# Patient Record
Sex: FEMALE | Race: BLACK OR AFRICAN AMERICAN | Employment: OTHER | URBAN - METROPOLITAN AREA
[De-identification: names, ages, dates, MRNs, and addresses within clinical notes are randomized per-mention and may not be internally consistent; named-entity substitution may affect disease eponyms.]

---

## 2017-06-13 ENCOUNTER — APPOINTMENT (OUTPATIENT)
Dept: CT IMAGING | Age: 67
End: 2017-06-13
Attending: EMERGENCY MEDICINE
Payer: MEDICARE

## 2017-06-13 ENCOUNTER — HOSPITAL ENCOUNTER (EMERGENCY)
Age: 67
Discharge: HOME | End: 2017-06-13
Attending: EMERGENCY MEDICINE
Payer: MEDICARE

## 2017-06-13 ENCOUNTER — APPOINTMENT (OUTPATIENT)
Dept: GENERAL RADIOLOGY | Age: 67
End: 2017-06-13
Attending: EMERGENCY MEDICINE
Payer: MEDICARE

## 2017-06-13 VITALS
WEIGHT: 124 LBS | TEMPERATURE: 99.1 F | OXYGEN SATURATION: 97 % | DIASTOLIC BLOOD PRESSURE: 64 MMHG | HEART RATE: 70 BPM | HEIGHT: 62 IN | RESPIRATION RATE: 16 BRPM | SYSTOLIC BLOOD PRESSURE: 129 MMHG | BODY MASS INDEX: 22.82 KG/M2

## 2017-06-13 DIAGNOSIS — E86.0 DEHYDRATION: Primary | ICD-10-CM

## 2017-06-13 LAB
ALBUMIN SERPL BCP-MCNC: 3.7 G/DL (ref 3.4–5)
ALBUMIN/GLOB SERPL: 0.8 {RATIO} (ref 0.8–1.7)
ALP SERPL-CCNC: 77 U/L (ref 45–117)
ALT SERPL-CCNC: 25 U/L (ref 13–56)
AMORPH CRY URNS QL MICRO: ABNORMAL
ANION GAP BLD CALC-SCNC: 12 MMOL/L (ref 3–18)
APPEARANCE UR: CLEAR
AST SERPL W P-5'-P-CCNC: 20 U/L (ref 15–37)
BACTERIA URNS QL MICRO: ABNORMAL /HPF
BASOPHILS # BLD AUTO: 0 K/UL (ref 0–0.1)
BASOPHILS # BLD: 0 % (ref 0–2)
BILIRUB SERPL-MCNC: 0.6 MG/DL (ref 0.2–1)
BILIRUB UR QL: NEGATIVE
BNP SERPL-MCNC: 43 PG/ML (ref 0–900)
BUN SERPL-MCNC: 18 MG/DL (ref 7–18)
BUN/CREAT SERPL: 15 (ref 12–20)
CALCIUM SERPL-MCNC: 8.5 MG/DL (ref 8.5–10.1)
CHLORIDE SERPL-SCNC: 105 MMOL/L (ref 100–108)
CK MB CFR SERPL CALC: 2.3 % (ref 0–4)
CK MB SERPL-MCNC: 1 NG/ML (ref 5–25)
CK SERPL-CCNC: 44 U/L (ref 26–192)
CO2 SERPL-SCNC: 22 MMOL/L (ref 21–32)
COLOR UR: ABNORMAL
CREAT SERPL-MCNC: 1.17 MG/DL (ref 0.6–1.3)
D DIMER PPP FEU-MCNC: 0.34 UG/ML(FEU)
DIFFERENTIAL METHOD BLD: ABNORMAL
EOSINOPHIL # BLD: 0 K/UL (ref 0–0.4)
EOSINOPHIL NFR BLD: 0 % (ref 0–5)
EPITH CASTS URNS QL MICRO: NEGATIVE /LPF (ref 0–5)
ERYTHROCYTE [DISTWIDTH] IN BLOOD BY AUTOMATED COUNT: 13.4 % (ref 11.6–14.5)
GLOBULIN SER CALC-MCNC: 4.6 G/DL (ref 2–4)
GLUCOSE SERPL-MCNC: 215 MG/DL (ref 74–99)
GLUCOSE UR STRIP.AUTO-MCNC: 100 MG/DL
HCT VFR BLD AUTO: 38 % (ref 35–45)
HGB BLD-MCNC: 12.7 G/DL (ref 12–16)
HGB UR QL STRIP: NEGATIVE
HYALINE CASTS URNS QL MICRO: ABNORMAL /LPF (ref 0–2)
KETONES UR QL STRIP.AUTO: ABNORMAL MG/DL
LEUKOCYTE ESTERASE UR QL STRIP.AUTO: NEGATIVE
LYMPHOCYTES # BLD AUTO: 21 % (ref 21–52)
LYMPHOCYTES # BLD: 0.8 K/UL (ref 0.9–3.6)
MCH RBC QN AUTO: 24.8 PG (ref 24–34)
MCHC RBC AUTO-ENTMCNC: 33.4 G/DL (ref 31–37)
MCV RBC AUTO: 74.2 FL (ref 74–97)
MONOCYTES # BLD: 0.3 K/UL (ref 0.05–1.2)
MONOCYTES NFR BLD AUTO: 8 % (ref 3–10)
MUCOUS THREADS URNS QL MICRO: ABNORMAL /LPF
NEUTS SEG # BLD: 2.6 K/UL (ref 1.8–8)
NEUTS SEG NFR BLD AUTO: 71 % (ref 40–73)
NITRITE UR QL STRIP.AUTO: NEGATIVE
PH UR STRIP: 5 [PH] (ref 5–8)
PLATELET # BLD AUTO: 184 K/UL (ref 135–420)
PLATELET COMMENTS,PCOM: ABNORMAL
PMV BLD AUTO: 10.9 FL (ref 9.2–11.8)
POTASSIUM SERPL-SCNC: 3.5 MMOL/L (ref 3.5–5.5)
PROT SERPL-MCNC: 8.3 G/DL (ref 6.4–8.2)
PROT UR STRIP-MCNC: 30 MG/DL
RBC # BLD AUTO: 5.12 M/UL (ref 4.2–5.3)
RBC #/AREA URNS HPF: NEGATIVE /HPF (ref 0–5)
RBC MORPH BLD: ABNORMAL
SODIUM SERPL-SCNC: 139 MMOL/L (ref 136–145)
SP GR UR REFRACTOMETRY: >1.03 (ref 1–1.03)
TROPONIN I SERPL-MCNC: <0.02 NG/ML (ref 0–0.04)
TSH SERPL DL<=0.05 MIU/L-ACNC: 0.45 UIU/ML (ref 0.36–3.74)
UROBILINOGEN UR QL STRIP.AUTO: 1 EU/DL (ref 0.2–1)
WBC # BLD AUTO: 3.7 K/UL (ref 4.6–13.2)
WBC URNS QL MICRO: ABNORMAL /HPF (ref 0–4)

## 2017-06-13 PROCEDURE — 84443 ASSAY THYROID STIM HORMONE: CPT | Performed by: EMERGENCY MEDICINE

## 2017-06-13 PROCEDURE — 74011250636 HC RX REV CODE- 250/636: Performed by: EMERGENCY MEDICINE

## 2017-06-13 PROCEDURE — 96374 THER/PROPH/DIAG INJ IV PUSH: CPT

## 2017-06-13 PROCEDURE — 71010 XR CHEST PORT: CPT

## 2017-06-13 PROCEDURE — 99285 EMERGENCY DEPT VISIT HI MDM: CPT

## 2017-06-13 PROCEDURE — 82550 ASSAY OF CK (CPK): CPT | Performed by: EMERGENCY MEDICINE

## 2017-06-13 PROCEDURE — 70450 CT HEAD/BRAIN W/O DYE: CPT

## 2017-06-13 PROCEDURE — 83880 ASSAY OF NATRIURETIC PEPTIDE: CPT | Performed by: EMERGENCY MEDICINE

## 2017-06-13 PROCEDURE — 96361 HYDRATE IV INFUSION ADD-ON: CPT

## 2017-06-13 PROCEDURE — 81001 URINALYSIS AUTO W/SCOPE: CPT | Performed by: EMERGENCY MEDICINE

## 2017-06-13 PROCEDURE — 85379 FIBRIN DEGRADATION QUANT: CPT | Performed by: EMERGENCY MEDICINE

## 2017-06-13 PROCEDURE — 93005 ELECTROCARDIOGRAM TRACING: CPT

## 2017-06-13 PROCEDURE — 80053 COMPREHEN METABOLIC PANEL: CPT | Performed by: EMERGENCY MEDICINE

## 2017-06-13 PROCEDURE — 85025 COMPLETE CBC W/AUTO DIFF WBC: CPT | Performed by: EMERGENCY MEDICINE

## 2017-06-13 RX ORDER — HYDROCODONE POLISTIREX AND CHLORPHENIRAMINE POLISTIREX 10; 8 MG/5ML; MG/5ML
5 SUSPENSION, EXTENDED RELEASE ORAL
Qty: 115 ML | Refills: 0 | Status: SHIPPED | OUTPATIENT
Start: 2017-06-13 | End: 2017-11-08

## 2017-06-13 RX ORDER — TRAMADOL HYDROCHLORIDE 50 MG/1
50 TABLET ORAL
COMMUNITY
End: 2017-11-08

## 2017-06-13 RX ORDER — SODIUM CHLORIDE 9 MG/ML
1000 INJECTION, SOLUTION INTRAVENOUS ONCE
Status: COMPLETED | OUTPATIENT
Start: 2017-06-13 | End: 2017-06-13

## 2017-06-13 RX ORDER — MORPHINE SULFATE 4 MG/ML
4 INJECTION, SOLUTION INTRAMUSCULAR; INTRAVENOUS
Status: COMPLETED | OUTPATIENT
Start: 2017-06-13 | End: 2017-06-13

## 2017-06-13 RX ORDER — METFORMIN HYDROCHLORIDE 500 MG/1
750 TABLET ORAL
Status: ON HOLD | COMMUNITY
End: 2019-10-18 | Stop reason: SDUPTHER

## 2017-06-13 RX ADMIN — Medication 4 MG: at 19:44

## 2017-06-13 RX ADMIN — SODIUM CHLORIDE 1000 ML: 900 INJECTION, SOLUTION INTRAVENOUS at 19:27

## 2017-06-13 RX ADMIN — SODIUM CHLORIDE 1000 ML: 9 INJECTION, SOLUTION INTRAVENOUS at 18:30

## 2017-06-13 NOTE — DISCHARGE INSTRUCTIONS
Dehydration: Care Instructions  Your Care Instructions  Dehydration happens when your body loses too much fluid. This might happen when you do not drink enough water or you lose large amounts of fluids from your body because of diarrhea, vomiting, or sweating. Severe dehydration can be life-threatening. Water and minerals called electrolytes help put your body fluids back in balance. Learn the early signs of fluid loss, and drink more fluids to prevent dehydration. Follow-up care is a key part of your treatment and safety. Be sure to make and go to all appointments, and call your doctor if you are having problems. It's also a good idea to know your test results and keep a list of the medicines you take. How can you care for yourself at home? · To prevent dehydration, drink plenty of fluids, enough so that your urine is light yellow or clear like water. Choose water and other caffeine-free clear liquids until you feel better. If you have kidney, heart, or liver disease and have to limit fluids, talk with your doctor before you increase the amount of fluids you drink. · If you do not feel like eating or drinking, try taking small sips of water, sports drinks, or other rehydration drinks. · Get plenty of rest.  To prevent dehydration  · Add more fluids to your diet and daily routine, unless your doctor has told you not to. · During hot weather, drink more fluids. Drink even more fluids if you exercise a lot. Stay away from drinks with alcohol or caffeine. · Watch for the symptoms of dehydration. These include:  ¨ A dry, sticky mouth. ¨ Dark yellow urine, and not much of it. ¨ Dry and sunken eyes. ¨ Feeling very tired. · Learn what problems can lead to dehydration. These include:  ¨ Diarrhea, fever, and vomiting. ¨ Any illness with a fever, such as pneumonia or the flu. ¨ Activities that cause heavy sweating, such as endurance races and heavy outdoor work in hot or humid weather.   ¨ Alcohol or drug abuse or withdrawal.  ¨ Certain medicines, such as cold and allergy pills (antihistamines), diet pills (diuretics), and laxatives. ¨ Certain diseases, such as diabetes, cancer, and heart or kidney disease. When should you call for help? Call 911 anytime you think you may need emergency care. For example, call if:  · You passed out (lost consciousness). Call your doctor now or seek immediate medical care if:  · You are confused and cannot think clearly. · You are dizzy or lightheaded, or you feel like you may faint. · You have signs of needing more fluids. You have sunken eyes and a dry mouth, and you pass only a little dark urine. · You cannot keep fluids down. Watch closely for changes in your health, and be sure to contact your doctor if:  · You are not making tears. · Your skin is very dry and sags slowly back into place after you pinch it. · Your mouth and eyes are very dry. Where can you learn more? Go to http://odessa-mack.info/. Enter Q602 in the search box to learn more about \"Dehydration: Care Instructions. \"  Current as of: May 27, 2016  Content Version: 11.2  © 3829-6171 [employer]. Care instructions adapted under license by [employer] (which disclaims liability or warranty for this information). If you have questions about a medical condition or this instruction, always ask your healthcare professional. [place] [address] any warranty or liability for your use of this information.

## 2017-06-13 NOTE — ED PROVIDER NOTES
HPI Comments:   5:55 PM Avelina Cole is a 61 y.o. female who presents to the ED via EMS for the evaluation of dizziness and fatigue, onset yesterday. Pt states that her sx worsened this morning where she became more SOB and had an episode of N/V. She also reports frequent bowel movements, dysuria, bilateral ankle swelling, and productive cough with green sputum. Denies CP, palpitations, or vision changes. Pt states that she saw her PCP about a week ago, where he refilled all of her home meds. Pt states that sh is no longer on Coumadin. No relieving or exacerbating factors. No other complaints at this time. PMHx: Pt has a past medical history of Arthritis; Asthma; CAD in native artery; Chronic neck pain; Chronic pain; Chronic pain syndrome; COPD (chronic obstructive pulmonary disease) ([address] Depression; Diabetes ([address] Diverticulitis; GERD (gastroesophageal reflux disease); Heart disease; Hidradenitis (9/11/2014); Hypertension; Left knee pain; Narcotic dependence ([address] Right wrist pain; and Sarcoidosis ([address] She also has no past medical history of Thromboembolus ([address] PCP: Hermann Fritz MD       The history is provided by the spouse, the patient and the EMS personnel. Past Medical History:   Diagnosis Date    Arthritis     \"generalized\"    Asthma     CAD in native artery     NSTEMI (Sep 2014); diffuse 65% pLAD, minimal disease RCA & LCx. pLAD FFR 0.93.  LV  no RWMA (echo and LV angio)    Chronic neck pain     Chronic pain     left knee    Chronic pain syndrome     COPD (chronic obstructive pulmonary disease) (HCC)     Depression     Diabetes (HCC)     Diverticulitis     GERD (gastroesophageal reflux disease)     Heart disease     Hidradenitis 9/11/2014    Hypertension     Left knee pain     Narcotic dependence ([address]     Right wrist pain     Sarcoidosis ([address]        Past Surgical History:   Procedure Laterality Date    HX BREAST BIOPSY Right     9/10/14: She said tag in place from [address] [place] [address] HX HYSTERECTOMY      HX KNEE ARTHROSCOPY Left          Family History:   Problem Relation Age of Onset    Hypertension Father     Diabetes Mother        Social History     Social History    Marital status:      Spouse name: N/A    Number of children: N/A    Years of education: N/A     Occupational History    Not on file. Social History Main Topics    Smoking status: Current Some Day Smoker     Packs/day: 0.10     Years: 46.00     Types: Cigarettes    Smokeless tobacco: Not on file      Comment: Only smokes 1 cigarette per day    Alcohol use 1.0 oz/week     2 Glasses of wine per week      Comment: socially    Drug use: Yes     Special: Cocaine, Marijuana      Comment: 9/10/14: Cocaine use during past day; 9/12/14: Says uses cocaine only occasionally    Sexual activity: Yes     Other Topics Concern    Not on file     Social History Narrative         ALLERGIES: Ciprofloxacin; Penicillins; and Shellfish derived    Review of Systems   Constitutional: Positive for appetite change and fatigue. Negative for chills and fever. HENT: Negative for congestion and sneezing. Eyes: Negative for visual disturbance. Respiratory: Positive for cough and shortness of breath. Cardiovascular: Positive for leg swelling. Negative for chest pain and palpitations. Gastrointestinal: Positive for nausea and vomiting. Negative for abdominal pain. Genitourinary: Negative for difficulty urinating and dysuria. Musculoskeletal: Negative for back pain. Skin: Negative for rash. Neurological: Positive for dizziness and light-headedness. Negative for weakness and headaches. Vitals:    06/13/17 1757 06/13/17 1800   BP: 130/70 132/72   Pulse: 81 81   Resp: 17 20   Temp: 99.1 °F (37.3 °C)    SpO2: 98% 97%   Weight: 56.2 kg (124 lb)    Height: 5' 2\" (1.575 m)         98% on RA, indicating adequate oxygenation.       Physical Exam   Constitutional: She is oriented to person, place, and time. She appears well-developed and well-nourished. Appears chronically ill   HENT:   Head: Normocephalic and atraumatic. Dry oral mucosa  Chapped lips   Neck: Neck supple. No JVD present. Cardiovascular: Normal rate and regular rhythm. Pulmonary/Chest: Effort normal and breath sounds normal. No respiratory distress. Abdominal: Soft. She exhibits no distension. There is no tenderness. There is no rebound and no guarding. Musculoskeletal: She exhibits no edema. No calf tenderness   Neurological: She is alert and oriented to person, place, and time. No cranial nerve deficit. Skin: Skin is warm and dry. No erythema. Psychiatric: Judgment normal.        MDM  Number of Diagnoses or Management Options  Diagnosis management comments: 62 y/o female presents with fatigue    Screen for infection with ua, cxr    Due to report of multiple falls will ct head to eval for ich  No dizziness, grossly normal neuro exam, doubt cva    Appears dry, ?due to decreased fluid intake and report of diarrhea, will give NS bolus.         Amount and/or Complexity of Data Reviewed  Clinical lab tests: ordered and reviewed  Tests in the radiology section of CPT®: ordered and reviewed  Tests in the medicine section of CPT®: reviewed and ordered      ED Course       Procedures    Medications ordered:   Medications   0.9% sodium chloride infusion 1,000 mL (1,000 mL IntraVENous New Bag 6/13/17 1830)   0.9% sodium chloride infusion 1,000 mL (1,000 mL IntraVENous New Bag 6/13/17 1927)   morphine injection 4 mg (4 mg IntraVENous Given 6/13/17 1944)         Lab findings:  Recent Results (from the past 12 hour(s))   TSH 3RD GENERATION    Collection Time: 06/13/17  6:08 PM   Result Value Ref Range    TSH 0.45 0.36 - 3.74 uIU/mL   EKG, 12 LEAD, INITIAL    Collection Time: 06/13/17  6:12 PM   Result Value Ref Range    Ventricular Rate 81 BPM    Atrial Rate 81 BPM    P-R Interval 122 ms    QRS Duration 86 ms    Q-T Interval 412 ms    QTC Calculation (Bezet) 478 ms    Calculated P Axis 61 degrees    Calculated R Axis -39 degrees    Calculated T Axis 45 degrees    Diagnosis       Normal sinus rhythm  Left axis deviation  Abnormal ECG  When compared with ECG of 30-SEP-2016 18:05,  No significant change was found     URINALYSIS W/ RFLX MICROSCOPIC    Collection Time: 06/13/17  6:15 PM   Result Value Ref Range    Color DARK YELLOW      Appearance CLEAR      Specific gravity >1.030 (H) 1.005 - 1.030    pH (UA) 5.0 5.0 - 8.0      Protein 30 (A) NEG mg/dL    Glucose 100 (A) NEG mg/dL    Ketone TRACE (A) NEG mg/dL    Bilirubin NEGATIVE  NEG      Blood NEGATIVE  NEG      Urobilinogen 1.0 0.2 - 1.0 EU/dL    Nitrites NEGATIVE  NEG      Leukocyte Esterase NEGATIVE  NEG     URINE MICROSCOPIC ONLY    Collection Time: 06/13/17  6:15 PM   Result Value Ref Range    WBC 1 to 3 0 - 4 /hpf    RBC NEGATIVE  0 - 5 /hpf    Epithelial cells NEGATIVE  0 - 5 /lpf    Bacteria FEW (A) NEG /hpf    Mucus 1+ (A) NEG /lpf    Amorphous Crystals 2+ (A) NEG    Hyaline cast 20 to 40 0 - 2 /lpf   CBC WITH AUTOMATED DIFF    Collection Time: 06/13/17  6:35 PM   Result Value Ref Range    WBC 3.7 (L) 4.6 - 13.2 K/uL    RBC 5.12 4.20 - 5.30 M/uL    HGB 12.7 12.0 - 16.0 g/dL    HCT 38.0 35.0 - 45.0 %    MCV 74.2 74.0 - 97.0 FL    MCH 24.8 24.0 - 34.0 PG    MCHC 33.4 31.0 - 37.0 g/dL    RDW 13.4 11.6 - 14.5 %    PLATELET 514 324 - 844 K/uL    MPV 10.9 9.2 - 11.8 FL    NEUTROPHILS 71 40 - 73 %    LYMPHOCYTES 21 21 - 52 %    MONOCYTES 8 3 - 10 %    EOSINOPHILS 0 0 - 5 %    BASOPHILS 0 0 - 2 %    ABS. NEUTROPHILS 2.6 1.8 - 8.0 K/UL    ABS. LYMPHOCYTES 0.8 (L) 0.9 - 3.6 K/UL    ABS. MONOCYTES 0.3 0.05 - 1.2 K/UL    ABS. EOSINOPHILS 0.0 0.0 - 0.4 K/UL    ABS.  BASOPHILS 0.0 0.0 - 0.1 K/UL    DF AUTOMATED      PLATELET COMMENTS ADEQUATE PLATELETS      RBC COMMENTS NORMOCYTIC, NORMOCHROMIC     METABOLIC PANEL, COMPREHENSIVE    Collection Time: 06/13/17  6:35 PM   Result Value Ref Range    Sodium 139 136 - 145 mmol/L    Potassium 3.5 3.5 - 5.5 mmol/L    Chloride 105 100 - 108 mmol/L    CO2 22 21 - 32 mmol/L    Anion gap 12 3.0 - 18 mmol/L    Glucose 215 (H) 74 - 99 mg/dL    BUN 18 7.0 - 18 MG/DL    Creatinine 1.17 0.6 - 1.3 MG/DL    BUN/Creatinine ratio 15 12 - 20      GFR est AA 56 (L) >60 ml/min/1.73m2    GFR est non-AA 47 (L) >60 ml/min/1.73m2    Calcium 8.5 8.5 - 10.1 MG/DL    Bilirubin, total 0.6 0.2 - 1.0 MG/DL    ALT (SGPT) 25 13 - 56 U/L    AST (SGOT) 20 15 - 37 U/L    Alk. phosphatase 77 45 - 117 U/L    Protein, total 8.3 (H) 6.4 - 8.2 g/dL    Albumin 3.7 3.4 - 5.0 g/dL    Globulin 4.6 (H) 2.0 - 4.0 g/dL    A-G Ratio 0.8 0.8 - 1.7     CARDIAC PANEL,(CK, CKMB & TROPONIN)    Collection Time: 06/13/17  6:35 PM   Result Value Ref Range    CK 44 26 - 192 U/L    CK - MB 1.0 <3.6 ng/ml    CK-MB Index 2.3 0.0 - 4.0 %    Troponin-I, Qt. <0.02 0.0 - 0.045 NG/ML   PRO-BNP    Collection Time: 06/13/17  6:35 PM   Result Value Ref Range    NT pro-BNP 43 0 - 900 PG/ML   D DIMER    Collection Time: 06/13/17  6:35 PM   Result Value Ref Range    D DIMER 0.34 <0.46 ug/ml(FEU)        EKG interpretation per Rhoda Mccall, DO :  3834, rate 81, left axis, NSR, incomplete RBBB. No ST changes    X-Ray, CT or other radiology findings or impressions:  Ct Head Wo Cont    Result Date: 6/13/2017  CT HEAD WITHOUT IV CONTRAST INDICATION: Multiple falls, off-balance, dizziness, lethargy. COMPARISON: CT head 9/30/2016. TECHNIQUE: Serial axial CT images were obtained from the skull vertex to foramen magnum without IV contrast. All CT scans at this facility are performed using dose optimization technique as appropriate to a performed exam, to include automated exposure control, adjustment of the mA and/or kV according to patient's size (including appropriate matching for site-specific examinations), or use of iterative reconstruction technique.  FINDINGS: Visualized paranasal sinuses are free from significant mucosal disease. . Gray-white matter differentiation appears preserved. .No evidence for acute intracranial hemorrhage, acute infarct, or mass lesion. Similar appearance of cerebral and cerebellar atrophy. No evidence for hydrocephalus. . The calvarium appears intact. IMPRESSION: No evidence for acute intracranial process. . Similar appearance of global atrophy. CXR: no acute process    Progress notes, Consult notes or additional Procedure notes:   Discussed results with pt. Advised PCP follow up.   discussed return precautions. Notified by RN that pt was requesting resources for addiction as she reports using crack cocaine intermittently. Beulah Cranberry, crisis worker gave pt resources for follow up. Dispo:  Patient was discharged in stable condition. Patient is to return to emergency department with any new or worsening condition. Diagnosis:   1. Dehydration        Follow-up Information     Follow up With Details Comments Prema Bauer MD In 2 days  [address] [facility] EMERGENCY DEPT  As needed, If symptoms worsen [address] [facility] [address]  [phone]           Patient's Medications   Start Taking    CHLORPHENIRAMINE-HYDROCODONE [facility]) 10-8 MG/5 ML SUSPENSION    Take 5 mL by mouth nightly as needed for Cough. Max Daily Amount: 5 mL. Continue Taking    ALBUTEROL (PROVENTIL HFA, VENTOLIN HFA, PROAIR HFA) 90 MCG/ACTUATION INHALER    Take 1 puff by inhalation daily as needed for Wheezing. ASPIRIN 81 MG CHEWABLE TABLET    Take 1 tablet by mouth daily. ATORVASTATIN (LIPITOR) 40 MG TABLET    Take 1 tablet by mouth nightly. CLOPIDOGREL (PLAVIX) 75 MG TABLET    Take 1 tablet by mouth daily. DILTIAZEM CD (CARDIZEM CD) 240 MG ER CAPSULE    Take 1 capsule by mouth daily. FLUOXETINE (PROZAC) 20 MG CAPSULE    Take  by mouth daily.     FLUTICASONE-SALMETEROL (ADVAIR) 250-50 MCG/DOSE DISKUS INHALER    Take 1 puff by inhalation every twelve (12) hours. GABAPENTIN (NEURONTIN) 300 MG CAPSULE    Take 300 mg by mouth daily. Indications: NEUROPATHIC PAIN    GABAPENTIN (NEURONTIN) 300 MG CAPSULE    1 tab PO BID    HYDROCODONE-ACETAMINOPHEN (NORCO)  MG TABLET    1/2 - 1 tab PO every day-BID PRN pain    INSULIN GLARGINE (LANTUS) 100 UNIT/ML INJECTION    33 Units by SubCUTAneous route nightly. METFORMIN (GLUCOPHAGE) 500 MG TABLET    Take 500 mg by mouth two (2) times daily (with meals). METHYLPREDNISOLONE (MEDROL DOSEPACK) 4 MG TABLET    Per dose pack instructions    NITROGLYCERIN (NITROSTAT) 0.4 MG SL TABLET    1 tablet by SubLINGual route as needed for Chest Pain. OMEPRAZOLE (PRILOSEC PO)    Take 20 mg by mouth daily. POLYETHYLENE GLYCOL (MIRALAX) 17 GRAM PACKET    Take 1 packet by mouth daily. POLYETHYLENE GLYCOL (MIRALAX) 17 GRAM/DOSE POWDER    Take 17 g by mouth daily. 1 tablespoon with 8 oz of water daily  Indications: CONSTIPATION    SITAGLIPTIN (JANUVIA) 100 MG TABLET    Take 100 mg by mouth daily. TIOTROPIUM (SPIRIVA WITH HANDIHALER) 18 MCG INHALATION CAPSULE    Take 1 Cap by inhalation daily. TIOTROPIUM (SPIRIVA WITH HANDIHALER) 18 MCG INHALATION CAPSULE    Take 1 capsule by inhalation daily. TRAMADOL (ULTRAM) 50 MG TABLET    Take 50 mg by mouth every six (6) hours as needed for Pain. ZOLPIDEM CR (AMBIEN CR) 12.5 MG TABLET        ZOLPIDEM TARTRATE (AMBIEN PO)    Take 12.5 mg by mouth nightly. These Medications have changed    No medications on file   Stop Taking    No medications on file         SCRIBE ATTESTATION STATEMENT  Documented by: Jayden Donaldson. Hermelinda Mullen for, and in the presence of, Vincenzo Montiel DO 5:55 PM   Signed by Jayden Donaldson.  Teresa Mukherjee, 6/13/2017 5:55 PM     PROVIDER ATTESTATION STATEMENT  I personally performed the services described in the documentation, reviewed the documentation, as recorded by the scribe in my presence, and it accurately and completely records my words and actions.   Javier Tse, DO

## 2017-06-13 NOTE — BSMART NOTE
At the request of an ER nurse, patient was provided with a folder containing referral information for area substance abuse treatment centers and miscellaneous alcohol/substance abuse contact information.       Dante Gupta, RN, BSN

## 2017-06-13 NOTE — ED TRIAGE NOTES
Pt arrives via EMS with c/o lethargy and dizziness with onset last night. Pt reports that sx's worsened today with nausea and vomiting. No change in meds per pt. Pt reports taht she saw her PCP Dr. Mack Goddard for similar sx's last week. Hx of MI x 2, HTN, and DM.

## 2017-06-14 LAB
ATRIAL RATE: 81 BPM
CALCULATED P AXIS, ECG09: 61 DEGREES
CALCULATED R AXIS, ECG10: -39 DEGREES
CALCULATED T AXIS, ECG11: 45 DEGREES
DIAGNOSIS, 93000: NORMAL
P-R INTERVAL, ECG05: 122 MS
Q-T INTERVAL, ECG07: 412 MS
QRS DURATION, ECG06: 86 MS
QTC CALCULATION (BEZET), ECG08: 478 MS
VENTRICULAR RATE, ECG03: 81 BPM

## 2017-06-14 NOTE — ED NOTES
I have reviewed discharge instructions with the patient. The patient verbalized understanding. Patient armband removed and shredded. Pt is discharged via wheelchair with no acute distress noted at this time, the patient is alert, oriented and stable at time of discharge. Vital Signs stable. Patient is being discharged with 1 prescription at this time.

## 2017-06-16 ENCOUNTER — HOSPITAL ENCOUNTER (EMERGENCY)
Age: 67
Discharge: HOME | End: 2017-06-16
Attending: EMERGENCY MEDICINE | Admitting: EMERGENCY MEDICINE
Payer: MEDICARE

## 2017-06-16 ENCOUNTER — APPOINTMENT (OUTPATIENT)
Dept: GENERAL RADIOLOGY | Age: 67
End: 2017-06-16
Attending: EMERGENCY MEDICINE
Payer: MEDICARE

## 2017-06-16 VITALS
RESPIRATION RATE: 20 BRPM | DIASTOLIC BLOOD PRESSURE: 85 MMHG | TEMPERATURE: 98.1 F | WEIGHT: 130 LBS | HEIGHT: 62 IN | BODY MASS INDEX: 23.92 KG/M2 | HEART RATE: 76 BPM | OXYGEN SATURATION: 99 % | SYSTOLIC BLOOD PRESSURE: 152 MMHG

## 2017-06-16 DIAGNOSIS — R53.1 WEAKNESS: Primary | ICD-10-CM

## 2017-06-16 DIAGNOSIS — E87.6 HYPOKALEMIA: ICD-10-CM

## 2017-06-16 LAB
ANION GAP BLD CALC-SCNC: 6 MMOL/L (ref 3–18)
APPEARANCE UR: CLEAR
BACTERIA URNS QL MICRO: NEGATIVE /HPF
BASOPHILS # BLD AUTO: 0 K/UL (ref 0–0.1)
BASOPHILS # BLD: 1 % (ref 0–2)
BILIRUB UR QL: NEGATIVE
BUN SERPL-MCNC: 5 MG/DL (ref 7–18)
BUN/CREAT SERPL: 7 (ref 12–20)
CALCIUM SERPL-MCNC: 8.2 MG/DL (ref 8.5–10.1)
CHLORIDE SERPL-SCNC: 105 MMOL/L (ref 100–108)
CK MB CFR SERPL CALC: NORMAL % (ref 0–4)
CK MB SERPL-MCNC: <1 NG/ML (ref 5–25)
CK SERPL-CCNC: 35 U/L (ref 26–192)
CO2 SERPL-SCNC: 30 MMOL/L (ref 21–32)
COLOR UR: YELLOW
CREAT SERPL-MCNC: 0.69 MG/DL (ref 0.6–1.3)
DIFFERENTIAL METHOD BLD: ABNORMAL
EOSINOPHIL # BLD: 0 K/UL (ref 0–0.4)
EOSINOPHIL NFR BLD: 1 % (ref 0–5)
EPITH CASTS URNS QL MICRO: ABNORMAL /LPF (ref 0–5)
ERYTHROCYTE [DISTWIDTH] IN BLOOD BY AUTOMATED COUNT: 13.3 % (ref 11.6–14.5)
GLUCOSE SERPL-MCNC: 139 MG/DL (ref 74–99)
GLUCOSE UR STRIP.AUTO-MCNC: 100 MG/DL
HCT VFR BLD AUTO: 34.2 % (ref 35–45)
HGB BLD-MCNC: 11.5 G/DL (ref 12–16)
HGB UR QL STRIP: NEGATIVE
KETONES UR QL STRIP.AUTO: NEGATIVE MG/DL
LEUKOCYTE ESTERASE UR QL STRIP.AUTO: NEGATIVE
LYMPHOCYTES # BLD AUTO: 45 % (ref 21–52)
LYMPHOCYTES # BLD: 1.7 K/UL (ref 0.9–3.6)
MAGNESIUM SERPL-MCNC: 1.4 MG/DL (ref 1.6–2.6)
MCH RBC QN AUTO: 24.7 PG (ref 24–34)
MCHC RBC AUTO-ENTMCNC: 33.6 G/DL (ref 31–37)
MCV RBC AUTO: 73.5 FL (ref 74–97)
MONOCYTES # BLD: 0.3 K/UL (ref 0.05–1.2)
MONOCYTES NFR BLD AUTO: 8 % (ref 3–10)
MUCOUS THREADS URNS QL MICRO: ABNORMAL /LPF
NEUTS SEG # BLD: 1.7 K/UL (ref 1.8–8)
NEUTS SEG NFR BLD AUTO: 45 % (ref 40–73)
NITRITE UR QL STRIP.AUTO: NEGATIVE
PH UR STRIP: 6 [PH] (ref 5–8)
PLATELET # BLD AUTO: 156 K/UL (ref 135–420)
PMV BLD AUTO: 11.8 FL (ref 9.2–11.8)
POTASSIUM SERPL-SCNC: 2.8 MMOL/L (ref 3.5–5.5)
PROT UR STRIP-MCNC: 30 MG/DL
RBC # BLD AUTO: 4.65 M/UL (ref 4.2–5.3)
RBC #/AREA URNS HPF: NEGATIVE /HPF (ref 0–5)
SODIUM SERPL-SCNC: 141 MMOL/L (ref 136–145)
SP GR UR REFRACTOMETRY: 1.02 (ref 1–1.03)
TROPONIN I SERPL-MCNC: <0.02 NG/ML (ref 0–0.04)
UROBILINOGEN UR QL STRIP.AUTO: 4 EU/DL (ref 0.2–1)
WBC # BLD AUTO: 3.7 K/UL (ref 4.6–13.2)
WBC URNS QL MICRO: ABNORMAL /HPF (ref 0–4)

## 2017-06-16 PROCEDURE — 96374 THER/PROPH/DIAG INJ IV PUSH: CPT

## 2017-06-16 PROCEDURE — 71010 XR CHEST PORT: CPT

## 2017-06-16 PROCEDURE — 85025 COMPLETE CBC W/AUTO DIFF WBC: CPT | Performed by: EMERGENCY MEDICINE

## 2017-06-16 PROCEDURE — 99285 EMERGENCY DEPT VISIT HI MDM: CPT

## 2017-06-16 PROCEDURE — 74011250636 HC RX REV CODE- 250/636: Performed by: EMERGENCY MEDICINE

## 2017-06-16 PROCEDURE — 83735 ASSAY OF MAGNESIUM: CPT | Performed by: EMERGENCY MEDICINE

## 2017-06-16 PROCEDURE — 80048 BASIC METABOLIC PNL TOTAL CA: CPT | Performed by: EMERGENCY MEDICINE

## 2017-06-16 PROCEDURE — 81001 URINALYSIS AUTO W/SCOPE: CPT | Performed by: EMERGENCY MEDICINE

## 2017-06-16 PROCEDURE — 74011250637 HC RX REV CODE- 250/637: Performed by: EMERGENCY MEDICINE

## 2017-06-16 PROCEDURE — 82550 ASSAY OF CK (CPK): CPT | Performed by: EMERGENCY MEDICINE

## 2017-06-16 RX ORDER — KETOROLAC TROMETHAMINE 30 MG/ML
30 INJECTION, SOLUTION INTRAMUSCULAR; INTRAVENOUS
Status: COMPLETED | OUTPATIENT
Start: 2017-06-16 | End: 2017-06-16

## 2017-06-16 RX ORDER — POTASSIUM CHLORIDE 20 MEQ/1
40 TABLET, EXTENDED RELEASE ORAL
Status: COMPLETED | OUTPATIENT
Start: 2017-06-16 | End: 2017-06-16

## 2017-06-16 RX ORDER — POTASSIUM CHLORIDE 20 MEQ/1
20 TABLET, EXTENDED RELEASE ORAL 3 TIMES DAILY
Qty: 9 TAB | Refills: 0 | Status: SHIPPED | OUTPATIENT
Start: 2017-06-16 | End: 2017-06-19

## 2017-06-16 RX ADMIN — POTASSIUM CHLORIDE 40 MEQ: 1500 TABLET, EXTENDED RELEASE ORAL at 14:17

## 2017-06-16 RX ADMIN — KETOROLAC TROMETHAMINE 30 MG: 30 INJECTION, SOLUTION INTRAMUSCULAR at 14:37
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
## 2017-06-16 NOTE — DISCHARGE INSTRUCTIONS
SPECIFIC PATIENT INSTRUCTIONS FROM THE PHYSICIAN WHO TREATED YOU IN THE ER TODAY:  1. Return if any concerns or worsening of condition(s). 2. FOLLOW UP APPOINTMENT:  Your primary doctor in 1-2 days. 3. K-Dur as prescribed. Weakness: Care Instructions  Your Care Instructions  Weakness is a lack of physical or muscle strength. You may feel that you need to make extra effort to move your arms, legs, or other muscles. Generalized weakness means that you feel weak in most areas of your body. Another type of weakness may affect just one muscle or group of muscles. You may feel weak and tired after you have done too much activity, such as taking an extra-long hike. This is not a serious problem. It often goes away on its own. Feeling weak can also be caused by medical conditions like thyroid problems, depression, or a virus. Sometimes the cause can be serious. Your doctor may want to do more tests to try to find the cause of the weakness. The doctor has checked you carefully, but problems can develop later. If you notice any problems or new symptoms, get medical treatment right away. Follow-up care is a key part of your treatment and safety. Be sure to make and go to all appointments, and call your doctor if you are having problems. It's also a good idea to know your test results and keep a list of the medicines you take. How can you care for yourself at home? · Rest when you feel tired. · Be safe with medicines. If your doctor prescribed medicine, take it exactly as prescribed. Call your doctor if you think you are having a problem with your medicine. You will get more details on the specific medicines your doctor prescribes. · Do not skip meals. Eating a balanced diet may increase your energy level. · Get some physical activity every day, but do not get too tired. When should you call for help? Call your doctor now or seek immediate medical care if:  · You have new or worse weakness.   · You are dizzy or lightheaded, or you feel like you may faint. Watch closely for changes in your health, and be sure to contact your doctor if:  · You do not get better as expected. Where can you learn more? Go to http://odessa-mack.info/. Enter 148 [id number] in the search box to learn more about \"Weakness: Care Instructions. \"  Current as of: May 27, 2016  Content Version: 11.2  © 5899-0695 [employer]. Care instructions adapted under license by [employer] (which disclaims liability or warranty for this information). If you have questions about a medical condition or this instruction, always ask your healthcare professional. [address] any warranty or liability for your use of this information. Hypokalemia: Care Instructions  Your Care Instructions  Hypokalemia (say \"py-tr-roo-MARIAM-kenya-uh\") is a low level of potassium. The heart, muscles, kidneys, and nervous system all need potassium to work well. This problem has many different causes. Kidney problems, diet, and medicines like diuretics and laxatives can cause it. So can vomiting or diarrhea. In some cases, cancer is the cause. Your doctor may do tests to find the cause of your low potassium levels. You may need medicines to bring your potassium levels back to normal. You may also need regular blood tests to check your potassium. If you have very low potassium, you may need intravenous (IV) medicines. You also may need tests to check the electrical activity of your heart. Heart problems caused by low potassium levels can be very serious. Follow-up care is a key part of your treatment and safety. Be sure to make and go to all appointments, and call your doctor if you are having problems. It's also a good idea to know your test results and keep a list of the medicines you take. How can you care for yourself at home? · If your doctor recommends it, eat foods that have a lot of potassium.  These include fresh fruits, juices, and vegetables. They also include nuts, beans, and milk. · Be safe with medicines. If your doctor prescribes medicines or potassium supplements, take them exactly as directed. Call your doctor if you have any problems with your medicines. · Get your potassium levels tested as often as your doctor tells you. When should you call for help? Call 911 anytime you think you may need emergency care. For example, call if:  · You feel like your heart is missing beats. Heart problems caused by low potassium can cause death. · You passed out (lost consciousness). · You have a seizure. Call your doctor now or seek immediate medical care if:  · You feel weak or unusually tired. · You have severe arm or leg cramps. · You have tingling or numbness. · You feel sick to your stomach, or you vomit. · You have belly cramps. · You feel bloated or constipated. · You have to urinate a lot. · You feel very thirsty most of the time. · You are dizzy or lightheaded, or you feel like you may faint. · You feel depressed, or you lose touch with reality. Watch closely for changes in your health, and be sure to contact your doctor if:  · You do not get better as expected. Where can you learn more? Go to http://odessa-mack.info/. Enter G358 in the search box to learn more about \"Hypokalemia: Care Instructions. \"  Current as of: July 28, 2016  Content Version: 11.2  © 4391-6344 [employer]. Care instructions adapted under license by [employer] (which disclaims liability or warranty for this information). If you have questions about a medical condition or this instruction, always ask your healthcare professional. [address] any warranty or liability for your use of this information. Dojo Activation    Thank you for requesting access to Dojo.  Please follow the instructions below to securely access and download your online medical record. enercast allows you to send messages to your doctor, view your test results, renew your prescriptions, schedule appointments, and more. How Do I Sign Up? 1. In your internet browser, go to [email or web address]. 2. Click on the First Time User? Click Here link in the Sign In box. You will see the New Member Sign Up page. 3. Enter your enercast Access Code exactly as it appears below. You will not need to use this code after youve completed the sign-up process. If you do not sign up before the expiration date, you must request a new code. Intucellt Access Code: Activation code not generated  Current enercast Status: Patient Declined (This is the date your enercast access code will )    4. Enter the last four digits of your Social Security Number (xxxx) and Date of Birth (mm/dd/yyyy) as indicated and click Submit. You will be taken to the next sign-up page. 5. Create a enercast ID. This will be your enercast login ID and cannot be changed, so think of one that is secure and easy to remember. 6. Create a enercast password. You can change your password at any time. 7. Enter your Password Reset Question and Answer. This can be used at a later time if you forget your password. 8. Enter your e-mail address. You will receive e-mail notification when new information is available in [address] 9. Click Sign Up. You can now view and download portions of your medical record. 10. Click the Download Summary menu link to download a portable copy of your medical information. Additional Information    If you have questions, please visit the Frequently Asked Questions section of the enercast website at [email or web address] Remember, enercast is NOT to be used for urgent needs. For medical emergencies, dial 911.

## 2017-08-15 ENCOUNTER — HOSPITAL ENCOUNTER (OUTPATIENT)
Dept: LAB | Age: 67
Discharge: HOME | End: 2017-08-15
Payer: MEDICARE

## 2017-08-15 DIAGNOSIS — I10 ESSENTIAL HYPERTENSION, BENIGN: ICD-10-CM

## 2017-08-15 LAB
ANION GAP BLD CALC-SCNC: 8 MMOL/L (ref 3–18)
BUN SERPL-MCNC: 13 MG/DL (ref 7–18)
BUN/CREAT SERPL: 17 (ref 12–20)
CALCIUM SERPL-MCNC: 8.4 MG/DL (ref 8.5–10.1)
CHLORIDE SERPL-SCNC: 106 MMOL/L (ref 100–108)
CO2 SERPL-SCNC: 29 MMOL/L (ref 21–32)
CREAT SERPL-MCNC: 0.75 MG/DL (ref 0.6–1.3)
GLUCOSE SERPL-MCNC: 252 MG/DL (ref 74–99)
HBA1C MFR BLD: 9.1 % (ref 4.2–5.6)
POTASSIUM SERPL-SCNC: 3.9 MMOL/L (ref 3.5–5.5)
SODIUM SERPL-SCNC: 143 MMOL/L (ref 136–145)

## 2017-08-15 PROCEDURE — 80048 BASIC METABOLIC PNL TOTAL CA: CPT | Performed by: FAMILY MEDICINE

## 2017-08-15 PROCEDURE — 36415 COLL VENOUS BLD VENIPUNCTURE: CPT | Performed by: FAMILY MEDICINE

## 2017-08-15 PROCEDURE — 83036 HEMOGLOBIN GLYCOSYLATED A1C: CPT | Performed by: FAMILY MEDICINE

## 2017-10-30 ENCOUNTER — HOSPITAL ENCOUNTER (EMERGENCY)
Age: 67
Discharge: HOME | End: 2017-10-30
Attending: EMERGENCY MEDICINE
Payer: MEDICARE

## 2017-10-30 ENCOUNTER — APPOINTMENT (OUTPATIENT)
Dept: GENERAL RADIOLOGY | Age: 67
End: 2017-10-30
Attending: EMERGENCY MEDICINE
Payer: MEDICARE

## 2017-10-30 VITALS
OXYGEN SATURATION: 97 % | RESPIRATION RATE: 22 BRPM | HEART RATE: 96 BPM | DIASTOLIC BLOOD PRESSURE: 88 MMHG | TEMPERATURE: 98.6 F | SYSTOLIC BLOOD PRESSURE: 134 MMHG

## 2017-10-30 DIAGNOSIS — R05.9 COUGH: Primary | ICD-10-CM

## 2017-10-30 LAB
ALBUMIN SERPL-MCNC: 4.1 G/DL (ref 3.4–5)
ALBUMIN/GLOB SERPL: 0.8 {RATIO} (ref 0.8–1.7)
ALP SERPL-CCNC: 142 U/L (ref 45–117)
ALT SERPL-CCNC: 23 U/L (ref 13–56)
ANION GAP SERPL CALC-SCNC: 7 MMOL/L (ref 3–18)
AST SERPL-CCNC: 16 U/L (ref 15–37)
ATRIAL RATE: 97 BPM
BASOPHILS # BLD: 0 K/UL (ref 0–0.06)
BASOPHILS NFR BLD: 0 % (ref 0–2)
BILIRUB SERPL-MCNC: 0.2 MG/DL (ref 0.2–1)
BUN SERPL-MCNC: 18 MG/DL (ref 7–18)
BUN/CREAT SERPL: 29 (ref 12–20)
CALCIUM SERPL-MCNC: 9.3 MG/DL (ref 8.5–10.1)
CALCULATED P AXIS, ECG09: 55 DEGREES
CALCULATED R AXIS, ECG10: -21 DEGREES
CALCULATED T AXIS, ECG11: 41 DEGREES
CHLORIDE SERPL-SCNC: 101 MMOL/L (ref 100–108)
CK MB CFR SERPL CALC: 3.9 % (ref 0–4)
CK MB SERPL-MCNC: 1.5 NG/ML (ref 5–25)
CK SERPL-CCNC: 38 U/L (ref 26–192)
CO2 SERPL-SCNC: 28 MMOL/L (ref 21–32)
CREAT SERPL-MCNC: 0.63 MG/DL (ref 0.6–1.3)
D DIMER PPP FEU-MCNC: 0.45 UG/ML(FEU)
DIAGNOSIS, 93000: NORMAL
DIFFERENTIAL METHOD BLD: ABNORMAL
EOSINOPHIL # BLD: 0.1 K/UL (ref 0–0.4)
EOSINOPHIL NFR BLD: 2 % (ref 0–5)
ERYTHROCYTE [DISTWIDTH] IN BLOOD BY AUTOMATED COUNT: 16.1 % (ref 11.6–14.5)
GLOBULIN SER CALC-MCNC: 5.3 G/DL (ref 2–4)
GLUCOSE SERPL-MCNC: 171 MG/DL (ref 74–99)
HCT VFR BLD AUTO: 37.2 % (ref 35–45)
HGB BLD-MCNC: 11.7 G/DL (ref 12–16)
LACTATE BLD-SCNC: 1.1 MMOL/L (ref 0.4–2)
LYMPHOCYTES # BLD: 1.9 K/UL (ref 0.9–3.6)
LYMPHOCYTES NFR BLD: 33 % (ref 21–52)
MCH RBC QN AUTO: 23.6 PG (ref 24–34)
MCHC RBC AUTO-ENTMCNC: 31.5 G/DL (ref 31–37)
MCV RBC AUTO: 75.2 FL (ref 74–97)
MONOCYTES # BLD: 0.3 K/UL (ref 0.05–1.2)
MONOCYTES NFR BLD: 6 % (ref 3–10)
NEUTS SEG # BLD: 3.4 K/UL (ref 1.8–8)
NEUTS SEG NFR BLD: 59 % (ref 40–73)
P-R INTERVAL, ECG05: 116 MS
PLATELET # BLD AUTO: 328 K/UL (ref 135–420)
PMV BLD AUTO: 10.6 FL (ref 9.2–11.8)
POTASSIUM SERPL-SCNC: 4.5 MMOL/L (ref 3.5–5.5)
PROT SERPL-MCNC: 9.4 G/DL (ref 6.4–8.2)
Q-T INTERVAL, ECG07: 354 MS
QRS DURATION, ECG06: 74 MS
QTC CALCULATION (BEZET), ECG08: 449 MS
RBC # BLD AUTO: 4.95 M/UL (ref 4.2–5.3)
SODIUM SERPL-SCNC: 136 MMOL/L (ref 136–145)
TROPONIN I SERPL-MCNC: <0.02 NG/ML (ref 0–0.04)
VENTRICULAR RATE, ECG03: 97 BPM
WBC # BLD AUTO: 5.7 K/UL (ref 4.6–13.2)

## 2017-10-30 PROCEDURE — 80053 COMPREHEN METABOLIC PANEL: CPT | Performed by: EMERGENCY MEDICINE

## 2017-10-30 PROCEDURE — 74011000250 HC RX REV CODE- 250: Performed by: EMERGENCY MEDICINE

## 2017-10-30 PROCEDURE — 83605 ASSAY OF LACTIC ACID: CPT

## 2017-10-30 PROCEDURE — 93005 ELECTROCARDIOGRAM TRACING: CPT

## 2017-10-30 PROCEDURE — 82550 ASSAY OF CK (CPK): CPT | Performed by: EMERGENCY MEDICINE

## 2017-10-30 PROCEDURE — 94640 AIRWAY INHALATION TREATMENT: CPT

## 2017-10-30 PROCEDURE — 85379 FIBRIN DEGRADATION QUANT: CPT | Performed by: EMERGENCY MEDICINE

## 2017-10-30 PROCEDURE — 77030029684 HC NEB SM VOL KT MONA -A

## 2017-10-30 PROCEDURE — 74011250636 HC RX REV CODE- 250/636: Performed by: EMERGENCY MEDICINE

## 2017-10-30 PROCEDURE — 99283 EMERGENCY DEPT VISIT LOW MDM: CPT

## 2017-10-30 PROCEDURE — 85025 COMPLETE CBC W/AUTO DIFF WBC: CPT | Performed by: EMERGENCY MEDICINE

## 2017-10-30 PROCEDURE — 96374 THER/PROPH/DIAG INJ IV PUSH: CPT

## 2017-10-30 PROCEDURE — 71020 XR CHEST PA LAT: CPT

## 2017-10-30 RX ORDER — KETOROLAC TROMETHAMINE 30 MG/ML
30 INJECTION, SOLUTION INTRAMUSCULAR; INTRAVENOUS
Status: COMPLETED | OUTPATIENT
Start: 2017-10-30 | End: 2017-10-30

## 2017-10-30 RX ORDER — IPRATROPIUM BROMIDE AND ALBUTEROL SULFATE 2.5; .5 MG/3ML; MG/3ML
3 SOLUTION RESPIRATORY (INHALATION)
Status: COMPLETED | OUTPATIENT
Start: 2017-10-30 | End: 2017-10-30

## 2017-10-30 RX ORDER — AZITHROMYCIN 250 MG/1
TABLET, FILM COATED ORAL
Qty: 6 TAB | Refills: 0 | Status: SHIPPED | OUTPATIENT
Start: 2017-10-30 | End: 2017-11-08

## 2017-10-30 RX ORDER — METHYLPREDNISOLONE 4 MG/1
TABLET ORAL
Qty: 1 DOSE PACK | Refills: 0 | Status: SHIPPED | OUTPATIENT
Start: 2017-10-30 | End: 2018-09-11

## 2017-10-30 RX ORDER — HYDROCODONE BITARTRATE AND ACETAMINOPHEN 5; 325 MG/1; MG/1
TABLET ORAL
Qty: 12 TAB | Refills: 0 | Status: ON HOLD | OUTPATIENT
Start: 2017-10-30 | End: 2017-11-08

## 2017-10-30 RX ADMIN — KETOROLAC TROMETHAMINE 30 MG: 30 INJECTION, SOLUTION INTRAMUSCULAR at 14:24

## 2017-10-30 RX ADMIN — IPRATROPIUM BROMIDE AND ALBUTEROL SULFATE 3 ML: .5; 3 SOLUTION RESPIRATORY (INHALATION) at 14:24

## 2017-10-30 NOTE — ED PROVIDER NOTES
HPI Comments: 12:52 PM Junaid Black is a 74 y.o. female with a h/o COPD, CAD, asthma, HTN who presents to the ED c/o productive cough with central CP that began about 3 weeks ago. She notes green sputum with streaks of blood and states that chest pain worsens with coughing. She said she always gets this pain when she coughs. Has had similar pain whens he has gotten a respiratory infection in the past.  She rates her current discomfort at 9/10 in severity. She reports sore throat, dry skin and muscle aches. She states that symptoms have persisted much longer than in previous similar episodes and have not responded to OTC medication. She has not been seen by her PCP for her symptoms. She denies leg swelling. PCP:  Saadia Covington MD      The history is provided by the patient. No  was used. Past Medical History:   Diagnosis Date    Arthritis     \"generalized\"    Asthma     CAD in native artery     NSTEMI (Sep 2014); diffuse 65% pLAD, minimal disease RCA & LCx. pLAD FFR 0.93.  LV  no RWMA (echo and LV angio)    Chronic neck pain     Chronic pain     left knee    Chronic pain syndrome     COPD (chronic obstructive pulmonary disease) (HCC)     Depression     Diabetes (HCC)     Diverticulitis     GERD (gastroesophageal reflux disease)     Heart disease     Hidradenitis 9/11/2014    Hypertension     Left knee pain     Narcotic dependence ([address]     Right wrist pain     Sarcoidosis        Past Surgical History:   Procedure Laterality Date    HX BREAST BIOPSY Right     9/10/14: She said tag in place from [address] [place] [address] HX HYSTERECTOMY      HX KNEE ARTHROSCOPY Left          Family History:   Problem Relation Age of Onset    Hypertension Father     Diabetes Mother        Social History     Social History    Marital status:      Spouse name: N/A    Number of children: N/A    Years of education: N/A     Occupational History    Not on file.     Social History Main Topics    Smoking status: Current Some Day Smoker     Packs/day: 0.10     Years: 46.00     Types: Cigarettes    Smokeless tobacco: Not on file      Comment: Only smokes 1 cigarette per day    Alcohol use 1.0 oz/week     2 Glasses of wine per week      Comment: socially    Drug use: Yes     Special: Cocaine, Marijuana      Comment: 9/10/14: Cocaine use during past day; 9/12/14: Says uses cocaine only occasionally    Sexual activity: Yes     Other Topics Concern    Not on file     Social History Narrative         ALLERGIES: Ciprofloxacin; Penicillins; and Shellfish derived    Review of Systems   Constitutional: Positive for fever. Negative for chills. HENT: Positive for congestion and sore throat. Negative for rhinorrhea. Respiratory: Positive for cough and shortness of breath. Cardiovascular: Positive for chest pain. Negative for leg swelling. Gastrointestinal: Negative for abdominal pain, nausea and vomiting. Endocrine: Negative for polyuria. Genitourinary: Negative for dysuria. Musculoskeletal: Positive for myalgias. Negative for back pain. Skin: Negative for rash. Neurological: Negative for headaches. Vitals:    10/30/17 1026   BP: 134/88   Pulse: 96   Resp: 22   Temp: 98.6 °F (37 °C)   SpO2: 97%            Physical Exam   Constitutional: She is oriented to person, place, and time. She appears well-developed and well-nourished. Speaking in full sentences   HENT:   Head: Normocephalic and atraumatic. Eyes: Conjunctivae are normal. Pupils are equal, round, and reactive to light. Neck: Normal range of motion. Neck supple. Cardiovascular: Normal rate and regular rhythm. Pulmonary/Chest: Effort normal and breath sounds normal. No respiratory distress. She has no wheezes. She exhibits tenderness (anterior). Speaking in full sentences   Abdominal: Soft. Bowel sounds are normal. She exhibits no distension. There is no tenderness.  There is no rebound and no guarding. Musculoskeletal: Normal range of motion. Neurological: She is alert and oriented to person, place, and time. Skin: Skin is warm and dry. Psychiatric: She has a normal mood and affect. Thought content normal.        MDM  ED Course   Patient with cough and pain whens he coughs. Says she normally takes vicodin for this pain but she is out. Deniesany fevers or chills. CXR with some chronic changes. Azithromycin for possible infiltrate. Will give steroids for possible flair of sarcoid. GIven small doses of vicodin. Cardiac enzymes and d-dimer all within normal limits.      Procedures    Vitals:  Patient Vitals for the past 12 hrs:   Temp Pulse Resp BP SpO2   10/30/17 1026 98.6 °F (37 °C) 96 22 134/88 97 %       Medications Ordered:  Medications   ketorolac (TORADOL) injection 30 mg (30 mg IntraVENous Given 10/30/17 1424)   albuterol-ipratropium (DUO-NEB) 2.5 MG-0.5 MG/3 ML (3 mL Nebulization Given 10/30/17 1424)       Lab Findings:  Recent Results (from the past 12 hour(s))   EKG, 12 LEAD, INITIAL    Collection Time: 10/30/17 10:19 AM   Result Value Ref Range    Ventricular Rate 97 BPM    Atrial Rate 97 BPM    P-R Interval 116 ms    QRS Duration 74 ms    Q-T Interval 354 ms    QTC Calculation (Bezet) 449 ms    Calculated P Axis 55 degrees    Calculated R Axis -21 degrees    Calculated T Axis 41 degrees    Diagnosis       Normal sinus rhythm  Possible Left atrial enlargement  Borderline ECG  When compared with ECG of 13-JUN-2017 18:12,  No significant change was found  Confirmed by Dominik Ledbetter ([id number]) on 10/30/2017 1:37:13 PM     CBC WITH AUTOMATED DIFF    Collection Time: 10/30/17 12:10 PM   Result Value Ref Range    WBC 5.7 4.6 - 13.2 K/uL    RBC 4.95 4.20 - 5.30 M/uL    HGB 11.7 (L) 12.0 - 16.0 g/dL    HCT 37.2 35.0 - 45.0 %    MCV 75.2 74.0 - 97.0 FL    MCH 23.6 (L) 24.0 - 34.0 PG    MCHC 31.5 31.0 - 37.0 g/dL    RDW 16.1 (H) 11.6 - 14.5 %    PLATELET 033 745 - 812 K/uL    MPV 10.6 9.2 - 11.8 FL    NEUTROPHILS 59 40 - 73 %    LYMPHOCYTES 33 21 - 52 %    MONOCYTES 6 3 - 10 %    EOSINOPHILS 2 0 - 5 %    BASOPHILS 0 0 - 2 %    ABS. NEUTROPHILS 3.4 1.8 - 8.0 K/UL    ABS. LYMPHOCYTES 1.9 0.9 - 3.6 K/UL    ABS. MONOCYTES 0.3 0.05 - 1.2 K/UL    ABS. EOSINOPHILS 0.1 0.0 - 0.4 K/UL    ABS. BASOPHILS 0.0 0.0 - 0.06 K/UL    DF AUTOMATED     METABOLIC PANEL, COMPREHENSIVE    Collection Time: 10/30/17 12:10 PM   Result Value Ref Range    Sodium 136 136 - 145 mmol/L    Potassium 4.5 3.5 - 5.5 mmol/L    Chloride 101 100 - 108 mmol/L    CO2 28 21 - 32 mmol/L    Anion gap 7 3.0 - 18 mmol/L    Glucose 171 (H) 74 - 99 mg/dL    BUN 18 7.0 - 18 MG/DL    Creatinine 0.63 0.6 - 1.3 MG/DL    BUN/Creatinine ratio 29 (H) 12 - 20      GFR est AA >60 >60 ml/min/1.73m2    GFR est non-AA >60 >60 ml/min/1.73m2    Calcium 9.3 8.5 - 10.1 MG/DL    Bilirubin, total 0.2 0.2 - 1.0 MG/DL    ALT (SGPT) 23 13 - 56 U/L    AST (SGOT) 16 15 - 37 U/L    Alk. phosphatase 142 (H) 45 - 117 U/L    Protein, total 9.4 (H) 6.4 - 8.2 g/dL    Albumin 4.1 3.4 - 5.0 g/dL    Globulin 5.3 (H) 2.0 - 4.0 g/dL    A-G Ratio 0.8 0.8 - 1.7     CARDIAC PANEL,(CK, CKMB & TROPONIN)    Collection Time: 10/30/17 12:10 PM   Result Value Ref Range    CK 38 26 - 192 U/L    CK - MB 1.5 <3.6 ng/ml    CK-MB Index 3.9 0.0 - 4.0 %    Troponin-I, Qt. <0.02 0.0 - 0.045 NG/ML   D DIMER    Collection Time: 10/30/17 12:10 PM   Result Value Ref Range    D DIMER 0.45 <0.46 ug/ml(FEU)   POC LACTIC ACID    Collection Time: 10/30/17  1:23 PM   Result Value Ref Range    Lactic Acid (POC) 1.1 0.4 - 2.0 mmol/L       EKG Interpretation by ED physician:  10:19  NSR @ 97 bpm. ST elevations or depressions. X-ray, CT or radiology findings or impressions:  Xr Chest Pa Lat    Result Date: 10/30/2017  CHEST CPT code: 23326 History: Productive cough with thick mucus; not feeling well last three weeks.  Findings: PA and lateral views of the chest demonstrate chronic increased interstitial markings in the lateral mid upper right lung and possibly both lung bases. There is a small focal opacity at the cardiac apex seen on several prior studies. No acute consolidations are seen. The cardiomediastinal silhouette is normal.  The bones and soft tissues are unremarkable. There is little change from 16 June 2017. IMPRESSION: No acute pulmonic disease. Old interstitial or possibly granulomatous disease mainly upper mid right lung in the lung bases. Chronic focal opacity at the cardiac apex unchanged from 13 June 2017. Diagnosis:   1. Cough        Disposition: Home    Follow-up Information     Follow up With Details MD Dinorah Call  [address] [place] [address]  [phone]             Discharge Medication List as of 10/30/2017  3:49 PM      START taking these medications    Details   HYDROcodone-acetaminophen (NORCO) 5-325 mg per tablet Take 1-2 tablets PO every 4-6 hours as needed for pain control. If over the counter ibuprofen or acetaminophen was suggested, then only take the vicodin for pain not well controlled with the over the counter medication. , Print, Disp-12 Tab, R-0      azithromycin (ZITHROMAX Z-RENÉE) 250 mg tablet Take 2 tablets for 1 day, then 1 tablet daily for 4 days, Print, Disp-6 Tab, R-0         CONTINUE these medications which have CHANGED    Details   methylPREDNISolone (MEDROL DOSEPACK) 4 mg tablet Per dose pack instructions, Print, Disp-1 Dose Pack, R-0         CONTINUE these medications which have NOT CHANGED    Details   traMADol (ULTRAM) 50 mg tablet Take 50 mg by mouth every six (6) hours as needed for Pain., Historical Med      metFORMIN (GLUCOPHAGE) 500 mg tablet Take 500 mg by mouth two (2) times daily (with meals). , Historical Med      chlorpheniramine-HYDROcodone (TUSSIONEX PENNKINETIC ER) 10-8 mg/5 mL suspension Take 5 mL by mouth nightly as needed for Cough. Max Daily Amount: 5 mL. , Print, Disp-115 mL, R-0      zolpidem CR (AMBIEN CR) 12.5 mg tablet Historical Med      polyethylene glycol (MIRALAX) 17 gram/dose powder Take 17 g by mouth daily. 1 tablespoon with 8 oz of water daily  Indications: CONSTIPATION, Normal, Disp-510 g, R-0      !! gabapentin (NEURONTIN) 300 mg capsule 1 tab PO BID, Normal, Disp-60 Cap, R-2      !! tiotropium (SPIRIVA WITH HANDIHALER) 18 mcg inhalation capsule Take 1 capsule by inhalation daily. , Sample, Disp-10 capsule, R-0      fluticasone-salmeterol (ADVAIR) 250-50 mcg/dose diskus inhaler Take 1 puff by inhalation every twelve (12) hours. , Sample, Disp-1 Inhaler, R-0      albuterol (PROVENTIL HFA, VENTOLIN HFA, PROAIR HFA) 90 mcg/actuation inhaler Take 1 puff by inhalation daily as needed for Wheezing., Sample, Disp-1 Inhaler, R-0      clopidogrel (PLAVIX) 75 mg tablet Take 1 tablet by mouth daily. , Normal, Disp-30 tablet, R-11      diltiazem CD (CARDIZEM CD) 240 mg ER capsule Take 1 capsule by mouth daily. , Normal, Disp-30 capsule, R-11      atorvastatin (LIPITOR) 40 mg tablet Take 1 tablet by mouth nightly., Normal, Disp-30 tablet, R-11      aspirin 81 mg chewable tablet Take 1 tablet by mouth daily. , Print, Disp-30 tablet, R-0      nitroglycerin (NITROSTAT) 0.4 mg SL tablet 1 tablet by SubLINGual route as needed for Chest Pain., Print, Disp-1 Bottle, R-0      polyethylene glycol (MIRALAX) 17 gram packet Take 1 packet by mouth daily. , Print, Disp-14 each, R-0      !! gabapentin (NEURONTIN) 300 mg capsule Take 300 mg by mouth daily. Indications: NEUROPATHIC PAIN, Historical Med      sitagliptin (JANUVIA) 100 mg tablet Take 100 mg by mouth daily. , Historical Med      fluoxetine (PROZAC) 20 mg capsule Take  by mouth daily. , Historical Med      insulin glargine (LANTUS) 100 unit/mL injection 33 Units by SubCUTAneous route nightly., Historical Med      OMEPRAZOLE (PRILOSEC PO) Take 20 mg by mouth daily. , Historical Med      ZOLPIDEM TARTRATE (AMBIEN PO) Take 12.5 mg by mouth nightly., Historical Med      !! tiotropium (SPIRIVA WITH HANDIHALER) 18 mcg inhalation capsule Take 1 Cap by inhalation daily. , Historical Med       !! - Potential duplicate medications found. Please discuss with provider. STOP taking these medications       HYDROcodone-acetaminophen (NORCO)  mg tablet Comments:   Reason for Stopping:             Scribe Attestation     [facility] acting as a scribe for and in the presence of Galen Bermeo MD      October 30, 2017 at 12:51 PM       Provider Attestation:      I personally performed the services described in the documentation, reviewed the documentation, as recorded by the scribe in my presence, and it accurately and completely records my words and actions.  October 30, 2017 at 12:51 PM - Galen Bermeo MD

## 2017-10-30 NOTE — DISCHARGE INSTRUCTIONS

## 2017-10-30 NOTE — ED TRIAGE NOTES
Patient states this is the 3rd week I just don't feel good. Has productive cough with thick green mucous. When she coughs had it really hurts.

## 2017-11-05 ENCOUNTER — HOSPITAL ENCOUNTER (OUTPATIENT)
Age: 67
Setting detail: OBSERVATION
Discharge: HOME | End: 2017-11-08
Attending: EMERGENCY MEDICINE | Admitting: FAMILY MEDICINE
Payer: MEDICARE

## 2017-11-05 ENCOUNTER — APPOINTMENT (OUTPATIENT)
Dept: GENERAL RADIOLOGY | Age: 67
End: 2017-11-05
Attending: EMERGENCY MEDICINE
Payer: MEDICARE

## 2017-11-05 ENCOUNTER — APPOINTMENT (OUTPATIENT)
Dept: CT IMAGING | Age: 67
End: 2017-11-05
Attending: EMERGENCY MEDICINE
Payer: MEDICARE

## 2017-11-05 DIAGNOSIS — G89.29 CHRONIC LOW BACK PAIN WITHOUT SCIATICA, UNSPECIFIED BACK PAIN LATERALITY: ICD-10-CM

## 2017-11-05 DIAGNOSIS — R55 SYNCOPE AND COLLAPSE: ICD-10-CM

## 2017-11-05 DIAGNOSIS — D64.9 ANEMIA, UNSPECIFIED TYPE: Primary | ICD-10-CM

## 2017-11-05 DIAGNOSIS — S09.90XA CLOSED HEAD INJURY, INITIAL ENCOUNTER: ICD-10-CM

## 2017-11-05 DIAGNOSIS — M54.50 CHRONIC LOW BACK PAIN WITHOUT SCIATICA, UNSPECIFIED BACK PAIN LATERALITY: ICD-10-CM

## 2017-11-05 PROBLEM — M54.9 CHRONIC BACK PAIN: Status: ACTIVE | Noted: 2017-11-05

## 2017-11-05 PROBLEM — R73.9 HYPERGLYCEMIA: Status: ACTIVE | Noted: 2017-11-05

## 2017-11-05 LAB
ADMINISTERED INITIALS, ADMINIT: NORMAL
ALBUMIN SERPL-MCNC: 3.4 G/DL (ref 3.4–5)
ALBUMIN/GLOB SERPL: 0.8 {RATIO} (ref 0.8–1.7)
ALP SERPL-CCNC: 144 U/L (ref 45–117)
ALT SERPL-CCNC: 22 U/L (ref 13–56)
AMPHET UR QL SCN: NEGATIVE
ANION GAP SERPL CALC-SCNC: 12 MMOL/L (ref 3–18)
AST SERPL-CCNC: 20 U/L (ref 15–37)
ATRIAL RATE: 87 BPM
BARBITURATES UR QL SCN: NEGATIVE
BASOPHILS # BLD: 0 K/UL (ref 0–0.1)
BASOPHILS NFR BLD: 0 % (ref 0–2)
BENZODIAZ UR QL: NEGATIVE
BILIRUB SERPL-MCNC: 0.3 MG/DL (ref 0.2–1)
BUN SERPL-MCNC: 18 MG/DL (ref 7–18)
BUN/CREAT SERPL: 21 (ref 12–20)
CALCIUM SERPL-MCNC: 8.6 MG/DL (ref 8.5–10.1)
CALCULATED P AXIS, ECG09: 50 DEGREES
CALCULATED R AXIS, ECG10: -14 DEGREES
CALCULATED T AXIS, ECG11: 41 DEGREES
CANNABINOIDS UR QL SCN: NEGATIVE
CHLORIDE SERPL-SCNC: 106 MMOL/L (ref 100–108)
CK MB CFR SERPL CALC: 3.3 % (ref 0–4)
CK MB SERPL-MCNC: 2.1 NG/ML (ref 5–25)
CK SERPL-CCNC: 63 U/L (ref 26–192)
CO2 SERPL-SCNC: 21 MMOL/L (ref 21–32)
COCAINE UR QL SCN: POSITIVE
CREAT SERPL-MCNC: 0.86 MG/DL (ref 0.6–1.3)
D50 ADMINISTERED, D50ADM: 0 ML
D50 ORDER, D50ORD: 0 ML
DIAGNOSIS, 93000: NORMAL
DIFFERENTIAL METHOD BLD: ABNORMAL
EOSINOPHIL # BLD: 0 K/UL (ref 0–0.4)
EOSINOPHIL NFR BLD: 0 % (ref 0–5)
ERYTHROCYTE [DISTWIDTH] IN BLOOD BY AUTOMATED COUNT: 16 % (ref 11.6–14.5)
EST. AVERAGE GLUCOSE BLD GHB EST-MCNC: 212 MG/DL
GLOBULIN SER CALC-MCNC: 4.5 G/DL (ref 2–4)
GLUCOSE BLD STRIP.AUTO-MCNC: 162 MG/DL (ref 70–110)
GLUCOSE BLD STRIP.AUTO-MCNC: 172 MG/DL (ref 70–110)
GLUCOSE BLD STRIP.AUTO-MCNC: 207 MG/DL (ref 70–110)
GLUCOSE BLD STRIP.AUTO-MCNC: 322 MG/DL (ref 70–110)
GLUCOSE SERPL-MCNC: 438 MG/DL (ref 74–99)
GLUCOSE, GLC: 162 MG/DL
GLUCOSE, GLC: 207 MG/DL
GLUCOSE, GLC: 322 MG/DL
HBA1C MFR BLD: 9 % (ref 4.2–5.6)
HCT VFR BLD AUTO: 30.2 % (ref 35–45)
HDSCOM,HDSCOM: ABNORMAL
HGB BLD-MCNC: 9.7 G/DL (ref 12–16)
HIGH TARGET, HITG: 180 MG/DL
INSULIN ADMINSTERED, INSADM: 1 UNITS/HOUR
INSULIN ADMINSTERED, INSADM: 1.5 UNITS/HOUR
INSULIN ADMINSTERED, INSADM: 5.2 UNITS/HOUR
INSULIN ORDER, INSORD: 1 UNITS/HOUR
INSULIN ORDER, INSORD: 1.5 UNITS/HOUR
INSULIN ORDER, INSORD: 5.2 UNITS/HOUR
LOW TARGET, LOT: 140 MG/DL
LYMPHOCYTES # BLD: 0.7 K/UL (ref 0.9–3.6)
LYMPHOCYTES NFR BLD: 8 % (ref 21–52)
MAGNESIUM SERPL-MCNC: 1.7 MG/DL (ref 1.6–2.6)
MCH RBC QN AUTO: 24 PG (ref 24–34)
MCHC RBC AUTO-ENTMCNC: 32.1 G/DL (ref 31–37)
MCV RBC AUTO: 74.6 FL (ref 74–97)
METHADONE UR QL: NEGATIVE
MINUTES UNTIL NEXT BG, NBG: 60 MIN
MONOCYTES # BLD: 0.3 K/UL (ref 0.05–1.2)
MONOCYTES NFR BLD: 3 % (ref 3–10)
MULTIPLIER, MUL: 0.01
MULTIPLIER, MUL: 0.01
MULTIPLIER, MUL: 0.02
NEUTS SEG # BLD: 8.3 K/UL (ref 1.8–8)
NEUTS SEG NFR BLD: 89 % (ref 40–73)
OPIATES UR QL: NEGATIVE
ORDER INITIALS, ORDINIT: NORMAL
P-R INTERVAL, ECG05: 134 MS
PCP UR QL: NEGATIVE
PLATELET # BLD AUTO: 328 K/UL (ref 135–420)
PMV BLD AUTO: 10 FL (ref 9.2–11.8)
POTASSIUM SERPL-SCNC: 4.7 MMOL/L (ref 3.5–5.5)
PROT SERPL-MCNC: 7.9 G/DL (ref 6.4–8.2)
Q-T INTERVAL, ECG07: 376 MS
QRS DURATION, ECG06: 84 MS
QTC CALCULATION (BEZET), ECG08: 452 MS
RBC # BLD AUTO: 4.05 M/UL (ref 4.2–5.3)
SODIUM SERPL-SCNC: 139 MMOL/L (ref 136–145)
TROPONIN I SERPL-MCNC: <0.02 NG/ML (ref 0–0.04)
VENTRICULAR RATE, ECG03: 87 BPM
WBC # BLD AUTO: 9.3 K/UL (ref 4.6–13.2)

## 2017-11-05 PROCEDURE — 96366 THER/PROPH/DIAG IV INF ADDON: CPT

## 2017-11-05 PROCEDURE — 83735 ASSAY OF MAGNESIUM: CPT | Performed by: EMERGENCY MEDICINE

## 2017-11-05 PROCEDURE — 96361 HYDRATE IV INFUSION ADD-ON: CPT

## 2017-11-05 PROCEDURE — 74011000258 HC RX REV CODE- 258: Performed by: EMERGENCY MEDICINE

## 2017-11-05 PROCEDURE — 74011250636 HC RX REV CODE- 250/636: Performed by: EMERGENCY MEDICINE

## 2017-11-05 PROCEDURE — 72072 X-RAY EXAM THORAC SPINE 3VWS: CPT

## 2017-11-05 PROCEDURE — 93005 ELECTROCARDIOGRAM TRACING: CPT

## 2017-11-05 PROCEDURE — 83036 HEMOGLOBIN GLYCOSYLATED A1C: CPT | Performed by: EMERGENCY MEDICINE

## 2017-11-05 PROCEDURE — 65660000000 HC RM CCU STEPDOWN

## 2017-11-05 PROCEDURE — 72125 CT NECK SPINE W/O DYE: CPT

## 2017-11-05 PROCEDURE — 85025 COMPLETE CBC W/AUTO DIFF WBC: CPT | Performed by: EMERGENCY MEDICINE

## 2017-11-05 PROCEDURE — 74011250637 HC RX REV CODE- 250/637: Performed by: FAMILY MEDICINE

## 2017-11-05 PROCEDURE — 65390000012 HC CONDITION CODE 44 OBSERVATION

## 2017-11-05 PROCEDURE — 70450 CT HEAD/BRAIN W/O DYE: CPT

## 2017-11-05 PROCEDURE — 99285 EMERGENCY DEPT VISIT HI MDM: CPT

## 2017-11-05 PROCEDURE — 80053 COMPREHEN METABOLIC PANEL: CPT | Performed by: EMERGENCY MEDICINE

## 2017-11-05 PROCEDURE — 82550 ASSAY OF CK (CPK): CPT | Performed by: EMERGENCY MEDICINE

## 2017-11-05 PROCEDURE — 82962 GLUCOSE BLOOD TEST: CPT

## 2017-11-05 PROCEDURE — 80307 DRUG TEST PRSMV CHEM ANLYZR: CPT | Performed by: EMERGENCY MEDICINE

## 2017-11-05 PROCEDURE — 74011636637 HC RX REV CODE- 636/637: Performed by: EMERGENCY MEDICINE

## 2017-11-05 PROCEDURE — 96365 THER/PROPH/DIAG IV INF INIT: CPT

## 2017-11-05 RX ORDER — ATORVASTATIN CALCIUM 40 MG/1
40 TABLET, FILM COATED ORAL
Status: DISCONTINUED | OUTPATIENT
Start: 2017-11-05 | End: 2017-11-08 | Stop reason: HOSPADM

## 2017-11-05 RX ORDER — FLUOXETINE HYDROCHLORIDE 20 MG/1
20 CAPSULE ORAL DAILY
Status: DISCONTINUED | OUTPATIENT
Start: 2017-11-06 | End: 2017-11-08 | Stop reason: HOSPADM

## 2017-11-05 RX ORDER — INSULIN GLARGINE 100 [IU]/ML
20 INJECTION, SOLUTION SUBCUTANEOUS
Status: DISCONTINUED | OUTPATIENT
Start: 2017-11-05 | End: 2017-11-05

## 2017-11-05 RX ORDER — GABAPENTIN 300 MG/1
300 CAPSULE ORAL 2 TIMES DAILY
Status: DISCONTINUED | OUTPATIENT
Start: 2017-11-05 | End: 2017-11-05

## 2017-11-05 RX ORDER — SODIUM CHLORIDE 9 MG/ML
75 INJECTION, SOLUTION INTRAVENOUS CONTINUOUS
Status: DISCONTINUED | OUTPATIENT
Start: 2017-11-05 | End: 2017-11-08 | Stop reason: HOSPADM

## 2017-11-05 RX ORDER — ZOLPIDEM TARTRATE 5 MG/1
10 TABLET ORAL
Status: DISCONTINUED | OUTPATIENT
Start: 2017-11-05 | End: 2017-11-08 | Stop reason: HOSPADM

## 2017-11-05 RX ORDER — MAGNESIUM SULFATE 100 %
4 CRYSTALS MISCELLANEOUS AS NEEDED
Status: DISCONTINUED | OUTPATIENT
Start: 2017-11-05 | End: 2017-11-06 | Stop reason: SDUPTHER

## 2017-11-05 RX ORDER — POLYETHYLENE GLYCOL 3350 17 G/17G
17 POWDER, FOR SOLUTION ORAL DAILY
Status: DISCONTINUED | OUTPATIENT
Start: 2017-11-06 | End: 2017-11-08 | Stop reason: HOSPADM

## 2017-11-05 RX ORDER — NITROGLYCERIN 0.4 MG/1
0.4 TABLET SUBLINGUAL AS NEEDED
Status: DISCONTINUED | OUTPATIENT
Start: 2017-11-05 | End: 2017-11-08 | Stop reason: HOSPADM

## 2017-11-05 RX ORDER — BUDESONIDE AND FORMOTEROL FUMARATE DIHYDRATE 160; 4.5 UG/1; UG/1
2 AEROSOL RESPIRATORY (INHALATION)
Status: DISCONTINUED | OUTPATIENT
Start: 2017-11-05 | End: 2017-11-08 | Stop reason: HOSPADM

## 2017-11-05 RX ORDER — GABAPENTIN 300 MG/1
300 CAPSULE ORAL 2 TIMES DAILY
Status: DISCONTINUED | OUTPATIENT
Start: 2017-11-06 | End: 2017-11-08 | Stop reason: HOSPADM

## 2017-11-05 RX ORDER — AZITHROMYCIN 250 MG/1
250 TABLET, FILM COATED ORAL SEE ADMIN INSTRUCTIONS
COMMUNITY
End: 2018-09-11

## 2017-11-05 RX ORDER — DEXTROSE 50 % IN WATER (D50W) INTRAVENOUS SYRINGE
25-50 AS NEEDED
Status: DISCONTINUED | OUTPATIENT
Start: 2017-11-05 | End: 2017-11-06 | Stop reason: SDUPTHER

## 2017-11-05 RX ORDER — HYDROCODONE BITARTRATE AND ACETAMINOPHEN 10; 325 MG/1; MG/1
1 TABLET ORAL
Status: DISCONTINUED | OUTPATIENT
Start: 2017-11-05 | End: 2017-11-08 | Stop reason: HOSPADM

## 2017-11-05 RX ORDER — HYDROMORPHONE HCL IN 0.9% NACL 50 MG/50ML
0.2 PLASTIC BAG, INJECTION (ML) INJECTION ONCE
Status: COMPLETED | OUTPATIENT
Start: 2017-11-05 | End: 2017-11-05

## 2017-11-05 RX ORDER — GUAIFENESIN 100 MG/5ML
81 LIQUID (ML) ORAL DAILY
Status: DISCONTINUED | OUTPATIENT
Start: 2017-11-06 | End: 2017-11-08 | Stop reason: HOSPADM

## 2017-11-05 RX ADMIN — HYDROCODONE BITARTRATE AND ACETAMINOPHEN 1 TABLET: 10; 325 TABLET ORAL at 23:40

## 2017-11-05 RX ADMIN — SODIUM CHLORIDE 5.2 UNITS/HR: 900 INJECTION, SOLUTION INTRAVENOUS at 18:28

## 2017-11-05 RX ADMIN — ATORVASTATIN CALCIUM 40 MG: 40 TABLET, FILM COATED ORAL at 23:29

## 2017-11-05 RX ADMIN — GABAPENTIN 300 MG: 300 CAPSULE ORAL at 20:48

## 2017-11-05 RX ADMIN — Medication 0.2 MG: at 20:48

## 2017-11-05 RX ADMIN — SODIUM CHLORIDE 1000 ML: 900 INJECTION, SOLUTION INTRAVENOUS at 17:37

## 2017-11-05 RX ADMIN — SODIUM CHLORIDE 125 ML/HR: 900 INJECTION, SOLUTION INTRAVENOUS at 23:30

## 2017-11-05 NOTE — IP AVS SNAPSHOT
[address] Patient: Gaby Kang MRN: [id number] ENN:[id number] My Medications STOP taking these medications   
 chlorpheniramine-HYDROcodone 10-8 mg/5 mL suspension Commonly known as:  TUSSIONEX PENNKINETIC ER  
   
  
 traMADol 50 mg tablet Commonly known as:  ULTRAM  
   
  
  
TAKE these medications as instructed Instructions Each Dose to Equal  
 Morning Noon Evening Bedtime  
 albuterol 90 mcg/actuation inhaler Commonly known as:  PROVENTIL HFA, VENTOLIN HFA, PROAIR HFA Your last dose was: Your next dose is: Take 1 puff by inhalation daily as needed for Wheezing. 1 Puff  
    
   
   
   
  
 aspirin 81 mg chewable tablet Your last dose was: Your next dose is: Take 1 tablet by mouth daily. 81 mg  
    
   
   
   
  
 atorvastatin 40 mg tablet Commonly known as:  LIPITOR Your last dose was: Your next dose is: Take 1 tablet by mouth nightly. 40 mg  
    
   
   
   
  
 azithromycin 250 mg tablet Commonly known as:  Janeth Redgranite Your last dose was: Your next dose is: Take 250 mg by mouth See Admin Instructions. 250 mg  
    
   
   
   
  
 clopidogrel 75 mg Tab Commonly known as:  PLAVIX Your last dose was: Your next dose is: Take 1 tablet by mouth daily. 75 mg  
    
   
   
   
  
 dilTIAZem  mg ER capsule Commonly known as:  CARDIZEM CD Your last dose was: Your next dose is: Take 1 capsule by mouth daily. 240 mg FLUoxetine 20 mg capsule Commonly known as:  PROzac Your last dose was: Your next dose is: Take  by mouth daily. fluticasone-salmeterol 250-50 mcg/dose diskus inhaler Commonly known as:  ADVAIR  
   
 Your last dose was: Your next dose is: Take 1 puff by inhalation every twelve (12) hours. 1 Puff  
    
   
   
   
  
 gabapentin 300 mg capsule Commonly known as:  NEURONTIN Your last dose was: Your next dose is: Take 300 mg by mouth daily. Indications: NEUROPATHIC PAIN  
 300 mg HYDROcodone-acetaminophen 5-325 mg per tablet Commonly known as:  Wayna Glaze Your last dose was: Your next dose is: Take 1 Tab by mouth every six (6) hours as needed for Pain for up to 7 days. Max Daily Amount: 4 Tabs. 1 Tab JANUVIA 100 mg tablet Generic drug:  SITagliptin Your last dose was: Your next dose is: Take 100 mg by mouth daily. 100 mg  
    
   
   
   
  
 LANTUS 100 unit/mL injection Generic drug:  insulin glargine Your last dose was: Your next dose is:    
   
   
 33 Units by SubCUTAneous route nightly. 33 Units  
    
   
   
   
  
 metFORMIN 500 mg tablet Commonly known as:  GLUCOPHAGE Your last dose was: Your next dose is: Take 500 mg by mouth two (2) times daily (with meals). 500 mg  
    
   
   
   
  
 methylPREDNISolone 4 mg tablet Commonly known as:  Nasim Montoya Your last dose was: Your next dose is:    
   
   
 Per dose pack instructions  
     
   
   
   
  
 nitroglycerin 0.4 mg SL tablet Commonly known as:  NITROSTAT Your last dose was: Your next dose is:    
   
   
 1 Tab by SubLINGual route as needed for Chest Pain. 0.4 mg  
    
   
   
   
  
 polyethylene glycol 17 gram packet Commonly known as:  Ronnell Barragan Your last dose was: Your next dose is: Take 1 packet by mouth daily. 17 g PRILOSEC PO Your last dose was: Your next dose is: Take 20 mg by mouth daily.   
 20 mg  
    
   
   
 SPIRIVA WITH HANDIHALER 18 mcg inhalation capsule Generic drug:  tiotropium Your last dose was: Your next dose is: Take 1 Cap by inhalation daily. 1 Cap  
    
   
   
   
  
 zolpidem CR 12.5 mg tablet Commonly known as:  BOSTON DAVIS Your last dose was: Your next dose is:    
   
   
      
   
   
   
  
  
  
Where to Get Your Medications Information on where to get these meds will be given to you by the nurse or doctor. ! Ask your nurse or doctor about these medications HYDROcodone-acetaminophen 5-325 mg per tablet  
 nitroglycerin 0.4 mg SL tablet

## 2017-11-05 NOTE — ED PROVIDER NOTES
HPI Comments: Steve Cosme is a 74 y.o. Female with PMHx of sarcoidosis, COPD, chronic pain, HTN, DM and CAD who presents to the ED with c/o head pain post-syncopal episode earlier today.  notes patient was attempting to go to the restroom when she passed out and hit the R side of her head. Associated symptoms include mid-back pain, notes this is different and worse than her chronic pinched nerve pain. Denies wrist pain. No other symptoms or concerns were expressed. The history is provided by the patient and the spouse. Past Medical History:   Diagnosis Date    Arthritis     \"generalized\"    Asthma     CAD in native artery     NSTEMI (Sep 2014); diffuse 65% pLAD, minimal disease RCA & LCx. pLAD FFR 0.93. LV  no RWMA (echo and LV angio)    Chronic neck pain     Chronic pain     left knee    Chronic pain syndrome     COPD (chronic obstructive pulmonary disease) (HCC)     Depression     Diabetes (HCC)     Diverticulitis     GERD (gastroesophageal reflux disease)     Heart disease     Hidradenitis 9/11/2014    Hypertension     Left knee pain     Narcotic dependence ([address]     Right wrist pain     Sarcoidosis        Past Surgical History:   Procedure Laterality Date    HX BREAST BIOPSY Right     9/10/14: She said tag in place from [address] [facility] HX HYSTERECTOMY      HX KNEE ARTHROSCOPY Left          Family History:   Problem Relation Age of Onset    Hypertension Father     Diabetes Mother        Social History     Social History    Marital status:      Spouse name: N/A    Number of children: N/A    Years of education: N/A     Occupational History    Not on file.      Social History Main Topics    Smoking status: Current Some Day Smoker     Packs/day: 0.10     Years: 46.00     Types: Cigarettes    Smokeless tobacco: Not on file      Comment: Only smokes 1 cigarette per day    Alcohol use 1.0 oz/week     2 Glasses of wine per week      Comment: socially    Drug use: Yes     Special: Cocaine, Marijuana      Comment: 9/10/14: Cocaine use during past day; 9/12/14: Says uses cocaine only occasionally    Sexual activity: Yes     Other Topics Concern    Not on file     Social History Narrative         ALLERGIES: Ciprofloxacin; Penicillins; and Shellfish derived    Review of Systems   Constitutional: Negative for activity change, fatigue and fever. HENT: Negative for congestion and rhinorrhea. Eyes: Negative for visual disturbance. Respiratory: Negative for shortness of breath. Cardiovascular: Negative for chest pain and palpitations. Gastrointestinal: Negative for abdominal pain, diarrhea, nausea and vomiting. Genitourinary: Negative for dysuria and hematuria. Musculoskeletal: Positive for back pain and myalgias. Skin: Negative for rash. Neurological: Positive for syncope. Negative for dizziness, weakness and light-headedness. All other systems reviewed and are negative. Vitals:    11/08/17 0400 11/08/17 0724 11/08/17 0958 11/08/17 1142   BP: 148/80 145/78  152/81   Pulse: 85 76  82   Resp: 18 18  17   Temp: 98.5 °F (36.9 °C) 98.7 °F (37.1 °C)  98.6 °F (37 °C)   SpO2: 97% 97% 98% 94%   Weight:       Height:                Physical Exam   Constitutional: She is oriented to person, place, and time. She appears well-developed and well-nourished. No distress. HENT:   Head: Normocephalic. Right Ear: External ear normal.   Left Ear: External ear normal.   Nose: Nose normal.   Dry oral mucosa   R frontal hematoma with ecchymosis    Eyes: Conjunctivae and EOM are normal. Pupils are equal, round, and reactive to light. No scleral icterus. Neck: Neck supple. No JVD present. No tracheal deviation present. No thyromegaly present. Poorly localized neck pain    Cardiovascular: Normal rate, regular rhythm, normal heart sounds and intact distal pulses. Exam reveals no gallop and no friction rub. No murmur heard.   Pulmonary/Chest: Effort normal and breath sounds normal. She exhibits no tenderness. Abdominal: Soft. Bowel sounds are normal. She exhibits no distension. There is no tenderness. There is no rebound and no guarding. Musculoskeletal: She exhibits no edema or tenderness. Mild B wrist pain, no deformity    Lymphadenopathy:     She has no cervical adenopathy. Neurological: She is alert and oriented to person, place, and time. No cranial nerve deficit. Coordination normal.   Globally weak but follows commands    Skin: Skin is warm and dry. Psychiatric: She has a normal mood and affect. Her behavior is normal. Judgment and thought content normal.   Supportive family at the bedside    Nursing note and vitals reviewed. MDM  Number of Diagnoses or Management Options  Diagnosis management comments: Pt is a 72yo female with a hx of COPD, sarcoidosis, chronic pain, DM, HTN, CAD, evaluation for cough on medrol/zithromax presents with complaint of HA and weakness. Pt was at home preparing for [employer] and had a syncopal event. Pt notes she had to go to the bathroom prior to the event. She had LOC and has R sided HA. Pt appears weak and does not recall the events. Will CT head, follow cardiac labs, hydrate and start insulin as glucose is elevated. Will CT c/s and XR the T spine as well. Pt EKG notes a normal QT while on azithromycin.  [place], DO 4:57 PM      ED Course       Procedures  Vitals:  Patient Vitals for the past 12 hrs:   Temp Pulse Resp BP SpO2   11/05/17 1845 - 88 27 137/82 97 %   11/05/17 1830 - - - 141/70 97 %   11/05/17 1815 - - - 150/78 97 %   11/05/17 1800 - 86 26 135/76 97 %   11/05/17 1745 - 88 28 147/73 97 %   11/05/17 1630 - 83 26 137/66 96 %   11/05/17 1625 - - - - 99 %   11/05/17 1624 97.8 °F (36.6 °C) 71 25 131/61 99 %   11/05/17 1615 - 93 21 (!) 148/116 98 %   11/05/17 1600 - 86 28 136/61 96 %       Medications ordered:   Medications   0.9% sodium chloride infusion (not administered)   insulin regular (NOVOLIN R, HUMULIN R) 100 Units in 0.9% sodium chloride 100 mL infusion (5.2 Units/hr IntraVENous New Bag 11/5/17 6245)   glucose chewable tablet 16 g (not administered)   glucagon (GLUCAGEN) injection 1 mg (not administered)   dextrose (D50W) injection syrg 12.5-25 g (not administered)   sodium chloride 0.9 % bolus infusion 1,000 mL (1,000 mL IntraVENous New Bag 11/5/17 7583)         Lab findings:  Recent Results (from the past 12 hour(s))   CBC WITH AUTOMATED DIFF    Collection Time: 11/05/17  3:44 PM   Result Value Ref Range    WBC 9.3 4.6 - 13.2 K/uL    RBC 4.05 (L) 4.20 - 5.30 M/uL    HGB 9.7 (L) 12.0 - 16.0 g/dL    HCT 30.2 (L) 35.0 - 45.0 %    MCV 74.6 74.0 - 97.0 FL    MCH 24.0 24.0 - 34.0 PG    MCHC 32.1 31.0 - 37.0 g/dL    RDW 16.0 (H) 11.6 - 14.5 %    PLATELET 017 578 - 736 K/uL    MPV 10.0 9.2 - 11.8 FL    NEUTROPHILS 89 (H) 40 - 73 %    LYMPHOCYTES 8 (L) 21 - 52 %    MONOCYTES 3 3 - 10 %    EOSINOPHILS 0 0 - 5 %    BASOPHILS 0 0 - 2 %    ABS. NEUTROPHILS 8.3 (H) 1.8 - 8.0 K/UL    ABS. LYMPHOCYTES 0.7 (L) 0.9 - 3.6 K/UL    ABS. MONOCYTES 0.3 0.05 - 1.2 K/UL    ABS. EOSINOPHILS 0.0 0.0 - 0.4 K/UL    ABS. BASOPHILS 0.0 0.0 - 0.1 K/UL    DF AUTOMATED     METABOLIC PANEL, COMPREHENSIVE    Collection Time: 11/05/17  3:44 PM   Result Value Ref Range    Sodium 139 136 - 145 mmol/L    Potassium 4.7 3.5 - 5.5 mmol/L    Chloride 106 100 - 108 mmol/L    CO2 21 21 - 32 mmol/L    Anion gap 12 3.0 - 18 mmol/L    Glucose 438 (HH) 74 - 99 mg/dL    BUN 18 7.0 - 18 MG/DL    Creatinine 0.86 0.6 - 1.3 MG/DL    BUN/Creatinine ratio 21 (H) 12 - 20      GFR est AA >60 >60 ml/min/1.73m2    GFR est non-AA >60 >60 ml/min/1.73m2    Calcium 8.6 8.5 - 10.1 MG/DL    Bilirubin, total 0.3 0.2 - 1.0 MG/DL    ALT (SGPT) 22 13 - 56 U/L    AST (SGOT) 20 15 - 37 U/L    Alk.  phosphatase 144 (H) 45 - 117 U/L    Protein, total 7.9 6.4 - 8.2 g/dL    Albumin 3.4 3.4 - 5.0 g/dL    Globulin 4.5 (H) 2.0 - 4.0 g/dL    A-G Ratio 0.8 0.8 - 1. 7     MAGNESIUM    Collection Time: 11/05/17  3:44 PM   Result Value Ref Range    Magnesium 1.7 1.6 - 2.6 mg/dL   CARDIAC PANEL,(CK, CKMB & TROPONIN)    Collection Time: 11/05/17  3:44 PM   Result Value Ref Range    CK 63 26 - 192 U/L    CK - MB 2.1 <3.6 ng/ml    CK-MB Index 3.3 0.0 - 4.0 %    Troponin-I, Qt. <0.02 0.0 - 0.045 NG/ML   HEMOGLOBIN A1C WITH EAG    Collection Time: 11/05/17  3:44 PM   Result Value Ref Range    Hemoglobin A1c 9.0 (H) 4.2 - 5.6 %    Est. average glucose 212 mg/dL   EKG, 12 LEAD, INITIAL    Collection Time: 11/05/17  4:19 PM   Result Value Ref Range    Ventricular Rate 87 BPM    Atrial Rate 87 BPM    P-R Interval 134 ms    QRS Duration 84 ms    Q-T Interval 376 ms    QTC Calculation (Bezet) 452 ms    Calculated P Axis 50 degrees    Calculated R Axis -14 degrees    Calculated T Axis 41 degrees    Diagnosis       Normal sinus rhythm  Possible Left atrial enlargement  Borderline ECG  When compared with ECG of 30-OCT-2017 10:19,  No significant change was found  Confirmed by Kamilla Mobley MD, ----- ([id number]) on 11/5/2017 5:11:09 PM     GLUCOSE, POC    Collection Time: 11/05/17  6:23 PM   Result Value Ref Range    Glucose (POC) 322 (H) 70 - 110 mg/dL   GLUCOSTABILIZER    Collection Time: 11/05/17  6:28 PM   Result Value Ref Range    Glucose 322 mg/dL    Insulin order 5.2 units/hour    Insulin adminstered 5.2 units/hour    Multiplier 0.020     Low target 140 mg/dL    High target 180 mg/dL    D50 order 0.0 ml    D50 administered 0.00 ml    Minutes until next BG 60 min    Order initials LC     Administered initials LC        EKG interpretation by ED Physician:  16:19: NSR @ 87 bpm/ LAD. No STEMI. X-Ray, CT or other radiology findings or impressions:  XR SPINE THORAC 3 V   Final Result      CT SPINE CERV WO CONT   Final Result      CT HEAD WO CONT   Final Result      CT Head:  No acute intracranial abnormality. CT C-Spine:  No CT evidence of acute C-spine injury seen.   Facet arthropathy at several levels with associated bony foramina stenosis,  mildly progressed as above  XR T-Spine:  No fracture. Progress notes, Consult notes or additional Procedure notes:   7:11 PM: Patient is feeling very weak with elevated blood sugar. CT is reassuring. Will discuss admission for syncope, and hyperglycemia with Dr. Danette Lyons. Reevaluation of patient:   I have reassessed the patient. Patient is feeling better but is week with recurrent syncope. Discussed the case with Dr. Chin Starr and will admit. Asks to put on Dr. Liz Brain service. Stan Krishna DO     Disposition:  Diagnosis:   1. Anemia, unspecified type    2. Syncope and collapse    3. Closed head injury, initial encounter    4. Chronic low back pain without sciatica, unspecified back pain laterality        Disposition: Admit     Follow-up Information     Follow up With Details Comments MD Beena On 11/15/2017 @ 12:15 pm [address] [place] [address]  [phone]             Patient's Medications   Start Taking    No medications on file   Continue Taking    ALBUTEROL (PROVENTIL HFA, VENTOLIN HFA, PROAIR HFA) 90 MCG/ACTUATION INHALER    Take 1 puff by inhalation daily as needed for Wheezing. ASPIRIN 81 MG CHEWABLE TABLET    Take 1 tablet by mouth daily. ATORVASTATIN (LIPITOR) 40 MG TABLET    Take 1 tablet by mouth nightly. CHLORPHENIRAMINE-HYDROCODONE (TUSSIONEX PENNKINETIC ER) 10-8 MG/5 ML SUSPENSION    Take 5 mL by mouth nightly as needed for Cough. Max Daily Amount: 5 mL. CLOPIDOGREL (PLAVIX) 75 MG TABLET    Take 1 tablet by mouth daily. DILTIAZEM CD (CARDIZEM CD) 240 MG ER CAPSULE    Take 1 capsule by mouth daily. FLUOXETINE (PROZAC) 20 MG CAPSULE    Take  by mouth daily. FLUTICASONE-SALMETEROL (ADVAIR) 250-50 MCG/DOSE DISKUS INHALER    Take 1 puff by inhalation every twelve (12) hours. GABAPENTIN (NEURONTIN) 300 MG CAPSULE    Take 300 mg by mouth daily. Indications: NEUROPATHIC PAIN    GABAPENTIN (NEURONTIN) 300 MG CAPSULE    1 tab PO BID    HYDROCODONE-ACETAMINOPHEN (NORCO) 5-325 MG PER TABLET    Take 1-2 tablets PO every 4-6 hours as needed for pain control. If over the counter ibuprofen or acetaminophen was suggested, then only take the vicodin for pain not well controlled with the over the counter medication. INSULIN GLARGINE (LANTUS) 100 UNIT/ML INJECTION    33 Units by SubCUTAneous route nightly. METFORMIN (GLUCOPHAGE) 500 MG TABLET    Take 500 mg by mouth two (2) times daily (with meals). METHYLPREDNISOLONE (MEDROL DOSEPACK) 4 MG TABLET    Per dose pack instructions    NITROGLYCERIN (NITROSTAT) 0.4 MG SL TABLET    1 tablet by SubLINGual route as needed for Chest Pain. OMEPRAZOLE (PRILOSEC PO)    Take 20 mg by mouth daily. POLYETHYLENE GLYCOL (MIRALAX) 17 GRAM PACKET    Take 1 packet by mouth daily. POLYETHYLENE GLYCOL (MIRALAX) 17 GRAM/DOSE POWDER    Take 17 g by mouth daily. 1 tablespoon with 8 oz of water daily  Indications: CONSTIPATION    SITAGLIPTIN (JANUVIA) 100 MG TABLET    Take 100 mg by mouth daily. TIOTROPIUM (SPIRIVA WITH HANDIHALER) 18 MCG INHALATION CAPSULE    Take 1 Cap by inhalation daily. TIOTROPIUM (SPIRIVA WITH HANDIHALER) 18 MCG INHALATION CAPSULE    Take 1 capsule by inhalation daily. TRAMADOL (ULTRAM) 50 MG TABLET    Take 50 mg by mouth every six (6) hours as needed for Pain. ZOLPIDEM CR (AMBIEN CR) 12.5 MG TABLET        ZOLPIDEM TARTRATE (AMBIEN PO)    Take 12.5 mg by mouth nightly.    These Medications have changed    No medications on file   Stop Taking    No medications on file         Scribe Attestation     Julieann Litten acting as a scribe for and in the presence of Enzo Betancur MD      November 05, 2017 at 4:46 PM       Provider Attestation:      I personally performed the services described in the documentation, reviewed the documentation, as recorded by the scribe in my presence, and it accurately and completely records my words and actions.  November 05, 2017 at 4:46 PM - Gail Reyes MD

## 2017-11-05 NOTE — IP AVS SNAPSHOT
Blu Argueta 
 
 
 [address] [facility] [address] Patient: Gertrude Leonard MRN: [id number] About your hospitalization You were admitted on:  November 5, 2017 You last received care in the:  [facility] [address] 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  
@ 12:15 pm  
  
Phone:  [phone] Where:  [address] [facility], , [address] [facility] [address] [facility] [address] Discharge Orders None A check marcia indicates which time of day the medication should be taken. My Medications STOP taking these medications   
 chlorpheniramine-HYDROcodone 10-8 mg/5 mL suspension Commonly known as:  TUSSIONEX PENNKINETIC ER  
   
  
 traMADol 50 mg tablet Commonly known as:  ULTRAM  
   
  
  
TAKE these medications as instructed Instructions Each Dose to Equal  
 Morning Noon Evening Bedtime  
 albuterol 90 mcg/actuation inhaler Commonly known as:  PROVENTIL HFA, VENTOLIN HFA, PROAIR HFA Your last dose was: Your next dose is: Take 1 puff by inhalation daily as needed for Wheezing. 1 Puff  
    
   
   
   
  
 aspirin 81 mg chewable tablet Your last dose was: Your next dose is: Take 1 tablet by mouth daily. 81 mg  
    
   
   
   
  
 atorvastatin 40 mg tablet Commonly known as:  LIPITOR Your last dose was: Your next dose is: Take 1 tablet by mouth nightly. 40 mg  
    
   
   
   
  
 azithromycin 250 mg tablet Commonly known as:  Saravanan Patton Your last dose was: Your next dose is: Take 250 mg by mouth See Admin Instructions. 250 mg  
    
   
   
   
  
 clopidogrel 75 mg Tab Commonly known as:  PLAVIX Your last dose was: Your next dose is: Take 1 tablet by mouth daily. 75 mg  
    
   
   
   
  
 dilTIAZem  mg ER capsule Commonly known as:  CARDIZEM CD Your last dose was: Your next dose is: Take 1 capsule by mouth daily. 240 mg FLUoxetine 20 mg capsule Commonly known as:  PROzac Your last dose was: Your next dose is: Take  by mouth daily. fluticasone-salmeterol 250-50 mcg/dose diskus inhaler Commonly known as:  ADVAIR Your last dose was: Your next dose is: Take 1 puff by inhalation every twelve (12) hours. 1 Puff  
    
   
   
   
  
 gabapentin 300 mg capsule Commonly known as:  NEURONTIN Your last dose was: Your next dose is: Take 300 mg by mouth daily. Indications: NEUROPATHIC PAIN  
 300 mg HYDROcodone-acetaminophen 5-325 mg per tablet Commonly known as:  [place] Your last dose was: Your next dose is: Take 1 Tab by mouth every six (6) hours as needed for Pain for up to 7 days. Max Daily Amount: 4 Tabs. 1 Tab JANUVIA 100 mg tablet Generic drug:  SITagliptin Your last dose was: Your next dose is: Take 100 mg by mouth daily. 100 mg  
    
   
   
   
  
 LANTUS 100 unit/mL injection Generic drug:  insulin glargine Your last dose was: Your next dose is:    
   
   
 33 Units by SubCUTAneous route nightly. 33 Units  
    
   
   
   
  
 metFORMIN 500 mg tablet Commonly known as:  GLUCOPHAGE Your last dose was: Your next dose is: Take 500 mg by mouth two (2) times daily (with meals). 500 mg  
    
   
   
   
  
 methylPREDNISolone 4 mg tablet Commonly known as:  Sonia Rist Your last dose was: Your next dose is:    
   
   
 Per dose pack instructions  
     
   
   
   
  
 nitroglycerin 0.4 mg SL tablet Commonly known as:  NITROSTAT Your last dose was: Your next dose is:    
   
   
 1 Tab by SubLINGual route as needed for Chest Pain. 0.4 mg  
    
   
   
   
  
 polyethylene glycol 17 gram packet Commonly known as:  Tamara Floor Your last dose was: Your next dose is: Take 1 packet by mouth daily. 17 g PRILOSEC PO Your last dose was: Your next dose is: Take 20 mg by mouth daily. 20 mg  
    
   
   
   
  
 SPIRIVA WITH HANDIHALER 18 mcg inhalation capsule Generic drug:  tiotropium Your last dose was: Your next dose is: Take 1 Cap by inhalation daily. 1 Cap  
    
   
   
   
  
 zolpidem CR 12.5 mg tablet Commonly known as:  AMBIEN CR Your last dose was: Your next dose is:    
   
   
      
   
   
   
  
  
  
Where to Get Your Medications Information on where to get these meds will be given to you by the nurse or doctor. ! Ask your nurse or doctor about these medications HYDROcodone-acetaminophen 5-325 mg per tablet  
 nitroglycerin 0.4 mg SL tablet Discharge Instructions Fainting: Care Instructions Your Care Instructions When you faint, or pass out, you lose consciousness for a short time. A brief drop in blood flow to the brain often causes it. When you fall or lie down, more blood flows to your brain and you regain consciousness. Emotional stress, pain, or overheating-especially if you have been standing-can make you faint.  In these cases, fainting is usually not serious. But fainting can be a sign of a more serious problem. Your doctor may want you to have more tests to rule out other causes. The treatment you need depends on the reason why you fainted. The doctor has checked you carefully, but problems can develop later. If you notice any problems or new symptoms, get medical treatment right away. Follow-up care is a key part of your treatment and safety. Be sure to make and go to all appointments, and call your doctor if you are having problems. It's also a good idea to know your test results and keep a list of the medicines you take. How can you care for yourself at home? · Drink plenty of fluids to prevent dehydration. If you have kidney, heart, or liver disease and have to limit fluids, talk with your doctor before you increase your fluid intake. When should you call for help? Call 911 anytime you think you may need emergency care. For example, call if: 
? · You have symptoms of a heart problem. These may include: ¨ Chest pain or pressure. ¨ Severe trouble breathing. ¨ A fast or irregular heartbeat. ¨ Lightheadedness or sudden weakness. ¨ Coughing up pink, foamy mucus. ¨ Passing out. After you call 911, the  may tell you to chew 1 adult-strength or 2 to 4 low-dose aspirin. Wait for an ambulance. Do not try to drive yourself. ? · You have symptoms of a stroke. These may include: 
¨ Sudden numbness, tingling, weakness, or loss of movement in your face, arm, or leg, especially on only one side of your body. ¨ Sudden vision changes. ¨ Sudden trouble speaking. ¨ Sudden confusion or trouble understanding simple statements. ¨ Sudden problems with walking or balance. ¨ A sudden, severe headache that is different from past headaches. ? · You passed out (lost consciousness) again. ? Watch closely for changes in your health, and be sure to contact your doctor if: 
? · You do not get better as expected. Where can you learn more? Go to http://odessa-mack.info/. Enter H717 in the search box to learn more about \"Fainting: Care Instructions. \" Current as of: March 20, 2017 Content Version: 11.4 © 0543-6032 [employer]. Care instructions adapted under license by [employer] (which disclaims liability or warranty for this information). If you have questions about a medical condition or this instruction, always ask your healthcare professional. [address] any warranty or liability for your use of this information. Learning About High Blood Sugar What is high blood sugar? Your body turns the food you eat into glucose (sugar), which it uses for energy. But if your body isn't able to use the sugar right away, it can build up in your blood and lead to high blood sugar. When the amount of sugar in your blood stays too high for too much of the time, you may have diabetes. Diabetes is a disease that can cause serious health problems. The good news is that lifestyle changes may help you get your blood sugar back to normal and avoid or delay diabetes. What causes high blood sugar? Sugar (glucose) can build up in your blood if you: · Are overweight. · Have a family history of diabetes. · Take certain medicines, such as steroids. What are the symptoms? Having high blood sugar may not cause any symptoms at all. Or it may make you feel very thirsty or very hungry. You may also urinate more often than usual, have blurry vision, or lose weight without trying. How is high blood sugar treated? You can take steps to lower your blood sugar level if you understand what makes it get higher. Your doctor may want you to learn how to test your blood sugar level at home. Then you can see how illness, stress, or different kinds of food or medicine raise or lower your blood sugar level. Other tests may be needed to see if you have diabetes. How can you prevent high blood sugar? · Watch your weight. If you're overweight, losing just a small amount of weight may help. Reducing fat around your waist is most important. · Limit the amount of calories, sweets, and unhealthy fat you eat. Ask your doctor if a dietitian can help you. A registered dietitian can help you create meal plans that fit your lifestyle. · Get at least 30 minutes of exercise on most days of the week. Exercise helps control your blood sugar. It also helps you maintain a healthy weight. Walking is a good choice. You also may want to do other activities, such as running, swimming, cycling, or playing tennis or team sports. · If your doctor prescribed medicines, take them exactly as prescribed. Call your doctor if you think you are having a problem with your medicine. You will get more details on the specific medicines your doctor prescribes. Follow-up care is a key part of your treatment and safety. Be sure to make and go to all appointments, and call your doctor if you are having problems. It's also a good idea to know your test results and keep a list of the medicines you take. Where can you learn more? Go to http://odsesa-mack.info/. Enter O108 in the search box to learn more about \"Learning About High Blood Sugar. \" Current as of: March 13, 2017 Content Version: 11.4 © 6816-5580 [employer]. Care instructions adapted under license by [employer] (which disclaims liability or warranty for this information). If you have questions about a medical condition or this instruction, always ask your healthcare professional. [place] [address] any warranty or liability for your use of this information. Learning About a Closed Head Injury What is a closed head injury? A closed head injury happens when your head gets hit hard. The strong force of the blow causes your brain to shake in your skull.  This movement can cause the brain to bruise, swell, or tear. Sometimes nerves or blood vessels also get damaged. This can cause bleeding in or around the brain. A concussion is a type of closed head injury. What are the symptoms? If you have a mild concussion, you may have a mild headache or feel \"not quite right. \" These symptoms are common. They usually go away over a few days to 4 weeks. But sometimes after a concussion, you feel like you can't function as well as before the injury. And you have new symptoms. This is called postconcussive syndrome. You may: · Find it harder to solve problems, think, concentrate, or remember. · Have headaches. · Have changes in your sleep patterns, such as not being able to sleep or sleeping all the time. · Have changes in your personality. · Not be interested in your usual activities. · Feel angry or anxious without a clear reason. · Lose your sense of taste or smell. · Be dizzy, lightheaded, or unsteady. It may be hard to stand or walk. How is a closed head injury treated? Any person who may have a concussion needs to see a doctor. Some people have to stay in the hospital to be watched. Others can go home safely. If you go home, follow your doctor's instructions. He or she will tell you if you need someone to watch you closely for the next 24 hours or longer. Rest is the best treatment. Get plenty of sleep at night. And try to rest during the day. · Avoid activities that are physically or mentally demanding. These include housework, exercise, and schoolwork. And don't play video games, send text messages, or use the computer. You may need to change your school or work schedule to be able to avoid these activities. · Ask your doctor when it's okay to drive, ride a bike, or operate machinery. · Take an over-the-counter pain medicine, such as acetaminophen (Tylenol), ibuprofen (Advil, Motrin), or naproxen (Aleve). Be safe with medicines. Read and follow all instructions on the label. · Check with your doctor before you use any other medicines for pain. · Do not drink alcohol or use illegal drugs. They can slow recovery. They can also increase your risk of getting a second head injury. Follow-up care is a key part of your treatment and safety. Be sure to make and go to all appointments, and call your doctor if you are having problems. It's also a good idea to know your test results and keep a list of the medicines you take. Where can you learn more? Go to http://odessa-mack.info/. Enter E235 in the search box to learn more about \"Learning About a Closed Head Injury. \" Current as of: October 14, 2016 Content Version: 11.4 © 0133-0240 [employer]. Care instructions adapted under license by [employer] (which disclaims liability or warranty for this information). If you have questions about a medical condition or this instruction, always ask your healthcare professional. [place] [address] any warranty or liability for your use of this information. Patient armband removed and shredded DISCHARGE SUMMARY from Nurse PATIENT INSTRUCTIONS: 
 
 
F-face looks uneven A-arms unable to move or move unevenly S-speech slurred or non-existent T-time-call 911 as soon as signs and symptoms begin-DO NOT go Back to bed or wait to see if you get better-TIME IS BRAIN. Warning Signs of HEART ATTACK Call 911 if you have these symptoms: 
? Chest discomfort.  Most heart attacks involve discomfort in the center of the chest that lasts more than a few minutes, or that goes away and comes back. It can feel like uncomfortable pressure, squeezing, fullness, or pain. ? Discomfort in other areas of the upper body. Symptoms can include pain or discomfort in one or both arms, the back, neck, jaw, or stomach. ? Shortness of breath with or without chest discomfort. ? Other signs may include breaking out in a cold sweat, nausea, or lightheadedness. Don't wait more than five minutes to call [address]! Fast action can save your life. Calling 911 is almost always the fastest way to get lifesaving treatment. Emergency Medical Services staff can begin treatment when they arrive  up to an hour sooner than if someone gets to the hospital by car. The discharge information has been reviewed with the patient. The patient verbalized understanding. Discharge medications reviewed with the patient and appropriate educational materials and side effects teaching were provided. ___________________________________________________________________________________________________________________________________ Introducing [facility]! Dear Lulla Lennox: Thank you for requesting a [employer] account. Our records indicate that you have previously registered for a [employer] account but its currently inactive. Please call our [employer] support line at [phone]. Additional Information If you have questions, please visit the Frequently Asked Questions section of the [employer] website at [email or web address] Remember, [employer] is NOT to be used for urgent needs. For medical emergencies, dial 911. Now available from your iPhone and Android! Unresulted Labs-Please follow up with your PCP about these lab tests Order Current Status NUCLEAR STRESS TEST Preliminary result Providers Seen During Your Hospitalization Provider Specialty Primary office phone Gregory Elena MD Emergency Medicine [phone] Gildardo Gillespie MD Family Practice [phone] Your Primary Care Physician (PCP) Primary Care Physician Office Phone Office Fax Emma Mazariegos [phone] You are allergic to the following Allergen Reactions Ciprofloxacin Hives Penicillins Nausea Only Shellfish Derived Hives Swelling Swelling of legs Recent Documentation Height Weight BMI OB Status Smoking Status 1.575 m 56.7 kg 22.86 kg/m2 Hysterectomy Current Some Day Smoker Emergency Contacts Name Discharge Info Relation Home Work Mobile GrahamManfred Rev DISCHARGE CAREGIVER [3] Spouse [3]  [phone] Patient Belongings The following personal items are in your possession at time of discharge: 
  Dental Appliances: None  Visual Aid: None      Home Medications: Kept at bedside   Jewelry: None  Clothing: None    Other Valuables: None Discharge Instructions Attachments/References HYDROCODONE/ACETAMINOPHEN (BY MOUTH) (ENGLISH) NITROGLYCERIN (BY MOUTH) (ENGLISH) Patient Handouts Hydrocodone/Acetaminophen (By mouth) Acetaminophen (a-yxoo-i-MIN-oh-fen), Hydrocodone Bitartrate (bmc-jily-NWU-done bye-TAR-trate) Treats pain. This medicine contains a narcotic pain reliever. Brand Name(s): Hycet, Lorcet, Lorcet HD, Lorcet Plus, Lortab 10/325, Lortab 5/325, Lortab 7.5/325, Lortab Elixir, Norco, Verdrocet, Vicodin, Vicodin ES, Vicodin HP, Xodol, Xodol 5/300 There may be other brand names for this medicine. When This Medicine Should Not Be Used: This medicine is not right for everyone. Do not use it if you had an allergic reaction to acetaminophen, hydrocodone, or other narcotic medicines, or stomach or bowel blockage (including paralytic ileus). How to Use This Medicine:  
Capsule, Liquid, Tablet · Your doctor will tell you how much medicine to use. Do not use more than directed. · An overdose can be dangerous. Follow directions carefully so you do not get too much medicine at one time. · Oral liquid: Measure the oral liquid medicine with a marked measuring spoon, oral syringe, or medicine cup. · Drink plenty of liquids to help avoid constipation. · This medicine should come with a Medication Guide. Ask your pharmacist for a copy if you do not have one. · Missed dose: Take a dose as soon as you remember. If it is almost time for your next dose, wait until then and take a regular dose. Do not take extra medicine to make up for a missed dose. · Store the medicine in a closed container at room temperature, away from heat, moisture, and direct light. Flush any unused Norco® tablets down the toilet. Drugs and Foods to Avoid: Ask your doctor or pharmacist before using any other medicine, including over-the-counter medicines, vitamins, and herbal products. · Do not use this medicine if you are using or have used an MAO inhibitor within the past 14 days. · Some medicines can affect how hydrocodone/acetaminophen works. Tell your doctor if you are using any of the following: ¨ Carbamazepine, erythromycin, ketoconazole, mirtazapine, phenytoin, rifampin, ritonavir, tramadol, trazodone ¨ Diuretic (water pill) ¨ Medicine to treat depression or mental health problems ¨ Medicine to treat migraine headaches ¨ Phenothiazine medicine · Tell your doctor if you use anything else that makes you sleepy. Some examples are allergy medicine, narcotic pain medicine, and alcohol. Tell your doctor if you are using buprenorphine, butorphanol, nalbuphine, pentazocine, or a muscle relaxer. · Do not drink alcohol while you are using this medicine. Acetaminophen can damage your liver, and your risk is higher if you also drink alcohol. Warnings While Using This Medicine: · Tell your doctor if you are pregnant or breastfeeding, or if you have kidney disease, liver disease, lung or breathing problems, gallbladder or pancreas problems, an underactive thyroid, [place] disease, prostate problems, trouble urinating, stomach problems, or a history of head injury or brain tumor, seizures, alcohol or drug addiction. · This medicine may cause the following problems:  
¨ High risk of overdose, which can lead to death ¨ Respiratory depression (serious breathing problem that can be life-threatening) ¨ Liver problems ¨ Serious skin reactions ¨ Serotonin syndrome (when used with certain medicines) · This medicine can be habit-forming. Do not use more than your prescribed dose. Call your doctor if you think your medicine is not working. · This medicine may make you dizzy or drowsy. Do not drive or doing anything else that could be dangerous until you know how this medicine affects you. · This medicine contains acetaminophen. Read the labels of all other medicines you are using to see if they also contain acetaminophen, or ask your doctor or pharmacist. Geno Anna not use more than 4 grams (4,000 milligrams) total of acetaminophen in one day. · Tell any doctor or dentist who treats you that you are using this medicine. This medicine may affect certain medical test results. · This medicine may cause constipation, especially with long-term use. Ask your doctor if you should use a laxative to prevent and treat constipation. · This medicine could cause infertility. Talk with your doctor before using this medicine if you plan to have children. · Keep all medicine out of the reach of children. Never share your medicine with anyone. Possible Side Effects While Using This Medicine:  
Call your doctor right away if you notice any of these side effects: · Allergic reaction: Itching or hives, swelling in your face or hands, swelling or tingling in your mouth or throat, chest tightness, trouble breathing · Anxiety, restlessness, fast heartbeat, fever, sweating, muscle spasms, twitching, diarrhea, seeing or hearing things that are not there · Blistering, peeling, red skin rash · Blue lips, fingernails, or skin · Dark urine or pale stools, loss of appetite, nausea or vomiting, stomach pain, yellow skin or eyes · Extreme weakness, shallow breathing, slow heartbeat, sweating, seizures, cold or clammy skin · Lightheadedness, dizziness, fainting If you notice these less serious side effects, talk with your doctor: · Constipation, nausea, vomiting · Tiredness or sleepiness If you notice other side effects that you think are caused by this medicine, tell your doctor. Call your doctor for medical advice about side effects. You may report side effects to FDA at 8-533-FDA-5886 © 2017 [address]  Information is for End User's use only and may not be sold, redistributed or otherwise used for commercial purposes. The above information is an  only. It is not intended as medical advice for individual conditions or treatments. Talk to your doctor, nurse or pharmacist before following any medical regimen to see if it is safe and effective for you. Nitroglycerin (By mouth) Nitroglycerin (nny-icqs-NWIJ-er-in) Treats or prevents angina (chest pain). This medicine is a nitrate. Brand Name(s): Nitro-Time, Nitrostat There may be other brand names for this medicine. When This Medicine Should Not Be Used: This medicine is not right for everyone. Do not use it if you had an allergic reaction to nitroglycerin or similar medicines. How to Use This Medicine:  
Long Acting Capsule, Tablet, Long Acting Tablet · Your doctor will tell you how much medicine to use. Do not use more than directed. Sit down before you take this medicine, because it could make you lightheaded. · Most people use this medicine for only part of the day or as needed. · Extended-release capsule or extended-release tablet:  
¨ Take on an empty stomach with a full glass of water. ¨ Swallow whole. Do not crush, break, or chew it. · Buccal tablet:  
¨ Place it between your gum and upper cheek or upper lip. Let the tablet dissolve slowly in your mouth over several hours. Do not chew, crush, or swallow the tablet or put it under your tongue. ¨ Avoid drinking anything hot while the tablet is in your mouth and do not touch the tablet with your tongue. ¨ Do not go to sleep with a buccal tablet in your mouth. · Sublingual tablet:  
¨ Wet the tablet with saliva and place it under your tongue or inside your cheek. Let the tablet dissolve. Do not chew, crush, or swallow the tablet. Wait 5 minutes. If you still have pain, take a second tablet. Do not take more than 3 tablets in 15 minutes. If you still have pain after you take a total of 3 tablets, this is an emergency. Call 911. Do not drive yourself to the hospital. 
¨ You may use this medicine 5 to 10 minutes before an activity that can cause angina. This may help prevent the attack. · Read and follow the patient instructions that come with this medicine. Talk to your doctor or pharmacist if you have any questions. · Missed dose: Take a dose as soon as you remember. If it is almost time for your next dose, wait until then and take a regular dose. Do not take extra medicine to make up for a missed dose. · Store the medicine in a closed container at room temperature, away from heat, moisture, and direct light. Store the sublingual tablets at room temperature in the original glass container, away from heat, moisture, and direct light. How to Store and Dispose of This Medicine: · Store the medicine at room temperature in a closed container, away from heat, moisture, and direct light. Ask your pharmacist, doctor, or health caregiver about the best way to dispose of any outdated medicine or medicine no longer needed. · Keep all medicine out of the reach of children and never share your medicine with anyone. Drugs and Foods to Avoid: Ask your doctor or pharmacist before using any other medicine, including over-the-counter medicines, vitamins, and herbal products. · Do not use this medicine if you are also using avanafil, riociguat, sildenafil, tadalafil, or vardenafil. · Some medicines can affect how nitroglycerin works. Tell your doctor if you are using any of the following: ¨ Alteplase, aspirin, heparin ¨ Blood pressure medicine ¨ Diuretic (water pill) ¨ Ergot medicine · Tell your doctor if you are using any medicine that makes your mouth dry, such as medicines that treat depression. · Do not drink alcohol while you are using this medicine. Warnings While Using This Medicine: · Tell your doctor if you are pregnant or breastfeeding, or if you have kidney disease, anemia, congestive heart failure, enlarged heart, low blood pressure, or a recent heart attack. Tell your doctor if you have a history of a head injury. · This medicine may make you dizzy or lightheaded. Do not drive or do anything else that could be dangerous until you know how this medicine affects you. These symptoms may be worse if you drink alcohol. · Medicines that treat chest pain sometimes cause headaches when you first start using it. This is normal. Do not stop using the medicine or change the time you use it to avoid headaches. Ask your doctor if you can take aspirin or acetaminophen to treat the headache. · Tell any doctor or dentist who treats you that you are using this medicine. This medicine may affect certain medical test results. · Keep all medicine out of the reach of children. Never share your medicine with anyone. Possible Side Effects While Using This Medicine:  
Call your doctor right away if you notice any of these side effects: · Allergic reaction: Itching or hives, swelling in your face or hands, swelling or tingling in your mouth or throat, chest tightness, trouble breathing · Blurred vision, dry mouth · Increased chest pain, fast or slow heartbeat · Severe or ongoing dizziness, lightheadedness, or fainting · Throbbing, severe, or ongoing headache, confusion, low fever, or trouble seeing · Trouble breathing, cold sweat, blue skin, lips, or nails · Warmth or redness in your face, neck, arms, or upper chest 
If you notice these less serious side effects, talk with your doctor: · Nausea, vomiting, weakness If you notice other side effects that you think are caused by this medicine, tell your doctor. Call your doctor for medical advice about side effects. You may report side effects to FDA at 3-811-FDA-9720 © 2017 [address] Information is for End User's use only and may not be sold, redistributed or otherwise used for commercial purposes. The above information is an  only. It is not intended as medical advice for individual conditions or treatments. Talk to your doctor, nurse or pharmacist before following any medical regimen to see if it is safe and effective for you. Please provide this summary of care documentation to your next provider. Signatures-by signing, you are acknowledging that this After Visit Summary has been reviewed with you and you have received a copy. Patient Signature:  ____________________________________________________________ Date:  ____________________________________________________________  
  
Sadaf Duong Provider Signature:  ____________________________________________________________ Date:  ____________________________________________________________

## 2017-11-06 PROBLEM — R55 SYNCOPE: Status: ACTIVE | Noted: 2017-11-06

## 2017-11-06 LAB
ALBUMIN SERPL-MCNC: 2.9 G/DL (ref 3.4–5)
ALBUMIN/GLOB SERPL: 0.7 {RATIO} (ref 0.8–1.7)
ALP SERPL-CCNC: 86 U/L (ref 45–117)
ALT SERPL-CCNC: 17 U/L (ref 13–56)
ANION GAP SERPL CALC-SCNC: 9 MMOL/L (ref 3–18)
AST SERPL-CCNC: 7 U/L (ref 15–37)
BASOPHILS # BLD: 0 K/UL (ref 0–0.1)
BASOPHILS NFR BLD: 0 % (ref 0–2)
BILIRUB SERPL-MCNC: 0.2 MG/DL (ref 0.2–1)
BUN SERPL-MCNC: 9 MG/DL (ref 7–18)
BUN/CREAT SERPL: 17 (ref 12–20)
CALCIUM SERPL-MCNC: 7.8 MG/DL (ref 8.5–10.1)
CHLORIDE SERPL-SCNC: 105 MMOL/L (ref 100–108)
CK MB CFR SERPL CALC: 5.5 % (ref 0–4)
CK MB CFR SERPL CALC: 5.8 % (ref 0–4)
CK MB CFR SERPL CALC: 5.9 % (ref 0–4)
CK MB SERPL-MCNC: 1.8 NG/ML (ref 5–25)
CK MB SERPL-MCNC: 1.8 NG/ML (ref 5–25)
CK MB SERPL-MCNC: 1.9 NG/ML (ref 5–25)
CK SERPL-CCNC: 31 U/L (ref 26–192)
CK SERPL-CCNC: 32 U/L (ref 26–192)
CK SERPL-CCNC: 33 U/L (ref 26–192)
CO2 SERPL-SCNC: 24 MMOL/L (ref 21–32)
CREAT SERPL-MCNC: 0.54 MG/DL (ref 0.6–1.3)
DIFFERENTIAL METHOD BLD: ABNORMAL
EOSINOPHIL # BLD: 0 K/UL (ref 0–0.4)
EOSINOPHIL NFR BLD: 0 % (ref 0–5)
ERYTHROCYTE [DISTWIDTH] IN BLOOD BY AUTOMATED COUNT: 16.1 % (ref 11.6–14.5)
GLOBULIN SER CALC-MCNC: 4.1 G/DL (ref 2–4)
GLUCOSE BLD STRIP.AUTO-MCNC: 231 MG/DL (ref 70–110)
GLUCOSE BLD STRIP.AUTO-MCNC: 256 MG/DL (ref 70–110)
GLUCOSE BLD STRIP.AUTO-MCNC: 287 MG/DL (ref 70–110)
GLUCOSE BLD STRIP.AUTO-MCNC: 328 MG/DL (ref 70–110)
GLUCOSE SERPL-MCNC: 256 MG/DL (ref 74–99)
HCT VFR BLD AUTO: 28.6 % (ref 35–45)
HGB BLD-MCNC: 8.9 G/DL (ref 12–16)
LYMPHOCYTES # BLD: 2.1 K/UL (ref 0.9–3.6)
LYMPHOCYTES NFR BLD: 25 % (ref 21–52)
MAGNESIUM SERPL-MCNC: 1.5 MG/DL (ref 1.6–2.6)
MCH RBC QN AUTO: 23.2 PG (ref 24–34)
MCHC RBC AUTO-ENTMCNC: 31.1 G/DL (ref 31–37)
MCV RBC AUTO: 74.7 FL (ref 74–97)
MONOCYTES # BLD: 0.5 K/UL (ref 0.05–1.2)
MONOCYTES NFR BLD: 5 % (ref 3–10)
NEUTS SEG # BLD: 5.7 K/UL (ref 1.8–8)
NEUTS SEG NFR BLD: 70 % (ref 40–73)
PLATELET # BLD AUTO: 294 K/UL (ref 135–420)
PMV BLD AUTO: 10.2 FL (ref 9.2–11.8)
POTASSIUM SERPL-SCNC: 3.4 MMOL/L (ref 3.5–5.5)
PROT SERPL-MCNC: 7 G/DL (ref 6.4–8.2)
RBC # BLD AUTO: 3.83 M/UL (ref 4.2–5.3)
SODIUM SERPL-SCNC: 138 MMOL/L (ref 136–145)
TROPONIN I SERPL-MCNC: <0.02 NG/ML (ref 0–0.04)
WBC # BLD AUTO: 8.3 K/UL (ref 4.6–13.2)

## 2017-11-06 PROCEDURE — 36415 COLL VENOUS BLD VENIPUNCTURE: CPT | Performed by: FAMILY MEDICINE

## 2017-11-06 PROCEDURE — 78452 HT MUSCLE IMAGE SPECT MULT: CPT | Performed by: PHYSICIAN ASSISTANT

## 2017-11-06 PROCEDURE — 93306 TTE W/DOPPLER COMPLETE: CPT

## 2017-11-06 PROCEDURE — 94640 AIRWAY INHALATION TREATMENT: CPT

## 2017-11-06 PROCEDURE — 82550 ASSAY OF CK (CPK): CPT | Performed by: FAMILY MEDICINE

## 2017-11-06 PROCEDURE — 93017 CV STRESS TEST TRACING ONLY: CPT | Performed by: PHYSICIAN ASSISTANT

## 2017-11-06 PROCEDURE — 95816 EEG AWAKE AND DROWSY: CPT | Performed by: FAMILY MEDICINE

## 2017-11-06 PROCEDURE — 74011250637 HC RX REV CODE- 250/637: Performed by: FAMILY MEDICINE

## 2017-11-06 PROCEDURE — 65390000012 HC CONDITION CODE 44 OBSERVATION

## 2017-11-06 PROCEDURE — 83735 ASSAY OF MAGNESIUM: CPT | Performed by: FAMILY MEDICINE

## 2017-11-06 PROCEDURE — 82962 GLUCOSE BLOOD TEST: CPT

## 2017-11-06 PROCEDURE — 85025 COMPLETE CBC W/AUTO DIFF WBC: CPT | Performed by: FAMILY MEDICINE

## 2017-11-06 PROCEDURE — 74011636637 HC RX REV CODE- 636/637: Performed by: FAMILY MEDICINE

## 2017-11-06 PROCEDURE — 99218 HC RM OBSERVATION: CPT

## 2017-11-06 PROCEDURE — 80053 COMPREHEN METABOLIC PANEL: CPT | Performed by: FAMILY MEDICINE

## 2017-11-06 RX ORDER — AZITHROMYCIN 250 MG/1
500 TABLET, FILM COATED ORAL DAILY
Status: DISCONTINUED | OUTPATIENT
Start: 2017-11-07 | End: 2017-11-08 | Stop reason: HOSPADM

## 2017-11-06 RX ORDER — DILTIAZEM HYDROCHLORIDE 240 MG/1
240 CAPSULE, COATED, EXTENDED RELEASE ORAL DAILY
Status: DISCONTINUED | OUTPATIENT
Start: 2017-11-07 | End: 2017-11-08 | Stop reason: HOSPADM

## 2017-11-06 RX ORDER — INSULIN LISPRO 100 [IU]/ML
INJECTION, SOLUTION INTRAVENOUS; SUBCUTANEOUS
Status: DISCONTINUED | OUTPATIENT
Start: 2017-11-06 | End: 2017-11-08 | Stop reason: HOSPADM

## 2017-11-06 RX ORDER — MAGNESIUM SULFATE 100 %
16 CRYSTALS MISCELLANEOUS AS NEEDED
Status: DISCONTINUED | OUTPATIENT
Start: 2017-11-06 | End: 2017-11-08 | Stop reason: HOSPADM

## 2017-11-06 RX ORDER — DEXTROSE MONOHYDRATE 25 G/50ML
25-50 INJECTION, SOLUTION INTRAVENOUS AS NEEDED
Status: DISCONTINUED | OUTPATIENT
Start: 2017-11-06 | End: 2017-11-08 | Stop reason: HOSPADM

## 2017-11-06 RX ADMIN — ZOLPIDEM TARTRATE 10 MG: 5 TABLET ORAL at 01:56

## 2017-11-06 RX ADMIN — HYDROCODONE BITARTRATE AND ACETAMINOPHEN 1 TABLET: 10; 325 TABLET ORAL at 06:09

## 2017-11-06 RX ADMIN — ASPIRIN 81 MG 81 MG: 81 TABLET ORAL at 09:55

## 2017-11-06 RX ADMIN — FLUOXETINE HYDROCHLORIDE 20 MG: 20 CAPSULE ORAL at 09:55

## 2017-11-06 RX ADMIN — TIOTROPIUM BROMIDE 18 MCG: 18 CAPSULE ORAL; RESPIRATORY (INHALATION) at 10:52

## 2017-11-06 RX ADMIN — INSULIN LISPRO 6 UNITS: 100 INJECTION, SOLUTION INTRAVENOUS; SUBCUTANEOUS at 23:00

## 2017-11-06 RX ADMIN — BUDESONIDE AND FORMOTEROL FUMARATE DIHYDRATE 2 PUFF: 160; 4.5 AEROSOL RESPIRATORY (INHALATION) at 10:52

## 2017-11-06 RX ADMIN — ATORVASTATIN CALCIUM 40 MG: 40 TABLET, FILM COATED ORAL at 23:01

## 2017-11-06 RX ADMIN — HYDROCODONE BITARTRATE AND ACETAMINOPHEN 1 TABLET: 10; 325 TABLET ORAL at 23:06

## 2017-11-06 RX ADMIN — GABAPENTIN 300 MG: 300 CAPSULE ORAL at 18:14

## 2017-11-06 RX ADMIN — HYDROCODONE BITARTRATE AND ACETAMINOPHEN 1 TABLET: 10; 325 TABLET ORAL at 18:14

## 2017-11-06 RX ADMIN — POLYETHYLENE GLYCOL 3350 17 G: 17 POWDER, FOR SOLUTION ORAL at 09:55

## 2017-11-06 RX ADMIN — SITAGLIPTIN 100 MG: 100 TABLET, FILM COATED ORAL at 09:55

## 2017-11-06 RX ADMIN — BUDESONIDE AND FORMOTEROL FUMARATE DIHYDRATE 2 PUFF: 160; 4.5 AEROSOL RESPIRATORY (INHALATION) at 20:36

## 2017-11-06 RX ADMIN — GABAPENTIN 300 MG: 300 CAPSULE ORAL at 09:55

## 2017-11-06 NOTE — ROUTINE PROCESS
Bedside shift change report given to Nova Lopez (oncoming nurse) by Julia Marshall RN (offgoing nurse). Report included the following information SBAR, Intake/Output and MAR.

## 2017-11-06 NOTE — NURSE NAVIGATOR
Spoke with patient in room with  Rev. Wattsbrandon Isiah at bedside. She was able verify her address and phone # as correct on the facesheet. The patient stated that Dr. Isaiah Pringle is her PCP. She stated that she has a walker, rollator and cane. She uses [facility] on [address]. She plans to return home upon discharge and is open to [facility] if recommended.

## 2017-11-06 NOTE — DIABETES MGMT
NUTRITIONAL ASSESSMENT GLYCEMIC CONTROL/ PLAN OF CARE     Beti Azevedo           74 y.o.           11/5/2017                 1. Anemia, unspecified type    2. Syncope and collapse    3. Closed head injury, initial encounter    4. Chronic low back pain without sciatica, unspecified back pain laterality       INTERVENTIONS/PLAN:   Recommend discontinuing Januvia while pt is hospitalized. Recommend basal and correctional insulin coverage per Insulin Subcutaneous order set. Diabetes education   Consider discharging pt on more affordable insulin regimen as pt reported being unable to continue Lantus insulin due to the cost     ASSESSMENT:   Pt is a 74year old female with a past medical history significant for COPD, hypertension, diabetes, cocaine abuse, MI, GERD, CAD, and diverticulitis. Blood glucose elevated above targets. Pt has order for [facility] [place]. Pt reports only taking Metformin at home and had to stop taking Lantus because of financial reasons. Pt reports having a good appetite and eating well at lunch meal. Pt was seen earlier today by RN diabetes educator, reinforced importance of healthy eating. Pt declined further education and reported no questions at this time. Pt has information on free outpatient diabetes education classes.      Diabetes Management:   Recent blood glucose:    11/5/2017 19:40 11/5/2017 20:44 11/5/2017 23:03 11/6/2017 10:04 11/6/2017 12:00   207 (H) 162 (H) 172 (H) 231 (H) 256 (H)    Within target range (non-ICU: <140; ICU<180): [] Yes   [x]  No    Current Insulin regimen:   Januvia 100 mg daily   Home medication/insulin regimen:   Metformin 500 mg twice daily   (stopped taking insulin taking Lantus due to financial difficulty)   HbA1c: 9.0% (estimated average glucose of 212 mg/dL)  Adequate glycemic control PTA:  [] Yes  [x] No    SUBJECTIVE/OBJECTIVE:   Information obtained from: Patient, chart review     Diet: Diabetic Consistent Carbohydrate    Medications: [x]  Reviewed     Most Recent POC Glucose:   Recent Labs      11/06/17   0358  11/05/17   1544   GLU  256*  438*       Labs:   Lab Results   Component Value Date/Time    Hemoglobin A1c 9.0 11/05/2017 03:44 PM     Lab Results   Component Value Date/Time    Sodium 138 11/06/2017 03:58 AM    Potassium 3.4 11/06/2017 03:58 AM    Chloride 105 11/06/2017 03:58 AM    CO2 24 11/06/2017 03:58 AM    Anion gap 9 11/06/2017 03:58 AM    Glucose 256 11/06/2017 03:58 AM    BUN 9 11/06/2017 03:58 AM    Creatinine 0.54 11/06/2017 03:58 AM    Calcium 7.8 11/06/2017 03:58 AM    Magnesium 1.5 11/06/2017 03:58 AM    Albumin 2.9 11/06/2017 03:58 AM     Anthropometrics:BMI (calculated): 22.9  Wt Readings from Last 1 Encounters:   11/06/17 56.7 kg (125 lb)      Ht Readings from Last 1 Encounters:   11/05/17 5' 2\" (1.575 m)     Estimated Nutrition Needs: 1238-3070 Kcal/day, 45-57 grams protein/day  Based on:   [x]  Actual BW    [] IBW   [] Adjusted BW      Nutrition Diagnoses:    Altered nutrition related lab value related to diabetes as evidenced by Hemoglobin A1c of 9.0%  Nutrition Interventions: diabetes education, coordination of care   Goal: Blood glucose will be within target range of  mg/dL by 11/09/17     Nutrition Monitoring and Evaluation    []     Monitor po intake on meal rounds  [x]     Continue inpatient monitoring and intervention  []     Other:    Briana Guzmán RD, CDE

## 2017-11-06 NOTE — DIABETES MGMT
Diabetes Patient/Family Education Record    Factors That  May Influence Patients Ability  to Learn or  Comply with Recommendations   []   Language barrier    []   Cultural needs   []   Motivation    []   Cognitive limitation    []   Physical   [x]   Education    []   Physiological factors   []   Hearing/vision/speaking impairment   []   Yazdanism beliefs    []   Financial factors   []  Other:   []  No factors identified at this time. Person Instructed:   [x]   Patient   []   Family   []  Other     Preference for Learning:   [x]   Verbal   [x]   Written   []  Demonstration     Level of Comprehension & Competence:    [x]  Good                                      [] Fair                                     []  Poor                             []  Needs Reinforcement   [x]  Teachback completed    Education Component:   [x]  Medication management, including how to administer insulin (if appropriate) and potential medication interactions: Patient stated that she previously took lantus insulin 33 units daily but stopped because she cannot afford the cost. She also took Januvia 100 mg daily until it was stopped by her doctor. Patient stated that she was only taking Metformin 500 mg twice daily. Discussed the subject elevated A1c with patient which she said happened because she was not able to continue to take lantus insulin due to financial factor. Patient stated that she is willing to resume taking insulin if she can afford to pay but said that there is no way for her to take lantus insulin again if she can't afford to pay for it. Educated patient about other insulin option: 70/30 mix insulin which is available at [facility] for $24.99 per vial. Patient stated that she can consider this. [x]  Nutritional management: Discussed the importance of eating 3 meals daily and portion control/serving size of carbs (starches, fruits, dairy) and limiting intake of concentrated sweets.     [x] Exercise: Patient stated that she's able to tolerate walking exercise when not feeling sick. [x]  Signs, symptoms, and treatment of hyperglycemia and hypoglycemia   [x] Prevention, recognition and treatment of hyperglycemia and hypoglycemia   [x]  Importance of blood glucose monitoring and how to obtain a blood glucose meter: Patient stated that she has BG meter and testing supplies at home, and she is checking her blood sugar daily at home. She is using [employer] BG meter.    []  Instruction on use of the blood glucose meter   [x]  Discuss the importance of HbA1C monitoring: Discussed with patient that her current A1c is elevated at 9.0% (11/05/2017) which is equivalent to average blood glucose of 212 mg/dL during the past 2-3 months. Patient verbalized understanding that her A1c is elevated and stated that she was not able to continue to take lantus insulin because it was too expensive. Educated patient about 70/30 mix insulin which is available at [facility] for $24.99/vial. Patient will consider taking mix insulin since she cannot afford lantus insulin. Patient has Medicare.     []  Sick day guidelines   []  Proper use and disposal of lancets, needles, syringes or insulin pens (if appropriate)   [x]  Potential long-term complications (retinopathy, kidney disease, neuropathy, foot care)   [x] Information about whom to contact in case of emergency or for more information    [x]  Goal:  Patient/family will demonstrate understanding of Diabetes Self Management Skills by: 11/13/2017  Plan for post-discharge education or self-management support:    [x] Outpatient class schedule provided            [] Patient Declined    [] Scheduled for outpatient classes (date) _______         Gary Argueta RN

## 2017-11-06 NOTE — CONSULTS
[facility] - Consult Note    Consultation request by Dr. Cari Steward for advice/opinion related to evaluating Syncope    Date of  Admission: 11/5/2017  3:52 PM   Primary Care Physician:  Shira Hoskins MD     Assessment:     Patient Active Problem List   Diagnosis Code    Chronic pain syndrome G89.4    DJD (degenerative joint disease) of knee M17.10    Knee pain, left M25.562    Depression F32.9    COPD, moderate ([facility] [address] J44.9    HNP (herniated nucleus pulposus), cervical M50.20    HTN (hypertension) I10    Osteoporosis M81.0    DM (diabetes mellitus) ([facility] [address] E11.9    Sarcoidosis D86.9    Chest pain R07.9    Trichomonas A59.9    Hidradenitis L73.2    Recurrent falls R29.6    GERD (gastroesophageal reflux disease) K21.9    Insomnia G47.00    Cocaine abuse F14.10    RVH (right ventricular hypertrophy) I51.7    Cigarette smoker F17.210    Non-ST elevated myocardial infarction (non-STEMI) ([facility]) I21.4    Anemia D64.9    CAD in native artery I25.10    Diverticulitis K57.92    Abdominal pain R10.9    Syncope and collapse R55    Hyperglycemia R73.9    Closed head injury S09.90XA    Chronic back pain M54.9, G89.29       -Syncope, likely vasovagal, neg trop x 3, negative head/C-spine CT  -Hx moderate to severe single-vessel CAD involving proximal LAD (65% lesion) with preserved LV function, medical management recommended 09/2014  -Anemia  -HTN  -DM, uncontrolled, glucose 438 on admission, A1c 9.0  -Hypomagnesemia  -Cocaine use    Remotely followed by Dr. Selvin Morrell, last seen 10/2014. Plan:     Pt presented to the hospital s/p syncopal episode that occurred after washing her hands post-micturition, seems vasovagal in nature. It has been a long time since last cardiac evaluation. Echo has been ordered, further recommendations based on findings. Will consider pharmacologic nuclear stress test since last evaluation was by cardiac catheterization 09/2014 with 65% LAD disease.   Would not recommend BB therapy due to cocaine use. Recommend magnesium replacement. Continue telemetry monitoring. History of Present Illness: This is a 74 y.o. female admitted for Syncope and collapse  Hyperglycemia  Chronic back pain  Closed head injury. Patient complains of:  Syncope    Pt is a 75 yo F with Hx HTN, DM, CAD with 65% LAD lesion 09/2014 (medical Tx) who presented to the ED s/p syncopal episode. Pt reports that she urinated, washed her hands, and her  subsequently heard something, called her name, and found her on the bathroom floor. She states she was out for 5-7 minutes. She denies any preceding symptoms to include increased chest pain/shortness of breath, lightheadedness, dizziness, palpitations. She does note that she apparently had urinary incontinence. She reports that her face becomes diaphoretic while walking and that she has also been having lightheadedness (no triggers) over the past couple months. She has chronic chest pressure and shortness of breath, which she attributes to her sarcoidosis and states is unchanged from baseline. No PND or orthopnea.         Cardiac risk factors: diabetes mellitus, hypertension, dyslipidemia, post-menopausal, cocaine use      Review of Symptoms:  Except as stated above include:  Constitutional:  Sweaty face while walking short distances  Respiratory:  Chronic shortness of breath a/w sarcoidosis  Cardiovascular:  Chronic chest pressure, unchanged, a/w sarcoidosis  Gastrointestinal: No vomiting, diarrhea, BRBPR  Genitourinary:  + urine incontinence a/w syncopal episode, no dysuria/hematuria  Musculoskeletal:  Chronic intermittent LE swelling  Neurological:  + syncope  Dermatological:  Small forehead contusion s/p fall  Endocrinological: Negative  Psychological:  + depression, no SI/HI     Past Medical History:     Past Medical History:   Diagnosis Date    Arthritis     \"generalized\"    Asthma     CAD in native artery     NSTEMI (Sep 2014); diffuse 65% pLAD, minimal disease RCA & LCx. pLAD FFR 0.93. LV  no RWMA (echo and LV angio)    Chronic neck pain     Chronic pain     left knee    Chronic pain syndrome     COPD (chronic obstructive pulmonary disease) (HCC)     Depression     Diabetes (HCC)     Diverticulitis     GERD (gastroesophageal reflux disease)     Heart disease     Hidradenitis 9/11/2014    Hypertension     Left knee pain     Narcotic dependence ([address]     Right wrist pain     Sarcoidosis          Social History:     Social History     Social History    Marital status:      Spouse name: N/A    Number of children: N/A    Years of education: N/A     Social History Main Topics    Smoking status: Current Some Day Smoker     Packs/day: 0.10     Years: 46.00     Types: Cigarettes    Smokeless tobacco: Not on file      Comment: Only smokes 1 cigarette per day    Alcohol use 1.0 oz/week     2 Glasses of wine per week      Comment: socially    Drug use: Yes     Special: Cocaine, Marijuana      Comment: 9/10/14: Cocaine use during past day; 9/12/14: Says uses cocaine only occasionally    Sexual activity: Yes     Other Topics Concern    Not on file     Social History Narrative        Family History:     Family History   Problem Relation Age of Onset    Hypertension Father     Diabetes Mother         Medications:      Allergies   Allergen Reactions    Ciprofloxacin Hives    Penicillins Nausea Only    Shellfish Derived Hives and Swelling     Swelling of legs        Current Facility-Administered Medications   Medication Dose Route Frequency    0.9% sodium chloride infusion  125 mL/hr IntraVENous CONTINUOUS    insulin regular (NOVOLIN R, HUMULIN R) 100 Units in 0.9% sodium chloride 100 mL infusion  1-10 Units/hr IntraVENous TITRATE    glucose chewable tablet 16 g  4 Tab Oral PRN    glucagon (GLUCAGEN) injection 1 mg  1 mg IntraMUSCular PRN    dextrose (D50W) injection syrg 12.5-25 g  25-50 mL IntraVENous PRN    aspirin chewable tablet 81 mg  81 mg Oral DAILY    atorvastatin (LIPITOR) tablet 40 mg  40 mg Oral QHS    FLUoxetine (PROzac) capsule 20 mg  20 mg Oral DAILY    budesonide-formoterol (SYMBICORT) 160-4.5 mcg/actuation HFA inhaler 2 Puff  2 Puff Inhalation BID RT    nitroglycerin (NITROSTAT) tablet 0.4 mg  0.4 mg SubLINGual PRN    polyethylene glycol (MIRALAX) packet 17 g  17 g Oral DAILY    SITagliptin (JANUVIA) tablet 100 mg  100 mg Oral DAILY    tiotropium (SPIRIVA) inhalation capsule 18 mcg  1 Cap Inhalation QAM RT    zolpidem (AMBIEN) tablet 10 mg  10 mg Oral QHS PRN    HYDROcodone-acetaminophen (NORCO)  mg tablet 1 Tab  1 Tab Oral Q4H PRN    gabapentin (NEURONTIN) capsule 300 mg  300 mg Oral BID         Physical Exam:     Visit Vitals    /72 (BP 1 Location: Right arm, BP Patient Position: At rest)    Pulse 88    Temp 97.9 °F (36.6 °C)    Resp 18    Ht 5' 2\" (1.575 m)    Wt 56.7 kg (125 lb)    SpO2 93%    BMI 22.86 kg/m2     BP Readings from Last 3 Encounters:   11/06/17 119/72   10/30/17 134/88   06/16/17 152/85     Pulse Readings from Last 3 Encounters:   11/06/17 88   10/30/17 96   06/16/17 76     Wt Readings from Last 3 Encounters:   11/06/17 56.7 kg (125 lb)   06/16/17 59 kg (130 lb)   06/13/17 56.2 kg (124 lb)       General:  alert, cooperative, no distress, appears stated age  Neck:  no JVD  Lungs:  Minimal inspiratory rales primarily at right base  Heart:  regular rate and rhythm  Abdomen:  abdomen is soft without significant tenderness, masses, organomegaly or guarding  Extremities:  no edema  Skin: Warm and dry. Neuro: alert, oriented x3, affect appropriate, no focal neurological deficits, moves all extremities well, no involuntary movements  Psych: Non-focal but expresses some comments about recent depression.      Data Review:     Recent Labs      11/06/17   0358  11/05/17   1544   WBC  8.3  9.3   HGB  8.9*  9.7*   HCT  28.6*  30.2*   PLT  294  328     Recent Labs      11/06/17   0358  11/05/17   1544   NA  138  139   K  3.4*  4.7   CL  105  106   CO2  24  21   GLU  256*  438*   BUN  9  18   CREA  0.54*  0.86   CA  7.8*  8.6   MG  1.5*  1.7   ALB  2.9*  3.4   SGOT  7*  20   ALT  17  22       Results for orders placed or performed during the hospital encounter of 11/05/17   EKG, 12 LEAD, INITIAL   Result Value Ref Range    Ventricular Rate 87 BPM    Atrial Rate 87 BPM    P-R Interval 134 ms    QRS Duration 84 ms    Q-T Interval 376 ms    QTC Calculation (Bezet) 452 ms    Calculated P Axis 50 degrees    Calculated R Axis -14 degrees    Calculated T Axis 41 degrees    Diagnosis       Normal sinus rhythm  Possible Left atrial enlargement  Borderline ECG  When compared with ECG of 30-OCT-2017 10:19,  No significant change was found  Confirmed by Jamey Xiao MD, ----- ([id number]) on 11/5/2017 5:11:09 PM     Results for orders placed or performed in visit on 10/06/14   AMB POC EKG ROUTINE W/ 12 LEADS, INTER & REP    Narrative    Sinus rhythm, left axis deviation       All Cardiac Markers in the last 24 hours:    Lab Results   Component Value Date/Time    CPK 33 11/06/2017 03:58 AM    CPK 63 11/05/2017 03:44 PM    CKMB 1.8 11/06/2017 03:58 AM    CKMB 2.1 11/05/2017 03:44 PM    CKND1 5.5 (H) 11/06/2017 03:58 AM    CKND1 3.3 11/05/2017 03:44 PM    TROIQ <0.02 11/06/2017 03:58 AM    TROIQ <0.02 11/05/2017 03:44 PM       Last Lipid:    Lab Results   Component Value Date/Time    Cholesterol, total 194 09/11/2014 05:22 AM    HDL Cholesterol 49 09/11/2014 05:22 AM    LDL, calculated 109.2 09/11/2014 05:22 AM    Triglyceride 179 09/11/2014 05:22 AM    CHOL/HDL Ratio 4.0 09/11/2014 05:22 AM       Signed By: Matilde Alonso PA-C     November 6, 2017

## 2017-11-06 NOTE — PROGRESS NOTES
met with patient, completed the initial Spiritual Assessment of the patient, and offered Pastoral Care, see flow sheets for interventions. Patient said she is Eulogio Haskins, her  is older and is a Pentecostalism . She said she has always assisted him and neglected herself to an extent. Pastoral support provided. Patient does not have any Anabaptist/cultural needs that will affect patients preferences in health care. Chart reviewed. Chaplains will continue to follow and will provide pastoral care on an as needed/as requested basis. Jerry Magana MDiv.   Board Certified [employer] [phone]

## 2017-11-06 NOTE — PROGRESS NOTES
Problem: Falls - Risk of  Goal: *Absence of Falls  Document Gilma Fall Risk and appropriate interventions in the flowsheet.    Outcome: Progressing Towards Goal  Fall Risk Interventions:  Mobility Interventions: Patient to call before getting OOB         Medication Interventions: Assess postural VS orthostatic hypotension, Evaluate medications/consider consulting pharmacy, Patient to call before getting OOB, Teach patient to arise slowly         History of Falls Interventions: Bed/chair exit alarm, Consult care management for discharge planning, Evaluate medications/consider consulting pharmacy, Investigate reason for fall

## 2017-11-06 NOTE — ROUTINE PROCESS
TRANSFER - IN REPORT:    Verbal report received from Dennys RN(name) on [employer]  being received from ED(unit) for     Report consisted of patients Situation, Background, Assessment and   Recommendations(SBAR). Information from the following report(s) SBAR, Intake/Output and MAR was reviewed with the receiving nurse. Opportunity for questions and clarification was provided. Assessment completed upon patients arrival to unit and care assumed.

## 2017-11-06 NOTE — ROUTINE PROCESS
TRANSFER - OUT REPORT:    Verbal report given to Barbara Daniels RN (name) on Corie Parisi  being transferred to [employer] (unit) for routine progression of care       Report consisted of patients Situation, Background, Assessment and   Recommendations(SBAR). Information from the following report(s) SBAR, ED Summary and MAR was reviewed with the receiving nurse. Lines:   Peripheral IV 11/05/17 Left Wrist (Active)   Site Assessment Clean, dry, & intact 11/5/2017  3:54 PM   Phlebitis Assessment 0 11/5/2017  3:54 PM   Infiltration Assessment 0 11/5/2017  3:54 PM   Dressing Status Clean, dry, & intact 11/5/2017  3:54 PM   Dressing Type 4 X 4 11/5/2017  3:54 PM        Opportunity for questions and clarification was provided.       Patient transported with:   Monitor  Registered Nurse

## 2017-11-06 NOTE — H&P
History and Physical    Patient: Edwina Zuniga MRN: [id number]  SSN: [id number]    YOB: 1950  Age: 74 y.o. Sex: female      Subjective:      Edwina Zuniga is a 74 y.o. female who was brought to the hospital after syncopal episode where patient landed on face with forehead swelling. Patient was in the bathroom but does not remember falling. Has had at least one other episode of syncope and has history of heart disease. Will admit for evaluation of syncope. Past Medical History:   Diagnosis Date    Arthritis     \"generalized\"    Asthma     CAD in native artery     NSTEMI (Sep 2014); diffuse 65% pLAD, minimal disease RCA & LCx. pLAD FFR 0.93. LV  no RWMA (echo and LV angio)    Chronic neck pain     Chronic pain     left knee    Chronic pain syndrome     COPD (chronic obstructive pulmonary disease) (HCC)     Depression     Diabetes (HCC)     Diverticulitis     GERD (gastroesophageal reflux disease)     Heart disease     Hidradenitis 9/11/2014    Hypertension     Left knee pain     Narcotic dependence ([address]     Right wrist pain     Sarcoidosis      Past Surgical History:   Procedure Laterality Date    HX BREAST BIOPSY Right     9/10/14: She said tag in place from [address] [place] [address] HX HYSTERECTOMY      HX KNEE ARTHROSCOPY Left       Family History   Problem Relation Age of Onset    Hypertension Father     Diabetes Mother      Social History   Substance Use Topics    Smoking status: Current Some Day Smoker     Packs/day: 0.10     Years: 46.00     Types: Cigarettes    Smokeless tobacco: Not on file      Comment: Only smokes 1 cigarette per day    Alcohol use 1.0 oz/week     2 Glasses of wine per week      Comment: socially      Prior to Admission medications    Medication Sig Start Date End Date Taking? Authorizing Provider   azithromycin (ZITHROMAX) 250 mg tablet Take 250 mg by mouth See Admin Instructions.    Yes Historical Provider methylPREDNISolone (MEDROL DOSEPACK) 4 mg tablet Per dose pack instructions 10/30/17  Yes Aniya Pierce MD   metFORMIN (GLUCOPHAGE) 500 mg tablet Take 500 mg by mouth two (2) times daily (with meals). Yes Reba Grier MD   clopidogrel (PLAVIX) 75 mg tablet Take 1 tablet by mouth daily. 10/15/14  Yes Mg ROSE MD   diltiazem CD (CARDIZEM CD) 240 mg ER capsule Take 1 capsule by mouth daily. 10/15/14  Yes Mg ROSE MD   atorvastatin (LIPITOR) 40 mg tablet Take 1 tablet by mouth nightly. 10/15/14  Yes Mg ROSE MD   aspirin 81 mg chewable tablet Take 1 tablet by mouth daily. 9/12/14  Yes Parul Bennett MD   gabapentin (NEURONTIN) 300 mg capsule Take 300 mg by mouth daily. Indications: NEUROPATHIC PAIN   Yes Historical Provider   ZOLPIDEM TARTRATE (AMBIEN PO) Take 12.5 mg by mouth nightly. Yes Historical Provider   HYDROcodone-acetaminophen (NORCO) 5-325 mg per tablet Take 1-2 tablets PO every 4-6 hours as needed for pain control. If over the counter ibuprofen or acetaminophen was suggested, then only take the vicodin for pain not well controlled with the over the counter medication. 10/30/17   Aniya Pierce MD   traMADol Perico Priestly) 50 mg tablet Take 50 mg by mouth every six (6) hours as needed for Pain. Reba Grier MD   chlorpheniramine-HYDROcodone [facility]) 10-8 mg/5 mL suspension Take 5 mL by mouth nightly as needed for Cough. Max Daily Amount: 5 mL. 6/13/17   Zora Ramírez DO   zolpidem CR (AMBIEN CR) 12.5 mg tablet  9/19/16   Historical Provider   polyethylene glycol (MIRALAX) 17 gram/dose powder Take 17 g by mouth daily. 1 tablespoon with 8 oz of water daily  Indications: CONSTIPATION 10/13/16   Shiraz Saravia MD   gabapentin (NEURONTIN) 300 mg capsule 1 tab PO BID 10/13/16   Shiraz Saravia MD   tiotropium (SPIRIVA WITH HANDIHALER) 18 mcg inhalation capsule Take 1 capsule by inhalation daily.  11/25/14   Karl White MD   fluticasone-salmeterol (ADVAIR) 250-50 mcg/dose diskus inhaler Take 1 puff by inhalation every twelve (12) hours. 11/25/14   Regina Dyer MD   albuterol (PROVENTIL HFA, VENTOLIN HFA, PROAIR HFA) 90 mcg/actuation inhaler Take 1 puff by inhalation daily as needed for Wheezing. 11/25/14   Regina Dyer MD   nitroglycerin (NITROSTAT) 0.4 mg SL tablet 1 tablet by SubLINGual route as needed for Chest Pain. 9/12/14   Trang Chase MD   polyethylene glycol (MIRALAX) 17 gram packet Take 1 packet by mouth daily. 9/13/14   Trang Chase MD   sitagliptin (JANUVIA) 100 mg tablet Take 100 mg by mouth daily. Historical Provider   fluoxetine (PROZAC) 20 mg capsule Take  by mouth daily. Historical Provider   insulin glargine (LANTUS) 100 unit/mL injection 33 Units by SubCUTAneous route nightly. Historical Provider   OMEPRAZOLE (PRILOSEC PO) Take 20 mg by mouth daily. Historical Provider   tiotropium (SPIRIVA WITH HANDIHALER) 18 mcg inhalation capsule Take 1 Cap by inhalation daily. Reba Grier MD        Allergies   Allergen Reactions    Ciprofloxacin Hives    Penicillins Nausea Only    Shellfish Derived Hives and Swelling     Swelling of legs       Review of Systems:  Review of systems not obtained due to patient factors. Objective:     Vitals:    11/06/17 0011 11/06/17 0612 11/06/17 1053 11/06/17 1200   BP: 141/76 119/72  (!) 162/92   Pulse: 80 88  87   Resp: 18 18  19   Temp: 98.3 °F (36.8 °C) 97.9 °F (36.6 °C)  97.8 °F (36.6 °C)   SpO2: 96% 93% 98% 95%   Weight:  56.7 kg (125 lb)     Height:            Physical Exam:  GENERAL: alert, cooperative, mild distress, appears older than stated age  THROAT & NECK: normal and no erythema or exudates noted. LUNG: clear to auscultation bilaterally  HEART: regular rate and rhythm, S1, S2 normal, no murmur, click, rub or gallop  ABDOMEN: soft, non-tender.  Bowel sounds normal. No masses,  no organomegaly    Assessment:     Hospital Problems  Date Reviewed: 11/6/2017 Codes Class Noted POA    Syncope and collapse ICD-10-CM: R55  ICD-9-CM: 780.2  11/5/2017 Unknown        Hyperglycemia ICD-10-CM: R73.9  ICD-9-CM: 790.29  11/5/2017 Unknown        Closed head injury ICD-10-CM: S09. 90XA  ICD-9-CM: 959.01  11/5/2017 Unknown        Chronic back pain ICD-10-CM: M54.9, G89.29  ICD-9-CM: 724.5, 338.29  11/5/2017 Unknown        CAD in native artery ICD-10-CM: I25.10  ICD-9-CM: 414.01  Unknown Yes    Overview Signed 10/6/2014  2:06 PM by Ollie Gold V     NSTEMI (Sep 2014); diffuse 65% pLAD, minimal disease RCA & LCx. pLAD FFR 0.93. LV  no RWMA (echo and LV angio)             Cocaine abuse ICD-10-CM: F14.10  ICD-9-CM: 305.60  9/11/2014 Yes    Overview Addendum 9/12/2014  7:05 PM by Ted Gaston MD     9/10/14: She reports use of cocaine during past 24 hours. 9/12/2014: She reports only occasional use, says no regular use. DM (diabetes mellitus) ([facility] [address] (Chronic) ICD-10-CM: E11.9  ICD-9-CM: 250.00  1/15/2014 Yes        Sarcoidosis (Chronic) ICD-10-CM: D86.9  ICD-9-CM: 376  1/15/2014 Yes              Plan:     [facility]  Cardiology consulted  Replace electrolytes.  EEG    Signed By: Nakia Aguila MD     November 6, 2017

## 2017-11-07 ENCOUNTER — HOME HEALTH ADMISSION (OUTPATIENT)
Dept: HOME HEALTH SERVICES | Facility: HOME HEALTH | Age: 67
End: 2017-11-07

## 2017-11-07 LAB
ATTENDING PHYSICIAN, CST07: NORMAL
DIAGNOSIS, 93000: NORMAL
DUKE TM SCORE RESULT, CST14: NORMAL
DUKE TREADMILL SCORE, CST13: NORMAL
ECG INTERP BEFORE EX, CST11: NORMAL
ECG INTERP DURING EX, CST12: NORMAL
FUNCTIONAL CAPACITY, CST17: NORMAL
GLUCOSE BLD STRIP.AUTO-MCNC: 145 MG/DL (ref 70–110)
GLUCOSE BLD STRIP.AUTO-MCNC: 148 MG/DL (ref 70–110)
GLUCOSE BLD STRIP.AUTO-MCNC: 182 MG/DL (ref 70–110)
GLUCOSE BLD STRIP.AUTO-MCNC: 225 MG/DL (ref 70–110)
KNOWN CARDIAC CONDITION, CST08: NORMAL
MAX. DIASTOLIC BP, CST04: 106 MMHG
MAX. HEART RATE, CST05: 127 BPM
MAX. SYSTOLIC BP, CST03: 199 MMHG
OVERALL BP RESPONSE TO EXERCISE, CST16: NORMAL
OVERALL HR RESPONSE TO EXERCISE, CST15: NORMAL
PEAK EX METS, CST10: 1 METS
PROTOCOL NAME, CST01: NORMAL
TEST INDICATION, CST09: NORMAL

## 2017-11-07 PROCEDURE — A9500 TC99M SESTAMIBI: HCPCS

## 2017-11-07 PROCEDURE — 74011250637 HC RX REV CODE- 250/637: Performed by: FAMILY MEDICINE

## 2017-11-07 PROCEDURE — 96375 TX/PRO/DX INJ NEW DRUG ADDON: CPT

## 2017-11-07 PROCEDURE — 94640 AIRWAY INHALATION TREATMENT: CPT

## 2017-11-07 PROCEDURE — 74011250636 HC RX REV CODE- 250/636: Performed by: FAMILY MEDICINE

## 2017-11-07 PROCEDURE — 74011250636 HC RX REV CODE- 250/636: Performed by: PHYSICIAN ASSISTANT

## 2017-11-07 PROCEDURE — 99218 HC RM OBSERVATION: CPT

## 2017-11-07 PROCEDURE — 74011636637 HC RX REV CODE- 636/637: Performed by: FAMILY MEDICINE

## 2017-11-07 PROCEDURE — 82962 GLUCOSE BLOOD TEST: CPT

## 2017-11-07 RX ORDER — LANOLIN ALCOHOL/MO/W.PET/CERES
400 CREAM (GRAM) TOPICAL 2 TIMES DAILY
Status: DISCONTINUED | OUTPATIENT
Start: 2017-11-07 | End: 2017-11-08 | Stop reason: HOSPADM

## 2017-11-07 RX ORDER — MORPHINE SULFATE 2 MG/ML
2 INJECTION, SOLUTION INTRAMUSCULAR; INTRAVENOUS ONCE
Status: COMPLETED | OUTPATIENT
Start: 2017-11-07 | End: 2017-11-07

## 2017-11-07 RX ORDER — ONDANSETRON 2 MG/ML
4 INJECTION INTRAMUSCULAR; INTRAVENOUS
Status: DISCONTINUED | OUTPATIENT
Start: 2017-11-07 | End: 2017-11-08 | Stop reason: HOSPADM

## 2017-11-07 RX ORDER — POTASSIUM CHLORIDE 20 MEQ/1
20 TABLET, EXTENDED RELEASE ORAL DAILY
Status: DISCONTINUED | OUTPATIENT
Start: 2017-11-07 | End: 2017-11-08 | Stop reason: HOSPADM

## 2017-11-07 RX ADMIN — ATORVASTATIN CALCIUM 40 MG: 40 TABLET, FILM COATED ORAL at 21:08

## 2017-11-07 RX ADMIN — FLUOXETINE HYDROCHLORIDE 20 MG: 20 CAPSULE ORAL at 09:28

## 2017-11-07 RX ADMIN — HYDROCODONE BITARTRATE AND ACETAMINOPHEN 1 TABLET: 10; 325 TABLET ORAL at 21:06

## 2017-11-07 RX ADMIN — BUDESONIDE AND FORMOTEROL FUMARATE DIHYDRATE 2 PUFF: 160; 4.5 AEROSOL RESPIRATORY (INHALATION) at 21:40

## 2017-11-07 RX ADMIN — DILTIAZEM HYDROCHLORIDE 240 MG: 240 CAPSULE, COATED, EXTENDED RELEASE ORAL at 09:28

## 2017-11-07 RX ADMIN — AZITHROMYCIN 500 MG: 250 TABLET, FILM COATED ORAL at 09:28

## 2017-11-07 RX ADMIN — HYDROCODONE BITARTRATE AND ACETAMINOPHEN 1 TABLET: 10; 325 TABLET ORAL at 09:27

## 2017-11-07 RX ADMIN — INSULIN LISPRO 2 UNITS: 100 INJECTION, SOLUTION INTRAVENOUS; SUBCUTANEOUS at 09:48

## 2017-11-07 RX ADMIN — Medication 2 MG: at 10:33

## 2017-11-07 RX ADMIN — POLYETHYLENE GLYCOL 3350 17 G: 17 POWDER, FOR SOLUTION ORAL at 09:28

## 2017-11-07 RX ADMIN — ASPIRIN 81 MG 81 MG: 81 TABLET ORAL at 09:28

## 2017-11-07 RX ADMIN — Medication 400 MG: at 10:33

## 2017-11-07 RX ADMIN — TIOTROPIUM BROMIDE 18 MCG: 18 CAPSULE ORAL; RESPIRATORY (INHALATION) at 11:55

## 2017-11-07 RX ADMIN — GABAPENTIN 300 MG: 300 CAPSULE ORAL at 17:35

## 2017-11-07 RX ADMIN — GABAPENTIN 300 MG: 300 CAPSULE ORAL at 09:28

## 2017-11-07 RX ADMIN — Medication 400 MG: at 17:35

## 2017-11-07 RX ADMIN — INSULIN LISPRO 2 UNITS: 100 INJECTION, SOLUTION INTRAVENOUS; SUBCUTANEOUS at 13:27

## 2017-11-07 RX ADMIN — SITAGLIPTIN 100 MG: 100 TABLET, FILM COATED ORAL at 09:28

## 2017-11-07 RX ADMIN — POTASSIUM CHLORIDE 20 MEQ: 20 TABLET, EXTENDED RELEASE ORAL at 10:33

## 2017-11-07 RX ADMIN — BUDESONIDE AND FORMOTEROL FUMARATE DIHYDRATE 2 PUFF: 160; 4.5 AEROSOL RESPIRATORY (INHALATION) at 11:55

## 2017-11-07 RX ADMIN — REGADENOSON 0.4 MG: 0.08 INJECTION, SOLUTION INTRAVENOUS at 08:20

## 2017-11-07 RX ADMIN — ZOLPIDEM TARTRATE 10 MG: 5 TABLET ORAL at 21:08

## 2017-11-07 RX ADMIN — INSULIN LISPRO 4 UNITS: 100 INJECTION, SOLUTION INTRAVENOUS; SUBCUTANEOUS at 23:35

## 2017-11-07 RX ADMIN — HYDROCODONE BITARTRATE AND ACETAMINOPHEN 1 TABLET: 10; 325 TABLET ORAL at 17:37

## 2017-11-07 RX ADMIN — SODIUM CHLORIDE 75 ML/HR: 900 INJECTION, SOLUTION INTRAVENOUS at 13:27

## 2017-11-07 NOTE — PROGRESS NOTES
Progress Note    Patient: Corie Parisi MRN: [id number]  SSN: [id number]    YOB: 1950  Age: 74 y.o. Sex: female      Admit Date: 11/5/2017    LOS: 1 day     Subjective:     Patient admitted due to syncopal episode. Having some chest discomfort after having the stress test.    Objective:     Vitals:    11/07/17 0400 11/07/17 0641 11/07/17 0800 11/07/17 1200   BP: 149/76  (!) 173/91 145/77   Pulse: 82  81 90   Resp: 19 18 18   Temp: 98.4 °F (36.9 °C)  97.6 °F (36.4 °C) 97.7 °F (36.5 °C)   SpO2: 95%  98% 98%   Weight:  56.7 kg (125 lb)     Height:            Intake and Output:  Current Shift:    Last three shifts: 11/05 1901 - 11/07 0700  In: 1853.3 [P.O.:360; I.V.:1493.3]  Out: 2100 [Urine:2100]    Physical Exam:   GENERAL: alert, cooperative, mild distress, appears stated age  LUNG: clear to auscultation bilaterally    Lab/Data Review: All lab results for the last 24 hours reviewed. ECHO  Ef 65%   Slight LVH    EEG pending    Assessment:     Active Problems:    DM (diabetes mellitus) ([facility] [address] (1/15/2014)      Sarcoidosis (1/15/2014)      Cocaine abuse (9/11/2014)      Overview: 9/10/14: She reports use of cocaine during past 24 hours. 9/12/2014: She reports only occasional use, says no regular use. CAD in native artery ()      Overview: NSTEMI (Sep 2014); diffuse 65% pLAD, minimal disease RCA & LCx. pLAD FFR       0.93. LV  no RWMA (echo and LV angio)      Syncope and collapse (11/5/2017)      Hyperglycemia (11/5/2017)      Closed head injury (11/5/2017)      Chronic back pain (11/5/2017)      Syncope (11/6/2017)        Plan:     Consult to home health. Probable discharge tomorrow unless stress test positive.     Signed By: Trina Gamboa MD     November 7, 2017

## 2017-11-07 NOTE — HOME CARE
Received HH referral, [facility] will follow for SN for medication management,disease education,PT/OT and HH aide, but pt states she does not want a [facility] aide, pt states she will accept SN and PT/OT; pt also states she wants someone to come cook and clean for her,pt informed that that's personal care (per notes: pt was given list of personal care agencies by ) ; pt states she already has RW,Rollator and cane, [facility] will follow. MIKE DRIVER.

## 2017-11-07 NOTE — PROCEDURES
[address] STRESS    Name:  Rayne Hutton  MR#:  [id number]  :  1950  Account #:  [de-identified]  Date of Adm:  2017  Date of Service:      PROCEDURE: Pharmacological cardiac nuclear stress test.    INDICATIONS: Syncope and chest pain. BASELINE ELECTROCARDIOGRAM: Sinus rhythm, normal EKG. PROTOCOL: The patient was given Lexiscan infusion through a right  hand IV; 10 mCi of sestamibi was injected before rest and 33 mCi of  sestamibi was injected before stress. Gated processing was  performed. Resting blood pressure 185/95 mmHg, increased to  199/106 mmHg. ELECTROCARDIOGRAM FINDINGS: No arrhythmias. No changes  beyond baseline. NUCLEAR FINDINGS: Left ventricular systolic function is normal and  calculated at 71%. No evidence of transient ischemic dilatation or  regional wall motion abnormality, ischemia or previous infarct. IMPRESSION:  1. Low risk pharmacological cardiac nuclear stress test.  2. Normal left ventricular systolic function calculated at 71%. 3. No regional wall motion abnormalities. 4. No ischemia or previous infarction based on perfusion imaging. 5. Baseline hypertension with hypertensive response to Lexiscan.         Preston Becerra M.D.    [facility] / [facility]  D:  2017   11:04  T:  2017   18:39  Job #:  [id number]

## 2017-11-07 NOTE — PROGRESS NOTES
Patient was given 10.91 mCi of Sestamibi for the Resting pictures. Patient received 0.4 mg of Lexiscan for the exercise portion of the Stress test. Patient was then given 33 mCi of Sestamibi for the Stress pictures. Patient went back to room with armband still on.

## 2017-11-07 NOTE — ROUTINE PROCESS
Bedside shift change report given to Trevor (oncoming nurse) by Kaila Pennington (offgoing nurse). Report included the following information SBAR, Intake/Output, MAR, Recent Results and Cardiac Rhythm . Candance Huger

## 2017-11-07 NOTE — PROGRESS NOTES
[facility] - Progress Note  Admit Date: 11/5/2017    Assessment:     Hospital Problems  Date Reviewed: 11/6/2017          Codes Class Noted POA    Syncope ICD-10-CM: R55  ICD-9-CM: 780.2  11/6/2017 Unknown        Syncope and collapse ICD-10-CM: R55  ICD-9-CM: 780.2  11/5/2017 Unknown        Hyperglycemia ICD-10-CM: R73.9  ICD-9-CM: 790.29  11/5/2017 Unknown        Closed head injury ICD-10-CM: S09. 90XA  ICD-9-CM: 959.01  11/5/2017 Unknown        Chronic back pain ICD-10-CM: M54.9, G89.29  ICD-9-CM: 724.5, 338.29  11/5/2017 Unknown        CAD in native artery ICD-10-CM: I25.10  ICD-9-CM: 414.01  Unknown Yes    Overview Signed 10/6/2014  2:06 PM by Agustin Gold V     NSTEMI (Sep 2014); diffuse 65% pLAD, minimal disease RCA & LCx. pLAD FFR 0.93. LV  no RWMA (echo and LV angio)             Cocaine abuse ICD-10-CM: F14.10  ICD-9-CM: 305.60  9/11/2014 Yes    Overview Addendum 9/12/2014  7:05 PM by Vonda Vasquez MD     9/10/14: She reports use of cocaine during past 24 hours. 9/12/2014: She reports only occasional use, says no regular use. DM (diabetes mellitus) ([facility] [address] (Chronic) ICD-10-CM: E11.9  ICD-9-CM: 250.00  1/15/2014 Yes        Sarcoidosis (Chronic) ICD-10-CM: D86.9  ICD-9-CM: 754  1/15/2014 Yes                 -Syncope, likely vasovagal, neg trop x 3, negative head/C-spine CT.  -Hx moderate to severe single-vessel CAD involving proximal LAD (65% lesion) with preserved LV function, medical management recommended 09/2014.  -Anemia  -HTN  -DM, uncontrolled, glucose 438 on admission, A1c 9.0  -Hypomagnesemia  -Cocaine use     Remotely followed by Dr. Cyndi Osuna, last seen 10/2014. Plan: Will proceed with nuclear stress test this AM and make further recommendations pending course. Continued on ASA and statin. No BB given cocaine use. Subjective:     Evaluated in stress lab. Nervous. No CP, dizziness or syncope. .     Objective:      Patient Vitals for the past 8 hrs:   Temp Pulse Resp BP SpO2   11/07/17 0800 97.6 °F (36.4 °C) 81 18 (!) 173/91 98 %   11/07/17 0400 98.4 °F (36.9 °C) 82 19 149/76 95 %         Patient Vitals for the past 96 hrs:   Weight   11/07/17 0641 56.7 kg (125 lb)   11/06/17 0612 56.7 kg (125 lb)   11/05/17 2203 57.5 kg (126 lb 11.2 oz)                    Intake/Output Summary (Last 24 hours) at 11/07/17 0836  Last data filed at 11/07/17 0826   Gross per 24 hour   Intake                0 ml   Output              450 ml   Net             -450 ml       Physical Exam:  General:  alert, cooperative, no distress, appears stated age  Neck:  no JVD  Lungs:  clear to auscultation bilaterally  Heart:  regular rate and rhythm, S1, S2 normal, no murmur, click, rub or gallop  Abdomen:  abdomen is soft without significant tenderness, masses, organomegaly or guarding  Extremities:  extremities normal, atraumatic, no cyanosis or edema    Data Review:     Labs: Results:       Chemistry Recent Labs      11/06/17   0358  11/05/17   1544   GLU  256*  438*   NA  138  139   K  3.4*  4.7   CL  105  106   CO2  24  21   BUN  9  18   CREA  0.54*  0.86   CA  7.8*  8.6   MG  1.5*  1.7   AGAP  9  12   BUCR  17  21*   AP  86  144*   TP  7.0  7.9   ALB  2.9*  3.4   GLOB  4.1*  4.5*   AGRAT  0.7*  0.8      CBC w/Diff Recent Labs      11/06/17   0358  11/05/17   1544   WBC  8.3  9.3   RBC  3.83*  4.05*   HGB  8.9*  9.7*   HCT  28.6*  30.2*   PLT  294  328   GRANS  70  89*   LYMPH  25  8*   EOS  0  0      Cardiac Enzymes Lab Results   Component Value Date/Time    CPK 31 11/06/2017 01:15 PM    CPK 32 11/06/2017 10:28 AM    CKMB 1.8 11/06/2017 01:15 PM    CKMB 1.9 11/06/2017 10:28 AM    CKND1 5.8 (H) 11/06/2017 01:15 PM    CKND1 5.9 (H) 11/06/2017 10:28 AM    TROIQ <0.02 11/06/2017 01:15 PM    TROIQ <0.02 11/06/2017 10:28 AM      Coagulation No results for input(s): PTP, INR, APTT in the last 72 hours.     No lab exists for component: INREXT    Lipid Panel Lab Results   Component Value Date/Time Cholesterol, total 194 09/11/2014 05:22 AM    HDL Cholesterol 49 09/11/2014 05:22 AM    LDL, calculated 109.2 09/11/2014 05:22 AM    VLDL, calculated 35.8 09/11/2014 05:22 AM    Triglyceride 179 09/11/2014 05:22 AM    CHOL/HDL Ratio 4.0 09/11/2014 05:22 AM      BNP No results found for: BNP, BNPP, XBNPT   Liver Enzymes Recent Labs      11/06/17   0358   TP  7.0   ALB  2.9*   AP  86   SGOT  7*      Digoxin    Thyroid Studies Lab Results   Component Value Date/Time    TSH 0.45 06/13/2017 06:08 PM          Signed By: ROBIN Menezes     November 7, 2017

## 2017-11-08 VITALS
RESPIRATION RATE: 17 BRPM | SYSTOLIC BLOOD PRESSURE: 152 MMHG | HEART RATE: 82 BPM | HEIGHT: 62 IN | WEIGHT: 125 LBS | OXYGEN SATURATION: 94 % | BODY MASS INDEX: 23 KG/M2 | TEMPERATURE: 98.6 F | DIASTOLIC BLOOD PRESSURE: 81 MMHG

## 2017-11-08 LAB
GLUCOSE BLD STRIP.AUTO-MCNC: 217 MG/DL (ref 70–110)
GLUCOSE BLD STRIP.AUTO-MCNC: 224 MG/DL (ref 70–110)

## 2017-11-08 PROCEDURE — 74011250637 HC RX REV CODE- 250/637: Performed by: FAMILY MEDICINE

## 2017-11-08 PROCEDURE — 74011636637 HC RX REV CODE- 636/637: Performed by: FAMILY MEDICINE

## 2017-11-08 PROCEDURE — 94640 AIRWAY INHALATION TREATMENT: CPT

## 2017-11-08 PROCEDURE — 99218 HC RM OBSERVATION: CPT

## 2017-11-08 PROCEDURE — 82962 GLUCOSE BLOOD TEST: CPT

## 2017-11-08 RX ORDER — HYDROCODONE BITARTRATE AND ACETAMINOPHEN 5; 325 MG/1; MG/1
1 TABLET ORAL
Qty: 28 TAB | Refills: 0 | Status: SHIPPED | OUTPATIENT
Start: 2017-11-08 | End: 2017-11-15

## 2017-11-08 RX ORDER — NITROGLYCERIN 0.4 MG/1
0.4 TABLET SUBLINGUAL AS NEEDED
Qty: 25 TAB | Refills: 1 | Status: SHIPPED | OUTPATIENT
Start: 2017-11-08

## 2017-11-08 RX ADMIN — Medication 400 MG: at 08:13

## 2017-11-08 RX ADMIN — INSULIN LISPRO 4 UNITS: 100 INJECTION, SOLUTION INTRAVENOUS; SUBCUTANEOUS at 08:14

## 2017-11-08 RX ADMIN — HYDROCODONE BITARTRATE AND ACETAMINOPHEN 1 TABLET: 10; 325 TABLET ORAL at 08:13

## 2017-11-08 RX ADMIN — AZITHROMYCIN 500 MG: 250 TABLET, FILM COATED ORAL at 08:13

## 2017-11-08 RX ADMIN — POTASSIUM CHLORIDE 20 MEQ: 20 TABLET, EXTENDED RELEASE ORAL at 09:00

## 2017-11-08 RX ADMIN — GABAPENTIN 300 MG: 300 CAPSULE ORAL at 08:13

## 2017-11-08 RX ADMIN — SITAGLIPTIN 100 MG: 100 TABLET, FILM COATED ORAL at 08:13

## 2017-11-08 RX ADMIN — BUDESONIDE AND FORMOTEROL FUMARATE DIHYDRATE 2 PUFF: 160; 4.5 AEROSOL RESPIRATORY (INHALATION) at 09:57

## 2017-11-08 RX ADMIN — DILTIAZEM HYDROCHLORIDE 240 MG: 240 CAPSULE, COATED, EXTENDED RELEASE ORAL at 08:13

## 2017-11-08 RX ADMIN — POLYETHYLENE GLYCOL 3350 17 G: 17 POWDER, FOR SOLUTION ORAL at 08:13

## 2017-11-08 RX ADMIN — INSULIN LISPRO 4 UNITS: 100 INJECTION, SOLUTION INTRAVENOUS; SUBCUTANEOUS at 12:31

## 2017-11-08 RX ADMIN — ASPIRIN 81 MG 81 MG: 81 TABLET ORAL at 08:13

## 2017-11-08 RX ADMIN — TIOTROPIUM BROMIDE 18 MCG: 18 CAPSULE ORAL; RESPIRATORY (INHALATION) at 09:57

## 2017-11-08 RX ADMIN — FLUOXETINE HYDROCHLORIDE 20 MG: 20 CAPSULE ORAL at 08:13

## 2017-11-08 NOTE — ROUTINE PROCESS
Bedside and Verbal shift change report given to Trevor RN (oncoming nurse) by RAIMUNDO Rodriguez RN (offgoing nurse). Report given with SBAR, Kardex, MAR and Recent Results.

## 2017-11-08 NOTE — DISCHARGE INSTRUCTIONS
Fainting: Care Instructions  Your Care Instructions    When you faint, or pass out, you lose consciousness for a short time. A brief drop in blood flow to the brain often causes it. When you fall or lie down, more blood flows to your brain and you regain consciousness. Emotional stress, pain, or overheating-especially if you have been standing-can make you faint. In these cases, fainting is usually not serious. But fainting can be a sign of a more serious problem. Your doctor may want you to have more tests to rule out other causes. The treatment you need depends on the reason why you fainted. The doctor has checked you carefully, but problems can develop later. If you notice any problems or new symptoms, get medical treatment right away. Follow-up care is a key part of your treatment and safety. Be sure to make and go to all appointments, and call your doctor if you are having problems. It's also a good idea to know your test results and keep a list of the medicines you take. How can you care for yourself at home? · Drink plenty of fluids to prevent dehydration. If you have kidney, heart, or liver disease and have to limit fluids, talk with your doctor before you increase your fluid intake. When should you call for help? Call 911 anytime you think you may need emergency care. For example, call if:  ? · You have symptoms of a heart problem. These may include:  ¨ Chest pain or pressure. ¨ Severe trouble breathing. ¨ A fast or irregular heartbeat. ¨ Lightheadedness or sudden weakness. ¨ Coughing up pink, foamy mucus. ¨ Passing out. After you call 911, the  may tell you to chew 1 adult-strength or 2 to 4 low-dose aspirin. Wait for an ambulance. Do not try to drive yourself. ? · You have symptoms of a stroke. These may include:  ¨ Sudden numbness, tingling, weakness, or loss of movement in your face, arm, or leg, especially on only one side of your body. ¨ Sudden vision changes.   ¨ Sudden trouble speaking. ¨ Sudden confusion or trouble understanding simple statements. ¨ Sudden problems with walking or balance. ¨ A sudden, severe headache that is different from past headaches. ? · You passed out (lost consciousness) again. ? Watch closely for changes in your health, and be sure to contact your doctor if:  ? · You do not get better as expected. Where can you learn more? Go to http://odessa-mack.info/. Enter S684 in the search box to learn more about \"Fainting: Care Instructions. \"  Current as of: March 20, 2017  Content Version: 11.4  © 6981-0527 [employer]. Care instructions adapted under license by [employer] (which disclaims liability or warranty for this information). If you have questions about a medical condition or this instruction, always ask your healthcare professional. [address] any warranty or liability for your use of this information. Learning About High Blood Sugar  What is high blood sugar? Your body turns the food you eat into glucose (sugar), which it uses for energy. But if your body isn't able to use the sugar right away, it can build up in your blood and lead to high blood sugar. When the amount of sugar in your blood stays too high for too much of the time, you may have diabetes. Diabetes is a disease that can cause serious health problems. The good news is that lifestyle changes may help you get your blood sugar back to normal and avoid or delay diabetes. What causes high blood sugar? Sugar (glucose) can build up in your blood if you:  · Are overweight. · Have a family history of diabetes. · Take certain medicines, such as steroids. What are the symptoms? Having high blood sugar may not cause any symptoms at all. Or it may make you feel very thirsty or very hungry. You may also urinate more often than usual, have blurry vision, or lose weight without trying.   How is high blood sugar treated? You can take steps to lower your blood sugar level if you understand what makes it get higher. Your doctor may want you to learn how to test your blood sugar level at home. Then you can see how illness, stress, or different kinds of food or medicine raise or lower your blood sugar level. Other tests may be needed to see if you have diabetes. How can you prevent high blood sugar? · Watch your weight. If you're overweight, losing just a small amount of weight may help. Reducing fat around your waist is most important. · Limit the amount of calories, sweets, and unhealthy fat you eat. Ask your doctor if a dietitian can help you. A registered dietitian can help you create meal plans that fit your lifestyle. · Get at least 30 minutes of exercise on most days of the week. Exercise helps control your blood sugar. It also helps you maintain a healthy weight. Walking is a good choice. You also may want to do other activities, such as running, swimming, cycling, or playing tennis or team sports. · If your doctor prescribed medicines, take them exactly as prescribed. Call your doctor if you think you are having a problem with your medicine. You will get more details on the specific medicines your doctor prescribes. Follow-up care is a key part of your treatment and safety. Be sure to make and go to all appointments, and call your doctor if you are having problems. It's also a good idea to know your test results and keep a list of the medicines you take. Where can you learn more? Go to http://odessa-mack.info/. Enter O108 in the search box to learn more about \"Learning About High Blood Sugar. \"  Current as of: March 13, 2017  Content Version: 11.4  © 4225-2553 [employer]. Care instructions adapted under license by [employer] (which disclaims liability or warranty for this information).  If you have questions about a medical condition or this instruction, always ask your healthcare professional. [address] any warranty or liability for your use of this information. Learning About a Closed Head Injury  What is a closed head injury? A closed head injury happens when your head gets hit hard. The strong force of the blow causes your brain to shake in your skull. This movement can cause the brain to bruise, swell, or tear. Sometimes nerves or blood vessels also get damaged. This can cause bleeding in or around the brain. A concussion is a type of closed head injury. What are the symptoms? If you have a mild concussion, you may have a mild headache or feel \"not quite right. \" These symptoms are common. They usually go away over a few days to 4 weeks. But sometimes after a concussion, you feel like you can't function as well as before the injury. And you have new symptoms. This is called postconcussive syndrome. You may:  · Find it harder to solve problems, think, concentrate, or remember. · Have headaches. · Have changes in your sleep patterns, such as not being able to sleep or sleeping all the time. · Have changes in your personality. · Not be interested in your usual activities. · Feel angry or anxious without a clear reason. · Lose your sense of taste or smell. · Be dizzy, lightheaded, or unsteady. It may be hard to stand or walk. How is a closed head injury treated? Any person who may have a concussion needs to see a doctor. Some people have to stay in the hospital to be watched. Others can go home safely. If you go home, follow your doctor's instructions. He or she will tell you if you need someone to watch you closely for the next 24 hours or longer. Rest is the best treatment. Get plenty of sleep at night. And try to rest during the day. · Avoid activities that are physically or mentally demanding. These include housework, exercise, and schoolwork. And don't play video games, send text messages, or use the computer.  You may need to change your school or work schedule to be able to avoid these activities. · Ask your doctor when it's okay to drive, ride a bike, or operate machinery. · Take an over-the-counter pain medicine, such as acetaminophen (Tylenol), ibuprofen (Advil, Motrin), or naproxen (Aleve). Be safe with medicines. Read and follow all instructions on the label. · Check with your doctor before you use any other medicines for pain. · Do not drink alcohol or use illegal drugs. They can slow recovery. They can also increase your risk of getting a second head injury. Follow-up care is a key part of your treatment and safety. Be sure to make and go to all appointments, and call your doctor if you are having problems. It's also a good idea to know your test results and keep a list of the medicines you take. Where can you learn more? Go to http://odessa-mack.info/. Enter E235 in the search box to learn more about \"Learning About a Closed Head Injury. \"  Current as of: October 14, 2016  Content Version: 11.4  © 6632-7987 [employer]. Care instructions adapted under license by [employer] (which disclaims liability or warranty for this information). If you have questions about a medical condition or this instruction, always ask your healthcare professional. [address] any warranty or liability for your use of this information. Patient armband removed and shredded      DISCHARGE SUMMARY from Nurse    PATIENT INSTRUCTIONS:    After general anesthesia or intravenous sedation, for 24 hours or while taking prescription Narcotics:  · Limit your activities  · Do not drive and operate hazardous machinery  · Do not make important personal or business decisions  · Do  not drink alcoholic beverages  · If you have not urinated within 8 hours after discharge, please contact your surgeon on call.     Report the following to your surgeon:  · Excessive pain, swelling, redness or odor of or around the surgical area  · Temperature over 100.5  · Nausea and vomiting lasting longer than 4 hours or if unable to take medications  · Any signs of decreased circulation or nerve impairment to extremity: change in color, persistent  numbness, tingling, coldness or increase pain  · Any questions    What to do at Home:  Recommended activity: Activity as tolerated    If you experience any of the following symptoms Nausea, vomiting, diarrhea, fever greater than 100.5, dizziness, severe headache, shortness of breath, chest pain, increased pain, please follow up with PCP. *  Please give a list of your current medications to your Primary Care Provider. *  Please update this list whenever your medications are discontinued, doses are      changed, or new medications (including over-the-counter products) are added. *  Please carry medication information at all times in case of emergency situations. These are general instructions for a healthy lifestyle:    No smoking/ No tobacco products/ Avoid exposure to second hand smoke  Surgeon General's Warning:  Quitting smoking now greatly reduces serious risk to your health. Obesity, smoking, and sedentary lifestyle greatly increases your risk for illness    A healthy diet, regular physical exercise & weight monitoring are important for maintaining a healthy lifestyle    You may be retaining fluid if you have a history of heart failure or if you experience any of the following symptoms:  Weight gain of 3 pounds or more overnight or 5 pounds in a week, increased swelling in our hands or feet or shortness of breath while lying flat in bed. Please call your doctor as soon as you notice any of these symptoms; do not wait until your next office visit.     Recognize signs and symptoms of STROKE:    F-face looks uneven    A-arms unable to move or move unevenly    S-speech slurred or non-existent    T-time-call 911 as soon as signs and symptoms begin-DO NOT go       Back to bed or wait to see if you get better-TIME IS BRAIN. Warning Signs of HEART ATTACK     Call 911 if you have these symptoms:   Chest discomfort. Most heart attacks involve discomfort in the center of the chest that lasts more than a few minutes, or that goes away and comes back. It can feel like uncomfortable pressure, squeezing, fullness, or pain.  Discomfort in other areas of the upper body. Symptoms can include pain or discomfort in one or both arms, the back, neck, jaw, or stomach.  Shortness of breath with or without chest discomfort.  Other signs may include breaking out in a cold sweat, nausea, or lightheadedness. Don't wait more than five minutes to call 911 - MINUTES MATTER! Fast action can save your life. Calling 911 is almost always the fastest way to get lifesaving treatment. Emergency Medical Services staff can begin treatment when they arrive -- up to an hour sooner than if someone gets to the hospital by car. The discharge information has been reviewed with the patient. The patient verbalized understanding. Discharge medications reviewed with the patient and appropriate educational materials and side effects teaching were provided.   ___________________________________________________________________________________________________________________________________

## 2017-11-08 NOTE — PROGRESS NOTES
Problem: Falls - Risk of  Goal: *Absence of Falls  Document Gilma Fall Risk and appropriate interventions in the flowsheet.    Outcome: Progressing Towards Goal  Fall Risk Interventions:  Mobility Interventions: Assess mobility with egress test, OT consult for ADLs, Patient to call before getting OOB, PT Consult for mobility concerns, PT Consult for assist device competence, Strengthening exercises (ROM-active/passive)         Medication Interventions: Patient to call before getting OOB    Elimination Interventions: Call light in reach, Elevated toilet seat, Patient to call for help with toileting needs, Toileting schedule/hourly rounds, Toilet paper/wipes in reach    History of Falls Interventions: Bed/chair exit alarm, Consult care management for discharge planning, Investigate reason for fall

## 2017-11-08 NOTE — PROGRESS NOTES
Care Management Interventions  PCP Verified by CM: Yes  Mode of Transport at Discharge:  (family)  Transition of Care Consult (CM Consult): [address]: Yes  Physical Therapy Consult: No  Occupational Therapy Consult: No  Current Support Network: Lives with Spouse  Confirm Follow Up Transport: Family  Plan discussed with Pt/Family/Caregiver: Yes  Freedom of Choice Offered: Yes (home health)  Discharge Location  Discharge Placement: Home with home health    Pt is discharging home today. Pt states that she has not been screen for medicaid for personal care yet and that she would like for them to please call her cell phone [phone] because she is unable to get up quickly to get to the home phone and states that she will have her cell phone with her. Home phone number is [phone]. Called Yasbel with [employer] to ask them to please call pt's cell phone. Pt's  at bedside and states that he will try to call someone for a ride home but he is not sure if they have a ride home. MOON and OBS forms signed by pt's . Originals placed in chart and copies given to pt.

## 2017-11-08 NOTE — PROCEDURES
[place]    Name:  Neva Nevarez  MR#:  [id number]  :  1950  Account #:  [de-identified]  Date of Adm:  2017  Date of Service:  2017      REFERRING PHYSICIAN: Dr. Beba De La Garza: Destin Blue MD    This is a 68-year-old who is brought following a syncopal episode. MEDICATIONS  Include:  1. Humalog. 2. Glucagon. 3. Dextrose. 4. Januvia. 5. Spiriva. 6. Neurontin. 7. Prozac. 8. Miralax. 9. Aspirin. The EEG examination is performed as an inpatient utilizing both  referential and differential montages as well as the international 10-20  electrode placement system. The patient was noted to be alert with  eyes closed. She demonstrates well-formed posteriorly dominant,  reactive alpha rhythm up to 9 Hz. She does appear to enter into  drowsiness. Hyperventilation was not performed. Intermittent photic  stimulation and mental alerting failed to elicit abnormal activity. There  were no other focal, lateralized or abnormal paroxysmal discharges  noted. On single-channel EKG monitoring, no cardiac ectopy  was appreciated. ELECTROENCEPHALOGRAM INTERPRETATION: Normal awake  briefly drowsy recording.         MD SHANNON Prakash / SALMA  D:  2017   15:50  T:  2017   09:31  Job #:  [id number]

## 2017-11-08 NOTE — HOME CARE
Discharge noted for today, [facility] will follow for SN,PT/OT, pt declined [facility] aidjoseluis DRIVER LPN.

## 2017-11-08 NOTE — PROGRESS NOTES
[facility] - Progress Note  Admit Date: 11/5/2017    Assessment:     -Syncope, likely vasovagal, neg trop x 3, negative head/C-spine CT. -Pharm nuc 11/7/17, no ischemia, normal EF  -Hx moderate to severe single-vessel CAD involving proximal LAD (65% lesion) with preserved LV function, medical management recommended 09/2014.  -Anemia  -HTN  -DM, uncontrolled, glucose 438 on admission, A1c 9.0  -Hypomagnesemia  -Cocaine use      Remotely followed by Dr. Tg Omalley, last seen 10/2014. Plan:     Discharging in stable condition, please call if questions. Subjective:     No new complaints.      Objective:      Patient Vitals for the past 8 hrs:   Temp Pulse Resp BP SpO2   11/08/17 0958 - - - - 98 %   11/08/17 0724 98.7 °F (37.1 °C) 76 18 145/78 97 %   11/08/17 0400 98.5 °F (36.9 °C) 85 18 148/80 97 %         Patient Vitals for the past 96 hrs:   Weight   11/07/17 0641 56.7 kg (125 lb)   11/06/17 0612 56.7 kg (125 lb)   11/05/17 2203 57.5 kg (126 lb 11.2 oz)                    Intake/Output Summary (Last 24 hours) at 11/08/17 1129  Last data filed at 11/08/17 0816   Gross per 24 hour   Intake              720 ml   Output                0 ml   Net              720 ml       Physical Exam:  General:  alert, cooperative, no distress, appears stated age  Neck:  nontender  Lungs:  clear to auscultation bilaterally  Heart:  regular rate and rhythm, S1, S2 normal, no murmur, click, rub or gallop  Abdomen:  abdomen is soft without significant tenderness, masses, organomegaly or guarding  Extremities:  extremities normal, atraumatic, no cyanosis or edema    Data Review:     Labs: Results:       Chemistry Recent Labs      11/06/17   0358  11/05/17   1544   GLU  256*  438*   NA  138  139   K  3.4*  4.7   CL  105  106   CO2  24  21   BUN  9  18   CREA  0.54*  0.86   CA  7.8*  8.6   MG  1.5*  1.7   AGAP  9  12   BUCR  17  21*   AP  86  144*   TP  7.0  7.9   ALB  2.9*  3.4   GLOB  4.1*  4.5*   AGRAT  0.7*  0.8      CBC w/Diff Recent Labs      11/06/17   0358  11/05/17   1544   WBC  8.3  9.3   RBC  3.83*  4.05*   HGB  8.9*  9.7*   HCT  28.6*  30.2*   PLT  294  328   GRANS  70  89*   LYMPH  25  8*   EOS  0  0      Cardiac Enzymes No results found for: CPK, CK, CKMMB, CKMB, RCK3, CKMBT, CKNDX, CKND1, BRANDEN, TROPT, TROIQ, ADOLPH, TROPT, TNIPOC, BNP, BNPP   Coagulation No results for input(s): PTP, INR, APTT in the last 72 hours.     No lab exists for component: INREXT    Lipid Panel Lab Results   Component Value Date/Time    Cholesterol, total 194 09/11/2014 05:22 AM    HDL Cholesterol 49 09/11/2014 05:22 AM    LDL, calculated 109.2 09/11/2014 05:22 AM    VLDL, calculated 35.8 09/11/2014 05:22 AM    Triglyceride 179 09/11/2014 05:22 AM    CHOL/HDL Ratio 4.0 09/11/2014 05:22 AM      BNP No results found for: BNP, BNPP, XBNPT   Liver Enzymes Recent Labs      11/06/17 0358   TP  7.0   ALB  2.9*   AP  86   SGOT  7*      Digoxin    Thyroid Studies Lab Results   Component Value Date/Time    TSH 0.45 06/13/2017 06:08 PM          Signed By: Manny Coronado MD     November 8, 2017

## 2017-11-08 NOTE — DISCHARGE SUMMARY
Discharge Summary     Patient: Beti Azevedo MRN: [id number]  SSN: [id number]    YOB: 1950  Age: 74 y.o. Sex: female       Admit Date: 11/5/2017    Discharge Date: 11/8/2017      Admission Diagnoses: Syncope and collapse  Hyperglycemia  Chronic back pain  Closed head injury  Syncope    Discharge Diagnoses:   Problem List as of 11/8/2017  Date Reviewed: 11/6/2017          Codes Class Noted - Resolved    Syncope ICD-10-CM: R55  ICD-9-CM: 780.2  11/6/2017 - Present        Syncope and collapse ICD-10-CM: R55  ICD-9-CM: 780.2  11/5/2017 - Present        Hyperglycemia ICD-10-CM: R73.9  ICD-9-CM: 790.29  11/5/2017 - Present        Closed head injury ICD-10-CM: S09. 90XA  ICD-9-CM: 959.01  11/5/2017 - Present        Chronic back pain ICD-10-CM: M54.9, G89.29  ICD-9-CM: 724.5, 338.29  11/5/2017 - Present        Diverticulitis ICD-10-CM: K57.92  ICD-9-CM: 562.11  10/25/2014 - Present        Abdominal pain ICD-10-CM: R10.9  ICD-9-CM: 789.00  10/25/2014 - Present        CAD in native artery ICD-10-CM: I25.10  ICD-9-CM: 414.01  Unknown - Present    Overview Signed 10/6/2014  2:06 PM by Beba Gold V     NSTEMI (Sep 2014); diffuse 65% pLAD, minimal disease RCA & LCx. pLAD FFR 0.93. LV  no RWMA (echo and LV angio)             Anemia ICD-10-CM: D64.9  ICD-9-CM: 285.9  9/12/2014 - Present    Overview Addendum 9/12/2014  6:05 PM by Amy Roblero MD     Microcytic, hypochromic.  9/12/2014: Hypoproliferative (retic 1.7%, low for her anemia)  9/12/2014: NOT iron deficient: % transferrin saturation 27%  Suspect anemia of chronic disease, I think needs B12 level and folate as out-patient to complete work up.              Trichomonas ICD-10-CM: A59.9  ICD-9-CM: 131.9  9/11/2014 - Present    Overview Addendum 9/18/2014 11:28 AM by Amy Roblero MD     Urine micro:  Component      Latest Ref Rng 9/10/2014           5:01 PM   WBC      0 - 4 /hpf 0 to 3   RBC      0 - 5 /hpf NEGATIVE   Epithelial cells      0 - 5 /lpf FEW   Bacteria      NEG /hpf NEGATIVE   Trichomonas      NEG   FEW (A)   Trich in urine indicates significant colonization. Plan: Before discharge was treated with 2 grams Metronidazole when overall condition is stable. Will educate that any partners in last 6 months should be notified. 9/18/2014: My EPIC review indicates that urine for Chlam / GC screen was not sent. Adal Lincoln MD              Hidradenitis ICD-10-CM: L73.2  ICD-9-CM: 705.83  9/11/2014 - Present        Recurrent falls ICD-10-CM: R29.6  ICD-9-CM: V15.88  9/11/2014 - Present        GERD (gastroesophageal reflux disease) ICD-10-CM: K21.9  ICD-9-CM: 530.81  9/11/2014 - Present        Insomnia ICD-10-CM: G47.00  ICD-9-CM: 780.52  9/11/2014 - Present        Cocaine abuse ICD-10-CM: F14.10  ICD-9-CM: 305.60  9/11/2014 - Present    Overview Addendum 9/12/2014  7:05 PM by Tone Strauss MD     9/10/14: She reports use of cocaine during past 24 hours. 9/12/2014: She reports only occasional use, says no regular use. RVH (right ventricular hypertrophy) ICD-10-CM: I51.7  ICD-9-CM: 429.3  9/11/2014 - Present    Overview Signed 9/11/2014  7:05 AM by Tone Strauss MD     9/10/14: ECG with RsR' in V2, diagnostic of ECG RVH. Also has bi-atrial enlargement. Cigarette smoker ICD-10-CM: F17.210  ICD-9-CM: 305.1  9/11/2014 - Present        Non-ST elevated myocardial infarction (non-STEMI) ([facility] [address] ICD-10-CM: I21.4  ICD-9-CM: 410.70  9/11/2014 - Present    Overview Signed 9/11/2014  2:42 PM by Tone Strauss MD     Results for Kylah Gay (MRN [id number]) as of 9/11/2014 14:26   Ref. Range 9/10/2014 17:38 9/10/2014 21:25 9/11/2014 05:22   CK-MB Index Latest Range: 0.0-4.0 % 4.2 (H) 7.8 (H) 9.3 (H)   CK - MB Latest Range: 0.5-3.6 ng/ml 2.9 6.9 (H) 8.2 (H)   Troponin-I, Qt.  Latest Range: 0.0-0.045 NG/ML 0.19 (H) 0.88 (H) 1.15 (H)   9/11/2014: Coronary Angiography: Single vessel coronary artery disease: 65 % LAD lesion. Revascularization NOT done. Medical management. Chest pain ICD-10-CM: R07.9  ICD-9-CM: 786.50  9/10/2014 - Present    Overview Addendum 9/12/2014  7:05 PM by Cornell Gonzalez MD     9/12/2014: After cardiac cath, likely cause is musculoskeletal.             HNP (herniated nucleus pulposus), cervical ICD-10-CM: M50.20  ICD-9-CM: 722.0  1/15/2014 - Present        HTN (hypertension) (Chronic) ICD-10-CM: I10  ICD-9-CM: 401.9  1/15/2014 - Present        Osteoporosis (Chronic) ICD-10-CM: M81.0  ICD-9-CM: 733.00  1/15/2014 - Present        DM (diabetes mellitus) ([facility] [address] (Chronic) ICD-10-CM: E11.9  ICD-9-CM: 250.00  1/15/2014 - Present        Sarcoidosis (Chronic) ICD-10-CM: D86.9  ICD-9-CM: 135  1/15/2014 - Present        Chronic pain syndrome ICD-10-CM: G89.4  ICD-9-CM: 338.4  9/8/2011 - Present        DJD (degenerative joint disease) of knee ICD-10-CM: M17.10  ICD-9-CM: 715.36  9/8/2011 - Present        Knee pain, left ICD-10-CM: M25.562  ICD-9-CM: 719.46  9/8/2011 - Present        Depression ICD-10-CM: F32.9  ICD-9-CM: 426  9/8/2011 - Present        COPD, moderate ([facility] [address] ICD-10-CM: J44.9  ICD-9-CM: 528  9/8/2011 - Present               Discharge Condition: Stable    Hospital Course: Patient admitted after unwitnessed syncopal episode. Patient does have cardiac history and was having some chest discomfort although cardiac enzymes negative. Patient did strike face. Patient had no focal neurologic deficits and no further syncopal episodes. ECHO unremarkable and stress test negastive EEG still pending. Patient discharged with plan for close followup Did have low magnesium and potassium requiring replacement.     Consults: Cardiology    Significant Diagnostic Studies: radiology: CT scan: head  negative and cardiac graphics: Echocardiogram: EF 65%  Stress test negative    Disposition: home    Discharge Medications:   Current Discharge Medication List      CONTINUE these medications which have CHANGED    Details   HYDROcodone-acetaminophen (NORCO) 5-325 mg per tablet Take 1 Tab by mouth every six (6) hours as needed for Pain for up to 7 days. Max Daily Amount: 4 Tabs. Qty: 28 Tab, Refills: 0      nitroglycerin (NITROSTAT) 0.4 mg SL tablet 1 Tab by SubLINGual route as needed for Chest Pain. Qty: 25 Tab, Refills: 1         CONTINUE these medications which have NOT CHANGED    Details   azithromycin (ZITHROMAX) 250 mg tablet Take 250 mg by mouth See Admin Instructions. methylPREDNISolone (MEDROL DOSEPACK) 4 mg tablet Per dose pack instructions  Qty: 1 Dose Pack, Refills: 0      metFORMIN (GLUCOPHAGE) 500 mg tablet Take 500 mg by mouth two (2) times daily (with meals). clopidogrel (PLAVIX) 75 mg tablet Take 1 tablet by mouth daily. Qty: 30 tablet, Refills: 11      diltiazem CD (CARDIZEM CD) 240 mg ER capsule Take 1 capsule by mouth daily. Qty: 30 capsule, Refills: 11      atorvastatin (LIPITOR) 40 mg tablet Take 1 tablet by mouth nightly. Qty: 30 tablet, Refills: 11      aspirin 81 mg chewable tablet Take 1 tablet by mouth daily. Qty: 30 tablet, Refills: 0      gabapentin (NEURONTIN) 300 mg capsule Take 300 mg by mouth daily. Indications: NEUROPATHIC PAIN      zolpidem CR (AMBIEN CR) 12.5 mg tablet       fluticasone-salmeterol (ADVAIR) 250-50 mcg/dose diskus inhaler Take 1 puff by inhalation every twelve (12) hours. Qty: 1 Inhaler, Refills: 0      albuterol (PROVENTIL HFA, VENTOLIN HFA, PROAIR HFA) 90 mcg/actuation inhaler Take 1 puff by inhalation daily as needed for Wheezing. Qty: 1 Inhaler, Refills: 0      polyethylene glycol (MIRALAX) 17 gram packet Take 1 packet by mouth daily. Qty: 14 each, Refills: 0      sitagliptin (JANUVIA) 100 mg tablet Take 100 mg by mouth daily. fluoxetine (PROZAC) 20 mg capsule Take  by mouth daily. insulin glargine (LANTUS) 100 unit/mL injection 33 Units by SubCUTAneous route nightly.       OMEPRAZOLE (PRILOSEC PO) Take 20 mg by mouth daily.      tiotropium (SPIRIVA WITH HANDIHALER) 18 mcg inhalation capsule Take 1 Cap by inhalation daily. STOP taking these medications       ZOLPIDEM TARTRATE (AMBIEN PO) Comments:   Reason for Stopping:         traMADol (ULTRAM) 50 mg tablet Comments:   Reason for Stopping:         chlorpheniramine-HYDROcodone (TUSSIONEX PENNKINETIC ER) 10-8 mg/5 mL suspension Comments:   Reason for Stopping:         polyethylene glycol (MIRALAX) 17 gram/dose powder Comments:   Reason for Stopping:               Activity: Activity as tolerated  Diet: Cardiac Diet  Wound Care: None needed    Follow-up Appointments   Procedures    FOLLOW UP VISIT Appointment in: One Week     Standing Status:   Standing     Number of Occurrences:   1     Order Specific Question:   Appointment in     Answer:    One Week       Signed By: Brittani Antonio MD     November 8, 2017

## 2017-11-08 NOTE — DIABETES MGMT
GLYCEMIC CONTROL PLAN OF CARE    Assessment/Recommendations:  Pt BG above target range. Recommend advance to very insulin resistant scale lispro ACHS, change diet to diabetic consistent carbohydrate and consider addition of 6 units lantus if BG continues to remain above target range. Will continue to monitor inpatient for intervention. Most recent blood glucose values:     Ref.  Range 11/7/2017 15:09 11/7/2017 22:31 11/8/2017 06:00 11/8/2017 11:45   GLUCOSE,FAST - POC Latest Ref Range: 70 - 110 mg/dL 145 (H) 225 (H) 217 (H) 224 (H)     Current A1C:  9%-11/05/17 estimated average blood glucose of 212 mg/dl over the past 2-3 months    Current hospital diabetes medications:  100 mg [place] [facility] [place] daily  Correctional lispro ACHS- normal insulin sensitivity scale    Previous day Insulin TDD:  11/7: 8 units lispro    Diet:   Cardiac  Current Meal Intake:  Patient Vitals for the past 100 hrs:   % Diet Eaten   11/08/17 0816 100 %   11/07/17 1300 0 %   11/07/17 1030 30 %   11/07/17 0826 0 %       Home diabetes medications:  500 mg metformin two times daily with meals    Goals:  Pt BG will be within target range by _11/11/17____    Education:  _x__  Refer to Diabetes Education Record             ___  Education not indicated at this time    Saeed Elkins [address], [address] [place] [address], E  Pager: [phone]

## 2017-11-13 ENCOUNTER — HOME CARE VISIT (OUTPATIENT)
Dept: HOME HEALTH SERVICES | Facility: HOME HEALTH | Age: 67
End: 2017-11-13

## 2017-11-15 ENCOUNTER — HOME CARE VISIT (OUTPATIENT)
Dept: HOME HEALTH SERVICES | Facility: HOME HEALTH | Age: 67
End: 2017-11-15

## 2017-11-16 ENCOUNTER — HOME CARE VISIT (OUTPATIENT)
Dept: HOME HEALTH SERVICES | Facility: HOME HEALTH | Age: 67
End: 2017-11-16

## 2018-05-18 ENCOUNTER — APPOINTMENT (OUTPATIENT)
Dept: GENERAL RADIOLOGY | Age: 68
End: 2018-05-18
Attending: EMERGENCY MEDICINE
Payer: MEDICARE

## 2018-05-18 ENCOUNTER — HOSPITAL ENCOUNTER (EMERGENCY)
Age: 68
Discharge: HOME | End: 2018-05-18
Attending: EMERGENCY MEDICINE
Payer: MEDICARE

## 2018-05-18 VITALS
BODY MASS INDEX: 21.71 KG/M2 | WEIGHT: 118 LBS | RESPIRATION RATE: 21 BRPM | DIASTOLIC BLOOD PRESSURE: 67 MMHG | HEART RATE: 83 BPM | OXYGEN SATURATION: 99 % | HEIGHT: 62 IN | TEMPERATURE: 98.3 F | SYSTOLIC BLOOD PRESSURE: 96 MMHG

## 2018-05-18 DIAGNOSIS — R07.9 CHEST PAIN, UNSPECIFIED TYPE: Primary | ICD-10-CM

## 2018-05-18 LAB
ALBUMIN SERPL-MCNC: 3.4 G/DL (ref 3.4–5)
ALBUMIN/GLOB SERPL: 0.7 {RATIO} (ref 0.8–1.7)
ALP SERPL-CCNC: 159 U/L (ref 45–117)
ALT SERPL-CCNC: 38 U/L (ref 13–56)
AMPHET UR QL SCN: NEGATIVE
ANION GAP SERPL CALC-SCNC: 10 MMOL/L (ref 3–18)
AST SERPL-CCNC: 23 U/L (ref 15–37)
ATRIAL RATE: 80 BPM
BARBITURATES UR QL SCN: NEGATIVE
BASOPHILS # BLD: 0 K/UL (ref 0–0.1)
BASOPHILS NFR BLD: 1 % (ref 0–2)
BENZODIAZ UR QL: NEGATIVE
BILIRUB SERPL-MCNC: 0.4 MG/DL (ref 0.2–1)
BUN SERPL-MCNC: 17 MG/DL (ref 7–18)
BUN/CREAT SERPL: 18 (ref 12–20)
CALCIUM SERPL-MCNC: 8.6 MG/DL (ref 8.5–10.1)
CALCULATED P AXIS, ECG09: 50 DEGREES
CALCULATED R AXIS, ECG10: -35 DEGREES
CALCULATED T AXIS, ECG11: 40 DEGREES
CANNABINOIDS UR QL SCN: NEGATIVE
CHLORIDE SERPL-SCNC: 99 MMOL/L (ref 100–108)
CO2 SERPL-SCNC: 27 MMOL/L (ref 21–32)
COCAINE UR QL SCN: POSITIVE
CREAT SERPL-MCNC: 0.93 MG/DL (ref 0.6–1.3)
DIAGNOSIS, 93000: NORMAL
DIFFERENTIAL METHOD BLD: ABNORMAL
EOSINOPHIL # BLD: 0 K/UL (ref 0–0.4)
EOSINOPHIL NFR BLD: 1 % (ref 0–5)
ERYTHROCYTE [DISTWIDTH] IN BLOOD BY AUTOMATED COUNT: 14.1 % (ref 11.6–14.5)
GLOBULIN SER CALC-MCNC: 4.9 G/DL (ref 2–4)
GLUCOSE BLD STRIP.AUTO-MCNC: 232 MG/DL (ref 70–110)
GLUCOSE SERPL-MCNC: 499 MG/DL (ref 74–99)
HCT VFR BLD AUTO: 34.2 % (ref 35–45)
HDSCOM,HDSCOM: ABNORMAL
HGB BLD-MCNC: 11.3 G/DL (ref 12–16)
LIPASE SERPL-CCNC: 391 U/L (ref 73–393)
LYMPHOCYTES # BLD: 1.6 K/UL (ref 0.9–3.6)
LYMPHOCYTES NFR BLD: 38 % (ref 21–52)
MCH RBC QN AUTO: 22.8 PG (ref 24–34)
MCHC RBC AUTO-ENTMCNC: 33 G/DL (ref 31–37)
MCV RBC AUTO: 69.1 FL (ref 74–97)
METHADONE UR QL: NEGATIVE
MONOCYTES # BLD: 0.3 K/UL (ref 0.05–1.2)
MONOCYTES NFR BLD: 8 % (ref 3–10)
NEUTS SEG # BLD: 2.2 K/UL (ref 1.8–8)
NEUTS SEG NFR BLD: 52 % (ref 40–73)
OPIATES UR QL: NEGATIVE
P-R INTERVAL, ECG05: 132 MS
PCP UR QL: NEGATIVE
PLATELET # BLD AUTO: 251 K/UL (ref 135–420)
PLATELET COMMENTS,PCOM: ABNORMAL
PMV BLD AUTO: 10.7 FL (ref 9.2–11.8)
POTASSIUM SERPL-SCNC: 3.6 MMOL/L (ref 3.5–5.5)
PROT SERPL-MCNC: 8.3 G/DL (ref 6.4–8.2)
Q-T INTERVAL, ECG07: 402 MS
QRS DURATION, ECG06: 82 MS
QTC CALCULATION (BEZET), ECG08: 463 MS
RBC # BLD AUTO: 4.95 M/UL (ref 4.2–5.3)
RBC MORPH BLD: ABNORMAL
RBC MORPH BLD: ABNORMAL
SODIUM SERPL-SCNC: 136 MMOL/L (ref 136–145)
TROPONIN I SERPL-MCNC: <0.02 NG/ML (ref 0–0.04)
TROPONIN I SERPL-MCNC: <0.02 NG/ML (ref 0–0.04)
VENTRICULAR RATE, ECG03: 80 BPM
WBC # BLD AUTO: 4.1 K/UL (ref 4.6–13.2)

## 2018-05-18 PROCEDURE — 80053 COMPREHEN METABOLIC PANEL: CPT | Performed by: EMERGENCY MEDICINE

## 2018-05-18 PROCEDURE — 85025 COMPLETE CBC W/AUTO DIFF WBC: CPT | Performed by: EMERGENCY MEDICINE

## 2018-05-18 PROCEDURE — 96361 HYDRATE IV INFUSION ADD-ON: CPT

## 2018-05-18 PROCEDURE — 96374 THER/PROPH/DIAG INJ IV PUSH: CPT

## 2018-05-18 PROCEDURE — 84484 ASSAY OF TROPONIN QUANT: CPT | Performed by: EMERGENCY MEDICINE

## 2018-05-18 PROCEDURE — 71046 X-RAY EXAM CHEST 2 VIEWS: CPT

## 2018-05-18 PROCEDURE — 80307 DRUG TEST PRSMV CHEM ANLYZR: CPT | Performed by: EMERGENCY MEDICINE

## 2018-05-18 PROCEDURE — 74011636637 HC RX REV CODE- 636/637: Performed by: EMERGENCY MEDICINE

## 2018-05-18 PROCEDURE — 99285 EMERGENCY DEPT VISIT HI MDM: CPT

## 2018-05-18 PROCEDURE — 74011250636 HC RX REV CODE- 250/636: Performed by: EMERGENCY MEDICINE

## 2018-05-18 PROCEDURE — 74011250637 HC RX REV CODE- 250/637: Performed by: EMERGENCY MEDICINE

## 2018-05-18 PROCEDURE — 96375 TX/PRO/DX INJ NEW DRUG ADDON: CPT

## 2018-05-18 PROCEDURE — 82962 GLUCOSE BLOOD TEST: CPT

## 2018-05-18 PROCEDURE — 83690 ASSAY OF LIPASE: CPT | Performed by: EMERGENCY MEDICINE

## 2018-05-18 PROCEDURE — 93005 ELECTROCARDIOGRAM TRACING: CPT

## 2018-05-18 RX ORDER — ACETAMINOPHEN 325 MG/1
650 TABLET ORAL ONCE
Status: COMPLETED | OUTPATIENT
Start: 2018-05-18 | End: 2018-05-18

## 2018-05-18 RX ORDER — LIDOCAINE 50 MG/G
PATCH TOPICAL
Qty: 15 EACH | Refills: 0 | Status: SHIPPED | OUTPATIENT
Start: 2018-05-18 | End: 2018-09-11

## 2018-05-18 RX ORDER — ONDANSETRON 2 MG/ML
4 INJECTION INTRAMUSCULAR; INTRAVENOUS
Status: COMPLETED | OUTPATIENT
Start: 2018-05-18 | End: 2018-05-18

## 2018-05-18 RX ADMIN — ONDANSETRON 4 MG: 2 INJECTION INTRAMUSCULAR; INTRAVENOUS at 03:30

## 2018-05-18 RX ADMIN — ACETAMINOPHEN 650 MG: 500 TABLET ORAL at 08:01

## 2018-05-18 RX ADMIN — INSULIN HUMAN 10 UNITS: 100 INJECTION, SOLUTION PARENTERAL at 07:35

## 2018-05-18 RX ADMIN — SODIUM CHLORIDE 1000 ML: 900 INJECTION, SOLUTION INTRAVENOUS at 06:00

## 2018-05-18 NOTE — ED NOTES
7:10 AM :Pt care assumed from Dr. Jessica Vasquez , ED provider. Pt complaint(s), current treatment plan, progression and available diagnostic results have been discussed thoroughly. Rounding occurred: yes  Intended Disposition: TBD   Pending diagnostic reports and/or labs (please list): Repeat troponin and glucose     7:48 AM: Re-assessed pt. Pt is feeling better after lido patch and NSAIDS for her chest wall tenderness. Delta trop neg, repeat glucose improved after insulin and fluids. She understood the risks and benefits of an outpt approach to her chest pain work up. Will give cardiology f/u. She pledges not to use any more cocaine. Patient Vitals for the past 24 hrs:   Temp Pulse Resp BP SpO2   05/18/18 0900 - 83 21 96/67 99 %   05/18/18 0830 - 78 23 124/84 96 %   05/18/18 0800 - 79 24 128/55 100 %   05/18/18 0730 - 78 28 135/76 99 %   05/18/18 0700 - 72 17 128/82 98 %   05/18/18 0345 98.3 °F (36.8 °C) 76 21 141/71 97 %   05/18/18 0342 - 75 29 - 97 %   05/18/18 0339 - - - 124/88 -       Problem List Items Addressed This Visit     Chest pain - Primary        Dispo: Discharged      Scribe [address] [place] [address] acting as a scribe for and in the presence of Grisel Melton MD      May 18, 2018 at 7:35 AM       Provider Attestation:      I personally performed the services described in the documentation, reviewed the documentation, as recorded by the scribe in my presence, and it accurately and completely records my words and actions.  May 18, 2018 at 7:35 AM - Grisel Melton MD

## 2018-05-18 NOTE — Clinical Note
Please follow up with a doctor as discussed by calling the number listed for a follow up appointment. The evaluation and treatment done today requires that you follow up with a physician for re-evaluation. Medical problems can change over time and symp toms can get worse or new symptoms can develop over time, therefore, it is important that you follow up as we discussed. Please immediately return to the ER if you have any concerns. Call the ER if you have any questions about what we discussed. At [facility] Emergency Department we are genuinely concerned about your health and comfort. You may be selected to participate in a patient satisfaction survey mailed to your home. We are excited about the opportunity to learn from your expe rience so we may continue to improve. In striving for the very best we believe good is not good enough, but if you rate us as EXCELLENT in all boxes, we have succeeded. We strive to provide EXCELLENT care to you and your family. We appreciate the opportu nity to take care of you, and hope you do well.

## 2018-05-18 NOTE — ED NOTES
Patient assisted onto bedpan, continues to complain of intermittent chest pain radiating to mid back. Dr. Kristan Arias made aware.

## 2018-05-18 NOTE — DISCHARGE INSTRUCTIONS
Chest Pain: Care Instructions  Your Care Instructions    There are many things that can cause chest pain. Some are not serious and will get better on their own in a few days. But some kinds of chest pain need more testing and treatment. Your doctor may have recommended a follow-up visit in the next 8 to 12 hours. If you are not getting better, you may need more tests or treatment. Even though your doctor has released you, you still need to watch for any problems. The doctor carefully checked you, but sometimes problems can develop later. If you have new symptoms or if your symptoms do not get better, get medical care right away. If you have worse or different chest pain or pressure that lasts more than 5 minutes or you passed out (lost consciousness), call 911 or seek other emergency help right away. A medical visit is only one step in your treatment. Even if you feel better, you still need to do what your doctor recommends, such as going to all suggested follow-up appointments and taking medicines exactly as directed. This will help you recover and help prevent future problems. How can you care for yourself at home? · Rest until you feel better. · Take your medicine exactly as prescribed. Call your doctor if you think you are having a problem with your medicine. · Do not drive after taking a prescription pain medicine. When should you call for help? Call 911 if:  ? · You passed out (lost consciousness). ? · You have severe difficulty breathing. ? · You have symptoms of a heart attack. These may include:  ¨ Chest pain or pressure, or a strange feeling in your chest.  ¨ Sweating. ¨ Shortness of breath. ¨ Nausea or vomiting. ¨ Pain, pressure, or a strange feeling in your back, neck, jaw, or upper belly or in one or both shoulders or arms. ¨ Lightheadedness or sudden weakness. ¨ A fast or irregular heartbeat.   After you call 911, the  may tell you to chew 1 adult-strength or 2 to 4 low-dose aspirin. Wait for an ambulance. Do not try to drive yourself. ?Call your doctor today if:  ? · You have any trouble breathing. ? · Your chest pain gets worse. ? · You are dizzy or lightheaded, or you feel like you may faint. ? · You are not getting better as expected. ? · You are having new or different chest pain. Where can you learn more? Go to http://odessa-mack.info/. Enter A120 in the search box to learn more about \"Chest Pain: Care Instructions. \"  Current as of: March 20, 2017  Content Version: 11.4  © 1330-1772 [facility]. Care instructions adapted under license by [employer] (which disclaims liability or warranty for this information). If you have questions about a medical condition or this instruction, always ask your healthcare professional. [address] any warranty or liability for your use of this information.

## 2018-05-18 NOTE — ED NOTES
Patient continues to complain of intermittent chest pain, left sided. Dr. Ainsley Suárez made aware.

## 2018-05-18 NOTE — ED TRIAGE NOTES
Patient A/O x 4, brought in by [employer] for intermittent chest pain x 2 days. Patient also states she's been vomiting x 2, and complaining of abdominal pain. Patient states she's SOB normally because she has a hx of COPD but no worsening SOB. Patient received  mg PO, nitroglycerin SL x 1. Patient states medication did not help her pain.

## 2018-05-18 NOTE — ED PROVIDER NOTES
EMERGENCY DEPARTMENT HISTORY AND PHYSICAL EXAM    2:39 AM      Date: 5/18/2018  Patient Name: Darrian Delgado    History of Presenting Illness     No chief complaint on file. History Provided By: Patient    Chief Complaint: chest pain  Duration: 2 Days  Timing:  Acute  Location: center chest  Quality: Aching  Severity: Moderate  Modifying Factors: worse with exertion  Associated Symptoms: denies any other associated signs or symptoms      Additional History (Context): Darrian Delgado is a 74 y.o. female with diabetes, hypertension, COPD and asthma who presents with 2 days of acute onset moderate aching center chest pain with radiation to back that is worse with exertion. Pt states that she has been having chest pain for the last 2 days and was seen by her PCP yesterday. No other concerns or symptoms at this time. PCP: Diony Barone MD    Current Facility-Administered Medications   Medication Dose Route Frequency Provider Last Rate Last Dose    ondansetron (ZOFRAN) injection 4 mg  4 mg IntraVENous NOW Ana M Mitchell MD         Current Outpatient Prescriptions   Medication Sig Dispense Refill    nitroglycerin (NITROSTAT) 0.4 mg SL tablet 1 Tab by SubLINGual route as needed for Chest Pain. 25 Tab 1    azithromycin (ZITHROMAX) 250 mg tablet Take 250 mg by mouth See Admin Instructions.  methylPREDNISolone (MEDROL DOSEPACK) 4 mg tablet Per dose pack instructions 1 Dose Pack 0    metFORMIN (GLUCOPHAGE) 500 mg tablet Take 500 mg by mouth two (2) times daily (with meals).  zolpidem CR (AMBIEN CR) 12.5 mg tablet       fluticasone-salmeterol (ADVAIR) 250-50 mcg/dose diskus inhaler Take 1 puff by inhalation every twelve (12) hours. 1 Inhaler 0    albuterol (PROVENTIL HFA, VENTOLIN HFA, PROAIR HFA) 90 mcg/actuation inhaler Take 1 puff by inhalation daily as needed for Wheezing. 1 Inhaler 0    clopidogrel (PLAVIX) 75 mg tablet Take 1 tablet by mouth daily.  30 tablet 11    diltiazem CD (CARDIZEM CD) 240 mg ER capsule Take 1 capsule by mouth daily. 30 capsule 11    atorvastatin (LIPITOR) 40 mg tablet Take 1 tablet by mouth nightly. 30 tablet 11    aspirin 81 mg chewable tablet Take 1 tablet by mouth daily. 30 tablet 0    polyethylene glycol (MIRALAX) 17 gram packet Take 1 packet by mouth daily. 14 each 0    gabapentin (NEURONTIN) 300 mg capsule Take 300 mg by mouth daily. Indications: NEUROPATHIC PAIN      sitagliptin (JANUVIA) 100 mg tablet Take 100 mg by mouth daily.  fluoxetine (PROZAC) 20 mg capsule Take  by mouth daily.  insulin glargine (LANTUS) 100 unit/mL injection 33 Units by SubCUTAneous route nightly.  OMEPRAZOLE (PRILOSEC PO) Take 20 mg by mouth daily.  tiotropium (SPIRIVA WITH HANDIHALER) 18 mcg inhalation capsule Take 1 Cap by inhalation daily. Past History     Past Medical History:  Past Medical History:   Diagnosis Date    Arthritis     \"generalized\"    Asthma     CAD in native artery     NSTEMI (Sep 2014); diffuse 65% pLAD, minimal disease RCA & LCx. pLAD FFR 0.93.  LV  no RWMA (echo and LV angio)    Chronic neck pain     Chronic pain     left knee    Chronic pain syndrome     COPD (chronic obstructive pulmonary disease) (HCC)     Depression     Diabetes (HCC)     Diverticulitis     GERD (gastroesophageal reflux disease)     Heart disease     Hidradenitis 9/11/2014    Hypertension     Left knee pain     Narcotic dependence ([address]     Right wrist pain     Sarcoidosis        Past Surgical History:  Past Surgical History:   Procedure Laterality Date    HX BREAST BIOPSY Right     9/10/14: She said tag in place from [address] HX HYSTERECTOMY      HX KNEE ARTHROSCOPY Left        Family History:  Family History   Problem Relation Age of Onset    Hypertension Father     Diabetes Mother        Social History:  Social History   Substance Use Topics    Smoking status: Current Some Day Smoker     Packs/day: 0.10 Years: 46.00     Types: Cigarettes    Smokeless tobacco: Not on file      Comment: Only smokes 1 cigarette per day    Alcohol use 1.0 oz/week     2 Glasses of wine per week      Comment: socially       Allergies: Allergies   Allergen Reactions    Ciprofloxacin Hives    Penicillins Nausea Only    Shellfish Derived Hives and Swelling     Swelling of legs         Review of Systems       Review of Systems   Respiratory: Negative for shortness of breath. Cardiovascular: Positive for chest pain. All other systems reviewed and are negative. Physical Exam   There were no vitals taken for this visit. Physical Exam  Physical Exam:  . No data found. Gen: Well developed, well nourished 74 y.o. female  HEENT: Normocephalic, atraumatic  Respiratory: No accessory muscle use No wheeze, No rales, No rhonchi. Normal chest wall excursion. No subcutaneous air, no rib crepitus  Cardiovascular: Regular rhythm and rate, Normal pulses, Normal perfusion. No edema  Gastrointestinal: Non distended, Non tender, No masses. No ascites. No organomegaly. No evidence of trauma  Musculoskeletal: Full range of motion at all other tested joints. No joint effusions. Neuro: Normal strength, Normal sensation. Normal speech. No ataxia. Cranial Nerves II-XII normal as tested. Skin: No rash, petechia or purpura. Warm and dry  Psyche: No suicidal ideation, No homicidal ideation. No hallucinations. Organized thoughts. Heme: Normal  : Deferred        Diagnostic Study Results     Labs -  No results found for this or any previous visit (from the past 12 hour(s)). Radiologic Studies -   XR CHEST PA LAT    (Results Pending)         Medical Decision Making   I am the first provider for this patient. I reviewed the vital signs, available nursing notes, past medical history, past surgical history, family history and social history. Vital Signs-Reviewed the patient's vital signs.     Pulse Oximetry Analysis -  97 on room air (Interpretation)    Cardiac Monitor:  Rate: 76  Rhythm:  Normal Sinus Rhythm     EKG: Interpreted by the EP. Time Interpreted: 02:50am   Rate: 80   Rhythm: Normal Sinus Rhythm qrs duration 80ms       Records Reviewed: Nursing Notes and Old Medical Records (Time of Review: 2:39 AM)    ED Course: Progress Notes, Reevaluation, and Consults:      Provider Notes (Medical Decision Making):       Diagnosis     Clinical Impression:   1. Chest pain, unspecified type          Disposition:   7:00 AM : Pt care transferred to Dr. Terrell Quan  ,ED provider. History of patient complaint(s), available diagnostic reports and current treatment plan has been discussed thoroughly. Bedside rounding on patient occured : yes . Intended disposition of patient : TBD  Pending diagnostics reports and/or labs (please list): repeat troponin      Follow-up Information     None           Patient's Medications   Start Taking    No medications on file   Continue Taking    ALBUTEROL (PROVENTIL HFA, VENTOLIN HFA, PROAIR HFA) 90 MCG/ACTUATION INHALER    Take 1 puff by inhalation daily as needed for Wheezing. ASPIRIN 81 MG CHEWABLE TABLET    Take 1 tablet by mouth daily. ATORVASTATIN (LIPITOR) 40 MG TABLET    Take 1 tablet by mouth nightly. AZITHROMYCIN (ZITHROMAX) 250 MG TABLET    Take 250 mg by mouth See Admin Instructions. CLOPIDOGREL (PLAVIX) 75 MG TABLET    Take 1 tablet by mouth daily. DILTIAZEM CD (CARDIZEM CD) 240 MG ER CAPSULE    Take 1 capsule by mouth daily. FLUOXETINE (PROZAC) 20 MG CAPSULE    Take  by mouth daily. FLUTICASONE-SALMETEROL (ADVAIR) 250-50 MCG/DOSE DISKUS INHALER    Take 1 puff by inhalation every twelve (12) hours. GABAPENTIN (NEURONTIN) 300 MG CAPSULE    Take 300 mg by mouth daily. Indications: NEUROPATHIC PAIN    INSULIN GLARGINE (LANTUS) 100 UNIT/ML INJECTION    33 Units by SubCUTAneous route nightly. METFORMIN (GLUCOPHAGE) 500 MG TABLET    Take 500 mg by mouth two (2) times daily (with meals). METHYLPREDNISOLONE (MEDROL DOSEPACK) 4 MG TABLET    Per dose pack instructions    NITROGLYCERIN (NITROSTAT) 0.4 MG SL TABLET    1 Tab by SubLINGual route as needed for Chest Pain. OMEPRAZOLE (PRILOSEC PO)    Take 20 mg by mouth daily. POLYETHYLENE GLYCOL (MIRALAX) 17 GRAM PACKET    Take 1 packet by mouth daily. SITAGLIPTIN (JANUVIA) 100 MG TABLET    Take 100 mg by mouth daily. TIOTROPIUM (SPIRIVA WITH HANDIHALER) 18 MCG INHALATION CAPSULE    Take 1 Cap by inhalation daily. ZOLPIDEM CR (AMBIEN CR) 12.5 MG TABLET       These Medications have changed    No medications on file   Stop Taking    No medications on file     _______________________________    Attestations:  Scribe [address] [place] [address] acting as a scribe for and in the presence of Raciel Quan MD      May 18, 2018 at 2:39 AM       Provider Attestation:      I personally performed the services described in the documentation, reviewed the documentation, as recorded by the scribe in my presence, and it accurately and completely records my words and actions.  May 18, 2018 at 2:39 AM - Annie Rosen MD    _______________________________

## 2018-08-09 ENCOUNTER — HOSPITAL ENCOUNTER (OUTPATIENT)
Dept: CT IMAGING | Age: 68
Discharge: HOME | End: 2018-08-09
Attending: NURSE PRACTITIONER
Payer: MEDICARE

## 2018-08-09 DIAGNOSIS — R04.2 COUGHING BLOOD: ICD-10-CM

## 2018-08-09 PROCEDURE — 71250 CT THORAX DX C-: CPT

## 2018-08-10 ENCOUNTER — HOSPITAL ENCOUNTER (OUTPATIENT)
Dept: LAB | Age: 68
Discharge: HOME | End: 2018-08-10

## 2018-08-10 PROCEDURE — 99001 SPECIMEN HANDLING PT-LAB: CPT | Performed by: INTERNAL MEDICINE

## 2018-08-23 ENCOUNTER — HOSPITAL ENCOUNTER (OUTPATIENT)
Dept: NON INVASIVE DIAGNOSTICS | Age: 68
Discharge: HOME | End: 2018-08-23
Attending: NURSE PRACTITIONER
Payer: MEDICARE

## 2018-08-23 VITALS
BODY MASS INDEX: 22.63 KG/M2 | WEIGHT: 123 LBS | SYSTOLIC BLOOD PRESSURE: 144 MMHG | DIASTOLIC BLOOD PRESSURE: 80 MMHG | HEIGHT: 62 IN

## 2018-08-23 DIAGNOSIS — D86.0 PULMONARY SARCOIDOSIS (HCC): ICD-10-CM

## 2018-08-23 LAB
NUC REST EJECTION FRACTION: 79 %
STRESS BASELINE DIAS BP: 88 MMHG
STRESS BASELINE HR: 75 BPM
STRESS BASELINE SYS BP: 144 MMHG
STRESS ESTIMATED WORKLOAD: 1.6 METS
STRESS EXERCISE DUR MIN: NORMAL
STRESS PEAK DIAS BP: 80 MMHG
STRESS PEAK SYS BP: 152 MMHG
STRESS PERCENT HR ACHIEVED: 93 %
STRESS POST PEAK HR: 123 BPM
STRESS RATE PRESSURE PRODUCT: NORMAL BPM*MMHG
STRESS ST DEPRESSION: 0 MM
STRESS ST ELEVATION: 0 MM
STRESS TARGET HR: 132 BPM
TID: 1

## 2018-08-23 PROCEDURE — A9500 TC99M SESTAMIBI: HCPCS

## 2018-08-23 RX ORDER — SODIUM CHLORIDE 9 MG/ML
250 INJECTION, SOLUTION INTRAVENOUS ONCE
Status: DISPENSED | OUTPATIENT
Start: 2018-08-23 | End: 2018-08-23

## 2018-08-23 NOTE — PROGRESS NOTES
Patient was given 10.02 milliCuries of 99mTc-Sestamibi for the resting images. Patient was also given 31.8 milliCuries of 99mTc-Sestamibi for the stress images. Injected 0.4mg Lexiscan while slowly walking on treadmill. Patient's armband was discarded and shredded.

## 2018-09-11 RX ORDER — LISINOPRIL AND HYDROCHLOROTHIAZIDE 12.5; 2 MG/1; MG/1
1 TABLET ORAL DAILY
COMMUNITY

## 2018-09-18 ENCOUNTER — ANESTHESIA EVENT (OUTPATIENT)
Dept: ENDOSCOPY | Age: 68
End: 2018-09-18
Payer: MEDICARE

## 2018-09-18 RX ORDER — FENTANYL CITRATE 50 UG/ML
50 INJECTION, SOLUTION INTRAMUSCULAR; INTRAVENOUS
Status: CANCELLED | OUTPATIENT
Start: 2018-09-18

## 2018-09-18 RX ORDER — FLUMAZENIL 0.1 MG/ML
0.2 INJECTION INTRAVENOUS
Status: CANCELLED | OUTPATIENT
Start: 2018-09-18

## 2018-09-18 RX ORDER — NALOXONE HYDROCHLORIDE 0.4 MG/ML
0.4 INJECTION, SOLUTION INTRAMUSCULAR; INTRAVENOUS; SUBCUTANEOUS
Status: CANCELLED | OUTPATIENT
Start: 2018-09-18

## 2018-09-18 RX ORDER — MIDAZOLAM HYDROCHLORIDE 1 MG/ML
1 INJECTION, SOLUTION INTRAMUSCULAR; INTRAVENOUS
Status: CANCELLED | OUTPATIENT
Start: 2018-09-18

## 2018-09-19 ENCOUNTER — HOSPITAL ENCOUNTER (OUTPATIENT)
Age: 68
Setting detail: OUTPATIENT SURGERY
Discharge: HOME | End: 2018-09-19
Attending: INTERNAL MEDICINE | Admitting: INTERNAL MEDICINE
Payer: MEDICARE

## 2018-09-19 ENCOUNTER — ANESTHESIA (OUTPATIENT)
Dept: ENDOSCOPY | Age: 68
End: 2018-09-19
Payer: MEDICARE

## 2018-09-19 ENCOUNTER — APPOINTMENT (OUTPATIENT)
Dept: GENERAL RADIOLOGY | Age: 68
End: 2018-09-19
Attending: INTERNAL MEDICINE
Payer: MEDICARE

## 2018-09-19 VITALS
OXYGEN SATURATION: 94 % | DIASTOLIC BLOOD PRESSURE: 76 MMHG | WEIGHT: 130.38 LBS | HEIGHT: 62 IN | HEART RATE: 85 BPM | BODY MASS INDEX: 23.99 KG/M2 | RESPIRATION RATE: 16 BRPM | TEMPERATURE: 98.6 F | SYSTOLIC BLOOD PRESSURE: 135 MMHG

## 2018-09-19 LAB
AMPHET UR QL SCN: NEGATIVE
BARBITURATES UR QL SCN: NEGATIVE
BENZODIAZ UR QL: NEGATIVE
BRONCH. LAVAGE DIFF.,BR: NORMAL
BUN BLD-MCNC: 18 MG/DL (ref 7–18)
CANNABINOIDS UR QL SCN: NEGATIVE
CHLORIDE BLD-SCNC: 103 MMOL/L (ref 100–108)
COCAINE UR QL SCN: NEGATIVE
EOSINOPHIL NFR BRONCH MANUAL: 0 %
GLUCOSE BLD STRIP.AUTO-MCNC: 100 MG/DL (ref 74–106)
GLUCOSE BLD STRIP.AUTO-MCNC: 120 MG/DL (ref 70–110)
HCT VFR BLD CALC: 35 % (ref 36–49)
HDSCOM,HDSCOM: NORMAL
HGB BLD-MCNC: 11.9 G/DL (ref 12–16)
INR BLD: 1.1 (ref 0.9–1.2)
LYMPHOCYTES NFR BRONCH MANUAL: 7 %
MACROPHAGES NFR BRONCH MANUAL: 7 %
METHADONE UR QL: NEGATIVE
NEUTROPHILS NFR BRONCH MANUAL: 86 %
OPIATES UR QL: NEGATIVE
PCP UR QL: NEGATIVE
POTASSIUM BLD-SCNC: 5.1 MMOL/L (ref 3.5–5.5)
SODIUM BLD-SCNC: 142 MMOL/L (ref 136–145)

## 2018-09-19 PROCEDURE — 82962 GLUCOSE BLOOD TEST: CPT

## 2018-09-19 PROCEDURE — 76060000033 HC ANESTHESIA 1 TO 1.5 HR: Performed by: INTERNAL MEDICINE

## 2018-09-19 PROCEDURE — 88312 SPECIAL STAINS GROUP 1: CPT | Performed by: INTERNAL MEDICINE

## 2018-09-19 PROCEDURE — 87185 SC STD ENZYME DETCJ PER NZM: CPT | Performed by: INTERNAL MEDICINE

## 2018-09-19 PROCEDURE — 88112 CYTOPATH CELL ENHANCE TECH: CPT | Performed by: INTERNAL MEDICINE

## 2018-09-19 PROCEDURE — 71046 X-RAY EXAM CHEST 2 VIEWS: CPT

## 2018-09-19 PROCEDURE — 87799 DETECT AGENT NOS DNA QUANT: CPT | Performed by: INTERNAL MEDICINE

## 2018-09-19 PROCEDURE — 88305 TISSUE EXAM BY PATHOLOGIST: CPT | Performed by: INTERNAL MEDICINE

## 2018-09-19 PROCEDURE — 80307 DRUG TEST PRSMV CHEM ANLYZR: CPT

## 2018-09-19 PROCEDURE — 77030022556 HC FCPS BIOP TIS ENDOSC BSC -B: Performed by: INTERNAL MEDICINE

## 2018-09-19 PROCEDURE — 74011250636 HC RX REV CODE- 250/636: Performed by: ANESTHESIOLOGY

## 2018-09-19 PROCEDURE — 87102 FUNGUS ISOLATION CULTURE: CPT | Performed by: INTERNAL MEDICINE

## 2018-09-19 PROCEDURE — 74011250636 HC RX REV CODE- 250/636

## 2018-09-19 PROCEDURE — 85610 PROTHROMBIN TIME: CPT

## 2018-09-19 PROCEDURE — 87077 CULTURE AEROBIC IDENTIFY: CPT | Performed by: INTERNAL MEDICINE

## 2018-09-19 PROCEDURE — 82947 ASSAY GLUCOSE BLOOD QUANT: CPT

## 2018-09-19 PROCEDURE — 76040000008: Performed by: INTERNAL MEDICINE

## 2018-09-19 PROCEDURE — 77030003454 HC NDL BIOP BRONCH BSC -B: Performed by: INTERNAL MEDICINE

## 2018-09-19 PROCEDURE — 77030012699 HC VLV SUC CNTRL OCOA -A: Performed by: INTERNAL MEDICINE

## 2018-09-19 PROCEDURE — 89051 BODY FLUID CELL COUNT: CPT | Performed by: INTERNAL MEDICINE

## 2018-09-19 PROCEDURE — 87116 MYCOBACTERIA CULTURE: CPT | Performed by: INTERNAL MEDICINE

## 2018-09-19 PROCEDURE — 74011000250 HC RX REV CODE- 250: Performed by: NURSE ANESTHETIST, CERTIFIED REGISTERED

## 2018-09-19 PROCEDURE — 87252 VIRUS INOCULATION TISSUE: CPT | Performed by: INTERNAL MEDICINE

## 2018-09-19 PROCEDURE — 87070 CULTURE OTHR SPECIMN AEROBIC: CPT | Performed by: INTERNAL MEDICINE

## 2018-09-19 PROCEDURE — 74011250636 HC RX REV CODE- 250/636: Performed by: NURSE ANESTHETIST, CERTIFIED REGISTERED

## 2018-09-19 RX ORDER — MAGNESIUM SULFATE 100 %
4 CRYSTALS MISCELLANEOUS AS NEEDED
Status: DISCONTINUED | OUTPATIENT
Start: 2018-09-19 | End: 2018-09-19 | Stop reason: HOSPADM

## 2018-09-19 RX ORDER — SODIUM CHLORIDE 0.9 % (FLUSH) 0.9 %
5-10 SYRINGE (ML) INJECTION EVERY 8 HOURS
Status: DISCONTINUED | OUTPATIENT
Start: 2018-09-19 | End: 2018-09-19 | Stop reason: HOSPADM

## 2018-09-19 RX ORDER — ONDANSETRON 2 MG/ML
4 INJECTION INTRAMUSCULAR; INTRAVENOUS ONCE
Status: DISCONTINUED | OUTPATIENT
Start: 2018-09-19 | End: 2018-09-19 | Stop reason: HOSPADM

## 2018-09-19 RX ORDER — INSULIN LISPRO 100 [IU]/ML
INJECTION, SOLUTION INTRAVENOUS; SUBCUTANEOUS ONCE
Status: DISCONTINUED | OUTPATIENT
Start: 2018-09-19 | End: 2018-09-19 | Stop reason: HOSPADM

## 2018-09-19 RX ORDER — SODIUM CHLORIDE 0.9 % (FLUSH) 0.9 %
5-10 SYRINGE (ML) INJECTION AS NEEDED
Status: DISCONTINUED | OUTPATIENT
Start: 2018-09-19 | End: 2018-09-19 | Stop reason: HOSPADM

## 2018-09-19 RX ORDER — DEXTROSE 50 % IN WATER (D50W) INTRAVENOUS SYRINGE
25-50 AS NEEDED
Status: DISCONTINUED | OUTPATIENT
Start: 2018-09-19 | End: 2018-09-19 | Stop reason: HOSPADM

## 2018-09-19 RX ORDER — MIDAZOLAM HYDROCHLORIDE 1 MG/ML
INJECTION, SOLUTION INTRAMUSCULAR; INTRAVENOUS AS NEEDED
Status: DISCONTINUED | OUTPATIENT
Start: 2018-09-19 | End: 2018-09-19 | Stop reason: HOSPADM

## 2018-09-19 RX ORDER — PROPOFOL 10 MG/ML
INJECTION, EMULSION INTRAVENOUS AS NEEDED
Status: DISCONTINUED | OUTPATIENT
Start: 2018-09-19 | End: 2018-09-19 | Stop reason: HOSPADM

## 2018-09-19 RX ORDER — SODIUM CHLORIDE, SODIUM LACTATE, POTASSIUM CHLORIDE, CALCIUM CHLORIDE 600; 310; 30; 20 MG/100ML; MG/100ML; MG/100ML; MG/100ML
75 INJECTION, SOLUTION INTRAVENOUS CONTINUOUS
Status: DISCONTINUED | OUTPATIENT
Start: 2018-09-19 | End: 2018-09-19 | Stop reason: HOSPADM

## 2018-09-19 RX ORDER — LIDOCAINE HYDROCHLORIDE 20 MG/ML
INJECTION, SOLUTION EPIDURAL; INFILTRATION; INTRACAUDAL; PERINEURAL AS NEEDED
Status: DISCONTINUED | OUTPATIENT
Start: 2018-09-19 | End: 2018-09-19 | Stop reason: HOSPADM

## 2018-09-19 RX ORDER — SODIUM CHLORIDE 9 MG/ML
50 INJECTION, SOLUTION INTRAVENOUS CONTINUOUS
Status: DISCONTINUED | OUTPATIENT
Start: 2018-09-19 | End: 2018-09-19 | Stop reason: HOSPADM

## 2018-09-19 RX ORDER — FENTANYL CITRATE 50 UG/ML
INJECTION, SOLUTION INTRAMUSCULAR; INTRAVENOUS AS NEEDED
Status: DISCONTINUED | OUTPATIENT
Start: 2018-09-19 | End: 2018-09-19 | Stop reason: HOSPADM

## 2018-09-19 RX ORDER — MORPHINE SULFATE 2 MG/ML
2 INJECTION, SOLUTION INTRAMUSCULAR; INTRAVENOUS ONCE
Status: COMPLETED | OUTPATIENT
Start: 2018-09-19 | End: 2018-09-19

## 2018-09-19 RX ADMIN — Medication 10 ML: at 07:19

## 2018-09-19 RX ADMIN — FENTANYL CITRATE 100 MCG: 50 INJECTION, SOLUTION INTRAMUSCULAR; INTRAVENOUS at 07:41

## 2018-09-19 RX ADMIN — SODIUM CHLORIDE, SODIUM LACTATE, POTASSIUM CHLORIDE, AND CALCIUM CHLORIDE 75 ML/HR: 600; 310; 30; 20 INJECTION, SOLUTION INTRAVENOUS at 07:18

## 2018-09-19 RX ADMIN — SODIUM CHLORIDE 20 MG: 9 INJECTION INTRAMUSCULAR; INTRAVENOUS; SUBCUTANEOUS at 07:17

## 2018-09-19 RX ADMIN — LIDOCAINE HYDROCHLORIDE 40 MG: 20 INJECTION, SOLUTION EPIDURAL; INFILTRATION; INTRACAUDAL; PERINEURAL at 07:45

## 2018-09-19 RX ADMIN — MIDAZOLAM HYDROCHLORIDE 2 MG: 1 INJECTION, SOLUTION INTRAMUSCULAR; INTRAVENOUS at 07:00

## 2018-09-19 RX ADMIN — PROPOFOL 100 MG: 10 INJECTION, EMULSION INTRAVENOUS at 07:45

## 2018-09-19 RX ADMIN — MORPHINE SULFATE 2 MG: 2 INJECTION, SOLUTION INTRAMUSCULAR; INTRAVENOUS at 09:39

## 2018-09-19 NOTE — ANESTHESIA POSTPROCEDURE EVALUATION
Post-Anesthesia Evaluation and Assessment    Patient: Maxine Biswas MRN: [id number]  SSN: [id number]    YOB: 1950  Age: 79 y.o. Sex: female       Cardiovascular Function/Vital Signs  Visit Vitals    /76    Pulse 85    Temp 37 °C (98.6 °F)    Resp 16    Ht 5' 2\" (1.575 m)    Wt 59.1 kg (130 lb 6 oz)    SpO2 94%    Breastfeeding No    BMI 23.85 kg/m2       Patient is status post general anesthesia for Procedure(s): FLEXIBLE BRONCHOSCOPY WITH BIOPSY w C Arm w BAL. Nausea/Vomiting: None    Postoperative hydration reviewed and adequate. Pain:  Pain Scale 1: Numeric (0 - 10) (09/19/18 0926)  Pain Intensity 1: 7 (09/19/18 0926)   Managed    Neurological Status: At baseline    Mental Status and Level of Consciousness: Alert and oriented     Pulmonary Status:   O2 Device: Room air (09/19/18 0853)   Adequate oxygenation and airway patent    Complications related to anesthesia: None    Post-anesthesia assessment completed.  No concerns    Signed By: Leonel Schmitz MD     September 19, 2018

## 2018-09-19 NOTE — PROGRESS NOTES
conducted a pre-op visit with Vaughn Villegas, who is a 79 y.o.,female. The  provided the following Interventions:  Initiated a relationship of care and support. Plan:  Chaplains will continue to follow and will provide pastoral care on an as needed/requested basis.  recommends bedside caregivers page  on duty if patient shows signs of acute spiritual or emotional distress.     [facility] Certified [address] [place] [address]   [phone]

## 2018-09-19 NOTE — PROCEDURES
[facility]  Pulmonary, Critical Care, and Sleep Medicine    Name: Fabiana aLnza MRN: [id number]   : 1950 Hospital: [address] [place] [address]   Date: 2018          Diagnostic Bronchoscopy, Bronchio-Alveolar Lavage (BAL), Transbronchial lung biopsies using Flouoroscopy, Endobronchial lung biopsies      Pre-procedure diagnosis  1. Hemoptysis  2. Sarcoidosis  3. Right upper lobe infiltrates     Post procedure diagnosis  Same    ASA:  3      Mallampatti: 2    Procedures  1. Diagnostic Bronchoscopy  2. Bronchio-Alveolar Lavage (BAL) from Right Upper lobe  3. Transbronchial Lung biopsies Right Upper Lobe using Flouoroscopy  4. Endobronchial Lung biopsies from Right Lower Lobe  5. Fluoroscopy less than 1 hr    Consent/Treatment: Informed consent was obtained from the  patient after risks, benefits and alternatives were explained. Timeout verified the correct patient and correct procedure. Anesthesia:   General Anesthesia with Endotracheal tube and oxygenation by Anesthesia team    Procedure Details: The flexible bronchoscope was passed through the oral adapter. The scope was passed through the endotracheal tube into the trachea. Airways surveillance:   -- The distal trachea was normal, main fatimah was normal.   -- The right-sided endobronchial anatomy was completely inspected and were found to be normal; except at the entrance of the right lower lobe basal segments in the fatimah bordering the middle lobe, there was a small nodule on the wall (see picture below). The nodule was smooth, shiny, not pulsating or bleeding.   -- The left-sided endobronchial anatomy was completely inspected and were found to be normal.   -- No bleeding or endobronchial masses. -- thin white to yellow mucus plugs seen in the airways; also black specks seen coming from the distal airways during lavage    Specimens / Further Procedures:   Bronchio-Alveolar Lavage (BAL):   The bronchoscope was wedged in the Right upper lobe segments and bronchoalveolar lavage was performed with 20 cc aliquots times 5; material was sent for cell count, cytology, pneumocystis, AFB smear and culture, viral culture, fungal culture, aerobic culture. Transbronchial lung biopsies: The bronchoscope was placed in the Right Upper lobe; and using Fluoroscopy guidance, the biopsy forceps (without needle tip) was passed into the segmental airways and total of 4 biopsies done. Endobronchial lung biopsies: The bronchoscope was placed in the Right bronchus intermedius; and using same biopsy forceps, about 3 biopsies taken under direct visualization from the right lower lobe endobronchial nodule. The angle was difficult to get the biopsies. Complications: none; minimal oozing, but no significant bleeding noted post biopsies. Cold saline lavages done post biopsies.      Estimated Blood Loss: <1cc    PLAN  PACU  Await cyto-path and micro results  D/w  - in detail    Mai Jose Luispaulina, [address] [phone]

## 2018-09-19 NOTE — PROGRESS NOTES
Pulmonary    Visit Vitals    /76    Pulse 85    Temp 98.6 °F (37 °C)    Resp 16    Ht 5' 2\" (1.575 m)    Wt 59.1 kg (130 lb 6 oz)    SpO2 94%    Breastfeeding No    BMI 23.85 kg/m2       Patient examined in PACU post procedure this morning  Awake, alert  No chest pains or hemoptysis  Vitals and oxygenation stable  On room air  Lungs symmetrical breath sounds, NO wheezing    CXR reviewed: Portable film; no pneumothorax    Ok to dc home    D.w patient and     Angela Bean MD

## 2018-09-19 NOTE — H&P
Chart reviewed - no changes  Patient stable for bronchoscopy evaluation  Imaging and Labs reviewed  Patient seen and examined    Visit Vitals    /78    Pulse 83    Temp 98.6 °F (37 °C)    Resp 18    Ht 5' 2\" (1.575 m)    Wt 59.1 kg (130 lb 6 oz)    SpO2 98%    Breastfeeding No    BMI 23.85 kg/m2     · Planned for EBUS bronchoscopy and possible transbronchial / endobronchial lung biopsies - evaluation of persistent mild hemoptysis. The procedure is planned with anesthesia team was reviewed and discussed. Discussed risks of bleeding, pneumothorax, hypoxemia, cardiac and neuro risks with hypoxemia and general anesthesia. Risks of prolonged mechanical ventilation reviewed. Patient agreeable for procedure and informed consent signed. D/w patient, .     Demetrice Combs MD

## 2018-09-19 NOTE — DISCHARGE INSTRUCTIONS
After bronchoscopy, cough, fever and respiratory secretions are expected for 6 to 24 hours. Please use simple analgesics such as Tylenol if needed. If biopsies were done, some sputum mixed with blood clots will be seen. If you are coughing roxane blood or having chest pain or shortness of breath - please call 911 and/or call the office [phone]. It is recommended to take rest the day of the procedure after going home. Since sedation was used, you would feel sleepy. It is recommended not to drive or operate machinery or take alcohol on the day of the procedure. Light meal advised for the very first meal post-procedure. Normal meal can be taken thereafter. If the procedure was done with breathing tube and general anesthesia, some sore throat is expected for 24 to 48 hours. Since anesthesia / sedation was used, do not make important legal decisions today. Resume ASPIRIN from tomorrow if ok with your PCP; resume CLOPIDOGREL in 2 days if ok with your PCP; if coughing blood, stop these medications and call office for advice. Maria Mcelroy MD  [facility]. [facility]  [phone]       Bronchoscopy: What to Expect at Home  Your Recovery    Bronchoscopy lets your doctor look at your airway through a tube called a bronchoscope. Afterward, you may feel tired for 1 or 2 days. Your mouth may feel very dry for several hours after the procedure. You may also have a sore throat and a hoarse voice for a few days. Sucking on throat lozenges or gargling with warm salt water may help soothe your sore throat. If a sample of tissue (biopsy) was taken, you may spit up a small amount of blood or have bloody saliva. This is normal.  This care sheet gives you a general idea about how long it will take for you to recover. But each person recovers at a different pace. Follow the steps below to get better as quickly as possible. How can you care for yourself at home?   Activity    · Do not eat anything for 2 hours after the procedure.     · Rest when you feel tired. Getting enough sleep will help you recover.     · Avoid strenuous activities, such as bicycle riding, jogging, weight lifting, or aerobic exercise, until your doctor says it is okay.     · Ask your doctor when you can drive again. Diet    · You can eat your normal diet. If your stomach is upset, try bland, low-fat foods like plain rice, broiled chicken, toast, and yogurt.     · If it is painful to swallow, start out with cold drinks, flavored ice pops, and ice cream. Next, try soft foods like pudding, yogurt, canned or cooked fruit, scrambled eggs, and mashed potatoes. Avoid eating hard or scratchy foods like chips or raw vegetables. Avoid orange or tomato juice and other acidic foods that can sting the throat.     · Drink plenty of fluids to avoid becoming dehydrated (unless your doctor tells you not to). Medicines    · Take pain medicines exactly as directed. ¨ If the doctor gave you a prescription medicine for pain, take it as prescribed. ¨ If you are not taking a prescription pain medicine, ask your doctor if you can take an over-the-counter medicine.     · If you think your pain medicine is making you sick to your stomach:  ¨ Take your medicine after meals (unless your doctor has told you not to). ¨ Ask your doctor for a different pain medicine.     · If your doctor prescribed antibiotics, take them as directed. Do not stop taking them just because you feel better. You need to take the full course of antibiotics. Follow-up care is a key part of your treatment and safety. Be sure to make and go to all appointments, and call your doctor if you are having problems. It's also a good idea to know your test results and keep a list of the medicines you take. When should you call for help? Call 911 anytime you think you may need emergency care.  For example, call if:    · You passed out (lost consciousness).     · You have sudden chest pain and shortness of breath.     · You cough up large amounts of bright red blood.     · You have severe pain in your chest.     · You have severe trouble breathing.    Call your doctor now or seek immediate medical care if:    · You cough up more than a few tablespoons of blood.     · You have pain that does not get better after you take pain medicine.     · You have a fever over 100°F.     · You still sound hoarse after a few days.     · You have bubbles under the skin around the collarbone. These may crackle and pop when you press on them.    Watch closely for changes in your health, and be sure to contact your doctor if you have any problems. Where can you learn more? Go to http://odessa-mack.info/. Enter H051 in the search box to learn more about \"Bronchoscopy: What to Expect at Home. \"  Current as of: December 6, 2017  Content Version: 11.7  © 5217-3662 [employer]. Care instructions adapted under license by [employer] (which disclaims liability or warranty for this information). If you have questions about a medical condition or this instruction, always ask your healthcare professional. [place] [address] any warranty or liability for your use of this information. DISCHARGE SUMMARY from Nurse    PATIENT INSTRUCTIONS:    After general anesthesia or intravenous sedation, for 24 hours or while taking prescription Narcotics:  · Limit your activities  · Do not drive and operate hazardous machinery  · Do not make important personal or business decisions  · Do  not drink alcoholic beverages  · If you have not urinated within 8 hours after discharge, please contact your surgeon on call.     Report the following to your surgeon:  · Excessive pain, swelling, redness or odor of or around the surgical area  · Temperature over 100.5  · Nausea and vomiting lasting longer than 4 hours or if unable to take medications  · Any signs of decreased circulation or nerve impairment to extremity: change in color, persistent  numbness, tingling, coldness or increase pain  · Any questions    What to do at Home:  Recommended activity: Activity as tolerated and no driving for today    These are general instructions for a healthy lifestyle:    No smoking/ No tobacco products/ Avoid exposure to second hand smoke  Surgeon General's Warning:  Quitting smoking now greatly reduces serious risk to your health. Obesity, smoking, and sedentary lifestyle greatly increases your risk for illness    A healthy diet, regular physical exercise & weight monitoring are important for maintaining a healthy lifestyle    You may be retaining fluid if you have a history of heart failure or if you experience any of the following symptoms:  Weight gain of 3 pounds or more overnight or 5 pounds in a week, increased swelling in our hands or feet or shortness of breath while lying flat in bed. Please call your doctor as soon as you notice any of these symptoms; do not wait until your next office visit. Recognize signs and symptoms of STROKE:    F-face looks uneven    A-arms unable to move or move unevenly    S-speech slurred or non-existent    T-time-call 911 as soon as signs and symptoms begin-DO NOT go       Back to bed or wait to see if you get better-TIME IS BRAIN. Warning Signs of HEART ATTACK     Call 911 if you have these symptoms:   Chest discomfort. Most heart attacks involve discomfort in the center of the chest that lasts more than a few minutes, or that goes away and comes back. It can feel like uncomfortable pressure, squeezing, fullness, or pain.  Discomfort in other areas of the upper body. Symptoms can include pain or discomfort in one or both arms, the back, neck, jaw, or stomach.  Shortness of breath with or without chest discomfort.  Other signs may include breaking out in a cold sweat, nausea, or lightheadedness. Don't wait more than five minutes to call 911 - MINUTES MATTER! Fast action can save your life. Calling 911 is almost always the fastest way to get lifesaving treatment. Emergency Medical Services staff can begin treatment when they arrive -- up to an hour sooner than if someone gets to the hospital by car. The discharge information has been reviewed with the patient and spouse. The patient and spouse verbalized understanding. Discharge medications reviewed with the patient and spouse and appropriate educational materials and side effects teaching were provided.   ___________________________________________________________________________________________________________________________________

## 2018-09-19 NOTE — IP AVS SNAPSHOT
[address] [address] 
[phone] Patient: Namrata Gonzalez MRN: [id number] OCE:[id number] About your hospitalization You were admitted on:  September 19, 2018 You last received care in the:  [facility] PACU You were discharged on:  September 19, 2018 Why you were hospitalized Your primary diagnosis was:  Not on File Follow-up Information Follow up With Details Comments Contact Info Pritesh Javed MD  Follow up as instructed [address] [place] [address] [address] [place] [address] 
[phone] Provider Unknown   Patient not available to ask Discharge Orders None A check marcia indicates which time of day the medication should be taken. My Medications CHANGE how you take these medications Instructions Each Dose to Equal  
 Morning Noon Evening Bedtime  
 fluticasone-salmeterol 250-50 mcg/dose diskus inhaler Commonly known as:  ADVAIR What changed:  when to take this Your last dose was: Your next dose is: Take 1 puff by inhalation every twelve (12) hours. 1 Puff CONTINUE taking these medications Instructions Each Dose to Equal  
 Morning Noon Evening Bedtime  
 albuterol 90 mcg/actuation inhaler Commonly known as:  PROVENTIL HFA, VENTOLIN HFA, PROAIR HFA Your last dose was: Your next dose is: Take 1 puff by inhalation daily as needed for Wheezing. 1 Puff  
    
   
   
   
  
 aspirin 81 mg chewable tablet Your last dose was: Your next dose is: Take 1 tablet by mouth daily. 81 mg  
    
   
   
   
  
 atorvastatin 40 mg tablet Commonly known as:  LIPITOR Your last dose was: Your next dose is: Take 1 tablet by mouth nightly. 40 mg  
    
   
   
   
  
 clopidogrel 75 mg Tab Commonly known as:  PLAVIX Your last dose was: Your next dose is: Take 1 tablet by mouth daily. 75 mg FLUoxetine 20 mg capsule Commonly known as:  PROzac Your last dose was: Your next dose is: Take  by mouth daily. LANTUS U-100 INSULIN 100 unit/mL injection Generic drug:  insulin glargine Your last dose was: Your next dose is:    
   
   
 20 Units by SubCUTAneous route nightly. 20 Units  
    
   
   
   
  
 lisinopril-hydroCHLOROthiazide 20-12.5 mg per tablet Commonly known as:  Reyna Up Your last dose was: Your next dose is: Take 1 Tab by mouth daily. Indications: hypertension 1 Tab  
    
   
   
   
  
 metFORMIN 500 mg tablet Commonly known as:  GLUCOPHAGE Your last dose was: Your next dose is: Take 750 mg by mouth daily (with breakfast). 750 mg  
    
   
   
   
  
 nitroglycerin 0.4 mg SL tablet Commonly known as:  NITROSTAT Your last dose was: Your next dose is:    
   
   
 1 Tab by SubLINGual route as needed for Chest Pain. 0.4 mg  
    
   
   
   
  
 polyethylene glycol 17 gram packet Commonly known as:  Denver Castellanos Your last dose was: Your next dose is: Take 1 packet by mouth daily. 17 g PRILOSEC PO Your last dose was: Your next dose is: Take 20 mg by mouth daily as needed. 20 mg  
    
   
   
   
  
 SPIRIVA WITH HANDIHALER 18 mcg inhalation capsule Generic drug:  tiotropium Your last dose was: Your next dose is: Take 1 Cap by inhalation daily. 1 Cap  
    
   
   
   
  
 zolpidem CR 12.5 mg tablet Commonly known as:  AMBIEN CR Your last dose was: Your next dose is:    
   
   
 nightly as needed. Discharge Instructions After bronchoscopy, cough, fever and respiratory secretions are expected for 6 to 24 hours. Please use simple analgesics such as Tylenol if needed. If biopsies were done, some sputum mixed with blood clots will be seen. If you are coughing roxane blood or having chest pain or shortness of breath - please call 911 and/or call the office [phone]. It is recommended to take rest the day of the procedure after going home. Since sedation was used, you would feel sleepy. It is recommended not to drive or operate machinery or take alcohol on the day of the procedure. Light meal advised for the very first meal post-procedure. Normal meal can be taken thereafter. If the procedure was done with breathing tube and general anesthesia, some sore throat is expected for 24 to 48 hours. Since anesthesia / sedation was used, do not make important legal decisions today. Resume ASPIRIN from tomorrow if ok with your PCP; resume CLOPIDOGREL in 2 days if ok with your PCP; if coughing blood, stop these medications and call office for advice. Akbar Rodriguez MD 
[facility]. [facility] [phone] Bronchoscopy: What to Expect at [facility] Your Recovery Bronchoscopy lets your doctor look at your airway through a tube called a bronchoscope. Afterward, you may feel tired for 1 or 2 days. Your mouth may feel very dry for several hours after the procedure. You may also have a sore throat and a hoarse voice for a few days. Sucking on throat lozenges or gargling with warm salt water may help soothe your sore throat. If a sample of tissue (biopsy) was taken, you may spit up a small amount of blood or have bloody saliva. This is normal. 
This care sheet gives you a general idea about how long it will take for you to recover. But each person recovers at a different pace. Follow the steps below to get better as quickly as possible. How can you care for yourself at home? Activity   · Do not eat anything for 2 hours after the procedure.  
  · Rest when you feel tired. Getting enough sleep will help you recover.  
  · Avoid strenuous activities, such as bicycle riding, jogging, weight lifting, or aerobic exercise, until your doctor says it is okay.  
  · Ask your doctor when you can drive again. Diet 
  · You can eat your normal diet. If your stomach is upset, try bland, low-fat foods like plain rice, broiled chicken, toast, and yogurt.  
  · If it is painful to swallow, start out with cold drinks, flavored ice pops, and ice cream. Next, try soft foods like pudding, yogurt, canned or cooked fruit, scrambled eggs, and mashed potatoes. Avoid eating hard or scratchy foods like chips or raw vegetables. Avoid orange or tomato juice and other acidic foods that can sting the throat.  
  · Drink plenty of fluids to avoid becoming dehydrated (unless your doctor tells you not to). Medicines 
  · Take pain medicines exactly as directed. ¨ If the doctor gave you a prescription medicine for pain, take it as prescribed. ¨ If you are not taking a prescription pain medicine, ask your doctor if you can take an over-the-counter medicine.  
  · If you think your pain medicine is making you sick to your stomach: 
¨ Take your medicine after meals (unless your doctor has told you not to). ¨ Ask your doctor for a different pain medicine.  
  · If your doctor prescribed antibiotics, take them as directed. Do not stop taking them just because you feel better. You need to take the full course of antibiotics. Follow-up care is a key part of your treatment and safety. Be sure to make and go to all appointments, and call your doctor if you are having problems. It's also a good idea to know your test results and keep a list of the medicines you take. When should you call for help? Call 911 anytime you think you may need emergency care. For example, call if: 
  · You passed out (lost consciousness).   · You have sudden chest pain and shortness of breath.  
  · You cough up large amounts of bright red blood.  
  · You have severe pain in your chest.  
  · You have severe trouble breathing.  
 Call your doctor now or seek immediate medical care if: 
  · You cough up more than a few tablespoons of blood.  
  · You have pain that does not get better after you take pain medicine.  
  · You have a fever over 100°F.  
  · You still sound hoarse after a few days.  
  · You have bubbles under the skin around the collarbone. These may crackle and pop when you press on them.  
 Watch closely for changes in your health, and be sure to contact your doctor if you have any problems. Where can you learn more? Go to http://odessa-mack.info/. Enter G591 in the search box to learn more about \"Bronchoscopy: What to Expect at Home. \" Current as of: December 6, 2017 Content Version: 11.7 © 4547-1414 [employer]. Care instructions adapted under license by [employer] (which disclaims liability or warranty for this information). If you have questions about a medical condition or this instruction, always ask your healthcare professional. [place] [address] any warranty or liability for your use of this information. DISCHARGE SUMMARY from Nurse PATIENT INSTRUCTIONS: 
 
After general anesthesia or intravenous sedation, for 24 hours or while taking prescription Narcotics: · Limit your activities · Do not drive and operate hazardous machinery · Do not make important personal or business decisions · Do  not drink alcoholic beverages · If you have not urinated within 8 hours after discharge, please contact your surgeon on call. Report the following to your surgeon: 
· Excessive pain, swelling, redness or odor of or around the surgical area · Temperature over 100.5 · Nausea and vomiting lasting longer than 4 hours or if unable to take medications · Any signs of decreased circulation or nerve impairment to extremity: change in color, persistent  numbness, tingling, coldness or increase pain · Any questions What to do at Home: 
Recommended activity: Activity as tolerated and no driving for today These are general instructions for a healthy lifestyle: No smoking/ No tobacco products/ Avoid exposure to second hand smoke Surgeon General's Warning:  Quitting smoking now greatly reduces serious risk to your health. Obesity, smoking, and sedentary lifestyle greatly increases your risk for illness A healthy diet, regular physical exercise & weight monitoring are important for maintaining a healthy lifestyle You may be retaining fluid if you have a history of heart failure or if you experience any of the following symptoms:  Weight gain of 3 pounds or more overnight or 5 pounds in a week, increased swelling in our hands or feet or shortness of breath while lying flat in bed. Please call your doctor as soon as you notice any of these symptoms; do not wait until your next office visit. Recognize signs and symptoms of STROKE: 
 
F-face looks uneven A-arms unable to move or move unevenly S-speech slurred or non-existent T-time-call 911 as soon as signs and symptoms begin-DO NOT go Back to bed or wait to see if you get better-TIME IS BRAIN. Warning Signs of HEART ATTACK Call 911 if you have these symptoms: 
? Chest discomfort. Most heart attacks involve discomfort in the center of the chest that lasts more than a few minutes, or that goes away and comes back. It can feel like uncomfortable pressure, squeezing, fullness, or pain. ? Discomfort in other areas of the upper body. Symptoms can include pain or discomfort in one or both arms, the back, neck, jaw, or stomach. ? Shortness of breath with or without chest discomfort. ? Other signs may include breaking out in a cold sweat, nausea, or lightheadedness. Don't wait more than five minutes to call [address]! Fast action can save your life. Calling 911 is almost always the fastest way to get lifesaving treatment. Emergency Medical Services staff can begin treatment when they arrive  up to an hour sooner than if someone gets to the hospital by car. The discharge information has been reviewed with the patient and spouse. The patient and spouse verbalized understanding. Discharge medications reviewed with the patient and spouse and appropriate educational materials and side effects teaching were provided. ___________________________________________________________________________________________________________________________________ Introducing James Velasco As a [facility] patient, I wanted to make you aware of our electronic visit tool called James LyonAhead. [facility] 24/7 allows you to connect within minutes with a medical provider 24 hours a day, seven days a week via a mobile device or tablet or logging into a secure website from your computer. You can access James Velasco from anywhere in the [place]. A virtual visit might be right for you when you have a simple condition and feel like you just dont want to get out of bed, or cant get away from work for an appointment, when your regular [employer] provider is not available (evenings, weekends or holidays), or when youre out of town and need minor care. Electronic visits cost only $49 and if the [employer] provider determines a prescription is needed to treat your condition, one can be electronically transmitted to a nearby pharmacy*. Please take a moment to enroll today if you have not already done so. The enrollment process is free and takes just a few minutes. To enroll, please download the [facility]rain 24/Pzoom taco to your tablet or phone, or visit [email or web address] to enroll on your computer. And, as an [address] [place] [address] patient with a imagine account, the results of your visits will be scanned into your electronic medical record and your primary care provider will be able to view the scanned results. We urge you to continue to see your regular [facility] provider for your ongoing medical care. And while your primary care provider may not be the one available when you seek a James Velasco virtual visit, the peace of mind you get from getting a real diagnosis real time can be priceless. For more information on James Lyonfin, view our Frequently Asked Questions (FAQs) at [email or web address]. Sincerely, 
 
Jeromy Howard MD 
Chief Medical Officer [employer] Bette Arthur *:  certain medications cannot be prescribed via [employer] Unresulted Labs-Please follow up with your PCP about these lab tests Order Current Status AFB CULTURE + SMEAR W/RFLX ID FROM CULTURE In process BRONCH. LAVAGE DIFF. In process CULTURE, FUNGUS In process CULTURE, RESPIRATORY/SPUTUM/BRONCH W GRAM STAIN In process CULTURE, VIRAL In process NC XR TECHNOLOGIST SERVICE In process P. JIROVECI BY PCR, BAL In process XR CHEST INSP AND EXP In process Providers Seen During Your Hospitalization Provider Specialty Primary office phone Demetrice Combs MD Pulmonary Disease [phone] Your Primary Care Physician (PCP) Primary Care Physician Office Phone Office Fax UNKNOWN, PROVIDER ** None ** ** None ** You are allergic to the following Allergen Reactions Ciprofloxacin Hives Penicillins Nausea Only Shellfish Derived Hives Swelling Swelling of legs Recent Documentation Height Weight Breastfeeding? BMI OB Status Smoking Status 1.575 m 59.1 kg No 23.85 kg/m2 Hysterectomy Former Smoker Emergency Contacts Name Discharge Info Relation Home Work Mobile Manfred Graham DISCHARGE CAREGIVER [3] Spouse [3]  [phone] Spouse [3] [phone] Patient Belongings The following personal items are in your possession at time of discharge: 
  Dental Appliances: Uppers  Visual Aid: None Please provide this summary of care documentation to your next provider. Signatures-by signing, you are acknowledging that this After Visit Summary has been reviewed with you and you have received a copy. Patient Signature:  ____________________________________________________________ Date:  ____________________________________________________________  
  
[facility] Provider Signature:  ____________________________________________________________ Date:  ____________________________________________________________

## 2018-09-19 NOTE — ANESTHESIA PREPROCEDURE EVALUATION
Anesthetic History   No history of anesthetic complications            Review of Systems / Medical History  Patient summary reviewed, nursing notes reviewed and pertinent labs reviewed    Pulmonary    COPD: moderate        Asthma : well controlled       Neuro/Psych         Psychiatric history     Cardiovascular    Hypertension          CAD    Exercise tolerance: <4 METS: limted to SOB     GI/Hepatic/Renal     GERD: well controlled           Endo/Other    Diabetes: type 2, using insulin    Arthritis     Other Findings              Physical Exam    Airway  Mallampati: II  TM Distance: 4 - 6 cm  Neck ROM: normal range of motion   Mouth opening: Normal     Cardiovascular  Regular rate and rhythm,  S1 and S2 normal,  no murmur, click, rub, or gallop  Rhythm: regular  Rate: normal         Dental    Dentition: Full lower dentures and Full upper dentures     Pulmonary    Rhonchi    Wheezes         Abdominal  GI exam deferred       Other Findings            Anesthetic Plan    ASA: 4  Anesthesia type: general          Induction: Intravenous  Anesthetic plan and risks discussed with: Patient

## 2018-09-21 LAB
BACTERIA SPEC CULT: ABNORMAL
BACTERIA SPEC CULT: ABNORMAL
GRAM STN SPEC: ABNORMAL
GRAM STN SPEC: ABNORMAL
SERVICE CMNT-IMP: ABNORMAL

## 2018-09-25 LAB
P JIROVECII DNA # BAL NAA+PROBE: NEGATIVE
SOURCE, PJPB1: NORMAL

## 2018-09-27 LAB
SPECIMEN SOURCE: NORMAL
VIRUS SPEC CULT: NORMAL

## 2018-10-22 LAB
BACTERIA SPEC CULT: NORMAL
FUNGUS SMEAR,FNGSMR: NORMAL
SERVICE CMNT-IMP: NORMAL

## 2018-11-04 LAB
ACID FAST STN SPEC: NEGATIVE
MYCOBACTERIUM SPEC QL CULT: NEGATIVE
SPECIMEN PREPARATION: NORMAL
SPECIMEN SOURCE: NORMAL

## 2019-04-01 ENCOUNTER — HOSPITAL ENCOUNTER (EMERGENCY)
Age: 69
Discharge: HOME | End: 2019-04-01
Attending: EMERGENCY MEDICINE
Payer: MEDICARE

## 2019-04-01 ENCOUNTER — APPOINTMENT (OUTPATIENT)
Dept: GENERAL RADIOLOGY | Age: 69
End: 2019-04-01
Attending: EMERGENCY MEDICINE
Payer: MEDICARE

## 2019-04-01 VITALS
TEMPERATURE: 98.7 F | DIASTOLIC BLOOD PRESSURE: 123 MMHG | HEART RATE: 88 BPM | OXYGEN SATURATION: 99 % | SYSTOLIC BLOOD PRESSURE: 148 MMHG | RESPIRATION RATE: 20 BRPM

## 2019-04-01 DIAGNOSIS — J18.9 COMMUNITY ACQUIRED PNEUMONIA OF LEFT LOWER LOBE OF LUNG: Primary | ICD-10-CM

## 2019-04-01 LAB
ALBUMIN SERPL-MCNC: 3.7 G/DL (ref 3.4–5)
ALBUMIN/GLOB SERPL: 0.9 {RATIO} (ref 0.8–1.7)
ALP SERPL-CCNC: 70 U/L (ref 45–117)
ALT SERPL-CCNC: 28 U/L (ref 13–56)
ANION GAP SERPL CALC-SCNC: 4 MMOL/L (ref 3–18)
AST SERPL-CCNC: 31 U/L (ref 15–37)
BASOPHILS # BLD: 0 K/UL (ref 0–0.1)
BASOPHILS NFR BLD: 0 % (ref 0–2)
BILIRUB SERPL-MCNC: 0.5 MG/DL (ref 0.2–1)
BNP SERPL-MCNC: 50 PG/ML (ref 0–900)
BUN SERPL-MCNC: 11 MG/DL (ref 7–18)
BUN/CREAT SERPL: 14 (ref 12–20)
CALCIUM SERPL-MCNC: 8.8 MG/DL (ref 8.5–10.1)
CHLORIDE SERPL-SCNC: 111 MMOL/L (ref 100–108)
CK MB CFR SERPL CALC: 1.5 % (ref 0–4)
CK MB SERPL-MCNC: 1.9 NG/ML (ref 5–25)
CK SERPL-CCNC: 125 U/L (ref 26–192)
CO2 SERPL-SCNC: 25 MMOL/L (ref 21–32)
CREAT SERPL-MCNC: 0.77 MG/DL (ref 0.6–1.3)
DIFFERENTIAL METHOD BLD: ABNORMAL
EOSINOPHIL # BLD: 0.1 K/UL (ref 0–0.4)
EOSINOPHIL NFR BLD: 2 % (ref 0–5)
ERYTHROCYTE [DISTWIDTH] IN BLOOD BY AUTOMATED COUNT: 15.3 % (ref 11.6–14.5)
GLOBULIN SER CALC-MCNC: 3.9 G/DL (ref 2–4)
GLUCOSE SERPL-MCNC: 161 MG/DL (ref 74–99)
HCT VFR BLD AUTO: 33.6 % (ref 35–45)
HGB BLD-MCNC: 10.9 G/DL (ref 12–16)
LYMPHOCYTES # BLD: 2.3 K/UL (ref 0.9–3.6)
LYMPHOCYTES NFR BLD: 42 % (ref 21–52)
MCH RBC QN AUTO: 24 PG (ref 24–34)
MCHC RBC AUTO-ENTMCNC: 32.4 G/DL (ref 31–37)
MCV RBC AUTO: 73.8 FL (ref 74–97)
MONOCYTES # BLD: 0.4 K/UL (ref 0.05–1.2)
MONOCYTES NFR BLD: 7 % (ref 3–10)
NEUTS SEG # BLD: 2.7 K/UL (ref 1.8–8)
NEUTS SEG NFR BLD: 49 % (ref 40–73)
PLATELET # BLD AUTO: 247 K/UL (ref 135–420)
PMV BLD AUTO: 10.2 FL (ref 9.2–11.8)
POTASSIUM SERPL-SCNC: 4.6 MMOL/L (ref 3.5–5.5)
PROT SERPL-MCNC: 7.6 G/DL (ref 6.4–8.2)
RBC # BLD AUTO: 4.55 M/UL (ref 4.2–5.3)
SODIUM SERPL-SCNC: 140 MMOL/L (ref 136–145)
TROPONIN I SERPL-MCNC: <0.02 NG/ML (ref 0–0.04)
WBC # BLD AUTO: 5.5 K/UL (ref 4.6–13.2)

## 2019-04-01 PROCEDURE — 71046 X-RAY EXAM CHEST 2 VIEWS: CPT

## 2019-04-01 PROCEDURE — 82550 ASSAY OF CK (CPK): CPT

## 2019-04-01 PROCEDURE — 85025 COMPLETE CBC W/AUTO DIFF WBC: CPT

## 2019-04-01 PROCEDURE — 71045 X-RAY EXAM CHEST 1 VIEW: CPT

## 2019-04-01 PROCEDURE — 99283 EMERGENCY DEPT VISIT LOW MDM: CPT

## 2019-04-01 PROCEDURE — 93005 ELECTROCARDIOGRAM TRACING: CPT

## 2019-04-01 PROCEDURE — 80053 COMPREHEN METABOLIC PANEL: CPT

## 2019-04-01 PROCEDURE — 74011250636 HC RX REV CODE- 250/636: Performed by: EMERGENCY MEDICINE

## 2019-04-01 PROCEDURE — 96374 THER/PROPH/DIAG INJ IV PUSH: CPT

## 2019-04-01 PROCEDURE — 83880 ASSAY OF NATRIURETIC PEPTIDE: CPT

## 2019-04-01 RX ORDER — DOXYCYCLINE 100 MG/1
100 CAPSULE ORAL
Status: DISCONTINUED | OUTPATIENT
Start: 2019-04-01 | End: 2019-04-02 | Stop reason: HOSPADM

## 2019-04-01 RX ORDER — DOXYCYCLINE HYCLATE 100 MG
100 TABLET ORAL 2 TIMES DAILY
Qty: 14 TAB | Refills: 0 | Status: SHIPPED | OUTPATIENT
Start: 2019-04-01 | End: 2019-04-08

## 2019-04-01 RX ORDER — KETOROLAC TROMETHAMINE 15 MG/ML
15 INJECTION, SOLUTION INTRAMUSCULAR; INTRAVENOUS
Status: COMPLETED | OUTPATIENT
Start: 2019-04-01 | End: 2019-04-01

## 2019-04-01 RX ADMIN — KETOROLAC TROMETHAMINE 15 MG: 15 INJECTION, SOLUTION INTRAMUSCULAR; INTRAVENOUS at 21:13

## 2019-04-02 LAB
ATRIAL RATE: 95 BPM
CALCULATED P AXIS, ECG09: 54 DEGREES
CALCULATED R AXIS, ECG10: -22 DEGREES
CALCULATED T AXIS, ECG11: 32 DEGREES
DIAGNOSIS, 93000: NORMAL
P-R INTERVAL, ECG05: 122 MS
Q-T INTERVAL, ECG07: 358 MS
QRS DURATION, ECG06: 80 MS
QTC CALCULATION (BEZET), ECG08: 449 MS
VENTRICULAR RATE, ECG03: 95 BPM

## 2019-04-02 NOTE — ED NOTES
I have reviewed discharge instructions with the patient and spouse. The patient and spouse verbalized understanding. No further questions at this time.

## 2019-04-02 NOTE — ED PROVIDER NOTES
EMERGENCY DEPARTMENT HISTORY AND PHYSICAL EXAM    9:18 PM      Date: 4/1/2019  Patient Name: Jorge Luis Tay    History of Presenting Illness     Chief Complaint   Patient presents with    Chest Pain         History Provided By: Patient    Additional History (Context): Jorge Luis Tay is a 79 y.o. female with history of sarcoid who presents with complaint of 3 weeks of persistently worsening cough, associated chest pain, and some increased shortness of breath. She denies fever, but notes that the cough is productive. Chest pain feels like a squeezing around her chest, it is unchanged over these 3 weeks. She denies any recent steroids. Denies other associated symptoms. PCP: Unknown, Provider    Current Facility-Administered Medications   Medication Dose Route Frequency Provider Last Rate Last Dose    doxycycline (VIBRAMYCIN) capsule 100 mg  100 mg Oral NOW Scotty Rucker MD         Current Outpatient Medications   Medication Sig Dispense Refill    lisinopril-hydroCHLOROthiazide (PRINZIDE, ZESTORETIC) 20-12.5 mg per tablet Take 1 Tab by mouth daily. Indications: hypertension      nitroglycerin (NITROSTAT) 0.4 mg SL tablet 1 Tab by SubLINGual route as needed for Chest Pain. 25 Tab 1    metFORMIN (GLUCOPHAGE) 500 mg tablet Take 750 mg by mouth daily (with breakfast).  zolpidem CR (AMBIEN CR) 12.5 mg tablet nightly as needed.  fluticasone-salmeterol (ADVAIR) 250-50 mcg/dose diskus inhaler Take 1 puff by inhalation every twelve (12) hours. (Patient taking differently: Take 1 Puff by inhalation daily. ) 1 Inhaler 0    albuterol (PROVENTIL HFA, VENTOLIN HFA, PROAIR HFA) 90 mcg/actuation inhaler Take 1 puff by inhalation daily as needed for Wheezing. 1 Inhaler 0    clopidogrel (PLAVIX) 75 mg tablet Take 1 tablet by mouth daily. 30 tablet 11    atorvastatin (LIPITOR) 40 mg tablet Take 1 tablet by mouth nightly. 30 tablet 11    aspirin 81 mg chewable tablet Take 1 tablet by mouth daily.  27 tablet 0    polyethylene glycol (MIRALAX) 17 gram packet Take 1 packet by mouth daily. 14 each 0    fluoxetine (PROZAC) 20 mg capsule Take  by mouth daily.  insulin glargine (LANTUS) 100 unit/mL injection 20 Units by SubCUTAneous route nightly.  OMEPRAZOLE (PRILOSEC PO) Take 20 mg by mouth daily as needed.  tiotropium (SPIRIVA WITH HANDIHALER) 18 mcg inhalation capsule Take 1 Cap by inhalation daily. Past History     Past Medical History:  Past Medical History:   Diagnosis Date    Arthritis     \"generalized\"    Asthma     CAD in native artery     NSTEMI (Sep 2014); diffuse 65% pLAD, minimal disease RCA & LCx. pLAD FFR 0.93. LV  no RWMA (echo and LV angio)    Chronic neck pain     Chronic pain     left knee    Chronic pain syndrome     COPD (chronic obstructive pulmonary disease) (HCC)     Depression     Diabetes (HCC)     Diverticulitis     GERD (gastroesophageal reflux disease)     Heart disease     Hidradenitis 2014    Hypertension     Left knee pain     Narcotic dependence ([address]     Right wrist pain     Sarcoidosis        Past Surgical History:  Past Surgical History:   Procedure Laterality Date    HX BREAST BIOPSY Right     9/10/14: She said tag in place from [facility]      HX HYSTERECTOMY      HX KNEE ARTHROSCOPY Left        Family History:  Family History   Problem Relation Age of Onset    Hypertension Father     Diabetes Mother        Social History:  Social History     Tobacco Use    Smoking status: Former Smoker     Years: 46.00     Types: Cigarettes     Last attempt to quit: 2018     Years since quittin.0    Smokeless tobacco: Never Used    Tobacco comment: Only smokes 1 cigarette per day   Substance Use Topics    Alcohol use: Yes     Alcohol/week: 1.0 oz     Types: 2 Glasses of wine per week     Comment: socially    Drug use: Yes     Types: Cocaine, Marijuana     Comment: 6 months ago       Allergies:   Allergies Allergen Reactions    Ciprofloxacin Hives    Penicillins Nausea Only    Shellfish Derived Hives and Swelling     Swelling of legs         Review of Systems       Review of Systems   Constitutional: Negative for activity change and appetite change. HENT: Negative for congestion. Eyes: Negative for visual disturbance. Respiratory: Positive for cough and shortness of breath. Cardiovascular: Positive for chest pain. Gastrointestinal: Negative for abdominal pain, diarrhea, nausea and vomiting. Genitourinary: Negative for dysuria. Musculoskeletal: Negative for arthralgias and myalgias. Skin: Negative for rash. Neurological: Negative for weakness and numbness. Physical Exam     Visit Vitals  BP (!) 148/123 (BP 1 Location: Right arm, BP Patient Position: At rest;Sitting)   Pulse 88   Temp 98.7 °F (37.1 °C)   Resp 20   SpO2 99%         Physical Exam   Constitutional: She is oriented to person, place, and time. She appears well-developed and well-nourished. HENT:   Head: Normocephalic and atraumatic. Mouth/Throat: Oropharynx is clear and moist.   Eyes: Conjunctivae are normal.   Neck: Normal range of motion. Neck supple. No JVD present. Cardiovascular: Normal rate, regular rhythm, normal heart sounds and intact distal pulses. No murmur heard. Pulmonary/Chest: Effort normal. She has rales. Rales at the bases primarily in the left   Abdominal: Soft. Bowel sounds are normal. She exhibits no distension. There is no tenderness. Musculoskeletal: Normal range of motion. She exhibits no deformity. Lymphadenopathy:     She has no cervical adenopathy. Neurological: She is alert and oriented to person, place, and time. Coordination normal.   Skin: Skin is warm and dry. No rash noted. Psychiatric: She has a normal mood and affect. Nursing note and vitals reviewed.         Diagnostic Study Results     Labs -  Recent Results (from the past 12 hour(s))   CBC WITH AUTOMATED DIFF Collection Time: 04/01/19  5:00 PM   Result Value Ref Range    WBC 5.5 4.6 - 13.2 K/uL    RBC 4.55 4.20 - 5.30 M/uL    HGB 10.9 (L) 12.0 - 16.0 g/dL    HCT 33.6 (L) 35.0 - 45.0 %    MCV 73.8 (L) 74.0 - 97.0 FL    MCH 24.0 24.0 - 34.0 PG    MCHC 32.4 31.0 - 37.0 g/dL    RDW 15.3 (H) 11.6 - 14.5 %    PLATELET 758 339 - 514 K/uL    MPV 10.2 9.2 - 11.8 FL    NEUTROPHILS 49 40 - 73 %    LYMPHOCYTES 42 21 - 52 %    MONOCYTES 7 3 - 10 %    EOSINOPHILS 2 0 - 5 %    BASOPHILS 0 0 - 2 %    ABS. NEUTROPHILS 2.7 1.8 - 8.0 K/UL    ABS. LYMPHOCYTES 2.3 0.9 - 3.6 K/UL    ABS. MONOCYTES 0.4 0.05 - 1.2 K/UL    ABS. EOSINOPHILS 0.1 0.0 - 0.4 K/UL    ABS. BASOPHILS 0.0 0.0 - 0.1 K/UL    DF AUTOMATED     CARDIAC PANEL,(CK, CKMB & TROPONIN)    Collection Time: 04/01/19  7:10 PM   Result Value Ref Range     26 - 192 U/L    CK - MB 1.9 <3.6 ng/ml    CK-MB Index 1.5 0.0 - 4.0 %    Troponin-I, QT <0.02 0.0 - 2.762 NG/ML   METABOLIC PANEL, COMPREHENSIVE    Collection Time: 04/01/19  7:10 PM   Result Value Ref Range    Sodium 140 136 - 145 mmol/L    Potassium 4.6 3.5 - 5.5 mmol/L    Chloride 111 (H) 100 - 108 mmol/L    CO2 25 21 - 32 mmol/L    Anion gap 4 3.0 - 18 mmol/L    Glucose 161 (H) 74 - 99 mg/dL    BUN 11 7.0 - 18 MG/DL    Creatinine 0.77 0.6 - 1.3 MG/DL    BUN/Creatinine ratio 14 12 - 20      GFR est AA >60 >60 ml/min/1.73m2    GFR est non-AA >60 >60 ml/min/1.73m2    Calcium 8.8 8.5 - 10.1 MG/DL    Bilirubin, total 0.5 0.2 - 1.0 MG/DL    ALT (SGPT) 28 13 - 56 U/L    AST (SGOT) 31 15 - 37 U/L    Alk. phosphatase 70 45 - 117 U/L    Protein, total 7.6 6.4 - 8.2 g/dL    Albumin 3.7 3.4 - 5.0 g/dL    Globulin 3.9 2.0 - 4.0 g/dL    A-G Ratio 0.9 0.8 - 1.7     NT-PRO BNP    Collection Time: 04/01/19  7:10 PM   Result Value Ref Range    NT pro-BNP 50 0 - 900 PG/ML       Radiologic Studies -   XR CHEST PA LAT   Final Result   IMPRESSION:   1. Lingular infiltrate. Follow-up to resolution is recommended.             Medical Decision Making   I am the first provider for this patient. I reviewed the vital signs, available nursing notes, past medical history, past surgical history, family history and social history. Vital Signs-Reviewed the patient's vital signs. EKG:  Normal sinus rhythm, rate 95, , QTc 449. No acute ST or T wave changes, no STEMI. Records Reviewed: Nursing Notes (Time of Review: 9:18 PM)      Provider Notes (Medical Decision Making):    Isauro Green is a 79 y.o. female with history of sarcoid who presents with complaint of 3 weeks of persistently worsening cough, associated chest pain, and some increased shortness of breath. She denies fever, but notes that the cough is productive. Chest pain feels like a squeezing around her chest, it is unchanged over these 3 weeks. She denies any recent steroids. Patient does not appear to be in any acute distress at this time. 100% at baseline 2 L nasal cannula. Differential Diagnosis: Primarily suspect viral respiratory illness in the setting of sarcoid, versus pneumonia, will consider ACS, GERD, Pericarditis/myocarditis, pleuritis, pneumothorax, MSK pain. Given the patient's history and exam, I do not suspect PE, aortic dissection, pancreatitis, esophageal rupture, pericardial tamponate, mediastenitis. Testing: CXR, ECG, CBC, CMP, Troponin. Treatments: Pending evaluation    Re-evaluations:  Apparent infiltrate in the lingula. No other signs or symptoms of sepsis, and patient is at baseline oxygen requirement. I do believe she is appropriate for discharge on p.o. antibiotics. Will start on doxycycline. toradol for pain. Case discussed with on-call physician for Jefe Scott, who agrees with this plan of management, and assures that she can follow-up tomorrow. Diagnosis     Clinical Impression:   1.  Community acquired pneumonia of left lower lobe of lung [facility])        Disposition: Discharge    Follow-up Information    None          Patient's Medications   Start Taking    No medications on file   Continue Taking    ALBUTEROL (PROVENTIL HFA, VENTOLIN HFA, PROAIR HFA) 90 MCG/ACTUATION INHALER    Take 1 puff by inhalation daily as needed for Wheezing. ASPIRIN 81 MG CHEWABLE TABLET    Take 1 tablet by mouth daily. ATORVASTATIN (LIPITOR) 40 MG TABLET    Take 1 tablet by mouth nightly. CLOPIDOGREL (PLAVIX) 75 MG TABLET    Take 1 tablet by mouth daily. FLUOXETINE (PROZAC) 20 MG CAPSULE    Take  by mouth daily. FLUTICASONE-SALMETEROL (ADVAIR) 250-50 MCG/DOSE DISKUS INHALER    Take 1 puff by inhalation every twelve (12) hours. INSULIN GLARGINE (LANTUS) 100 UNIT/ML INJECTION    20 Units by SubCUTAneous route nightly. LISINOPRIL-HYDROCHLOROTHIAZIDE (PRINZIDE, ZESTORETIC) 20-12.5 MG PER TABLET    Take 1 Tab by mouth daily. Indications: hypertension    METFORMIN (GLUCOPHAGE) 500 MG TABLET    Take 750 mg by mouth daily (with breakfast). NITROGLYCERIN (NITROSTAT) 0.4 MG SL TABLET    1 Tab by SubLINGual route as needed for Chest Pain. OMEPRAZOLE (PRILOSEC PO)    Take 20 mg by mouth daily as needed. POLYETHYLENE GLYCOL (MIRALAX) 17 GRAM PACKET    Take 1 packet by mouth daily. TIOTROPIUM (SPIRIVA WITH HANDIHALER) 18 MCG INHALATION CAPSULE    Take 1 Cap by inhalation daily. ZOLPIDEM CR (AMBIEN CR) 12.5 MG TABLET    nightly as needed. These Medications have changed    No medications on file   Stop Taking    No medications on file     _______________________________    Attestations:  Jaime Valenzuela MD acting as a scribe for and in the presence of Lynsey Coronado MD      April 01, 2019 at 9:22 PM       Provider Attestation:      I personally performed the services described in the documentation, reviewed the documentation, as recorded by the scribe in my presence, and it accurately and completely records my words and actions.  April 01, 2019 at 9:22 PM - Lynsey Coronado MD    _______________________________

## 2019-04-02 NOTE — DISCHARGE INSTRUCTIONS
Patient Education        Pneumonia: Care Instructions  Your Care Instructions    Pneumonia is an infection of the lungs. Most cases are caused by infections from bacteria or viruses. Pneumonia may be mild or very severe. If it is caused by bacteria, you will be treated with antibiotics. It may take a few weeks to a few months to recover fully from pneumonia, depending on how sick you were and whether your overall health is good. Follow-up care is a key part of your treatment and safety. Be sure to make and go to all appointments, and call your doctor if you are having problems. It's also a good idea to know your test results and keep a list of the medicines you take. How can you care for yourself at home? · Take your antibiotics exactly as directed. Do not stop taking the medicine just because you are feeling better. You need to take the full course of antibiotics. · Take your medicines exactly as prescribed. Call your doctor if you think you are having a problem with your medicine. · Get plenty of rest and sleep. You may feel weak and tired for a while, but your energy level will improve with time. · To prevent dehydration, drink plenty of fluids, enough so that your urine is light yellow or clear like water. Choose water and other caffeine-free clear liquids until you feel better. If you have kidney, heart, or liver disease and have to limit fluids, talk with your doctor before you increase the amount of fluids you drink. · Take care of your cough so you can rest. A cough that brings up mucus from your lungs is common with pneumonia. It is one way your body gets rid of the infection. But if coughing keeps you from resting or causes severe fatigue and chest-wall pain, talk to your doctor. He or she may suggest that you take a medicine to reduce the cough. · Use a vaporizer or humidifier to add moisture to your bedroom. Follow the directions for cleaning the machine.   · Do not smoke or allow others to smoke around you. Smoke will make your cough last longer. If you need help quitting, talk to your doctor about stop-smoking programs and medicines. These can increase your chances of quitting for good. · Take an over-the-counter pain medicine, such as acetaminophen (Tylenol), ibuprofen (Advil, Motrin), or naproxen (Aleve). Read and follow all instructions on the label. · Do not take two or more pain medicines at the same time unless the doctor told you to. Many pain medicines have acetaminophen, which is Tylenol. Too much acetaminophen (Tylenol) can be harmful. · If you were given a spirometer to measure how well your lungs are working, use it as instructed. This can help your doctor tell how your recovery is going. · To prevent pneumonia in the future, talk to your doctor about getting a flu vaccine (once a year) and a pneumococcal vaccine (one time only for most people). When should you call for help? Call 911 anytime you think you may need emergency care. For example, call if:    · You have severe trouble breathing.    Call your doctor now or seek immediate medical care if:    · You cough up dark brown or bloody mucus (sputum).     · You have new or worse trouble breathing.     · You are dizzy or lightheaded, or you feel like you may faint.    Watch closely for changes in your health, and be sure to contact your doctor if:    · You have a new or higher fever.     · You are coughing more deeply or more often.     · You are not getting better after 2 days (48 hours).     · You do not get better as expected. Where can you learn more? Go to http://odessa-mack.info/. Enter [id number] in the search box to learn more about \"Pneumonia: Care Instructions. \"  Current as of: September 5, 2018  Content Version: 11.9  © 8096-8776 [employer]. Care instructions adapted under license by [employer] (which disclaims liability or warranty for this information).  If you have questions about a medical condition or this instruction, always ask your healthcare professional. [place] [address] any warranty or liability for your use of this information.

## 2019-10-10 ENCOUNTER — HOSPITAL ENCOUNTER (OUTPATIENT)
Dept: BONE DENSITY | Age: 69
Discharge: HOME | End: 2019-10-10
Attending: NURSE PRACTITIONER
Payer: MEDICARE

## 2019-10-10 DIAGNOSIS — M81.0 OSTEOPOROSIS: ICD-10-CM

## 2019-10-10 PROCEDURE — 77080 DXA BONE DENSITY AXIAL: CPT

## 2019-10-16 ENCOUNTER — APPOINTMENT (OUTPATIENT)
Dept: CT IMAGING | Age: 69
DRG: 191 | End: 2019-10-16
Attending: EMERGENCY MEDICINE
Payer: MEDICARE

## 2019-10-16 ENCOUNTER — APPOINTMENT (OUTPATIENT)
Dept: GENERAL RADIOLOGY | Age: 69
DRG: 191 | End: 2019-10-16
Attending: EMERGENCY MEDICINE
Payer: MEDICARE

## 2019-10-16 ENCOUNTER — HOSPITAL ENCOUNTER (INPATIENT)
Age: 69
LOS: 2 days | Discharge: HOME | DRG: 191 | End: 2019-10-18
Attending: EMERGENCY MEDICINE | Admitting: INTERNAL MEDICINE
Payer: MEDICARE

## 2019-10-16 DIAGNOSIS — R68.89 FLU-LIKE SYMPTOMS: Primary | ICD-10-CM

## 2019-10-16 DIAGNOSIS — R19.07 GENERALIZED ABDOMINAL OR PELVIC SWELLING OR MASS OR LUMP: ICD-10-CM

## 2019-10-16 PROBLEM — D86.0 PULMONARY SARCOIDOSIS (HCC): Status: ACTIVE | Noted: 2019-10-16

## 2019-10-16 PROBLEM — J96.22 ACUTE ON CHRONIC RESPIRATORY FAILURE WITH HYPOXIA AND HYPERCAPNIA (HCC): Status: ACTIVE | Noted: 2019-10-16

## 2019-10-16 PROBLEM — J96.21 ACUTE ON CHRONIC RESPIRATORY FAILURE WITH HYPOXIA AND HYPERCAPNIA (HCC): Status: ACTIVE | Noted: 2019-10-16

## 2019-10-16 LAB
ANION GAP SERPL CALC-SCNC: 5 MMOL/L (ref 3–18)
BASOPHILS # BLD: 0 K/UL (ref 0–0.1)
BASOPHILS NFR BLD: 0 % (ref 0–2)
BNP SERPL-MCNC: 66 PG/ML (ref 0–900)
BUN SERPL-MCNC: 25 MG/DL (ref 7–18)
BUN/CREAT SERPL: 20 (ref 12–20)
CALCIUM SERPL-MCNC: 8.3 MG/DL (ref 8.5–10.1)
CHLORIDE SERPL-SCNC: 106 MMOL/L (ref 100–111)
CO2 SERPL-SCNC: 24 MMOL/L (ref 21–32)
CREAT SERPL-MCNC: 1.23 MG/DL (ref 0.6–1.3)
DIFFERENTIAL METHOD BLD: ABNORMAL
EOSINOPHIL # BLD: 0.1 K/UL (ref 0–0.4)
EOSINOPHIL NFR BLD: 1 % (ref 0–5)
ERYTHROCYTE [DISTWIDTH] IN BLOOD BY AUTOMATED COUNT: 14.4 % (ref 11.6–14.5)
FLUAV AG NPH QL IA: NEGATIVE
FLUBV AG NOSE QL IA: NEGATIVE
GLUCOSE BLD STRIP.AUTO-MCNC: 324 MG/DL (ref 70–110)
GLUCOSE BLD STRIP.AUTO-MCNC: 369 MG/DL (ref 70–110)
GLUCOSE BLD STRIP.AUTO-MCNC: 391 MG/DL (ref 70–110)
GLUCOSE SERPL-MCNC: 325 MG/DL (ref 74–99)
GLUCOSE SERPL-MCNC: 442 MG/DL (ref 74–99)
HCT VFR BLD AUTO: 35.9 % (ref 35–45)
HGB BLD-MCNC: 11.9 G/DL (ref 12–16)
LACTATE BLD-SCNC: 2.16 MMOL/L (ref 0.4–2)
LACTATE BLD-SCNC: 2.39 MMOL/L (ref 0.4–2)
LYMPHOCYTES # BLD: 2.3 K/UL (ref 0.9–3.6)
LYMPHOCYTES NFR BLD: 28 % (ref 21–52)
MCH RBC QN AUTO: 23.7 PG (ref 24–34)
MCHC RBC AUTO-ENTMCNC: 33.1 G/DL (ref 31–37)
MCV RBC AUTO: 71.5 FL (ref 74–97)
MONOCYTES # BLD: 0.5 K/UL (ref 0.05–1.2)
MONOCYTES NFR BLD: 6 % (ref 3–10)
NEUTS SEG # BLD: 5.3 K/UL (ref 1.8–8)
NEUTS SEG NFR BLD: 65 % (ref 40–73)
PLATELET # BLD AUTO: 98 K/UL (ref 135–420)
PLATELET COMMENTS,PCOM: ABNORMAL
POTASSIUM SERPL-SCNC: 4.2 MMOL/L (ref 3.5–5.5)
RBC # BLD AUTO: 5.02 M/UL (ref 4.2–5.3)
RBC MORPH BLD: ABNORMAL
RBC MORPH BLD: ABNORMAL
SODIUM SERPL-SCNC: 135 MMOL/L (ref 136–145)
TROPONIN I SERPL-MCNC: <0.02 NG/ML (ref 0–0.04)
WBC # BLD AUTO: 8.2 K/UL (ref 4.6–13.2)

## 2019-10-16 PROCEDURE — 94761 N-INVAS EAR/PLS OXIMETRY MLT: CPT

## 2019-10-16 PROCEDURE — 74011250637 HC RX REV CODE- 250/637: Performed by: INTERNAL MEDICINE

## 2019-10-16 PROCEDURE — 84145 PROCALCITONIN (PCT): CPT

## 2019-10-16 PROCEDURE — 71045 X-RAY EXAM CHEST 1 VIEW: CPT

## 2019-10-16 PROCEDURE — 83605 ASSAY OF LACTIC ACID: CPT

## 2019-10-16 PROCEDURE — 74011636637 HC RX REV CODE- 636/637: Performed by: INTERNAL MEDICINE

## 2019-10-16 PROCEDURE — 82962 GLUCOSE BLOOD TEST: CPT

## 2019-10-16 PROCEDURE — 74011250636 HC RX REV CODE- 250/636: Performed by: EMERGENCY MEDICINE

## 2019-10-16 PROCEDURE — 80048 BASIC METABOLIC PNL TOTAL CA: CPT

## 2019-10-16 PROCEDURE — 94640 AIRWAY INHALATION TREATMENT: CPT

## 2019-10-16 PROCEDURE — 74011250636 HC RX REV CODE- 250/636: Performed by: INTERNAL MEDICINE

## 2019-10-16 PROCEDURE — 74011000250 HC RX REV CODE- 250: Performed by: INTERNAL MEDICINE

## 2019-10-16 PROCEDURE — 93005 ELECTROCARDIOGRAM TRACING: CPT

## 2019-10-16 PROCEDURE — 85025 COMPLETE CBC W/AUTO DIFF WBC: CPT

## 2019-10-16 PROCEDURE — 74011636320 HC RX REV CODE- 636/320: Performed by: EMERGENCY MEDICINE

## 2019-10-16 PROCEDURE — 83880 ASSAY OF NATRIURETIC PEPTIDE: CPT

## 2019-10-16 PROCEDURE — 65660000000 HC RM CCU STEPDOWN

## 2019-10-16 PROCEDURE — 71275 CT ANGIOGRAPHY CHEST: CPT

## 2019-10-16 PROCEDURE — 96374 THER/PROPH/DIAG INJ IV PUSH: CPT

## 2019-10-16 PROCEDURE — 82947 ASSAY GLUCOSE BLOOD QUANT: CPT

## 2019-10-16 PROCEDURE — 74011636637 HC RX REV CODE- 636/637: Performed by: EMERGENCY MEDICINE

## 2019-10-16 PROCEDURE — 36415 COLL VENOUS BLD VENIPUNCTURE: CPT

## 2019-10-16 PROCEDURE — 99285 EMERGENCY DEPT VISIT HI MDM: CPT

## 2019-10-16 PROCEDURE — 87804 INFLUENZA ASSAY W/OPTIC: CPT

## 2019-10-16 PROCEDURE — 84484 ASSAY OF TROPONIN QUANT: CPT

## 2019-10-16 RX ORDER — MAGNESIUM SULFATE 100 %
16 CRYSTALS MISCELLANEOUS AS NEEDED
Status: DISCONTINUED | OUTPATIENT
Start: 2019-10-16 | End: 2019-10-18 | Stop reason: HOSPADM

## 2019-10-16 RX ORDER — DEXTROSE MONOHYDRATE 100 MG/ML
125-250 INJECTION, SOLUTION INTRAVENOUS AS NEEDED
Status: DISCONTINUED | OUTPATIENT
Start: 2019-10-16 | End: 2019-10-18 | Stop reason: HOSPADM

## 2019-10-16 RX ORDER — BUDESONIDE AND FORMOTEROL FUMARATE DIHYDRATE 160; 4.5 UG/1; UG/1
2 AEROSOL RESPIRATORY (INHALATION)
Status: DISCONTINUED | OUTPATIENT
Start: 2019-10-16 | End: 2019-10-16

## 2019-10-16 RX ORDER — ARFORMOTEROL TARTRATE 15 UG/2ML
15 SOLUTION RESPIRATORY (INHALATION)
Status: DISCONTINUED | OUTPATIENT
Start: 2019-10-16 | End: 2019-10-18 | Stop reason: HOSPADM

## 2019-10-16 RX ORDER — MORPHINE SULFATE 4 MG/ML
4 INJECTION, SOLUTION INTRAMUSCULAR; INTRAVENOUS
Status: COMPLETED | OUTPATIENT
Start: 2019-10-16 | End: 2019-10-16

## 2019-10-16 RX ORDER — DEXTROSE 50 % IN WATER (D50W) INTRAVENOUS SYRINGE
25-50 AS NEEDED
Status: DISCONTINUED | OUTPATIENT
Start: 2019-10-16 | End: 2019-10-16 | Stop reason: SDUPTHER

## 2019-10-16 RX ORDER — ZOLPIDEM TARTRATE 5 MG/1
10 TABLET ORAL
Status: DISCONTINUED | OUTPATIENT
Start: 2019-10-16 | End: 2019-10-18

## 2019-10-16 RX ORDER — CLOPIDOGREL BISULFATE 75 MG/1
75 TABLET ORAL DAILY
Status: DISCONTINUED | OUTPATIENT
Start: 2019-10-17 | End: 2019-10-18 | Stop reason: HOSPADM

## 2019-10-16 RX ORDER — IPRATROPIUM BROMIDE AND ALBUTEROL SULFATE 2.5; .5 MG/3ML; MG/3ML
3 SOLUTION RESPIRATORY (INHALATION)
Status: DISCONTINUED | OUTPATIENT
Start: 2019-10-16 | End: 2019-10-18

## 2019-10-16 RX ORDER — INSULIN LISPRO 100 [IU]/ML
INJECTION, SOLUTION INTRAVENOUS; SUBCUTANEOUS
Status: DISCONTINUED | OUTPATIENT
Start: 2019-10-16 | End: 2019-10-16

## 2019-10-16 RX ORDER — INSULIN LISPRO 100 [IU]/ML
INJECTION, SOLUTION INTRAVENOUS; SUBCUTANEOUS
Status: DISCONTINUED | OUTPATIENT
Start: 2019-10-16 | End: 2019-10-17

## 2019-10-16 RX ORDER — ATORVASTATIN CALCIUM 40 MG/1
40 TABLET, FILM COATED ORAL
Status: DISCONTINUED | OUTPATIENT
Start: 2019-10-16 | End: 2019-10-18 | Stop reason: HOSPADM

## 2019-10-16 RX ORDER — ACETAMINOPHEN 325 MG/1
650 TABLET ORAL
Status: DISCONTINUED | OUTPATIENT
Start: 2019-10-16 | End: 2019-10-18 | Stop reason: HOSPADM

## 2019-10-16 RX ORDER — BUDESONIDE 1 MG/2ML
1000 INHALANT ORAL
Status: DISCONTINUED | OUTPATIENT
Start: 2019-10-16 | End: 2019-10-18 | Stop reason: HOSPADM

## 2019-10-16 RX ORDER — HEPARIN SODIUM 5000 [USP'U]/ML
5000 INJECTION, SOLUTION INTRAVENOUS; SUBCUTANEOUS EVERY 8 HOURS
Status: DISCONTINUED | OUTPATIENT
Start: 2019-10-16 | End: 2019-10-17

## 2019-10-16 RX ORDER — GUAIFENESIN 100 MG/5ML
81 LIQUID (ML) ORAL DAILY
Status: DISCONTINUED | OUTPATIENT
Start: 2019-10-17 | End: 2019-10-18 | Stop reason: HOSPADM

## 2019-10-16 RX ORDER — PANTOPRAZOLE SODIUM 20 MG/1
20 TABLET, DELAYED RELEASE ORAL
Status: DISCONTINUED | OUTPATIENT
Start: 2019-10-17 | End: 2019-10-17

## 2019-10-16 RX ORDER — NITROGLYCERIN 0.4 MG/1
0.4 TABLET SUBLINGUAL AS NEEDED
Status: DISCONTINUED | OUTPATIENT
Start: 2019-10-16 | End: 2019-10-18 | Stop reason: HOSPADM

## 2019-10-16 RX ORDER — FLUOXETINE 10 MG/1
20 CAPSULE ORAL DAILY
Status: DISCONTINUED | OUTPATIENT
Start: 2019-10-17 | End: 2019-10-18 | Stop reason: HOSPADM

## 2019-10-16 RX ORDER — LISINOPRIL AND HYDROCHLOROTHIAZIDE 12.5; 2 MG/1; MG/1
1 TABLET ORAL DAILY
Status: DISCONTINUED | OUTPATIENT
Start: 2019-10-17 | End: 2019-10-18 | Stop reason: HOSPADM

## 2019-10-16 RX ORDER — INSULIN GLARGINE 100 [IU]/ML
20 INJECTION, SOLUTION SUBCUTANEOUS
Status: DISCONTINUED | OUTPATIENT
Start: 2019-10-16 | End: 2019-10-17

## 2019-10-16 RX ORDER — POLYETHYLENE GLYCOL 3350 17 G/17G
17 POWDER, FOR SOLUTION ORAL DAILY
Status: DISCONTINUED | OUTPATIENT
Start: 2019-10-17 | End: 2019-10-18 | Stop reason: HOSPADM

## 2019-10-16 RX ORDER — PREDNISONE 20 MG/1
60 TABLET ORAL
Status: COMPLETED | OUTPATIENT
Start: 2019-10-16 | End: 2019-10-16

## 2019-10-16 RX ADMIN — ACETAMINOPHEN 650 MG: 325 TABLET ORAL at 22:47

## 2019-10-16 RX ADMIN — INSULIN LISPRO 15 UNITS: 100 INJECTION, SOLUTION INTRAVENOUS; SUBCUTANEOUS at 22:34

## 2019-10-16 RX ADMIN — BUDESONIDE 1000 MCG: 1 SUSPENSION RESPIRATORY (INHALATION) at 19:41

## 2019-10-16 RX ADMIN — ARFORMOTEROL TARTRATE 15 MCG: 15 SOLUTION RESPIRATORY (INHALATION) at 19:41

## 2019-10-16 RX ADMIN — IPRATROPIUM BROMIDE AND ALBUTEROL SULFATE 3 ML: .5; 3 SOLUTION RESPIRATORY (INHALATION) at 19:41

## 2019-10-16 RX ADMIN — METHYLPREDNISOLONE SODIUM SUCCINATE 40 MG: 40 INJECTION, POWDER, FOR SOLUTION INTRAMUSCULAR; INTRAVENOUS at 22:36

## 2019-10-16 RX ADMIN — ATORVASTATIN CALCIUM 40 MG: 40 TABLET, FILM COATED ORAL at 22:35

## 2019-10-16 RX ADMIN — CEFTRIAXONE SODIUM 2 G: 2 INJECTION, POWDER, FOR SOLUTION INTRAMUSCULAR; INTRAVENOUS at 17:24

## 2019-10-16 RX ADMIN — ACETAMINOPHEN 650 MG: 325 TABLET ORAL at 17:24

## 2019-10-16 RX ADMIN — IOPAMIDOL 100 ML: 755 INJECTION, SOLUTION INTRAVENOUS at 12:05

## 2019-10-16 RX ADMIN — SODIUM CHLORIDE, SODIUM LACTATE, POTASSIUM CHLORIDE, AND CALCIUM CHLORIDE 1000 ML: 600; 310; 30; 20 INJECTION, SOLUTION INTRAVENOUS at 06:40

## 2019-10-16 RX ADMIN — ZOLPIDEM TARTRATE 10 MG: 5 TABLET ORAL at 22:35

## 2019-10-16 RX ADMIN — PREDNISONE 60 MG: 20 TABLET ORAL at 13:26

## 2019-10-16 RX ADMIN — HEPARIN SODIUM 5000 UNITS: 5000 INJECTION INTRAVENOUS; SUBCUTANEOUS at 15:31

## 2019-10-16 RX ADMIN — INSULIN GLARGINE 20 UNITS: 100 INJECTION, SOLUTION SUBCUTANEOUS at 22:34

## 2019-10-16 RX ADMIN — IPRATROPIUM BROMIDE AND ALBUTEROL SULFATE 3 ML: .5; 3 SOLUTION RESPIRATORY (INHALATION) at 23:36

## 2019-10-16 RX ADMIN — INSULIN LISPRO 10 UNITS: 100 INJECTION, SOLUTION INTRAVENOUS; SUBCUTANEOUS at 17:24

## 2019-10-16 RX ADMIN — MORPHINE SULFATE 4 MG: 4 INJECTION, SOLUTION INTRAMUSCULAR; INTRAVENOUS at 08:30

## 2019-10-16 NOTE — CONSULTS
[facility].   Pulmonary, Critical Care, and Sleep Medicine    Initial Patient Consult    Name: Luca Cabrera MRN: [id number]   : 1950 Hospital: [facility]   Date: 10/16/2019        IMPRESSION:   · Acute on Chronic Hypoxic Respiratory Failure  · - likely 2/2 viral URI, less likely sarcoidosis exacerbation given stable imaging  · Sarcoidosis  · - only treated with steroids intermittently, Dx 1994 by skin lesion  · - bronch with endobronchial Bx 2018- path negative  · Hx of MI  · - s/p cath without intervention  · AMBROCIO  · Fever, cough, N/V/D  · - most likely viral illness     Patient Active Problem List   Diagnosis Code    Chronic pain syndrome G89.4    DJD (degenerative joint disease) of knee M17.10    Knee pain, left M25.562    Depression F32.9    COPD, moderate ([facility] [address] J44.9    HNP (herniated nucleus pulposus), cervical M50.20    HTN (hypertension) I10    Osteoporosis M81.0    DM (diabetes mellitus) ([facility] [address] E11.9    Sarcoidosis D86.9    Chest pain R07.9    Trichomonas A59.9    Hidradenitis L73.2    Recurrent falls R29.6    GERD (gastroesophageal reflux disease) K21.9    Insomnia G47.00    Cocaine abuse (HCC) F14.10    RVH (right ventricular hypertrophy) I51.7    Cigarette smoker F17.210    Non-ST elevated myocardial infarction (non-STEMI) ([facility] I21.4    Anemia D64.9    CAD in native artery I25.10    Diverticulitis K57.92    Abdominal pain R10.9    Syncope and collapse R55    Hyperglycemia R73.9    Closed head injury S09.90XA    Chronic back pain M54.9, G89.29    Syncope R55    Pulmonary sarcoidosis (HCC) D86.0      RECOMMENDATIONS:   · Myriad of symptoms most consistent with a viral illness, stable CT scan- no suspicion for PNA or serious sarcoid exacerbation  · Would treat empirically with IV steroids and IV ABX- ceftriaxone  · IVFs  · Sputum Cx, procal, RVP  · Start brovana and pulmicort  · Aspiration precautions  · Assess home Oxygen needs at discharge  · OT, PT, OOB and ambulate  · Healthy weight  · Will Follow     Subjective: This patient has been seen and evaluated at the request of Dr. Agnes Richardson for shortness of breath. Patient is a 81 y.o. female with PMHx of sarcoidosis, MI who presents to the ED with a myriad of symptoms including shortness of breath, cough with white sputum, fever, N/V/D and general weakness for several days. She has history of sarcoidosis since 1994 in the [facility] when she developed skin lesions. They were biopsied and she was told she had sarcoid. She has never had any treatment or seen a pulmonologist for this. She states she saw [facility] pulmonary about 1 year ago. A bronch 9/2018 showed some endobronchial abnormalities, and biopsies were taken but all pathology was negative. She did not seen them again. She states her PCP occasionally gives her prednisone when her breathing gets bad and she has been on home O2 for several months. She never had PFTs to her knowledge. In the ED, her VSS, a CTA was done which was negative for PE and showed some chronic fibrotic changes similiar to prior. Labs revealed mild AMBROCIO and thrombocytopenia. Past Medical History:   Diagnosis Date    Arthritis     \"generalized\"    Asthma     CAD in native artery     NSTEMI (Sep 2014); diffuse 65% pLAD, minimal disease RCA & LCx. pLAD FFR 0.93.  LV  no RWMA (echo and LV angio)    Chronic neck pain     Chronic pain     left knee    Chronic pain syndrome     COPD (chronic obstructive pulmonary disease) (HCC)     Depression     Diabetes (HCC)     Diverticulitis     GERD (gastroesophageal reflux disease)     Heart disease     Hidradenitis 9/11/2014    Hypertension     Left knee pain     Narcotic dependence ([address]     Right wrist pain     Sarcoidosis       Past Surgical History:   Procedure Laterality Date    HX BREAST BIOPSY Right     9/10/14: She said tag in place from [address]  2005    HX HYSTERECTOMY  HX KNEE ARTHROSCOPY Left       Prior to Admission medications    Medication Sig Start Date End Date Taking? Authorizing Provider   lisinopril-hydroCHLOROthiazide (PRINZIDE, ZESTORETIC) 20-12.5 mg per tablet Take 1 Tab by mouth daily. Indications: hypertension   Yes Provider, Historical   nitroglycerin (NITROSTAT) 0.4 mg SL tablet 1 Tab by SubLINGual route as needed for Chest Pain. 11/8/17  Yes Alise Webb MD   metFORMIN (GLUCOPHAGE) 500 mg tablet Take 750 mg by mouth daily (with breakfast). Yes Other, MD Reba   zolpidem CR (AMBIEN CR) 12.5 mg tablet nightly as needed. 9/19/16  Yes Provider, Historical   fluticasone-salmeterol (ADVAIR) 250-50 mcg/dose diskus inhaler Take 1 puff by inhalation every twelve (12) hours. Patient taking differently: Take 1 Puff by inhalation daily. 11/25/14  Yes Samir Agrawal MD   albuterol (PROVENTIL HFA, VENTOLIN HFA, PROAIR HFA) 90 mcg/actuation inhaler Take 1 puff by inhalation daily as needed for Wheezing. 11/25/14  Yes Samir Agrawal MD   clopidogrel (PLAVIX) 75 mg tablet Take 1 tablet by mouth daily. 10/15/14  Yes Mg Gold MD   atorvastatin (LIPITOR) 40 mg tablet Take 1 tablet by mouth nightly. 10/15/14  Yes Mg Gold MD   aspirin 81 mg chewable tablet Take 1 tablet by mouth daily. 9/12/14  Yes Niseha Schmitz MD   polyethylene glycol (MIRALAX) 17 gram packet Take 1 packet by mouth daily. 9/13/14  Yes Niesha Schmitz MD   fluoxetine (PROZAC) 20 mg capsule Take  by mouth daily. Yes Provider, Historical   insulin glargine (LANTUS) 100 unit/mL injection 20 Units by SubCUTAneous route nightly. Yes Provider, Historical   OMEPRAZOLE (PRILOSEC PO) Take 20 mg by mouth daily as needed. Yes Provider, Historical   tiotropium (SPIRIVA WITH HANDIHALER) 18 mcg inhalation capsule Take 1 Cap by inhalation daily.      Yes Other, MD Reba     Allergies   Allergen Reactions    Ciprofloxacin Hives    Penicillins Nausea Only    Shellfish Derived Hives and Swelling     Swelling of legs      Social History     Tobacco Use    Smoking status: Former Smoker     Years: 46.00     Types: Cigarettes     Last attempt to quit: 2018     Years since quittin.6    Smokeless tobacco: Never Used    Tobacco comment: Only smokes 1 cigarette per day   Substance Use Topics    Alcohol use: Yes     Alcohol/week: 1.7 standard drinks     Types: 2 Glasses of wine per week     Comment: socially      Family History   Problem Relation Age of Onset    Hypertension Father     Diabetes Mother         Current Facility-Administered Medications   Medication Dose Route Frequency    [START ON 10/17/2019] aspirin chewable tablet 81 mg  81 mg Oral DAILY    atorvastatin (LIPITOR) tablet 40 mg  40 mg Oral QHS    [START ON 10/17/2019] clopidogrel (PLAVIX) tablet 75 mg  75 mg Oral DAILY    [START ON 10/17/2019] FLUoxetine (PROzac) capsule 20 mg  20 mg Oral DAILY    budesonide-formoterol (SYMBICORT) 160-4.5 mcg/actuation HFA inhaler 2 Puff  2 Puff Inhalation BID RT    insulin glargine (LANTUS) injection 20 Units  20 Units SubCUTAneous QHS    [START ON 10/17/2019] lisinopril-hydroCHLOROthiazide (PRINZIDE, ZESTORETIC) 20-12.5 mg per tablet 1 Tab  1 Tab Oral DAILY    [START ON 10/17/2019] pantoprazole (PROTONIX) tablet 20 mg  20 mg Oral ACB    [START ON 10/17/2019] polyethylene glycol (MIRALAX) packet 17 g  17 g Oral DAILY    zolpidem (AMBIEN) tablet 10 mg  10 mg Oral QHS    albuterol-ipratropium (DUO-NEB) 2.5 MG-0.5 MG/3 ML  3 mL Nebulization Q4H RT    heparin (porcine) injection 5,000 Units  5,000 Units SubCUTAneous Q8H    insulin lispro (HUMALOG) injection   SubCUTAneous TIDAC       Review of Systems:  Pertinent items are noted in HPI.   ROS    Objective:   Vital Signs:    Visit Vitals  /49   Pulse 75   Temp 99.5 °F (37.5 °C)   Resp 30   SpO2 100%       O2 Device: Nasal cannula   O2 Flow Rate (L/min): 3 l/min   Temp (24hrs), Av.5 °F (37.5 °C), Min:99.5 °F (37.5 °C), Max:99.5 °F (37.5 °C)       Intake/Output:   Last shift:      10/16 0701 - 10/16 1900  In: 1000 [I.V.:1000]  Out: -   Last 3 shifts: No intake/output data recorded. Intake/Output Summary (Last 24 hours) at 10/16/2019 1616  Last data filed at 10/16/2019 1325  Gross per 24 hour   Intake 1000 ml   Output --   Net 1000 ml      Physical Exam:   General:  Alert, cooperative, no distress, appears stated age. Head:  Normocephalic, without obvious abnormality, atraumatic. Lungs:   Bilateral auscultation mostly clear, sporatic crackles, no wheezes or rales. Chest wall:  No tenderness or deformity. NO CREPITUS   Heart:  Regular rate and rhythm, S1, S2 normal, no murmur, click, rub or gallop. Abdomen:   Soft, non-tender. Bowel sounds normal. No masses,  No organomegaly. No paradox   Extremities: normal, atraumatic, no cyanosis or edema.    Neurologic: Grossly nonfocal          Data review:   Labs:  Recent Results (from the past 24 hour(s))   GLUCOSE, POC    Collection Time: 10/16/19  4:39 AM   Result Value Ref Range    Glucose (POC) 324 (H) 70 - 006 mg/dL   METABOLIC PANEL, BASIC    Collection Time: 10/16/19  4:58 AM   Result Value Ref Range    Sodium 135 (L) 136 - 145 mmol/L    Potassium 4.2 3.5 - 5.5 mmol/L    Chloride 106 100 - 111 mmol/L    CO2 24 21 - 32 mmol/L    Anion gap 5 3.0 - 18 mmol/L    Glucose 325 (H) 74 - 99 mg/dL    BUN 25 (H) 7.0 - 18 MG/DL    Creatinine 1.23 0.6 - 1.3 MG/DL    BUN/Creatinine ratio 20 12 - 20      GFR est AA 53 (L) >60 ml/min/1.73m2    GFR est non-AA 44 (L) >60 ml/min/1.73m2    Calcium 8.3 (L) 8.5 - 10.1 MG/DL   TROPONIN I    Collection Time: 10/16/19  4:58 AM   Result Value Ref Range    Troponin-I, QT <0.02 0.0 - 0.045 NG/ML   NT-PRO BNP    Collection Time: 10/16/19  4:58 AM   Result Value Ref Range    NT pro-BNP 66 0 - 900 PG/ML   INFLUENZA A & B AG (RAPID TEST)    Collection Time: 10/16/19  5:01 AM   Result Value Ref Range    Influenza A Antigen NEGATIVE  NEG Influenza B Antigen NEGATIVE  NEG     CBC WITH AUTOMATED DIFF    Collection Time: 10/16/19  5:42 AM   Result Value Ref Range    WBC 8.2 4.6 - 13.2 K/uL    RBC 5.02 4.20 - 5.30 M/uL    HGB 11.9 (L) 12.0 - 16.0 g/dL    HCT 35.9 35.0 - 45.0 %    MCV 71.5 (L) 74.0 - 97.0 FL    MCH 23.7 (L) 24.0 - 34.0 PG    MCHC 33.1 31.0 - 37.0 g/dL    RDW 14.4 11.6 - 14.5 %    PLATELET 98 (L) 601 - 420 K/uL    NEUTROPHILS 65 40 - 73 %    LYMPHOCYTES 28 21 - 52 %    MONOCYTES 6 3 - 10 %    EOSINOPHILS 1 0 - 5 %    BASOPHILS 0 0 - 2 %    ABS. NEUTROPHILS 5.3 1.8 - 8.0 K/UL    ABS. LYMPHOCYTES 2.3 0.9 - 3.6 K/UL    ABS. MONOCYTES 0.5 0.05 - 1.2 K/UL    ABS. EOSINOPHILS 0.1 0.0 - 0.4 K/UL    ABS. BASOPHILS 0.0 0.0 - 0.1 K/UL    DF SMEAR SCANNED      PLATELET COMMENTS DECREASED PLATELETS      RBC COMMENTS MICROCYTOSIS  2+        RBC COMMENTS HYPOCHROMIA  1+       POC LACTIC ACID    Collection Time: 10/16/19  5:47 AM   Result Value Ref Range    Lactic Acid (POC) 2.39 (HH) 0.40 - 2.00 mmol/L   POC LACTIC ACID    Collection Time: 10/16/19  8:47 AM   Result Value Ref Range    Lactic Acid (POC) 2.16 (HH) 0.40 - 2.00 mmol/L     Imaging:  I have personally reviewed the patients radiographs and have reviewed the reports:  CXR Results  (Last 48 hours)               10/16/19 0504  XR CHEST PORT Final result    Impression:  IMPRESSION: Nonspecific chronic interstitial thickening, no active disease       Narrative:  EXAM: XR CHEST PORT       INDICATION: chest pain, sob, and/or arrhythmia       COMPARISON: 4/1/2019       FINDINGS: A portable AP radiograph of the chest was obtained at 0504 hours. The   bones are unremarkable. The heart is not dilated. The lungs reveal mild   nonspecific interstitial thickening. There is no mass or consolidation, no   pleural effusion or pneumothorax. CT Results  (Last 48 hours)               10/16/19 1204  CTA CHEST W OR W WO CONT Final result    Impression:  IMPRESSION:           1.   No convincing evidence of pulmonary embolism. 2.  No acute airspace disease. Decreased conspicuity of nodular infiltrates   compared to 08/09/18 study. Narrative:  EXAM:  CTA Chest with Contrast (CT Pulmonary Study for PE). CLINICAL INDICATION:  Acute onset progressive chest pain, worsening shortness of   breath, increased oxygen requirement. COMPARISON:  08/09/18. TECHNIQUE:         - Helical volumetric sections of the chest are obtained with CT pulmonary   angiogram protocol. Subsequently, sagittal and coronal multiplanar   reconstruction images are obtained. Maximum intensity projection images are   generated to better delineate the pulmonary vasculature, differentiate between   the pulmonary arteries and veins and to increase sensitivity to pulmonary   emboli.     - IV contrast 100 mL [id number]-370.   - Radiation dose optimization techniques are utilized as appropriate to the   exam, with combination of automated exposure control, adjustment of the mA   and/or kV according to patient's size (Including appropriate matching for   site-specific examinations), or use of iterative reconstruction technique. FINDINGS:        Pulmonary Arteries:         - The pulmonary artery opacification is diagnostic in quality.   - No pulmonary emboli are noted in the pulmonary arterial tree down to the level   of the segmental arteries. Lung, Pleura, Airways:         - No infiltrate or consolidation. Multiple foci of scarring in both lungs with   curvilinear irregular densities at multiple foci. The nodular infiltrate seen   on 08/09/18 appear markedly decreased or inapparent on current study.   - No dominant mass or discrete new developing suspicious nodule. - No pleural effusion. Mediastinum, Shwetha:  Some of the mediastinal nodes appear calcified. Subtle   calcified hilar nodes are also identified bilaterally. Overall similar   appearance compared to 08/09/18. Aorta:  No evidence of aortic dissection or aneurysm. Base of neck:  No acute findings. Axillae:  Unremarkable. Abdomen:  No acute abnormalities. Skeletal Structures:  No acute findings. High complexity decision making was performed during the evaluation of this patient at high risk for decompensation with multiple organ involvement     Above mentioned total time spent on reviewing the case/medical record/data/notes/EMR/patient examination/documentation/coordinating care with nurse/consultants, exclusive of procedures with complex decision making performed and > 50% time spent in face to face evaluation.      Glenny Russo MD

## 2019-10-16 NOTE — ED NOTES
Patient laying in bed alert and oriented x3,  is sitting at bedside. Patient does not appear to be in any acute distress at this time. She is talking in full sentences. She has a 24 g IV in her left lower arm. Was unable to obtain blood for repeat lactic from this site.

## 2019-10-16 NOTE — ED NOTES
Assisted pt to ambulate to restroom, RA sats 98% and pt c/o dizziness. Ambulated back to stretcher. Provider made aware.

## 2019-10-16 NOTE — ED NOTES
Assumed care of pt from EMS. Pt c/o N/V/D x1wk  Pt has hx of decreased lung use. Chronically on 3-4L O2.

## 2019-10-16 NOTE — ED PROVIDER NOTES
EMERGENCY DEPARTMENT HISTORY AND PHYSICAL EXAM      Date: 10/16/2019  Patient Name: Gerald Langley    History of Presenting Illness     Chief Complaint   Patient presents with    Flu Like Symptoms       History (Context): Gerald Langley is a 81 y.o. Victoria Shutters with sarcoidosis, CAD, asthma, chronic pain, diabetes, GERD, hypertension, and a complicated set of active comorbid conditions as noted below in the past medical history, who presents with multiple symptoms including acute onset, progressive, mild to moderate myalgias, chest pain, rhinorrhea, intermittent fever, cough (productive), worsening shortness of breath, increased oxygen requirement. Symptoms started 2 days ago. No exacerbating/relieving features or other associated symptoms    On review of systems, the patient denies fhills, trauma, back pain, abdominal pain,  Diaphoresis. Endorses diarrhea and nausea/vomiting. PCP: Benita Bang NP    Current Facility-Administered Medications   Medication Dose Route Frequency Provider Last Rate Last Dose    lactated ringers bolus infusion 1,000 mL  1,000 mL IntraVENous ONCE Livan Pelaez MD         Current Outpatient Medications   Medication Sig Dispense Refill    lisinopril-hydroCHLOROthiazide (PRINZIDE, ZESTORETIC) 20-12.5 mg per tablet Take 1 Tab by mouth daily. Indications: hypertension      nitroglycerin (NITROSTAT) 0.4 mg SL tablet 1 Tab by SubLINGual route as needed for Chest Pain. 25 Tab 1    metFORMIN (GLUCOPHAGE) 500 mg tablet Take 750 mg by mouth daily (with breakfast).  zolpidem CR (AMBIEN CR) 12.5 mg tablet nightly as needed.  fluticasone-salmeterol (ADVAIR) 250-50 mcg/dose diskus inhaler Take 1 puff by inhalation every twelve (12) hours. (Patient taking differently: Take 1 Puff by inhalation daily. ) 1 Inhaler 0    albuterol (PROVENTIL HFA, VENTOLIN HFA, PROAIR HFA) 90 mcg/actuation inhaler Take 1 puff by inhalation daily as needed for Wheezing.  1 Inhaler 0    clopidogrel (PLAVIX) 75 mg tablet Take 1 tablet by mouth daily. 30 tablet 11    atorvastatin (LIPITOR) 40 mg tablet Take 1 tablet by mouth nightly. 30 tablet 11    aspirin 81 mg chewable tablet Take 1 tablet by mouth daily. 30 tablet 0    polyethylene glycol (MIRALAX) 17 gram packet Take 1 packet by mouth daily. 14 each 0    fluoxetine (PROZAC) 20 mg capsule Take  by mouth daily.  insulin glargine (LANTUS) 100 unit/mL injection 20 Units by SubCUTAneous route nightly.  OMEPRAZOLE (PRILOSEC PO) Take 20 mg by mouth daily as needed.  tiotropium (SPIRIVA WITH HANDIHALER) 18 mcg inhalation capsule Take 1 Cap by inhalation daily. Past History     Past Medical History:  Past Medical History:   Diagnosis Date    Arthritis     \"generalized\"    Asthma     CAD in native artery     NSTEMI (Sep 2014); diffuse 65% pLAD, minimal disease RCA & LCx. pLAD FFR 0.93.  LV  no RWMA (echo and LV angio)    Chronic neck pain     Chronic pain     left knee    Chronic pain syndrome     [facility]     Depression     Diabetes (HCC)     Diverticulitis     GERD (gastroesophageal reflux disease)     Heart disease     Hidradenitis 2014    Hypertension     Left knee pain     Narcotic dependence ([address]     Right wrist pain     Sarcoidosis        Past Surgical History:  Past Surgical History:   Procedure Laterality Date    HX BREAST BIOPSY Right     9/10/14: She said tag in place from [address] [place] [address]      HX HYSTERECTOMY      HX KNEE ARTHROSCOPY Left        Family History:  Family History   Problem Relation Age of Onset    Hypertension Father     Diabetes Mother        Social History:  Social History     Tobacco Use    Smoking status: Former Smoker     Years: 46.00     Types: Cigarettes     Last attempt to quit: 2018     Years since quittin.6    Smokeless tobacco: Never Used    Tobacco comment: Only smokes 1 cigarette per day   Substance Use Topics    Alcohol use: Yes     Alcohol/week: 1.7 standard drinks     Types: 2 Glasses of wine per week     Comment: socially    Drug use: Yes     Types: Cocaine, Marijuana     Comment: 6 months ago       Allergies: Allergies   Allergen Reactions    Ciprofloxacin Hives    Penicillins Nausea Only    Shellfish Derived Hives and Swelling     Swelling of legs       PMH, PSH, family history, social history, allergies reviewed with the patient with significant items noted above. Review of Systems   As per HPI, otherwise reviewed and negative. Physical Exam     Vitals:    10/16/19 0500 10/16/19 0530   BP: (!) 156/113 124/76   Pulse: 86 81   Resp: 21 30   Temp: 99.5 °F (37.5 °C)    SpO2: 100%        Gen: Well-appearing, in no acute distress  HEENT: Normocephalic, sclera anicteric  Cardiovascular: Normal rate, regular rhythm, no murmurs, rubs, gallops. Pulses intact and equal distally. Pulmonary: No respiratory distress. No stridor. Clear lungs. ABD: Soft, nontender, nondistended. Neuro: Alert. Normal speech. Normal mentation. Psych: Normal thought content and thought processes. : No CVA tenderness  EXT: No edema. Moves all extremities well. No cyanosis or clubbing. Skin: Warm and well-perfused.           Diagnostic Study Results     Labs -     Recent Results (from the past 12 hour(s))   GLUCOSE, POC    Collection Time: 10/16/19  4:39 AM   Result Value Ref Range    Glucose (POC) 324 (H) 70 - 622 mg/dL   METABOLIC PANEL, BASIC    Collection Time: 10/16/19  4:58 AM   Result Value Ref Range    Sodium 135 (L) 136 - 145 mmol/L    Potassium 4.2 3.5 - 5.5 mmol/L    Chloride 106 100 - 111 mmol/L    CO2 24 21 - 32 mmol/L    Anion gap 5 3.0 - 18 mmol/L    Glucose 325 (H) 74 - 99 mg/dL    BUN 25 (H) 7.0 - 18 MG/DL    Creatinine 1.23 0.6 - 1.3 MG/DL    BUN/Creatinine ratio 20 12 - 20      GFR est AA 53 (L) >60 ml/min/1.73m2    GFR est non-AA 44 (L) >60 ml/min/1.73m2    Calcium 8.3 (L) 8.5 - 10.1 MG/DL   TROPONIN I    Collection Time: 10/16/19  4:58 AM   Result Value Ref Range    Troponin-I, QT <0.02 0.0 - 0.045 NG/ML   NT-PRO BNP    Collection Time: 10/16/19  4:58 AM   Result Value Ref Range    NT pro-BNP 66 0 - 900 PG/ML   INFLUENZA A & B AG (RAPID TEST)    Collection Time: 10/16/19  5:01 AM   Result Value Ref Range    Influenza A Antigen NEGATIVE  NEG      Influenza B Antigen NEGATIVE  NEG     CBC WITH AUTOMATED DIFF    Collection Time: 10/16/19  5:42 AM   Result Value Ref Range    WBC 8.2 4.6 - 13.2 K/uL    RBC 5.02 4.20 - 5.30 M/uL    HGB 11.9 (L) 12.0 - 16.0 g/dL    HCT 35.9 35.0 - 45.0 %    MCV 71.5 (L) 74.0 - 97.0 FL    MCH 23.7 (L) 24.0 - 34.0 PG    MCHC 33.1 31.0 - 37.0 g/dL    RDW 14.4 11.6 - 14.5 %    PLATELET 98 (L) 137 - 420 K/uL    NEUTROPHILS PENDING %    LYMPHOCYTES PENDING %    MONOCYTES PENDING %    EOSINOPHILS PENDING %    BASOPHILS PENDING %    ABS. NEUTROPHILS PENDING K/UL    ABS. LYMPHOCYTES PENDING K/UL    ABS. MONOCYTES PENDING K/UL    ABS. EOSINOPHILS PENDING K/UL    ABS. BASOPHILS PENDING K/UL    DF PENDING    POC LACTIC ACID    Collection Time: 10/16/19  5:47 AM   Result Value Ref Range    Lactic Acid (POC) 2.39 (HH) 0.40 - 2.00 mmol/L       Radiologic Studies -   XR CHEST PORT    (Results Pending)     CT Results  (Last 48 hours)    None        CXR Results  (Last 48 hours)    None            Medical Decision Making   I am the first provider for this patient. I reviewed the vital signs, available nursing notes, past medical history, past surgical history, family history and social history. Vital Signs-Reviewed the patient's vital signs. EKG: Interpreted by myself. Records Reviewed: Personally, on initial evaluation    MDM:   Patient presents with substernal chest pain.   Exam significant for relatively normal exam.   DDX considered: Influenza, flulike illness, other viral syndrome, pneumonia, ACS,GERD, MSK chest pain, anxiety  DDX thought to be less likely but also considered due to high risk condition: Aortic dissection, PE, Boerhaave's, pericarditis, mediastinitis    Patient condition on initial evaluation: Stable    Plan:   Pain Control  Close Observation  Cardiac monitoring  As per orders noted below:  Orders Placed This Encounter    INFLUENZA A & B AG (RAPID TEST)    XR CHEST PORT    CBC WITH AUTOMATED DIFF    BASIC METABOLIC PANEL    TROPONIN I    PRO-BNP    POC LACTIC ACID ([facility])    GLUCOSE, POC    POC LACTIC ACID    EKG, 12 LEAD, INITIAL    lactated ringers bolus infusion 1,000 mL      ED Course:   Lactate elevated, resuscitating with LR. Other work-up so far benign including influenza and CBC. Chest radiograph appears normal.    0709 -signed out to Dr. Raúl Euceda with disposition and further treatment pending. Diagnosis     Clinical Impression:   1. Flu-like symptoms        Signed,  Karan Bunn MD  Emergency Physician  [facility]    As a voice dictation software was utilized to dictate this note, minor word transpositions can occur. I apologize for confusing wording and typographic errors. Please feel free to contact me for clarification.

## 2019-10-16 NOTE — H&P
History & Physical    Patient: Carlos Nicole MRN: [id number]  CSN: [id number]    YOB: 1950  Age: 81 y.o. Sex: female      DOA: 10/16/2019    Chief Complaint:   Chief Complaint   Patient presents with    Flu Like Symptoms          HPI:     Carlos Nicole is a 81 y.o.  female with a past medical history of diabetes, hypertension, pulmonary sarcoidosis, depression, COPD, smoker, previous history of CAD, diverticulitis was brought to the hospital for worsening shortness of breath at home. Patient stated yesterday she started to notice that she decreased mobility due to worsening shortness of breath. Patient stated that she was able to ambulate from her bedroom to the kitchen which she was not able to do yesterday. Patient states that she is unusually short of breath and she felt lightheaded and was about to pass out. Patient also stated that she has increased sputum production and cough in the last few days. She also mentions she had a fever of 101 at home which was checked once last night. Patient seems to be detailed in her symptoms and concerned. In the emergency room patient was unable to walk more than a few feet due to shortness of breath. Dr. Korin Villarreal consulted pulmonary and patient to be admitted to the hospital for further management and stabilization. Past Medical History:   Diagnosis Date    Arthritis     \"generalized\"    Asthma     CAD in native artery     NSTEMI (Sep 2014); diffuse 65% pLAD, minimal disease RCA & LCx. pLAD FFR 0.93.  LV  no RWMA (echo and LV angio)    Chronic neck pain     Chronic pain     left knee    Chronic pain syndrome     COPD (chronic obstructive pulmonary disease) (HCC)     Depression     Diabetes (HCC)     Diverticulitis     GERD (gastroesophageal reflux disease)     Heart disease     Hidradenitis 9/11/2014    Hypertension     Left knee pain     Narcotic dependence (HCC)     Right wrist pain     Sarcoidosis        Past Surgical History:   Procedure Laterality Date    HX BREAST BIOPSY Right     9/10/14: She said tag in place from [address]      HX HYSTERECTOMY      HX KNEE ARTHROSCOPY Left        Family History   Problem Relation Age of Onset    Hypertension Father     Diabetes Mother        Social History     Socioeconomic History    Marital status:      Spouse name: Not on file    Number of children: Not on file    Years of education: Not on file    Highest education level: Not on file   Tobacco Use    Smoking status: Former Smoker     Years: 46.00     Types: Cigarettes     Last attempt to quit: 2018     Years since quittin.6    Smokeless tobacco: Never Used    Tobacco comment: Only smokes 1 cigarette per day   Substance and Sexual Activity    Alcohol use: Yes     Alcohol/week: 1.7 standard drinks     Types: 2 Glasses of wine per week     Comment: socially    Drug use: Yes     Types: Cocaine, Marijuana     Comment: 6 months ago    Sexual activity: Yes       Prior to Admission medications    Medication Sig Start Date End Date Taking? Authorizing Provider   lisinopril-hydroCHLOROthiazide (PRINZIDE, ZESTORETIC) 20-12.5 mg per tablet Take 1 Tab by mouth daily. Indications: hypertension   Yes Provider, Historical   nitroglycerin (NITROSTAT) 0.4 mg SL tablet 1 Tab by SubLINGual route as needed for Chest Pain. 17  Yes Peggy Holguin MD   metFORMIN (GLUCOPHAGE) 500 mg tablet Take 750 mg by mouth daily (with breakfast). Yes Other, MD Reba   zolpidem CR (AMBIEN CR) 12.5 mg tablet nightly as needed. 16  Yes Provider, Historical   fluticasone-salmeterol (ADVAIR) 250-50 mcg/dose diskus inhaler Take 1 puff by inhalation every twelve (12) hours. Patient taking differently: Take 1 Puff by inhalation daily.  14  Yes Kay Dougherty MD   albuterol (PROVENTIL HFA, VENTOLIN HFA, PROAIR HFA) 90 mcg/actuation inhaler Take 1 puff by inhalation daily as needed for Wheezing. 11/25/14  Yes Yumiko Porter MD   clopidogrel (PLAVIX) 75 mg tablet Take 1 tablet by mouth daily. 10/15/14  Yes Mg Gold MD   atorvastatin (LIPITOR) 40 mg tablet Take 1 tablet by mouth nightly. 10/15/14  Yes Mg Gold MD   aspirin 81 mg chewable tablet Take 1 tablet by mouth daily. 9/12/14  Yes Julia Chacon MD   polyethylene glycol (MIRALAX) 17 gram packet Take 1 packet by mouth daily. 9/13/14  Yes Julia Chacon MD   fluoxetine (PROZAC) 20 mg capsule Take  by mouth daily. Yes Provider, Historical   insulin glargine (LANTUS) 100 unit/mL injection 20 Units by SubCUTAneous route nightly. Yes Provider, Historical   OMEPRAZOLE (PRILOSEC PO) Take 20 mg by mouth daily as needed. Yes Provider, Historical   tiotropium (SPIRIVA WITH HANDIHALER) 18 mcg inhalation capsule Take 1 Cap by inhalation daily. Yes Other, MD Reba       Allergies   Allergen Reactions    Ciprofloxacin Hives    Penicillins Nausea Only    Shellfish Derived Hives and Swelling     Swelling of legs         Review of Systems  GENERAL: Patient alert, awake and oriented times 3, able to communicate full sentences and not in distress. HEENT: No change in vision, no earache, tinnitus, sore throat or sinus congestion. NECK: No pain or stiffness. PULMONARY: Cough shortness of breath and sputum production. Cardiovascular: Persistent chest pain  GASTROINTESTINAL: No abdominal pain, nausea, vomiting or diarrhea, melena or bright red blood per rectum. GENITOURINARY: No urinary frequency, urgency, hesitancy or dysuria. MUSCULOSKELETAL: No joint or muscle pain, no back pain, no recent trauma. DERMATOLOGIC: No rash, no itching, no lesions. ENDOCRINE: No polyuria, polydipsia, no heat or cold intolerance. No recent change in weight. HEMATOLOGICAL: No anemia or easy bruising or bleeding. NEUROLOGIC: No headache, seizures, numbness, tingling or weakness.        Physical Exam: Physical Exam:  Visit Vitals  /78   Pulse 71   Temp 99.5 °F (37.5 °C)   Resp 30   SpO2 100%    O2 Flow Rate (L/min): 3 l/min O2 Device: Nasal cannula    Temp (24hrs), Av.5 °F (37.5 °C), Min:99.5 °F (37.5 °C), Max:99.5 °F (37.5 °C)    10/16 0701 - 10/16 1900  In: 1000 [I.V.:1000]  Out: -    No intake/output data recorded. General:  Alert, cooperative, no distress, appears stated age. Head: Normocephalic, without obvious abnormality, atraumatic. Eyes:  Conjunctivae/corneas clear. PERRL, EOMs intact. Nose: Nares normal. No drainage or sinus tenderness. Neck: Supple, symmetrical, trachea midline, no adenopathy, thyroid: no enlargement, no carotid bruit and no JVD. Lungs:   Clear to auscultation bilaterally. Heart:  Regular rate and rhythm, S1, S2 normal.     Abdomen: Soft, non-tender. Bowel sounds normal.    Extremities: Extremities normal, atraumatic, no cyanosis or edema. Pulses: 2+ and symmetric all extremities. Skin:  No rashes or lesions   Neurologic: AAOx3, No focal motor or sensory deficit.        Labs Reviewed:    BMP:   Lab Results   Component Value Date/Time     (L) 10/16/2019 04:58 AM    K 4.2 10/16/2019 04:58 AM     10/16/2019 04:58 AM    CO2 24 10/16/2019 04:58 AM    AGAP 5 10/16/2019 04:58 AM     (H) 10/16/2019 04:58 AM    BUN 25 (H) 10/16/2019 04:58 AM    CREA 1.23 10/16/2019 04:58 AM    GFRAA 53 (L) 10/16/2019 04:58 AM    GFRNA 44 (L) 10/16/2019 04:58 AM     CMP:   Lab Results   Component Value Date/Time     (L) 10/16/2019 04:58 AM    K 4.2 10/16/2019 04:58 AM     10/16/2019 04:58 AM    CO2 24 10/16/2019 04:58 AM    AGAP 5 10/16/2019 04:58 AM     (H) 10/16/2019 04:58 AM    BUN 25 (H) 10/16/2019 04:58 AM    CREA 1.23 10/16/2019 04:58 AM    GFRAA 53 (L) 10/16/2019 04:58 AM    GFRNA 44 (L) 10/16/2019 04:58 AM    CA 8.3 (L) 10/16/2019 04:58 AM         Procedures/imaging: see electronic medical records for all procedures/Xrays and details which were not copied into this note but were reviewed prior to creation of Plan      Assessment/Plan     Active Problems:  Anxious shortness of breath likely due to COPD and sarcoidosis    Pulmonary sarcoidosis   COPD without acute exacerbation  Chronic respiratory failure due to COPD and sarcoidosis on 3 L/min oxygen at home 24-hour 7  Diabetes  Hypertension  History of CAD status post stents    Admit patient to the hospital for work-up and management of her worsening symptoms  Patient received 60 mg of prednisone, will await further recommendation from pulmonary before continuing any high-dose steroids  Hold any antibiotics for now as there is no evidence of pneumonia  Patient states her chest pain at the moment but stated that she always feels chest pressure and today her symptoms as usual  Continue Lantus 20 units/day, hold metformin and start sliding scale insulin with glucose monitoring 4 times a day  Continue aspirin, Lipitor 40 mg, Plavix 75 mg  Start DuoNeb 3 mL every 4 hours, change Advair to Symbicort, continue oxygen supplementation  Continue lisinopril and hydrochlorothiazide combination  Continue MiraLAX  Continue Ambien 10 mg every night      DVT/GI Prophylaxis: Hep SQ   Diet: Diabetic  CODE STATUS: Full code    Discussed with patient and  at bedside about hospital admission and my plan care, both understood and agree with my plan care.     Sarai Lyon MD  10/16/2019 2:16 PM

## 2019-10-16 NOTE — ED NOTES
9m: 53-year-old female with a past medical history of coronary disease, prior MI, sarcoid, pulmonary hypertension, COPD, diabetes who presents with multiple complaints. Patient states that she has been feeling poorly for about a week. She notes she is having a chest heaviness radiating to her back. She states it has been intermittent coming and going over the past week. That when it is there it does last for 24 hours consistently without resolution and then resolves and returns. No associated nausea or vomiting. No diaphoresis. She states this is also associated with some lightheadedness. She feels that she has had some chills associated with runny nose and increased cough with increased sputum production. Also associated with loose stools for approximately 1 week. Patient states she has overall myalgia and just does not feel well. She is on her baseline 3 to 4 L home oxygen. General; AOX3. Pulmonary; CTA-B. Cardiac: RRR no MRG; Abd S/NT/ND. Plan:      Lactic 2.39  White counts 8 with differential pending  Chemistry  unremarkable with a negative troponin   flu negative    Chest x-ray read as:IMPRESSION: Nonspecific chronic interstitial thickening, no active disease      Patient with what sounds like a viral syndrome she has had increased sputum production and cough. X-ray is negative but difficult to interpret any subtle findings of pneumonia. Last time she had a bronchoscopy she was told that she did have pneumonia. 9:28 AM patient ambulated made a few steps felt very lightheaded we will obtain a CTA    CTA neg  No pe no pna  extertional HAN; patient only able to make it a few steps at a time. Discussed with pulmonary for consult discussed with the hospitalist for admission  Pulm Dr Elizabeth Lang. Hosp: Dr Max Rodriguez.

## 2019-10-16 NOTE — ED NOTES
TRANSFER - OUT REPORT:    Verbal report given to PETER Contreras(name) on Thai Padilla  being transferred to [facility]unit) for routine progression of care       Report consisted of patients Situation, Background, Assessment and   Recommendations(SBAR). Information from the following report(s) ED Summary was reviewed with the receiving nurse. Lines:   Peripheral IV 10/16/19 Left Forearm (Active)   Site Assessment Clean, dry, & intact 10/16/2019  7:13 AM   Phlebitis Assessment 0 10/16/2019  7:13 AM   Infiltration Assessment 0 10/16/2019  7:13 AM   Dressing Status Clean, dry, & intact 10/16/2019  7:13 AM   Dressing Type 4 X 4 10/16/2019  7:13 AM   Hub Color/Line Status Pink 10/16/2019  7:13 AM       Peripheral IV 10/16/19 Right Antecubital (Active)   Site Assessment Clean, dry, & intact 10/16/2019 11:10 AM   Dressing Status Clean, dry, & intact 10/16/2019 11:10 AM        Opportunity for questions and clarification was provided.       Patient transported with:

## 2019-10-17 LAB
ADMINISTERED INITIALS, ADMINIT: 357
ADMINISTERED INITIALS, ADMINIT: NORMAL
ANION GAP SERPL CALC-SCNC: 12 MMOL/L (ref 3–18)
ANION GAP SERPL CALC-SCNC: 14 MMOL/L (ref 3–18)
ANION GAP SERPL CALC-SCNC: 9 MMOL/L (ref 3–18)
ATRIAL RATE: 87 BPM
BUN SERPL-MCNC: 19 MG/DL (ref 7–18)
BUN SERPL-MCNC: 24 MG/DL (ref 7–18)
BUN SERPL-MCNC: 28 MG/DL (ref 7–18)
BUN/CREAT SERPL: 17 (ref 12–20)
BUN/CREAT SERPL: 18 (ref 12–20)
BUN/CREAT SERPL: 22 (ref 12–20)
CALCIUM SERPL-MCNC: 7.9 MG/DL (ref 8.5–10.1)
CALCIUM SERPL-MCNC: 8.1 MG/DL (ref 8.5–10.1)
CALCIUM SERPL-MCNC: 8.4 MG/DL (ref 8.5–10.1)
CALCULATED P AXIS, ECG09: 41 DEGREES
CALCULATED R AXIS, ECG10: -27 DEGREES
CALCULATED T AXIS, ECG11: 17 DEGREES
CHLORIDE SERPL-SCNC: 105 MMOL/L (ref 100–111)
CHLORIDE SERPL-SCNC: 106 MMOL/L (ref 100–111)
CHLORIDE SERPL-SCNC: 110 MMOL/L (ref 100–111)
CO2 SERPL-SCNC: 18 MMOL/L (ref 21–32)
CO2 SERPL-SCNC: 18 MMOL/L (ref 21–32)
CO2 SERPL-SCNC: 19 MMOL/L (ref 21–32)
CREAT SERPL-MCNC: 1.15 MG/DL (ref 0.6–1.3)
CREAT SERPL-MCNC: 1.3 MG/DL (ref 0.6–1.3)
CREAT SERPL-MCNC: 1.31 MG/DL (ref 0.6–1.3)
D50 ADMINISTERED, D50ADM: 0 ML
D50 ADMINISTERED, D50ADM: NORMAL ML
D50 ORDER, D50ORD: 0 ML
D50 ORDER, D50ORD: NORMAL ML
DIAGNOSIS, 93000: NORMAL
ERYTHROCYTE [DISTWIDTH] IN BLOOD BY AUTOMATED COUNT: 14.5 % (ref 11.6–14.5)
EST. AVERAGE GLUCOSE BLD GHB EST-MCNC: 292 MG/DL
GLUCOSE BLD STRIP.AUTO-MCNC: 180 MG/DL (ref 70–110)
GLUCOSE BLD STRIP.AUTO-MCNC: 192 MG/DL (ref 70–110)
GLUCOSE BLD STRIP.AUTO-MCNC: 222 MG/DL (ref 70–110)
GLUCOSE BLD STRIP.AUTO-MCNC: 223 MG/DL (ref 70–110)
GLUCOSE BLD STRIP.AUTO-MCNC: 233 MG/DL (ref 70–110)
GLUCOSE BLD STRIP.AUTO-MCNC: 242 MG/DL (ref 70–110)
GLUCOSE BLD STRIP.AUTO-MCNC: 265 MG/DL (ref 70–110)
GLUCOSE BLD STRIP.AUTO-MCNC: 266 MG/DL (ref 70–110)
GLUCOSE BLD STRIP.AUTO-MCNC: 350 MG/DL (ref 70–110)
GLUCOSE BLD STRIP.AUTO-MCNC: 357 MG/DL (ref 70–110)
GLUCOSE BLD STRIP.AUTO-MCNC: 594 MG/DL (ref 70–110)
GLUCOSE BLD STRIP.AUTO-MCNC: >600 MG/DL (ref 70–110)
GLUCOSE SERPL-MCNC: 224 MG/DL (ref 74–99)
GLUCOSE SERPL-MCNC: 301 MG/DL (ref 74–99)
GLUCOSE SERPL-MCNC: 447 MG/DL (ref 74–99)
GLUCOSE, GLC: 180 MG/DL
GLUCOSE, GLC: 192 MG/DL
GLUCOSE, GLC: 222 MG/DL
GLUCOSE, GLC: 223 MG/DL
GLUCOSE, GLC: 233 MG/DL
GLUCOSE, GLC: 242 MG/DL
GLUCOSE, GLC: 265 MG/DL
GLUCOSE, GLC: 266 MG/DL
GLUCOSE, GLC: 350 MG/DL
GLUCOSE, GLC: 357 MG/DL
GLUCOSE, GLC: 426 MG/DL
GLUCOSE, GLC: 594 MG/DL
GLUCOSE, GLC: NORMAL MG/DL
HBA1C MFR BLD: 11.8 % (ref 4.2–5.6)
HCT VFR BLD AUTO: 31.6 % (ref 35–45)
HGB BLD-MCNC: 10.3 G/DL (ref 12–16)
HIGH TARGET, HITG: 180 MG/DL
HIGH TARGET, HITG: NORMAL MG/DL
INSULIN ADMINSTERED, INSADM: 10.3 UNITS/HOUR
INSULIN ADMINSTERED, INSADM: 10.7 UNITS/HOUR
INSULIN ADMINSTERED, INSADM: 11.9 UNITS/HOUR
INSULIN ADMINSTERED, INSADM: 13 UNITS/HOUR
INSULIN ADMINSTERED, INSADM: 15.6 UNITS/HOUR
INSULIN ADMINSTERED, INSADM: 20 UNITS/HOUR
INSULIN ADMINSTERED, INSADM: 20.5 UNITS/HOUR
INSULIN ADMINSTERED, INSADM: 7.2 UNITS/HOUR
INSULIN ADMINSTERED, INSADM: 7.3 UNITS/HOUR
INSULIN ADMINSTERED, INSADM: 8.7 UNITS/HOUR
INSULIN ADMINSTERED, INSADM: 9.2 UNITS/HOUR
INSULIN ADMINSTERED, INSADM: 9.8 UNITS/HOUR
INSULIN ADMINSTERED, INSADM: NORMAL UNITS/HOUR
INSULIN ORDER, INSORD: 10.3 UNITS/HOUR
INSULIN ORDER, INSORD: 10.7 UNITS/HOUR
INSULIN ORDER, INSORD: 11.9 UNITS/HOUR
INSULIN ORDER, INSORD: 13 UNITS/HOUR
INSULIN ORDER, INSORD: 15.6 UNITS/HOUR
INSULIN ORDER, INSORD: 20 UNITS/HOUR
INSULIN ORDER, INSORD: 20.5 UNITS/HOUR
INSULIN ORDER, INSORD: 7.2 UNITS/HOUR
INSULIN ORDER, INSORD: 7.3 UNITS/HOUR
INSULIN ORDER, INSORD: 8.7 UNITS/HOUR
INSULIN ORDER, INSORD: 9.2 UNITS/HOUR
INSULIN ORDER, INSORD: 9.8 UNITS/HOUR
INSULIN ORDER, INSORD: NORMAL UNITS/HOUR
LOW TARGET, LOT: 140 MG/DL
LOW TARGET, LOT: NORMAL MG/DL
MAGNESIUM SERPL-MCNC: 1.7 MG/DL (ref 1.6–2.6)
MCH RBC QN AUTO: 23 PG (ref 24–34)
MCHC RBC AUTO-ENTMCNC: 32.6 G/DL (ref 31–37)
MCV RBC AUTO: 70.7 FL (ref 74–97)
MINUTES UNTIL NEXT BG, NBG: 60 MIN
MINUTES UNTIL NEXT BG, NBG: NORMAL MIN
MULTIPLIER, MUL: 0.02
MULTIPLIER, MUL: 0.02
MULTIPLIER, MUL: 0.03
MULTIPLIER, MUL: 0.04
MULTIPLIER, MUL: 0.05
MULTIPLIER, MUL: 0.06
MULTIPLIER, MUL: 0.06
MULTIPLIER, MUL: 0.07
MULTIPLIER, MUL: 0.08
MULTIPLIER, MUL: 0.09
MULTIPLIER, MUL: 0.1
MULTIPLIER, MUL: 0.11
MULTIPLIER, MUL: NORMAL
ORDER INITIALS, ORDINIT: NORMAL
P-R INTERVAL, ECG05: 112 MS
PHOSPHATE SERPL-MCNC: 2.8 MG/DL (ref 2.5–4.9)
PLATELET # BLD AUTO: 130 K/UL (ref 135–420)
POTASSIUM SERPL-SCNC: 3.5 MMOL/L (ref 3.5–5.5)
POTASSIUM SERPL-SCNC: 3.7 MMOL/L (ref 3.5–5.5)
POTASSIUM SERPL-SCNC: 4 MMOL/L (ref 3.5–5.5)
PROCALCITONIN SERPL-MCNC: <0.1 NG/ML
Q-T INTERVAL, ECG07: 378 MS
QRS DURATION, ECG06: 80 MS
QTC CALCULATION (BEZET), ECG08: 454 MS
RBC # BLD AUTO: 4.47 M/UL (ref 4.2–5.3)
SODIUM SERPL-SCNC: 136 MMOL/L (ref 136–145)
SODIUM SERPL-SCNC: 137 MMOL/L (ref 136–145)
SODIUM SERPL-SCNC: 138 MMOL/L (ref 136–145)
VENTRICULAR RATE, ECG03: 87 BPM
WBC # BLD AUTO: 8.4 K/UL (ref 4.6–13.2)

## 2019-10-17 PROCEDURE — 85027 COMPLETE CBC AUTOMATED: CPT

## 2019-10-17 PROCEDURE — 74011250637 HC RX REV CODE- 250/637: Performed by: EMERGENCY MEDICINE

## 2019-10-17 PROCEDURE — 65660000004 HC RM CVT STEPDOWN

## 2019-10-17 PROCEDURE — 80048 BASIC METABOLIC PNL TOTAL CA: CPT

## 2019-10-17 PROCEDURE — 74011636637 HC RX REV CODE- 636/637: Performed by: INTERNAL MEDICINE

## 2019-10-17 PROCEDURE — 82962 GLUCOSE BLOOD TEST: CPT

## 2019-10-17 PROCEDURE — 74011636637 HC RX REV CODE- 636/637: Performed by: EMERGENCY MEDICINE

## 2019-10-17 PROCEDURE — 83036 HEMOGLOBIN GLYCOSYLATED A1C: CPT

## 2019-10-17 PROCEDURE — 74011000258 HC RX REV CODE- 258: Performed by: EMERGENCY MEDICINE

## 2019-10-17 PROCEDURE — 77030027138 HC INCENT SPIROMETER -A

## 2019-10-17 PROCEDURE — 84100 ASSAY OF PHOSPHORUS: CPT

## 2019-10-17 PROCEDURE — 74011250636 HC RX REV CODE- 250/636: Performed by: INTERNAL MEDICINE

## 2019-10-17 PROCEDURE — 94761 N-INVAS EAR/PLS OXIMETRY MLT: CPT

## 2019-10-17 PROCEDURE — 74011000250 HC RX REV CODE- 250: Performed by: INTERNAL MEDICINE

## 2019-10-17 PROCEDURE — 83735 ASSAY OF MAGNESIUM: CPT

## 2019-10-17 PROCEDURE — 94640 AIRWAY INHALATION TREATMENT: CPT

## 2019-10-17 PROCEDURE — 36415 COLL VENOUS BLD VENIPUNCTURE: CPT

## 2019-10-17 PROCEDURE — 74011250636 HC RX REV CODE- 250/636: Performed by: EMERGENCY MEDICINE

## 2019-10-17 PROCEDURE — 74011250637 HC RX REV CODE- 250/637: Performed by: INTERNAL MEDICINE

## 2019-10-17 RX ORDER — MAGNESIUM SULFATE 100 %
4 CRYSTALS MISCELLANEOUS AS NEEDED
Status: DISCONTINUED | OUTPATIENT
Start: 2019-10-17 | End: 2019-10-18

## 2019-10-17 RX ORDER — PANTOPRAZOLE SODIUM 40 MG/1
40 TABLET, DELAYED RELEASE ORAL
Status: DISCONTINUED | OUTPATIENT
Start: 2019-10-18 | End: 2019-10-18

## 2019-10-17 RX ORDER — DEXTROSE MONOHYDRATE 100 MG/ML
125-250 INJECTION, SOLUTION INTRAVENOUS AS NEEDED
Status: DISCONTINUED | OUTPATIENT
Start: 2019-10-17 | End: 2019-10-18

## 2019-10-17 RX ORDER — INSULIN GLARGINE 100 [IU]/ML
15 INJECTION, SOLUTION SUBCUTANEOUS ONCE
Status: DISCONTINUED | OUTPATIENT
Start: 2019-10-17 | End: 2019-10-17

## 2019-10-17 RX ORDER — DOCUSATE SODIUM 100 MG/1
100 CAPSULE, LIQUID FILLED ORAL 2 TIMES DAILY
Status: DISCONTINUED | OUTPATIENT
Start: 2019-10-17 | End: 2019-10-18 | Stop reason: HOSPADM

## 2019-10-17 RX ORDER — SODIUM CHLORIDE 9 MG/ML
75 INJECTION, SOLUTION INTRAVENOUS CONTINUOUS
Status: DISCONTINUED | OUTPATIENT
Start: 2019-10-17 | End: 2019-10-18

## 2019-10-17 RX ORDER — INSULIN GLARGINE 100 [IU]/ML
35 INJECTION, SOLUTION SUBCUTANEOUS
Status: DISCONTINUED | OUTPATIENT
Start: 2019-10-17 | End: 2019-10-17

## 2019-10-17 RX ORDER — OXYCODONE AND ACETAMINOPHEN 5; 325 MG/1; MG/1
1 TABLET ORAL
Status: DISCONTINUED | OUTPATIENT
Start: 2019-10-17 | End: 2019-10-18

## 2019-10-17 RX ADMIN — ARFORMOTEROL TARTRATE 15 MCG: 15 SOLUTION RESPIRATORY (INHALATION) at 07:20

## 2019-10-17 RX ADMIN — ASPIRIN 81 MG 81 MG: 81 TABLET ORAL at 08:29

## 2019-10-17 RX ADMIN — OXYCODONE HYDROCHLORIDE AND ACETAMINOPHEN 1 TABLET: 5; 325 TABLET ORAL at 11:42

## 2019-10-17 RX ADMIN — SODIUM CHLORIDE 10.7 UNITS/HR: 900 INJECTION, SOLUTION INTRAVENOUS at 11:44

## 2019-10-17 RX ADMIN — IPRATROPIUM BROMIDE AND ALBUTEROL SULFATE 3 ML: .5; 3 SOLUTION RESPIRATORY (INHALATION) at 19:40

## 2019-10-17 RX ADMIN — BUDESONIDE 1000 MCG: 1 SUSPENSION RESPIRATORY (INHALATION) at 07:18

## 2019-10-17 RX ADMIN — CEFTRIAXONE SODIUM 2 G: 2 INJECTION, POWDER, FOR SOLUTION INTRAMUSCULAR; INTRAVENOUS at 17:04

## 2019-10-17 RX ADMIN — INSULIN LISPRO 15 UNITS: 100 INJECTION, SOLUTION INTRAVENOUS; SUBCUTANEOUS at 08:51

## 2019-10-17 RX ADMIN — ACETAMINOPHEN 650 MG: 325 TABLET ORAL at 08:29

## 2019-10-17 RX ADMIN — FLUOXETINE 20 MG: 20 CAPSULE ORAL at 08:29

## 2019-10-17 RX ADMIN — ATORVASTATIN CALCIUM 40 MG: 40 TABLET, FILM COATED ORAL at 21:38

## 2019-10-17 RX ADMIN — IPRATROPIUM BROMIDE AND ALBUTEROL SULFATE 3 ML: .5; 3 SOLUTION RESPIRATORY (INHALATION) at 23:35

## 2019-10-17 RX ADMIN — SODIUM CHLORIDE 75 ML/HR: 900 INJECTION, SOLUTION INTRAVENOUS at 12:59

## 2019-10-17 RX ADMIN — SODIUM CHLORIDE 11.9 UNITS/HR: 900 INJECTION, SOLUTION INTRAVENOUS at 15:20

## 2019-10-17 RX ADMIN — CLOPIDOGREL BISULFATE 75 MG: 75 TABLET ORAL at 08:29

## 2019-10-17 RX ADMIN — IPRATROPIUM BROMIDE AND ALBUTEROL SULFATE 3 ML: .5; 3 SOLUTION RESPIRATORY (INHALATION) at 13:13

## 2019-10-17 RX ADMIN — PANTOPRAZOLE SODIUM 20 MG: 20 TABLET, DELAYED RELEASE ORAL at 08:29

## 2019-10-17 RX ADMIN — SODIUM CHLORIDE 9.2 UNITS/HR: 900 INJECTION, SOLUTION INTRAVENOUS at 19:37

## 2019-10-17 RX ADMIN — POLYETHYLENE GLYCOL 3350 17 G: 17 POWDER, FOR SOLUTION ORAL at 08:32

## 2019-10-17 RX ADMIN — SODIUM CHLORIDE 7.2 UNITS/HR: 900 INJECTION, SOLUTION INTRAVENOUS at 18:31

## 2019-10-17 RX ADMIN — DOCUSATE SODIUM 100 MG: 100 CAPSULE, LIQUID FILLED ORAL at 13:54

## 2019-10-17 RX ADMIN — METHYLPREDNISOLONE SODIUM SUCCINATE 40 MG: 40 INJECTION, POWDER, FOR SOLUTION INTRAMUSCULAR; INTRAVENOUS at 08:51

## 2019-10-17 RX ADMIN — BUDESONIDE 1000 MCG: 1 SUSPENSION RESPIRATORY (INHALATION) at 19:40

## 2019-10-17 RX ADMIN — LISINOPRIL AND HYDROCHLOROTHIAZIDE 1 TABLET: 12.5; 2 TABLET ORAL at 08:29

## 2019-10-17 RX ADMIN — OXYCODONE HYDROCHLORIDE AND ACETAMINOPHEN 1 TABLET: 5; 325 TABLET ORAL at 17:20

## 2019-10-17 RX ADMIN — DOCUSATE SODIUM 100 MG: 100 CAPSULE, LIQUID FILLED ORAL at 17:20

## 2019-10-17 RX ADMIN — IPRATROPIUM BROMIDE AND ALBUTEROL SULFATE 3 ML: .5; 3 SOLUTION RESPIRATORY (INHALATION) at 04:53

## 2019-10-17 RX ADMIN — ZOLPIDEM TARTRATE 10 MG: 5 TABLET ORAL at 21:38

## 2019-10-17 RX ADMIN — ARFORMOTEROL TARTRATE 15 MCG: 15 SOLUTION RESPIRATORY (INHALATION) at 19:40

## 2019-10-17 RX ADMIN — SODIUM CHLORIDE 13 UNITS/HR: 900 INJECTION, SOLUTION INTRAVENOUS at 20:40

## 2019-10-17 RX ADMIN — IPRATROPIUM BROMIDE AND ALBUTEROL SULFATE 3 ML: .5; 3 SOLUTION RESPIRATORY (INHALATION) at 07:20

## 2019-10-17 NOTE — PROGRESS NOTES
[facility] Hospitalist Group  Progress Note    Patient: Yousuf Martinez Age: 81 y.o. : 1950 MR#: [id number] SSN: [id number]  Date/Time: 10/17/2019    Subjective:     Patient sitting in bed in NAD, awake, alert.  at bedside. PETER Vazquez present in room    Assessment/Plan:     - COPD with acute exacerbation  - URI  - chronic hypoxic resp failure, on 3 liters via NC continuously. I doubt acute Resp failure  - Sarcoidosis without acute exacerbation  - Uncontrolled DM2 with hyperglycemia  - HTN  - CAD  - Patient states she has a large Haital Hernia, that causes her discomfort and is scheduled to see a GI as OP. Add PPI, advised patient to always have HOB > 30 degrees. - thrombocytopenia, monitor    PLAN  O2, steroids, Bronchodilators  Pulm following, on abx  Insulin drip for Hyperglycemia, > 600  Iv fluids  On asa, statin  Monitor BP  D/w patient  Full code    Case discussed with:  [x]Patient  []Family  [x]Nursing  []Case Management  DVT Prophylaxis:  []Lovenox  [x]Hep SQ  []SCDs  []Coumadin   []On Heparin gtt    Objective:   VS:   Visit Vitals  /72 (BP 1 Location: Right arm, BP Patient Position: At rest)   Pulse (!) 105   Temp 98.5 °F (36.9 °C)   Resp 20   Ht 5' 2\" (1.575 m)   Wt 65.7 kg (144 lb 14.4 oz)   SpO2 99%   Breastfeeding?  No   BMI 26.50 kg/m²      Tmax/24hrs: Temp (24hrs), Av.9 °F (37.2 °C), Min:97.3 °F (36.3 °C), Max:100.4 °F (38 °C)    Input/Output:     Intake/Output Summary (Last 24 hours) at 10/17/2019 1116  Last data filed at 10/16/2019 2244  Gross per 24 hour   Intake 1240 ml   Output --   Net 1240 ml       General:  Awake, alert, follows commands  Cardiovascular:  S1S2+, RRR  Pulmonary:  Coarse bs b/l, some wheezing  GI:  Soft, BS+, NT, ND  Extremities:  No edema      Labs:    Recent Results (from the past 24 hour(s))   GLUCOSE, POC    Collection Time: 10/16/19  4:57 PM   Result Value Ref Range    Glucose (POC) 369 (H) 70 - 110 mg/dL   GLUCOSE, RANDOM    Collection Time: 10/16/19  6:08 PM   Result Value Ref Range    Glucose 442 (HH) 74 - 99 mg/dL   GLUCOSE, POC    Collection Time: 10/16/19  7:40 PM   Result Value Ref Range    Glucose (POC) 391 (H) 70 - 555 mg/dL   METABOLIC PANEL, BASIC    Collection Time: 10/17/19  5:49 AM   Result Value Ref Range    Sodium 136 136 - 145 mmol/L    Potassium 4.0 3.5 - 5.5 mmol/L    Chloride 106 100 - 111 mmol/L    CO2 18 (L) 21 - 32 mmol/L    Anion gap 12 3.0 - 18 mmol/L    Glucose 447 (HH) 74 - 99 mg/dL    BUN 19 (H) 7.0 - 18 MG/DL    Creatinine 1.15 0.6 - 1.3 MG/DL    BUN/Creatinine ratio 17 12 - 20      GFR est AA 57 (L) >60 ml/min/1.73m2    GFR est non-AA 47 (L) >60 ml/min/1.73m2    Calcium 7.9 (L) 8.5 - 10.1 MG/DL   CBC W/O DIFF    Collection Time: 10/17/19  5:49 AM   Result Value Ref Range    WBC 8.4 4.6 - 13.2 K/uL    RBC 4.47 4.20 - 5.30 M/uL    HGB 10.3 (L) 12.0 - 16.0 g/dL    HCT 31.6 (L) 35.0 - 45.0 %    MCV 70.7 (L) 74.0 - 97.0 FL    MCH 23.0 (L) 24.0 - 34.0 PG    MCHC 32.6 31.0 - 37.0 g/dL    RDW 14.5 11.6 - 14.5 %    PLATELET 701 (L) 276 - 420 K/uL   GLUCOSE, POC    Collection Time: 10/17/19  8:22 AM   Result Value Ref Range    Glucose (POC) >600 (HH) 70 - 110 mg/dL     Additional Data Reviewed:      Signed By: Mariano Favre, MD     October 17, 2019

## 2019-10-17 NOTE — PROGRESS NOTES
Problem: Gas Exchange - Impaired  Goal: *Absence of hypoxia  Outcome: Progressing Towards Goal     Problem: Falls - Risk of  Goal: *Absence of Falls  Description  Document Rogelio Best Fall Risk and appropriate interventions in the flowsheet.   Outcome: Progressing Towards Goal  Note:   Fall Risk Interventions:            Medication Interventions: Bed/chair exit alarm, Patient to call before getting OOB, Teach patient to arise slowly

## 2019-10-17 NOTE — PROGRESS NOTES
NUTRITION    Nursing Referral: [facility]      RECOMMENDATIONS / PLAN:     - Modify supplement: Decrease Glucerna Shake to BID; add Magic Cup AYANA and Ensure Pudding once daily  - Continue RD inpatient monitoring and evaluation. NUTRITION INTERVENTIONS & DIAGNOSIS:     - Meals/snacks: modified composition  - Medical food supplement therapy: modify    Nutrition Diagnosis: Inadequate oral intake related to poor appetite as evidenced by po intake <50% of meals x 1-2 weeks PTA     ASSESSMENT:     Pt reported poor appetite and meal intake PTA; fair since admission. Reported consuming 75% of meals but is not hungry for dinner. Has not tried the [employer] yet; will later and agreeable to trying another supplement. Nutritional intake adequate to meet patients estimated nutritional needs:  Unable to determine at this time    Diet: DIET DIABETIC CONSISTENT CARB Regular  DIET NUTRITIONAL SUPPLEMENTS Breakfast, Lunch, Dinner; [place] 57 Allergies:  Shellfish   Current Appetite: poor/fair  Appetite/meal intake prior to admission: poor x 1-2 weeks, consuming some snacks and juice daily  Feeding Limitations:  [] Swallowing difficulty    [] Chewing difficulty    [] Other:   Current Meal Intake: No data found.     BM: 10/15  Skin Integrity:  No pressure injury or wound noted  Edema:   [x] No     [] Yes   Pertinent Medications: Reviewed: lipitor, insulin regular (novolin R, humulin R), prinizide, solu-medrol, protonix, miralax    Recent Labs     10/17/19  1335 10/17/19  0549 10/16/19  1808 10/16/19  0458    136  --  135*   K 3.5 4.0  --  4.2    106  --  106   CO2 18* 18*  --  24   * 447* 442* 325*   BUN 24* 19*  --  25*   CREA 1.31* 1.15  --  1.23   CA 8.4* 7.9*  --  8.3*   MG 1.7  --   --   --    PHOS 2.8  --   --   --        Intake/Output Summary (Last 24 hours) at 10/17/2019 1714  Last data filed at 10/16/2019 2244  Gross per 24 hour   Intake 240 ml   Output --   Net 240 ml Anthropometrics:  Ht Readings from Last 1 Encounters:   10/17/19 5' 2\" (1.575 m)     Last 3 Recorded Weights in this Encounter    10/17/19 0950 10/17/19 1147   Weight: 65.7 kg (144 lb 14.4 oz) 66.6 kg (146 lb 12.8 oz)     Body mass index is 26.85 kg/m². Weight History:   Pt reported gaining some weight PTA. +16 lb x 1 month PTA per chart hx. Wt re-checked on 10/17/19; weighed 146 lb. Weight Metrics 10/17/2019 9/19/2018 8/23/2018 5/18/2018 11/7/2017 6/16/2017 6/13/2017   Weight 146 lb 12.8 oz 130 lb 6 oz 123 lb 118 lb 125 lb 130 lb 124 lb   BMI 26.85 kg/m2 23.85 kg/m2 22.5 kg/m2 21.58 kg/m2 22.86 kg/m2 23.78 kg/m2 22.68 kg/m2        Admitting Diagnosis: Pulmonary sarcoidosis (HCC) [D86.0]  Pertinent PMHx: CAD, COPD, depression, DM, diverticulitis, GERD, heart disease, HTN, narcotic dependence    Education Needs:        [x] None identified  [] Identified - Not appropriate at this time  []  Identified and addressed - refer to education log  Learning Limitations:   [x] None identified  [] Identified   Cultural, Roman [employer] & ethnic food preferences:  [x] None identified    [] Identified and addressed     ESTIMATED NUTRITION NEEDS:     Calories: 1692-8056 kcal (25-30 kcal/kg) based on  [x] Actual BW: 67 kg      [] IBW   Protein: 54-80 gm (0.8-1.2 gm/kg) based on  [x] Actual BW      [] IBW   Fluid: 1 mL/kcal     MONITORING & EVALUATION:     Nutrition Goal(s):   - PO nutrition intake will meet >75% of patient estimated nutritional needs within the next 7 days. Outcome: New/Initial goal     Monitoring:   [x] Food and beverage intake   [x] Diet order   [x] Nutrition-focused physical findings   [x] Treatment/therapy   [] Weight   [] Enteral nutrition intake        Previous Recommendations (for follow-up assessments only):  Not Applicable     Discharge Planning: No nutritional discharge needs at this time.     [x] Participated in care planning, discharge planning, & interdisciplinary rounds as appropriate      Alessandra Cyr, [address] [place] [address]  Pager: [phone]

## 2019-10-17 NOTE — ROUTINE PROCESS
Bedside and Verbal shift change report given to Agnes Miguel (oncoming nurse) by Turner Grant RN (offgoing nurse). Report included the following information SBAR, Kardex and MAR.

## 2019-10-17 NOTE — DIABETES MGMT
Glycemic Control Plan of Care    T2DM 9.0% (11/05/2017). Pending result of A1c today, 10/17/2019. Home diabetes medications: Patient reported on 10/17/2019:  Lantus insulin 30 daily at bedtime. Metformin 500 mg daily with breakfast    IV Solumedrol 40 mg every 12 hours. POC BG range on 10/16/2019: 369-391 mg/dL. Lantus insulin 20 units and 25 units of sliding scale insulin. POC BG report on 10/17/2019 at time of review: >600 mg/dL. Ordered regular insulin drip via GlucoStabilizer to start after patient transferred from  to [facility]. Spoke with Elvira Green RN, and stated that she already called report to [facility] and plan to transfer patient now. Recommendation(s):  1.) Follow GlucoStabilizer protocol. Administer first dose of lantus insulin two hours before d/c insulin drip when BG values, insulin multiplier and labs stable. 2.) Include correctional lispro insulin ACHS once patient is off [facility]. Assessment:  Patient is 81year old with past medical history including type 2 diabetes mellitus, COPD on home oxygen at 3-4 L, hypertension, GERD, cocaine abuse, cigarette smoker, NSTEMI, CAD and pulmonary sarcoidosis - was admitted on 10/16/2019 with report of nausea, vomiting, diarrhea and flu like symptoms. Noted:  Elevated BG values. Prednisone 60 mg and Solumedrol 40 mg on 10/16/2019. Acute on chronic respiratory failure. Sarcoidosis. AMBROCIO. T2DM. Most recent blood glucose values:    Results for Shiraz Roque (MRN [id number]) as of 10/17/2019 09:53   Ref. Range 10/16/2019 04:58 10/16/2019 18:08 10/17/2019 05:49   Glucose Latest Ref Range: 74 - 99 mg/dL 325 (H) 442 (HH) 447 (HH)       Results for Shiraz Roque (MRN [id number]) as of 10/17/2019 09:53   Ref. Range 10/16/2019 16:57 10/16/2019 19:40   GLUCOSE,FAST - POC Latest Ref Range: 70 - 110 mg/dL 369 (H) 391 (H)     Results for Shiraz Roque (MRN [id number]) as of 10/17/2019 09:53   Ref.  Range 10/17/2019 08:22 GLUCOSE,FAST - POC Latest Ref Range: 70 - 110 mg/dL >600 (HH)     Current A1C: 9.0% (11/05/2017). Pending result of A1c today, 10/17/2019. Current hospital diabetes medications:  Regular insulin drip via GlucoStabilizer ordered this AM, 10/17/2019. Total daily dose insulin requirement previous day: 10/16/2019:  Lantus: 20 units  Lispro 25 units  TDD insulin: 45 units    Home diabetes medications: Patient reported on 10/17/2019:  Lantus insulin 30 daily at bedtime. Metformin 500 mg daily with breakfast.    Diet: Diabetic consistent carb regular; nutr suppl: glucerna shake with breakfast, lunch, dinner. Goals:  Blood glucose will be within target range of  mg/dL by 10/20/2019. Education:  ___  Refer to Diabetes Education Record             _X__  Education not indicated at this time: pt to transfer from [facility] to [facility] to start insulin drip via GlucoStabilizer.     Belinda House RN [facility]  Pager: [phone]

## 2019-10-17 NOTE — PROGRESS NOTES
Reason for Admission:   Pulmonary sarcoidosis ([facility] [address] [D86.0]               RRAT Score:     23             Resources/supports as identified by patient/family:   Patient has family supports. Top Challenges facing patient (as identified by patient/family and CM): Finances/Medication cost?   NO    Transportation     Family vs. Medicaid cab. Support system or lack thereof? Family system. Living arrangements? Lives with her . Self-care/ADLs/Cognition? Alert and oriented. Current Advanced Directive/Advance Care Plan:   no                          Plan for utilizing home health:    Patient has no home health orders in place at this time. This writer will continue to closely monitor for potential home health needs and orders. Likelihood of readmission:   HIGH    Transition of Care Plan:   Patient will return home with help from her family. Patient's family will also transport, if available. If not, she will need medicaid transport at the time of discharge. Initial assessment completed with patient and her  Shireen Shone). Cognitive status of patient: Alert and oriented. Face sheet information confirmed:  yes. The patient designates her  Shireen Shone) to participate in her discharge plan and to receive any needed information. This patient lives in an apartment, with her  Shireen Shone). Patient is not able to navigate steps as needed. Prior to hospitalization, patient was considered to be independent with ADLs/IADLS : yes . Patient has a current ACP document on file: no     Patient's family will be available to transport patient home upon discharge, if they are available. If not, she will need medicaid cab. The patient already has a rollator, a rolling walker and 2 canes, available in the home. Patient is not currently active with home health. Patient has not stayed in a skilled nursing facility or rehab.         This patient is on dialysis :no    Currently, the discharge plan is to return home with help from her family. Patient's family will transport home, if available. If not, she will need medicaid cab ride home. Patient's  is Corinne Johnson, QM#[phone] and EW#[phone]). Patient's PCP is Anu Molina NP. Patient is also a [facility] patient. Patient is insured through [facility] and she also has Brenna Fields. The patient states that she can obtain her medications from the pharmacy, and take her medications as directed. This writer will continue to closely monitor for discharge planning to ensure a safe discharge home from [facility]. Care Management Interventions  PCP Verified by CM: Michael Ferrari NP)  Palliative Care Criteria Met (RRAT>21 & CHF Dx)?: No  Mode of Transport at Discharge: Other (see comment)(Family or Medicaid cab)  Transition of Care Consult (CM Consult): Discharge Planning  Current Support Network: Lives with Spouse, Family Lives Nearby  Confirm Follow Up Transport: Other (see comment)(family vs. medicaid cab)  Plan discussed with Pt/Family/Caregiver: Yes  Discharge Location  Discharge Placement: Home with family assistance        Saba Ortiz MSW  Care Manager  Pager#: [phone]

## 2019-10-17 NOTE — PROGRESS NOTES
0730: Patient resting in bed watching TV with  at bedside. David Mendez checked and adjusted. 2100: Patient resting. BG increasing every hour. I noticed a tray at the bedside and asked the patient if she had eaten, and she stated yes she had dinner. She was unaware she was not supposed to eat while on the glucostabalizer.

## 2019-10-17 NOTE — PROGRESS NOTES
[facility]  Pulmonary, Critical Care, and Sleep Medicine    Pulmonary Medicine Progress Note    Name: Raphael Valencia MRN: [id number]  : 1950 Hospital: [address] [place] [address] Dr  Date: 10/17/2019       Subjective:  Pt appears to be feeling better. Her main issue is abdominal pain. She states her hiatal hernia is acting up. She appeared to be breathing fine until I attempted ascultation, she started to pant and say she couldn't get any air in. VSS.      Patient Active Problem List   Diagnosis Code    Chronic pain syndrome G89.4    DJD (degenerative joint disease) of knee M17.10    Knee pain, left M25.562    Depression F32.9    COPD, moderate ([facility]) J44.9    HNP (herniated nucleus pulposus), cervical M50.20    HTN (hypertension) I10    Osteoporosis M81.0    DM (diabetes mellitus) ([facility] [address] E11.9    Sarcoidosis D86.9    Chest pain R07.9    Trichomonas A59.9    Hidradenitis L73.2    Recurrent falls R29.6    GERD (gastroesophageal reflux disease) K21.9    Insomnia G47.00    Cocaine abuse ([facility]) F14.10    RVH (right ventricular hypertrophy) I51.7    Cigarette smoker F17.210    Non-ST elevated myocardial infarction (non-STEMI) ([facility]) I21.4    Anemia D64.9    CAD in native artery I25.10    Diverticulitis K57.92    Abdominal pain R10.9    Syncope and collapse R55    Hyperglycemia R73.9    Closed head injury S09.90XA    Chronic back pain M54.9, G89.29    Syncope R55    Pulmonary sarcoidosis ([facility]) D86.0    Acute on chronic respiratory failure with hypoxia and hypercapnia ([facility]) J96.21, J96.22       Assessment:  · Acute on Chronic Hypoxic Respiratory Failure  · - likely 2/2 viral URI, less likely sarcoidosis exacerbation given stable imaging  · Sarcoidosis  · - only treated with steroids intermittently, Dx  by skin lesion  · - bronch with endobronchial Bx 2018- path negative  · Hx of MI  · - s/p cath without intervention  · AMBROCIO  · - cr improving  · Fever, cough, N/V/D  · - most likely viral illness  · Hyperglycemia    Impression/Plan:  - No wheezing on exam will decrease steroids given severe hyperglycemia  - Increase lantus dose  - Await sputum Cx, procal and RVP, continue ABX for now  - Pain management and GI symptoms per primary service  - Will plan for follow up in our clinic    Will follow    FiO2 to keep SpO2 >=92%, HOB >=30 degree, aspiration precautions, aggressive pulmonary toileting, incentive spirometry. Other issues management by primary team and respective consultants. Events and notes from last 24 hours reviewed. Discussed with patient and family, answered all questions to their satisfaction. Care plan discussed with nursing.      Labs and images personally seen and available reports reviewed  All current medicines are reviewed       Medications- Current:  Current Facility-Administered Medications   Medication Dose Route Frequency    insulin regular (NOVOLIN R, HUMULIN R) 100 Units in 0.9% sodium chloride 100 mL infusion  0-50 Units/hr IntraVENous TITRATE    glucose chewable tablet 16 g  4 Tab Oral PRN    glucagon (GLUCAGEN) injection 1 mg  1 mg IntraMUSCular PRN    dextrose 10% infusion 125-250 mL  125-250 mL IntraVENous PRN    please update ht and wt in Epic  1 Each Other ONCE    [START ON 10/18/2019] methylPREDNISolone (PF) (SOLU-MEDROL) injection 40 mg  40 mg IntraVENous DAILY    [START ON 10/18/2019] pantoprazole (PROTONIX) tablet 40 mg  40 mg Oral ACB    oxyCODONE-acetaminophen (PERCOCET) 5-325 mg per tablet 1 Tab  1 Tab Oral Q6H PRN    aluminum-magnesium hydroxide (MAALOX) oral suspension 15 mL  15 mL Oral QID PRN    docusate sodium (COLACE) capsule 100 mg  100 mg Oral BID    0.9% sodium chloride infusion  75 mL/hr IntraVENous CONTINUOUS    aspirin chewable tablet 81 mg  81 mg Oral DAILY    atorvastatin (LIPITOR) tablet 40 mg  40 mg Oral QHS    clopidogrel (PLAVIX) tablet 75 mg  75 mg Oral DAILY    FLUoxetine (PROzac) capsule 20 mg  20 mg Oral DAILY    lisinopril-hydroCHLOROthiazide (PRINZIDE, ZESTORETIC) 20-12.5 mg per tablet 1 Tab  1 Tab Oral DAILY    nitroglycerin (NITROSTAT) tablet 0.4 mg  0.4 mg SubLINGual PRN    polyethylene glycol (MIRALAX) packet 17 g  17 g Oral DAILY    zolpidem (AMBIEN) tablet 10 mg  10 mg Oral QHS    albuterol-ipratropium (DUO-NEB) 2.5 MG-0.5 MG/3 ML  3 mL Nebulization Q4H RT    glucose chewable tablet 16 g  16 g Oral PRN    glucagon (GLUCAGEN) injection 1 mg  1 mg IntraMUSCular PRN    budesonide (PULMICORT) 1,000 mcg/2 mL nebulizer susp  1,000 mcg Nebulization BID RT    arformoterol (BROVANA) neb solution 15 mcg  15 mcg Nebulization BID RT    cefTRIAXone (ROCEPHIN) 2 g in sterile water (preservative free) 20 mL IV syringe  2 g IntraVENous Q24H    acetaminophen (TYLENOL) tablet 650 mg  650 mg Oral Q4H PRN    dextrose 10% infusion 125-250 mL  125-250 mL IntraVENous PRN       Objective:  Vital Signs:    Visit Vitals  /72 (BP 1 Location: Right arm, BP Patient Position: At rest)   Pulse (!) 105   Temp 98.5 °F (36.9 °C)   Resp 20   Ht 5' 2\" (1.575 m)   Wt 65.7 kg (144 lb 14.4 oz)   SpO2 99%   Breastfeeding? No   BMI 26.50 kg/m²      O2 Device: Nasal cannula  O2 Flow Rate (L/min): 3 l/min  Temp (24hrs), Av.9 °F (37.2 °C), Min:97.3 °F (36.3 °C), Max:100.4 °F (38 °C)      Intake/Output:   Last shift:      No intake/output data recorded. Last 3 shifts: 10/15 1901 - 10/17 0700  In: 1240 [P.O.:240; I.V.:1000]  Out: -     Intake/Output Summary (Last 24 hours) at 10/17/2019 1146  Last data filed at 10/16/2019 2244  Gross per 24 hour   Intake 1240 ml   Output --   Net 1240 ml       Physical Exam:     General/Neurology: Alert, Awake  Head:   Normocephalic, without obvious abnormality  Eye:   PERRL, EOM intact  Oral:  Mucus membranes moist  Lung:   Good air movement, no wheezing, few scattered rhonchi  Heart:   S1 S2 present  Abdomen:  Soft, non tender, BS+nt    Extremities:  No pedal edema.    Skin:   Dry, intact  Data:      Recent Results (from the past 24 hour(s))   GLUCOSE, POC    Collection Time: 10/16/19  4:57 PM   Result Value Ref Range    Glucose (POC) 369 (H) 70 - 110 mg/dL   GLUCOSE, RANDOM    Collection Time: 10/16/19  6:08 PM   Result Value Ref Range    Glucose 442 (HH) 74 - 99 mg/dL   GLUCOSE, POC    Collection Time: 10/16/19  7:40 PM   Result Value Ref Range    Glucose (POC) 391 (H) 70 - 773 mg/dL   METABOLIC PANEL, BASIC    Collection Time: 10/17/19  5:49 AM   Result Value Ref Range    Sodium 136 136 - 145 mmol/L    Potassium 4.0 3.5 - 5.5 mmol/L    Chloride 106 100 - 111 mmol/L    CO2 18 (L) 21 - 32 mmol/L    Anion gap 12 3.0 - 18 mmol/L    Glucose 447 (HH) 74 - 99 mg/dL    BUN 19 (H) 7.0 - 18 MG/DL    Creatinine 1.15 0.6 - 1.3 MG/DL    BUN/Creatinine ratio 17 12 - 20      GFR est AA 57 (L) >60 ml/min/1.73m2    GFR est non-AA 47 (L) >60 ml/min/1.73m2    Calcium 7.9 (L) 8.5 - 10.1 MG/DL   CBC W/O DIFF    Collection Time: 10/17/19  5:49 AM   Result Value Ref Range    WBC 8.4 4.6 - 13.2 K/uL    RBC 4.47 4.20 - 5.30 M/uL    HGB 10.3 (L) 12.0 - 16.0 g/dL    HCT 31.6 (L) 35.0 - 45.0 %    MCV 70.7 (L) 74.0 - 97.0 FL    MCH 23.0 (L) 24.0 - 34.0 PG    MCHC 32.6 31.0 - 37.0 g/dL    RDW 14.5 11.6 - 14.5 %    PLATELET 176 (L) 465 - 420 K/uL   GLUCOSE, POC    Collection Time: 10/17/19  8:22 AM   Result Value Ref Range    Glucose (POC) >600 (HH) 70 - 110 mg/dL   GLUCOSE, POC    Collection Time: 10/17/19 11:20 AM   Result Value Ref Range    Glucose (POC) 594 (HH) 70 - 110 mg/dL         Chemistry   Recent Labs     10/17/19  0549 10/16/19  1808 10/16/19  0458   * 442* 325*     --  135*   K 4.0  --  4.2     --  106   CO2 18*  --  24   BUN 19*  --  25*   CREA 1.15  --  1.23   CA 7.9*  --  8.3*   AGAP 12  --  5   BUCR 17  --  20       CBC w/Diff   Recent Labs     10/17/19  0549 10/16/19  0542   WBC 8.4 8.2   RBC 4.47 5.02   HGB 10.3* 11.9*   HCT 31.6* 35.9   * 98*   GRANS  --  65   LYMPH  --  28 EOS  --  1       ABG No results for input(s): PHI, PHI, POC2, PCO2I, PO2, PO2I, HCO3, HCO3I, FIO2, FIO2I in the last 72 hours. Micro  No results for input(s): SDES, CULT in the last 72 hours. No results for input(s): CULT in the last 72 hours. CT (Most Recent)   Results from Hospital Encounter encounter on 10/16/19   CTA CHEST W OR W WO CONT    Narrative EXAM:  CTA Chest with Contrast (CT Pulmonary Study for PE). CLINICAL INDICATION:  Acute onset progressive chest pain, worsening shortness of  breath, increased oxygen requirement. COMPARISON:  08/09/18. TECHNIQUE:      - Helical volumetric sections of the chest are obtained with CT pulmonary  angiogram protocol. Subsequently, sagittal and coronal multiplanar  reconstruction images are obtained. Maximum intensity projection images are  generated to better delineate the pulmonary vasculature, differentiate between  the pulmonary arteries and veins and to increase sensitivity to pulmonary  emboli.    - IV contrast 100 mL Isovue-370.  - Radiation dose optimization techniques are utilized as appropriate to the  exam, with combination of automated exposure control, adjustment of the mA  and/or kV according to patient's size (Including appropriate matching for  site-specific examinations), or use of iterative reconstruction technique. FINDINGS:     Pulmonary Arteries:      - The pulmonary artery opacification is diagnostic in quality.  - No pulmonary emboli are noted in the pulmonary arterial tree down to the level  of the segmental arteries. Lung, Pleura, Airways:      - No infiltrate or consolidation. Multiple foci of scarring in both lungs with  curvilinear irregular densities at multiple foci. The nodular infiltrate seen  on 08/09/18 appear markedly decreased or inapparent on current study.  - No dominant mass or discrete new developing suspicious nodule. - No pleural effusion.     Mediastinum, Shwetha:  Some of the mediastinal nodes appear calcified. Subtle  calcified hilar nodes are also identified bilaterally. Overall similar  appearance compared to 08/09/18. Aorta:  No evidence of aortic dissection or aneurysm. Base of neck:  No acute findings. Axillae:  Unremarkable. Abdomen:  No acute abnormalities. Skeletal Structures:  No acute findings. Impression IMPRESSION:         1. No convincing evidence of pulmonary embolism. 2.  No acute airspace disease. Decreased conspicuity of nodular infiltrates  compared to 08/09/18 study. XR (Most Recent). CXR reviewed by me and compared with previous CXR   Results from Hospital Encounter encounter on 10/16/19   XR CHEST PORT    Narrative EXAM: XR CHEST PORT    INDICATION: chest pain, sob, and/or arrhythmia    COMPARISON: 4/1/2019    FINDINGS: A portable AP radiograph of the chest was obtained at 0504 hours. The  bones are unremarkable. The heart is not dilated. The lungs reveal mild  nonspecific interstitial thickening. There is no mass or consolidation, no  pleural effusion or pneumothorax. Impression IMPRESSION: Nonspecific chronic interstitial thickening, no active disease       See my orders for details     Total care time exclusive of procedures with complex decision making, coordination of care and counseling patient performed and > 50% time spent in face to face evaluation as mentioned above.     Ortega Coulter MD  Critical Care Medicine

## 2019-10-17 NOTE — ROUTINE PROCESS
Bedside shift change report given to Jessica Lund RN and Ree Franklin RN (oncoming nurse) by Toan Fernandez RN (offgoing nurse). Report included the following information SBAR, Kardex, Intake/Output, MAR, Recent Results and Quality Measures.

## 2019-10-17 NOTE — PROGRESS NOTES
TRANSFER - OUT REPORT:    Verbal report given to  [facility] RN(name) on Rosie Zhang  being transferred to  2305 CVT stepdown(unit) for change in patient condition(elevated glucose insulin drip needeed)       Report consisted of patients Situation, Background, Assessment and   Recommendations(SBAR). Information from the following report(s) SBAR, Kardex, [facility] and Recent Results was reviewed with the receiving nurse. Lines:   Peripheral IV 10/16/19 Left Forearm (Active)   Site Assessment Clean, dry, & intact 10/17/2019  7:33 AM   Phlebitis Assessment 0 10/17/2019  7:33 AM   Infiltration Assessment 0 10/17/2019  7:33 AM   Dressing Status Clean, dry, & intact 10/17/2019  7:33 AM   Dressing Type Transparent 10/17/2019  7:33 AM   Hub Color/Line Status Yellow 10/17/2019  7:33 AM   Action Taken Open ports on tubing capped 10/17/2019  7:33 AM   Alcohol Cap Used Yes 10/17/2019  7:33 AM       Peripheral IV 10/16/19 Right Antecubital (Active)   Site Assessment Clean, dry, & intact 10/17/2019  7:33 AM   Phlebitis Assessment 0 10/17/2019  7:33 AM   Infiltration Assessment 0 10/17/2019  7:33 AM   Dressing Status Clean, dry, & intact 10/17/2019  7:33 AM   Dressing Type Transparent 10/17/2019  7:33 AM   Hub Color/Line Status Pink 10/17/2019  7:33 AM   Action Taken Open ports on tubing capped 10/17/2019  7:33 AM   Alcohol Cap Used Yes 10/17/2019  7:33 AM        Opportunity for questions and clarification was provided.       Patient transported with:   Monitor  O2 @ 3 liters  Registered Nurse

## 2019-10-17 NOTE — PROGRESS NOTES
1114  Pt arrived to floor via transport stretcher from [address] [facility]. Telebox applied and vital signs obtained.  at bedside. Whiteboard updated and call bell within reach. 200 Pt eating lunch with no issues. 1400 ICS performed with encouragement. Denies pain or nausea. [address] [place] [address] was eaten with no issues. Evening meds given with no issues. [address] given to [place], RN using SBAR MAR I&O and Kardex. Glucose stabilizer verified with oncoming RN.

## 2019-10-17 NOTE — PROGRESS NOTES
Problem: Gas Exchange - Impaired  Goal: *Absence of hypoxia  Outcome: Not Progressing Towards Goal     Problem: Patient Education: Go to Patient Education Activity  Goal: Patient/Family Education  Outcome: Not Progressing Towards Goal     Problem: Patient Education: Go to Patient Education Activity  Goal: Patient/Family Education  Outcome: Not Progressing Towards Goal     Problem: Falls - Risk of  Goal: *Absence of Falls  Description  Document Keisha Britt Fall Risk and appropriate interventions in the flowsheet.   Outcome: Not Progressing Towards Goal  Note:   Fall Risk Interventions:            Medication Interventions: Patient to call before getting OOB, Teach patient to arise slowly                   Problem: Patient Education: Go to Patient Education Activity  Goal: Patient/Family Education  Outcome: Not Progressing Towards Goal

## 2019-10-18 ENCOUNTER — APPOINTMENT (OUTPATIENT)
Dept: NON INVASIVE DIAGNOSTICS | Age: 69
DRG: 191 | End: 2019-10-18
Attending: INTERNAL MEDICINE
Payer: MEDICARE

## 2019-10-18 VITALS
SYSTOLIC BLOOD PRESSURE: 159 MMHG | DIASTOLIC BLOOD PRESSURE: 67 MMHG | RESPIRATION RATE: 20 BRPM | BODY MASS INDEX: 26.87 KG/M2 | HEART RATE: 99 BPM | TEMPERATURE: 98.6 F | OXYGEN SATURATION: 100 % | HEIGHT: 62 IN | WEIGHT: 146 LBS

## 2019-10-18 LAB
ADMINISTERED INITIALS, ADMINIT: NORMAL
ANION GAP SERPL CALC-SCNC: 10 MMOL/L (ref 3–18)
BASOPHILS # BLD: 0 K/UL (ref 0–0.1)
BASOPHILS NFR BLD: 0 % (ref 0–2)
BUN SERPL-MCNC: 26 MG/DL (ref 7–18)
BUN/CREAT SERPL: 22 (ref 12–20)
CALCIUM SERPL-MCNC: 7.9 MG/DL (ref 8.5–10.1)
CHLORIDE SERPL-SCNC: 111 MMOL/L (ref 100–111)
CO2 SERPL-SCNC: 20 MMOL/L (ref 21–32)
CREAT SERPL-MCNC: 1.18 MG/DL (ref 0.6–1.3)
D50 ADMINISTERED, D50ADM: 0 ML
D50 ADMINISTERED, D50ADM: 12 ML
D50 ORDER, D50ORD: 0 ML
D50 ORDER, D50ORD: 12 ML
DIFFERENTIAL METHOD BLD: ABNORMAL
ECHO AO ROOT DIAM: 2.67 CM
ECHO LA AREA 4C: 16.9 CM2
ECHO LA VOL 2C: 27.99 ML (ref 22–52)
ECHO LA VOL 4C: 40.15 ML (ref 22–52)
ECHO LA VOL BP: 38.47 ML (ref 22–52)
ECHO LA VOL/BSA BIPLANE: 23.01 ML/M2 (ref 16–28)
ECHO LA VOLUME INDEX A2C: 16.74 ML/M2 (ref 16–28)
ECHO LA VOLUME INDEX A4C: 24.01 ML/M2 (ref 16–28)
ECHO LV E' LATERAL VELOCITY: 10.06 CM/S
ECHO LV E' SEPTAL VELOCITY: 7.66 CM/S
ECHO LV INTERNAL DIMENSION DIASTOLIC: 3.56 CM (ref 3.9–5.3)
ECHO LV INTERNAL DIMENSION SYSTOLIC: 2.24 CM
ECHO LV IVSD: 0.93 CM (ref 0.6–0.9)
ECHO LV MASS 2D: 97.4 G (ref 67–162)
ECHO LV MASS INDEX 2D: 58.2 G/M2 (ref 43–95)
ECHO LV POSTERIOR WALL DIASTOLIC: 0.85 CM (ref 0.6–0.9)
ECHO LVOT DIAM: 1.75 CM
ECHO LVOT PEAK GRADIENT: 6 MMHG
ECHO LVOT PEAK VELOCITY: 122.67 CM/S
ECHO LVOT VTI: 24.87 CM
ECHO MV A VELOCITY: 125.84 CM/S
ECHO MV E DECELERATION TIME (DT): 182.6 MS
ECHO MV E VELOCITY: 123.25 CM/S
ECHO MV E/A RATIO: 0.98
ECHO MV E/E' LATERAL: 12.25
ECHO MV E/E' RATIO (AVERAGED): 14.17
ECHO MV E/E' SEPTAL: 16.09
ECHO RV TAPSE: 2 CM (ref 1.5–2)
ECHO TV REGURGITANT MAX VELOCITY: 335.04 CM/S
ECHO TV REGURGITANT PEAK GRADIENT: 44.9 MMHG
EOSINOPHIL # BLD: 0 K/UL (ref 0–0.4)
EOSINOPHIL NFR BLD: 0 % (ref 0–5)
ERYTHROCYTE [DISTWIDTH] IN BLOOD BY AUTOMATED COUNT: 14.7 % (ref 11.6–14.5)
GLUCOSE BLD STRIP.AUTO-MCNC: 111 MG/DL (ref 70–110)
GLUCOSE BLD STRIP.AUTO-MCNC: 149 MG/DL (ref 70–110)
GLUCOSE BLD STRIP.AUTO-MCNC: 178 MG/DL (ref 70–110)
GLUCOSE BLD STRIP.AUTO-MCNC: 204 MG/DL (ref 70–110)
GLUCOSE BLD STRIP.AUTO-MCNC: 220 MG/DL (ref 70–110)
GLUCOSE BLD STRIP.AUTO-MCNC: 229 MG/DL (ref 70–110)
GLUCOSE BLD STRIP.AUTO-MCNC: 236 MG/DL (ref 70–110)
GLUCOSE BLD STRIP.AUTO-MCNC: 286 MG/DL (ref 70–110)
GLUCOSE BLD STRIP.AUTO-MCNC: 70 MG/DL (ref 70–110)
GLUCOSE BLD STRIP.AUTO-MCNC: 76 MG/DL (ref 70–110)
GLUCOSE SERPL-MCNC: 85 MG/DL (ref 74–99)
GLUCOSE, GLC: 111 MG/DL
GLUCOSE, GLC: 149 MG/DL
GLUCOSE, GLC: 178 MG/DL
GLUCOSE, GLC: 204 MG/DL
GLUCOSE, GLC: 220 MG/DL
GLUCOSE, GLC: 229 MG/DL
GLUCOSE, GLC: 236 MG/DL
GLUCOSE, GLC: 286 MG/DL
GLUCOSE, GLC: 70 MG/DL
GLUCOSE, GLC: 76 MG/DL
HCT VFR BLD AUTO: 31.6 % (ref 35–45)
HGB BLD-MCNC: 10.2 G/DL (ref 12–16)
HIGH TARGET, HITG: 180 MG/DL
INR PPP: 1 (ref 0.8–1.2)
INSULIN ADMINSTERED, INSADM: 0 UNITS/HOUR
INSULIN ADMINSTERED, INSADM: 1.5 UNITS/HOUR
INSULIN ADMINSTERED, INSADM: 1.5 UNITS/HOUR
INSULIN ADMINSTERED, INSADM: 13.4 UNITS/HOUR
INSULIN ADMINSTERED, INSADM: 19.2 UNITS/HOUR
INSULIN ADMINSTERED, INSADM: 19.7 UNITS/HOUR
INSULIN ADMINSTERED, INSADM: 20.2 UNITS/HOUR
INSULIN ADMINSTERED, INSADM: 22 UNITS/HOUR
INSULIN ADMINSTERED, INSADM: 26.4 UNITS/HOUR
INSULIN ADMINSTERED, INSADM: 6.1 UNITS/HOUR
INSULIN ORDER, INSORD: 0 UNITS/HOUR
INSULIN ORDER, INSORD: 1.5 UNITS/HOUR
INSULIN ORDER, INSORD: 1.5 UNITS/HOUR
INSULIN ORDER, INSORD: 13.4 UNITS/HOUR
INSULIN ORDER, INSORD: 19.2 UNITS/HOUR
INSULIN ORDER, INSORD: 19.7 UNITS/HOUR
INSULIN ORDER, INSORD: 20.2 UNITS/HOUR
INSULIN ORDER, INSORD: 22 UNITS/HOUR
INSULIN ORDER, INSORD: 26.4 UNITS/HOUR
INSULIN ORDER, INSORD: 6.1 UNITS/HOUR
LOW TARGET, LOT: 140 MG/DL
LYMPHOCYTES # BLD: 1.8 K/UL (ref 0.9–3.6)
LYMPHOCYTES NFR BLD: 14 % (ref 21–52)
MAGNESIUM SERPL-MCNC: 1.6 MG/DL (ref 1.6–2.6)
MAGNESIUM SERPL-MCNC: 1.7 MG/DL (ref 1.6–2.6)
MCH RBC QN AUTO: 23.2 PG (ref 24–34)
MCHC RBC AUTO-ENTMCNC: 32.3 G/DL (ref 31–37)
MCV RBC AUTO: 71.8 FL (ref 74–97)
MINUTES UNTIL NEXT BG, NBG: 15 MIN
MINUTES UNTIL NEXT BG, NBG: 60 MIN
MONOCYTES # BLD: 1.1 K/UL (ref 0.05–1.2)
MONOCYTES NFR BLD: 9 % (ref 3–10)
MULTIPLIER, MUL: 0.08
MULTIPLIER, MUL: 0.08
MULTIPLIER, MUL: 0.09
MULTIPLIER, MUL: 0.1
MULTIPLIER, MUL: 0.12
MULTIPLIER, MUL: 0.12
MULTIPLIER, MUL: 0.13
MULTIPLIER, MUL: 0.14
MULTIPLIER, MUL: 0.15
MULTIPLIER, MUL: 0.15
NEUTS SEG # BLD: 10.1 K/UL (ref 1.8–8)
NEUTS SEG NFR BLD: 77 % (ref 40–73)
ORDER INITIALS, ORDINIT: NORMAL
PLATELET # BLD AUTO: 158 K/UL (ref 135–420)
POTASSIUM SERPL-SCNC: 3.3 MMOL/L (ref 3.5–5.5)
PROTHROMBIN TIME: 13 SEC (ref 11.5–15.2)
RBC # BLD AUTO: 4.4 M/UL (ref 4.2–5.3)
SODIUM SERPL-SCNC: 141 MMOL/L (ref 136–145)
WBC # BLD AUTO: 13 K/UL (ref 4.6–13.2)

## 2019-10-18 PROCEDURE — 74011250636 HC RX REV CODE- 250/636: Performed by: INTERNAL MEDICINE

## 2019-10-18 PROCEDURE — 94640 AIRWAY INHALATION TREATMENT: CPT

## 2019-10-18 PROCEDURE — 74011636637 HC RX REV CODE- 636/637: Performed by: INTERNAL MEDICINE

## 2019-10-18 PROCEDURE — 85610 PROTHROMBIN TIME: CPT

## 2019-10-18 PROCEDURE — 85025 COMPLETE CBC W/AUTO DIFF WBC: CPT

## 2019-10-18 PROCEDURE — 74011000250 HC RX REV CODE- 250: Performed by: INTERNAL MEDICINE

## 2019-10-18 PROCEDURE — 36415 COLL VENOUS BLD VENIPUNCTURE: CPT

## 2019-10-18 PROCEDURE — 97161 PT EVAL LOW COMPLEX 20 MIN: CPT

## 2019-10-18 PROCEDURE — 74011000258 HC RX REV CODE- 258: Performed by: EMERGENCY MEDICINE

## 2019-10-18 PROCEDURE — 74011250637 HC RX REV CODE- 250/637: Performed by: INTERNAL MEDICINE

## 2019-10-18 PROCEDURE — 80048 BASIC METABOLIC PNL TOTAL CA: CPT

## 2019-10-18 PROCEDURE — 82962 GLUCOSE BLOOD TEST: CPT

## 2019-10-18 PROCEDURE — 97165 OT EVAL LOW COMPLEX 30 MIN: CPT

## 2019-10-18 PROCEDURE — 74011250637 HC RX REV CODE- 250/637: Performed by: EMERGENCY MEDICINE

## 2019-10-18 PROCEDURE — 83735 ASSAY OF MAGNESIUM: CPT

## 2019-10-18 PROCEDURE — 74011636637 HC RX REV CODE- 636/637: Performed by: EMERGENCY MEDICINE

## 2019-10-18 PROCEDURE — 74011250636 HC RX REV CODE- 250/636: Performed by: EMERGENCY MEDICINE

## 2019-10-18 PROCEDURE — 93306 TTE W/DOPPLER COMPLETE: CPT

## 2019-10-18 RX ORDER — CEFPODOXIME PROXETIL 200 MG/1
200 TABLET, FILM COATED ORAL EVERY 12 HOURS
Status: DISCONTINUED | OUTPATIENT
Start: 2019-10-18 | End: 2019-10-18 | Stop reason: HOSPADM

## 2019-10-18 RX ORDER — IPRATROPIUM BROMIDE AND ALBUTEROL SULFATE 2.5; .5 MG/3ML; MG/3ML
3 SOLUTION RESPIRATORY (INHALATION)
Status: DISCONTINUED | OUTPATIENT
Start: 2019-10-18 | End: 2019-10-18 | Stop reason: HOSPADM

## 2019-10-18 RX ORDER — INSULIN GLARGINE 100 [IU]/ML
35 INJECTION, SOLUTION SUBCUTANEOUS DAILY
Qty: 1 VIAL | Refills: 0 | Status: SHIPPED
Start: 2019-10-19

## 2019-10-18 RX ORDER — PREDNISONE 10 MG/1
TABLET ORAL
Qty: 15 TAB | Refills: 0 | Status: SHIPPED | OUTPATIENT
Start: 2019-10-18 | End: 2019-12-02 | Stop reason: ALTCHOICE

## 2019-10-18 RX ORDER — CEFDINIR 300 MG/1
300 CAPSULE ORAL 2 TIMES DAILY
Qty: 6 CAP | Refills: 0 | Status: SHIPPED | OUTPATIENT
Start: 2019-10-18 | End: 2019-12-02 | Stop reason: ALTCHOICE

## 2019-10-18 RX ORDER — LANOLIN ALCOHOL/MO/W.PET/CERES
3 CREAM (GRAM) TOPICAL
Qty: 30 TAB | Refills: 0 | Status: SHIPPED | OUTPATIENT
Start: 2019-10-18

## 2019-10-18 RX ORDER — INSULIN LISPRO 100 [IU]/ML
INJECTION, SOLUTION INTRAVENOUS; SUBCUTANEOUS
Status: DISCONTINUED | OUTPATIENT
Start: 2019-10-18 | End: 2019-10-18 | Stop reason: HOSPADM

## 2019-10-18 RX ORDER — INSULIN GLARGINE 100 [IU]/ML
30 INJECTION, SOLUTION SUBCUTANEOUS DAILY
Status: DISCONTINUED | OUTPATIENT
Start: 2019-10-18 | End: 2019-10-18

## 2019-10-18 RX ORDER — RANITIDINE 150 MG/1
150 TABLET, FILM COATED ORAL
Qty: 15 TAB | Refills: 0 | Status: SHIPPED | OUTPATIENT
Start: 2019-10-18 | End: 2022-10-28

## 2019-10-18 RX ORDER — DEXTROSE MONOHYDRATE 100 MG/ML
125-250 INJECTION, SOLUTION INTRAVENOUS AS NEEDED
Status: DISCONTINUED | OUTPATIENT
Start: 2019-10-18 | End: 2019-10-18 | Stop reason: HOSPADM

## 2019-10-18 RX ORDER — FAMOTIDINE 20 MG/1
20 TABLET, FILM COATED ORAL
Status: DISCONTINUED | OUTPATIENT
Start: 2019-10-18 | End: 2019-10-18 | Stop reason: HOSPADM

## 2019-10-18 RX ORDER — PREDNISONE 20 MG/1
20 TABLET ORAL
Status: DISCONTINUED | OUTPATIENT
Start: 2019-10-19 | End: 2019-10-18 | Stop reason: HOSPADM

## 2019-10-18 RX ORDER — PREDNISONE 10 MG/1
TABLET ORAL
Qty: 35 TAB | Refills: 0 | Status: SHIPPED | OUTPATIENT
Start: 2019-10-18 | End: 2019-10-18 | Stop reason: SDUPTHER

## 2019-10-18 RX ORDER — DICLOFENAC SODIUM 10 MG/G
2 GEL TOPICAL 4 TIMES DAILY
Qty: 100 G | Refills: 0 | Status: SHIPPED | OUTPATIENT
Start: 2019-10-18

## 2019-10-18 RX ORDER — INSULIN GLARGINE 100 [IU]/ML
35 INJECTION, SOLUTION SUBCUTANEOUS DAILY
Status: DISCONTINUED | OUTPATIENT
Start: 2019-10-18 | End: 2019-10-18 | Stop reason: HOSPADM

## 2019-10-18 RX ORDER — PANTOPRAZOLE SODIUM 40 MG/1
40 TABLET, DELAYED RELEASE ORAL
Status: DISCONTINUED | OUTPATIENT
Start: 2019-10-18 | End: 2019-10-18 | Stop reason: HOSPADM

## 2019-10-18 RX ORDER — DICLOFENAC SODIUM 10 MG/G
2 GEL TOPICAL 4 TIMES DAILY
Status: DISCONTINUED | OUTPATIENT
Start: 2019-10-18 | End: 2019-10-18 | Stop reason: HOSPADM

## 2019-10-18 RX ORDER — LANOLIN ALCOHOL/MO/W.PET/CERES
3 CREAM (GRAM) TOPICAL
Status: DISCONTINUED | OUTPATIENT
Start: 2019-10-18 | End: 2019-10-18 | Stop reason: HOSPADM

## 2019-10-18 RX ORDER — POTASSIUM CHLORIDE 20 MEQ/1
40 TABLET, EXTENDED RELEASE ORAL
Status: COMPLETED | OUTPATIENT
Start: 2019-10-18 | End: 2019-10-18

## 2019-10-18 RX ORDER — DEXTROSE MONOHYDRATE 50 MG/ML
75 INJECTION, SOLUTION INTRAVENOUS CONTINUOUS
Status: DISCONTINUED | OUTPATIENT
Start: 2019-10-18 | End: 2019-10-18

## 2019-10-18 RX ORDER — METFORMIN HYDROCHLORIDE 500 MG/1
500 TABLET ORAL 2 TIMES DAILY WITH MEALS
Qty: 60 TAB | Refills: 0 | Status: SHIPPED | OUTPATIENT
Start: 2019-10-18

## 2019-10-18 RX ORDER — IPRATROPIUM BROMIDE AND ALBUTEROL SULFATE 2.5; .5 MG/3ML; MG/3ML
3 SOLUTION RESPIRATORY (INHALATION)
Qty: 30 NEBULE | Refills: 0 | Status: SHIPPED | OUTPATIENT
Start: 2019-10-18

## 2019-10-18 RX ADMIN — CEFPODOXIME PROXETIL 200 MG: 200 TABLET, FILM COATED ORAL at 16:03

## 2019-10-18 RX ADMIN — PANTOPRAZOLE SODIUM 40 MG: 40 TABLET, DELAYED RELEASE ORAL at 16:03

## 2019-10-18 RX ADMIN — IPRATROPIUM BROMIDE AND ALBUTEROL SULFATE 3 ML: .5; 3 SOLUTION RESPIRATORY (INHALATION) at 07:59

## 2019-10-18 RX ADMIN — INSULIN GLARGINE 35 UNITS: 100 INJECTION, SOLUTION SUBCUTANEOUS at 12:04

## 2019-10-18 RX ADMIN — SODIUM CHLORIDE 75 ML/HR: 900 INJECTION, SOLUTION INTRAVENOUS at 02:07

## 2019-10-18 RX ADMIN — PANTOPRAZOLE SODIUM 40 MG: 40 TABLET, DELAYED RELEASE ORAL at 08:28

## 2019-10-18 RX ADMIN — DEXTROSE MONOHYDRATE 50 ML/HR: 5 INJECTION, SOLUTION INTRAVENOUS at 06:30

## 2019-10-18 RX ADMIN — PREDNISONE 30 MG: 10 TABLET ORAL at 10:19

## 2019-10-18 RX ADMIN — POLYETHYLENE GLYCOL 3350 17 G: 17 POWDER, FOR SOLUTION ORAL at 08:26

## 2019-10-18 RX ADMIN — OXYCODONE HYDROCHLORIDE AND ACETAMINOPHEN 1 TABLET: 5; 325 TABLET ORAL at 10:19

## 2019-10-18 RX ADMIN — METHYLPREDNISOLONE SODIUM SUCCINATE 40 MG: 40 INJECTION, POWDER, FOR SOLUTION INTRAMUSCULAR; INTRAVENOUS at 08:26

## 2019-10-18 RX ADMIN — DICLOFENAC 2 G: 10 GEL TOPICAL at 16:02

## 2019-10-18 RX ADMIN — BUDESONIDE 1000 MCG: 1 SUSPENSION RESPIRATORY (INHALATION) at 07:59

## 2019-10-18 RX ADMIN — SODIUM CHLORIDE 19.7 UNITS/HR: 900 INJECTION, SOLUTION INTRAVENOUS at 11:14

## 2019-10-18 RX ADMIN — ASPIRIN 81 MG 81 MG: 81 TABLET ORAL at 08:27

## 2019-10-18 RX ADMIN — ARFORMOTEROL TARTRATE 15 MCG: 15 SOLUTION RESPIRATORY (INHALATION) at 07:59

## 2019-10-18 RX ADMIN — DOCUSATE SODIUM 100 MG: 100 CAPSULE, LIQUID FILLED ORAL at 08:28

## 2019-10-18 RX ADMIN — CLOPIDOGREL BISULFATE 75 MG: 75 TABLET ORAL at 08:27

## 2019-10-18 RX ADMIN — SODIUM CHLORIDE 20.2 UNITS/HR: 900 INJECTION, SOLUTION INTRAVENOUS at 03:09

## 2019-10-18 RX ADMIN — POTASSIUM CHLORIDE 40 MEQ: 20 TABLET, EXTENDED RELEASE ORAL at 10:56

## 2019-10-18 RX ADMIN — LISINOPRIL AND HYDROCHLOROTHIAZIDE 1 TABLET: 12.5; 2 TABLET ORAL at 08:26

## 2019-10-18 RX ADMIN — SODIUM CHLORIDE 75 ML/HR: 900 INJECTION, SOLUTION INTRAVENOUS at 11:19

## 2019-10-18 RX ADMIN — FLUOXETINE 20 MG: 20 CAPSULE ORAL at 08:26

## 2019-10-18 RX ADMIN — IPRATROPIUM BROMIDE AND ALBUTEROL SULFATE 3 ML: .5; 3 SOLUTION RESPIRATORY (INHALATION) at 03:33

## 2019-10-18 NOTE — DISCHARGE SUMMARY
[address]  Face to Face Encounter    Patients Name: Bard Alex    YOB: 1950    Primary Diagnosis: COPD, Osteoarthritis, DM    Date of Face to Face:   10/18/2019                                    Face to Face Encounter findings are related to primary reason for home care:   yes    1. I certify that the patient needs intermittent skilled nursing care, physical therapy and/or speech therapy. I will not be following this patient in the Community and Dr. Gibson Toure, NP  will be responsible for signing the [address] 2. Initial Orders for Care: Must be completed only if Face to Face MD will not be signing the [address] Skilled Nursing, Physical Therapy and Occupational Therapy    3. I certify that this patient is homebound for the following reason(s): Requires considerable and taxing effort to leave the home  and Requires the assistance of 1 or more persons to leave the home     4. I certify that this patient is under my care and that I, or a nurse practitioner or [phone] working with me, had a Face-to-Face Encounter that meets the physician Face-to-Face Encounter requirements. Document the physical findings from the Face-to-Face Encounter that support the need for skilled services: Has wound that requires skilled nursing assessment and treatment , Has new diagnosis that requires skilled nursing teaching and intervention  and Has new finding of weakness and altered mobility that requires skilled physical/occupational and/or speech therapy services for evaluation and interventions.      Dimple Allan MD  10/18/2019

## 2019-10-18 NOTE — DIABETES MGMT
Diabetes Patient/Family Education Record  Factors That  May Influence Patients Ability  to Learn or  Comply with Recommendations   []   Language barrier    []   Cultural needs   []   Motivation    []   Cognitive limitation    []   Physical   [x]   Education    []   Physiological factors   []   Hearing/vision/speaking impairment   []   Christian beliefs    []   Financial factors   []  Other:   []  No factors identified at this time. Person Instructed:   [x]   Patient   []   Family   []  Other     Preference for Learning:   [x]   Verbal   [x]   Written: diab educ packet   []  Demonstration     Level of Comprehension & Competence:    [x]  Good                                      [] Fair                                     []  Poor                             []  Needs Reinforcement   [x]  Teachback completed    Education Component:  Patient reported compliant in taking diabetes medications PTA but not compliant with the recommended nutrition for management of diabetes. Patient reported eating 3 meals daily and snack all day long with cookies, pies, cakes and drinking regular soda at least 3 cans daily. Discussed that her diabetes is not controlled and the list of potential complications. [x]  Medication management, including how to administer insulin (if appropriate) and potential medication interactions: Yes. Patient reported list of home diabetes medications PTA:  Lantus insulin 30 units daily. Metformin 500 mg daily with breakfast.    Patient verbalized information that her lantus insulin dose for discharge was increased to 35 units daily and the frequency of metformin was increased to twice daily. Discussed and emphasized with patient that diabetes medication is effective with diet and regular exercise. [x]  Nutritional management -obtain usual meal pattern: Yes. Patient reported eating 3 meals daily and snack all day long with cookies, pies, cakes.   Educated patient about the plate method regarding serving size and portion control of carbs (starches, fruits, dairy) and she agreed to limit intake of concentrated sweets. She also agreed to change soda to diet. Patient accepted the class sched for free diabetes education and receptive about attending classes. [x]  Exercise: Yes. [x]  Signs, symptoms, and treatment of hyperglycemia and hypoglycemia: Yes. Patient verbalized understanding of hyperglycemia and hypoglycemia. She clearly stated understanding low blood sugar symptoms, treatment, prevention and when to get emergency medical help. [x] Prevention, recognition and treatment of hyperglycemia and hypoglycemia: Yes. [x]  Importance of blood glucose monitoring and how to obtain a blood glucose meter: Yes.    []  Instruction on use of the blood glucose meter   [x]  Discuss the importance of HbA1C monitoring: Yes. Patient verbalized understanding that her current A1c of 11.8% (10/17/2019) is equivalent to estimated average blood glucose of 292 mg/dL during the past 2-3 months. After nutrition education, patient verbalized understanding that diabetes medications is effective when diet and regular exercise is included. Patient agreed to change eating habits. []  Sick day guidelines   [x]  Proper use and disposal of lancets, needles, syringes or insulin pens (if appropriate): Yes. [x]  Potential long-term complications (retinopathy, kidney disease, neuropathy, foot care): Yes. [x] Information about whom to contact in case of emergency or for more information: Yes. [x]  Goal:  Patient/family will demonstrate understanding of Diabetes Self Management Skills by: 10/25/2019  Plan for post-discharge education or self-management support:    [x] Outpatient class schedule provided            [] Patient Declined    [] Scheduled for outpatient classes (date) _______  Verify:  Does patient understand how diabetes medications work? Yes, after educating patient.  See notes above. Does patient know what their most recent A1c is? Yes. Does patient monitor glucose at home? Yes. Does patient have difficulty obtaining diabetes medications and testing supplies?  No.       Mike Alan RN [facility]  Pager: [phone]

## 2019-10-18 NOTE — PROGRESS NOTES
conducted an initial consultation and Spiritual Assessment for Ludy Echeverria, who is a 81 y.o.,female. Patient's Primary Language is: [place]. According to the patient's EMR Latter-day Affiliation is: Yelitza Hodges.     The reason the Patient came to the hospital is:   Patient Active Problem List    Diagnosis Date Noted    Pulmonary sarcoidosis ([facility] 10/16/2019    Acute on chronic respiratory failure with hypoxia and hypercapnia ([facility] 10/16/2019    Syncope 11/06/2017    Syncope and collapse 11/05/2017    Hyperglycemia 11/05/2017    Closed head injury 11/05/2017    Chronic back pain 11/05/2017    Diverticulitis 10/25/2014    Abdominal pain 10/25/2014    CAD in native artery     Anemia 09/12/2014    Trichomonas 09/11/2014    Hidradenitis 09/11/2014    Recurrent falls 09/11/2014    GERD (gastroesophageal reflux disease) 09/11/2014    Insomnia 09/11/2014    Cocaine abuse ([facility] 09/11/2014    RVH (right ventricular hypertrophy) 09/11/2014    Cigarette smoker 09/11/2014    Non-ST elevated myocardial infarction (non-STEMI) ([facility] 09/11/2014    Chest pain 09/10/2014    HNP (herniated nucleus pulposus), cervical 01/15/2014    HTN (hypertension) 01/15/2014    Osteoporosis 01/15/2014    DM (diabetes mellitus) ([address] 01/15/2014    Sarcoidosis 01/15/2014    Chronic pain syndrome 09/08/2011    DJD (degenerative joint disease) of knee 09/08/2011    Knee pain, left 09/08/2011    Depression 09/08/2011    COPD, moderate ([address] 09/08/2011        The  provided the following Interventions:  Initiated a relationship of care and support with Patient and family member (). Explored issues of leticia, belief, spirituality and Sabianism/ritual needs while hospitalized. Listened empathically. Provided information about Spiritual Care Services. Offered prayer and assurance of continued prayers on patient's behalf. Chart reviewed.     The following outcomes where achieved:  Patient shared limited information about both their medical narrative and spiritual journey/beliefs.  confirmed Patient's Mormonism Affiliation. Patient expressed gratitude for 's visit. Assessment:  Patient's  said that he used to be a  at the hospital.   Patient does not have any Uatsdin/cultural needs that will affect patient's preferences in health care. There are no spiritual or Uatsdin issues which require intervention at this time. Plan:  Chaplains will continue to follow and will provide pastoral care on an as needed/requested basis.  recommends bedside caregivers page  on duty if patient shows signs of acute spiritual or emotional distress.     [id number] [address]   [phone]

## 2019-10-18 NOTE — DISCHARGE SUMMARY
Physician Discharge Summary       Patient: Taylor Chaidez MRN: [id number]  SSN: [id number]    YOB: 1950  Age: 81 y.o. Sex: female    PCP: Gilda Gómez NP    Allergies: Ciprofloxacin; Penicillins; and Shellfish derived    Admit date: 10/16/2019  Admitting Provider: Penny Beasley MD    Discharge date: 10/18/2019  Discharging Provider: Bryon Carbone MD    * Admission Diagnoses: Pulmonary [facility]osis ([facility] [D86.0]    * Discharge Diagnoses:      1. Shortness of breath, multifactorial, improving  2. Reportedly COPD with exacerbation, clinically improving  3. GERD, patient states she also has hiatal hernia. 4.  History of sarcoidosis without any exacerbation. 5.  Chronic hypoxic respiratory failure on exertion on home oxygen 3 L. 6.  Diabetes with hyperglycemia due to steroid  7. Bilateral chronic exertional knee pain most likely due to osteoarthritis per patient. 8.  Hypertension  9. Coronary artery disease  10. Thrombocytopenia, resolved  11. Presumed medical noncompliance with diet causing hypoglycemia     [facility] Course: The patient was admitted to the hospital on October 16, 2019 with complaint of shortness of breath. Please see per hospital admission H&P for further detail. It was also noted that patient mentioned to admitting physician that she had a fever at home but no documented fever while in the hospital.  Her influenza a and B were negative. Patient was treated with IV Rocephin that will be changed to p.o. antibiotics as mentioned below. Patient was admitted here with a diagnosis of shortness of breath due to COPD and possible sarcoidosis exacerbation. With regards to shortness of breath, patient has history of COPD and reportedly possible sarcoid. She had a chest x-ray done at the time of admission did not show any active disease.   She had a CTA chest done which was negative for PE and did not show any acute airspace disease other than decrease nodular infiltrates. Patient was seen by pulmonologist.  Patient was continued on IV steroid and change to p.o. steroid afterwards. She was also placed on nebulizer. On the day of discharge, patient had an echocardiogram which showed normal ejection fraction and mild to moderate pulmonary hypertension most likely due to COPD. The finding of echo has been discussed with the patient. Patient was seen by pulmonologist on the day of discharge and cleared for the discharge with outpatient pulmonary follow-up. Patient will be discharged home on home inhalers, oxygen as needed, tapering dose of steroid as mentioned below. With regards to diabetes mellitus, patient was initially continued on home dose of Lantus insulin and started on sliding scale in place of metformin. However, due to steroid patient's blood sugar was getting up. Subsequently, [facility]. Her hyperglycemia got better. It seems like patient is very noncompliant with her medications as well as diet. Her home dose of Lantus will be increased from 30 to 35 units and Metformin will be increased from 500 mg daily to 500 mg twice a day. Patient was also educated about signs and symptoms of hypoglycemia. Noncompliance issue was also discussed with the patient about her dietary habit. With regards to GERD, patient was claiming that she has hiatal hernia. There is no mention of hiatal hernia on CT scan. She also mentioned that she was supposed to see Dr. Farooq Rios from GI department today. She was also mentioning that she was told by some of the physician in the past not to take Prilosec. She wanted narcotics for GERD related pain. She was educated about how to treat GERD. After discussion in presence of nurse, patient has decided to go home on Prilosec twice a day and Zantac for 15 days every night. She will be followed by Dr. Farooq Rios as an outpatient for further work-up.   Patient also understands there is no role of narcotics in GERD. She will be continued on melatonin in place of Ambien for insomnia. She she was advised to continue using MiraLAX for her intermittent constipation. Patient will continue her home medication for the comorbidities. I saw this patient on the day of discharge made her walk in hallway. We will set up home health care. Patient was also advised to use Voltaren for her reportedly bilateral knee osteoarthritis. She verbalized understanding about it. She will be discharged home later today with the following medications. * Procedures: None  * No surgery found *      Consults: Pulmonary/Intensive care    Significant Diagnostic Studies: Chest x-ray, CTA of the chest, and echo    Discharge Exam:    Please read my progress note. * Discharge Condition: improved  * Disposition: Home    Discharge Medications:    Current Discharge Medication List      START taking these medications    Details   albuterol-ipratropium (DUO-NEB) 2.5 mg-0.5 mg/3 ml nebu 3 mL by Nebulization route every four (4) hours as needed for Other (shortness of breath). Qty: 30 Nebule, Refills: 0      diclofenac (VOLTAREN) 1 % gel Apply 2 g to affected area four (4) times daily. Gel should be measured and applied using the supplied dosing card. Apply dose (2 gm or 4 gm) to each location. If treatment site is the hands, patients should wait at least one (1) hour to wash their hands. APPLY TO bilateral knees  Qty: 100 g, Refills: 0      melatonin 3 mg tablet Take 1 Tab by mouth nightly. Qty: 30 Tab, Refills: 0      raNITIdine (ZANTAC) 150 mg tablet Take 1 Tab by mouth nightly. Qty: 15 Tab, Refills: 0      predniSONE (DELTASONE) 10 mg tablet 2 tablets po daily x 5 days then 1 tablet po daily x 5 days then STOP. Qty: 15 Tab, Refills: 0      cefdinir (OMNICEF) 300 mg capsule Take 1 Cap by mouth two (2) times a day.   Qty: 6 Cap, Refills: 0         CONTINUE these medications which have CHANGED Details   metFORMIN (GLUCOPHAGE) 500 mg tablet Take 1 Tab by mouth two (2) times daily (with meals). Qty: 60 Tab, Refills: 0      insulin glargine (LANTUS U-100 INSULIN) 100 unit/mL injection 35 Units by SubCUTAneous route daily. Qty: 1 Vial, Refills: 0      Omeprazole Magnesium (PRILOSEC) 10 mg suDR Take 20 mg by mouth two (2) times a day. Qty: 60 Each, Refills: 0         CONTINUE these medications which have NOT CHANGED    Details   lisinopril-hydroCHLOROthiazide (PRINZIDE, ZESTORETIC) 20-12.5 mg per tablet Take 1 Tab by mouth daily. Indications: hypertension      nitroglycerin (NITROSTAT) 0.4 mg SL tablet 1 Tab by SubLINGual route as needed for Chest Pain. Qty: 25 Tab, Refills: 1      fluticasone-salmeterol (ADVAIR) 250-50 mcg/dose diskus inhaler Take 1 puff by inhalation every twelve (12) hours. Qty: 1 Inhaler, Refills: 0      albuterol (PROVENTIL HFA, VENTOLIN HFA, PROAIR HFA) 90 mcg/actuation inhaler Take 1 puff by inhalation daily as needed for Wheezing. Qty: 1 Inhaler, Refills: 0      clopidogrel (PLAVIX) 75 mg tablet Take 1 tablet by mouth daily. Qty: 30 tablet, Refills: 11      atorvastatin (LIPITOR) 40 mg tablet Take 1 tablet by mouth nightly. Qty: 30 tablet, Refills: 11      aspirin 81 mg chewable tablet Take 1 tablet by mouth daily. Qty: 30 tablet, Refills: 0      polyethylene glycol (MIRALAX) 17 gram packet Take 1 packet by mouth daily. Qty: 14 each, Refills: 0      fluoxetine (PROZAC) 20 mg capsule Take  by mouth daily. tiotropium (SPIRIVA WITH HANDIHALER) 18 mcg inhalation capsule Take 1 Cap by inhalation daily. STOP taking these medications       zolpidem CR (AMBIEN CR) 12.5 mg tablet Comments:   Reason for Stopping:                   * Follow-up Care/Patient Instructions:   Activity: Activity as tolerated and PT/OT per Home Health  Diet: Cardiac Diet and Diabetic Diet  Wound Care: None needed    Follow-up Information     Follow up With Specialties Details Why Contact Info Colette Jang NP Nurse Practitioner  the office will call patient on Monday with appointment and  time. [address] [place] [address] [facility] [address]      Cassaundra Primrose, MD Gastroenterology, Internal Medicine On 10/28/2019 Appointment at 10:40 am [address]  [phone]      Tarsha Farrell MD Pulmonary Disease   [facility] [address] [facility]  [phone]          Follow-up Appointments   Procedures    FOLLOW UP VISIT Appointment in: Other ([facility]) 1. With PCP in 5 days 2. With dr. Fredis ramos's office in 2 weeks for sarcoidosis 3. With dr. Chan Randolph [GI] in 10 days     1. With PCP in 5 days  2. With dr. Fredis ramos's office in 2 weeks for sarcoidosis  3. With dr. Chan Randolph [GI] in 10 days     Standing Status:   Standing     Number of Occurrences:   1     Order Specific Question:   Appointment in     Answer:    Other (Specify)       Signed:  Yas Monahan MD  10/18/2019  12:33 PM

## 2019-10-18 NOTE — PROGRESS NOTES
0700 BSSR received by PETER Pham using MEGAN MAR I&O and Kardex. Whiteboard is updated and call bell within reach. 0830 Pt eating breakfast with no issues. Daily morning meds given with no issues. 0850 Pt to echo via wheelchair. 1010 Pt back from echo. Tele box reconnected. Pt c/o pain to abdomin. Pain med given. 1215 OT and PT at bedside with pt.    1230 Pt eating lunch with no issues. 1430 Rounded on pt. Pt ambulated to restroom. Requested ice water. 1530 Pt resting with eyes closed. Dressed and awaiting discharge. 26 Pt is alert and oriented to person place time and situation, denies pain and VS are stable for discharge home. PIV is removed and discharge instructions are reviewed prior to pt leaving. Pt is transported in wheelchair to waiting vehicle. Belongings and scripts with pt.

## 2019-10-18 NOTE — DISCHARGE INSTRUCTIONS
Patient Education        Sarcoidosis: Care Instructions  Your Care Instructions    Sarcoidosis (say \"xap-ijs-CVY-leilani\") is a rare disease that causes tiny lumps of cells throughout the body called granulomas. These lumps are too small to see or feel. They can form anywhere on the inside or outside of the body and can cause permanent scar tissue. They often form in the lungs, lymph nodes, liver, skin, or eyes. Sarcoidosis may affect how an organ works. For instance, if it is in the lungs, you may be short of breath. For most people, sarcoidosis is a long-term disease that lasts several years or a lifetime. But some cases go away in a few months. Experts have no way of knowing how it will affect you. For some people, the disease may cause no symptoms at all. For others, symptoms may include fever, body aches, swollen lymph glands, shortness of breath, painful joints, and numbness. It may lead to lung or heart problems. Sometimes sarcoidosis can cause high calcium levels in the blood. Sarcoidosis occurs most often in young and middle-aged adults. Although the cause is not known, the disease does not spread from person to person. Different types of sarcoidosis have different treatments. Sarcoidosis may require long-term treatment (lasting months to years) with corticosteroids and other medicines, especially if it causes symptoms. You may also need regular tests. Follow-up care is a key part of your treatment and safety. Be sure to make and go to all appointments, and call your doctor if you are having problems. It's also a good idea to know your test results and keep a list of the medicines you take. How can you care for yourself at home? · Take your medicines exactly as prescribed. Call your doctor if you think you are having a problem with your medicine. · Do not smoke. Smoking can make sarcoidosis worse. If you need help quitting, talk to your doctor about stop-smoking programs and medicines.  These can increase your chances of quitting for good. · Avoid dust, smoke, and fumes. They can harm your lungs. · Drink plenty of fluids, enough so that your urine is light yellow or clear like water. If you have kidney, heart, or liver disease and have to limit fluids, talk with your doctor before you increase the amount of fluids you drink. · If your doctor recommends it, get more exercise. Walking is a good choice. Bit by bit, increase the amount you walk every day. Try for at least 30 minutes on most days of the week. You also may want to swim, bike, or do other activities. When should you call for help? Call 911 anytime you think you may need emergency care. For example, call if:    · You have severe trouble breathing.     · You passed out (lost consciousness).    Call your doctor now or seek immediate medical care if:    · You have changes in your vision.     · You are very tired, get confused, or urinate a lot.     · Your symptoms do not get better, or they get worse.    Watch closely for changes in your health, and be sure to contact your doctor if you have any problems. Where can you learn more? Go to http://odessa-mack.info/. Enter 67 [phone] 78 in the search box to learn more about \"Sarcoidosis: Care Instructions. \"  Current as of: June 9, 2019  Content Version: 12.2  © 3229-8323 [employer]. Care instructions adapted under license by [employer] (which disclaims liability or warranty for this information). If you have questions about a medical condition or this instruction, always ask your healthcare professional. [place] [address] any warranty or liability for your use of this information.

## 2019-10-18 NOTE — PROGRESS NOTES
OT order received and chart reviewed. Patient currently with transport in room to take patient for Echo. Will continue to follow and see patient when available/as appropriate.             Thank you for this referral,   Mir Brooks MS, OTR/L

## 2019-10-18 NOTE — PROGRESS NOTES
[facility]  Progress Note    Patient: Gopal Dai Age: 81 y.o. : 1950 MR#: [id number] SSN: [id number]  Date/Time: 10/18/2019    Subjective: The patient was seen in presence of nursing. I made patient walk in hallway for about 100 steps. Her saturation remained in 100 percentage on room air. However she has oxygen at home. Her shortness of breath is better. She has dyspnea on exertion and dizziness on exertion sometimes. She also states she gets very bad heartburns and was supposed to see GI Dr. Pauline Andino today. She also mentioned that she has bilateral knee pain. Denies any chest pain other than chest discomfort from hiatal hernia. Dry cough present. No nausea vomiting currently. She lives with her . Assessment:     1. Shortness of breath, multifactorial, improving  2. Reportedly COPD with exacerbation, clinically improving  3. GERD, patient states she also has hiatal hernia. 4.  History of sarcoidosis without any exacerbation. 5.  Chronic hypoxic respiratory failure on exertion on home oxygen 3 L. 6.  Diabetes with hyperglycemia due to steroid  7. Bilateral chronic exertional knee pain most likely due to osteoarthritis per patient. 8.  Hypertension  9. Coronary artery disease  10. Thrombocytopenia, resolved    PLAN:     Continue current management. Patient had some hypoglycemia earlier. She was started on D5 and she ate. Now her blood sugar went up. The plan is to stop insulin drip, start increasing dose of Lantus and metformin. Diabetic educator will talk to her about signs and symptoms of hypoglycemia. With regards to sarcoid and COPD, patient will be continued on home dose of inhaler and tapering dose of steroid. We will make sure patient has a pulmonologist appointment prior to discharge. With regards to bilateral chronic exertional knee pain, patient will be placed on Voltaren.   With regards to GERD, I do not see any hiatal hernia on CT scan and chest x-ray. However patient thinks she has it. We will put her on PPI twice daily and Zantac every night to control her symptoms. Zantac can be stopped after 2 weeks. I will asked the staff to make an appointment with Dr. Jimmie Santamaria again. It has been discussed with the patient that patient can not have narcotic prescription for her hiatal hernia pain. She verbalized understanding about it. Patient can be discharged home with home health care today. Case discussed with:  [x]Patient  []Family  [x]Nursing  []Case Management  DVT Prophylaxis:  []Lovenox  [x]Hep SQ  []SCDs  []Coumadin   []On Heparin gtt    Objective:   VS:   Visit Vitals  /83   Pulse 90   Temp 98.7 °F (37.1 °C)   Resp 20   Ht 5' 2\" (1.575 m)   Wt 66.6 kg (146 lb 12.8 oz)   SpO2 98%   Breastfeeding? No   BMI 26.85 kg/m²      Tmax/24hrs: Temp (24hrs), Av.3 °F (36.8 °C), Min:97.6 °F (36.4 °C), Max:98.7 °F (37.1 °C)    Input/Output:     Intake/Output Summary (Last 24 hours) at 10/18/2019 0849  Last data filed at 10/17/2019 1715  Gross per 24 hour   Intake 480 ml   Output 0 ml   Net 480 ml       General:  Awake, alert, follows commands  Cardiovascular:  S1S2+, RRR  Pulmonary:  Coarse bs b/l, no wheezing  GI:  Soft, BS+, NT, ND  Extremities:  No pedal edema. No ataxia.   Motor strength 5 out of 5 on all 4 extremities      Labs:    Recent Results (from the past 24 hour(s))   GLUCOSE, POC    Collection Time: 10/17/19 11:20 AM   Result Value Ref Range    Glucose (POC) 594 (HH) 70 - 110 mg/dL   GLUCOSTABILIZER    Collection Time: 10/17/19 11:43 AM   Result Value Ref Range    Glucose DELETED mg/dL    Insulin order DELETED units/hour    Insulin adminstered DELETED units/hour    Multiplier DELETED     Low target DELETED mg/dL    High target DELETED mg/dL    D50 order DELETED ml    D50 administered DELETED ml    Minutes until next BG DELETED min    Order initials DELETED     Administered initials DELETED GLUCOSTABILIZER    Collection Time: 10/17/19 11:46 AM   Result Value Ref Range    Glucose 594 mg/dL    Insulin order 10.7 units/hour    Insulin adminstered 10.7 units/hour    Multiplier 0.020     Low target 140 mg/dL    High target 180 mg/dL    D50 order 0.0 ml    D50 administered 0.00 ml    Minutes until next BG 60 min    Order initials      Administered initials     GLUCOSTABILIZER    Collection Time: 10/17/19 12:54 PM   Result Value Ref Range    Glucose 426 mg/dL    Insulin order 7.3 units/hour    Insulin adminstered 7.3 units/hour    Multiplier 0.020     Low target 140 mg/dL    High target 180 mg/dL    D50 order 0.0 ml    D50 administered 0.00 ml    Minutes until next BG 60 min    Order initials      Administered initials     METABOLIC PANEL, BASIC    Collection Time: 10/17/19  1:35 PM   Result Value Ref Range    Sodium 137 136 - 145 mmol/L    Potassium 3.5 3.5 - 5.5 mmol/L    Chloride 105 100 - 111 mmol/L    CO2 18 (L) 21 - 32 mmol/L    Anion gap 14 3.0 - 18 mmol/L    Glucose 301 (H) 74 - 99 mg/dL    BUN 24 (H) 7.0 - 18 MG/DL    Creatinine 1.31 (H) 0.6 - 1.3 MG/DL    BUN/Creatinine ratio 18 12 - 20      GFR est AA 49 (L) >60 ml/min/1.73m2    GFR est non-AA 41 (L) >60 ml/min/1.73m2    Calcium 8.4 (L) 8.5 - 10.1 MG/DL   MAGNESIUM    Collection Time: 10/17/19  1:35 PM   Result Value Ref Range    Magnesium 1.7 1.6 - 2.6 mg/dL   PHOSPHORUS    Collection Time: 10/17/19  1:35 PM   Result Value Ref Range    Phosphorus 2.8 2.5 - 4.9 MG/DL   HEMOGLOBIN A1C WITH EAG    Collection Time: 10/17/19  1:35 PM   Result Value Ref Range    Hemoglobin A1c 11.8 (H) 4.2 - 5.6 %    Est. average glucose 292 mg/dL   GLUCOSE, POC    Collection Time: 10/17/19  1:56 PM   Result Value Ref Range    Glucose (POC) 350 (H) 70 - 110 mg/dL   GLUCOSTABILIZER    Collection Time: 10/17/19  1:57 PM   Result Value Ref Range    Glucose 350 mg/dL    Insulin order 8.7 units/hour    Insulin adminstered 8.7 units/hour    Multiplier 0.030     Low target 140 mg/dL    High target 180 mg/dL    D50 order 0.0 ml    D50 administered 0.00 ml    Minutes until next BG 60 min    Order initials SM     Administered initials SM    GLUCOSE, POC    Collection Time: 10/17/19  3:01 PM   Result Value Ref Range    Glucose (POC) 357 (H) 70 - 110 mg/dL   GLUCOSTABILIZER    Collection Time: 10/17/19  3:19 PM   Result Value Ref Range    Glucose 357 mg/dL    Insulin order 11.9 units/hour    Insulin adminstered 11.9 units/hour    Multiplier 0.040     Low target 140 mg/dL    High target 180 mg/dL    D50 order 0.0 ml    D50 administered 0.00 ml    Minutes until next BG 60 min    Order initials YZM     Administered initials 357    GLUCOSE, POC    Collection Time: 10/17/19  4:21 PM   Result Value Ref Range    Glucose (POC) 266 (H) 70 - 110 mg/dL   GLUCOSTABILIZER    Collection Time: 10/17/19  4:22 PM   Result Value Ref Range    Glucose 266 mg/dL    Insulin order 10.3 units/hour    Insulin adminstered 10.3 units/hour    Multiplier 0.050     Low target 140 mg/dL    High target 180 mg/dL    D50 order 0.0 ml    D50 administered 0.00 ml    Minutes until next BG 60 min    Order initials YZM     Administered initials YZM    MAGNESIUM    Collection Time: 10/17/19  4:30 PM   Result Value Ref Range    Magnesium 1.7 1.6 - 2.6 mg/dL   GLUCOSE, POC    Collection Time: 10/17/19  5:23 PM   Result Value Ref Range    Glucose (POC) 223 (H) 70 - 110 mg/dL   GLUCOSTABILIZER    Collection Time: 10/17/19  5:23 PM   Result Value Ref Range    Glucose 223 mg/dL    Insulin order 9.8 units/hour    Insulin adminstered 9.8 units/hour    Multiplier 0.060     Low target 140 mg/dL    High target 180 mg/dL    D50 order 0.0 ml    D50 administered 0.00 ml    Minutes until next BG 60 min    Order initials YZM     Administered initials YZM    GLUCOSE, POC    Collection Time: 10/17/19  6:28 PM   Result Value Ref Range    Glucose (POC) 180 (H) 70 - 110 mg/dL   GLUCOSTABILIZER    Collection Time: 10/17/19  6:30 PM   Result Value Ref Range    Glucose 180 mg/dL    Insulin order 7.2 units/hour    Insulin adminstered 7.2 units/hour    Multiplier 0.060     Low target 140 mg/dL    High target 180 mg/dL    D50 order 0.0 ml    D50 administered 0.00 ml    Minutes until next BG 60 min    Order initials SM     Administered initials SM    GLUCOSE, POC    Collection Time: 10/17/19  7:35 PM   Result Value Ref Range    Glucose (POC) 192 (H) 70 - 110 mg/dL   GLUCOSTABILIZER    Collection Time: 10/17/19  7:36 PM   Result Value Ref Range    Glucose 192 mg/dL    Insulin order 9.2 units/hour    Insulin adminstered 9.2 units/hour    Multiplier 0.070     Low target 140 mg/dL    High target 180 mg/dL    D50 order 0.0 ml    D50 administered 0.00 ml    Minutes until next BG 60 min    Order initials SM     Administered initials SM    GLUCOSE, POC    Collection Time: 10/17/19  8:39 PM   Result Value Ref Range    Glucose (POC) 222 (H) 70 - 110 mg/dL   GLUCOSTABILIZER    Collection Time: 10/17/19  8:40 PM   Result Value Ref Range    Glucose 222 mg/dL    Insulin order 13.0 units/hour    Insulin adminstered 13.0 units/hour    Multiplier 0.080     Low target 140 mg/dL    High target 180 mg/dL    D50 order 0.0 ml    D50 administered 0.00 ml    Minutes until next BG 60 min    Order initials      Administered initials     GLUCOSE, POC    Collection Time: 10/17/19  9:39 PM   Result Value Ref Range    Glucose (POC) 233 (H) 70 - 110 mg/dL   GLUCOSTABILIZER    Collection Time: 10/17/19  9:40 PM   Result Value Ref Range    Glucose 233 mg/dL    Insulin order 15.6 units/hour    Insulin adminstered 15.6 units/hour    Multiplier 0.090     Low target 140 mg/dL    High target 180 mg/dL    D50 order 0.0 ml    D50 administered 0.00 ml    Minutes until next BG 60 min    Order initials      Administered initials     METABOLIC PANEL, BASIC    Collection Time: 10/17/19  9:49 PM   Result Value Ref Range    Sodium 138 136 - 145 mmol/L    Potassium 3.7 3.5 - 5.5 mmol/L    Chloride 110 100 - 111 mmol/L    CO2 19 (L) 21 - 32 mmol/L    Anion gap 9 3.0 - 18 mmol/L    Glucose 224 (H) 74 - 99 mg/dL    BUN 28 (H) 7.0 - 18 MG/DL    Creatinine 1.30 0.6 - 1.3 MG/DL    BUN/Creatinine ratio 22 (H) 12 - 20      GFR est AA 50 (L) >60 ml/min/1.73m2    GFR est non-AA 41 (L) >60 ml/min/1.73m2    Calcium 8.1 (L) 8.5 - 10.1 MG/DL   MAGNESIUM    Collection Time: 10/17/19  9:49 PM   Result Value Ref Range    Magnesium 1.7 1.6 - 2.6 mg/dL   GLUCOSE, POC    Collection Time: 10/17/19 10:41 PM   Result Value Ref Range    Glucose (POC) 265 (H) 70 - 110 mg/dL   GLUCOSTABILIZER    Collection Time: 10/17/19 10:43 PM   Result Value Ref Range    Glucose 265 mg/dL    Insulin order 20.5 units/hour    Insulin adminstered 20.5 units/hour    Multiplier 0.100     Low target 140 mg/dL    High target 180 mg/dL    D50 order 0.0 ml    D50 administered 0.00 ml    Minutes until next BG 60 min    Order initials      Administered initials     GLUCOSE, POC    Collection Time: 10/17/19 11:49 PM   Result Value Ref Range    Glucose (POC) 242 (H) 70 - 110 mg/dL   GLUCOSTABILIZER    Collection Time: 10/17/19 11:49 PM   Result Value Ref Range    Glucose 242 mg/dL    Insulin order 20.0 units/hour    Insulin adminstered 20.0 units/hour    Multiplier 0.110     Low target 140 mg/dL    High target 180 mg/dL    D50 order 0.0 ml    D50 administered 0.00 ml    Minutes until next BG 60 min    Order initials cs     Administered initials cs    GLUCOSE, POC    Collection Time: 10/18/19 12:57 AM   Result Value Ref Range    Glucose (POC) 220 (H) 70 - 110 mg/dL   GLUCOSTABILIZER    Collection Time: 10/18/19 12:57 AM   Result Value Ref Range    Glucose 220 mg/dL    Insulin order 19.2 units/hour    Insulin adminstered 19.2 units/hour    Multiplier 0.120     Low target 140 mg/dL    High target 180 mg/dL    D50 order 0.0 ml    D50 administered 0.00 ml    Minutes until next BG 60 min    Order initials      Administered initials     MAGNESIUM    Collection Time: 10/18/19  2:02 AM   Result Value Ref Range    Magnesium 1.6 1.6 - 2.6 mg/dL   GLUCOSE, POC    Collection Time: 10/18/19  2:04 AM   Result Value Ref Range    Glucose (POC) 229 (H) 70 - 110 mg/dL   GLUCOSTABILIZER    Collection Time: 10/18/19  2:04 AM   Result Value Ref Range    Glucose 229 mg/dL    Insulin order 22.0 units/hour    Insulin adminstered 22.0 units/hour    Multiplier 0.130     Low target 140 mg/dL    High target 180 mg/dL    D50 order 0.0 ml    D50 administered 0.00 ml    Minutes until next BG 60 min    Order initials      Administered initials     GLUCOSE, POC    Collection Time: 10/18/19  3:08 AM   Result Value Ref Range    Glucose (POC) 204 (H) 70 - 110 mg/dL   GLUCOSTABILIZER    Collection Time: 10/18/19  3:08 AM   Result Value Ref Range    Glucose 204 mg/dL    Insulin order 20.2 units/hour    Insulin adminstered 20.2 units/hour    Multiplier 0.140     Low target 140 mg/dL    High target 180 mg/dL    D50 order 0.0 ml    D50 administered 0.00 ml    Minutes until next BG 60 min    Order initials lisbet     Administered initials lisbet    GLUCOSE, POC    Collection Time: 10/18/19  4:08 AM   Result Value Ref Range    Glucose (POC) 236 (H) 70 - 110 mg/dL   GLUCOSTABILIZER    Collection Time: 10/18/19  4:09 AM   Result Value Ref Range    Glucose 236 mg/dL    Insulin order 26.4 units/hour    Insulin adminstered 26.4 units/hour    Multiplier 0.150     Low target 140 mg/dL    High target 180 mg/dL    D50 order 0.0 ml    D50 administered 0.00 ml    Minutes until next BG 60 min    Order initials      Administered initials     GLUCOSE, POC    Collection Time: 10/18/19  5:16 AM   Result Value Ref Range    Glucose (POC) 149 (H) 70 - 110 mg/dL   GLUCOSTABILIZER    Collection Time: 10/18/19  5:16 AM   Result Value Ref Range    Glucose 149 mg/dL    Insulin order 13.4 units/hour    Insulin adminstered 13.4 units/hour    Multiplier 0.150     Low target 140 mg/dL    High target 180 mg/dL    D50 order 0.0 ml D50 administered 0.00 ml    Minutes until next BG 60 min    Order initials      Administered initials     MAGNESIUM    Collection Time: 10/18/19  6:00 AM   Result Value Ref Range    Magnesium 1.7 1.6 - 2.6 mg/dL   CBC WITH AUTOMATED DIFF    Collection Time: 10/18/19  6:00 AM   Result Value Ref Range    WBC 13.0 4.6 - 13.2 K/uL    RBC 4.40 4.20 - 5.30 M/uL    HGB 10.2 (L) 12.0 - 16.0 g/dL    HCT 31.6 (L) 35.0 - 45.0 %    MCV 71.8 (L) 74.0 - 97.0 FL    MCH 23.2 (L) 24.0 - 34.0 PG    MCHC 32.3 31.0 - 37.0 g/dL    RDW 14.7 (H) 11.6 - 14.5 %    PLATELET 351 481 - 968 K/uL    NEUTROPHILS 77 (H) 40 - 73 %    LYMPHOCYTES 14 (L) 21 - 52 %    MONOCYTES 9 3 - 10 %    EOSINOPHILS 0 0 - 5 %    BASOPHILS 0 0 - 2 %    ABS. NEUTROPHILS 10.1 (H) 1.8 - 8.0 K/UL    ABS. LYMPHOCYTES 1.8 0.9 - 3.6 K/UL    ABS. MONOCYTES 1.1 0.05 - 1.2 K/UL    ABS. EOSINOPHILS 0.0 0.0 - 0.4 K/UL    ABS.  BASOPHILS 0.0 0.0 - 0.1 K/UL    DF AUTOMATED     METABOLIC PANEL, BASIC    Collection Time: 10/18/19  6:00 AM   Result Value Ref Range    Sodium 141 136 - 145 mmol/L    Potassium 3.3 (L) 3.5 - 5.5 mmol/L    Chloride 111 100 - 111 mmol/L    CO2 20 (L) 21 - 32 mmol/L    Anion gap 10 3.0 - 18 mmol/L    Glucose 85 74 - 99 mg/dL    BUN 26 (H) 7.0 - 18 MG/DL    Creatinine 1.18 0.6 - 1.3 MG/DL    BUN/Creatinine ratio 22 (H) 12 - 20      GFR est AA 55 (L) >60 ml/min/1.73m2    GFR est non-AA 46 (L) >60 ml/min/1.73m2    Calcium 7.9 (L) 8.5 - 10.1 MG/DL   PROTHROMBIN TIME + INR    Collection Time: 10/18/19  6:00 AM   Result Value Ref Range    Prothrombin time 13.0 11.5 - 15.2 sec    INR 1.0 0.8 - 1.2     GLUCOSE, POC    Collection Time: 10/18/19  6:15 AM   Result Value Ref Range    Glucose (POC) 111 (H) 70 - 110 mg/dL   GLUCOSTABILIZER    Collection Time: 10/18/19  6:16 AM   Result Value Ref Range    Glucose 111 mg/dL    Insulin order 6.1 units/hour    Insulin adminstered 6.1 units/hour    Multiplier 0.120     Low target 140 mg/dL    High target 180 mg/dL    D50 order 0.0 ml    D50 administered 0.00 ml    Minutes until next BG 60 min    Order initials cs     Administered initials cs    GLUCOSE, POC    Collection Time: 10/18/19  7:30 AM   Result Value Ref Range    Glucose (POC) 76 70 - 110 mg/dL   GLUCOSTABILIZER    Collection Time: 10/18/19  7:30 AM   Result Value Ref Range    Glucose 76 mg/dL    Insulin order 1.5 units/hour    Insulin adminstered 1.5 units/hour    Multiplier 0.096     Low target 140 mg/dL    High target 180 mg/dL    D50 order 0.0 ml    D50 administered 0.00 ml    Minutes until next BG 60 min    Order initials      Administered initials     GLUCOSE, POC    Collection Time: 10/18/19  8:35 AM   Result Value Ref Range    Glucose (POC) 70 70 - 110 mg/dL   GLUCOSTABILIZER    Collection Time: 10/18/19  8:36 AM   Result Value Ref Range    Glucose 70 mg/dL    Insulin order 0.0 units/hour    Insulin adminstered 0.0 units/hour    Multiplier 0.077     Low target 140 mg/dL    High target 180 mg/dL    D50 order 12.0 ml    D50 administered 12.00 ml    Minutes until next BG 15 min    Order initials SM     Administered initials SM      Current Discharge Medication List      START taking these medications    Details   albuterol-ipratropium (DUO-NEB) 2.5 mg-0.5 mg/3 ml nebu 3 mL by Nebulization route every four (4) hours as needed for Other (shortness of breath). Qty: 30 Nebule, Refills: 0      diclofenac (VOLTAREN) 1 % gel Apply 2 g to affected area four (4) times daily. Gel should be measured and applied using the supplied dosing card. Apply dose (2 gm or 4 gm) to each location. If treatment site is the hands, patients should wait at least one (1) hour to wash their hands. APPLY TO bilateral knees  Qty: 100 g, Refills: 0      melatonin 3 mg tablet Take 1 Tab by mouth nightly.   Qty: 30 Tab, Refills: 0      predniSONE (DELTASONE) 10 mg tablet 2 tablets po daily x 5 days then 1 tablet po daily x 5 days then half tablet every day until seen by pulmonologist.  Clair Slider: 35 Tab, Refills: 0      raNITIdine (ZANTAC) 150 mg tablet Take 1 Tab by mouth nightly. Qty: 15 Tab, Refills: 0         CONTINUE these medications which have CHANGED    Details   metFORMIN (GLUCOPHAGE) 500 mg tablet Take 1 Tab by mouth two (2) times daily (with meals). Qty: 60 Tab, Refills: 0      insulin glargine (LANTUS U-100 INSULIN) 100 unit/mL injection 35 Units by SubCUTAneous route daily. Qty: 1 Vial, Refills: 0      Omeprazole Magnesium (PRILOSEC) 10 mg suDR Take 20 mg by mouth two (2) times a day. Qty: 60 Each, Refills: 0         CONTINUE these medications which have NOT CHANGED    Details   lisinopril-hydroCHLOROthiazide (PRINZIDE, ZESTORETIC) 20-12.5 mg per tablet Take 1 Tab by mouth daily. Indications: hypertension      nitroglycerin (NITROSTAT) 0.4 mg SL tablet 1 Tab by SubLINGual route as needed for Chest Pain. Qty: 25 Tab, Refills: 1      fluticasone-salmeterol (ADVAIR) 250-50 mcg/dose diskus inhaler Take 1 puff by inhalation every twelve (12) hours. Qty: 1 Inhaler, Refills: 0      albuterol (PROVENTIL HFA, VENTOLIN HFA, PROAIR HFA) 90 mcg/actuation inhaler Take 1 puff by inhalation daily as needed for Wheezing. Qty: 1 Inhaler, Refills: 0      clopidogrel (PLAVIX) 75 mg tablet Take 1 tablet by mouth daily. Qty: 30 tablet, Refills: 11      atorvastatin (LIPITOR) 40 mg tablet Take 1 tablet by mouth nightly. Qty: 30 tablet, Refills: 11      aspirin 81 mg chewable tablet Take 1 tablet by mouth daily. Qty: 30 tablet, Refills: 0      polyethylene glycol (MIRALAX) 17 gram packet Take 1 packet by mouth daily. Qty: 14 each, Refills: 0      fluoxetine (PROZAC) 20 mg capsule Take  by mouth daily. tiotropium (SPIRIVA WITH HANDIHALER) 18 mcg inhalation capsule Take 1 Cap by inhalation daily.            STOP taking these medications       zolpidem CR (AMBIEN CR) 12.5 mg tablet Comments:   Reason for Stopping:                 Signed By: Dimple Allan MD     October 18, 2019

## 2019-10-18 NOTE — PROGRESS NOTES
Problem: Self Care Deficits Care Plan (Adult)  Goal: *Acute Goals and Plan of Care (Insert Text)  Outcome: Resolved/Met       OCCUPATIONAL THERAPY EVALUATION/DISCHARGE    Patient: Bibiana Bragg (6 y.o. female)  Date: 10/18/2019  Primary Diagnosis: Pulmonary sarcoidosis ([facility] [address] [D86.0]  Precautions: Aspiration, Fall  PLOF: Patient was independent with self-care and used a cane for functional mobility PTA. ASSESSMENT AND RECOMMENDATIONS:  Pt cleared to participate in OT evaluation by RN. Upon entering the room, pt was supine in bed, alert, and agreeable to participate in OT evaluation. Patient is modified independent - independent with basic self-care, modified independent with bed mobility and Supervision with functional transfers using rolling walker. Based on the objective data described below, the patient presents with no deficits that impede pt function with ADLs, functional transfers, and functional mobility. Patient left seated EOB, all needs met, and Dr. Delroy Beard and RN present. At this time patient is safe to d/c home with family support. OT to d/c from caseload at this time. Skilled occupational therapy is not indicated at this time. Discharge Recommendations: None  Further Equipment Recommendations for Discharge: rolling walker      SUBJECTIVE:   Patient stated you can help me to the bathroom    OBJECTIVE DATA SUMMARY:     Past Medical History:   Diagnosis Date    Arthritis     \"generalized\"    Asthma     CAD in native artery     NSTEMI (Sep 2014); diffuse 65% pLAD, minimal disease RCA & LCx. pLAD FFR 0.93.  LV  no RWMA (echo and LV angio)    Chronic neck pain     Chronic pain     left knee    Chronic pain syndrome     COPD (chronic obstructive pulmonary disease) (HCC)     Depression     Diabetes (HCC)     Diverticulitis     GERD (gastroesophageal reflux disease)     Heart disease     Hidradenitis 9/11/2014    Hypertension     Left knee pain     Narcotic dependence ([facility] [address]     Right wrist pain     Sarcoidosis      Past Surgical History:   Procedure Laterality Date    HX BREAST BIOPSY Right     9/10/14: She said tag in place from [address]  2005    HX HYSTERECTOMY      HX KNEE ARTHROSCOPY Left      Barriers to Learning/Limitations: None  Compensate with: visual, verbal, tactile, kinesthetic cues/model    Home Situation:   Home Situation  Home Environment: Assisted living  [address] [place] [address] [facility]: [facility]  # Steps to Enter: 0  One/Two Story Residence: One story  Living Alone: No  Support Systems: Family member(s), Spouse/Significant Other/Partner  Patient Expects to be Discharged to[de-identified] Assisted living  Current DME Used/Available at Home: Walker, rolling, Oxygen, portable, Nebulizer  Tub or Shower Type: Shower  ? Right hand dominant   ? Left hand dominant    Cognitive/Behavioral Status:  Neurologic State: Alert  Orientation Level: Oriented X4  Cognition: Follows commands  Safety/Judgement: Fall prevention; Awareness of environment    Skin: Intact  Edema: None noted    Vision/Perceptual:    Acuity: Within Defined Limits      Coordination: BUE  Fine Motor Skills-Upper: Left Intact; Right Intact    Gross Motor Skills-Upper: Left Intact; Right Intact    Balance:  Sitting: Intact  Standing: Impaired; With support  Standing - Static: Good  Standing - Dynamic : Fair    Strength: BUE  Strength:  Within functional limits    Tone & Sensation: BUE  Tone: Normal  Sensation: Intact    Range of Motion: BUE  AROM: Within functional limits      Functional Mobility and Transfers for ADLs:  Bed Mobility:  Supine to Sit: Modified independent  Sit to Supine: Modified independent  Scooting: Modified independent    Transfers:  Sit to Stand: Supervision  Stand to Sit: Supervision   Toilet Transfer : Supervision      ADL Assessment:  Feeding: Independent    Oral Facial Hygiene/Grooming: Independent    Bathing: Modified independent    Upper Body Dressing: Independent    Lower Body Dressing: Modified independent    Toileting: Independent    ADL Intervention:  Lower Body Dressing Assistance  Dressing Assistance: Modified independent  Socks: Modified independent  Position Performed: Seated edge of bed    Toileting  Toileting Assistance: Independent  Bladder Hygiene: Independent  Clothing Management: Independent    Cognitive Retraining  Safety/Judgement: Fall prevention; Awareness of environment    Pain:  Pain level pre-treatment: 9/10, chest   Pain level post-treatment: 9/10, chest  Pain Intervention(s): Medication (see MAR); Response to intervention: Nurse notified, See doc flow    Activity Tolerance:   Fair    Please refer to the flowsheet for vital signs taken during this treatment. After treatment:   ?  Patient left in no apparent distress sitting up in chair  ? Patient left in no apparent distress in bed  ? Call bell left within reach  ? Nursing notified  ? Caregiver present  ? Bed alarm activated    COMMUNICATION/EDUCATION:   ?      Role of Occupational Therapy in the acute care setting  ? Home safety education was provided and the patient/caregiver indicated understanding. ? Patient/family have participated as able and agree with findings and recommendations. ?      Patient is unable to participate in plan of care at this time. Thank you for this referral.  Den Chaidez OTR/L  Time Calculation: 16 mins      Eval Complexity: History: MEDIUM Complexity : Expanded review of history including physical, cognitive and psychosocial  history ; Examination: LOW Complexity : 1-3 performance deficits relating to physical, cognitive , or psychosocial skils that result in activity limitations and / or participation restrictions ;    Decision Making:LOW Complexity : No comorbidities that affect functional and no verbal or physical assistance needed to complete eval tasks

## 2019-10-18 NOTE — PROGRESS NOTES
PT order received and chart was reviewed. Attempted to see patient for evaluation however transport is currently in the room taking patient for testing. Will continue to follow.   Balbina Bauman, PT

## 2019-10-18 NOTE — PROGRESS NOTES
[facility]  Pulmonary, Critical Care, and Sleep Medicine    Pulmonary Medicine Progress Note    Name: Thai Padilla MRN: [id number]  : 1950 Hospital: [address] [place] [address] Dr  Date: 10/18/2019       Subjective:  Pt improving. Able to ambulate without issues.  On RA    Patient Active Problem List   Diagnosis Code    Chronic pain syndrome G89.4    DJD (degenerative joint disease) of knee M17.10    Knee pain, left M25.562    Depression F32.9    COPD, moderate ([facility]) J44.9    HNP (herniated nucleus pulposus), cervical M50.20    HTN (hypertension) I10    Osteoporosis M81.0    DM (diabetes mellitus) ([facility] [address] E11.9    Sarcoidosis D86.9    Chest pain R07.9    Trichomonas A59.9    Hidradenitis L73.2    Recurrent falls R29.6    GERD (gastroesophageal reflux disease) K21.9    Insomnia G47.00    Cocaine abuse ([facility]) F14.10    RVH (right ventricular hypertrophy) I51.7    Cigarette smoker F17.210    Non-ST elevated myocardial infarction (non-STEMI) ([facility]) I21.4    Anemia D64.9    CAD in native artery I25.10    Diverticulitis K57.92    Abdominal pain R10.9    Syncope and collapse R55    Hyperglycemia R73.9    Closed head injury S09.90XA    Chronic back pain M54.9, G89.29    Syncope R55    Pulmonary sarcoidosis ([facility]) D86.0    Acute on chronic respiratory failure with hypoxia and hypercapnia ([facility]) J96.21, J96.22       Assessment:  · Acute on Chronic Hypoxic Respiratory Failure  · - likely 2/2 viral URI, less likely sarcoidosis exacerbation given stable imaging  · Sarcoidosis  · - only treated with steroids intermittently, Dx  by skin lesion  · - bronch with endobronchial Bx 2018- path negative  · Hx of MI  · - s/p cath without intervention  · AMBROCIO  · - cr improving  · Fever, cough, N/V/D  · - most likely viral illness  · Hyperglycemia  · Possible pHTN  · - elevated RVSP 48 on 2decho    Impression/Plan:  - short course of steroids  - procal negative, d/c abx  - Will plan for follow up in our clinic    Will sign off    FiO2 to keep SpO2 >=92%, HOB >=30 degree, aspiration precautions, aggressive pulmonary toileting, incentive spirometry. Other issues management by primary team and respective consultants. Events and notes from last 24 hours reviewed. Discussed with patient and family, answered all questions to their satisfaction. Care plan discussed with nursing.      Labs and images personally seen and available reports reviewed  All current medicines are reviewed       Medications- Current:  Current Facility-Administered Medications   Medication Dose Route Frequency    albuterol-ipratropium (DUO-NEB) 2.5 MG-0.5 MG/3 ML  3 mL Nebulization Q6H RT    insulin lispro (HUMALOG) injection   SubCUTAneous AC&HS    dextrose 10% infusion 125-250 mL  125-250 mL IntraVENous PRN    insulin glargine (LANTUS) injection 35 Units  35 Units SubCUTAneous DAILY    diclofenac (VOLTAREN) 1 % topical gel 2 g  2 g Topical QID    pantoprazole (PROTONIX) tablet 40 mg  40 mg Oral ACB&D    famotidine (PEPCID) tablet 20 mg  20 mg Oral QHS    [START ON 10/19/2019] predniSONE (DELTASONE) tablet 20 mg  20 mg Oral DAILY WITH BREAKFAST    melatonin tablet 3 mg  3 mg Oral QHS    cefpodoxime (VANTIN) tablet 200 mg  200 mg Oral Q12H    aluminum-magnesium hydroxide (MAALOX) oral suspension 15 mL  15 mL Oral QID PRN    docusate sodium (COLACE) capsule 100 mg  100 mg Oral BID    aspirin chewable tablet 81 mg  81 mg Oral DAILY    atorvastatin (LIPITOR) tablet 40 mg  40 mg Oral QHS    clopidogrel (PLAVIX) tablet 75 mg  75 mg Oral DAILY    FLUoxetine (PROzac) capsule 20 mg  20 mg Oral DAILY    lisinopril-hydroCHLOROthiazide (PRINZIDE, ZESTORETIC) 20-12.5 mg per tablet 1 Tab  1 Tab Oral DAILY    nitroglycerin (NITROSTAT) tablet 0.4 mg  0.4 mg SubLINGual PRN    polyethylene glycol (MIRALAX) packet 17 g  17 g Oral DAILY    glucose chewable tablet 16 g  16 g Oral PRN    glucagon (GLUCAGEN) injection 1 mg  1 mg IntraMUSCular PRN    budesonide (PULMICORT) 1,000 mcg/2 mL nebulizer susp  1,000 mcg Nebulization BID RT    arformoterol (BROVANA) neb solution 15 mcg  15 mcg Nebulization BID RT    acetaminophen (TYLENOL) tablet 650 mg  650 mg Oral Q4H PRN    dextrose 10% infusion 125-250 mL  125-250 mL IntraVENous PRN       Objective:  Vital Signs:    Visit Vitals  /67   Pulse 99   Temp 98.6 °F (37 °C)   Resp 20   Ht 5' 2\" (1.575 m)   Wt 66.2 kg (146 lb)   SpO2 100%   Breastfeeding? No   BMI 26.70 kg/m²      O2 Device: Room air  O2 Flow Rate (L/min): 0 l/min  Temp (24hrs), Av.3 °F (36.8 °C), Min:97.6 °F (36.4 °C), Max:98.7 °F (37.1 °C)      Intake/Output:   Last shift:      10/18 0701 - 10/18 1900  In: 480 [P.O.:480]  Out: -   Last 3 shifts: 10/16 1901 - 10/18 0700  In: 720 [P.O.:720]  Out: 0     Intake/Output Summary (Last 24 hours) at 10/18/2019 1247  Last data filed at 10/18/2019 0959  Gross per 24 hour   Intake 720 ml   Output 0 ml   Net 720 ml       Physical Exam:     General/Neurology: Alert, Awake  Head:   Normocephalic, without obvious abnormality  Eye:   PERRL, EOM intact  Oral:  Mucus membranes moist  Lung:   Good air movement, no wheezing, few scattered rhonchi  Heart:   S1 S2 present  Abdomen:  Soft, non tender, BS+nt    Extremities:  No pedal edema.    Skin:   Dry, intact  Data:      Recent Results (from the past 24 hour(s))   GLUCOSTABILIZER    Collection Time: 10/17/19 12:54 PM   Result Value Ref Range    Glucose 426 mg/dL    Insulin order 7.3 units/hour    Insulin adminstered 7.3 units/hour    Multiplier 0.020     Low target 140 mg/dL    High target 180 mg/dL    D50 order 0.0 ml    D50 administered 0.00 ml    Minutes until next BG 60 min    Order initials SM     Administered initials SM    METABOLIC PANEL, BASIC    Collection Time: 10/17/19  1:35 PM   Result Value Ref Range    Sodium 137 136 - 145 mmol/L    Potassium 3.5 3.5 - 5.5 mmol/L    Chloride 105 100 - 111 mmol/L    CO2 18 (L) 21 - 32 mmol/L    Anion gap 14 3.0 - 18 mmol/L    Glucose 301 (H) 74 - 99 mg/dL    BUN 24 (H) 7.0 - 18 MG/DL    Creatinine 1.31 (H) 0.6 - 1.3 MG/DL    BUN/Creatinine ratio 18 12 - 20      GFR est AA 49 (L) >60 ml/min/1.73m2    GFR est non-AA 41 (L) >60 ml/min/1.73m2    Calcium 8.4 (L) 8.5 - 10.1 MG/DL   MAGNESIUM    Collection Time: 10/17/19  1:35 PM   Result Value Ref Range    Magnesium 1.7 1.6 - 2.6 mg/dL   PHOSPHORUS    Collection Time: 10/17/19  1:35 PM   Result Value Ref Range    Phosphorus 2.8 2.5 - 4.9 MG/DL   HEMOGLOBIN A1C WITH EAG    Collection Time: 10/17/19  1:35 PM   Result Value Ref Range    Hemoglobin A1c 11.8 (H) 4.2 - 5.6 %    Est. average glucose 292 mg/dL   GLUCOSE, POC    Collection Time: 10/17/19  1:56 PM   Result Value Ref Range    Glucose (POC) 350 (H) 70 - 110 mg/dL   GLUCOSTABILIZER    Collection Time: 10/17/19  1:57 PM   Result Value Ref Range    Glucose 350 mg/dL    Insulin order 8.7 units/hour    Insulin adminstered 8.7 units/hour    Multiplier 0.030     Low target 140 mg/dL    High target 180 mg/dL    D50 order 0.0 ml    D50 administered 0.00 ml    Minutes until next BG 60 min    Order initials SM     Administered initials SM    GLUCOSE, POC    Collection Time: 10/17/19  3:01 PM   Result Value Ref Range    Glucose (POC) 357 (H) 70 - 110 mg/dL   GLUCOSTABILIZER    Collection Time: 10/17/19  3:19 PM   Result Value Ref Range    Glucose 357 mg/dL    Insulin order 11.9 units/hour    Insulin adminstered 11.9 units/hour    Multiplier 0.040     Low target 140 mg/dL    High target 180 mg/dL    D50 order 0.0 ml    D50 administered 0.00 ml    Minutes until next BG 60 min    Order initials YZM     Administered initials 357    GLUCOSE, POC    Collection Time: 10/17/19  4:21 PM   Result Value Ref Range    Glucose (POC) 266 (H) 70 - 110 mg/dL   GLUCOSTABILIZER    Collection Time: 10/17/19  4:22 PM   Result Value Ref Range    Glucose 266 mg/dL    Insulin order 10.3 units/hour    Insulin adminstered 10.3 units/hour    Multiplier 0.050     Low target 140 mg/dL    High target 180 mg/dL    D50 order 0.0 ml    D50 administered 0.00 ml    Minutes until next BG 60 min    Order initials YZM     Administered initials YZM    MAGNESIUM    Collection Time: 10/17/19  4:30 PM   Result Value Ref Range    Magnesium 1.7 1.6 - 2.6 mg/dL   GLUCOSE, POC    Collection Time: 10/17/19  5:23 PM   Result Value Ref Range    Glucose (POC) 223 (H) 70 - 110 mg/dL   GLUCOSTABILIZER    Collection Time: 10/17/19  5:23 PM   Result Value Ref Range    Glucose 223 mg/dL    Insulin order 9.8 units/hour    Insulin adminstered 9.8 units/hour    Multiplier 0.060     Low target 140 mg/dL    High target 180 mg/dL    D50 order 0.0 ml    D50 administered 0.00 ml    Minutes until next BG 60 min    Order initials YZM     Administered initials YZM    GLUCOSE, POC    Collection Time: 10/17/19  6:28 PM   Result Value Ref Range    Glucose (POC) 180 (H) 70 - 110 mg/dL   GLUCOSTABILIZER    Collection Time: 10/17/19  6:30 PM   Result Value Ref Range    Glucose 180 mg/dL    Insulin order 7.2 units/hour    Insulin adminstered 7.2 units/hour    Multiplier 0.060     Low target 140 mg/dL    High target 180 mg/dL    D50 order 0.0 ml    D50 administered 0.00 ml    Minutes until next BG 60 min    Order initials SM     Administered initials SM    GLUCOSE, POC    Collection Time: 10/17/19  7:35 PM   Result Value Ref Range    Glucose (POC) 192 (H) 70 - 110 mg/dL   GLUCOSTABILIZER    Collection Time: 10/17/19  7:36 PM   Result Value Ref Range    Glucose 192 mg/dL    Insulin order 9.2 units/hour    Insulin adminstered 9.2 units/hour    Multiplier 0.070     Low target 140 mg/dL    High target 180 mg/dL    D50 order 0.0 ml    D50 administered 0.00 ml    Minutes until next BG 60 min    Order initials SM     Administered initials SM    GLUCOSE, POC    Collection Time: 10/17/19  8:39 PM   Result Value Ref Range    Glucose (POC) 222 (H) 70 - 110 mg/dL   GLUCOSTABILIZER Collection Time: 10/17/19  8:40 PM   Result Value Ref Range    Glucose 222 mg/dL    Insulin order 13.0 units/hour    Insulin adminstered 13.0 units/hour    Multiplier 0.080     Low target 140 mg/dL    High target 180 mg/dL    D50 order 0.0 ml    D50 administered 0.00 ml    Minutes until next BG 60 min    Order initials      Administered initials     GLUCOSE, POC    Collection Time: 10/17/19  9:39 PM   Result Value Ref Range    Glucose (POC) 233 (H) 70 - 110 mg/dL   GLUCOSTABILIZER    Collection Time: 10/17/19  9:40 PM   Result Value Ref Range    Glucose 233 mg/dL    Insulin order 15.6 units/hour    Insulin adminstered 15.6 units/hour    Multiplier 0.090     Low target 140 mg/dL    High target 180 mg/dL    D50 order 0.0 ml    D50 administered 0.00 ml    Minutes until next BG 60 min    Order initials      Administered initials     METABOLIC PANEL, BASIC    Collection Time: 10/17/19  9:49 PM   Result Value Ref Range    Sodium 138 136 - 145 mmol/L    Potassium 3.7 3.5 - 5.5 mmol/L    Chloride 110 100 - 111 mmol/L    CO2 19 (L) 21 - 32 mmol/L    Anion gap 9 3.0 - 18 mmol/L    Glucose 224 (H) 74 - 99 mg/dL    BUN 28 (H) 7.0 - 18 MG/DL    Creatinine 1.30 0.6 - 1.3 MG/DL    BUN/Creatinine ratio 22 (H) 12 - 20      GFR est AA 50 (L) >60 ml/min/1.73m2    GFR est non-AA 41 (L) >60 ml/min/1.73m2    Calcium 8.1 (L) 8.5 - 10.1 MG/DL   MAGNESIUM    Collection Time: 10/17/19  9:49 PM   Result Value Ref Range    Magnesium 1.7 1.6 - 2.6 mg/dL   GLUCOSE, POC    Collection Time: 10/17/19 10:41 PM   Result Value Ref Range    Glucose (POC) 265 (H) 70 - 110 mg/dL   GLUCOSTABILIZER    Collection Time: 10/17/19 10:43 PM   Result Value Ref Range    Glucose 265 mg/dL    Insulin order 20.5 units/hour    Insulin adminstered 20.5 units/hour    Multiplier 0.100     Low target 140 mg/dL    High target 180 mg/dL    D50 order 0.0 ml    D50 administered 0.00 ml    Minutes until next BG 60 min    Order initials      Administered initials  GLUCOSE, POC    Collection Time: 10/17/19 11:49 PM   Result Value Ref Range    Glucose (POC) 242 (H) 70 - 110 mg/dL   GLUCOSTABILIZER    Collection Time: 10/17/19 11:49 PM   Result Value Ref Range    Glucose 242 mg/dL    Insulin order 20.0 units/hour    Insulin adminstered 20.0 units/hour    Multiplier 0.110     Low target 140 mg/dL    High target 180 mg/dL    D50 order 0.0 ml    D50 administered 0.00 ml    Minutes until next BG 60 min    Order initials cs     Administered initials cs    GLUCOSE, POC    Collection Time: 10/18/19 12:57 AM   Result Value Ref Range    Glucose (POC) 220 (H) 70 - 110 mg/dL   GLUCOSTABILIZER    Collection Time: 10/18/19 12:57 AM   Result Value Ref Range    Glucose 220 mg/dL    Insulin order 19.2 units/hour    Insulin adminstered 19.2 units/hour    Multiplier 0.120     Low target 140 mg/dL    High target 180 mg/dL    D50 order 0.0 ml    D50 administered 0.00 ml    Minutes until next BG 60 min    Order initials      Administered initials     MAGNESIUM    Collection Time: 10/18/19  2:02 AM   Result Value Ref Range    Magnesium 1.6 1.6 - 2.6 mg/dL   GLUCOSE, POC    Collection Time: 10/18/19  2:04 AM   Result Value Ref Range    Glucose (POC) 229 (H) 70 - 110 mg/dL   GLUCOSTABILIZER    Collection Time: 10/18/19  2:04 AM   Result Value Ref Range    Glucose 229 mg/dL    Insulin order 22.0 units/hour    Insulin adminstered 22.0 units/hour    Multiplier 0.130     Low target 140 mg/dL    High target 180 mg/dL    D50 order 0.0 ml    D50 administered 0.00 ml    Minutes until next BG 60 min    Order initials      Administered initials     GLUCOSE, POC    Collection Time: 10/18/19  3:08 AM   Result Value Ref Range    Glucose (POC) 204 (H) 70 - 110 mg/dL   GLUCOSTABILIZER    Collection Time: 10/18/19  3:08 AM   Result Value Ref Range    Glucose 204 mg/dL    Insulin order 20.2 units/hour    Insulin adminstered 20.2 units/hour    Multiplier 0.140     Low target 140 mg/dL    High target 180 mg/dL D50 order 0.0 ml    D50 administered 0.00 ml    Minutes until next BG 60 min    Order initials lisbet     Administered initials lisbet    GLUCOSE, POC    Collection Time: 10/18/19  4:08 AM   Result Value Ref Range    Glucose (POC) 236 (H) 70 - 110 mg/dL   GLUCOSTABILIZER    Collection Time: 10/18/19  4:09 AM   Result Value Ref Range    Glucose 236 mg/dL    Insulin order 26.4 units/hour    Insulin adminstered 26.4 units/hour    Multiplier 0.150     Low target 140 mg/dL    High target 180 mg/dL    D50 order 0.0 ml    D50 administered 0.00 ml    Minutes until next BG 60 min    Order initials      Administered initials     GLUCOSE, POC    Collection Time: 10/18/19  5:16 AM   Result Value Ref Range    Glucose (POC) 149 (H) 70 - 110 mg/dL   GLUCOSTABILIZER    Collection Time: 10/18/19  5:16 AM   Result Value Ref Range    Glucose 149 mg/dL    Insulin order 13.4 units/hour    Insulin adminstered 13.4 units/hour    Multiplier 0.150     Low target 140 mg/dL    High target 180 mg/dL    D50 order 0.0 ml    D50 administered 0.00 ml    Minutes until next BG 60 min    Order initials      Administered initials     MAGNESIUM    Collection Time: 10/18/19  6:00 AM   Result Value Ref Range    Magnesium 1.7 1.6 - 2.6 mg/dL   CBC WITH AUTOMATED DIFF    Collection Time: 10/18/19  6:00 AM   Result Value Ref Range    WBC 13.0 4.6 - 13.2 K/uL    RBC 4.40 4.20 - 5.30 M/uL    HGB 10.2 (L) 12.0 - 16.0 g/dL    HCT 31.6 (L) 35.0 - 45.0 %    MCV 71.8 (L) 74.0 - 97.0 FL    MCH 23.2 (L) 24.0 - 34.0 PG    MCHC 32.3 31.0 - 37.0 g/dL    RDW 14.7 (H) 11.6 - 14.5 %    PLATELET 395 890 - 421 K/uL    NEUTROPHILS 77 (H) 40 - 73 %    LYMPHOCYTES 14 (L) 21 - 52 %    MONOCYTES 9 3 - 10 %    EOSINOPHILS 0 0 - 5 %    BASOPHILS 0 0 - 2 %    ABS. NEUTROPHILS 10.1 (H) 1.8 - 8.0 K/UL    ABS. LYMPHOCYTES 1.8 0.9 - 3.6 K/UL    ABS. MONOCYTES 1.1 0.05 - 1.2 K/UL    ABS. EOSINOPHILS 0.0 0.0 - 0.4 K/UL    ABS.  BASOPHILS 0.0 0.0 - 0.1 K/UL    DF AUTOMATED METABOLIC PANEL, BASIC    Collection Time: 10/18/19  6:00 AM   Result Value Ref Range    Sodium 141 136 - 145 mmol/L    Potassium 3.3 (L) 3.5 - 5.5 mmol/L    Chloride 111 100 - 111 mmol/L    CO2 20 (L) 21 - 32 mmol/L    Anion gap 10 3.0 - 18 mmol/L    Glucose 85 74 - 99 mg/dL    BUN 26 (H) 7.0 - 18 MG/DL    Creatinine 1.18 0.6 - 1.3 MG/DL    BUN/Creatinine ratio 22 (H) 12 - 20      GFR est AA 55 (L) >60 ml/min/1.73m2    GFR est non-AA 46 (L) >60 ml/min/1.73m2    Calcium 7.9 (L) 8.5 - 10.1 MG/DL   PROTHROMBIN TIME + INR    Collection Time: 10/18/19  6:00 AM   Result Value Ref Range    Prothrombin time 13.0 11.5 - 15.2 sec    INR 1.0 0.8 - 1.2     GLUCOSE, POC    Collection Time: 10/18/19  6:15 AM   Result Value Ref Range    Glucose (POC) 111 (H) 70 - 110 mg/dL   GLUCOSTABILIZER    Collection Time: 10/18/19  6:16 AM   Result Value Ref Range    Glucose 111 mg/dL    Insulin order 6.1 units/hour    Insulin adminstered 6.1 units/hour    Multiplier 0.120     Low target 140 mg/dL    High target 180 mg/dL    D50 order 0.0 ml    D50 administered 0.00 ml    Minutes until next BG 60 min    Order initials cs     Administered initials cs    GLUCOSE, POC    Collection Time: 10/18/19  7:30 AM   Result Value Ref Range    Glucose (POC) 76 70 - 110 mg/dL   GLUCOSTABILIZER    Collection Time: 10/18/19  7:30 AM   Result Value Ref Range    Glucose 76 mg/dL    Insulin order 1.5 units/hour    Insulin adminstered 1.5 units/hour    Multiplier 0.096     Low target 140 mg/dL    High target 180 mg/dL    D50 order 0.0 ml    D50 administered 0.00 ml    Minutes until next BG 60 min    Order initials      Administered initials     GLUCOSE, POC    Collection Time: 10/18/19  8:35 AM   Result Value Ref Range    Glucose (POC) 70 70 - 110 mg/dL   GLUCOSTABILIZER    Collection Time: 10/18/19  8:36 AM   Result Value Ref Range    Glucose 70 mg/dL    Insulin order 0.0 units/hour    Insulin adminstered 0.0 units/hour    Multiplier 0.077     Low target 140 mg/dL    High target 180 mg/dL    D50 order 12.0 ml    D50 administered 12.00 ml    Minutes until next BG 15 min    Order initials SM     Administered initials SM    ECHO ADULT COMPLETE    Collection Time: 10/18/19  9:25 AM   Result Value Ref Range    LA Vol Index 23.01 16 - 28 ml/m2    LVIDd 3.56 (A) 3.9 - 5.3 cm    LVPWd 0.85 0.6 - 0.9 cm    LVIDs 2.24 cm    IVSd 0.93 (A) 0.6 - 0.9 cm    LVOT d 1.75 cm    LVOT Peak Velocity 122.67 cm/s    LVOT Peak Gradient 6.0 mmHg    LVOT VTI 24.87 cm    MV A Lucho 125.84 cm/s    MV E Lucho 123.25 cm/s    MV E/A 0.98     LA Vol 4C 40.15 22 - 52 mL    LA Vol 2C 27.99 22 - 52 mL    LA Area 4C 16.9 cm2    LV Mass AL 97.4 67 - 162 g    LV Mass AL Index 58.2 43 - 95 g/m2    E/E' lateral 12.25     E/E' septal 16.09     E/E' ratio (averaged) 14.17     Mitral Valve E Wave Deceleration Time 182.6 ms    Triscuspid Valve Regurgitation Peak Gradient 44.9 mmHg    TR Max Velocity 335.04 cm/s    LA Vol Index 16.74 16 - 28 ml/m2    LA Vol Index 24.01 16 - 28 ml/m2    LA Volume 38.47 22 - 52 mL    LV E' Lateral Velocity 10.06 cm/s    LV E' Septal Velocity 7.66 cm/s    Tapse 2.00 1.5 - 2.0 cm    Ao Root D 2.67 cm   GLUCOSE, POC    Collection Time: 10/18/19 10:09 AM   Result Value Ref Range    Glucose (POC) 178 (H) 70 - 110 mg/dL   GLUCOSTABILIZER    Collection Time: 10/18/19 10:10 AM   Result Value Ref Range    Glucose 178 mg/dL    Insulin order 1.5 units/hour    Insulin adminstered 1.5 units/hour    Multiplier 0.077     Low target 140 mg/dL    High target 180 mg/dL    D50 order 0.0 ml    D50 administered 0.00 ml    Minutes until next BG 60 min    Order initials SM     Administered initials SM    GLUCOSE, POC    Collection Time: 10/18/19 11:10 AM   Result Value Ref Range    Glucose (POC) 286 (H) 70 - 110 mg/dL   GLUCOSTABILIZER    Collection Time: 10/18/19 11:11 AM   Result Value Ref Range    Glucose 286 mg/dL    Insulin order 19.7 units/hour    Insulin adminstered 19.7 units/hour    Multiplier 0.087     Low target 140 mg/dL    High target 180 mg/dL    D50 order 0.0 ml    D50 administered 0.00 ml    Minutes until next BG 60 min    Order initials SM     Administered initials SM          Chemistry   Recent Labs     10/18/19  0600 10/18/19  0202 10/17/19  2149  10/17/19  1335   GLU 85  --  224*  --  301*     --  138  --  137   K 3.3*  --  3.7  --  3.5     --  110  --  105   CO2 20*  --  19*  --  18*   BUN 26*  --  28*  --  24*   CREA 1.18  --  1.30  --  1.31*   CA 7.9*  --  8.1*  --  8.4*   MG 1.7 1.6 1.7   < > 1.7   PHOS  --   --   --   --  2.8   AGAP 10  --  9  --  14   BUCR 22*  --  22*  --  18    < > = values in this interval not displayed. CBC w/Diff   Recent Labs     10/18/19  0600 10/17/19  0549 10/16/19  0542   WBC 13.0 8.4 8.2   RBC 4.40 4.47 5.02   HGB 10.2* 10.3* 11.9*   HCT 31.6* 31.6* 35.9    130* 98*   GRANS 77*  --  65   LYMPH 14*  --  28   EOS 0  --  1       ABG No results for input(s): PHI, PHI, POC2, PCO2I, PO2, PO2I, HCO3, HCO3I, FIO2, FIO2I in the last 72 hours. Micro  No results for input(s): SDES, CULT in the last 72 hours. No results for input(s): CULT in the last 72 hours. CT (Most Recent)   Results from Hospital Encounter encounter on 10/16/19   CTA CHEST W OR W WO CONT    Narrative EXAM:  CTA Chest with Contrast (CT Pulmonary Study for PE). CLINICAL INDICATION:  Acute onset progressive chest pain, worsening shortness of  breath, increased oxygen requirement. COMPARISON:  08/09/18. TECHNIQUE:      - Helical volumetric sections of the chest are obtained with CT pulmonary  angiogram protocol. Subsequently, sagittal and coronal multiplanar  reconstruction images are obtained.   Maximum intensity projection images are  generated to better delineate the pulmonary vasculature, differentiate between  the pulmonary arteries and veins and to increase sensitivity to pulmonary  emboli.    - IV contrast 100 mL [id number].  - Radiation dose optimization techniques are utilized as appropriate to the  exam, with combination of automated exposure control, adjustment of the mA  and/or kV according to patient's size (Including appropriate matching for  site-specific examinations), or use of iterative reconstruction technique. FINDINGS:     Pulmonary Arteries:      - The pulmonary artery opacification is diagnostic in quality.  - No pulmonary emboli are noted in the pulmonary arterial tree down to the level  of the segmental arteries. Lung, Pleura, Airways:      - No infiltrate or consolidation. Multiple foci of scarring in both lungs with  curvilinear irregular densities at multiple foci. The nodular infiltrate seen  on 08/09/18 appear markedly decreased or inapparent on current study.  - No dominant mass or discrete new developing suspicious nodule. - No pleural effusion. Mediastinum, Shwetha:  Some of the mediastinal nodes appear calcified. Subtle  calcified hilar nodes are also identified bilaterally. Overall similar  appearance compared to 08/09/18. Aorta:  No evidence of aortic dissection or aneurysm. Base of neck:  No acute findings. Axillae:  Unremarkable. Abdomen:  No acute abnormalities. Skeletal Structures:  No acute findings. Impression IMPRESSION:         1. No convincing evidence of pulmonary embolism. 2.  No acute airspace disease. Decreased conspicuity of nodular infiltrates  compared to 08/09/18 study. XR (Most Recent). CXR reviewed by me and compared with previous CXR   Results from Hospital Encounter encounter on 10/16/19   XR CHEST PORT    Narrative EXAM: XR CHEST PORT    INDICATION: chest pain, sob, and/or arrhythmia    COMPARISON: 4/1/2019    FINDINGS: A portable AP radiograph of the chest was obtained at 0504 hours. The  bones are unremarkable. The heart is not dilated. The lungs reveal mild  nonspecific interstitial thickening.  There is no mass or consolidation, no  pleural effusion or pneumothorax. Impression IMPRESSION: Nonspecific chronic interstitial thickening, no active disease       See my orders for details     Total care time exclusive of procedures with complex decision making, coordination of care and counseling patient performed and > 50% time spent in face to face evaluation as mentioned above.     Ronnell Whitten MD  Critical Care Medicine

## 2019-10-18 NOTE — PROGRESS NOTES
Dc order noted. Met with patient and .  to drive patient home. Patient refused home health. She already has personal care in the home.    Nell De Paz RN BSN  Case Management [phone]

## 2019-10-18 NOTE — DIABETES MGMT
Glycemic Control Plan of Care    T2DM with current A1c of 11.8% (10/17/2019). Patient verbalized understanding that her diabetes is currently not controlled because the recommended A1c level is <7%. See separate notes, 10/18/2019, for assessment of home diabetes management and education. Patient reported that she was compliant in taking diabetes meds but not following diet recommendations and snacking throughout the day. Patient also reported drinking at least 3 cans of regular soda daily. Emphasized the importance of lifestyle change to prevent potential list of uncontrolled diabetes. She is receptive to attending diabetes education and accepted the class schedule. Home diabetes medications: Patient reported on 10/17/2019:  Lantus insulin 30 daily at bedtime. Metformin 500 mg daily with breakfast    Discontinued Solumedrol and changed to prednisone daily. POC BG range on 10/17/2019: 180 - >600 mg/dL. Changed SC insulin to regular insulin drip via GlucoStabilizer. POC BG range on 10/18/2019 at time of review:  mg/dL. Ordered to transition from [employer] to SC insulin. First dose of lantus insulin 35 units given at 12:04 PM. Returned to see patient and noted that [employer] was beeping. Received info from [employer], [facility], that Dr. Clary Beuno stopped it few minutes after fist dose of lantus insulin. Recommendation(s):  1.) Cont inpatient glycemic monitoring and intervention. Assessment:  Patient is 81year old with past medical history including type 2 diabetes mellitus, COPD on home oxygen at 3-4 L, hypertension, GERD, cocaine abuse, cigarette smoker, NSTEMI, CAD and pulmonary sarcoidosis - was admitted on 10/16/2019 with report of nausea, vomiting, diarrhea and flu like symptoms. Noted:  Steroid induced hyperglycemia. Acute on chronic respiratory failure. COPD exacerbation. AMBROCIO. T2DM.     Most recent blood glucose values:      Results for Luca Velez (MRN [id number]) as of 10/18/2019 12:37   Ref. Range 10/17/2019 08:22 10/17/2019 11:20 10/17/2019 13:56 10/17/2019 15:01 10/17/2019 16:21 10/17/2019 17:23 10/17/2019 18:28 10/17/2019 19:35 10/17/2019 20:39 10/17/2019 21:39 10/17/2019 22:41 10/17/2019 23:49   GLUCOSE,FAST - POC Latest Ref Range: 70 - 110 mg/dL >600 (HH) 594 (HH) 350 (H) 357 (H) 266 (H) 223 (H) 180 (H) 192 (H) 222 (H) 233 (H) 265 (H) 242 (H)       Results for Maura Norris (MRN [id number]) as of 10/18/2019 12:37   Ref. Range 10/18/2019 00:57 10/18/2019 02:04 10/18/2019 03:08 10/18/2019 04:08 10/18/2019 05:16 10/18/2019 06:15 10/18/2019 07:30 10/18/2019 08:35 10/18/2019 10:09 10/18/2019 11:10   GLUCOSE,FAST - POC Latest Ref Range: 70 - 110 mg/dL 220 (H) 229 (H) 204 (H) 236 (H) 149 (H) 111 (H) 76 70 178 (H) 286 (H)     Current A1C: 11.8% (10/17/2019) equivalent to estimated average blood glucose of 292 mg/dL during the past 2-3 months. Current hospital diabetes medications:  Basal lantus insulin 35 units daily. Correctional lispro insulin ACHS. Normal sensitivity dose. Total daily dose insulin requirement previous day: 10/17/2019:  Regular insulin drip via GlucoStabiilzer; 144.2 units  Lispro 15 units  TDD insulin: 159.2 units    Home diabetes medications: Patient reported on 10/17/2019:  Lantus insulin 30 daily at bedtime. Metformin 500 mg daily with breakfast.    Diet: Diabetic consistent carb regular; nutr suppl: glucerna shake with breakfast, lunch, dinner. Goals:  Blood glucose will be within target range of  mg/dL by 10/20/2019.     Education:  _X__  Refer to Diabetes Education Record: 10/18/2019             ___  Education not indicated at this time:     Bautista Olvera RN [facility]  Pager: [phone]

## 2019-10-18 NOTE — PROGRESS NOTES
Problem: Gas Exchange - Impaired  Goal: *Absence of hypoxia  Outcome: Progressing Towards Goal     Problem: Gas Exchange - Impaired  Goal: *Absence of hypoxia  Outcome: Progressing Towards Goal     Problem: Patient Education: Go to Patient Education Activity  Goal: Patient/Family Education  Outcome: Progressing Towards Goal     Problem: Patient Education: Go to Patient Education Activity  Goal: Patient/Family Education  Outcome: Progressing Towards Goal     Problem: Falls - Risk of  Goal: *Absence of Falls  Description  Document Tilroby Pickles Fall Risk and appropriate interventions in the flowsheet. Outcome: Progressing Towards Goal  Note:   Fall Risk Interventions:            Medication Interventions: Assess postural VS orthostatic hypotension, Patient to call before getting OOB, Teach patient to arise slowly         History of Falls Interventions: Bed/chair exit alarm, Vital signs minimum Q4HRs X 24 hrs (comment for end date)         Problem: Falls - Risk of  Goal: *Absence of Falls  Description  Document Tilford Pickles Fall Risk and appropriate interventions in the flowsheet. Outcome: Progressing Towards Goal  Note:   Fall Risk Interventions:            Medication Interventions: Assess postural VS orthostatic hypotension, Patient to call before getting OOB, Teach patient to arise slowly         History of Falls Interventions: Bed/chair exit alarm, Vital signs minimum Q4HRs X 24 hrs (comment for end date)         Problem: Diabetes Self-Management  Goal: *Disease process and treatment process  Description  Define diabetes and identify own type of diabetes; list 3 options for treating diabetes. Outcome: Progressing Towards Goal  Goal: *Incorporating nutritional management into lifestyle  Description  Describe effect of type, amount and timing of food on blood glucose; list 3 methods for planning meals.   Outcome: Progressing Towards Goal  Goal: *Incorporating physical activity into lifestyle  Description  State effect of exercise on blood glucose levels. Outcome: Progressing Towards Goal  Goal: *Developing strategies to promote health/change behavior  Description  Define the ABC's of diabetes; identify appropriate screenings, schedule and personal plan for screenings. Outcome: Progressing Towards Goal  Goal: *Using medications safely  Description  State effect of diabetes medications on diabetes; name diabetes medication taking, action and side effects. Outcome: Progressing Towards Goal  Goal: *Monitoring blood glucose, interpreting and using results  Description  Identify recommended blood glucose targets  and personal targets. Outcome: Progressing Towards Goal  Goal: *Prevention, detection, treatment of acute complications  Description  List symptoms of hyper- and hypoglycemia; describe how to treat low blood sugar and actions for lowering  high blood glucose level. Outcome: Progressing Towards Goal  Goal: *Prevention, detection and treatment of chronic complications  Description  Define the natural course of diabetes and describe the relationship of blood glucose levels to long term complications of diabetes.   Outcome: Progressing Towards Goal  Goal: *Developing strategies to address psychosocial issues  Description  Describe feelings about living with diabetes; identify support needed and support network  Outcome: Progressing Towards Goal  Goal: *Insulin pump training  Outcome: Progressing Towards Goal  Goal: *Sick day guidelines  Outcome: Progressing Towards Goal  Goal: *Patient Specific Goal (EDIT GOAL, INSERT TEXT)  Outcome: Progressing Towards Goal     Problem: Patient Education: Go to Patient Education Activity  Goal: Patient/Family Education  Outcome: Progressing Towards Goal     Problem: Nutrition Deficit  Goal: *Optimize nutritional status  Outcome: Progressing Towards Goal

## 2019-10-18 NOTE — PROGRESS NOTES
Problem: Mobility Impaired (Adult and Pediatric)  Goal: *Acute Goals and Plan of Care (Insert Text)  Description  Physical Therapy Goals  Initiated 10/18/2019 and to be accomplished within 7 day(s)  1. Patient will move from supine to sit and sit to supine  in bed with independence. 2.  Patient will transfer from bed to chair and chair to bed with modified independence using the least restrictive device. 3.  Patient will perform sit to stand with modified independence. 4.  Patient will ambulate with modified independence for 300 feet with the least restrictive device. 5.  Patient will ascend/descend 3 stairs with 1 handrail(s) with minimal assistance/contact guard assist.    PLOF: pt reports ambulating with a standard walker or cane at home   Outcome: Progressing Towards Goal   PHYSICAL THERAPY EVALUATION    Patient: Wilber Jones (72 y.o. female)  Date: 10/18/2019  Primary Diagnosis: Pulmonary sarcoidosis ([facility] [D86.0]        Precautions:   Aspiration, Fall    PLOF: see above    ASSESSMENT :  Based on the objective data described below, the patient presents with decreased strength, balance and activity tolerance resulting in decreased independence with functional mobility. Patient will benefit from skilled intervention to address the above impairments. Patient's rehabilitation potential is considered to be Good  Factors which may influence rehabilitation potential include:   ? None noted  ? Mental ability/status  ? Medical condition  ? Home/family situation and support systems  ? Safety awareness  ? Pain tolerance/management  ? Other:      PLAN :  Recommendations and Planned Interventions:   ?           Bed Mobility Training             ? Neuromuscular Re-Education  ? Transfer Training                   ? Orthotic/Prosthetic Training  ? Gait Training                          ? Modalities  ? Therapeutic Exercises           ? Edema Management/Control  ? Therapeutic Activities            ? Family Training/Education  ? Patient Education  ? Other (comment):    Frequency/Duration: Patient will be followed by physical therapy 1-2 times per day/4-7 days per week to address goals. Discharge Recommendations: Home Health  Further Equipment Recommendations for Discharge: rolling walker     SUBJECTIVE:   Patient stated I'm really don't feel like this but I will do it.     OBJECTIVE DATA SUMMARY:     Past Medical History:   Diagnosis Date    Arthritis     \"generalized\"    Asthma     CAD in native artery     NSTEMI (Sep 2014); diffuse 65% pLAD, minimal disease RCA & LCx. pLAD FFR 0.93.  LV  no RWMA (echo and LV angio)    Chronic neck pain     Chronic pain     left knee    Chronic pain syndrome     COPD (chronic obstructive pulmonary disease) (HCC)     Depression     Diabetes (HCC)     Diverticulitis     GERD (gastroesophageal reflux disease)     Heart disease     Hidradenitis 9/11/2014    Hypertension     Left knee pain     Narcotic dependence ([address]     Right wrist pain     Sarcoidosis      Past Surgical History:   Procedure Laterality Date    HX BREAST BIOPSY Right     9/10/14: She said tag in place from [address]  2005    HX HYSTERECTOMY      HX KNEE ARTHROSCOPY Left      Barriers to Learning/Limitations: None  Compensate with: N/A  Home Situation:  Home Situation  Home Environment: [address] Name: [facility]  # Steps to Enter: 0  One/Two Story Residence: One story  Living Alone: No  Support Systems: Family member(s), Spouse/Significant Other/Partner  Patient Expects to be Discharged to[de-identified] Assisted living  Current DME Used/Available at Home: Walker, rolling, Oxygen, portable, Nebulizer  Tub or Shower Type: Shower  Critical Behavior:  Neurologic State: Alert  Orientation Level: Oriented X4  Cognition: Follows commands  Safety/Judgement: [facility] prevention; Awareness of environment  Strength:    Strength: Generally decreased, functional   Tone & Sensation:   Tone: Normal   Sensation: Intact   Range Of Motion:  AROM: Within functional limits   Functional Mobility:  Bed Mobility:  Supine to Sit: Modified independent  Sit to Supine: Modified independent  Scooting: Modified independent  Transfers:  Sit to Stand: Modified independent  Stand to Sit: Modified independent     Balance:   Sitting: Intact  Standing: With support  Standing - Static: Good  Standing - Dynamic : Fair  Ambulation/Gait Training:  Distance (ft): 120 Feet (ft)  Assistive Device: Walker, rolling  Ambulation - Level of Assistance: Supervision  Gait Abnormalities: Decreased step clearance       Pain:  Pain level pre-treatment: 0/10   Pain level post-treatment: 0/10   Pain Intervention(s) : N/A    Activity Tolerance:   Fair-  Please refer to the flowsheet for vital signs taken during this treatment. After treatment:   ?         Patient left in no apparent distress sitting up in chair  ? Patient left in no apparent distress in bed  ? Call bell left within reach  ? Nursing notified  ? Caregiver present  ? Bed alarm activated  ? SCDs applied    COMMUNICATION/EDUCATION:   ?         Role of Physical Therapy in the acute care setting. ?         Fall prevention education was provided and the patient/caregiver indicated understanding. ? Patient/family have participated as able in goal setting and plan of care. ?         Patient/family agree to work toward stated goals and plan of care. ?         Patient understands intent and goals of therapy, but is neutral about his/her participation. ? Patient is unable to participate in goal setting/plan of care: ongoing with therapy staff. ?         Other:     Thank you for this referral.  Alok Fonseca, PT   Time Calculation: 15 mins      Eval Complexity: History: MEDIUM  Complexity : 1-2 comorbidities / personal factors will impact the outcome/ POC Exam:MEDIUM Complexity : 3 Standardized tests and measures addressing body structure, function, activity limitation and / or participation in recreation  Presentation: LOW Complexity : Stable, uncomplicated  Clinical Decision Making:Low Complexity    Overall Complexity:LOW

## 2019-12-02 ENCOUNTER — OFFICE VISIT (OUTPATIENT)
Dept: PULMONOLOGY | Age: 69
End: 2019-12-02

## 2019-12-02 VITALS — HEIGHT: 62 IN | BODY MASS INDEX: 26.7 KG/M2

## 2019-12-02 DIAGNOSIS — R06.02 SOB (SHORTNESS OF BREATH) ON EXERTION: ICD-10-CM

## 2019-12-02 DIAGNOSIS — D86.9 SARCOIDOSIS: ICD-10-CM

## 2019-12-02 DIAGNOSIS — Z87.891 HISTORY OF TOBACCO USE: ICD-10-CM

## 2019-12-02 DIAGNOSIS — R05.9 COUGH: Primary | ICD-10-CM

## 2019-12-02 DIAGNOSIS — R05.9 COUGH: ICD-10-CM

## 2019-12-02 RX ORDER — MEGESTROL ACETATE 40 MG/ML
SUSPENSION ORAL
Refills: 1 | COMMUNITY
Start: 2019-09-20 | End: 2022-10-28

## 2019-12-02 RX ORDER — MIRTAZAPINE 15 MG/1
15 TABLET, FILM COATED ORAL
COMMUNITY
End: 2022-10-28

## 2019-12-02 RX ORDER — BENZONATATE 100 MG/1
100 CAPSULE ORAL
Status: ON HOLD | COMMUNITY
End: 2022-10-19

## 2019-12-02 RX ORDER — PEN NEEDLE, DIABETIC 30 GX3/16"
NEEDLE, DISPOSABLE MISCELLANEOUS
Refills: 3 | COMMUNITY
Start: 2019-10-12

## 2019-12-02 RX ORDER — PANTOPRAZOLE SODIUM 20 MG/1
20 TABLET, DELAYED RELEASE ORAL DAILY
COMMUNITY

## 2019-12-02 RX ORDER — PREDNISONE 20 MG/1
40 TABLET ORAL
Qty: 14 TAB | Refills: 0 | Status: SHIPPED | OUTPATIENT
Start: 2019-12-02 | End: 2019-12-09

## 2019-12-02 NOTE — PROGRESS NOTES
Yousuf Martinez presents today for   Chief Complaint   Patient presents with   [facility] Follow Up     from [facility] on 10/16-10/18/2019 for pulmonary sarcoidosis    COPD     last seen by Dr. Sissy Zavala on 11/25/2014    Shortness of Breath    Results     CXR & CTA 10/16/2019, Echo 10/18/2019       Is someone accompanying this pt? Yes. spouse    Is the patient using any DME equipment during OV? No    -DME Company N/A    Depression Screening:  3 most recent PHQ Screens 12/2/2019   Little interest or pleasure in doing things Nearly every day   Feeling down, depressed, irritable, or hopeless Nearly every day   Total Score PHQ 2 6       Learning Assessment:  Learning Assessment 12/2/2019   PRIMARY LEARNER Patient   CO-LEARNER CAREGIVER -   PRIMARY LANGUAGE ENGLISH   LEARNER PREFERENCE PRIMARY DEMONSTRATION   ANSWERED BY Patient     Self   RELATIONSHIP -       Abuse Screening:  Abuse Screening Questionnaire 12/2/2019   Do you ever feel afraid of your partner? N   Are you in a relationship with someone who physically or mentally threatens you? N   Is it safe for you to go home? Y       Fall Risk  Fall Risk Assessment, last 12 mths 12/2/2019   Able to walk? Yes   Fall in past 12 months? Yes   Fall with injury? Yes   Number of falls in past 12 months 8 or more   Fall Risk Score 9         Coordination of Care:  1. Have you been to the ER, urgent care clinic since your last visit? Hospitalized since your last visit? Yes; Where: [facility], When: 10/16-10/18/2019-pulmonary sarcoidosis    2. Have you seen or consulted any other health care providers outside of the [address] [place] [address] since your last visit? Include any pap smears or colon screening. Michelle lord      Influenza vaccine received from [employer] on 10/2019per patient. Immunization record updated.

## 2019-12-02 NOTE — PROGRESS NOTES
[facility]  Pulmonary, Critical Care, and Sleep Medicine      Pulmonary Office Progress Notes    Name: Orestes Grimaldo     : 1950     Date: 2019        Subjective:     2019          HPI    Orestes Grimaldo  is a 81 y.o. female with PMH of COPD and DM who presents for hospital follow-up visit. She was hospitalized at [facility] on 10/18/2019 for acute on chronic respiratory failure / exacerbation of COPD and was discharged home after treatment with bronchodilators and abx. Today she appears comfortable, in no distress and reports that she continues to have daily HAN and a chronic cough productive of yellow/green mucus. She continues to use supplemental oxygen at 2 to 3 L/min and takes ICS/LABA/LAMA and albuterol as needed. She denies wheezing or hemoptysis. No fever, chills or orthopnea; no leg / calf pain or swelling and no decreased appetite or weight loss. She additionally complains of daily fatigue and lack of energy and expresses concerns regarding progression of sarcoidosis. Her last PFT's in  demonstrated moderate obstructive defect, severe restrictive ventilatory  defect, and  reduced diffusion capacity indicating a decrease in  alveolar surface area for gas exchange    CTA, 10/16/2019- No convincing evidence of pulmonary embolism. No acute airspace disease. Decreased conspicuity of nodular infiltrates  compared to 18 study. Echocardiogram 10/19/2019-fraction is 56 - 60%. Visually measured ejection fraction. No regional wall motion abnormality noted. Inconclusive left ventricular diastolic function. Pulmonary Artery: Mild to moderate pulmonary hypertension. Pulmonary arterial systolic pressure is 48 mmHg.             Past Medical History:   Diagnosis Date    Arthritis     \"generalized\"    Asthma     CAD in native artery     NSTEMI (Sep 2014); diffuse 65% pLAD, minimal disease RCA & LCx. pLAD FFR 0.93.  LV  no RWMA (echo and LV angio)    Chronic neck pain     Chronic pain     left knee    Chronic pain syndrome     COPD (chronic obstructive pulmonary disease) (HCC)     Depression     Diabetes (HCC)     Diverticulitis     GERD (gastroesophageal reflux disease)     Heart disease     Hidradenitis 9/11/2014    Hypertension     Left knee pain     Narcotic dependence (HCC)     Pneumonia     Right wrist pain     Sarcoidosis        Allergies   Allergen Reactions    Ciprofloxacin Hives    Penicillins Nausea Only    Shellfish Derived Hives and Swelling     Swelling of legs       Current Outpatient Medications   Medication Sig Dispense Refill    megestrol (MEGACE) 400 mg/10 mL (40 mg/mL) suspension TAKE 5 ML BY MOUTH THREE TIMES DAILY FOR APPETITE  1    Insulin Needles, Disposable, 31 gauge x 5/16\" ndle USE AS DIRECTED WITH INSULIN PEN DAILY  3    Oxygen 3LPM O2 as needed   Indications: DME: [employer]      pantoprazole (PROTONIX) 20 mg tablet Take 20 mg by mouth daily.  benzonatate (TESSALON) 100 mg capsule Take 100 mg by mouth three (3) times daily as needed for Cough.  mirtazapine (REMERON) 15 mg tablet Take 15 mg by mouth nightly.  predniSONE (DELTASONE) 20 mg tablet Take 40 mg by mouth daily (with breakfast) for 7 days. 14 Tab 0    metFORMIN (GLUCOPHAGE) 500 mg tablet Take 1 Tab by mouth two (2) times daily (with meals). 60 Tab 0    insulin glargine (LANTUS U-100 INSULIN) 100 unit/mL injection 35 Units by SubCUTAneous route daily. (Patient taking differently: 40 Units by SubCUTAneous route daily. ) 1 Vial 0    albuterol-ipratropium (DUO-NEB) 2.5 mg-0.5 mg/3 ml nebu 3 mL by Nebulization route every four (4) hours as needed for Other (shortness of breath). 30 Nebule 0    diclofenac (VOLTAREN) 1 % gel Apply 2 g to affected area four (4) times daily. Gel should be measured and applied using the supplied dosing card. Apply dose (2 gm or 4 gm) to each location.    If treatment site is the hands, patients should wait at least one (1) hour to wash their hands. APPLY TO bilateral knees 100 g 0    melatonin 3 mg tablet Take 1 Tab by mouth nightly. (Patient taking differently: Take 5 mg by mouth nightly.) 30 Tab 0    lisinopril-hydroCHLOROthiazide (PRINZIDE, ZESTORETIC) 20-12.5 mg per tablet Take 1 Tab by mouth daily. Indications: hypertension      nitroglycerin (NITROSTAT) 0.4 mg SL tablet 1 Tab by SubLINGual route as needed for Chest Pain. 25 Tab 1    fluticasone-salmeterol (ADVAIR) 250-50 mcg/dose diskus inhaler Take 1 puff by inhalation every twelve (12) hours. (Patient taking differently: Take 1 Puff by inhalation daily. ) 1 Inhaler 0    albuterol (PROVENTIL HFA, VENTOLIN HFA, PROAIR HFA) 90 mcg/actuation inhaler Take 1 puff by inhalation daily as needed for Wheezing. 1 Inhaler 0    clopidogrel (PLAVIX) 75 mg tablet Take 1 tablet by mouth daily. 30 tablet 11    atorvastatin (LIPITOR) 40 mg tablet Take 1 tablet by mouth nightly. 30 tablet 11    aspirin 81 mg chewable tablet Take 1 tablet by mouth daily. 30 tablet 0    polyethylene glycol (MIRALAX) 17 gram packet Take 1 packet by mouth daily. 14 each 0    fluoxetine (PROZAC) 20 mg capsule Take 20 mg by mouth daily.  tiotropium (SPIRIVA WITH HANDIHALER) 18 mcg inhalation capsule Take 1 Cap by inhalation daily.  Omeprazole Magnesium (PRILOSEC) 10 mg suDR Take 20 mg by mouth two (2) times a day. 60 Each 0    raNITIdine (ZANTAC) 150 mg tablet Take 1 Tab by mouth nightly.  15 Tab 0       Review of Systems:    HEENT: No epistaxis, no nasal drainage, no difficulty in swallowing, no redness in eyes  Respiratory: As stated above in HPI  Cardiovascular: no chest pain, no palpitations, no chronic leg edema, no syncope  Gastrointestinal: no abd pain, no vomiting, no diarrhea, no bleeding symptoms  Genitourinary: No urinary symptoms or hematuria  Integument/breast: No ulcers or rashes  Musculoskeletal: No leg / calf pain  Neurological: No focal weakness, no seizures, no headaches  Behvioral/Psych: No anxiety, no depression  Constitutional: No fever, chills or night sweats. No decreased appetite or weight loss     Objective:     Visit Vitals  Ht 5' 2\" (1.575 m)   BMI 26.70 kg/m²        PHYSICAL EXAM      General: Oriented to person, place, and time. Well-developed, well-nourished, and in no distress      Head:   Normocephalic, without obvious abnormality, atraumatic       Eyes:   Pupils reactive, conjunctivae / corneas clear. EOM's intact, no scleral icterus       Nose:   Nares normal, no drainage. Throat:    Lips, mucosa and tongue normal. Teeth and gums normal       Neck:   Supple, symmetrical, trachea midline. No adenopathy or thyroid swelling; no carotid bruit or JVD. CVS:    Regular rate and rhythm. S1S2 normal,  no murmurs       RS:      Symmetrical chest rise, good AE bilaterally. Lung sounds clear to auscultation bilaterally. No wheezing, rales or rhonchi, no accessory muscle use      Abd:     Soft, non-tender. No hepatosplenomegaly                                                     Neuro:   non focal, awake, alert and oriented to person, place, time and situation    Extrm:   no leg edema,  clubbing or cyanosis       Skin:   no rash    Data review:     No results displayed because visit has over 200 results. PULMONARY FUNCTION TESTS    Date FVC FEV1  FEV1/FVC [id number] TLC RV RV/TLC VC DLCO   11/25/14 75 66 68 35                                                  Imaging:  I have reviewed all of the available data including the patient's previous history external records and radiological imaging available for review. In addition, applicable cardiology and other lab data were also reviewed.        XR Results (most recent):  Results from Hospital Encounter encounter on 10/16/19   XR CHEST PORT    Narrative EXAM: XR CHEST PORT    INDICATION: chest pain, sob, and/or arrhythmia    COMPARISON: 4/1/2019    FINDINGS: A portable AP radiograph of the chest was obtained at 0504 hours. The  bones are unremarkable. The heart is not dilated. The lungs reveal mild  nonspecific interstitial thickening. There is no mass or consolidation, no  pleural effusion or pneumothorax. Impression IMPRESSION: Nonspecific chronic interstitial thickening, no active disease     CT Results (most recent):  Results from Hospital Encounter encounter on 10/16/19   CTA CHEST W OR W WO CONT    Narrative EXAM:  CTA Chest with Contrast (CT Pulmonary Study for PE). CLINICAL INDICATION:  Acute onset progressive chest pain, worsening shortness of  breath, increased oxygen requirement. COMPARISON:  08/09/18. TECHNIQUE:      - Helical volumetric sections of the chest are obtained with CT pulmonary  angiogram protocol. Subsequently, sagittal and coronal multiplanar  reconstruction images are obtained. Maximum intensity projection images are  generated to better delineate the pulmonary vasculature, differentiate between  the pulmonary arteries and veins and to increase sensitivity to pulmonary  emboli.    - IV contrast 100 mL Isovue-370.  - Radiation dose optimization techniques are utilized as appropriate to the  exam, with combination of automated exposure control, adjustment of the mA  and/or kV according to patient's size (Including appropriate matching for  site-specific examinations), or use of iterative reconstruction technique. FINDINGS:     Pulmonary Arteries:      - The pulmonary artery opacification is diagnostic in quality.  - No pulmonary emboli are noted in the pulmonary arterial tree down to the level  of the segmental arteries. Lung, Pleura, Airways:      - No infiltrate or consolidation. Multiple foci of scarring in both lungs with  curvilinear irregular densities at multiple foci.   The nodular infiltrate seen  on 08/09/18 appear markedly decreased or inapparent on current study.  - No dominant mass or discrete new developing suspicious nodule. - No pleural effusion. Mediastinum, Shwetha:  Some of the mediastinal nodes appear calcified. Subtle  calcified hilar nodes are also identified bilaterally. Overall similar  appearance compared to 08/09/18. Aorta:  No evidence of aortic dissection or aneurysm. Base of neck:  No acute findings. Axillae:  Unremarkable. Abdomen:  No acute abnormalities. Skeletal Structures:  No acute findings. Impression IMPRESSION:         1. No convincing evidence of pulmonary embolism. 2.  No acute airspace disease. Decreased conspicuity of nodular infiltrates  compared to 08/09/18 study.                    Patient Active Problem List   Diagnosis Code    Chronic pain syndrome G89.4    DJD (degenerative joint disease) of knee M17.10    Knee pain, left M25.562    Depression F32.9    COPD, moderate ([facility]) J44.9    HNP (herniated nucleus pulposus), cervical M50.20    HTN (hypertension) I10    Osteoporosis M81.0    DM (diabetes mellitus) ([facility] [address] E11.9    Sarcoidosis D86.9    Chest pain R07.9    Trichomonas A59.9    Hidradenitis L73.2    Recurrent falls R29.6    GERD (gastroesophageal reflux disease) K21.9    Insomnia G47.00    Cocaine abuse ([facility]) F14.10    RVH (right ventricular hypertrophy) I51.7    Cigarette smoker F17.210    Non-ST elevated myocardial infarction (non-STEMI) ([facility]) I21.4    Anemia D64.9    CAD in native artery I25.10    Diverticulitis K57.92    Abdominal pain R10.9    Syncope and collapse R55    Hyperglycemia R73.9    Closed head injury S09.90XA    Chronic back pain M54.9, G89.29    Syncope R55    Pulmonary sarcoidosis (HCC) D86.0    Acute on chronic respiratory failure with hypoxia and hypercapnia ([facility]) J96.21, J96.22       IMPRESSION:   ·  Chronic Hypoxic Respiratory Failure - currently on supplemental O2 @ 2-3 LPM daily   · Sarcoidosis  · Hx of MI  · - s/p cath without intervention  · Possible pHTN  · - elevated RVSP 48 on 2decho  · History of tobacco use      RECOMMENDATIONS:       · Continue  Adviar diskus one inhalation twice daily, Spiriva handihaler 18 mcg, one cap daily   and albuterol PRN. Discussed appropriate use of respiratory  maintenance and rescue  medications with patient  · Complete course of prednisone  · Submit sputum culture  · Continue supplemental O2 @ 2-3 LPM daily  · Previous CT scans suggestive of sarcoid however no serologies noted in record. AVILA, ACE and Rheumatoid factor ordered  · Consider repeat PFT's in near future  · Preventative immunizations reviewed and up-to-date  · Follow up in pulmonary clinic in  3 months   or sooner with worsening of symptoms  · Recommend healthy diet / weight / exercise as tolerated  · Report to ER with chest pain or difficulty breathing. Please note that this dictation was completed with NextMedium, the computer voice recognition software. Quite often unanticipated grammatical, syntax, homophones, and other interpretive errors are inadvertently transcribed by the computer software. Please disregard these errors. Please excuse any errors that have escaped final proofreading.               Collette Chess, NP

## 2020-02-21 ENCOUNTER — OFFICE VISIT (OUTPATIENT)
Dept: ORTHOPEDIC SURGERY | Facility: CLINIC | Age: 70
End: 2020-02-21

## 2020-02-21 VITALS
RESPIRATION RATE: 16 BRPM | HEART RATE: 93 BPM | BODY MASS INDEX: 24.88 KG/M2 | WEIGHT: 135.2 LBS | DIASTOLIC BLOOD PRESSURE: 81 MMHG | HEIGHT: 62 IN | SYSTOLIC BLOOD PRESSURE: 146 MMHG | OXYGEN SATURATION: 99 % | TEMPERATURE: 98.8 F

## 2020-02-21 DIAGNOSIS — M17.12 PRIMARY OSTEOARTHRITIS OF LEFT KNEE: ICD-10-CM

## 2020-02-21 DIAGNOSIS — M25.561 CHRONIC PAIN OF RIGHT KNEE: ICD-10-CM

## 2020-02-21 DIAGNOSIS — G89.29 CHRONIC PAIN OF RIGHT KNEE: ICD-10-CM

## 2020-02-21 DIAGNOSIS — M17.11 PRIMARY OSTEOARTHRITIS OF RIGHT KNEE: Primary | ICD-10-CM

## 2020-02-21 DIAGNOSIS — G89.29 CHRONIC PAIN OF LEFT KNEE: ICD-10-CM

## 2020-02-21 DIAGNOSIS — M25.562 CHRONIC PAIN OF LEFT KNEE: ICD-10-CM

## 2020-02-21 RX ORDER — BETAMETHASONE SODIUM PHOSPHATE AND BETAMETHASONE ACETATE 3; 3 MG/ML; MG/ML
6 INJECTION, SUSPENSION INTRA-ARTICULAR; INTRALESIONAL; INTRAMUSCULAR; SOFT TISSUE ONCE
Qty: 0.5 ML | Refills: 0
Start: 2020-02-21 | End: 2020-02-21

## 2020-02-21 NOTE — PROGRESS NOTES
Patient: Isauro Green                MRN: [id number]       SSN: [id number]  YOB: 1950        AGE: 81 y.o. SEX: female      PCP: Van Hoffman NP  02/21/20    CC: BILATERAL KNEE PAIN    HISTORY:  Isauro Green is a 81 y.o. female who is seen for increased bilateral knee pain. She is no longer in pain management. She has been experiencing knee pain for the past several years. Her pain increased in the past 3 months. She is s/p left knee arthroscopy 12/14/11. She feels throbbing pain in her knees. She notes a sharp pain on the lateral side of her knees. She has popping and crackling as she walks up and down stairs. She reports occasional giving way episodes so she is fearful of falling. She feels like someone is sticking an ice pick in her knees. She was previously discharged from the center of pain management, since she tested positive for cocaine. Pain Assessment  2/21/2020   Location of Pain Knee   Location Modifiers Right   Severity of Pain 9   Quality of Pain Throbbing   Duration of Pain Persistent   Frequency of Pain Constant   Aggravating Factors Bending;Squatting;Standing;Walking   Aggravating Factors Comment Pain is there all day and all night   Limiting Behavior -   Relieving Factors Rest;Ice;Heat;Elevation   Result of Injury Yes   Work-Related Injury No   Type of Injury Fall     Occupation, etc: Ms. Henry Campbell previously worked as an  for the [employer]. She lived in the [facility] with her first  for 8 years. She lives in [place] with her . She states her  is doing much better after his colectomy. She states he has gained back the weight he lost. She has 1 son from her 1st marriage and 1 granddaughter. She is active with her [employer]. She states she is 2 years sober from cocaine. She is 135 pounds and is 5'2\" tall.     Last 3 Recorded Weights in this Encounter    02/21/20 1252   Weight: 135 lb 3.2 oz (61.3 kg) Body mass index is 24.73 kg/m². REVIEW OF SYSTEMS: All Below are Negative except: See HPI   Constitutional: negative for fever, chills, and weight loss. Cardiovascular: negative for chest pain, claudication, leg swelling, SOB, HAN   Gastrointestinal: Negative for pain, N/V/C/D, Blood in stool or urine, dysuria,  hematuria, incontinence, pelvic pain. Musculoskeletal: See HPI   Neurological: Negative for dizziness and weakness. Negative for headaches, Visual changes, confusion, seizures   Phychiatric/Behavioral: Negative for depression, memory loss, substance  abuse. Extremities: Negative for hair changes, rash, or skin lesion changes. Hematologic: Negative for bleeding problems, bruising, pallor or swollen lymph  nodes   Peripheral Vascular: No calf pain, no circulation deficits. Social History     Socioeconomic History    Marital status:      Spouse name: Not on file    Number of children: Not on file    Years of education: Not on file    Highest education level: Not on file   Occupational History    Not on file   Social Needs    Financial resource strain: Not on file    Food insecurity:     Worry: Not on file     Inability: Not on file    Transportation needs:     Medical: Not on file     Non-medical: Not on file   Tobacco Use    Smoking status: Former Smoker     Years: 46.00     Types: Cigarettes     Last attempt to quit: 2018     Years since quittin.9    Smokeless tobacco: Never Used    Tobacco comment: Only smokes 1 cigarette per day   Substance and Sexual Activity    Alcohol use:  Yes     Alcohol/week: 1.7 standard drinks     Types: 2 Glasses of wine per week     Comment: socially    Drug use: Yes     Types: Cocaine, Marijuana     Comment: 6 months ago    Sexual activity: Yes   Lifestyle    Physical activity:     Days per week: Not on file     Minutes per session: Not on file    Stress: Not on file   Relationships    Social connections:     Talks on phone: Not on file     Gets together: Not on file     Attends Islam service: Not on file     Active member of club or organization: Not on file     Attends meetings of clubs or organizations: Not on file     Relationship status: Not on file    Intimate partner violence:     Fear of current or ex partner: Not on file     Emotionally abused: Not on file     Physically abused: Not on file     Forced sexual activity: Not on file   Other Topics Concern    Not on file   Social History Narrative    Not on file        Allergies   Allergen Reactions    Ciprofloxacin Hives    Penicillins Nausea Only    Shellfish Derived Hives and Swelling     Swelling of legs        Current Outpatient Medications   Medication Sig    megestrol (MEGACE) 400 mg/10 mL (40 mg/mL) suspension TAKE 5 ML BY MOUTH THREE TIMES DAILY FOR APPETITE    Insulin Needles, Disposable, 31 gauge x 5/16\" ndle USE AS DIRECTED WITH INSULIN PEN DAILY    Oxygen 3LPM O2 as needed   Indications: DME: [employer]    pantoprazole (PROTONIX) 20 mg tablet Take 20 mg by mouth daily.  benzonatate (TESSALON) 100 mg capsule Take 100 mg by mouth three (3) times daily as needed for Cough.  mirtazapine (REMERON) 15 mg tablet Take 15 mg by mouth nightly.  metFORMIN (GLUCOPHAGE) 500 mg tablet Take 1 Tab by mouth two (2) times daily (with meals).  insulin glargine (LANTUS U-100 INSULIN) 100 unit/mL injection 35 Units by SubCUTAneous route daily. (Patient taking differently: 40 Units by SubCUTAneous route daily.)    albuterol-ipratropium (DUO-NEB) 2.5 mg-0.5 mg/3 ml nebu 3 mL by Nebulization route every four (4) hours as needed for Other (shortness of breath).  diclofenac (VOLTAREN) 1 % gel Apply 2 g to affected area four (4) times daily. Gel should be measured and applied using the supplied dosing card. Apply dose (2 gm or 4 gm) to each location. If treatment site is the hands, patients should wait at least one (1) hour to wash their hands.    APPLY TO bilateral knees  melatonin 3 mg tablet Take 1 Tab by mouth nightly. (Patient taking differently: Take 5 mg by mouth nightly.)    Omeprazole Magnesium (PRILOSEC) 10 mg suDR Take 20 mg by mouth two (2) times a day.  raNITIdine (ZANTAC) 150 mg tablet Take 1 Tab by mouth nightly.  lisinopril-hydroCHLOROthiazide (PRINZIDE, ZESTORETIC) 20-12.5 mg per tablet Take 1 Tab by mouth daily. Indications: hypertension    nitroglycerin (NITROSTAT) 0.4 mg SL tablet 1 Tab by SubLINGual route as needed for Chest Pain.  fluticasone-salmeterol (ADVAIR) 250-50 mcg/dose diskus inhaler Take 1 puff by inhalation every twelve (12) hours. (Patient taking differently: Take 1 Puff by inhalation daily.)    albuterol (PROVENTIL HFA, VENTOLIN HFA, PROAIR HFA) 90 mcg/actuation inhaler Take 1 puff by inhalation daily as needed for Wheezing.  clopidogrel (PLAVIX) 75 mg tablet Take 1 tablet by mouth daily.  atorvastatin (LIPITOR) 40 mg tablet Take 1 tablet by mouth nightly.  aspirin 81 mg chewable tablet Take 1 tablet by mouth daily.  polyethylene glycol (MIRALAX) 17 gram packet Take 1 packet by mouth daily.  fluoxetine (PROZAC) 20 mg capsule Take 20 mg by mouth daily.  tiotropium (SPIRIVA WITH HANDIHALER) 18 mcg inhalation capsule Take 1 Cap by inhalation daily. No current facility-administered medications for this visit.          PHYSICAL EXAMINATION:  Visit Vitals  /81   Pulse 93   Temp 98.8 °F (37.1 °C) (Oral)   Resp 16   Ht 5' 2\" (1.575 m)   Wt 135 lb 3.2 oz (61.3 kg)   SpO2 99%   BMI 24.73 kg/m²        ORTHO EXAMINATION:  Examination Left knee Right knee   Skin Intact Intact   Range of motion 115-5 115-5   Effusion - -   Medial joint line tenderness + +   Lateral joint line tenderness + +   Popliteal tenderness +, and swelling  +, and swelling   Osteophytes palpable + +   Dions - -   Patella crepitus + +   Anterior drawer - -   Lateral laxity - -   Medial laxity - -   Varus deformity - -   Valgus deformity - - Pretibial edema - -   Calf tenderness - -       Chart reviewed for the following:   I, Liseth Caldwell MD, have reviewed the History, Physical and updated the Allergic reactions for Myrtle VILL[address] performed immediately prior to start of procedure:  Chepe Jackson MD, have performed the following reviews on [address] [place] [address] prior to the start of the procedure:            * Patient was identified by name and date of birth   * Agreement on procedure being performed was verified  * Risks and Benefits explained to the patient  * Procedure site verified and marked as necessary  * Patient was positioned for comfort  * Consent was obtained     Time: 1:21 PM    Date of procedure: 2/21/2020    Procedure performed by:  Liseth Caldwell MD    Ms. Graham tolerated the procedure well with no complications. RADIOGRAPHS:    XR BILAT KNEE 2/21/20 ONEIL  IMPRESSION:  Three views - No fractures, no effusion, moderate joint space narrowing, no osteophytes present. Kellgren Esdras grade 2, flattening of condyles, moderate patellofemoral narrowing    XR BILAT KNEES PREV  Three views of knees: no fractures, no effusion, moderate joint space narrowing, medial and lateral osteophytes present. IMPRESSION:      ICD-10-CM ICD-9-CM    1. Primary osteoarthritis of right knee M17.11 715.16 betamethasone (CELESTONE SOLUSPAN) 6 mg/mL injection      BETAMETHASONE ACETATE & SODIUM PHOSPHATE INJECTION 3 MG EA.      DRAIN/INJECT LARGE JOINT/BURSA      PROCEDURE AUTHORIZATION TO    2. Chronic pain of right knee M25.561 719.46 AMB POC X-RAY KNEE 3 VIEW    G89.29 338.29 betamethasone (CELESTONE SOLUSPAN) 6 mg/mL injection      BETAMETHASONE ACETATE & SODIUM PHOSPHATE INJECTION 3 MG EA.      DRAIN/INJECT LARGE JOINT/BURSA      PROCEDURE AUTHORIZATION TO    3.  Chronic pain of left knee M25.562 719.46 AMB POC X-RAY KNEE 3 VIEW    G89.29 338.29 betamethasone (CELESTONE SOLUSPAN) 6 mg/mL injection      BETAMETHASONE ACETATE & SODIUM PHOSPHATE INJECTION 3 MG EA.      DRAIN/INJECT LARGE JOINT/BURSA      PROCEDURE AUTHORIZATION TO    4. Primary osteoarthritis of left knee M17.12 715.16 betamethasone (CELESTONE SOLUSPAN) 6 mg/mL injection      BETAMETHASONE ACETATE & SODIUM PHOSPHATE INJECTION 3 MG EA.      DRAIN/INJECT LARGE JOINT/BURSA      PROCEDURE AUTHORIZATION TO        PLAN:  After discussing treatment options, patient's knees were injected with 4 cc Marcaine and 1/2 cc Celestone. Consider visco supplementation if pain continues. There is no need for surgery at this time. We discussed a possible need for right knee MRI in the future if pain continues. OTC analgesics for pain. She will follow up as needed.      Scribed by Rene Martinez (Simin Grandchild) as dictated by Rubi Ornelas MD

## 2020-02-21 NOTE — PROGRESS NOTES
1. Have you been to the ER, urgent care clinic since your last visit? Hospitalized since your last visit? No    2. Have you seen or consulted any other health care providers outside of the [address] [place] [address] since your last visit? Include any pap smears or colon screening.  No

## 2020-02-21 NOTE — PROGRESS NOTES
Verbal order given by Dr. Kyle Almeida to draw up 4 mL 0.25% Sensorcaine and 0.5 mL 30 mg/5mL Betamethasone x 2.

## 2020-12-16 ENCOUNTER — HOSPITAL ENCOUNTER (EMERGENCY)
Age: 70
Discharge: HOME | End: 2020-12-16
Attending: EMERGENCY MEDICINE
Payer: MEDICARE

## 2020-12-16 ENCOUNTER — APPOINTMENT (OUTPATIENT)
Dept: GENERAL RADIOLOGY | Age: 70
End: 2020-12-16
Attending: EMERGENCY MEDICINE
Payer: MEDICARE

## 2020-12-16 ENCOUNTER — APPOINTMENT (OUTPATIENT)
Dept: CT IMAGING | Age: 70
End: 2020-12-16
Attending: EMERGENCY MEDICINE
Payer: MEDICARE

## 2020-12-16 VITALS
DIASTOLIC BLOOD PRESSURE: 88 MMHG | HEART RATE: 85 BPM | TEMPERATURE: 98.7 F | OXYGEN SATURATION: 100 % | HEIGHT: 62 IN | SYSTOLIC BLOOD PRESSURE: 146 MMHG | RESPIRATION RATE: 22 BRPM | BODY MASS INDEX: 23.74 KG/M2 | WEIGHT: 129 LBS

## 2020-12-16 DIAGNOSIS — K57.92 DIVERTICULITIS: Primary | ICD-10-CM

## 2020-12-16 DIAGNOSIS — R73.9 HYPERGLYCEMIA: ICD-10-CM

## 2020-12-16 LAB
ALBUMIN SERPL-MCNC: 3 G/DL (ref 3.4–5)
ALBUMIN/GLOB SERPL: 0.6 {RATIO} (ref 0.8–1.7)
ALP SERPL-CCNC: 202 U/L (ref 45–117)
ALT SERPL-CCNC: 33 U/L (ref 13–56)
ANION GAP SERPL CALC-SCNC: 10 MMOL/L (ref 3–18)
APPEARANCE UR: CLEAR
AST SERPL-CCNC: 11 U/L (ref 10–38)
BASOPHILS # BLD: 0 K/UL (ref 0–0.1)
BASOPHILS NFR BLD: 0 % (ref 0–2)
BILIRUB SERPL-MCNC: 0.3 MG/DL (ref 0.2–1)
BILIRUB UR QL: NEGATIVE
BUN SERPL-MCNC: 23 MG/DL (ref 7–18)
BUN/CREAT SERPL: 17 (ref 12–20)
CALCIUM SERPL-MCNC: 8.9 MG/DL (ref 8.5–10.1)
CHLORIDE SERPL-SCNC: 98 MMOL/L (ref 100–111)
CK MB CFR SERPL CALC: 4.5 % (ref 0–4)
CK MB SERPL-MCNC: 1.3 NG/ML (ref 5–25)
CK SERPL-CCNC: 29 U/L (ref 26–192)
CO2 SERPL-SCNC: 26 MMOL/L (ref 21–32)
COLOR UR: YELLOW
CREAT SERPL-MCNC: 1.39 MG/DL (ref 0.6–1.3)
DIFFERENTIAL METHOD BLD: ABNORMAL
EOSINOPHIL # BLD: 0 K/UL (ref 0–0.4)
EOSINOPHIL NFR BLD: 0 % (ref 0–5)
ERYTHROCYTE [DISTWIDTH] IN BLOOD BY AUTOMATED COUNT: 13.8 % (ref 11.6–14.5)
GLOBULIN SER CALC-MCNC: 4.9 G/DL (ref 2–4)
GLUCOSE BLD STRIP.AUTO-MCNC: 366 MG/DL (ref 70–110)
GLUCOSE SERPL-MCNC: 579 MG/DL (ref 74–99)
GLUCOSE UR STRIP.AUTO-MCNC: >1000 MG/DL
HCT VFR BLD AUTO: 36.3 % (ref 35–45)
HGB BLD-MCNC: 11.7 G/DL (ref 12–16)
HGB UR QL STRIP: NEGATIVE
KETONES UR QL STRIP.AUTO: NEGATIVE MG/DL
LEUKOCYTE ESTERASE UR QL STRIP.AUTO: NEGATIVE
LYMPHOCYTES # BLD: 1.9 K/UL (ref 0.9–3.6)
LYMPHOCYTES NFR BLD: 16 % (ref 21–52)
MCH RBC QN AUTO: 23.5 PG (ref 24–34)
MCHC RBC AUTO-ENTMCNC: 32.2 G/DL (ref 31–37)
MCV RBC AUTO: 72.9 FL (ref 74–97)
MONOCYTES # BLD: 0.4 K/UL (ref 0.05–1.2)
MONOCYTES NFR BLD: 3 % (ref 3–10)
NEUTS SEG # BLD: 9.3 K/UL (ref 1.8–8)
NEUTS SEG NFR BLD: 81 % (ref 40–73)
NITRITE UR QL STRIP.AUTO: NEGATIVE
PH UR STRIP: 5.5 [PH] (ref 5–8)
PLATELET # BLD AUTO: 210 K/UL (ref 135–420)
POTASSIUM SERPL-SCNC: 4.3 MMOL/L (ref 3.5–5.5)
PROT SERPL-MCNC: 7.9 G/DL (ref 6.4–8.2)
PROT UR STRIP-MCNC: NEGATIVE MG/DL
RBC # BLD AUTO: 4.98 M/UL (ref 4.2–5.3)
SODIUM SERPL-SCNC: 134 MMOL/L (ref 136–145)
SP GR UR REFRACTOMETRY: >1.03 (ref 1–1.03)
TROPONIN I SERPL-MCNC: <0.02 NG/ML (ref 0–0.04)
UROBILINOGEN UR QL STRIP.AUTO: 0.2 EU/DL (ref 0.2–1)
WBC # BLD AUTO: 11.5 K/UL (ref 4.6–13.2)

## 2020-12-16 PROCEDURE — 74177 CT ABD & PELVIS W/CONTRAST: CPT

## 2020-12-16 PROCEDURE — 74011250637 HC RX REV CODE- 250/637: Performed by: EMERGENCY MEDICINE

## 2020-12-16 PROCEDURE — 81003 URINALYSIS AUTO W/O SCOPE: CPT

## 2020-12-16 PROCEDURE — 82550 ASSAY OF CK (CPK): CPT

## 2020-12-16 PROCEDURE — 71045 X-RAY EXAM CHEST 1 VIEW: CPT

## 2020-12-16 PROCEDURE — 82962 GLUCOSE BLOOD TEST: CPT

## 2020-12-16 PROCEDURE — 99284 EMERGENCY DEPT VISIT MOD MDM: CPT

## 2020-12-16 PROCEDURE — 74011636637 HC RX REV CODE- 636/637: Performed by: EMERGENCY MEDICINE

## 2020-12-16 PROCEDURE — 93005 ELECTROCARDIOGRAM TRACING: CPT

## 2020-12-16 PROCEDURE — 80053 COMPREHEN METABOLIC PANEL: CPT

## 2020-12-16 PROCEDURE — 85025 COMPLETE CBC W/AUTO DIFF WBC: CPT

## 2020-12-16 PROCEDURE — 74011000636 HC RX REV CODE- 636: Performed by: EMERGENCY MEDICINE

## 2020-12-16 PROCEDURE — 74011250636 HC RX REV CODE- 250/636

## 2020-12-16 RX ORDER — MORPHINE SULFATE 4 MG/ML
INJECTION, SOLUTION INTRAMUSCULAR; INTRAVENOUS
Status: COMPLETED
Start: 2020-12-16 | End: 2020-12-16

## 2020-12-16 RX ORDER — AMOXICILLIN AND CLAVULANATE POTASSIUM 875; 125 MG/1; MG/1
1 TABLET, FILM COATED ORAL
Status: COMPLETED | OUTPATIENT
Start: 2020-12-16 | End: 2020-12-16

## 2020-12-16 RX ORDER — AMOXICILLIN AND CLAVULANATE POTASSIUM 875; 125 MG/1; MG/1
1 TABLET, FILM COATED ORAL 2 TIMES DAILY
Qty: 14 TAB | Refills: 0 | Status: SHIPPED | OUTPATIENT
Start: 2020-12-16 | End: 2020-12-23

## 2020-12-16 RX ORDER — OXYCODONE AND ACETAMINOPHEN 5; 325 MG/1; MG/1
1 TABLET ORAL
Qty: 12 TAB | Refills: 0 | Status: SHIPPED | OUTPATIENT
Start: 2020-12-16 | End: 2020-12-19

## 2020-12-16 RX ADMIN — AMOXICILLIN AND CLAVULANATE POTASSIUM 1 TABLET: 875; 125 TABLET, FILM COATED ORAL at 18:09

## 2020-12-16 RX ADMIN — IOPAMIDOL 70 ML: 612 INJECTION, SOLUTION INTRAVENOUS at 16:19

## 2020-12-16 RX ADMIN — MORPHINE SULFATE: 4 INJECTION, SOLUTION INTRAMUSCULAR; INTRAVENOUS at 16:11

## 2020-12-16 RX ADMIN — INSULIN HUMAN 10 UNITS: 100 INJECTION, SOLUTION PARENTERAL at 16:39

## 2020-12-16 NOTE — ED TRIAGE NOTES
Pt complaining of LLQ abd pain. Pt reports her pre-existing SOB and CP she normally has, has increased due to the abd pain. Started Friday.

## 2020-12-16 NOTE — ED PROVIDER NOTES
EMERGENCY DEPARTMENT HISTORY AND PHYSICAL EXAM  This was created with voice recognition software and transcription errors may be present. 2:52 PM  Date: 12/16/2020  Patient Name: Garo Urbano    History of Presenting Illness     Chief Complaint:    History Provided By:     HPI: Garo Urbano is a 78 y.o. female past medical history of asthma coronary disease sarcoidosis chronic pain COPD depression diabetes and hypertension who presents with left lower quadrant pain. Patient states it has been going on since last Wednesday. Associated with increased urination loss ability to control her urine. No numbness or tingling also associated with diarrhea. Patient states she is having some shortness of breath when she exerts herself. No other associated symptoms no other aggravating alleviating factors    PCP: Lisette Macdonald MD      Past History     Past Medical History:  Past Medical History:   Diagnosis Date    Arthritis     \"generalized\"    Asthma     CAD in native artery     NSTEMI (Sep 2014); diffuse 65% pLAD, minimal disease RCA & LCx. pLAD FFR 0.93.  LV  no RWMA (echo and LV angio)    Chronic neck pain     Chronic pain     left knee    Chronic pain syndrome     COPD (chronic obstructive pulmonary disease) (HCC)     Depression     Diabetes (HCC)     Diverticulitis     GERD (gastroesophageal reflux disease)     Heart disease     Hidradenitis 9/11/2014    Hypertension     Left knee pain     Narcotic dependence ([address]     Pneumonia     Right wrist pain     Sarcoidosis        Past Surgical History:  Past Surgical History:   Procedure Laterality Date    BRONCHOSCOPY  10/2019    HX BREAST BIOPSY Right     9/10/14: She said tag in place from [address]  2005    HX HYSTERECTOMY      HX KNEE ARTHROSCOPY Left        Family History:  Family History   Problem Relation Age of Onset    Hypertension Father     Diabetes Mother        Social History:  Social History     Tobacco Use    Smoking status: Former Smoker     Years: 46.00     Types: Cigarettes     Quit date: 2018     Years since quittin.7    Smokeless tobacco: Never Used    Tobacco comment: Only smokes 1 cigarette per day   Substance Use Topics    Alcohol use: Yes     Alcohol/week: 1.7 standard drinks     Types: 2 Glasses of wine per week     Comment: socially    Drug use: Yes     Types: Cocaine, Marijuana     Comment: 6 months ago       Allergies: Allergies   Allergen Reactions    Latex Hives and Itching    Ciprofloxacin Hives    Penicillins Nausea Only    Shellfish Derived Hives and Swelling     Swelling of legs       Review of Systems     Review of Systems   Respiratory: Positive for shortness of breath. Negative for apnea. Gastrointestinal: Positive for abdominal pain. All other systems reviewed and are negative. 10 point review of systems otherwise negative unless noted in HPI. Physical Exam       Physical Exam  Constitutional:       Appearance: She is well-developed. HENT:      Head: Normocephalic and atraumatic. Eyes:      Pupils: Pupils are equal, round, and reactive to light. Neck:      Musculoskeletal: Normal range of motion and neck supple. Cardiovascular:      Rate and Rhythm: Normal rate and regular rhythm. Heart sounds: Normal heart sounds. No murmur. No friction rub. Pulmonary:      Effort: Pulmonary effort is normal. No respiratory distress. Breath sounds: Normal breath sounds. No wheezing. Abdominal:      General: There is no distension. Palpations: Abdomen is soft. Tenderness: There is no abdominal tenderness. There is no guarding or rebound. Comments: Left lower quadrant pain without rebound or guarding   Musculoskeletal: Normal range of motion. Skin:     General: Skin is warm and dry. Neurological:      Mental Status: She is alert and oriented to person, place, and time.    Psychiatric:         Behavior: Behavior normal. Thought Content: Thought content normal.         Diagnostic Study Results     Vital Signs  EKG: EKG shows sinus at 112 with a left axis normal intervals no ST elevation or depression no hypertrophy  Labs: cbc wnl shift no bands  Glucose elevated, bun baseline. Slight connie. Imaging:     Medical Decision Making     ED Course: Progress Notes, Reevaluation, and Consults:      Provider Notes (Medical Decision Making): Patient presents with left lower quadrant pain since last Friday which is approximately 5 days. Associated with shortness of breath on exertion, with decreased ability to control of her bladder and diarrhea. I do not think this is actually a spinal cord injury with loss of control but I think she is having urgency and frequency. Will check basic labs as well as a CT.    ua neg  glucosde improved. + diverticulitis on CT. D/w  [facility] covering for pcp for follow up in am.            Diagnosis     Clinical Impression: No diagnosis found. Disposition:    Patient's Medications   Start Taking    No medications on file   Continue Taking    ALBUTEROL (PROVENTIL HFA, VENTOLIN HFA, PROAIR HFA) 90 MCG/ACTUATION INHALER    Take 1 puff by inhalation daily as needed for Wheezing. ALBUTEROL-IPRATROPIUM (DUO-NEB) 2.5 MG-0.5 MG/3 ML NEBU    3 mL by Nebulization route every four (4) hours as needed for Other (shortness of breath). ASPIRIN 81 MG CHEWABLE TABLET    Take 1 tablet by mouth daily. ATORVASTATIN (LIPITOR) 40 MG TABLET    Take 1 tablet by mouth nightly. BENZONATATE (TESSALON) 100 MG CAPSULE    Take 100 mg by mouth three (3) times daily as needed for Cough. CLOPIDOGREL (PLAVIX) 75 MG TABLET    Take 1 tablet by mouth daily. DICLOFENAC (VOLTAREN) 1 % GEL    Apply 2 g to affected area four (4) times daily. Gel should be measured and applied using the supplied dosing card. Apply dose (2 gm or 4 gm) to each location.    If treatment site is the hands, patients should wait at least one (1) hour to wash their hands. APPLY TO bilateral knees    FLUOXETINE (PROZAC) 20 MG CAPSULE    Take 20 mg by mouth daily. FLUTICASONE-SALMETEROL (ADVAIR) 250-50 MCG/DOSE DISKUS INHALER    Take 1 puff by inhalation every twelve (12) hours. INSULIN GLARGINE (LANTUS U-100 INSULIN) 100 UNIT/ML INJECTION    35 Units by SubCUTAneous route daily. INSULIN NEEDLES, DISPOSABLE, 31 GAUGE X 5/16\" NDLE    USE AS DIRECTED WITH INSULIN PEN DAILY    LISINOPRIL-HYDROCHLOROTHIAZIDE (PRINZIDE, ZESTORETIC) 20-12.5 MG PER TABLET    Take 1 Tab by mouth daily. Indications: hypertension    MEGESTROL (MEGACE) 400 MG/10 ML (40 MG/ML) SUSPENSION    TAKE 5 ML BY MOUTH THREE TIMES DAILY FOR APPETITE    MELATONIN 3 MG TABLET    Take 1 Tab by mouth nightly. METFORMIN (GLUCOPHAGE) 500 MG TABLET    Take 1 Tab by mouth two (2) times daily (with meals). MIRTAZAPINE (REMERON) 15 MG TABLET    Take 15 mg by mouth nightly. NITROGLYCERIN (NITROSTAT) 0.4 MG SL TABLET    1 Tab by SubLINGual route as needed for Chest Pain. OMEPRAZOLE MAGNESIUM (PRILOSEC) 10 MG SUDR    Take 20 mg by mouth two (2) times a day. OXYGEN    3LPM O2 as needed   Indications: DME: [employer]    PANTOPRAZOLE (PROTONIX) 20 MG TABLET    Take 20 mg by mouth daily. POLYETHYLENE GLYCOL (MIRALAX) 17 GRAM PACKET    Take 1 packet by mouth daily. RANITIDINE (ZANTAC) 150 MG TABLET    Take 1 Tab by mouth nightly. TIOTROPIUM (SPIRIVA WITH HANDIHALER) 18 MCG INHALATION CAPSULE    Take 1 Cap by inhalation daily.      These Medications have changed    No medications on file   Stop Taking    No medications on file

## 2020-12-17 LAB
ATRIAL RATE: 112 BPM
CALCULATED P AXIS, ECG09: 90 DEGREES
CALCULATED R AXIS, ECG10: -46 DEGREES
CALCULATED T AXIS, ECG11: 3 DEGREES
DIAGNOSIS, 93000: NORMAL
P-R INTERVAL, ECG05: 104 MS
Q-T INTERVAL, ECG07: 324 MS
QRS DURATION, ECG06: 64 MS
QTC CALCULATION (BEZET), ECG08: 442 MS
VENTRICULAR RATE, ECG03: 112 BPM

## 2020-12-19 ENCOUNTER — HOSPITAL ENCOUNTER (EMERGENCY)
Age: 70
Discharge: HOME | End: 2020-12-19
Attending: EMERGENCY MEDICINE
Payer: MEDICARE

## 2020-12-19 VITALS
HEART RATE: 96 BPM | SYSTOLIC BLOOD PRESSURE: 150 MMHG | DIASTOLIC BLOOD PRESSURE: 67 MMHG | BODY MASS INDEX: 23.92 KG/M2 | TEMPERATURE: 97.6 F | OXYGEN SATURATION: 98 % | RESPIRATION RATE: 22 BRPM | WEIGHT: 130 LBS | HEIGHT: 62 IN

## 2020-12-19 DIAGNOSIS — S10.11XA ABRASION OF THROAT, INITIAL ENCOUNTER: Primary | ICD-10-CM

## 2020-12-19 PROCEDURE — 99283 EMERGENCY DEPT VISIT LOW MDM: CPT

## 2020-12-19 NOTE — DISCHARGE INSTRUCTIONS
Patient Education        Scrapes (Abrasions): Care Instructions  Your Care Instructions  Scrapes (abrasions) are wounds where your skin has been rubbed or torn off. Most scrapes do not go deep into the skin, but some may remove several layers of skin. Scrapes usually don't bleed much, but they may ooze pinkish fluid. Scrapes on the head or face may appear worse than they are. They may bleed a lot because of the good blood supply to this area. Most scrapes heal well and may not need a bandage. They usually heal within 3 to 7 days. A large, deep scrape may take 1 to 2 weeks or longer to heal. A scab may form on some scrapes. Follow-up care is a key part of your treatment and safety. Be sure to make and go to all appointments, and call your doctor if you are having problems. It's also a good idea to know your test results and keep a list of the medicines you take. How can you care for yourself at home? · If your doctor told you how to care for your wound, follow your doctor's instructions. If you did not get instructions, follow this general advice:  ? Wash the scrape with clean water 2 times a day. Don't use hydrogen peroxide or alcohol, which can slow healing. ? You may cover the scrape with a thin layer of petroleum jelly, such as Vaseline, and a nonstick bandage. ? Apply more petroleum jelly and replace the bandage as needed. · Prop up the injured area on a pillow anytime you sit or lie down during the next 3 days. Try to keep it above the level of your heart. This will help reduce swelling. · Be safe with medicines. Take pain medicines exactly as directed. ? If the doctor gave you a prescription medicine for pain, take it as prescribed. ? If you are not taking a prescription pain medicine, ask your doctor if you can take an over-the-counter medicine. When should you call for help?    Call your doctor now or seek immediate medical care if:    · You have signs of infection, such as:  ? Increased pain, swelling, warmth, or redness around the scrape. ? Red streaks leading from the scrape. ? Pus draining from the scrape. ? A fever.     · The scrape starts to bleed, and blood soaks through the bandage. Oozing small amounts of blood is normal.   Watch closely for changes in your health, and be sure to contact your doctor if the scrape is not getting better each day. Where can you learn more? Go to http://www.gray.com/  Enter A374 in the search box to learn more about \"Scrapes (Abrasions): Care Instructions. \"  Current as of: June 26, 2019               Content Version: 12.6  © 2508-8330 [employer]. Care instructions adapted under license by [employer] (which disclaims liability or warranty for this information). If you have questions about a medical condition or this instruction, always ask your healthcare professional. Kita Coley any warranty or liability for your use of this information.

## 2020-12-19 NOTE — ED NOTES
6:52 AM  12/19/20     Discharge instructions given to Skylar Díaz (name) with verbalization of understanding. Patient accompanied by sefl. Patient discharged with the following prescriptions none. Patient discharged to home (destination).       Sandra Suero

## 2020-12-19 NOTE — ED PROVIDER NOTES
HPI Pt reports swallowing a pill approx. 1 hour ago that went down wrong. C/O of having a scratchy  throat. Past Medical History:   Diagnosis Date    Arthritis     \"generalized\"    Asthma     CAD in native artery     NSTEMI (Sep 2014); diffuse 65% pLAD, minimal disease RCA & LCx. pLAD FFR 0.93. LV  no RWMA (echo and LV angio)    Chronic neck pain     Chronic pain     left knee    Chronic pain syndrome     [facility] ([facility])     Depression     Diabetes (HCC)     Diverticulitis     GERD (gastroesophageal reflux disease)     Heart disease     Hidradenitis 2014    Hypertension     Left knee pain     [address] [facility] [address] [address]     Pneumonia     Right wrist pain     Sarcoidosis        Past Surgical History:   Procedure Laterality Date    BRONCHOSCOPY  10/2019    HX BREAST BIOPSY Right     9/10/14: She said tag in place from [address]      HX HYSTERECTOMY      HX KNEE ARTHROSCOPY Left          Family History:   Problem Relation Age of Onset    Hypertension Father     Diabetes Mother        Social History     Socioeconomic History    Marital status:      Spouse name: Not on file    Number of children: Not on file    Years of education: Not on file    Highest education level: Not on file   Occupational History    Not on file   Social Needs    Financial resource strain: Not on file    Food insecurity     Worry: Not on file     Inability: Not on file    Transportation needs     Medical: Not on file     Non-medical: Not on file   Tobacco Use    Smoking status: Former Smoker     Years: 46.00     Types: Cigarettes     Quit date: 2018     Years since quittin.8    Smokeless tobacco: Never Used    Tobacco comment: Only smokes 1 cigarette per day   Substance and Sexual Activity    Alcohol use: Yes     Alcohol/week: 1.7 standard drinks     Types: 2 Glasses of wine per week     Comment: socially    Drug use:  Yes Types: Cocaine, Marijuana     Comment: 6 months ago    Sexual activity: Yes   Lifestyle    Physical activity     Days per week: Not on file     Minutes per session: Not on file    Stress: Not on file   Relationships    Social connections     Talks on phone: Not on file     Gets together: Not on file     Attends Hindu service: Not on file     Active member of club or organization: Not on file     Attends meetings of clubs or organizations: Not on file     Relationship status: Not on file    Intimate partner violence     Fear of current or ex partner: Not on file     Emotionally abused: Not on file     Physically abused: Not on file     Forced sexual activity: Not on file   Other Topics Concern    Not on file   Social History Narrative    Not on file         ALLERGIES: Latex, Ciprofloxacin, Penicillins, and Shellfish derived    Review of Systems   Constitutional: Negative. HENT: Negative. Eyes: Negative. Respiratory: Negative. Cardiovascular: Negative. Gastrointestinal: Negative. Endocrine: Negative. Genitourinary: Negative. Musculoskeletal: Negative. Skin: Negative. Allergic/Immunologic: Negative. Neurological: Negative. Hematological: Negative. Psychiatric/Behavioral: Negative. All other systems reviewed and are negative. Vitals:    12/19/20 0527   BP: (!) 150/67   Pulse: 96   Resp: 22   Temp: 97.6 °F (36.4 °C)   SpO2: 98%   Weight: 59 kg (130 lb)   Height: 5' 2\" (1.575 m)            Physical Exam  Vitals signs and nursing note reviewed. Constitutional:       General: She is not in acute distress. Appearance: She is well-developed. She is not diaphoretic. HENT:      Head: Normocephalic. Right Ear: External ear normal.      Left Ear: External ear normal.      Mouth/Throat:      Pharynx: No oropharyngeal exudate. Eyes:      General: No scleral icterus. Right eye: No discharge. Left eye: No discharge.       Conjunctiva/sclera: Conjunctivae normal.      Pupils: Pupils are equal, round, and reactive to light. Neck:      Musculoskeletal: Normal range of motion and neck supple. Thyroid: No thyromegaly. Vascular: No JVD. Trachea: No tracheal deviation. Cardiovascular:      Rate and Rhythm: Normal rate and regular rhythm. Heart sounds: Normal heart sounds. No murmur. No friction rub. No gallop. Pulmonary:      Effort: Pulmonary effort is normal. No respiratory distress. Breath sounds: Normal breath sounds. No stridor. No wheezing or rales. Chest:      Chest wall: No tenderness. Abdominal:      General: Bowel sounds are normal. There is no distension. Palpations: Abdomen is soft. There is no mass. Tenderness: There is no abdominal tenderness. There is no guarding or rebound. Musculoskeletal: Normal range of motion. General: No tenderness. Lymphadenopathy:      Cervical: No cervical adenopathy. Skin:     General: Skin is warm and dry. Coloration: Skin is not pale. Findings: No erythema or rash. Neurological:      Mental Status: She is alert and oriented to person, place, and time. Cranial Nerves: No cranial nerve deficit. Motor: No abnormal muscle tone. Coordination: Coordination normal.      Deep Tendon Reflexes: Reflexes normal.          MDM       Procedures    Dx: abrasion of throat    Disp:  D/C  Home. F/U PCP prn. Return to ER prn. Dictation disclaimer:  Please note that this dictation was completed with [employer] the [employer] voice recognition software. Quite often unanticipated grammatical, syntax, homophones, and other interpretive errors are inadvertently transcribed by the computer software. Please disregard these errors. Please excuse any errors that have escaped final proofreading.

## 2021-02-08 ENCOUNTER — APPOINTMENT (OUTPATIENT)
Dept: CT IMAGING | Age: 71
End: 2021-02-08
Attending: EMERGENCY MEDICINE
Payer: MEDICARE

## 2021-02-08 ENCOUNTER — APPOINTMENT (OUTPATIENT)
Dept: GENERAL RADIOLOGY | Age: 71
End: 2021-02-08
Attending: EMERGENCY MEDICINE
Payer: MEDICARE

## 2021-02-08 ENCOUNTER — HOSPITAL ENCOUNTER (EMERGENCY)
Age: 71
Discharge: HOME | End: 2021-02-08
Attending: EMERGENCY MEDICINE
Payer: MEDICARE

## 2021-02-08 VITALS
RESPIRATION RATE: 22 BRPM | DIASTOLIC BLOOD PRESSURE: 72 MMHG | OXYGEN SATURATION: 99 % | TEMPERATURE: 98.8 F | HEART RATE: 94 BPM | SYSTOLIC BLOOD PRESSURE: 120 MMHG

## 2021-02-08 DIAGNOSIS — R42 DIZZINESS: ICD-10-CM

## 2021-02-08 DIAGNOSIS — M25.511 PAIN IN JOINT OF RIGHT SHOULDER: ICD-10-CM
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
## 2021-02-08 LAB
ALBUMIN SERPL-MCNC: 3.3 G/DL (ref 3.4–5)
ALBUMIN/GLOB SERPL: 0.8 {RATIO} (ref 0.8–1.7)
ALP SERPL-CCNC: 96 U/L (ref 45–117)
ALT SERPL-CCNC: 21 U/L (ref 13–56)
ANION GAP SERPL CALC-SCNC: 6 MMOL/L (ref 3–18)
AST SERPL-CCNC: 54 U/L (ref 10–38)
BASOPHILS # BLD: 0 K/UL (ref 0–0.1)
BASOPHILS NFR BLD: 0 % (ref 0–2)
BILIRUB SERPL-MCNC: 0.9 MG/DL (ref 0.2–1)
BUN SERPL-MCNC: 13 MG/DL (ref 7–18)
BUN/CREAT SERPL: 12 (ref 12–20)
CALCIUM SERPL-MCNC: 8.6 MG/DL (ref 8.5–10.1)
CHLORIDE SERPL-SCNC: 105 MMOL/L (ref 100–111)
CO2 SERPL-SCNC: 28 MMOL/L (ref 21–32)
CREAT SERPL-MCNC: 1.06 MG/DL (ref 0.6–1.3)
DIFFERENTIAL METHOD BLD: ABNORMAL
EOSINOPHIL # BLD: 0.1 K/UL (ref 0–0.4)
EOSINOPHIL NFR BLD: 1 % (ref 0–5)
ERYTHROCYTE [DISTWIDTH] IN BLOOD BY AUTOMATED COUNT: 14.4 % (ref 11.6–14.5)
GLOBULIN SER CALC-MCNC: 4.2 G/DL (ref 2–4)
GLUCOSE SERPL-MCNC: 257 MG/DL (ref 74–99)
HCT VFR BLD AUTO: 36.7 % (ref 35–45)
HGB BLD-MCNC: 11.8 G/DL (ref 12–16)
LYMPHOCYTES # BLD: 2.2 K/UL (ref 0.9–3.6)
LYMPHOCYTES NFR BLD: 40 % (ref 21–52)
MAGNESIUM SERPL-MCNC: 1.9 MG/DL (ref 1.6–2.6)
MCH RBC QN AUTO: 24.1 PG (ref 24–34)
MCHC RBC AUTO-ENTMCNC: 32.2 G/DL (ref 31–37)
MCV RBC AUTO: 74.9 FL (ref 74–97)
MONOCYTES # BLD: 0.6 K/UL (ref 0.05–1.2)
MONOCYTES NFR BLD: 12 % (ref 3–10)
NEUTS SEG # BLD: 2.5 K/UL (ref 1.8–8)
NEUTS SEG NFR BLD: 47 % (ref 40–73)
PLATELET # BLD AUTO: 252 K/UL (ref 135–420)
PLATELET COMMENTS,PCOM: ABNORMAL
PMV BLD AUTO: 10.8 FL (ref 9.2–11.8)
POTASSIUM SERPL-SCNC: 3.8 MMOL/L (ref 3.5–5.5)
PROT SERPL-MCNC: 7.5 G/DL (ref 6.4–8.2)
RBC # BLD AUTO: 4.9 M/UL (ref 4.2–5.3)
RBC MORPH BLD: ABNORMAL
SODIUM SERPL-SCNC: 139 MMOL/L (ref 136–145)
TROPONIN I SERPL-MCNC: <0.02 NG/ML (ref 0–0.04)
WBC # BLD AUTO: 5.4 K/UL (ref 4.6–13.2)

## 2021-02-08 PROCEDURE — 84484 ASSAY OF TROPONIN QUANT: CPT

## 2021-02-08 PROCEDURE — 74011250637 HC RX REV CODE- 250/637: Performed by: EMERGENCY MEDICINE

## 2021-02-08 PROCEDURE — 96374 THER/PROPH/DIAG INJ IV PUSH: CPT

## 2021-02-08 PROCEDURE — 74011250636 HC RX REV CODE- 250/636: Performed by: EMERGENCY MEDICINE

## 2021-02-08 PROCEDURE — 85025 COMPLETE CBC W/AUTO DIFF WBC: CPT

## 2021-02-08 PROCEDURE — 71045 X-RAY EXAM CHEST 1 VIEW: CPT

## 2021-02-08 PROCEDURE — 72125 CT NECK SPINE W/O DYE: CPT

## 2021-02-08 PROCEDURE — 73030 X-RAY EXAM OF SHOULDER: CPT

## 2021-02-08 PROCEDURE — 83735 ASSAY OF MAGNESIUM: CPT

## 2021-02-08 PROCEDURE — 99283 EMERGENCY DEPT VISIT LOW MDM: CPT

## 2021-02-08 PROCEDURE — 80053 COMPREHEN METABOLIC PANEL: CPT

## 2021-02-08 PROCEDURE — 70450 CT HEAD/BRAIN W/O DYE: CPT

## 2021-02-08 PROCEDURE — 74011000250 HC RX REV CODE- 250: Performed by: EMERGENCY MEDICINE

## 2021-02-08 RX ORDER — KETOROLAC TROMETHAMINE 15 MG/ML
15 INJECTION, SOLUTION INTRAMUSCULAR; INTRAVENOUS ONCE
Status: COMPLETED | OUTPATIENT
Start: 2021-02-08 | End: 2021-02-08

## 2021-02-08 RX ORDER — LIDOCAINE 4 G/100G
1 PATCH TOPICAL EVERY 24 HOURS
Status: DISCONTINUED | OUTPATIENT
Start: 2021-02-08 | End: 2021-02-08 | Stop reason: HOSPADM

## 2021-02-08 RX ORDER — HYDROCODONE BITARTRATE AND ACETAMINOPHEN 5; 325 MG/1; MG/1
1 TABLET ORAL ONCE
Status: COMPLETED | OUTPATIENT
Start: 2021-02-08 | End: 2021-02-08

## 2021-02-08 RX ADMIN — HYDROCODONE BITARTRATE AND ACETAMINOPHEN 1 TABLET: 5; 325 TABLET ORAL at 05:08

## 2021-02-08 RX ADMIN — KETOROLAC TROMETHAMINE 15 MG: 15 INJECTION, SOLUTION INTRAMUSCULAR; INTRAVENOUS at 07:25

## 2021-02-08 NOTE — ED NOTES
Patient was given discharge teaching and instructions by ED provider and this nurse. Patient verbally states understanding. Ambulated patient out of the ED with a wheel chair to await medical cab that was called. Patient is able to walk to the restroom with no assistance.

## 2021-02-08 NOTE — ED PROVIDER NOTES
EMERGENCY DEPARTMENT HISTORY AND PHYSICAL EXAM    4:45 AM  Date: 2/8/2021  Patient Name: Flower Roland    History of Presenting Illness     No chief complaint on file. History Provided By: Patient    HPI: Flower Roland is a 78 y.o. female with history multiple medical problems as below including rheumatoid arthritis. Patient is presenting after a fall. She reports that she got out of bed and felt lightheaded then fell onto her right side. Denies head injury or LOC. Complaining of generalized back pain, generalized neck pain, right shoulder pain. No history of chest pain, shortness of breath. No previous similar episodes. No other neurological symptoms. Location:  Severity:  Timing/course: Onset/Duration:     PCP: Madai Donald MD    Past History     Past Medical History:  Past Medical History:   Diagnosis Date    Arthritis     \"generalized\"    Asthma     CAD in native artery     NSTEMI (Sep 2014); diffuse 65% pLAD, minimal disease RCA & LCx. pLAD FFR 0.93.  LV  no RWMA (echo and LV angio)    Chronic neck pain     Chronic pain     left knee    Chronic pain syndrome     COPD (chronic obstructive pulmonary disease) (HCC)     Depression     Diabetes (HCC)     Diverticulitis     GERD (gastroesophageal reflux disease)     Heart disease     Hidradenitis 9/11/2014    Hypertension     Left knee pain     Narcotic dependence ([address]     Pneumonia     Right wrist pain     Sarcoidosis        Past Surgical History:  Past Surgical History:   Procedure Laterality Date    BRONCHOSCOPY  10/2019    HX BREAST BIOPSY Right     9/10/14: She said tag in place from [address]  2005    HX HYSTERECTOMY      HX KNEE ARTHROSCOPY Left        Family History:  Family History   Problem Relation Age of Onset    Hypertension Father     Diabetes Mother        Social History:  Social History     Tobacco Use    Smoking status: Former Smoker     Years: 46.00     Types: Cigarettes     Quit date: 2018     Years since quittin.9    Smokeless tobacco: Never Used    Tobacco comment: Only smokes 1 cigarette per day   Substance Use Topics    Alcohol use: Yes     Alcohol/week: 1.7 standard drinks     Types: 2 Glasses of wine per week     Comment: socially    Drug use: Yes     Types: Cocaine, Marijuana     Comment: 6 months ago       Allergies: Allergies   Allergen Reactions    Latex Hives and Itching    Ciprofloxacin Hives    Penicillins Nausea Only    Shellfish Derived Hives and Swelling     Swelling of legs       Review of Systems   Review of Systems   Musculoskeletal: Positive for arthralgias, back pain and neck pain. Neurological: Positive for light-headedness. All other systems reviewed and are negative. Physical Exam     No data found. Physical Exam  Vitals signs and nursing note reviewed. Constitutional:       Appearance: Normal appearance. HENT:      Head: Normocephalic and atraumatic. Eyes:      Extraocular Movements: Extraocular movements intact. Neck:      Musculoskeletal: Neck supple. Muscular tenderness present. Cardiovascular:      Rate and Rhythm: Normal rate. Pulmonary:      Effort: Pulmonary effort is normal. No respiratory distress. Chest:      Chest wall: Tenderness present. Abdominal:      Palpations: Abdomen is soft. Tenderness: There is no abdominal tenderness. Musculoskeletal:      Right shoulder: She exhibits tenderness. She exhibits normal range of motion. Skin:     General: Skin is warm and dry. Neurological:      General: No focal deficit present. Mental Status: She is alert and oriented to person, place, and time. Psychiatric:         Mood and Affect: Mood normal.         Behavior: Behavior normal.         Diagnostic Study Results     Labs -  No results found for this or any previous visit (from the past 12 hour(s)). Radiologic Studies -   No results found.       Medical Decision Making     ED Course: Progress Notes, Reevaluation, and Consults:    4:45 AM Initial assessment performed. The patients presenting problems have been discussed, and they/their family are in agreement with the care plan formulated and outlined with them. I have encouraged them to ask questions as they arise throughout their visit. Provider Notes (Medical Decision Making): 72-year-old female with history of arthritis and chronic pain presenting after a fall as a result of lightheadedness. Complaining of diffuse body aches mainly right shoulder, diffuse paraspinal back pain and C-spine tenderness. Given the dizziness in the fall will get head CT and C-spine as well as chest x-ray, shoulder x-ray. Screening labs to evaluate for causes of the dizziness. Possibly postural hypotension as it happened when she stood up. Pain control and reevaluation. Procedures:     Critical Care Time:     Vital Signs-Reviewed the patient's vital signs. Reviewed pt's pulse ox reading. EKG: Interpreted by the EP. Time Interpreted:    Rate:    Rhythm:    Interpretation:   Comparison:     Records Reviewed: Nursing Notes (Time of Review: 4:45 AM)  -I am the first provider for this patient.  -I reviewed the vital signs, available nursing notes, past medical history, past surgical history, family history and social history. Current Outpatient Medications   Medication Sig Dispense Refill    megestrol (MEGACE) 400 mg/10 mL (40 mg/mL) suspension TAKE 5 ML BY MOUTH THREE TIMES DAILY FOR APPETITE  1    Insulin Needles, Disposable, 31 gauge x 5/16\" ndle USE AS DIRECTED WITH INSULIN PEN DAILY  3    Oxygen 3LPM O2 as needed   Indications: DME: [facility]      pantoprazole (PROTONIX) 20 mg tablet Take 20 mg by mouth daily.  benzonatate (TESSALON) 100 mg capsule Take 100 mg by mouth three (3) times daily as needed for Cough.  mirtazapine (REMERON) 15 mg tablet Take 15 mg by mouth nightly.       metFORMIN (GLUCOPHAGE) 500 mg tablet Take 1 Tab by mouth two (2) times daily (with meals). 60 Tab 0    insulin glargine (LANTUS U-100 INSULIN) 100 unit/mL injection 35 Units by SubCUTAneous route daily. (Patient taking differently: 40 Units by SubCUTAneous route daily. ) 1 Vial 0    albuterol-ipratropium (DUO-NEB) 2.5 mg-0.5 mg/3 ml nebu 3 mL by Nebulization route every four (4) hours as needed for Other (shortness of breath). 30 Nebule 0    diclofenac (VOLTAREN) 1 % gel Apply 2 g to affected area four (4) times daily. Gel should be measured and applied using the supplied dosing card. Apply dose (2 gm or 4 gm) to each location. If treatment site is the hands, patients should wait at least one (1) hour to wash their hands. APPLY TO bilateral knees 100 g 0    melatonin 3 mg tablet Take 1 Tab by mouth nightly. (Patient taking differently: Take 5 mg by mouth nightly.) 30 Tab 0    Omeprazole Magnesium (PRILOSEC) 10 mg suDR Take 20 mg by mouth two (2) times a day. 60 Each 0    raNITIdine (ZANTAC) 150 mg tablet Take 1 Tab by mouth nightly. 15 Tab 0    lisinopril-hydroCHLOROthiazide (PRINZIDE, ZESTORETIC) 20-12.5 mg per tablet Take 1 Tab by mouth daily. Indications: hypertension      nitroglycerin (NITROSTAT) 0.4 mg SL tablet 1 Tab by SubLINGual route as needed for Chest Pain. 25 Tab 1    fluticasone-salmeterol (ADVAIR) 250-50 mcg/dose diskus inhaler Take 1 puff by inhalation every twelve (12) hours. (Patient taking differently: Take 1 Puff by inhalation daily. ) 1 Inhaler 0    albuterol (PROVENTIL HFA, VENTOLIN HFA, PROAIR HFA) 90 mcg/actuation inhaler Take 1 puff by inhalation daily as needed for Wheezing. 1 Inhaler 0    clopidogrel (PLAVIX) 75 mg tablet Take 1 tablet by mouth daily. 30 tablet 11    atorvastatin (LIPITOR) 40 mg tablet Take 1 tablet by mouth nightly. 30 tablet 11    aspirin 81 mg chewable tablet Take 1 tablet by mouth daily. 30 tablet 0    polyethylene glycol (MIRALAX) 17 gram packet Take 1 packet by mouth daily.  14 each 0    fluoxetine (PROZAC) 20 mg capsule Take 20 mg by mouth daily.  tiotropium (SPIRIVA WITH HANDIHALER) 18 mcg inhalation capsule Take 1 Cap by inhalation daily. Clinical Impression     Clinical Impression: No diagnosis found. Disposition: DC      DISCHARGE NOTE:     Pt has been reexamined. Patient has no new complaints, changes, or physical findings. Care plan outlined and precautions discussed. Results of labs and imaging were reviewed with the patient. All medications were reviewed with the patient; will d/c home with return precautions. All of pt's questions and concerns were addressed. Patient was instructed and agrees to follow up with PCP, as well as to return to the ED upon further deterioration. Patient is ready to go home. This note was dictated utilizing voice recognition software which may lead to typographical errors. I apologize in advance if the situation occurs. If questions arise please do not hesitate to contact me or call our department.     Tobias Blackburn MD  4:45 AM

## 2021-02-08 NOTE — ED NOTES
Assumed care of patient, patient is currently resting on stretcher with a 10/10 pain on the right shoulder. Medication provided. Provider aware. Transportation was set up to get patient home safely.

## 2021-02-26 ENCOUNTER — OFFICE VISIT (OUTPATIENT)
Dept: ORTHOPEDIC SURGERY | Age: 71
End: 2021-02-26
Payer: MEDICARE

## 2021-02-26 VITALS — TEMPERATURE: 96.6 F | WEIGHT: 131.8 LBS | BODY MASS INDEX: 24.11 KG/M2

## 2021-02-26 DIAGNOSIS — S42.034A CLOSED NONDISPLACED FRACTURE OF ACROMIAL END OF RIGHT CLAVICLE, INITIAL ENCOUNTER: Primary | ICD-10-CM

## 2021-02-26 DIAGNOSIS — S16.1XXA STRAIN OF NECK MUSCLE, INITIAL ENCOUNTER: ICD-10-CM

## 2021-02-26 DIAGNOSIS — M25.562 CHRONIC PAIN OF LEFT KNEE: ICD-10-CM

## 2021-02-26 DIAGNOSIS — G89.29 CHRONIC PAIN OF LEFT KNEE: ICD-10-CM

## 2021-02-26 DIAGNOSIS — M17.12 PRIMARY OSTEOARTHRITIS OF LEFT KNEE: ICD-10-CM

## 2021-02-26 DIAGNOSIS — M50.30 DDD (DEGENERATIVE DISC DISEASE), CERVICAL: ICD-10-CM

## 2021-02-26 PROCEDURE — G8420 CALC BMI NORM PARAMETERS: HCPCS | Performed by: SPECIALIST

## 2021-02-26 PROCEDURE — G8756 NO BP MEASURE DOC: HCPCS | Performed by: SPECIALIST

## 2021-02-26 PROCEDURE — 73000 X-RAY EXAM OF COLLAR BONE: CPT | Performed by: SPECIALIST

## 2021-02-26 PROCEDURE — 20610 DRAIN/INJ JOINT/BURSA W/O US: CPT | Performed by: SPECIALIST

## 2021-02-26 PROCEDURE — 1101F PT FALLS ASSESS-DOCD LE1/YR: CPT | Performed by: SPECIALIST

## 2021-02-26 PROCEDURE — 1090F PRES/ABSN URINE INCON ASSESS: CPT | Performed by: SPECIALIST

## 2021-02-26 PROCEDURE — 99203 OFFICE O/P NEW LOW 30 MIN: CPT | Performed by: SPECIALIST

## 2021-02-26 PROCEDURE — G9717 DOC PT DX DEP/BP F/U NT REQ: HCPCS | Performed by: SPECIALIST

## 2021-02-26 PROCEDURE — G8427 DOCREV CUR MEDS BY ELIG CLIN: HCPCS | Performed by: SPECIALIST

## 2021-02-26 PROCEDURE — 3017F COLORECTAL CA SCREEN DOC REV: CPT | Performed by: SPECIALIST

## 2021-02-26 PROCEDURE — G8536 NO DOC ELDER MAL SCRN: HCPCS | Performed by: SPECIALIST

## 2021-02-26 RX ORDER — BETAMETHASONE SODIUM PHOSPHATE AND BETAMETHASONE ACETATE 3; 3 MG/ML; MG/ML
3 INJECTION, SUSPENSION INTRA-ARTICULAR; INTRALESIONAL; INTRAMUSCULAR; SOFT TISSUE ONCE
Status: COMPLETED | OUTPATIENT
Start: 2021-02-26 | End: 2021-02-26

## 2021-02-26 RX ORDER — HYDROCODONE BITARTRATE AND ACETAMINOPHEN 7.5; 325 MG/1; MG/1
1 TABLET ORAL
Qty: 30 TAB | Refills: 0 | Status: SHIPPED | OUTPATIENT
Start: 2021-02-26 | End: 2021-03-16 | Stop reason: SDUPTHER

## 2021-02-26 RX ADMIN — BETAMETHASONE SODIUM PHOSPHATE AND BETAMETHASONE ACETATE 3 MG: 3; 3 INJECTION, SUSPENSION INTRA-ARTICULAR; INTRALESIONAL; INTRAMUSCULAR; SOFT TISSUE at 10:35

## 2021-02-26 NOTE — PROGRESS NOTES
Patient: Bayron Bah                MRN: [id number]       SSN: [id number]  YOB: 1950        AGE: 78 y.o. SEX: female      PCP: Kristina Holguin MD  02/26/21    CC: RIGHT SHOULDER AND LEFT KNEE PAIN    HISTORY:  Bayron Bah is a 78 y.o. female who sustained a right shoulder injury on 2/8/21. She landed on her right shoulder when her left knee gave out on her as she was getting out of bed. She was seen at [facility] on the same day where right shoulder x rays revealed a nondisplaced fracture of the right distal clavicle. Tara Mercado She was provided naproxen and an orthopedic referral. She has been experiencing  pain and swelling since the injury. She was previously seen for increased bilateral knee pain. She is no longer in pain management. She has been experiencing knee pain for the past several years. Her pain increased in the past 3 months. She is s/p left knee arthroscopy 12/14/11. She feels throbbing pain in her knees. She notes a sharp pain on the lateral side of her knees. She has popping and crackling as she walks up and down stairs. She reports occasional giving way episodes so she is fearful of falling. She feels like someone is sticking an ice pick in her knees. She was previously discharged from the center of pain management, since she tested positive for cocaine. Pain Assessment  2/26/2021   Location of Pain Shoulder   Location Modifiers Right   Severity of Pain 10   Quality of Pain Throbbing; Sharp;Dull;Aching;Burning   Quality of Pain Comment swelling. pain radiating  up the neck. stabbing.  hot   Duration of Pain Persistent   Frequency of Pain Constant   Date Pain First Started 2/6/2021   Aggravating Factors (No Data)   Aggravating Factors Comment ROM   Limiting Behavior -   Relieving Factors Nothing   Result of Injury Yes   Work-Related Injury No   Type of Injury Fall   Type of Injury Comment knee give out and feel on the shoulder     Occupation, etc: Ms. Rhonda Conteh previously worked as an  for the [employer]. She lived in the [facility] with her first  for 8 years. She lives in [place] with her . She states her  is doing much better after his colectomy. She states he has gained back the weight he lost. She has 1 son from her 1st marriage and 1 granddaughter. She is active with her [employer]. She states she is 2 years sober from cocaine. She is 131 pounds and is 5'2\" tall. Last 3 Recorded Weights in this Encounter    02/26/21 0949   Weight: 131 lb 12.8 oz (59.8 kg)     Body mass index is 24.11 kg/m². REVIEW OF SYSTEMS: All Below are Negative except: See HPI   Constitutional: negative for fever, chills, and weight loss. Cardiovascular: negative for chest pain, claudication, leg swelling, SOB, HAN   Gastrointestinal: Negative for pain, N/V/C/D, Blood in stool or urine, dysuria,  hematuria, incontinence, pelvic pain. Musculoskeletal: See HPI   Neurological: Negative for dizziness and weakness. Negative for headaches, Visual changes, confusion, seizures   Phychiatric/Behavioral: Negative for depression, memory loss, substance  abuse. Extremities: Negative for hair changes, rash, or skin lesion changes. Hematologic: Negative for bleeding problems, bruising, pallor or swollen lymph  nodes   Peripheral Vascular: No calf pain, no circulation deficits.     Social History     Socioeconomic History    Marital status:      Spouse name: Not on file    Number of children: Not on file    Years of education: Not on file    Highest education level: Not on file   Occupational History    Not on file   Social Needs    Financial resource strain: Not on file    Food insecurity     Worry: Not on file     Inability: Not on file    Transportation needs     Medical: Not on file     Non-medical: Not on file   Tobacco Use    Smoking status: Former Smoker     Years: 46.00     Types: Cigarettes     Quit date: 03/2018     Years since quittin.9    Smokeless tobacco: Never Used    Tobacco comment: Only smokes 1 cigarette per day   Substance and Sexual Activity    Alcohol use: Yes     Alcohol/week: 1.7 standard drinks     Types: 2 Glasses of wine per week     Comment: socially    Drug use: Yes     Types: Cocaine, Marijuana     Comment: 6 months ago    Sexual activity: Yes   Lifestyle    Physical activity     Days per week: Not on file     Minutes per session: Not on file    Stress: Not on file   Relationships    Social connections     Talks on phone: Not on file     Gets together: Not on file     Attends Anabaptism service: Not on file     Active member of club or organization: Not on file     Attends meetings of clubs or organizations: Not on file     Relationship status: Not on file    Intimate partner violence     Fear of current or ex partner: Not on file     Emotionally abused: Not on file     Physically abused: Not on file     Forced sexual activity: Not on file   Other Topics Concern    Not on file   Social History Narrative    Not on file        Allergies   Allergen Reactions    Latex Hives and Itching    Ciprofloxacin Hives    Penicillins Nausea Only    Shellfish Derived Hives and Swelling     Swelling of legs        Current Outpatient Medications   Medication Sig    HYDROcodone-acetaminophen (Norco) 7.5-325 mg per tablet Take 1 Tab by mouth nightly as needed for Pain for up to 30 days. Max Daily Amount: 1 Tab.  megestrol (MEGACE) 400 mg/10 mL (40 mg/mL) suspension TAKE 5 ML BY MOUTH THREE TIMES DAILY FOR APPETITE    Insulin Needles, Disposable, 31 gauge x 5/16\" ndle USE AS DIRECTED WITH INSULIN PEN DAILY    Oxygen 3LPM O2 as needed   Indications: DME: [facility]    pantoprazole (PROTONIX) 20 mg tablet Take 20 mg by mouth daily.  benzonatate (TESSALON) 100 mg capsule Take 100 mg by mouth three (3) times daily as needed for Cough.  mirtazapine (REMERON) 15 mg tablet Take 15 mg by mouth nightly.     metFORMIN (GLUCOPHAGE) 500 mg tablet Take 1 Tab by mouth two (2) times daily (with meals).  insulin glargine (LANTUS U-100 INSULIN) 100 unit/mL injection 35 Units by SubCUTAneous route daily. (Patient taking differently: 40 Units by SubCUTAneous route daily.)    albuterol-ipratropium (DUO-NEB) 2.5 mg-0.5 mg/3 ml nebu 3 mL by Nebulization route every four (4) hours as needed for Other (shortness of breath).  diclofenac (VOLTAREN) 1 % gel Apply 2 g to affected area four (4) times daily. Gel should be measured and applied using the supplied dosing card. Apply dose (2 gm or 4 gm) to each location. If treatment site is the hands, patients should wait at least one (1) hour to wash their hands. APPLY TO bilateral knees    melatonin 3 mg tablet Take 1 Tab by mouth nightly. (Patient taking differently: Take 5 mg by mouth nightly.)    Omeprazole Magnesium (PRILOSEC) 10 mg suDR Take 20 mg by mouth two (2) times a day.  raNITIdine (ZANTAC) 150 mg tablet Take 1 Tab by mouth nightly.  lisinopril-hydroCHLOROthiazide (PRINZIDE, ZESTORETIC) 20-12.5 mg per tablet Take 1 Tab by mouth daily. Indications: hypertension    nitroglycerin (NITROSTAT) 0.4 mg SL tablet 1 Tab by SubLINGual route as needed for Chest Pain.  fluticasone-salmeterol (ADVAIR) 250-50 mcg/dose diskus inhaler Take 1 puff by inhalation every twelve (12) hours. (Patient taking differently: Take 1 Puff by inhalation daily.)    albuterol (PROVENTIL HFA, VENTOLIN HFA, PROAIR HFA) 90 mcg/actuation inhaler Take 1 puff by inhalation daily as needed for Wheezing.  clopidogrel (PLAVIX) 75 mg tablet Take 1 tablet by mouth daily.  atorvastatin (LIPITOR) 40 mg tablet Take 1 tablet by mouth nightly.  aspirin 81 mg chewable tablet Take 1 tablet by mouth daily.  polyethylene glycol (MIRALAX) 17 gram packet Take 1 packet by mouth daily.  fluoxetine (PROZAC) 20 mg capsule Take 20 mg by mouth daily.     tiotropium (SPIRIVA WITH HANDIHALER) 18 mcg inhalation capsule Take 1 Cap by inhalation daily. No current facility-administered medications for this visit. PHYSICAL EXAMINATION:  Visit Vitals  Temp (!) 96.6 °F (35.9 °C) (Temporal)   Wt 131 lb 12.8 oz (59.8 kg)   BMI 24.11 kg/m²        ORTHO EXAMINATION:  Examination Left knee Right knee   Skin Intact Intact   Range of motion 115-5 115-5   Effusion - -   Medial joint line tenderness + +   Lateral joint line tenderness + +   Popliteal tenderness +, and swelling  +, and swelling   Osteophytes palpable + +   Dions - -   Patella crepitus + +   Anterior drawer - -   Lateral laxity - -   Medial laxity - -   Varus deformity - -   Valgus deformity - -   Pretibial edema - -   Calf tenderness - -     Examination Right shoulder Left shoulder   Skin Intact Intact   Effusion - -   Biceps deformity - -   Atrophy - -   AC joint tenderness - -   Acromial tenderness + -   Biceps tenderness - -   Forward flexion/Elevation ROM 90 170   Active abduction ROM 90 170   External rotation ROM 25 30   Internal rotation ROM 70 90   Apprehension - -   Impingement - -   Drop Arm Test - -   Neurovascular Intact Intact    Tenderness lateral third R clavicle    TIME OUT:  Chart reviewed for the following:   I, Deep Sparks MD, have reviewed the History, Physical and updated the Allergic reactions for [facility] performed immediately prior to start of procedure:  Cesar Boland MD, have performed the following reviews on [address] [place] [address] prior to the start of the procedure:          * Patient was identified by name and date of birth   * Agreement on procedure being performed was verified  * Risks and Benefits explained to the patient  * Procedure site verified and marked as necessary  * Patient was positioned for comfort  * Consent was obtained     Time: 10:33 AM     Date of procedure: 2/26/2021  Procedure performed by:  Deep Sparks MD  Ms. Graham tolerated the procedure well with no complications. CT HEAD 2/8/21  IMPRESSION  No evidence of acute intracranial abnormality. CT CERV SPINE 2/8/21  IMPRESSION  1. No CT signs of acute cervical spine trauma.     2. Moderate facet arthropathy, right greater than left. There is moderate C4-C5 foraminal stenosis. RADIOGRAPHS:  XR RIGHT CLAVICLE 2/26/21 [facility]  IMPRESSION:  Three views - Evidence of healing of nondisplaced distal clavicle  Fracture, mild AC narrowing. XR RIGHT SHOULDER 2/8/21 [facility]  IMPRESSION  Acute nondisplaced fracture of the lateral third of the right clavicle.  -I have independently reviewed these images during this office visit. -Dr. [place]:  Three views - Nondisplaced distal right clavicle fracture just medial to the [facility] joint. Mild AC narrowing    XR BILAT KNEE 2/21/20 [facility]  IMPRESSION:  Three views - No fractures, no effusion, moderate joint space narrowing, no osteophytes present. Kellgren Esdras grade 2, flattening of condyles, moderate patellofemoral narrowing    XR BILAT KNEES PREV  Three views of knees: no fractures, no effusion, moderate joint space narrowing, medial and lateral osteophytes present. IMPRESSION:      ICD-10-CM ICD-9-CM    1. Closed nondisplaced fracture of acromial end of right clavicle, initial encounter  S42.034A 810.03 HYDROcodone-acetaminophen (Norco) 7.5-325 mg per tablet      AMB POC XRAY; CLAVICLE, COMPLETE      [facility] CLOSED TX CLAVICLE FRACTURE      REFERRAL TO PHYSICAL THERAPY   2. DDD (degenerative disc disease), cervical  M50.30 722.4    3. Strain of neck muscle, initial encounter  S16. 1XXA 847.0    4. Primary osteoarthritis of left knee  M17.12 715.16 betamethasone (CELESTONE) injection 3 mg      DRAIN/INJECT LARGE JOINT/BURSA      REFERRAL TO PHYSICAL THERAPY   5. Chronic pain of left knee  M25.562 719.46     G89.29 338.29      PLAN:  Norco Rx provided. She will start a brief course of outpatient physical therapy.  After discussing treatment options, patient's left knee was injected with 4 cc Marcaine and 1/2 cc Celestone. There is no need for surgery at this time. She will follow up in 4 weeks with ROBIN Anderson.     Scribed by Dean Nathan ([address]) as dictated by Leana Nicole MD

## 2021-03-16 DIAGNOSIS — S42.034A CLOSED NONDISPLACED FRACTURE OF ACROMIAL END OF RIGHT CLAVICLE, INITIAL ENCOUNTER: ICD-10-CM

## 2021-03-16 NOTE — TELEPHONE ENCOUNTER
Patient is requesting a refill of HYDROcodone-acetaminophen (Norco) 7.5-325 mg per tablet called in to [facility] on file. Please advise patient once approved, [phone].

## 2021-03-16 NOTE — TELEPHONE ENCOUNTER
[facility] date for Norco as 2/26/21 for a 30 d/s. Last Visit: 2/26/21 with MD Pinky Iraheta  Next Appointment: 3/26/21 with ROBIN Olivia  Previous Refill Encounter(s): 2/26/21 #30    Requested Prescriptions     Pending Prescriptions Disp Refills    HYDROcodone-acetaminophen (Norco) 7.5-325 mg per tablet 30 Tab 0     Sig: Take 1 Tab by mouth nightly as needed for Pain for up to 30 days. Max Daily Amount: 1 Tab.

## 2021-03-17 RX ORDER — HYDROCODONE BITARTRATE AND ACETAMINOPHEN 7.5; 325 MG/1; MG/1
1 TABLET ORAL
Qty: 30 TAB | Refills: 0 | Status: SHIPPED | OUTPATIENT
Start: 2021-03-17 | End: 2021-04-16

## 2021-08-01 ENCOUNTER — APPOINTMENT (OUTPATIENT)
Dept: CT IMAGING | Age: 71
End: 2021-08-01
Attending: EMERGENCY MEDICINE
Payer: MEDICARE

## 2021-08-01 ENCOUNTER — APPOINTMENT (OUTPATIENT)
Dept: GENERAL RADIOLOGY | Age: 71
End: 2021-08-01
Attending: EMERGENCY MEDICINE
Payer: MEDICARE

## 2021-08-01 ENCOUNTER — HOSPITAL ENCOUNTER (EMERGENCY)
Age: 71
Discharge: HOME | End: 2021-08-01
Attending: EMERGENCY MEDICINE
Payer: MEDICARE

## 2021-08-01 VITALS
DIASTOLIC BLOOD PRESSURE: 90 MMHG | OXYGEN SATURATION: 98 % | SYSTOLIC BLOOD PRESSURE: 173 MMHG | RESPIRATION RATE: 20 BRPM | HEART RATE: 101 BPM | TEMPERATURE: 98.4 F

## 2021-08-01 DIAGNOSIS — M79.10 MYALGIA: Primary | ICD-10-CM

## 2021-08-01 DIAGNOSIS — R53.81 MALAISE AND FATIGUE: ICD-10-CM

## 2021-08-01 DIAGNOSIS — R53.83 MALAISE AND FATIGUE: ICD-10-CM

## 2021-08-01 DIAGNOSIS — R03.0 ELEVATED BLOOD PRESSURE READING: ICD-10-CM

## 2021-08-01 LAB
ALBUMIN SERPL-MCNC: 3.2 G/DL (ref 3.4–5)
ALBUMIN/GLOB SERPL: 0.8 {RATIO} (ref 0.8–1.7)
ALP SERPL-CCNC: 135 U/L (ref 45–117)
ALT SERPL-CCNC: 32 U/L (ref 13–56)
ANION GAP SERPL CALC-SCNC: 7 MMOL/L (ref 3–18)
APPEARANCE UR: CLEAR
APTT PPP: 23.5 SEC (ref 23–36.4)
AST SERPL-CCNC: 18 U/L (ref 10–38)
ATRIAL RATE: 99 BPM
BASOPHILS # BLD: 0 K/UL (ref 0–0.1)
BASOPHILS NFR BLD: 0 % (ref 0–2)
BILIRUB SERPL-MCNC: 0.5 MG/DL (ref 0.2–1)
BILIRUB UR QL: NEGATIVE
BUN SERPL-MCNC: 26 MG/DL (ref 7–18)
BUN/CREAT SERPL: 30 (ref 12–20)
CALCIUM SERPL-MCNC: 8.6 MG/DL (ref 8.5–10.1)
CALCULATED P AXIS, ECG09: 61 DEGREES
CALCULATED R AXIS, ECG10: -38 DEGREES
CALCULATED T AXIS, ECG11: 51 DEGREES
CHLORIDE SERPL-SCNC: 108 MMOL/L (ref 100–111)
CO2 SERPL-SCNC: 26 MMOL/L (ref 21–32)
COLOR UR: YELLOW
CREAT SERPL-MCNC: 0.86 MG/DL (ref 0.6–1.3)
DIAGNOSIS, 93000: NORMAL
DIFFERENTIAL METHOD BLD: ABNORMAL
EOSINOPHIL # BLD: 0.1 K/UL (ref 0–0.4)
EOSINOPHIL NFR BLD: 1 % (ref 0–5)
ERYTHROCYTE [DISTWIDTH] IN BLOOD BY AUTOMATED COUNT: 16.9 % (ref 11.6–14.5)
GLOBULIN SER CALC-MCNC: 3.8 G/DL (ref 2–4)
GLUCOSE SERPL-MCNC: 192 MG/DL (ref 74–99)
GLUCOSE UR STRIP.AUTO-MCNC: NEGATIVE MG/DL
HCT VFR BLD AUTO: 32 % (ref 35–45)
HGB BLD-MCNC: 9.9 G/DL (ref 12–16)
HGB UR QL STRIP: NEGATIVE
INR PPP: 0.9 (ref 0.8–1.2)
KETONES UR QL STRIP.AUTO: NEGATIVE MG/DL
LEUKOCYTE ESTERASE UR QL STRIP.AUTO: NEGATIVE
LYMPHOCYTES # BLD: 2.7 K/UL (ref 0.9–3.6)
LYMPHOCYTES NFR BLD: 34 % (ref 21–52)
MAGNESIUM SERPL-MCNC: 1.8 MG/DL (ref 1.6–2.6)
MCH RBC QN AUTO: 23.8 PG (ref 24–34)
MCHC RBC AUTO-ENTMCNC: 30.9 G/DL (ref 31–37)
MCV RBC AUTO: 76.9 FL (ref 74–97)
MONOCYTES # BLD: 0.6 K/UL (ref 0.05–1.2)
MONOCYTES NFR BLD: 8 % (ref 3–10)
NEUTS SEG # BLD: 4.4 K/UL (ref 1.8–8)
NEUTS SEG NFR BLD: 56 % (ref 40–73)
NITRITE UR QL STRIP.AUTO: NEGATIVE
P-R INTERVAL, ECG05: 114 MS
PH UR STRIP: 5 [PH] (ref 5–8)
PLATELET # BLD AUTO: 292 K/UL (ref 135–420)
PMV BLD AUTO: 10.4 FL (ref 9.2–11.8)
POTASSIUM SERPL-SCNC: 3.8 MMOL/L (ref 3.5–5.5)
PROT SERPL-MCNC: 7 G/DL (ref 6.4–8.2)
PROT UR STRIP-MCNC: NEGATIVE MG/DL
PROTHROMBIN TIME: 11.9 SEC (ref 11.5–15.2)
Q-T INTERVAL, ECG07: 338 MS
QRS DURATION, ECG06: 76 MS
QTC CALCULATION (BEZET), ECG08: 433 MS
RBC # BLD AUTO: 4.16 M/UL (ref 4.2–5.3)
SODIUM SERPL-SCNC: 141 MMOL/L (ref 136–145)
SP GR UR REFRACTOMETRY: 1.02 (ref 1–1.03)
TSH SERPL DL<=0.05 MIU/L-ACNC: 0.64 UIU/ML (ref 0.36–3.74)
UROBILINOGEN UR QL STRIP.AUTO: 0.2 EU/DL (ref 0.2–1)
VENTRICULAR RATE, ECG03: 99 BPM
WBC # BLD AUTO: 7.8 K/UL (ref 4.6–13.2)

## 2021-08-01 PROCEDURE — 99284 EMERGENCY DEPT VISIT MOD MDM: CPT

## 2021-08-01 PROCEDURE — 84443 ASSAY THYROID STIM HORMONE: CPT

## 2021-08-01 PROCEDURE — 85025 COMPLETE CBC W/AUTO DIFF WBC: CPT

## 2021-08-01 PROCEDURE — 71045 X-RAY EXAM CHEST 1 VIEW: CPT

## 2021-08-01 PROCEDURE — 96361 HYDRATE IV INFUSION ADD-ON: CPT

## 2021-08-01 PROCEDURE — 85610 PROTHROMBIN TIME: CPT

## 2021-08-01 PROCEDURE — 93005 ELECTROCARDIOGRAM TRACING: CPT

## 2021-08-01 PROCEDURE — 83735 ASSAY OF MAGNESIUM: CPT

## 2021-08-01 PROCEDURE — 80053 COMPREHEN METABOLIC PANEL: CPT

## 2021-08-01 PROCEDURE — 85730 THROMBOPLASTIN TIME PARTIAL: CPT

## 2021-08-01 PROCEDURE — 99283 EMERGENCY DEPT VISIT LOW MDM: CPT

## 2021-08-01 PROCEDURE — 81003 URINALYSIS AUTO W/O SCOPE: CPT

## 2021-08-01 PROCEDURE — 74011250636 HC RX REV CODE- 250/636: Performed by: EMERGENCY MEDICINE

## 2021-08-01 PROCEDURE — 70450 CT HEAD/BRAIN W/O DYE: CPT

## 2021-08-01 PROCEDURE — 96374 THER/PROPH/DIAG INJ IV PUSH: CPT

## 2021-08-01 PROCEDURE — 74011250637 HC RX REV CODE- 250/637: Performed by: EMERGENCY MEDICINE

## 2021-08-01 RX ORDER — HYDROCODONE BITARTRATE AND ACETAMINOPHEN 5; 325 MG/1; MG/1
1 TABLET ORAL
Status: COMPLETED | OUTPATIENT
Start: 2021-08-01 | End: 2021-08-01

## 2021-08-01 RX ORDER — SODIUM CHLORIDE 9 MG/ML
125 INJECTION, SOLUTION INTRAVENOUS CONTINUOUS
Status: DISCONTINUED | OUTPATIENT
Start: 2021-08-01 | End: 2021-08-01 | Stop reason: HOSPADM

## 2021-08-01 RX ORDER — HYDROCODONE BITARTRATE AND ACETAMINOPHEN 5; 325 MG/1; MG/1
1 TABLET ORAL
Qty: 6 TABLET | Refills: 0 | Status: SHIPPED | OUTPATIENT
Start: 2021-08-01 | End: 2021-08-04

## 2021-08-01 RX ORDER — ACETAMINOPHEN AND CODEINE PHOSPHATE 300; 30 MG/1; MG/1
1 TABLET ORAL
Status: DISCONTINUED | OUTPATIENT
Start: 2021-08-01 | End: 2021-08-01

## 2021-08-01 RX ORDER — MORPHINE SULFATE 4 MG/ML
4 INJECTION INTRAVENOUS
Status: COMPLETED | OUTPATIENT
Start: 2021-08-01 | End: 2021-08-01

## 2021-08-01 RX ADMIN — MORPHINE SULFATE 4 MG: 4 INJECTION, SOLUTION INTRAMUSCULAR; INTRAVENOUS at 13:17

## 2021-08-01 RX ADMIN — SODIUM CHLORIDE 125 ML/HR: 900 INJECTION, SOLUTION INTRAVENOUS at 14:15

## 2021-08-01 RX ADMIN — SODIUM CHLORIDE 1000 ML: 900 INJECTION, SOLUTION INTRAVENOUS at 10:49

## 2021-08-01 RX ADMIN — HYDROCODONE BITARTRATE AND ACETAMINOPHEN 1 TABLET: 5; 325 TABLET ORAL at 15:05

## 2021-08-01 NOTE — ED TRIAGE NOTES
Pt arrived via EMS for reported weakness. Pt reports generalized weakness, cough and chest pains  times 7 days.

## 2021-08-01 NOTE — ED PROVIDER NOTES
EMERGENCY DEPARTMENT HISTORY AND PHYSICAL EXAM    10:38 AM      Date: 8/1/2021  Patient Name: Kemi Valiente    History of Presenting Illness     Chief Complaint   Patient presents with    Fatigue    Cough         History Provided By: Patient  Location/Duration/Severity/Modifying factors   Patient is a 59-year-old female with a history of hypertension, sarcoidosis with pulmonary involvement on daily steroids, COPD, hypertension, diverticulitis, chronic pain, diabetes, GERD, the presents emergency department with complaint of 1 week of increasing fatigue and intermittent chest pain. The patient notes that she has been feeling very weak and her activity level has declined and started having some drooling from the right side of her mouth so were concerned. The patient goes to the [facility] and has an appointment this week however did not want a wait till it appointment to be seen. The patient says that she has chronic numbness in her lower extremities which has not changed but is bothering her that she is feels lightheaded when she stands. The patient denies any new trauma and denies any headache. Patient said that her extremities are overall weak but did not notice one side is more weak than the other. In addition the patient says that she has pain all the time however she is not on any regularly prescribed pain medication. The patient denies any fevers or chills and has some occasional blood in her cough which she attributes to her sarcoidosis which is not uncommon for her. Patient denies any other aggravating or alleviating factors. Patient denies any leg swelling. Patient denies any sick contacts. Patient has been fully vaccinated for COVID-19. Patient is a former smoker, occasional drinker, and remote history of drug use.           PCP: Radha Alvarez MD    Current Facility-Administered Medications   Medication Dose Route Frequency Provider Last Rate Last Admin    sodium chloride 0.9 % bolus infusion 1,000 mL  1,000 mL IntraVENous ONCE Linda Nicholas MD        0.9% sodium chloride infusion  125 mL/hr IntraVENous CONTINUOUS Page Solis MD         Current Outpatient Medications   Medication Sig Dispense Refill    megestrol (MEGACE) 400 mg/10 mL (40 mg/mL) suspension TAKE 5 ML BY MOUTH THREE TIMES DAILY FOR APPETITE  1    Insulin Needles, Disposable, 31 gauge x 5/16\" ndle USE AS DIRECTED WITH INSULIN PEN DAILY  3    Oxygen 3LPM O2 as needed   Indications: DME: [employer]      pantoprazole (PROTONIX) 20 mg tablet Take 20 mg by mouth daily.  benzonatate (TESSALON) 100 mg capsule Take 100 mg by mouth three (3) times daily as needed for Cough.  mirtazapine (REMERON) 15 mg tablet Take 15 mg by mouth nightly.  metFORMIN (GLUCOPHAGE) 500 mg tablet Take 1 Tab by mouth two (2) times daily (with meals). 60 Tab 0    insulin glargine (LANTUS U-100 INSULIN) 100 unit/mL injection 35 Units by SubCUTAneous route daily. (Patient taking differently: 40 Units by SubCUTAneous route daily. ) 1 Vial 0    albuterol-ipratropium (DUO-NEB) 2.5 mg-0.5 mg/3 ml nebu 3 mL by Nebulization route every four (4) hours as needed for Other (shortness of breath). 30 Nebule 0    diclofenac (VOLTAREN) 1 % gel Apply 2 g to affected area four (4) times daily. Gel should be measured and applied using the supplied dosing card. Apply dose (2 gm or 4 gm) to each location. If treatment site is the hands, patients should wait at least one (1) hour to wash their hands. APPLY TO bilateral knees 100 g 0    melatonin 3 mg tablet Take 1 Tab by mouth nightly. (Patient taking differently: Take 5 mg by mouth nightly.) 30 Tab 0    Omeprazole Magnesium (PRILOSEC) 10 mg suDR Take 20 mg by mouth two (2) times a day. 60 Each 0    raNITIdine (ZANTAC) 150 mg tablet Take 1 Tab by mouth nightly. 15 Tab 0    lisinopril-hydroCHLOROthiazide (PRINZIDE, ZESTORETIC) 20-12.5 mg per tablet Take 1 Tab by mouth daily. Indications: hypertension      nitroglycerin (NITROSTAT) 0.4 mg SL tablet 1 Tab by SubLINGual route as needed for Chest Pain. 25 Tab 1    fluticasone-salmeterol (ADVAIR) 250-50 mcg/dose diskus inhaler Take 1 puff by inhalation every twelve (12) hours. (Patient taking differently: Take 1 Puff by inhalation daily. ) 1 Inhaler 0    albuterol (PROVENTIL HFA, VENTOLIN HFA, PROAIR HFA) 90 mcg/actuation inhaler Take 1 puff by inhalation daily as needed for Wheezing. 1 Inhaler 0    clopidogrel (PLAVIX) 75 mg tablet Take 1 tablet by mouth daily. 30 tablet 11    atorvastatin (LIPITOR) 40 mg tablet Take 1 tablet by mouth nightly. 30 tablet 11    aspirin 81 mg chewable tablet Take 1 tablet by mouth daily. 30 tablet 0    polyethylene glycol (MIRALAX) 17 gram packet Take 1 packet by mouth daily. 14 each 0    fluoxetine (PROZAC) 20 mg capsule Take 20 mg by mouth daily.  tiotropium (SPIRIVA WITH HANDIHALER) 18 mcg inhalation capsule Take 1 Cap by inhalation daily. Past History     Past Medical History:  Past Medical History:   Diagnosis Date    Arthritis     \"generalized\"    Asthma     CAD in native artery     NSTEMI (Sep 2014); diffuse 65% pLAD, minimal disease RCA & LCx. pLAD FFR 0.93.  LV  no RWMA (echo and LV angio)    Chronic neck pain     Chronic pain     left knee    Chronic pain syndrome     [facility]     Depression     Diabetes (HCC)     Diverticulitis     GERD (gastroesophageal reflux disease)     Heart disease     Hidradenitis 9/11/2014    Hypertension     Left knee pain     Narcotic dependence ([facility] [address]     Pneumonia     Right wrist pain     Sarcoidosis        Past Surgical History:  Past Surgical History:   Procedure Laterality Date    BRONCHOSCOPY  10/2019    HX BREAST BIOPSY Right     9/10/14: She said tag in place from [address] [place] [address]  2005    HX HYSTERECTOMY      HX KNEE ARTHROSCOPY Left        Family History:  Family History   Problem Relation Age of Onset    Hypertension Father     Diabetes Mother        Social History:  Social History     Tobacco Use    Smoking status: Former Smoker     Years: 46.00     Types: Cigarettes     Quit date: 03/2018     Years since quitting: 3.4    Smokeless tobacco: Never Used    Tobacco comment: Only smokes 1 cigarette per day   Substance Use Topics    Alcohol use: Yes     Alcohol/week: 1.7 standard drinks     Types: 2 Glasses of wine per week     Comment: socially    Drug use: Yes     Types: Cocaine, Marijuana     Comment: 6 months ago       Allergies: Allergies   Allergen Reactions    Latex Hives and Itching    Ciprofloxacin Hives    Penicillins Nausea Only    Shellfish Derived Hives and Swelling     Swelling of legs         Review of Systems       Review of Systems   Constitutional: Positive for activity change and fatigue. Negative for fever. HENT: Negative for congestion and rhinorrhea. Eyes: Negative for visual disturbance. Respiratory: Positive for cough. Negative for shortness of breath. Cardiovascular: Positive for chest pain. Negative for palpitations. Gastrointestinal: Negative for abdominal pain, diarrhea, nausea and vomiting. Genitourinary: Negative for dysuria and hematuria. Musculoskeletal: Negative for back pain. Skin: Negative for rash. Neurological: Positive for weakness and light-headedness. Negative for dizziness. All other systems reviewed and are negative. Physical Exam     Visit Vitals  BP (!) 180/102 (BP 1 Location: Left upper arm)   Pulse (!) 101   Temp 98.4 °F (36.9 °C)   Resp 20   SpO2 98%         Physical Exam  Vitals and nursing note reviewed. Constitutional:       General: She is not in acute distress. Appearance: She is well-developed. HENT:      Head: Normocephalic and atraumatic.       Right Ear: External ear normal.      Left Ear: External ear normal.      Nose: Nose normal.   Eyes:      General: No scleral icterus. Conjunctiva/sclera: Conjunctivae normal.      Pupils: Pupils are equal, round, and reactive to light. Neck:      Thyroid: No thyromegaly. Vascular: No JVD. Trachea: No tracheal deviation. Cardiovascular:      Rate and Rhythm: Regular rhythm. Tachycardia present. Heart sounds: Normal heart sounds. No murmur heard. No friction rub. No gallop. Pulmonary:      Effort: Pulmonary effort is normal.      Breath sounds: Rhonchi present. Chest:      Chest wall: No tenderness. Abdominal:      General: Bowel sounds are normal. There is no distension. Palpations: Abdomen is soft. Tenderness: There is no abdominal tenderness. There is no guarding or rebound. Musculoskeletal:         General: No tenderness. Normal range of motion. Cervical back: Normal range of motion and neck supple. Lymphadenopathy:      Cervical: No cervical adenopathy. Skin:     General: Skin is warm and dry. Neurological:      Mental Status: She is alert and oriented to person, place, and time. Cranial Nerves: No cranial nerve deficit. Coordination: Coordination normal.      Comments: Mild global weakness, no arm or leg drift, no pronator drift, no drift of the lower extremities, gait not observed   Psychiatric:         Behavior: Behavior normal.         Thought Content: Thought content normal.         Judgment: Judgment normal.           Diagnostic Study Results     Labs -  No results found for this or any previous visit (from the past 12 hour(s)). Radiologic Studies -   XR CHEST SNGL V   Final Result   No acute findings. Chronic pulmonary findings as discussed. CT HEAD WO CONT   Final Result   No acute findings or adverse interval change. Medical Decision Making   I am the first provider for this patient. I reviewed the vital signs, available nursing notes, past medical history, past surgical history, family history and social history.     Vital Signs-Reviewed the patient's vital signs. EKG: Sinus rhythm at 99, left axis deviation, RSR prime, no STEMI, interpreted by me    Records Reviewed: Nursing Notes, Old Medical Records, Previous Radiology Studies and Previous Laboratory Studies (Time of Review: 10:38 AM)    ED Course: Progress Notes, Reevaluation, and Consults: The patients workup was reassuring and will proceed with close outpatient care with pain control. The patient will follow closely with her PMD and return if at all worsened or concerned. The discharge instructions were reviewed with the patient and the patient verbalized understanding. The patient had no questions about her discharge instructions. The patient will follow closely with her outpatient care plan and will return if at all worsened or concerned. Devan Crenshaw DO       Provider Notes (Medical Decision Making):   MDM  Number of Diagnoses or Management Options  Diagnosis management comments: The patient is a 53-year-old female with a history of pulmonary sarcoidosis, diabetes, COPD, coronary disease, and hypertension, the presents emergency department with vague 1 week symptoms of just not feeling well. The patient describes having some drooling from the right side of her mouth however is not clear that she has any facial asymmetry my exam as well as just feeling weak all over especially when she stands. The patient is not lateralizing on her exam however her blood pressure is elevated and she has a mildly elevated heart rate. We will CT her head, follow her cardiac labs, urinalysis, hydrate as tolerated, and then reevaluate. The patient is asking for some pain control and says that she takes over-the-counter medications only however appears that she does have a history of chronic pain syndrome as well as some narcotic dependence however her PDMP was reviewed and shows that her last fill of pain medication was in March 2021.   We will continue to follow patient closely and reevaluate. Devan Crenshaw DO 10:44 AM        Procedures    *      Diagnosis     Clinical Impression:   1. Myalgia    2. Malaise and fatigue    3. Elevated blood pressure reading        Disposition: DC    Follow-up Information    None          Patient's Medications   Start Taking    No medications on file   Continue Taking    ALBUTEROL (PROVENTIL HFA, VENTOLIN HFA, PROAIR HFA) 90 MCG/ACTUATION INHALER    Take 1 puff by inhalation daily as needed for Wheezing. ALBUTEROL-IPRATROPIUM (DUO-NEB) 2.5 MG-0.5 MG/3 ML NEBU    3 mL by Nebulization route every four (4) hours as needed for Other (shortness of breath). ASPIRIN 81 MG CHEWABLE TABLET    Take 1 tablet by mouth daily. ATORVASTATIN (LIPITOR) 40 MG TABLET    Take 1 tablet by mouth nightly. BENZONATATE (TESSALON) 100 MG CAPSULE    Take 100 mg by mouth three (3) times daily as needed for Cough. CLOPIDOGREL (PLAVIX) 75 MG TABLET    Take 1 tablet by mouth daily. DICLOFENAC (VOLTAREN) 1 % GEL    Apply 2 g to affected area four (4) times daily. Gel should be measured and applied using the supplied dosing card. Apply dose (2 gm or 4 gm) to each location. If treatment site is the hands, patients should wait at least one (1) hour to wash their hands. APPLY TO bilateral knees    FLUOXETINE (PROZAC) 20 MG CAPSULE    Take 20 mg by mouth daily. FLUTICASONE-SALMETEROL (ADVAIR) 250-50 MCG/DOSE DISKUS INHALER    Take 1 puff by inhalation every twelve (12) hours. INSULIN GLARGINE (LANTUS U-100 INSULIN) 100 UNIT/ML INJECTION    35 Units by SubCUTAneous route daily. INSULIN NEEDLES, DISPOSABLE, 31 GAUGE X 5/16\" NDLE    USE AS DIRECTED WITH INSULIN PEN DAILY    LISINOPRIL-HYDROCHLOROTHIAZIDE (PRINZIDE, ZESTORETIC) 20-12.5 MG PER TABLET    Take 1 Tab by mouth daily. Indications: hypertension    MEGESTROL (MEGACE) 400 MG/10 ML (40 MG/ML) SUSPENSION    TAKE 5 ML BY MOUTH THREE TIMES DAILY FOR APPETITE    MELATONIN 3 MG TABLET    Take 1 Tab by mouth nightly. METFORMIN (GLUCOPHAGE) 500 MG TABLET    Take 1 Tab by mouth two (2) times daily (with meals). MIRTAZAPINE (REMERON) 15 MG TABLET    Take 15 mg by mouth nightly. NITROGLYCERIN (NITROSTAT) 0.4 MG SL TABLET    1 Tab by SubLINGual route as needed for Chest Pain. OMEPRAZOLE MAGNESIUM (PRILOSEC) 10 MG SUDR    Take 20 mg by mouth two (2) times a day. OXYGEN    3LPM O2 as needed   Indications: DME: [facility]    PANTOPRAZOLE (PROTONIX) 20 MG TABLET    Take 20 mg by mouth daily. POLYETHYLENE GLYCOL (MIRALAX) 17 GRAM PACKET    Take 1 packet by mouth daily. RANITIDINE (ZANTAC) 150 MG TABLET    Take 1 Tab by mouth nightly. TIOTROPIUM (SPIRIVA WITH HANDIHALER) 18 MCG INHALATION CAPSULE    Take 1 Cap by inhalation daily. These Medications have changed    No medications on file   Stop Taking    No medications on file     Disclaimer: Sections of this note are dictated using utilizing voice recognition software. Minor typographical errors may be present. If questions arise, please do not hesitate to contact me or call our department.

## 2021-08-03 PROBLEM — R55 SYNCOPE: Status: RESOLVED | Noted: 2017-11-06 | Resolved: 2021-08-03

## 2021-09-29 ENCOUNTER — APPOINTMENT (OUTPATIENT)
Dept: GENERAL RADIOLOGY | Age: 71
End: 2021-09-29
Attending: STUDENT IN AN ORGANIZED HEALTH CARE EDUCATION/TRAINING PROGRAM
Payer: MEDICARE

## 2021-09-29 ENCOUNTER — APPOINTMENT (OUTPATIENT)
Dept: CT IMAGING | Age: 71
End: 2021-09-29
Attending: STUDENT IN AN ORGANIZED HEALTH CARE EDUCATION/TRAINING PROGRAM
Payer: MEDICARE

## 2021-09-29 ENCOUNTER — HOSPITAL ENCOUNTER (EMERGENCY)
Age: 71
Discharge: HOME | End: 2021-09-30
Attending: EMERGENCY MEDICINE
Payer: MEDICARE

## 2021-09-29 DIAGNOSIS — D86.9 SARCOIDOSIS: ICD-10-CM

## 2021-09-29 DIAGNOSIS — R06.09 DYSPNEA ON EXERTION: ICD-10-CM

## 2021-09-29 DIAGNOSIS — K57.92 DIVERTICULITIS: Primary | ICD-10-CM

## 2021-09-29 LAB
ALBUMIN SERPL-MCNC: 3 G/DL (ref 3.4–5)
ALBUMIN/GLOB SERPL: 0.8 {RATIO} (ref 0.8–1.7)
ALP SERPL-CCNC: 86 U/L (ref 45–117)
ALT SERPL-CCNC: 36 U/L (ref 13–56)
ANION GAP SERPL CALC-SCNC: 9 MMOL/L (ref 3–18)
AST SERPL-CCNC: 18 U/L (ref 10–38)
ATRIAL RATE: 86 BPM
BASOPHILS # BLD: 0 K/UL (ref 0–0.1)
BASOPHILS NFR BLD: 0 % (ref 0–2)
BILIRUB DIRECT SERPL-MCNC: 0.1 MG/DL (ref 0–0.2)
BILIRUB SERPL-MCNC: 0.6 MG/DL (ref 0.2–1)
BNP SERPL-MCNC: 107 PG/ML (ref 0–900)
BUN SERPL-MCNC: 16 MG/DL (ref 7–18)
BUN/CREAT SERPL: 16 (ref 12–20)
CALCIUM SERPL-MCNC: 8.5 MG/DL (ref 8.5–10.1)
CALCULATED R AXIS, ECG10: -134 DEGREES
CALCULATED T AXIS, ECG11: 171 DEGREES
CHLORIDE SERPL-SCNC: 109 MMOL/L (ref 100–111)
CK MB CFR SERPL CALC: 5 % (ref 0–4)
CK MB SERPL-MCNC: 1.2 NG/ML (ref 5–25)
CK SERPL-CCNC: 24 U/L (ref 26–192)
CO2 SERPL-SCNC: 27 MMOL/L (ref 21–32)
COVID-19 RAPID TEST, COVR: NOT DETECTED
CREAT SERPL-MCNC: 1 MG/DL (ref 0.6–1.3)
DIAGNOSIS, 93000: NORMAL
DIFFERENTIAL METHOD BLD: ABNORMAL
EOSINOPHIL # BLD: 0 K/UL (ref 0–0.4)
EOSINOPHIL NFR BLD: 0 % (ref 0–5)
ERYTHROCYTE [DISTWIDTH] IN BLOOD BY AUTOMATED COUNT: 16.3 % (ref 11.6–14.5)
GLOBULIN SER CALC-MCNC: 3.8 G/DL (ref 2–4)
GLUCOSE SERPL-MCNC: 64 MG/DL (ref 74–99)
HCT VFR BLD AUTO: 33.5 % (ref 35–45)
HGB BLD-MCNC: 10.4 G/DL (ref 12–16)
LACTATE BLD-SCNC: 1.48 MMOL/L (ref 0.4–2)
LYMPHOCYTES # BLD: 2 K/UL (ref 0.9–3.6)
LYMPHOCYTES NFR BLD: 19 % (ref 21–52)
MCH RBC QN AUTO: 23.5 PG (ref 24–34)
MCHC RBC AUTO-ENTMCNC: 31 G/DL (ref 31–37)
MCV RBC AUTO: 75.6 FL (ref 78–100)
MONOCYTES # BLD: 0.4 K/UL (ref 0.05–1.2)
MONOCYTES NFR BLD: 4 % (ref 3–10)
NEUTS SEG # BLD: 7.8 K/UL (ref 1.8–8)
NEUTS SEG NFR BLD: 76 % (ref 40–73)
P-R INTERVAL, ECG05: 118 MS
PLATELET # BLD AUTO: 297 K/UL (ref 135–420)
PMV BLD AUTO: 11.4 FL (ref 9.2–11.8)
POTASSIUM SERPL-SCNC: 4.1 MMOL/L (ref 3.5–5.5)
PROT SERPL-MCNC: 6.8 G/DL (ref 6.4–8.2)
Q-T INTERVAL, ECG07: 376 MS
QRS DURATION, ECG06: 72 MS
QTC CALCULATION (BEZET), ECG08: 449 MS
RBC # BLD AUTO: 4.43 M/UL (ref 4.2–5.3)
SODIUM SERPL-SCNC: 145 MMOL/L (ref 136–145)
SOURCE, COVRS: NORMAL
TROPONIN I SERPL-MCNC: <0.02 NG/ML (ref 0–0.04)
VENTRICULAR RATE, ECG03: 86 BPM
WBC # BLD AUTO: 10.3 K/UL (ref 4.6–13.2)

## 2021-09-29 PROCEDURE — 82553 CREATINE MB FRACTION: CPT

## 2021-09-29 PROCEDURE — 80076 HEPATIC FUNCTION PANEL: CPT

## 2021-09-29 PROCEDURE — 87040 BLOOD CULTURE FOR BACTERIA: CPT

## 2021-09-29 PROCEDURE — 99285 EMERGENCY DEPT VISIT HI MDM: CPT

## 2021-09-29 PROCEDURE — 74011000636 HC RX REV CODE- 636: Performed by: EMERGENCY MEDICINE

## 2021-09-29 PROCEDURE — 96365 THER/PROPH/DIAG IV INF INIT: CPT

## 2021-09-29 PROCEDURE — 71045 X-RAY EXAM CHEST 1 VIEW: CPT

## 2021-09-29 PROCEDURE — 87635 SARS-COV-2 COVID-19 AMP PRB: CPT

## 2021-09-29 PROCEDURE — 96376 TX/PRO/DX INJ SAME DRUG ADON: CPT

## 2021-09-29 PROCEDURE — 74011250636 HC RX REV CODE- 250/636: Performed by: STUDENT IN AN ORGANIZED HEALTH CARE EDUCATION/TRAINING PROGRAM

## 2021-09-29 PROCEDURE — 96375 TX/PRO/DX INJ NEW DRUG ADDON: CPT

## 2021-09-29 PROCEDURE — 74177 CT ABD & PELVIS W/CONTRAST: CPT

## 2021-09-29 PROCEDURE — 96368 THER/DIAG CONCURRENT INF: CPT

## 2021-09-29 PROCEDURE — 74011000258 HC RX REV CODE- 258: Performed by: STUDENT IN AN ORGANIZED HEALTH CARE EDUCATION/TRAINING PROGRAM

## 2021-09-29 PROCEDURE — 93005 ELECTROCARDIOGRAM TRACING: CPT

## 2021-09-29 PROCEDURE — 83605 ASSAY OF LACTIC ACID: CPT

## 2021-09-29 PROCEDURE — 82550 ASSAY OF CK (CPK): CPT

## 2021-09-29 PROCEDURE — 84484 ASSAY OF TROPONIN QUANT: CPT

## 2021-09-29 PROCEDURE — 83880 ASSAY OF NATRIURETIC PEPTIDE: CPT

## 2021-09-29 PROCEDURE — 96366 THER/PROPH/DIAG IV INF ADDON: CPT

## 2021-09-29 PROCEDURE — 85025 COMPLETE CBC W/AUTO DIFF WBC: CPT

## 2021-09-29 PROCEDURE — 80048 BASIC METABOLIC PNL TOTAL CA: CPT

## 2021-09-29 RX ORDER — HYDROCORTISONE SODIUM SUCCINATE 100 MG/2ML
100 INJECTION, POWDER, FOR SOLUTION INTRAMUSCULAR; INTRAVENOUS ONCE
Status: COMPLETED | OUTPATIENT
Start: 2021-09-29 | End: 2021-09-29

## 2021-09-29 RX ORDER — MORPHINE SULFATE 4 MG/ML
4 INJECTION INTRAVENOUS
Status: COMPLETED | OUTPATIENT
Start: 2021-09-29 | End: 2021-09-29

## 2021-09-29 RX ORDER — SODIUM CHLORIDE 0.9 % (FLUSH) 0.9 %
5-10 SYRINGE (ML) INJECTION AS NEEDED
Status: DISCONTINUED | OUTPATIENT
Start: 2021-09-29 | End: 2021-09-30 | Stop reason: HOSPADM

## 2021-09-29 RX ORDER — MORPHINE SULFATE 4 MG/ML
4 INJECTION INTRAVENOUS ONCE
Status: COMPLETED | OUTPATIENT
Start: 2021-09-29 | End: 2021-09-29

## 2021-09-29 RX ORDER — VANCOMYCIN HYDROCHLORIDE
1250 ONCE
Status: COMPLETED | OUTPATIENT
Start: 2021-09-29 | End: 2021-09-29

## 2021-09-29 RX ADMIN — PIPERACILLIN AND TAZOBACTAM 3.38 G: 3; .375 INJECTION, POWDER, LYOPHILIZED, FOR SOLUTION INTRAVENOUS at 18:53

## 2021-09-29 RX ADMIN — MORPHINE SULFATE 4 MG: 4 INJECTION INTRAVENOUS at 20:59

## 2021-09-29 RX ADMIN — VANCOMYCIN HYDROCHLORIDE 1250 MG: 10 INJECTION, POWDER, LYOPHILIZED, FOR SOLUTION INTRAVENOUS at 20:59

## 2021-09-29 RX ADMIN — SODIUM CHLORIDE 1000 ML: 900 INJECTION, SOLUTION INTRAVENOUS at 18:34

## 2021-09-29 RX ADMIN — MORPHINE SULFATE 4 MG: 4 INJECTION INTRAVENOUS at 18:31

## 2021-09-29 RX ADMIN — SODIUM CHLORIDE 782 ML: 900 INJECTION, SOLUTION INTRAVENOUS at 20:34

## 2021-09-29 RX ADMIN — HYDROCORTISONE SODIUM SUCCINATE 100 MG: 100 INJECTION, POWDER, FOR SOLUTION INTRAMUSCULAR; INTRAVENOUS at 18:30

## 2021-09-29 RX ADMIN — IOPAMIDOL 100 ML: 612 INJECTION, SOLUTION INTRAVENOUS at 22:54

## 2021-09-29 NOTE — ED PROVIDER NOTES
[address] [facility]  Emergency Department Treatment Report        Patient: Jerry Velez Age: 73 y.o. Sex: female    YOB: 1950 Admit Date: 9/29/2021 PCP: Rochelle Cheatham MD   MRN: [id number]  CSN: [id number]     Room: [address] [place] [address] Time Dictated: 6:10 PM      Chief Complaint   Chief Complaint   Patient presents with    Shortness of Breath       History of Present Illness   73 y.o. female with hx of sarcoidosis on steroids, hx of COPD not on O2, CAD, chronic pain presenting with with CC of chest pain, dyspnea, cough and fever. She reports that she has had shortness of breath over several months, but states that it has worsened over the past several days, now reporting HAN and orthopnea with b/l LE edma. States that she had a fever of 101 two days ago with increasing productive cough with green sputum. Reports epigastric and lower chest pain that has been constant for the past several days, feels like burning, radiates to the back, no worsening or reliving factors noted. She also reports diffuse abdominal pain over past several days, worse in the LLQ, hx of diverticulitis. Review of Systems   Review of Systems   Constitutional: Positive for chills, fever and malaise/fatigue. HENT: Negative for congestion. Respiratory: Positive for cough, sputum production and shortness of breath. Negative for wheezing. Cardiovascular: Positive for chest pain, orthopnea and leg swelling. Gastrointestinal: Positive for abdominal pain and nausea. Genitourinary: Negative for dysuria and urgency. Neurological: Negative for focal weakness and headaches. Past Medical/Surgical History     Past Medical History:   Diagnosis Date    Arthritis     \"generalized\"    Asthma     CAD in native artery     NSTEMI (Sep 2014); diffuse 65% pLAD, minimal disease RCA & LCx. pLAD FFR 0.93.  LV  no RWMA (echo and LV angio)    Chronic neck pain     Chronic pain     left knee    Chronic pain syndrome     COPD (chronic obstructive pulmonary disease) (HCC)     Depression     Diabetes (HCC)     Diverticulitis     GERD (gastroesophageal reflux disease)     Heart disease     Hidradenitis 9/11/2014    Hypertension     Left knee pain     Narcotic dependence ([facility] [address]     Pneumonia     Right wrist pain     Sarcoidosis      Past Surgical History:   Procedure Laterality Date    BRONCHOSCOPY  10/2019    HX BREAST BIOPSY Right     9/10/14: She said tag in place from [address]  2005    HX HYSTERECTOMY      HX KNEE ARTHROSCOPY Left        Social History     Social History     Socioeconomic History    Marital status:      Spouse name: Not on file    Number of children: Not on file    Years of education: Not on file    Highest education level: Not on file   Occupational History    Not on file   Tobacco Use    Smoking status: Former Smoker     Years: 46.00     Types: Cigarettes     Quit date: 03/2018     Years since quitting: 3.5    Smokeless tobacco: Never Used    Tobacco comment: Only smokes 1 cigarette per day   Substance and Sexual Activity    Alcohol use: Yes     Alcohol/week: 1.7 standard drinks     Types: 2 Glasses of wine per week     Comment: socially    Drug use: Yes     Types: Cocaine, Marijuana     Comment: 6 months ago    Sexual activity: Yes   Other Topics Concern    Not on file   Social History Narrative    Not on file     Social Determinants of Health     Financial Resource Strain:     Difficulty of Paying Living Expenses:    Food Insecurity:     Worried About Running Out of Food in the Last Year:     Ran Out of Food in the Last Year:    Transportation Needs:     Lack of Transportation (Medical):      Lack of Transportation (Non-Medical):    Physical Activity:     Days of Exercise per Week:     Minutes of Exercise per Session:    Stress:     Feeling of Stress :    Social Connections:     Frequency of Communication with Friends and Family:     Frequency of Social Gatherings with Friends and Family:     Attends Faith Services:     Active Member of Clubs or Organizations:     Attends Club or Organization Meetings:     Marital Status:    Intimate Partner Violence:     Fear of Current or Ex-Partner:     Emotionally Abused:     Physically Abused:     Sexually Abused:        Family History     Family History   Problem Relation Age of Onset    Hypertension Father     Diabetes Mother        Current Medications     Prior to Admission Medications   Prescriptions Last Dose Informant Patient Reported? Taking? Insulin Needles, Disposable, 31 gauge x 5/16\" ndle   Yes No   Sig: USE AS DIRECTED WITH INSULIN PEN DAILY   Omeprazole Magnesium (PRILOSEC) 10 mg suDR   No No   Sig: Take 20 mg by mouth two (2) times a day. Oxygen   Yes No   Sig: 3LPM O2 as needed   Indications: DME: [employer]   albuterol (PROVENTIL HFA, VENTOLIN HFA, PROAIR HFA) 90 mcg/actuation inhaler   No No   Sig: Take 1 puff by inhalation daily as needed for Wheezing. albuterol-ipratropium (DUO-NEB) 2.5 mg-0.5 mg/3 ml nebu   No No   Sig: 3 mL by Nebulization route every four (4) hours as needed for Other (shortness of breath). aspirin 81 mg chewable tablet   No No   Sig: Take 1 tablet by mouth daily. atorvastatin (LIPITOR) 40 mg tablet   No No   Sig: Take 1 tablet by mouth nightly. benzonatate (TESSALON) 100 mg capsule   Yes No   Sig: Take 100 mg by mouth three (3) times daily as needed for Cough. clopidogrel (PLAVIX) 75 mg tablet   No No   Sig: Take 1 tablet by mouth daily. diclofenac (VOLTAREN) 1 % gel   No No   Sig: Apply 2 g to affected area four (4) times daily. Gel should be measured and applied using the supplied dosing card. Apply dose (2 gm or 4 gm) to each location. If treatment site is the hands, patients should wait at least one (1) hour to wash their hands. APPLY TO bilateral knees   fluoxetine (PROZAC) 20 mg capsule   Yes No   Sig: Take 20 mg by mouth daily. fluticasone-salmeterol (ADVAIR) 250-50 mcg/dose diskus inhaler   No No   Sig: Take 1 puff by inhalation every twelve (12) hours. Patient taking differently: Take 1 Puff by inhalation daily. insulin glargine (LANTUS U-100 INSULIN) 100 unit/mL injection   No No   Si Units by SubCUTAneous route daily. Patient taking differently: 40 Units by SubCUTAneous route daily. lisinopril-hydroCHLOROthiazide (PRINZIDE, ZESTORETIC) 20-12.5 mg per tablet   Yes No   Sig: Take 1 Tab by mouth daily. Indications: hypertension   megestrol (MEGACE) 400 mg/10 mL (40 mg/mL) suspension   Yes No   Sig: TAKE 5 ML BY MOUTH THREE TIMES DAILY FOR APPETITE   melatonin 3 mg tablet   No No   Sig: Take 1 Tab by mouth nightly. Patient taking differently: Take 5 mg by mouth nightly. metFORMIN (GLUCOPHAGE) 500 mg tablet   No No   Sig: Take 1 Tab by mouth two (2) times daily (with meals). mirtazapine (REMERON) 15 mg tablet   Yes No   Sig: Take 15 mg by mouth nightly. nitroglycerin (NITROSTAT) 0.4 mg SL tablet   No No   Si Tab by SubLINGual route as needed for Chest Pain.   pantoprazole (PROTONIX) 20 mg tablet   Yes No   Sig: Take 20 mg by mouth daily. polyethylene glycol (MIRALAX) 17 gram packet   No No   Sig: Take 1 packet by mouth daily. raNITIdine (ZANTAC) 150 mg tablet   No No   Sig: Take 1 Tab by mouth nightly. tiotropium (SPIRIVA WITH HANDIHALER) 18 mcg inhalation capsule   Yes No   Sig: Take 1 Cap by inhalation daily.         Facility-Administered Medications: None       Allergies     Allergies   Allergen Reactions    Latex Hives and Itching    Ciprofloxacin Hives    Penicillins Nausea Only    Shellfish Derived Hives and Swelling     Swelling of legs       Physical Exam     ED Triage Vitals   ED Encounter Vitals Group      BP 21 1645 (!) 166/94      Pulse (Heart Rate) 21 1645 85      Resp Rate 21 1645 22      Temp 21 1707 98.8 °F (37.1 °C)      Temp src --       O2 Sat (%) 21 1707 100 %      Weight 09/29/21 1645 131 lb      Height 09/29/21 1645 5' 2\"        Physical Exam  Vitals and nursing note reviewed. Constitutional:       General: She is not in acute distress. HENT:      Head: Normocephalic and atraumatic. Cardiovascular:      Rate and Rhythm: Normal rate and regular rhythm. Pulses: Normal pulses. Pulmonary:      Effort: Tachypnea present. No respiratory distress. Breath sounds: No decreased breath sounds or wheezing. Chest:      Chest wall: Tenderness present. Abdominal:      Palpations: Abdomen is soft. Tenderness: There is abdominal tenderness (Diffuse, worse in epigastrium and LLQ). Musculoskeletal:      Right lower leg: Edema present. Left lower leg: Edema present. Skin:     Capillary Refill: Capillary refill takes less than 2 seconds. Neurological:      General: No focal deficit present. Mental Status: She is alert.           Diagnostic Studies   Lab:   Recent Results (from the past 12 hour(s))   EKG, 12 LEAD, INITIAL    Collection Time: 09/29/21  5:42 PM   Result Value Ref Range    Ventricular Rate 86 BPM    Atrial Rate 86 BPM    P-R Interval 118 ms    QRS Duration 72 ms    Q-T Interval 376 ms    QTC Calculation (Bezet) 449 ms    Calculated R Axis -134 degrees    Calculated T Axis 171 degrees    Diagnosis       Normal sinus rhythm  Right superior axis deviation  ST & T wave abnormality, consider inferior ischemia  Abnormal ECG    Confirmed by Hector Arenas MD, Cielo Stover ([id number]) on 9/29/2021 6:07:55 PM     POC LACTIC ACID    Collection Time: 09/29/21  6:17 PM   Result Value Ref Range    Lactic Acid (POC) 1.48 0.40 - 2.00 mmol/L   COVID-19 RAPID TEST    Collection Time: 09/29/21  8:06 PM   Result Value Ref Range    Specimen source Nasopharyngeal      COVID-19 rapid test Not detected NOTD     CBC WITH AUTOMATED DIFF    Collection Time: 09/29/21  8:44 PM   Result Value Ref Range    WBC 10.3 4.6 - 13.2 K/uL    RBC 4.43 4.20 - 5.30 M/uL    HGB 10.4 (L) 12.0 - 16.0 g/dL    HCT 33.5 (L) 35.0 - 45.0 %    MCV 75.6 (L) 78.0 - 100.0 FL    MCH 23.5 (L) 24.0 - 34.0 PG    MCHC 31.0 31.0 - 37.0 g/dL    RDW 16.3 (H) 11.6 - 14.5 %    PLATELET 462 030 - 715 K/uL    MPV 11.4 9.2 - 11.8 FL    NEUTROPHILS 76 (H) 40 - 73 %    LYMPHOCYTES 19 (L) 21 - 52 %    MONOCYTES 4 3 - 10 %    EOSINOPHILS 0 0 - 5 %    BASOPHILS 0 0 - 2 %    ABS. NEUTROPHILS 7.8 1.8 - 8.0 K/UL    ABS. LYMPHOCYTES 2.0 0.9 - 3.6 K/UL    ABS. MONOCYTES 0.4 0.05 - 1.2 K/UL    ABS. EOSINOPHILS 0.0 0.0 - 0.4 K/UL    ABS. BASOPHILS 0.0 0.0 - 0.1 K/UL    DF AUTOMATED     NT-PRO BNP    Collection Time: 09/29/21  8:44 PM   Result Value Ref Range    NT pro- 0 - 900 PG/ML   HEPATIC FUNCTION PANEL    Collection Time: 09/29/21  8:44 PM   Result Value Ref Range    Protein, total 6.8 6.4 - 8.2 g/dL    Albumin 3.0 (L) 3.4 - 5.0 g/dL    Globulin 3.8 2.0 - 4.0 g/dL    A-G Ratio 0.8 0.8 - 1.7      Bilirubin, total 0.6 0.2 - 1.0 MG/DL    Bilirubin, direct 0.1 0.0 - 0.2 MG/DL    Alk.  phosphatase 86 45 - 117 U/L    AST (SGOT) 18 10 - 38 U/L    ALT (SGPT) 36 13 - 56 U/L   CK-MB,QT    Collection Time: 09/29/21  8:44 PM   Result Value Ref Range    CK - MB 1.2 <3.6 ng/ml    CK-MB Index 5.0 (H) 0.0 - 4.0 %   CK    Collection Time: 09/29/21  8:44 PM   Result Value Ref Range    CK 24 (L) 26 - 192 U/L   TROPONIN I    Collection Time: 09/29/21  8:44 PM   Result Value Ref Range    Troponin-I, QT <0.02 0.0 - 0.300 NG/ML   METABOLIC PANEL, BASIC    Collection Time: 09/29/21  8:44 PM   Result Value Ref Range    Sodium 145 136 - 145 mmol/L    Potassium 4.1 3.5 - 5.5 mmol/L    Chloride 109 100 - 111 mmol/L    CO2 27 21 - 32 mmol/L    Anion gap 9 3.0 - 18 mmol/L    Glucose 64 (L) 74 - 99 mg/dL    BUN 16 7.0 - 18 MG/DL    Creatinine 1.00 0.6 - 1.3 MG/DL    BUN/Creatinine ratio 16 12 - 20      GFR est AA >60 >60 ml/min/1.73m2    GFR est non-AA 55 (L) >60 ml/min/1.73m2    Calcium 8.5 8.5 - 10.1 MG/DL     Labs Reviewed   CBC WITH AUTOMATED DIFF - Abnormal; Notable for the following components:       Result Value    HGB 10.4 (*)     HCT 33.5 (*)     MCV 75.6 (*)     MCH 23.5 (*)     RDW 16.3 (*)     NEUTROPHILS 76 (*)     LYMPHOCYTES 19 (*)     All other components within normal limits   HEPATIC FUNCTION PANEL - Abnormal; Notable for the following components:    Albumin 3.0 (*)     All other components within normal limits   CK-MB,QT - Abnormal; Notable for the following components:    CK-MB Index 5.0 (*)     All other components within normal limits   CK - Abnormal; Notable for the following components:    CK 24 (*)     All other components within normal limits   METABOLIC PANEL, BASIC - Abnormal; Notable for the following components:    Glucose 64 (*)     GFR est non-AA 55 (*)     All other components within normal limits   COVID-19 RAPID TEST   CULTURE, BLOOD   NT-PRO BNP   TROPONIN I   URINALYSIS W/ RFLX MICROSCOPIC   URINALYSIS W/ RFLX MICROSCOPIC   POC LACTIC ACID       Imaging:    XR CHEST PORT    Result Date: 9/29/2021  ONE VIEW CHEST RADIOGRAPH INDICATION: meets SIRS criteria. COMPARISON: Chest radiograph 8/1/2021. TECHNIQUE: AP view of the chest FINDINGS:  LINES/TUBES: None. MEDIASTINUM: Cardiac silhouette is within normal limits. Calcified mediastinal lymph nodes are sequela of prior granulomatous disease. LUNGS: Stable appearance of chronically prominent interstitial markings. No focal consolidation or pleural effusion. BONES/OTHER: No acute osseous abnormality. No acute cardiopulmonary process. Stable chronic interstitial lung disease. EKG as interpreted by me shows NSR, RAD, normal intervals, no ST elevations, T wave inversions in 1 AVL    ED Course/MDM     ED Course as of Sep 29 2258   Wed Sep 29, 2021   2005 Discussed with lab, there is a delay because the blood samples were lost.     [CT]   2238 Normal troponin, no leukocytosis, Hgb above baseline, normal plts, COVID negative.  CXR without evidence of acute process, electrolytes normal aside from mild hypoglycemia, rechecking now that she has had some food. No clear source of infectious etiology at this time. Care continued with Dr. Delbert Tinsley pending CT of the abdomen and pelvis. If no acute pathology, patient should be ambulated with pulse ox and if patient cannot tolerate ambulation or desats, consider admission    [CT]      ED Course User Index  [CT] Hailee Barton MD       Differential Diagnosis includes but is not limited to: CHF, ACS, PE, aortic dissection, pneumonia, pneumothoraic, PUD, diverticulitis. No wheezing to be concerned for COPD exacerbation. With reported fever, tachypnea, and tachcardia in the 90s on my evaluation, sepsis alert initated with empiric vanc/zosyn as well as COVID test. Giving empiric steroids as well given her reported hx of steroid use for sarcoid. Procedures    Final Diagnosis       ICD-10-CM ICD-9-CM   1. Dyspnea on exertion  R06.00 786.09       Disposition   Care continued with Dr. Delbert Tinsley pending CT A/P and re-evaluation    The patient was personally evaluated by myself and Dr. Nathan Acevedo who agrees with the above assessment and plan. Tracy Frazier M.D.  [facility]  September 29, 2021    My signature above authenticates this document and my orders, the final    diagnosis (es), discharge prescription (s), and instructions in the Epic    record. If you have any questions please contact [phone]. Nursing notes have been reviewed by the physician    [employer] medical dictation software was used for portions of this report. Unintended voice recognition errors may occur.

## 2021-09-29 NOTE — ED NOTES
Pt says her Doctor called to inform her of abnormal labs, an infection somewhere. Provider did not inform pt off location but told her that she should head in to ED for eval. Pt attends [facility] under Dr. Regino Soulier.

## 2021-09-29 NOTE — ED TRIAGE NOTES
Noticed pt labored breathing in hallways bed, says she has hx of COPD & Sarcoidosis.  Says she is feeling pain in left breast area radiating to her back (says she has all the time but worsened Monday)

## 2021-09-30 VITALS
HEART RATE: 83 BPM | RESPIRATION RATE: 16 BRPM | BODY MASS INDEX: 24.11 KG/M2 | DIASTOLIC BLOOD PRESSURE: 80 MMHG | HEIGHT: 62 IN | SYSTOLIC BLOOD PRESSURE: 170 MMHG | OXYGEN SATURATION: 97 % | TEMPERATURE: 98.1 F | WEIGHT: 131 LBS

## 2021-09-30 LAB
APPEARANCE UR: CLEAR
BILIRUB UR QL: NEGATIVE
COLOR UR: YELLOW
GLUCOSE BLD STRIP.AUTO-MCNC: 90 MG/DL (ref 70–110)
GLUCOSE UR STRIP.AUTO-MCNC: NEGATIVE MG/DL
HGB UR QL STRIP: NEGATIVE
KETONES UR QL STRIP.AUTO: NEGATIVE MG/DL
LEUKOCYTE ESTERASE UR QL STRIP.AUTO: NEGATIVE
NITRITE UR QL STRIP.AUTO: NEGATIVE
PH UR STRIP: 7 [PH] (ref 5–8)
PROT UR STRIP-MCNC: NEGATIVE MG/DL
SP GR UR REFRACTOMETRY: 1.02 (ref 1–1.03)
UROBILINOGEN UR QL STRIP.AUTO: 0.2 EU/DL (ref 0.2–1)

## 2021-09-30 PROCEDURE — 82962 GLUCOSE BLOOD TEST: CPT

## 2021-09-30 PROCEDURE — 74011250636 HC RX REV CODE- 250/636: Performed by: STUDENT IN AN ORGANIZED HEALTH CARE EDUCATION/TRAINING PROGRAM

## 2021-09-30 PROCEDURE — 81003 URINALYSIS AUTO W/O SCOPE: CPT

## 2021-09-30 PROCEDURE — 96367 TX/PROPH/DG ADDL SEQ IV INF: CPT

## 2021-09-30 PROCEDURE — 74011000258 HC RX REV CODE- 258: Performed by: STUDENT IN AN ORGANIZED HEALTH CARE EDUCATION/TRAINING PROGRAM

## 2021-09-30 RX ORDER — AMOXICILLIN AND CLAVULANATE POTASSIUM 875; 125 MG/1; MG/1
1 TABLET, FILM COATED ORAL 2 TIMES DAILY
Qty: 14 TABLET | Refills: 0 | OUTPATIENT
Start: 2021-09-30 | End: 2021-10-18

## 2021-09-30 RX ORDER — HYDROCODONE BITARTRATE AND ACETAMINOPHEN 5; 325 MG/1; MG/1
1 TABLET ORAL
Qty: 12 TABLET | Refills: 0 | Status: SHIPPED | OUTPATIENT
Start: 2021-09-30 | End: 2021-10-03

## 2021-09-30 RX ORDER — ONDANSETRON 4 MG/1
4 TABLET, ORALLY DISINTEGRATING ORAL
Qty: 20 TABLET | Refills: 0 | OUTPATIENT
Start: 2021-09-30 | End: 2022-09-22

## 2021-09-30 RX ADMIN — PIPERACILLIN AND TAZOBACTAM 3.38 G: 3; .375 INJECTION, POWDER, LYOPHILIZED, FOR SOLUTION INTRAVENOUS at 03:07

## 2021-09-30 NOTE — ED NOTES
Recent Results (from the past 12 hour(s))   EKG, 12 LEAD, INITIAL    Collection Time: 09/29/21  5:42 PM   Result Value Ref Range    Ventricular Rate 86 BPM    Atrial Rate 86 BPM    P-R Interval 118 ms    QRS Duration 72 ms    Q-T Interval 376 ms    QTC Calculation (Bezet) 449 ms    Calculated R Axis -134 degrees    Calculated T Axis 171 degrees    Diagnosis       Normal sinus rhythm  Right superior axis deviation  ST & T wave abnormality, consider inferior ischemia  Abnormal ECG    Confirmed by Harvinder Snell MD, Jeromy ([id number]) on 9/29/2021 6:07:55 PM     POC LACTIC ACID    Collection Time: 09/29/21  6:17 PM   Result Value Ref Range    Lactic Acid (POC) 1.48 0.40 - 2.00 mmol/L   COVID-19 RAPID TEST    Collection Time: 09/29/21  8:06 PM   Result Value Ref Range    Specimen source Nasopharyngeal      COVID-19 rapid test Not detected NOTD     CBC WITH AUTOMATED DIFF    Collection Time: 09/29/21  8:44 PM   Result Value Ref Range    WBC 10.3 4.6 - 13.2 K/uL    RBC 4.43 4.20 - 5.30 M/uL    HGB 10.4 (L) 12.0 - 16.0 g/dL    HCT 33.5 (L) 35.0 - 45.0 %    MCV 75.6 (L) 78.0 - 100.0 FL    MCH 23.5 (L) 24.0 - 34.0 PG    MCHC 31.0 31.0 - 37.0 g/dL    RDW 16.3 (H) 11.6 - 14.5 %    PLATELET 830 807 - 321 K/uL    MPV 11.4 9.2 - 11.8 FL    NEUTROPHILS 76 (H) 40 - 73 %    LYMPHOCYTES 19 (L) 21 - 52 %    MONOCYTES 4 3 - 10 %    EOSINOPHILS 0 0 - 5 %    BASOPHILS 0 0 - 2 %    ABS. NEUTROPHILS 7.8 1.8 - 8.0 K/UL    ABS. LYMPHOCYTES 2.0 0.9 - 3.6 K/UL    ABS. MONOCYTES 0.4 0.05 - 1.2 K/UL    ABS. EOSINOPHILS 0.0 0.0 - 0.4 K/UL    ABS.  BASOPHILS 0.0 0.0 - 0.1 K/UL    DF AUTOMATED     NT-PRO BNP    Collection Time: 09/29/21  8:44 PM   Result Value Ref Range    NT pro- 0 - 900 PG/ML   HEPATIC FUNCTION PANEL    Collection Time: 09/29/21  8:44 PM   Result Value Ref Range    Protein, total 6.8 6.4 - 8.2 g/dL    Albumin 3.0 (L) 3.4 - 5.0 g/dL    Globulin 3.8 2.0 - 4.0 g/dL    A-G Ratio 0.8 0.8 - 1.7      Bilirubin, total 0.6 0.2 - 1.0 MG/DL Bilirubin, direct 0.1 0.0 - 0.2 MG/DL    Alk. phosphatase 86 45 - 117 U/L    AST (SGOT) 18 10 - 38 U/L    ALT (SGPT) 36 13 - 56 U/L   CK-MB,QT    Collection Time: 09/29/21  8:44 PM   Result Value Ref Range    CK - MB 1.2 <3.6 ng/ml    CK-MB Index 5.0 (H) 0.0 - 4.0 %   CK    Collection Time: 09/29/21  8:44 PM   Result Value Ref Range    CK 24 (L) 26 - 192 U/L   TROPONIN I    Collection Time: 09/29/21  8:44 PM   Result Value Ref Range    Troponin-I, QT <0.02 0.0 - 3.778 NG/ML   METABOLIC PANEL, BASIC    Collection Time: 09/29/21  8:44 PM   Result Value Ref Range    Sodium 145 136 - 145 mmol/L    Potassium 4.1 3.5 - 5.5 mmol/L    Chloride 109 100 - 111 mmol/L    CO2 27 21 - 32 mmol/L    Anion gap 9 3.0 - 18 mmol/L    Glucose 64 (L) 74 - 99 mg/dL    BUN 16 7.0 - 18 MG/DL    Creatinine 1.00 0.6 - 1.3 MG/DL    BUN/Creatinine ratio 16 12 - 20      GFR est AA >60 >60 ml/min/1.73m2    GFR est non-AA 55 (L) >60 ml/min/1.73m2    Calcium 8.5 8.5 - 10.1 MG/DL   URINALYSIS W/ RFLX MICROSCOPIC    Collection Time: 09/30/21 12:05 AM   Result Value Ref Range    Color YELLOW      Appearance CLEAR      Specific gravity 1.023 1.005 - 1.030      pH (UA) 7.0 5.0 - 8.0      Protein Negative NEG mg/dL    Glucose Negative NEG mg/dL    Ketone Negative NEG mg/dL    Bilirubin Negative NEG      Blood Negative NEG      Urobilinogen 0.2 0.2 - 1.0 EU/dL    Nitrites Negative NEG      Leukocyte Esterase Negative NEG       CT ABD PELV W CONT   Final Result      Acute diverticulitis at junction of left colon and sigmoid. No complication of   free air, abscess formation or bowel obstruction. Inflammatory changes are not   as severe as on prior study. Constipation. Right renal cyst.      XR CHEST PORT   Final Result   No acute cardiopulmonary process. Stable chronic interstitial lung disease. A/P:  Ct shows acute uncomplicated diverticulitis. She was discharged home with rxes for Norco, zofran, and augmentin.   I confirmed with the patient that she is able to take amoxicillin.

## 2021-10-05 LAB
BACTERIA SPEC CULT: NORMAL
SERVICE CMNT-IMP: NORMAL

## 2021-10-18 ENCOUNTER — APPOINTMENT (OUTPATIENT)
Dept: CT IMAGING | Age: 71
End: 2021-10-18
Attending: PHYSICIAN ASSISTANT
Payer: MEDICARE

## 2021-10-18 ENCOUNTER — HOSPITAL ENCOUNTER (EMERGENCY)
Age: 71
Discharge: HOME | End: 2021-10-18
Attending: STUDENT IN AN ORGANIZED HEALTH CARE EDUCATION/TRAINING PROGRAM
Payer: MEDICARE

## 2021-10-18 VITALS
HEART RATE: 92 BPM | TEMPERATURE: 98.1 F | RESPIRATION RATE: 16 BRPM | SYSTOLIC BLOOD PRESSURE: 192 MMHG | DIASTOLIC BLOOD PRESSURE: 95 MMHG | OXYGEN SATURATION: 100 %

## 2021-10-18 DIAGNOSIS — K59.00 CONSTIPATION, UNSPECIFIED CONSTIPATION TYPE: ICD-10-CM

## 2021-10-18 DIAGNOSIS — R03.0 ELEVATED BLOOD PRESSURE READING: ICD-10-CM

## 2021-10-18 DIAGNOSIS — K57.92 DIVERTICULITIS: Primary | ICD-10-CM

## 2021-10-18 LAB
ALBUMIN SERPL-MCNC: 3.1 G/DL (ref 3.4–5)
ALBUMIN/GLOB SERPL: 0.8 {RATIO} (ref 0.8–1.7)
ALP SERPL-CCNC: 100 U/L (ref 45–117)
ALT SERPL-CCNC: 34 U/L (ref 13–56)
ANION GAP SERPL CALC-SCNC: ABNORMAL MMOL/L (ref 3–18)
APPEARANCE UR: CLEAR
AST SERPL-CCNC: 20 U/L (ref 10–38)
ATRIAL RATE: 93 BPM
BACTERIA URNS QL MICRO: ABNORMAL /HPF
BASOPHILS # BLD: 0.1 K/UL (ref 0–0.1)
BASOPHILS NFR BLD: 1 % (ref 0–2)
BILIRUB SERPL-MCNC: 0.3 MG/DL (ref 0.2–1)
BILIRUB UR QL: NEGATIVE
BUN SERPL-MCNC: 13 MG/DL (ref 7–18)
BUN/CREAT SERPL: 16 (ref 12–20)
CALCIUM SERPL-MCNC: 8.5 MG/DL (ref 8.5–10.1)
CALCULATED P AXIS, ECG09: 52 DEGREES
CALCULATED R AXIS, ECG10: -33 DEGREES
CALCULATED T AXIS, ECG11: 52 DEGREES
CHLORIDE SERPL-SCNC: 109 MMOL/L (ref 100–111)
CK MB CFR SERPL CALC: 6.7 % (ref 0–4)
CK MB SERPL-MCNC: 1.8 NG/ML (ref 5–25)
CK SERPL-CCNC: 27 U/L (ref 26–192)
CO2 SERPL-SCNC: 32 MMOL/L (ref 21–32)
COLOR UR: YELLOW
CREAT SERPL-MCNC: 0.82 MG/DL (ref 0.6–1.3)
DIAGNOSIS, 93000: NORMAL
DIFFERENTIAL METHOD BLD: ABNORMAL
EOSINOPHIL # BLD: 0.2 K/UL (ref 0–0.4)
EOSINOPHIL NFR BLD: 2 % (ref 0–5)
EPITH CASTS URNS QL MICRO: ABNORMAL /LPF (ref 0–5)
ERYTHROCYTE [DISTWIDTH] IN BLOOD BY AUTOMATED COUNT: 15.3 % (ref 11.6–14.5)
GLOBULIN SER CALC-MCNC: 3.9 G/DL (ref 2–4)
GLUCOSE SERPL-MCNC: 89 MG/DL (ref 74–99)
GLUCOSE UR STRIP.AUTO-MCNC: 250 MG/DL
HCT VFR BLD AUTO: 34.6 % (ref 35–45)
HGB BLD-MCNC: 10.6 G/DL (ref 12–16)
HGB UR QL STRIP: NEGATIVE
KETONES UR QL STRIP.AUTO: NEGATIVE MG/DL
LEUKOCYTE ESTERASE UR QL STRIP.AUTO: NEGATIVE
LIPASE SERPL-CCNC: 173 U/L (ref 73–393)
LYMPHOCYTES # BLD: 3.8 K/UL (ref 0.9–3.6)
LYMPHOCYTES NFR BLD: 39 % (ref 21–52)
MCH RBC QN AUTO: 23.7 PG (ref 24–34)
MCHC RBC AUTO-ENTMCNC: 30.6 G/DL (ref 31–37)
MCV RBC AUTO: 77.2 FL (ref 78–100)
MONOCYTES # BLD: 0.6 K/UL (ref 0.05–1.2)
MONOCYTES NFR BLD: 6 % (ref 3–10)
NEUTS SEG # BLD: 5.1 K/UL (ref 1.8–8)
NEUTS SEG NFR BLD: 51 % (ref 40–73)
NITRITE UR QL STRIP.AUTO: NEGATIVE
P-R INTERVAL, ECG05: 108 MS
PH UR STRIP: 5.5 [PH] (ref 5–8)
PLATELET # BLD AUTO: 241 K/UL (ref 135–420)
PMV BLD AUTO: 11 FL (ref 9.2–11.8)
POTASSIUM SERPL-SCNC: 3.5 MMOL/L (ref 3.5–5.5)
PROT SERPL-MCNC: 7 G/DL (ref 6.4–8.2)
PROT UR STRIP-MCNC: ABNORMAL MG/DL
Q-T INTERVAL, ECG07: 334 MS
QRS DURATION, ECG06: 70 MS
QTC CALCULATION (BEZET), ECG08: 415 MS
RBC # BLD AUTO: 4.48 M/UL (ref 4.2–5.3)
RBC #/AREA URNS HPF: ABNORMAL /HPF (ref 0–5)
SODIUM SERPL-SCNC: 138 MMOL/L (ref 136–145)
SP GR UR REFRACTOMETRY: 1.01 (ref 1–1.03)
TROPONIN I SERPL-MCNC: <0.02 NG/ML (ref 0–0.04)
UROBILINOGEN UR QL STRIP.AUTO: 0.2 EU/DL (ref 0.2–1)
VENTRICULAR RATE, ECG03: 93 BPM
WBC # BLD AUTO: 9.9 K/UL (ref 4.6–13.2)
WBC URNS QL MICRO: ABNORMAL /HPF (ref 0–4)

## 2021-10-18 PROCEDURE — 96374 THER/PROPH/DIAG INJ IV PUSH: CPT

## 2021-10-18 PROCEDURE — 74011250636 HC RX REV CODE- 250/636: Performed by: PHYSICIAN ASSISTANT

## 2021-10-18 PROCEDURE — 80053 COMPREHEN METABOLIC PANEL: CPT

## 2021-10-18 PROCEDURE — 74011000636 HC RX REV CODE- 636: Performed by: STUDENT IN AN ORGANIZED HEALTH CARE EDUCATION/TRAINING PROGRAM

## 2021-10-18 PROCEDURE — 82553 CREATINE MB FRACTION: CPT

## 2021-10-18 PROCEDURE — 81001 URINALYSIS AUTO W/SCOPE: CPT

## 2021-10-18 PROCEDURE — 83690 ASSAY OF LIPASE: CPT

## 2021-10-18 PROCEDURE — 74177 CT ABD & PELVIS W/CONTRAST: CPT

## 2021-10-18 PROCEDURE — 93005 ELECTROCARDIOGRAM TRACING: CPT

## 2021-10-18 PROCEDURE — 96375 TX/PRO/DX INJ NEW DRUG ADDON: CPT

## 2021-10-18 PROCEDURE — 99283 EMERGENCY DEPT VISIT LOW MDM: CPT

## 2021-10-18 PROCEDURE — 85025 COMPLETE CBC W/AUTO DIFF WBC: CPT

## 2021-10-18 RX ORDER — ONDANSETRON 2 MG/ML
4 INJECTION INTRAMUSCULAR; INTRAVENOUS
Status: COMPLETED | OUTPATIENT
Start: 2021-10-18 | End: 2021-10-18

## 2021-10-18 RX ORDER — MORPHINE SULFATE 4 MG/ML
4 INJECTION INTRAVENOUS
Status: COMPLETED | OUTPATIENT
Start: 2021-10-18 | End: 2021-10-18

## 2021-10-18 RX ORDER — DOCUSATE SODIUM 100 MG/1
100 CAPSULE, LIQUID FILLED ORAL 2 TIMES DAILY
Qty: 30 CAPSULE | Refills: 0 | Status: SHIPPED | OUTPATIENT
Start: 2021-10-18 | End: 2021-11-02

## 2021-10-18 RX ORDER — METRONIDAZOLE 500 MG/1
500 TABLET ORAL 3 TIMES DAILY
Qty: 30 TABLET | Refills: 0 | Status: SHIPPED | OUTPATIENT
Start: 2021-10-18 | End: 2021-10-28

## 2021-10-18 RX ORDER — SULFAMETHOXAZOLE AND TRIMETHOPRIM 800; 160 MG/1; MG/1
1 TABLET ORAL 2 TIMES DAILY
Qty: 20 TABLET | Refills: 0 | Status: SHIPPED | OUTPATIENT
Start: 2021-10-18 | End: 2021-10-28

## 2021-10-18 RX ORDER — HYDROCODONE BITARTRATE AND ACETAMINOPHEN 5; 325 MG/1; MG/1
1 TABLET ORAL
Qty: 8 TABLET | Refills: 0 | Status: SHIPPED | OUTPATIENT
Start: 2021-10-18 | End: 2021-10-21

## 2021-10-18 RX ADMIN — IOPAMIDOL 90 ML: 612 INJECTION, SOLUTION INTRAVENOUS at 14:28

## 2021-10-18 RX ADMIN — ONDANSETRON 4 MG: 2 INJECTION INTRAMUSCULAR; INTRAVENOUS at 12:38

## 2021-10-18 RX ADMIN — MORPHINE SULFATE 4 MG: 4 INJECTION INTRAVENOUS at 12:38

## 2021-10-18 NOTE — ED TRIAGE NOTES
Pt c/o right groin pain d/t a mass that is radiating to back. Pt states she is a \"heart patient. \"

## 2021-10-18 NOTE — DISCHARGE INSTRUCTIONS
Take medication as prescribed. Follow-up with the gastroenterologist within 2 days for reassessment. Bring the results from this visit with you for their review. Return to the ED immediately for any new, worsening, or persistent symptoms, including fever, vomiting, or any other medical concerns.

## 2021-10-18 NOTE — ED PROVIDER NOTES
EMERGENCY DEPARTMENT HISTORY AND PHYSICAL EXAM    11:41 AM      Date: 10/18/2021  Patient Name: Kory Starr    History of Presenting Illness     Chief Complaint   Patient presents with    Abdominal Pain    Back Pain       History Provided By: Patient    Additional History (Context): Kory Starr is a 73 y.o. female with hx of COPD, DM, sarcoidosis, GERD, diverticulitis, and other noted PMH who presents with complaint of right lower abdominal pain and swelling x2 weeks. Patient notes the pain radiates into her back. Pt notes intermittent diarrhea and constipation. Notes last bowel movement was this morning. Denies fever or chills, chest pain, acute shortness of breath, n/v, dysuria, hematuria. Notes she was evaluated in the emergency room on 9/29, diagnosed with diverticulitis, took pain medicine and antibiotics as prescribed. PCP: Reba Grier MD    Current Outpatient Medications   Medication Sig Dispense Refill    trimethoprim-sulfamethoxazole (Bactrim DS) 160-800 mg per tablet Take 1 Tablet by mouth two (2) times a day for 10 days. 20 Tablet 0    metroNIDAZOLE (FlagyL) 500 mg tablet Take 1 Tablet by mouth three (3) times daily for 10 days. 30 Tablet 0    HYDROcodone-acetaminophen (NORCO) 5-325 mg per tablet Take 1 Tablet by mouth every six (6) hours as needed for Pain for up to 3 days. Max Daily Amount: 4 Tablets. 8 Tablet 0    docusate sodium (Colace) 100 mg capsule Take 1 Capsule by mouth two (2) times a day for 15 days. 30 Capsule 0    ondansetron (Zofran ODT) 4 mg disintegrating tablet 1 Tablet by SubLINGual route every eight (8) hours as needed for Nausea or Vomiting.  20 Tablet 0    megestrol (MEGACE) 400 mg/10 mL (40 mg/mL) suspension TAKE 5 ML BY MOUTH THREE TIMES DAILY FOR APPETITE  1    Insulin Needles, Disposable, 31 gauge x 5/16\" ndle USE AS DIRECTED WITH INSULIN PEN DAILY  3    Oxygen 3LPM O2 as needed   Indications: DME: [facility]      pantoprazole (PROTONIX) 20 mg tablet Take 20 mg by mouth daily.  benzonatate (TESSALON) 100 mg capsule Take 100 mg by mouth three (3) times daily as needed for Cough.  mirtazapine (REMERON) 15 mg tablet Take 15 mg by mouth nightly.  metFORMIN (GLUCOPHAGE) 500 mg tablet Take 1 Tab by mouth two (2) times daily (with meals). 60 Tab 0    insulin glargine (LANTUS U-100 INSULIN) 100 unit/mL injection 35 Units by SubCUTAneous route daily. (Patient taking differently: 40 Units by SubCUTAneous route daily. ) 1 Vial 0    albuterol-ipratropium (DUO-NEB) 2.5 mg-0.5 mg/3 ml nebu 3 mL by Nebulization route every four (4) hours as needed for Other (shortness of breath). 30 Nebule 0    diclofenac (VOLTAREN) 1 % gel Apply 2 g to affected area four (4) times daily. Gel should be measured and applied using the supplied dosing card. Apply dose (2 gm or 4 gm) to each location. If treatment site is the hands, patients should wait at least one (1) hour to wash their hands. APPLY TO bilateral knees 100 g 0    melatonin 3 mg tablet Take 1 Tab by mouth nightly. (Patient taking differently: Take 5 mg by mouth nightly.) 30 Tab 0    Omeprazole Magnesium (PRILOSEC) 10 mg suDR Take 20 mg by mouth two (2) times a day. 60 Each 0    raNITIdine (ZANTAC) 150 mg tablet Take 1 Tab by mouth nightly. 15 Tab 0    lisinopril-hydroCHLOROthiazide (PRINZIDE, ZESTORETIC) 20-12.5 mg per tablet Take 1 Tab by mouth daily. Indications: hypertension      nitroglycerin (NITROSTAT) 0.4 mg SL tablet 1 Tab by SubLINGual route as needed for Chest Pain. 25 Tab 1    fluticasone-salmeterol (ADVAIR) 250-50 mcg/dose diskus inhaler Take 1 puff by inhalation every twelve (12) hours. (Patient taking differently: Take 1 Puff by inhalation daily. ) 1 Inhaler 0    albuterol (PROVENTIL HFA, VENTOLIN HFA, PROAIR HFA) 90 mcg/actuation inhaler Take 1 puff by inhalation daily as needed for Wheezing. 1 Inhaler 0    clopidogrel (PLAVIX) 75 mg tablet Take 1 tablet by mouth daily.  30 tablet 11    atorvastatin (LIPITOR) 40 mg tablet Take 1 tablet by mouth nightly. 30 tablet 11    aspirin 81 mg chewable tablet Take 1 tablet by mouth daily. 30 tablet 0    polyethylene glycol (MIRALAX) 17 gram packet Take 1 packet by mouth daily. 14 each 0    fluoxetine (PROZAC) 20 mg capsule Take 20 mg by mouth daily.  tiotropium (SPIRIVA WITH HANDIHALER) 18 mcg inhalation capsule Take 1 Cap by inhalation daily. Past History     Past Medical History:  Past Medical History:   Diagnosis Date    Arthritis     \"generalized\"    Asthma     CAD in native artery     NSTEMI (Sep 2014); diffuse 65% pLAD, minimal disease RCA & LCx. pLAD FFR 0.93. LV  no RWMA (echo and LV angio)    Chronic neck pain     Chronic pain     left knee    Chronic pain syndrome     COPD (chronic obstructive pulmonary disease) (HCC)     Depression     Diabetes (HCC)     Diverticulitis     GERD (gastroesophageal reflux disease)     Heart disease     Hidradenitis 9/11/2014    Hypertension     Left knee pain     Narcotic dependence ([facility] [address]     Pneumonia     Right wrist pain     Sarcoidosis        Past Surgical History:  Past Surgical History:   Procedure Laterality Date    BRONCHOSCOPY  10/2019    HX BREAST BIOPSY Right     9/10/14: She said tag in place from [address]  2005    HX HYSTERECTOMY      HX KNEE ARTHROSCOPY Left        Family History:  Family History   Problem Relation Age of Onset    Hypertension Father     Diabetes Mother        Social History:  Social History     Tobacco Use    Smoking status: Former Smoker     Years: 46.00     Types: Cigarettes     Quit date: 03/2018     Years since quitting: 3.6    Smokeless tobacco: Never Used    Tobacco comment: Only smokes 1 cigarette per day   Substance Use Topics    Alcohol use:  Yes     Alcohol/week: 1.7 standard drinks     Types: 2 Glasses of wine per week     Comment: socially    Drug use: Yes     Types: Cocaine, Marijuana     Comment: 6 months ago       Allergies: Allergies   Allergen Reactions    Latex Hives and Itching    Ciprofloxacin Hives    Penicillins Nausea Only    Shellfish Derived Hives and Swelling     Swelling of legs         Review of Systems       Review of Systems   Constitutional: Negative for chills and fever. Respiratory: Negative for shortness of breath. Cardiovascular: Negative for chest pain. Gastrointestinal: Positive for abdominal distention, abdominal pain, constipation and diarrhea. Negative for nausea and vomiting. Skin: Negative for rash. Neurological: Negative for weakness. All other systems reviewed and are negative. Physical Exam     Visit Vitals  BP (!) 192/95   Pulse 92   Temp 98.1 °F (36.7 °C)   Resp 16   SpO2 100%         Physical Exam  Vitals and nursing note reviewed. Exam conducted with a chaperone present (Nurse Red Gauthier). Constitutional:       General: She is not in acute distress. Appearance: She is well-developed. She is not diaphoretic. HENT:      Head: Normocephalic and atraumatic. Cardiovascular:      Rate and Rhythm: Normal rate and regular rhythm. Heart sounds: Normal heart sounds. No murmur heard. No friction rub. No gallop. Pulmonary:      Effort: Pulmonary effort is normal. No respiratory distress. Breath sounds: Normal breath sounds. No wheezing or rales. Abdominal:      General: Abdomen is flat. Bowel sounds are normal.      Palpations: Abdomen is soft. Tenderness: There is generalized abdominal tenderness (mild). There is no right CVA tenderness, left CVA tenderness, guarding or rebound. Negative signs include McBurney's sign. Musculoskeletal:         General: Normal range of motion. Cervical back: Normal range of motion and neck supple. Skin:     General: Skin is warm. Findings: No rash. Neurological:      Mental Status: She is alert.            Diagnostic Study Results     Labs -  Recent Results (from the past 12 hour(s)) URINALYSIS W/ RFLX MICROSCOPIC    Collection Time: 10/18/21 10:46 AM   Result Value Ref Range    Color YELLOW      Appearance CLEAR      Specific gravity 1.014 1.005 - 1.030      pH (UA) 5.5 5.0 - 8.0      Protein TRACE (A) NEG mg/dL    Glucose 250 (A) NEG mg/dL    Ketone Negative NEG mg/dL    Bilirubin Negative NEG      Blood Negative NEG      Urobilinogen 0.2 0.2 - 1.0 EU/dL    Nitrites Negative NEG      Leukocyte Esterase Negative NEG     URINE MICROSCOPIC ONLY    Collection Time: 10/18/21 10:46 AM   Result Value Ref Range    WBC 0 to 2 0 - 4 /hpf    RBC NONE 0 - 5 /hpf    Epithelial cells FEW 0 - 5 /lpf    Bacteria FEW (A) NEG /hpf   EKG, 12 LEAD, INITIAL    Collection Time: 10/18/21 12:00 PM   Result Value Ref Range    Ventricular Rate 93 BPM    Atrial Rate 93 BPM    P-R Interval 108 ms    QRS Duration 70 ms    Q-T Interval 334 ms    QTC Calculation (Bezet) 415 ms    Calculated P Axis 52 degrees    Calculated R Axis -33 degrees    Calculated T Axis 52 degrees    Diagnosis       Sinus rhythm with short WY  Possible Left atrial enlargement  Left axis deviation  Abnormal ECG  When compared with ECG of 29-SEP-2021 17:42,  Questionable change in QRS axis  ST no longer depressed in Inferior leads  Non-specific change in ST segment in Lateral leads  T wave inversion no longer evident in Inferior leads     CBC WITH AUTOMATED DIFF    Collection Time: 10/18/21 12:30 PM   Result Value Ref Range    WBC 9.9 4.6 - 13.2 K/uL    RBC 4.48 4.20 - 5.30 M/uL    HGB 10.6 (L) 12.0 - 16.0 g/dL    HCT 34.6 (L) 35.0 - 45.0 %    MCV 77.2 (L) 78.0 - 100.0 FL    MCH 23.7 (L) 24.0 - 34.0 PG    MCHC 30.6 (L) 31.0 - 37.0 g/dL    RDW 15.3 (H) 11.6 - 14.5 %    PLATELET 393 036 - 125 K/uL    MPV 11.0 9.2 - 11.8 FL    NEUTROPHILS 51 40 - 73 %    LYMPHOCYTES 39 21 - 52 %    MONOCYTES 6 3 - 10 %    EOSINOPHILS 2 0 - 5 %    BASOPHILS 1 0 - 2 %    ABS. NEUTROPHILS 5.1 1.8 - 8.0 K/UL    ABS. LYMPHOCYTES 3.8 (H) 0.9 - 3.6 K/UL    ABS.  MONOCYTES 0.6 0.05 - 1.2 K/UL    ABS. EOSINOPHILS 0.2 0.0 - 0.4 K/UL    ABS. BASOPHILS 0.1 0.0 - 0.1 K/UL    DF AUTOMATED     CARDIAC PANEL,(CK, CKMB & TROPONIN)    Collection Time: 10/18/21 12:30 PM   Result Value Ref Range    CK - MB 1.8 <3.6 ng/ml    CK-MB Index 6.7 (H) 0.0 - 4.0 %    CK 27 26 - 192 U/L    Troponin-I, QT <0.02 0.0 - 0.045 NG/ML   LIPASE    Collection Time: 10/18/21 12:30 PM   Result Value Ref Range    Lipase 173 73 - 616 U/L   METABOLIC PANEL, COMPREHENSIVE    Collection Time: 10/18/21 12:30 PM   Result Value Ref Range    Sodium 138 136 - 145 mmol/L    Potassium 3.5 3.5 - 5.5 mmol/L    Chloride 109 100 - 111 mmol/L    CO2 32 21 - 32 mmol/L    Anion gap NEG 3 3.0 - 18 mmol/L    Glucose 89 74 - 99 mg/dL    BUN 13 7.0 - 18 MG/DL    Creatinine 0.82 0.6 - 1.3 MG/DL    BUN/Creatinine ratio 16 12 - 20      GFR est AA >60 >60 ml/min/1.73m2    GFR est non-AA >60 >60 ml/min/1.73m2    Calcium 8.5 8.5 - 10.1 MG/DL    Bilirubin, total 0.3 0.2 - 1.0 MG/DL    ALT (SGPT) 34 13 - 56 U/L    AST (SGOT) 20 10 - 38 U/L    Alk. phosphatase 100 45 - 117 U/L    Protein, total 7.0 6.4 - 8.2 g/dL    Albumin 3.1 (L) 3.4 - 5.0 g/dL    Globulin 3.9 2.0 - 4.0 g/dL    A-G Ratio 0.8 0.8 - 1.7         Radiologic Studies -   CT ABD PELV W CONT   Final Result      1. Mild diverticulitis at the junction of descending and sigmoid colon seen on   prior CT shows mild improvement with minimal inflammation. 2. Diverticulosis and mild fecal impaction. Thank you for this referral.             Medical Decision Making   I am the first provider for this patient. I reviewed the vital signs, available nursing notes, past medical history, past surgical history, family history and social history. Vital Signs-Reviewed the patient's vital signs.       Records Reviewed: Nursing Notes and Old Medical Records (Time of Review: 11:41 AM)    ED Course: Progress Notes, Reevaluation, and Consults:  1:30 PM: Pt YENNY to CT   3:08 PM Reviewed results with patient and . Discussed need for close outpatient follow-up this week for reassessment with PMD and GI physician. Discussed strict return precautions, including fever, vomiting, inability to have a bowel movement, or any other medical concerns. In agreement with plan. One or more blood pressure readings were noted elevated during the patient's presentation in the emergency department today. This abnormal reading has been cited in the patients' diagnosis, and they have been encouraged to follow up with their primary care physician, or referred to a consultant for further evaluation and treatment. Provider Notes (Medical Decision Making): 44-year-old female who presents to the ED due to lower abdominal pain. Afebrile, nontoxic-appearing, looks well. Mild generalized tenderness without rebound or guarding. Labs essentially unremarkable, no evidence of leukocytosis, anemia baseline. CT demonstrates mild diverticulitis with improvement from previous CT, no evidence of associated complication. Stable for discharge with antibiotics, pain control, stool softener, and referral to GI for close outpatient follow-up for further assessment. Strict return precautions provided. Diagnosis     Clinical Impression:   1. Diverticulitis    2. Constipation, unspecified constipation type    3.  Elevated blood pressure reading        Disposition: home     Follow-up Information     Follow up With Specialties Details Why [address] [facility] EMERGENCY DEPT Emergency Medicine  If symptoms worsen [address] [place] [address] [address] [id number]  Mary Beth 6  Schedule an appointment as soon as possible for a visit   Alvaro Daniel 3  [facility] [address] [place] [address] JANELLE Clark MD Gastroenterology Schedule an appointment as soon as possible for a visit   Christian Medina [phone]             Patient's Medications   Start Taking    DOCUSATE SODIUM (COLACE) 100 MG CAPSULE    Take 1 Capsule by mouth two (2) times a day for 15 days. HYDROCODONE-ACETAMINOPHEN (NORCO) 5-325 MG PER TABLET    Take 1 Tablet by mouth every six (6) hours as needed for Pain for up to 3 days. Max Daily Amount: 4 Tablets. METRONIDAZOLE (FLAGYL) 500 MG TABLET    Take 1 Tablet by mouth three (3) times daily for 10 days. TRIMETHOPRIM-SULFAMETHOXAZOLE (BACTRIM DS) 160-800 MG PER TABLET    Take 1 Tablet by mouth two (2) times a day for 10 days. Continue Taking    ALBUTEROL (PROVENTIL HFA, VENTOLIN HFA, PROAIR HFA) 90 MCG/ACTUATION INHALER    Take 1 puff by inhalation daily as needed for Wheezing. ALBUTEROL-IPRATROPIUM (DUO-NEB) 2.5 MG-0.5 MG/3 ML NEBU    3 mL by Nebulization route every four (4) hours as needed for Other (shortness of breath). ASPIRIN 81 MG CHEWABLE TABLET    Take 1 tablet by mouth daily. ATORVASTATIN (LIPITOR) 40 MG TABLET    Take 1 tablet by mouth nightly. BENZONATATE (TESSALON) 100 MG CAPSULE    Take 100 mg by mouth three (3) times daily as needed for Cough. CLOPIDOGREL (PLAVIX) 75 MG TABLET    Take 1 tablet by mouth daily. DICLOFENAC (VOLTAREN) 1 % GEL    Apply 2 g to affected area four (4) times daily. Gel should be measured and applied using the supplied dosing card. Apply dose (2 gm or 4 gm) to each location. If treatment site is the hands, patients should wait at least one (1) hour to wash their hands. APPLY TO bilateral knees    FLUOXETINE (PROZAC) 20 MG CAPSULE    Take 20 mg by mouth daily. FLUTICASONE-SALMETEROL (ADVAIR) 250-50 MCG/DOSE DISKUS INHALER    Take 1 puff by inhalation every twelve (12) hours. INSULIN GLARGINE (LANTUS U-100 INSULIN) 100 UNIT/ML INJECTION    35 Units by SubCUTAneous route daily. INSULIN NEEDLES, DISPOSABLE, 31 GAUGE X 5/16\" NDLE    USE AS DIRECTED WITH INSULIN PEN DAILY    LISINOPRIL-HYDROCHLOROTHIAZIDE (PRINZIDE, ZESTORETIC) 20-12.5 MG PER TABLET    Take 1 Tab by mouth daily.  Indications: hypertension    MEGESTROL (MEGACE) 400 MG/10 ML (40 MG/ML) SUSPENSION    TAKE 5 ML BY MOUTH THREE TIMES DAILY FOR APPETITE    MELATONIN 3 MG TABLET    Take 1 Tab by mouth nightly. METFORMIN (GLUCOPHAGE) 500 MG TABLET    Take 1 Tab by mouth two (2) times daily (with meals). MIRTAZAPINE (REMERON) 15 MG TABLET    Take 15 mg by mouth nightly. NITROGLYCERIN (NITROSTAT) 0.4 MG SL TABLET    1 Tab by SubLINGual route as needed for Chest Pain. OMEPRAZOLE MAGNESIUM (PRILOSEC) 10 MG SUDR    Take 20 mg by mouth two (2) times a day. ONDANSETRON (ZOFRAN ODT) 4 MG DISINTEGRATING TABLET    1 Tablet by SubLINGual route every eight (8) hours as needed for Nausea or Vomiting. OXYGEN    3LPM O2 as needed   Indications: DME: [facility]    PANTOPRAZOLE (PROTONIX) 20 MG TABLET    Take 20 mg by mouth daily. POLYETHYLENE GLYCOL (MIRALAX) 17 GRAM PACKET    Take 1 packet by mouth daily. RANITIDINE (ZANTAC) 150 MG TABLET    Take 1 Tab by mouth nightly. TIOTROPIUM (SPIRIVA WITH HANDIHALER) 18 MCG INHALATION CAPSULE    Take 1 Cap by inhalation daily. These Medications have changed    No medications on file   Stop Taking    AMOXICILLIN-CLAVULANATE (AUGMENTIN) 875-125 MG PER TABLET    Take 1 Tablet by mouth two (2) times a day. Dictation disclaimer:  Please note that this dictation was completed with [employer]bridge, the [employer] voice recognition software. Quite often unanticipated grammatical, syntax, homophones, and other interpretive errors are inadvertently transcribed by the computer software. Please disregard these errors. Please excuse any errors that have escaped final proofreading.

## 2022-03-06 ENCOUNTER — HOSPITAL ENCOUNTER (EMERGENCY)
Age: 72
Discharge: HOME | End: 2022-03-06
Attending: EMERGENCY MEDICINE
Payer: MEDICARE

## 2022-03-06 ENCOUNTER — APPOINTMENT (OUTPATIENT)
Dept: GENERAL RADIOLOGY | Age: 72
End: 2022-03-06
Attending: EMERGENCY MEDICINE
Payer: MEDICARE

## 2022-03-06 VITALS
TEMPERATURE: 98.6 F | BODY MASS INDEX: 21.83 KG/M2 | WEIGHT: 131 LBS | HEART RATE: 100 BPM | HEIGHT: 65 IN | RESPIRATION RATE: 53 BRPM | OXYGEN SATURATION: 93 % | SYSTOLIC BLOOD PRESSURE: 138 MMHG | DIASTOLIC BLOOD PRESSURE: 71 MMHG

## 2022-03-06 DIAGNOSIS — R07.9 CHEST PAIN, UNSPECIFIED TYPE: Primary | ICD-10-CM

## 2022-03-06 LAB
ALBUMIN SERPL-MCNC: 2.8 G/DL (ref 3.4–5)
ALBUMIN/GLOB SERPL: 0.8 {RATIO} (ref 0.8–1.7)
ALP SERPL-CCNC: 92 U/L (ref 45–117)
ALT SERPL-CCNC: 18 U/L (ref 13–56)
ANION GAP SERPL CALC-SCNC: 12 MMOL/L (ref 3–18)
AST SERPL-CCNC: 10 U/L (ref 10–38)
BASOPHILS # BLD: 0 K/UL (ref 0–0.1)
BASOPHILS NFR BLD: 1 % (ref 0–2)
BILIRUB SERPL-MCNC: 1 MG/DL (ref 0.2–1)
BUN SERPL-MCNC: 13 MG/DL (ref 7–18)
BUN/CREAT SERPL: 12 (ref 12–20)
CALCIUM SERPL-MCNC: 8.9 MG/DL (ref 8.5–10.1)
CHLORIDE SERPL-SCNC: 101 MMOL/L (ref 100–111)
CO2 SERPL-SCNC: 24 MMOL/L (ref 21–32)
CREAT SERPL-MCNC: 1.1 MG/DL (ref 0.6–1.3)
DIFFERENTIAL METHOD BLD: ABNORMAL
EOSINOPHIL # BLD: 0 K/UL (ref 0–0.4)
EOSINOPHIL NFR BLD: 0 % (ref 0–5)
ERYTHROCYTE [DISTWIDTH] IN BLOOD BY AUTOMATED COUNT: 16.3 % (ref 11.6–14.5)
GLOBULIN SER CALC-MCNC: 3.6 G/DL (ref 2–4)
GLUCOSE SERPL-MCNC: 197 MG/DL (ref 74–99)
HCT VFR BLD AUTO: 32.1 % (ref 35–45)
HGB BLD-MCNC: 9.8 G/DL (ref 12–16)
IMM GRANULOCYTES # BLD AUTO: 0 K/UL (ref 0–0.04)
IMM GRANULOCYTES NFR BLD AUTO: 0 % (ref 0–0.5)
LYMPHOCYTES # BLD: 1.5 K/UL (ref 0.9–3.6)
LYMPHOCYTES NFR BLD: 18 % (ref 21–52)
MAGNESIUM SERPL-MCNC: 1.9 MG/DL (ref 1.6–2.6)
MCH RBC QN AUTO: 23.3 PG (ref 24–34)
MCHC RBC AUTO-ENTMCNC: 30.5 G/DL (ref 31–37)
MCV RBC AUTO: 76.2 FL (ref 78–100)
MONOCYTES # BLD: 0.8 K/UL (ref 0.05–1.2)
MONOCYTES NFR BLD: 10 % (ref 3–10)
NEUTS SEG # BLD: 5.6 K/UL (ref 1.8–8)
NEUTS SEG NFR BLD: 70 % (ref 40–73)
NRBC # BLD: 0 K/UL (ref 0–0.01)
NRBC BLD-RTO: 0 PER 100 WBC
PLATELET # BLD AUTO: 342 K/UL (ref 135–420)
PMV BLD AUTO: 9.6 FL (ref 9.2–11.8)
POTASSIUM SERPL-SCNC: 4 MMOL/L (ref 3.5–5.5)
PROT SERPL-MCNC: 6.4 G/DL (ref 6.4–8.2)
RBC # BLD AUTO: 4.21 M/UL (ref 4.2–5.3)
SODIUM SERPL-SCNC: 137 MMOL/L (ref 136–145)
TROPONIN-HIGH SENSITIVITY: 11 NG/L (ref 0–54)
WBC # BLD AUTO: 8 K/UL (ref 4.6–13.2)

## 2022-03-06 PROCEDURE — 71045 X-RAY EXAM CHEST 1 VIEW: CPT

## 2022-03-06 PROCEDURE — 85025 COMPLETE CBC W/AUTO DIFF WBC: CPT

## 2022-03-06 PROCEDURE — 96374 THER/PROPH/DIAG INJ IV PUSH: CPT

## 2022-03-06 PROCEDURE — 84484 ASSAY OF TROPONIN QUANT: CPT

## 2022-03-06 PROCEDURE — 99285 EMERGENCY DEPT VISIT HI MDM: CPT

## 2022-03-06 PROCEDURE — 80053 COMPREHEN METABOLIC PANEL: CPT

## 2022-03-06 PROCEDURE — 74011250636 HC RX REV CODE- 250/636: Performed by: EMERGENCY MEDICINE

## 2022-03-06 PROCEDURE — 83735 ASSAY OF MAGNESIUM: CPT

## 2022-03-06 PROCEDURE — 93005 ELECTROCARDIOGRAM TRACING: CPT

## 2022-03-06 RX ORDER — MORPHINE SULFATE 4 MG/ML
4 INJECTION INTRAVENOUS ONCE
Status: COMPLETED | OUTPATIENT
Start: 2022-03-06 | End: 2022-03-06

## 2022-03-06 RX ORDER — ACETAMINOPHEN 500 MG
1000 TABLET ORAL
Qty: 24 TABLET | Refills: 0 | Status: SHIPPED | OUTPATIENT
Start: 2022-03-06 | End: 2022-03-09

## 2022-03-06 RX ADMIN — MORPHINE SULFATE 4 MG: 4 INJECTION, SOLUTION INTRAMUSCULAR; INTRAVENOUS at 12:15

## 2022-03-06 NOTE — ED TRIAGE NOTES
From [facility], reports substernal cp with sob since this am, NTG X  2 sub and 324mg asprin enroute. fsbs of 243. Client reports having chronic cp, what makes today different is she felt as though she was gonna fall/faint.

## 2022-03-06 NOTE — ED PROVIDER NOTES
EMERGENCY DEPARTMENT HISTORY AND PHYSICAL EXAM    9:53 AM patient seen at this time in room 14      Date: 3/6/2022  Patient Name: Noemy Traylor    History of Presenting Illness     No chief complaint on file. History Provided By: patient    Additional History (Context): Noemy Traylor is a 73 y.o. female presents with arrives to the emergency department via EMS complains of chest pain all the time substernal going through to her back it is constant. She has a history of sarcoidosis in the lungs. 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 Her chest pain is nonexertional nonpleuritic not changed with motion p.o. intake etc.    PCP: Marvel, MD Reba    Chief Complaint:   Duration:    Timing:    Location:   Quality:   Severity:   Modifying Factors:   Associated Symptoms:       Current Outpatient Medications   Medication Sig Dispense Refill    ondansetron (Zofran ODT) 4 mg disintegrating tablet 1 Tablet by SubLINGual route every eight (8) hours as needed for Nausea or Vomiting. 20 Tablet 0    megestrol (MEGACE) 400 mg/10 mL (40 mg/mL) suspension TAKE 5 ML BY MOUTH THREE TIMES DAILY FOR APPETITE  1    Insulin Needles, Disposable, 31 gauge x 5/16\" ndle USE AS DIRECTED WITH INSULIN PEN DAILY  3    Oxygen 3LPM O2 as needed   Indications: DME: [facility]      pantoprazole (PROTONIX) 20 mg tablet Take 20 mg by mouth daily.  benzonatate (TESSALON) 100 mg capsule Take 100 mg by mouth three (3) times daily as needed for Cough.  mirtazapine (REMERON) 15 mg tablet Take 15 mg by mouth nightly.  metFORMIN (GLUCOPHAGE) 500 mg tablet Take 1 Tab by mouth two (2) times daily (with meals). 60 Tab 0    insulin glargine (LANTUS U-100 INSULIN) 100 unit/mL injection 35 Units by SubCUTAneous route daily. (Patient taking differently: 40 Units by SubCUTAneous route daily. ) 1 Vial 0    albuterol-ipratropium (DUO-NEB) 2.5 mg-0.5 mg/3 ml nebu 3 mL by Nebulization route every four (4) hours as needed for Other (shortness of breath). 30 Nebule 0    diclofenac (VOLTAREN) 1 % gel Apply 2 g to affected area four (4) times daily. Gel should be measured and applied using the supplied dosing card. Apply dose (2 gm or 4 gm) to each location. If treatment site is the hands, patients should wait at least one (1) hour to wash their hands. APPLY TO bilateral knees 100 g 0    melatonin 3 mg tablet Take 1 Tab by mouth nightly. (Patient taking differently: Take 5 mg by mouth nightly.) 30 Tab 0    Omeprazole Magnesium (PRILOSEC) 10 mg suDR Take 20 mg by mouth two (2) times a day. 60 Each 0    raNITIdine (ZANTAC) 150 mg tablet Take 1 Tab by mouth nightly. 15 Tab 0    lisinopril-hydroCHLOROthiazide (PRINZIDE, ZESTORETIC) 20-12.5 mg per tablet Take 1 Tab by mouth daily. Indications: hypertension      nitroglycerin (NITROSTAT) 0.4 mg SL tablet 1 Tab by SubLINGual route as needed for Chest Pain. 25 Tab 1    fluticasone-salmeterol (ADVAIR) 250-50 mcg/dose diskus inhaler Take 1 puff by inhalation every twelve (12) hours. (Patient taking differently: Take 1 Puff by inhalation daily. ) 1 Inhaler 0    albuterol (PROVENTIL HFA, VENTOLIN HFA, PROAIR HFA) 90 mcg/actuation inhaler Take 1 puff by inhalation daily as needed for Wheezing. 1 Inhaler 0    clopidogrel (PLAVIX) 75 mg tablet Take 1 tablet by mouth daily. 30 tablet 11    atorvastatin (LIPITOR) 40 mg tablet Take 1 tablet by mouth nightly. 30 tablet 11    aspirin 81 mg chewable tablet Take 1 tablet by mouth daily. 30 tablet 0    polyethylene glycol (MIRALAX) 17 gram packet Take 1 packet by mouth daily. 14 each 0    fluoxetine (PROZAC) 20 mg capsule Take 20 mg by mouth daily.  tiotropium (SPIRIVA WITH HANDIHALER) 18 mcg inhalation capsule Take 1 Cap by inhalation daily.            Past History     Past Medical History:  Past Medical History:   Diagnosis Date    Arthritis     \"generalized\"    Asthma     CAD in native artery     NSTEMI (Sep 2014); diffuse 65% pLAD, minimal disease RCA & LCx. pLAD FFR 0.93. LV  no RWMA (echo and LV angio)    Chronic neck pain     Chronic pain     left knee    Chronic pain syndrome     COPD (chronic obstructive pulmonary disease) (HCC)     Depression     Diabetes (HCC)     Diverticulitis     GERD (gastroesophageal reflux disease)     Heart disease     Hidradenitis 2014    Hypertension     Left knee pain     Narcotic dependence ([address]     Pneumonia     Right wrist pain     Sarcoidosis        Past Surgical History:  Past Surgical History:   Procedure Laterality Date    BRONCHOSCOPY  10/2019    HX BREAST BIOPSY Right     9/10/14: She said tag in place from [address]      HX HYSTERECTOMY      HX KNEE ARTHROSCOPY Left        Family History:  Family History   Problem Relation Age of Onset    Hypertension Father     Diabetes Mother        Social History:  Social History     Tobacco Use    Smoking status: Former Smoker     Years: 46.00     Types: Cigarettes     Quit date: 2018     Years since quittin.0    Smokeless tobacco: Never Used    Tobacco comment: Only smokes 1 cigarette per day   Substance Use Topics    Alcohol use: Yes     Alcohol/week: 1.7 standard drinks     Types: 2 Glasses of wine per week     Comment: socially    Drug use: Yes     Types: Cocaine, Marijuana     Comment: 6 months ago       Allergies: Allergies   Allergen Reactions    Latex Hives and Itching    Ciprofloxacin Hives    Penicillins Nausea Only    Shellfish Derived Hives and Swelling     Swelling of legs         Review of Systems     Review of Systems   Constitutional: Negative for diaphoresis and fever. HENT: Negative for congestion and sore throat. Eyes: Negative for pain and itching. Respiratory: Negative for cough and shortness of breath. Cardiovascular: Positive for chest pain. Negative for palpitations.    Gastrointestinal: Negative for abdominal pain and diarrhea. Endocrine: Negative for polydipsia and polyuria. Genitourinary: Negative for dysuria and hematuria. Musculoskeletal: Negative for arthralgias and myalgias. Skin: Negative for rash and wound. Neurological: Negative for seizures and syncope. Hematological: Does not bruise/bleed easily. Psychiatric/Behavioral: Negative for agitation and hallucinations. Physical Exam       Patient Vitals for the past 12 hrs:   Temp Pulse Resp BP SpO2   03/06/22 0923 98.6 °F (37 °C) (!) 112 (!) 34 (!) 145/95 96 %       IPVITALS  Patient Vitals for the past 24 hrs:   BP Temp Pulse Resp SpO2 Height Weight   03/06/22 0924 -- -- -- -- -- 5' 5\" (1.651 m) 59.4 kg (131 lb)   03/06/22 0923 (!) 145/95 98.6 °F (37 °C) (!) 112 (!) 34 96 % -- --       Physical Exam  Vitals and nursing note reviewed. Constitutional:       Appearance: Normal appearance. She is well-developed. HENT:      Head: Normocephalic and atraumatic. Eyes:      General: No scleral icterus. Conjunctiva/sclera: Conjunctivae normal.   Neck:      Vascular: No JVD. Cardiovascular:      Rate and Rhythm: Normal rate and regular rhythm. Heart sounds: Normal heart sounds. Comments: 4 intact extremity pulses  Pulmonary:      Effort: Pulmonary effort is normal.      Breath sounds: Normal breath sounds. Chest:      Chest wall: No tenderness. Abdominal:      Palpations: Abdomen is soft. There is no mass. Tenderness: There is no abdominal tenderness. Musculoskeletal:         General: Normal range of motion. Cervical back: Normal range of motion and neck supple. Lymphadenopathy:      Cervical: No cervical adenopathy. Skin:     General: Skin is warm and dry. Neurological:      Mental Status: She is alert.       Comments: Writhing, dystonic movements           Diagnostic Study Results   Labs -  Recent Results (from the past 24 hour(s))   CBC WITH AUTOMATED DIFF    Collection Time: 03/06/22  9:49 AM Result Value Ref Range    WBC 8.0 4.6 - 13.2 K/uL    RBC 4.21 4.20 - 5.30 M/uL    HGB 9.8 (L) 12.0 - 16.0 g/dL    HCT 32.1 (L) 35.0 - 45.0 %    MCV 76.2 (L) 78.0 - 100.0 FL    MCH 23.3 (L) 24.0 - 34.0 PG    MCHC 30.5 (L) 31.0 - 37.0 g/dL    RDW 16.3 (H) 11.6 - 14.5 %    PLATELET 633 285 - 393 K/uL    MPV 9.6 9.2 - 11.8 FL    NRBC 0.0 0  WBC    ABSOLUTE NRBC 0.00 0.00 - 0.01 K/uL    NEUTROPHILS 70 40 - 73 %    LYMPHOCYTES 18 (L) 21 - 52 %    MONOCYTES 10 3 - 10 %    EOSINOPHILS 0 0 - 5 %    BASOPHILS 1 0 - 2 %    IMMATURE GRANULOCYTES 0 0.0 - 0.5 %    ABS. NEUTROPHILS 5.6 1.8 - 8.0 K/UL    ABS. LYMPHOCYTES 1.5 0.9 - 3.6 K/UL    ABS. MONOCYTES 0.8 0.05 - 1.2 K/UL    ABS. EOSINOPHILS 0.0 0.0 - 0.4 K/UL    ABS. BASOPHILS 0.0 0.0 - 0.1 K/UL    ABS. IMM. GRANS. 0.0 0.00 - 0.04 K/UL    DF AUTOMATED     METABOLIC PANEL, COMPREHENSIVE    Collection Time: 03/06/22  9:49 AM   Result Value Ref Range    Sodium 137 136 - 145 mmol/L    Potassium 4.0 3.5 - 5.5 mmol/L    Chloride 101 100 - 111 mmol/L    CO2 24 21 - 32 mmol/L    Anion gap 12 3.0 - 18 mmol/L    Glucose 197 (H) 74 - 99 mg/dL    BUN 13 7.0 - 18 MG/DL    Creatinine 1.10 0.6 - 1.3 MG/DL    BUN/Creatinine ratio 12 12 - 20      GFR est AA 60 (L) >60 ml/min/1.73m2    GFR est non-AA 49 (L) >60 ml/min/1.73m2    Calcium 8.9 8.5 - 10.1 MG/DL    Bilirubin, total 1.0 0.2 - 1.0 MG/DL    ALT (SGPT) 18 13 - 56 U/L    AST (SGOT) 10 10 - 38 U/L    Alk. phosphatase 92 45 - 117 U/L    Protein, total 6.4 6.4 - 8.2 g/dL    Albumin 2.8 (L) 3.4 - 5.0 g/dL    Globulin 3.6 2.0 - 4.0 g/dL    A-G Ratio 0.8 0.8 - 1.7     MAGNESIUM    Collection Time: 03/06/22  9:49 AM   Result Value Ref Range    Magnesium 1.9 1.6 - 2.6 mg/dL   TROPONIN-HIGH SENSITIVITY    Collection Time: 03/06/22  9:49 AM   Result Value Ref Range    Troponin-High Sensitivity 11 0 - 54 ng/L       Radiologic Studies -   XR CHEST PORT   Final Result       1. Chronic interstitial lung disease.  Superimposed interstitial edema or   interstitial infiltrate would be difficult to exclude with certainty. 2. No new consolidation. XR CHEST PORT    Result Date: 3/6/2022  AP CHEST, PORTABLE INDICATION: Chest pain. Sarcoidosis COMPARISON: Chest x-ray 9/29/2021 FINDINGS:  EKG leads overlie the patient. Cardiac silhouette is stable. Redemonstrated calcified mediastinal/hilar lymph nodes. Chronic versus recurrent interstitial prominence. No new consolidation, pleural effusion, or pneumothorax. No acute osseous abnormalities are identified. 1. Chronic interstitial lung disease. Superimposed interstitial edema or interstitial infiltrate would be difficult to exclude with certainty. 2. No new consolidation. Medications ordered:   Medications   morphine injection 4 mg (4 mg IntraVENous Given 3/6/22 1215)         Medical Decision Making   Initial Medical Decision Making and DDx:     Sinus tachycardia 144 with premature ventricular contractions. ED Course: Progress Notes, Reevaluation, and Consults:     No alarming findings on cardiac work-up this is not ACS. She has known sarcoidosis and chronic chest pain, has been recommended to pain management but has not followed up. We will refer back to primary care to be referred to pain management. Doubt aortic disease pneumothorax PE    I am the first provider for this patient. I reviewed the vital signs, available nursing notes, past medical history, past surgical history, family history and social history. Patient Vitals for the past 12 hrs:   Temp Pulse Resp BP SpO2   03/06/22 0923 98.6 °F (37 °C) (!) 112 (!) 34 (!) 145/95 96 %       Vital Signs-Reviewed the patient's vital signs. Pulse Oximetry Analysis, Cardiac Monitor, 12 lead ekg:      Interpreted by the EP.       Records Reviewed: Nursing notes reviewed (Time of Review: 9:53 AM)    Procedures:   Critical Care Time:   Aspirin: (was aspirin given for stroke?)    Diagnosis     Clinical Impression: 1. Chest pain, unspecified type        Disposition: Discharged      Follow-up Information     Follow up With Specialties Details Why Contact Info    your primary doctor               Patient's Medications   Start Taking    No medications on file   Continue Taking    ALBUTEROL (PROVENTIL HFA, VENTOLIN HFA, PROAIR HFA) 90 MCG/ACTUATION INHALER    Take 1 puff by inhalation daily as needed for Wheezing. ALBUTEROL-IPRATROPIUM (DUO-NEB) 2.5 MG-0.5 MG/3 ML NEBU    3 mL by Nebulization route every four (4) hours as needed for Other (shortness of breath). ASPIRIN 81 MG CHEWABLE TABLET    Take 1 tablet by mouth daily. ATORVASTATIN (LIPITOR) 40 MG TABLET    Take 1 tablet by mouth nightly. BENZONATATE (TESSALON) 100 MG CAPSULE    Take 100 mg by mouth three (3) times daily as needed for Cough. CLOPIDOGREL (PLAVIX) 75 MG TABLET    Take 1 tablet by mouth daily. DICLOFENAC (VOLTAREN) 1 % GEL    Apply 2 g to affected area four (4) times daily. Gel should be measured and applied using the supplied dosing card. Apply dose (2 gm or 4 gm) to each location. If treatment site is the hands, patients should wait at least one (1) hour to wash their hands. APPLY TO bilateral knees    FLUOXETINE (PROZAC) 20 MG CAPSULE    Take 20 mg by mouth daily. FLUTICASONE-SALMETEROL (ADVAIR) 250-50 MCG/DOSE DISKUS INHALER    Take 1 puff by inhalation every twelve (12) hours. INSULIN GLARGINE (LANTUS U-100 INSULIN) 100 UNIT/ML INJECTION    35 Units by SubCUTAneous route daily. INSULIN NEEDLES, DISPOSABLE, 31 GAUGE X 5/16\" NDLE    USE AS DIRECTED WITH INSULIN PEN DAILY    LISINOPRIL-HYDROCHLOROTHIAZIDE (PRINZIDE, ZESTORETIC) 20-12.5 MG PER TABLET    Take 1 Tab by mouth daily. Indications: hypertension    MEGESTROL (MEGACE) 400 MG/10 ML (40 MG/ML) SUSPENSION    TAKE 5 ML BY MOUTH THREE TIMES DAILY FOR APPETITE    MELATONIN 3 MG TABLET    Take 1 Tab by mouth nightly.     METFORMIN (GLUCOPHAGE) 500 MG TABLET    Take 1 Tab by mouth two (2) times daily (with meals). MIRTAZAPINE (REMERON) 15 MG TABLET    Take 15 mg by mouth nightly. NITROGLYCERIN (NITROSTAT) 0.4 MG SL TABLET    1 Tab by SubLINGual route as needed for Chest Pain. OMEPRAZOLE MAGNESIUM (PRILOSEC) 10 MG SUDR    Take 20 mg by mouth two (2) times a day. ONDANSETRON (ZOFRAN ODT) 4 MG DISINTEGRATING TABLET    1 Tablet by SubLINGual route every eight (8) hours as needed for Nausea or Vomiting. OXYGEN    3LPM O2 as needed   Indications: DME: [facility]    PANTOPRAZOLE (PROTONIX) 20 MG TABLET    Take 20 mg by mouth daily. POLYETHYLENE GLYCOL (MIRALAX) 17 GRAM PACKET    Take 1 packet by mouth daily. RANITIDINE (ZANTAC) 150 MG TABLET    Take 1 Tab by mouth nightly. TIOTROPIUM (SPIRIVA WITH HANDIHALER) 18 MCG INHALATION CAPSULE    Take 1 Cap by inhalation daily.      These Medications have changed    No medications on file   Stop Taking    No medications on file     _______________________________    Notes:    Sean Arguello MD using Dragon dictation      _______________________________

## 2022-03-07 LAB
ATRIAL RATE: 144 BPM
CALCULATED P AXIS, ECG09: 66 DEGREES
CALCULATED R AXIS, ECG10: -46 DEGREES
CALCULATED T AXIS, ECG11: 84 DEGREES
DIAGNOSIS, 93000: NORMAL
P-R INTERVAL, ECG05: 130 MS
Q-T INTERVAL, ECG07: 330 MS
QRS DURATION, ECG06: 68 MS
QTC CALCULATION (BEZET), ECG08: 510 MS
VENTRICULAR RATE, ECG03: 144 BPM

## 2022-03-18 PROBLEM — J96.21 ACUTE ON CHRONIC RESPIRATORY FAILURE WITH HYPOXIA AND HYPERCAPNIA (HCC): Status: ACTIVE | Noted: 2019-10-16

## 2022-03-18 PROBLEM — J96.22 ACUTE ON CHRONIC RESPIRATORY FAILURE WITH HYPOXIA AND HYPERCAPNIA (HCC): Status: ACTIVE | Noted: 2019-10-16

## 2022-03-19 PROBLEM — D86.0 PULMONARY SARCOIDOSIS (HCC): Status: ACTIVE | Noted: 2019-10-16

## 2022-03-19 PROBLEM — R73.9 HYPERGLYCEMIA: Status: ACTIVE | Noted: 2017-11-05

## 2022-03-19 PROBLEM — M54.9 CHRONIC BACK PAIN: Status: ACTIVE | Noted: 2017-11-05

## 2022-03-19 PROBLEM — G89.29 CHRONIC BACK PAIN: Status: ACTIVE | Noted: 2017-11-05

## 2022-03-19 PROBLEM — S09.90XA CLOSED HEAD INJURY: Status: ACTIVE | Noted: 2017-11-05

## 2022-03-20 PROBLEM — R55 SYNCOPE AND COLLAPSE: Status: ACTIVE | Noted: 2017-11-05

## 2022-07-03 ENCOUNTER — HOSPITAL ENCOUNTER (EMERGENCY)
Age: 72
Discharge: HOME | End: 2022-07-03
Attending: EMERGENCY MEDICINE
Payer: MEDICARE

## 2022-07-03 ENCOUNTER — APPOINTMENT (OUTPATIENT)
Dept: CT IMAGING | Age: 72
End: 2022-07-03
Attending: EMERGENCY MEDICINE
Payer: MEDICARE

## 2022-07-03 VITALS
WEIGHT: 131 LBS | SYSTOLIC BLOOD PRESSURE: 123 MMHG | OXYGEN SATURATION: 99 % | TEMPERATURE: 99.4 F | DIASTOLIC BLOOD PRESSURE: 83 MMHG | BODY MASS INDEX: 23.21 KG/M2 | HEART RATE: 98 BPM | RESPIRATION RATE: 19 BRPM | HEIGHT: 63 IN

## 2022-07-03 DIAGNOSIS — R10.31 RIGHT LOWER QUADRANT ABDOMINAL PAIN: Primary | ICD-10-CM

## 2022-07-03 DIAGNOSIS — K57.30 DIVERTICULOSIS OF COLON: ICD-10-CM

## 2022-07-03 LAB
ALBUMIN SERPL-MCNC: 3.7 G/DL (ref 3.4–5)
ALBUMIN/GLOB SERPL: 0.9 {RATIO} (ref 0.8–1.7)
ALP SERPL-CCNC: 91 U/L (ref 45–117)
ALT SERPL-CCNC: 15 U/L (ref 13–56)
ANION GAP SERPL CALC-SCNC: 11 MMOL/L (ref 3–18)
AST SERPL-CCNC: 17 U/L (ref 10–38)
ATRIAL RATE: 103 BPM
BASOPHILS # BLD: 0.1 K/UL (ref 0–0.1)
BASOPHILS NFR BLD: 1 % (ref 0–2)
BILIRUB SERPL-MCNC: 1 MG/DL (ref 0.2–1)
BUN SERPL-MCNC: 20 MG/DL (ref 7–18)
BUN/CREAT SERPL: 21 (ref 12–20)
CALCIUM SERPL-MCNC: 9 MG/DL (ref 8.5–10.1)
CALCULATED P AXIS, ECG09: 53 DEGREES
CALCULATED R AXIS, ECG10: -30 DEGREES
CALCULATED T AXIS, ECG11: 39 DEGREES
CHLORIDE SERPL-SCNC: 100 MMOL/L (ref 100–111)
CO2 SERPL-SCNC: 26 MMOL/L (ref 21–32)
CREAT SERPL-MCNC: 0.97 MG/DL (ref 0.6–1.3)
DIAGNOSIS, 93000: NORMAL
DIFFERENTIAL METHOD BLD: ABNORMAL
EOSINOPHIL # BLD: 0.1 K/UL (ref 0–0.4)
EOSINOPHIL NFR BLD: 1 % (ref 0–5)
ERYTHROCYTE [DISTWIDTH] IN BLOOD BY AUTOMATED COUNT: 14.5 % (ref 11.6–14.5)
GLOBULIN SER CALC-MCNC: 3.9 G/DL (ref 2–4)
GLUCOSE SERPL-MCNC: 202 MG/DL (ref 74–99)
HCT VFR BLD AUTO: 37 % (ref 35–45)
HGB BLD-MCNC: 11.7 G/DL (ref 12–16)
IMM GRANULOCYTES # BLD AUTO: 0 K/UL (ref 0–0.04)
IMM GRANULOCYTES NFR BLD AUTO: 0 % (ref 0–0.5)
LIPASE SERPL-CCNC: 153 U/L (ref 73–393)
LYMPHOCYTES # BLD: 1.4 K/UL (ref 0.9–3.6)
LYMPHOCYTES NFR BLD: 22 % (ref 21–52)
MAGNESIUM SERPL-MCNC: 1.8 MG/DL (ref 1.6–2.6)
MCH RBC QN AUTO: 22.6 PG (ref 24–34)
MCHC RBC AUTO-ENTMCNC: 31.6 G/DL (ref 31–37)
MCV RBC AUTO: 71.4 FL (ref 78–100)
MONOCYTES # BLD: 0.4 K/UL (ref 0.05–1.2)
MONOCYTES NFR BLD: 6 % (ref 3–10)
NEUTS SEG # BLD: 4.5 K/UL (ref 1.8–8)
NEUTS SEG NFR BLD: 70 % (ref 40–73)
NRBC # BLD: 0 K/UL (ref 0–0.01)
NRBC BLD-RTO: 0 PER 100 WBC
P-R INTERVAL, ECG05: 116 MS
PLATELET # BLD AUTO: 292 K/UL (ref 135–420)
PMV BLD AUTO: 11.2 FL (ref 9.2–11.8)
POTASSIUM SERPL-SCNC: 3.5 MMOL/L (ref 3.5–5.5)
PROT SERPL-MCNC: 7.6 G/DL (ref 6.4–8.2)
Q-T INTERVAL, ECG07: 370 MS
QRS DURATION, ECG06: 70 MS
QTC CALCULATION (BEZET), ECG08: 484 MS
RBC # BLD AUTO: 5.18 M/UL (ref 4.2–5.3)
SODIUM SERPL-SCNC: 137 MMOL/L (ref 136–145)
TROPONIN-HIGH SENSITIVITY: 11 NG/L (ref 0–54)
VENTRICULAR RATE, ECG03: 103 BPM
WBC # BLD AUTO: 6.4 K/UL (ref 4.6–13.2)

## 2022-07-03 PROCEDURE — 93005 ELECTROCARDIOGRAM TRACING: CPT

## 2022-07-03 PROCEDURE — 80053 COMPREHEN METABOLIC PANEL: CPT

## 2022-07-03 PROCEDURE — 83735 ASSAY OF MAGNESIUM: CPT

## 2022-07-03 PROCEDURE — 74011000250 HC RX REV CODE- 250: Performed by: STUDENT IN AN ORGANIZED HEALTH CARE EDUCATION/TRAINING PROGRAM

## 2022-07-03 PROCEDURE — 96375 TX/PRO/DX INJ NEW DRUG ADDON: CPT

## 2022-07-03 PROCEDURE — 74011250637 HC RX REV CODE- 250/637: Performed by: STUDENT IN AN ORGANIZED HEALTH CARE EDUCATION/TRAINING PROGRAM

## 2022-07-03 PROCEDURE — 85025 COMPLETE CBC W/AUTO DIFF WBC: CPT

## 2022-07-03 PROCEDURE — C9113 INJ PANTOPRAZOLE SODIUM, VIA: HCPCS | Performed by: STUDENT IN AN ORGANIZED HEALTH CARE EDUCATION/TRAINING PROGRAM

## 2022-07-03 PROCEDURE — 96374 THER/PROPH/DIAG INJ IV PUSH: CPT

## 2022-07-03 PROCEDURE — 83690 ASSAY OF LIPASE: CPT

## 2022-07-03 PROCEDURE — 84484 ASSAY OF TROPONIN QUANT: CPT

## 2022-07-03 PROCEDURE — 99285 EMERGENCY DEPT VISIT HI MDM: CPT

## 2022-07-03 PROCEDURE — 96372 THER/PROPH/DIAG INJ SC/IM: CPT

## 2022-07-03 PROCEDURE — 74011000636 HC RX REV CODE- 636: Performed by: EMERGENCY MEDICINE

## 2022-07-03 PROCEDURE — 74011250636 HC RX REV CODE- 250/636: Performed by: EMERGENCY MEDICINE

## 2022-07-03 PROCEDURE — 74011250636 HC RX REV CODE- 250/636: Performed by: STUDENT IN AN ORGANIZED HEALTH CARE EDUCATION/TRAINING PROGRAM

## 2022-07-03 PROCEDURE — 74177 CT ABD & PELVIS W/CONTRAST: CPT

## 2022-07-03 RX ORDER — ACETAMINOPHEN 500 MG
1000 TABLET ORAL ONCE
Status: COMPLETED | OUTPATIENT
Start: 2022-07-03 | End: 2022-07-03

## 2022-07-03 RX ORDER — DICYCLOMINE HYDROCHLORIDE 10 MG/ML
20 INJECTION INTRAMUSCULAR ONCE
Status: COMPLETED | OUTPATIENT
Start: 2022-07-03 | End: 2022-07-03

## 2022-07-03 RX ORDER — DICYCLOMINE HYDROCHLORIDE 20 MG/1
20 TABLET ORAL EVERY 6 HOURS
Qty: 20 TABLET | Refills: 0 | Status: SHIPPED | OUTPATIENT
Start: 2022-07-03 | End: 2022-07-08

## 2022-07-03 RX ORDER — KETOROLAC TROMETHAMINE 15 MG/ML
15 INJECTION, SOLUTION INTRAMUSCULAR; INTRAVENOUS
Status: COMPLETED | OUTPATIENT
Start: 2022-07-03 | End: 2022-07-03

## 2022-07-03 RX ORDER — HYDROCODONE BITARTRATE AND ACETAMINOPHEN 5; 325 MG/1; MG/1
1 TABLET ORAL
Qty: 6 TABLET | Refills: 0 | Status: SHIPPED | OUTPATIENT
Start: 2022-07-03 | End: 2022-07-05

## 2022-07-03 RX ORDER — ONDANSETRON 4 MG/1
4 TABLET, ORALLY DISINTEGRATING ORAL
Qty: 8 TABLET | Refills: 0 | Status: SHIPPED | OUTPATIENT
Start: 2022-07-03 | End: 2022-07-06

## 2022-07-03 RX ORDER — AMOXICILLIN 250 MG
1 CAPSULE ORAL DAILY
Qty: 10 TABLET | Refills: 0 | Status: SHIPPED | OUTPATIENT
Start: 2022-07-03 | End: 2022-07-13

## 2022-07-03 RX ORDER — ONDANSETRON 2 MG/ML
4 INJECTION INTRAMUSCULAR; INTRAVENOUS
Status: COMPLETED | OUTPATIENT
Start: 2022-07-03 | End: 2022-07-03

## 2022-07-03 RX ORDER — HYDROMORPHONE HYDROCHLORIDE 1 MG/ML
0.5 INJECTION, SOLUTION INTRAMUSCULAR; INTRAVENOUS; SUBCUTANEOUS
Status: COMPLETED | OUTPATIENT
Start: 2022-07-03 | End: 2022-07-03

## 2022-07-03 RX ADMIN — KETOROLAC TROMETHAMINE 15 MG: 15 INJECTION, SOLUTION INTRAMUSCULAR; INTRAVENOUS at 08:06

## 2022-07-03 RX ADMIN — DICYCLOMINE HYDROCHLORIDE 20 MG: 20 INJECTION, SOLUTION INTRAMUSCULAR at 05:19

## 2022-07-03 RX ADMIN — IOPAMIDOL 80 ML: 612 INJECTION, SOLUTION INTRAVENOUS at 10:33

## 2022-07-03 RX ADMIN — HYDROMORPHONE HYDROCHLORIDE 0.5 MG: 1 INJECTION, SOLUTION INTRAMUSCULAR; INTRAVENOUS; SUBCUTANEOUS at 08:07

## 2022-07-03 RX ADMIN — ACETAMINOPHEN 1000 MG: 500 TABLET ORAL at 05:18

## 2022-07-03 RX ADMIN — ONDANSETRON 4 MG: 2 INJECTION INTRAMUSCULAR; INTRAVENOUS at 08:07

## 2022-07-03 RX ADMIN — DIATRIZOATE MEGLUMINE AND DIATRIZOATE SODIUM 30 ML: 600; 100 SOLUTION ORAL; RECTAL at 08:26

## 2022-07-03 RX ADMIN — SODIUM CHLORIDE 40 MG: 9 INJECTION INTRAMUSCULAR; INTRAVENOUS; SUBCUTANEOUS at 05:18

## 2022-07-03 RX ADMIN — SODIUM CHLORIDE 1000 ML: 9 INJECTION, SOLUTION INTRAVENOUS at 08:13

## 2022-07-03 NOTE — ED TRIAGE NOTES
Pt reports abdominal pain and nausea ongoing since November worse since argument with spouse yesterday.
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
## 2022-07-03 NOTE — DISCHARGE INSTRUCTIONS
Although your CT did not show any evidence of diverticulitis. You have diverticulosis. I recommend you maintain a clear liquid diet for right now until feeling better. Follow-up with your primary care doctor within 1 to 2 days. Return to the emergency room if you are worsening.

## 2022-07-03 NOTE — ED NOTES
Patient laying in bed alert and oriented x3, no signs of distress noted. She is asking when she will be discharged, explained we are waiting for the results of the CT scan. Patient acknowledged understanding.

## 2022-07-21 ENCOUNTER — APPOINTMENT (OUTPATIENT)
Dept: CT IMAGING | Age: 72
End: 2022-07-21
Attending: STUDENT IN AN ORGANIZED HEALTH CARE EDUCATION/TRAINING PROGRAM
Payer: MEDICARE

## 2022-07-21 ENCOUNTER — HOSPITAL ENCOUNTER (EMERGENCY)
Age: 72
Discharge: HOME | End: 2022-07-21
Attending: STUDENT IN AN ORGANIZED HEALTH CARE EDUCATION/TRAINING PROGRAM
Payer: MEDICARE

## 2022-07-21 ENCOUNTER — APPOINTMENT (OUTPATIENT)
Dept: GENERAL RADIOLOGY | Age: 72
End: 2022-07-21
Attending: STUDENT IN AN ORGANIZED HEALTH CARE EDUCATION/TRAINING PROGRAM
Payer: MEDICARE

## 2022-07-21 VITALS
SYSTOLIC BLOOD PRESSURE: 172 MMHG | DIASTOLIC BLOOD PRESSURE: 107 MMHG | TEMPERATURE: 98.4 F | OXYGEN SATURATION: 99 % | HEART RATE: 103 BPM | RESPIRATION RATE: 30 BRPM

## 2022-07-21 DIAGNOSIS — R10.32 ABDOMINAL PAIN, LLQ (LEFT LOWER QUADRANT): Primary | ICD-10-CM

## 2022-07-21 LAB
ALBUMIN SERPL-MCNC: 3.9 G/DL (ref 3.4–5)
ALBUMIN/GLOB SERPL: 0.9 {RATIO} (ref 0.8–1.7)
ALP SERPL-CCNC: 73 U/L (ref 45–117)
ALT SERPL-CCNC: 14 U/L (ref 13–56)
ANION GAP SERPL CALC-SCNC: 5 MMOL/L (ref 3–18)
APPEARANCE UR: CLEAR
AST SERPL-CCNC: 13 U/L (ref 10–38)
ATRIAL RATE: 105 BPM
BACTERIA URNS QL MICRO: ABNORMAL /HPF
BASOPHILS # BLD: 0 K/UL (ref 0–0.1)
BASOPHILS NFR BLD: 1 % (ref 0–2)
BILIRUB SERPL-MCNC: 0.8 MG/DL (ref 0.2–1)
BILIRUB UR QL: NEGATIVE
BUN SERPL-MCNC: 9 MG/DL (ref 7–18)
BUN/CREAT SERPL: 12 (ref 12–20)
CALCIUM SERPL-MCNC: 9.4 MG/DL (ref 8.5–10.1)
CALCULATED P AXIS, ECG09: 104 DEGREES
CALCULATED R AXIS, ECG10: -21 DEGREES
CALCULATED T AXIS, ECG11: 90 DEGREES
CHLORIDE SERPL-SCNC: 105 MMOL/L (ref 100–111)
CO2 SERPL-SCNC: 29 MMOL/L (ref 21–32)
COLOR UR: YELLOW
CREAT SERPL-MCNC: 0.75 MG/DL (ref 0.6–1.3)
DIAGNOSIS, 93000: NORMAL
DIFFERENTIAL METHOD BLD: ABNORMAL
EOSINOPHIL # BLD: 0 K/UL (ref 0–0.4)
EOSINOPHIL NFR BLD: 1 % (ref 0–5)
EPITH CASTS URNS QL MICRO: ABNORMAL /LPF (ref 0–5)
ERYTHROCYTE [DISTWIDTH] IN BLOOD BY AUTOMATED COUNT: 14.6 % (ref 11.6–14.5)
GLOBULIN SER CALC-MCNC: 4.4 G/DL (ref 2–4)
GLUCOSE SERPL-MCNC: 150 MG/DL (ref 74–99)
GLUCOSE UR STRIP.AUTO-MCNC: NEGATIVE MG/DL
HCT VFR BLD AUTO: 39.7 % (ref 35–45)
HGB BLD-MCNC: 12.4 G/DL (ref 12–16)
HGB UR QL STRIP: NEGATIVE
IMM GRANULOCYTES # BLD AUTO: 0 K/UL (ref 0–0.04)
IMM GRANULOCYTES NFR BLD AUTO: 0 % (ref 0–0.5)
KETONES UR QL STRIP.AUTO: ABNORMAL MG/DL
LEUKOCYTE ESTERASE UR QL STRIP.AUTO: NEGATIVE
LIPASE SERPL-CCNC: 174 U/L (ref 73–393)
LYMPHOCYTES # BLD: 1.8 K/UL (ref 0.9–3.6)
LYMPHOCYTES NFR BLD: 56 % (ref 21–52)
MCH RBC QN AUTO: 22.5 PG (ref 24–34)
MCHC RBC AUTO-ENTMCNC: 31.2 G/DL (ref 31–37)
MCV RBC AUTO: 72.1 FL (ref 78–100)
MONOCYTES # BLD: 0.3 K/UL (ref 0.05–1.2)
MONOCYTES NFR BLD: 8 % (ref 3–10)
NEUTS SEG # BLD: 1.1 K/UL (ref 1.8–8)
NEUTS SEG NFR BLD: 34 % (ref 40–73)
NITRITE UR QL STRIP.AUTO: NEGATIVE
NRBC # BLD: 0 K/UL (ref 0–0.01)
NRBC BLD-RTO: 0 PER 100 WBC
P-R INTERVAL, ECG05: 116 MS
PH UR STRIP: 6.5 [PH] (ref 5–8)
PLATELET # BLD AUTO: 273 K/UL (ref 135–420)
PMV BLD AUTO: 9.7 FL (ref 9.2–11.8)
POTASSIUM SERPL-SCNC: 4.1 MMOL/L (ref 3.5–5.5)
PROT SERPL-MCNC: 8.3 G/DL (ref 6.4–8.2)
PROT UR STRIP-MCNC: 30 MG/DL
Q-T INTERVAL, ECG07: 362 MS
QRS DURATION, ECG06: 76 MS
QTC CALCULATION (BEZET), ECG08: 478 MS
RBC # BLD AUTO: 5.51 M/UL (ref 4.2–5.3)
RBC #/AREA URNS HPF: ABNORMAL /HPF (ref 0–5)
SODIUM SERPL-SCNC: 139 MMOL/L (ref 136–145)
SP GR UR REFRACTOMETRY: >1.03 (ref 1–1.03)
UROBILINOGEN UR QL STRIP.AUTO: 0.2 EU/DL (ref 0.2–1)
VENTRICULAR RATE, ECG03: 105 BPM
WBC # BLD AUTO: 3.3 K/UL (ref 4.6–13.2)
WBC URNS QL MICRO: ABNORMAL /HPF (ref 0–4)
YEAST URNS QL MICRO: ABNORMAL

## 2022-07-21 PROCEDURE — 83690 ASSAY OF LIPASE: CPT

## 2022-07-21 PROCEDURE — 71045 X-RAY EXAM CHEST 1 VIEW: CPT

## 2022-07-21 PROCEDURE — 74177 CT ABD & PELVIS W/CONTRAST: CPT

## 2022-07-21 PROCEDURE — 81001 URINALYSIS AUTO W/SCOPE: CPT

## 2022-07-21 PROCEDURE — 74011000636 HC RX REV CODE- 636: Performed by: STUDENT IN AN ORGANIZED HEALTH CARE EDUCATION/TRAINING PROGRAM

## 2022-07-21 PROCEDURE — 94762 N-INVAS EAR/PLS OXIMTRY CONT: CPT

## 2022-07-21 PROCEDURE — 93005 ELECTROCARDIOGRAM TRACING: CPT

## 2022-07-21 PROCEDURE — 96375 TX/PRO/DX INJ NEW DRUG ADDON: CPT

## 2022-07-21 PROCEDURE — 80053 COMPREHEN METABOLIC PANEL: CPT

## 2022-07-21 PROCEDURE — 74011250636 HC RX REV CODE- 250/636: Performed by: STUDENT IN AN ORGANIZED HEALTH CARE EDUCATION/TRAINING PROGRAM

## 2022-07-21 PROCEDURE — 99285 EMERGENCY DEPT VISIT HI MDM: CPT

## 2022-07-21 PROCEDURE — 96374 THER/PROPH/DIAG INJ IV PUSH: CPT

## 2022-07-21 PROCEDURE — 85025 COMPLETE CBC W/AUTO DIFF WBC: CPT

## 2022-07-21 RX ORDER — KETOROLAC TROMETHAMINE 15 MG/ML
15 INJECTION, SOLUTION INTRAMUSCULAR; INTRAVENOUS ONCE
Status: COMPLETED | OUTPATIENT
Start: 2022-07-21 | End: 2022-07-21

## 2022-07-21 RX ORDER — HYDROCODONE BITARTRATE AND ACETAMINOPHEN 5; 325 MG/1; MG/1
1 TABLET ORAL
Qty: 12 TABLET | Refills: 0 | Status: SHIPPED | OUTPATIENT
Start: 2022-07-21 | End: 2022-07-24

## 2022-07-21 RX ORDER — MORPHINE SULFATE 4 MG/ML
4 INJECTION INTRAVENOUS ONCE
Status: COMPLETED | OUTPATIENT
Start: 2022-07-21 | End: 2022-07-21

## 2022-07-21 RX ORDER — ONDANSETRON 2 MG/ML
4 INJECTION INTRAMUSCULAR; INTRAVENOUS ONCE
Status: COMPLETED | OUTPATIENT
Start: 2022-07-21 | End: 2022-07-21

## 2022-07-21 RX ADMIN — IOPAMIDOL 100 ML: 612 INJECTION, SOLUTION INTRAVENOUS at 10:47

## 2022-07-21 RX ADMIN — KETOROLAC TROMETHAMINE 15 MG: 15 INJECTION, SOLUTION INTRAMUSCULAR; INTRAVENOUS at 08:50

## 2022-07-21 RX ADMIN — MORPHINE SULFATE 4 MG: 4 INJECTION, SOLUTION INTRAMUSCULAR; INTRAVENOUS at 10:15

## 2022-07-21 RX ADMIN — ONDANSETRON 4 MG: 2 INJECTION INTRAMUSCULAR; INTRAVENOUS at 08:50

## 2022-07-21 NOTE — DISCHARGE INSTRUCTIONS
Please contact your primary care doctor soon as possible for follow-up within 3 to 7 days. Develop any sudden changes of including sudden/pain, fever/chills, or any other sudden/severe change in your condition please return immediately to emergency department further evaluation and treatment.

## 2022-07-21 NOTE — ED TRIAGE NOTES
Pt arrived with c/o of abdominal pain for the past month. Pt has a history of divierticclitus. Pt recently placed on antibiotics by her PCP but no relief.

## 2022-07-21 NOTE — ED PROVIDER NOTES
Patient 61-year-old female with a history of COPD, depression, diabetes, gastroesophageal reflux disease, and diverticulitis. Patient presents today with primary complaint of left lower quadrant pain radiating to the right side that she attributes to diverticulitis, patient states she was first diagnosed with diverticulitis back in October 2021 and reports a recent flare has been ongoing for approximately 1 week, patient has received unknown antibiotics from her primary care doctor with no significant improvement in her symptoms though patient does report intermittent compliance with her antibiotic regimen. Patient reports reports associated nausea without vomiting and denies any fever/chills, chest pain, difficulty breathing, or urinary complaints. Past Medical History:   Diagnosis Date    Arthritis     \"generalized\"    Asthma     CAD in native artery     NSTEMI (Sep 2014); diffuse 65% pLAD, minimal disease RCA & LCx. pLAD FFR 0.93.  LV  no RWMA (echo and LV angio)    Chronic neck pain     Chronic pain     left knee    Chronic pain syndrome     COPD (chronic obstructive pulmonary disease) (HCC)     Depression     Diabetes (HCC)     Diverticulitis     GERD (gastroesophageal reflux disease)     Heart disease     Hidradenitis 9/11/2014    Hypertension     Left knee pain     Narcotic dependence ([address]     Pneumonia     Right wrist pain     Sarcoidosis        Past Surgical History:   Procedure Laterality Date    BRONCHOSCOPY  10/2019    HX BREAST BIOPSY Right     9/10/14: She said tag in place from [address]  2005    HX HYSTERECTOMY      HX KNEE ARTHROSCOPY Left          Family History:   Problem Relation Age of Onset    Hypertension Father     Diabetes Mother        Social History     Socioeconomic History    Marital status:      Spouse name: Not on file    Number of children: Not on file    Years of education: Not on file    Highest education level: Not on file   Occupational History Not on file   Tobacco Use    Smoking status: Former     Years: 46.00     Types: Cigarettes     Quit date: 2018     Years since quittin.3    Smokeless tobacco: Never    Tobacco comments: Only smokes 1 cigarette per day   Substance and Sexual Activity    Alcohol use: Yes     Alcohol/week: 1.7 standard drinks     Types: 2 Glasses of wine per week     Comment: socially    Drug use: Yes     Types: Cocaine, Marijuana     Comment: 6 months ago    Sexual activity: Yes   Other Topics Concern    Not on file   Social History Narrative    Not on file     Social Determinants of Health     Financial Resource Strain: Not on file   Food Insecurity: Not on file   Transportation Needs: Not on file   Physical Activity: Not on file   Stress: Not on file   Social Connections: Not on file   Intimate Partner Violence: Not on file   Housing Stability: Not on file         ALLERGIES: Latex, Ciprofloxacin, Penicillins, and Shellfish derived    Review of Systems   Constitutional:  Negative for chills and fever. HENT:  Negative for rhinorrhea and sore throat. Eyes:  Negative for discharge and redness. Respiratory:  Negative for cough and shortness of breath. Cardiovascular:  Negative for chest pain and leg swelling. Gastrointestinal:  Positive for abdominal pain and nausea. Negative for diarrhea and vomiting. Genitourinary:  Negative for difficulty urinating and dysuria. Musculoskeletal:  Negative for back pain and neck pain. Skin:  Negative for rash and wound. Neurological:  Negative for syncope, light-headedness and headaches. Vitals:    22 0742   BP: (!) 172/107   Pulse: (!) 105   Resp: 20   Temp: 98.4 °F (36.9 °C)   SpO2: 100%            Physical Exam  Constitutional:       General: She is not in acute distress. Appearance: She is not ill-appearing, toxic-appearing or diaphoretic. HENT:      Head: Normocephalic and atraumatic.       Right Ear: External ear normal.      Left Ear: External ear normal.      Nose: No congestion or rhinorrhea. Mouth/Throat:      Mouth: Mucous membranes are moist.      Pharynx: No oropharyngeal exudate or posterior oropharyngeal erythema. Eyes:      General:         Right eye: No discharge. Left eye: No discharge. Pupils: Pupils are equal, round, and reactive to light. Cardiovascular:      Rate and Rhythm: Normal rate and regular rhythm. Heart sounds: No murmur heard. No friction rub. No gallop. Pulmonary:      Effort: Pulmonary effort is normal. No respiratory distress. Breath sounds: No stridor. No wheezing, rhonchi or rales. Abdominal:      General: Abdomen is flat. There is no distension. Tenderness: There is generalized abdominal tenderness and tenderness in the left lower quadrant. There is no right CVA tenderness, left CVA tenderness, guarding or rebound. Comments: Generalized abdominal tenderness worse in left lower quadrant with guarding, no distention or rebound. Musculoskeletal:         General: No swelling, tenderness, deformity or signs of injury. Cervical back: No rigidity or tenderness. Right lower leg: No edema. Left lower leg: No edema. Lymphadenopathy:      Cervical: No cervical adenopathy. Skin:     General: Skin is warm. Capillary Refill: Capillary refill takes less than 2 seconds. Coloration: Skin is not jaundiced or pale. Findings: No bruising, erythema, lesion or rash. Neurological:      General: No focal deficit present. Mental Status: She is alert and oriented to person, place, and time. Sensory: No sensory deficit. Motor: No weakness.    Psychiatric:         Mood and Affect: Mood normal.        MDM  Number of Diagnoses or Management Options  Diagnosis management comments: Patient presents the primary complaint of left lower quadrant pain she states is consistent with past episodes of diverticulitis, patient is otherwise well-appearing demonstrates no evidence of sepsis on initial evaluation. Will evaluate for complicated diverticulitis, provide appropriate analgesia/antiemetic therapy with disposition pending results of imaging/labs.            Procedures

## 2022-07-21 NOTE — PROGRESS NOTES
ED Nurse called stating patient is asking for a Lyft ride home, Nurse stated patient does not have any finances for transportation. Requested a Lyft ride from [employer]. Can use the cell phone of Oscar Rg the unit secretary.

## 2022-07-26 ENCOUNTER — HOSPITAL ENCOUNTER (EMERGENCY)
Age: 72
Discharge: HOME | End: 2022-07-26
Attending: STUDENT IN AN ORGANIZED HEALTH CARE EDUCATION/TRAINING PROGRAM
Payer: MEDICARE

## 2022-07-26 VITALS
HEART RATE: 78 BPM | TEMPERATURE: 98.3 F | DIASTOLIC BLOOD PRESSURE: 107 MMHG | HEIGHT: 63 IN | WEIGHT: 96 LBS | OXYGEN SATURATION: 100 % | BODY MASS INDEX: 17.01 KG/M2 | SYSTOLIC BLOOD PRESSURE: 136 MMHG | RESPIRATION RATE: 18 BRPM

## 2022-07-26 DIAGNOSIS — R10.84 ABDOMINAL PAIN, GENERALIZED: Primary | ICD-10-CM

## 2022-07-26 DIAGNOSIS — K57.90 DIVERTICULOSIS: ICD-10-CM

## 2022-07-26 LAB
ALBUMIN SERPL-MCNC: 4.1 G/DL (ref 3.4–5)
ALBUMIN/GLOB SERPL: 1.1 {RATIO} (ref 0.8–1.7)
ALP SERPL-CCNC: 65 U/L (ref 45–117)
ALT SERPL-CCNC: 12 U/L (ref 13–56)
ANION GAP SERPL CALC-SCNC: 11 MMOL/L (ref 3–18)
AST SERPL-CCNC: 16 U/L (ref 10–38)
BASOPHILS # BLD: 0 K/UL (ref 0–0.1)
BASOPHILS NFR BLD: 1 % (ref 0–2)
BILIRUB SERPL-MCNC: 0.8 MG/DL (ref 0.2–1)
BUN SERPL-MCNC: 12 MG/DL (ref 7–18)
BUN/CREAT SERPL: 11 (ref 12–20)
CALCIUM SERPL-MCNC: 9.7 MG/DL (ref 8.5–10.1)
CHLORIDE SERPL-SCNC: 106 MMOL/L (ref 100–111)
CO2 SERPL-SCNC: 24 MMOL/L (ref 21–32)
CREAT SERPL-MCNC: 1.07 MG/DL (ref 0.6–1.3)
DIFFERENTIAL METHOD BLD: ABNORMAL
EOSINOPHIL # BLD: 0 K/UL (ref 0–0.4)
EOSINOPHIL NFR BLD: 1 % (ref 0–5)
ERYTHROCYTE [DISTWIDTH] IN BLOOD BY AUTOMATED COUNT: 14.8 % (ref 11.6–14.5)
GLOBULIN SER CALC-MCNC: 3.9 G/DL (ref 2–4)
GLUCOSE SERPL-MCNC: 170 MG/DL (ref 74–99)
HCT VFR BLD AUTO: 37.8 % (ref 35–45)
HGB BLD-MCNC: 11.8 G/DL (ref 12–16)
IMM GRANULOCYTES # BLD AUTO: 0 K/UL (ref 0–0.04)
IMM GRANULOCYTES NFR BLD AUTO: 0 % (ref 0–0.5)
LIPASE SERPL-CCNC: 193 U/L (ref 73–393)
LYMPHOCYTES # BLD: 2.1 K/UL (ref 0.9–3.6)
LYMPHOCYTES NFR BLD: 54 % (ref 21–52)
MCH RBC QN AUTO: 22.6 PG (ref 24–34)
MCHC RBC AUTO-ENTMCNC: 31.2 G/DL (ref 31–37)
MCV RBC AUTO: 72.4 FL (ref 78–100)
MONOCYTES # BLD: 0.3 K/UL (ref 0.05–1.2)
MONOCYTES NFR BLD: 6 % (ref 3–10)
NEUTS SEG # BLD: 1.4 K/UL (ref 1.8–8)
NEUTS SEG NFR BLD: 37 % (ref 40–73)
NRBC # BLD: 0 K/UL (ref 0–0.01)
NRBC BLD-RTO: 0 PER 100 WBC
PLATELET # BLD AUTO: 170 K/UL (ref 135–420)
POTASSIUM SERPL-SCNC: 3.5 MMOL/L (ref 3.5–5.5)
PROT SERPL-MCNC: 8 G/DL (ref 6.4–8.2)
RBC # BLD AUTO: 5.22 M/UL (ref 4.2–5.3)
SODIUM SERPL-SCNC: 141 MMOL/L (ref 136–145)
WBC # BLD AUTO: 3.9 K/UL (ref 4.6–13.2)

## 2022-07-26 PROCEDURE — 83690 ASSAY OF LIPASE: CPT

## 2022-07-26 PROCEDURE — 80053 COMPREHEN METABOLIC PANEL: CPT

## 2022-07-26 PROCEDURE — 99283 EMERGENCY DEPT VISIT LOW MDM: CPT

## 2022-07-26 PROCEDURE — 85025 COMPLETE CBC W/AUTO DIFF WBC: CPT

## 2022-07-26 RX ORDER — ACETAMINOPHEN 325 MG/1
650 TABLET ORAL
Qty: 20 TABLET | Refills: 0 | Status: ON HOLD | OUTPATIENT
Start: 2022-07-26 | End: 2022-10-19

## 2022-07-26 NOTE — ED TRIAGE NOTES
Pt reports left lower abdominal pain off and on since October. Pt states she was dx with diverticulitis 2 weeks ago and reports worsening pain and nausea. Pt states she has lost 28 pounds since March.

## 2022-07-26 NOTE — ED PROVIDER NOTES
EMERGENCY DEPARTMENT HISTORY AND PHYSICAL EXAM    7:58 PM      Date: 7/26/2022  Patient Name: Irene Gómez    History of Presenting Illness     Chief Complaint   Patient presents with    Abdominal Pain    Weight Loss         History Provided By: Patient  Location/Duration/Severity/Modifying factors   HPI    Patient is a 68-year-old female with past medical history of COPD, sarcoidosis, depression, diabetes, GERD and diverticulitis. Patient presents today with primary complaints of lower abdominal pain. Patient says the pain is in the lower mid abdomen right lower quadrant and left lower quadrant. Patient attributes this pain to diverticulitis. Patient has received multiple CT scans. Patient has already received 2 CT abdomens this month. Both CTs done this month showed no evidence of diverticulitis. However, there was diverticulosis. Patient says she is unable to keep anything down. She notes significant weight loss over the past few months. Patient says she was followed by GI, Dr. Dmitriy Kimball. However, patient says she has not seen GI in almost a year. Patient would like pain relief today. Patient would also like to be admitted today. PCP: Marvel, MD Reba    Current Outpatient Medications   Medication Sig Dispense Refill    ondansetron (Zofran ODT) 4 mg disintegrating tablet 1 Tablet by SubLINGual route every eight (8) hours as needed for Nausea or Vomiting. 20 Tablet 0    megestrol (MEGACE) 400 mg/10 mL (40 mg/mL) suspension TAKE 5 ML BY MOUTH THREE TIMES DAILY FOR APPETITE  1    Insulin Needles, Disposable, 31 gauge x 5/16\" ndle USE AS DIRECTED WITH INSULIN PEN DAILY  3    Oxygen 3LPM O2 as needed   Indications: DME: [employer]      pantoprazole (PROTONIX) 20 mg tablet Take 20 mg by mouth daily. benzonatate (TESSALON) 100 mg capsule Take 100 mg by mouth three (3) times daily as needed for Cough. mirtazapine (REMERON) 15 mg tablet Take 15 mg by mouth nightly.       metFORMIN (GLUCOPHAGE) 500 mg tablet Take 1 Tab by mouth two (2) times daily (with meals). 60 Tab 0    insulin glargine (LANTUS U-100 INSULIN) 100 unit/mL injection 35 Units by SubCUTAneous route daily. (Patient taking differently: 40 Units by SubCUTAneous route daily. ) 1 Vial 0    albuterol-ipratropium (DUO-NEB) 2.5 mg-0.5 mg/3 ml nebu 3 mL by Nebulization route every four (4) hours as needed for Other (shortness of breath). 30 Nebule 0    diclofenac (VOLTAREN) 1 % gel Apply 2 g to affected area four (4) times daily. Gel should be measured and applied using the supplied dosing card. Apply dose (2 gm or 4 gm) to each location. If treatment site is the hands, patients should wait at least one (1) hour to wash their hands. APPLY TO bilateral knees 100 g 0    melatonin 3 mg tablet Take 1 Tab by mouth nightly. (Patient taking differently: Take 5 mg by mouth nightly.) 30 Tab 0    Omeprazole Magnesium (PRILOSEC) 10 mg suDR Take 20 mg by mouth two (2) times a day. 60 Each 0    raNITIdine (ZANTAC) 150 mg tablet Take 1 Tab by mouth nightly. 15 Tab 0    lisinopril-hydroCHLOROthiazide (PRINZIDE, ZESTORETIC) 20-12.5 mg per tablet Take 1 Tab by mouth daily. Indications: hypertension      nitroglycerin (NITROSTAT) 0.4 mg SL tablet 1 Tab by SubLINGual route as needed for Chest Pain. 25 Tab 1    fluticasone-salmeterol (ADVAIR) 250-50 mcg/dose diskus inhaler Take 1 puff by inhalation every twelve (12) hours. (Patient taking differently: Take 1 Puff by inhalation daily. ) 1 Inhaler 0    albuterol (PROVENTIL HFA, VENTOLIN HFA, PROAIR HFA) 90 mcg/actuation inhaler Take 1 puff by inhalation daily as needed for Wheezing. 1 Inhaler 0    clopidogrel (PLAVIX) 75 mg tablet Take 1 tablet by mouth daily. 30 tablet 11    atorvastatin (LIPITOR) 40 mg tablet Take 1 tablet by mouth nightly. 30 tablet 11    aspirin 81 mg chewable tablet Take 1 tablet by mouth daily. 30 tablet 0    polyethylene glycol (MIRALAX) 17 gram packet Take 1 packet by mouth daily.  14 each 0 fluoxetine (PROZAC) 20 mg capsule Take 20 mg by mouth daily. tiotropium (SPIRIVA WITH HANDIHALER) 18 mcg inhalation capsule Take 1 Cap by inhalation daily. Past History     Past Medical History:  Past Medical History:   Diagnosis Date    Arthritis     \"generalized\"    Asthma     CAD in native artery     NSTEMI (Sep 2014); diffuse 65% pLAD, minimal disease RCA & LCx. pLAD FFR 0.93. LV  no RWMA (echo and LV angio)    Chronic neck pain     Chronic pain     left knee    Chronic pain syndrome     COPD (chronic obstructive pulmonary disease) (HCC)     Depression     Diabetes (HCC)     Diverticulitis     GERD (gastroesophageal reflux disease)     Heart disease     Hidradenitis 2014    Hypertension     Left knee pain     Narcotic dependence ([address]     Pneumonia     Right wrist pain     Sarcoidosis        Past Surgical History:  Past Surgical History:   Procedure Laterality Date    BRONCHOSCOPY  10/2019    HX BREAST BIOPSY Right     9/10/14: She said tag in place from [address]      HX HYSTERECTOMY      HX KNEE ARTHROSCOPY Left        Family History:  Family History   Problem Relation Age of Onset    Hypertension Father     Diabetes Mother        Social History:  Social History     Tobacco Use    Smoking status: Former     Years: 46.00     Types: Cigarettes     Quit date: 2018     Years since quittin.4    Smokeless tobacco: Never    Tobacco comments: Only smokes 1 cigarette per day   Substance Use Topics    Alcohol use: Yes     Alcohol/week: 1.7 standard drinks     Types: 2 Glasses of wine per week     Comment: socially    Drug use: Yes     Types: Cocaine, Marijuana     Comment: 6 months ago       Allergies:   Allergies   Allergen Reactions    Latex Hives and Itching    Ciprofloxacin Hives    Penicillins Nausea Only    Shellfish Derived Hives and Swelling     Swelling of legs         Review of Systems       Review of Systems   Constitutional:  Positive for chills and unexpected weight change. Negative for fever. Respiratory: Negative. Cardiovascular: Negative. Gastrointestinal:  Positive for abdominal pain, nausea and vomiting. Endocrine: Negative. Genitourinary: Negative. Musculoskeletal: Negative. Skin: Negative. Neurological: Negative. Psychiatric/Behavioral: Negative. Physical Exam   Visit Vitals  BP (!) 136/107   Pulse 78   Temp 98.3 °F (36.8 °C)   Resp 18   Ht 5' 3\" (1.6 m)   Wt 43.5 kg (96 lb)   SpO2 100%   BMI 17.01 kg/m²         Physical Exam  Constitutional:       Appearance: She is well-developed. Cardiovascular:      Rate and Rhythm: Normal rate and regular rhythm. Pulmonary:      Effort: Pulmonary effort is normal.      Breath sounds: Normal breath sounds. Abdominal:      General: Abdomen is flat. Palpations: Abdomen is soft. Tenderness: There is abdominal tenderness in the right lower quadrant and left lower quadrant. Skin:     General: Skin is warm and dry. Neurological:      General: No focal deficit present. Mental Status: She is alert and oriented to person, place, and time. Diagnostic Study Results     Labs -  Recent Results (from the past 12 hour(s))   CBC WITH AUTOMATED DIFF    Collection Time: 07/26/22  8:40 PM   Result Value Ref Range    WBC 3.9 (L) 4.6 - 13.2 K/uL    RBC 5.22 4.20 - 5.30 M/uL    HGB 11.8 (L) 12.0 - 16.0 g/dL    HCT 37.8 35.0 - 45.0 %    MCV 72.4 (L) 78.0 - 100.0 FL    MCH 22.6 (L) 24.0 - 34.0 PG    MCHC 31.2 31.0 - 37.0 g/dL    RDW 14.8 (H) 11.6 - 14.5 %    PLATELET 520 879 - 041 K/uL    NRBC 0.0 0  WBC    ABSOLUTE NRBC 0.00 0.00 - 0.01 K/uL    NEUTROPHILS 37 (L) 40 - 73 %    LYMPHOCYTES 54 (H) 21 - 52 %    MONOCYTES 6 3 - 10 %    EOSINOPHILS 1 0 - 5 %    BASOPHILS 1 0 - 2 %    IMMATURE GRANULOCYTES 0 0.0 - 0.5 %    ABS. NEUTROPHILS 1.4 (L) 1.8 - 8.0 K/UL    ABS. LYMPHOCYTES 2.1 0.9 - 3.6 K/UL    ABS. MONOCYTES 0.3 0.05 - 1.2 K/UL    ABS.  EOSINOPHILS 0.0 0.0 - 0.4 K/UL    ABS. BASOPHILS 0.0 0.0 - 0.1 K/UL    ABS. IMM. GRANS. 0.0 0.00 - 0.04 K/UL    DF AUTOMATED     METABOLIC PANEL, COMPREHENSIVE    Collection Time: 07/26/22  8:40 PM   Result Value Ref Range    Sodium 141 136 - 145 mmol/L    Potassium 3.5 3.5 - 5.5 mmol/L    Chloride 106 100 - 111 mmol/L    CO2 24 21 - 32 mmol/L    Anion gap 11 3.0 - 18 mmol/L    Glucose 170 (H) 74 - 99 mg/dL    BUN 12 7.0 - 18 MG/DL    Creatinine 1.07 0.6 - 1.3 MG/DL    BUN/Creatinine ratio 11 (L) 12 - 20      GFR est AA >60 >60 ml/min/1.73m2    GFR est non-AA 51 (L) >60 ml/min/1.73m2    Calcium 9.7 8.5 - 10.1 MG/DL    Bilirubin, total 0.8 0.2 - 1.0 MG/DL    ALT (SGPT) 12 (L) 13 - 56 U/L    AST (SGOT) 16 10 - 38 U/L    Alk. phosphatase 65 45 - 117 U/L    Protein, total 8.0 6.4 - 8.2 g/dL    Albumin 4.1 3.4 - 5.0 g/dL    Globulin 3.9 2.0 - 4.0 g/dL    A-G Ratio 1.1 0.8 - 1.7     LIPASE    Collection Time: 07/26/22  8:40 PM   Result Value Ref Range    Lipase 193 73 - 393 U/L       Radiologic Studies -   No orders to display         Medical Decision Making     Vital Signs-Reviewed the patient's vital signs. EKG: --    Records Reviewed: Old Medical Records (Time of Review: 7:58 PM)    ED Course: Progress Notes, Reevaluation, and Consults:    8:45 PM: Labs obtained via ultrasound guided IV    Provider Notes (Medical Decision Making): [facility]    I am the first provider for this patient. I reviewed the vital signs, available nursing notes, past medical history, past surgical history, family history and social history. No remarkable findings on patient labs. Will not get repeat CT as patient has had 2 this month already. No new findings on CT at last scan    Recommend that patient follow-up with GI and PCP for pain management and further evaluation. Patient says that only morphine and Dilaudid to control pain. Discussed with patient that morphine and Dilaudid would not be an option today in the ED.   Patient says no other options work for her. Recommended other medications for patient's, however, she says they would not work. Again reiterated that patient should follow-up with PCP and GI for further evaluation and management of chronic abdominal pain. Opioids would likely do more harm than good for patient. I do not feel that is appropriate to prescribe these medication at this time. Patient says that she will take over-the-counter medications for pain management until she can follow-up with PCP and GI. Plan to discharge home with understanding that patient follow-up with GI and PCP. Patient expresses understanding. Procedures    Critical Care Time: --      Diagnosis     Clinical Impression:   1. Abdominal pain, generalized    2. Diverticulosis        Disposition: Home    Follow-up Information    None          Patient's Medications   Start Taking    No medications on file   Continue Taking    ALBUTEROL (PROVENTIL HFA, VENTOLIN HFA, PROAIR HFA) 90 MCG/ACTUATION INHALER    Take 1 puff by inhalation daily as needed for Wheezing. ALBUTEROL-IPRATROPIUM (DUO-NEB) 2.5 MG-0.5 MG/3 ML NEBU    3 mL by Nebulization route every four (4) hours as needed for Other (shortness of breath). ASPIRIN 81 MG CHEWABLE TABLET    Take 1 tablet by mouth daily. ATORVASTATIN (LIPITOR) 40 MG TABLET    Take 1 tablet by mouth nightly. BENZONATATE (TESSALON) 100 MG CAPSULE    Take 100 mg by mouth three (3) times daily as needed for Cough. CLOPIDOGREL (PLAVIX) 75 MG TABLET    Take 1 tablet by mouth daily. DICLOFENAC (VOLTAREN) 1 % GEL    Apply 2 g to affected area four (4) times daily. Gel should be measured and applied using the supplied dosing card. Apply dose (2 gm or 4 gm) to each location. If treatment site is the hands, patients should wait at least one (1) hour to wash their hands. APPLY TO bilateral knees    FLUOXETINE (PROZAC) 20 MG CAPSULE    Take 20 mg by mouth daily.     FLUTICASONE-SALMETEROL (ADVAIR) 250-50 MCG/DOSE DISKUS INHALER    Take 1 puff by inhalation every twelve (12) hours. INSULIN GLARGINE (LANTUS U-100 INSULIN) 100 UNIT/ML INJECTION    35 Units by SubCUTAneous route daily. INSULIN NEEDLES, DISPOSABLE, 31 GAUGE X 5/16\" NDLE    USE AS DIRECTED WITH INSULIN PEN DAILY    LISINOPRIL-HYDROCHLOROTHIAZIDE (PRINZIDE, ZESTORETIC) 20-12.5 MG PER TABLET    Take 1 Tab by mouth daily. Indications: hypertension    MEGESTROL (MEGACE) 400 MG/10 ML (40 MG/ML) SUSPENSION    TAKE 5 ML BY MOUTH THREE TIMES DAILY FOR APPETITE    MELATONIN 3 MG TABLET    Take 1 Tab by mouth nightly. METFORMIN (GLUCOPHAGE) 500 MG TABLET    Take 1 Tab by mouth two (2) times daily (with meals). MIRTAZAPINE (REMERON) 15 MG TABLET    Take 15 mg by mouth nightly. NITROGLYCERIN (NITROSTAT) 0.4 MG SL TABLET    1 Tab by SubLINGual route as needed for Chest Pain. OMEPRAZOLE MAGNESIUM (PRILOSEC) 10 MG SUDR    Take 20 mg by mouth two (2) times a day. ONDANSETRON (ZOFRAN ODT) 4 MG DISINTEGRATING TABLET    1 Tablet by SubLINGual route every eight (8) hours as needed for Nausea or Vomiting. OXYGEN    3LPM O2 as needed   Indications: DME: [facility]    PANTOPRAZOLE (PROTONIX) 20 MG TABLET    Take 20 mg by mouth daily. POLYETHYLENE GLYCOL (MIRALAX) 17 GRAM PACKET    Take 1 packet by mouth daily. RANITIDINE (ZANTAC) 150 MG TABLET    Take 1 Tab by mouth nightly. TIOTROPIUM (SPIRIVA WITH HANDIHALER) 18 MCG INHALATION CAPSULE    Take 1 Cap by inhalation daily. These Medications have changed    No medications on file   Stop Taking    No medications on file     Disclaimer: Sections of this note are dictated using utilizing voice recognition software. Minor typographical errors may be present. If questions arise, please do not hesitate to contact me or call our department.

## 2022-09-03 ENCOUNTER — HOSPITAL ENCOUNTER (EMERGENCY)
Age: 72
Discharge: HOME | End: 2022-09-03
Attending: EMERGENCY MEDICINE
Payer: MEDICARE

## 2022-09-03 ENCOUNTER — APPOINTMENT (OUTPATIENT)
Dept: GENERAL RADIOLOGY | Age: 72
End: 2022-09-03
Attending: EMERGENCY MEDICINE
Payer: MEDICARE

## 2022-09-03 ENCOUNTER — APPOINTMENT (OUTPATIENT)
Dept: CT IMAGING | Age: 72
End: 2022-09-03
Attending: EMERGENCY MEDICINE
Payer: MEDICARE

## 2022-09-03 VITALS
DIASTOLIC BLOOD PRESSURE: 76 MMHG | SYSTOLIC BLOOD PRESSURE: 126 MMHG | HEART RATE: 109 BPM | TEMPERATURE: 98.4 F | WEIGHT: 85.5 LBS | HEIGHT: 62 IN | RESPIRATION RATE: 18 BRPM | OXYGEN SATURATION: 100 % | BODY MASS INDEX: 15.73 KG/M2

## 2022-09-03 DIAGNOSIS — S09.90XA CLOSED HEAD INJURY, INITIAL ENCOUNTER: Primary | ICD-10-CM

## 2022-09-03 LAB
ANION GAP SERPL CALC-SCNC: 7 MMOL/L (ref 3–18)
BASOPHILS # BLD: 0 K/UL (ref 0–0.1)
BASOPHILS NFR BLD: 1 % (ref 0–2)
BUN SERPL-MCNC: 29 MG/DL (ref 7–18)
BUN/CREAT SERPL: 24 (ref 12–20)
CALCIUM SERPL-MCNC: 9.3 MG/DL (ref 8.5–10.1)
CHLORIDE SERPL-SCNC: 104 MMOL/L (ref 100–111)
CO2 SERPL-SCNC: 26 MMOL/L (ref 21–32)
CREAT SERPL-MCNC: 1.2 MG/DL (ref 0.6–1.3)
DIFFERENTIAL METHOD BLD: ABNORMAL
EOSINOPHIL # BLD: 0 K/UL (ref 0–0.4)
EOSINOPHIL NFR BLD: 1 % (ref 0–5)
ERYTHROCYTE [DISTWIDTH] IN BLOOD BY AUTOMATED COUNT: 16.8 % (ref 11.6–14.5)
GLUCOSE SERPL-MCNC: 207 MG/DL (ref 74–99)
HCT VFR BLD AUTO: 29.5 % (ref 35–45)
HGB BLD-MCNC: 9.3 G/DL (ref 12–16)
IMM GRANULOCYTES # BLD AUTO: 0 K/UL (ref 0–0.04)
IMM GRANULOCYTES NFR BLD AUTO: 0 % (ref 0–0.5)
LYMPHOCYTES # BLD: 1.5 K/UL (ref 0.9–3.6)
LYMPHOCYTES NFR BLD: 33 % (ref 21–52)
MCH RBC QN AUTO: 23 PG (ref 24–34)
MCHC RBC AUTO-ENTMCNC: 31.5 G/DL (ref 31–37)
MCV RBC AUTO: 73 FL (ref 78–100)
MONOCYTES # BLD: 0.4 K/UL (ref 0.05–1.2)
MONOCYTES NFR BLD: 9 % (ref 3–10)
NEUTS SEG # BLD: 2.5 K/UL (ref 1.8–8)
NEUTS SEG NFR BLD: 56 % (ref 40–73)
NRBC # BLD: 0 K/UL (ref 0–0.01)
NRBC BLD-RTO: 0 PER 100 WBC
PLATELET # BLD AUTO: 259 K/UL (ref 135–420)
PMV BLD AUTO: 10.6 FL (ref 9.2–11.8)
POTASSIUM SERPL-SCNC: 5.6 MMOL/L (ref 3.5–5.5)
RBC # BLD AUTO: 4.04 M/UL (ref 4.2–5.3)
SODIUM SERPL-SCNC: 137 MMOL/L (ref 136–145)
WBC # BLD AUTO: 4.5 K/UL (ref 4.6–13.2)

## 2022-09-03 PROCEDURE — 99284 EMERGENCY DEPT VISIT MOD MDM: CPT

## 2022-09-03 PROCEDURE — 70450 CT HEAD/BRAIN W/O DYE: CPT

## 2022-09-03 PROCEDURE — 71045 X-RAY EXAM CHEST 1 VIEW: CPT

## 2022-09-03 PROCEDURE — 80048 BASIC METABOLIC PNL TOTAL CA: CPT

## 2022-09-03 PROCEDURE — 85025 COMPLETE CBC W/AUTO DIFF WBC: CPT

## 2022-09-03 PROCEDURE — 74011250637 HC RX REV CODE- 250/637: Performed by: EMERGENCY MEDICINE

## 2022-09-03 RX ORDER — ACETAMINOPHEN 500 MG
1000 TABLET ORAL ONCE
Status: COMPLETED | OUTPATIENT
Start: 2022-09-03 | End: 2022-09-03

## 2022-09-03 RX ADMIN — ACETAMINOPHEN 1000 MG: 500 TABLET ORAL at 04:55

## 2022-09-03 NOTE — ED TRIAGE NOTES
Pt arrived via EMS from home after fall 2hrs ago. Reports LOC and c/o of Left rib pain, neck pain, and forehead pain due to possible vertigo episode.

## 2022-09-03 NOTE — ED PROVIDER NOTES
EMERGENCY DEPARTMENT HISTORY AND PHYSICAL EXAM    3:12 AM patient seen at this time in room 3      Date: 9/3/2022  Patient Name: Jay Villanueva    History of Presenting Illness     Chief Complaint   Patient presents with    Fall         History Provided By: patient    Additional History (Context): Jay Villanueva is a 73 y.o. female presents with patient complains of a fall today with syncope, struck the left side of her forehead states this was a severe fall and she falls frequently, states her head bounced. Also complains of left-sided rib pain. PCP: Reba Grier MD    Chief Complaint:   Duration:    Timing:    Location:   Quality:   Severity:   Modifying Factors:   Associated Symptoms:       Current Outpatient Medications   Medication Sig Dispense Refill    acetaminophen (TYLENOL) 325 mg tablet Take 2 Tablets by mouth every four (4) hours as needed for Pain. 20 Tablet 0    ondansetron (Zofran ODT) 4 mg disintegrating tablet 1 Tablet by SubLINGual route every eight (8) hours as needed for Nausea or Vomiting. 20 Tablet 0    megestrol (MEGACE) 400 mg/10 mL (40 mg/mL) suspension TAKE 5 ML BY MOUTH THREE TIMES DAILY FOR APPETITE  1    Insulin Needles, Disposable, 31 gauge x 5/16\" ndle USE AS DIRECTED WITH INSULIN PEN DAILY  3    Oxygen 3LPM O2 as needed   Indications: DME: [facility]      pantoprazole (PROTONIX) 20 mg tablet Take 20 mg by mouth daily. benzonatate (TESSALON) 100 mg capsule Take 100 mg by mouth three (3) times daily as needed for Cough. mirtazapine (REMERON) 15 mg tablet Take 15 mg by mouth nightly. metFORMIN (GLUCOPHAGE) 500 mg tablet Take 1 Tab by mouth two (2) times daily (with meals). 60 Tab 0    insulin glargine (LANTUS U-100 INSULIN) 100 unit/mL injection 35 Units by SubCUTAneous route daily. (Patient taking differently: 40 Units by SubCUTAneous route daily. ) 1 Vial 0    albuterol-ipratropium (DUO-NEB) 2.5 mg-0.5 mg/3 ml nebu 3 mL by Nebulization route every four (4) hours as needed for Other (shortness of breath). 30 Nebule 0    diclofenac (VOLTAREN) 1 % gel Apply 2 g to affected area four (4) times daily. Gel should be measured and applied using the supplied dosing card. Apply dose (2 gm or 4 gm) to each location. If treatment site is the hands, patients should wait at least one (1) hour to wash their hands. APPLY TO bilateral knees 100 g 0    melatonin 3 mg tablet Take 1 Tab by mouth nightly. (Patient taking differently: Take 5 mg by mouth nightly.) 30 Tab 0    Omeprazole Magnesium (PRILOSEC) 10 mg suDR Take 20 mg by mouth two (2) times a day. 60 Each 0    raNITIdine (ZANTAC) 150 mg tablet Take 1 Tab by mouth nightly. 15 Tab 0    lisinopril-hydroCHLOROthiazide (PRINZIDE, ZESTORETIC) 20-12.5 mg per tablet Take 1 Tab by mouth daily. Indications: hypertension      nitroglycerin (NITROSTAT) 0.4 mg SL tablet 1 Tab by SubLINGual route as needed for Chest Pain. 25 Tab 1    fluticasone-salmeterol (ADVAIR) 250-50 mcg/dose diskus inhaler Take 1 puff by inhalation every twelve (12) hours. (Patient taking differently: Take 1 Puff by inhalation daily. ) 1 Inhaler 0    albuterol (PROVENTIL HFA, VENTOLIN HFA, PROAIR HFA) 90 mcg/actuation inhaler Take 1 puff by inhalation daily as needed for Wheezing. 1 Inhaler 0    clopidogrel (PLAVIX) 75 mg tablet Take 1 tablet by mouth daily. 30 tablet 11    atorvastatin (LIPITOR) 40 mg tablet Take 1 tablet by mouth nightly. 30 tablet 11    aspirin 81 mg chewable tablet Take 1 tablet by mouth daily. 30 tablet 0    polyethylene glycol (MIRALAX) 17 gram packet Take 1 packet by mouth daily. 14 each 0    fluoxetine (PROZAC) 20 mg capsule Take 20 mg by mouth daily. tiotropium (SPIRIVA WITH HANDIHALER) 18 mcg inhalation capsule Take 1 Cap by inhalation daily.            Past History     Past Medical History:  Past Medical History:   Diagnosis Date    Arthritis     \"generalized\"    Asthma     CAD in native artery     NSTEMI (Sep 2014); diffuse 65% pLAD, minimal disease RCA & LCx. pLAD FFR 0.93. LV  no RWMA (echo and LV angio)    Chronic neck pain     Chronic pain     left knee    Chronic pain syndrome     COPD (chronic obstructive pulmonary disease) (HCC)     Depression     Diabetes (HCC)     Diverticulitis     GERD (gastroesophageal reflux disease)     Heart disease     Hidradenitis 2014    Hypertension     Left knee pain     Narcotic dependence ([address]     Pneumonia     Right wrist pain     Sarcoidosis        Past Surgical History:  Past Surgical History:   Procedure Laterality Date    BRONCHOSCOPY  10/2019    HX BREAST BIOPSY Right     9/10/14: She said tag in place from [address]      HX HYSTERECTOMY      HX KNEE ARTHROSCOPY Left        Family History:  Family History   Problem Relation Age of Onset    Hypertension Father     Diabetes Mother        Social History:  Social History     Tobacco Use    Smoking status: Former     Years: 46.00     Types: Cigarettes     Quit date: 2018     Years since quittin.5    Smokeless tobacco: Never    Tobacco comments: Only smokes 1 cigarette per day   Substance Use Topics    Alcohol use: Yes     Alcohol/week: 1.7 standard drinks     Types: 2 Glasses of wine per week     Comment: socially    Drug use: Yes     Types: Cocaine, Marijuana     Comment: 6 months ago       Allergies: Allergies   Allergen Reactions    Latex Hives and Itching    Ciprofloxacin Hives    Penicillins Nausea Only    Shellfish Derived Hives and Swelling     Swelling of legs         Review of Systems     Review of Systems   Constitutional:  Negative for diaphoresis and fever. HENT:  Negative for congestion and sore throat. Eyes:  Negative for pain and itching. Respiratory:  Negative for cough and shortness of breath. Cardiovascular:  Positive for chest pain. Negative for palpitations. Gastrointestinal:  Negative for abdominal pain and diarrhea. Endocrine: Negative for polydipsia and polyuria. Genitourinary:  Negative for dysuria and hematuria. Musculoskeletal:  Negative for arthralgias and myalgias. Skin:  Negative for rash and wound. Neurological:  Positive for syncope. Negative for seizures. Hematological:  Does not bruise/bleed easily. Psychiatric/Behavioral:  Negative for agitation and hallucinations. Physical Exam       Patient Vitals for the past 12 hrs:   Temp Pulse Resp BP SpO2   09/03/22 0300 98.4 °F (36.9 °C) (!) 109 18 126/76 100 %       IPVITALS  Patient Vitals for the past 24 hrs:   BP Temp Pulse Resp SpO2 Height Weight   09/03/22 0300 126/76 98.4 °F (36.9 °C) (!) 109 18 100 % 5' 2\" (1.575 m) 38.8 kg (85 lb 8 oz)       Physical Exam  Vitals and nursing note reviewed. Constitutional:       General: She is in acute distress (Almost tearful, somewhat distraught). Appearance: She is well-developed. HENT:      Head: Normocephalic and atraumatic. Eyes:      General: No scleral icterus. Extraocular Movements: Extraocular movements intact. Conjunctiva/sclera: Conjunctivae normal.      Pupils: Pupils are equal, round, and reactive to light. Neck:      Vascular: No JVD. Cardiovascular:      Rate and Rhythm: Normal rate and regular rhythm. Heart sounds: Normal heart sounds. Comments: 4 intact extremity pulses  Pulmonary:      Effort: Pulmonary effort is normal.      Breath sounds: Normal breath sounds. Abdominal:      Palpations: Abdomen is soft. There is no mass. Tenderness: There is no abdominal tenderness. Musculoskeletal:         General: Normal range of motion. Cervical back: Normal range of motion and neck supple. Lymphadenopathy:      Cervical: No cervical adenopathy. Skin:     General: Skin is warm and dry. Neurological:      General: No focal deficit present. Mental Status: She is alert. Cranial Nerves: Cranial nerve deficit present.          Diagnostic Study Results   Labs -  Recent Results (from the past 24 hour(s))   CBC WITH AUTOMATED DIFF    Collection Time: 09/03/22  3:15 AM   Result Value Ref Range    WBC 4.5 (L) 4.6 - 13.2 K/uL    RBC 4.04 (L) 4.20 - 5.30 M/uL    HGB 9.3 (L) 12.0 - 16.0 g/dL    HCT 29.5 (L) 35.0 - 45.0 %    MCV 73.0 (L) 78.0 - 100.0 FL    MCH 23.0 (L) 24.0 - 34.0 PG    MCHC 31.5 31.0 - 37.0 g/dL    RDW 16.8 (H) 11.6 - 14.5 %    PLATELET 699 381 - 607 K/uL    MPV 10.6 9.2 - 11.8 FL    NRBC 0.0 0  WBC    ABSOLUTE NRBC 0.00 0.00 - 0.01 K/uL    NEUTROPHILS 56 40 - 73 %    LYMPHOCYTES 33 21 - 52 %    MONOCYTES 9 3 - 10 %    EOSINOPHILS 1 0 - 5 %    BASOPHILS 1 0 - 2 %    IMMATURE GRANULOCYTES 0 0.0 - 0.5 %    ABS. NEUTROPHILS 2.5 1.8 - 8.0 K/UL    ABS. LYMPHOCYTES 1.5 0.9 - 3.6 K/UL    ABS. MONOCYTES 0.4 0.05 - 1.2 K/UL    ABS. EOSINOPHILS 0.0 0.0 - 0.4 K/UL    ABS. BASOPHILS 0.0 0.0 - 0.1 K/UL    ABS. IMM. GRANS. 0.0 0.00 - 0.04 K/UL    DF AUTOMATED     METABOLIC PANEL, BASIC    Collection Time: 09/03/22  3:15 AM   Result Value Ref Range    Sodium 137 136 - 145 mmol/L    Potassium 5.6 (H) 3.5 - 5.5 mmol/L    Chloride 104 100 - 111 mmol/L    CO2 26 21 - 32 mmol/L    Anion gap 7 3.0 - 18 mmol/L    Glucose 207 (H) 74 - 99 mg/dL    BUN 29 (H) 7.0 - 18 MG/DL    Creatinine 1.20 0.6 - 1.3 MG/DL    BUN/Creatinine ratio 24 (H) 12 - 20      GFR est AA 54 (L) >60 ml/min/1.73m2    GFR est non-AA 45 (L) >60 ml/min/1.73m2    Calcium 9.3 8.5 - 10.1 MG/DL       Radiologic Studies -   CT HEAD WO CONT   Final Result   1. No acute intracranial process identified. 2.  Moderate parenchymal volume loss and chronic small vessel ischemic changes. XR CHEST PORT   Final Result   1. No acute infiltrate or effusion. 2.  No acute osseous pathology appreciated. CT HEAD WO CONT    Result Date: 9/3/2022  EXAM: CT of the Head without contrast INDICATION:  Left forehead pain after a fall TECHNIQUE: CT of the head from the vertex to the skull base performed. No IV contrast administered.  All CT scans at this facility are performed using dose optimization technique as appropriate to a performed exam, to include automated exposure control, adjustment of the mA and/or kV according to patient size (including appropriate matching for site specific examination) or use of iterative reconstruction technique. COMPARISON: 8/1/2021 FINDINGS: No evidence of acute intra-axial or extra-axial hemorrhage. The ventricles and sulci are symmetric. The ventricles and sulci are moderately prominent. Periventricular hypodensities are noted. These findings are unchanged. No midline shift, mass effect or mass lesion appreciated. The gray-white junction is preserved. No evidence of an acute infarct identified. The mastoid air cells are well aerated. The paranasal sinuses are unremarkable. The orbits are normal. The scalp and skull are unremarkable. 1.   No acute intracranial process identified. 2.  Moderate parenchymal volume loss and chronic small vessel ischemic changes. XR CHEST PORT    Result Date: 9/3/2022  EXAM: Chest Radiograph INDICATION:  Left-sided rib pain after a fall TECHNIQUE: AP view of the chest COMPARISON: 7/21/2022, 3/6/2022, 9/29/2021 FINDINGS: No pneumothorax identified. There is mild scarring in the right upper lung field similar to prior imaging. No consolidation identified. No effusions identified. The cardiomediastinal silhouette is unremarkable. The pulmonary vasculature is unremarkable. No discrete fracture identified. Degenerative changes shoulders and spine are noted. 1.  No acute infiltrate or effusion. 2.  No acute osseous pathology appreciated. Medications ordered:   Medications   acetaminophen (TYLENOL) tablet 1,000 mg (1,000 mg Oral Given 9/3/22 2735)         Medical Decision Making   Initial Medical Decision Making and DDx:  CT scan of the head no skull fracture no extra-axial lesion. Chest x-ray no evidence for free air under the diaphragm cardiopulmonary disease or broken ribs. On reassessment patient is in benign condition resting comfortably in bed, asks specifically for IV pain medication, I do not think this is best since she is suffered a fall and will have significant risk for complications. She will be discharged with close follow-up. ED Course: Progress Notes, Reevaluation, and Consults:         I am the first provider for this patient. I reviewed the vital signs, available nursing notes, past medical history, past surgical history, family history and social history. Patient Vitals for the past 12 hrs:   Temp Pulse Resp BP SpO2   09/03/22 0300 98.4 °F (36.9 °C) (!) 109 18 126/76 100 %       Vital Signs-Reviewed the patient's vital signs. Pulse Oximetry Analysis, Cardiac Monitor, 12 lead ekg:      Interpreted by the EP. Records Reviewed: Nursing notes reviewed (Time of Review: 3:12 AM)    Procedures:   Critical Care Time:   Aspirin: (was aspirin given for stroke?)    Diagnosis     Clinical Impression:   1. Closed head injury, initial encounter        Disposition: Discharged      Follow-up Information       Follow up With Specialties Details Why [address]  In 1 day  [address]  [address]  [phone]             Patient's Medications   Start Taking    No medications on file   Continue Taking    ACETAMINOPHEN (TYLENOL) 325 MG TABLET    Take 2 Tablets by mouth every four (4) hours as needed for Pain. ALBUTEROL (PROVENTIL HFA, VENTOLIN HFA, PROAIR HFA) 90 MCG/ACTUATION INHALER    Take 1 puff by inhalation daily as needed for Wheezing. ALBUTEROL-IPRATROPIUM (DUO-NEB) 2.5 MG-0.5 MG/3 ML NEBU    3 mL by Nebulization route every four (4) hours as needed for Other (shortness of breath). ASPIRIN 81 MG CHEWABLE TABLET    Take 1 tablet by mouth daily. ATORVASTATIN (LIPITOR) 40 MG TABLET    Take 1 tablet by mouth nightly.     BENZONATATE (TESSALON) 100 MG CAPSULE    Take 100 mg by mouth three (3) times daily as needed for Cough. CLOPIDOGREL (PLAVIX) 75 MG TABLET    Take 1 tablet by mouth daily. DICLOFENAC (VOLTAREN) 1 % GEL    Apply 2 g to affected area four (4) times daily. Gel should be measured and applied using the supplied dosing card. Apply dose (2 gm or 4 gm) to each location. If treatment site is the hands, patients should wait at least one (1) hour to wash their hands. APPLY TO bilateral knees    FLUOXETINE (PROZAC) 20 MG CAPSULE    Take 20 mg by mouth daily. FLUTICASONE-SALMETEROL (ADVAIR) 250-50 MCG/DOSE DISKUS INHALER    Take 1 puff by inhalation every twelve (12) hours. INSULIN GLARGINE (LANTUS U-100 INSULIN) 100 UNIT/ML INJECTION    35 Units by SubCUTAneous route daily. INSULIN NEEDLES, DISPOSABLE, 31 GAUGE X 5/16\" NDLE    USE AS DIRECTED WITH INSULIN PEN DAILY    LISINOPRIL-HYDROCHLOROTHIAZIDE (PRINZIDE, ZESTORETIC) 20-12.5 MG PER TABLET    Take 1 Tab by mouth daily. Indications: hypertension    MEGESTROL (MEGACE) 400 MG/10 ML (40 MG/ML) SUSPENSION    TAKE 5 ML BY MOUTH THREE TIMES DAILY FOR APPETITE    MELATONIN 3 MG TABLET    Take 1 Tab by mouth nightly. METFORMIN (GLUCOPHAGE) 500 MG TABLET    Take 1 Tab by mouth two (2) times daily (with meals). MIRTAZAPINE (REMERON) 15 MG TABLET    Take 15 mg by mouth nightly. NITROGLYCERIN (NITROSTAT) 0.4 MG SL TABLET    1 Tab by SubLINGual route as needed for Chest Pain. OMEPRAZOLE MAGNESIUM (PRILOSEC) 10 MG SUDR    Take 20 mg by mouth two (2) times a day. ONDANSETRON (ZOFRAN ODT) 4 MG DISINTEGRATING TABLET    1 Tablet by SubLINGual route every eight (8) hours as needed for Nausea or Vomiting. OXYGEN    3LPM O2 as needed   Indications: DME: [facility]    PANTOPRAZOLE (PROTONIX) 20 MG TABLET    Take 20 mg by mouth daily. POLYETHYLENE GLYCOL (MIRALAX) 17 GRAM PACKET    Take 1 packet by mouth daily. RANITIDINE (ZANTAC) 150 MG TABLET    Take 1 Tab by mouth nightly.     TIOTROPIUM ([address] [place] [address]) 18 MCG INHALATION CAPSULE    Take 1 Cap by inhalation daily.      These Medications have changed    No medications on file   Stop Taking    No medications on file     _______________________________    Notes:    Norma Briggs MD using Dragon dictation      _______________________________

## 2022-09-06 ENCOUNTER — APPOINTMENT (OUTPATIENT)
Dept: CT IMAGING | Age: 72
End: 2022-09-06
Attending: STUDENT IN AN ORGANIZED HEALTH CARE EDUCATION/TRAINING PROGRAM
Payer: MEDICARE

## 2022-09-06 ENCOUNTER — HOSPITAL ENCOUNTER (EMERGENCY)
Age: 72
Discharge: HOME | End: 2022-09-07
Attending: STUDENT IN AN ORGANIZED HEALTH CARE EDUCATION/TRAINING PROGRAM
Payer: MEDICARE

## 2022-09-06 DIAGNOSIS — E04.1 THYROID NODULE: ICD-10-CM

## 2022-09-06 DIAGNOSIS — R63.0 DECREASED APPETITE: ICD-10-CM

## 2022-09-06 DIAGNOSIS — E86.0 DEHYDRATION: ICD-10-CM

## 2022-09-06 DIAGNOSIS — R42 LIGHTHEADEDNESS: Primary | ICD-10-CM

## 2022-09-06 DIAGNOSIS — R07.9 CHEST PAIN, UNSPECIFIED TYPE: ICD-10-CM

## 2022-09-06 LAB
ALBUMIN SERPL-MCNC: 3.7 G/DL (ref 3.4–5)
ALBUMIN/GLOB SERPL: 1.1 {RATIO} (ref 0.8–1.7)
ALP SERPL-CCNC: 51 U/L (ref 45–117)
ALT SERPL-CCNC: 17 U/L (ref 13–56)
ANION GAP SERPL CALC-SCNC: 8 MMOL/L (ref 3–18)
AST SERPL-CCNC: 16 U/L (ref 10–38)
BASOPHILS # BLD: 0 K/UL (ref 0–0.1)
BASOPHILS NFR BLD: 1 % (ref 0–2)
BILIRUB SERPL-MCNC: 0.3 MG/DL (ref 0.2–1)
BUN SERPL-MCNC: 54 MG/DL (ref 7–18)
BUN/CREAT SERPL: 48 (ref 12–20)
CALCIUM SERPL-MCNC: 9.3 MG/DL (ref 8.5–10.1)
CHLORIDE SERPL-SCNC: 103 MMOL/L (ref 100–111)
CO2 SERPL-SCNC: 25 MMOL/L (ref 21–32)
CREAT SERPL-MCNC: 1.13 MG/DL (ref 0.6–1.3)
DIFFERENTIAL METHOD BLD: ABNORMAL
EOSINOPHIL # BLD: 0 K/UL (ref 0–0.4)
EOSINOPHIL NFR BLD: 1 % (ref 0–5)
ERYTHROCYTE [DISTWIDTH] IN BLOOD BY AUTOMATED COUNT: 16.2 % (ref 11.6–14.5)
GLOBULIN SER CALC-MCNC: 3.5 G/DL (ref 2–4)
GLUCOSE SERPL-MCNC: 139 MG/DL (ref 74–99)
HCT VFR BLD AUTO: 30.1 % (ref 35–45)
HGB BLD-MCNC: 9.6 G/DL (ref 12–16)
IMM GRANULOCYTES # BLD AUTO: 0 K/UL (ref 0–0.04)
IMM GRANULOCYTES NFR BLD AUTO: 0 % (ref 0–0.5)
LYMPHOCYTES # BLD: 1.6 K/UL (ref 0.9–3.6)
LYMPHOCYTES NFR BLD: 40 % (ref 21–52)
MAGNESIUM SERPL-MCNC: 2.6 MG/DL (ref 1.6–2.6)
MCH RBC QN AUTO: 23.2 PG (ref 24–34)
MCHC RBC AUTO-ENTMCNC: 31.9 G/DL (ref 31–37)
MCV RBC AUTO: 72.7 FL (ref 78–100)
MONOCYTES # BLD: 0.4 K/UL (ref 0.05–1.2)
MONOCYTES NFR BLD: 10 % (ref 3–10)
NEUTS SEG # BLD: 2 K/UL (ref 1.8–8)
NEUTS SEG NFR BLD: 49 % (ref 40–73)
NRBC # BLD: 0 K/UL (ref 0–0.01)
NRBC BLD-RTO: 0 PER 100 WBC
PLATELET # BLD AUTO: 247 K/UL (ref 135–420)
PMV BLD AUTO: 10.8 FL (ref 9.2–11.8)
POTASSIUM SERPL-SCNC: 5.1 MMOL/L (ref 3.5–5.5)
PROT SERPL-MCNC: 7.2 G/DL (ref 6.4–8.2)
RBC # BLD AUTO: 4.14 M/UL (ref 4.2–5.3)
SODIUM SERPL-SCNC: 136 MMOL/L (ref 136–145)
TROPONIN-HIGH SENSITIVITY: 6 NG/L (ref 0–54)
TSH SERPL DL<=0.05 MIU/L-ACNC: 1.34 UIU/ML (ref 0.36–3.74)
WBC # BLD AUTO: 4 K/UL (ref 4.6–13.2)

## 2022-09-06 PROCEDURE — 80053 COMPREHEN METABOLIC PANEL: CPT

## 2022-09-06 PROCEDURE — 71260 CT THORAX DX C+: CPT

## 2022-09-06 PROCEDURE — 84484 ASSAY OF TROPONIN QUANT: CPT

## 2022-09-06 PROCEDURE — 81003 URINALYSIS AUTO W/O SCOPE: CPT

## 2022-09-06 PROCEDURE — 83735 ASSAY OF MAGNESIUM: CPT

## 2022-09-06 PROCEDURE — 84443 ASSAY THYROID STIM HORMONE: CPT

## 2022-09-06 PROCEDURE — 74011250636 HC RX REV CODE- 250/636: Performed by: STUDENT IN AN ORGANIZED HEALTH CARE EDUCATION/TRAINING PROGRAM

## 2022-09-06 PROCEDURE — 93005 ELECTROCARDIOGRAM TRACING: CPT

## 2022-09-06 PROCEDURE — 96374 THER/PROPH/DIAG INJ IV PUSH: CPT

## 2022-09-06 PROCEDURE — 81001 URINALYSIS AUTO W/SCOPE: CPT

## 2022-09-06 PROCEDURE — 96375 TX/PRO/DX INJ NEW DRUG ADDON: CPT

## 2022-09-06 PROCEDURE — 74011000250 HC RX REV CODE- 250: Performed by: STUDENT IN AN ORGANIZED HEALTH CARE EDUCATION/TRAINING PROGRAM

## 2022-09-06 PROCEDURE — 74011000636 HC RX REV CODE- 636: Performed by: STUDENT IN AN ORGANIZED HEALTH CARE EDUCATION/TRAINING PROGRAM

## 2022-09-06 PROCEDURE — 85025 COMPLETE CBC W/AUTO DIFF WBC: CPT

## 2022-09-06 PROCEDURE — 99285 EMERGENCY DEPT VISIT HI MDM: CPT

## 2022-09-06 PROCEDURE — 96361 HYDRATE IV INFUSION ADD-ON: CPT

## 2022-09-06 PROCEDURE — 74011250637 HC RX REV CODE- 250/637: Performed by: STUDENT IN AN ORGANIZED HEALTH CARE EDUCATION/TRAINING PROGRAM

## 2022-09-06 RX ORDER — ONDANSETRON 2 MG/ML
4 INJECTION INTRAMUSCULAR; INTRAVENOUS ONCE
Status: COMPLETED | OUTPATIENT
Start: 2022-09-06 | End: 2022-09-06

## 2022-09-06 RX ORDER — LIDOCAINE 4 G/100G
1 PATCH TOPICAL EVERY 24 HOURS
Status: DISCONTINUED | OUTPATIENT
Start: 2022-09-06 | End: 2022-09-07 | Stop reason: HOSPADM

## 2022-09-06 RX ORDER — MORPHINE SULFATE 4 MG/ML
4 INJECTION INTRAVENOUS ONCE
Status: COMPLETED | OUTPATIENT
Start: 2022-09-06 | End: 2022-09-06

## 2022-09-06 RX ORDER — METHOCARBAMOL 100 MG/ML
1000 INJECTION, SOLUTION INTRAMUSCULAR; INTRAVENOUS ONCE
Status: COMPLETED | OUTPATIENT
Start: 2022-09-06 | End: 2022-09-06

## 2022-09-06 RX ORDER — METHOCARBAMOL 750 MG/1
750 TABLET, FILM COATED ORAL 3 TIMES DAILY
Qty: 30 TABLET | Refills: 0 | Status: SHIPPED | OUTPATIENT
Start: 2022-09-06 | End: 2022-09-16

## 2022-09-06 RX ORDER — ACETAMINOPHEN 500 MG
1000 TABLET ORAL ONCE
Status: COMPLETED | OUTPATIENT
Start: 2022-09-06 | End: 2022-09-06

## 2022-09-06 RX ORDER — LIDOCAINE 50 MG/G
PATCH TOPICAL
Qty: 30 EACH | Refills: 0 | Status: SHIPPED | OUTPATIENT
Start: 2022-09-06

## 2022-09-06 RX ORDER — ACETAMINOPHEN 325 MG/1
975 TABLET ORAL
Qty: 100 TABLET | Refills: 0 | Status: SHIPPED | OUTPATIENT
Start: 2022-09-06

## 2022-09-06 RX ADMIN — MORPHINE SULFATE 4 MG: 4 INJECTION INTRAVENOUS at 21:38

## 2022-09-06 RX ADMIN — SODIUM CHLORIDE 1000 ML: 900 INJECTION, SOLUTION INTRAVENOUS at 16:45

## 2022-09-06 RX ADMIN — SODIUM CHLORIDE 500 ML: 9 INJECTION, SOLUTION INTRAVENOUS at 21:37

## 2022-09-06 RX ADMIN — ONDANSETRON 4 MG: 2 INJECTION INTRAMUSCULAR; INTRAVENOUS at 21:38

## 2022-09-06 RX ADMIN — METHOCARBAMOL 1000 MG: 100 INJECTION INTRAMUSCULAR; INTRAVENOUS at 21:38

## 2022-09-06 RX ADMIN — IOPAMIDOL 100 ML: 612 INJECTION, SOLUTION INTRAVENOUS at 17:45

## 2022-09-06 RX ADMIN — ACETAMINOPHEN 1000 MG: 500 TABLET ORAL at 16:04

## 2022-09-06 NOTE — ED NOTES
Bedside shift change report given to Donta Hilton RN  (oncoming nurse) by Jordon Hopson RN (offgoing nurse). Report included the following information SBAR, Kardex, ED Summary, [facility] and Recent Results.

## 2022-09-06 NOTE — ED PROVIDER NOTES
EMERGENCY DEPARTMENT HISTORY AND PHYSICAL EXAM      Date: 9/6/2022  Patient Name: Wade Sofia    History of Presenting Illness     Chief Complaint   Patient presents with    Dizziness         History Provided By: EMS, patient     Chief Complaint: lightheadedness  Duration: weeks, worse in the past few days  Timing: constant  Location:   Quality:     Severity:   Modifying Factors: worse when attempting to stand, ambulate  Associated Symptoms: feels like she might faint      History (Context): Wade Sofia is a 73 y.o. female with a past medical history significant for IDDM (not taking her insulin), CAD (s/p MI x2), sarcoidosis w/ lung involvement (not on immunosuppressive therapy), Emphysema, HTN, recently seen in this emergency department after an episode of syncope with closed head injury diagnosed with some bruised ribs but no other acute injuries, who comes into the ED today due to persistent lightheadedness, presyncopal symptoms x4 days and pain in her left ribs where she fell, in the setting of reported 20-30lb weight loss in the last three months, decreased appetite, generalized weakness, and diverticulitis (currently on antibiotics x1 week). All of these chronic issues are being followed by her primary care doctor, Dr. Sabrina Snell. Denies syncope since recent ED visit 3 days ago. PCP: Other, MD Reba    Current Facility-Administered Medications   Medication Dose Route Frequency Provider Last Rate Last Admin    lidocaine 4 % patch 1 Patch  1 Patch TransDERmal Q24H Nighat Tatum MD   1 Patch at 09/06/22 1604     Current Outpatient Medications   Medication Sig Dispense Refill    methocarbamoL (Robaxin-750) 750 mg tablet Take 1 Tablet by mouth three (3) times daily for 10 days. 30 Tablet 0    lidocaine (Lidoderm) 5 % Apply patch to the affected area for 12 hours a day and remove for 12 hours a day.  30 Each 0    acetaminophen (TylenoL) 325 mg tablet Take 3 Tablets by mouth every six (6) hours as needed for Pain. 100 Tablet 0    acetaminophen (TYLENOL) 325 mg tablet Take 2 Tablets by mouth every four (4) hours as needed for Pain. 20 Tablet 0    ondansetron (Zofran ODT) 4 mg disintegrating tablet 1 Tablet by SubLINGual route every eight (8) hours as needed for Nausea or Vomiting. 20 Tablet 0    megestrol (MEGACE) 400 mg/10 mL (40 mg/mL) suspension TAKE 5 ML BY MOUTH THREE TIMES DAILY FOR APPETITE  1    Insulin Needles, Disposable, 31 gauge x 5/16\" ndle USE AS DIRECTED WITH INSULIN PEN DAILY  3    Oxygen 3LPM O2 as needed   Indications: DME: [employer]      pantoprazole (PROTONIX) 20 mg tablet Take 20 mg by mouth daily. benzonatate (TESSALON) 100 mg capsule Take 100 mg by mouth three (3) times daily as needed for Cough. mirtazapine (REMERON) 15 mg tablet Take 15 mg by mouth nightly. metFORMIN (GLUCOPHAGE) 500 mg tablet Take 1 Tab by mouth two (2) times daily (with meals). 60 Tab 0    insulin glargine (LANTUS U-100 INSULIN) 100 unit/mL injection 35 Units by SubCUTAneous route daily. (Patient taking differently: 40 Units by SubCUTAneous route daily. ) 1 Vial 0    albuterol-ipratropium (DUO-NEB) 2.5 mg-0.5 mg/3 ml nebu 3 mL by Nebulization route every four (4) hours as needed for Other (shortness of breath). 30 Nebule 0    diclofenac (VOLTAREN) 1 % gel Apply 2 g to affected area four (4) times daily. Gel should be measured and applied using the supplied dosing card. Apply dose (2 gm or 4 gm) to each location. If treatment site is the hands, patients should wait at least one (1) hour to wash their hands. APPLY TO bilateral knees 100 g 0    melatonin 3 mg tablet Take 1 Tab by mouth nightly. (Patient taking differently: Take 5 mg by mouth nightly.) 30 Tab 0    Omeprazole Magnesium (PRILOSEC) 10 mg suDR Take 20 mg by mouth two (2) times a day. 60 Each 0    raNITIdine (ZANTAC) 150 mg tablet Take 1 Tab by mouth nightly.  15 Tab 0    lisinopril-hydroCHLOROthiazide (PRINZIDE, ZESTORETIC) 20-12.5 mg per tablet Take 1 Tab by mouth daily. Indications: hypertension      nitroglycerin (NITROSTAT) 0.4 mg SL tablet 1 Tab by SubLINGual route as needed for Chest Pain. 25 Tab 1    fluticasone-salmeterol (ADVAIR) 250-50 mcg/dose diskus inhaler Take 1 puff by inhalation every twelve (12) hours. (Patient taking differently: Take 1 Puff by inhalation daily. ) 1 Inhaler 0    albuterol (PROVENTIL HFA, VENTOLIN HFA, PROAIR HFA) 90 mcg/actuation inhaler Take 1 puff by inhalation daily as needed for Wheezing. 1 Inhaler 0    clopidogrel (PLAVIX) 75 mg tablet Take 1 tablet by mouth daily. 30 tablet 11    atorvastatin (LIPITOR) 40 mg tablet Take 1 tablet by mouth nightly. 30 tablet 11    aspirin 81 mg chewable tablet Take 1 tablet by mouth daily. 30 tablet 0    polyethylene glycol (MIRALAX) 17 gram packet Take 1 packet by mouth daily. 14 each 0    fluoxetine (PROZAC) 20 mg capsule Take 20 mg by mouth daily. tiotropium (SPIRIVA WITH HANDIHALER) 18 mcg inhalation capsule Take 1 Cap by inhalation daily. Past History     Past Medical History:   Past Medical History:   Diagnosis Date    Arthritis     \"generalized\"    Asthma     CAD in native artery     NSTEMI (Sep 2014); diffuse 65% pLAD, minimal disease RCA & LCx. pLAD FFR 0.93.  LV  no RWMA (echo and LV angio)    Chronic neck pain     Chronic pain     left knee    Chronic pain syndrome     COPD (chronic obstructive pulmonary disease) ([facility])     Depression     Diabetes ([address]     Diverticulitis     GERD (gastroesophageal reflux disease)     Heart disease     Hidradenitis 9/11/2014    Hypertension     Left knee pain     Narcotic dependence ([address]     Pneumonia     Right wrist pain     Sarcoidosis        Past Surgical History:  Past Surgical History:   Procedure Laterality Date    BRONCHOSCOPY  10/2019    HX BREAST BIOPSY Right     9/10/14: She said tag in place from [address]  2005    HX HYSTERECTOMY      HX KNEE ARTHROSCOPY Left        Family History:  Family History   Problem Relation Age of Onset    Hypertension Father     Diabetes Mother        Social History:   Social History     Tobacco Use    Smoking status: Former     Years: 46.00     Types: Cigarettes     Quit date: 2018     Years since quittin.5    Smokeless tobacco: Never    Tobacco comments: Only smokes 1 cigarette per day   Substance Use Topics    Alcohol use: Yes     Alcohol/week: 1.7 standard drinks     Types: 2 Glasses of wine per week     Comment: socially    Drug use: Yes     Types: Cocaine, Marijuana     Comment: 6 months ago       Allergies: Allergies   Allergen Reactions    Latex Hives and Itching    Ciprofloxacin Hives    Penicillins Nausea Only    Shellfish Derived Hives and Swelling     Swelling of legs       PMH, PSH, family history, social history, allergies reviewed with the patient with significant items noted above. Review of Systems   Review of Systems   Constitutional:  Positive for activity change (has not been getting up due to worries she will pass out), appetite change (decreased appetite for several months) and fatigue. Negative for chills and fever. HENT:  Negative for congestion and sore throat. Eyes:  Negative for photophobia and visual disturbance. Respiratory:  Negative for cough and shortness of breath. Cardiovascular:  Positive for chest pain (left-sided chest wall pain s/p fall three days ago, dx with bruising). Negative for palpitations. Gastrointestinal:  Positive for abdominal pain (LLQ, ongoing for several months) and constipation. Negative for blood in stool, diarrhea, nausea and vomiting. Genitourinary:  Negative for difficulty urinating, dysuria and frequency. Musculoskeletal:  Negative for back pain and neck pain. Skin:  Negative for rash and wound. Neurological:  Positive for weakness (generalized) and light-headedness. Negative for dizziness, seizures, speech difficulty, numbness and headaches.    Psychiatric/Behavioral: Negative for agitation and confusion. Physical Exam     Vitals:    09/06/22 2035 09/06/22 2105 09/06/22 2205 09/06/22 2235   BP:  (!) 145/76 (!) 148/83    Pulse: (!) 104 96 91 98   Resp: 27 24 19 20   Temp:       SpO2: 99% 100% 99%    Weight:       Height:           Physical Exam  Vitals and nursing note reviewed. Constitutional:       General: She is not in acute distress. Appearance: She is cachectic. HENT:      Head: Normocephalic and atraumatic. Nose: Nose normal.      Mouth/Throat:      Mouth: Mucous membranes are dry. Pharynx: Oropharynx is clear. Eyes:      Extraocular Movements: Extraocular movements intact. Conjunctiva/sclera: Conjunctivae normal.      Pupils: Pupils are equal, round, and reactive to light. Cardiovascular:      Rate and Rhythm: Regular rhythm. Tachycardia present. Heart sounds: No murmur heard. No friction rub. No gallop. Pulmonary:      Effort: Pulmonary effort is normal.      Breath sounds: Normal breath sounds. No stridor. No wheezing, rhonchi or rales. Chest:      Chest wall: Tenderness (left lateral lower ribs) present. Abdominal:      General: There is no distension. Palpations: Abdomen is soft. Tenderness: There is abdominal tenderness (mild, LLQ). There is no guarding or rebound. Musculoskeletal:         General: No tenderness. Cervical back: Normal range of motion. No rigidity. Right lower leg: No edema. Left lower leg: No edema. Skin:     General: Skin is warm and dry. Neurological:      General: No focal deficit present. Mental Status: She is alert and oriented to person, place, and time.        Diagnostic Study Results     Labs -     Recent Results (from the past 12 hour(s))   EKG, 12 LEAD, INITIAL    Collection Time: 09/06/22  4:34 PM   Result Value Ref Range    Ventricular Rate 99 BPM    Atrial Rate 99 BPM    P-R Interval 116 ms    QRS Duration 80 ms    Q-T Interval 342 ms    QTC Calculation (Bezet) 438 ms    Calculated P Axis 59 degrees    Calculated R Axis -36 degrees    Calculated T Axis 58 degrees    Diagnosis       Normal sinus rhythm  Left axis deviation  Abnormal ECG  When compared with ECG of 21-JUL-2022 07:47,  Nonspecific T wave abnormality no longer evident in Lateral leads     CBC WITH AUTOMATED DIFF    Collection Time: 09/06/22  4:35 PM   Result Value Ref Range    WBC 4.0 (L) 4.6 - 13.2 K/uL    RBC 4.14 (L) 4.20 - 5.30 M/uL    HGB 9.6 (L) 12.0 - 16.0 g/dL    HCT 30.1 (L) 35.0 - 45.0 %    MCV 72.7 (L) 78.0 - 100.0 FL    MCH 23.2 (L) 24.0 - 34.0 PG    MCHC 31.9 31.0 - 37.0 g/dL    RDW 16.2 (H) 11.6 - 14.5 %    PLATELET 667 011 - 787 K/uL    MPV 10.8 9.2 - 11.8 FL    NRBC 0.0 0  WBC    ABSOLUTE NRBC 0.00 0.00 - 0.01 K/uL    NEUTROPHILS 49 40 - 73 %    LYMPHOCYTES 40 21 - 52 %    MONOCYTES 10 3 - 10 %    EOSINOPHILS 1 0 - 5 %    BASOPHILS 1 0 - 2 %    IMMATURE GRANULOCYTES 0 0.0 - 0.5 %    ABS. NEUTROPHILS 2.0 1.8 - 8.0 K/UL    ABS. LYMPHOCYTES 1.6 0.9 - 3.6 K/UL    ABS. MONOCYTES 0.4 0.05 - 1.2 K/UL    ABS. EOSINOPHILS 0.0 0.0 - 0.4 K/UL    ABS. BASOPHILS 0.0 0.0 - 0.1 K/UL    ABS. IMM. GRANS. 0.0 0.00 - 0.04 K/UL    DF AUTOMATED     METABOLIC PANEL, COMPREHENSIVE    Collection Time: 09/06/22  4:35 PM   Result Value Ref Range    Sodium 136 136 - 145 mmol/L    Potassium 5.1 3.5 - 5.5 mmol/L    Chloride 103 100 - 111 mmol/L    CO2 25 21 - 32 mmol/L    Anion gap 8 3.0 - 18 mmol/L    Glucose 139 (H) 74 - 99 mg/dL    BUN 54 (H) 7.0 - 18 MG/DL    Creatinine 1.13 0.6 - 1.3 MG/DL    BUN/Creatinine ratio 48 (H) 12 - 20      GFR est AA 58 (L) >60 ml/min/1.73m2    GFR est non-AA 48 (L) >60 ml/min/1.73m2    Calcium 9.3 8.5 - 10.1 MG/DL    Bilirubin, total 0.3 0.2 - 1.0 MG/DL    ALT (SGPT) 17 13 - 56 U/L    AST (SGOT) 16 10 - 38 U/L    Alk.  phosphatase 51 45 - 117 U/L    Protein, total 7.2 6.4 - 8.2 g/dL    Albumin 3.7 3.4 - 5.0 g/dL    Globulin 3.5 2.0 - 4.0 g/dL    A-G Ratio 1.1 0.8 - 1.7 TROPONIN-HIGH SENSITIVITY    Collection Time: 09/06/22  4:35 PM   Result Value Ref Range    Troponin-High Sensitivity 6 0 - 54 ng/L   MAGNESIUM    Collection Time: 09/06/22  4:35 PM   Result Value Ref Range    Magnesium 2.6 1.6 - 2.6 mg/dL   TSH 3RD GENERATION    Collection Time: 09/06/22  4:35 PM   Result Value Ref Range    TSH 1.34 0.36 - 3.74 uIU/mL   URINALYSIS W/ RFLX MICROSCOPIC    Collection Time: 09/06/22  7:45 PM   Result Value Ref Range    Color YELLOW      Appearance CLEAR      Specific gravity >1.030 (H) 1.003 - 1.030    pH (UA) 5.0 5.0 - 8.0      Protein Negative NEG mg/dL    Glucose Negative NEG mg/dL    Ketone Negative NEG mg/dL    Bilirubin Negative NEG      Blood Negative NEG      Urobilinogen 0.2 0.2 - 1.0 EU/dL    Nitrites Negative NEG      Leukocyte Esterase TRACE (A) NEG     URINE MICROSCOPIC ONLY    Collection Time: 09/06/22  7:45 PM   Result Value Ref Range    WBC 2 to 5 0 - 4 /hpf    RBC Negative 0 - 5 /hpf    Epithelial cells 1 to 3 0 - 5 /lpf    Bacteria Negative NEG /hpf    Yeast 2 to 5 NEG      Labs Reviewed   CBC WITH AUTOMATED DIFF - Abnormal; Notable for the following components:       Result Value    WBC 4.0 (*)     RBC 4.14 (*)     HGB 9.6 (*)     HCT 30.1 (*)     MCV 72.7 (*)     MCH 23.2 (*)     RDW 16.2 (*)     All other components within normal limits   METABOLIC PANEL, COMPREHENSIVE - Abnormal; Notable for the following components:    Glucose 139 (*)     BUN 54 (*)     BUN/Creatinine ratio 48 (*)     GFR est AA 58 (*)     GFR est non-AA 48 (*)     All other components within normal limits   URINALYSIS W/ RFLX MICROSCOPIC - Abnormal; Notable for the following components:    Specific gravity >1.030 (*)     Leukocyte Esterase TRACE (*)     All other components within normal limits   TROPONIN-HIGH SENSITIVITY   MAGNESIUM   TSH 3RD GENERATION   URINE MICROSCOPIC ONLY       Radiologic Studies -   CT CHEST W CONT   Final Result   1.  No acute abnormalities or malignant findings in the chest.   2. Hypodense nodule in the right lobe of thyroid gland, slightly larger than   before. CT Results  (Last 48 hours)                 09/06/22 1748  CT CHEST W CONT Final result    Impression:  1. No acute abnormalities or malignant findings in the chest.   2. Hypodense nodule in the right lobe of thyroid gland, slightly larger than   before. Narrative:  CT chest with IV contrast       HISTORY: Weakness. Left-sided chest pain. Recent fall. All CT scans at this facility are performed using dose optimization technique as   appropriate to a performed exam, to include automated exposure control,   adjustment of the mA and/or kV according to patient size (including appropriate   matching for site specific examination) or use of iterative reconstruction   technique. COMPARISON: 10/16/2019. There is a 7 mm hypodense nodule in the right of thyroid gland, larger than   before. No mediastinal or hilar mass. No cardiomegaly or pericardial effusion. Linear scar or atelectasis in both lungs with no consolidation. No pulmonary   mass. No pleural effusion. No pneumothorax. No contusion. No displaced left rib fractures. No axillary mass. No acute findings in the upper abdomen. No compression fracture in the thoracic spine. CXR Results  (Last 48 hours)      None            The laboratory results, imaging results, and other diagnostic exams were reviewed in the EMR. Medical Decision Making   I am the first provider for this patient. I reviewed the vital signs, available nursing notes, past medical history, past surgical history, family history and social history. Vital Signs-Reviewed the patient's vital signs. ED EKG interpretation:  Rhythm: normal sinus rhythm; and regular . Rate (approx.): 99;  Axis: left axis deviation; P wave: normal; QRS interval: normal ; ST/T wave: normal; Other findings: Normal sinus rhythm with left axis deviation, nonischemic, no concerning cardiac dysrhythmias. . This EKG was interpreted by Name Dr Tomasa Ngo, Dr Nito Franco. Records Reviewed: Personally, on initial evaluation    MDM:   Dion Callas presents with complaint of lightheadedness  DDX includes but is not limited to: Orthostatic hypotension, electrolyte abnormalities, anemia, failure to thrive, dehydration, malnutrition, vasovagal syncope, cardiac dysrhythmia, ACS, and others. Will obtain lab work and imaging for further evaluation of patients complaint. Will continue to monitor and evaluate patient while in the ED. Orders as below:  Orders Placed This Encounter    CT CHEST W CONT    CBC WITH AUTOMATED DIFF    COMPREHENSIVE METABOLIC PANEL    TROPONIN-HIGH SENSITIVITY    MAGNESIUM    TSH    URINALYSIS W/ RFLX MICROSCOPIC    URINE MICROSCOPIC ONLY    B/P LYING/SIT/STANDING    EKG, 12 LEAD, INITIAL    sodium chloride 0.9 % bolus infusion 1,000 mL    acetaminophen (TYLENOL) tablet 1,000 mg    lidocaine 4 % patch 1 Patch    iopamidoL (ISOVUE 300) 61 % contrast injection  mL    methocarbamoL (ROBAXIN) injection 1,000 mg    sodium chloride 0.9 % bolus infusion 500 mL    morphine injection 4 mg    ondansetron (ZOFRAN) injection 4 mg    methocarbamoL (Robaxin-750) 750 mg tablet    lidocaine (Lidoderm) 5 %    acetaminophen (TylenoL) 325 mg tablet        ED Course:      Chart review shows patient was 59.4kg in March 2022, now 38.8kg (approx 45lb weight loss in 6 months). Patient has been seeing her primary care doctor during this time, has been started on ensures for nutrition replacement. Treated with IV fluids, Tylenol. Statics normal, not concerning for orthostatic hypotension.     Leukopenia appears to be patient's baseline, baseline anemia  No concerning electrolyte abnormalities, elevated BUN, baseline creatinine, with BUN/creatinine ratio 48, concerning for dehydration  LFTs within normal limits, albumin normal 3.7  Troponin 6  UA without urinary tract infection    CT chest with contrast shows no rib fractures, findings concerning for malignancy within the thoracic cavity. Does have a right lobe thyroid nodule that is slightly larger than previous. Added on TSH/free T4 to assess thyroid function. @2648: Called the lab to check on add on TSH/free T4, they state they are very busy have not yet processed these labs. TSH normal, 1.34. Not enough blood for add on free T4; however, in the setting of a normal TSH free T4 is not needed. Reevaluated patient at bedside, she has not received methocarbamol, states that her left chest wall pain is not improved by acetaminophen and Lidoderm patch. Of note, patient ate the majority of a microwave dinner without difficulty. Discussed that she has no rib fractures, no acute injury, will take time for her bruising to heal.  Advised we would give her a one-time dose of morphine for pain; however, cannot send her home with morphine medication. Offered Lidoderm patches, Voltaren gel for discharge. Patient's pain is much improved after methocarbamol, morphine. She is comfortable plan for discharge home with Lidoderm patches, Tylenol, Voltaren gel (she has this at home already), and methocarbamol for her rib pain. Discussed precautions for lightheadedness/presyncopal symptoms, possible orthostatics, importance of hydration, following up with her primary care provider for further evaluation. Also discussed incidental finding of thyroid nodule, need for follow-up for possible ultrasound, biopsy as appropriate. All questions answered. Patient discharged home. Procedures:  Procedures        Diagnosis and Disposition     CLINICAL IMPRESSION:  1. Lightheadedness    2. Decreased appetite    3. Thyroid nodule    4. Chest pain, unspecified type    5.  Dehydration      Current Discharge Medication List        START taking these medications    Details   methocarbamoL (Robaxin-750) 750 mg tablet Take 1 Tablet by mouth three (3) times daily for 10 days. Qty: 30 Tablet, Refills: 0  Start date: 9/6/2022, End date: 9/16/2022    Comments: Patient would like medications delivered to home. lidocaine (Lidoderm) 5 % Apply patch to the affected area for 12 hours a day and remove for 12 hours a day. Qty: 30 Each, Refills: 0  Start date: 9/6/2022    Comments: Patient would like medications delivered to home. !! acetaminophen (TylenoL) 325 mg tablet Take 3 Tablets by mouth every six (6) hours as needed for Pain. Qty: 100 Tablet, Refills: 0  Start date: 9/6/2022    Comments: Patient would like medications delivered to home. !! - Potential duplicate medications found. Please discuss with provider. CONTINUE these medications which have NOT CHANGED    Details   !! acetaminophen (TYLENOL) 325 mg tablet Take 2 Tablets by mouth every four (4) hours as needed for Pain. Qty: 20 Tablet, Refills: 0       !! - Potential duplicate medications found. Please discuss with provider. Disposition: Discharged    Patient condition at time of disposition: Stable    DISCHARGE NOTE:   Pt has been reexamined. Patient has no new complaints, changes, or physical findings. Care plan outlined and precautions discussed. Results were reviewed with the patient. All medications were reviewed with the patient. All of pt's questions and concerns were addressed. Alarm symptoms and return precautions associated with chief complaint and evaluation were reviewed with the patient in detail. The patient demonstrated adequate understanding. Patient was instructed to follow up with primary care doctor, as well as strict return precautions to the ED upon further deterioration. Patient is ready to go home. The patient is happy with this plan        Dragon Disclaimer     Please note that this dictation was completed with Dick or Bro, the [employer] voice recognition software.   Quite often unanticipated grammatical, syntax, homophones, and other interpretive errors are inadvertently transcribed by the computer software. Please disregard these errors. Please excuse any errors that have escaped final proofreading.     Fan Monae MD   [address] [place] [address] EM Resident, PGY-4

## 2022-09-06 NOTE — ED NOTES
Pt's orthostatic vitals were preformed by myself, pt stated feeling generally unwell and weak, \"feeling off\" to be more specific. Pt states that she gets around with a roller but is noted to be a severe fall risk with a recent history of treatment following a fall; will attempt standing vitals at a later time, discussed with the RN and agreed with this plan.

## 2022-09-06 NOTE — ED TRIAGE NOTES
Patient arrives via EMS with complaints of dizziness and \"room spinning\", multiple syncopal episodes without fall, decreased eating and drinking and generalized weakness since Saturday. Patient was seen here on Saturday for GLF and denies any new falls. Patient lives in senior community with spouse and uses wheelchair. EMS reports BG of 197 in the field.

## 2022-09-07 VITALS
TEMPERATURE: 98.4 F | OXYGEN SATURATION: 99 % | DIASTOLIC BLOOD PRESSURE: 83 MMHG | WEIGHT: 85.5 LBS | HEIGHT: 62 IN | RESPIRATION RATE: 20 BRPM | BODY MASS INDEX: 15.73 KG/M2 | SYSTOLIC BLOOD PRESSURE: 148 MMHG | HEART RATE: 98 BPM

## 2022-09-07 LAB
APPEARANCE UR: CLEAR
ATRIAL RATE: 99 BPM
BACTERIA URNS QL MICRO: NEGATIVE /HPF
BILIRUB UR QL: NEGATIVE
CALCULATED P AXIS, ECG09: 59 DEGREES
CALCULATED R AXIS, ECG10: -36 DEGREES
CALCULATED T AXIS, ECG11: 58 DEGREES
COLOR UR: YELLOW
DIAGNOSIS, 93000: NORMAL
EPITH CASTS URNS QL MICRO: NORMAL /LPF (ref 0–5)
GLUCOSE UR STRIP.AUTO-MCNC: NEGATIVE MG/DL
HGB UR QL STRIP: NEGATIVE
KETONES UR QL STRIP.AUTO: NEGATIVE MG/DL
LEUKOCYTE ESTERASE UR QL STRIP.AUTO: ABNORMAL
NITRITE UR QL STRIP.AUTO: NEGATIVE
P-R INTERVAL, ECG05: 116 MS
PH UR STRIP: 5 [PH] (ref 5–8)
PROT UR STRIP-MCNC: NEGATIVE MG/DL
Q-T INTERVAL, ECG07: 342 MS
QRS DURATION, ECG06: 80 MS
QTC CALCULATION (BEZET), ECG08: 438 MS
RBC #/AREA URNS HPF: NEGATIVE /HPF (ref 0–5)
SP GR UR REFRACTOMETRY: >1.03 (ref 1–1.03)
UROBILINOGEN UR QL STRIP.AUTO: 0.2 EU/DL (ref 0.2–1)
VENTRICULAR RATE, ECG03: 99 BPM
WBC URNS QL MICRO: NORMAL /HPF (ref 0–4)
YEAST URNS QL MICRO: NORMAL

## 2022-09-07 NOTE — DISCHARGE INSTRUCTIONS
You were seen in the emergency department for lightheadedness. It is important that you stay well-hydrated, eat small frequent meals as tolerated as well as your ensures, and take your time when moving from a seated to standing or lying down to seated position in order to give your body time to adjust.    Please follow-up with your primary care doctor, Dr. rFank Davila, regarding your blood pressure medications, the thyroid nodule seen on CT scan in the emergency department, your recent weight loss, and to follow-up on your rib pain after your fall a few days ago. You have been prescribed Robaxin (a muscle relaxant), Lidoderm patches, and Tylenol for your rib pain. You may also use the Voltaren gel you have at home. Please return immediately to the emergency department if you have another fall with possible injuries, if you cannot tolerate food or fluids, if you have sudden onset chest pain with shortness of breath, significant worsening of your abdominal pain, persistent vomiting, or any other new and concerning symptoms.

## 2022-09-07 NOTE — ED NOTES
Prior to dc, confirmed pharma and reviewed scripts. Reviewed provider instructions and pt. To follow up with her pcp red thyroid and rib pain. Pt. Marquita Dinh understanding. Out to lobby to wait for ride in .  present with pt.

## 2022-09-12 ENCOUNTER — APPOINTMENT (OUTPATIENT)
Dept: CT IMAGING | Age: 72
End: 2022-09-12
Attending: STUDENT IN AN ORGANIZED HEALTH CARE EDUCATION/TRAINING PROGRAM
Payer: MEDICARE

## 2022-09-12 ENCOUNTER — HOSPITAL ENCOUNTER (EMERGENCY)
Age: 72
Discharge: HOME | End: 2022-09-12
Attending: STUDENT IN AN ORGANIZED HEALTH CARE EDUCATION/TRAINING PROGRAM
Payer: MEDICARE

## 2022-09-12 VITALS
DIASTOLIC BLOOD PRESSURE: 84 MMHG | HEART RATE: 100 BPM | SYSTOLIC BLOOD PRESSURE: 121 MMHG | OXYGEN SATURATION: 99 % | HEIGHT: 62 IN | RESPIRATION RATE: 18 BRPM | TEMPERATURE: 98 F | BODY MASS INDEX: 16.2 KG/M2 | WEIGHT: 88 LBS

## 2022-09-12 DIAGNOSIS — K59.00 CONSTIPATION, UNSPECIFIED CONSTIPATION TYPE: Primary | ICD-10-CM

## 2022-09-12 LAB
ALBUMIN SERPL-MCNC: 4.5 G/DL (ref 3.4–5)
ALBUMIN/GLOB SERPL: 1 {RATIO} (ref 0.8–1.7)
ALP SERPL-CCNC: 55 U/L (ref 45–117)
ALT SERPL-CCNC: 18 U/L (ref 13–56)
ANION GAP SERPL CALC-SCNC: 8 MMOL/L (ref 3–18)
AST SERPL-CCNC: 13 U/L (ref 10–38)
BASOPHILS # BLD: 0.1 K/UL (ref 0–0.1)
BASOPHILS NFR BLD: 1 % (ref 0–2)
BILIRUB SERPL-MCNC: 1.3 MG/DL (ref 0.2–1)
BUN SERPL-MCNC: 79 MG/DL (ref 7–18)
BUN/CREAT SERPL: 51 (ref 12–20)
CALCIUM SERPL-MCNC: 10.4 MG/DL (ref 8.5–10.1)
CHLORIDE SERPL-SCNC: 113 MMOL/L (ref 100–111)
CO2 SERPL-SCNC: 16 MMOL/L (ref 21–32)
CREAT SERPL-MCNC: 1.55 MG/DL (ref 0.6–1.3)
DIFFERENTIAL METHOD BLD: ABNORMAL
EOSINOPHIL # BLD: 0 K/UL (ref 0–0.4)
EOSINOPHIL NFR BLD: 1 % (ref 0–5)
ERYTHROCYTE [DISTWIDTH] IN BLOOD BY AUTOMATED COUNT: 17.3 % (ref 11.6–14.5)
GLOBULIN SER CALC-MCNC: 4.3 G/DL (ref 2–4)
GLUCOSE SERPL-MCNC: 146 MG/DL (ref 74–99)
HCT VFR BLD AUTO: 32.3 % (ref 35–45)
HGB BLD-MCNC: 10 G/DL (ref 12–16)
IMM GRANULOCYTES # BLD AUTO: 0 K/UL (ref 0–0.04)
IMM GRANULOCYTES NFR BLD AUTO: 0 % (ref 0–0.5)
LIPASE SERPL-CCNC: 311 U/L (ref 73–393)
LYMPHOCYTES # BLD: 1.9 K/UL (ref 0.9–3.6)
LYMPHOCYTES NFR BLD: 33 % (ref 21–52)
MCH RBC QN AUTO: 23.1 PG (ref 24–34)
MCHC RBC AUTO-ENTMCNC: 31 G/DL (ref 31–37)
MCV RBC AUTO: 74.6 FL (ref 78–100)
MONOCYTES # BLD: 0.4 K/UL (ref 0.05–1.2)
MONOCYTES NFR BLD: 7 % (ref 3–10)
NEUTS SEG # BLD: 3.3 K/UL (ref 1.8–8)
NEUTS SEG NFR BLD: 58 % (ref 40–73)
NRBC # BLD: 0 K/UL (ref 0–0.01)
NRBC BLD-RTO: 0 PER 100 WBC
PLATELET # BLD AUTO: 340 K/UL (ref 135–420)
PMV BLD AUTO: 10.5 FL (ref 9.2–11.8)
POTASSIUM SERPL-SCNC: 5.6 MMOL/L (ref 3.5–5.5)
PROT SERPL-MCNC: 8.8 G/DL (ref 6.4–8.2)
RBC # BLD AUTO: 4.33 M/UL (ref 4.2–5.3)
SODIUM SERPL-SCNC: 137 MMOL/L (ref 136–145)
WBC # BLD AUTO: 5.6 K/UL (ref 4.6–13.2)

## 2022-09-12 PROCEDURE — 85025 COMPLETE CBC W/AUTO DIFF WBC: CPT

## 2022-09-12 PROCEDURE — 99284 EMERGENCY DEPT VISIT MOD MDM: CPT

## 2022-09-12 PROCEDURE — 74176 CT ABD & PELVIS W/O CONTRAST: CPT

## 2022-09-12 PROCEDURE — 74011250637 HC RX REV CODE- 250/637: Performed by: STUDENT IN AN ORGANIZED HEALTH CARE EDUCATION/TRAINING PROGRAM

## 2022-09-12 PROCEDURE — 80053 COMPREHEN METABOLIC PANEL: CPT

## 2022-09-12 PROCEDURE — 74011250636 HC RX REV CODE- 250/636: Performed by: STUDENT IN AN ORGANIZED HEALTH CARE EDUCATION/TRAINING PROGRAM

## 2022-09-12 PROCEDURE — 83690 ASSAY OF LIPASE: CPT

## 2022-09-12 PROCEDURE — 96361 HYDRATE IV INFUSION ADD-ON: CPT

## 2022-09-12 PROCEDURE — 93005 ELECTROCARDIOGRAM TRACING: CPT

## 2022-09-12 PROCEDURE — 96360 HYDRATION IV INFUSION INIT: CPT

## 2022-09-12 RX ORDER — SODIUM POLYSTYRENE SULFONATE 15 G/60ML
15 SUSPENSION ORAL; RECTAL
Status: COMPLETED | OUTPATIENT
Start: 2022-09-12 | End: 2022-09-12

## 2022-09-12 RX ORDER — MAGNESIUM CITRATE
SOLUTION, ORAL ORAL
Qty: 295 ML | Refills: 0 | Status: SHIPPED | OUTPATIENT
Start: 2022-09-12

## 2022-09-12 RX ADMIN — SODIUM CHLORIDE 1000 ML: 900 INJECTION, SOLUTION INTRAVENOUS at 21:00

## 2022-09-12 RX ADMIN — SODIUM POLYSTYRENE SULFONATE 15 G: 15 SUSPENSION ORAL; RECTAL at 22:00

## 2022-09-12 RX ADMIN — SODIUM CHLORIDE 1000 ML: 9 INJECTION, SOLUTION INTRAVENOUS at 19:12

## 2022-09-13 LAB
ATRIAL RATE: 121 BPM
CALCULATED P AXIS, ECG09: 76 DEGREES
CALCULATED R AXIS, ECG10: -38 DEGREES
CALCULATED T AXIS, ECG11: 65 DEGREES
DIAGNOSIS, 93000: NORMAL
P-R INTERVAL, ECG05: 124 MS
Q-T INTERVAL, ECG07: 310 MS
QRS DURATION, ECG06: 72 MS
QTC CALCULATION (BEZET), ECG08: 440 MS
VENTRICULAR RATE, ECG03: 121 BPM

## 2022-09-13 NOTE — DISCHARGE INSTRUCTIONS
Take MiraLAX daily twice a day once in the morning and once in the evening. Continue your Dulcolax. Drink the magnesium citrate tomorrow to help with your constipation. Follow-up with your primary care doctor.

## 2022-09-21 ENCOUNTER — APPOINTMENT (OUTPATIENT)
Dept: CT IMAGING | Age: 72
End: 2022-09-21
Attending: PHYSICIAN ASSISTANT
Payer: MEDICARE

## 2022-09-21 ENCOUNTER — HOSPITAL ENCOUNTER (EMERGENCY)
Age: 72
Discharge: HOME | End: 2022-09-22
Attending: EMERGENCY MEDICINE
Payer: MEDICARE

## 2022-09-21 DIAGNOSIS — D64.9 ANEMIA, UNSPECIFIED TYPE: ICD-10-CM

## 2022-09-21 DIAGNOSIS — K59.00 CONSTIPATION, UNSPECIFIED CONSTIPATION TYPE: ICD-10-CM

## 2022-09-21 DIAGNOSIS — R74.8 ELEVATED LIPASE: ICD-10-CM

## 2022-09-21 DIAGNOSIS — R10.32 ABDOMINAL PAIN, LLQ (LEFT LOWER QUADRANT): Primary | ICD-10-CM

## 2022-09-21 LAB
ALBUMIN SERPL-MCNC: 4.1 G/DL (ref 3.4–5)
ALBUMIN/GLOB SERPL: 1.1 {RATIO} (ref 0.8–1.7)
ALP SERPL-CCNC: 67 U/L (ref 45–117)
ALT SERPL-CCNC: 27 U/L (ref 13–56)
ANION GAP SERPL CALC-SCNC: 8 MMOL/L (ref 3–18)
APPEARANCE UR: CLEAR
AST SERPL-CCNC: 11 U/L (ref 10–38)
BACTERIA URNS QL MICRO: ABNORMAL /HPF
BASOPHILS # BLD: 0 K/UL (ref 0–0.1)
BASOPHILS NFR BLD: 1 % (ref 0–2)
BILIRUB SERPL-MCNC: 0.3 MG/DL (ref 0.2–1)
BILIRUB UR QL: NEGATIVE
BUN SERPL-MCNC: 48 MG/DL (ref 7–18)
BUN/CREAT SERPL: 53 (ref 12–20)
CALCIUM SERPL-MCNC: 10.2 MG/DL (ref 8.5–10.1)
CHLORIDE SERPL-SCNC: 110 MMOL/L (ref 100–111)
CO2 SERPL-SCNC: 21 MMOL/L (ref 21–32)
COLOR UR: YELLOW
CREAT SERPL-MCNC: 0.91 MG/DL (ref 0.6–1.3)
DIFFERENTIAL METHOD BLD: ABNORMAL
EOSINOPHIL # BLD: 0.1 K/UL (ref 0–0.4)
EOSINOPHIL NFR BLD: 1 % (ref 0–5)
EPITH CASTS URNS QL MICRO: ABNORMAL /LPF (ref 0–5)
ERYTHROCYTE [DISTWIDTH] IN BLOOD BY AUTOMATED COUNT: 16.3 % (ref 11.6–14.5)
GLOBULIN SER CALC-MCNC: 3.6 G/DL (ref 2–4)
GLUCOSE SERPL-MCNC: 219 MG/DL (ref 74–99)
GLUCOSE UR STRIP.AUTO-MCNC: NEGATIVE MG/DL
HCT VFR BLD AUTO: 34.6 % (ref 35–45)
HGB BLD-MCNC: 11 G/DL (ref 12–16)
HGB UR QL STRIP: NEGATIVE
IMM GRANULOCYTES # BLD AUTO: 0 K/UL (ref 0–0.04)
IMM GRANULOCYTES NFR BLD AUTO: 0 % (ref 0–0.5)
KETONES UR QL STRIP.AUTO: NEGATIVE MG/DL
LACTATE BLD-SCNC: 0.9 MMOL/L (ref 0.4–2)
LEUKOCYTE ESTERASE UR QL STRIP.AUTO: ABNORMAL
LIPASE SERPL-CCNC: 847 U/L (ref 73–393)
LYMPHOCYTES # BLD: 2.3 K/UL (ref 0.9–3.6)
LYMPHOCYTES NFR BLD: 42 % (ref 21–52)
MAGNESIUM SERPL-MCNC: 2.7 MG/DL (ref 1.6–2.6)
MCH RBC QN AUTO: 23.5 PG (ref 24–34)
MCHC RBC AUTO-ENTMCNC: 31.8 G/DL (ref 31–37)
MCV RBC AUTO: 73.8 FL (ref 78–100)
MONOCYTES # BLD: 0.4 K/UL (ref 0.05–1.2)
MONOCYTES NFR BLD: 8 % (ref 3–10)
NEUTS SEG # BLD: 2.6 K/UL (ref 1.8–8)
NEUTS SEG NFR BLD: 48 % (ref 40–73)
NITRITE UR QL STRIP.AUTO: NEGATIVE
NRBC # BLD: 0 K/UL (ref 0–0.01)
NRBC BLD-RTO: 0 PER 100 WBC
PH UR STRIP: 5 [PH] (ref 5–8)
PLATELET # BLD AUTO: 228 K/UL (ref 135–420)
PMV BLD AUTO: 10.8 FL (ref 9.2–11.8)
POTASSIUM SERPL-SCNC: 5.2 MMOL/L (ref 3.5–5.5)
PROT SERPL-MCNC: 7.7 G/DL (ref 6.4–8.2)
PROT UR STRIP-MCNC: NEGATIVE MG/DL
RBC # BLD AUTO: 4.69 M/UL (ref 4.2–5.3)
RBC #/AREA URNS HPF: ABNORMAL /HPF (ref 0–5)
SODIUM SERPL-SCNC: 139 MMOL/L (ref 136–145)
SP GR UR REFRACTOMETRY: >1.03 (ref 1–1.03)
TROPONIN-HIGH SENSITIVITY: 8 NG/L (ref 0–54)
UROBILINOGEN UR QL STRIP.AUTO: 0.2 EU/DL (ref 0.2–1)
WBC # BLD AUTO: 5.4 K/UL (ref 4.6–13.2)
WBC URNS QL MICRO: ABNORMAL /HPF (ref 0–4)

## 2022-09-21 PROCEDURE — 81001 URINALYSIS AUTO W/SCOPE: CPT

## 2022-09-21 PROCEDURE — 83690 ASSAY OF LIPASE: CPT

## 2022-09-21 PROCEDURE — 74011000636 HC RX REV CODE- 636: Performed by: EMERGENCY MEDICINE

## 2022-09-21 PROCEDURE — 83735 ASSAY OF MAGNESIUM: CPT

## 2022-09-21 PROCEDURE — 96376 TX/PRO/DX INJ SAME DRUG ADON: CPT

## 2022-09-21 PROCEDURE — 96375 TX/PRO/DX INJ NEW DRUG ADDON: CPT

## 2022-09-21 PROCEDURE — 74011250636 HC RX REV CODE- 250/636: Performed by: PHYSICIAN ASSISTANT

## 2022-09-21 PROCEDURE — 87040 BLOOD CULTURE FOR BACTERIA: CPT

## 2022-09-21 PROCEDURE — 93005 ELECTROCARDIOGRAM TRACING: CPT

## 2022-09-21 PROCEDURE — 83605 ASSAY OF LACTIC ACID: CPT

## 2022-09-21 PROCEDURE — 84484 ASSAY OF TROPONIN QUANT: CPT

## 2022-09-21 PROCEDURE — 96374 THER/PROPH/DIAG INJ IV PUSH: CPT

## 2022-09-21 PROCEDURE — 99285 EMERGENCY DEPT VISIT HI MDM: CPT

## 2022-09-21 PROCEDURE — 85025 COMPLETE CBC W/AUTO DIFF WBC: CPT

## 2022-09-21 PROCEDURE — 74011000258 HC RX REV CODE- 258: Performed by: PHYSICIAN ASSISTANT

## 2022-09-21 PROCEDURE — 96365 THER/PROPH/DIAG IV INF INIT: CPT

## 2022-09-21 PROCEDURE — 80053 COMPREHEN METABOLIC PANEL: CPT

## 2022-09-21 PROCEDURE — 74177 CT ABD & PELVIS W/CONTRAST: CPT

## 2022-09-21 RX ORDER — MORPHINE SULFATE 2 MG/ML
2 INJECTION, SOLUTION INTRAMUSCULAR; INTRAVENOUS
Status: COMPLETED | OUTPATIENT
Start: 2022-09-21 | End: 2022-09-21

## 2022-09-21 RX ORDER — ONDANSETRON 2 MG/ML
4 INJECTION INTRAMUSCULAR; INTRAVENOUS
Status: COMPLETED | OUTPATIENT
Start: 2022-09-21 | End: 2022-09-21

## 2022-09-21 RX ORDER — MORPHINE SULFATE 4 MG/ML
4 INJECTION INTRAVENOUS
Status: COMPLETED | OUTPATIENT
Start: 2022-09-21 | End: 2022-09-21

## 2022-09-21 RX ADMIN — PIPERACILLIN AND TAZOBACTAM 4.5 G: 4; .5 INJECTION, POWDER, FOR SOLUTION INTRAVENOUS; PARENTERAL at 18:00

## 2022-09-21 RX ADMIN — SODIUM CHLORIDE 1000 ML: 900 INJECTION, SOLUTION INTRAVENOUS at 17:00

## 2022-09-21 RX ADMIN — MORPHINE SULFATE 2 MG: 2 INJECTION, SOLUTION INTRAMUSCULAR; INTRAVENOUS at 23:12

## 2022-09-21 RX ADMIN — MORPHINE SULFATE 4 MG: 4 INJECTION, SOLUTION INTRAMUSCULAR; INTRAVENOUS at 17:36

## 2022-09-21 RX ADMIN — ONDANSETRON 4 MG: 2 INJECTION INTRAMUSCULAR; INTRAVENOUS at 23:17

## 2022-09-21 RX ADMIN — IOPAMIDOL 70 ML: 612 INJECTION, SOLUTION INTRAVENOUS at 18:37

## 2022-09-21 RX ADMIN — ONDANSETRON 4 MG: 2 INJECTION INTRAMUSCULAR; INTRAVENOUS at 17:36

## 2022-09-21 NOTE — ED NOTES
6:06 PM Assumed care of the pt at this time. Discussed with ROBIN Angelo concerning patient Shannen Reddy, standard discussion of reason for visit, HPI, ROS, PE, and current results available. Recommendation for obtaining pending lactic acid and CT imaging followed by bedside re-evaluation to dispo the pt. Anita Miller PA-C     1:35 AM CT resulted, no acute process, labs are at baseline. Pt seen and re-evaluated she expresses concerns that she has lost a significant amount of weight over the past 3 months. I have consulted with ED attending Dr. Andrea Paula who has reviewed the pt's CT and labs. Dr. Andrea Paula recommends miralax/colace for likely constipation noted on CT followed by close outpatient GI follow-up. Anita Miller PA-C     Disposition: d/c    Dictation disclaimer:  Please note that this dictation was completed with [employer], the computer voice recognition software. Quite often unanticipated grammatical, syntax, homophones, and other interpretive errors are inadvertently transcribed by the computer software. Please disregard these errors. Please excuse any errors that have escaped final proofreading.

## 2022-09-21 NOTE — ED TRIAGE NOTES
Client reports chronic abdominal pain over 1 year, hx n/v/d at this present time and  having unrelieved pain in abdominal. AXOX4. Unable to keep fluids down.

## 2022-09-21 NOTE — ED PROVIDER NOTES
EMERGENCY DEPARTMENT HISTORY AND PHYSICAL EXAM    6:12 PM      Date: 9/21/2022  Patient Name: Jerry Velez    History of Presenting Illness     Chief Complaint   Patient presents with    Vomiting    Abdominal Pain         History Provided By: Patient    Additional History (Context): Jerry Velez is a 81 y.o. female with  hx of diverticulitis, constipation, depression, and other noted PMH  who presents with complaint of left lower quadrant abdominal pain associated with nausea, vomiting, diarrhea x4 days. Patient notes reduced appetite as well. Patient notes she has had intermittent symptoms for the past 3 months, notes weight loss of 30 pounds. Notes she has been evaluated by her primary care physician for the symptoms, was placed on antibiotics which she completed as prescribed. Patient denies alleviating or exacerbating factors. PCP: Reba Grier MD    Current Facility-Administered Medications   Medication Dose Route Frequency Provider Last Rate Last Admin    [START ON 9/22/2022] piperacillin-tazobactam (ZOSYN) 3.375 g in 0.9% sodium chloride (MBP/ADV) 100 mL MBP  3.375 g IntraVENous Q8H Izzy Hayes PA        piperacillin-tazobactam (ZOSYN) 4.5 g in 0.9% sodium chloride (MBP/ADV) 100 mL MBP  4.5 g IntraVENous ONCE Elda Boston PA        iopamidoL (ISOVUE 300) 61 % contrast injection  mL   mL IntraVENous RAD ONCE Santos Saravia MD         Current Outpatient Medications   Medication Sig Dispense Refill    magnesium citrate solution Take 1/3 of bottle every 8 hours until finished or until you have a bowel movement. 295 mL 0    lidocaine (Lidoderm) 5 % Apply patch to the affected area for 12 hours a day and remove for 12 hours a day. 30 Each 0    acetaminophen (TylenoL) 325 mg tablet Take 3 Tablets by mouth every six (6) hours as needed for Pain. 100 Tablet 0    acetaminophen (TYLENOL) 325 mg tablet Take 2 Tablets by mouth every four (4) hours as needed for Pain.  21 Tablet 0    ondansetron (Zofran ODT) 4 mg disintegrating tablet 1 Tablet by SubLINGual route every eight (8) hours as needed for Nausea or Vomiting. 20 Tablet 0    megestrol (MEGACE) 400 mg/10 mL (40 mg/mL) suspension TAKE 5 ML BY MOUTH THREE TIMES DAILY FOR APPETITE  1    Insulin Needles, Disposable, 31 gauge x 5/16\" ndle USE AS DIRECTED WITH INSULIN PEN DAILY  3    Oxygen 3LPM O2 as needed   Indications: DME: [facility]      pantoprazole (PROTONIX) 20 mg tablet Take 20 mg by mouth daily. benzonatate (TESSALON) 100 mg capsule Take 100 mg by mouth three (3) times daily as needed for Cough. mirtazapine (REMERON) 15 mg tablet Take 15 mg by mouth nightly. metFORMIN (GLUCOPHAGE) 500 mg tablet Take 1 Tab by mouth two (2) times daily (with meals). 60 Tab 0    insulin glargine (LANTUS U-100 INSULIN) 100 unit/mL injection 35 Units by SubCUTAneous route daily. (Patient taking differently: 40 Units by SubCUTAneous route daily. ) 1 Vial 0    albuterol-ipratropium (DUO-NEB) 2.5 mg-0.5 mg/3 ml nebu 3 mL by Nebulization route every four (4) hours as needed for Other (shortness of breath). 30 Nebule 0    diclofenac (VOLTAREN) 1 % gel Apply 2 g to affected area four (4) times daily. Gel should be measured and applied using the supplied dosing card. Apply dose (2 gm or 4 gm) to each location. If treatment site is the hands, patients should wait at least one (1) hour to wash their hands. APPLY TO bilateral knees 100 g 0    melatonin 3 mg tablet Take 1 Tab by mouth nightly. (Patient taking differently: Take 5 mg by mouth nightly.) 30 Tab 0    Omeprazole Magnesium (PRILOSEC) 10 mg suDR Take 20 mg by mouth two (2) times a day. 60 Each 0    raNITIdine (ZANTAC) 150 mg tablet Take 1 Tab by mouth nightly. 15 Tab 0    lisinopril-hydroCHLOROthiazide (PRINZIDE, ZESTORETIC) 20-12.5 mg per tablet Take 1 Tab by mouth daily.  Indications: hypertension      nitroglycerin (NITROSTAT) 0.4 mg SL tablet 1 Tab by SubLINGual route as needed for Chest Pain. 25 Tab 1    fluticasone-salmeterol (ADVAIR) 250-50 mcg/dose diskus inhaler Take 1 puff by inhalation every twelve (12) hours. (Patient taking differently: Take 1 Puff by inhalation daily. ) 1 Inhaler 0    albuterol (PROVENTIL HFA, VENTOLIN HFA, PROAIR HFA) 90 mcg/actuation inhaler Take 1 puff by inhalation daily as needed for Wheezing. 1 Inhaler 0    clopidogrel (PLAVIX) 75 mg tablet Take 1 tablet by mouth daily. 30 tablet 11    atorvastatin (LIPITOR) 40 mg tablet Take 1 tablet by mouth nightly. 30 tablet 11    aspirin 81 mg chewable tablet Take 1 tablet by mouth daily. 30 tablet 0    polyethylene glycol (MIRALAX) 17 gram packet Take 1 packet by mouth daily. 14 each 0    fluoxetine (PROZAC) 20 mg capsule Take 20 mg by mouth daily. tiotropium (SPIRIVA WITH HANDIHALER) 18 mcg inhalation capsule Take 1 Cap by inhalation daily. Past History     Past Medical History:  Past Medical History:   Diagnosis Date    Arthritis     \"generalized\"    Asthma     CAD in native artery     NSTEMI (Sep 2014); diffuse 65% pLAD, minimal disease RCA & LCx. pLAD FFR 0.93.  LV  no RWMA (echo and LV angio)    Chronic neck pain     Chronic pain     left knee    Chronic pain syndrome     COPD (chronic obstructive pulmonary disease) ([facility])     Depression     Diabetes ([facility])     Diverticulitis     GERD (gastroesophageal reflux disease)     Heart disease     Hidradenitis 9/11/2014    Hypertension     Left knee pain     Narcotic dependence ([address]     Pneumonia     Right wrist pain     Sarcoidosis        Past Surgical History:  Past Surgical History:   Procedure Laterality Date    BRONCHOSCOPY  10/2019    HX BREAST BIOPSY Right     9/10/14: She said tag in place from [address]    HX CHOLECYSTECTOMY  2005    HX HYSTERECTOMY      HX KNEE ARTHROSCOPY Left        Family History:  Family History   Problem Relation Age of Onset    Hypertension Father     Diabetes Mother        Social History:  Social History     Tobacco Use Smoking status: Former     Years: 46.00     Types: Cigarettes     Quit date: 2018     Years since quittin.5    Smokeless tobacco: Never    Tobacco comments: Only smokes 1 cigarette per day   Substance Use Topics    Alcohol use: Yes     Alcohol/week: 1.7 standard drinks     Types: 2 Glasses of wine per week     Comment: socially    Drug use: Yes     Types: Cocaine, Marijuana     Comment: 6 months ago       Allergies: Allergies   Allergen Reactions    Latex Hives and Itching    Ciprofloxacin Hives    Penicillins Nausea Only    Shellfish Derived Hives and Swelling     Swelling of legs         Review of Systems       Review of Systems   Constitutional:  Positive for appetite change and unexpected weight change. Negative for chills and fever. Respiratory:  Negative for shortness of breath. Cardiovascular:  Negative for chest pain. Gastrointestinal:  Positive for abdominal pain, diarrhea, nausea and vomiting. Skin:  Negative for rash. Neurological:  Negative for weakness. All other systems reviewed and are negative. Physical Exam   Visit Vitals  /88 (BP 1 Location: Left upper arm, BP Patient Position: At rest)   Pulse (!) 125   Temp 98.4 °F (36.9 °C)   Resp 16   Ht 5' 2\" (1.575 m)   Wt 39.9 kg (88 lb)   SpO2 100%   BMI 16.10 kg/m²         Physical Exam  Vitals and nursing note reviewed. Constitutional:       General: She is not in acute distress. Appearance: She is not diaphoretic. Comments: thin   HENT:      Head: Normocephalic and atraumatic. Cardiovascular:      Rate and Rhythm: Normal rate and regular rhythm. Heart sounds: Normal heart sounds. No murmur heard. No friction rub. No gallop. Pulmonary:      Effort: Pulmonary effort is normal. No respiratory distress. Breath sounds: Normal breath sounds. No wheezing or rales. Abdominal:      General: Abdomen is flat. Bowel sounds are normal.      Palpations: Abdomen is soft. Tenderness:  There is abdominal tenderness in the left lower quadrant. There is no right CVA tenderness, left CVA tenderness, guarding or rebound. Negative signs include Toure's sign. Musculoskeletal:         General: Normal range of motion. Cervical back: Normal range of motion and neck supple. Skin:     General: Skin is warm. Findings: No rash. Neurological:      Mental Status: She is alert. Diagnostic Study Results     Labs -  Recent Results (from the past 12 hour(s))   CBC WITH AUTOMATED DIFF    Collection Time: 09/21/22  4:20 PM   Result Value Ref Range    WBC 5.4 4.6 - 13.2 K/uL    RBC 4.69 4.20 - 5.30 M/uL    HGB 11.0 (L) 12.0 - 16.0 g/dL    HCT 34.6 (L) 35.0 - 45.0 %    MCV 73.8 (L) 78.0 - 100.0 FL    MCH 23.5 (L) 24.0 - 34.0 PG    MCHC 31.8 31.0 - 37.0 g/dL    RDW 16.3 (H) 11.6 - 14.5 %    PLATELET 786 473 - 651 K/uL    MPV 10.8 9.2 - 11.8 FL    NRBC 0.0 0  WBC    ABSOLUTE NRBC 0.00 0.00 - 0.01 K/uL    NEUTROPHILS 48 40 - 73 %    LYMPHOCYTES 42 21 - 52 %    MONOCYTES 8 3 - 10 %    EOSINOPHILS 1 0 - 5 %    BASOPHILS 1 0 - 2 %    IMMATURE GRANULOCYTES 0 0.0 - 0.5 %    ABS. NEUTROPHILS 2.6 1.8 - 8.0 K/UL    ABS. LYMPHOCYTES 2.3 0.9 - 3.6 K/UL    ABS. MONOCYTES 0.4 0.05 - 1.2 K/UL    ABS. EOSINOPHILS 0.1 0.0 - 0.4 K/UL    ABS. BASOPHILS 0.0 0.0 - 0.1 K/UL    ABS. IMM.  GRANS. 0.0 0.00 - 0.04 K/UL    DF AUTOMATED     METABOLIC PANEL, COMPREHENSIVE    Collection Time: 09/21/22  4:20 PM   Result Value Ref Range    Sodium 139 136 - 145 mmol/L    Potassium 5.2 3.5 - 5.5 mmol/L    Chloride 110 100 - 111 mmol/L    CO2 21 21 - 32 mmol/L    Anion gap 8 3.0 - 18 mmol/L    Glucose 219 (H) 74 - 99 mg/dL    BUN 48 (H) 7.0 - 18 MG/DL    Creatinine 0.91 0.6 - 1.3 MG/DL    BUN/Creatinine ratio 53 (H) 12 - 20      GFR est AA >60 >60 ml/min/1.73m2    GFR est non-AA >60 >60 ml/min/1.73m2    Calcium 10.2 (H) 8.5 - 10.1 MG/DL    Bilirubin, total 0.3 0.2 - 1.0 MG/DL    ALT (SGPT) 27 13 - 56 U/L    AST (SGOT) 11 10 - 38 U/L Alk. phosphatase 67 45 - 117 U/L    Protein, total 7.7 6.4 - 8.2 g/dL    Albumin 4.1 3.4 - 5.0 g/dL    Globulin 3.6 2.0 - 4.0 g/dL    A-G Ratio 1.1 0.8 - 1.7     LIPASE    Collection Time: 09/21/22  4:20 PM   Result Value Ref Range    Lipase 847 (H) 73 - 393 U/L   MAGNESIUM    Collection Time: 09/21/22  4:20 PM   Result Value Ref Range    Magnesium 2.7 (H) 1.6 - 2.6 mg/dL   TROPONIN-HIGH SENSITIVITY    Collection Time: 09/21/22  4:20 PM   Result Value Ref Range    Troponin-High Sensitivity 8 0 - 54 ng/L       Radiologic Studies -   CT ABD PELV W CONT    (Results Pending)         Medical Decision Making   I am the first provider for this patient. I reviewed the vital signs, available nursing notes, past medical history, past surgical history, family history and social history. Vital Signs-Reviewed the patient's vital signs    Records Reviewed: Nursing Notes, Old Medical Records, and Previous electrocardiograms (Time of Review: 6:12 PM)    ED Course: Progress Notes, Reevaluation, and Consults:  PROGRESS NOTE:  6:15 PM   Patient care will be transferred to Anita Miller PA-C. Discussed available diagnostic results and care plan at length. Pending lactic, CT abd/pelvis. Written by Nadai Masterson PA-C     Diagnosis     Clinical Impression:   1. Abdominal pain, LLQ (left lower quadrant)    2. Anemia, unspecified type    3.  Elevated lipase        Disposition: TBD     Follow-up Information       Follow up With Specialties Details Why [address] [facility] EMERGENCY DEPT Emergency Medicine  If symptoms worsen [address] [address]  Mary Beth 6  Schedule an appointment as soon as possible for a visit   Alvaro Daniel [address]  [address]  [phone]             Patient's Medications   Start Taking    No medications on file   Continue Taking    ACETAMINOPHEN (TYLENOL) 325 MG TABLET    Take 2 Tablets by mouth every four (4) hours as needed for Pain.    ACETAMINOPHEN (TYLENOL) 325 MG TABLET    Take 3 Tablets by mouth every six (6) hours as needed for Pain. ALBUTEROL (PROVENTIL HFA, VENTOLIN HFA, PROAIR HFA) 90 MCG/ACTUATION INHALER    Take 1 puff by inhalation daily as needed for Wheezing. ALBUTEROL-IPRATROPIUM (DUO-NEB) 2.5 MG-0.5 MG/3 ML NEBU    3 mL by Nebulization route every four (4) hours as needed for Other (shortness of breath). ASPIRIN 81 MG CHEWABLE TABLET    Take 1 tablet by mouth daily. ATORVASTATIN (LIPITOR) 40 MG TABLET    Take 1 tablet by mouth nightly. BENZONATATE (TESSALON) 100 MG CAPSULE    Take 100 mg by mouth three (3) times daily as needed for Cough. CLOPIDOGREL (PLAVIX) 75 MG TABLET    Take 1 tablet by mouth daily. DICLOFENAC (VOLTAREN) 1 % GEL    Apply 2 g to affected area four (4) times daily. Gel should be measured and applied using the supplied dosing card. Apply dose (2 gm or 4 gm) to each location. If treatment site is the hands, patients should wait at least one (1) hour to wash their hands. APPLY TO bilateral knees    FLUOXETINE (PROZAC) 20 MG CAPSULE    Take 20 mg by mouth daily. FLUTICASONE-SALMETEROL (ADVAIR) 250-50 MCG/DOSE DISKUS INHALER    Take 1 puff by inhalation every twelve (12) hours. INSULIN GLARGINE (LANTUS U-100 INSULIN) 100 UNIT/ML INJECTION    35 Units by SubCUTAneous route daily. INSULIN NEEDLES, DISPOSABLE, 31 GAUGE X 5/16\" NDLE    USE AS DIRECTED WITH INSULIN PEN DAILY    LIDOCAINE (LIDODERM) 5 %    Apply patch to the affected area for 12 hours a day and remove for 12 hours a day. LISINOPRIL-HYDROCHLOROTHIAZIDE (PRINZIDE, ZESTORETIC) 20-12.5 MG PER TABLET    Take 1 Tab by mouth daily. Indications: hypertension    MAGNESIUM CITRATE SOLUTION    Take 1/3 of bottle every 8 hours until finished or until you have a bowel movement.     MEGESTROL (MEGACE) 400 MG/10 ML (40 MG/ML) SUSPENSION    TAKE 5 ML BY MOUTH THREE TIMES DAILY FOR APPETITE    MELATONIN 3 MG TABLET    Take 1 Tab by mouth nightly. METFORMIN (GLUCOPHAGE) 500 MG TABLET    Take 1 Tab by mouth two (2) times daily (with meals). MIRTAZAPINE (REMERON) 15 MG TABLET    Take 15 mg by mouth nightly. NITROGLYCERIN (NITROSTAT) 0.4 MG SL TABLET    1 Tab by SubLINGual route as needed for Chest Pain. OMEPRAZOLE MAGNESIUM (PRILOSEC) 10 MG SUDR    Take 20 mg by mouth two (2) times a day. ONDANSETRON (ZOFRAN ODT) 4 MG DISINTEGRATING TABLET    1 Tablet by SubLINGual route every eight (8) hours as needed for Nausea or Vomiting. OXYGEN    3LPM O2 as needed   Indications: DME: [facility]    PANTOPRAZOLE (PROTONIX) 20 MG TABLET    Take 20 mg by mouth daily. POLYETHYLENE GLYCOL (MIRALAX) 17 GRAM PACKET    Take 1 packet by mouth daily. RANITIDINE (ZANTAC) 150 MG TABLET    Take 1 Tab by mouth nightly. TIOTROPIUM (SPIRIVA WITH HANDIHALER) 18 MCG INHALATION CAPSULE    Take 1 Cap by inhalation daily. These Medications have changed    No medications on file   Stop Taking    No medications on file       Dictation disclaimer:  Please note that this dictation was completed with [employer], the [employer] voice recognition software. Quite often unanticipated grammatical, syntax, homophones, and other interpretive errors are inadvertently transcribed by the computer software. Please disregard these errors. Please excuse any errors that have escaped final proofreading.

## 2022-09-21 NOTE — DISCHARGE INSTRUCTIONS
[facility] Activation    Thank you for requesting access to [facility]. Please follow the instructions below to securely access and download your online medical record. [facility] allows you to send messages to your doctor, view your test results, renew your prescriptions, schedule appointments, and more. How Do I Sign Up? In your internet browser, go to [email or web address]  Click on the First Time User? Click Here link in the Sign In box. You will be redirect to the New Member Sign Up page. Enter your [facility] Access Code exactly as it appears below. You will not need to use this code after youve completed the sign-up process. If you do not sign up before the expiration date, you must request a new code. [facility] Access Code: [email or web address]  Expires: 10/27/2022  6:49 PM (This is the date your [facility] access code will )    Enter the last four digits of your Social Security Number (xxxx) and Date of Birth (mm/dd/yyyy) as indicated and click Submit. You will be taken to the next sign-up page. Create a [facility] ID. This will be your [facility] login ID and cannot be changed, so think of one that is secure and easy to remember. Create a [facility] password. You can change your password at any time. Enter your Password Reset Question and Answer. This can be used at a later time if you forget your password. Enter your e-mail address. You will receive e-mail notification when new information is available in [address] Click Sign Up. You can now view and download portions of your medical record. Click the [place] link to download a portable copy of your medical information. Additional Information    If you have questions, please visit the Frequently Asked Questions section of the [facility] website at [email or web address] Remember, [facility] is NOT to be used for urgent needs. For medical emergencies, dial 911.

## 2022-09-22 VITALS
WEIGHT: 88 LBS | OXYGEN SATURATION: 99 % | HEIGHT: 62 IN | SYSTOLIC BLOOD PRESSURE: 119 MMHG | HEART RATE: 99 BPM | DIASTOLIC BLOOD PRESSURE: 76 MMHG | BODY MASS INDEX: 16.2 KG/M2 | TEMPERATURE: 98.3 F | RESPIRATION RATE: 16 BRPM

## 2022-09-22 LAB
ATRIAL RATE: 101 BPM
CALCULATED P AXIS, ECG09: 54 DEGREES
CALCULATED R AXIS, ECG10: 0 DEGREES
CALCULATED T AXIS, ECG11: 60 DEGREES
DIAGNOSIS, 93000: NORMAL
P-R INTERVAL, ECG05: 116 MS
Q-T INTERVAL, ECG07: 332 MS
QRS DURATION, ECG06: 68 MS
QTC CALCULATION (BEZET), ECG08: 430 MS
VENTRICULAR RATE, ECG03: 101 BPM

## 2022-09-22 PROCEDURE — 96366 THER/PROPH/DIAG IV INF ADDON: CPT

## 2022-09-22 PROCEDURE — 74011250636 HC RX REV CODE- 250/636: Performed by: PHYSICIAN ASSISTANT

## 2022-09-22 PROCEDURE — 74011000258 HC RX REV CODE- 258: Performed by: PHYSICIAN ASSISTANT

## 2022-09-22 RX ORDER — DOCUSATE SODIUM 100 MG/1
100 CAPSULE, LIQUID FILLED ORAL 2 TIMES DAILY
Qty: 30 CAPSULE | Refills: 0 | Status: SHIPPED | OUTPATIENT
Start: 2022-09-22

## 2022-09-22 RX ORDER — FAMOTIDINE 20 MG/1
20 TABLET, FILM COATED ORAL 2 TIMES DAILY
Qty: 20 TABLET | Refills: 0 | Status: SHIPPED | OUTPATIENT
Start: 2022-09-22 | End: 2022-10-02

## 2022-09-22 RX ORDER — DICYCLOMINE HYDROCHLORIDE 20 MG/1
20 TABLET ORAL
Qty: 20 TABLET | Refills: 0 | Status: ON HOLD | OUTPATIENT
Start: 2022-09-22 | End: 2022-10-28 | Stop reason: SDUPTHER

## 2022-09-22 RX ORDER — ONDANSETRON 4 MG/1
4 TABLET, ORALLY DISINTEGRATING ORAL
Qty: 15 TABLET | Refills: 0 | Status: SHIPPED | OUTPATIENT
Start: 2022-09-22

## 2022-09-22 RX ORDER — POLYETHYLENE GLYCOL 3350 17 G/17G
17 POWDER, FOR SOLUTION ORAL DAILY
Qty: 289 G | Refills: 0 | Status: SHIPPED | OUTPATIENT
Start: 2022-09-22

## 2022-09-22 RX ADMIN — PIPERACILLIN AND TAZOBACTAM 3.38 G: 3; .375 INJECTION, POWDER, FOR SOLUTION INTRAVENOUS at 01:45

## 2022-09-22 NOTE — ED NOTES
Assumed care of pt at this time. Pt moved to bed 14, resting quietly. Warm blankets provided, callbell in reach.

## 2022-09-22 NOTE — ED NOTES
Pt given both verbal and written dc instructions, verbalized understanding. All questions answered. Medical transport arranged for ride home.

## 2022-09-27 LAB
BACTERIA SPEC CULT: NORMAL
BACTERIA SPEC CULT: NORMAL
SERVICE CMNT-IMP: NORMAL
SERVICE CMNT-IMP: NORMAL

## 2022-10-19 ENCOUNTER — APPOINTMENT (OUTPATIENT)
Dept: CT IMAGING | Age: 72
DRG: 073 | End: 2022-10-19
Attending: EMERGENCY MEDICINE
Payer: MEDICARE

## 2022-10-19 ENCOUNTER — HOSPITAL ENCOUNTER (INPATIENT)
Age: 72
LOS: 9 days | Discharge: HOME | DRG: 073 | End: 2022-10-28
Attending: EMERGENCY MEDICINE | Admitting: INTERNAL MEDICINE
Payer: MEDICARE

## 2022-10-19 ENCOUNTER — APPOINTMENT (OUTPATIENT)
Dept: GENERAL RADIOLOGY | Age: 72
DRG: 073 | End: 2022-10-19
Attending: EMERGENCY MEDICINE
Payer: MEDICARE

## 2022-10-19 DIAGNOSIS — K57.90 DIVERTICULOSIS: ICD-10-CM

## 2022-10-19 DIAGNOSIS — K52.9 NONINFECTIOUS GASTROENTERITIS, UNSPECIFIED TYPE: ICD-10-CM

## 2022-10-19 DIAGNOSIS — K31.84 GASTROPARESIS: ICD-10-CM

## 2022-10-19 DIAGNOSIS — N17.9 AKI (ACUTE KIDNEY INJURY) (HCC): ICD-10-CM

## 2022-10-19 DIAGNOSIS — R63.4 WEIGHT LOSS, ABNORMAL: Primary | ICD-10-CM

## 2022-10-19 DIAGNOSIS — E87.20 ACIDOSIS: ICD-10-CM

## 2022-10-19 DIAGNOSIS — D72.819 LEUKOPENIA, UNSPECIFIED TYPE: ICD-10-CM

## 2022-10-19 DIAGNOSIS — R91.8 PULMONARY NODULES: ICD-10-CM

## 2022-10-19 DIAGNOSIS — E04.2 MULTIPLE THYROID NODULES: ICD-10-CM

## 2022-10-19 DIAGNOSIS — R10.30 LOWER ABDOMINAL PAIN: ICD-10-CM

## 2022-10-19 PROBLEM — J96.12 CHRONIC RESPIRATORY FAILURE WITH HYPOXIA AND HYPERCAPNIA (HCC): Status: ACTIVE | Noted: 2019-10-16

## 2022-10-19 PROBLEM — J96.11 CHRONIC RESPIRATORY FAILURE WITH HYPOXIA (HCC): Status: ACTIVE | Noted: 2019-10-16

## 2022-10-19 PROBLEM — J96.11 CHRONIC RESPIRATORY FAILURE WITH HYPOXIA AND HYPERCAPNIA (HCC): Status: ACTIVE | Noted: 2019-10-16

## 2022-10-19 LAB
ALBUMIN SERPL-MCNC: 3.9 G/DL (ref 3.4–5)
ALBUMIN/GLOB SERPL: 1 {RATIO} (ref 0.8–1.7)
ALP SERPL-CCNC: 74 U/L (ref 45–117)
ALT SERPL-CCNC: 25 U/L (ref 13–56)
ANION GAP SERPL CALC-SCNC: 13 MMOL/L (ref 3–18)
APPEARANCE UR: CLEAR
AST SERPL-CCNC: 19 U/L (ref 10–38)
ATRIAL RATE: 116 BPM
BACTERIA URNS QL MICRO: NEGATIVE /HPF
BASE DEFICIT BLDV-SCNC: 6.6 MMOL/L
BASOPHILS # BLD: 0.1 K/UL (ref 0–0.1)
BASOPHILS NFR BLD: 1 % (ref 0–2)
BILIRUB SERPL-MCNC: 0.7 MG/DL (ref 0.2–1)
BILIRUB UR QL: NEGATIVE
BNP SERPL-MCNC: 205 PG/ML (ref 0–900)
BUN SERPL-MCNC: 34 MG/DL (ref 7–18)
BUN/CREAT SERPL: 24 (ref 12–20)
CALCIUM SERPL-MCNC: 9.5 MG/DL (ref 8.5–10.1)
CALCULATED P AXIS, ECG09: 61 DEGREES
CALCULATED R AXIS, ECG10: -32 DEGREES
CALCULATED T AXIS, ECG11: 66 DEGREES
CHLORIDE SERPL-SCNC: 105 MMOL/L (ref 100–111)
CO2 SERPL-SCNC: 20 MMOL/L (ref 21–32)
COLOR UR: YELLOW
CREAT SERPL-MCNC: 1.42 MG/DL (ref 0.6–1.3)
DIAGNOSIS, 93000: NORMAL
DIFFERENTIAL METHOD BLD: ABNORMAL
EOSINOPHIL # BLD: 0 K/UL (ref 0–0.4)
EOSINOPHIL NFR BLD: 1 % (ref 0–5)
EPITH CASTS URNS QL MICRO: ABNORMAL /LPF (ref 0–5)
ERYTHROCYTE [DISTWIDTH] IN BLOOD BY AUTOMATED COUNT: 14.9 % (ref 11.6–14.5)
FLUAV RNA SPEC QL NAA+PROBE: NOT DETECTED
FLUBV RNA SPEC QL NAA+PROBE: NOT DETECTED
GLOBULIN SER CALC-MCNC: 4.1 G/DL (ref 2–4)
GLUCOSE BLD STRIP.AUTO-MCNC: 181 MG/DL (ref 70–110)
GLUCOSE SERPL-MCNC: 178 MG/DL (ref 74–99)
GLUCOSE UR STRIP.AUTO-MCNC: NEGATIVE MG/DL
HCO3 BLDV-SCNC: 19.3 MMOL/L (ref 23–28)
HCT VFR BLD AUTO: 35 % (ref 35–45)
HGB BLD-MCNC: 10.9 G/DL (ref 12–16)
HGB UR QL STRIP: NEGATIVE
IMM GRANULOCYTES # BLD AUTO: 0 K/UL (ref 0–0.04)
IMM GRANULOCYTES NFR BLD AUTO: 0 % (ref 0–0.5)
KETONES UR QL STRIP.AUTO: ABNORMAL MG/DL
LACTATE BLD-SCNC: 1.54 MMOL/L (ref 0.4–2)
LEUKOCYTE ESTERASE UR QL STRIP.AUTO: NEGATIVE
LIPASE SERPL-CCNC: 133 U/L (ref 73–393)
LYMPHOCYTES # BLD: 1.9 K/UL (ref 0.9–3.6)
LYMPHOCYTES NFR BLD: 46 % (ref 21–52)
MAGNESIUM SERPL-MCNC: 2.4 MG/DL (ref 1.6–2.6)
MCH RBC QN AUTO: 23.5 PG (ref 24–34)
MCHC RBC AUTO-ENTMCNC: 31.1 G/DL (ref 31–37)
MCV RBC AUTO: 75.4 FL (ref 78–100)
MONOCYTES # BLD: 0.3 K/UL (ref 0.05–1.2)
MONOCYTES NFR BLD: 6 % (ref 3–10)
MUCOUS THREADS URNS QL MICRO: ABNORMAL /LPF
NEUTS SEG # BLD: 1.9 K/UL (ref 1.8–8)
NEUTS SEG NFR BLD: 46 % (ref 40–73)
NITRITE UR QL STRIP.AUTO: NEGATIVE
NRBC # BLD: 0 K/UL (ref 0–0.01)
NRBC BLD-RTO: 0 PER 100 WBC
P-R INTERVAL, ECG05: 126 MS
PCO2 BLDV: 39 MMHG (ref 41–51)
PH BLDV: 7.3 [PH] (ref 7.32–7.42)
PH UR STRIP: 5 [PH] (ref 5–8)
PLATELET # BLD AUTO: 338 K/UL (ref 135–420)
PMV BLD AUTO: 10.9 FL (ref 9.2–11.8)
PO2 BLDV: 25 MMHG (ref 25–40)
POTASSIUM SERPL-SCNC: 4.8 MMOL/L (ref 3.5–5.5)
PROT SERPL-MCNC: 8 G/DL (ref 6.4–8.2)
PROT UR STRIP-MCNC: 30 MG/DL
Q-T INTERVAL, ECG07: 326 MS
QRS DURATION, ECG06: 68 MS
QTC CALCULATION (BEZET), ECG08: 453 MS
RBC # BLD AUTO: 4.64 M/UL (ref 4.2–5.3)
RBC #/AREA URNS HPF: NEGATIVE /HPF (ref 0–5)
SAO2 % BLDV: 39 % (ref 65–88)
SARS-COV-2, COV2: NOT DETECTED
SERVICE CMNT-IMP: ABNORMAL
SODIUM SERPL-SCNC: 138 MMOL/L (ref 136–145)
SP GR UR REFRACTOMETRY: >1.03 (ref 1–1.03)
SPECIMEN TYPE: ABNORMAL
TROPONIN-HIGH SENSITIVITY: 12 NG/L (ref 0–54)
UROBILINOGEN UR QL STRIP.AUTO: 0.2 EU/DL (ref 0.2–1)
VENTRICULAR RATE, ECG03: 116 BPM
WBC # BLD AUTO: 4.1 K/UL (ref 4.6–13.2)
WBC URNS QL MICRO: ABNORMAL /HPF (ref 0–4)

## 2022-10-19 PROCEDURE — 99285 EMERGENCY DEPT VISIT HI MDM: CPT

## 2022-10-19 PROCEDURE — 74011250637 HC RX REV CODE- 250/637: Performed by: PHYSICIAN ASSISTANT

## 2022-10-19 PROCEDURE — 77030013140 HC MSK NEB VYRM -A

## 2022-10-19 PROCEDURE — 74011000250 HC RX REV CODE- 250: Performed by: PHYSICIAN ASSISTANT

## 2022-10-19 PROCEDURE — 65270000029 HC RM PRIVATE

## 2022-10-19 PROCEDURE — 74011250636 HC RX REV CODE- 250/636: Performed by: PHYSICIAN ASSISTANT

## 2022-10-19 PROCEDURE — 71045 X-RAY EXAM CHEST 1 VIEW: CPT

## 2022-10-19 PROCEDURE — 74011000636 HC RX REV CODE- 636: Performed by: EMERGENCY MEDICINE

## 2022-10-19 PROCEDURE — 74177 CT ABD & PELVIS W/CONTRAST: CPT

## 2022-10-19 PROCEDURE — 83605 ASSAY OF LACTIC ACID: CPT

## 2022-10-19 PROCEDURE — 83880 ASSAY OF NATRIURETIC PEPTIDE: CPT

## 2022-10-19 PROCEDURE — 87389 HIV-1 AG W/HIV-1&-2 AB AG IA: CPT

## 2022-10-19 PROCEDURE — 99223 1ST HOSP IP/OBS HIGH 75: CPT | Performed by: PHYSICIAN ASSISTANT

## 2022-10-19 PROCEDURE — 77030040392 HC DRSG OPTIFOAM MDII -A

## 2022-10-19 PROCEDURE — 2709999900 HC NON-CHARGEABLE SUPPLY

## 2022-10-19 PROCEDURE — 74011000258 HC RX REV CODE- 258: Performed by: EMERGENCY MEDICINE

## 2022-10-19 PROCEDURE — 93005 ELECTROCARDIOGRAM TRACING: CPT

## 2022-10-19 PROCEDURE — 96374 THER/PROPH/DIAG INJ IV PUSH: CPT

## 2022-10-19 PROCEDURE — 87636 SARSCOV2 & INF A&B AMP PRB: CPT

## 2022-10-19 PROCEDURE — 74011250636 HC RX REV CODE- 250/636: Performed by: EMERGENCY MEDICINE

## 2022-10-19 PROCEDURE — 83690 ASSAY OF LIPASE: CPT

## 2022-10-19 PROCEDURE — 82962 GLUCOSE BLOOD TEST: CPT

## 2022-10-19 PROCEDURE — 84484 ASSAY OF TROPONIN QUANT: CPT

## 2022-10-19 PROCEDURE — 71275 CT ANGIOGRAPHY CHEST: CPT

## 2022-10-19 PROCEDURE — 82803 BLOOD GASES ANY COMBINATION: CPT

## 2022-10-19 PROCEDURE — 85025 COMPLETE CBC W/AUTO DIFF WBC: CPT

## 2022-10-19 PROCEDURE — 83735 ASSAY OF MAGNESIUM: CPT

## 2022-10-19 PROCEDURE — 81001 URINALYSIS AUTO W/SCOPE: CPT

## 2022-10-19 PROCEDURE — 74011636637 HC RX REV CODE- 636/637: Performed by: PHYSICIAN ASSISTANT

## 2022-10-19 PROCEDURE — 80053 COMPREHEN METABOLIC PANEL: CPT

## 2022-10-19 RX ORDER — TRAZODONE HYDROCHLORIDE 100 MG/1
100 TABLET ORAL
COMMUNITY
Start: 2022-08-13 | End: 2022-10-28

## 2022-10-19 RX ORDER — INSULIN LISPRO 100 [IU]/ML
INJECTION, SOLUTION INTRAVENOUS; SUBCUTANEOUS
Status: DISCONTINUED | OUTPATIENT
Start: 2022-10-19 | End: 2022-10-28 | Stop reason: HOSPADM

## 2022-10-19 RX ORDER — ONDANSETRON 2 MG/ML
4 INJECTION INTRAMUSCULAR; INTRAVENOUS
Status: DISCONTINUED | OUTPATIENT
Start: 2022-10-19 | End: 2022-10-28 | Stop reason: HOSPADM

## 2022-10-19 RX ORDER — BUDESONIDE AND FORMOTEROL FUMARATE DIHYDRATE 160; 4.5 UG/1; UG/1
2 AEROSOL RESPIRATORY (INHALATION)
Refills: 0 | Status: DISCONTINUED | OUTPATIENT
Start: 2022-10-19 | End: 2022-10-19 | Stop reason: CLARIF

## 2022-10-19 RX ORDER — SODIUM CHLORIDE 0.9 % (FLUSH) 0.9 %
5-40 SYRINGE (ML) INJECTION EVERY 8 HOURS
Status: DISCONTINUED | OUTPATIENT
Start: 2022-10-19 | End: 2022-10-28 | Stop reason: HOSPADM

## 2022-10-19 RX ORDER — GUAIFENESIN 100 MG/5ML
81 LIQUID (ML) ORAL DAILY
Status: DISCONTINUED | OUTPATIENT
Start: 2022-10-20 | End: 2022-10-28 | Stop reason: HOSPADM

## 2022-10-19 RX ORDER — BUDESONIDE 0.5 MG/2ML
500 INHALANT ORAL
Status: DISCONTINUED | OUTPATIENT
Start: 2022-10-19 | End: 2022-10-28 | Stop reason: HOSPADM

## 2022-10-19 RX ORDER — CLOPIDOGREL BISULFATE 75 MG/1
75 TABLET ORAL DAILY
Status: DISCONTINUED | OUTPATIENT
Start: 2022-10-20 | End: 2022-10-28 | Stop reason: HOSPADM

## 2022-10-19 RX ORDER — HYDROCODONE BITARTRATE AND ACETAMINOPHEN 5; 325 MG/1; MG/1
1 TABLET ORAL
Status: DISCONTINUED | OUTPATIENT
Start: 2022-10-19 | End: 2022-10-28 | Stop reason: HOSPADM

## 2022-10-19 RX ORDER — FACIAL-BODY WIPES
10 EACH TOPICAL DAILY PRN
Status: DISCONTINUED | OUTPATIENT
Start: 2022-10-19 | End: 2022-10-28 | Stop reason: HOSPADM

## 2022-10-19 RX ORDER — POLYETHYLENE GLYCOL 3350 17 G/17G
17 POWDER, FOR SOLUTION ORAL DAILY PRN
Status: DISCONTINUED | OUTPATIENT
Start: 2022-10-19 | End: 2022-10-28 | Stop reason: HOSPADM

## 2022-10-19 RX ORDER — MAGNESIUM SULFATE 100 %
4 CRYSTALS MISCELLANEOUS AS NEEDED
Status: DISCONTINUED | OUTPATIENT
Start: 2022-10-19 | End: 2022-10-28 | Stop reason: HOSPADM

## 2022-10-19 RX ORDER — DEXTROSE MONOHYDRATE 100 MG/ML
0-250 INJECTION, SOLUTION INTRAVENOUS AS NEEDED
Status: DISCONTINUED | OUTPATIENT
Start: 2022-10-19 | End: 2022-10-28 | Stop reason: HOSPADM

## 2022-10-19 RX ORDER — ATORVASTATIN CALCIUM 40 MG/1
40 TABLET, FILM COATED ORAL
Status: DISCONTINUED | OUTPATIENT
Start: 2022-10-19 | End: 2022-10-28 | Stop reason: HOSPADM

## 2022-10-19 RX ORDER — DULOXETIN HYDROCHLORIDE 30 MG/1
30 CAPSULE, DELAYED RELEASE ORAL DAILY
Status: DISCONTINUED | OUTPATIENT
Start: 2022-10-20 | End: 2022-10-28 | Stop reason: HOSPADM

## 2022-10-19 RX ORDER — MORPHINE SULFATE 2 MG/ML
2 INJECTION, SOLUTION INTRAMUSCULAR; INTRAVENOUS
Status: COMPLETED | OUTPATIENT
Start: 2022-10-19 | End: 2022-10-19

## 2022-10-19 RX ORDER — DICYCLOMINE HYDROCHLORIDE 10 MG/1
10 CAPSULE ORAL
Status: DISCONTINUED | OUTPATIENT
Start: 2022-10-19 | End: 2022-10-28 | Stop reason: HOSPADM

## 2022-10-19 RX ORDER — IPRATROPIUM BROMIDE AND ALBUTEROL SULFATE 2.5; .5 MG/3ML; MG/3ML
3 SOLUTION RESPIRATORY (INHALATION)
Status: DISCONTINUED | OUTPATIENT
Start: 2022-10-19 | End: 2022-10-28 | Stop reason: HOSPADM

## 2022-10-19 RX ORDER — MORPHINE SULFATE 2 MG/ML
1 INJECTION, SOLUTION INTRAMUSCULAR; INTRAVENOUS
Status: DISCONTINUED | OUTPATIENT
Start: 2022-10-19 | End: 2022-10-20

## 2022-10-19 RX ORDER — ENOXAPARIN SODIUM 100 MG/ML
40 INJECTION SUBCUTANEOUS DAILY
Status: DISCONTINUED | OUTPATIENT
Start: 2022-10-20 | End: 2022-10-28 | Stop reason: HOSPADM

## 2022-10-19 RX ORDER — ACETAMINOPHEN 325 MG/1
650 TABLET ORAL
Status: DISCONTINUED | OUTPATIENT
Start: 2022-10-19 | End: 2022-10-28 | Stop reason: HOSPADM

## 2022-10-19 RX ORDER — DULOXETIN HYDROCHLORIDE 30 MG/1
30 CAPSULE, DELAYED RELEASE ORAL DAILY
Status: ON HOLD | COMMUNITY
Start: 2022-09-23 | End: 2022-10-28 | Stop reason: SDUPTHER

## 2022-10-19 RX ORDER — SODIUM CHLORIDE, SODIUM LACTATE, POTASSIUM CHLORIDE, CALCIUM CHLORIDE 600; 310; 30; 20 MG/100ML; MG/100ML; MG/100ML; MG/100ML
100 INJECTION, SOLUTION INTRAVENOUS CONTINUOUS
Status: DISPENSED | OUTPATIENT
Start: 2022-10-19 | End: 2022-10-20

## 2022-10-19 RX ORDER — ARFORMOTEROL TARTRATE 15 UG/2ML
15 SOLUTION RESPIRATORY (INHALATION)
Status: DISCONTINUED | OUTPATIENT
Start: 2022-10-19 | End: 2022-10-28 | Stop reason: HOSPADM

## 2022-10-19 RX ORDER — ONDANSETRON 4 MG/1
4 TABLET, ORALLY DISINTEGRATING ORAL
Status: DISCONTINUED | OUTPATIENT
Start: 2022-10-19 | End: 2022-10-28 | Stop reason: HOSPADM

## 2022-10-19 RX ORDER — MEGESTROL ACETATE 40 MG/ML
200 SUSPENSION ORAL
Refills: 1 | Status: DISCONTINUED | OUTPATIENT
Start: 2022-10-20 | End: 2022-10-28 | Stop reason: HOSPADM

## 2022-10-19 RX ORDER — SODIUM CHLORIDE 0.9 % (FLUSH) 0.9 %
5-40 SYRINGE (ML) INJECTION AS NEEDED
Status: DISCONTINUED | OUTPATIENT
Start: 2022-10-19 | End: 2022-10-28 | Stop reason: HOSPADM

## 2022-10-19 RX ORDER — INSULIN GLARGINE 100 [IU]/ML
20 INJECTION, SOLUTION SUBCUTANEOUS
Status: DISCONTINUED | OUTPATIENT
Start: 2022-10-19 | End: 2022-10-28 | Stop reason: HOSPADM

## 2022-10-19 RX ADMIN — IOPAMIDOL 78 ML: 755 INJECTION, SOLUTION INTRAVENOUS at 14:09

## 2022-10-19 RX ADMIN — BUDESONIDE 500 MCG: 0.5 SUSPENSION RESPIRATORY (INHALATION) at 21:57

## 2022-10-19 RX ADMIN — ATORVASTATIN CALCIUM 40 MG: 40 TABLET, FILM COATED ORAL at 21:57

## 2022-10-19 RX ADMIN — Medication 2 UNITS: at 21:59

## 2022-10-19 RX ADMIN — Medication 20 UNITS: at 21:58

## 2022-10-19 RX ADMIN — ARFORMOTEROL TARTRATE 15 MCG: 15 SOLUTION RESPIRATORY (INHALATION) at 21:57

## 2022-10-19 RX ADMIN — SODIUM CHLORIDE, POTASSIUM CHLORIDE, SODIUM LACTATE AND CALCIUM CHLORIDE 500 ML: 600; 310; 30; 20 INJECTION, SOLUTION INTRAVENOUS at 21:51

## 2022-10-19 RX ADMIN — PIPERACILLIN AND TAZOBACTAM 4.5 G: 4; .5 INJECTION, POWDER, FOR SOLUTION INTRAVENOUS at 15:43

## 2022-10-19 RX ADMIN — SODIUM CHLORIDE, PRESERVATIVE FREE 10 ML: 5 INJECTION INTRAVENOUS at 22:36

## 2022-10-19 RX ADMIN — MORPHINE SULFATE 2 MG: 2 INJECTION, SOLUTION INTRAMUSCULAR; INTRAVENOUS at 12:47

## 2022-10-19 RX ADMIN — SODIUM CHLORIDE 1000 ML: 900 INJECTION, SOLUTION INTRAVENOUS at 12:47

## 2022-10-19 RX ADMIN — MORPHINE SULFATE 1 MG: 2 INJECTION, SOLUTION INTRAMUSCULAR; INTRAVENOUS at 21:57

## 2022-10-19 NOTE — ED PROVIDER NOTES
EMERGENCY DEPARTMENT HISTORY AND PHYSICAL EXAM      Date: 10/19/2022  Patient Name: Alan Kumar      History of Presenting Illness     Chief Complaint   Patient presents with    Respiratory Distress     Arrived by EMS. Shortness of breath. Came from GI doctor after complaining of shortness of breath. Patient presents anxious. Location/Duration/Severity/Modifying factors   Chief Complaint   Patient presents with    Respiratory Distress     Arrived by EMS. Shortness of breath. Came from GI doctor after complaining of shortness of breath. Patient presents anxious. HPI:  Alan Kumar is a 81 y.o. female with history as listed presents with a concern of weight loss, ABD pain, Chest pain, feeling unwell, blood pressure abnormalties( high at GI appt today), being out of her home O2 therapy because her physician did not order it or because she forgot to call for delivery of it(???). patient reports all of these complaints have been ongoing for quite some time but now getting worse. Last has been getting much worse. The patient reports that she was 125 pounds a few months ago but now she is down to 88 pounds. The patient does not know what causing her weight loss. The patient also says that her sarcoidosis is acting up. She is not been able to get to see her doctor. She was seen in an outside gastroenterology doctor today to get a colonoscopy scheduled however she felt significantly unwell and was sent to the emergency department for further evaluation and treatment. Nothing makes her symptoms better. Symptoms classified as moderate to severe      There are no other complaints, changes, or physical findings at this time.     PCP: Sasha Steward MD    Current Facility-Administered Medications   Medication Dose Route Frequency Provider Last Rate Last Admin    therapeutic multivitamin [facility]) tablet 1 Tablet  1 Tablet Oral DAILY Rebeca Aguirre DO        thiamine HCL (B-1) tablet 100 mg  100 mg Oral DAILY Wynonia Amabile, DO        diphenhydrAMINE (BENADRYL) capsule 25 mg  25 mg Oral Q4H PRN Arianne Galla, DO   25 mg at 10/20/22 2204    morphine injection 1 mg  1 mg IntraVENous Q3H PRN Wynonia Amabile, DO   1 mg at 10/21/22 2294    sodium chloride (NS) flush 5-40 mL  5-40 mL IntraVENous Q8H Reprogle, Littie Rumpf, PA   10 mL at 10/20/22 2102    sodium chloride (NS) flush 5-40 mL  5-40 mL IntraVENous PRN Reprogle, Littie Rumpf, PA        acetaminophen (TYLENOL) tablet 650 mg  650 mg Oral Q6H PRN Reprogle, Littie Rumpf, PA        Or    acetaminophen (TYLENOL) suppository 650 mg  650 mg Rectal Q6H PRN Reprogle, Littie Rumpf, PA        polyethylene glycol (MIRALAX) packet 17 g  17 g Oral DAILY PRN Reprogle, Littie Rumpf, PA        bisacodyL (DULCOLAX) suppository 10 mg  10 mg Rectal DAILY PRN Reprogle, Littie Rumpf, PA        ondansetron (ZOFRAN ODT) tablet 4 mg  4 mg Oral Q8H PRN Reprogle, Littie Rumpf, PA        Or    ondansetron (ZOFRAN) injection 4 mg  4 mg IntraVENous Q6H PRN Reprogle, Littie Rumpf, PA        enoxaparin (LOVENOX) injection 40 mg  40 mg SubCUTAneous DAILY Reprogle, Littie Rumpf, PA   40 mg at 10/20/22 0845    HYDROcodone-acetaminophen (NORCO) 5-325 mg per tablet 1 Tablet  1 Tablet Oral Q6H PRN Reprogle, Littie Rumpf, PA   1 Tablet at 10/20/22 1737    albuterol-ipratropium (DUO-NEB) 2.5 MG-0.5 MG/3 ML  3 mL Nebulization Q4H PRN Reprogle, Littie Rumpf, PA        aspirin chewable tablet 81 mg  81 mg Oral DAILY Reprogle, Littie Rumpf, PA   81 mg at 10/20/22 0845    atorvastatin (LIPITOR) tablet 40 mg  40 mg Oral QHS Reprogle, Littie Rumpf, PA   40 mg at 10/20/22 2101    clopidogreL (PLAVIX) tablet 75 mg  75 mg Oral DAILY Reprogle, Rogertie Dickpf, PA   75 mg at 10/20/22 0845    DULoxetine (CYMBALTA) capsule 30 mg  30 mg Oral DAILY Reprogle, Littie Rumpf, PA   30 mg at 10/20/22 0845    insulin glargine (LANTUS) injection 20 Units  20 Units SubCUTAneous QHS ReprogleAbelteri Bishop PA   20 Units at 10/20/22 2206    insulin lispro (HUMALOG) injection   SubCUTAneous AC&HS ROBIN Mabry   2 Units at 10/20/22 2102    glucose chewable tablet 16 g  4 Tablet Oral PRN Marita Rios PA        glucagon (GLUCAGEN) injection 1 mg  1 mg IntraMUSCular PRN Marita Rios PA        dextrose 10% infusion 0-250 mL  0-250 mL IntraVENous PRN Marita Rios PA        dicyclomine (BENTYL) capsule 10 mg  10 mg Oral QID PRN Marita Rios PA        megestroL (MEGACE) 400 mg/10 mL (10 mL) oral suspension 200 mg  200 mg Oral TID WITH MEALS Marita Rios PA   200 mg at 10/20/22 1737    budesonide (PULMICORT) 500 mcg/2 ml nebulizer suspension  500 mcg Nebulization BID RT LeighannglMarita smith PA   500 mcg at 10/20/22 2012    And    arformoteroL (BROVANA) neb solution 15 mcg  15 mcg Nebulization BID RT ReprogleDulce Pastures, PA   15 mcg at 10/20/22 2012       Past History     Past Medical History:  Past Medical History:   Diagnosis Date    Arthritis     \"generalized\"    Asthma     CAD in native artery     NSTEMI (Sep 2014); diffuse 65% pLAD, minimal disease RCA & LCx. pLAD FFR 0.93.  LV  no RWMA (echo and LV angio)    Chronic neck pain     Chronic pain     left knee    Chronic pain syndrome     COPD (chronic obstructive pulmonary disease) (HCC)     Depression     Diabetes (HCC)     Diverticulitis     GERD (gastroesophageal reflux disease)     Heart disease     Hidradenitis 9/11/2014    Hypertension     Left knee pain     Narcotic dependence ([address]     Pneumonia     Right wrist pain     Sarcoidosis        Past Surgical History:  Past Surgical History:   Procedure Laterality Date    BRONCHOSCOPY  10/2019    HX BREAST BIOPSY Right     9/10/14: She said tag in place from [facility] [address]    HX CHOLECYSTECTOMY  2005    HX HYSTERECTOMY      HX KNEE ARTHROSCOPY Left        Family History:  Family History   Problem Relation Age of Onset    Hypertension Father     Diabetes Mother        Social History:  Social History     Tobacco Use Smoking status: Former     Years: 46.00     Types: Cigarettes     Quit date: 2018     Years since quittin.6    Smokeless tobacco: Never    Tobacco comments: Only smokes 1 cigarette per day   Substance Use Topics    Alcohol use: Yes     Alcohol/week: 1.7 standard drinks     Types: 2 Glasses of wine per week     Comment: socially    Drug use: Yes     Types: Cocaine, Marijuana     Comment: 6 months ago       Allergies: Allergies   Allergen Reactions    Latex Hives and Itching    Ciprofloxacin Hives    Penicillins Nausea Only    Shellfish Derived Hives and Swelling     Swelling of legs         Review of Systems     Review of Systems   Constitutional:  Positive for fatigue and unexpected weight change. Negative for fever. HENT:  Negative for sore throat and trouble swallowing. Eyes:  Positive for visual disturbance. Negative for photophobia. Respiratory:  Positive for shortness of breath. Negative for cough. Cardiovascular:  Positive for chest pain. Negative for palpitations. Gastrointestinal:  Positive for abdominal pain. Negative for diarrhea. Genitourinary:  Negative for dysuria and flank pain. Musculoskeletal:  Negative for neck pain and neck stiffness. Skin:  Positive for pallor. Negative for rash. Neurological:  Positive for weakness. Negative for numbness. Physical Exam     Physical Exam  Constitutional:       Appearance: Normal appearance. She is cachectic. She is ill-appearing. She is not toxic-appearing. HENT:      Head: Normocephalic and atraumatic. Right Ear: External ear normal.      Left Ear: External ear normal.      Nose: No congestion or rhinorrhea. Mouth/Throat:      Pharynx: No oropharyngeal exudate or posterior oropharyngeal erythema. Cardiovascular:      Rate and Rhythm: Normal rate and regular rhythm. Pulmonary:      Effort: Tachypnea present. Breath sounds: Normal breath sounds. Chest:      Chest wall: No mass or lacerations.    Abdominal: General: Abdomen is flat. Palpations: Abdomen is soft. Musculoskeletal:         General: No swelling or tenderness. Normal range of motion. Cervical back: Normal range of motion and neck supple. Skin:     Coloration: Skin is pale. Findings: No lesion or rash. Neurological:      General: No focal deficit present. Mental Status: She is alert. Mental status is at baseline.        Lab and Diagnostic Study Results     Labs -  Recent Results (from the past 24 hour(s))   GLUCOSE, POC    Collection Time: 10/20/22 12:05 PM   Result Value Ref Range    Glucose (POC) 83 70 - 110 mg/dL   GLUCOSE, POC    Collection Time: 10/20/22  4:56 PM   Result Value Ref Range    Glucose (POC) 119 (H) 70 - 110 mg/dL   GLUCOSE, POC    Collection Time: 10/20/22 10:02 PM   Result Value Ref Range    Glucose (POC) 171 (H) 70 - 839 mg/dL   METABOLIC PANEL, BASIC    Collection Time: 10/21/22  4:33 AM   Result Value Ref Range    Sodium 142 136 - 145 mmol/L    Potassium 4.6 3.5 - 5.5 mmol/L    Chloride 110 100 - 111 mmol/L    CO2 30 21 - 32 mmol/L    Anion gap 2 (L) 3.0 - 18 mmol/L    Glucose 124 (H) 74 - 99 mg/dL    BUN 24 (H) 7.0 - 18 MG/DL    Creatinine 0.65 0.6 - 1.3 MG/DL    BUN/Creatinine ratio 37 (H) 12 - 20      eGFR >60 >60 ml/min/1.73m2    Calcium 8.3 (L) 8.5 - 10.1 MG/DL   CBC W/O DIFF    Collection Time: 10/21/22  4:33 AM   Result Value Ref Range    WBC 5.9 4.6 - 13.2 K/uL    RBC 3.16 (L) 4.20 - 5.30 M/uL    HGB 7.5 (L) 12.0 - 16.0 g/dL    HCT 23.9 (L) 35.0 - 45.0 %    MCV 75.6 (L) 78.0 - 100.0 FL    MCH 23.7 (L) 24.0 - 34.0 PG    MCHC 31.4 31.0 - 37.0 g/dL    RDW 15.2 (H) 11.6 - 14.5 %    PLATELET 609 661 - 742 K/uL    MPV 10.9 9.2 - 11.8 FL    NRBC 0.0 0  WBC    ABSOLUTE NRBC 0.00 0.00 - 0.01 K/uL   GLUCOSE, POC    Collection Time: 10/21/22  7:28 AM   Result Value Ref Range    Glucose (POC) 118 (H) 70 - 110 mg/dL         Radiologic Studies -   CT ABD PELV W CONT   Final Result      The right colon with perhaps slightly increased mucosal enhancement. Query a   nonspecific colitis. -Perhaps subtle wall thickening in the splenic flexure. In light of reported   weight loss, recommend nonemergent endoscopic correlation at some point, if not   already recently performed for screening. -Appendix is fluid-filled but no significant inflammatory change otherwise. Appendicitis felt highly unlikely, but clinical correlation recommended for   possible early appendicitis. -Distal esophagus mild wall thickening. Query esophagitis and/or reflux.   -Colonic diverticulosis. Slightly prominent biliary tree post cholecystectomy, likely reservoir effect,   but correlate clinically. See additional details above. CTA CHEST W OR W WO CONT   Final Result      No evidence of pulmonary embolus or right heart strain. No definite acute   findings. Multiple punctate scattered pulmonary nodules, likely centrilobular, possibly   tiny bronchiolar opacities or chronic inflammatory process. Recommend attention   on follow-up or consider nonemergent follow-up high-resolution chest CT. Subcentimeter thyroid nodules. XR CHEST PORT   Final Result      No clearly acute findings. Likely streaky atelectasis/scarring at the right   upper lung. DUPLEX ABD VISC ART ORGANS COMPLETE    (Results Pending)         Procedures and Critical Care       Performed by: Savannah Mcrae MD    EKG    Date/Time: 10/19/2022 12:44 PM  Performed by: Arjun Barahona MD  Authorized by: Arjun Barahona MD     Previous ECG:     Previous ECG:  Unavailable  Interpretation:     Interpretation: normal    Quality:     Tracing quality:  Limited by artifact  Rate:     ECG rate assessment: tachycardic    Rhythm:     Rhythm: sinus rhythm    Ectopy:     Ectopy: none    QRS:     QRS axis:  Left    QRS intervals:  Normal    QRS conduction: normal    ST segments:     ST segments:  Normal  T waves:     T waves: inverted      Details:   In the V2.  This is new compared to prior tracing. Q waves:     Abnormal Q-waves: not present    Other findings:     Other findings: poor R wave progression    Comments: All the only significant change is a T wave inversion noted in V2. Critical Care  Performed by: Tiburcio Nicholas MD  Authorized by: Tiburcio Nicholas MD     Critical care provider statement:     Critical care time (minutes):  35    Critical care time was exclusive of:  Separately billable procedures and treating other patients    Critical care was necessary to treat or prevent imminent or life-threatening deterioration of the following conditions: colitis. Critical care was time spent personally by me on the following activities:  Development of treatment plan with patient or surrogate, discussions with consultants, examination of patient, obtaining history from patient or surrogate, ordering and performing treatments and interventions, ordering and review of laboratory studies, ordering and review of radiographic studies, re-evaluation of patient's condition and review of old charts    I assumed direction of critical care for this patient from another provider in my specialty: yes      Care discussed with: admitting provider           Lizzie Baker MD    Medical Decision Making and ED Course   - I am the first and primary provider for this patient AND AM THE PRIMARY PROVIDER OF RECORD. - I reviewed the vital signs, available nursing notes, past medical history, past surgical history, family history and social history. - Initial assessment performed. The patients presenting problems have been discussed, and the staff are in agreement with the care plan formulated and outlined with them. I have encouraged them to ask questions as they arise throughout their visit. Vital Signs-Reviewed the patient's vital signs.     Patient Vitals for the past 12 hrs:   Temp Pulse Resp BP SpO2   10/21/22 0409 98.8 °F (37.1 °C) (!) 102 18 126/70 98 % 10/20/22 2340 99.3 °F (37.4 °C) (!) 109 18 (!) 142/71 98 %   10/20/22 2006 98.8 °F (37.1 °C) (!) 105 18 120/65 100 %         Provider Notes (Medical Decision Making):     MDM  Number of Diagnoses or Management Options  Acidosis: new, needed workup  AMBROCIO (acute kidney injury) ([facility] [address]): new, needed workup  Diverticulosis: new, needed workup  Leukopenia, unspecified type: new, needed workup  Multiple thyroid nodules: new, needed workup  Noninfectious gastroenteritis, unspecified type: new, needed workup  Pulmonary nodules: new, needed workup  Weight loss, abnormal: new, needed workup  Diagnosis management comments: Significant weight loss of undetermined etiology. Patient also complaining of chest pain and Abdominal pain. She is also out of her home oxygen therapy. I do not suspect that this patient is going to be safe to discharge home given her oxygen need. Also with her significant weight loss pain and chest pain she is likely not going to be safe either. Will require evaluation to determine the etiology of her weight loss as well as screening for potential electrolyte anomalies etc.    Summary patient found to have acidosis of undetermined etiology, mild AMBROCIO, colitis, does not have any oxygen in her home and has been out for 1 week now. She has had significant weight loss as well and appears cachectic and malnourished. I had an extensive discussion with the patient about all these issues and that she will need close follow-up. Given the above cannot safely send the patient home and feel that hospitalization is warranted. Given Zosyn for colitis although I feel like potentially infectious is less likely cannot exclude at this point time. Patient admitted to the hospitalist for further evaluation and treatment.       Risk of Complications, Morbidity, and/or Mortality  Presenting problems: moderate  Diagnostic procedures: moderate  Management options: moderate           ED Course:       ED Course as of 10/21/22 3669   Wed Oct 19, 2022   1507 Results noted [RS]   1526 Accepted to Dr Velma Herrera hospitalist [RS]   2010 Patient gastroenterology over an hour ago and they have not called back. [RS]   1616 Lactic Acid (POC): 1.54 [RS]      ED Course User Index  [RS] Ana Pan MD     ------------------------------------------------------------------------------------------------------------        Consultations:       Consultations:       Disposition         Admitted      Diagnosis     Clinical Impression:   1. Weight loss, abnormal    2. Leukopenia, unspecified type    3. AMBROCIO (acute kidney injury) ([address]    4. Noninfectious gastroenteritis, unspecified type    5. Acidosis    6. Diverticulosis    7. Pulmonary nodules    8.  Multiple thyroid nodules        Attestations:    Claudine Haro MD

## 2022-10-19 NOTE — PROGRESS NOTES
TRANSFER - IN REPORT:    Verbal report received from Morena(name) on Salvador Roblero  being received from [facility](unit) for routine progression of care      Report consisted of patients Situation, Background, Assessment and   Recommendations(SBAR). Information from the following report(s) SBAR, Kardex, and Recent Results was reviewed with the receiving nurse. Opportunity for questions and clarification was provided. Assessment completed upon patients arrival to unit and care assumed.          Myrna Tran

## 2022-10-19 NOTE — ROUTINE PROCESS
TRANSFER - OUT REPORT:    Verbal report given to Kenton Russo RN transferred to [facility] Room 208 (unit) for routine progression of care       Report consisted of patients Situation, Background, Assessment and   Recommendations(SBAR). Information from the following report(s) SBAR was reviewed with the receiving nurse. Lines:   Peripheral IV 10/19/22 Right Arm (Active)   Site Assessment Clean, dry, & intact 10/19/22 1318   Phlebitis Assessment 0 10/19/22 1318   Infiltration Assessment 0 10/19/22 1318   Dressing Status Clean, dry, & intact 10/19/22 1318   Dressing Type Transparent 10/19/22 1318   Hub Color/Line Status Pink;Patent; Flushed 10/19/22 1318        Opportunity for questions and clarification was provided.       Patient transported with:   O2 @ 3 liters

## 2022-10-19 NOTE — H&P
History and Physical          Subjective     HPI: Lakesha Muhammad is a 81 y.o. female with a PMHx of CAD, COPD, IDDM, GERD, HTN, pulm sarcoidosis who presented to the ED with c/o LLQ pain associated with malaise, weight loss, and poor appetite. She states she has lost 47 lbs in 3 months (125 -> 78lbs). Three months ago, she was diagnosed with diverticuliltis and given abx. She states the pain never went away. She reports having a recent colonoscopy done by Dr. Tommie Zaman, but that she was very constipated. She went to Dr. Bettie Romero office today for evaluation for another colonscopy/EGD, but was sent to the ED due to low blood pressure. Additionally, she reports increasing SOB and states she ran out of her home O2 about 1 week ago and Dr. Gen Manley was unable to renew it for her. She denies fever, NVD, cp, cough, dysuria. Prior records reviewed - the colonoscopy by Dr. Tommie Zaman was performed on 2/23/22. Per the report \"It is possible her constant lower abdominal pain would be from previous episodes of diverticulitis which have been treated multiple times with antibiotics. \" The CT scan from 9/21/22, which is when she was last given a course of flagyl/cipro, was negative for acute findings    In the ED, HR stable, BP elevated. Creatinine 1.42, CO2 20, . CT abd/pelv with questionable right colon colitis, subtle wall thickening in the splenic flexure, fluid filled appendix without inflammatory changes. CTA chest with multiple scattered pulm nodules, no PE. PMHx:  Past Medical History:   Diagnosis Date    Arthritis     \"generalized\"    Asthma     CAD in native artery     NSTEMI (Sep 2014); diffuse 65% pLAD, minimal disease RCA & LCx. pLAD FFR 0.93.  LV  no RWMA (echo and LV angio)    Chronic neck pain     Chronic pain     left knee    Chronic pain syndrome     COPD (chronic obstructive pulmonary disease) (HCC)     Depression     Diabetes (HCC)     Diverticulitis     GERD (gastroesophageal reflux disease) Heart disease     Hidradenitis 2014    Hypertension     Left knee pain     Narcotic dependence ([address]     Pneumonia     Right wrist pain     Sarcoidosis        PSurgHx:  Past Surgical History:   Procedure Laterality Date    BRONCHOSCOPY  10/2019    HX BREAST BIOPSY Right     9/10/14: She said tag in place from [address]    HX CHOLECYSTECTOMY  2005    HX HYSTERECTOMY      HX KNEE ARTHROSCOPY Left        SocialHx:  Social History     Socioeconomic History    Marital status:    Tobacco Use    Smoking status: Former     Years: 46.00     Types: Cigarettes     Quit date: 2018     Years since quittin.6    Smokeless tobacco: Never    Tobacco comments: Only smokes 1 cigarette per day   Substance and Sexual Activity    Alcohol use: Yes     Alcohol/week: 1.7 standard drinks     Types: 2 Glasses of wine per week     Comment: socially    Drug use: Yes     Types: Cocaine, Marijuana     Comment: 6 months ago    Sexual activity: Yes       FamilyHx:  Family History   Problem Relation Age of Onset    Hypertension Father     Diabetes Mother        Home Medications:  Prior to Admission Medications   Prescriptions Last Dose Informant Patient Reported? Taking? DULoxetine (CYMBALTA) 30 mg capsule   Yes No   Sig: Take 30 mg by mouth daily. Insulin Needles, Disposable, 31 gauge x 5/16\" ndle   Yes No   Sig: USE AS DIRECTED WITH INSULIN PEN DAILY   Omeprazole Magnesium (PRILOSEC) 10 mg suDR   No No   Sig: Take 20 mg by mouth two (2) times a day. Oxygen   Yes No   Sig: 3LPM O2 as needed   Indications: DME: [employer]   acetaminophen (TYLENOL) 325 mg tablet   No No   Sig: Take 2 Tablets by mouth every four (4) hours as needed for Pain. acetaminophen (TylenoL) 325 mg tablet   No No   Sig: Take 3 Tablets by mouth every six (6) hours as needed for Pain. albuterol (PROVENTIL HFA, VENTOLIN HFA, PROAIR HFA) 90 mcg/actuation inhaler   No No   Sig: Take 1 puff by inhalation daily as needed for Wheezing.    albuterol-ipratropium (DUO-NEB) 2.5 mg-0.5 mg/3 ml nebu   No No   Sig: 3 mL by Nebulization route every four (4) hours as needed for Other (shortness of breath). aspirin 81 mg chewable tablet   No No   Sig: Take 1 tablet by mouth daily. atorvastatin (LIPITOR) 40 mg tablet   No No   Sig: Take 1 tablet by mouth nightly. benzonatate (TESSALON) 100 mg capsule   Yes No   Sig: Take 100 mg by mouth three (3) times daily as needed for Cough. clopidogrel (PLAVIX) 75 mg tablet   No No   Sig: Take 1 tablet by mouth daily. diclofenac (VOLTAREN) 1 % gel   No No   Sig: Apply 2 g to affected area four (4) times daily. Gel should be measured and applied using the supplied dosing card. Apply dose (2 gm or 4 gm) to each location. If treatment site is the hands, patients should wait at least one (1) hour to wash their hands. APPLY TO bilateral knees   dicyclomine (BENTYL) 20 mg tablet   No No   Sig: Take 1 Tablet by mouth every six (6) hours as needed for Abdominal Cramps. docusate sodium (Colace) 100 mg capsule   No No   Sig: Take 1 Capsule by mouth two (2) times a day. fluoxetine (PROZAC) 20 mg capsule   Yes No   Sig: Take 20 mg by mouth daily. fluticasone-salmeterol (ADVAIR) 250-50 mcg/dose diskus inhaler   No No   Sig: Take 1 puff by inhalation every twelve (12) hours. Patient taking differently: Take 1 Puff by inhalation daily. insulin glargine (LANTUS U-100 INSULIN) 100 unit/mL injection   No No   Si Units by SubCUTAneous route daily. Patient taking differently: 40 Units by SubCUTAneous route daily. lidocaine (Lidoderm) 5 %   No No   Sig: Apply patch to the affected area for 12 hours a day and remove for 12 hours a day. lisinopril-hydroCHLOROthiazide (PRINZIDE, ZESTORETIC) 20-12.5 mg per tablet   Yes No   Sig: Take 1 Tab by mouth daily. Indications: hypertension   magnesium citrate solution   No No   Sig: Take 1/3 of bottle every 8 hours until finished or until you have a bowel movement.    megestrol (MEGACE) 400 mg/10 mL (40 mg/mL) suspension   Yes No   Sig: TAKE 5 ML BY MOUTH THREE TIMES DAILY FOR APPETITE   melatonin 3 mg tablet   No No   Sig: Take 1 Tab by mouth nightly. Patient taking differently: Take 5 mg by mouth nightly. metFORMIN (GLUCOPHAGE) 500 mg tablet   No No   Sig: Take 1 Tab by mouth two (2) times daily (with meals). mirtazapine (REMERON) 15 mg tablet   Yes No   Sig: Take 15 mg by mouth nightly. nitroglycerin (NITROSTAT) 0.4 mg SL tablet   No No   Si Tab by SubLINGual route as needed for Chest Pain. ondansetron (ZOFRAN ODT) 4 mg disintegrating tablet   No No   Sig: Take 1 Tablet by mouth every eight (8) hours as needed for Nausea or Vomiting. pantoprazole (PROTONIX) 20 mg tablet   Yes No   Sig: Take 20 mg by mouth daily. polyethylene glycol (Miralax) 17 gram/dose powder   No No   Sig: Take 17 g by mouth daily. 1 tablespoon with 8 oz of water daily   raNITIdine (ZANTAC) 150 mg tablet   No No   Sig: Take 1 Tab by mouth nightly. tiotropium (SPIRIVA WITH HANDIHALER) 18 mcg inhalation capsule   Yes No   Sig: Take 1 Cap by inhalation daily. traZODone (DESYREL) 100 mg tablet   Yes No   Sig: Take 100 mg by mouth nightly as needed. Facility-Administered Medications: None       Allergies: Allergies   Allergen Reactions    Latex Hives and Itching    Ciprofloxacin Hives    Penicillins Nausea Only    Shellfish Derived Hives and Swelling     Swelling of legs        Review of Systems:  CONST: + weight loss, no fever or chills, no fatigue  Eyes: No change in vision, no itching or drainage  ENT: no sore throat or sinus congestion. PULM: No shortness of breath, no cough or wheeze. CV: no pnd or orthopnea, no CP, no palpitations, no edema  GI: + abdominal pain, no nausea, no vomiting or diarrhea, no melena or bright red blood per rectum. : No urinary frequency, no urgency, no hesitancy or dysuria. MSK: No joint or muscle pain, no back pain, no neck pain, no recent trauma.    INTEG: No rash, no itching, no lesions. ENDO: No polyuria, no polydipsia, no heat or cold intolerance. HEME: No anemia or easy bruising or bleeding. NEURO: No headache, no dizziness  PSYCH: No anxiety, no depression      Objective     Physical Exam:  Visit Vitals  /76 (BP 1 Location: Left upper arm, BP Patient Position: At rest)   Pulse (!) 103   Temp 97.6 °F (36.4 °C)   Resp 24   SpO2 100%   Breastfeeding No       General: NAD, appears stated age, alert  Skin: warm, dry, no rashes  Eyes: PERRL, sclera is non-icteric  HENT: normocephalic/atraumatic, dry mucus membranes  Respiratory: CTA with no signs of respiratory distress  Cardiovascular: RRR, no m/r/g, no cyanosis or peripheral edema of extremities  GI: soft, non-tender, no rebound or guarding, normal bowel sounds  Neuro: moves all extremities, no focal deficits, normal speech  Psych: appropriate mood and affect, no visual or auditory hallucinations    Laboratory Studies:  Recent Results (from the past 24 hour(s))   CBC WITH AUTOMATED DIFF    Collection Time: 10/19/22 12:10 PM   Result Value Ref Range    WBC 4.1 (L) 4.6 - 13.2 K/uL    RBC 4.64 4.20 - 5.30 M/uL    HGB 10.9 (L) 12.0 - 16.0 g/dL    HCT 35.0 35.0 - 45.0 %    MCV 75.4 (L) 78.0 - 100.0 FL    MCH 23.5 (L) 24.0 - 34.0 PG    MCHC 31.1 31.0 - 37.0 g/dL    RDW 14.9 (H) 11.6 - 14.5 %    PLATELET 923 922 - 518 K/uL    MPV 10.9 9.2 - 11.8 FL    NRBC 0.0 0  WBC    ABSOLUTE NRBC 0.00 0.00 - 0.01 K/uL    NEUTROPHILS 46 40 - 73 %    LYMPHOCYTES 46 21 - 52 %    MONOCYTES 6 3 - 10 %    EOSINOPHILS 1 0 - 5 %    BASOPHILS 1 0 - 2 %    IMMATURE GRANULOCYTES 0 0.0 - 0.5 %    ABS. NEUTROPHILS 1.9 1.8 - 8.0 K/UL    ABS. LYMPHOCYTES 1.9 0.9 - 3.6 K/UL    ABS. MONOCYTES 0.3 0.05 - 1.2 K/UL    ABS. EOSINOPHILS 0.0 0.0 - 0.4 K/UL    ABS. BASOPHILS 0.1 0.0 - 0.1 K/UL    ABS. IMM.  GRANS. 0.0 0.00 - 0.04 K/UL    DF AUTOMATED     METABOLIC PANEL, COMPREHENSIVE    Collection Time: 10/19/22 12:10 PM   Result Value Ref Range    Sodium 138 136 - 145 mmol/L    Potassium 4.8 3.5 - 5.5 mmol/L    Chloride 105 100 - 111 mmol/L    CO2 20 (L) 21 - 32 mmol/L    Anion gap 13 3.0 - 18 mmol/L    Glucose 178 (H) 74 - 99 mg/dL    BUN 34 (H) 7.0 - 18 MG/DL    Creatinine 1.42 (H) 0.6 - 1.3 MG/DL    BUN/Creatinine ratio 24 (H) 12 - 20      eGFR 40 (L) >60 ml/min/1.73m2    Calcium 9.5 8.5 - 10.1 MG/DL    Bilirubin, total 0.7 0.2 - 1.0 MG/DL    ALT (SGPT) 25 13 - 56 U/L    AST (SGOT) 19 10 - 38 U/L    Alk.  phosphatase 74 45 - 117 U/L    Protein, total 8.0 6.4 - 8.2 g/dL    Albumin 3.9 3.4 - 5.0 g/dL    Globulin 4.1 (H) 2.0 - 4.0 g/dL    A-G Ratio 1.0 0.8 - 1.7     TROPONIN-HIGH SENSITIVITY    Collection Time: 10/19/22 12:10 PM   Result Value Ref Range    Troponin-High Sensitivity 12 0 - 54 ng/L   LIPASE    Collection Time: 10/19/22 12:10 PM   Result Value Ref Range    Lipase 133 73 - 393 U/L   MAGNESIUM    Collection Time: 10/19/22 12:10 PM   Result Value Ref Range    Magnesium 2.4 1.6 - 2.6 mg/dL   NT-PRO BNP    Collection Time: 10/19/22 12:10 PM   Result Value Ref Range    NT pro- 0 - 900 PG/ML   EKG, 12 LEAD, INITIAL    Collection Time: 10/19/22 12:20 PM   Result Value Ref Range    Ventricular Rate 116 BPM    Atrial Rate 116 BPM    P-R Interval 126 ms    QRS Duration 68 ms    Q-T Interval 326 ms    QTC Calculation (Bezet) 453 ms    Calculated P Axis 61 degrees    Calculated R Axis -32 degrees    Calculated T Axis 66 degrees    Diagnosis       Sinus tachycardia  Left axis deviation  RSR' or QR pattern in V1 suggests right ventricular conduction delay  Abnormal ECG  When compared with ECG of 21-SEP-2022 17:23,  RSR' pattern in V1 is now present  Confirmed by Koko Payton MD, Krishan Garrett ([id number]) on 10/19/2022 5:59:20 PM     COVID-19 WITH INFLUENZA A/B    Collection Time: 10/19/22 12:55 PM   Result Value Ref Range    SARS-CoV-2 by PCR Not detected NOTD      Influenza A by PCR Not detected NOTD      Influenza B by PCR Not detected NOTD     POC VENOUS BLOOD GAS    Collection Time: 10/19/22 12:57 PM   Result Value Ref Range    pH, venous (POC) 7.30 (L) 7.32 - 7.42      pCO2, venous (POC) 39.0 (L) 41 - 51 MMHG    pO2, venous (POC) 25 25 - 40 mmHg    HCO3, venous (POC) 19.3 (L) 23.0 - 28.0 MMOL/L    sO2, venous (POC) 39.0 (L) 65 - 88 %    Base deficit, venous (POC) 6.6 mmol/L    Specimen type (POC) VENOUS BLOOD      Performed by Dashawn Manrique    POC LACTIC ACID    Collection Time: 10/19/22  3:19 PM   Result Value Ref Range    Lactic Acid (POC) 1.54 0.40 - 2.00 mmol/L   URINALYSIS W/ RFLX MICROSCOPIC    Collection Time: 10/19/22  4:43 PM   Result Value Ref Range    Color YELLOW      Appearance CLEAR      Specific gravity >1.030 (H) 1.003 - 1.030    pH (UA) 5.0 5.0 - 8.0      Protein 30 (A) NEG mg/dL    Glucose Negative NEG mg/dL    Ketone TRACE (A) NEG mg/dL    Bilirubin Negative NEG      Blood Negative NEG      Urobilinogen 0.2 0.2 - 1.0 EU/dL    Nitrites Negative NEG      Leukocyte Esterase Negative NEG     URINE MICROSCOPIC ONLY    Collection Time: 10/19/22  4:43 PM   Result Value Ref Range    WBC 0 to 3 0 - 4 /hpf    RBC Negative 0 - 5 /hpf    Epithelial cells FEW 0 - 5 /lpf    Bacteria Negative NEG /hpf    Mucus FEW (A) NEG /lpf       Imaging Reviewed:  CTA CHEST W OR W WO CONT    Result Date: 10/19/2022  EXAMINATION: CTA chest pulmonary INDICATION: Shortness of breath, weight loss COMPARISON: CT 9/6/2022 TECHNIQUE: CT angiography of the pulmonary arteries with IV contrast, with 3-D MIP reformations. All CT scans at this facility are performed using dose optimization technique as appropriate to a performed exam, to include automated exposure control, adjustment of the mA and/or kV according to patient size (including appropriate matching first site specific examinations), or use of iterative reconstruction technique. FINDINGS: Pulmonary arteries: No pulmonary artery filling defects identified to suggest pulmonary embolus. No evidence of right heart strain. Cardiovascular: Mild burden of atherosclerotic calcifications. Thoracic aorta normal in course and caliber. Heart size normal. Mediastinum: Subcentimeter thyroid nodules noted. No adenopathy by size criteria. Calcified mediastinal nodes noted. Esophagus nondistended. Pleura: No pleural effusion. No pneumothorax. Lungs/airways: No suspicious bronchial lesions. Patchy areas of streaky atelectasis/scarring. No confluent consolidation. Numerous scattered tiny punctate less than 4 mm diameter nodules throughout the lungs. Upper abdomen: See same day abdomen CT. Miscellaneous: Superficial soft tissues unremarkable. Bones: No acute osseous findings. No evidence of pulmonary embolus or right heart strain. No definite acute findings. Multiple punctate scattered pulmonary nodules, likely centrilobular, possibly tiny bronchiolar opacities or chronic inflammatory process. Recommend attention on follow-up or consider nonemergent follow-up high-resolution chest CT. Subcentimeter thyroid nodules. CT ABD PELV W CONT    Result Date: 10/19/2022  EXAMINATION: CT abdomen/pelvis with contrast INDICATION: Weight loss COMPARISON: CT 9/21/2022 TECHNIQUE: CT of the abdomen pelvis with IV contrast. All CT scans at this facility are performed using dose optimization technique as appropriate to a performed exam, to include automated exposure control, adjustment of the mA and/or kV according to patient size (including appropriate matching first site specific examinations), or use of iterative reconstruction technique. FINDINGS: Lower chest: See same day chest CT. Hepatobiliary: Liver unremarkable. Status post cholecystectomy. Slightly prominent biliary tree. Pancreas: Unremarkable. Spleen: Unremarkable. Adrenals: Unremarkable. Genitourinary: -Right kidney: A few likely subcentimeter cysts. No suspicious lesions. No hydronephrosis. -Left kidney: Probable subcentimeter cysts. No suspicious lesions. No hydronephrosis.  -Bladder/reproductive: Bladder unremarkable. Status post hysterectomy. Gastrointestinal: Distal esophagus mild wall thickening. Stomach otherwise unremarkable. Small bowel loops nondilated. The colon is nondilated. Scattered colonic diverticula. Perhaps mild right colon increased mucosal enhancement, and equivocal short segment of colonic wall thickening at the splenic flexure. Appendix fluid-filled, but no significant surrounding inflammation. Mesentery/vessels/nodes: No free air. No free fluid. Mild burden of atherosclerotic calcifications, otherwise major vessels unremarkable. No adenopathy by size criteria. Miscellaneous: Superficial soft tissues unremarkable. Bones: Osteopenia limits evaluation of bony detail. Bilateral hip degenerative changes. Chronic appearing pubic rami fracture deformities bilaterally. No obvious acute osseous findings. The right colon with perhaps slightly increased mucosal enhancement. Query a nonspecific colitis. -Perhaps subtle wall thickening in the splenic flexure. In light of reported weight loss, recommend nonemergent endoscopic correlation at some point, if not already recently performed for screening. -Appendix is fluid-filled but no significant inflammatory change otherwise. Appendicitis felt highly unlikely, but clinical correlation recommended for possible early appendicitis. -Distal esophagus mild wall thickening. Query esophagitis and/or reflux. -Colonic diverticulosis. Slightly prominent biliary tree post cholecystectomy, likely reservoir effect, but correlate clinically. See additional details above. XR CHEST PORT    Result Date: 10/19/2022  EXAMINATION: Chest single view INDICATION: Shortness of breath COMPARISON: 9/3/2022 FINDINGS: Single frontal view the chest. Medius on silhouette normal in size. Pulmonary vasculature unremarkable. Calcified mediastinal nodes. No confluent consolidation. Coarse appearing streaky densities at the right upper lung. No definite pneumothorax.  No obvious acute osseous findings. No clearly acute findings. Likely streaky atelectasis/scarring at the right upper lung. Assessment/Plan     Hospital Problems  Date Reviewed: 2/26/2021            Codes Class Noted POA    AMBROCIO (acute kidney injury) ([facility] ICD-10-CM: N17.9  ICD-9-CM: 584.9  10/19/2022 Yes        Pulmonary sarcoidosis ([facility] ICD-10-CM: D86.0  ICD-9-CM: 135, 517.8  10/16/2019 Yes        Chronic respiratory failure with hypoxia [facility]) ICD-10-CM: J96.11  ICD-9-CM: 518.83, 799.02  10/16/2019 Yes        HTN (hypertension) (Chronic) ICD-10-CM: I10  ICD-9-CM: 401.9  1/15/2014 Yes        DM (diabetes mellitus) ([facility] (Chronic) ICD-10-CM: E11.9  ICD-9-CM: 250.00  1/15/2014 Yes        COPD, moderate ([facility] ICD-10-CM: J44.9  ICD-9-CM: 281  9/8/2011 Yes         AMBROCIO: likely prerenal due to poor PO intake  - IV fluids  - monitor BMP  - hold lisinopril/HCTZ    Weight loss: 125 -> 78lbs in 3 months. Poor appetite. Wall thickening in splenic flexure seen on CT.  - nutrition consult  - outpatient f/up with GI for colonoscopy  - cont megace    LLQ pain: chronic in nature. No s/sx of active infection. Last colonoscopy Feb 2022.  - PRN bentyl  - PRN stool softeners    Chronic resp failure with hypoxia: underlying COPD and pulm sarcoid  - stable on baseline 3L   - will need home oxygen re-ordered prior to discharge  - cont advair, PRN duonebs    DM  - lantus,ssi  - monitor achs  - check a1c    HTN  - holding lisinopril/HCTZ due to AMBROCIO. BP stable          Anticipated Discharge: 1-2 days    DVT Prophylaxis:  [x]Lovenox  []Hep SQ  []SCDs  []Coumadin []DOAC  []On Heparin gtt     I have personally reviewed all pertinent labs, films and EKGs that have officially resulted. I reviewed available electronic documentation outlining the initial presentation as well as the emergency room physician's encounter.    Time spent reviewing records, independently interpreting results, obtaining history from patient or caregiver, performing physical exam, ordering tests and medications, documenting in the chart, and coordinating overall care is 75 minutes    Martha Gonzalez PA-C  [address] [facility]  Office:  [phone]  Pager: [phone]

## 2022-10-19 NOTE — ED TRIAGE NOTES
Arrived by EMS. Shortness of breath. Came from GI doctor after complaining of shortness of breath. Patient presents anxious.

## 2022-10-19 NOTE — Clinical Note
Status[de-identified] INPATIENT [101]   Type of Bed: Medical [8]   Inpatient Hospitalization Certified Necessary for the Following Reasons: 3.  Patient receiving treatment that can only be provided in an inpatient setting (further clarification in H&P documentation)   Admitting Diagnosis: Colitis [408813]   Admitting Physician: Niecy Martin [de-identified]   Attending Physician: Niecy Martin [de-identified]   Estimated Length of Stay: 3-4 Midnights   Discharge Plan[de-identified] Home with Office Follow-up

## 2022-10-20 ENCOUNTER — ANESTHESIA EVENT (OUTPATIENT)
Dept: ENDOSCOPY | Age: 72
DRG: 073 | End: 2022-10-20
Payer: MEDICARE

## 2022-10-20 LAB
ANION GAP SERPL CALC-SCNC: 5 MMOL/L (ref 3–18)
BUN SERPL-MCNC: 31 MG/DL (ref 7–18)
BUN/CREAT SERPL: 27 (ref 12–20)
CALCIUM SERPL-MCNC: 8.5 MG/DL (ref 8.5–10.1)
CHLORIDE SERPL-SCNC: 112 MMOL/L (ref 100–111)
CO2 SERPL-SCNC: 25 MMOL/L (ref 21–32)
CREAT SERPL-MCNC: 1.13 MG/DL (ref 0.6–1.3)
ERYTHROCYTE [DISTWIDTH] IN BLOOD BY AUTOMATED COUNT: 15.3 % (ref 11.6–14.5)
EST. AVERAGE GLUCOSE BLD GHB EST-MCNC: 157 MG/DL
GLUCOSE BLD STRIP.AUTO-MCNC: 119 MG/DL (ref 70–110)
GLUCOSE BLD STRIP.AUTO-MCNC: 171 MG/DL (ref 70–110)
GLUCOSE BLD STRIP.AUTO-MCNC: 83 MG/DL (ref 70–110)
GLUCOSE SERPL-MCNC: 74 MG/DL (ref 74–99)
HBA1C MFR BLD: 7.1 % (ref 4.2–5.6)
HCT VFR BLD AUTO: 27.1 % (ref 35–45)
HGB BLD-MCNC: 8.1 G/DL (ref 12–16)
MCH RBC QN AUTO: 22.9 PG (ref 24–34)
MCHC RBC AUTO-ENTMCNC: 29.9 G/DL (ref 31–37)
MCV RBC AUTO: 76.6 FL (ref 78–100)
NRBC # BLD: 0 K/UL (ref 0–0.01)
NRBC BLD-RTO: 0 PER 100 WBC
PLATELET # BLD AUTO: 204 K/UL (ref 135–420)
PMV BLD AUTO: 11.1 FL (ref 9.2–11.8)
POTASSIUM SERPL-SCNC: 4.4 MMOL/L (ref 3.5–5.5)
RBC # BLD AUTO: 3.54 M/UL (ref 4.2–5.3)
SODIUM SERPL-SCNC: 142 MMOL/L (ref 136–145)
WBC # BLD AUTO: 4.1 K/UL (ref 4.6–13.2)

## 2022-10-20 PROCEDURE — 97161 PT EVAL LOW COMPLEX 20 MIN: CPT

## 2022-10-20 PROCEDURE — 97535 SELF CARE MNGMENT TRAINING: CPT

## 2022-10-20 PROCEDURE — 99232 SBSQ HOSP IP/OBS MODERATE 35: CPT | Performed by: INTERNAL MEDICINE

## 2022-10-20 PROCEDURE — 82962 GLUCOSE BLOOD TEST: CPT

## 2022-10-20 PROCEDURE — 74011000250 HC RX REV CODE- 250: Performed by: PHYSICIAN ASSISTANT

## 2022-10-20 PROCEDURE — 80048 BASIC METABOLIC PNL TOTAL CA: CPT

## 2022-10-20 PROCEDURE — 74011250636 HC RX REV CODE- 250/636: Performed by: PHYSICIAN ASSISTANT

## 2022-10-20 PROCEDURE — 85027 COMPLETE CBC AUTOMATED: CPT

## 2022-10-20 PROCEDURE — 97165 OT EVAL LOW COMPLEX 30 MIN: CPT

## 2022-10-20 PROCEDURE — 2709999900 HC NON-CHARGEABLE SUPPLY

## 2022-10-20 PROCEDURE — 94640 AIRWAY INHALATION TREATMENT: CPT

## 2022-10-20 PROCEDURE — 97530 THERAPEUTIC ACTIVITIES: CPT

## 2022-10-20 PROCEDURE — 74011250637 HC RX REV CODE- 250/637: Performed by: INTERNAL MEDICINE

## 2022-10-20 PROCEDURE — 74011636637 HC RX REV CODE- 636/637: Performed by: PHYSICIAN ASSISTANT

## 2022-10-20 PROCEDURE — 83036 HEMOGLOBIN GLYCOSYLATED A1C: CPT

## 2022-10-20 PROCEDURE — 65270000029 HC RM PRIVATE

## 2022-10-20 PROCEDURE — 74011250637 HC RX REV CODE- 250/637: Performed by: PHYSICIAN ASSISTANT

## 2022-10-20 PROCEDURE — 36415 COLL VENOUS BLD VENIPUNCTURE: CPT

## 2022-10-20 RX ORDER — DIPHENHYDRAMINE HCL 25 MG
25 CAPSULE ORAL
Status: DISCONTINUED | OUTPATIENT
Start: 2022-10-20 | End: 2022-10-28 | Stop reason: HOSPADM

## 2022-10-20 RX ORDER — MORPHINE SULFATE 2 MG/ML
1 INJECTION, SOLUTION INTRAMUSCULAR; INTRAVENOUS
Status: DISCONTINUED | OUTPATIENT
Start: 2022-10-21 | End: 2022-10-28 | Stop reason: HOSPADM

## 2022-10-20 RX ORDER — LANOLIN ALCOHOL/MO/W.PET/CERES
100 CREAM (GRAM) TOPICAL DAILY
Status: DISCONTINUED | OUTPATIENT
Start: 2022-10-21 | End: 2022-10-28 | Stop reason: HOSPADM

## 2022-10-20 RX ORDER — THERA TABS 400 MCG
1 TAB ORAL DAILY
Status: DISCONTINUED | OUTPATIENT
Start: 2022-10-21 | End: 2022-10-28 | Stop reason: HOSPADM

## 2022-10-20 RX ADMIN — SODIUM CHLORIDE, POTASSIUM CHLORIDE, SODIUM LACTATE AND CALCIUM CHLORIDE 100 ML/HR: 600; 310; 30; 20 INJECTION, SOLUTION INTRAVENOUS at 16:05

## 2022-10-20 RX ADMIN — ASPIRIN 81 MG 81 MG: 81 TABLET ORAL at 08:45

## 2022-10-20 RX ADMIN — MEGESTROL ACETATE 200 MG: 40 SUSPENSION ORAL at 17:37

## 2022-10-20 RX ADMIN — BUDESONIDE 500 MCG: 0.5 SUSPENSION RESPIRATORY (INHALATION) at 20:12

## 2022-10-20 RX ADMIN — MORPHINE SULFATE 1 MG: 2 INJECTION, SOLUTION INTRAMUSCULAR; INTRAVENOUS at 02:48

## 2022-10-20 RX ADMIN — CLOPIDOGREL BISULFATE 75 MG: 75 TABLET ORAL at 08:45

## 2022-10-20 RX ADMIN — ARFORMOTEROL TARTRATE 15 MCG: 15 SOLUTION RESPIRATORY (INHALATION) at 20:12

## 2022-10-20 RX ADMIN — HYDROCODONE BITARTRATE AND ACETAMINOPHEN 1 TABLET: 5; 325 TABLET ORAL at 17:37

## 2022-10-20 RX ADMIN — ATORVASTATIN CALCIUM 40 MG: 40 TABLET, FILM COATED ORAL at 21:01

## 2022-10-20 RX ADMIN — ENOXAPARIN SODIUM 40 MG: 100 INJECTION SUBCUTANEOUS at 08:45

## 2022-10-20 RX ADMIN — MORPHINE SULFATE 1 MG: 2 INJECTION, SOLUTION INTRAMUSCULAR; INTRAVENOUS at 20:58

## 2022-10-20 RX ADMIN — SODIUM CHLORIDE, PRESERVATIVE FREE 10 ML: 5 INJECTION INTRAVENOUS at 14:34

## 2022-10-20 RX ADMIN — MORPHINE SULFATE 1 MG: 2 INJECTION, SOLUTION INTRAMUSCULAR; INTRAVENOUS at 15:59

## 2022-10-20 RX ADMIN — MORPHINE SULFATE 1 MG: 2 INJECTION, SOLUTION INTRAMUSCULAR; INTRAVENOUS at 08:58

## 2022-10-20 RX ADMIN — SODIUM CHLORIDE, PRESERVATIVE FREE 10 ML: 5 INJECTION INTRAVENOUS at 21:02

## 2022-10-20 RX ADMIN — SODIUM CHLORIDE, POTASSIUM CHLORIDE, SODIUM LACTATE AND CALCIUM CHLORIDE 100 ML/HR: 600; 310; 30; 20 INJECTION, SOLUTION INTRAVENOUS at 00:33

## 2022-10-20 RX ADMIN — Medication 20 UNITS: at 22:06

## 2022-10-20 RX ADMIN — Medication 2 UNITS: at 21:02

## 2022-10-20 RX ADMIN — MEGESTROL ACETATE 200 MG: 40 SUSPENSION ORAL at 12:07

## 2022-10-20 RX ADMIN — DULOXETINE 30 MG: 30 CAPSULE, DELAYED RELEASE ORAL at 08:45

## 2022-10-20 RX ADMIN — DIPHENHYDRAMINE HYDROCHLORIDE 25 MG: 25 CAPSULE ORAL at 22:04

## 2022-10-20 RX ADMIN — MEGESTROL ACETATE 200 MG: 40 SUSPENSION ORAL at 08:45

## 2022-10-20 NOTE — ROUTINE PROCESS
Bedside and Verbal shift change report given to Abdi Nobles (oncoming nurse) by Rakesh Edmond (offgoing nurse). Report included the following information SBAR, Kardex, ED Summary, MAR, Recent Results, and Cardiac Rhythm SR . Patient awake on rounds,  at [facility], call light in reach, bed alarm on. Patient removed coccyx drsg, states it was too \"bulky\". Explained the purpose to patient.

## 2022-10-20 NOTE — PROGRESS NOTES
conducted an initial consultation and Spiritual Assessment for Ludy Echeverria, who is a 81 y.o.,female. Patients Primary Language is: [place]. According to the patients EMR Sikh Affiliation is: Orthodoxy. The reason the Patient came to the hospital is:   Patient Active Problem List    Diagnosis Date Noted    AMBROCIO (acute kidney injury) ([facility] 10/19/2022    Pulmonary sarcoidosis ([address] 10/16/2019    Chronic respiratory failure with hypoxia ([address] 10/16/2019    Syncope and collapse 11/05/2017    Hyperglycemia 11/05/2017    Closed head injury 11/05/2017    Chronic back pain 11/05/2017    Diverticulitis 10/25/2014    Abdominal pain 10/25/2014    CAD in native artery     Anemia 09/12/2014    Trichomonas 09/11/2014    Hidradenitis 09/11/2014    Recurrent falls 09/11/2014    GERD (gastroesophageal reflux disease) 09/11/2014    Insomnia 09/11/2014    Cocaine abuse ([facility] 09/11/2014    RVH (right ventricular hypertrophy) 09/11/2014    Cigarette smoker 09/11/2014    Non-ST elevated myocardial infarction (non-STEMI) ([address] 09/11/2014    Chest pain 09/10/2014    HNP (herniated nucleus pulposus), cervical 01/15/2014    HTN (hypertension) 01/15/2014    Osteoporosis 01/15/2014    DM (diabetes mellitus) ([address] 01/15/2014    Chronic pain syndrome 09/08/2011    DJD (degenerative joint disease) of knee 09/08/2011    Knee pain, left 09/08/2011    Depression 09/08/2011    COPD, moderate ([address] 09/08/2011        The  provided the following Interventions:  Initiated a relationship of care and support. Explored issues of leticia, belief, spirituality and Christian/ritual needs while hospitalized. Listened empathically. Provided information about Spiritual Care Services. Offered prayer and assurance of continued prayers on patient's behalf. Chart reviewed. The following outcomes where achieved:  Patient is not interested at this time in completing an Advance Medical Directive.     confirmed Patient's Judaism Affiliation. Patient expressed gratitude for 's visit. Assessment:  Patient does not have any Zoroastrianism/cultural needs that will affect patients preferences in health care. There are no spiritual or Zoroastrianism issues which require intervention at this time. Plan:  Chaplains will continue to follow and will provide pastoral care on an as needed/requested basis.  recommends bedside caregivers page  on duty if patient shows signs of acute spiritual or emotional distress.     [address]  ([phone])

## 2022-10-20 NOTE — CONSULTS
[email or web address]  [phone]    GASTROENTEROLOGY CONSULT      Impression:   1. Abdominal pain  - predominantly in lower abdomen  - intermittent epigastric pain with eating  - CT abd/pelvis 10/19 - right colon increased mucosal enhancement, subtle wall thickening in splenic flexure, distal esophageal mild wall thickening, slightly prominent biliary tree likely reservoir effect  2. Unintentional weight loss  - 47 lb weight loss in past 3 months  - Colonoscopy 2/2022 notable for polyps  3. COPD and Pulmonary Sarcoidosis on home O2  4. Anemia - H/H 8.1/27.1      Plan:     1. Mesenteric doppler US - ordered  2. NPO p MN for EGD in AM  3. Monitor H/H and transfuse as per protocol      Chief Complaint: abdominal pain      HPI:  Merline Puna is a 81 y.o. female who I am being asked to see in consultation for an opinion regarding abdominal pain and significant weight loss. Was seen in GI office yesterday and BP 78/52, transferred to ER, has had intermittent abdominal pain mostly lower abdomen for the past year and a 47 lb weight loss in the past 3 months. Had a colonoscopy with Dr. Barragan Pass 2/23/22 significant for polyps. Has a history of multiple bouts of diverticulitis requiring antibiotics, last episode 9/21/22. She reports constipation with BMs every 4 days, uses dulcolax and milk of mag with some relief. Loss of appetite for this past year, intermittent epigastric pain with eating, intermittent reflux. Denies hematemesis, melena, hematochezia. CT in ER as above. PMH:   Past Medical History:   Diagnosis Date    Arthritis     \"generalized\"    Asthma     CAD in native artery     NSTEMI (Sep 2014); diffuse 65% pLAD, minimal disease RCA & LCx. pLAD FFR 0.93.  LV  no RWMA (echo and LV angio)    Chronic neck pain     Chronic pain     left knee    Chronic pain syndrome     COPD (chronic obstructive pulmonary disease) (HCC)     Depression     Diabetes (HCC)     Diverticulitis     GERD (gastroesophageal reflux disease)     Heart disease     Hidradenitis 2014    Hypertension     Left knee pain     Narcotic dependence ([address]     Pneumonia     Right wrist pain     Sarcoidosis        PSH:   Past Surgical History:   Procedure Laterality Date    BRONCHOSCOPY  10/2019    HX BREAST BIOPSY Right     9/10/14: She said tag in place from [address]    HX CHOLECYSTECTOMY  2005    HX HYSTERECTOMY      HX KNEE ARTHROSCOPY Left        Social HX:   Social History     Socioeconomic History    Marital status:      Spouse name: Not on file    Number of children: Not on file    Years of education: Not on file    Highest education level: Not on file   Occupational History    Not on file   Tobacco Use    Smoking status: Former     Years: 46.00     Types: Cigarettes     Quit date: 2018     Years since quittin.6    Smokeless tobacco: Never    Tobacco comments: Only smokes 1 cigarette per day   Substance and Sexual Activity    Alcohol use:  Yes     Alcohol/week: 1.7 standard drinks     Types: 2 Glasses of wine per week     Comment: socially    Drug use: Yes     Types: Cocaine, Marijuana     Comment: 6 months ago    Sexual activity: Yes   Other Topics Concern    Not on file   Social History Narrative    Not on file     Social Determinants of Health     Financial Resource Strain: Not on file   Food Insecurity: Not on file   Transportation Needs: Not on file   Physical Activity: Not on file   Stress: Not on file   Social Connections: Not on file   Intimate Partner Violence: Not on file   Housing Stability: Not on file       FHX:   Family History   Problem Relation Age of Onset    Hypertension Father     Diabetes Mother        Allergy:   Allergies   Allergen Reactions    Latex Hives and Itching    Ciprofloxacin Hives    Penicillins Nausea Only    Shellfish Derived Hives and Swelling     Swelling of legs       Patient Active Problem List   Diagnosis Code    Chronic pain syndrome G89.4    DJD (degenerative joint disease) of knee M17.9 Knee pain, left M25.562    Depression F32. A    COPD, moderate ([facility]) J44.9    HNP (herniated nucleus pulposus), cervical M50.20    HTN (hypertension) I10    Osteoporosis M81.0    DM (diabetes mellitus) ([facility] E11.9    Chest pain R07.9    Trichomonas A59.9    Hidradenitis L73.2    Recurrent falls R29.6    GERD (gastroesophageal reflux disease) K21.9    Insomnia G47.00    Cocaine abuse ([facility]) F14.10    RVH (right ventricular hypertrophy) I51.7    Cigarette smoker F17.210    Non-ST elevated myocardial infarction (non-STEMI) ([facility]) I21.4    Anemia D64.9    CAD in native artery I25.10    Diverticulitis K57.92    Abdominal pain R10.9    Syncope and collapse R55    Hyperglycemia R73.9    Closed head injury S09.90XA    Chronic back pain M54.9, G89.29    Pulmonary sarcoidosis ([facility]) D86.0    Chronic respiratory failure with hypoxia ([facility] J96.11    AMBROCIO (acute kidney injury) ([facility] N17.9       Home Medications:     Medications Prior to Admission   Medication Sig    DULoxetine (CYMBALTA) 30 mg capsule Take 30 mg by mouth daily. traZODone (DESYREL) 100 mg tablet Take 100 mg by mouth nightly as needed. polyethylene glycol (Miralax) 17 gram/dose powder Take 17 g by mouth daily. 1 tablespoon with 8 oz of water daily    docusate sodium (Colace) 100 mg capsule Take 1 Capsule by mouth two (2) times a day. ondansetron (ZOFRAN ODT) 4 mg disintegrating tablet Take 1 Tablet by mouth every eight (8) hours as needed for Nausea or Vomiting. dicyclomine (BENTYL) 20 mg tablet Take 1 Tablet by mouth every six (6) hours as needed for Abdominal Cramps.    magnesium citrate solution Take 1/3 of bottle every 8 hours until finished or until you have a bowel movement. lidocaine (Lidoderm) 5 % Apply patch to the affected area for 12 hours a day and remove for 12 hours a day.     acetaminophen (TylenoL) 325 mg tablet Take 3 Tablets by mouth every six (6) hours as needed for Pain.    megestrol (MEGACE) 400 mg/10 mL (40 mg/mL) suspension TAKE 5 ML BY MOUTH THREE TIMES DAILY FOR APPETITE    Insulin Needles, Disposable, 31 gauge x 5/16\" ndle USE AS DIRECTED WITH INSULIN PEN DAILY    Oxygen 3LPM O2 as needed   Indications: DME: [employer]    pantoprazole (PROTONIX) 20 mg tablet Take 20 mg by mouth daily. mirtazapine (REMERON) 15 mg tablet Take 15 mg by mouth nightly. metFORMIN (GLUCOPHAGE) 500 mg tablet Take 1 Tab by mouth two (2) times daily (with meals). insulin glargine (LANTUS U-100 INSULIN) 100 unit/mL injection 35 Units by SubCUTAneous route daily. (Patient taking differently: 50 Units by SubCUTAneous route daily.)    albuterol-ipratropium (DUO-NEB) 2.5 mg-0.5 mg/3 ml nebu 3 mL by Nebulization route every four (4) hours as needed for Other (shortness of breath). diclofenac (VOLTAREN) 1 % gel Apply 2 g to affected area four (4) times daily. Gel should be measured and applied using the supplied dosing card. Apply dose (2 gm or 4 gm) to each location. If treatment site is the hands, patients should wait at least one (1) hour to wash their hands. APPLY TO bilateral knees    melatonin 3 mg tablet Take 1 Tab by mouth nightly. (Patient taking differently: Take 5 mg by mouth nightly.)    Omeprazole Magnesium (PRILOSEC) 10 mg suDR Take 20 mg by mouth two (2) times a day. raNITIdine (ZANTAC) 150 mg tablet Take 1 Tab by mouth nightly. lisinopril-hydroCHLOROthiazide (PRINZIDE, ZESTORETIC) 20-12.5 mg per tablet Take 1 Tab by mouth daily. Indications: hypertension    nitroglycerin (NITROSTAT) 0.4 mg SL tablet 1 Tab by SubLINGual route as needed for Chest Pain. fluticasone-salmeterol (ADVAIR) 250-50 mcg/dose diskus inhaler Take 1 puff by inhalation every twelve (12) hours. (Patient taking differently: Take 1 Puff by inhalation daily.)    albuterol (PROVENTIL HFA, VENTOLIN HFA, PROAIR HFA) 90 mcg/actuation inhaler Take 1 puff by inhalation daily as needed for Wheezing. clopidogrel (PLAVIX) 75 mg tablet Take 1 tablet by mouth daily. atorvastatin (LIPITOR) 40 mg tablet Take 1 tablet by mouth nightly. aspirin 81 mg chewable tablet Take 1 tablet by mouth daily. tiotropium (SPIRIVA WITH HANDIHALER) 18 mcg inhalation capsule Take 1 Cap by inhalation daily. Review of Systems:     Constitutional: No fevers, chills, weight loss, fatigue. Skin: No rashes, pruritis, jaundice, ulcerations, erythema. HENT: No headaches, nosebleeds, sinus pressure, rhinorrhea, sore throat. Eyes: No visual changes, blurred vision, eye pain, photophobia, jaundice. Cardiovascular: No chest pain, heart palpitations. Respiratory: No cough, SOB, wheezing, chest discomfort, orthopnea. Gastrointestinal: Neg unless noted otherwise in H&P   Genitourinary: No dysuria, bleeding, discharge, pyuria. Musculoskeletal: No weakness, arthralgias, wasting. Endo: No sweats. Heme: No bruising, easy bleeding. Allergies: As noted. Neurological: Cranial nerves intact. Alert and oriented. Gait not assessed. Psychiatric:  No anxiety, depression, hallucinations. Visit Vitals  /76   Pulse 99   Temp 98.6 °F (37 °C)   Resp 19   SpO2 100%   Breastfeeding No       Physical Assessment:     constitutional: appearance: thin habitus, no deformities, in no acute distress. skin: inspection: no rashes, ulcers, icterus or other lesions; no clubbing or telangiectasias. palpation: no induration or subcutaneos nodules. eyes: inspection: normal conjunctivae and lids; no jaundice pupils: normal  ENMT: mouth: normal oral mucosa,lips and gums; good dentition. oropharynx: normal tongue, hard and soft palate; posterior pharynx without erithema, exudate or lesions. respiratory: effort: normal chest excursion; no intercostal retraction or accessory muscle use. cardiovascular: abdominal aorta: normal size and position; no bruits. palpation: PMI of normal size and position; normal rhythm; no thrill or murmurs.    abdominal: abdomen: normal consistency; + tenderness epigastrium and bilateral lower abdomen, no masses. hernias: no hernias appreciated. liver: normal size and consistency. spleen: not palpable. rectal: hemoccult/guaiac: not performed. musculoskeletal: digits and nails: no clubbing, cyanosis, petechiae or other inflammatory conditions. head and neck: normal range of motion; no pain, crepitation or contracture. spine/ribs/pelvis: normal range of motion; no pain, deformity or contracture. neurologic: cranial nerves: II-XII normal. Pupils intact. psychiatric: judgement/insight: within normal limits. memory: within normal limits for recent and remote events. mood and affect: no evidence of depression, anxiety or agitation. orientation: oriented to time, space and person. Basic Metabolic Profile   Recent Labs     10/20/22  0206 10/19/22  1210    138   K 4.4 4.8   * 105   CO2 25 20*   BUN 31* 34*   GLU 74 178*   CA 8.5 9.5   MG  --  2.4         CBC w/Diff    Recent Labs     10/20/22  0206   WBC 4.1*   RBC 3.54*   HGB 8.1*   HCT 27.1*   MCV 76.6*   MCH 22.9*   MCHC 29.9*   RDW 15.3*       Recent Labs     10/19/22  1210   GRANS 46   LYMPH 46   EOS 1        Hepatic Function   Recent Labs     10/19/22  1210   ALB 3.9   TP 8.0   TBILI 0.7   AP 74   LPSE 133        Coags   No results for input(s): PTP, INR, APTT, INREXT in the last 72 hours. Johnathan Santa NP. [facility], [address] [place] [address]    [email or web address]

## 2022-10-20 NOTE — PROGRESS NOTES
Discharge/Transition Planning       Called IFEANYI granados with [facility] and asked about the home oxygen as does not make sense to \"run out of home oxygen\" as people have concentrators and if portable empty, one calls and has switched for full tanks   Patient oxygen was discontinued by PCP, Dr Jaimee Montgomery on 9/26/22    Patient has COPD and is 100 % and on oxygen at hospital. Patient should not be over oxygenated with COPD    Would need new walk test when closer to dc if thought that needs home o2       Also of note , [facility] notified CM patient was smoking cocaine and last drug screen on August 2022 was positive.  No drug screen completed on admission

## 2022-10-20 NOTE — CONSULTS
Comprehensive Nutrition Assessment    Type and Reason for Visit: Initial, Positive nutrition screen, Consult    Nutrition Recommendations/Plan:   Continue oral nutrition supplement to optimize nutrition intake opportunity: Glucerna (each provides 220 kcal, 10g protein) TID. Plan to add MVI and thiamine daily   Modify current diet with easy to chew d/t pt preferences (no dentures)   Monitor PO intake, compliance of oral supplement, weight, labs, and plan of care during admission. Malnutrition Assessment:  Malnutrition Status:  Severe malnutrition (10/20/22 1553)    Context:  Acute illness     Findings of the 6 clinical characteristics of malnutrition:   Energy Intake:  50% or less of est energy requirements for 5 or more days  Weight Loss:  Greater than 7.5% over 3 months     Body Fat Loss: Moderate body fat loss, Triceps, Orbital, Buccal region   Muscle Mass Loss: Moderate muscle mass loss, Temples (temporalis), Thigh (quadriceps), Clavicles (pectoralis & deltoids), Hand (interosseous)  Fluid Accumulation:  Mild, Ascites   Strength:  Not performed     Nutrition History and Allergies:   Past medical history: arthritis, asthma, depression, heart disease, diabetes, hidradenitis, narcotic dependence, pneumonia, CAD, COPD, IDDM, GERD, HTN, diverticulitis, pulm sarcoidosis. Food allergies include shellfish. Weight history per chart review:  CBW: 10/20/2022: 94.8 lbs, 09/21/22 : 39.9 kg (88 lb), 90 days:07/26/22 : 43.5 kg (96 lb), 180 days: 03/06/22 : 59.4 kg (131 lb), 1 year: 09/29/21 : 59.4 kg (131 lb). Weight trending down: -28% significant change x 180 days. Nutrition Assessment:    Visited pt in room, laying in bed, alert and able to communicate- in room. Pt admitted for c/o LLQ pain associated with malaise, weight loss (47 lbs x 90 days), and poor appetite. Pt reported tolerating current diet with % PO intake and great acceptance of oral supplement.  Pt reported PTA experiencing abdominal pain, decreased appetite x 3 months and only consuming Ensure Plus TID. Edentulous observed-updated food preferences. Currently pt reported improved appetite and consuming all of most meals including oral supplements. Current labs shows elevated BUN (31). Obtained current body weight from bed scale: 98.4 lbs. Pt reported lowest weight as 78 lbs. Pt requested a second meal for hospital-notified RN and Nutrition Manager to update dietary office. Nutrition Related Findings:    Last BM 10/19. Output: 200mL (urine). Pertinent Medications: miralax, dulcolax, zofran, lovenox, Wound Type: None     Current Nutrition Intake & Therapies:  Average Meal Intake: %  Average Supplement Intake: 51-75%  ADULT DIET Regular  ADULT ORAL NUTRITION SUPPLEMENT Breakfast, Lunch, Dinner; Diabetic Supplement  DIET NPO    Anthropometric Measures:  Height: 5' 2\" (157.5 cm)  Ideal Body Weight (IBW): 110 lbs (50 kg)  Admission Body Weight: 98 lb 6.4 oz  Current Body Wt:  44.6 kg (98 lb 6.4 oz), 89.5 % IBW.  Bed scale  Current BMI (kg/m2): 18  Usual Body Weight: 59.4 kg (131 lb)  % Weight Change (Calculated): -24.9  Weight Adjustment: No adjustment  BMI Category: Underweight (BMI less than 22) age over 72    Estimated Daily Nutrient Needs:  Energy Requirements Based On: Kcal/kg (25-35)  Weight Used for Energy Requirements: Current  Energy (kcal/day): 3245-7717  Weight Used for Protein Requirements: Current (1.0-1.2)  Protein (g/day): 45-54  Method Used for Fluid Requirements: 1 ml/kcal  Fluid (ml/day): 8356-4155    Nutrition Diagnosis:   Severe malnutrition, In context of acute illness or injury related to early satiety, inadequate protein-energy intake as evidenced by Criteria as identified in malnutrition assessment, weight loss, poor dentition    Nutrition Interventions:   Food and/or Nutrient Delivery: Continue current diet, Modify current diet, Mineral supplement, Vitamin supplement, Start oral nutrition supplement  Nutrition Education/Counseling: Education not indicated, No recommendations at this time  Coordination of Nutrition Care: Continue to monitor while inpatient  Plan of Care discussed with: patient, RN, Nutrition Manager/Dietary    Goals:     Goals: Meet at least 75% of estimated needs, by next RD assessment       Nutrition Monitoring and Evaluation:   Behavioral-Environmental Outcomes: None identified  Food/Nutrient Intake Outcomes: Diet advancement/tolerance, Food and nutrient intake, Supplement intake, Vitamin/mineral intake  Physical Signs/Symptoms Outcomes: Biochemical data, GI status, Weight, Nutrition focused physical findings, Meal time behavior    Discharge Planning:    Continue current diet, Continue oral nutrition supplement    Dontrell Velasquez MA, RDN, LD   Contact: [phone]

## 2022-10-20 NOTE — PROGRESS NOTES
Problem: Mobility Impaired (Adult and Pediatric)  Goal: *Acute Goals and Plan of Care (Insert Text)  Description: Physical Therapy Goals  Initiated 10/20/2022 and to be accomplished within 7 day(s)  1. Patient will move from supine to sit and sit to supine , scoot up and down, and roll side to side in bed with modified independence. 2.  Patient will transfer from bed to chair and chair to bed with modified independence using the least restrictive device. 3.  Patient will perform sit to stand with modified independence. 4.  Patient will ambulate with modified independence for 75 feet with the least restrictive device. 5.  Patient will participate in LE exercise to tolerance. PLOF: Pt lives with  in apartment with elevator access. Pt has been passing out 2 times per week over the last several months, falling to floor and hitting head at times. Pt has a rollator and BSC, will sit in rollator if needed. Outcome: Progressing Towards Goal   PHYSICAL THERAPY EVALUATION    Patient: Ursula Homl (89 y.o. female)  Date: 10/20/2022  Primary Diagnosis: Colitis [K52.9]  Procedure(s) (LRB):  UPPER ENDOSCOPY (N/A)     Precautions:  Fall, Skin    ASSESSMENT :  Pt cleared to participate in PT session, pt received semi-reclined in bed and agreeable to therapy session. Based on the objective data described below, the patient presents with decreased endurance, decreased strength, decreased balance reactions, gait deviations, dizziness, and decreased independence in functional mobility. BP taken in supine, 127/72, donned socks in bed. Pt transitioning to EOB with supervision. BP of 125/71 in sitting. Standing with CGA to RW and ambulating x2 feet forward. BP taken in standing 89/59. Returned to sitting with improvements in BP. Returned to supine with supervision. Pt positioned for comfort and educated to call for assist before getting up, pt verbalized understanding.  Pt left with all needs met and call bell in reach. RN notified of position and participation. Mobility limited due to drop in BP in standing. Pt requesting help getting her home O2 back. Patient will benefit from skilled intervention to address the above impairments. Patient's rehabilitation potential is considered to be Good  Factors which may influence rehabilitation potential include:   []         None noted  []         Mental ability/status  [x]         Medical condition  []         Home/family situation and support systems  []         Safety awareness  []         Pain tolerance/management  []         Other:      PLAN :  Recommendations and Planned Interventions:   [x]           Bed Mobility Training             []    Neuromuscular Re-Education  [x]           Transfer Training                   []    Orthotic/Prosthetic Training  [x]           Gait Training                          []    Modalities  [x]           Therapeutic Exercises           []    Edema Management/Control  [x]           Therapeutic Activities            [x]    Family Training/Education  [x]           Patient Education  []           Other (comment):    Frequency/Duration: Patient will be followed by physical therapy 1-2 times per day/4-7 days per week to address goals. Further Equipment Recommendations for Discharge: RW    AMPAC: Current research shows that an AM-PAC score of 18 or greater is associated with a discharge to the patient's home setting. Based on an AM-PAC score of 15/24 and their current functional mobility deficits, it is recommended that the patient have 2-3 sessions per week of Physical Therapy at d/c to increase the patient's independence. This AMPAC score should be considered in conjunction with interdisciplinary team recommendations to determine the most appropriate discharge setting. Patient's social support, diagnosis, medical stability, and prior level of function should also be taken into consideration.      SUBJECTIVE:   Patient stated I need to find out what is going one with me.     OBJECTIVE DATA SUMMARY:     Past Medical History:   Diagnosis Date    Arthritis     \"generalized\"    Asthma     CAD in native artery     NSTEMI (Sep 2014); diffuse 65% pLAD, minimal disease RCA & LCx. pLAD FFR 0.93. LV  no RWMA (echo and LV angio)    Chronic neck pain     Chronic pain     left knee    Chronic pain syndrome     COPD (chronic obstructive pulmonary disease) (HCC)     Depression     Diabetes (HCC)     Diverticulitis     GERD (gastroesophageal reflux disease)     Heart disease     Hidradenitis 9/11/2014    Hypertension     Left knee pain     Narcotic dependence ([facility] [address]     Pneumonia     Right wrist pain     Sarcoidosis      Past Surgical History:   Procedure Laterality Date    BRONCHOSCOPY  10/2019    HX BREAST BIOPSY Right     9/10/14: She said tag in place from [address]  2005    HX HYSTERECTOMY      HX KNEE ARTHROSCOPY Left      Barriers to Learning/Limitations: None  Compensate with: N/A  Home Situation:  Home Situation  Home Environment: Apartment  # Steps to Enter:  (elevator)  One/Two Story Residence: One story  Living Alone: No  Support Systems: Spouse/Significant Other, [employer]/[place]  Patient Expects to be Discharged to[de-identified] Home  Current DME Used/Available at Home: Saratha Niles, rollator, Commode, bedside  Tub or Shower Type: Shower  Critical Behavior:  Neurologic State: Alert  Orientation Level: Oriented X4  Cognition: Follows commands  Safety/Judgement: Fall prevention  Psychosocial  Patient Behaviors: Calm; Cooperative  Family  Behaviors: Supportive     Family  Behaviors: Supportive    Strength:    Strength: Generally decreased, functional       Tone & Sensation:   Tone: Normal    Sensation: Intact    Range Of Motion:  AROM: Within functional limits    Posture:  Posture (WDL): Within defined limits     Functional Mobility:  Bed Mobility:     Supine to Sit: Supervision  Sit to Supine: Supervision  Scooting: Supervision  Transfers:  Sit to Stand: Contact guard assistance  Stand to Sit: Contact guard assistance    Balance:   Sitting: Intact  Standing: Impaired; With support  Standing - Static: Good  Standing - Dynamic : Fair    Ambulation/Gait Training:  Distance (ft): 2 Feet (ft)  Assistive Device: Walker, rolling  Ambulation - Level of Assistance: Contact guard assistance     Gait Description (WDL): Exceptions to WDL  Gait Abnormalities: Decreased step clearance    Base of Support: Narrowed     Speed/Kell: Slow;Shuffled  Step Length: Right shortened;Left shortened    Pain:  Pain level pre-treatment: 0/10   Pain level post-treatment: 0/10     Activity Tolerance:   Fair     Please refer to the flowsheet for vital signs taken during this treatment. After treatment:   []         Patient left in no apparent distress sitting up in chair  [x]         Patient left in no apparent distress in bed  [x]         Call bell left within reach  [x]         Nursing notified  []         Caregiver present  []         Bed alarm activated  []         SCDs applied    COMMUNICATION/EDUCATION:   [x]         Role of Physical Therapy in the acute care setting. [x]         Fall prevention education was provided and the patient/caregiver indicated understanding. [x]         Patient/family have participated as able in goal setting and plan of care. [x]         Patient/family agree to work toward stated goals and plan of care. []         Patient understands intent and goals of therapy, but is neutral about his/her participation. []         Patient is unable to participate in goal setting/plan of care: ongoing with therapy staff.  []         Other:     Thank you for this referral.  Erika Byers, PT   Time Calculation: 32 mins      Eval Complexity: History: MEDIUM  Complexity : 1-2 comorbidities / personal factors will impact the outcome/ POC Exam:LOW Complexity : 1-2 Standardized tests and measures addressing body structure, function, activity limitation and / or participation in recreation  Presentation: LOW Complexity : Stable, uncomplicated  Clinical Decision Making:Low Complexity low  Overall Complexity:LOW     [employer] AM-PAC® Basic Mobility Inpatient Short Form (6-Clicks) Version 2    How much HELP from another person does the patient currently need    (If the patient hasn't done an activity recently, how much help from another person do you think he/she would need if he/she tried?)   Total (Total A or Dep)   A Lot  (Mod to Max A)   A Little (Sup or Min A)   None (Mod I to I)   Turning from your back to your side while in a flat bed without using bedrails? [] 1 [] 2 [x] 3 [] 4   2. Moving from lying on your back to sitting on the side of a flat bed without using bedrails? [] 1 [] 2 [x] 3 [] 4   3. Moving to and from a bed to a chair (including a wheelchair)? [] 1 [] 2 [x] 3 [] 4   4. Standing up from a chair using your arms (e.g., wheelchair, or bedside chair)? [] 1 [] 2 [x] 3 [] 4   5. Walking in hospital room? [] 1 [] 2 [x] 3 [] 4          +If stair climbing cannot be assessed, skip item #6. Sum responses from items 1-5.

## 2022-10-20 NOTE — PROGRESS NOTES
Pulmicort Respules® (budesonide) 0.5mg/2ml BID + Arformoterol 15mcg/2ml BID were therapeutically interchanged for Symbicort® (budesonide/formoterol) 160/4.5 mcg BID per the P&T Committee approved Therapeutic Interchanges Policy.

## 2022-10-20 NOTE — PROGRESS NOTES
Problem: Self Care Deficits Care Plan (Adult)  Goal: *Acute Goals and Plan of Care (Insert Text)  Description: Occupational Therapy Goals  Initiated 10/20/2022 within 7 day(s). 1.  Patient will perform lower body dressing with modified independence. 2.  Patient will perform functional activity standing > 2 minutes with modified independence, F+ balance. 3.  Patient will perform toilet transfers with modified independence. 4.  Patient will perform all aspects of toileting with modified independence. 5.  Patient will utilize energy conservation techniques during functional activities with verbal cues. Prior Level of Function: Patient needed assist self-care prn and used a rollator for functional mobility PTA. Outcome: Progressing Towards Goal     OCCUPATIONAL THERAPY EVALUATION    Patient: Mya Baig (89 y.o. female)  Date: 10/20/2022  Primary Diagnosis: Colitis [K52.9]  Procedure(s) (LRB):  UPPER ENDOSCOPY (N/A)     Precautions:   Fall, Skin        ASSESSMENT :  Upon entering the room, the patient was supine in bed, alert, and agreeable to participate in OT evaluation with spouse present. Patient reporting dizziness at bed level; /74. Patient modified independent for bed mobility and contact guard - stand by assist for sit <> stand. Patient reported dizziness did not improve while standing, standing /64. Patient stand by assist for self-feeding, grooming and upper body dressing. Patient requires contact guard assist for all standing ADLs. Based on the objective data described below, the patient presents with decreased strength, decreased independence, decreased standing activity tolerance, decreased functional balance, and decreased functional mobility, which impedes pt performance in basic self-care/ADL tasks. Patient would benefit from skilled OT during admission to restore PLOF and maximize function.         Patient will benefit from skilled intervention to address the above impairments. Patient's rehabilitation potential is considered to be Fair  Factors which may influence rehabilitation potential include:   []             None noted  [x]             Mental ability/status  [x]             Medical condition  [x]             Home/family situation and support systems  [x]             Safety awareness  [x]             Pain tolerance/management  []             Other:      PLAN :  Recommendations and Planned Interventions:   [x]               Self Care Training                  [x]      Therapeutic Activities  [x]               Functional Mobility Training   []      Cognitive Retraining  [x]               Therapeutic Exercises           [x]      Endurance Activities  [x]               Balance Training                    [x]      Neuromuscular Re-Education  []               Visual/Perceptual Training     [x]      Home Safety Training  [x]               Patient Education                   [x]      Family Training/Education  []               Other (comment):    Frequency/Duration: Patient will be followed by occupational therapy 3 - 5 times a week to address goals. Further Equipment Recommendations for Discharge: wheelchair for community reintegration    AMPAC: Current research shows that an AM-PAC score of 18 or greater is associated with a discharge to the patient's home setting. Based on an AM-PAC score of 18/24 and their current ADL deficits; it is recommended that the patient have 2-3 sessions per week of Occupational Therapy at d/c to increase the patient's independence. Patient unable to stand > 1 minute d/t dizziness and requires assist for tasks. Patient may benefit from placement to increase endurance, independence and activity tolerance for ADLs pending progress with therapy. This AMPAC score should be considered in conjunction with interdisciplinary team recommendations to determine the most appropriate discharge setting.  Patient's social support, diagnosis, medical stability, and prior level of function should also be taken into consideration. SUBJECTIVE:   Patient stated I cant stand long to cook or clean (Patient may benefit from resource handouts such as meals on wheels, etc)    OBJECTIVE DATA SUMMARY:     Past Medical History:   Diagnosis Date    Arthritis     \"generalized\"    Asthma     CAD in native artery     NSTEMI (Sep 2014); diffuse 65% pLAD, minimal disease RCA & LCx. pLAD FFR 0.93. LV  no RWMA (echo and LV angio)    Chronic neck pain     Chronic pain     left knee    Chronic pain syndrome     COPD (chronic obstructive pulmonary disease) (HCC)     Depression     Diabetes (HCC)     Diverticulitis     GERD (gastroesophageal reflux disease)     Heart disease     Hidradenitis 9/11/2014    Hypertension     Left knee pain     Narcotic dependence ([facility] [address]     Pneumonia     Right wrist pain     Sarcoidosis      Past Surgical History:   Procedure Laterality Date    BRONCHOSCOPY  10/2019    HX BREAST BIOPSY Right     9/10/14: She said tag in place from [address]  2005    HX HYSTERECTOMY      HX KNEE ARTHROSCOPY Left      Barriers to Learning/Limitations: None  Compensate with: visual, verbal, tactile, kinesthetic cues/model    Home Situation:   Home Situation  Home Environment: Apartment  # Steps to Enter:  (elevator)  One/Two Story Residence: One story  Living Alone: No  Support Systems: Spouse/Significant Other, [employer]/[place]  Patient Expects to be Discharged to[de-identified] Home  Current DME Used/Available at Home: Precilla Imperial, rollator  Tub or Shower Type: Shower  [x]  Right hand dominant   []  Left hand dominant    Cognitive/Behavioral Status:  Neurologic State: Alert  Orientation Level: Oriented X4  Cognition: Follows commands  Safety/Judgement: Fall prevention    Skin: intact  Edema: None noted    Vision/Perceptual:    Acuity: Within Defined Limits      Coordination: BUE  Fine Motor Skills-Upper: Left Intact; Right Intact    Gross Motor Skills-Upper: Left Intact; Right Intact    Balance:  Sitting: Intact  Standing: Impaired; With support  Standing - Static: Fair (F+)  Standing - Dynamic : Fair    Strength: BUE  Strength: Generally decreased, functional     Tone & Sensation: BUE  Tone: Normal  Sensation: Intact    Range of Motion: BUE  AROM: Within functional limits    Functional Mobility and Transfers for ADLs:  Bed Mobility:  Supine to Sit: Modified independent  Sit to Supine: Modified independent  Scooting: Modified independent    Transfers:  Sit to Stand: Contact guard assistance  Stand to Sit: Stand-by assistance      ADL Assessment:   Feeding: Setup    Oral Facial Hygiene/Grooming: Setup;Stand-by assistance    Bathing: Minimum assistance    Upper Body Dressing: Stand-by assistance    Lower Body Dressing: Minimum assistance    Toileting: Contact guard assistance;Minimum assistance    ADL Intervention:  Feeding  Feeding Assistance: Set-up; Stand-by assistance  Container Management: Standing-by assistance  Food to Mouth: Stand-by assistance    Grooming  Grooming Assistance: Set-up; Stand-by assistance  Brushing/Combing Hair: Stand-by assistance    Upper Body Dressing Assistance  Dressing Assistance: Stand-by assistance  Shirt simulation with hospital gown: Stand-by assistance    Cognitive Retraining  Safety/Judgement: Fall prevention    Pain:  Pain level pre-treatment: 8/10 , abdominal pain  Pain level post-treatment: 9/10   Pain Intervention(s): Medication (see MAR); Rest, Ice, Repositioning   Response to intervention: Nurse notified, See doc flow    Activity Tolerance:   Fair      Please refer to the flowsheet for vital signs taken during this treatment.   After treatment:   [] Patient left in no apparent distress sitting up in chair  [x] Patient left in no apparent distress in bed  [x] Call bell left within reach  [x] Nursing notified  [] Caregiver present  [] Bed alarm activated    COMMUNICATION/EDUCATION:   [x] Role of Occupational Therapy in the acute care setting  [x] Home safety education was provided and the patient/caregiver indicated understanding. [x] Patient/family have participated as able in goal setting and plan of care. [x] Patient/family agree to work toward stated goals and plan of care. [] Patient understands intent and goals of therapy, but is neutral about his/her participation. [] Patient is unable to participate in goal setting and plan of care. Thank you for this referral.  Olu Nur, OTR/L  Time Calculation: 16 mins    Eval Complexity: History: MEDIUM Complexity : Expanded review of history including physical, cognitive and psychosocial  history ; Examination: MEDIUM Complexity : 3-5 performance deficits relating to physical, cognitive , or psychosocial skils that result in activity limitations and / or participation restrictions; Decision Making:LOW Complexity : No comorbidities that affect functional and no verbal or physical assistance needed to complete eval tasks     [facility]-P[facility]® Daily Activity Inpatient Short Form (6-Clicks)*    How much HELP from another person does the patient currently need    (If the patient hasn't done an activity recently, how much help from another person do you think he/she would need if he/she tried?)   Total (Total A or Dep)   A Lot  (Mod to Max A)   A Little (Sup or Min A)   None (Mod I to I)   Putting on and taking off regular lower body clothing? [] 1 [] 2 [x] 3 [] 4   2. Bathing (including washing, rinsing,      drying)? [] 1 [] 2 [x] 3 [] 4   3. Toileting, which includes using toilet, bedpan or urinal?   [] 1 [] 2 [x] 3 [] 4   4. Putting on and taking off regular upper body clothing? [] 1 [] 2 [x] 3 [] 4   5. Taking care of personal grooming such as brushing teeth? [] 1 [] 2 [x] 3 [] 4   6. Eating meals?    [] 1 [] 2 [x] 3 [] 4

## 2022-10-20 NOTE — ACP (ADVANCE CARE PLANNING)
Advance Care Planning   Advance Care Planning Inpatient Note  [address] [facility] Department    Today's Date: 10/20/2022  Unit: [facility] 2S TELEMETRY    Received request from admission screening. Upon review of chart and communication with care team, patient's decision making abilities are not in question. Patient and Spouse was/were present in the room during visit. Health Care Decision Makers:      Primary Decision Maker: Rakesh Beltran Spouse - [phone]    Summary:  Verified Healthcare Decision Maker  Patient's spouse, Mylinda Goodpasture, is her legal next of kin, and patient confirmed that he would be making medical decisions if she is not able to make medical decisions.     Advance Care Planning Documents (Patient Wishes) on file:  None       Interventions:  Deferred conversation as patient not interested in completing an advance directive at this time    Care Preferences Communicated:  No    Outcomes/Plan:  ACP Discussion Refused    Floyd [facility]Minnie [facility] on 10/20/2022 at 12:08 PM

## 2022-10-21 ENCOUNTER — ANESTHESIA (OUTPATIENT)
Dept: ENDOSCOPY | Age: 72
DRG: 073 | End: 2022-10-21
Payer: MEDICARE

## 2022-10-21 ENCOUNTER — APPOINTMENT (OUTPATIENT)
Dept: VASCULAR SURGERY | Age: 72
DRG: 073 | End: 2022-10-21
Attending: NURSE PRACTITIONER
Payer: MEDICARE

## 2022-10-21 PROBLEM — K31.84 GASTROPARESIS: Status: ACTIVE | Noted: 2022-10-21

## 2022-10-21 LAB
ABDOMINAL PROX AORTA VEL: 85.5 CM/S
ANION GAP SERPL CALC-SCNC: 2 MMOL/L (ref 3–18)
BUN SERPL-MCNC: 24 MG/DL (ref 7–18)
BUN/CREAT SERPL: 37 (ref 12–20)
CALCIUM SERPL-MCNC: 8.3 MG/DL (ref 8.5–10.1)
CELIAC EDV: 25.6 CM/S
CELIAC PSV: 129.8 CM/S
CHLORIDE SERPL-SCNC: 110 MMOL/L (ref 100–111)
CO2 SERPL-SCNC: 30 MMOL/L (ref 21–32)
COMMON HEPATIC EDV: 18.2 CM/S
COMMON HEPATIC PSV: 163.1 CM/S
CREAT SERPL-MCNC: 0.65 MG/DL (ref 0.6–1.3)
DIST AORTIC AP: 1.16 CM
DIST AORTIC TRANS: 1.01 CM
DIST SMA EDV: 19.4 CM/S
DIST SMA PSV: 119.8 CM/S
ERYTHROCYTE [DISTWIDTH] IN BLOOD BY AUTOMATED COUNT: 15.2 % (ref 11.6–14.5)
FERRITIN SERPL-MCNC: 360 NG/ML (ref 8–388)
GLUCOSE BLD STRIP.AUTO-MCNC: 101 MG/DL (ref 70–110)
GLUCOSE BLD STRIP.AUTO-MCNC: 103 MG/DL (ref 70–110)
GLUCOSE BLD STRIP.AUTO-MCNC: 118 MG/DL (ref 70–110)
GLUCOSE BLD STRIP.AUTO-MCNC: 177 MG/DL (ref 70–110)
GLUCOSE BLD STRIP.AUTO-MCNC: 77 MG/DL (ref 70–110)
GLUCOSE SERPL-MCNC: 124 MG/DL (ref 74–99)
HCT VFR BLD AUTO: 23.9 % (ref 35–45)
HGB BLD-MCNC: 7.5 G/DL (ref 12–16)
HIV 1+2 AB+HIV1 P24 AG SERPL QL IA: NONREACTIVE
HIV12 RESULT COMMENT, HHIVC: NORMAL
IMA EDV: 15.5 CM/S
IMA PSV: 86.7 CM/S
IRON SATN MFR SERPL: 20 % (ref 20–50)
IRON SERPL-MCNC: 43 UG/DL (ref 50–175)
MCH RBC QN AUTO: 23.7 PG (ref 24–34)
MCHC RBC AUTO-ENTMCNC: 31.4 G/DL (ref 31–37)
MCV RBC AUTO: 75.6 FL (ref 78–100)
MID AORTIC AP: 1.28 CM
MID AORTIC TRANS: 1.31 CM
MID SMA EDV: 16.3 CM/S
MID SMA PSV: 184.8 CM/S
NRBC # BLD: 0 K/UL (ref 0–0.01)
NRBC BLD-RTO: 0 PER 100 WBC
ORIGIN SMA EDV: 26.5 CM/S
ORIGIN SMA PSV: 226.9 CM/S
PLATELET # BLD AUTO: 230 K/UL (ref 135–420)
PMV BLD AUTO: 10.9 FL (ref 9.2–11.8)
POTASSIUM SERPL-SCNC: 4.6 MMOL/L (ref 3.5–5.5)
PROX AORTIC AP: 1.52 CM
PROX AORTIC TRANS: 1.55 CM
PROX SMA EDV: 24.1 CM/S
PROX SMA PSV: 210.7 CM/S
RBC # BLD AUTO: 3.16 M/UL (ref 4.2–5.3)
SODIUM SERPL-SCNC: 142 MMOL/L (ref 136–145)
SPLENIC EDV: 24.8 CM/S
SPLENIC PSV: 167.5 CM/S
TIBC SERPL-MCNC: 219 UG/DL (ref 250–450)
WBC # BLD AUTO: 5.9 K/UL (ref 4.6–13.2)

## 2022-10-21 PROCEDURE — 94762 N-INVAS EAR/PLS OXIMTRY CONT: CPT

## 2022-10-21 PROCEDURE — 65270000029 HC RM PRIVATE

## 2022-10-21 PROCEDURE — 80048 BASIC METABOLIC PNL TOTAL CA: CPT

## 2022-10-21 PROCEDURE — 93975 VASCULAR STUDY: CPT

## 2022-10-21 PROCEDURE — 83540 ASSAY OF IRON: CPT

## 2022-10-21 PROCEDURE — 77030008565 HC TBNG SUC IRR ERBE -B: Performed by: INTERNAL MEDICINE

## 2022-10-21 PROCEDURE — 74011250637 HC RX REV CODE- 250/637: Performed by: PHYSICIAN ASSISTANT

## 2022-10-21 PROCEDURE — 36415 COLL VENOUS BLD VENIPUNCTURE: CPT

## 2022-10-21 PROCEDURE — 74011250636 HC RX REV CODE- 250/636: Performed by: NURSE ANESTHETIST, CERTIFIED REGISTERED

## 2022-10-21 PROCEDURE — 74011000250 HC RX REV CODE- 250: Performed by: ANESTHESIOLOGY

## 2022-10-21 PROCEDURE — 74011250636 HC RX REV CODE- 250/636: Performed by: INTERNAL MEDICINE

## 2022-10-21 PROCEDURE — 74011250636 HC RX REV CODE- 250/636: Performed by: ANESTHESIOLOGY

## 2022-10-21 PROCEDURE — 74011250637 HC RX REV CODE- 250/637: Performed by: INTERNAL MEDICINE

## 2022-10-21 PROCEDURE — 77010033678 HC OXYGEN DAILY

## 2022-10-21 PROCEDURE — 77030019988 HC FCPS ENDOSC DISP BSC -B: Performed by: INTERNAL MEDICINE

## 2022-10-21 PROCEDURE — 82728 ASSAY OF FERRITIN: CPT

## 2022-10-21 PROCEDURE — 00731 ANES UPR GI NDSC PX NOS: CPT | Performed by: ANESTHESIOLOGY

## 2022-10-21 PROCEDURE — 76040000019: Performed by: INTERNAL MEDICINE

## 2022-10-21 PROCEDURE — 99232 SBSQ HOSP IP/OBS MODERATE 35: CPT | Performed by: INTERNAL MEDICINE

## 2022-10-21 PROCEDURE — 85027 COMPLETE CBC AUTOMATED: CPT

## 2022-10-21 PROCEDURE — 74011636637 HC RX REV CODE- 636/637: Performed by: PHYSICIAN ASSISTANT

## 2022-10-21 PROCEDURE — 74011250636 HC RX REV CODE- 250/636

## 2022-10-21 PROCEDURE — 0DB98ZX EXCISION OF DUODENUM, VIA NATURAL OR ARTIFICIAL OPENING ENDOSCOPIC, DIAGNOSTIC: ICD-10-PCS | Performed by: INTERNAL MEDICINE

## 2022-10-21 PROCEDURE — 76060000031 HC ANESTHESIA FIRST 0.5 HR: Performed by: INTERNAL MEDICINE

## 2022-10-21 PROCEDURE — 88305 TISSUE EXAM BY PATHOLOGIST: CPT

## 2022-10-21 PROCEDURE — 2709999900 HC NON-CHARGEABLE SUPPLY: Performed by: INTERNAL MEDICINE

## 2022-10-21 PROCEDURE — 94640 AIRWAY INHALATION TREATMENT: CPT

## 2022-10-21 PROCEDURE — 74011000250 HC RX REV CODE- 250: Performed by: PHYSICIAN ASSISTANT

## 2022-10-21 PROCEDURE — 82962 GLUCOSE BLOOD TEST: CPT

## 2022-10-21 RX ORDER — SODIUM CHLORIDE, SODIUM LACTATE, POTASSIUM CHLORIDE, CALCIUM CHLORIDE 600; 310; 30; 20 MG/100ML; MG/100ML; MG/100ML; MG/100ML
INJECTION, SOLUTION INTRAVENOUS
Status: DISCONTINUED | OUTPATIENT
Start: 2022-10-21 | End: 2022-10-21 | Stop reason: HOSPADM

## 2022-10-21 RX ORDER — FENTANYL CITRATE 50 UG/ML
50 INJECTION, SOLUTION INTRAMUSCULAR; INTRAVENOUS
Status: COMPLETED | OUTPATIENT
Start: 2022-10-21 | End: 2022-10-21

## 2022-10-21 RX ORDER — DEXTROSE MONOHYDRATE 100 MG/ML
0-250 INJECTION, SOLUTION INTRAVENOUS AS NEEDED
Status: DISCONTINUED | OUTPATIENT
Start: 2022-10-21 | End: 2022-10-21 | Stop reason: HOSPADM

## 2022-10-21 RX ORDER — PROPOFOL 10 MG/ML
INJECTION, EMULSION INTRAVENOUS AS NEEDED
Status: DISCONTINUED | OUTPATIENT
Start: 2022-10-21 | End: 2022-10-21 | Stop reason: HOSPADM

## 2022-10-21 RX ORDER — LIDOCAINE HYDROCHLORIDE 20 MG/ML
INJECTION, SOLUTION EPIDURAL; INFILTRATION; INTRACAUDAL; PERINEURAL AS NEEDED
Status: DISCONTINUED | OUTPATIENT
Start: 2022-10-21 | End: 2022-10-21 | Stop reason: HOSPADM

## 2022-10-21 RX ORDER — INSULIN LISPRO 100 [IU]/ML
INJECTION, SOLUTION INTRAVENOUS; SUBCUTANEOUS ONCE
Status: DISCONTINUED | OUTPATIENT
Start: 2022-10-21 | End: 2022-10-21 | Stop reason: HOSPADM

## 2022-10-21 RX ORDER — POLYETHYLENE GLYCOL 3350 17 G/17G
34 POWDER, FOR SOLUTION ORAL DAILY
Status: DISCONTINUED | OUTPATIENT
Start: 2022-10-21 | End: 2022-10-28 | Stop reason: HOSPADM

## 2022-10-21 RX ORDER — SODIUM CHLORIDE 0.9 % (FLUSH) 0.9 %
5-40 SYRINGE (ML) INJECTION AS NEEDED
Status: DISCONTINUED | OUTPATIENT
Start: 2022-10-21 | End: 2022-10-21 | Stop reason: HOSPADM

## 2022-10-21 RX ORDER — SODIUM CHLORIDE, SODIUM LACTATE, POTASSIUM CHLORIDE, CALCIUM CHLORIDE 600; 310; 30; 20 MG/100ML; MG/100ML; MG/100ML; MG/100ML
50 INJECTION, SOLUTION INTRAVENOUS CONTINUOUS
Status: DISCONTINUED | OUTPATIENT
Start: 2022-10-21 | End: 2022-10-21 | Stop reason: HOSPADM

## 2022-10-21 RX ORDER — DIPHENHYDRAMINE HYDROCHLORIDE 50 MG/ML
12.5 INJECTION, SOLUTION INTRAMUSCULAR; INTRAVENOUS
Status: COMPLETED | OUTPATIENT
Start: 2022-10-21 | End: 2022-10-21

## 2022-10-21 RX ORDER — HYDROMORPHONE HYDROCHLORIDE 1 MG/ML
0.5 INJECTION, SOLUTION INTRAMUSCULAR; INTRAVENOUS; SUBCUTANEOUS
Status: DISCONTINUED | OUTPATIENT
Start: 2022-10-21 | End: 2022-10-21 | Stop reason: HOSPADM

## 2022-10-21 RX ORDER — DIPHENHYDRAMINE HYDROCHLORIDE 50 MG/ML
INJECTION, SOLUTION INTRAMUSCULAR; INTRAVENOUS
Status: COMPLETED
Start: 2022-10-21 | End: 2022-10-21

## 2022-10-21 RX ORDER — MAGNESIUM SULFATE 100 %
4 CRYSTALS MISCELLANEOUS AS NEEDED
Status: DISCONTINUED | OUTPATIENT
Start: 2022-10-21 | End: 2022-10-21 | Stop reason: HOSPADM

## 2022-10-21 RX ORDER — SODIUM CHLORIDE 0.9 % (FLUSH) 0.9 %
5-40 SYRINGE (ML) INJECTION EVERY 8 HOURS
Status: DISCONTINUED | OUTPATIENT
Start: 2022-10-21 | End: 2022-10-21 | Stop reason: HOSPADM

## 2022-10-21 RX ORDER — HYDROMORPHONE HYDROCHLORIDE 1 MG/ML
INJECTION, SOLUTION INTRAMUSCULAR; INTRAVENOUS; SUBCUTANEOUS
Status: DISPENSED
Start: 2022-10-21 | End: 2022-10-21

## 2022-10-21 RX ORDER — HYDROMORPHONE HYDROCHLORIDE 1 MG/ML
INJECTION, SOLUTION INTRAMUSCULAR; INTRAVENOUS; SUBCUTANEOUS
Status: COMPLETED
Start: 2022-10-21 | End: 2022-10-21

## 2022-10-21 RX ORDER — METOCLOPRAMIDE HYDROCHLORIDE 5 MG/ML
5 INJECTION INTRAMUSCULAR; INTRAVENOUS 2 TIMES DAILY
Status: DISCONTINUED | OUTPATIENT
Start: 2022-10-21 | End: 2022-10-24

## 2022-10-21 RX ORDER — LIDOCAINE HYDROCHLORIDE 10 MG/ML
0.1 INJECTION, SOLUTION EPIDURAL; INFILTRATION; INTRACAUDAL; PERINEURAL AS NEEDED
Status: DISCONTINUED | OUTPATIENT
Start: 2022-10-21 | End: 2022-10-21 | Stop reason: HOSPADM

## 2022-10-21 RX ORDER — FENTANYL CITRATE 50 UG/ML
INJECTION, SOLUTION INTRAMUSCULAR; INTRAVENOUS
Status: COMPLETED
Start: 2022-10-21 | End: 2022-10-21

## 2022-10-21 RX ADMIN — MEGESTROL ACETATE 200 MG: 40 SUSPENSION ORAL at 13:09

## 2022-10-21 RX ADMIN — HYDROCODONE BITARTRATE AND ACETAMINOPHEN 1 TABLET: 5; 325 TABLET ORAL at 19:31

## 2022-10-21 RX ADMIN — DIPHENHYDRAMINE HYDROCHLORIDE 12.5 MG: 50 INJECTION, SOLUTION INTRAMUSCULAR; INTRAVENOUS at 11:13

## 2022-10-21 RX ADMIN — DIPHENHYDRAMINE HYDROCHLORIDE 12.5 MG: 50 INJECTION, SOLUTION INTRAMUSCULAR; INTRAVENOUS at 11:30

## 2022-10-21 RX ADMIN — FENTANYL CITRATE 50 MCG: 50 INJECTION, SOLUTION INTRAMUSCULAR; INTRAVENOUS at 10:02

## 2022-10-21 RX ADMIN — SODIUM CHLORIDE, PRESERVATIVE FREE 10 ML: 5 INJECTION INTRAVENOUS at 21:14

## 2022-10-21 RX ADMIN — MEGESTROL ACETATE 200 MG: 40 SUSPENSION ORAL at 17:14

## 2022-10-21 RX ADMIN — HYDROMORPHONE HYDROCHLORIDE 0.5 MG: 1 INJECTION, SOLUTION INTRAMUSCULAR; INTRAVENOUS; SUBCUTANEOUS at 10:48

## 2022-10-21 RX ADMIN — FENTANYL CITRATE 50 MCG: 50 INJECTION, SOLUTION INTRAMUSCULAR; INTRAVENOUS at 10:14

## 2022-10-21 RX ADMIN — BUDESONIDE 500 MCG: 0.5 SUSPENSION RESPIRATORY (INHALATION) at 08:23

## 2022-10-21 RX ADMIN — HYDROMORPHONE HYDROCHLORIDE 0.5 MG: 1 INJECTION, SOLUTION INTRAMUSCULAR; INTRAVENOUS; SUBCUTANEOUS at 10:33

## 2022-10-21 RX ADMIN — Medication 20 UNITS: at 21:12

## 2022-10-21 RX ADMIN — BUDESONIDE 500 MCG: 0.5 SUSPENSION RESPIRATORY (INHALATION) at 19:23

## 2022-10-21 RX ADMIN — ARFORMOTEROL TARTRATE 15 MCG: 15 SOLUTION RESPIRATORY (INHALATION) at 19:20

## 2022-10-21 RX ADMIN — SODIUM CHLORIDE, POTASSIUM CHLORIDE, SODIUM LACTATE AND CALCIUM CHLORIDE 50 ML/HR: 600; 310; 30; 20 INJECTION, SOLUTION INTRAVENOUS at 09:14

## 2022-10-21 RX ADMIN — SODIUM CHLORIDE, PRESERVATIVE FREE 10 ML: 5 INJECTION INTRAVENOUS at 14:59

## 2022-10-21 RX ADMIN — ATORVASTATIN CALCIUM 40 MG: 40 TABLET, FILM COATED ORAL at 21:11

## 2022-10-21 RX ADMIN — POLYETHYLENE GLYCOL 3350 34 G: 17 POWDER, FOR SOLUTION ORAL at 13:09

## 2022-10-21 RX ADMIN — ARFORMOTEROL TARTRATE 15 MCG: 15 SOLUTION RESPIRATORY (INHALATION) at 08:24

## 2022-10-21 RX ADMIN — METOCLOPRAMIDE 5 MG: 5 INJECTION, SOLUTION INTRAMUSCULAR; INTRAVENOUS at 17:13

## 2022-10-21 RX ADMIN — MORPHINE SULFATE 1 MG: 2 INJECTION, SOLUTION INTRAMUSCULAR; INTRAVENOUS at 02:38

## 2022-10-21 RX ADMIN — LIDOCAINE HYDROCHLORIDE 80 MG: 20 INJECTION, SOLUTION EPIDURAL; INFILTRATION; INTRACAUDAL; PERINEURAL at 09:26

## 2022-10-21 RX ADMIN — Medication 2 UNITS: at 21:12

## 2022-10-21 RX ADMIN — SODIUM CHLORIDE, SODIUM LACTATE, POTASSIUM CHLORIDE, AND CALCIUM CHLORIDE: 600; 310; 30; 20 INJECTION, SOLUTION INTRAVENOUS at 09:10

## 2022-10-21 RX ADMIN — MORPHINE SULFATE 1 MG: 2 INJECTION, SOLUTION INTRAMUSCULAR; INTRAVENOUS at 23:31

## 2022-10-21 RX ADMIN — PROPOFOL 50 MG: 10 INJECTION, EMULSION INTRAVENOUS at 09:26

## 2022-10-21 NOTE — PERIOP NOTES
TRANSFER - IN REPORT:    Verbal report received from Lisa RN(name) on [employer]  being received from 208(unit) for ordered procedure      Report consisted of patients Situation, Background, Assessment and   Recommendations(SBAR). Information from the following report(s) SBAR, MAR, and Procedure Verification was reviewed with the receiving nurse. Opportunity for questions and clarification was provided. Assessment completed upon patients arrival to unit and care assumed.

## 2022-10-21 NOTE — PROGRESS NOTES
[facility] Hospitalist Group  Progress Note    Patient: Mandi Linton Age: 81 y.o. : 1950 MR#: [id number] SSN: [id number]  Date/Time: 10/20/2022     Subjective:   Patient doing well. Has LLQ pain and would like an increase in morphine. Review of systems  General: No fevers or chills. Cardiovascular: No chest pain or pressure. No palpitations. Pulmonary: No shortness of breath, cough or wheeze. Gastrointestinal: reports abdominal pain, denies nausea, vomiting or diarrhea. Genitourinary: No urinary frequency, urgency, hesitancy or dysuria. Musculoskeletal: No joint or muscle pain, no back pain, no recent trauma. Neurologic: No headache, numbness, tingling or weakness. Assessment/Plan:   Hypotension  2. AMBROCIO  3. Unintended weight loss  4. LLQ pain  5. Chronic respiratory failure with hypoxia  6. Type 2 DM  7. HTN      Patient initially hypotensive likely due to poor po intake, doubt sepsis but cannot rule out fully. Holding HCTZ and lisinopril due to AMBROCIO on admission, likely poor po intake and poor renal perfusion  LLQ pain possibly related to past diverticulitis episodes vs a new issue  Mesenteric doppler per GI  NPO midnight for Egd in AM  Patient on Insulin SSI, Bgs checks  Morphine for pain control. BP stable at this time. I spent 40 minutes with the patient in face-to-face consultation, of which greater than 50% was spent in counseling and coordination of care as described above.     Case discussed with:  [x]Patient  []Family  []Nursing  []Case Management  DVT Prophylaxis:  [x]Lovenox  []Hep SQ  []SCDs  []Coumadin   []Eliquis/Xarelto     Objective:   VS: Visit Vitals  BP (!) 142/71 (BP 1 Location: Left upper arm, BP Patient Position: At rest)   Pulse (!) 109   Temp 99.3 °F (37.4 °C)   Resp 18   Ht 5' 2\" (1.575 m)   SpO2 98%   Breastfeeding No   BMI 16.10 kg/m²      Tmax/24hrs: Temp (24hrs), Av.8 °F (37.1 °C), Min:98.3 °F (36.8 °C), Max:99.3 °F (37.4 °C)  [facility] intake or output data in the 24 hours ending 10/20/22 4595    General:  Alert, cooperative, no acute distress    HEENT: PERRLA, anicteric sclerae. Pulmonary:  CTA Bilaterally. No Wheezing/Rales. Cardiovascular: Regular rate and Rhythm. GI:  Soft, Non distended, Non tender. + Bowel sounds. Tenderness to LLQ/RLQ, No guarding  Extremities:  No edema. No calf tenderness. Psych: Good insight. Not anxious or agitated. Neurologic: Alert and oriented X 3. Moves all ext.   Additional:    Medications:   Current Facility-Administered Medications   Medication Dose Route Frequency    [START ON 10/21/2022] therapeutic multivitamin (THERAGRAN) tablet 1 Tablet  1 Tablet Oral DAILY    [START ON 10/21/2022] thiamine HCL (B-1) tablet 100 mg  100 mg Oral DAILY    diphenhydrAMINE (BENADRYL) capsule 25 mg  25 mg Oral Q4H PRN    sodium chloride (NS) flush 5-40 mL  5-40 mL IntraVENous Q8H    sodium chloride (NS) flush 5-40 mL  5-40 mL IntraVENous PRN    acetaminophen (TYLENOL) tablet 650 mg  650 mg Oral Q6H PRN    Or    acetaminophen (TYLENOL) suppository 650 mg  650 mg Rectal Q6H PRN    polyethylene glycol (MIRALAX) packet 17 g  17 g Oral DAILY PRN    bisacodyL (DULCOLAX) suppository 10 mg  10 mg Rectal DAILY PRN    ondansetron (ZOFRAN ODT) tablet 4 mg  4 mg Oral Q8H PRN    Or    ondansetron (ZOFRAN) injection 4 mg  4 mg IntraVENous Q6H PRN    enoxaparin (LOVENOX) injection 40 mg  40 mg SubCUTAneous DAILY    morphine injection 1 mg  1 mg IntraVENous Q4H PRN    HYDROcodone-acetaminophen (NORCO) 5-325 mg per tablet 1 Tablet  1 Tablet Oral Q6H PRN    albuterol-ipratropium (DUO-NEB) 2.5 MG-0.5 MG/3 ML  3 mL Nebulization Q4H PRN    aspirin chewable tablet 81 mg  81 mg Oral DAILY    atorvastatin (LIPITOR) tablet 40 mg  40 mg Oral QHS    clopidogreL (PLAVIX) tablet 75 mg  75 mg Oral DAILY    DULoxetine (CYMBALTA) capsule 30 mg  30 mg Oral DAILY    insulin glargine (LANTUS) injection 20 Units  20 Units SubCUTAneous QHS    insulin lispro (HUMALOG) injection   SubCUTAneous AC&HS    glucose chewable tablet 16 g  4 Tablet Oral PRN    glucagon (GLUCAGEN) injection 1 mg  1 mg IntraMUSCular PRN    dextrose 10% infusion 0-250 mL  0-250 mL IntraVENous PRN    dicyclomine (BENTYL) capsule 10 mg  10 mg Oral QID PRN    megestroL (MEGACE) 400 mg/10 mL (10 mL) oral suspension 200 mg  200 mg Oral TID WITH MEALS    budesonide (PULMICORT) 500 mcg/2 ml nebulizer suspension  500 mcg Nebulization BID RT    And    arformoteroL (BROVANA) neb solution 15 mcg  15 mcg Nebulization BID RT       Labs:    Recent Results (from the past 24 hour(s))   METABOLIC PANEL, BASIC    Collection Time: 10/20/22  2:06 AM   Result Value Ref Range    Sodium 142 136 - 145 mmol/L    Potassium 4.4 3.5 - 5.5 mmol/L    Chloride 112 (H) 100 - 111 mmol/L    CO2 25 21 - 32 mmol/L    Anion gap 5 3.0 - 18 mmol/L    Glucose 74 74 - 99 mg/dL    BUN 31 (H) 7.0 - 18 MG/DL    Creatinine 1.13 0.6 - 1.3 MG/DL    BUN/Creatinine ratio 27 (H) 12 - 20      eGFR 52 (L) >60 ml/min/1.73m2    Calcium 8.5 8.5 - 10.1 MG/DL   CBC W/O DIFF    Collection Time: 10/20/22  2:06 AM   Result Value Ref Range    WBC 4.1 (L) 4.6 - 13.2 K/uL    RBC 3.54 (L) 4.20 - 5.30 M/uL    HGB 8.1 (L) 12.0 - 16.0 g/dL    HCT 27.1 (L) 35.0 - 45.0 %    MCV 76.6 (L) 78.0 - 100.0 FL    MCH 22.9 (L) 24.0 - 34.0 PG    MCHC 29.9 (L) 31.0 - 37.0 g/dL    RDW 15.3 (H) 11.6 - 14.5 %    PLATELET 789 884 - 822 K/uL    MPV 11.1 9.2 - 11.8 FL    NRBC 0.0 0  WBC    ABSOLUTE NRBC 0.00 0.00 - 0.01 K/uL   HEMOGLOBIN A1C WITH EAG    Collection Time: 10/20/22  2:06 AM   Result Value Ref Range    Hemoglobin A1c 7.1 (H) 4.2 - 5.6 %    Est. average glucose 157 mg/dL   GLUCOSE, POC    Collection Time: 10/20/22 12:05 PM   Result Value Ref Range    Glucose (POC) 83 70 - 110 mg/dL   GLUCOSE, POC    Collection Time: 10/20/22  4:56 PM   Result Value Ref Range    Glucose (POC) 119 (H) 70 - 110 mg/dL   GLUCOSE, POC    Collection Time: 10/20/22 10:02 PM   Result Value Ref Range    Glucose (POC) 171 (H) 70 - 110 mg/dL       Signed By: Anu Steinberg DO     October 20, 2022      Disclaimer: Sections of this note are dictated using utilizing voice recognition software. Minor typographical errors may be present. If questions arise, please do not hesitate to contact me or call our department.

## 2022-10-21 NOTE — PROGRESS NOTES
Problem: Pressure Injury - Risk of  Goal: *Prevention of pressure injury  Description: Document Ryan Scale and appropriate interventions in the flowsheet. Outcome: Progressing Towards Goal  Note: Pressure Injury Interventions:       Moisture Interventions: Absorbent underpads, Apply protective barrier, creams and emollients, Maintain skin hydration (lotion/cream)    Activity Interventions: Pressure redistribution bed/mattress(bed type), Increase time out of bed    Mobility Interventions: Float heels, Pressure redistribution bed/mattress (bed type), PT/OT evaluation    Nutrition Interventions: Document food/fluid/supplement intake    Friction and Shear Interventions: HOB 30 degrees or less, Lift sheet, Minimize layers                Problem: Patient Education: Go to Patient Education Activity  Goal: Patient/Family Education  Outcome: Progressing Towards Goal     Problem: Falls - Risk of  Goal: *Absence of Falls  Description: Document Gilma Fall Risk and appropriate interventions in the flowsheet.   Outcome: Progressing Towards Goal  Note: Fall Risk Interventions:  Mobility Interventions: Utilize walker, cane, or other assistive device, Patient to call before getting OOB         Medication Interventions: Patient to call before getting OOB, Teach patient to arise slowly    Elimination Interventions: Call light in reach, Patient to call for help with toileting needs    History of Falls Interventions: Bed/chair exit alarm         Problem: Patient Education: Go to Patient Education Activity  Goal: Patient/Family Education  Outcome: Progressing Towards Goal     Problem: Pain  Goal: *Control of Pain  Outcome: Progressing Towards Goal  Goal: *PALLIATIVE CARE:  Alleviation of Pain  Outcome: Progressing Towards Goal     Problem: Patient Education: Go to Patient Education Activity  Goal: Patient/Family Education  Outcome: Progressing Towards Goal     Problem: Patient Education: Go to Patient Education Activity  Goal: Patient/Family Education  Outcome: Progressing Towards Goal     Problem: Patient Education: Go to Patient Education Activity  Goal: Patient/Family Education  Outcome: Progressing Towards Goal     Problem: Diabetes Self-Management  Goal: *Disease process and treatment process  Description: Define diabetes and identify own type of diabetes; list 3 options for treating diabetes. Outcome: Progressing Towards Goal  Goal: *Incorporating nutritional management into lifestyle  Description: Describe effect of type, amount and timing of food on blood glucose; list 3 methods for planning meals. Outcome: Progressing Towards Goal  Goal: *Incorporating physical activity into lifestyle  Description: State effect of exercise on blood glucose levels. Outcome: Progressing Towards Goal  Goal: *Developing strategies to promote health/change behavior  Description: Define the ABC's of diabetes; identify appropriate screenings, schedule and personal plan for screenings. Outcome: Progressing Towards Goal  Goal: *Using medications safely  Description: State effect of diabetes medications on diabetes; name diabetes medication taking, action and side effects. Outcome: Progressing Towards Goal  Goal: *Monitoring blood glucose, interpreting and using results  Description: Identify recommended blood glucose targets  and personal targets. Outcome: Progressing Towards Goal  Goal: *Prevention, detection, treatment of acute complications  Description: List symptoms of hyper- and hypoglycemia; describe how to treat low blood sugar and actions for lowering  high blood glucose level. Outcome: Progressing Towards Goal  Goal: *Prevention, detection and treatment of chronic complications  Description: Define the natural course of diabetes and describe the relationship of blood glucose levels to long term complications of diabetes.   Outcome: Progressing Towards Goal  Goal: *Developing strategies to address psychosocial issues  Description: Describe feelings about living with diabetes; identify support needed and support network  Outcome: Progressing Towards Goal  Goal: *Insulin pump training  Outcome: Progressing Towards Goal  Goal: *Sick day guidelines  Outcome: Progressing Towards Goal  Goal: *Patient Specific Goal (EDIT GOAL, INSERT TEXT)  Outcome: Progressing Towards Goal     Problem: Patient Education: Go to Patient Education Activity  Goal: Patient/Family Education  Outcome: Progressing Towards Goal     Problem: Nutrition Deficit  Goal: *Optimize nutritional status  Outcome: Progressing Towards Goal

## 2022-10-21 NOTE — PROGRESS NOTES
[facility] Hospitalist Group  Progress Note    Patient: Dora Dumont Age: 81 y.o. : 1950 MR#: [id number] SSN: [id number]  Date/Time: 10/21/2022     Subjective:   Patient doing well after EGD. Pain is overall improved. Able to eat for the most part. Asking for pain medicine when she goes home. Review of systems  General: No fevers or chills. Cardiovascular: No chest pain or pressure. No palpitations. Pulmonary: No shortness of breath, cough or wheeze. Gastrointestinal: reports improved abdominal pain, nausea, vomiting or diarrhea. Genitourinary: No urinary frequency, urgency, hesitancy or dysuria. Musculoskeletal: No joint or muscle pain, no back pain, no recent trauma. Neurologic: No headache, numbness, tingling or weakness. Assessment/Plan:   Hypotension  2. AMBROCIO  3. Unintended weight loss  4. LLQ pain  5. Chronic respiratory failure with hypoxia  6. Type 2 DM  7. HTN  8. Gastroparesis     Patient initially hypotensive likely due to poor po intake, doubt sepsis but cannot rule out fully. Holding HCTZ and lisinopril due to AMBROCIO on admission, likely poor po intake and poor renal perfusion  LLQ pain possibly related to past diverticulitis episodes vs a new issue  Diabetic diet after EGD, Gastroparesis noted with retained food bolus  Reglan Per GI  Patient on Insulin SSI, Bgs checks  Possible discharge 10/22 or 10/23  Morphine for pain control. BP stable at this time. I spent 40 minutes with the patient in face-to-face consultation, of which greater than 50% was spent in counseling and coordination of care as described above.     Case discussed with:  [x]Patient  []Family  []Nursing  []Case Management  DVT Prophylaxis:  [x]Lovenox  []Hep SQ  []SCDs  []Coumadin   []Eliquis/Xarelto     Objective:   VS: Visit Vitals  /67   Pulse (!) 110   Temp 98.8 °F (37.1 °C)   Resp 19   Ht 5' 2\" (1.575 m)   SpO2 97%   Breastfeeding No   BMI 16.10 kg/m² Tmax/24hrs: Temp (24hrs), Av.8 °F (37.1 °C), Min:98.4 °F (36.9 °C), Max:99.4 °F (37.4 °C)  IOBRIEF  Intake/Output Summary (Last 24 hours) at 10/21/2022 1926  Last data filed at 10/21/2022 0445  Gross per 24 hour   Intake --   Output 900 ml   Net -900 ml       General:  Alert, cooperative, no acute distress    HEENT: PERRLA, anicteric sclerae. Pulmonary:  CTA Bilaterally. No Wheezing/Rales. Cardiovascular: Regular rate and Rhythm. GI:  Soft, Non distended, Non tender. + Bowel sounds. Tenderness to LLQ/RLQ, No guarding  Extremities:  No edema. No calf tenderness. Psych: Good insight. Not anxious or agitated. Neurologic: Alert and oriented X 3. Moves all ext.   Additional:    Medications:   Current Facility-Administered Medications   Medication Dose Route Frequency    polyethylene glycol (MIRALAX) packet 34 g  34 g Oral DAILY    HYDROmorphone (DILAUDID) 0.5 mg/0.5 mL syringe        metoclopramide HCl (REGLAN) injection 5 mg  5 mg IntraVENous BID    therapeutic multivitamin (THERAGRAN) tablet 1 Tablet  1 Tablet Oral DAILY    thiamine HCL (B-1) tablet 100 mg  100 mg Oral DAILY    diphenhydrAMINE (BENADRYL) capsule 25 mg  25 mg Oral Q4H PRN    morphine injection 1 mg  1 mg IntraVENous Q3H PRN    sodium chloride (NS) flush 5-40 mL  5-40 mL IntraVENous Q8H    sodium chloride (NS) flush 5-40 mL  5-40 mL IntraVENous PRN    acetaminophen (TYLENOL) tablet 650 mg  650 mg Oral Q6H PRN    Or    acetaminophen (TYLENOL) suppository 650 mg  650 mg Rectal Q6H PRN    polyethylene glycol (MIRALAX) packet 17 g  17 g Oral DAILY PRN    bisacodyL (DULCOLAX) suppository 10 mg  10 mg Rectal DAILY PRN    ondansetron (ZOFRAN ODT) tablet 4 mg  4 mg Oral Q8H PRN    Or    ondansetron (ZOFRAN) injection 4 mg  4 mg IntraVENous Q6H PRN    enoxaparin (LOVENOX) injection 40 mg  40 mg SubCUTAneous DAILY    HYDROcodone-acetaminophen (NORCO) 5-325 mg per tablet 1 Tablet  1 Tablet Oral Q6H PRN    albuterol-ipratropium (DUO-NEB) 2.5 MG-0.5 MG/3 ML  3 mL Nebulization Q4H PRN    aspirin chewable tablet 81 mg  81 mg Oral DAILY    atorvastatin (LIPITOR) tablet 40 mg  40 mg Oral QHS    clopidogreL (PLAVIX) tablet 75 mg  75 mg Oral DAILY    DULoxetine (CYMBALTA) capsule 30 mg  30 mg Oral DAILY    insulin glargine (LANTUS) injection 20 Units  20 Units SubCUTAneous QHS    insulin lispro (HUMALOG) injection   SubCUTAneous AC&HS    glucose chewable tablet 16 g  4 Tablet Oral PRN    glucagon (GLUCAGEN) injection 1 mg  1 mg IntraMUSCular PRN    dextrose 10% infusion 0-250 mL  0-250 mL IntraVENous PRN    dicyclomine (BENTYL) capsule 10 mg  10 mg Oral QID PRN    megestroL (MEGACE) 400 mg/10 mL (10 mL) oral suspension 200 mg  200 mg Oral TID WITH MEALS    budesonide (PULMICORT) 500 mcg/2 ml nebulizer suspension  500 mcg Nebulization BID RT    And    arformoteroL (BROVANA) neb solution 15 mcg  15 mcg Nebulization BID RT       Labs:    Recent Results (from the past 24 hour(s))   GLUCOSE, POC    Collection Time: 10/20/22 10:02 PM   Result Value Ref Range    Glucose (POC) 171 (H) 70 - 730 mg/dL   METABOLIC PANEL, BASIC    Collection Time: 10/21/22  4:33 AM   Result Value Ref Range    Sodium 142 136 - 145 mmol/L    Potassium 4.6 3.5 - 5.5 mmol/L    Chloride 110 100 - 111 mmol/L    CO2 30 21 - 32 mmol/L    Anion gap 2 (L) 3.0 - 18 mmol/L    Glucose 124 (H) 74 - 99 mg/dL    BUN 24 (H) 7.0 - 18 MG/DL    Creatinine 0.65 0.6 - 1.3 MG/DL    BUN/Creatinine ratio 37 (H) 12 - 20      eGFR >60 >60 ml/min/1.73m2    Calcium 8.3 (L) 8.5 - 10.1 MG/DL   CBC W/O DIFF    Collection Time: 10/21/22  4:33 AM   Result Value Ref Range    WBC 5.9 4.6 - 13.2 K/uL    RBC 3.16 (L) 4.20 - 5.30 M/uL    HGB 7.5 (L) 12.0 - 16.0 g/dL    HCT 23.9 (L) 35.0 - 45.0 %    MCV 75.6 (L) 78.0 - 100.0 FL    MCH 23.7 (L) 24.0 - 34.0 PG    MCHC 31.4 31.0 - 37.0 g/dL    RDW 15.2 (H) 11.6 - 14.5 %    PLATELET 437 052 - 963 K/uL    MPV 10.9 9.2 - 11.8 FL    NRBC 0.0 0  WBC    ABSOLUTE NRBC 0.00 0.00 - 0.01 K/uL IRON PROFILE    Collection Time: 10/21/22  4:33 AM   Result Value Ref Range    Iron 43 (L) 50 - 175 ug/dL    TIBC 219 (L) 250 - 450 ug/dL    Iron % saturation 20 20 - 50 %   FERRITIN    Collection Time: 10/21/22  4:33 AM   Result Value Ref Range    Ferritin 360 8 - 388 NG/ML   GLUCOSE, POC    Collection Time: 10/21/22  7:28 AM   Result Value Ref Range    Glucose (POC) 118 (H) 70 - 110 mg/dL   DUPLEX ABD VISC ART ORGANS COMPLETE    Collection Time: 10/21/22  8:09 AM   Result Value Ref Range    Prox aortic AP 1.52 cm    Prox aortic trans 1.55 cm    Mid aortic AP 1.28 cm    Mid aortic trans 1.31 cm    Dist aortic AP 1.16 cm    Dist aortic trans 1.01 cm    Abdominal prox aorta joni 85.5 cm/s    Celiac .8 cm/s    Celiac EDV 25.6 cm/s    Common Hepatic .1 cm/s    Common Hepatic EDV 18.2 cm/s    Splenic .5 cm/s    Splenic EDV 24.8 cm/s    Prox SMA .7 cm/s    Prox SMA EDV 24.1 cm/s    Mid SMA .8 cm/s    Mid SMA EDV 16.3 cm/s    Dist SMA .8 cm/s    Dist SMA EDV 19.4 cm/s    TRENA PSV 86.7 cm/s    TRENA EDV 15.5 cm/s    Origin SMA EDV 26.5 cm/s    Origin SMA .9 cm/s   GLUCOSE, POC    Collection Time: 10/21/22  9:37 AM   Result Value Ref Range    Glucose (POC) 103 70 - 110 mg/dL   GLUCOSE, POC    Collection Time: 10/21/22 12:19 PM   Result Value Ref Range    Glucose (POC) 101 70 - 110 mg/dL   GLUCOSE, POC    Collection Time: 10/21/22  4:42 PM   Result Value Ref Range    Glucose (POC) 77 70 - 110 mg/dL       Signed By: Maryann Ordaz DO     October 21, 2022      Disclaimer: Sections of this note are dictated using utilizing voice recognition software. Minor typographical errors may be present. If questions arise, please do not hesitate to contact me or call our department.

## 2022-10-21 NOTE — H&P
Date of Surgery Update:  [place] Model was seen and examined. History and physical has been reviewed. The patient has been examined.  There have been no significant clinical changes since the completion of the originally dated History and Physical.    Signed By: Mya George MD     October 21, 2022 9:22 AM

## 2022-10-21 NOTE — ANESTHESIA PREPROCEDURE EVALUATION
Relevant Problems   No relevant active problems       Anesthetic History   No history of anesthetic complications            Review of Systems / Medical History  Patient summary reviewed and pertinent labs reviewed    Pulmonary    COPD: moderate        Asthma        Neuro/Psych   Within defined limits           Cardiovascular    Hypertension          CAD    Exercise tolerance: >4 METS     GI/Hepatic/Renal     GERD           Endo/Other    Diabetes    Arthritis     Other Findings              Physical Exam    Airway  Mallampati: II  TM Distance: 4 - 6 cm  Neck ROM: normal range of motion   Mouth opening: Normal     Cardiovascular  Regular rate and rhythm,  S1 and S2 normal,  no murmur, click, rub, or gallop  Rhythm: regular  Rate: normal         Dental    Dentition: Edentulous     Pulmonary  Breath sounds clear to auscultation               Abdominal  GI exam deferred       Other Findings            Anesthetic Plan    ASA: 3  Anesthesia type: MAC          Induction: Intravenous  Anesthetic plan and risks discussed with: Patient

## 2022-10-21 NOTE — ANESTHESIA POSTPROCEDURE EVALUATION
Procedure(s):  UPPER ENDOSCOPY/ Biopsies. MAC    Anesthesia Post Evaluation      Multimodal analgesia: multimodal analgesia used between 6 hours prior to anesthesia start to PACU discharge  Patient location during evaluation: bedside  Patient participation: complete - patient participated  Level of consciousness: awake  Pain management: adequate  Airway patency: patent  Anesthetic complications: no  Cardiovascular status: stable  Respiratory status: acceptable  Hydration status: acceptable  Post anesthesia nausea and vomiting:  controlled      INITIAL Post-op Vital signs:   Vitals Value Taken Time   /88 10/21/22 1152   Temp 37.3 °C (99.1 °F) 10/21/22 1140   Pulse 106 10/21/22 1155   Resp 20 10/21/22 1155   SpO2 100 % 10/21/22 1140   Vitals shown include unvalidated device data.

## 2022-10-21 NOTE — PERIOP NOTES
0930- Received pt from endo. Pt connected to the monitor. VSS. Anesthesia report received   0940- Pt complaining of a 10/10 abdominal pain. Pt does not have pain medicine order. Reaching out to anesthesia for orders  1100- from [id number] pt was hyperventilating, anxious, and rating pain 10/10. Dr Sherrie Essex was made aware of pt's pain and gave orders. Pt received 100 mcg of fentanyl and 1 mg of dilaudid. After pain medicine patient relaxed and visually was not in pain. 1148 TRANSFER - OUT REPORT:    Verbal report given to Lisa RN(name) on [place]  being transferred to (unit) for routine post - op       Report consisted of patients Situation, Background, Assessment and   Recommendations(SBAR). Information from the following report(s) SBAR, OR Summary, Procedure Summary, MAR, Recent Results, and Cardiac Rhythm ST  was reviewed with the receiving nurse. Lines:   Peripheral IV 10/19/22 Right Arm (Active)   Site Assessment Clean, dry, & intact 10/21/22 0828   Phlebitis Assessment 0 10/21/22 0828   Infiltration Assessment 0 10/21/22 0828   Dressing Status Clean, dry, & intact 10/21/22 0828   Dressing Type Transparent;Tape 10/21/22 0828   Hub Color/Line Status Pink 10/21/22 0828   Alcohol Cap Used Yes 10/19/22 9321        Opportunity for questions and clarification was provided.       Patient transported with:   [employer]

## 2022-10-21 NOTE — PROCEDURES
[email or web address]  [facility], [address]      Procedure Note    Patient: Barbara Land MRN: [id number]  SSN: [id number]    YOB: 1950  Age: 81 y.o. Sex: female      Date/Time:  10/21/2022 9:22 AM    Esophagogastroduodenoscopy (EGD) Procedure Note    Procedure: Esophagogastroduodenoscopy with biopsy    Impression:    -large retained food bolus c/w gastroparesis -duodenal polyp     Recommendations:  -reglan 10 bid for 2 weeks then 10hs -check bxs -confirm severe anemia is from iron def then replace IV -miralax 2 capfuls daily for severe constipation     Indication:  Abdominal pain, epigastric  Pre-operative Diagnosis: see indication above  Post-operative Diagnosis: see findings below  :  Lisette Sanders MD  Referring Provider:   Felix Gorman MD    Exam:  Airway: clear, no airway problems anticipated  Heart: RRR, without gallops or rubs  Lungs: clear bilaterally without wheezes, crackles, or rhonchi  Abdomen: soft, nontender, nondistended, bowel sounds present  Mental Status: awake, alert and oriented to person, place and time     Anethesia/Sedation:  MAC anesthesia Propofol  Procedure Details   After informed consent was obtained for the procedure, with all risks and benefits of procedure explained the patient was taken to the endoscopy suite and placed in the left lateral decubitus position. Following sequential administration of sedation as per above, the [id number] gastroscope was inserted into the mouth and advanced under direct vision to third portion of the duodenum. A careful inspection was made as the gastroscope was withdrawn, including a retroflexed view of the proximal stomach; findings and interventions are described below. Findings:  1. Large food bolus c/w gastroparesis 2.  Small duodenal polyp       Therapies:  none   Specimens: duodenum and duodenal polyp   Estimated blood loss:  none   Surgical asisstants none  Implants none            Complications:   None; patient tolerated the procedure well. Discharge disposition:   To lock     Adriana Hagan MD

## 2022-10-21 NOTE — PROGRESS NOTES
Physician Progress Note      Oswald Curry  [facility] #:                  [id number]  :                       1950  ADMIT DATE:       10/19/2022 12:02 PM  DISCH DATE:  RESPONDING  PROVIDER #:        RIGO POTTS DO          QUERY TEXT:    Pt admitted with Hypotension and  AMBROCIO  . Noted documentation of Severe malnutrition  on 10/20/22   by RD positive screen  . If possible, please document in progress notes and discharge summary:      The medical record reflects the following:    Risk Factors: BMI 16.10    Clinical Indicators: Malnutrition Assessment: Malnutrition Status:  Severe malnutrition (10/20/22 1553)  Context:  Acute illness   Findings of the 6 clinical characteristics of malnutrition:  Energy Intake:  50% or less of est energy requirements for 5 or more days Weight Loss:  Greater than 7.5% over 3 months  Body Fat Loss: Moderate body fat loss, Triceps, Orbital, Buccal region  Muscle Mass Loss: Moderate muscle mass loss, Temples (temporalis), Thigh (quadriceps), Clavicles (pectoralis & deltoids), Hand (interosseous) Fluid Accumulation:  Mild, Ascites  Nutrition Diagnosis:  ? Severe malnutrition, In context of acute illness or injury related to early satiety, inadequate protein-energy intake as evidenced by Criteria as identified in malnutrition assessment, weight loss, poor dentition  Nutrition Assessment:  Visited pt in room, laying in bed, alert and able to communicate- in room. Pt admitted for c/o LLQ pain associated with malaise, weight loss (47 lbs x 90 days), and poor appetite. Pt reported tolerating current diet with % PO intake and great acceptance of oral supplement. Pt reported PTA experiencing abdominal pain, decreased appetite x 3 months and only consuming Ensure Plus TID. Edentulous observed-updated food preferences. Currently pt reported improved appetite and consuming all of most meals including oral supplements.  Current labs shows elevated BUN (31). Obtained current body weight from bed scale: 98.4 lbs. Pt reported lowest weight as 78 lbs    Treatment: Continue oral nutrition supplement to optimize nutrition intake opportunity: Glucerna (each provides 220 kcal, 10g protein) TID. Plan to add MVI and thiamine daily ,Modify current diet with easy to chew d/t pt preferences (no dentures)  Monitor PO intake, compliance of oral supplement, weight, labs, and plan of care during admission. Thank you  Jeny Chirinos RN [facility]  [email or web address]  Options provided:  -- Severe malnutrition  confirmed present on admission  -- Severe malnutrition  ruled out  -- Other - I will add my own diagnosis  -- Disagree - Not applicable / Not valid  -- Disagree - Clinically unable to determine / Unknown  -- Refer to Clinical Documentation Reviewer    PROVIDER RESPONSE TEXT:    The diagnosis of Severe malnutrition was confirmed as present on admission. Query created by:  Anu Rodriguez on 10/21/2022 12:34 PM      Electronically signed by:  Denise Mitchell DO 10/21/2022 4:16 PM

## 2022-10-21 NOTE — PROGRESS NOTES
Comprehensive Nutrition Assessment    Type and Reason for Visit: Reassess, Positive nutrition screen, Consult    Nutrition Recommendations/Plan:   Resume dysphagia diet once medically appropriate per MD and as tolerated by patient (resume oral supplement as prescribed). Monitor readiness to resume PO intake, compliance of oral supplement, weight, labs, and plan of care during admission. Malnutrition Assessment:  Malnutrition Status:  Severe malnutrition (10/20/22 1553)    Context:  Acute illness     Findings of the 6 clinical characteristics of malnutrition:   Energy Intake:  50% or less of est energy requirements for 5 or more days  Weight Loss:  Greater than 7.5% over 3 months     Body Fat Loss: Moderate body fat loss, Triceps, Orbital, Buccal region   Muscle Mass Loss: Moderate muscle mass loss, Temples (temporalis), Thigh (quadriceps), Clavicles (pectoralis & deltoids), Hand (interosseous)  Fluid Accumulation:  Mild, Ascites   Strength:  Not performed     Nutrition Assessment:    Visited pt in room, was off of unit for EGD procedure. Pt currently transition to NPO status r/t procedure. Followed up with RN Ranjan Head RN reported pt continues to tolerating current diet with % PO intake-good acceptance of oral supplement. Per chart review, EGD shows large retained food bolus c/w gastroparesis -duodenal polyp. Once diet advances, pt needs puree (easy to chew) or liquid diet d/t gastroparesis with low fiber/fat. Will closely monitor while inpatient. Nutrition Related Findings:    Last BM 10/16. Output: 900mL (urine). Pertinent Medications: LR infusion, miralax, reglan, MVI, thiamine, miralax, dulcolax, zofran, lovenox, lipitor, lantus, megace.  Wound Type: None     Current Nutrition Intake & Therapies:  Average Meal Intake: NPO  Average Supplement Intake: NPO  DIET NPO  ADULT ORAL NUTRITION SUPPLEMENT Breakfast, Lunch, Dinner; Diabetic Supplement    Anthropometric Measures:  Height: 5' 2\" (157.5 cm)  Ideal Body Weight (IBW): 110 lbs (50 kg)  Admission Body Weight: 98 lb 6.4 oz  Current Body Wt:  44.6 kg (98 lb 6.4 oz), 89.5 % IBW.  Bed scale  Current BMI (kg/m2): 18  Usual Body Weight: 59.4 kg (131 lb)  % Weight Change (Calculated): -24.9  Weight Adjustment: No adjustment  BMI Category: Underweight (BMI less than 22) age over 72    Estimated Daily Nutrient Needs:  Energy Requirements Based On: Kcal/kg (25-35)  Weight Used for Energy Requirements: Current  Energy (kcal/day): 0994-2789  Weight Used for Protein Requirements: Current (1.0-1.2)  Protein (g/day): 45-54  Method Used for Fluid Requirements: 1 ml/kcal  Fluid (ml/day): 5364-9687    Nutrition Diagnosis:   Severe malnutrition, In context of acute illness or injury related to early satiety, inadequate protein-energy intake as evidenced by Criteria as identified in malnutrition assessment, weight loss, poor dentition    Nutrition Interventions:   Food and/or Nutrient Delivery: Start oral diet, Vitamin supplement, Mineral supplement  Nutrition Education/Counseling: No recommendations at this time, Education not indicated  Coordination of Nutrition Care: Continue to monitor while inpatient  Plan of Care discussed with: PETER Webster    Goals:  Previous Goal Met: Progressing toward goal(s)  Goals: Meet at least 75% of estimated needs, by next RD assessment       Nutrition Monitoring and Evaluation:   Behavioral-Environmental Outcomes: None identified  Food/Nutrient Intake Outcomes: Diet advancement/tolerance, Food and nutrient intake, Supplement intake, Vitamin/mineral intake  Physical Signs/Symptoms Outcomes: Biochemical data, GI status, Weight, Nutrition focused physical findings, Meal time behavior    Discharge Planning:    No discharge needs at this time    Ila Mcclure MA, RDN, LD   Contact: [phone]

## 2022-10-22 LAB
ANION GAP SERPL CALC-SCNC: 5 MMOL/L (ref 3–18)
APPEARANCE UR: CLEAR
BACTERIA URNS QL MICRO: NEGATIVE /HPF
BILIRUB UR QL: NEGATIVE
BUN SERPL-MCNC: 21 MG/DL (ref 7–18)
BUN/CREAT SERPL: 27 (ref 12–20)
CALCIUM SERPL-MCNC: 9 MG/DL (ref 8.5–10.1)
CHLORIDE SERPL-SCNC: 109 MMOL/L (ref 100–111)
CO2 SERPL-SCNC: 26 MMOL/L (ref 21–32)
COLOR UR: YELLOW
CREAT SERPL-MCNC: 0.79 MG/DL (ref 0.6–1.3)
EPITH CASTS URNS QL MICRO: NORMAL /LPF (ref 0–5)
ERYTHROCYTE [DISTWIDTH] IN BLOOD BY AUTOMATED COUNT: 15.3 % (ref 11.6–14.5)
GLUCOSE BLD STRIP.AUTO-MCNC: 133 MG/DL (ref 70–110)
GLUCOSE BLD STRIP.AUTO-MCNC: 147 MG/DL (ref 70–110)
GLUCOSE BLD STRIP.AUTO-MCNC: 158 MG/DL (ref 70–110)
GLUCOSE BLD STRIP.AUTO-MCNC: 162 MG/DL (ref 70–110)
GLUCOSE SERPL-MCNC: 125 MG/DL (ref 74–99)
GLUCOSE UR STRIP.AUTO-MCNC: NEGATIVE MG/DL
HCT VFR BLD AUTO: 29.8 % (ref 35–45)
HGB BLD-MCNC: 9.4 G/DL (ref 12–16)
HGB UR QL STRIP: NEGATIVE
IRON SATN MFR SERPL: 20 % (ref 20–50)
IRON SERPL-MCNC: 46 UG/DL (ref 50–175)
KETONES UR QL STRIP.AUTO: NEGATIVE MG/DL
LACTATE SERPL-SCNC: 2.1 MMOL/L (ref 0.4–2)
LACTATE SERPL-SCNC: 2.7 MMOL/L (ref 0.4–2)
LEUKOCYTE ESTERASE UR QL STRIP.AUTO: ABNORMAL
MCH RBC QN AUTO: 23.4 PG (ref 24–34)
MCHC RBC AUTO-ENTMCNC: 31.5 G/DL (ref 31–37)
MCV RBC AUTO: 74.1 FL (ref 78–100)
NITRITE UR QL STRIP.AUTO: NEGATIVE
NRBC # BLD: 0 K/UL (ref 0–0.01)
NRBC BLD-RTO: 0 PER 100 WBC
PH UR STRIP: >8.5 [PH] (ref 5–8)
PLATELET # BLD AUTO: 275 K/UL (ref 135–420)
PMV BLD AUTO: 10.5 FL (ref 9.2–11.8)
POTASSIUM SERPL-SCNC: 4.7 MMOL/L (ref 3.5–5.5)
PROT UR STRIP-MCNC: NEGATIVE MG/DL
RBC # BLD AUTO: 4.02 M/UL (ref 4.2–5.3)
RBC #/AREA URNS HPF: NEGATIVE /HPF (ref 0–5)
SODIUM SERPL-SCNC: 140 MMOL/L (ref 136–145)
SP GR UR REFRACTOMETRY: 1.02 (ref 1–1.03)
TIBC SERPL-MCNC: 231 UG/DL (ref 250–450)
UROBILINOGEN UR QL STRIP.AUTO: 1 EU/DL (ref 0.2–1)
WBC # BLD AUTO: 7 K/UL (ref 4.6–13.2)
WBC URNS QL MICRO: NORMAL /HPF (ref 0–4)

## 2022-10-22 PROCEDURE — 77030018842 HC SOL IRR SOD CL 9% BAXT -A

## 2022-10-22 PROCEDURE — 94762 N-INVAS EAR/PLS OXIMTRY CONT: CPT

## 2022-10-22 PROCEDURE — 2709999900 HC NON-CHARGEABLE SUPPLY

## 2022-10-22 PROCEDURE — 74011000250 HC RX REV CODE- 250: Performed by: PHYSICIAN ASSISTANT

## 2022-10-22 PROCEDURE — 97116 GAIT TRAINING THERAPY: CPT

## 2022-10-22 PROCEDURE — 74011250636 HC RX REV CODE- 250/636: Performed by: INTERNAL MEDICINE

## 2022-10-22 PROCEDURE — 65270000029 HC RM PRIVATE

## 2022-10-22 PROCEDURE — 74011250637 HC RX REV CODE- 250/637: Performed by: INTERNAL MEDICINE

## 2022-10-22 PROCEDURE — 82962 GLUCOSE BLOOD TEST: CPT

## 2022-10-22 PROCEDURE — 87040 BLOOD CULTURE FOR BACTERIA: CPT

## 2022-10-22 PROCEDURE — 97110 THERAPEUTIC EXERCISES: CPT

## 2022-10-22 PROCEDURE — 85027 COMPLETE CBC AUTOMATED: CPT

## 2022-10-22 PROCEDURE — 83540 ASSAY OF IRON: CPT

## 2022-10-22 PROCEDURE — 81001 URINALYSIS AUTO W/SCOPE: CPT

## 2022-10-22 PROCEDURE — 36415 COLL VENOUS BLD VENIPUNCTURE: CPT

## 2022-10-22 PROCEDURE — 74011250636 HC RX REV CODE- 250/636: Performed by: PHYSICIAN ASSISTANT

## 2022-10-22 PROCEDURE — 74011250637 HC RX REV CODE- 250/637: Performed by: PHYSICIAN ASSISTANT

## 2022-10-22 PROCEDURE — 74011000258 HC RX REV CODE- 258: Performed by: INTERNAL MEDICINE

## 2022-10-22 PROCEDURE — 83605 ASSAY OF LACTIC ACID: CPT

## 2022-10-22 PROCEDURE — 99232 SBSQ HOSP IP/OBS MODERATE 35: CPT | Performed by: INTERNAL MEDICINE

## 2022-10-22 PROCEDURE — 94640 AIRWAY INHALATION TREATMENT: CPT

## 2022-10-22 PROCEDURE — 80048 BASIC METABOLIC PNL TOTAL CA: CPT

## 2022-10-22 PROCEDURE — 74011636637 HC RX REV CODE- 636/637: Performed by: PHYSICIAN ASSISTANT

## 2022-10-22 RX ORDER — SODIUM CHLORIDE 9 MG/ML
1000 INJECTION, SOLUTION INTRAVENOUS ONCE
Status: COMPLETED | OUTPATIENT
Start: 2022-10-22 | End: 2022-10-22

## 2022-10-22 RX ORDER — SODIUM CHLORIDE 9 MG/ML
60 INJECTION, SOLUTION INTRAVENOUS CONTINUOUS
Status: DISCONTINUED | OUTPATIENT
Start: 2022-10-22 | End: 2022-10-24

## 2022-10-22 RX ADMIN — HYDROCODONE BITARTRATE AND ACETAMINOPHEN 1 TABLET: 5; 325 TABLET ORAL at 01:35

## 2022-10-22 RX ADMIN — Medication 2 UNITS: at 21:51

## 2022-10-22 RX ADMIN — DIPHENHYDRAMINE HYDROCHLORIDE 25 MG: 25 CAPSULE ORAL at 06:07

## 2022-10-22 RX ADMIN — DULOXETINE 30 MG: 30 CAPSULE, DELAYED RELEASE ORAL at 09:25

## 2022-10-22 RX ADMIN — HYDROCODONE BITARTRATE AND ACETAMINOPHEN 1 TABLET: 5; 325 TABLET ORAL at 18:25

## 2022-10-22 RX ADMIN — VANCOMYCIN HYDROCHLORIDE 1000 MG: 1 INJECTION, POWDER, LYOPHILIZED, FOR SOLUTION INTRAVENOUS at 18:26

## 2022-10-22 RX ADMIN — Medication 2 UNITS: at 12:30

## 2022-10-22 RX ADMIN — ASPIRIN 81 MG 81 MG: 81 TABLET ORAL at 09:24

## 2022-10-22 RX ADMIN — HYDROCODONE BITARTRATE AND ACETAMINOPHEN 1 TABLET: 5; 325 TABLET ORAL at 11:45

## 2022-10-22 RX ADMIN — ARFORMOTEROL TARTRATE 15 MCG: 15 SOLUTION RESPIRATORY (INHALATION) at 19:41

## 2022-10-22 RX ADMIN — SODIUM CHLORIDE, PRESERVATIVE FREE 10 ML: 5 INJECTION INTRAVENOUS at 16:25

## 2022-10-22 RX ADMIN — METOCLOPRAMIDE 5 MG: 5 INJECTION, SOLUTION INTRAMUSCULAR; INTRAVENOUS at 17:40

## 2022-10-22 RX ADMIN — MORPHINE SULFATE 1 MG: 2 INJECTION, SOLUTION INTRAMUSCULAR; INTRAVENOUS at 06:00

## 2022-10-22 RX ADMIN — METOCLOPRAMIDE 5 MG: 5 INJECTION, SOLUTION INTRAMUSCULAR; INTRAVENOUS at 09:25

## 2022-10-22 RX ADMIN — PIPERACILLIN AND TAZOBACTAM 3.38 G: 3; .375 INJECTION, POWDER, FOR SOLUTION INTRAVENOUS at 23:52

## 2022-10-22 RX ADMIN — ENOXAPARIN SODIUM 40 MG: 100 INJECTION SUBCUTANEOUS at 09:25

## 2022-10-22 RX ADMIN — BUDESONIDE 500 MCG: 0.5 SUSPENSION RESPIRATORY (INHALATION) at 19:42

## 2022-10-22 RX ADMIN — MORPHINE SULFATE 1 MG: 2 INJECTION, SOLUTION INTRAMUSCULAR; INTRAVENOUS at 21:50

## 2022-10-22 RX ADMIN — MORPHINE SULFATE 1 MG: 2 INJECTION, SOLUTION INTRAMUSCULAR; INTRAVENOUS at 16:25

## 2022-10-22 RX ADMIN — Medication 100 MG: at 09:24

## 2022-10-22 RX ADMIN — BUDESONIDE 500 MCG: 0.5 SUSPENSION RESPIRATORY (INHALATION) at 08:52

## 2022-10-22 RX ADMIN — POLYETHYLENE GLYCOL 3350 34 G: 17 POWDER, FOR SOLUTION ORAL at 09:24

## 2022-10-22 RX ADMIN — CLOPIDOGREL BISULFATE 75 MG: 75 TABLET ORAL at 09:24

## 2022-10-22 RX ADMIN — ARFORMOTEROL TARTRATE 15 MCG: 15 SOLUTION RESPIRATORY (INHALATION) at 08:52

## 2022-10-22 RX ADMIN — PIPERACILLIN AND TAZOBACTAM 4.5 G: 4; .5 INJECTION, POWDER, FOR SOLUTION INTRAVENOUS at 17:39

## 2022-10-22 RX ADMIN — THERA TABS 1 TABLET: TAB at 09:24

## 2022-10-22 RX ADMIN — ATORVASTATIN CALCIUM 40 MG: 40 TABLET, FILM COATED ORAL at 21:50

## 2022-10-22 RX ADMIN — SODIUM CHLORIDE 1000 ML: 9 INJECTION, SOLUTION INTRAVENOUS at 19:34

## 2022-10-22 RX ADMIN — MEGESTROL ACETATE 200 MG: 40 SUSPENSION ORAL at 17:57

## 2022-10-22 RX ADMIN — MEGESTROL ACETATE 200 MG: 40 SUSPENSION ORAL at 11:45

## 2022-10-22 RX ADMIN — SODIUM CHLORIDE 25 ML/HR: 9 INJECTION, SOLUTION INTRAVENOUS at 21:49

## 2022-10-22 RX ADMIN — MORPHINE SULFATE 1 MG: 2 INJECTION, SOLUTION INTRAMUSCULAR; INTRAVENOUS at 09:24

## 2022-10-22 RX ADMIN — Medication 20 UNITS: at 21:49

## 2022-10-22 RX ADMIN — SODIUM CHLORIDE, PRESERVATIVE FREE 10 ML: 5 INJECTION INTRAVENOUS at 05:44

## 2022-10-22 RX ADMIN — DIPHENHYDRAMINE HYDROCHLORIDE 25 MG: 25 CAPSULE ORAL at 18:25

## 2022-10-22 RX ADMIN — IRON SUCROSE 300 MG: 20 INJECTION, SOLUTION INTRAVENOUS at 11:45

## 2022-10-22 RX ADMIN — MEGESTROL ACETATE 200 MG: 40 SUSPENSION ORAL at 09:25

## 2022-10-22 NOTE — PROGRESS NOTES
[facility]  Progress Note    Patient: Barbara Land Age: 81 y.o. : 1950 MR#: [id number] SSN: [id number]  Date/Time: 10/22/2022     Subjective:   Notes she is doing well. Pain is improved overall. Has been eating and had a bowel movement today. Been working with PT and OT. Review of systems  General: No fevers or chills. Cardiovascular: No chest pain or pressure. No palpitations. Pulmonary: No shortness of breath, cough or wheeze. Gastrointestinal: Reports mild abdominal pain, nausea, vomiting or diarrhea. Genitourinary: No urinary frequency, urgency, hesitancy or dysuria. Musculoskeletal: No joint or muscle pain, no back pain, no recent trauma. Neurologic: No headache, numbness, tingling or weakness. Assessment/Plan:   Hypotension resolved  2. AMBROCIO  3. Unintended weight loss  4. LLQ pain  5. Chronic respiratory failure with hypoxia  6. Type 2 DM  7. HTN  8. Gastroparesis  9. Fever and tachycardia       Patient initially hypotensive likely due to poor po intake, doubt sepsis but cannot rule out fully. Fever and tachycardia today, will draw blood cultures and lactic acid. Will start Vanco and Zosyn. UA negative on 10/19  Holding HCTZ and lisinopril due to AMBROCIO on admission, likely poor po intake and poor renal perfusion  LLQ pain possibly related to past diverticulitis episodes vs a new issue  Diabetic diet after EGD, Gastroparesis noted with retained food bolus  Reglan Per GI  Patient on Insulin SSI, Bgs checks  Morphine for pain control. BP stable at this time. I spent 40 minutes with the patient in face-to-face consultation, of which greater than 50% was spent in counseling and coordination of care as described above.     Case discussed with:  [x]Patient  []Family  []Nursing  []Case Management  DVT Prophylaxis:  [x]Lovenox  []Hep SQ  []SCDs  []Coumadin   []Eliquis/Xarelto     Objective:   VS: Visit Vitals  /71 (BP 1 Location: Right upper arm, BP Patient Position: At rest)   Pulse 98   Temp 99.2 °F (37.3 °C)   Resp 17   Ht 5' 2\" (1.575 m)   SpO2 98%   Breastfeeding No   BMI 16.10 kg/m²      Tmax/24hrs: Temp (24hrs), Av.3 °F (37.4 °C), Min:98.8 °F (37.1 °C), Max:100.6 °F (38.1 °C)  IOBRIEF  Intake/Output Summary (Last 24 hours) at 10/22/2022 1650  Last data filed at 10/22/2022 0139  Gross per 24 hour   Intake 60 ml   Output 1000 ml   Net -940 ml       General:  Alert, cooperative, no acute distress    HEENT: PERRLA, anicteric sclerae. Pulmonary:  CTA Bilaterally. No Wheezing/Rales. Cardiovascular: Regular rate and Rhythm. GI:  Soft, Non distended, Non tender. + Bowel sounds. Tenderness to LLQ/RLQ, No guarding  Extremities:  No edema. No calf tenderness. Psych: Good insight. Not anxious or agitated. Neurologic: Alert and oriented X 3. Moves all ext.   Additional:    Medications:   Current Facility-Administered Medications   Medication Dose Route Frequency    polyethylene glycol (MIRALAX) packet 34 g  34 g Oral DAILY    metoclopramide HCl (REGLAN) injection 5 mg  5 mg IntraVENous BID    therapeutic multivitamin (THERAGRAN) tablet 1 Tablet  1 Tablet Oral DAILY    thiamine HCL (B-1) tablet 100 mg  100 mg Oral DAILY    diphenhydrAMINE (BENADRYL) capsule 25 mg  25 mg Oral Q4H PRN    morphine injection 1 mg  1 mg IntraVENous Q3H PRN    sodium chloride (NS) flush 5-40 mL  5-40 mL IntraVENous Q8H    sodium chloride (NS) flush 5-40 mL  5-40 mL IntraVENous PRN    acetaminophen (TYLENOL) tablet 650 mg  650 mg Oral Q6H PRN    Or    acetaminophen (TYLENOL) suppository 650 mg  650 mg Rectal Q6H PRN    polyethylene glycol (MIRALAX) packet 17 g  17 g Oral DAILY PRN    bisacodyL (DULCOLAX) suppository 10 mg  10 mg Rectal DAILY PRN    ondansetron (ZOFRAN ODT) tablet 4 mg  4 mg Oral Q8H PRN    Or    ondansetron (ZOFRAN) injection 4 mg  4 mg IntraVENous Q6H PRN    enoxaparin (LOVENOX) injection 40 mg  40 mg SubCUTAneous DAILY HYDROcodone-acetaminophen (NORCO) 5-325 mg per tablet 1 Tablet  1 Tablet Oral Q6H PRN    albuterol-ipratropium (DUO-NEB) 2.5 MG-0.5 MG/3 ML  3 mL Nebulization Q4H PRN    aspirin chewable tablet 81 mg  81 mg Oral DAILY    atorvastatin (LIPITOR) tablet 40 mg  40 mg Oral QHS    clopidogreL (PLAVIX) tablet 75 mg  75 mg Oral DAILY    DULoxetine (CYMBALTA) capsule 30 mg  30 mg Oral DAILY    insulin glargine (LANTUS) injection 20 Units  20 Units SubCUTAneous QHS    insulin lispro (HUMALOG) injection   SubCUTAneous AC&HS    glucose chewable tablet 16 g  4 Tablet Oral PRN    glucagon (GLUCAGEN) injection 1 mg  1 mg IntraMUSCular PRN    dextrose 10% infusion 0-250 mL  0-250 mL IntraVENous PRN    dicyclomine (BENTYL) capsule 10 mg  10 mg Oral QID PRN    megestroL (MEGACE) 400 mg/10 mL (10 mL) oral suspension 200 mg  200 mg Oral TID WITH MEALS    budesonide (PULMICORT) 500 mcg/2 ml nebulizer suspension  500 mcg Nebulization BID RT    And    arformoteroL (BROVANA) neb solution 15 mcg  15 mcg Nebulization BID RT       Labs:    Recent Results (from the past 24 hour(s))   GLUCOSE, POC    Collection Time: 10/21/22  9:07 PM   Result Value Ref Range    Glucose (POC) 177 (H) 70 - 269 mg/dL   METABOLIC PANEL, BASIC    Collection Time: 10/22/22  2:08 AM   Result Value Ref Range    Sodium 140 136 - 145 mmol/L    Potassium 4.7 3.5 - 5.5 mmol/L    Chloride 109 100 - 111 mmol/L    CO2 26 21 - 32 mmol/L    Anion gap 5 3.0 - 18 mmol/L    Glucose 125 (H) 74 - 99 mg/dL    BUN 21 (H) 7.0 - 18 MG/DL    Creatinine 0.79 0.6 - 1.3 MG/DL    BUN/Creatinine ratio 27 (H) 12 - 20      eGFR >60 >60 ml/min/1.73m2    Calcium 9.0 8.5 - 10.1 MG/DL   CBC W/O DIFF    Collection Time: 10/22/22  2:08 AM   Result Value Ref Range    WBC 7.0 4.6 - 13.2 K/uL    RBC 4.02 (L) 4.20 - 5.30 M/uL    HGB 9.4 (L) 12.0 - 16.0 g/dL    HCT 29.8 (L) 35.0 - 45.0 %    MCV 74.1 (L) 78.0 - 100.0 FL    MCH 23.4 (L) 24.0 - 34.0 PG    MCHC 31.5 31.0 - 37.0 g/dL    RDW 15.3 (H) 11.6 - 14.5 %    PLATELET 932 326 - 640 K/uL    MPV 10.5 9.2 - 11.8 FL    NRBC 0.0 0  WBC    ABSOLUTE NRBC 0.00 0.00 - 0.01 K/uL   IRON PROFILE    Collection Time: 10/22/22  5:37 AM   Result Value Ref Range    Iron 46 (L) 50 - 175 ug/dL    TIBC 231 (L) 250 - 450 ug/dL    Iron % saturation 20 20 - 50 %   GLUCOSE, POC    Collection Time: 10/22/22  8:02 AM   Result Value Ref Range    Glucose (POC) 147 (H) 70 - 110 mg/dL   GLUCOSE, POC    Collection Time: 10/22/22 12:29 PM   Result Value Ref Range    Glucose (POC) 158 (H) 70 - 110 mg/dL   GLUCOSE, POC    Collection Time: 10/22/22  4:30 PM   Result Value Ref Range    Glucose (POC) 133 (H) 70 - 110 mg/dL       Signed By: Deandra Nguyne DO     October 22, 2022      Disclaimer: Sections of this note are dictated using utilizing voice recognition software. Minor typographical errors may be present. If questions arise, please do not hesitate to contact me or call our department.

## 2022-10-22 NOTE — PROGRESS NOTES
Pharmacy Note     Zosyn 3.375 gm Q6hr ordered for treatment of Intra-abdominal infection. Per Charlotte Ferreira 10 will be changed to 4.5 grams IV x 1 dose followed by 3.375 gm Q8hr with each dose over 240 minutes. Estimated Creatinine Clearance: Estimated Creatinine Clearance: 56.4 mL/min (based on SCr of 0.79 mg/dL). Dialysis Status, AMBROCIO, CKD: n/a    BMI:  Body mass index is 16.1 kg/m². Rationale for Adjustment:  [facility] B-Lactam extended infusion policy    Pharmacy will continue to monitor and adjust dose as necessary. Please call with any questions.     Thank you,  Adrian Black, [facility]

## 2022-10-22 NOTE — ROUTINE PROCESS
Bedside and Verbal shift change report given to Jose Enrique (oncoming nurse) by Joe Valencia RN (offgoing nurse). Report included the following information SBAR, Kardex, MAR and Recent Results. SITUATION:   Code Status: Full Code  Reason for Admission: Colitis [K52.9]    Hospital day: 3  Problem List:       Hospital Problems  Date Reviewed: 2/26/2021            Codes Class Noted POA    Gastroparesis ICD-10-CM: K31.84  ICD-9-CM: 536.3  10/21/2022 Unknown        * (Principal) AMBROCIO (acute kidney injury) ([facility] ICD-10-CM: N17.9  ICD-9-CM: 584.9  10/19/2022 Yes        Pulmonary sarcoidosis ([facility] ICD-10-CM: D86.0  ICD-9-CM: 135, 517.8  10/16/2019 Yes        Chronic respiratory failure with hypoxia ([facility] ICD-10-CM: J96.11  ICD-9-CM: 518.83, 799.02  10/16/2019 Yes        HTN (hypertension) (Chronic) ICD-10-CM: I10  ICD-9-CM: 401.9  1/15/2014 Yes        DM (diabetes mellitus) ([facility] (Chronic) ICD-10-CM: E11.9  ICD-9-CM: 250.00  1/15/2014 Yes        COPD, moderate ([facility] ICD-10-CM: J44.9  ICD-9-CM: 157  9/8/2011 Yes           BACKGROUND:    Past Medical History:   Past Medical History:   Diagnosis Date    Arthritis     \"generalized\"    Asthma     CAD in native artery     NSTEMI (Sep 2014); diffuse 65% pLAD, minimal disease RCA & LCx. pLAD FFR 0.93.  LV  no RWMA (echo and LV angio)    Chronic neck pain     Chronic pain     left knee    Chronic pain syndrome     COPD (chronic obstructive pulmonary disease) (HCC)     Depression     Diabetes (HCC)     Diverticulitis     GERD (gastroesophageal reflux disease)     Heart disease     Hidradenitis 9/11/2014    Hypertension     Left knee pain     Narcotic dependence ([facility]     Pneumonia     Right wrist pain     Sarcoidosis          Patient taking anticoagulants yes     ASSESSMENT:   Changes in Assessment Throughout Shift: abd pain    Patient has Central Line: no Reasons if yes:   Patient has Christie Cath: no Reasons if yes:      Last Vitals:     Vitals:    10/21/22 1920 10/21/22 1954 10/21/22 2334 10/22/22 0345   BP:  119/64 134/76 (!) 160/77   Pulse:  (!) 104 (!) 111 (!) 114   Resp:  16 16 16   Temp:  99 °F (37.2 °C) 99.3 °F (37.4 °C) 98.8 °F (37.1 °C)   SpO2: 97% 99% 98% 98%   Height:           IV and DRAINS (will only show if present)   Peripheral IV 10/19/22 Right Arm-Site Assessment: Clean, dry, & intact    WOUND (if present)   Wound Type:  none   Dressing present Dressing Present : No   Wound Concerns/Notes:  none    PAIN    Pain Assessment    Pain Intensity 1: 5 (10/22/22 0434)    Pain Location 1: Abdomen    Pain Intervention(s) 1: Medication (see MAR)    Patient Stated Pain Goal: 2  Interventions for Pain:  morphine  Intervention effective: no  Time of last intervention: 0630   Reassessment Completed: yes     Last 3 Weights: There were no vitals filed for this visit. Weight change:     INTAKE/OUPUT    Current Shift: 10/21 1901 - 10/22 0700  In: 60 [P.O.:60]  Out: 1000 [Urine:1000]    Last three shifts: 10/20 0701 - 10/21 1900  In: -   Out: 900 [Urine:900]    LAB RESULTS     Recent Labs     10/22/22  0208 10/21/22  0433 10/20/22  0206   WBC 7.0 5.9 4.1*   HGB 9.4* 7.5* 8.1*   HCT 29.8* 23.9* 27.1*    230 204        Recent Labs     10/22/22  0208 10/21/22  0433 10/20/22  0206 10/19/22  1210    142 142 138   K 4.7 4.6 4.4 4.8   * 124* 74 178*   BUN 21* 24* 31* 34*   CREA 0.79 0.65 1.13 1.42*   CA 9.0 8.3* 8.5 9.5   MG  --   --   --  2.4       RECOMMENDATIONS AND DISCHARGE PLANNING     Pending tests/procedures/ Plan of Care or Other Needs: none     Discharge plan for patient and Needs/Barriers: home    Estimated Discharge Date: ? Posted on Whiteboard in Patients Room: no      4. The patient's care plan was reviewed with the oncoming nurse.        \"HEALS\" SAFETY CHECK      Fall Risk    Total Score: 6    Safety Measures: Safety Measures: Bed/Chair-Wheels locked, Bed in low position, Call light within reach, Family at bedside, Gripper socks, Side rails X 3    A safety check occurred in the patient's room between off going nurse and oncoming nurse listed above. The safety check included the below items  Area Items   H  High Alert Medications Verify all high alert medication drips (heparin, PCA, etc.)   E  Equipment Suction is set up for ALL patients (with judy)  Red plugs utilized for all equipment (IV pumps, etc.)  WOWs wiped down at end of shift. Room stocked with oxygen, suction, and other unit-specific supplies   A  Alarms Bed alarm is set for fall risk patients  Ensure chair alarm is in place and activated if patient is up in a chair   L  Lines Check IV for any infiltration  Christie bag is empty if patient has a Christie   Tubing and IV bags are labeled   S  Safety   Room is clean, patient is clean, and equipment is clean. Hallways are clear from equipment besides carts. Fall bracelet on for fall risk patients  Ensure room is clear and free of clutter  Suction is set up for ALL patients (with judy)  Hallways are clear from equipment besides carts.    Isolation precautions followed, supplies available outside room, sign posted     Leatha Marley RN

## 2022-10-22 NOTE — PROGRESS NOTES
[address] [facility] Pharmacokinetic Monitoring Service - Vancomycin    Indication: intra-abdominal infection  Goal AUC/VINCENT: 400-600 mg*hr/L  Day of Therapy: 1  Additional Antimicrobials: pip-tazo    Pertinent Laboratory Values: Wt Readings from Last 1 Encounters:   09/21/22 39.9 kg (88 lb)     Temp Readings from Last 1 Encounters:   10/22/22 99.2 °F (37.3 °C)     No components found for: PROCAL  Estimated Creatinine Clearance: 56.4 mL/min (based on SCr of 0.79 mg/dL). Recent Labs     10/22/22  0208 10/21/22  0433   WBC 7.0 5.9     Pertinent Cultures:  Culture Date Source Results   - - -   MRSA Nasal Swab: N/A. Non-respiratory infection.     Assessment:  Date/Time Current Dose Concentration (mg/L) Timing of Concentration (h) AUC   10/22 1,000 mg x1 - - -   Note: Serum concentrations collected for AUC dosing may appear elevated if collected in close proximity to the dose administered, this is not necessarily an indication of toxicity    Plan:  Loading dose: 1,000 mg x1  Maintenance dose: 750 mg q18h  No level ordered at this time  Pharmacy will continue to monitor patient and adjust therapy as indicated    Thank you for the consult,  GABRIEL Hernandez  10/22/2022

## 2022-10-22 NOTE — PROGRESS NOTES
Problem: Mobility Impaired (Adult and Pediatric)  Goal: *Acute Goals and Plan of Care (Insert Text)  Description: Physical Therapy Goals  Initiated 10/20/2022 and to be accomplished within 7 day(s)  1. Patient will move from supine to sit and sit to supine , scoot up and down, and roll side to side in bed with modified independence. 2.  Patient will transfer from bed to chair and chair to bed with modified independence using the least restrictive device. 3.  Patient will perform sit to stand with modified independence. 4.  Patient will ambulate with modified independence for 75 feet with the least restrictive device. 5.  Patient will participate in LE exercise to tolerance. PLOF: Pt lives with  in apartment with elevator access. Pt has been passing out 2 times per week over the last several months, falling to floor and hitting head at times. Pt has a rollator and BSC, will sit in rollator if needed. 10/22/2022 1157 by Torie Jones., PTA  Outcome: Progressing Towards Goal  10/22/2022 1110 by Torie Jones., PTA  Outcome: Progressing Towards Goal     PHYSICAL THERAPY TREATMENT    Patient: Lakesha Muhammad (11 y.o. female)  Date: 10/22/2022  Diagnosis: Colitis [K52.9] AMBROCIO (acute kidney injury) ([facility] [address]  Procedure(s) (LRB):  UPPER ENDOSCOPY/ Biopsies (N/A) 1 Day Post-Op  Precautions: Fall, Skin      ASSESSMENT:  Pt found in bed upon arrival, with Nursing monitoring vitals (WNL); patient cleared for therapy. Pt instructed in ambulation with use of FWW c focus on improving gait speed, heel strike and B step length in order to improve overall gait quality; pt able to manage up to 100 ft with SBA-- fair improvement noted to gait-- will benefit from further training to also improve stamina. Patient instructed in standing therex x10 at [facility] (see exercise list below)-- occ cuing provided on ensuring upright trunk for improved postural control.  Patient denies any dizziness upon completion of routine; vitals WNL. Patient remained in room post tx session with spouse present. All needs met and call bell within reach. Education on utilizing call bell to gain assistance for all mobility, in order to reduce risk for falls; verbalized understanding. Nursing notified. Progression toward goals:   [x]      Improving appropriately and progressing toward goals  []      Improving slowly and progressing toward goals  []      Not making progress toward goals and plan of care will be adjusted     PLAN:  Patient continues to benefit from skilled intervention to address the above impairments. Continue treatment per established plan of care. Further Equipment Recommendations for Discharge:  walker     AMPAC: Current research shows that an AM-PAC score of 17 (13 without stairs) or less is not associated with a discharge to the patient's home setting. Based on an AM-PAC score of 15/24 (or **/20 if omitting stairs) and their current functional mobility deficits, it is recommended that the patient have 5-7 sessions per week of Physical Therapy at d/c to increase the patient's independence. Currently, this patient demonstrates the potential endurance, and/or tolerance for 3 hours of therapy each day at d/c. This AMPAC score should be considered in conjunction with interdisciplinary team recommendations to determine the most appropriate discharge setting. Patient's social support, diagnosis, medical stability, and prior level of function should also be taken into consideration. SUBJECTIVE:   Patient stated I feel really energized now.     OBJECTIVE DATA SUMMARY:   Critical Behavior:  Neurologic State: Alert  Orientation Level: Oriented X4  Cognition: Follows commands  Safety/Judgement: Fall prevention  Functional Mobility Training:  Bed Mobility:   Supine to Sit: Supervision  Sit to Supine: Supervision     Transfers:  Sit to Stand: Stand-by assistance  Stand to Sit: Supervision  Stand Pivot Transfers: Stand-by assistance       Ambulation/Gait Training:  Distance (ft): 100 Feet (ft)  Assistive Device: Walker, rolling  Speed/Kell: Slow  Step Length: Left shortened;Right shortened      Therapeutic Exercises:       EXERCISE   Sets   Reps   Active Active Assist   Passive Self ROM   Comments   Heel raises    [x] [] [] [] x10   Hip flexion    [x] [] [] [] x10   HS curls   [x] [] [] [] x10   Short Arc Quads   [] [] [] []    Heel Slides   [] [] [] []    Straight Leg Raises   [] [] [] []    Hip Add   [] [] [] [] Hold for 5 secs, w/ pillow squeeze   Long Arc Quads   [] [] [] []    Seated Marching   [] [] [] []    Standing Marching   [] [] [] []       [] [] [] []        Pain:  Pain level pre-treatment: 0/10  Pain level post-treatment: 0/10       Activity Tolerance:   Excellent  Please refer to the flowsheet for vital signs taken during this treatment. After treatment:   [] Patient left in no apparent distress sitting up in chair  [x] Patient left in no apparent distress in bed  [x] Call bell left within reach  [x] Nursing notified  [x] Caregiver present  [] Bed alarm activated  [] SCDs applied      COMMUNICATION/EDUCATION:   []         Role of Physical Therapy in the acute care setting. []         Fall prevention education was provided and the patient/caregiver indicated understanding. []         Patient/family have participated as able in working toward goals and plan of care. []         Patient/family agree to work toward stated goals and plan of care. []         Patient understands intent and goals of therapy, but is neutral about his/her participation. []         Patient is unable to participate in stated goals/plan of care: ongoing with therapy staff.  []         Other:        Haleigh Patel.  Jerrald Kawasaki, BRANDON   Time Calculation: 26 mins    Gaston Alejandro AM-PAC® Basic Mobility Inpatient Short Form (6-Clicks) Version 2    How much HELP from another person does the patient currently need    (If the patient hasn't done an activity recently, how much help from another person do you think he/she would need if he/she tried?)   Total (Total A or Dep)   A Lot  (Mod to Max A)   A Little (Sup or Min A)   None (Mod I to I)   Turning from your back to your side while in a flat bed without using bedrails? [] 1 [] 2 [x] 3 [] 4   2. Moving from lying on your back to sitting on the side of a flat bed without using bedrails? [] 1 [] 2 [x] 3 [] 4   3. Moving to and from a bed to a chair (including a wheelchair)? [] 1 [] 2 [x] 3 [] 4   4. Standing up from a chair using your arms (e.g., wheelchair, or bedside chair)? [] 1 [] 2 [x] 3 [] 4   5. Walking in hospital room? [] 1 [] 2 [x] 3 [] 4   6. Climbing 3-5 steps with a railing?+   [] 1 [] 2 [] 3 [] 4   +If stair climbing cannot be assessed, skip item #6. Sum responses from items 1-5.

## 2022-10-23 ENCOUNTER — APPOINTMENT (OUTPATIENT)
Dept: CT IMAGING | Age: 72
DRG: 073 | End: 2022-10-23
Attending: INTERNAL MEDICINE
Payer: MEDICARE

## 2022-10-23 ENCOUNTER — APPOINTMENT (OUTPATIENT)
Dept: ULTRASOUND IMAGING | Age: 72
DRG: 073 | End: 2022-10-23
Attending: INTERNAL MEDICINE
Payer: MEDICARE

## 2022-10-23 ENCOUNTER — APPOINTMENT (OUTPATIENT)
Dept: GENERAL RADIOLOGY | Age: 72
DRG: 073 | End: 2022-10-23
Attending: INTERNAL MEDICINE
Payer: MEDICARE

## 2022-10-23 LAB
ALBUMIN SERPL-MCNC: 3.3 G/DL (ref 3.4–5)
ALBUMIN/GLOB SERPL: 1 {RATIO} (ref 0.8–1.7)
ALP SERPL-CCNC: 84 U/L (ref 45–117)
ALT SERPL-CCNC: 16 U/L (ref 13–56)
ANION GAP SERPL CALC-SCNC: 5 MMOL/L (ref 3–18)
AST SERPL-CCNC: 16 U/L (ref 10–38)
B PERT DNA SPEC QL NAA+PROBE: NOT DETECTED
BILIRUB SERPL-MCNC: 0.4 MG/DL (ref 0.2–1)
BORDETELLA PARAPERTUSSIS PCR, BORPAR: NOT DETECTED
BUN SERPL-MCNC: 17 MG/DL (ref 7–18)
BUN/CREAT SERPL: 24 (ref 12–20)
C PNEUM DNA SPEC QL NAA+PROBE: NOT DETECTED
CALCIUM SERPL-MCNC: 8.6 MG/DL (ref 8.5–10.1)
CHLORIDE SERPL-SCNC: 111 MMOL/L (ref 100–111)
CO2 SERPL-SCNC: 25 MMOL/L (ref 21–32)
CREAT SERPL-MCNC: 0.72 MG/DL (ref 0.6–1.3)
ERYTHROCYTE [DISTWIDTH] IN BLOOD BY AUTOMATED COUNT: 15.7 % (ref 11.6–14.5)
FLUAV SUBTYP SPEC NAA+PROBE: NOT DETECTED
FLUBV RNA SPEC QL NAA+PROBE: NOT DETECTED
GLOBULIN SER CALC-MCNC: 3.2 G/DL (ref 2–4)
GLUCOSE BLD STRIP.AUTO-MCNC: 109 MG/DL (ref 70–110)
GLUCOSE BLD STRIP.AUTO-MCNC: 122 MG/DL (ref 70–110)
GLUCOSE BLD STRIP.AUTO-MCNC: 123 MG/DL (ref 70–110)
GLUCOSE BLD STRIP.AUTO-MCNC: 96 MG/DL (ref 70–110)
GLUCOSE SERPL-MCNC: 110 MG/DL (ref 74–99)
HADV DNA SPEC QL NAA+PROBE: NOT DETECTED
HCOV 229E RNA SPEC QL NAA+PROBE: NOT DETECTED
HCOV HKU1 RNA SPEC QL NAA+PROBE: NOT DETECTED
HCOV NL63 RNA SPEC QL NAA+PROBE: NOT DETECTED
HCOV OC43 RNA SPEC QL NAA+PROBE: NOT DETECTED
HCT VFR BLD AUTO: 25.4 % (ref 35–45)
HGB BLD-MCNC: 7.7 G/DL (ref 12–16)
HMPV RNA SPEC QL NAA+PROBE: NOT DETECTED
HPIV1 RNA SPEC QL NAA+PROBE: NOT DETECTED
HPIV2 RNA SPEC QL NAA+PROBE: NOT DETECTED
HPIV3 RNA SPEC QL NAA+PROBE: NOT DETECTED
HPIV4 RNA SPEC QL NAA+PROBE: NOT DETECTED
LACTATE SERPL-SCNC: 1.8 MMOL/L (ref 0.4–2)
LACTATE SERPL-SCNC: 2.8 MMOL/L (ref 0.4–2)
M PNEUMO DNA SPEC QL NAA+PROBE: NOT DETECTED
MCH RBC QN AUTO: 22.8 PG (ref 24–34)
MCHC RBC AUTO-ENTMCNC: 30.3 G/DL (ref 31–37)
MCV RBC AUTO: 75.1 FL (ref 78–100)
NRBC # BLD: 0 K/UL (ref 0–0.01)
NRBC BLD-RTO: 0 PER 100 WBC
PLATELET # BLD AUTO: 217 K/UL (ref 135–420)
PMV BLD AUTO: 11.1 FL (ref 9.2–11.8)
POTASSIUM SERPL-SCNC: 5.1 MMOL/L (ref 3.5–5.5)
PROT SERPL-MCNC: 6.5 G/DL (ref 6.4–8.2)
RBC # BLD AUTO: 3.38 M/UL (ref 4.2–5.3)
RSV RNA SPEC QL NAA+PROBE: NOT DETECTED
RV+EV RNA SPEC QL NAA+PROBE: NOT DETECTED
SARS-COV-2 PCR, COVPCR: NOT DETECTED
SODIUM SERPL-SCNC: 141 MMOL/L (ref 136–145)
WBC # BLD AUTO: 5.1 K/UL (ref 4.6–13.2)

## 2022-10-23 PROCEDURE — 74011000250 HC RX REV CODE- 250: Performed by: PHYSICIAN ASSISTANT

## 2022-10-23 PROCEDURE — 74011250636 HC RX REV CODE- 250/636: Performed by: INTERNAL MEDICINE

## 2022-10-23 PROCEDURE — 74011000258 HC RX REV CODE- 258: Performed by: INTERNAL MEDICINE

## 2022-10-23 PROCEDURE — 94761 N-INVAS EAR/PLS OXIMETRY MLT: CPT

## 2022-10-23 PROCEDURE — 0202U NFCT DS 22 TRGT SARS-COV-2: CPT

## 2022-10-23 PROCEDURE — 99232 SBSQ HOSP IP/OBS MODERATE 35: CPT | Performed by: INTERNAL MEDICINE

## 2022-10-23 PROCEDURE — 74011000636 HC RX REV CODE- 636: Performed by: INTERNAL MEDICINE

## 2022-10-23 PROCEDURE — 74011250636 HC RX REV CODE- 250/636: Performed by: PHYSICIAN ASSISTANT

## 2022-10-23 PROCEDURE — 74011250637 HC RX REV CODE- 250/637: Performed by: INTERNAL MEDICINE

## 2022-10-23 PROCEDURE — 71045 X-RAY EXAM CHEST 1 VIEW: CPT

## 2022-10-23 PROCEDURE — 85027 COMPLETE CBC AUTOMATED: CPT

## 2022-10-23 PROCEDURE — 82962 GLUCOSE BLOOD TEST: CPT

## 2022-10-23 PROCEDURE — 74177 CT ABD & PELVIS W/CONTRAST: CPT

## 2022-10-23 PROCEDURE — 74011250637 HC RX REV CODE- 250/637: Performed by: PHYSICIAN ASSISTANT

## 2022-10-23 PROCEDURE — 80053 COMPREHEN METABOLIC PANEL: CPT

## 2022-10-23 PROCEDURE — 76705 ECHO EXAM OF ABDOMEN: CPT

## 2022-10-23 PROCEDURE — 36415 COLL VENOUS BLD VENIPUNCTURE: CPT

## 2022-10-23 PROCEDURE — 83605 ASSAY OF LACTIC ACID: CPT

## 2022-10-23 PROCEDURE — 65270000029 HC RM PRIVATE

## 2022-10-23 PROCEDURE — 94640 AIRWAY INHALATION TREATMENT: CPT

## 2022-10-23 RX ORDER — SODIUM CHLORIDE 9 MG/ML
1000 INJECTION, SOLUTION INTRAVENOUS ONCE
Status: COMPLETED | OUTPATIENT
Start: 2022-10-23 | End: 2022-10-23

## 2022-10-23 RX ORDER — HYDRALAZINE HYDROCHLORIDE 20 MG/ML
10 INJECTION INTRAMUSCULAR; INTRAVENOUS
Status: DISCONTINUED | OUTPATIENT
Start: 2022-10-23 | End: 2022-10-28 | Stop reason: HOSPADM

## 2022-10-23 RX ADMIN — IOPAMIDOL 80 ML: 612 INJECTION, SOLUTION INTRAVENOUS at 14:45

## 2022-10-23 RX ADMIN — MEGESTROL ACETATE 200 MG: 40 SUSPENSION ORAL at 11:20

## 2022-10-23 RX ADMIN — METOCLOPRAMIDE 5 MG: 5 INJECTION, SOLUTION INTRAMUSCULAR; INTRAVENOUS at 17:45

## 2022-10-23 RX ADMIN — ATORVASTATIN CALCIUM 40 MG: 40 TABLET, FILM COATED ORAL at 23:13

## 2022-10-23 RX ADMIN — ARFORMOTEROL TARTRATE 15 MCG: 15 SOLUTION RESPIRATORY (INHALATION) at 21:18

## 2022-10-23 RX ADMIN — DULOXETINE 30 MG: 30 CAPSULE, DELAYED RELEASE ORAL at 11:06

## 2022-10-23 RX ADMIN — HYDROCODONE BITARTRATE AND ACETAMINOPHEN 1 TABLET: 5; 325 TABLET ORAL at 03:58

## 2022-10-23 RX ADMIN — SODIUM CHLORIDE, PRESERVATIVE FREE 10 ML: 5 INJECTION INTRAVENOUS at 16:40

## 2022-10-23 RX ADMIN — PIPERACILLIN AND TAZOBACTAM 3.38 G: 3; .375 INJECTION, POWDER, FOR SOLUTION INTRAVENOUS at 16:37

## 2022-10-23 RX ADMIN — BUDESONIDE 500 MCG: 0.5 SUSPENSION RESPIRATORY (INHALATION) at 09:07

## 2022-10-23 RX ADMIN — METOCLOPRAMIDE 5 MG: 5 INJECTION, SOLUTION INTRAMUSCULAR; INTRAVENOUS at 11:06

## 2022-10-23 RX ADMIN — DIPHENHYDRAMINE HYDROCHLORIDE 25 MG: 25 CAPSULE ORAL at 16:37

## 2022-10-23 RX ADMIN — PIPERACILLIN AND TAZOBACTAM 3.38 G: 3; .375 INJECTION, POWDER, FOR SOLUTION INTRAVENOUS at 11:07

## 2022-10-23 RX ADMIN — ARFORMOTEROL TARTRATE 15 MCG: 15 SOLUTION RESPIRATORY (INHALATION) at 09:07

## 2022-10-23 RX ADMIN — MORPHINE SULFATE 1 MG: 2 INJECTION, SOLUTION INTRAMUSCULAR; INTRAVENOUS at 02:11

## 2022-10-23 RX ADMIN — MORPHINE SULFATE 1 MG: 2 INJECTION, SOLUTION INTRAMUSCULAR; INTRAVENOUS at 23:13

## 2022-10-23 RX ADMIN — DIATRIZOATE MEGLUMINE AND DIATRIZOATE SODIUM 30 ML: 660; 100 LIQUID ORAL; RECTAL at 12:52

## 2022-10-23 RX ADMIN — HYDRALAZINE HYDROCHLORIDE 10 MG: 20 INJECTION INTRAMUSCULAR; INTRAVENOUS at 16:37

## 2022-10-23 RX ADMIN — HYDROCODONE BITARTRATE AND ACETAMINOPHEN 1 TABLET: 5; 325 TABLET ORAL at 23:31

## 2022-10-23 RX ADMIN — BUDESONIDE 500 MCG: 0.5 SUSPENSION RESPIRATORY (INHALATION) at 21:18

## 2022-10-23 RX ADMIN — DIPHENHYDRAMINE HYDROCHLORIDE 25 MG: 25 CAPSULE ORAL at 02:08

## 2022-10-23 RX ADMIN — MORPHINE SULFATE 1 MG: 2 INJECTION, SOLUTION INTRAMUSCULAR; INTRAVENOUS at 11:06

## 2022-10-23 RX ADMIN — THERA TABS 1 TABLET: TAB at 11:06

## 2022-10-23 RX ADMIN — MEGESTROL ACETATE 200 MG: 40 SUSPENSION ORAL at 17:45

## 2022-10-23 RX ADMIN — ASPIRIN 81 MG 81 MG: 81 TABLET ORAL at 11:06

## 2022-10-23 RX ADMIN — SODIUM CHLORIDE 1000 ML: 9 INJECTION, SOLUTION INTRAVENOUS at 11:19

## 2022-10-23 RX ADMIN — CLOPIDOGREL BISULFATE 75 MG: 75 TABLET ORAL at 11:06

## 2022-10-23 RX ADMIN — POLYETHYLENE GLYCOL 3350 34 G: 17 POWDER, FOR SOLUTION ORAL at 11:06

## 2022-10-23 RX ADMIN — MORPHINE SULFATE 1 MG: 2 INJECTION, SOLUTION INTRAMUSCULAR; INTRAVENOUS at 16:37

## 2022-10-23 RX ADMIN — POLYETHYLENE GLYCOL 3350 17 G: 17 POWDER, FOR SOLUTION ORAL at 02:07

## 2022-10-23 RX ADMIN — ENOXAPARIN SODIUM 40 MG: 100 INJECTION SUBCUTANEOUS at 11:07

## 2022-10-23 RX ADMIN — MORPHINE SULFATE 1 MG: 2 INJECTION, SOLUTION INTRAMUSCULAR; INTRAVENOUS at 06:47

## 2022-10-23 RX ADMIN — Medication 100 MG: at 11:06

## 2022-10-23 NOTE — PROGRESS NOTES
[facility] Pharmacokinetic Monitoring Service - Vancomycin    Indication: intra-abdominal infection  Goal AUC/VINCENT: 400-600 mg*hr/L  Day of Therapy: 2  Additional Antimicrobials: pip-tazo    Pertinent Laboratory Values: Wt Readings from Last 1 Encounters:   10/22/22 41.8 kg (92 lb 3.2 oz)     Temp Readings from Last 1 Encounters:   10/23/22 99.3 °F (37.4 °C)     No components found for: PROCAL  Estimated Creatinine Clearance: 48 mL/min (based on SCr of 0.72 mg/dL). Recent Labs     10/23/22  0419 10/22/22  0208   WBC 5.1 7.0     Pertinent Cultures:  Culture Date Source Results   10/22 blood NGTD   MRSA Nasal Swab: N/A. Non-respiratory infection.     Assessment:  Date/Time Current Dose Concentration (mg/L) Timing of Concentration (h) AUC   10/22 1,000 mg x1 - - -   10/23 750 mg q18h - - -   Note: Serum concentrations collected for AUC dosing may appear elevated if collected in close proximity to the dose administered, this is not necessarily an indication of toxicity    Plan:  Continue current dose of 750 mg q18h  Ordered a level for 10/24 with AM labs  Pharmacy will continue to monitor patient and adjust therapy as indicated    Thank you for the consult,  Grzegorz House, [address]  10/23/2022

## 2022-10-23 NOTE — PROGRESS NOTES
[facility] Hospitalist Group  Progress Note    Patient: Jaja Hutton Age: 81 y.o. : 1950 MR#: [id number] SSN: [id number]  Date/Time: 10/23/2022     Subjective:   Patient doing well. States she has 7/10 abdominal pain despite morphine. Has been eating with bowel movements. Review of systems  General: No fevers or chills. Cardiovascular: No chest pain or pressure. No palpitations. Pulmonary: No shortness of breath, cough or wheeze. Gastrointestinal: No abdominal pain, nausea, vomiting or diarrhea. Genitourinary: No urinary frequency, urgency, hesitancy or dysuria. Musculoskeletal: No joint or muscle pain, no back pain, no recent trauma. Neurologic: No headache, numbness, tingling or weakness. Assessment/Plan:   Hypotension resolved  2. AMBROCIO  3. Unintended weight loss  4. LLQ pain  5. Chronic respiratory failure with hypoxia  6. Type 2 DM  7. HTN  8. Gastroparesis  9. Fever and tachycardia source unclear  10. Metabolic acidosis    Patient initially hypotensive likely due to poor po intake, Likely sepsis ongoing. Source unclear,. Possible appendicitis vs colitis on CT. CVA tenderness on right however CT scan showed no abnormality on kidneys. Will order US of appendix- unable to visualize appendix, will repeat CT. Blood cx NGTD so far. CXR negative for a acute process. Pending RVP. Lactic acid elevated. Patient is s/p 2 L of NS. Will trend. No anion gap. UA negative on 10/19  Holding HCTZ and lisinopril due to AMBROCIO on admission, likely poor po intake and poor renal perfusion  LLQ pain possibly related to questionable colitis seen on CT  Diabetic diet, Gastroparesis noted with retained food bolus  Reglan Per GI  Patient on Insulin SSI, Bgs checks  Morphine for pain control. BP stable at this time.       I spent 40 minutes with the patient in face-to-face consultation, of which greater than 50% was spent in counseling and coordination of care as described above. Case discussed with:  [x]Patient  []Family  []Nursing  []Case Management  DVT Prophylaxis:  [x]Lovenox  []Hep SQ  []SCDs  []Coumadin   []Eliquis/Xarelto     Objective:   VS: Visit Vitals  BP (!) 160/88 (BP 1 Location: Right upper arm, BP Patient Position: At rest) Comment: Reported to RN Moe   Pulse (!) 111   Temp 99.3 °F (37.4 °C)   Resp 16   Ht 5' 2\" (1.575 m)   Wt 41.8 kg (92 lb 3.2 oz)   SpO2 93%   Breastfeeding No   BMI 16.86 kg/m²      Tmax/24hrs: Temp (24hrs), Av.9 °F (37.2 °C), Min:98.4 °F (36.9 °C), Max:99.3 °F (37.4 °C)  IOBRIEF  Intake/Output Summary (Last 24 hours) at 10/23/2022 1148  Last data filed at 10/23/2022 0400  Gross per 24 hour   Intake 1160.83 ml   Output 300 ml   Net 860.83 ml       General:  Alert, cooperative, no acute distress    HEENT: PERRLA, anicteric sclerae. Pulmonary:  CTA Bilaterally. No Wheezing/Rales. Cardiovascular: Regular rate and Rhythm. GI:  Soft, Non distended, Tender to RLQ and LLQ. + Bowel sounds. Tenderness to Right CVA area  Extremities:  No edema. No calf tenderness. Psych: Good insight. Not anxious or agitated. Neurologic: Alert and oriented X 3. Moves all ext.   Additional:    Medications:   Current Facility-Administered Medications   Medication Dose Route Frequency    piperacillin-tazobactam (ZOSYN) 3.375 g in 0.9% sodium chloride (MBP/ADV) 100 mL MBP  3.375 g IntraVENous Q8H    vancomycin (VANCOCIN) 750 mg in 0.9% sodium chloride 250 mL (VIAL-MATE)  750 mg IntraVENous Q18H    0.9% sodium chloride infusion  60 mL/hr IntraVENous CONTINUOUS    polyethylene glycol (MIRALAX) packet 34 g  34 g Oral DAILY    metoclopramide HCl (REGLAN) injection 5 mg  5 mg IntraVENous BID    therapeutic multivitamin (THERAGRAN) tablet 1 Tablet  1 Tablet Oral DAILY    thiamine HCL (B-1) tablet 100 mg  100 mg Oral DAILY    diphenhydrAMINE (BENADRYL) capsule 25 mg  25 mg Oral Q4H PRN    morphine injection 1 mg  1 mg IntraVENous Q3H PRN    sodium chloride (NS) flush 5-40 mL  5-40 mL IntraVENous Q8H    sodium chloride (NS) flush 5-40 mL  5-40 mL IntraVENous PRN    acetaminophen (TYLENOL) tablet 650 mg  650 mg Oral Q6H PRN    Or    acetaminophen (TYLENOL) suppository 650 mg  650 mg Rectal Q6H PRN    polyethylene glycol (MIRALAX) packet 17 g  17 g Oral DAILY PRN    bisacodyL (DULCOLAX) suppository 10 mg  10 mg Rectal DAILY PRN    ondansetron (ZOFRAN ODT) tablet 4 mg  4 mg Oral Q8H PRN    Or    ondansetron (ZOFRAN) injection 4 mg  4 mg IntraVENous Q6H PRN    enoxaparin (LOVENOX) injection 40 mg  40 mg SubCUTAneous DAILY    HYDROcodone-acetaminophen (NORCO) 5-325 mg per tablet 1 Tablet  1 Tablet Oral Q6H PRN    albuterol-ipratropium (DUO-NEB) 2.5 MG-0.5 MG/3 ML  3 mL Nebulization Q4H PRN    aspirin chewable tablet 81 mg  81 mg Oral DAILY    atorvastatin (LIPITOR) tablet 40 mg  40 mg Oral QHS    clopidogreL (PLAVIX) tablet 75 mg  75 mg Oral DAILY    DULoxetine (CYMBALTA) capsule 30 mg  30 mg Oral DAILY    insulin glargine (LANTUS) injection 20 Units  20 Units SubCUTAneous QHS    insulin lispro (HUMALOG) injection   SubCUTAneous AC&HS    glucose chewable tablet 16 g  4 Tablet Oral PRN    glucagon (GLUCAGEN) injection 1 mg  1 mg IntraMUSCular PRN    dextrose 10% infusion 0-250 mL  0-250 mL IntraVENous PRN    dicyclomine (BENTYL) capsule 10 mg  10 mg Oral QID PRN    megestroL (MEGACE) 400 mg/10 mL (10 mL) oral suspension 200 mg  200 mg Oral TID WITH MEALS    budesonide (PULMICORT) 500 mcg/2 ml nebulizer suspension  500 mcg Nebulization BID RT    And    arformoteroL (BROVANA) neb solution 15 mcg  15 mcg Nebulization BID RT       Labs:    Recent Results (from the past 24 hour(s))   GLUCOSE, POC    Collection Time: 10/22/22 12:29 PM   Result Value Ref Range    Glucose (POC) 158 (H) 70 - 110 mg/dL   GLUCOSE, POC    Collection Time: 10/22/22  4:30 PM   Result Value Ref Range    Glucose (POC) 133 (H) 70 - 110 mg/dL   CULTURE, BLOOD    Collection Time: 10/22/22  6:40 PM    Specimen: Blood   Result Value Ref Range    Special Requests: NO SPECIAL REQUESTS      Culture result: NO GROWTH AFTER 10 HOURS     LACTIC ACID    Collection Time: 10/22/22  6:40 PM   Result Value Ref Range    Lactic acid 2.1 (HH) 0.4 - 2.0 MMOL/L   CULTURE, BLOOD    Collection Time: 10/22/22  6:45 PM    Specimen: Blood   Result Value Ref Range    Special Requests: NO SPECIAL REQUESTS      Culture result: NO GROWTH AFTER 10 HOURS     GLUCOSE, POC    Collection Time: 10/22/22  9:43 PM   Result Value Ref Range    Glucose (POC) 162 (H) 70 - 110 mg/dL   URINALYSIS W/ RFLX MICROSCOPIC    Collection Time: 10/22/22 10:35 PM   Result Value Ref Range    Color YELLOW      Appearance CLEAR      Specific gravity 1.017 1.005 - 1.030      pH (UA) >8.5 (H) 5.0 - 8.0    Protein Negative NEG mg/dL    Glucose Negative NEG mg/dL    Ketone Negative NEG mg/dL    Bilirubin Negative NEG      Blood Negative NEG      Urobilinogen 1.0 0.2 - 1.0 EU/dL    Nitrites Negative NEG      Leukocyte Esterase SMALL (A) NEG     URINE MICROSCOPIC ONLY    Collection Time: 10/22/22 10:35 PM   Result Value Ref Range    WBC 0 to 3 0 - 4 /hpf    RBC Negative 0 - 5 /hpf    Epithelial cells 1+ 0 - 5 /lpf    Bacteria Negative NEG /hpf   LACTIC ACID    Collection Time: 10/22/22 11:12 PM   Result Value Ref Range    Lactic acid 2.7 (HH) 0.4 - 2.0 MMOL/L   METABOLIC PANEL, COMPREHENSIVE    Collection Time: 10/23/22  4:19 AM   Result Value Ref Range    Sodium 141 136 - 145 mmol/L    Potassium 5.1 3.5 - 5.5 mmol/L    Chloride 111 100 - 111 mmol/L    CO2 25 21 - 32 mmol/L    Anion gap 5 3.0 - 18 mmol/L    Glucose 110 (H) 74 - 99 mg/dL    BUN 17 7.0 - 18 MG/DL    Creatinine 0.72 0.6 - 1.3 MG/DL    BUN/Creatinine ratio 24 (H) 12 - 20      eGFR >60 >60 ml/min/1.73m2    Calcium 8.6 8.5 - 10.1 MG/DL    Bilirubin, total 0.4 0.2 - 1.0 MG/DL    ALT (SGPT) 16 13 - 56 U/L    AST (SGOT) 16 10 - 38 U/L    Alk.  phosphatase 84 45 - 117 U/L    Protein, total 6.5 6.4 - 8.2 g/dL    Albumin 3.3 (L) 3.4 - 5.0 g/dL    Globulin 3.2 2.0 - 4.0 g/dL    A-G Ratio 1.0 0.8 - 1.7     CBC W/O DIFF    Collection Time: 10/23/22  4:19 AM   Result Value Ref Range    WBC 5.1 4.6 - 13.2 K/uL    RBC 3.38 (L) 4.20 - 5.30 M/uL    HGB 7.7 (L) 12.0 - 16.0 g/dL    HCT 25.4 (L) 35.0 - 45.0 %    MCV 75.1 (L) 78.0 - 100.0 FL    MCH 22.8 (L) 24.0 - 34.0 PG    MCHC 30.3 (L) 31.0 - 37.0 g/dL    RDW 15.7 (H) 11.6 - 14.5 %    PLATELET 898 386 - 104 K/uL    MPV 11.1 9.2 - 11.8 FL    NRBC 0.0 0  WBC    ABSOLUTE NRBC 0.00 0.00 - 0.01 K/uL   LACTIC ACID    Collection Time: 10/23/22  4:19 AM   Result Value Ref Range    Lactic acid 2.8 (HH) 0.4 - 2.0 MMOL/L   GLUCOSE, POC    Collection Time: 10/23/22  8:21 AM   Result Value Ref Range    Glucose (POC) 96 70 - 110 mg/dL       Signed By: Kenneth Mckenzie DO     October 23, 2022      Disclaimer: Sections of this note are dictated using utilizing voice recognition software. Minor typographical errors may be present. If questions arise, please do not hesitate to contact me or call our department.

## 2022-10-23 NOTE — ROUTINE PROCESS
Bedside shift change report given to Jose Enrique (oncoming nurse) by Nancy Clayton (offgoing nurse). Report included the following information SBAR, Kardex, and MAR.

## 2022-10-24 ENCOUNTER — APPOINTMENT (OUTPATIENT)
Dept: VASCULAR SURGERY | Age: 72
DRG: 073 | End: 2022-10-24
Attending: PHYSICIAN ASSISTANT
Payer: MEDICARE

## 2022-10-24 LAB
ANION GAP SERPL CALC-SCNC: 3 MMOL/L (ref 3–18)
BUN SERPL-MCNC: 19 MG/DL (ref 7–18)
BUN/CREAT SERPL: 21 (ref 12–20)
CALCIUM SERPL-MCNC: 8.7 MG/DL (ref 8.5–10.1)
CHLORIDE SERPL-SCNC: 111 MMOL/L (ref 100–111)
CO2 SERPL-SCNC: 23 MMOL/L (ref 21–32)
CREAT SERPL-MCNC: 0.9 MG/DL (ref 0.6–1.3)
ERYTHROCYTE [DISTWIDTH] IN BLOOD BY AUTOMATED COUNT: 15.9 % (ref 11.6–14.5)
GLUCOSE BLD STRIP.AUTO-MCNC: 140 MG/DL (ref 70–110)
GLUCOSE BLD STRIP.AUTO-MCNC: 154 MG/DL (ref 70–110)
GLUCOSE BLD STRIP.AUTO-MCNC: 156 MG/DL (ref 70–110)
GLUCOSE SERPL-MCNC: 141 MG/DL (ref 74–99)
HCT VFR BLD AUTO: 24.1 % (ref 35–45)
HGB BLD-MCNC: 7.5 G/DL (ref 12–16)
MCH RBC QN AUTO: 23.4 PG (ref 24–34)
MCHC RBC AUTO-ENTMCNC: 31.1 G/DL (ref 31–37)
MCV RBC AUTO: 75.3 FL (ref 78–100)
NRBC # BLD: 0 K/UL (ref 0–0.01)
NRBC BLD-RTO: 0 PER 100 WBC
PLATELET # BLD AUTO: 252 K/UL (ref 135–420)
PMV BLD AUTO: 11 FL (ref 9.2–11.8)
POTASSIUM SERPL-SCNC: 4.8 MMOL/L (ref 3.5–5.5)
POTASSIUM SERPL-SCNC: 5.9 MMOL/L (ref 3.5–5.5)
RBC # BLD AUTO: 3.2 M/UL (ref 4.2–5.3)
SODIUM SERPL-SCNC: 137 MMOL/L (ref 136–145)
VANCOMYCIN SERPL-MCNC: 5.4 UG/ML (ref 5–40)
WBC # BLD AUTO: 6.9 K/UL (ref 4.6–13.2)

## 2022-10-24 PROCEDURE — 2709999900 HC NON-CHARGEABLE SUPPLY

## 2022-10-24 PROCEDURE — 74011000258 HC RX REV CODE- 258: Performed by: INTERNAL MEDICINE

## 2022-10-24 PROCEDURE — 85027 COMPLETE CBC AUTOMATED: CPT

## 2022-10-24 PROCEDURE — 74011250637 HC RX REV CODE- 250/637: Performed by: INTERNAL MEDICINE

## 2022-10-24 PROCEDURE — 74011250636 HC RX REV CODE- 250/636: Performed by: INTERNAL MEDICINE

## 2022-10-24 PROCEDURE — 94762 N-INVAS EAR/PLS OXIMTRY CONT: CPT

## 2022-10-24 PROCEDURE — 65270000029 HC RM PRIVATE

## 2022-10-24 PROCEDURE — 36415 COLL VENOUS BLD VENIPUNCTURE: CPT

## 2022-10-24 PROCEDURE — 74011000250 HC RX REV CODE- 250: Performed by: PHYSICIAN ASSISTANT

## 2022-10-24 PROCEDURE — 84132 ASSAY OF SERUM POTASSIUM: CPT

## 2022-10-24 PROCEDURE — 97116 GAIT TRAINING THERAPY: CPT

## 2022-10-24 PROCEDURE — 74011250637 HC RX REV CODE- 250/637: Performed by: PHYSICIAN ASSISTANT

## 2022-10-24 PROCEDURE — 80202 ASSAY OF VANCOMYCIN: CPT

## 2022-10-24 PROCEDURE — 82962 GLUCOSE BLOOD TEST: CPT

## 2022-10-24 PROCEDURE — 74011250636 HC RX REV CODE- 250/636: Performed by: PHYSICIAN ASSISTANT

## 2022-10-24 PROCEDURE — 74011636637 HC RX REV CODE- 636/637: Performed by: PHYSICIAN ASSISTANT

## 2022-10-24 PROCEDURE — 94640 AIRWAY INHALATION TREATMENT: CPT

## 2022-10-24 RX ORDER — LUBIPROSTONE 8 UG/1
8 CAPSULE ORAL
Status: DISCONTINUED | OUTPATIENT
Start: 2022-10-24 | End: 2022-10-28 | Stop reason: HOSPADM

## 2022-10-24 RX ORDER — PANTOPRAZOLE SODIUM 40 MG/1
40 TABLET, DELAYED RELEASE ORAL DAILY
Status: DISCONTINUED | OUTPATIENT
Start: 2022-10-24 | End: 2022-10-28 | Stop reason: HOSPADM

## 2022-10-24 RX ORDER — FUROSEMIDE 10 MG/ML
20 INJECTION INTRAMUSCULAR; INTRAVENOUS ONCE
Status: COMPLETED | OUTPATIENT
Start: 2022-10-24 | End: 2022-10-24

## 2022-10-24 RX ORDER — METOCLOPRAMIDE 5 MG/1
10 TABLET ORAL
Status: DISCONTINUED | OUTPATIENT
Start: 2022-10-25 | End: 2022-10-28 | Stop reason: HOSPADM

## 2022-10-24 RX ORDER — METOCLOPRAMIDE HYDROCHLORIDE 5 MG/ML
5 INJECTION INTRAMUSCULAR; INTRAVENOUS 2 TIMES DAILY
Status: COMPLETED | OUTPATIENT
Start: 2022-10-24 | End: 2022-10-24

## 2022-10-24 RX ADMIN — PIPERACILLIN AND TAZOBACTAM 3.38 G: 3; .375 INJECTION, POWDER, FOR SOLUTION INTRAVENOUS at 01:12

## 2022-10-24 RX ADMIN — Medication 100 MG: at 08:58

## 2022-10-24 RX ADMIN — MEGESTROL ACETATE 200 MG: 40 SUSPENSION ORAL at 08:58

## 2022-10-24 RX ADMIN — BUDESONIDE 500 MCG: 0.5 SUSPENSION RESPIRATORY (INHALATION) at 07:58

## 2022-10-24 RX ADMIN — SODIUM CHLORIDE, PRESERVATIVE FREE 10 ML: 5 INJECTION INTRAVENOUS at 01:14

## 2022-10-24 RX ADMIN — CLOPIDOGREL BISULFATE 75 MG: 75 TABLET ORAL at 08:58

## 2022-10-24 RX ADMIN — ACETAMINOPHEN 650 MG: 325 TABLET, FILM COATED ORAL at 12:23

## 2022-10-24 RX ADMIN — DULOXETINE 30 MG: 30 CAPSULE, DELAYED RELEASE ORAL at 08:58

## 2022-10-24 RX ADMIN — Medication 2 UNITS: at 18:09

## 2022-10-24 RX ADMIN — POLYETHYLENE GLYCOL 3350 34 G: 17 POWDER, FOR SOLUTION ORAL at 08:58

## 2022-10-24 RX ADMIN — VANCOMYCIN HYDROCHLORIDE 750 MG: 750 INJECTION, POWDER, LYOPHILIZED, FOR SOLUTION INTRAVENOUS at 06:28

## 2022-10-24 RX ADMIN — LUBIPROSTONE 8 MCG: 8 CAPSULE, GELATIN COATED ORAL at 10:28

## 2022-10-24 RX ADMIN — THERA TABS 1 TABLET: TAB at 08:58

## 2022-10-24 RX ADMIN — FUROSEMIDE 20 MG: 10 INJECTION, SOLUTION INTRAMUSCULAR; INTRAVENOUS at 08:58

## 2022-10-24 RX ADMIN — MORPHINE SULFATE 1 MG: 2 INJECTION, SOLUTION INTRAMUSCULAR; INTRAVENOUS at 09:22

## 2022-10-24 RX ADMIN — Medication 2 UNITS: at 12:50

## 2022-10-24 RX ADMIN — ENOXAPARIN SODIUM 40 MG: 100 INJECTION SUBCUTANEOUS at 08:58

## 2022-10-24 RX ADMIN — MORPHINE SULFATE 1 MG: 2 INJECTION, SOLUTION INTRAMUSCULAR; INTRAVENOUS at 16:03

## 2022-10-24 RX ADMIN — METOCLOPRAMIDE 5 MG: 5 INJECTION, SOLUTION INTRAMUSCULAR; INTRAVENOUS at 08:58

## 2022-10-24 RX ADMIN — PIPERACILLIN AND TAZOBACTAM 3.38 G: 3; .375 INJECTION, POWDER, FOR SOLUTION INTRAVENOUS at 08:57

## 2022-10-24 RX ADMIN — PANTOPRAZOLE SODIUM 40 MG: 40 TABLET, DELAYED RELEASE ORAL at 08:59

## 2022-10-24 RX ADMIN — ARFORMOTEROL TARTRATE 15 MCG: 15 SOLUTION RESPIRATORY (INHALATION) at 07:58

## 2022-10-24 RX ADMIN — HYDROCODONE BITARTRATE AND ACETAMINOPHEN 1 TABLET: 5; 325 TABLET ORAL at 20:47

## 2022-10-24 RX ADMIN — MORPHINE SULFATE 1 MG: 2 INJECTION, SOLUTION INTRAMUSCULAR; INTRAVENOUS at 06:27

## 2022-10-24 RX ADMIN — SODIUM CHLORIDE, PRESERVATIVE FREE 10 ML: 5 INJECTION INTRAVENOUS at 16:03

## 2022-10-24 RX ADMIN — PIPERACILLIN AND TAZOBACTAM 3.38 G: 3; .375 INJECTION, POWDER, FOR SOLUTION INTRAVENOUS at 16:03

## 2022-10-24 RX ADMIN — ASPIRIN 81 MG 81 MG: 81 TABLET ORAL at 08:58

## 2022-10-24 RX ADMIN — Medication 20 UNITS: at 00:45

## 2022-10-24 RX ADMIN — MEGESTROL ACETATE 200 MG: 40 SUSPENSION ORAL at 12:19

## 2022-10-24 RX ADMIN — MEGESTROL ACETATE 200 MG: 40 SUSPENSION ORAL at 18:09

## 2022-10-24 RX ADMIN — METOCLOPRAMIDE 5 MG: 5 INJECTION, SOLUTION INTRAMUSCULAR; INTRAVENOUS at 18:09

## 2022-10-24 NOTE — PROGRESS NOTES
OT attempted to see patient at 2:05 and 2:31. Patient with family both times and asked for therapist to return later. Will continue to follow.          Thank you for this referral,  Benton Haskins OTR/L

## 2022-10-24 NOTE — PROGRESS NOTES
conducted a Follow up consultation and Spiritual Assessment for Ludy Echeverria, who is a 81 y.o.,female. The  provided the following Interventions:  Continued the relationship of care and support. Listened empathically. Offered prayer and assurance of continued prayer on patients behalf. Chart reviewed. The following outcomes were achieved:  Patient expressed gratitude for 's visit. Assessment:  There are no further spiritual or Mandaeism issues which require Spiritual Care Services interventions at this time. Plan:  Chaplains will continue to follow and will provide pastoral care on an as needed/requested basis.  recommends bedside caregivers page  on duty if patient shows signs of acute spiritual or emotional distress.      [address] [facility] Certified [address] [place] [address]   [phone]

## 2022-10-24 NOTE — DISCHARGE INSTR - DIET
Good nutrition is important when healing from an illness, injury, or surgery. Follow any nutrition recommendations given to you during your hospital stay. If you were given an oral nutrition supplement while in the hospital, continue to take this supplement at home. You can take it with meals, in-between meals, and/or before bedtime. These supplements can be purchased at most local grocery stores, pharmacies, and chain [employer]-stores. If you have any questions about your diet or nutrition, call the hospital and ask for the dietitian. Continue Oral Nutrition Supplement (ONS) at discharge. Recommend Glucerna or a comparable/similiar product Three times daily for 90 days unless otherwise directed by your Primary Care Physician.      Ruben Rutledge RD

## 2022-10-24 NOTE — PROGRESS NOTES
[facility]  [facility]    Patient: Nem[place] Model Age: 81 y.o. : 1950 MR#: [id number] SSN: [id number]  Date/Time: 10/24/2022     Subjective:   Patient reports abdominal pain is decreasing. Had BM today. No change in pain. No diarrhea. No fevers, chills. Review of systems  General: No fevers or chills. Cardiovascular: No chest pain or pressure. No palpitations. Pulmonary: No shortness of breath, cough or wheeze. Gastrointestinal: abdominal pain, no nausea, vomiting or diarrhea. Genitourinary: No urinary frequency, urgency, hesitancy or dysuria. Musculoskeletal: No joint or muscle pain, no back pain, no recent trauma. Neurologic: No headache, numbness, tingling or weakness. Assessment/Plan:   Hypotension: resolved, had lactic acidosis as well, consider poor po intake vs. Sepsis. Treated with vanc zosyn, however no infectious workup positive yet. Negative Blood, urine. Negative RVP. Consider adrenal insufficiency as etiology that could explain soft pressures, abdominal pain, hyperk, weight loss. Morning cortisol pending, if low will proceed with coysnthropin stim. 2.   AMBROCIO: resolved. Likely prerenal given poor PO intake. 3.   Unintended weight loss: Workup as outpt with colo pending. Nutrition consulted. Started megace. 4.   LLQ pain: No structural abnormalities noted on CT. Possibly neuropathic. Also considered chronic consitpation. GI consulted, appreciate assitance. Started amitiza. GI ordered mesentric doppler. Adrenal workup as above. Switched to po reglan. 10 BID for 2 weeks and then 10Hs per GI.  5.   Chronic respiratory failure with hypoxia: hx of COPD, continue home meds. O2 goal >88%. 6.   Type 2 DM: Blood glucose goal 140-180, continue basal bolus dosing. 7. HTN: holding home BP meds with periods of hypotenison. 8.  Gastroparesis: GI started reglan.            Case discussed with:  [x]Patient  []Family  []Nursing []Case Management  DVT Prophylaxis:  [x]Lovenox  []Hep SQ  []SCDs  []Coumadin   []Eliquis/Xarelto     Objective:   VS: Visit Vitals  /75   Pulse 76   Temp 97.8 °F (36.6 °C)   Resp 18   Ht 5' 2\" (1.575 m)   Wt 41.8 kg (92 lb 3.2 oz)   SpO2 98%   Breastfeeding No   BMI 16.86 kg/m²        Tmax/24hrs: Temp (24hrs), Av °F (37.2 °C), Min:97.8 °F (36.6 °C), Max:99.7 °F (37.6 °C)  IOBRIEFNo intake or output data in the 24 hours ending 10/24/22 1519      General:  Alert, cooperative, no acute distress    HEENT: PERRLA, anicteric sclerae. Pulmonary:  CTA Bilaterally. No Wheezing/Rales. Cardiovascular: Regular rate and Rhythm. GI:  Soft, Non distended, Tender to RLQ and LLQ. + Bowel sounds. Extremities:  No edema. No calf tenderness. Psych: Good insight. Not anxious or agitated. Neurologic: Alert and oriented X 3. Moves all ext.   Additional:    Medications:   Current Facility-Administered Medications   Medication Dose Route Frequency    lubiPROStone (AMITIZA) capsule 8 mcg  8 mcg Oral DAILY WITH BREAKFAST    pantoprazole (PROTONIX) tablet 40 mg  40 mg Oral DAILY    hydrALAZINE (APRESOLINE) 20 mg/mL injection 10 mg  10 mg IntraVENous Q6H PRN    piperacillin-tazobactam (ZOSYN) 3.375 g in 0.9% sodium chloride (MBP/ADV) 100 mL MBP  3.375 g IntraVENous Q8H    vancomycin (VANCOCIN) 750 mg in 0.9% sodium chloride 250 mL (VIAL-MATE)  750 mg IntraVENous Q18H    polyethylene glycol (MIRALAX) packet 34 g  34 g Oral DAILY    metoclopramide HCl (REGLAN) injection 5 mg  5 mg IntraVENous BID    therapeutic multivitamin (THERAGRAN) tablet 1 Tablet  1 Tablet Oral DAILY    thiamine HCL (B-1) tablet 100 mg  100 mg Oral DAILY    diphenhydrAMINE (BENADRYL) capsule 25 mg  25 mg Oral Q4H PRN    morphine injection 1 mg  1 mg IntraVENous Q3H PRN    sodium chloride (NS) flush 5-40 mL  5-40 mL IntraVENous Q8H    sodium chloride (NS) flush 5-40 mL  5-40 mL IntraVENous PRN    acetaminophen (TYLENOL) tablet 650 mg  650 mg Oral Q6H PRN Or    acetaminophen (TYLENOL) suppository 650 mg  650 mg Rectal Q6H PRN    polyethylene glycol (MIRALAX) packet 17 g  17 g Oral DAILY PRN    bisacodyL (DULCOLAX) suppository 10 mg  10 mg Rectal DAILY PRN    ondansetron (ZOFRAN ODT) tablet 4 mg  4 mg Oral Q8H PRN    Or    ondansetron (ZOFRAN) injection 4 mg  4 mg IntraVENous Q6H PRN    enoxaparin (LOVENOX) injection 40 mg  40 mg SubCUTAneous DAILY    HYDROcodone-acetaminophen (NORCO) 5-325 mg per tablet 1 Tablet  1 Tablet Oral Q6H PRN    albuterol-ipratropium (DUO-NEB) 2.5 MG-0.5 MG/3 ML  3 mL Nebulization Q4H PRN    aspirin chewable tablet 81 mg  81 mg Oral DAILY    atorvastatin (LIPITOR) tablet 40 mg  40 mg Oral QHS    clopidogreL (PLAVIX) tablet 75 mg  75 mg Oral DAILY    DULoxetine (CYMBALTA) capsule 30 mg  30 mg Oral DAILY    insulin glargine (LANTUS) injection 20 Units  20 Units SubCUTAneous QHS    insulin lispro (HUMALOG) injection   SubCUTAneous AC&HS    glucose chewable tablet 16 g  4 Tablet Oral PRN    glucagon (GLUCAGEN) injection 1 mg  1 mg IntraMUSCular PRN    dextrose 10% infusion 0-250 mL  0-250 mL IntraVENous PRN    dicyclomine (BENTYL) capsule 10 mg  10 mg Oral QID PRN    megestroL (MEGACE) 400 mg/10 mL (10 mL) oral suspension 200 mg  200 mg Oral TID WITH MEALS    budesonide (PULMICORT) 500 mcg/2 ml nebulizer suspension  500 mcg Nebulization BID RT    And    arformoteroL (BROVANA) neb solution 15 mcg  15 mcg Nebulization BID RT       Labs:    Recent Results (from the past 24 hour(s))   GLUCOSE, POC    Collection Time: 10/23/22  4:35 PM   Result Value Ref Range    Glucose (POC) 122 (H) 70 - 110 mg/dL   RESPIRATORY VIRUS PANEL W/COVID-19, PCR    Collection Time: 10/23/22  4:53 PM    Specimen: Nasopharyngeal   Result Value Ref Range    Adenovirus Not detected NOTD      Coronavirus 229E Not detected NOTD      Coronavirus HKU1 Not detected NOTD      Coronavirus CVNL63 Not detected NOTD      Coronavirus OC43 Not detected NOTD      SARS-CoV-2, PCR Not detected NOTD      Metapneumovirus Not detected NOTD      Rhinovirus and Enterovirus Not detected NOTD      Influenza A Not detected NOTD      Influenza B Not detected NOTD      Parainfluenza 1 Not detected NOTD      Parainfluenza 2 Not detected NOTD      Parainfluenza 3 Not detected NOTD      Parainfluenza virus 4 Not detected NOTD      RSV by PCR Not detected NOTD      B. parapertussis, PCR Not detected NOTD      Bordetella pertussis - PCR Not detected NOTD      Chlamydophila pneumoniae DNA, QL, PCR Not detected NOTD      Mycoplasma pneumoniae DNA, QL, PCR Not detected NOTD     GLUCOSE, POC    Collection Time: 10/23/22 11:33 PM   Result Value Ref Range    Glucose (POC) 123 (H) 70 - 511 mg/dL   METABOLIC PANEL, BASIC    Collection Time: 10/24/22  1:24 AM   Result Value Ref Range    Sodium 137 136 - 145 mmol/L    Potassium 5.9 (H) 3.5 - 5.5 mmol/L    Chloride 111 100 - 111 mmol/L    CO2 23 21 - 32 mmol/L    Anion gap 3 3.0 - 18 mmol/L    Glucose 141 (H) 74 - 99 mg/dL    BUN 19 (H) 7.0 - 18 MG/DL    Creatinine 0.90 0.6 - 1.3 MG/DL    BUN/Creatinine ratio 21 (H) 12 - 20      eGFR >60 >60 ml/min/1.73m2    Calcium 8.7 8.5 - 10.1 MG/DL   CBC W/O DIFF    Collection Time: 10/24/22  1:24 AM   Result Value Ref Range    WBC 6.9 4.6 - 13.2 K/uL    RBC 3.20 (L) 4.20 - 5.30 M/uL    HGB 7.5 (L) 12.0 - 16.0 g/dL    HCT 24.1 (L) 35.0 - 45.0 %    MCV 75.3 (L) 78.0 - 100.0 FL    MCH 23.4 (L) 24.0 - 34.0 PG    MCHC 31.1 31.0 - 37.0 g/dL    RDW 15.9 (H) 11.6 - 14.5 %    PLATELET 462 268 - 361 K/uL    MPV 11.0 9.2 - 11.8 FL    NRBC 0.0 0  WBC    ABSOLUTE NRBC 0.00 0.00 - 0.01 K/uL   VANCOMYCIN, RANDOM    Collection Time: 10/24/22  1:24 AM   Result Value Ref Range    Vancomycin, random 5.4 5.0 - 40.0 UG/ML   GLUCOSE, POC    Collection Time: 10/24/22  7:27 AM   Result Value Ref Range    Glucose (POC) 140 (H) 70 - 110 mg/dL   GLUCOSE, POC    Collection Time: 10/24/22 11:42 AM   Result Value Ref Range    Glucose (POC) 154 (H) 70 - 110 mg/dL       Signed By: Haider Buck     October 24, 2022      Disclaimer: Sections of this note are dictated using utilizing voice recognition software. Minor typographical errors may be present. If questions arise, please do not hesitate to contact me or call our department.

## 2022-10-24 NOTE — PROGRESS NOTES
Problem: Mobility Impaired (Adult and Pediatric)  Goal: *Acute Goals and Plan of Care (Insert Text)  Description: Physical Therapy Goals  Initiated 10/20/2022 and to be accomplished within 7 day(s)  1. Patient will move from supine to sit and sit to supine , scoot up and down, and roll side to side in bed with modified independence. 2.  Patient will transfer from bed to chair and chair to bed with modified independence using the least restrictive device. 3.  Patient will perform sit to stand with modified independence. 4.  Patient will ambulate with modified independence for 75 feet with the least restrictive device. 5.  Patient will participate in LE exercise to tolerance. PLOF: Pt lives with  in apartment with elevator access. Pt has been passing out 2 times per week over the last several months, falling to floor and hitting head at times. Pt has a rollator and BSC, will sit in rollator if needed. Outcome: Progressing Towards Goal     PHYSICAL THERAPY TREATMENT    Patient: Saadia Brewer (2 y.o. female)  Date: 10/24/2022  Diagnosis: Colitis [K52.9] AMBROCIO (acute kidney injury) ([facility] [address]  Procedure(s) (LRB):  UPPER ENDOSCOPY/ Biopsies (N/A) 3 Days Post-Op  Precautions: Fall, Skin    ASSESSMENT:  RN cleared for mobility. Pt found semi-reclined in bed in NAD, willing to work with PT. Sup-sit with sup. PT stood and amb 200 ft in hallway with rollator. Steady gait. Pt returned back to bed with call bell and all needs met. Progression toward goals:   []      Improving appropriately and progressing toward goals  [x]      Improving slowly and progressing toward goals  []      Not making progress toward goals and plan of care will be adjusted     PLAN:  Patient continues to benefit from skilled intervention to address the above impairments. Continue treatment per established plan of care.     Further Equipment Recommendations for Discharge:  N/A    [facility]: Current research shows that an AM-PAC score of 18 (14 without stairs) or greater is associated with a discharge to the patient's home setting. Based on an AM-PAC score of 20/24 (or **/20 if omitting stairs) and their current functional mobility deficits, it is recommended that the patient have 2-3 sessions per week of Physical Therapy at d/c to increase the patient's independence. This [facility] score should be considered in conjunction with interdisciplinary team recommendations to determine the most appropriate discharge setting. Patient's social support, diagnosis, medical stability, and prior level of function should also be taken into consideration. SUBJECTIVE:   Patient stated This feels good.     OBJECTIVE DATA SUMMARY:   Critical Behavior:  Neurologic State: Alert  Orientation Level: Oriented X4  Cognition: Follows commands  Safety/Judgement: Fall prevention  Functional Mobility Training:  Bed Mobility:     Supine to Sit: Supervision  Sit to Supine: Supervision            Transfers:  Sit to Stand: Supervision  Stand to Sit: Supervision          Balance:  Sitting: Intact  Standing: Impaired; With support  Standing - Static: Good  Standing - Dynamic : Fair       Ambulation/Gait Training:  Distance (ft): 200 Feet (ft)  Assistive Device: Walker, rollator  Ambulation - Level of Assistance: Stand-by assistance          Step Length: Right shortened;Left shortened  Pain:  Pain level pre-treatment: 0/10  Pain level post-treatment: 0/10   Pain Intervention(s): Medication (see MAR); Rest, Ice, Repositioning   Response to intervention: Nurse notified, See doc flow    Activity Tolerance:   Pt tolerated mobility well  Please refer to the flowsheet for vital signs taken during this treatment.   After treatment:   [] Patient left in no apparent distress sitting up in chair  [x] Patient left in no apparent distress in bed  [x] Call bell left within reach  [x] Nursing notified  [] Caregiver present  [] Bed alarm activated  [] SCDs applied      COMMUNICATION/EDUCATION:   [x]         Role of Physical Therapy in the acute care setting. [x]         Fall prevention education was provided and the patient/caregiver indicated understanding. [x]         Patient/family have participated as able in working toward goals and plan of care. []         Patient/family agree to work toward stated goals and plan of care. []         Patient understands intent and goals of therapy, but is neutral about his/her participation. []         Patient is unable to participate in stated goals/plan of care: ongoing with therapy staff.  []         Other:        Ashleigh Mariscal   Time Calculation: 12 mins    [facility] AM-PAC® Basic Mobility Inpatient Short Form (6-Clicks) Version 2    How much HELP from another person does the patient currently need    (If the patient hasn't done an activity recently, how much help from another person do you think he/she would need if he/she tried?)   Total (Total A or Dep)   A Lot  (Mod to Max A)   A Little (Sup or Min A)   None (Mod I to I)   Turning from your back to your side while in a flat bed without using bedrails? [] 1 [] 2 [] 3 [x] 4   2. Moving from lying on your back to sitting on the side of a flat bed without using bedrails? [] 1 [] 2 [] 3 [x] 4   3. Moving to and from a bed to a chair (including a wheelchair)? [] 1 [] 2 [x] 3 [] 4   4. Standing up from a chair using your arms (e.g., wheelchair, or bedside chair)? [] 1 [] 2 [x] 3 [] 4   5. Walking in hospital room? [] 1 [] 2 [x] 3 [] 4   6. Climbing 3-5 steps with a railing?+   [] 1 [] 2 [x] 3 [] 4   +If stair climbing cannot be assessed, skip item #6. Sum responses from items 1-5. Current research shows that an AM-PAC score of 18 (14 without stairs) or greater is associated with a discharge to the patient's home setting.  Based on an AM-PAC score of 20/24 (or **/20 if omitting stairs) and their current functional mobility deficits, it is recommended that the patient have 2-3 sessions per week of Physical Therapy at d/c to increase the patient's independence.

## 2022-10-24 NOTE — PROGRESS NOTES
[place], [place], [address]  [phone]  [email or web address]    Gastroenterology follow up-Progress note    Impression:  1. Gastroparesis   - s/p EGD 10/21/22 with confirmation of \"large food bolus c/w gastroparesis\"  2. Profound iron deficiency anemia s/p IV iron repletion 300 mg 10/22/22  3. Esophagitis  4. Lower abdominal pain concerning for possible neuropathy in setting of uncontrolled DM; however, mesenteric doppler as ordered. - CT and US without pathology to explain her lower abdominal pain  5. History of colon polyps   - colonoscopy with polypectomies 2/2022  6. Chronic respiratory failure with hypoxia requiring PRN supplemental O2  7. Underweight  8. At risk for nutritional deficiency  9. Constipation, chronic  10. Hypertension      Plan:  1. Best supportive measures. Will start once daily PPI for acid suppression. Ok to transition to PO reglan as appropriate. Continue megace with meals. 2. Add Amitiza 8 mcg BID with meals. Stimulant laxative as needed. 3. Small quantity meals/snacks throughout the day. 4. Mesenteric doppler ordered. 5. H/H, DM optimization and medical management per primary team.  6. Close follow-up with Dr Nicolle Vivas, GI, after hospital discharge. Will sign off but available as needed. Thank you for this consultation and the opportunity to participate in the care of this patient. Please do not hesitate to call with any questions or concerns, or should event occur that may necessitate additional GI evaluation. Chief Complaint: lower abdominal pain      Subjective:  no acute GI events over the weekend. She continues to have unchanged lower abdominal pain not exacerbated with meal intake, BMs or mobility. Has an appetite and eating better. No nausea, vomiting. Has gained a few lbs since her hospitalization. She ambulates to the bathroom without feeling dizzy nor falls. Had BM this morning but felt incomplete.  Completed nebulizer treatment this morning. ROS: Denies any fevers, chills, rash. General: awake, alert, no distress   Neck: supple and trachea normal   Cardiovascular: tachycardia   Pulmonary/Chest Wall: unlabored respiratory effort   Abdominal: appearance normal, bowel sounds normal and soft, non-acute, non-tender     Patient Active Problem List   Diagnosis Code    Chronic pain syndrome G89.4    DJD (degenerative joint disease) of knee M17.9    Knee pain, left M25.562    Depression F32. A    COPD, moderate ([facility]) J44.9    HNP (herniated nucleus pulposus), cervical M50.20    HTN (hypertension) I10    Osteoporosis M81.0    DM (diabetes mellitus) ([facility] [address] E11.9    Chest pain R07.9    Trichomonas A59.9    Hidradenitis L73.2    Recurrent falls R29.6    GERD (gastroesophageal reflux disease) K21.9    Insomnia G47.00    Cocaine abuse ([facility]) F14.10    RVH (right ventricular hypertrophy) I51.7    Cigarette smoker F17.210    Non-ST elevated myocardial infarction (non-STEMI) ([facility]) I21.4    Anemia D64.9    CAD in native artery I25.10    Diverticulitis K57.92    Abdominal pain R10.9    Syncope and collapse R55    Hyperglycemia R73.9    Closed head injury S09.90XA    Chronic back pain M54.9, G89.29    Pulmonary sarcoidosis ([facility]) D86.0    Chronic respiratory failure with hypoxia ([facility]) J96.11    [facility] N17.9    Gastroparesis K31.84         Visit Vitals  BP (!) 144/67 (BP 1 Location: Right upper arm, BP Patient Position: At rest)   Pulse (!) 118   Temp 98.7 °F (37.1 °C)   Resp 18   Ht 5' 2\" (1.575 m)   Wt 41.8 kg (92 lb 3.2 oz)   SpO2 100%   Breastfeeding No   BMI 16.86 kg/m²         No intake or output data in the 24 hours ending 10/24/22 0800    CBC w/Diff    Lab Results   Component Value Date/Time    WBC 6.9 10/24/2022 01:24 AM    RBC 3.20 (L) 10/24/2022 01:24 AM    HGB 7.5 (L) 10/24/2022 01:24 AM    HCT 24.1 (L) 10/24/2022 01:24 AM    MCV 75.3 (L) 10/24/2022 01:24 AM    MCH 23.4 (L) 10/24/2022 01:24 AM    MCHC 31.1 10/24/2022 01:24 AM RDW 15.9 (H) 10/24/2022 01:24 AM     10/24/2022 01:24 AM    Lab Results   Component Value Date/Time    GRANS 46 10/19/2022 12:10 PM    LYMPH 46 10/19/2022 12:10 PM    EOS 1 10/19/2022 12:10 PM    BASOS 1 10/19/2022 12:10 PM      Basic Metabolic Profile   Recent Labs     10/24/22  0124      K 5.9*      CO2 23   BUN 19*   CA 8.7        Hepatic Function    Lab Results   Component Value Date/Time    ALB 3.3 (L) 10/23/2022 04:19 AM    TP 6.5 10/23/2022 04:19 AM    AP 84 10/23/2022 04:19 AM    No results found for: TBIL       Coags   No results for input(s): PTP, INR, APTT, INREXT in the last 72 hours. Radiology  CT ABD PELV W CONT    Result Date: 10/23/2022  Normal appendix. Diverticulosis coli. No evidence of diverticulitis. No definite bowel wall thickening. Postcholecystectomy reservoir effect with mild biliary dilatation. Multiple bilateral renal low density masses likely simple cysts. Evidence of healed granulomatous disease. Aminata Petit US ABD LTD    Result Date: 10/23/2022  Inability to visualize the appendix due to peristalsing bowel. There is high clinical suspicion for acute appendicitis with recommend repeat CT abdomen and pelvis. XR CHEST PORT    Result Date: 10/23/2022  Stable changes of emphysema with scattered interstitial prominence       ROBIN Kay    [facility].   [email or web address]  Phone: [phone]  Pager: [phone]

## 2022-10-24 NOTE — PROGRESS NOTES
Morphine 0.5mg wasted due to IV infiltration. Witness: Tamiko GREEN (charge). New IV access to be placed. Will continue to monitor.

## 2022-10-24 NOTE — PROGRESS NOTES
Comprehensive Nutrition Assessment    Type and Reason for Visit: Reassess, Positive nutrition screen, Consult    Nutrition Recommendations/Plan:   Continue oral nutrition supplement to optimize nutrition intake opportunity: Glucerna (each provides 220 kcal, 10g protein) TID    Plan to modify diet to include low fiber d/t gastroparesis. Monitor PO intake, compliance of oral supplement, weight, labs, and plan of care during admission. Malnutrition Assessment:  Malnutrition Status:  Severe malnutrition (10/20/22 1553)    Context:  Acute illness     Findings of the 6 clinical characteristics of malnutrition:   Energy Intake:  50% or less of est energy requirements for 5 or more days  Weight Loss:  Greater than 7.5% over 3 months     Body Fat Loss: Moderate body fat loss, Triceps, Orbital, Buccal region   Muscle Mass Loss: Moderate muscle mass loss, Temples (temporalis), Thigh (quadriceps), Clavicles (pectoralis & deltoids), Hand (interosseous)  Fluid Accumulation:  Mild, Ascites   Strength:  Not performed     Nutrition Assessment:    Visited pt in room, standing up in room, alert and able to communicate. Pt reported tolerating current REGULAR diet with % PO intake and good acceptance of oral supplement. Pt reported BM. Per chart review, will start once daily PPI for acid suppression, PO reglan as appropriate; Continue megace with meals and laxative as needed. Will add low fat/low fiber to diet d/t gastroparesis. Nutrition Related Findings:    Last BM 10/19. Output: 100mL (urine). Pertinent Medications: lasix, pantoprazole, miralax, reglan, MVI, thiamine, dulcolax, zofran, lovenox, lantus, megace, humalog.  Wound Type: None       Current Nutrition Intake & Therapies:  Average Meal Intake: %  Average Supplement Intake: 51-75%  ADULT ORAL NUTRITION SUPPLEMENT Breakfast, Lunch, Dinner; Diabetic Supplement  ADULT DIET Regular; Pt requested gravy with meats, tuna/chicken or egg salad for lunch.    Anthropometric Measures:  Height: 5' 2\" (157.5 cm)  Ideal Body Weight (IBW): 110 lbs (50 kg)  Admission Body Weight: 98 lb 6.4 oz  Current Body Wt:  44.6 kg (98 lb 6.4 oz), 89.5 % IBW.  Bed scale  Current BMI (kg/m2): 18  Usual Body Weight: 59.4 kg (131 lb)  % Weight Change (Calculated): -24.9  Weight Adjustment: No adjustment  BMI Category: Underweight (BMI less than 22) age over 72    Estimated Daily Nutrient Needs:  Energy Requirements Based On: Kcal/kg (25-35)  Weight Used for Energy Requirements: Current  Energy (kcal/day): 6684-0936  Weight Used for Protein Requirements: Current (1.0-1.2)  Protein (g/day): 45-54  Method Used for Fluid Requirements: 1 ml/kcal  Fluid (ml/day): 3914-2107    Nutrition Diagnosis:   Severe malnutrition, In context of acute illness or injury related to early satiety, inadequate protein-energy intake as evidenced by Criteria as identified in malnutrition assessment, weight loss, poor dentition    Nutrition Interventions:   Food and/or Nutrient Delivery: Continue current diet, Continue oral nutrition supplement, Mineral supplement, Vitamin supplement  Nutrition Education/Counseling: Education not indicated, No recommendations at this time  Coordination of Nutrition Care: Continue to monitor while inpatient  Plan of Care discussed with: patient    Goals:  Previous Goal Met: Progressing toward goal(s)  Goals: Meet at least 75% of estimated needs, by next RD assessment       Nutrition Monitoring and Evaluation:   Behavioral-Environmental Outcomes: None identified  Food/Nutrient Intake Outcomes: Food and nutrient intake, Diet advancement/tolerance, Supplement intake, Vitamin/mineral intake  Physical Signs/Symptoms Outcomes: Biochemical data, GI status, Weight, Nutrition focused physical findings, Meal time behavior    Discharge Planning:    Continue current diet, Continue oral nutrition supplement    Kenneth Everett MA, RDN, LD   Contact: [phone]

## 2022-10-24 NOTE — PROGRESS NOTES
Reason for Admission:   Colitis [K52.9]    PCP: Valerio Krause MD               RUR Score:     25             Resources/supports as identified by patient/family:     Spouse. Alfredito Raw. Has Medicaid /medicare   Top Challenges facing patient (as identified by patient/family and CM): Finances/Medication cost?       Transportation      cab  Support system or lack thereof? supportive  Living arrangements? apartmetn   Self-care/ADLs/Cognition? Independent         Current Advanced Directive/Advance Care Plan:   no    Healthcare Decision Maker:   Primary Decision Maker: Manfred Graham Adolph - Spouse - [phone]    Click here to complete [address] including selection of the Healthcare Decision Maker Relationship (ie \"Primary\")                          Plan for utilizing home health:    yes  If can get home health company that will take patient insurance along with [facility] pcp                       Likelihood of readmission:   HIGH    Transition of Care Plan:                    Initial assessment completed with patient. Cognitive status of patient: oriented to time, place, person and situation. Face sheet information confirmed:  yes. The patient designates spouse  to participate in her discharge plan and to receive any needed information. This patient lives in a apartment with spouse. Patient is able to navigate steps as needed. Prior to hospitalization, patient was considered to be independent with ADLs/IADLS : yes . Patient has a current ACP document on file: no  The spouse will be available to transport patient home upon discharge. The patient already has Rosita Nutley, [employer], [employer], and  medical equipment available in the home. Patient is not currently active with home health. .  Patient has not stayed in a skilled nursing facility or rehab. Was  stay within last 60 days : no. This patient is on dialysis :no  Currently, the discharge plan is Home.     The patient states that she can obtain her medications from the pharmacy, and take her medications as directed. Patient's current insurance is Payor: [employer] / Plan: Christian Medina 8141 HMO / Product Type: Managed Care Medicare /       Care Management Interventions  PCP Verified by CM: Yes Katelyn Tello)  Mode of Transport at Discharge:  Other (see comment)  Transition of Care Consult (CM Consult): Discharge Planning  Support Systems: Spouse/Significant Other, Other Family Member(s)  Confirm Follow Up Transport: Other (see comment)  The Plan for Transition of Care is Related to the Following Treatment Goals : home health  Discharge Location  Patient Expects to be Discharged to[de-identified] Home with home health

## 2022-10-24 NOTE — PROGRESS NOTES
[address] [facility] Pharmacokinetic Monitoring Service - Vancomycin    Indication: intra-abdominal infection  Goal AUC/VINCENT: 400-600 mg*hr/L  Day of Therapy: 3  Additional Antimicrobials: pip-tazo    Pertinent Laboratory Values: Wt Readings from Last 1 Encounters:   10/22/22 41.8 kg (92 lb 3.2 oz)     Temp Readings from Last 1 Encounters:   10/24/22 98.7 °F (37.1 °C)     No components found for: PROCAL  Estimated Creatinine Clearance: 38.4 mL/min (based on SCr of 0.9 mg/dL). Recent Labs     10/24/22  0124 10/23/22  0419   WBC 6.9 5.1     Pertinent Cultures:  Culture Date Source Results   10/22 blood NGTD   MRSA Nasal Swab: N/A. Non-respiratory infection.     Assessment:  Date/Time Current Dose Concentration (mg/L) Timing of Concentration (h) AUC   10/22 1,000 mg x1 - - -   10/23 750 mg q18h - - -   10/24 750 mg q18h 5.4 31  535   Note: Serum concentrations collected for AUC dosing may appear elevated if collected in close proximity to the dose administered, this is not necessarily an indication of toxicity    Plan:  Continue current dose of 750 mg q18h  Order level when clinically indicated  Pharmacy will continue to monitor patient and adjust therapy as indicated    Thank you for the consult,  Barney Barnard  10/24/2022

## 2022-10-25 LAB
ANION GAP SERPL CALC-SCNC: 5 MMOL/L (ref 3–18)
BASOPHILS # BLD: 0 K/UL (ref 0–0.1)
BASOPHILS NFR BLD: 1 % (ref 0–2)
BUN SERPL-MCNC: 26 MG/DL (ref 7–18)
BUN/CREAT SERPL: 33 (ref 12–20)
CALCIUM SERPL-MCNC: 8.3 MG/DL (ref 8.5–10.1)
CHLORIDE SERPL-SCNC: 114 MMOL/L (ref 100–111)
CO2 SERPL-SCNC: 22 MMOL/L (ref 21–32)
CORTIS AM PEAK SERPL-MCNC: 4.7 UG/DL (ref 4.3–22.45)
CREAT SERPL-MCNC: 0.78 MG/DL (ref 0.6–1.3)
DIFFERENTIAL METHOD BLD: ABNORMAL
EOSINOPHIL # BLD: 0.1 K/UL (ref 0–0.4)
EOSINOPHIL NFR BLD: 2 % (ref 0–5)
ERYTHROCYTE [DISTWIDTH] IN BLOOD BY AUTOMATED COUNT: 16 % (ref 11.6–14.5)
GLUCOSE BLD STRIP.AUTO-MCNC: 132 MG/DL (ref 70–110)
GLUCOSE BLD STRIP.AUTO-MCNC: 136 MG/DL (ref 70–110)
GLUCOSE BLD STRIP.AUTO-MCNC: 141 MG/DL (ref 70–110)
GLUCOSE BLD STRIP.AUTO-MCNC: 152 MG/DL (ref 70–110)
GLUCOSE BLD STRIP.AUTO-MCNC: 181 MG/DL (ref 70–110)
GLUCOSE SERPL-MCNC: 118 MG/DL (ref 74–99)
HCT VFR BLD AUTO: 23.2 % (ref 35–45)
HGB BLD-MCNC: 7.3 G/DL (ref 12–16)
IMM GRANULOCYTES # BLD AUTO: 0 K/UL (ref 0–0.04)
IMM GRANULOCYTES NFR BLD AUTO: 0 % (ref 0–0.5)
LYMPHOCYTES # BLD: 1.4 K/UL (ref 0.9–3.6)
LYMPHOCYTES NFR BLD: 23 % (ref 21–52)
MCH RBC QN AUTO: 23.2 PG (ref 24–34)
MCHC RBC AUTO-ENTMCNC: 31.5 G/DL (ref 31–37)
MCV RBC AUTO: 73.7 FL (ref 78–100)
MONOCYTES # BLD: 0.7 K/UL (ref 0.05–1.2)
MONOCYTES NFR BLD: 12 % (ref 3–10)
NEUTS SEG # BLD: 3.8 K/UL (ref 1.8–8)
NEUTS SEG NFR BLD: 63 % (ref 40–73)
NRBC # BLD: 0 K/UL (ref 0–0.01)
NRBC BLD-RTO: 0 PER 100 WBC
PLATELET # BLD AUTO: 290 K/UL (ref 135–420)
PMV BLD AUTO: 10.9 FL (ref 9.2–11.8)
POTASSIUM SERPL-SCNC: 5 MMOL/L (ref 3.5–5.5)
RBC # BLD AUTO: 3.15 M/UL (ref 4.2–5.3)
SODIUM SERPL-SCNC: 141 MMOL/L (ref 136–145)
WBC # BLD AUTO: 6.1 K/UL (ref 4.6–13.2)

## 2022-10-25 PROCEDURE — 80048 BASIC METABOLIC PNL TOTAL CA: CPT

## 2022-10-25 PROCEDURE — 74011250637 HC RX REV CODE- 250/637: Performed by: INTERNAL MEDICINE

## 2022-10-25 PROCEDURE — 74011000258 HC RX REV CODE- 258: Performed by: INTERNAL MEDICINE

## 2022-10-25 PROCEDURE — 84202 ASSAY RBC PROTOPORPHYRIN: CPT

## 2022-10-25 PROCEDURE — 94640 AIRWAY INHALATION TREATMENT: CPT

## 2022-10-25 PROCEDURE — 74011250637 HC RX REV CODE- 250/637: Performed by: PHYSICIAN ASSISTANT

## 2022-10-25 PROCEDURE — 36415 COLL VENOUS BLD VENIPUNCTURE: CPT

## 2022-10-25 PROCEDURE — 74011250636 HC RX REV CODE- 250/636: Performed by: PHYSICIAN ASSISTANT

## 2022-10-25 PROCEDURE — 94761 N-INVAS EAR/PLS OXIMETRY MLT: CPT

## 2022-10-25 PROCEDURE — 65270000029 HC RM PRIVATE

## 2022-10-25 PROCEDURE — 74011636637 HC RX REV CODE- 636/637: Performed by: PHYSICIAN ASSISTANT

## 2022-10-25 PROCEDURE — 74011250636 HC RX REV CODE- 250/636: Performed by: INTERNAL MEDICINE

## 2022-10-25 PROCEDURE — 82962 GLUCOSE BLOOD TEST: CPT

## 2022-10-25 PROCEDURE — 82533 TOTAL CORTISOL: CPT

## 2022-10-25 PROCEDURE — 85025 COMPLETE CBC W/AUTO DIFF WBC: CPT

## 2022-10-25 PROCEDURE — 74011000250 HC RX REV CODE- 250: Performed by: PHYSICIAN ASSISTANT

## 2022-10-25 PROCEDURE — 2709999900 HC NON-CHARGEABLE SUPPLY

## 2022-10-25 RX ADMIN — Medication 2 UNITS: at 23:48

## 2022-10-25 RX ADMIN — MORPHINE SULFATE 1 MG: 2 INJECTION, SOLUTION INTRAMUSCULAR; INTRAVENOUS at 00:40

## 2022-10-25 RX ADMIN — PIPERACILLIN AND TAZOBACTAM 3.38 G: 3; .375 INJECTION, POWDER, FOR SOLUTION INTRAVENOUS at 11:20

## 2022-10-25 RX ADMIN — DULOXETINE 30 MG: 30 CAPSULE, DELAYED RELEASE ORAL at 09:29

## 2022-10-25 RX ADMIN — ATORVASTATIN CALCIUM 40 MG: 40 TABLET, FILM COATED ORAL at 00:40

## 2022-10-25 RX ADMIN — HYDROCODONE BITARTRATE AND ACETAMINOPHEN 1 TABLET: 5; 325 TABLET ORAL at 17:33

## 2022-10-25 RX ADMIN — VANCOMYCIN HYDROCHLORIDE 750 MG: 750 INJECTION, POWDER, LYOPHILIZED, FOR SOLUTION INTRAVENOUS at 17:32

## 2022-10-25 RX ADMIN — MEGESTROL ACETATE 200 MG: 40 SUSPENSION ORAL at 12:22

## 2022-10-25 RX ADMIN — CLOPIDOGREL BISULFATE 75 MG: 75 TABLET ORAL at 09:28

## 2022-10-25 RX ADMIN — Medication 2 UNITS: at 00:47

## 2022-10-25 RX ADMIN — SODIUM CHLORIDE, PRESERVATIVE FREE 10 ML: 5 INJECTION INTRAVENOUS at 10:17

## 2022-10-25 RX ADMIN — Medication 20 UNITS: at 00:46

## 2022-10-25 RX ADMIN — DIPHENHYDRAMINE HYDROCHLORIDE 25 MG: 25 CAPSULE ORAL at 09:32

## 2022-10-25 RX ADMIN — HYDROCODONE BITARTRATE AND ACETAMINOPHEN 1 TABLET: 5; 325 TABLET ORAL at 03:51

## 2022-10-25 RX ADMIN — PIPERACILLIN AND TAZOBACTAM 3.38 G: 3; .375 INJECTION, POWDER, FOR SOLUTION INTRAVENOUS at 17:32

## 2022-10-25 RX ADMIN — SODIUM CHLORIDE, PRESERVATIVE FREE 10 ML: 5 INJECTION INTRAVENOUS at 15:06

## 2022-10-25 RX ADMIN — METOCLOPRAMIDE 10 MG: 5 TABLET ORAL at 09:27

## 2022-10-25 RX ADMIN — HYDROCODONE BITARTRATE AND ACETAMINOPHEN 1 TABLET: 5; 325 TABLET ORAL at 10:56

## 2022-10-25 RX ADMIN — ACETAMINOPHEN 650 MG: 325 TABLET, FILM COATED ORAL at 09:32

## 2022-10-25 RX ADMIN — ATORVASTATIN CALCIUM 40 MG: 40 TABLET, FILM COATED ORAL at 23:43

## 2022-10-25 RX ADMIN — ACETAMINOPHEN 650 MG: 325 TABLET, FILM COATED ORAL at 20:55

## 2022-10-25 RX ADMIN — ASPIRIN 81 MG 81 MG: 81 TABLET ORAL at 09:29

## 2022-10-25 RX ADMIN — ARFORMOTEROL TARTRATE 15 MCG: 15 SOLUTION RESPIRATORY (INHALATION) at 08:49

## 2022-10-25 RX ADMIN — METOCLOPRAMIDE 10 MG: 5 TABLET ORAL at 17:33

## 2022-10-25 RX ADMIN — MEGESTROL ACETATE 200 MG: 40 SUSPENSION ORAL at 17:32

## 2022-10-25 RX ADMIN — PIPERACILLIN AND TAZOBACTAM 3.38 G: 3; .375 INJECTION, POWDER, FOR SOLUTION INTRAVENOUS at 03:40

## 2022-10-25 RX ADMIN — BUDESONIDE 500 MCG: 0.5 SUSPENSION RESPIRATORY (INHALATION) at 08:49

## 2022-10-25 RX ADMIN — PANTOPRAZOLE SODIUM 40 MG: 40 TABLET, DELAYED RELEASE ORAL at 09:28

## 2022-10-25 RX ADMIN — SODIUM CHLORIDE, PRESERVATIVE FREE 10 ML: 5 INJECTION INTRAVENOUS at 00:59

## 2022-10-25 RX ADMIN — MEGESTROL ACETATE 200 MG: 40 SUSPENSION ORAL at 09:29

## 2022-10-25 RX ADMIN — POLYETHYLENE GLYCOL 3350 34 G: 17 POWDER, FOR SOLUTION ORAL at 09:29

## 2022-10-25 RX ADMIN — Medication 100 MG: at 09:29

## 2022-10-25 RX ADMIN — ENOXAPARIN SODIUM 40 MG: 100 INJECTION SUBCUTANEOUS at 09:28

## 2022-10-25 RX ADMIN — ACETAMINOPHEN 650 MG: 325 TABLET, FILM COATED ORAL at 15:06

## 2022-10-25 RX ADMIN — VANCOMYCIN HYDROCHLORIDE 750 MG: 750 INJECTION, POWDER, LYOPHILIZED, FOR SOLUTION INTRAVENOUS at 00:41

## 2022-10-25 RX ADMIN — THERA TABS 1 TABLET: TAB at 09:28

## 2022-10-25 RX ADMIN — Medication 20 UNITS: at 23:49

## 2022-10-25 RX ADMIN — LUBIPROSTONE 8 MCG: 8 CAPSULE, GELATIN COATED ORAL at 09:28

## 2022-10-25 NOTE — PROGRESS NOTES
Nursing/RN cleared for pt to participate in OT tx session.  Pt declined to participate in OT tx session, stated she did not feel like it today, nursing notified,

## 2022-10-25 NOTE — PROGRESS NOTES
[address] [facility] Pharmacokinetic Monitoring Service - Vancomycin    Indication: intra-abdominal infection  Goal AUC/VINCENT: 400-600 mg*hr/L  Day of Therapy: 4  Additional Antimicrobials: pip-tazo    Pertinent Laboratory Values: Wt Readings from Last 1 Encounters:   10/22/22 41.8 kg (92 lb 3.2 oz)     Temp Readings from Last 1 Encounters:   10/25/22 98.8 °F (37.1 °C)     No components found for: PROCAL  Estimated Creatinine Clearance: 44.3 mL/min (based on SCr of 0.78 mg/dL). Recent Labs     10/25/22  0532 10/24/22  0124   WBC 6.1 6.9     Pertinent Cultures:  Culture Date Source Results   10/22 blood NGTD   MRSA Nasal Swab: N/A. Non-respiratory infection.     Assessment:  Date/Time Current Dose Concentration (mg/L) Timing of Concentration (h) AUC   10/22 1,000 mg x1 - - -   10/23 750 mg q18h - - -   10/24 750 mg q18h 5.4 31  535   10/25 750 mg q18h - - -   Note: Serum concentrations collected for AUC dosing may appear elevated if collected in close proximity to the dose administered, this is not necessarily an indication of toxicity    Plan:  Continue current dose of 750 mg q18h  No level ordered at this time  Pharmacy will continue to monitor patient and adjust therapy as indicated    Thank you for the consult,  GABRIEL Rondon  10/25/2022

## 2022-10-25 NOTE — PROGRESS NOTES
[facility] Hospitalist Group  Progress Note    Patient: Madeline Briones Age: 81 y.o. : 1950 MR#: [id number] SSN: [id number]  Date/Time: 10/25/2022     Subjective:   Abdomianl pain is persistent. No BM today. No hematochezia or melena reported. Pt requesting norco for discharge given that's the only item that seems to relieve pain. Review of systems  General: No fevers or chills. Cardiovascular: No chest pain or pressure. No palpitations. Pulmonary: No shortness of breath, cough or wheeze. Gastrointestinal: abdominal pain, no nausea, vomiting or diarrhea. Genitourinary: No urinary frequency, urgency, hesitancy or dysuria. Musculoskeletal: No joint or muscle pain, no back pain, no recent trauma. Neurologic: No headache, numbness, tingling or weakness. Assessment/Plan:   Hypotension: resolved, had lactic acidosis as well, consider poor po intake vs. Sepsis. Treated with vanc zosyn, however no infectious workup positive yet. Negative Blood, urine. Negative RVP. Consider adrenal insufficiency as etiology that could explain soft pressures, abdominal pain, hyperk, weight loss. Morning cortisol pending, if low will proceed with coysnthropin stim. Microcytic anemia: iron sat normal. No bloody BM reported. Still pending colo for workup. Consider anemia of chronic disease. Other rare causes include lead overload, lab ordered. Thalassemia, but less likely as this would have shown up earlier in life. ETOH another etiology, but none reported. Will continue to trend h/h and if stable can d/c and follow up outpt. 2.   AMBROCIO: resolved. Likely prerenal given poor PO intake. 3.   Unintended weight loss: Workup as outpt with colo pending. Nutrition consulted. Started megace. 4.   LLQ pain: No structural abnormalities noted on CT. Possibly neuropathic. Also considered chronic consitpation. GI consulted, appreciate assitance. Started amitiza. GI ordered mesentric doppler. Adrenal workup as above. Switched to po reglan. 10 BID for 2 weeks and then 10Hs per GI.  5.   Chronic respiratory failure with hypoxia: hx of COPD, continue home meds. O2 goal >88%. 6.   Type 2 DM: Blood glucose goal 140-180, continue basal bolus dosing. 7. HTN: holding home BP meds with periods of hypotenison. 8.  Gastroparesis: GI started reglan. Case discussed with:  [x]Patient  []Family  []Nursing  []Case Management  DVT Prophylaxis:  [x]Lovenox  []Hep SQ  []SCDs  []Coumadin   []Eliquis/Xarelto     Objective:   VS: Visit Vitals  /60 (BP 1 Location: Right upper arm, BP Patient Position: At rest)   Pulse (!) 116   Temp 98.8 °F (37.1 °C)   Resp 20   Ht 5' 2\" (1.575 m)   Wt 41.8 kg (92 lb 3.2 oz)   SpO2 96%   Breastfeeding No   BMI 16.86 kg/m²        Tmax/24hrs: Temp (24hrs), Av.4 °F (36.9 °C), Min:97.8 °F (36.6 °C), Max:98.8 °F (37.1 °C)  IOBRIEFNo intake or output data in the 24 hours ending 10/25/22 1557      General:  Alert, cooperative, no acute distress    HEENT: PERRLA, anicteric sclerae. Pulmonary:  CTA Bilaterally. No Wheezing/Rales. Cardiovascular: Regular rate and Rhythm. GI:  Soft, Non distended, Tender to RLQ and LLQ. + Bowel sounds. Extremities:  No edema. No calf tenderness. Psych: Good insight. Not anxious or agitated. Neurologic: Alert and oriented X 3. Moves all ext.   Additional:    Medications:   Current Facility-Administered Medications   Medication Dose Route Frequency    lubiPROStone (AMITIZA) capsule 8 mcg  8 mcg Oral DAILY WITH BREAKFAST    pantoprazole (PROTONIX) tablet 40 mg  40 mg Oral DAILY    metoclopramide HCl (REGLAN) tablet 10 mg  10 mg Oral ACB&D    hydrALAZINE (APRESOLINE) 20 mg/mL injection 10 mg  10 mg IntraVENous Q6H PRN    piperacillin-tazobactam (ZOSYN) 3.375 g in 0.9% sodium chloride (MBP/ADV) 100 mL MBP  3.375 g IntraVENous Q8H    vancomycin (VANCOCIN) 750 mg in 0.9% sodium chloride 250 mL (VIAL-MATE)  750 mg IntraVENous Q18H polyethylene glycol (MIRALAX) packet 34 g  34 g Oral DAILY    therapeutic multivitamin (THERAGRAN) tablet 1 Tablet  1 Tablet Oral DAILY    thiamine HCL (B-1) tablet 100 mg  100 mg Oral DAILY    diphenhydrAMINE (BENADRYL) capsule 25 mg  25 mg Oral Q4H PRN    morphine injection 1 mg  1 mg IntraVENous Q3H PRN    sodium chloride (NS) flush 5-40 mL  5-40 mL IntraVENous Q8H    sodium chloride (NS) flush 5-40 mL  5-40 mL IntraVENous PRN    acetaminophen (TYLENOL) tablet 650 mg  650 mg Oral Q6H PRN    Or    acetaminophen (TYLENOL) suppository 650 mg  650 mg Rectal Q6H PRN    polyethylene glycol (MIRALAX) packet 17 g  17 g Oral DAILY PRN    bisacodyL (DULCOLAX) suppository 10 mg  10 mg Rectal DAILY PRN    ondansetron (ZOFRAN ODT) tablet 4 mg  4 mg Oral Q8H PRN    Or    ondansetron (ZOFRAN) injection 4 mg  4 mg IntraVENous Q6H PRN    enoxaparin (LOVENOX) injection 40 mg  40 mg SubCUTAneous DAILY    HYDROcodone-acetaminophen (NORCO) 5-325 mg per tablet 1 Tablet  1 Tablet Oral Q6H PRN    albuterol-ipratropium (DUO-NEB) 2.5 MG-0.5 MG/3 ML  3 mL Nebulization Q4H PRN    aspirin chewable tablet 81 mg  81 mg Oral DAILY    atorvastatin (LIPITOR) tablet 40 mg  40 mg Oral QHS    clopidogreL (PLAVIX) tablet 75 mg  75 mg Oral DAILY    DULoxetine (CYMBALTA) capsule 30 mg  30 mg Oral DAILY    insulin glargine (LANTUS) injection 20 Units  20 Units SubCUTAneous QHS    insulin lispro (HUMALOG) injection   SubCUTAneous AC&HS    glucose chewable tablet 16 g  4 Tablet Oral PRN    glucagon (GLUCAGEN) injection 1 mg  1 mg IntraMUSCular PRN    dextrose 10% infusion 0-250 mL  0-250 mL IntraVENous PRN    dicyclomine (BENTYL) capsule 10 mg  10 mg Oral QID PRN    megestroL (MEGACE) 400 mg/10 mL (10 mL) oral suspension 200 mg  200 mg Oral TID WITH MEALS    budesonide (PULMICORT) 500 mcg/2 ml nebulizer suspension  500 mcg Nebulization BID RT    And    arformoteroL (BROVANA) neb solution 15 mcg  15 mcg Nebulization BID RT       Labs:    Recent Results (from the past 24 hour(s))   POTASSIUM    Collection Time: 10/24/22  4:27 PM   Result Value Ref Range    Potassium 4.8 3.5 - 5.5 mmol/L   GLUCOSE, POC    Collection Time: 10/24/22  4:35 PM   Result Value Ref Range    Glucose (POC) 156 (H) 70 - 110 mg/dL   GLUCOSE, POC    Collection Time: 10/25/22 12:38 AM   Result Value Ref Range    Glucose (POC) 152 (H) 70 - 418 mg/dL   METABOLIC PANEL, BASIC    Collection Time: 10/25/22  5:32 AM   Result Value Ref Range    Sodium 141 136 - 145 mmol/L    Potassium 5.0 3.5 - 5.5 mmol/L    Chloride 114 (H) 100 - 111 mmol/L    CO2 22 21 - 32 mmol/L    Anion gap 5 3.0 - 18 mmol/L    Glucose 118 (H) 74 - 99 mg/dL    BUN 26 (H) 7.0 - 18 MG/DL    Creatinine 0.78 0.6 - 1.3 MG/DL    BUN/Creatinine ratio 33 (H) 12 - 20      eGFR >60 >60 ml/min/1.73m2    Calcium 8.3 (L) 8.5 - 10.1 MG/DL   CBC WITH AUTOMATED DIFF    Collection Time: 10/25/22  5:32 AM   Result Value Ref Range    WBC 6.1 4.6 - 13.2 K/uL    RBC 3.15 (L) 4.20 - 5.30 M/uL    HGB 7.3 (L) 12.0 - 16.0 g/dL    HCT 23.2 (L) 35.0 - 45.0 %    MCV 73.7 (L) 78.0 - 100.0 FL    MCH 23.2 (L) 24.0 - 34.0 PG    MCHC 31.5 31.0 - 37.0 g/dL    RDW 16.0 (H) 11.6 - 14.5 %    PLATELET 487 514 - 220 K/uL    MPV 10.9 9.2 - 11.8 FL    NRBC 0.0 0  WBC    ABSOLUTE NRBC 0.00 0.00 - 0.01 K/uL    NEUTROPHILS 63 40 - 73 %    LYMPHOCYTES 23 21 - 52 %    MONOCYTES 12 (H) 3 - 10 %    EOSINOPHILS 2 0 - 5 %    BASOPHILS 1 0 - 2 %    IMMATURE GRANULOCYTES 0 0.0 - 0.5 %    ABS. NEUTROPHILS 3.8 1.8 - 8.0 K/UL    ABS. LYMPHOCYTES 1.4 0.9 - 3.6 K/UL    ABS. MONOCYTES 0.7 0.05 - 1.2 K/UL    ABS. EOSINOPHILS 0.1 0.0 - 0.4 K/UL    ABS. BASOPHILS 0.0 0.0 - 0.1 K/UL    ABS. IMM.  GRANS. 0.0 0.00 - 0.04 K/UL    DF AUTOMATED     GLUCOSE, POC    Collection Time: 10/25/22  7:08 AM   Result Value Ref Range    Glucose (POC) 141 (H) 70 - 110 mg/dL   GLUCOSE, POC    Collection Time: 10/25/22 11:17 AM   Result Value Ref Range    Glucose (POC) 136 (H) 70 - 110 mg/dL       Signed By: Patrick Monday October 25, 2022      Disclaimer: Sections of this note are dictated using utilizing voice recognition software. Minor typographical errors may be present. If questions arise, please do not hesitate to contact me or call our department.

## 2022-10-26 LAB
ANION GAP SERPL CALC-SCNC: 6 MMOL/L (ref 3–18)
BASOPHILS # BLD: 0 K/UL (ref 0–0.1)
BASOPHILS NFR BLD: 1 % (ref 0–2)
BUN SERPL-MCNC: 27 MG/DL (ref 7–18)
BUN/CREAT SERPL: 39 (ref 12–20)
CALCIUM SERPL-MCNC: 8.4 MG/DL (ref 8.5–10.1)
CHLORIDE SERPL-SCNC: 114 MMOL/L (ref 100–111)
CO2 SERPL-SCNC: 20 MMOL/L (ref 21–32)
CREAT SERPL-MCNC: 0.69 MG/DL (ref 0.6–1.3)
DIFFERENTIAL METHOD BLD: ABNORMAL
EOSINOPHIL # BLD: 0.1 K/UL (ref 0–0.4)
EOSINOPHIL NFR BLD: 1 % (ref 0–5)
ERYTHROCYTE [DISTWIDTH] IN BLOOD BY AUTOMATED COUNT: 16.2 % (ref 11.6–14.5)
GLUCOSE BLD STRIP.AUTO-MCNC: 110 MG/DL (ref 70–110)
GLUCOSE BLD STRIP.AUTO-MCNC: 111 MG/DL (ref 70–110)
GLUCOSE BLD STRIP.AUTO-MCNC: 141 MG/DL (ref 70–110)
GLUCOSE BLD STRIP.AUTO-MCNC: 260 MG/DL (ref 70–110)
GLUCOSE SERPL-MCNC: 125 MG/DL (ref 74–99)
HCT VFR BLD AUTO: 22.7 % (ref 35–45)
HGB BLD-MCNC: 7 G/DL (ref 12–16)
HISPANIC, LDP2T: NO
IMM GRANULOCYTES # BLD AUTO: 0 K/UL (ref 0–0.04)
IMM GRANULOCYTES NFR BLD AUTO: 1 % (ref 0–0.5)
LEAD BLD-MCNC: <1 UG/DL (ref 0–3.4)
LYMPHOCYTES # BLD: 1.5 K/UL (ref 0.9–3.6)
LYMPHOCYTES NFR BLD: 24 % (ref 21–52)
MCH RBC QN AUTO: 23 PG (ref 24–34)
MCHC RBC AUTO-ENTMCNC: 30.8 G/DL (ref 31–37)
MCV RBC AUTO: 74.4 FL (ref 78–100)
MONOCYTES # BLD: 0.7 K/UL (ref 0.05–1.2)
MONOCYTES NFR BLD: 11 % (ref 3–10)
NEUTS SEG # BLD: 3.9 K/UL (ref 1.8–8)
NEUTS SEG NFR BLD: 62 % (ref 40–73)
NRBC # BLD: 0 K/UL (ref 0–0.01)
NRBC BLD-RTO: 0 PER 100 WBC
PLATELET # BLD AUTO: 256 K/UL (ref 135–420)
PMV BLD AUTO: 10.3 FL (ref 9.2–11.8)
POTASSIUM SERPL-SCNC: 5.3 MMOL/L (ref 3.5–5.5)
RACE, 017371: NORMAL
RBC # BLD AUTO: 3.05 M/UL (ref 4.2–5.3)
RETICS/RBC NFR AUTO: 2.6 % (ref 0.5–2.5)
SODIUM SERPL-SCNC: 140 MMOL/L (ref 136–145)
SPECIMEN SOURCE: NORMAL
TEST PURPOSE, LDP4T: NORMAL
WBC # BLD AUTO: 6.2 K/UL (ref 4.6–13.2)
ZPP RBC-MCNC: 46 UG/DL (ref 0–99)

## 2022-10-26 PROCEDURE — 36415 COLL VENOUS BLD VENIPUNCTURE: CPT

## 2022-10-26 PROCEDURE — 74011250636 HC RX REV CODE- 250/636: Performed by: INTERNAL MEDICINE

## 2022-10-26 PROCEDURE — 2709999900 HC NON-CHARGEABLE SUPPLY

## 2022-10-26 PROCEDURE — 74011000258 HC RX REV CODE- 258: Performed by: INTERNAL MEDICINE

## 2022-10-26 PROCEDURE — 74011250637 HC RX REV CODE- 250/637: Performed by: INTERNAL MEDICINE

## 2022-10-26 PROCEDURE — 74011000250 HC RX REV CODE- 250: Performed by: PHYSICIAN ASSISTANT

## 2022-10-26 PROCEDURE — 82962 GLUCOSE BLOOD TEST: CPT

## 2022-10-26 PROCEDURE — 94761 N-INVAS EAR/PLS OXIMETRY MLT: CPT

## 2022-10-26 PROCEDURE — 74011250637 HC RX REV CODE- 250/637: Performed by: PHYSICIAN ASSISTANT

## 2022-10-26 PROCEDURE — 74011250636 HC RX REV CODE- 250/636: Performed by: STUDENT IN AN ORGANIZED HEALTH CARE EDUCATION/TRAINING PROGRAM

## 2022-10-26 PROCEDURE — 94640 AIRWAY INHALATION TREATMENT: CPT

## 2022-10-26 PROCEDURE — 74011636637 HC RX REV CODE- 636/637: Performed by: PHYSICIAN ASSISTANT

## 2022-10-26 PROCEDURE — 85045 AUTOMATED RETICULOCYTE COUNT: CPT

## 2022-10-26 PROCEDURE — 80048 BASIC METABOLIC PNL TOTAL CA: CPT

## 2022-10-26 PROCEDURE — 85025 COMPLETE CBC W/AUTO DIFF WBC: CPT

## 2022-10-26 PROCEDURE — 97535 SELF CARE MNGMENT TRAINING: CPT

## 2022-10-26 PROCEDURE — 74011250636 HC RX REV CODE- 250/636: Performed by: PHYSICIAN ASSISTANT

## 2022-10-26 PROCEDURE — 65270000029 HC RM PRIVATE

## 2022-10-26 PROCEDURE — 82533 TOTAL CORTISOL: CPT

## 2022-10-26 PROCEDURE — 94762 N-INVAS EAR/PLS OXIMTRY CONT: CPT

## 2022-10-26 PROCEDURE — 97530 THERAPEUTIC ACTIVITIES: CPT

## 2022-10-26 RX ORDER — COSYNTROPIN 0.25 MG/ML
0.25 INJECTION, POWDER, FOR SOLUTION INTRAMUSCULAR; INTRAVENOUS ONCE
Status: COMPLETED | OUTPATIENT
Start: 2022-10-26 | End: 2022-10-26

## 2022-10-26 RX ADMIN — Medication 6 UNITS: at 22:50

## 2022-10-26 RX ADMIN — BUDESONIDE 500 MCG: 0.5 SUSPENSION RESPIRATORY (INHALATION) at 07:38

## 2022-10-26 RX ADMIN — SODIUM CHLORIDE, PRESERVATIVE FREE 10 ML: 5 INJECTION INTRAVENOUS at 15:36

## 2022-10-26 RX ADMIN — HYDROCODONE BITARTRATE AND ACETAMINOPHEN 1 TABLET: 5; 325 TABLET ORAL at 04:50

## 2022-10-26 RX ADMIN — PANTOPRAZOLE SODIUM 40 MG: 40 TABLET, DELAYED RELEASE ORAL at 11:14

## 2022-10-26 RX ADMIN — COSYNTROPIN 0.25 MG: 0.25 INJECTION, POWDER, LYOPHILIZED, FOR SOLUTION INTRAMUSCULAR; INTRAVENOUS at 15:30

## 2022-10-26 RX ADMIN — DULOXETINE 30 MG: 30 CAPSULE, DELAYED RELEASE ORAL at 11:07

## 2022-10-26 RX ADMIN — POLYETHYLENE GLYCOL 3350 34 G: 17 POWDER, FOR SOLUTION ORAL at 11:05

## 2022-10-26 RX ADMIN — DIPHENHYDRAMINE HYDROCHLORIDE 25 MG: 25 CAPSULE ORAL at 16:43

## 2022-10-26 RX ADMIN — ACETAMINOPHEN 650 MG: 325 TABLET, FILM COATED ORAL at 20:34

## 2022-10-26 RX ADMIN — BUDESONIDE 500 MCG: 0.5 SUSPENSION RESPIRATORY (INHALATION) at 20:51

## 2022-10-26 RX ADMIN — MORPHINE SULFATE 1 MG: 2 INJECTION, SOLUTION INTRAMUSCULAR; INTRAVENOUS at 00:42

## 2022-10-26 RX ADMIN — HYDROCODONE BITARTRATE AND ACETAMINOPHEN 1 TABLET: 5; 325 TABLET ORAL at 11:23

## 2022-10-26 RX ADMIN — CLOPIDOGREL BISULFATE 75 MG: 75 TABLET ORAL at 11:06

## 2022-10-26 RX ADMIN — THERA TABS 1 TABLET: TAB at 11:07

## 2022-10-26 RX ADMIN — HYDROCODONE BITARTRATE AND ACETAMINOPHEN 1 TABLET: 5; 325 TABLET ORAL at 23:40

## 2022-10-26 RX ADMIN — VANCOMYCIN HYDROCHLORIDE 750 MG: 750 INJECTION, POWDER, LYOPHILIZED, FOR SOLUTION INTRAVENOUS at 11:59

## 2022-10-26 RX ADMIN — Medication 100 MG: at 11:07

## 2022-10-26 RX ADMIN — ASPIRIN 81 MG 81 MG: 81 TABLET ORAL at 11:07

## 2022-10-26 RX ADMIN — PIPERACILLIN AND TAZOBACTAM 3.38 G: 3; .375 INJECTION, POWDER, FOR SOLUTION INTRAVENOUS at 09:24

## 2022-10-26 RX ADMIN — ATORVASTATIN CALCIUM 40 MG: 40 TABLET, FILM COATED ORAL at 23:40

## 2022-10-26 RX ADMIN — HYDROCODONE BITARTRATE AND ACETAMINOPHEN 1 TABLET: 5; 325 TABLET ORAL at 16:43

## 2022-10-26 RX ADMIN — ARFORMOTEROL TARTRATE 15 MCG: 15 SOLUTION RESPIRATORY (INHALATION) at 07:38

## 2022-10-26 RX ADMIN — Medication 20 UNITS: at 22:49

## 2022-10-26 RX ADMIN — ENOXAPARIN SODIUM 40 MG: 100 INJECTION SUBCUTANEOUS at 11:06

## 2022-10-26 RX ADMIN — ATORVASTATIN CALCIUM 40 MG: 40 TABLET, FILM COATED ORAL at 22:41

## 2022-10-26 RX ADMIN — SODIUM CHLORIDE, PRESERVATIVE FREE 10 ML: 5 INJECTION INTRAVENOUS at 00:42

## 2022-10-26 RX ADMIN — ARFORMOTEROL TARTRATE 15 MCG: 15 SOLUTION RESPIRATORY (INHALATION) at 20:51

## 2022-10-26 RX ADMIN — MEGESTROL ACETATE 200 MG: 40 SUSPENSION ORAL at 11:06

## 2022-10-26 RX ADMIN — METOCLOPRAMIDE 10 MG: 5 TABLET ORAL at 16:43

## 2022-10-26 RX ADMIN — LUBIPROSTONE 8 MCG: 8 CAPSULE, GELATIN COATED ORAL at 11:06

## 2022-10-26 RX ADMIN — SODIUM CHLORIDE, PRESERVATIVE FREE 10 ML: 5 INJECTION INTRAVENOUS at 22:01

## 2022-10-26 RX ADMIN — METOCLOPRAMIDE 10 MG: 5 TABLET ORAL at 11:06

## 2022-10-26 RX ADMIN — DIPHENHYDRAMINE HYDROCHLORIDE 25 MG: 25 CAPSULE ORAL at 11:23

## 2022-10-26 RX ADMIN — MEGESTROL ACETATE 200 MG: 40 SUSPENSION ORAL at 16:43

## 2022-10-26 RX ADMIN — PIPERACILLIN AND TAZOBACTAM 3.38 G: 3; .375 INJECTION, POWDER, FOR SOLUTION INTRAVENOUS at 02:00

## 2022-10-26 NOTE — DIABETES MGMT
Diabetes/ Glycemic Control Plan of Care      Recommendations:  1.) cont on current basal and correctional lispro insulin as ordred. Assessment: Patient with history of T2DM on Lantus insulin and metformin at home was admitted on 1019/2022. POC BG 10/25/2022: 181 at 23:47  POc BG 10/26/2022: 111, 141    DX:   1. Weight loss, abnormal        2. Leukopenia, unspecified type        3. AMBROCIO (acute kidney injury) ([address]        4. Noninfectious gastroenteritis, unspecified type        5. Acidosis        6. Diverticulosis        7. Pulmonary nodules        8. Multiple thyroid nodules           Fasting/ Morning blood glucose:   Lab Results   Component Value Date/Time    Glucose 125 (H) 10/26/2022 06:01 AM    Glucose (POC) 141 (H) 10/26/2022 11:21 AM    Glucose,  09/19/2018 07:14 AM     IV Fluids containing dextrose: none    Steroids:  None    Blood glucose values: Within target range (70-180mg/dL):  YES. Patient had on elevated BG of 181 yesterday at bedtime. Her BG values at time of review today: 111, 141    Current insulin orders:   Basal lantus insulin 20 units daily at bedtime  Correctional lispro insulin. Normal sensitivity dose    Total Daily Dose previous 24 hours: 44 units   Lantus: 40 units (bedtime dose 10/24/2022 was given after midnight on 10/25/2022)  Lispro: 4 units    Current A1c:   Lab Results   Component Value Date/Time    Hemoglobin A1c 7.1 (H) 10/20/2022 02:06 AM      equivalent  to ave Blood Glucose of 157 mg/dl for 2-3 months prior to admission    Adequate glycemic control PTA: YES    Nutrition/Diet:   Active Orders   Diet    ADULT DIET Regular; Low Fiber; Pt requested gravy with meats, tuna/chicken or egg salad for lunch. Meal Intake:  No data found. Supplement Intake:  No data found. Home diabetes medications:   Key Antihyperglycemic Medications               metFORMIN (GLUCOPHAGE) 500 mg tablet Take 1 Tab by mouth two (2) times daily (with meals).     insulin glargine (LANTUS U-100 INSULIN) 100 unit/mL injection 35 Units by SubCUTAneous route daily. Plan/Goals:   Blood glucose will be within target of 70 - 180 mg/dl within 72 hours  Reinforce dietary and medication compliance at home.             Education:  [] Refer to Diabetes Education Record                       [x] Education not indicated at this time     Nayeli Pfeiffer RN [facility]  Office: [phone]

## 2022-10-26 NOTE — PROGRESS NOTES
Discharge/Transition Planning     Currently still do not have accepting home health company with PCP and insurance combo

## 2022-10-26 NOTE — PROGRESS NOTES
[facility] Hospitalist Group  Progress Note    Patient: Janell Farmer Age: 81 y.o. : 1950 MR#: [id number] SSN: [id number]  Date/Time: 10/26/2022     Subjective:   Abdomianl pain is persistent. No BM today. No hematochezia or melena reported. Review of systems  General: No fevers or chills. Cardiovascular: No chest pain or pressure. No palpitations. Pulmonary: No shortness of breath, cough or wheeze. Gastrointestinal: abdominal pain, no nausea, vomiting or diarrhea. Genitourinary: No urinary frequency, urgency, hesitancy or dysuria. Musculoskeletal: No joint or muscle pain, no back pain, no recent trauma. Neurologic: No headache, numbness, tingling or weakness. Assessment/Plan:   Hypotension: resolved, had lactic acidosis as well, consider poor po intake vs. Sepsis. Treated with vanc zosyn, however no infectious workup positive yet. Negative Blood, urine. Negative RVP. Consider adrenal insufficiency as etiology that could explain soft pressures, abdominal pain, hyperk, weight loss. Morning cortisol 4 which is highly specific for adrenal insufficiency but to completely evaluate will proceed with cosyntropin stim. Endo consulted. Microcytic anemia: iron sat normal. No bloody BM reported. Still pending colo for workup. Consider anemia of chronic disease. Lead overload r/o. Thalassemia, but less likely as this would have shown up earlier in life. ETOH another etiology, but none reported. Will continue to trend h/h and if stable can d/c and follow up outpt. Will transfuse Hg <7.   2.   AMBROCIO: resolved. Likely prerenal given poor PO intake. 3.   Unintended weight loss: Workup as outpt with colo pending. Nutrition consulted. Started megace. 4.   LLQ pain: No structural abnormalities noted on CT. Possibly neuropathic. Also considered chronic consitpation. GI consulted, appreciate assitance. Started amitiza. GI ordered mesentric doppler. Adrenal workup as above. Switched to po reglan. 10 BID for 2 weeks and then 10Hs per GI.  5.   Chronic respiratory failure with hypoxia: hx of COPD, continue home meds. O2 goal >88%. 6.   Type 2 DM: Blood glucose goal 140-180, continue basal bolus dosing. 7. HTN: holding home BP meds with periods of hypotenison. 8.  Gastroparesis: GI started reglan. Case discussed with:  [x]Patient  []Family  []Nursing  []Case Management  DVT Prophylaxis:  [x]Lovenox  []Hep SQ  []SCDs  []Coumadin   []Eliquis/Xarelto     Objective:   VS: Visit Vitals  BP (!) 151/83 (BP 1 Location: Right upper arm, BP Patient Position: At rest)   Pulse (!) 103   Temp 97.4 °F (36.3 °C)   Resp 20   Ht 5' 2\" (1.575 m)   Wt 41.8 kg (92 lb 3.2 oz)   SpO2 100%   Breastfeeding No   BMI 16.86 kg/m²        Tmax/24hrs: Temp (24hrs), Av.3 °F (36.8 °C), Min:97.4 °F (36.3 °C), Max:98.9 °F (37.2 °C)  IOBRIEF  Intake/Output Summary (Last 24 hours) at 10/26/2022 1506  Last data filed at 10/26/2022 1352  Gross per 24 hour   Intake 480 ml   Output --   Net 480 ml         General:  Alert, cooperative, no acute distress    HEENT: PERRLA, anicteric sclerae. Pulmonary:  CTA Bilaterally. No Wheezing/Rales. Cardiovascular: Regular rate and Rhythm. GI:  Soft, Non distended, Tender to RLQ and LLQ. + Bowel sounds. Extremities:  No edema. No calf tenderness. Psych: Good insight. Not anxious or agitated. Neurologic: Alert and oriented X 3. Moves all ext.   Additional:    Medications:   Current Facility-Administered Medications   Medication Dose Route Frequency    cosyntropin (CORTROSYN) injection 0.25 mg  0.25 mg IntraVENous ONCE    lubiPROStone (AMITIZA) capsule 8 mcg  8 mcg Oral DAILY WITH BREAKFAST    pantoprazole (PROTONIX) tablet 40 mg  40 mg Oral DAILY    metoclopramide HCl (REGLAN) tablet 10 mg  10 mg Oral ACB&D    hydrALAZINE (APRESOLINE) 20 mg/mL injection 10 mg  10 mg IntraVENous Q6H PRN    piperacillin-tazobactam (ZOSYN) 3.375 g in 0.9% sodium chloride (MBP/ADV) 100 mL MBP  3.375 g IntraVENous Q8H    vancomycin (VANCOCIN) 750 mg in 0.9% sodium chloride 250 mL (VIAL-MATE)  750 mg IntraVENous Q18H    polyethylene glycol (MIRALAX) packet 34 g  34 g Oral DAILY    therapeutic multivitamin (THERAGRAN) tablet 1 Tablet  1 Tablet Oral DAILY    thiamine HCL (B-1) tablet 100 mg  100 mg Oral DAILY    diphenhydrAMINE (BENADRYL) capsule 25 mg  25 mg Oral Q4H PRN    morphine injection 1 mg  1 mg IntraVENous Q3H PRN    sodium chloride (NS) flush 5-40 mL  5-40 mL IntraVENous Q8H    sodium chloride (NS) flush 5-40 mL  5-40 mL IntraVENous PRN    acetaminophen (TYLENOL) tablet 650 mg  650 mg Oral Q6H PRN    Or    acetaminophen (TYLENOL) suppository 650 mg  650 mg Rectal Q6H PRN    polyethylene glycol (MIRALAX) packet 17 g  17 g Oral DAILY PRN    bisacodyL (DULCOLAX) suppository 10 mg  10 mg Rectal DAILY PRN    ondansetron (ZOFRAN ODT) tablet 4 mg  4 mg Oral Q8H PRN    Or    ondansetron (ZOFRAN) injection 4 mg  4 mg IntraVENous Q6H PRN    enoxaparin (LOVENOX) injection 40 mg  40 mg SubCUTAneous DAILY    HYDROcodone-acetaminophen (NORCO) 5-325 mg per tablet 1 Tablet  1 Tablet Oral Q6H PRN    albuterol-ipratropium (DUO-NEB) 2.5 MG-0.5 MG/3 ML  3 mL Nebulization Q4H PRN    aspirin chewable tablet 81 mg  81 mg Oral DAILY    atorvastatin (LIPITOR) tablet 40 mg  40 mg Oral QHS    clopidogreL (PLAVIX) tablet 75 mg  75 mg Oral DAILY    DULoxetine (CYMBALTA) capsule 30 mg  30 mg Oral DAILY    insulin glargine (LANTUS) injection 20 Units  20 Units SubCUTAneous QHS    insulin lispro (HUMALOG) injection   SubCUTAneous AC&HS    glucose chewable tablet 16 g  4 Tablet Oral PRN    glucagon (GLUCAGEN) injection 1 mg  1 mg IntraMUSCular PRN    dextrose 10% infusion 0-250 mL  0-250 mL IntraVENous PRN    dicyclomine (BENTYL) capsule 10 mg  10 mg Oral QID PRN    megestroL (MEGACE) 400 mg/10 mL (10 mL) oral suspension 200 mg  200 mg Oral TID WITH MEALS    budesonide (PULMICORT) 500 mcg/2 ml nebulizer suspension  500 mcg Nebulization BID RT    And    arformoteroL (BROVANA) neb solution 15 mcg  15 mcg Nebulization BID RT       Labs:    Recent Results (from the past 24 hour(s))   GLUCOSE, POC    Collection Time: 10/25/22  4:03 PM   Result Value Ref Range    Glucose (POC) 132 (H) 70 - 110 mg/dL   LEAD PROFILE    Collection Time: 10/25/22  6:34 PM   Result Value Ref Range    Race BLACK      NO      Sample source BLOOD      Test purpose INITIAL     Lead <1.0 0.0 - 3.4 ug/dL    Zinc protoporphyrin PENDING ug/dL   GLUCOSE, POC    Collection Time: 10/25/22 11:47 PM   Result Value Ref Range    Glucose (POC) 181 (H) 70 - 947 mg/dL   METABOLIC PANEL, BASIC    Collection Time: 10/26/22  6:01 AM   Result Value Ref Range    Sodium 140 136 - 145 mmol/L    Potassium 5.3 3.5 - 5.5 mmol/L    Chloride 114 (H) 100 - 111 mmol/L    CO2 20 (L) 21 - 32 mmol/L    Anion gap 6 3.0 - 18 mmol/L    Glucose 125 (H) 74 - 99 mg/dL    BUN 27 (H) 7.0 - 18 MG/DL    Creatinine 0.69 0.6 - 1.3 MG/DL    BUN/Creatinine ratio 39 (H) 12 - 20      eGFR >60 >60 ml/min/1.73m2    Calcium 8.4 (L) 8.5 - 10.1 MG/DL   CBC WITH AUTOMATED DIFF    Collection Time: 10/26/22  6:01 AM   Result Value Ref Range    WBC 6.2 4.6 - 13.2 K/uL    RBC 3.05 (L) 4.20 - 5.30 M/uL    HGB 7.0 (L) 12.0 - 16.0 g/dL    HCT 22.7 (L) 35.0 - 45.0 %    MCV 74.4 (L) 78.0 - 100.0 FL    MCH 23.0 (L) 24.0 - 34.0 PG    MCHC 30.8 (L) 31.0 - 37.0 g/dL    RDW 16.2 (H) 11.6 - 14.5 %    PLATELET 489 154 - 274 K/uL    MPV 10.3 9.2 - 11.8 FL    NRBC 0.0 0  WBC    ABSOLUTE NRBC 0.00 0.00 - 0.01 K/uL    NEUTROPHILS 62 40 - 73 %    LYMPHOCYTES 24 21 - 52 %    MONOCYTES 11 (H) 3 - 10 %    EOSINOPHILS 1 0 - 5 %    BASOPHILS 1 0 - 2 %    IMMATURE GRANULOCYTES 1 (H) 0.0 - 0.5 %    ABS. NEUTROPHILS 3.9 1.8 - 8.0 K/UL    ABS. LYMPHOCYTES 1.5 0.9 - 3.6 K/UL    ABS. MONOCYTES 0.7 0.05 - 1.2 K/UL    ABS. EOSINOPHILS 0.1 0.0 - 0.4 K/UL    ABS. BASOPHILS 0.0 0.0 - 0.1 K/UL    ABS. IMM.  GRANS. 0.0 0.00 - 0.04 K/UL    DF AUTOMATED     RETICULOCYTE COUNT    Collection Time: 10/26/22  6:01 AM   Result Value Ref Range    Reticulocyte count 2.6 (H) 0.5 - 2.5 %   GLUCOSE, POC    Collection Time: 10/26/22  7:29 AM   Result Value Ref Range    Glucose (POC) 111 (H) 70 - 110 mg/dL   GLUCOSE, POC    Collection Time: 10/26/22 11:21 AM   Result Value Ref Range    Glucose (POC) 141 (H) 70 - 110 mg/dL       Signed By: Angel Szymanski     October 26, 2022      Disclaimer: Sections of this note are dictated using utilizing voice recognition software. Minor typographical errors may be present. If questions arise, please do not hesitate to contact me or call our department.

## 2022-10-26 NOTE — ROUTINE PROCESS
Bedside and verbal report received from [address] DEVI (offgoing nurse). Report included the following information; SBAR, MAR, LABS, Intake/output, Kardex, and summary of care. Patient resting in bed with  at the bedside.

## 2022-10-26 NOTE — PROGRESS NOTES
[address] [facility] Pharmacokinetic Monitoring Service - Vancomycin    Indication: intra-abdominal infection  Goal AUC/VINCENT: 400-600 mg*hr/L  Day of Therapy: 5  Additional Antimicrobials: pip-tazo    Pertinent Laboratory Values: Wt Readings from Last 1 Encounters:   10/22/22 41.8 kg (92 lb 3.2 oz)     Temp Readings from Last 1 Encounters:   10/26/22 97.4 °F (36.3 °C)     No components found for: PROCAL  Estimated Creatinine Clearance: 49.3 mL/min (by C-G formula based on SCr of 0.69 mg/dL). Recent Labs     10/26/22  0601 10/25/22  0532   WBC 6.2 6.1     Pertinent Cultures:  Culture Date Source Results   10/22 blood NGTD   MRSA Nasal Swab: N/A. Non-respiratory infection.     Assessment:  Date/Time Current Dose Concentration (mg/L) Timing of Concentration (h) AUC   10/22 1,000 mg x1 - - -   10/23 750 mg q18h - - -   10/24 750 mg q18h 5.4 31  535   10/25 750 mg q18h - - -   10/26 750 mg q18h - - -   Note: Serum concentrations collected for AUC dosing may appear elevated if collected in close proximity to the dose administered, this is not necessarily an indication of toxicity    Plan:  Continue current dose of 750 mg q18h  Ordered a level for 10/27 with AM labs  Pharmacy will continue to monitor patient and adjust therapy as indicated    Thank you for the consult,  [place], [place]  10/26/2022

## 2022-10-27 LAB
ANION GAP SERPL CALC-SCNC: 6 MMOL/L (ref 3–18)
BASOPHILS # BLD: 0 K/UL (ref 0–0.1)
BASOPHILS NFR BLD: 0 % (ref 0–2)
BUN SERPL-MCNC: 26 MG/DL (ref 7–18)
BUN/CREAT SERPL: 28 (ref 12–20)
CALCIUM SERPL-MCNC: 8.7 MG/DL (ref 8.5–10.1)
CHLORIDE SERPL-SCNC: 114 MMOL/L (ref 100–111)
CO2 SERPL-SCNC: 20 MMOL/L (ref 21–32)
CORTIS 1H P CHAL SERPL-MCNC: 30.1 UG/DL
CORTIS 30M P CHAL SERPL-MCNC: 5.8 UG/DL
CORTIS BS SERPL-MCNC: 18.7 UG/DL
CREAT SERPL-MCNC: 0.94 MG/DL (ref 0.6–1.3)
DIFFERENTIAL METHOD BLD: ABNORMAL
EOSINOPHIL # BLD: 0 K/UL (ref 0–0.4)
EOSINOPHIL NFR BLD: 0 % (ref 0–5)
ERYTHROCYTE [DISTWIDTH] IN BLOOD BY AUTOMATED COUNT: 16.5 % (ref 11.6–14.5)
GLUCOSE BLD STRIP.AUTO-MCNC: 138 MG/DL (ref 70–110)
GLUCOSE BLD STRIP.AUTO-MCNC: 152 MG/DL (ref 70–110)
GLUCOSE BLD STRIP.AUTO-MCNC: 182 MG/DL (ref 70–110)
GLUCOSE BLD STRIP.AUTO-MCNC: 200 MG/DL (ref 70–110)
GLUCOSE SERPL-MCNC: 149 MG/DL (ref 74–99)
HCT VFR BLD AUTO: 22.6 % (ref 35–45)
HGB BLD-MCNC: 7.1 G/DL (ref 12–16)
IMM GRANULOCYTES # BLD AUTO: 0.1 K/UL (ref 0–0.04)
IMM GRANULOCYTES NFR BLD AUTO: 1 % (ref 0–0.5)
LYMPHOCYTES # BLD: 0.9 K/UL (ref 0.9–3.6)
LYMPHOCYTES NFR BLD: 15 % (ref 21–52)
MCH RBC QN AUTO: 23.4 PG (ref 24–34)
MCHC RBC AUTO-ENTMCNC: 31.4 G/DL (ref 31–37)
MCV RBC AUTO: 74.3 FL (ref 78–100)
MONOCYTES # BLD: 0.6 K/UL (ref 0.05–1.2)
MONOCYTES NFR BLD: 11 % (ref 3–10)
NEUTS SEG # BLD: 4.4 K/UL (ref 1.8–8)
NEUTS SEG NFR BLD: 73 % (ref 40–73)
NRBC # BLD: 0 K/UL (ref 0–0.01)
NRBC BLD-RTO: 0 PER 100 WBC
PERIPHERAL SMEAR,PSM: NORMAL
PLATELET # BLD AUTO: 282 K/UL (ref 135–420)
PMV BLD AUTO: 10.7 FL (ref 9.2–11.8)
POTASSIUM SERPL-SCNC: 5.1 MMOL/L (ref 3.5–5.5)
RBC # BLD AUTO: 3.04 M/UL (ref 4.2–5.3)
SODIUM SERPL-SCNC: 140 MMOL/L (ref 136–145)
VANCOMYCIN SERPL-MCNC: 14.3 UG/ML (ref 5–40)
WBC # BLD AUTO: 6 K/UL (ref 4.6–13.2)

## 2022-10-27 PROCEDURE — 74011250637 HC RX REV CODE- 250/637: Performed by: PHYSICIAN ASSISTANT

## 2022-10-27 PROCEDURE — 74011250637 HC RX REV CODE- 250/637: Performed by: INTERNAL MEDICINE

## 2022-10-27 PROCEDURE — 97164 PT RE-EVAL EST PLAN CARE: CPT

## 2022-10-27 PROCEDURE — 05HB33Z INSERTION OF INFUSION DEVICE INTO RIGHT BASILIC VEIN, PERCUTANEOUS APPROACH: ICD-10-PCS | Performed by: STUDENT IN AN ORGANIZED HEALTH CARE EDUCATION/TRAINING PROGRAM

## 2022-10-27 PROCEDURE — 82962 GLUCOSE BLOOD TEST: CPT

## 2022-10-27 PROCEDURE — 2709999900 HC NON-CHARGEABLE SUPPLY

## 2022-10-27 PROCEDURE — 94640 AIRWAY INHALATION TREATMENT: CPT

## 2022-10-27 PROCEDURE — 36415 COLL VENOUS BLD VENIPUNCTURE: CPT

## 2022-10-27 PROCEDURE — 80048 BASIC METABOLIC PNL TOTAL CA: CPT

## 2022-10-27 PROCEDURE — 85025 COMPLETE CBC W/AUTO DIFF WBC: CPT

## 2022-10-27 PROCEDURE — 74011250636 HC RX REV CODE- 250/636: Performed by: PHYSICIAN ASSISTANT

## 2022-10-27 PROCEDURE — 36410 VNPNXR 3YR/> PHY/QHP DX/THER: CPT

## 2022-10-27 PROCEDURE — 74011000258 HC RX REV CODE- 258: Performed by: INTERNAL MEDICINE

## 2022-10-27 PROCEDURE — 74011000250 HC RX REV CODE- 250: Performed by: PHYSICIAN ASSISTANT

## 2022-10-27 PROCEDURE — 65270000029 HC RM PRIVATE

## 2022-10-27 PROCEDURE — 74011636637 HC RX REV CODE- 636/637: Performed by: PHYSICIAN ASSISTANT

## 2022-10-27 PROCEDURE — 74011250636 HC RX REV CODE- 250/636: Performed by: INTERNAL MEDICINE

## 2022-10-27 PROCEDURE — 80202 ASSAY OF VANCOMYCIN: CPT

## 2022-10-27 PROCEDURE — 97168 OT RE-EVAL EST PLAN CARE: CPT

## 2022-10-27 RX ORDER — COSYNTROPIN 0.25 MG/ML
0.25 INJECTION, POWDER, FOR SOLUTION INTRAMUSCULAR; INTRAVENOUS ONCE
Status: DISPENSED | OUTPATIENT
Start: 2022-10-27 | End: 2022-10-28

## 2022-10-27 RX ADMIN — VANCOMYCIN HYDROCHLORIDE 750 MG: 750 INJECTION, POWDER, LYOPHILIZED, FOR SOLUTION INTRAVENOUS at 23:11

## 2022-10-27 RX ADMIN — HYDROCODONE BITARTRATE AND ACETAMINOPHEN 1 TABLET: 5; 325 TABLET ORAL at 13:33

## 2022-10-27 RX ADMIN — VANCOMYCIN HYDROCHLORIDE 750 MG: 750 INJECTION, POWDER, LYOPHILIZED, FOR SOLUTION INTRAVENOUS at 07:46

## 2022-10-27 RX ADMIN — MEGESTROL ACETATE 200 MG: 40 SUSPENSION ORAL at 18:47

## 2022-10-27 RX ADMIN — Medication 100 MG: at 08:40

## 2022-10-27 RX ADMIN — ENOXAPARIN SODIUM 40 MG: 100 INJECTION SUBCUTANEOUS at 08:41

## 2022-10-27 RX ADMIN — ACETAMINOPHEN 650 MG: 325 TABLET, FILM COATED ORAL at 18:47

## 2022-10-27 RX ADMIN — PIPERACILLIN AND TAZOBACTAM 3.38 G: 3; .375 INJECTION, POWDER, FOR SOLUTION INTRAVENOUS at 18:48

## 2022-10-27 RX ADMIN — ASPIRIN 81 MG 81 MG: 81 TABLET ORAL at 08:41

## 2022-10-27 RX ADMIN — CLOPIDOGREL BISULFATE 75 MG: 75 TABLET ORAL at 08:40

## 2022-10-27 RX ADMIN — POLYETHYLENE GLYCOL 3350 34 G: 17 POWDER, FOR SOLUTION ORAL at 08:45

## 2022-10-27 RX ADMIN — ATORVASTATIN CALCIUM 40 MG: 40 TABLET, FILM COATED ORAL at 23:10

## 2022-10-27 RX ADMIN — SODIUM CHLORIDE, PRESERVATIVE FREE 10 ML: 5 INJECTION INTRAVENOUS at 23:11

## 2022-10-27 RX ADMIN — POLYETHYLENE GLYCOL 3350 17 G: 17 POWDER, FOR SOLUTION ORAL at 08:41

## 2022-10-27 RX ADMIN — DIPHENHYDRAMINE HYDROCHLORIDE 25 MG: 25 CAPSULE ORAL at 08:52

## 2022-10-27 RX ADMIN — Medication 2 UNITS: at 23:10

## 2022-10-27 RX ADMIN — METOCLOPRAMIDE 10 MG: 5 TABLET ORAL at 08:40

## 2022-10-27 RX ADMIN — PIPERACILLIN AND TAZOBACTAM 3.38 G: 3; .375 INJECTION, POWDER, FOR SOLUTION INTRAVENOUS at 01:56

## 2022-10-27 RX ADMIN — PANTOPRAZOLE SODIUM 40 MG: 40 TABLET, DELAYED RELEASE ORAL at 08:41

## 2022-10-27 RX ADMIN — LUBIPROSTONE 8 MCG: 8 CAPSULE, GELATIN COATED ORAL at 08:40

## 2022-10-27 RX ADMIN — BUDESONIDE 500 MCG: 0.5 SUSPENSION RESPIRATORY (INHALATION) at 20:40

## 2022-10-27 RX ADMIN — MEGESTROL ACETATE 200 MG: 40 SUSPENSION ORAL at 08:40

## 2022-10-27 RX ADMIN — DIPHENHYDRAMINE HYDROCHLORIDE 25 MG: 25 CAPSULE ORAL at 18:47

## 2022-10-27 RX ADMIN — ARFORMOTEROL TARTRATE 15 MCG: 15 SOLUTION RESPIRATORY (INHALATION) at 08:12

## 2022-10-27 RX ADMIN — Medication 4 UNITS: at 16:45

## 2022-10-27 RX ADMIN — ACETAMINOPHEN 650 MG: 325 TABLET, FILM COATED ORAL at 08:52

## 2022-10-27 RX ADMIN — MEGESTROL ACETATE 200 MG: 40 SUSPENSION ORAL at 13:33

## 2022-10-27 RX ADMIN — THERA TABS 1 TABLET: TAB at 08:41

## 2022-10-27 RX ADMIN — POLYETHYLENE GLYCOL 3350 17 G: 17 POWDER, FOR SOLUTION ORAL at 08:44

## 2022-10-27 RX ADMIN — SODIUM CHLORIDE, PRESERVATIVE FREE 10 ML: 5 INJECTION INTRAVENOUS at 06:05

## 2022-10-27 RX ADMIN — Medication 20 UNITS: at 23:10

## 2022-10-27 RX ADMIN — METOCLOPRAMIDE 10 MG: 5 TABLET ORAL at 18:59

## 2022-10-27 RX ADMIN — DULOXETINE 30 MG: 30 CAPSULE, DELAYED RELEASE ORAL at 08:40

## 2022-10-27 RX ADMIN — HYDROCODONE BITARTRATE AND ACETAMINOPHEN 1 TABLET: 5; 325 TABLET ORAL at 23:15

## 2022-10-27 RX ADMIN — ARFORMOTEROL TARTRATE 15 MCG: 15 SOLUTION RESPIRATORY (INHALATION) at 20:40

## 2022-10-27 RX ADMIN — BUDESONIDE 500 MCG: 0.5 SUSPENSION RESPIRATORY (INHALATION) at 08:12

## 2022-10-27 NOTE — PROGRESS NOTES
Problem: Self Care Deficits Care Plan (Adult)  Goal: *Acute Goals and Plan of Care (Insert Text)  Description: Occupational Therapy Goals  Initiated 10/20/2022 within 7 day(s). 1.  Patient will perform lower body dressing with modified independence. 2.  Patient will perform functional activity standing > 2 minutes with modified independence, F+ balance. 3.  Patient will perform toilet transfers with modified independence. 4.  Patient will perform all aspects of toileting with modified independence. 5.  Patient will utilize energy conservation techniques during functional activities with verbal cues. Prior Level of Function: Patient needed assist self-care prn and used a rollator for functional mobility PTA. Outcome: Resolved/Met   OCCUPATIONAL THERAPY RE-EVALUATION/DISCHARGE    Patient: Merline Puna (88 y.o. female)  Date: 10/27/2022  Primary Diagnosis: Colitis [K52.9]  Procedure(s) (LRB):  UPPER ENDOSCOPY/ Biopsies (N/A) 6 Days Post-Op   Precautions:   Fall, Skin  ASSESSMENT AND RECOMMENDATIONS:  Nursing/RN cleared for pt to participate in OT re-evaluation and tx session. Pt presents sitting on edge of bed, agreeable to participate in toilet transfer w/o AD, good balance and washing hands in stance at sink w/ Mod I, pt reports performing ADLs ad mariana in bathroom previous day w/ Mod I. LB dressing: Mod I doff and don slipper socks seated edge of bed with good balance. Pt has met maximal functional potential and is now discharged from OT services. Call bell within reach. Further Equipment Recommendations for Discharge: shower chair     AMPAC: At this time and based on an AM-PAC score of 24/24, no further OT is recommended upon discharge due to (i.e. patient at baseline functional statusetc). Recommend patient returns to prior setting with prior services.      This AMPAC score should be considered in conjunction with interdisciplinary team recommendations to determine the most appropriate discharge setting. Patient's social support, diagnosis, medical stability, and prior level of function should also be taken into consideration. SUBJECTIVE:   Patient stated I took a bath yesterday, I'll wait to do it tomorrow.     OBJECTIVE DATA SUMMARY:     Past Medical History:   Diagnosis Date    Arthritis     \"generalized\"    Asthma     CAD in native artery     NSTEMI (Sep 2014); diffuse 65% pLAD, minimal disease RCA & LCx. pLAD FFR 0.93. LV  no RWMA (echo and LV angio)    Chronic neck pain     Chronic pain     left knee    Chronic pain syndrome     COPD (chronic obstructive pulmonary disease) (HCC)     Depression     Diabetes (HCC)     Diverticulitis     GERD (gastroesophageal reflux disease)     Heart disease     Hidradenitis 9/11/2014    Hypertension     Left knee pain     Narcotic dependence ([facility] [address]     Pneumonia     Right wrist pain     Sarcoidosis      Past Surgical History:   Procedure Laterality Date    BRONCHOSCOPY  10/2019    HX BREAST BIOPSY Right     9/10/14: She said tag in place from [address]  2005    HX HYSTERECTOMY      HX KNEE ARTHROSCOPY Left      Barriers to Learning/Limitations: None  Compensate with: visual, verbal, tactile, kinesthetic cues/model    Home Situation:   Home Situation  Home Environment: Apartment  # Steps to Enter:  (elevator)  One/Two Story Residence: One story  Living Alone: No  Support Systems: Spouse/Significant Other, Other Family Member(s)  Patient Expects to be Discharged to[de-identified] Home with home health  Current DME Used/Available at Home: Elnoria Jewel, rollator, Commode, bedside  Tub or Shower Type: Shower  []     Right hand dominant   []     Left hand dominant    Cognitive/Behavioral Status:  Neurologic State: Alert     Skin: appears intact  Edema:  none noted    Vision/Perceptual:  appears intact, able to tell correct time on wall clock    Coordination: BUE  Coordination: Within functional limits  Fine Motor Skills-Upper: Left Intact; Right Intact Gross Motor Skills-Upper: Left Intact; Right Intact    Balance:  Sitting: Intact  Standing: Intact; Without support  Standing - Static: Good  Standing - Dynamic : Good    Strength: BUE  Strength: Within functional limits     Tone & Sensation: BUE  Tone: Normal     Range of Motion: BUE  AROM: Within functional limits     Functional Mobility and Transfers for ADLs:  Bed Mobility:     Supine to Sit: Modified independent  Sit to Supine: Modified independent  Scooting: Modified independent  Transfers:  Sit to Stand: Modified independent  Stand to Sit: Modified independent          Toilet Transfer : Modified independent      Bathroom Mobility: Modified independent      ADL Intervention:  Grooming  Position Performed: Standing  Washing Hands: Modified independent      Lower Body Dressing Assistance  Socks: Modified independent  Leg Crossed Method Used: Yes  Position Performed: Seated edge of bed    Pain:  Pain level pre-treatment: 0/10   Pain level post-treatment: 0/10     Activity Tolerance:   good  Please refer to the flowsheet for vital signs taken during this treatment. After treatment:   []  Patient left in no apparent distress sitting up in chair  [x]  Patient left in no apparent distress in bed  [x]  Call bell left within reach  [x]  Nursing notified  []  Caregiver present  []  Bed alarm activated    COMMUNICATION/EDUCATION:   [x]      Role of Occupational Therapy in the acute care setting  [x]      Home safety education was provided and the patient/caregiver indicated understanding. [x]      Patient/family have participated as able and agree with findings and recommendations. []      Patient is unable to participate in plan of care at this time.     Thank you for this referral.  Radha Obregon  Time Calculation: 8 mins    May Pouch AM-PAC® Daily Activity Inpatient Short Form (6-Clicks)*    How much HELP from another person does the patient currently need    (If the patient hasn't done an activity recently, how much help from another person do you think he/she would need if he/she tried?)   Total (Total A or Dep)   A Lot  (Mod to Max A)   A Little (Sup or Min A)   None (Mod I to I)   Putting on and taking off regular lower body clothing? [] 1 [] 2 [] 3 [x] 4   2. Bathing (including washing, rinsing,      drying)? [] 1 [] 2 [] 3 [x] 4   3. Toileting, which includes using toilet, bedpan or urinal?   [] 1 [] 2 [] 3 [x] 4   4. Putting on and taking off regular upper body clothing? [] 1 [] 2 [] 3 [x] 4   5. Taking care of personal grooming such as brushing teeth? [] 1 [] 2 [] 3 [x] 4   6. Eating meals?    [] 1 [] 2 [] 3 [x] 4

## 2022-10-27 NOTE — CONSULTS
Pt examined. Chart reviewed. Pt's condition represents a difficult diagnostic challenge. I think the preponderance of data suggests she does not have adrenal insufficiency. She does have protein calorie malnutrition, and his could explain her inanition and anemia. An underlying cocaine addiction may also be a factor, though the drug screen  for it is negative now. She was positive numerous times from 2015 through 2018 however. She might have pituitary insufficiency, which will be tested for, and/or a recrudescence of her sarcoidosis. I would refrain from giving her glucocorticoids at this point. Further testing may be revelatory, and at present she is gaining weight with the nutrition she has been receiving here. Recommend:    Testing: see orders  Rx: Continue adequate nutrition. No meds to add at this point.

## 2022-10-27 NOTE — PROGRESS NOTES
[address] [facility] Pharmacokinetic Monitoring Service - Vancomycin    Consulting Provider: Courtney Mehta DO  Indication: Intra-Abdominal Infection  Target Concentration: Goal AUC/VINCENT 400-600 mg*hr/L  Day of Therapy: 6  Additional Antimicrobials: Zosyn    Pertinent Laboratory Values: Wt Readings from Last 1 Encounters:   10/22/22 41.8 kg (92 lb 3.2 oz)     Temp Readings from Last 1 Encounters:   10/26/22 97 °F (36.1 °C)     Recent Labs     10/27/22  0044 10/26/22  0601 10/25/22  0532   CREA 0.94 0.69 0.78   BUN 26* 27* 26*     Estimated Creatinine Clearance: 36.7 mL/min (based on SCr of 0.94 mg/dL). Recent Labs     10/26/22  0601 10/25/22  0532   WBC 6.2 6.1       Pertinent Cultures:  Culture Date Source Results   10/22 Blood No growth        MRSA Nasal Swab: N/A. Non-respiratory infection. .      Assessment:  Date/Time Current Dose Concentration (mg/L) Timing of Concentration (h) AUC   10/22 1,000 mg x1 - - -   10/23 750 mg q18h - - -   10/24 750 mg q18h 5.4 31  535   10/25 750 mg q18h - - -   10/26 750 mg q18h - - -   10/27  14.3 12 429          Note: Serum concentrations collected for AUC dosing may appear elevated if collected in close proximity to the dose administered, this is not necessarily an indication of toxicity    Plan:  Current dosing regimen is therapeutic  Continue Vancomycin 750 mg IV q24h  Pharmacy will continue to monitor patient and adjust therapy as indicated    Thank you for the consult,  GABRIEL Pace  10/27/2022 3:51 AM

## 2022-10-27 NOTE — PROGRESS NOTES
Attempted to see patient for PT treatment however she refused due to not feeling well. Will continue to follow.   Giovanna Sullivan, PT

## 2022-10-27 NOTE — PROGRESS NOTES
Problem: Mobility Impaired (Adult and Pediatric)  Goal: *Acute Goals and Plan of Care (Insert Text)  Description: Physical Therapy Goals  Initiated 10/20/2022 and to be accomplished within 7 day(s)- met 10/27/22  1. Patient will move from supine to sit and sit to supine , scoot up and down, and roll side to side in bed with modified independence. 2.  Patient will transfer from bed to chair and chair to bed with modified independence using the least restrictive device. 3.  Patient will perform sit to stand with modified independence. 4.  Patient will ambulate with modified independence for 75 feet with the least restrictive device. 5.  Patient will participate in LE exercise to tolerance. PLOF: Pt lives with  in apartment with elevator access. Pt has been passing out 2 times per week over the last several months, falling to floor and hitting head at times. Pt has a rollator and BSC, will sit in rollator if needed. Outcome: Resolved/Met   PHYSICAL THERAPY RE-EVALUATION AND DISCHARGE    Patient: Michael Romero (54 y.o. female)  Date: 10/27/2022  Primary Diagnosis: Colitis [K52.9]  Procedure(s) (LRB):  UPPER ENDOSCOPY/ Biopsies (N/A) 6 Days Post-Op   Precautions:   Fall, Skin    PLOF: see above    ASSESSMENT :  Based on the objective data described below, the patient presents at a mod I level with ambulation and transfers using her rollator which is her baseline. Pt has been cleared to mobilize ad mariana on the unit. Patient does not require further skilled intervention at this level of care. PLAN :  Recommendations and Planned Interventions:   No formal PT needs identified at this time. Further Equipment Recommendations for Discharge: N/A    AMPAC: At this time and based on an AM-PAC score of 20/20 omitting stairs, no further PT is recommended upon discharge due to patient at baseline functional mobility.   Recommend patient returns to prior setting with prior services. [facility] should be considered in conjunction with interdisciplinary team recommendations to determine the most appropriate discharge setting. Patient's social support, diagnosis, medical stability, and prior level of function should also be taken into consideration. SUBJECTIVE:   Patient stated My knee is sore today.     OBJECTIVE DATA SUMMARY:   Hospital course since last seen and reason for re-evaluation: Pt continues to stabilize medically  Past Medical History:   Diagnosis Date    Arthritis     \"generalized\"    Asthma     CAD in native artery     NSTEMI (Sep 2014); diffuse 65% pLAD, minimal disease RCA & LCx. pLAD FFR 0.93.  LV  no RWMA (echo and LV angio)    Chronic neck pain     Chronic pain     left knee    Chronic pain syndrome     COPD (chronic obstructive pulmonary disease) (HCC)     Depression     Diabetes (HCC)     Diverticulitis     GERD (gastroesophageal reflux disease)     Heart disease     Hidradenitis 9/11/2014    Hypertension     Left knee pain     Narcotic dependence ([facility] [address]     Pneumonia     Right wrist pain     Sarcoidosis      Past Surgical History:   Procedure Laterality Date    BRONCHOSCOPY  10/2019    HX BREAST BIOPSY Right     9/10/14: She said tag in place from [address]  2005    HX HYSTERECTOMY      HX KNEE ARTHROSCOPY Left      Barriers to Learning/Limitations: None  Compensate with: N/A  Home Situation:   Home Situation  Home Environment: Apartment  # Steps to Enter:  (elevator)  One/Two Story Residence: One story  Living Alone: No  Support Systems: Spouse/Significant Other, Other Family Member(s)  Patient Expects to be Discharged to[de-identified] Home with home health  Current DME Used/Available at Home: Elverna Corti, rollator, Commode, bedside  Tub or Shower Type: Shower  Critical Behavior:   Calm and cooperative     Strength:    Strength: Generally decreased, functional        Tone & Sensation:   Tone: Normal   Sensation: Intact     Range Of Motion:  AROM: Within functional limits  Functional Mobility:  Bed Mobility:  Rolling: Modified independent  Supine to Sit: Modified independent  Sit to Supine: Modified independent  Scooting: Modified independent  Transfers:  Sit to Stand: Independent  Stand to Sit: Independent  Balance:   Sitting: Intact  Standing: Intact; With support  Standing - Static: Good  Standing - Dynamic : Good    Ambulation/Gait Training:  Distance (ft): 200 Feet (ft)  Assistive Device: Walker, rollator  Ambulation - Level of Assistance: Modified independent  Pain:  Pain level pre-treatment: 3/10   Pain level post-treatment: 3/10   Pain Intervention(s):  Rest,  Repositioning   Response to intervention: Nurse notified, See doc flow    Activity Tolerance:   fair  Please refer to the flowsheet for vital signs taken during this treatment. After treatment:   []         Patient left in no apparent distress sitting up in chair  [x]         Patient left in no apparent distress in bed  [x]         Call bell left within reach  [x]         Nursing notified  [x]         Caregiver present  []         Bed alarm activated  []         SCDs applied    COMMUNICATION/EDUCATION:   [x]         Role of Physical Therapy in the acute care setting. [x]         Fall prevention education was provided and the patient/caregiver indicated understanding. [x]         Patient/family have participated as able in goal setting and plan of care. [x]         Patient/family agree to work toward stated goals and plan of care. []         Patient understands intent and goals of therapy, but is neutral about his/her participation. []         Patient is unable to participate in goal setting/plan of care: ongoing with therapy staff.  []         Other:     Thank you for this referral.  Snehal Cm, PT   Time Calculation: 13 mins    Anabella Gould AM-PACCullen Basic Mobility Inpatient Short Form (6-Clicks) Version 2    How much HELP from another person does the patient currently need    (If the patient hasn't done an activity recently, how much help from another person do you think he/she would need if he/she tried?)   Total (Total A or Dep)   A Lot  (Mod to Max A)   A Little (Sup or Min A)   None (Mod I to I)   Turning from your back to your side while in a flat bed without using bedrails? [] 1 [] 2 [] 3 [x] 4   2. Moving from lying on your back to sitting on the side of a flat bed without using bedrails? [] 1 [] 2 [] 3 [x] 4   3. Moving to and from a bed to a chair (including a wheelchair)? [] 1 [] 2 [] 3 [x] 4   4. Standing up from a chair using your arms (e.g., wheelchair, or bedside chair)? [] 1 [] 2 [] 3 [x] 4   5. Walking in hospital room? [] 1 [] 2 [] 3 [x] 4   6. Climbing 3-5 steps with a railing?+   [] 1 [] 2 [] 3 [] 4   +If stair climbing cannot be assessed, skip item #6. Sum responses from items 1-5. At this time and based on an AM-PAC score of 20/20 omitting stairs, no further PT is recommended upon discharge due to patient at baseline functional mobility. Recommend patient returns to prior setting with prior services.

## 2022-10-27 NOTE — PROGRESS NOTES
[facility] Hospitalist Group  Progress Note    Patient: Mary Azul Age: 81 y.o. : 1950 MR#: [id number] SSN: [id number]  Date/Time: 10/27/2022     Subjective:   Abdomianl pain is persistent. Pt requesting increase of pain medications. Also reporting chronic L knee pain. Started when she fell on it months ago. Review of systems  General: No fevers or chills. Cardiovascular: No chest pain or pressure. No palpitations. Pulmonary: No shortness of breath, cough or wheeze. Gastrointestinal: abdominal pain, no nausea, vomiting or diarrhea. Genitourinary: No urinary frequency, urgency, hesitancy or dysuria. Musculoskeletal: No joint or muscle pain, no back pain, no recent trauma. Neurologic: No headache, numbness, tingling or weakness. Assessment/Plan:   Hypotension: resolved, had lactic acidosis as well, consider poor po intake vs. Sepsis. Treated with vanc zosyn, however no infectious workup positive yet. Consider d/c abx tomorrow as it'll be a 5 day course. Negative Blood, urine. Negative RVP. Consider adrenal insufficiency as etiology that could explain soft pressures, abdominal pain, hyperk, weight loss. Morning cortisol 4 which is highly specific for adrenal insufficiency. Stim test with equivical results, cortisol at 30 minutes dropped significantly? Not physiologic. Will repeat test. Endo consulted. Microcytic anemia: iron sat normal. No bloody BM reported. Still pending colo for workup. Consider anemia of chronic disease. Lead overload r/o. Thalassemia, but less likely as this would have shown up earlier in life. ETOH another etiology, but none reported. Will continue to trend h/h and if stable can d/c and follow up outpt. Will transfuse Hg <7.   2.   AMBROCIO: resolved. Likely prerenal given poor PO intake. 3.   Unintended weight loss: Workup as outpt with colo pending. Nutrition consulted. Started megace.    4.   LLQ pain: No structural abnormalities noted on CT. Possibly neuropathic. Also considered chronic consitpation. GI consulted, appreciate assitance. Started amitiza. GI ordered mesentric doppler. Adrenal workup as above. Switched to po reglan. 10 BID for 2 weeks and then 10Hs per GI.  5.   Chronic respiratory failure with hypoxia: hx of COPD, continue home meds. O2 goal >88%. 6.   Type 2 DM: Blood glucose goal 140-180, continue basal bolus dosing. 7. HTN: holding home BP meds with periods of hypotenison. 8.  Gastroparesis: GI started reglan. Case discussed with:  [x]Patient  []Family  []Nursing  []Case Management  DVT Prophylaxis:  [x]Lovenox  []Hep SQ  []SCDs  []Coumadin   []Eliquis/Xarelto     Objective:   VS: Visit Vitals  BP (!) 161/88 (BP 1 Location: Left upper arm, BP Patient Position: Sitting)   Pulse (!) 115   Temp 98.4 °F (36.9 °C)   Resp 16   Ht 5' 2\" (1.575 m)   Wt 40 kg (88 lb 3.2 oz)   SpO2 100%   Breastfeeding No   BMI 16.13 kg/m²        Tmax/24hrs: Temp (24hrs), Av °F (36.7 °C), Min:97 °F (36.1 °C), Max:98.4 °F (36.9 °C)  IOBRIEFNo intake or output data in the 24 hours ending 10/27/22 1505      General:  Alert, cooperative, no acute distress    HEENT: PERRLA, anicteric sclerae. Pulmonary:  CTA Bilaterally. No Wheezing/Rales. Cardiovascular: Regular rate and Rhythm. GI:  Soft, Non distended, Tender to RLQ and LLQ. + Bowel sounds. Extremities:  No edema. No calf tenderness. Psych: Good insight. Not anxious or agitated. Neurologic: Alert and oriented X 3. Moves all ext.   Additional:    Medications:   Current Facility-Administered Medications   Medication Dose Route Frequency    cosyntropin (CORTROSYN) injection 0.25 mg  0.25 mg IntraVENous ONCE    lubiPROStone (AMITIZA) capsule 8 mcg  8 mcg Oral DAILY WITH BREAKFAST    pantoprazole (PROTONIX) tablet 40 mg  40 mg Oral DAILY    metoclopramide HCl (REGLAN) tablet 10 mg  10 mg Oral ACB&D    hydrALAZINE (APRESOLINE) 20 mg/mL injection 10 mg  10 mg IntraVENous Q6H PRN piperacillin-tazobactam (ZOSYN) 3.375 g in 0.9% sodium chloride (MBP/ADV) 100 mL MBP  3.375 g IntraVENous Q8H    vancomycin (VANCOCIN) 750 mg in 0.9% sodium chloride 250 mL (VIAL-MATE)  750 mg IntraVENous Q18H    polyethylene glycol (MIRALAX) packet 34 g  34 g Oral DAILY    therapeutic multivitamin (THERAGRAN) tablet 1 Tablet  1 Tablet Oral DAILY    thiamine HCL (B-1) tablet 100 mg  100 mg Oral DAILY    diphenhydrAMINE (BENADRYL) capsule 25 mg  25 mg Oral Q4H PRN    morphine injection 1 mg  1 mg IntraVENous Q3H PRN    sodium chloride (NS) flush 5-40 mL  5-40 mL IntraVENous Q8H    sodium chloride (NS) flush 5-40 mL  5-40 mL IntraVENous PRN    acetaminophen (TYLENOL) tablet 650 mg  650 mg Oral Q6H PRN    Or    acetaminophen (TYLENOL) suppository 650 mg  650 mg Rectal Q6H PRN    polyethylene glycol (MIRALAX) packet 17 g  17 g Oral DAILY PRN    bisacodyL (DULCOLAX) suppository 10 mg  10 mg Rectal DAILY PRN    ondansetron (ZOFRAN ODT) tablet 4 mg  4 mg Oral Q8H PRN    Or    ondansetron (ZOFRAN) injection 4 mg  4 mg IntraVENous Q6H PRN    enoxaparin (LOVENOX) injection 40 mg  40 mg SubCUTAneous DAILY    HYDROcodone-acetaminophen (NORCO) 5-325 mg per tablet 1 Tablet  1 Tablet Oral Q6H PRN    albuterol-ipratropium (DUO-NEB) 2.5 MG-0.5 MG/3 ML  3 mL Nebulization Q4H PRN    aspirin chewable tablet 81 mg  81 mg Oral DAILY    atorvastatin (LIPITOR) tablet 40 mg  40 mg Oral QHS    clopidogreL (PLAVIX) tablet 75 mg  75 mg Oral DAILY    DULoxetine (CYMBALTA) capsule 30 mg  30 mg Oral DAILY    insulin glargine (LANTUS) injection 20 Units  20 Units SubCUTAneous QHS    insulin lispro (HUMALOG) injection   SubCUTAneous AC&HS    glucose chewable tablet 16 g  4 Tablet Oral PRN    glucagon (GLUCAGEN) injection 1 mg  1 mg IntraMUSCular PRN    dextrose 10% infusion 0-250 mL  0-250 mL IntraVENous PRN    dicyclomine (BENTYL) capsule 10 mg  10 mg Oral QID PRN    megestroL (MEGACE) 400 mg/10 mL (10 mL) oral suspension 200 mg  200 mg Oral TID WITH MEALS    budesonide (PULMICORT) 500 mcg/2 ml nebulizer suspension  500 mcg Nebulization BID RT    And    arformoteroL (BROVANA) neb solution 15 mcg  15 mcg Nebulization BID RT       Labs:    Recent Results (from the past 24 hour(s))   COSYNTROPIN ACTH STIMULATION TEST    Collection Time: 10/26/22  3:28 PM   Result Value Ref Range    Cortisol, baseline 18.7 ug/dL    Cortisol, 30 min. 5.8 ug/dL    Cortisol, 60 min. 30.1 ug/dL   GLUCOSE, POC    Collection Time: 10/26/22  4:19 PM   Result Value Ref Range    Glucose (POC) 110 70 - 110 mg/dL   GLUCOSE, POC    Collection Time: 10/26/22 10:42 PM   Result Value Ref Range    Glucose (POC) 260 (H) 70 - 110 mg/dL   VANCOMYCIN, RANDOM    Collection Time: 10/27/22 12:44 AM   Result Value Ref Range    Vancomycin, random 14.3 5.0 - 40.0 UG/ML   CBC WITH AUTOMATED DIFF    Collection Time: 10/27/22 12:44 AM   Result Value Ref Range    WBC 6.0 4.6 - 13.2 K/uL    RBC 3.04 (L) 4.20 - 5.30 M/uL    HGB 7.1 (L) 12.0 - 16.0 g/dL    HCT 22.6 (L) 35.0 - 45.0 %    MCV 74.3 (L) 78.0 - 100.0 FL    MCH 23.4 (L) 24.0 - 34.0 PG    MCHC 31.4 31.0 - 37.0 g/dL    RDW 16.5 (H) 11.6 - 14.5 %    PLATELET 530 022 - 206 K/uL    MPV 10.7 9.2 - 11.8 FL    NRBC 0.0 0  WBC    ABSOLUTE NRBC 0.00 0.00 - 0.01 K/uL    NEUTROPHILS 73 40 - 73 %    LYMPHOCYTES 15 (L) 21 - 52 %    MONOCYTES 11 (H) 3 - 10 %    EOSINOPHILS 0 0 - 5 %    BASOPHILS 0 0 - 2 %    IMMATURE GRANULOCYTES 1 (H) 0.0 - 0.5 %    ABS. NEUTROPHILS 4.4 1.8 - 8.0 K/UL    ABS. LYMPHOCYTES 0.9 0.9 - 3.6 K/UL    ABS. MONOCYTES 0.6 0.05 - 1.2 K/UL    ABS. EOSINOPHILS 0.0 0.0 - 0.4 K/UL    ABS. BASOPHILS 0.0 0.0 - 0.1 K/UL    ABS. IMM.  GRANS. 0.1 (H) 0.00 - 0.04 K/UL    DF AUTOMATED     METABOLIC PANEL, BASIC    Collection Time: 10/27/22 12:44 AM   Result Value Ref Range    Sodium 140 136 - 145 mmol/L    Potassium 5.1 3.5 - 5.5 mmol/L    Chloride 114 (H) 100 - 111 mmol/L    CO2 20 (L) 21 - 32 mmol/L    Anion gap 6 3.0 - 18 mmol/L    Glucose 149 (H) 74 - 99 mg/dL    BUN 26 (H) 7.0 - 18 MG/DL    Creatinine 0.94 0.6 - 1.3 MG/DL    BUN/Creatinine ratio 28 (H) 12 - 20      eGFR >60 >60 ml/min/1.73m2    Calcium 8.7 8.5 - 10.1 MG/DL   GLUCOSE, POC    Collection Time: 10/27/22  9:21 AM   Result Value Ref Range    Glucose (POC) 152 (H) 70 - 110 mg/dL   GLUCOSE, POC    Collection Time: 10/27/22 11:41 AM   Result Value Ref Range    Glucose (POC) 138 (H) 70 - 110 mg/dL       Signed By: Letty Brown     October 27, 2022      Disclaimer: Sections of this note are dictated using utilizing voice recognition software. Minor typographical errors may be present. If questions arise, please do not hesitate to contact me or call our department.

## 2022-10-28 VITALS
WEIGHT: 88.2 LBS | HEART RATE: 108 BPM | BODY MASS INDEX: 16.23 KG/M2 | RESPIRATION RATE: 16 BRPM | DIASTOLIC BLOOD PRESSURE: 87 MMHG | HEIGHT: 62 IN | SYSTOLIC BLOOD PRESSURE: 173 MMHG | OXYGEN SATURATION: 100 % | TEMPERATURE: 98.2 F

## 2022-10-28 LAB
ALBUMIN SERPL-MCNC: 4.1 G/DL (ref 3.4–5)
ANION GAP SERPL CALC-SCNC: 9 MMOL/L (ref 3–18)
BACTERIA SPEC CULT: NORMAL
BACTERIA SPEC CULT: NORMAL
BASOPHILS # BLD: 0 K/UL (ref 0–0.1)
BASOPHILS NFR BLD: 0 % (ref 0–2)
BUN SERPL-MCNC: 31 MG/DL (ref 7–18)
BUN/CREAT SERPL: 33 (ref 12–20)
CALCIUM SERPL-MCNC: 9.1 MG/DL (ref 8.5–10.1)
CHLORIDE SERPL-SCNC: 111 MMOL/L (ref 100–111)
CO2 SERPL-SCNC: 20 MMOL/L (ref 21–32)
CORTIS SERPL-MCNC: 7.6 UG/DL
CREAT SERPL-MCNC: 0.93 MG/DL (ref 0.6–1.3)
DIFFERENTIAL METHOD BLD: ABNORMAL
EOSINOPHIL # BLD: 0 K/UL (ref 0–0.4)
EOSINOPHIL NFR BLD: 0 % (ref 0–5)
ERYTHROCYTE [DISTWIDTH] IN BLOOD BY AUTOMATED COUNT: 17.3 % (ref 11.6–14.5)
FSH SERPL-ACNC: 44.2 MIU/ML
GLUCOSE BLD STRIP.AUTO-MCNC: 128 MG/DL (ref 70–110)
GLUCOSE BLD STRIP.AUTO-MCNC: 198 MG/DL (ref 70–110)
GLUCOSE SERPL-MCNC: 126 MG/DL (ref 74–99)
HCT VFR BLD AUTO: 30.1 % (ref 35–45)
HGB BLD-MCNC: 9.3 G/DL (ref 12–16)
IMM GRANULOCYTES # BLD AUTO: 0.1 K/UL (ref 0–0.04)
IMM GRANULOCYTES NFR BLD AUTO: 1 % (ref 0–0.5)
LH SERPL-ACNC: 32.1 MIU/ML
LYMPHOCYTES # BLD: 2 K/UL (ref 0.9–3.6)
LYMPHOCYTES NFR BLD: 20 % (ref 21–52)
MCH RBC QN AUTO: 23.9 PG (ref 24–34)
MCHC RBC AUTO-ENTMCNC: 30.9 G/DL (ref 31–37)
MCV RBC AUTO: 77.4 FL (ref 78–100)
MONOCYTES # BLD: 1.1 K/UL (ref 0.05–1.2)
MONOCYTES NFR BLD: 11 % (ref 3–10)
NEUTS SEG # BLD: 6.6 K/UL (ref 1.8–8)
NEUTS SEG NFR BLD: 68 % (ref 40–73)
NRBC # BLD: 0 K/UL (ref 0–0.01)
NRBC BLD-RTO: 0 PER 100 WBC
PLATELET # BLD AUTO: 383 K/UL (ref 135–420)
PMV BLD AUTO: 10.9 FL (ref 9.2–11.8)
POTASSIUM SERPL-SCNC: 5.3 MMOL/L (ref 3.5–5.5)
PREALB SERPL-MCNC: 50.2 MG/DL (ref 20–40)
PROLACTIN SERPL-MCNC: 68.3 NG/ML
RBC # BLD AUTO: 3.89 M/UL (ref 4.2–5.3)
SERVICE CMNT-IMP: NORMAL
SERVICE CMNT-IMP: NORMAL
SODIUM SERPL-SCNC: 140 MMOL/L (ref 136–145)
WBC # BLD AUTO: 9.7 K/UL (ref 4.6–13.2)

## 2022-10-28 PROCEDURE — 84134 ASSAY OF PREALBUMIN: CPT

## 2022-10-28 PROCEDURE — 36415 COLL VENOUS BLD VENIPUNCTURE: CPT

## 2022-10-28 PROCEDURE — 83001 ASSAY OF GONADOTROPIN (FSH): CPT

## 2022-10-28 PROCEDURE — 84146 ASSAY OF PROLACTIN: CPT

## 2022-10-28 PROCEDURE — 84305 ASSAY OF SOMATOMEDIN: CPT

## 2022-10-28 PROCEDURE — 94640 AIRWAY INHALATION TREATMENT: CPT

## 2022-10-28 PROCEDURE — 74011250636 HC RX REV CODE- 250/636: Performed by: INTERNAL MEDICINE

## 2022-10-28 PROCEDURE — 2709999900 HC NON-CHARGEABLE SUPPLY

## 2022-10-28 PROCEDURE — 82962 GLUCOSE BLOOD TEST: CPT

## 2022-10-28 PROCEDURE — 80048 BASIC METABOLIC PNL TOTAL CA: CPT

## 2022-10-28 PROCEDURE — 85025 COMPLETE CBC W/AUTO DIFF WBC: CPT

## 2022-10-28 PROCEDURE — 74011250637 HC RX REV CODE- 250/637: Performed by: INTERNAL MEDICINE

## 2022-10-28 PROCEDURE — 84466 ASSAY OF TRANSFERRIN: CPT

## 2022-10-28 PROCEDURE — 94762 N-INVAS EAR/PLS OXIMTRY CONT: CPT

## 2022-10-28 PROCEDURE — 99239 HOSP IP/OBS DSCHRG MGMT >30: CPT | Performed by: STUDENT IN AN ORGANIZED HEALTH CARE EDUCATION/TRAINING PROGRAM

## 2022-10-28 PROCEDURE — 74011250636 HC RX REV CODE- 250/636: Performed by: PHYSICIAN ASSISTANT

## 2022-10-28 PROCEDURE — 74011000258 HC RX REV CODE- 258: Performed by: INTERNAL MEDICINE

## 2022-10-28 PROCEDURE — 74011000250 HC RX REV CODE- 250: Performed by: PHYSICIAN ASSISTANT

## 2022-10-28 PROCEDURE — 82040 ASSAY OF SERUM ALBUMIN: CPT

## 2022-10-28 PROCEDURE — 82164 ANGIOTENSIN I ENZYME TEST: CPT

## 2022-10-28 PROCEDURE — 82668 ASSAY OF ERYTHROPOIETIN: CPT

## 2022-10-28 PROCEDURE — 74011250637 HC RX REV CODE- 250/637: Performed by: PHYSICIAN ASSISTANT

## 2022-10-28 PROCEDURE — 74011636637 HC RX REV CODE- 636/637: Performed by: PHYSICIAN ASSISTANT

## 2022-10-28 PROCEDURE — 82533 TOTAL CORTISOL: CPT

## 2022-10-28 PROCEDURE — 82024 ASSAY OF ACTH: CPT

## 2022-10-28 RX ORDER — MEGESTROL ACETATE 40 MG/ML
200 SUSPENSION ORAL
Qty: 250 ML | Refills: 1 | Status: SHIPPED | OUTPATIENT
Start: 2022-10-28

## 2022-10-28 RX ORDER — LANOLIN ALCOHOL/MO/W.PET/CERES
100 CREAM (GRAM) TOPICAL DAILY
Qty: 90 TABLET | Refills: 0 | Status: SHIPPED | OUTPATIENT
Start: 2022-10-29

## 2022-10-28 RX ORDER — DULOXETIN HYDROCHLORIDE 30 MG/1
30 CAPSULE, DELAYED RELEASE ORAL DAILY
Qty: 30 CAPSULE | Refills: 0 | Status: SHIPPED | OUTPATIENT
Start: 2022-10-28

## 2022-10-28 RX ORDER — DICYCLOMINE HYDROCHLORIDE 20 MG/1
20 TABLET ORAL
Qty: 20 TABLET | Refills: 0 | Status: SHIPPED | OUTPATIENT
Start: 2022-10-28 | End: 2022-11-27

## 2022-10-28 RX ORDER — LUBIPROSTONE 8 UG/1
8 CAPSULE ORAL
Qty: 90 CAPSULE | Refills: 0 | Status: SHIPPED | OUTPATIENT
Start: 2022-10-29

## 2022-10-28 RX ORDER — THERA TABS 400 MCG
1 TAB ORAL DAILY
Qty: 90 TABLET | Refills: 0 | Status: SHIPPED | OUTPATIENT
Start: 2022-10-29

## 2022-10-28 RX ORDER — METOCLOPRAMIDE 10 MG/1
10 TABLET ORAL
Qty: 20 TABLET | Refills: 0 | Status: SHIPPED | OUTPATIENT
Start: 2022-10-28 | End: 2022-11-07

## 2022-10-28 RX ADMIN — HYDROCODONE BITARTRATE AND ACETAMINOPHEN 1 TABLET: 5; 325 TABLET ORAL at 07:07

## 2022-10-28 RX ADMIN — ARFORMOTEROL TARTRATE 15 MCG: 15 SOLUTION RESPIRATORY (INHALATION) at 08:32

## 2022-10-28 RX ADMIN — HYDROCODONE BITARTRATE AND ACETAMINOPHEN 1 TABLET: 5; 325 TABLET ORAL at 08:37

## 2022-10-28 RX ADMIN — BUDESONIDE 500 MCG: 0.5 SUSPENSION RESPIRATORY (INHALATION) at 08:32

## 2022-10-28 RX ADMIN — PIPERACILLIN AND TAZOBACTAM 3.38 G: 3; .375 INJECTION, POWDER, FOR SOLUTION INTRAVENOUS at 09:37

## 2022-10-28 RX ADMIN — Medication 2 UNITS: at 08:23

## 2022-10-28 RX ADMIN — CLOPIDOGREL BISULFATE 75 MG: 75 TABLET ORAL at 08:19

## 2022-10-28 RX ADMIN — ENOXAPARIN SODIUM 40 MG: 100 INJECTION SUBCUTANEOUS at 08:23

## 2022-10-28 RX ADMIN — METOCLOPRAMIDE 10 MG: 5 TABLET ORAL at 07:07

## 2022-10-28 RX ADMIN — MEGESTROL ACETATE 200 MG: 40 SUSPENSION ORAL at 08:18

## 2022-10-28 RX ADMIN — POLYETHYLENE GLYCOL 3350 34 G: 17 POWDER, FOR SOLUTION ORAL at 08:20

## 2022-10-28 RX ADMIN — LUBIPROSTONE 8 MCG: 8 CAPSULE, GELATIN COATED ORAL at 08:19

## 2022-10-28 RX ADMIN — SODIUM CHLORIDE, PRESERVATIVE FREE 10 ML: 5 INJECTION INTRAVENOUS at 05:55

## 2022-10-28 RX ADMIN — ASPIRIN 81 MG 81 MG: 81 TABLET ORAL at 08:19

## 2022-10-28 RX ADMIN — MEGESTROL ACETATE 200 MG: 40 SUSPENSION ORAL at 12:32

## 2022-10-28 RX ADMIN — PANTOPRAZOLE SODIUM 40 MG: 40 TABLET, DELAYED RELEASE ORAL at 08:19

## 2022-10-28 RX ADMIN — THERA TABS 1 TABLET: TAB at 08:19

## 2022-10-28 RX ADMIN — PIPERACILLIN AND TAZOBACTAM 3.38 G: 3; .375 INJECTION, POWDER, FOR SOLUTION INTRAVENOUS at 01:10

## 2022-10-28 RX ADMIN — ACETAMINOPHEN 650 MG: 325 TABLET, FILM COATED ORAL at 09:40

## 2022-10-28 RX ADMIN — DULOXETINE 30 MG: 30 CAPSULE, DELAYED RELEASE ORAL at 08:20

## 2022-10-28 RX ADMIN — DIPHENHYDRAMINE HYDROCHLORIDE 25 MG: 25 CAPSULE ORAL at 09:40

## 2022-10-28 RX ADMIN — Medication 100 MG: at 08:19

## 2022-10-28 NOTE — PROGRESS NOTES
Problem: Falls - Risk of  Goal: *Absence of Falls  Description: Document Madyson Red Fall Risk and appropriate interventions in the flowsheet.   Outcome: Progressing Towards Goal  Note: Fall Risk Interventions:  Mobility Interventions: Bed/chair exit alarm, Patient to call before getting OOB         Medication Interventions: Bed/chair exit alarm, Evaluate medications/consider consulting pharmacy, Patient to call before getting OOB, Teach patient to arise slowly    Elimination Interventions: Call light in reach, Patient to call for help with toileting needs    History of Falls Interventions: Bed/chair exit alarm, Door open when patient unattended, Evaluate medications/consider consulting pharmacy         Problem: Patient Education: Go to Patient Education Activity  Goal: Patient/Family Education  Outcome: Progressing Towards Goal     Problem: Patient Education: Go to Patient Education Activity  Goal: Patient/Family Education  Outcome: Progressing Towards Goal     Problem: Pain  Goal: *Control of Pain  Outcome: Progressing Towards Goal

## 2022-10-28 NOTE — PROGRESS NOTES
Problem: Pressure Injury - Risk of  Goal: *Prevention of pressure injury  Description: Document Ryan Scale and appropriate interventions in the flowsheet. Outcome: Progressing Towards Goal  Note: Pressure Injury Interventions:       Moisture Interventions: Absorbent underpads    Activity Interventions: Pressure redistribution bed/mattress(bed type)    Mobility Interventions: HOB 30 degrees or less, Pressure redistribution bed/mattress (bed type)    Nutrition Interventions: Document food/fluid/supplement intake, Offer support with meals,snacks and hydration    Friction and Shear Interventions: HOB 30 degrees or less                Problem: Patient Education: Go to Patient Education Activity  Goal: Patient/Family Education  Outcome: Progressing Towards Goal     Problem: Falls - Risk of  Goal: *Absence of Falls  Description: Document Gilma Fall Risk and appropriate interventions in the flowsheet.   Outcome: Progressing Towards Goal  Note: Fall Risk Interventions:  Mobility Interventions: Bed/chair exit alarm, Patient to call before getting OOB         Medication Interventions: Bed/chair exit alarm, Evaluate medications/consider consulting pharmacy, Patient to call before getting OOB, Teach patient to arise slowly    Elimination Interventions: Call light in reach, Patient to call for help with toileting needs    History of Falls Interventions: Bed/chair exit alarm, Door open when patient unattended, Evaluate medications/consider consulting pharmacy         Problem: Patient Education: Go to Patient Education Activity  Goal: Patient/Family Education  Outcome: Progressing Towards Goal     Problem: Pain  Goal: *Control of Pain  Outcome: Progressing Towards Goal     Problem: Patient Education: Go to Patient Education Activity  Goal: Patient/Family Education  Outcome: Progressing Towards Goal     Problem: Chronic Obstructive Pulmonary Disease (COPD)  Goal: *Oxygen saturation during activity within specified parameters  Outcome: Progressing Towards Goal  Goal: *Able to remain out of bed as prescribed  Outcome: Progressing Towards Goal  Goal: *Absence of hypoxia  Outcome: Progressing Towards Goal  Goal: *Optimize nutritional status  Outcome: Progressing Towards Goal     Problem: Patient Education: Go to Patient Education Activity  Goal: Patient/Family Education  Outcome: Progressing Towards Goal

## 2022-10-28 NOTE — DISCHARGE INSTRUCTIONS
DISCHARGE SUMMARY from Nurse: PETER King    PATIENT INSTRUCTIONS:    After general anesthesia or intravenous sedation, for 24 hours or while taking prescription Narcotics:  Limit your activities  Do not drive and operate hazardous machinery  Do not make important personal or business decisions  Do  not drink alcoholic beverages  If you have not urinated within 8 hours after discharge, please contact your surgeon on call. Report the following to your surgeon:  Excessive pain, swelling, redness or odor of or around the surgical area  Temperature over 100.5  Nausea and vomiting lasting longer than 4 hours or if unable to take medications  Any signs of decreased circulation or nerve impairment to extremity: change in color, persistent  numbness, tingling, coldness or increase pain  Any questions    What to do at Home:  Recommended activity: Activity as tolerated,     If you experience any of the following symptoms shortness of breath, severe abdominal pain, chest pain, please follow up with primary care physician. *  Please give a list of your current medications to your Primary Care Provider. *  Please update this list whenever your medications are discontinued, doses are      changed, or new medications (including over-the-counter products) are added. *  Please carry medication information at all times in case of emergency situations. These are general instructions for a healthy lifestyle:    No smoking/ No tobacco products/ Avoid exposure to second hand smoke  Surgeon General's Warning:  Quitting smoking now greatly reduces serious risk to your health.     Obesity, smoking, and sedentary lifestyle greatly increases your risk for illness    A healthy diet, regular physical exercise & weight monitoring are important for maintaining a healthy lifestyle    You may be retaining fluid if you have a history of heart failure or if you experience any of the following symptoms:  Weight gain of 3 pounds or more overnight or 5 pounds in a week, increased swelling in our hands or feet or shortness of breath while lying flat in bed. Please call your doctor as soon as you notice any of these symptoms; do not wait until your next office visit. The discharge information has been reviewed with the patient. The patient verbalized understanding. Discharge medications reviewed with the patient and appropriate educational materials and side effects teaching were provided.   ___________________________________________________________________________________________________________________________________

## 2022-10-28 NOTE — DIABETES MGMT
Diabetes/ Glycemic Control Plan of Care  Recommendations:   Fasting lab glucose this am 126 mg/dl  Continue Lantus insulin dose as ordered. Will advance corrective insulin to very insulin resistant dosing. Will continue inpatient monitoring  Assessment:   DX:   1. Weight loss, abnormal        2. Leukopenia, unspecified type        3. AMBROCIO (acute kidney injury) ([facility] [address]        4. Noninfectious gastroenteritis, unspecified type        5. Acidosis        6. Diverticulosis        7. Pulmonary nodules        8. Multiple thyroid nodules           Fasting/ Morning blood glucose:   Lab Results   Component Value Date/Time    Glucose 126 (H) 10/28/2022 04:29 AM    Glucose (POC) 198 (H) 10/28/2022 07:45 AM    Glucose,  09/19/2018 07:14 AM     IV Fluids containing dextrose: none   Steroids:  none    Blood glucose values:        Latest Reference Range & Units 10/27/22 09:21 10/27/22 11:41 10/27/22 16:34 10/27/22 21:22 10/28/22 07:45   GLUCOSE,FAST - POC 70 - 110 mg/dL 152 (H) 138 (H) 200 (H) 182 (H) 198 (H)   (H): Data is abnormally high  Within target range (70-180mg/dL):  no. Will advance corrective insulin coverage  Current insulin orders:   Lantus 20 units every bedtime. Lispro corrective insulin coverage AC&HS  Total Daily Dose previous 24 hours = 26 units   Current A1c:   Lab Results   Component Value Date/Time    Hemoglobin A1c 7.1 (H) 10/20/2022 02:06 AM      Equivalent to an average Blood Glucose of 157 mg/dl for 2-3 months prior to admission  Adequate glycemic control PTA: yes  Nutrition/Diet:   Active Orders   Diet    ADULT DIET Regular; Low Fiber; Pt requested gravy with meats, tuna/chicken or egg salad for lunch. Meal Intake:  Patient Vitals for the past 168 hrs:   % Diet Eaten   10/26/22 1352 76 - 100%     Supplement Intake:  No data found.     Home diabetes medications:   Key Antihyperglycemic Medications               metFORMIN (GLUCOPHAGE) 500 mg tablet Take 1 Tab by mouth two (2) times daily (with meals). insulin glargine (LANTUS U-100 INSULIN) 100 unit/mL injection 35 Units by SubCUTAneous route daily. Plan/Goals:   Blood glucose will be within target of 70 - 180 mg/dl within 72 hours  Reinforce dietary and medication compliance at home.        Education:  [] Refer to Diabetes Education Record                       [x] Education not indicated at this time     Tommy Pérez RN 1 [facility]  Ext [phone]

## 2022-10-28 NOTE — PROGRESS NOTES
Requested Case Management office to assist with transportation to home       Address is [address] [place] [address]      and phone number is [phone] (spouse number can receive text message if the medicaid uses Lyft/[employer])      Pt requires Cab     Able to walk to and from vehicle with no assist. Spouse with patient      Patient is currently requiring oxygen No         Height:5 feet 2 in    Weight: 88 lb  Pt is on isolation: No        Is the pt ready now? yes  Requested time: Next Available  PCS Faxed: yes  Insurance verified on face sheet: yes  Auth needed for transport: yes

## 2022-10-28 NOTE — PROGRESS NOTES
Comprehensive Nutrition Assessment    Type and Reason for Visit: Reassess, Positive nutrition screen, Consult    Nutrition Recommendations/Plan:   Continue nutrition interventions prescribed previously   Monitor PO intake, compliance of oral supplement, weight, labs, and plan of care during admission. Malnutrition Assessment:  Malnutrition Status:  Severe malnutrition (10/20/22 1553)    Context:  Acute illness     Findings of the 6 clinical characteristics of malnutrition:   Energy Intake:  50% or less of est energy requirements for 5 or more days  Weight Loss:  Greater than 7.5% over 3 months     Body Fat Loss: Moderate body fat loss, Triceps, Orbital, Buccal region   Muscle Mass Loss: Moderate muscle mass loss, Temples (temporalis), Thigh (quadriceps), Clavicles (pectoralis & deltoids), Hand (interosseous)  Fluid Accumulation:  Mild, Ascites   Strength:  Not performed     Nutrition Assessment:    Visited pt in room, sitting up in bed, alert and able to communicate-pt  in room as well. Pt reported tolerating current diet-observed breakfast tray with 100% and pt was sipping on Glucerna. Pt also reported consuming 1-2 Glucerna a day. Pt has continued abdominal pain requiring medicine but BM continues as well. Pt denies d/c/n/v. Current lab shows elevated BUN (31). Obtained current body weight from bed scale: 101 lbs. Nutrition Related Findings:    Last BM 10/27-SOFT. Output: 100mL (urine). Pertinent Medications: miralax, MVI, thiamine, dulcolax, lovenox, lipitor, lantus, humalog, megace. Wound Type: None     Current Nutrition Intake & Therapies:  Average Meal Intake: %  Average Supplement Intake: 51-75%  ADULT ORAL NUTRITION SUPPLEMENT Breakfast, Lunch, Dinner; Diabetic Supplement  ADULT DIET Regular; Low Fiber; Pt requested gravy with meats, tuna/chicken or egg salad for lunch.     Anthropometric Measures:  Height: 5' 2\" (157.5 cm)  Ideal Body Weight (IBW): 110 lbs (50 kg)  Admission Body Weight: 98 lb 6.4 oz  Current Body Wt:  45.8 kg (101 lb) (obtained by this writer (RDN)), 91.8 % IBW.  Bed scale  Current BMI (kg/m2): 18.5  Usual Body Weight: 59.4 kg (131 lb)  % Weight Change (Calculated): -24.9  Weight Adjustment: No adjustment  BMI Category: Underweight (BMI less than 22) age over 72    Estimated Daily Nutrient Needs:  Energy Requirements Based On: Kcal/kg (25-35)  Weight Used for Energy Requirements: Current  Energy (kcal/day): 7512-6803  Weight Used for Protein Requirements: Current (1.0-1.2)  Protein (g/day): 45-54  Method Used for Fluid Requirements: 1 ml/kcal  Fluid (ml/day): 1213-6487    Nutrition Diagnosis:   Severe malnutrition, In context of acute illness or injury related to early satiety, inadequate protein-energy intake as evidenced by Criteria as identified in malnutrition assessment, weight loss, poor dentition    Nutrition Interventions:   Food and/or Nutrient Delivery: Continue current diet, Continue oral nutrition supplement, Mineral supplement, Vitamin supplement  Nutrition Education/Counseling: Education not indicated, No recommendations at this time  Coordination of Nutrition Care: Continue to monitor while inpatient  Plan of Care discussed with: PATIENT    Goals:  Previous Goal Met: Goal(s) achieved  Goals: Meet at least 75% of estimated needs, by next RD assessment       Nutrition Monitoring and Evaluation:   Behavioral-Environmental Outcomes: None identified  Food/Nutrient Intake Outcomes: Diet advancement/tolerance, Food and nutrient intake, Supplement intake, Vitamin/mineral intake  Physical Signs/Symptoms Outcomes: Biochemical data, GI status, Weight, Nutrition focused physical findings, Meal time behavior    Discharge Planning:    Continue current diet, Continue oral nutrition supplement    Terri Killian MA, RDN, LD   Contact: [phone]

## 2022-10-28 NOTE — PROGRESS NOTES
[facility] Pharmacokinetic Monitoring Service - Vancomycin    Indication: intra-abdominal infection  Goal AUC/VINCENT: 400-600 mg*hr/L  Day of Therapy: 7  Additional Antimicrobials: pip-tazo    Pertinent Laboratory Values: Wt Readings from Last 1 Encounters:   10/27/22 40 kg (88 lb 3.2 oz)     Temp Readings from Last 1 Encounters:   10/28/22 98.2 °F (36.8 °C)     No components found for: PROCAL  Estimated Creatinine Clearance: 35.5 mL/min (based on SCr of 0.93 mg/dL). Recent Labs     10/28/22  0429 10/27/22  0044   WBC 9.7 6.0     Pertinent Cultures:  Culture Date Source Results   10/22 blood NGF   MRSA Nasal Swab: N/A. Non-respiratory infection.     Assessment:  Date/Time Current Dose Concentration (mg/L) Timing of Concentration (h) AUC   10/22 1,000 mg x1 - - -   10/23 750 mg q18h - - -   10/24 750 mg q18h 5.4 31  535   10/25 750 mg q18h - - -   10/26 750 mg q18h - - -   10/27 750 mg q18h 14.3 13 538   10/28 750 mg q18h - - -   Note: Serum concentrations collected for AUC dosing may appear elevated if collected in close proximity to the dose administered, this is not necessarily an indication of toxicity    Plan:  Continue current dose of 750 mg q18h  No level ordered at this time  Pharmacy will continue to monitor patient and adjust therapy as indicated    Thank you for the consult,  GABRIEL Jaime  10/28/2022

## 2022-10-28 NOTE — ROUTINE PROCESS
Bedside and Verbal shift change report given to PETER King (oncoming nurse) by Salima Osorio RN   (offgoing nurse). Report included the following information SBAR, Kardex, Intake/Output, MAR, and Recent Results.

## 2022-10-28 NOTE — DISCHARGE SUMMARY
Discharge Summary    Patient: Mary Azul MRN: [id number]  CSN: [id number]    YOB: 1950  Age: 81 y.o. Sex: female    DOA: 10/19/2022 LOS:  LOS: 9 days        Disposition: Home with Callum Johnson    Discharge Date: 10/28/2022    Admission Diagnosis: Colitis [K52.9]    Discharge Diagnosis:    Abdominal pain  Abnormal Weight Loss  Microcytic anemia  AMBROCIO  DM2  HTN  Gastroparesis    Discharge Condition: Stable      PHYSICAL EXAM  Visit Vitals  BP (!) 173/87 (BP 1 Location: Left upper arm, BP Patient Position: At rest)   Pulse (!) 108   Temp 98.2 °F (36.8 °C)   Resp 16   Ht 5' 2\" (1.575 m)   Wt 40 kg (88 lb 3.2 oz)   SpO2 100%   Breastfeeding No   BMI 16.13 kg/m²       General:  Alert, cooperative, no acute distress, sarcopenic  HEENT: PERRLA, anicteric sclerae. Pulmonary:  CTA Bilaterally. No Wheezing/Rales. Cardiovascular: Regular rate and Rhythm. GI:  Soft, Non distended, Tender to RLQ and LLQ. + Bowel sounds. Extremities:  No edema. No calf tenderness. Psych: Good insight. Not anxious or agitated. Neurologic: Alert and oriented X 3. Moves all ext. Hospital Course:     81year old female with history of CAD, COPD, IDDM, GERD, HTN, Pulm sarcoid admitted for acute on chronic abdominal pain and weight loss of 50lbs. Patient was being seen by her gastroenterologist when she was noted to have hypotension and referred to the ED. On admission patient blood pressures were normotensive, but she was noted to have an AMBROCIO and dehydrated on exam. Patient was given IV fluids and AMBROCIO resolved. CT was unremarkable regarding the etiology of her abdominal pain. Blood pressures remained stable throughout her hospital course. Infectious workup was negative with blood and urine cultures. Patient reported a history of high dose steroids for treatment of pulmonary sarcoid which was stopped in the last year.  Given her symptoms with microcytic anemia and hyperkalemia, adrenal insufficiency was explored. Her AM cortisol was 4, but cosyntropin test was negative. Endocrinology was consulted and did not feel that AI was the cause of these symptoms however poor dietary intake could explain the anemia. Nutrition was consulted and provided the patient with dietary recommendations. Gastroenterology was also consulted regarding the pain. GI started an appetite stimulant and laxative due to concerns of constipation being a cause of the pain. They also started Reglan for treatment of her gastroparesis. Patient should follow up with GI as outpatient to have repeat colonoscopy done. Procedures: None     Consults:   Gastroenterology  Endocrinology     Imaging studies:   CT Results (most recent):  Results from Hospital Encounter encounter on 10/19/22    CT ABD PELV W CONT    Narrative  EXAM: CT ABDOMEN AND PELVIS WITH CONTRAST    CLINICAL HISTORY/INDICATION: Left lower quadrant abdominal pain, fever,  tachycardia, acute kidney injury, previous CT scan with fluid within the  appendix but no adjacent inflammatory changes with findings of distal esophageal  mild wall thickening, scattered diverticula of the colon, possible mild right:  Increased mucosal enhancement and questionable short segment colonic wall  thickening at the splenic flexure, appendicitis vs colitis    COMPARISON: CT abdomen and pelvis 10/19/2022. TECHNIQUE: Following the uneventful intravenous administration of 100 cc of  nonionic contrast, dynamic enhanced scanning of the abdomen and pelvis was  performed using standard 5 mm axial images. Coronal and sagittal reformations  obtained.     All CT scans at this facility are performed using dose optimization technique as  appropriate to a performed exam, to include automated exposure control,  adjustment of the mA and/or kV according to patient's size (including  appropriate matching for site-specific examinations), or use of iterative  reconstruction technique. FINDINGS:    Abdomen -    Small densely calcified hilar and posterior mediastinal nodes. .    The liver and spleen are normal in size and density. Prior cholecystectomy. Mild  intra and extrahepatic biliary dilatation without change. The common bile duct  tapers to the ampulla. Small bilateral renal cortical and central cortical scattered sharply marginated  water density masses of simple cysts without change. Symmetrical enhancement. No  evidence of hydronephrosis nor hydroureter. Bilateral micronodular adrenal thickening without change. There is no free fluid or free air. Mild circumferential thickening of the wall of the distal esophagus without  change. Stomach is distended with particulate material from meal. There is enteric  contrast within the stomach and the majority of the small bowel. The contrast  has not yet extended to the distal ileum. Intestinal content throughout colon. No evidence of abnormal mucosal  enhancement. No definite colonic wall thickening. Scattered diverticula with  greater clustering at the rectosigmoid. No surrounding inflammation or fluid. The appendix is normal.  .  There is no significant adenopathy. Pelvis -    There is no free pelvic fluid. The pelvic viscera are unremarkable. The bladder is incompletely distended but unremarkable. There is no significant adenopathy. Review of osseous structures throughout on bone window settings shows no  significant osseous pathology. Impression  Normal appendix. Diverticulosis coli. No evidence of diverticulitis. No definite bowel wall thickening. Postcholecystectomy reservoir effect with mild biliary dilatation. Multiple bilateral renal low density masses likely simple cysts. Evidence of healed granulomatous disease. Janice Zhang         Discharge Medications:     Current Discharge Medication List        START taking these medications    Details   lubiPROStone (AMITIZA) 8 mcg capsule Take 1 Capsule by mouth daily (with breakfast). Qty: 90 Capsule, Refills: 0      metoclopramide HCl (REGLAN) 10 mg tablet Take 1 Tablet by mouth Before breakfast and dinner for 10 days. Qty: 20 Tablet, Refills: 0      therapeutic multivitamin (THERAGRAN) tablet Take 1 Tablet by mouth daily. Qty: 90 Tablet, Refills: 0      thiamine HCL (B-1) 100 mg tablet Take 1 Tablet by mouth daily. Qty: 90 Tablet, Refills: 0           CONTINUE these medications which have CHANGED    Details   dicyclomine (BENTYL) 20 mg tablet Take 1 Tablet by mouth every six (6) hours as needed for Abdominal Cramps for up to 30 days. Qty: 20 Tablet, Refills: 0      DULoxetine (CYMBALTA) 30 mg capsule Take 1 Capsule by mouth daily. Qty: 30 Capsule, Refills: 0      megestroL (MEGACE) 400 mg/10 mL (10 mL) suspension Take 5 mL by mouth three (3) times daily (with meals). Qty: 250 mL, Refills: 1           CONTINUE these medications which have NOT CHANGED    Details   polyethylene glycol (Miralax) 17 gram/dose powder Take 17 g by mouth daily. 1 tablespoon with 8 oz of water daily  Qty: 289 g, Refills: 0      docusate sodium (Colace) 100 mg capsule Take 1 Capsule by mouth two (2) times a day. Qty: 30 Capsule, Refills: 0      ondansetron (ZOFRAN ODT) 4 mg disintegrating tablet Take 1 Tablet by mouth every eight (8) hours as needed for Nausea or Vomiting. Qty: 15 Tablet, Refills: 0      magnesium citrate solution Take 1/3 of bottle every 8 hours until finished or until you have a bowel movement. Qty: 295 mL, Refills: 0      lidocaine (Lidoderm) 5 % Apply patch to the affected area for 12 hours a day and remove for 12 hours a day. Qty: 30 Each, Refills: 0    Comments: Patient would like medications delivered to home. acetaminophen (TylenoL) 325 mg tablet Take 3 Tablets by mouth every six (6) hours as needed for Pain. Qty: 100 Tablet, Refills: 0    Comments: Patient would like medications delivered to home.       Insulin Needles, Disposable, 31 gauge x 5/16\" ndle USE AS DIRECTED WITH INSULIN PEN DAILY  Refills: 3      Oxygen 3LPM O2 as needed   Indications: DME: [employer]      pantoprazole (PROTONIX) 20 mg tablet Take 20 mg by mouth daily. metFORMIN (GLUCOPHAGE) 500 mg tablet Take 1 Tab by mouth two (2) times daily (with meals). Qty: 60 Tab, Refills: 0      insulin glargine (LANTUS U-100 INSULIN) 100 unit/mL injection 35 Units by SubCUTAneous route daily. Qty: 1 Vial, Refills: 0      albuterol-ipratropium (DUO-NEB) 2.5 mg-0.5 mg/3 ml nebu 3 mL by Nebulization route every four (4) hours as needed for Other (shortness of breath). Qty: 30 Nebule, Refills: 0      diclofenac (VOLTAREN) 1 % gel Apply 2 g to affected area four (4) times daily. Gel should be measured and applied using the supplied dosing card. Apply dose (2 gm or 4 gm) to each location. If treatment site is the hands, patients should wait at least one (1) hour to wash their hands. APPLY TO bilateral knees  Qty: 100 g, Refills: 0      melatonin 3 mg tablet Take 1 Tab by mouth nightly. Qty: 30 Tab, Refills: 0      lisinopril-hydroCHLOROthiazide (PRINZIDE, ZESTORETIC) 20-12.5 mg per tablet Take 1 Tab by mouth daily. Indications: hypertension      nitroglycerin (NITROSTAT) 0.4 mg SL tablet 1 Tab by SubLINGual route as needed for Chest Pain. Qty: 25 Tab, Refills: 1      fluticasone-salmeterol (ADVAIR) 250-50 mcg/dose diskus inhaler Take 1 puff by inhalation every twelve (12) hours. Qty: 1 Inhaler, Refills: 0      albuterol (PROVENTIL HFA, VENTOLIN HFA, PROAIR HFA) 90 mcg/actuation inhaler Take 1 puff by inhalation daily as needed for Wheezing. Qty: 1 Inhaler, Refills: 0      clopidogrel (PLAVIX) 75 mg tablet Take 1 tablet by mouth daily. Qty: 30 tablet, Refills: 11      atorvastatin (LIPITOR) 40 mg tablet Take 1 tablet by mouth nightly. Qty: 30 tablet, Refills: 11      aspirin 81 mg chewable tablet Take 1 tablet by mouth daily.   Qty: 30 tablet, Refills: 0      tiotropium ([facility]) 18 mcg inhalation capsule Take 1 Cap by inhalation daily. STOP taking these medications       traZODone (DESYREL) 100 mg tablet Comments:   Reason for Stopping:         mirtazapine (REMERON) 15 mg tablet Comments:   Reason for Stopping:         Omeprazole Magnesium (PRILOSEC) 10 mg suDR Comments:   Reason for Stopping:         raNITIdine (ZANTAC) 150 mg tablet Comments:   Reason for Stopping: It is important that you take the medication exactly as they are prescribed. Keep your medication in the bottles provided by the pharmacist and keep a list of the medication names, dosages, and times to be taken in your wallet. Do not take other medications without consulting your doctor. Minutes spent on discharge: 40 minutes spent coordinating this discharge (review instructions/follow-up, prescriptions, preparing report for sign off)    Ihsan Zaidi DO  10/28/2022 1:24 PM    Disclaimer: Sections of this note are dictated using utilizing voice recognition software. Minor typographical errors may be present. If questions arise, please do not hesitate to contact me or call our department.

## 2022-10-29 LAB
ACTH PLAS-MCNC: <1.5 PG/ML (ref 7.2–63.3)
EPO SERPL-ACNC: 26.9 MIU/ML (ref 2.6–18.5)
IGF-I SERPL-MCNC: 189 NG/ML (ref 52–196)
TRANSFERRIN SERPL-MCNC: 224 MG/DL (ref 192–364)

## 2022-10-30 LAB — ACE SERPL-CCNC: 65 U/L (ref 14–82)

## 2022-11-02 NOTE — PROGRESS NOTES
Physician Progress Note      Mey Wilkins  [facility] #:                  [id number]  :                       1950  ADMIT DATE:       10/19/2022 12:02 PM  Brendon Hallman DATE:        10/28/2022 1:26 PM  RESPONDING  PROVIDER #:        [id number] Luis Ireland DO          QUERY TEXT:    Pt admitted with LQ Abd pain, gastroparesis  , and AMBROCIO  . Pt noted to have per GI consult  T2DM possible neuropathy in setting of uncontrolled DM . If possible, please document in progress notes and discharge summary if you are evaluating and /or treating any of the following: The medical record reflects the following:    Risk Factors: Per GI Lower abdominal pain concerning for possible neuropathy in setting of uncontrolled DM; however, mesenteric doppler as ordered. GI MD PN -EGD was noted to be positive for Gastroparesis and duodenal polyp. Clinical Indicators: GI consult PN CT abd/pelvis 10/19 - right colon increased mucosal enhancement, subtle wall thickening in splenic flexure, distal esophageal mild wall thickening, slightly prominent biliary tree likely reservoir effect ,Lower abdominal pain concerning for possible neuropathy in setting of uncontrolled DM; however, mesenteric doppler as ordered. GI MD PN -EGD was noted to be positive for Gastroparesis and duodenal polyp. Treatment: IV flds  ,Add Amitiza 8 mcg BID with meals. Stimulant laxative prn , EGD ,Labs carefully monitored    Thank you  Jeny Chirinos RN CRCR [facility]  [email or web address]  Options provided:  -- Abdominal pain with intermittent epigastric pain  due to Diabetic Gastroparesis  -- Other - I will add my own diagnosis  -- Disagree - Not applicable / Not valid  -- Disagree - Clinically unable to determine / Unknown  -- Refer to Clinical Documentation Reviewer    PROVIDER RESPONSE TEXT:    This patient has Abdominal pain with intermittent epigastric pain due to diabetic gastroparesis. Query created by:  Nargis Swanson on 2022 10:20 AM      Electronically signed by:  Raquel Fajardo DO 11/1/2022 9:22 PM

## 2022-11-26 ENCOUNTER — APPOINTMENT (OUTPATIENT)
Dept: GENERAL RADIOLOGY | Age: 72
End: 2022-11-26
Attending: STUDENT IN AN ORGANIZED HEALTH CARE EDUCATION/TRAINING PROGRAM
Payer: MEDICARE

## 2022-11-26 ENCOUNTER — HOSPITAL ENCOUNTER (EMERGENCY)
Age: 72
Discharge: HOME | End: 2022-11-27
Attending: EMERGENCY MEDICINE
Payer: MEDICARE

## 2022-11-26 ENCOUNTER — APPOINTMENT (OUTPATIENT)
Dept: CT IMAGING | Age: 72
End: 2022-11-26
Attending: STUDENT IN AN ORGANIZED HEALTH CARE EDUCATION/TRAINING PROGRAM
Payer: MEDICARE

## 2022-11-26 VITALS
SYSTOLIC BLOOD PRESSURE: 143 MMHG | RESPIRATION RATE: 27 BRPM | OXYGEN SATURATION: 99 % | DIASTOLIC BLOOD PRESSURE: 83 MMHG | HEART RATE: 92 BPM | TEMPERATURE: 97.6 F
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
## 2022-11-26 DIAGNOSIS — M25.562 ACUTE PAIN OF LEFT KNEE: ICD-10-CM

## 2022-11-26 LAB
ANION GAP SERPL CALC-SCNC: 7 MMOL/L (ref 3–18)
BASOPHILS # BLD: 0.1 K/UL (ref 0–0.1)
BASOPHILS NFR BLD: 1 % (ref 0–2)
BUN SERPL-MCNC: 23 MG/DL (ref 7–18)
BUN/CREAT SERPL: 22 (ref 12–20)
CALCIUM SERPL-MCNC: 9.3 MG/DL (ref 8.5–10.1)
CHLORIDE SERPL-SCNC: 110 MMOL/L (ref 100–111)
CO2 SERPL-SCNC: 22 MMOL/L (ref 21–32)
CREAT SERPL-MCNC: 1.04 MG/DL (ref 0.6–1.3)
DIFFERENTIAL METHOD BLD: ABNORMAL
EOSINOPHIL # BLD: 0 K/UL (ref 0–0.4)
EOSINOPHIL NFR BLD: 0 % (ref 0–5)
ERYTHROCYTE [DISTWIDTH] IN BLOOD BY AUTOMATED COUNT: 15.4 % (ref 11.6–14.5)
GLUCOSE SERPL-MCNC: 153 MG/DL (ref 74–99)
HCT VFR BLD AUTO: 32.3 % (ref 35–45)
HGB BLD-MCNC: 10.2 G/DL (ref 12–16)
IMM GRANULOCYTES # BLD AUTO: 0 K/UL (ref 0–0.04)
IMM GRANULOCYTES NFR BLD AUTO: 0 % (ref 0–0.5)
LYMPHOCYTES # BLD: 1.6 K/UL (ref 0.9–3.6)
LYMPHOCYTES NFR BLD: 30 % (ref 21–52)
MCH RBC QN AUTO: 23.9 PG (ref 24–34)
MCHC RBC AUTO-ENTMCNC: 31.6 G/DL (ref 31–37)
MCV RBC AUTO: 75.6 FL (ref 78–100)
MONOCYTES # BLD: 0.3 K/UL (ref 0.05–1.2)
MONOCYTES NFR BLD: 6 % (ref 3–10)
NEUTS SEG # BLD: 3.5 K/UL (ref 1.8–8)
NEUTS SEG NFR BLD: 63 % (ref 40–73)
NRBC # BLD: 0 K/UL (ref 0–0.01)
NRBC BLD-RTO: 0 PER 100 WBC
PLATELET # BLD AUTO: 327 K/UL (ref 135–420)
PMV BLD AUTO: 10.9 FL (ref 9.2–11.8)
POTASSIUM SERPL-SCNC: 5 MMOL/L (ref 3.5–5.5)
RBC # BLD AUTO: 4.27 M/UL (ref 4.2–5.3)
SODIUM SERPL-SCNC: 139 MMOL/L (ref 136–145)
TROPONIN-HIGH SENSITIVITY: 11 NG/L (ref 0–54)
WBC # BLD AUTO: 5.5 K/UL (ref 4.6–13.2)

## 2022-11-26 PROCEDURE — 85025 COMPLETE CBC W/AUTO DIFF WBC: CPT

## 2022-11-26 PROCEDURE — 73552 X-RAY EXAM OF FEMUR 2/>: CPT

## 2022-11-26 PROCEDURE — 73521 X-RAY EXAM HIPS BI 2 VIEWS: CPT

## 2022-11-26 PROCEDURE — 70450 CT HEAD/BRAIN W/O DYE: CPT

## 2022-11-26 PROCEDURE — 71045 X-RAY EXAM CHEST 1 VIEW: CPT

## 2022-11-26 PROCEDURE — 73562 X-RAY EXAM OF KNEE 3: CPT

## 2022-11-26 PROCEDURE — 84484 ASSAY OF TROPONIN QUANT: CPT

## 2022-11-26 PROCEDURE — 72125 CT NECK SPINE W/O DYE: CPT

## 2022-11-26 PROCEDURE — 99285 EMERGENCY DEPT VISIT HI MDM: CPT

## 2022-11-26 PROCEDURE — 80048 BASIC METABOLIC PNL TOTAL CA: CPT

## 2022-11-26 RX ORDER — ACETAMINOPHEN 325 MG/1
650 TABLET ORAL ONCE
Status: DISCONTINUED | OUTPATIENT
Start: 2022-11-26 | End: 2022-11-27 | Stop reason: HOSPADM

## 2022-11-27 LAB
ATRIAL RATE: 96 BPM
CALCULATED P AXIS, ECG09: 80 DEGREES
CALCULATED R AXIS, ECG10: -53 DEGREES
CALCULATED T AXIS, ECG11: 54 DEGREES
DIAGNOSIS, 93000: NORMAL
P-R INTERVAL, ECG05: 112 MS
Q-T INTERVAL, ECG07: 344 MS
QRS DURATION, ECG06: 66 MS
QTC CALCULATION (BEZET), ECG08: 434 MS
VENTRICULAR RATE, ECG03: 96 BPM

## 2022-11-27 PROCEDURE — 93005 ELECTROCARDIOGRAM TRACING: CPT

## 2022-11-27 NOTE — ED NOTES
Per EMS, ground level fall from standing. Pt did not hit her head or have any LOC. Pt has been feeling weak all day. Pt has left sided hip and knee pain. Pt was able to ambulate to stretcher/medic from her apt.

## 2022-11-27 NOTE — ED PROVIDER NOTES
I independently examined and evaluated Gloria Toth. History: This is a 72-year-old female brought to the emergency department for evaluation status post syncope with fall. Patient reports been usual state of health until earlier on today. She said that she was walking from her bedroom to the bathroom when she passed out and landed on her left side. Complaining of left hip pain and left knee pain at this time. Patient does not think that she hit her head. Reports that the syncopal episodes have happened multiple times in the past with no clear-cut etiology as of yet identified. Denies antecedent chest pain shortness of breath headaches neck pain or vertiginous symptoms. Patient says she was able to ambulate with a limp afterwards. She says that she also uses a rolling walker at home. Physical exam:  General well-appearing very thin elderly female in no acute distress  Chest: Regular rhythm no murmurs rubs or gallops  Abdomen: Soft nontender nondistended  Lungs: Clear to auscultation bilaterally no wheezes rales rhonchi or stridor  Neuro: Cranial nerves II through XII grossly intact no apparent motor or sensory deficits  Extremities: No tenderness to palpation over bony prominences of clavicles shoulders humerus elbows radius ulna or scaphoid bilaterally. Patient's pelvis is stable no tenderness over proximal distal femurs knees tib-fib or ankle. Patient is able to lift her right hip and left hip off of the bed independently. No obvious deformities or dislocations noted. Patient's EKG is interpreted by me sinus rhythm rate 96 QTC further 4  no ST elevation no ST depression incomplete right bundle branch block is noted I appreciate no acute ischemia or infarction on this EKG no old EKGs with which to compare    Medical decision making: This is a 72-year-old female brought to emergency department for evaluation status post fall.   Based on patient's history and physical would be concerned about possible acetabular fracture. Other possibility patient symptoms do include pelvic fractures. No obvious deformity or dislocations noted. Patient has had reported multiple syncopal episodes and she states that she has been seen by cardiology for this with no clear-cut etiology identified. Clinical Impression:  1. Syncope 2. Left hip injury 3. Left knee injury      All diagnostic, treatment, and disposition decisions were made by myself in conjunction with the resident  I personally saw the patient and performed a substantive portion of the visit including all aspects of the medical decision making. I personally saw and examined the patient. I have reviewed and agree with the residents findings, including all diagnostic interpretations, and plans as written. I was present during the key portions of separately billed procedures.   Graciella Baumgarten, MD

## 2022-11-27 NOTE — ED PROVIDER NOTES
EMERGENCY DEPARTMENT HISTORY AND PHYSICAL EXAM    8:51 PM      Date: 11/26/2022  Patient Name: Medina Cisneros    History of Presenting Illness     Chief Complaint   Patient presents with    Fall         History Provided By: Patient  Location/Duration/Severity/Modifying factors   HPI    74-year-old female with past medical history sniffing for COPD, asthma, diverticulitis, hypertension, diabetes, CAD, and frequent falls presents to the ED via EMS status post fall. Patient describes earlier today she got up to go to the bathroom and lost consciousness, falling onto the ground from standing. Denies any blood thinner use. States she was quickly passed conscious to call 911. States her only pain is in her left knee and left hip. Denies any chest pain, shortness of breath, fever, chills, or abdominal pain. PCP: Selma Rosa MD    Current Facility-Administered Medications   Medication Dose Route Frequency Provider Last Rate Last Admin    acetaminophen (TYLENOL) tablet 650 mg  650 mg Oral ONCE Vel Martinez MD         Current Outpatient Medications   Medication Sig Dispense Refill    dicyclomine (BENTYL) 20 mg tablet Take 1 Tablet by mouth every six (6) hours as needed for Abdominal Cramps for up to 30 days. 20 Tablet 0    DULoxetine (CYMBALTA) 30 mg capsule Take 1 Capsule by mouth daily. 30 Capsule 0    megestroL (MEGACE) 400 mg/10 mL (10 mL) suspension Take 5 mL by mouth three (3) times daily (with meals). 250 mL 1    lubiPROStone (AMITIZA) 8 mcg capsule Take 1 Capsule by mouth daily (with breakfast). 90 Capsule 0    therapeutic multivitamin (THERAGRAN) tablet Take 1 Tablet by mouth daily. 90 Tablet 0    thiamine HCL (B-1) 100 mg tablet Take 1 Tablet by mouth daily. 90 Tablet 0    polyethylene glycol (Miralax) 17 gram/dose powder Take 17 g by mouth daily. 1 tablespoon with 8 oz of water daily 289 g 0    docusate sodium (Colace) 100 mg capsule Take 1 Capsule by mouth two (2) times a day.  [id number] [address]zo Capsule 0    ondansetron (ZOFRAN ODT) 4 mg disintegrating tablet Take 1 Tablet by mouth every eight (8) hours as needed for Nausea or Vomiting. 15 Tablet 0    magnesium citrate solution Take 1/3 of bottle every 8 hours until finished or until you have a bowel movement. 295 mL 0    lidocaine (Lidoderm) 5 % Apply patch to the affected area for 12 hours a day and remove for 12 hours a day. 30 Each 0    acetaminophen (TylenoL) 325 mg tablet Take 3 Tablets by mouth every six (6) hours as needed for Pain. 100 Tablet 0    Insulin Needles, Disposable, 31 gauge x 5/16\" ndle USE AS DIRECTED WITH INSULIN PEN DAILY  3    Oxygen 3LPM O2 as needed   Indications: DME: [employer]      pantoprazole (PROTONIX) 20 mg tablet Take 20 mg by mouth daily. metFORMIN (GLUCOPHAGE) 500 mg tablet Take 1 Tab by mouth two (2) times daily (with meals). 60 Tab 0    insulin glargine (LANTUS U-100 INSULIN) 100 unit/mL injection 35 Units by SubCUTAneous route daily. 1 Vial 0    albuterol-ipratropium (DUO-NEB) 2.5 mg-0.5 mg/3 ml nebu 3 mL by Nebulization route every four (4) hours as needed for Other (shortness of breath). 30 Nebule 0    diclofenac (VOLTAREN) 1 % gel Apply 2 g to affected area four (4) times daily. Gel should be measured and applied using the supplied dosing card. Apply dose (2 gm or 4 gm) to each location. If treatment site is the hands, patients should wait at least one (1) hour to wash their hands. APPLY TO bilateral knees 100 g 0    melatonin 3 mg tablet Take 1 Tab by mouth nightly. 30 Tab 0    lisinopril-hydroCHLOROthiazide (PRINZIDE, ZESTORETIC) 20-12.5 mg per tablet Take 1 Tab by mouth daily. Indications: hypertension      nitroglycerin (NITROSTAT) 0.4 mg SL tablet 1 Tab by SubLINGual route as needed for Chest Pain. 25 Tab 1    fluticasone-salmeterol (ADVAIR) 250-50 mcg/dose diskus inhaler Take 1 puff by inhalation every twelve (12) hours.  1 Inhaler 0    albuterol (PROVENTIL HFA, VENTOLIN HFA, PROAIR HFA) 90 mcg/actuation inhaler Take 1 puff by inhalation daily as needed for Wheezing. 1 Inhaler 0    clopidogrel (PLAVIX) 75 mg tablet Take 1 tablet by mouth daily. 30 tablet 11    atorvastatin (LIPITOR) 40 mg tablet Take 1 tablet by mouth nightly. 30 tablet 11    aspirin 81 mg chewable tablet Take 1 tablet by mouth daily. 30 tablet 0    tiotropium (SPIRIVA WITH HANDIHALER) 18 mcg inhalation capsule Take 1 Cap by inhalation daily. Past History     Past Medical History:  Past Medical History:   Diagnosis Date    Arthritis     \"generalized\"    Asthma     CAD in native artery     NSTEMI (Sep 2014); diffuse 65% pLAD, minimal disease RCA & LCx. pLAD FFR 0.93. LV  no RWMA (echo and LV angio)    Chronic neck pain     Chronic pain     left knee    Chronic pain syndrome     COPD (chronic obstructive pulmonary disease) ([facility])     Depression     Diabetes (HCC)     Diverticulitis     GERD (gastroesophageal reflux disease)     Heart disease     Hidradenitis 2014    Hypertension     Left knee pain     Narcotic dependence ([address]     Pneumonia     Right wrist pain     Sarcoidosis        Past Surgical History:  Past Surgical History:   Procedure Laterality Date    BRONCHOSCOPY  10/2019    HX BREAST BIOPSY Right     9/10/14: She said tag in place from [address]      HX HYSTERECTOMY      HX KNEE ARTHROSCOPY Left        Family History:  Family History   Problem Relation Age of Onset    Hypertension Father     Diabetes Mother        Social History:  Social History     Tobacco Use    Smoking status: Former     Years: 46.00     Types: Cigarettes     Quit date: 2018     Years since quittin.7    Smokeless tobacco: Never    Tobacco comments: Only smokes 1 cigarette per day   Substance Use Topics    Alcohol use: Yes     Alcohol/week: 1.7 standard drinks     Types: 2 Glasses of wine per week     Comment: socially    Drug use: Yes     Types: Cocaine, Marijuana     Comment: 6 months ago       Allergies:   Allergies Allergen Reactions    Latex Hives and Itching    Ciprofloxacin Hives    Penicillins Nausea Only    Shellfish Derived Hives and Swelling     Swelling of legs         Review of Systems     Review of Systems   Constitutional:  Negative for chills, fatigue and fever. HENT:  Negative for congestion, sore throat and voice change. Eyes:  Negative for photophobia, redness and visual disturbance. Respiratory:  Negative for cough, chest tightness, shortness of breath, wheezing and stridor. Cardiovascular:  Negative for chest pain, palpitations and leg swelling. Gastrointestinal:  Negative for abdominal pain, constipation, diarrhea, nausea and vomiting. Endocrine: Negative for polydipsia and polyuria. Genitourinary:  Negative for dysuria, flank pain, frequency and urgency. Musculoskeletal:  Positive for myalgias (Left hip, Left knee). Negative for back pain, joint swelling and neck pain. Skin:  Negative for pallor and rash. Neurological:  Negative for dizziness, seizures, syncope, speech difficulty, weakness, numbness and headaches. Psychiatric/Behavioral:  Negative for confusion and suicidal ideas. The patient is not nervous/anxious. Physical Exam   Visit Vitals  BP (!) 143/83 (BP 1 Location: Right upper arm, BP Patient Position: Semi fowlers)   Pulse 92   Temp 97.6 °F (36.4 °C)   Resp 27   SpO2 99%     Physical Exam  Vitals and nursing note reviewed. Constitutional:       General: She is not in acute distress. Appearance: She is well-developed. She is not ill-appearing, toxic-appearing or diaphoretic. HENT:      Head: Normocephalic and atraumatic. Eyes:      Pupils: Pupils are equal, round, and reactive to light. Cardiovascular:      Rate and Rhythm: Normal rate and regular rhythm. Heart sounds: Normal heart sounds. Pulmonary:      Effort: Pulmonary effort is normal. No tachypnea or respiratory distress. Breath sounds: Normal breath sounds.    Chest:      Chest wall: No tenderness or edema. Abdominal:      General: Bowel sounds are normal.      Palpations: Abdomen is soft. Musculoskeletal:         General: Normal range of motion. Cervical back: Normal range of motion and neck supple. Left hip: Tenderness (Left hip) present. Right lower leg: No edema. Left lower leg: Tenderness (Left knee) present. No deformity. No edema. Skin:     General: Skin is warm and dry. Capillary Refill: Capillary refill takes less than 2 seconds. Findings: No rash. Neurological:      General: No focal deficit present. Mental Status: She is alert and oriented to person, place, and time. Psychiatric:         Mood and Affect: Mood normal.         Behavior: Behavior normal.         Diagnostic Study Results     Labs -  Recent Results (from the past 12 hour(s))   CBC WITH AUTOMATED DIFF    Collection Time: 11/26/22 10:32 PM   Result Value Ref Range    WBC 5.5 4.6 - 13.2 K/uL    RBC 4.27 4.20 - 5.30 M/uL    HGB 10.2 (L) 12.0 - 16.0 g/dL    HCT 32.3 (L) 35.0 - 45.0 %    MCV 75.6 (L) 78.0 - 100.0 FL    MCH 23.9 (L) 24.0 - 34.0 PG    MCHC 31.6 31.0 - 37.0 g/dL    RDW 15.4 (H) 11.6 - 14.5 %    PLATELET 881 425 - 751 K/uL    MPV 10.9 9.2 - 11.8 FL    NRBC 0.0 0  WBC    ABSOLUTE NRBC 0.00 0.00 - 0.01 K/uL    NEUTROPHILS 63 40 - 73 %    LYMPHOCYTES 30 21 - 52 %    MONOCYTES 6 3 - 10 %    EOSINOPHILS 0 0 - 5 %    BASOPHILS 1 0 - 2 %    IMMATURE GRANULOCYTES 0 0.0 - 0.5 %    ABS. NEUTROPHILS 3.5 1.8 - 8.0 K/UL    ABS. LYMPHOCYTES 1.6 0.9 - 3.6 K/UL    ABS. MONOCYTES 0.3 0.05 - 1.2 K/UL    ABS. EOSINOPHILS 0.0 0.0 - 0.4 K/UL    ABS. BASOPHILS 0.1 0.0 - 0.1 K/UL    ABS. IMM.  GRANS. 0.0 0.00 - 0.04 K/UL    DF AUTOMATED     METABOLIC PANEL, BASIC    Collection Time: 11/26/22 10:32 PM   Result Value Ref Range    Sodium 139 136 - 145 mmol/L    Potassium 5.0 3.5 - 5.5 mmol/L    Chloride 110 100 - 111 mmol/L    CO2 22 21 - 32 mmol/L    Anion gap 7 3.0 - 18 mmol/L    Glucose 153 (H) 74 - 99 mg/dL    BUN 23 (H) 7.0 - 18 MG/DL    Creatinine 1.04 0.6 - 1.3 MG/DL    BUN/Creatinine ratio 22 (H) 12 - 20      eGFR 58 (L) >60 ml/min/1.73m2    Calcium 9.3 8.5 - 10.1 MG/DL   TROPONIN-HIGH SENSITIVITY    Collection Time: 11/26/22 10:32 PM   Result Value Ref Range    Troponin-High Sensitivity 11 0 - 54 ng/L       Radiologic Studies -   CT HEAD WO CONT   Final Result      No acute abnormalities. Small vessel disease such as is seen in patients with   diabetes or hypertension. .      CT SPINE CERV WO CONT   Final Result      No acute bony injury. Multilevel degenerative changes at the cervical spine. Probable chronic scarring right lung apex. XR CHEST PORT    (Results Pending)   XR KNEE LT 3 V    (Results Pending)   XR FEMUR LT 2 V    (Results Pending)   XR HIPS BI W OR WO AP PELV    (Results Pending)         Medical Decision Making   I am the first provider for this patient. I reviewed the vital signs, available nursing notes, past medical history, past surgical history, family history and social history. Vital Signs-Reviewed the patient's vital signs. EKG: NSR. Left axis. 96 bpm. Normal intervals. No evidence of acute ischemia or dysrhythmia. Records Reviewed: Nursing Notes, Old Medical Records, Previous Radiology Studies, and Previous Laboratory Studies (Time of Review: 8:51 PM)    ED Course: Progress Notes, Reevaluation, and Consults:  8:54 PM patient seen and evaluated. No acute distress. Ground-level fall prior to arrival.  12:18 AM patient reevaluated. Ambulatory in the ED. No further pain or tenderness on tertiary exam.         Provider Notes (Medical Decision Making):   MDM  68-year-old female with past medical history significant for COPD, asthma, diverticulitis, hypertension, diabetes, CAD, and frequent falls presents to the ED via EMS status post fall. No blood thinner use. Patient describing left knee and left hip pain on evaluation.   No obvious deformities. Patient is ambulatory in the ED without difficulty. Vital signs reassuring. No midline spinal tenderness. Neurologic exam is normal.  EKG without evidence of ischemia, troponin normal.  Plain films without evidence of fracture or dislocation. Lab work grossly normal.  On history, patient's fall is suspicious for orthostatic hypotension followed by syncope. Ambulatory around the ED without difficulty. Tertiary exam without evidence of further trauma. Low clinical concern for ACS, MI, PE, dysrhythmia, fracture, or dislocation. Procedures        Diagnosis     Clinical Impression:   1. Fall, initial encounter    2. Acute pain of left knee        Disposition: Discharge to Home    Follow-up Information       Follow up With Specialties Details Why Contact Info    Anabel Vasquez MD Family Medicine Schedule an appointment as soon as possible for a visit in 3 days  [address] [address]      [facility] EMERGENCY DEPT Emergency Medicine  If symptoms worsen [address] [facility] [id number]  [phone]             Current Discharge Medication List        CONTINUE these medications which have NOT CHANGED    Details   dicyclomine (BENTYL) 20 mg tablet Take 1 Tablet by mouth every six (6) hours as needed for Abdominal Cramps for up to 30 days. Qty: 20 Tablet, Refills: 0      DULoxetine (CYMBALTA) 30 mg capsule Take 1 Capsule by mouth daily. Qty: 30 Capsule, Refills: 0      megestroL (MEGACE) 400 mg/10 mL (10 mL) suspension Take 5 mL by mouth three (3) times daily (with meals). Qty: 250 mL, Refills: 1      lubiPROStone (AMITIZA) 8 mcg capsule Take 1 Capsule by mouth daily (with breakfast). Qty: 90 Capsule, Refills: 0      therapeutic multivitamin (THERAGRAN) tablet Take 1 Tablet by mouth daily. Qty: 90 Tablet, Refills: 0      thiamine HCL (B-1) 100 mg tablet Take 1 Tablet by mouth daily.   Qty: 90 Tablet, Refills: 0      polyethylene glycol (Miralax) 17 gram/dose powder Take 17 g by mouth daily. 1 tablespoon with 8 oz of water daily  Qty: 289 g, Refills: 0      docusate sodium (Colace) 100 mg capsule Take 1 Capsule by mouth two (2) times a day. Qty: 30 Capsule, Refills: 0      ondansetron (ZOFRAN ODT) 4 mg disintegrating tablet Take 1 Tablet by mouth every eight (8) hours as needed for Nausea or Vomiting. Qty: 15 Tablet, Refills: 0      magnesium citrate solution Take 1/3 of bottle every 8 hours until finished or until you have a bowel movement. Qty: 295 mL, Refills: 0      lidocaine (Lidoderm) 5 % Apply patch to the affected area for 12 hours a day and remove for 12 hours a day. Qty: 30 Each, Refills: 0    Comments: Patient would like medications delivered to home. acetaminophen (TylenoL) 325 mg tablet Take 3 Tablets by mouth every six (6) hours as needed for Pain. Qty: 100 Tablet, Refills: 0    Comments: Patient would like medications delivered to home. Insulin Needles, Disposable, 31 gauge x 5/16\" ndle USE AS DIRECTED WITH INSULIN PEN DAILY  Refills: 3      Oxygen 3LPM O2 as needed   Indications: DME: [employer]      pantoprazole (PROTONIX) 20 mg tablet Take 20 mg by mouth daily. metFORMIN (GLUCOPHAGE) 500 mg tablet Take 1 Tab by mouth two (2) times daily (with meals). Qty: 60 Tab, Refills: 0      insulin glargine (LANTUS U-100 INSULIN) 100 unit/mL injection 35 Units by SubCUTAneous route daily. Qty: 1 Vial, Refills: 0      albuterol-ipratropium (DUO-NEB) 2.5 mg-0.5 mg/3 ml nebu 3 mL by Nebulization route every four (4) hours as needed for Other (shortness of breath). Qty: 30 Nebule, Refills: 0      diclofenac (VOLTAREN) 1 % gel Apply 2 g to affected area four (4) times daily. Gel should be measured and applied using the supplied dosing card. Apply dose (2 gm or 4 gm) to each location. If treatment site is the hands, patients should wait at least one (1) hour to wash their hands.    APPLY TO bilateral knees  Qty: 100 g, Refills: 0 melatonin 3 mg tablet Take 1 Tab by mouth nightly. Qty: 30 Tab, Refills: 0      lisinopril-hydroCHLOROthiazide (PRINZIDE, ZESTORETIC) 20-12.5 mg per tablet Take 1 Tab by mouth daily. Indications: hypertension      nitroglycerin (NITROSTAT) 0.4 mg SL tablet 1 Tab by SubLINGual route as needed for Chest Pain. Qty: 25 Tab, Refills: 1      fluticasone-salmeterol (ADVAIR) 250-50 mcg/dose diskus inhaler Take 1 puff by inhalation every twelve (12) hours. Qty: 1 Inhaler, Refills: 0      albuterol (PROVENTIL HFA, VENTOLIN HFA, PROAIR HFA) 90 mcg/actuation inhaler Take 1 puff by inhalation daily as needed for Wheezing. Qty: 1 Inhaler, Refills: 0      clopidogrel (PLAVIX) 75 mg tablet Take 1 tablet by mouth daily. Qty: 30 tablet, Refills: 11      atorvastatin (LIPITOR) 40 mg tablet Take 1 tablet by mouth nightly. Qty: 30 tablet, Refills: 11      aspirin 81 mg chewable tablet Take 1 tablet by mouth daily. Qty: 30 tablet, Refills: 0      tiotropium (SPIRIVA WITH HANDIHALER) 18 mcg inhalation capsule Take 1 Cap by inhalation daily. Disclaimer: Sections of this note are dictated using utilizing voice recognition software. Minor typographical errors may be present. If questions arise, please do not hesitate to contact me or call our department.

## 2022-12-05 ENCOUNTER — OFFICE VISIT (OUTPATIENT)
Dept: ORTHOPEDIC SURGERY | Age: 72
End: 2022-12-05
Payer: MEDICARE

## 2022-12-05 VITALS — HEIGHT: 62 IN | BODY MASS INDEX: 16.2 KG/M2 | WEIGHT: 88 LBS | TEMPERATURE: 97.1 F

## 2022-12-05 DIAGNOSIS — G89.29 CHRONIC PAIN OF RIGHT KNEE: ICD-10-CM

## 2022-12-05 DIAGNOSIS — M17.12 PRIMARY OSTEOARTHRITIS OF LEFT KNEE: Primary | ICD-10-CM

## 2022-12-05 DIAGNOSIS — M17.11 PRIMARY OSTEOARTHRITIS OF RIGHT KNEE: ICD-10-CM

## 2022-12-05 DIAGNOSIS — M25.561 CHRONIC PAIN OF RIGHT KNEE: ICD-10-CM

## 2022-12-05 DIAGNOSIS — M25.562 CHRONIC PAIN OF LEFT KNEE: ICD-10-CM

## 2022-12-05 DIAGNOSIS — G89.29 CHRONIC PAIN OF LEFT KNEE: ICD-10-CM

## 2022-12-05 RX ORDER — BETAMETHASONE SODIUM PHOSPHATE AND BETAMETHASONE ACETATE 3; 3 MG/ML; MG/ML
3 INJECTION, SUSPENSION INTRA-ARTICULAR; INTRALESIONAL; INTRAMUSCULAR; SOFT TISSUE ONCE
Status: COMPLETED | OUTPATIENT
Start: 2022-12-05 | End: 2022-12-05

## 2022-12-05 RX ADMIN — BETAMETHASONE SODIUM PHOSPHATE AND BETAMETHASONE ACETATE 3 MG: 3; 3 INJECTION, SUSPENSION INTRA-ARTICULAR; INTRALESIONAL; INTRAMUSCULAR; SOFT TISSUE at 16:33

## 2022-12-05 NOTE — PROGRESS NOTES
Patient: Morena Pretty                MRN: [id number]       SSN: [id number]  YOB: 1950        AGE: 81 y.o. SEX: female    PCP: Brea Leyva MD  12/05/22    Chief Complaint   Patient presents with    Knee Pain     Lt      HISTORY:  Morena Pretty is a 81 y.o. female who is seen for increased left knee pain. She has been experiencing severe left knee pain for the past 3 months. She does not recall any injury but reports she went to the ED several times due to her pain. She feels pain with standing, walking and stair climbing. She experiences  startup pain after sitting. Her pain makes it difficult to sleep. She is s/p left knee arthroscopy 12/14/11. She was previously seen for a right shoulder injury on 2/8/21. She landed on her right shoulder when her left knee gave out on her as she was getting out of bed. She was seen at [facility] on the same day where right shoulder x rays revealed a nondisplaced fracture of the right distal clavicle. Shirley Fields She was provided naproxen and an orthopedic referral.       She was previously discharged from the center of pain management, since she tested positive for cocaine. Pain Assessment  2/26/2021   Location of Pain Shoulder   Location Modifiers Right   Severity of Pain 10   Quality of Pain Throbbing; Sharp;Dull;Aching;Burning   Quality of Pain Comment swelling. pain radiating  up the neck. stabbing. hot   Duration of Pain Persistent   Frequency of Pain Constant   Date Pain First Started 2/6/2021   Aggravating Factors (No Data)   Aggravating Factors Comment ROM   Limiting Behavior -   Relieving Factors Nothing   Result of Injury Yes   Work-Related Injury No   Type of Injury Fall   Type of Injury Comment knee give out and feel on the shoulder     Occupation, etc:  Ms. Enid Kenyon previously worked as an  for the [employer]. She lived in the [facility] with her first  for 8 years.  She lives in [place] with her . She states her  is doing much better after his colectomy. She has 1 son from her 1st marriage and 1 granddaughter. She is active with her Jew. She states she is 2 years sober from cocaine. Ms. Enid Kenyon weighs 88 lbs and is 5'2\" tall. She lost almost 40 lb due to diverticulitis. Her  accompanies her to today's office visit. Lab Results   Component Value Date/Time    Hemoglobin A1c 7.1 (H) 10/20/2022 02:06 AM     Weight Metrics 12/5/2022 10/27/2022 9/21/2022 9/12/2022 9/6/2022 9/3/2022 7/26/2022   Weight 88 lb 88 lb 3.2 oz 88 lb 88 lb 85 lb 8 oz 85 lb 8 oz 96 lb   BMI 16.1 kg/m2 16.13 kg/m2 16.1 kg/m2 16.1 kg/m2 15.64 kg/m2 15.64 kg/m2 17.01 kg/m2       Patient Active Problem List   Diagnosis Code    Chronic pain syndrome G89.4    DJD (degenerative joint disease) of knee M17.9    Knee pain, left M25.562    Depression F32. A    COPD, moderate ([facility]) J44.9    HNP (herniated nucleus pulposus), cervical M50.20    HTN (hypertension) I10    Osteoporosis M81.0    DM (diabetes mellitus) ([facility] [address] E11.9    Chest pain R07.9    Trichomonas A59.9    Hidradenitis L73.2    Recurrent falls R29.6    GERD (gastroesophageal reflux disease) K21.9    Insomnia G47.00    Cocaine abuse ([facility]) F14.10    RVH (right ventricular hypertrophy) I51.7    Cigarette smoker F17.210    Non-ST elevated myocardial infarction (non-STEMI) ([facility]) I21.4    Anemia D64.9    CAD in native artery I25.10    Diverticulitis K57.92    Abdominal pain R10.9    Syncope and collapse R55    Hyperglycemia R73.9    Closed head injury S09.90XA    Chronic back pain M54.9, G89.29    Pulmonary sarcoidosis ([facility]) D86.0    Chronic respiratory failure with hypoxia ([facility]) J96.11    AMBROCIO (acute kidney injury) ([facility] [address] N17.9    Gastroparesis K31.84     REVIEW OF SYSTEMS:    Constitutional Symptoms: Negative   Eyes: Negative   Ears, Nose, Throat and Mouth: Negative   Cardiovascular: Negative   Respiratory: Negative   Genitourinary: Per HPI   Gastrointestinal: Per HPI   Integumentary (Skin and/or Breast): Negative   Musculoskeletal: Per HPI   Endocrine/Rheumatologic: Negative   Neurological: Per HPI   Hematology/Lymphatic: Negative    Allergic/Immunologic: Negative   Phychiatric: Negative    Social History     Socioeconomic History    Marital status:      Spouse name: Not on file    Number of children: Not on file    Years of education: Not on file    Highest education level: Not on file   Occupational History    Not on file   Tobacco Use    Smoking status: Former     Years: 46.00     Types: Cigarettes     Quit date: 2018     Years since quittin.7    Smokeless tobacco: Never    Tobacco comments: Only smokes 1 cigarette per day   Substance and Sexual Activity    Alcohol use: Yes     Alcohol/week: 1.7 standard drinks     Types: 2 Glasses of wine per week     Comment: socially    Drug use: Yes     Types: Cocaine, Marijuana     Comment: 6 months ago    Sexual activity: Yes   Other Topics Concern    Not on file   Social History Narrative    Not on file     Social Determinants of Health     Financial Resource Strain: Not on file   Food Insecurity: Not on file   Transportation Needs: Not on file   Physical Activity: Not on file   Stress: Not on file   Social Connections: Not on file   Intimate Partner Violence: Not on file   Housing Stability: Not on file      Allergies   Allergen Reactions    Latex Hives and Itching    Ciprofloxacin Hives    Penicillins Nausea Only    Shellfish Derived Hives and Swelling     Swelling of legs      Current Outpatient Medications   Medication Sig    DULoxetine (CYMBALTA) 30 mg capsule Take 1 Capsule by mouth daily. megestroL (MEGACE) 400 mg/10 mL (10 mL) suspension Take 5 mL by mouth three (3) times daily (with meals). lubiPROStone (AMITIZA) 8 mcg capsule Take 1 Capsule by mouth daily (with breakfast). therapeutic multivitamin (THERAGRAN) tablet Take 1 Tablet by mouth daily.     thiamine HCL (B-1) 100 mg tablet Take 1 Tablet by mouth daily. polyethylene glycol (Miralax) 17 gram/dose powder Take 17 g by mouth daily. 1 tablespoon with 8 oz of water daily    docusate sodium (Colace) 100 mg capsule Take 1 Capsule by mouth two (2) times a day. ondansetron (ZOFRAN ODT) 4 mg disintegrating tablet Take 1 Tablet by mouth every eight (8) hours as needed for Nausea or Vomiting.    magnesium citrate solution Take 1/3 of bottle every 8 hours until finished or until you have a bowel movement. lidocaine (Lidoderm) 5 % Apply patch to the affected area for 12 hours a day and remove for 12 hours a day. acetaminophen (TylenoL) 325 mg tablet Take 3 Tablets by mouth every six (6) hours as needed for Pain. Insulin Needles, Disposable, 31 gauge x 5/16\" ndle USE AS DIRECTED WITH INSULIN PEN DAILY    Oxygen 3LPM O2 as needed   Indications: DME: [employer]    pantoprazole (PROTONIX) 20 mg tablet Take 20 mg by mouth daily. metFORMIN (GLUCOPHAGE) 500 mg tablet Take 1 Tab by mouth two (2) times daily (with meals). insulin glargine (LANTUS U-100 INSULIN) 100 unit/mL injection 35 Units by SubCUTAneous route daily. albuterol-ipratropium (DUO-NEB) 2.5 mg-0.5 mg/3 ml nebu 3 mL by Nebulization route every four (4) hours as needed for Other (shortness of breath). diclofenac (VOLTAREN) 1 % gel Apply 2 g to affected area four (4) times daily. Gel should be measured and applied using the supplied dosing card. Apply dose (2 gm or 4 gm) to each location. If treatment site is the hands, patients should wait at least one (1) hour to wash their hands. APPLY TO bilateral knees    melatonin 3 mg tablet Take 1 Tab by mouth nightly. lisinopril-hydroCHLOROthiazide (PRINZIDE, ZESTORETIC) 20-12.5 mg per tablet Take 1 Tab by mouth daily. Indications: hypertension    nitroglycerin (NITROSTAT) 0.4 mg SL tablet 1 Tab by SubLINGual route as needed for Chest Pain.     fluticasone-salmeterol (ADVAIR) 250-50 mcg/dose diskus inhaler Take 1 puff by inhalation every twelve (12) hours. albuterol (PROVENTIL HFA, VENTOLIN HFA, PROAIR HFA) 90 mcg/actuation inhaler Take 1 puff by inhalation daily as needed for Wheezing. clopidogrel (PLAVIX) 75 mg tablet Take 1 tablet by mouth daily. atorvastatin (LIPITOR) 40 mg tablet Take 1 tablet by mouth nightly. aspirin 81 mg chewable tablet Take 1 tablet by mouth daily. tiotropium (SPIRIVA) 18 mcg inhalation capsule Take 1 Cap by inhalation daily. No current facility-administered medications for this visit. PHYSICAL EXAMINATION:  Visit Vitals  Temp 97.1 °F (36.2 °C) (Temporal)   Ht 5' 2\" (1.575 m)   Wt 88 lb (39.9 kg)   BMI 16.10 kg/m²    cachectic  ORTHO EXAMINATION:  Examination Left knee Right knee   Skin Intact Intact   Range of motion 115-5 115-5   Effusion - -   Medial joint line tenderness - +   Lateral joint line tenderness - +   Popliteal tenderness - +, and swelling   Osteophytes palpable + +   Dions - -   Patella crepitus + +   Anterior drawer - -   Lateral laxity - -   Medial laxity - -   Varus deformity - -   Valgus deformity - -   Pretibial edema - -   Calf tenderness - -   Severe left knee tenderness even to light touch    TIME OUT:  Chart reviewed for the following:   Yusuf Mclean MD, have reviewed the History, Physical and updated the Allergic reactions for [address] performed immediately prior to start of procedure:  Yusuf Mclean MD, have performed the following reviews on [address] [place] [address] prior to the start of the procedure:          * Patient was identified by name and date of birth   * Agreement on procedure being performed was verified  * Risks and Benefits explained to the patient  * Procedure site verified and marked as necessary  * Patient was positioned for comfort  * Consent was obtained     Time: 4:27 PM     Date of procedure: 12/5/2022  Procedure performed by:  Tanisha Coombs MD  Ms. Graham tolerated the procedure well with no complications. CT HEAD 2/8/21  IMPRESSION  No evidence of acute intracranial abnormality. CT CERV SPINE 2/8/21  IMPRESSION  1. No CT signs of acute cervical spine trauma. 2. Moderate facet arthropathy, right greater than left. There is moderate C4-C5 foraminal stenosis. RADIOGRAPHS:  XR RIGHT CLAVICLE 2/26/21 [facility]  IMPRESSION:  Three views - Evidence of healing of nondisplaced distal clavicle  Fracture, mild AC narrowing. XR RIGHT SHOULDER 2/8/21 [facility]  IMPRESSION  Acute nondisplaced fracture of the lateral third of the right clavicle.  -I have independently reviewed these images during this office visit. -Dr. Juan Manuel Pelaez:  Three views - Nondisplaced distal right clavicle fracture just medial to the [facility] [place] joint. Mild AC narrowing     XR BILAT KNEE 2/21/20 [facility]  IMPRESSION:  Three views - No fractures, no effusion, moderate joint space narrowing, no osteophytes present. Kellgren Esdras grade 2, flattening of condyles, moderate patellofemoral narrowing     XR BILAT KNEES PREV  Three views of knees: no fractures, no effusion, moderate joint space narrowing, medial and lateral osteophytes present. IMPRESSION:      ICD-10-CM ICD-9-CM    1. Primary osteoarthritis of left knee  M17.12 715.16 DRAIN/INJECT LARGE JOINT/BURSA      PROCEDURE AUTHORIZATION TO       betamethasone (CELESTONE) injection 3 mg      REFERRAL TO PAIN MANAGEMENT      2. Chronic pain of left knee  M25.562 719.46 DRAIN/INJECT LARGE JOINT/BURSA    G89.29 338.29 PROCEDURE AUTHORIZATION TO       betamethasone (CELESTONE) injection 3 mg      REFERRAL TO PAIN MANAGEMENT      3. Primary osteoarthritis of right knee  M17.11 715.16 REFERRAL TO PAIN MANAGEMENT      4. Chronic pain of right knee  M25.561 719.46 REFERRAL TO PAIN MANAGEMENT    G89.29 338.29         PLAN:  After discussing treatment options, patient's left knee was injected with 4 cc Xylocaine and 1/2 cc Celestone.  She will be referred to pain management. Consider visco supplementation if pain continues. She will follow up as needed.      Scribed by Yetapurva See (Basha) as dictated by Jeferson Solis MD

## 2023-02-02 ENCOUNTER — DOCUMENTATION ONLY (OUTPATIENT)
Dept: ORTHOPEDIC SURGERY | Age: 73
End: 2023-02-02

## 2023-02-09 ENCOUNTER — OFFICE VISIT (OUTPATIENT)
Dept: ORTHOPEDIC SURGERY | Age: 73
End: 2023-02-09
Payer: MEDICARE

## 2023-02-09 VITALS — RESPIRATION RATE: 14 BRPM | BODY MASS INDEX: 16.2 KG/M2 | TEMPERATURE: 97.7 F | HEIGHT: 62 IN | WEIGHT: 88 LBS

## 2023-02-09 DIAGNOSIS — M54.32 SCIATICA OF LEFT SIDE: Primary | ICD-10-CM

## 2023-02-09 DIAGNOSIS — M17.12 PRIMARY OSTEOARTHRITIS OF LEFT KNEE: ICD-10-CM

## 2023-02-09 DIAGNOSIS — K57.92 DIVERTICULITIS: ICD-10-CM

## 2023-02-09 DIAGNOSIS — M25.562 CHRONIC PAIN OF LEFT KNEE: ICD-10-CM

## 2023-02-09 DIAGNOSIS — E46 MALNUTRITION, UNSPECIFIED TYPE (HCC): ICD-10-CM

## 2023-02-09 DIAGNOSIS — G89.29 CHRONIC PAIN OF LEFT KNEE: ICD-10-CM

## 2023-02-09 DIAGNOSIS — R63.0 ANOREXIA: ICD-10-CM

## 2023-02-09 DIAGNOSIS — R63.4 WEIGHT LOSS: ICD-10-CM

## 2023-02-09 NOTE — PROGRESS NOTES
Patient: Nubia Hall                MRN: [id number]       SSN: [id number]  YOB: 1950        AGE: 81 y.o. SEX: female     PCP: Sarah Lu MD  02/09/23          Chief Complaint   Patient presents with    Knee Pain       Lt       HISTORY:  Nubia Hall is a 81 y.o. female who is seen for increased left knee pain. She responded to her injection last ov but her pains have returned. She does not recall any injury but reports she went to the ED several times due to her pain. She feels pain with standing, walking and stair climbing. She experiences  startup pain after sitting. Her pain makes it difficult to sleep. She is s/p left knee arthroscopy 12/14/11. She was previously seen for a right shoulder injury on 2/8/21. She landed on her right shoulder when her left knee gave out on her as she was getting out of bed. She was seen at [facility] on the same day where right shoulder x rays revealed a nondisplaced fracture of the right distal clavicle. Missy Cline She was provided naproxen and an orthopedic referral.       She was previously discharged from the center of pain management, since she tested positive for cocaine. She states that she can't eat and/or sleep. She says that she think her diagnosis of diverticulitis is the reason for her unexplained weight loss. Pain Assessment  2/26/2021   Location of Pain Shoulder   Location Modifiers Right   Severity of Pain 10   Quality of Pain Throbbing; Sharp;Dull;Aching;Burning   Quality of Pain Comment swelling. pain radiating  up the neck. stabbing.  hot   Duration of Pain Persistent   Frequency of Pain Constant   Date Pain First Started 2/6/2021   Aggravating Factors (No Data)   Aggravating Factors Comment ROM   Limiting Behavior -   Relieving Factors Nothing   Result of Injury Yes   Work-Related Injury No   Type of Injury Fall   Type of Injury Comment knee give out and feel on the shoulder      Occupation, etc:  Ms. Alexus Tran previously worked as an  for the [employer]. She lived in the [facility] with her first  for 8 years. She lives in [facility] with her . She states her  is doing much better after his colectomy. She has 1 son from her 1st marriage and 1 granddaughter. She is active with her [employer]. She states she is 2 years sober from cocaine. Ms. Alexus Tran weighs 88 lbs and is 5'2\" tall. She lost almost 40 lb due to diverticulitis. Lab Results   Component Value Date/Time     Hemoglobin A1c 7.1 (H) 10/20/2022 02:06 AM      Weight Metrics 12/5/2022 10/27/2022 9/21/2022 9/12/2022 9/6/2022 9/3/2022 7/26/2022   Weight 88 lb 88 lb 3.2 oz 88 lb 88 lb 85 lb 8 oz 85 lb 8 oz 96 lb   BMI 16.1 kg/m2 16.13 kg/m2 16.1 kg/m2 16.1 kg/m2 15.64 kg/m2 15.64 kg/m2 17.01 kg/m2              Patient Active Problem List   Diagnosis Code    Chronic pain syndrome G89.4    DJD (degenerative joint disease) of knee M17.9    Knee pain, left M25.562    Depression F32. A    COPD, moderate ([facility]) J44.9    HNP (herniated nucleus pulposus), cervical M50.20    HTN (hypertension) I10    Osteoporosis M81.0    DM (diabetes mellitus) ([facility] [address] E11.9    Chest pain R07.9    Trichomonas A59.9    Hidradenitis L73.2    Recurrent falls R29.6    GERD (gastroesophageal reflux disease) K21.9    Insomnia G47.00    Cocaine abuse ([facility]) F14.10    RVH (right ventricular hypertrophy) I51.7    Cigarette smoker F17.210    Non-ST elevated myocardial infarction (non-STEMI) ([facility]) I21.4    Anemia D64.9    CAD in native artery I25.10    Diverticulitis K57.92    Abdominal pain R10.9    Syncope and collapse R55    Hyperglycemia R73.9    Closed head injury S09.90XA    Chronic back pain M54.9, G89.29    Pulmonary sarcoidosis ([facility]) D86.0    Chronic respiratory failure with hypoxia (H[facility]) J96.11    AMBROCIO (acute kidney injury) ([facility] [address] N17.9    Gastroparesis K31.84      REVIEW OF SYSTEMS:               Constitutional Symptoms: Negative              Eyes: Negative              Ears, Nose, Throat and Mouth: Negative              Cardiovascular: Negative              Respiratory: Negative              Genitourinary: Per HPI              Gastrointestinal: Per HPI              Integumentary (Skin and/or Breast): Negative              Musculoskeletal: Per HPI              Endocrine/Rheumatologic: Negative              Neurological: Per HPI              Hematology/Lymphatic: Negative               Allergic/Immunologic: Negative              Phychiatric: Negative     Social History   Social History            Socioeconomic History    Marital status:        Spouse name: Not on file    Number of children: Not on file    Years of education: Not on file    Highest education level: Not on file   Occupational History    Not on file   Tobacco Use    Smoking status: Former       Years: 46.00       Types: Cigarettes       Quit date: 2018       Years since quittin.7    Smokeless tobacco: Never    Tobacco comments: Only smokes 1 cigarette per day   Substance and Sexual Activity    Alcohol use:  Yes       Alcohol/week: 1.7 standard drinks       Types: 2 Glasses of wine per week       Comment: socially    Drug use: Yes       Types: Cocaine, Marijuana       Comment: 6 months ago    Sexual activity: Yes   Other Topics Concern    Not on file   Social History Narrative    Not on file      Social Determinants of Health      Financial Resource Strain: Not on file   Food Insecurity: Not on file   Transportation Needs: Not on file   Physical Activity: Not on file   Stress: Not on file   Social Connections: Not on file   Intimate Partner Violence: Not on file   Housing Stability: Not on file               Allergies   Allergen Reactions    Latex Hives and Itching    Ciprofloxacin Hives    Penicillins Nausea Only    Shellfish Derived Hives and Swelling       Swelling of legs           Current Outpatient Medications   Medication Sig    DULoxetine (CYMBALTA) 30 mg capsule Take 1 Capsule by mouth daily. megestroL (MEGACE) 400 mg/10 mL (10 mL) suspension Take 5 mL by mouth three (3) times daily (with meals). lubiPROStone (AMITIZA) 8 mcg capsule Take 1 Capsule by mouth daily (with breakfast). therapeutic multivitamin (THERAGRAN) tablet Take 1 Tablet by mouth daily. thiamine HCL (B-1) 100 mg tablet Take 1 Tablet by mouth daily. polyethylene glycol (Miralax) 17 gram/dose powder Take 17 g by mouth daily. 1 tablespoon with 8 oz of water daily    docusate sodium (Colace) 100 mg capsule Take 1 Capsule by mouth two (2) times a day. ondansetron (ZOFRAN ODT) 4 mg disintegrating tablet Take 1 Tablet by mouth every eight (8) hours as needed for Nausea or Vomiting.    magnesium citrate solution Take 1/3 of bottle every 8 hours until finished or until you have a bowel movement. lidocaine (Lidoderm) 5 % Apply patch to the affected area for 12 hours a day and remove for 12 hours a day. acetaminophen (TylenoL) 325 mg tablet Take 3 Tablets by mouth every six (6) hours as needed for Pain. Insulin Needles, Disposable, 31 gauge x 5/16\" ndle USE AS DIRECTED WITH INSULIN PEN DAILY    Oxygen 3LPM O2 as needed   Indications: DME: [employer]    pantoprazole (PROTONIX) 20 mg tablet Take 20 mg by mouth daily. metFORMIN (GLUCOPHAGE) 500 mg tablet Take 1 Tab by mouth two (2) times daily (with meals). insulin glargine (LANTUS U-100 INSULIN) 100 unit/mL injection 35 Units by SubCUTAneous route daily. albuterol-ipratropium (DUO-NEB) 2.5 mg-0.5 mg/3 ml nebu 3 mL by Nebulization route every four (4) hours as needed for Other (shortness of breath). diclofenac (VOLTAREN) 1 % gel Apply 2 g to affected area four (4) times daily. Gel should be measured and applied using the supplied dosing card. Apply dose (2 gm or 4 gm) to each location. If treatment site is the hands, patients should wait at least one (1) hour to wash their hands.    APPLY TO bilateral knees    melatonin 3 mg tablet Take 1 Tab by mouth nightly. lisinopril-hydroCHLOROthiazide (PRINZIDE, ZESTORETIC) 20-12.5 mg per tablet Take 1 Tab by mouth daily. Indications: hypertension    nitroglycerin (NITROSTAT) 0.4 mg SL tablet 1 Tab by SubLINGual route as needed for Chest Pain. fluticasone-salmeterol (ADVAIR) 250-50 mcg/dose diskus inhaler Take 1 puff by inhalation every twelve (12) hours. albuterol (PROVENTIL HFA, VENTOLIN HFA, PROAIR HFA) 90 mcg/actuation inhaler Take 1 puff by inhalation daily as needed for Wheezing. clopidogrel (PLAVIX) 75 mg tablet Take 1 tablet by mouth daily. atorvastatin (LIPITOR) 40 mg tablet Take 1 tablet by mouth nightly. aspirin 81 mg chewable tablet Take 1 tablet by mouth daily. tiotropium (SPIRIVA) 18 mcg inhalation capsule Take 1 Cap by inhalation daily. No current facility-administered medications for this visit.       PHYSICAL EXAMINATION:  Visit Vitals  Temp 97.1 °F (36.2 °C) (Temporal)   Ht 5' 2\" (1.575 m)   Wt 88 lb (39.9 kg)   BMI 16.10 kg/m²    cachectic  ORTHO EXAMINATION:  Examination Left knee Right knee   Skin Intact Intact   Range of motion 115-5 115-5   Effusion - -   Medial joint line tenderness - +   Lateral joint line tenderness - +   Popliteal tenderness - +, and swelling   Osteophytes palpable + +   Dions - -   Patella crepitus + +   Anterior drawer - -   Lateral laxity - -   Medial laxity - -   Varus deformity - -   Valgus deformity - -   Pretibial edema - -   Calf tenderness - -   Severe left knee tenderness even to light touch         Chart reviewed for the following:   ISarai MD, have reviewed the History, Physical and updated the Allergic reactions for [address] performed immediately prior to start of procedure:  Maren Kee MD, have performed the following reviews on [address] [place] [address] prior to the start of the procedure:            * Patient was identified by name and date of birth   * Agreement on procedure being performed was verified  * Risks and Benefits explained to the patient  * Procedure site verified and marked as necessary  * Patient was positioned for comfort  * Consent was obtained  Time: 3:12 PM  Date of procedure: 2/9/2023    Procedure performed by:  Dr. Irvin    Ms. Graham tolerated the procedure well with no complications. CT HEAD 2/8/21  IMPRESSION  No evidence of acute intracranial abnormality. CT CERV SPINE 2/8/21  IMPRESSION  1. No CT signs of acute cervical spine trauma. 2. Moderate facet arthropathy, right greater than left. There is moderate C4-C5 foraminal stenosis. RADIOGRAPHS:  XR RIGHT CLAVICLE 2/26/21 [facility]  IMPRESSION:  Three views - Evidence of healing of nondisplaced distal clavicle  Fracture, mild AC narrowing. XR RIGHT SHOULDER 2/8/21 [facility]  IMPRESSION  Acute nondisplaced fracture of the lateral third of the right clavicle.  -I have independently reviewed these images during this office visit. -Dr. Migdalia Bennett:  Three views - Nondisplaced distal right clavicle fracture just medial to the [facility] joint. Mild AC narrowing     XR BILAT KNEE 2/21/20 [facility]  IMPRESSION:  Three views - No fractures, no effusion, moderate joint space narrowing, no osteophytes present. Kellgren Esdras grade 2, flattening of condyles, moderate patellofemoral narrowing     XR BILAT KNEES PREV  Three views of knees: no fractures, no effusion, moderate joint space narrowing, medial and lateral osteophytes present. IMPRESSION:        ICD-10-CM ICD-9-CM     1. Primary osteoarthritis of left knee  M17.12 715.16 DRAIN/INJECT LARGE JOINT/BURSA         PROCEDURE AUTHORIZATION TO          betamethasone (CELESTONE) injection 3 mg         REFERRAL TO PAIN MANAGEMENT       2.  Chronic pain of left knee  M25.562 719.46 DRAIN/INJECT LARGE JOINT/BURSA     G89.29 338.29 PROCEDURE AUTHORIZATION TO          betamethasone (CELESTONE) injection 3 mg         REFERRAL TO PAIN MANAGEMENT       3. Primary osteoarthritis of right knee  M17.11 715.16 REFERRAL TO PAIN MANAGEMENT       4. Chronic pain of right knee  M25.561 719.46 REFERRAL TO PAIN MANAGEMENT     G89.29 338.29            PLAN:  After discussing treatment options, patient's left knee was injected with 4 cc Xylocaine and 1/2 cc Celestone. She will be referred to pain management. Consider visco supplementation if pain continues. She will follow up as needed.

## 2023-02-14 ENCOUNTER — TELEPHONE (OUTPATIENT)
Age: 73
End: 2023-02-14

## 2023-02-14 NOTE — TELEPHONE ENCOUNTER
Reached unidentified voice mail and left generic message to schedule appointment. Schedule New Patient appointment with any [facility] provider.     Sciatica of left side/no imaging/? prev sx/Ref by Dr Mamta Ramos

## 2023-02-16 ENCOUNTER — OFFICE VISIT (OUTPATIENT)
Age: 73
End: 2023-02-16

## 2023-02-16 VITALS
TEMPERATURE: 97.1 F | OXYGEN SATURATION: 95 % | WEIGHT: 80 LBS | HEART RATE: 97 BPM | HEIGHT: 62 IN | BODY MASS INDEX: 14.72 KG/M2

## 2023-02-16 DIAGNOSIS — M17.12 UNILATERAL PRIMARY OSTEOARTHRITIS, LEFT KNEE: Primary | ICD-10-CM

## 2023-02-16 DIAGNOSIS — M25.562 ACUTE PAIN OF LEFT KNEE: ICD-10-CM

## 2023-02-16 RX ORDER — HYALURONATE SODIUM 10 MG/ML
20 SYRINGE (ML) INTRAARTICULAR ONCE
Status: COMPLETED | OUTPATIENT
Start: 2023-02-16 | End: 2023-02-16

## 2023-02-16 RX ADMIN — Medication 20 MG: at 11:02

## 2023-02-16 NOTE — PROGRESS NOTES
Patient: Polly Brooks                MRN: [id number]       SSN: [id number]  YOB: 1950        AGE: 81 y.o. SEX: female  There is no height or weight on file to calculate BMI. PCP: Cassidy Bryant MD  02/16/23    No chief complaint on file. HISTORY:  Polly Brooks is a 81 y.o. female who is seen for knee pain. ICD-10-CM    1. Unilateral primary osteoarthritis, left knee  M17.12 DRAIN/INJECT LARGE JOINT/BURSA     sodium hyaluronate (EUFLEXXA, HYALGAN) injection 20 mg      2. Acute pain of left knee  M25.562 DRAIN/INJECT LARGE JOINT/BURSA     sodium hyaluronate (EUFLEXXA, HYALGAN) injection 20 mg          PROCEDURE:  Ms. Martinez Givens left knee injected with 2 cc of Euflexxa. Chart reviewed for the following:   Raf Isabel MD, have reviewed the History, Physical and updated the Allergic reactions for [address] performed immediately prior to start of procedure:  Raf Isabel MD, have performed the following reviews on Polly Brooks prior to the start of the procedure:            * Patient was identified by name and date of birth   * Agreement on procedure being performed was verified  * Risks and Benefits explained to the patient  * Procedure site verified and marked as necessary  * Patient was positioned for comfort  * Consent was obtained     Time: 11:00 AM     Date of procedure: 2/16/2023    Procedure performed by:  Job nAdres MD, MD    Ms. Loaiza tolerated the procedure well with no complications. PLAN:  Ms. Shelbie Dallas will follow up in one week to complete her Euflexxa injection series.

## 2023-02-21 ENCOUNTER — TELEPHONE (OUTPATIENT)
Age: 73
End: 2023-02-21

## 2023-02-23 ENCOUNTER — OFFICE VISIT (OUTPATIENT)
Age: 73
End: 2023-02-23

## 2023-02-23 VITALS — HEIGHT: 62 IN | BODY MASS INDEX: 14.91 KG/M2 | WEIGHT: 81 LBS | TEMPERATURE: 97.7 F

## 2023-02-23 DIAGNOSIS — M17.12 UNILATERAL PRIMARY OSTEOARTHRITIS, LEFT KNEE: ICD-10-CM

## 2023-02-23 DIAGNOSIS — M25.562 ACUTE PAIN OF LEFT KNEE: Primary | ICD-10-CM

## 2023-02-23 RX ORDER — HYALURONATE SODIUM 10 MG/ML
20 SYRINGE (ML) INTRAARTICULAR ONCE
Status: COMPLETED | OUTPATIENT
Start: 2023-02-23 | End: 2023-02-23

## 2023-02-23 RX ADMIN — Medication 20 MG: at 09:45

## 2023-02-23 NOTE — PROGRESS NOTES
Patient: Anton Collins                MRN: [id number]       SSN: [id number]  YOB: 1950        AGE: 81 y.o. SEX: female  There is no height or weight on file to calculate BMI. PCP: Ho Chaudhry MD  02/23/23    No chief complaint on file. HISTORY:  Anton Collins is a 81 y.o. female who is seen for knee pain. PROCEDURE:  Ms. Yesenia Garcia left knee injected with 2 cc of Euflexxa. TIME OUT performed immediately prior to start of procedure:  Jaylen Quezada MD, have performed the following reviews on Anton Collins prior to the start of the procedure:            * Patient was identified by name and date of birth   * Agreement on procedure being performed was verified  * Risks and Benefits explained to the patient  * Procedure site verified and marked as necessary  * Patient was positioned for comfort  * Consent was obtained     Time: 9:03 AM     Date of procedure: 2/23/2023    Procedure performed by:  Zurdo Solorio MD    Ms. Loaiza tolerated the procedure well with no complications    Encounter Diagnoses   Name Primary? Acute pain of left knee Yes    Unilateral primary osteoarthritis, left knee           PLAN:  Ms. Ankita Zavala will follow up PRN now that she has completed her visco supplementation injection series.

## 2023-02-28 ENCOUNTER — HOME CARE VISIT (OUTPATIENT)
Age: 73
End: 2023-02-28
Payer: MEDICARE

## 2023-02-28 ENCOUNTER — HOSPICE ADMISSION (OUTPATIENT)
Age: 73
End: 2023-02-28
Payer: MEDICARE

## 2023-02-28 PROCEDURE — 0651 HSPC ROUTINE HOME CARE

## 2023-02-28 PROCEDURE — 2500000001 HSPC NON INJECTABLE MED

## 2023-02-28 PROCEDURE — G0299 HHS/HOSPICE OF RN EA 15 MIN: HCPCS

## 2023-02-28 ASSESSMENT — ENCOUNTER SYMPTOMS: HEMOPTYSIS: 0

## 2023-03-01 ENCOUNTER — HOME CARE VISIT (OUTPATIENT)
Age: 73
End: 2023-03-01
Payer: MEDICARE

## 2023-03-01 VITALS
TEMPERATURE: 98 F | HEART RATE: 88 BPM | OXYGEN SATURATION: 92 % | DIASTOLIC BLOOD PRESSURE: 67 MMHG | SYSTOLIC BLOOD PRESSURE: 98 MMHG | RESPIRATION RATE: 18 BRPM

## 2023-03-01 VITALS
DIASTOLIC BLOOD PRESSURE: 62 MMHG | HEART RATE: 88 BPM | RESPIRATION RATE: 18 BRPM | SYSTOLIC BLOOD PRESSURE: 110 MMHG | TEMPERATURE: 97.3 F | OXYGEN SATURATION: 92 %

## 2023-03-01 PROCEDURE — 0651 HSPC ROUTINE HOME CARE

## 2023-03-01 PROCEDURE — G0300 HHS/HOSPICE OF LPN EA 15 MIN: HCPCS

## 2023-03-01 ASSESSMENT — ENCOUNTER SYMPTOMS
DYSPNEA ACTIVITY LEVEL: AT REST
PAIN LOCATION - PAIN QUALITY: SHARP
STOOL DESCRIPTION: SOFT FORMED
CONSTIPATION: 1

## 2023-03-01 NOTE — HOSPICE
Hospice Admission Summary    Ms. Randall Antoine is a 81year old female, admitted to Hospice services for a COPD. Patient has elected hospice services and is no longer seeking aggressive treatment. Co-morbidities related to the terminal diagnosis are osteoarthritis and debility  Patient also has a past medical history of Type 2 diabetes, Sarcoidosis, OA, HTN, CHF and depression. Mrs. Jonathan Flanagan is a 81year old female followed by [address] [place] [address] physicians for COPD. She has experienced increased exacerbations and admissions to hospital over past few months. It is also noted that she has a very poor appetite and has lost 45 pounds in the past year and now weighs 81 pounds. States appetite is poor. Declines discussion concerning aggressive therapies and feeding tube. It is also noted that she has sarcoidosis diagnosed in 1994 that has affected her breathing and caused some pain and spasticity on her left side. She also has osteoarthritis in her left knee which is the source of most of her pain. Six months ago Ms. Jonathan Flanagan was able to ambulate on her own with occasional assistance of cane. She was able to care for herself. She was able to shop and prepare meals for her and her spouse. Now she is sleeping or in bed most of the day- 18 hours reported. She needs assistance with dressing and showering. She is eating only small bites and sips. She is now incontinent of urine. The patient and spouse are present and willing and safely able to provide care and administer medications, Spouse's availability is daily. They have some assistance in the home with cleaning and cooking  The patient and spouse participated in goal setting, care planning, and are agreeable to the care plan.   Admission booklet reviewed with spouse; services provided under hospice benefit, review of rights and responsibilities, disposal of medications, contact information for , Joint Commission, Medicare, O, and outside resources for independence. The patient and spouse educated on IDT and their right to attend meetings. Education provided regarding 24-hour availability of hospice services and on-call number provided. Attending physician:  rowan  Medical Director:  Dr. Demetria Chavira  Level of Care:  routine  Advance Directives:  none  Allergies:  PCN, cipro, shellfish, Latex  MAC:  16.4 LMAC  Height:  5'2\"  Weight:  81 lbs  PPS:  40%  FAST:  NA  NYHA:  II  EF%:  NA  Tobacco usage:  occasionally smokes - ½-1 cigarette/day  Functional status- walks with rollator- history of several falls. Needs maximum assistance with all adls. Infection:  N/A   Pain:  Left knee- arthritic- 7/10 pain reported. Currently has Tylenol 500 mg q 4 hours which she state is not effective. Discussed with Dr. Demetria Chavira and received order for Dilaudid 2mg q 6 hours as needed for pain. Also order for morphine concentrate as needed for pain/sob  Bowel Regimen:  Dulcolax 5 mg tabs as needed, also ordered Dulcolax suppository prn  Lines, Drains, or Airways present:   NA See Care Plan for Intervention Orders  Wounds present:  NA See wound care plan for intervention orders  Symptoms to monitor to maintain comfort:  pain in left knee, shortness of breath    Hospice Aide Services Requested:  declined at this time. MSW Requested:  YES   Requested:  Nikkie Smith- Mr. Carl Norwood is a  in his [employer] - [employer] Requested: declined at this time  Bereavement risk/contact:  Mr. Carl Norwood - low bereavement. Patient has a grown son in the area that is estranged from patient. Patient/family/caregiver specific end of life goal:  Patient verbalizes that she wants to breathe easy and pass at home when it is her time.   She doesn't want to return to the hospital.  She wants pain relief in left knee/leg  Training and education provided this visit:  hospice philosophy, IDT, hospice services, fall prevention, infection control, skin care, bowel regimen, pain regimen  Plan for next visit:  review comfort meds, assess response to Dilaudid  Care coordination with Medical Director, IDG, and SMalorie Eliz Speaks regarding admission to hospice and all are in agreement with plan of care. DME - ordered oxygen concentrator 5l for prn use- Natalie  Supplies- ordered chux, sm/med pullups, wipes, gloves, barrier cream, lotion  MEDS- ordered dilaudid 2mg tabs (30) and Morphine concentrate from [facility]- to be delivered this evening. Billing information provided  Comfort pack minus morphine, loratadine- ordered via [facility] for delivery tomorrow.

## 2023-03-01 NOTE — HOSPICE
The following standard precautions for infection control were utilized:  Mask  Patient was asleep upon arrival but while talking to her  Rev. Loaiza she woke-up. She began to talk about her story and journey through life. [address] will continue to follow patient and family.

## 2023-03-02 ENCOUNTER — HOME CARE VISIT (OUTPATIENT)
Age: 73
End: 2023-03-02
Payer: MEDICARE

## 2023-03-02 PROCEDURE — 0651 HSPC ROUTINE HOME CARE

## 2023-03-02 NOTE — HOSPICE
Follow up visit complete. Answered questions about pain meds. Patient began taking    Morphine 0.5mL  today with positive results. Medication refills ordered this visit: Medication is in the home    Medications reconciled and all medications are not available in the home this visit. The following education was provided regarding medications, medication interactions, and look alike medications: Medication side effects, dosages, purposes, frequencies. Response to teaching: Verbalized understanding. Medications are effective/not effective at this time. Supplies by type and quantity ordered this visit include: Not needed    Consulted medical director/attending physician regarding: Not needed    Instructed patient/family/caregiver on 24-hour hospice availability and phone number. Plan for next visit:  Continue end of life education and support caregiver.

## 2023-03-03 ENCOUNTER — HOME CARE VISIT (OUTPATIENT)
Age: 73
End: 2023-03-03
Payer: MEDICARE

## 2023-03-03 VITALS — TEMPERATURE: 98 F | RESPIRATION RATE: 18 BRPM

## 2023-03-03 PROCEDURE — G0155 HHCP-SVS OF CSW,EA 15 MIN: HCPCS

## 2023-03-03 PROCEDURE — 0651 HSPC ROUTINE HOME CARE

## 2023-03-03 PROCEDURE — G0299 HHS/HOSPICE OF RN EA 15 MIN: HCPCS

## 2023-03-04 PROCEDURE — 0651 HSPC ROUTINE HOME CARE

## 2023-03-04 NOTE — HOSPICE
PRN visit made this evening due to pt having uncontrollable pain. Pt laying on couch, shakey and reporting 9/10 generalized pain. Reports that she has not taken anything for pain in two days but then stated she took something when the other hospice person was there. I reviewed her medications with her and counted all controlled substances. Pt refusing to take anything for pain during visit. Much time spent talking with patient about her life and what her goals were regarding pain management. She kept saying \"it doesn't matter\" and \"I don't care\". She reports that the pain meds make her feel loopy and that the only thing that ever relieved her pain was IV dilaudid. She did not have any suicideal ideations during visit. She was not agitated or angry but did say she becomes agitated at times.  is struggling with caring for her. By end of visit pt was calm and thankful for support. Encouraged to call hospice at any time with any concerns.

## 2023-03-05 PROCEDURE — 0651 HSPC ROUTINE HOME CARE

## 2023-03-06 ENCOUNTER — HOME CARE VISIT (OUTPATIENT)
Age: 73
End: 2023-03-06
Payer: MEDICARE

## 2023-03-06 PROCEDURE — G0299 HHS/HOSPICE OF RN EA 15 MIN: HCPCS

## 2023-03-06 NOTE — HOSPICE
The following standard precautions for infection control were utilized:  hand . Today's visit was conducted to complete an initial SW assessment for Vikas Rod. Viaks Rod is a 81year old female admitted on 02/28/23 with a terminal diagnosis of COPD. SW was received at the front door of the one bedroom apartment by Rev. Gissell Faith,  of pt. The apartment is small, slightly cluttered, but walkways are clear. There are no visible safety concerns noted by SW during this visit. CG wanted to speak with SW before introducing SW to pt. Pt was asleep in the bedroom with the door closed when SW first arrived. CG stated that he is in great need of a \"nurse\" to help him and provide ongoing support. CG had already reached out to insurance and requested additional CG's in the home. CG called his care navigator, who spoke with this SW about documentation needed to be provided to pt's insurance before paid caregivers could be provided in the home. SW agreed to [employer] with the needed information and obtained phone numbers in order to speak directly with insurance. CG stated pt fell 2 nights ago in the kitchen and he has no idea how long she was there before he found her. He states pt would not allow him to call for help to get her off the floor. CG stated it was difficult to help her get up and he is unclear that he can provide the level of care she needs by himself. CG is 89 and has difficulty walking (using a Rolator himself). CG states pt is resistant to accepting outside help. CG states PT takes her own medication, but also states pt uses other substances. SW questioned if other substance was marijuana and CG stated \"No, cocaine. \"  At this time, the bedroom door opened and PT walked out of the room, using her Rolator. SW introduced herself to PT, who stated she needed to use the bathroom. When pt walked into the restroom, CG stated, \"I'm not going to talk anymore. \"  PT came out of the restroom and came into the living room. She was highly agitated and spoke aggressively to  when  asked her to sit and discuss needs with SW.  PT stated, \"You already said too much and you know it. \"  SW asked PT to sit and discuss needs so that hospice could provide assistance and support to the family. Two cell phones began ringing, which increased pt's agitation and she stated something about going out the window. SW questioned if she stated she was going to through the phones out the window, she stated, \"No, I'm going to throw myself out the window. \"  At this point, SW stopped all conversation and directly spoke with pt about suicidal ideations and SW intention to call police if she was feeling suicidal.  A suicide assessment was completed immediately and it was determined that pt is in a great deal of uncontrolled pain and this is causing her aggressive/agitation and that she has never tried to complete suicide, nor does she intend too, she just wants the pain to end. SW questioned if current pain medications were being used. PT stated she does not like the was morphine and Dilaudid make her feel. Pt stated she does not want to feel \"Loopy\". SW walked through why medication that was provided was important to help provide comfort to pt and how pain she is experiencing is contributing to her anxiety/agitation. JOSH called CC to ask that a RN CM visit ASAP to provide additional education about medications and provide written instructions and pill box to ensure medication was taken as prescribed and on a set schedule. A homecare  ('s benefit) arrived in the home, which also caused more anxiety to pt and she requested that home companion to leave. This upset the cg because he needs the help. PT was insistent that  leave and  agreed at this time.   Pt was visibly experiencing pain and agitation and agreed to take a Dilaudid to see if it would help with pain control. SW retrieved a soda and water from the kitchen and pt took one pill. SW sat quietly with pt/cg to help decrease agitation. PT began to speak to SW about how \"new\" people in her home cause anxiety to her. She apologized for her behaviors and SW spoke with pt about hospice's role and our support team.  Umer Garces questioned if she was agreeable to accept our help and she stated, \"Yes. \"  Over the time span of approximately 30 minutes, pt's became more subdued and exhibited more appropriate responses and decreased agitation. SW discussed that RN YEIMI would be making a visit today to help schedule pain management routine. Pt is very thin and frail. Pt states she eats very little and has no appetite. Pt states she does not sleep well, but nods off and gets 1-2 hours of sleep between pain wakes her. Pt states everything hurts and that she's been experiencing pain for years and she is just tired of hurting. SW questioned on any prior HAN's and she stated, \"A long time ago\" and denied any current use of illegal substances. SW restated the importance of knowing of any unprescribed med's or substances in order to best care for her needs. At this point patient was nodding off. SW sat quietly as both pt/cg began to fall asleep on the sofa. SW sat for awhile and stated that both appeared to be needing rest.  Both agreed to lay down before SW left the home. SW restated to CG that a nurse would be by today to help with medication/symptom management and determine pt's needs more appropriately. IDG team members were notified of concerns. Jamie stated that on previous visits, pt/cg appeared to be calm and agreeable to visits and pt has not exhibited any of these behaviors on prior visits.  agreed to make additional visits. Bereavement risk for  is low. [facility] will be used for final arrangements.   SW visits will be @ 2 x month for additional support and ongoing assessment of needs/risks. CG/pt agrees to POC. CG understands 24/7 availability of both RN CM/supportive services and states the correct phone number to call. Non-emergency number provided to CG in case pt falls again and requires assistance.

## 2023-03-07 VITALS
RESPIRATION RATE: 16 BRPM | TEMPERATURE: 98.1 F | HEART RATE: 79 BPM | SYSTOLIC BLOOD PRESSURE: 88 MMHG | DIASTOLIC BLOOD PRESSURE: 38 MMHG

## 2023-03-07 NOTE — HOSPICE
Not doing good at all.  her left side is not work, might of had a slight stroke per . he fell two or three days ago. She is taking her medicine. I just don'tknow that is all i can say    Per  she is eating fair. She ate an orange, a few bites of scramble egges. She want to sty home, but I don't know. He oes not want her to feel like I am trying to get rid of her. I cannot monitor her to make sure how she is takin her medicine. I need a nurse for her. She needs to be in a hospital someone to get the care she needs but I don't want her to feel like I am trying to get rid of her.    sleeps off/on -    Patient resting in bed supine on room air with glasses in place. Patient behavior indicators negative for pain and discomfort. Patient has a partially  eaten orange at the bedside (approximately 3 wedges). 26mL Morphine  19 dilaudid tabs     Per spouse, patient sleeps hard when she takes the medications.

## 2023-03-09 ENCOUNTER — HOME CARE VISIT (OUTPATIENT)
Age: 73
End: 2023-03-09
Payer: MEDICARE

## 2023-03-09 PROCEDURE — G0300 HHS/HOSPICE OF LPN EA 15 MIN: HCPCS

## 2023-03-09 ASSESSMENT — ENCOUNTER SYMPTOMS: STOOL DESCRIPTION: LOOSE

## 2023-03-09 NOTE — HOSPICE
When this nurse arrived to the patients home, the patients  asked this nurse to talk in the kitchen prior to seeing the patient. The /caretaker stated that he could not take care of his wife any longer. He stated that it was too much for him to do as he is also disabled and walks with a walker. This nurse assured the caretaker that the  would be in touch with him as soon as possible and try to find placement.  was contacted and she is in the process of trying to find placement. Patient was on her bed in her bedroom complaining of nausea and vomiting. This nurse inquired about what medicationthe patient had been taking. No meds had been given. Zofran was then administered by this nurse with positive results. Caregiver reminded about the 24 Hour number and told to call if any further assistance was needed . Patient was resting comfortably when this nurse finished the appointment. Medication refills ordered this visit: None needed    Medications reconciled and all medications are available in the home this visit. The following education was provided regarding medications, medication interactions, and look alike medications: Medication side effects, dosages, purposes, frequencies. Response to teaching: Verbalized understanding. Medications are effective at this time. Supplies by type and quantity ordered this visit include: None needed    Consulted medical director/attending physician regarding: Not needed this visit    Instructed patient/family/caregiver on 24-hour hospice availability and phone number. Plan for next visit:  Continue end of life education and support caregiver.

## 2023-03-10 ENCOUNTER — HOME CARE VISIT (OUTPATIENT)
Age: 73
End: 2023-03-10
Payer: MEDICARE

## 2023-03-10 PROCEDURE — G0155 HHCP-SVS OF CSW,EA 15 MIN: HCPCS

## 2023-03-10 PROCEDURE — G0299 HHS/HOSPICE OF RN EA 15 MIN: HCPCS

## 2023-03-11 ENCOUNTER — HOME CARE VISIT (OUTPATIENT)
Age: 73
End: 2023-03-11
Payer: MEDICARE

## 2023-03-11 VITALS — RESPIRATION RATE: 18 BRPM | TEMPERATURE: 97.5 F

## 2023-03-11 VITALS — RESPIRATION RATE: 16 BRPM

## 2023-03-11 PROCEDURE — G0299 HHS/HOSPICE OF RN EA 15 MIN: HCPCS

## 2023-03-11 NOTE — HOSPICE
Medication refills ordered this visit: PETEY    Medications reconciled and all medications are available in the home this visit. The following education was provided regarding medications, medication interactions, and look alike medications: . Response to teaching: fair. Medications are  effective at this time. Supplies by type and quantity ordered this visit include: PETEY    Consulted medical director/attending physician regarding: PETEY    Instructed patient/family/caregiver on 24-hour hospice availability and phone number. Plan for next visit:  assessment and symptom management    Tuck in visit for pt after being tranferred to SNF. Hand delivered scripts for meds, given to tien SCHILLING LPN. Pt was comfortable but anxious that she was brought to facility to die. Reassurance and support offered.

## 2023-03-11 NOTE — HOSPICE
Medication refills ordered this visit: PETEY    Medications reconciled and all medications are available in the home this visit. The following education was provided regarding medications, medication interactions, and look alike medications:   Response to teaching: fair. Medications are effective at this time. Supplies by type and quantity ordered this visit include: PEETY    Consulted medical director/attending physician regarding: Zofran prescription    Instructed patient/family/caregiver on 24-hour hospice availability and phone number. Plan for next visit:  assessment, symptom management    PRN visit to hand deliver zofran prescription to facility nurse, Shakeel Lopez. Pt sleeping upon arrival. Nurse reports she gave her ativan this morning.

## 2023-03-13 ENCOUNTER — HOME CARE VISIT (OUTPATIENT)
Age: 73
End: 2023-03-13
Payer: MEDICARE

## 2023-03-13 NOTE — HOSPICE
The following standard precautions for infection control were utilized:  Mask  Patient was in bed balled up. She was complaining of pain so I will reach out to her . She was able to talk about her marriage and shared about her pain.  will follow up with patient and family.

## 2023-03-14 ENCOUNTER — HOME CARE VISIT (OUTPATIENT)
Age: 73
End: 2023-03-14
Payer: MEDICARE

## 2023-03-14 PROCEDURE — G0155 HHCP-SVS OF CSW,EA 15 MIN: HCPCS

## 2023-03-15 ENCOUNTER — TELEPHONE (OUTPATIENT)
Age: 73
End: 2023-03-15

## 2023-03-15 NOTE — TELEPHONE ENCOUNTER
[facility] faxed back \" Sorry unable to [employer]    Faxed demographics, ID, Ins Card(s), referral and records(office notes, lab, and imaging) to [facility]    Phone- [phone]  Fax- [phone]

## 2023-03-18 ENCOUNTER — HOME CARE VISIT (OUTPATIENT)
Age: 73
End: 2023-03-18
Payer: MEDICARE

## 2023-03-18 PROCEDURE — G0299 HHS/HOSPICE OF RN EA 15 MIN: HCPCS

## 2023-03-19 VITALS
RESPIRATION RATE: 20 BRPM | TEMPERATURE: 98 F | SYSTOLIC BLOOD PRESSURE: 142 MMHG | OXYGEN SATURATION: 99 % | DIASTOLIC BLOOD PRESSURE: 78 MMHG | HEART RATE: 90 BPM

## 2023-03-19 NOTE — HOSPICE
PRN visit made. Pt returned to SNF today. She reports that she was with a friend and was using drugs. She reported using cocaine the day before yesterday but could not remember much of anything else. She is shakey and it is difficult to understand her speech.  present during visit. This writer spoke with [facility]. She reports they have an order for narcan. They will not be able to administer morphine to patient for at least 24 hours. This will need follow up with JOSH and Clinical manager on Monday 3/20/23. DON requested that pt be drug tested on a regular basis.

## 2023-03-20 ENCOUNTER — HOME CARE VISIT (OUTPATIENT)
Age: 73
End: 2023-03-20
Payer: MEDICARE

## 2023-03-20 PROCEDURE — G0299 HHS/HOSPICE OF RN EA 15 MIN: HCPCS

## 2023-03-20 PROCEDURE — G0155 HHCP-SVS OF CSW,EA 15 MIN: HCPCS

## 2023-03-21 ENCOUNTER — HOME CARE VISIT (OUTPATIENT)
Age: 73
End: 2023-03-21
Payer: MEDICARE

## 2023-03-21 PROCEDURE — G0155 HHCP-SVS OF CSW,EA 15 MIN: HCPCS

## 2023-03-21 NOTE — HOSPICE
Edith Gannon and Matilde Miranda met with the staff at [facility]. Rev. Lyric Rodriguez was present at the meeting as patients care was discussed due to patient leaving facility.  will increse visits to twice per month along with JOSH.

## 2023-03-23 ENCOUNTER — HOME CARE VISIT (OUTPATIENT)
Age: 73
End: 2023-03-23
Payer: MEDICARE

## 2023-03-23 PROCEDURE — G0299 HHS/HOSPICE OF RN EA 15 MIN: HCPCS

## 2023-03-23 ASSESSMENT — ENCOUNTER SYMPTOMS: HEMOPTYSIS: 0

## 2023-03-23 NOTE — HOSPICE
The following standard precautions for infection control were utilized:  hand washing/hand sanitizing. The purpose of today's SW visit was to attend a facility meeting concerning the Pt's safety and well-being. Clinical Coordinator, Chaplain Daniel Jackson, and SW attended meeting at [facility]. Facility staff Ana Paulino, [facility] and [facility],  attended the meeting. Mr. Gordon Rose, pt's spouse also was in attendance. Mr. Gordon Rose has not been forth coming (out of fear) about pt's drug use/illegal activities surrounding her HAN. Today, Mr. Gordon Rose was able to give hospice and facility staff an accurate detail of pt's history and HAN. In order for pt to continue to be accepted at the facility several agreements will have to be signed by pt. Pt has signed an agreement concerning the use of illegal drugs. It was also decided at this time to include a limited visitor list, which was provided by Mr. Gordon Rose, discontinuing the access pt has to bank funds, and increased visits from both Stan and JOSH to twice a month. Pt was checked on by JOSH but was visiting with her spouse at the time. SW did not note any signs or concerns with abuse or neglect. On going support will be provided concerning pt's pain level as she is withdrawal from cocaine.

## 2023-03-24 VITALS
OXYGEN SATURATION: 96 % | SYSTOLIC BLOOD PRESSURE: 137 MMHG | TEMPERATURE: 97.4 F | RESPIRATION RATE: 18 BRPM | DIASTOLIC BLOOD PRESSURE: 76 MMHG | HEART RATE: 96 BPM

## 2023-03-24 NOTE — HOSPICE
Comrehensive assessment completed on patient. Skin assessment done, no large bruise nor open areas observed. Patient has a small scabed over wound on right knee. She expressed it is from a fall on the outside of the faciity when she was returning from her visit with friends. Patient;s rights and responsibiliy was reviewed with patient and belongings searched for any illegal substance, none found. While staff nurse Jacinto Luis was present, Writer reviewed pain management form, and explained its purpsoe. Patient agreed with same and signed form. Patient is calm with no complaint. Order made for drug screen as patient was out of facility for more than 24 hours without direct supervision from facility staff or Hospice personels. Drug test (urine) revealed Positive for Cocaine and Morphine. Physician contact made:  To approve drug screen and Medicatons: Morphine and Lorazepam

## 2023-03-27 ENCOUNTER — HOME CARE VISIT (OUTPATIENT)
Age: 73
End: 2023-03-27
Payer: MEDICARE

## 2023-03-27 PROCEDURE — G0300 HHS/HOSPICE OF LPN EA 15 MIN: HCPCS

## 2023-03-27 ASSESSMENT — ENCOUNTER SYMPTOMS: HEMOPTYSIS: 0

## 2023-03-27 NOTE — HOSPICE
Patient presents laying on bed in no obvious distress. No signs of acute distress noted. Vitals within normal limits. No new concerns per staff nurse Goldy Shaw  No needs at this time. Medication refills ordered this visit: none needed    Medications reconciled and all medications are available in the facility this visit. The following education was provided regarding medications, medication interactions, and look alike medications: Medication side effects, dosages, purposes, frequencies. Response to teaching: Staff manages medications. Medications are effective at this time. Supplies by type and quantity ordered this visit include: No supplies needed. Consulted medical director/attending physician regarding: Not needed this visit    Instructed caregiver on 24-hour hospice availability and phone number. Plan for next visit:  Continue end of life education and support. Patient had no complaint. She also signed an agreement drafted after family meeting re visitation. Patient had no objection.

## 2023-03-29 ENCOUNTER — HOME CARE VISIT (OUTPATIENT)
Age: 73
End: 2023-03-29
Payer: MEDICARE

## 2023-03-29 ASSESSMENT — ENCOUNTER SYMPTOMS: PAIN LOCATION - PAIN QUALITY: ACHING

## 2023-03-29 NOTE — HOSPICE
The following standard precautions for infection control were utilized:  Mask  Patient was in bed with a smile watching TV and about to take a nap. She talked about feeling better today but was having some gas. Spoke to her facility staff Edy Tijerina) stated that she had been cooperative and her meds had been increased. [place]ia [place] will continue to follow patient and family.

## 2023-03-30 ENCOUNTER — HOME CARE VISIT (OUTPATIENT)
Age: 73
End: 2023-03-30
Payer: MEDICARE

## 2023-03-30 PROCEDURE — G0299 HHS/HOSPICE OF RN EA 15 MIN: HCPCS

## 2023-04-03 ENCOUNTER — HOME CARE VISIT (OUTPATIENT)
Age: 73
End: 2023-04-03
Payer: MEDICARE

## 2023-04-03 VITALS
SYSTOLIC BLOOD PRESSURE: 96 MMHG | TEMPERATURE: 97.9 F | HEART RATE: 50 BPM | OXYGEN SATURATION: 98 % | RESPIRATION RATE: 20 BRPM | DIASTOLIC BLOOD PRESSURE: 60 MMHG

## 2023-04-03 PROCEDURE — G0299 HHS/HOSPICE OF RN EA 15 MIN: HCPCS

## 2023-04-03 PROCEDURE — G0300 HHS/HOSPICE OF LPN EA 15 MIN: HCPCS

## 2023-04-03 NOTE — HOSPICE
PRN Visit made. Facility staff report Blood sugar of 42 this morning and patient minimally responsive. Staff nurse started the patient on dextrose drip. NP Aleksandra Nelson discontinued blood sugar checks and nightly Lantus. After further speaking to staff, nurse Jayashree Sharp stated that the patient was on several blood pressure medications. Patients blood pressure was 96/60 when this nurse visited and 102/60 when another hospice nurse assessed blood pressure. E-mail sent to hospice team to review medications and make a decision.

## 2023-04-04 VITALS
DIASTOLIC BLOOD PRESSURE: 60 MMHG | TEMPERATURE: 98.6 F | OXYGEN SATURATION: 96 % | SYSTOLIC BLOOD PRESSURE: 102 MMHG | HEART RATE: 76 BPM | RESPIRATION RATE: 20 BRPM

## 2023-04-04 ASSESSMENT — ENCOUNTER SYMPTOMS
ABDOMINAL PAIN: 1
BOWEL INCONTINENCE: 1

## 2023-04-04 NOTE — HOSPICE
Medication refills ordered this visit:  No medications needed at this time    Medications reconciled and all medications are available in the home this visit. Education was provided regarding medications, medication interactions, and look alike medications. Response to teaching. Facility staff verbalized understanding of medications. Medications are effective at this time. Supplies by type and quantity ordered this visit include: chux, wipes, briefs and calmoseptine confirmation # [id number]    Consulted medical director/attending physician regarding:  Not at this time    Instructed patient/family/caregiver on 24-hour hospice availability and phone number. Plan for next visit:  Continued EOL education, follow up on BP medications and communicate with staff the changes.

## 2023-04-06 ENCOUNTER — HOME CARE VISIT (OUTPATIENT)
Age: 73
End: 2023-04-06
Payer: MEDICARE

## 2023-04-06 PROCEDURE — G0300 HHS/HOSPICE OF LPN EA 15 MIN: HCPCS

## 2023-04-07 ENCOUNTER — HOME CARE VISIT (OUTPATIENT)
Age: 73
End: 2023-04-07
Payer: MEDICARE

## 2023-04-07 VITALS
SYSTOLIC BLOOD PRESSURE: 120 MMHG | TEMPERATURE: 98 F | HEART RATE: 71 BPM | RESPIRATION RATE: 18 BRPM | DIASTOLIC BLOOD PRESSURE: 67 MMHG | OXYGEN SATURATION: 96 %

## 2023-04-07 PROCEDURE — G0155 HHCP-SVS OF CSW,EA 15 MIN: HCPCS

## 2023-04-07 ASSESSMENT — ENCOUNTER SYMPTOMS: BOWEL INCONTINENCE: 1

## 2023-04-07 NOTE — HOSPICE
The following standard precautions for infection control were utilized: hand  and face mask. The purpose of today's visit was to complete a bi-monthly visit with Victor Hugo Cobian. Pt was received in the hallway of the SNF facility walking with her rollator. SW greeted pt and introduced self to pt. Pt recognized Marco Antonio Alejandro brought a chocolate bunny to pt for Easter, and pt was very happy. Pt appears well cared for, was dressed nicely and no visible signs of abuse or neglect were noted by SW.  SW walked with pt through the gallardo ways. Pt displays no agitation and is calm and friendly. Pt does wish to go the the hairdresser and get her hair done. SW talks with pt about leaving the facility and that someone trustworthy would have to take her for the appointment. Pt stated she knows she did wrong last time and her  and several other people have fussed at her. SW stated we are very concerned with her safety and well-being. Pt states  will be visiting tomorrow and he had been sick with Covid, but he is feeling much better now. Pt is thankful for the visits from hospice staff. She did request the pull up type incontinence care briefs in a size small. Pt states the one she has are tabs and fall off her because they are too large for her. SW agreed to return to the office and see if some small pullups were available. Near end of visit, hospice volunteer came in and introduced herself. SW told pt, she would return if briefs were available. Pt thanked SW for visit.

## 2023-04-07 NOTE — HOSPICE
Patient presents supine in bed. NO signs of distress or disconfort. Patient denies pain this visit. Breakfast tray is still on the otb table approximately 50% of breakfast eaten. Patient asks if she can go home this weekend because her  has Scooby. Patient is assured that the safest place for her to be is at the facility to avoid spreading the virus. Patient agrees. Staff Nurse Tony Stanton inquired about d/c some blood pressure medications. Romain Stubbs stated that she D/c'd Norvasc already and would look into D/c-ing propranolol. Medication refills ordered this visit: None    Medications reconciled and all medications are available in the facility this visit. The following education was provided regarding medications, medication interactions, and look alike medications: Medication side effects, dosages, purposes, frequencies. Response to teaching: Verbalized understanding. Medications are effective at this time. Supplies by type and quantity ordered this visit include: Supplies delivered yesterday    Consulted medical director/attending physician regarding: Discontinuing some HTN meds    Instructed patient/family/caregiver on 24-hour hospice availability and phone number. Plan for next visit:  Continue end of life education and support caregiver.

## 2023-04-10 ENCOUNTER — HOME CARE VISIT (OUTPATIENT)
Age: 73
End: 2023-04-10
Payer: MEDICARE

## 2023-04-10 PROCEDURE — G0299 HHS/HOSPICE OF RN EA 15 MIN: HCPCS

## 2023-04-12 ASSESSMENT — ENCOUNTER SYMPTOMS: HEMOPTYSIS: 0

## 2023-04-14 ENCOUNTER — HOME CARE VISIT (OUTPATIENT)
Age: 73
End: 2023-04-14
Payer: MEDICARE

## 2023-04-14 PROCEDURE — G0299 HHS/HOSPICE OF RN EA 15 MIN: HCPCS

## 2023-04-17 ENCOUNTER — HOME CARE VISIT (OUTPATIENT)
Age: 73
End: 2023-04-17
Payer: MEDICARE

## 2023-04-17 VITALS
OXYGEN SATURATION: 97 % | HEART RATE: 88 BPM | TEMPERATURE: 98 F | SYSTOLIC BLOOD PRESSURE: 128 MMHG | RESPIRATION RATE: 16 BRPM | DIASTOLIC BLOOD PRESSURE: 67 MMHG

## 2023-04-17 VITALS
SYSTOLIC BLOOD PRESSURE: 167 MMHG | DIASTOLIC BLOOD PRESSURE: 70 MMHG | OXYGEN SATURATION: 95 % | TEMPERATURE: 97.6 F | HEART RATE: 103 BPM | RESPIRATION RATE: 18 BRPM

## 2023-04-17 PROCEDURE — G0299 HHS/HOSPICE OF RN EA 15 MIN: HCPCS

## 2023-04-17 ASSESSMENT — ENCOUNTER SYMPTOMS
HEMOPTYSIS: 0
BOWEL INCONTINENCE: 1
PAIN LOCATION - PAIN QUALITY: ACHINESS

## 2023-04-17 NOTE — HOSPICE
Medication refills ordered this visit:  No medications needed at this time    Medications reconciled and all medications are available in the home this visit. Education was provided regarding medications, medication interactions, and look alike medications. Response to teaching. Facility staff verbalized understanding of medications. Medications are effective at this time. Supplies by type and quantity ordered this visit include: chux, wipes, briefs, mouthwash ordered:   Consulted medical director/attending physician regarding:  Not at this time    Instructed patient/family/caregiver on 24-hour hospice availability and phone number.     Plan for next visit:  Continued EOL education, and communicate with staff any changes as needed

## 2023-04-17 NOTE — HOSPICE
Medication refills ordered this visit:  Escript sent to [facility] for Dilaudid 2 mg by mouth q6 hours PRN and Lorazepam 0.5mg po every 6 hours PRN    Medications reconciled and all medications are available in the home this visit. Education was provided regarding medications, medication interactions, and look alike medications. Response to teaching: Staff nurse Lyanne Boeck verbalized an understanding about medication teaching Medications are effective/not effective at this time. Supplies by type and quantity ordered this visit include: small pullups, wipes, chux, and foam  Confirmation # [id number]    Consulted medical director/attending physician regarding:  Spoke with Ramez Noble NP about resuming patient's dialuadid for pain    Instructed patient/family/caregiver on 24-hour hospice availability and phone number. Plan for next visit:  Assess effectiveness of Dilaudid.   Continued EOL education and support    Gel overlay also ordered per patient's request order # [id number]

## 2023-04-18 ENCOUNTER — HOME CARE VISIT (OUTPATIENT)
Age: 73
End: 2023-04-18
Payer: MEDICARE

## 2023-04-18 VITALS
DIASTOLIC BLOOD PRESSURE: 65 MMHG | OXYGEN SATURATION: 96 % | RESPIRATION RATE: 18 BRPM | SYSTOLIC BLOOD PRESSURE: 90 MMHG | TEMPERATURE: 97.2 F | HEART RATE: 88 BPM

## 2023-04-18 PROCEDURE — G0156 HHCP-SVS OF AIDE,EA 15 MIN: HCPCS

## 2023-04-18 ASSESSMENT — ENCOUNTER SYMPTOMS
BOWEL INCONTINENCE: 1
HEMOPTYSIS: 0

## 2023-04-18 NOTE — HOSPICE
Patient received laying in bed in right lateral position. No obvious signs of distress or discomfort observed. Patient denies pain during visit. Spoke to CNA at facility who expressed that patient has eaten only 50% of her breakfast and lunch. German Lopez had no questions this visit    Medication refills ordered this visit: None    Medications reconciled and all medications are available in the facility this visit. The following education was provided regarding medications, medication interactions, and look alike medications: Medication side effects, dosages, purposes, frequencies. Response to teaching: Verbalized understanding. Medications are effective at this time. Supplies by type and quantity ordered this visit include: Supplies delivered yesterday    Consulted medical director/attending physician regarding: none needed    Instructed patient/family/caregiver on 24-hour hospice availability and phone number. Plan for next visit:  Continue end of life education and support caregiver.

## 2023-04-20 ENCOUNTER — HOME CARE VISIT (OUTPATIENT)
Age: 73
End: 2023-04-20
Payer: MEDICARE

## 2023-04-20 VITALS
DIASTOLIC BLOOD PRESSURE: 84 MMHG | TEMPERATURE: 98.3 F | OXYGEN SATURATION: 100 % | RESPIRATION RATE: 18 BRPM | HEART RATE: 106 BPM | SYSTOLIC BLOOD PRESSURE: 152 MMHG

## 2023-04-20 PROCEDURE — G0300 HHS/HOSPICE OF LPN EA 15 MIN: HCPCS

## 2023-04-20 ASSESSMENT — ENCOUNTER SYMPTOMS
PAIN LOCATION - PAIN QUALITY: ACHING
BOWEL INCONTINENCE: 1

## 2023-04-20 NOTE — HOSPICE
Medication refills ordered this visit: Not needed    Medications reconciled and all medications are available  in the  this facility this visit. The following education was provided regarding medications, medication interactions, and look alike medications: Medication side effects, dosages, purposes, frequencies. Response to teaching: Verbalized understanding. Medications are effective at this time. Supplies by type and quantity ordered this visit include: Supplies available in the room    Consulted medical director/attending physician regarding: Not needed    Instructed patient/family/caregiver on 24-hour hospice availability and phone number.     Plan for next visit:  Continue end of life education and support caregiver

## 2023-04-21 ENCOUNTER — HOME CARE VISIT (OUTPATIENT)
Age: 73
End: 2023-04-21
Payer: MEDICARE

## 2023-04-21 PROCEDURE — G0156 HHCP-SVS OF AIDE,EA 15 MIN: HCPCS

## 2023-04-21 PROCEDURE — G0155 HHCP-SVS OF CSW,EA 15 MIN: HCPCS

## 2023-04-21 NOTE — HOSPICE
The following standard precautions for infection control were utilized:  MAsk  Patient was sitting on her bed talking to her  who had come to visit and take her to get her hair done. THe facility staff was getting her oxygen together for her trip to the hairdresser. Her  was in good spirits and she was also eating lunch. She was eating good.  is concerned about patient going out becuase of previous behaviors. Tiago Men will continue to follow.

## 2023-04-24 ENCOUNTER — HOME CARE VISIT (OUTPATIENT)
Age: 73
End: 2023-04-24
Payer: MEDICARE

## 2023-04-24 VITALS
TEMPERATURE: 98.4 F | DIASTOLIC BLOOD PRESSURE: 86 MMHG | HEART RATE: 108 BPM | RESPIRATION RATE: 18 BRPM | SYSTOLIC BLOOD PRESSURE: 135 MMHG | OXYGEN SATURATION: 97 %

## 2023-04-24 PROCEDURE — G0300 HHS/HOSPICE OF LPN EA 15 MIN: HCPCS

## 2023-04-24 ASSESSMENT — ENCOUNTER SYMPTOMS
PAIN LOCATION - PAIN QUALITY: ACHING
BOWEL INCONTINENCE: 1

## 2023-04-24 NOTE — HOSPICE
Medication refills ordered this visit: None    Medications reconciled and all medications are available in the home this visit. The following education was provided regarding medications, medication interactions, and look alike medications: Medication side effects, dosages, purposes, frequencies. Response to teaching: Verbalized understanding. Medications are effective at this time. Supplies by type and quantity ordered this visit include: None needed    Consulted medical director/attending physician regarding: Not needed    Instructed patient/family/caregiver on 24-hour hospice availability and phone number. Plan for next visit:  Continue end of life education and support caregiver.

## 2023-04-25 ENCOUNTER — HOME CARE VISIT (OUTPATIENT)
Age: 73
End: 2023-04-25
Payer: MEDICARE

## 2023-04-25 NOTE — HOSPICE
The following standard precautions for infection control were utilized:  face mask and hand sanitizing. Today's SW visit was to complete a bi-monthly check on pt's health, mental health, and needs. Pt was received in her room at [address] [place] [address]. Pt's  was visiting today and present during [address] visit. SW brought pt 3 candy bars because pt enjoys sweets and due to her HAN, sugar cravings are expected. Pt is dressed appropriately and appears well cared for. Pt is in a good mood today, calm and open discusses needs/wants with SW.  Pt is going to leave facility with  today for a hair appointment. She also states that she will be attending [employer] with her  this Sunday. Pt asked SW when she will be able to go home. SW explains concerns with pt going back home due to history of substance abuse and for her safety. Pt and SW talk extensively about drug use history and her wishes to never use drugs again. SW encourages pt to continue with medication management to help with withdraw and to keep anxiety low. Pt agrees. Spouse states to pt that discussion about returning home can be had in about 2 months. SW agrees to discuss with team.  Spouse of pt would like to visit more often but has to take [employer] rides to facility. He states he only has enough money to come about 2 x week currently. SW discussed some options to help defer some of the costs of transportation. SW will follow up with [facility] team and request [facility] funds for an [employer] gift card. He stated gratefulness for any help that is offered. No other needs at this time.

## 2023-04-27 ENCOUNTER — HOME CARE VISIT (OUTPATIENT)
Age: 73
End: 2023-04-27
Payer: MEDICARE

## 2023-04-27 VITALS
HEART RATE: 87 BPM | SYSTOLIC BLOOD PRESSURE: 137 MMHG | TEMPERATURE: 98 F | RESPIRATION RATE: 18 BRPM | OXYGEN SATURATION: 99 % | DIASTOLIC BLOOD PRESSURE: 83 MMHG

## 2023-04-27 PROCEDURE — G0300 HHS/HOSPICE OF LPN EA 15 MIN: HCPCS

## 2023-04-27 ASSESSMENT — ENCOUNTER SYMPTOMS
PAIN LOCATION - PAIN QUALITY: ACHING
BOWEL INCONTINENCE: 1

## 2023-04-27 NOTE — HOSPICE
Medication refills ordered this visit: Not needed    Medications reconciled and all medications are available in the home this visit. The following education was provided regarding medications, medication interactions, and look alike medications: Medication side effects, dosages, purposes, frequencies. Response to teaching: Verbalized understanding. Medications are effective at this time. Supplies by type and quantity ordered this visit include: Not needed this visit    Consulted medical director/attending physician regarding: Not needed    Instructed patient/family/caregiver on 24-hour hospice availability and phone number. Plan for next visit:  Continue end of life education and support caregiver.

## 2023-04-28 ENCOUNTER — HOME CARE VISIT (OUTPATIENT)
Age: 73
End: 2023-04-28
Payer: MEDICARE

## 2023-04-28 PROCEDURE — G0156 HHCP-SVS OF AIDE,EA 15 MIN: HCPCS

## 2023-05-01 ENCOUNTER — HOME CARE VISIT (OUTPATIENT)
Age: 73
End: 2023-05-01
Payer: MEDICARE

## 2023-05-01 VITALS
HEART RATE: 68 BPM | RESPIRATION RATE: 20 BRPM | SYSTOLIC BLOOD PRESSURE: 111 MMHG | TEMPERATURE: 99 F | DIASTOLIC BLOOD PRESSURE: 85 MMHG | OXYGEN SATURATION: 100 %

## 2023-05-01 PROCEDURE — G0299 HHS/HOSPICE OF RN EA 15 MIN: HCPCS

## 2023-05-01 ASSESSMENT — ENCOUNTER SYMPTOMS: BOWEL INCONTINENCE: 1

## 2023-05-01 NOTE — HOSPICE
Medication refills ordered this visit: No refills needed at this time    Medications reconciled and all medications are available in the home this visit. Education was provided regarding medications, medication interactions, and look alike medications. Response to teaching. Facility staff verbalized understanding of medications. Medications are effective at this time. Supplies by type and quantity ordered this visit include: Medium pullups, basins, moisturizer and grippy socks  confirmation#[id number]    Consulted medical director/attending physician regarding: Not this visit    Instructed patient/family/caregiver on 24-hour hospice availability and phone number.     Plan for next visit:  Continued EOL education and support

## 2023-05-02 ENCOUNTER — HOME CARE VISIT (OUTPATIENT)
Age: 73
End: 2023-05-02
Payer: MEDICARE

## 2023-05-04 ENCOUNTER — HOME CARE VISIT (OUTPATIENT)
Age: 73
End: 2023-05-04
Payer: MEDICARE

## 2023-05-04 VITALS
TEMPERATURE: 100.3 F | SYSTOLIC BLOOD PRESSURE: 110 MMHG | OXYGEN SATURATION: 95 % | RESPIRATION RATE: 20 BRPM | HEART RATE: 99 BPM | DIASTOLIC BLOOD PRESSURE: 40 MMHG

## 2023-05-04 PROCEDURE — G0299 HHS/HOSPICE OF RN EA 15 MIN: HCPCS

## 2023-05-04 NOTE — HOSPICE
Medication refills ordered this visit:  None needed    Medications reconciled and all medications are available in the home this visit. Education was provided regarding medications, medication interactions, and look alike medications. Response to teaching. Facility staff verbalized an understanding of medication administration. Medications are effective at this time. Supplies by type and quantity ordered this visit include:  None needed    Consulted medical director/attending physician regarding: Not at this time    Instructed patient/family/caregiver on 24-hour hospice availability and phone number.     Plan for next visit:  Continued EOL education and support

## 2023-05-04 NOTE — HOSPICE
The following standard precautions for infection control were utilized:  Mask  Patient was in bed and her  Charlette Rivas was present and giving great care. SHe talked today about her outtings that she has had over the past few weeks and she told me today that she likes sweets. We also called her  and he is doing well. [address] [place] [address] Road will continue to follow.

## 2023-05-05 ENCOUNTER — HOME CARE VISIT (OUTPATIENT)
Age: 73
End: 2023-05-05
Payer: MEDICARE

## 2023-05-05 PROCEDURE — G0156 HHCP-SVS OF AIDE,EA 15 MIN: HCPCS

## 2023-05-05 PROCEDURE — G0155 HHCP-SVS OF CSW,EA 15 MIN: HCPCS

## 2023-05-05 NOTE — HOSPICE
The following standard precautions for infection control were utilized: hand sanitizing. Today's visit was to complete a bi-monthly SW assessment of needs and offer emotional support to pt. Pt was received in her SNF room, asleep in her hospital bed. Pt was appropriately dressed, clean, and no visible signs or abuse/neglect were noted. Pt's hair has been done recently. SW noted pt was note using O2 at this time. Pt's supplies were visible in the room. SW sat briefly to see if patient would wake up on her own. Pt did rouse to gently calling of name by Valentina Milan let pt know she was there to visit and questioned how she was today. Pt stated she was worn out and stated HA was there earlier. SW brought two candy bars for pt and pt asked to leave them on her bedside table. Pt states her  was able to visit yesterday. Pt was clearly drowsy and SW thanked pt for talking. SW spoke with facility staff about pt's progress. Med tech/aide stated pt is doing well. They state her disposition continues to improve and she is following all medical orders and taking medications as prescribed. Pt has left the facility twice with , for the beauty shop and for [employer] and returned without incident. Staff states pt eats very well, finishing % of meals. Staff states pt eats a lot of \"junk food\" that is brought to her by . No needs stated by staff at this time.

## 2023-05-08 ENCOUNTER — HOME CARE VISIT (OUTPATIENT)
Age: 73
End: 2023-05-08
Payer: MEDICARE

## 2023-05-08 VITALS
TEMPERATURE: 98.4 F | DIASTOLIC BLOOD PRESSURE: 71 MMHG | SYSTOLIC BLOOD PRESSURE: 143 MMHG | OXYGEN SATURATION: 99 % | HEART RATE: 109 BPM | RESPIRATION RATE: 18 BRPM

## 2023-05-08 PROCEDURE — G0299 HHS/HOSPICE OF RN EA 15 MIN: HCPCS

## 2023-05-08 NOTE — HOSPICE
Medication refills ordered this visit: No refills needed    Medications reconciled and all medications are available in the home this visit. Education was provided regarding medications, medication interactions, and look alike medications. Response to teaching. Facility staff verbalize an understanding on comfort medications. Medications are effective at this time. Supplies by type and quantity ordered this visit include: no slip socks, wipes  confirmation # [id number]    Consulted medical director/attending physician regarding: Not at this time    Instructed patient/family/caregiver on 24-hour hospice availability and phone number. Plan for next visit:  Continued EOL education and support. Email sent to MARY Navarro as patient wanting help with medicaid application.

## 2023-05-09 ENCOUNTER — HOME CARE VISIT (OUTPATIENT)
Age: 73
End: 2023-05-09
Payer: MEDICARE

## 2023-05-09 PROCEDURE — G0156 HHCP-SVS OF AIDE,EA 15 MIN: HCPCS

## 2023-05-11 ENCOUNTER — HOME CARE VISIT (OUTPATIENT)
Age: 73
End: 2023-05-11
Payer: MEDICARE

## 2023-05-11 PROCEDURE — G0300 HHS/HOSPICE OF LPN EA 15 MIN: HCPCS

## 2023-05-12 ENCOUNTER — HOME CARE VISIT (OUTPATIENT)
Age: 73
End: 2023-05-12
Payer: MEDICARE

## 2023-05-12 VITALS
DIASTOLIC BLOOD PRESSURE: 87 MMHG | SYSTOLIC BLOOD PRESSURE: 139 MMHG | TEMPERATURE: 98.2 F | HEART RATE: 90 BPM | OXYGEN SATURATION: 96 % | RESPIRATION RATE: 18 BRPM

## 2023-05-12 PROCEDURE — G0156 HHCP-SVS OF AIDE,EA 15 MIN: HCPCS

## 2023-05-12 ASSESSMENT — ENCOUNTER SYMPTOMS: PAIN LOCATION - PAIN QUALITY: ACHING

## 2023-05-12 NOTE — HOSPICE
Medication refills ordered this visit: Not needed    Medications reconciled and all medications are/are not available in the home this visit. The following education was provided regarding medications, medication interactions, and look alike medications: Medication side effects, dosages, purposes, frequencies. Response to teaching: Verbalized understanding. Medications are effective at this time. Supplies by type and quantity ordered this visit include: Not needed    Consulted medical director/attending physician regarding: Not needed this visit    Instructed patient/family/caregiver on 24-hour hospice availability and phone number. Plan for next visit:  Continue end of life education and support caregiver.

## 2023-05-15 ENCOUNTER — HOME CARE VISIT (OUTPATIENT)
Age: 73
End: 2023-05-15
Payer: MEDICARE

## 2023-05-16 ENCOUNTER — HOME CARE VISIT (OUTPATIENT)
Age: 73
End: 2023-05-16
Payer: MEDICARE

## 2023-05-16 VITALS
SYSTOLIC BLOOD PRESSURE: 115 MMHG | DIASTOLIC BLOOD PRESSURE: 63 MMHG | RESPIRATION RATE: 20 BRPM | TEMPERATURE: 98.7 F | OXYGEN SATURATION: 97 % | HEART RATE: 83 BPM

## 2023-05-16 PROCEDURE — G0299 HHS/HOSPICE OF RN EA 15 MIN: HCPCS

## 2023-05-16 ASSESSMENT — ENCOUNTER SYMPTOMS
COUGH: 1
COUGH CHARACTERISTICS: NON-PRODUCTIVE

## 2023-05-16 NOTE — HOSPICE
Medication refills ordered this visit: No refills needed    Medications reconciled and all medications are available in the home this visit. Education was provided regarding medications, medication interactions, and look alike medications. Response to teaching. /facility verbalizes understanding Medications are effective/not effective at this time. Supplies by type and quantity ordered this visit include:  Not at this time    Consulted medical director/attending physician regarding:  not at this time    Instructed patient/family/caregiver on 24-hour hospice availability and phone number. Plan for next visit:  Continued EOL education and support    Re-Certification Summary  Pt Name:  Latanya Mohamud  Age:  81  Hospice Diagnosis:  COPD  Related Dx:  Osteoarthritis, debility. The patient also has a PMH of Type 2 diabetes, Sarcoidosis, OA, HTN, CHF and depression  Resides: At [address] [place] [address]; going to her residence with her  5/17/2023  Benefit Period:  2  Code Status:  DNR    Changes since last re-certification:     Appetite: Remains poor; will only take bites of somethings and only sips fluids. Family does bring in food from home in an attempt to get her to eat. Very frail and cachetic looking. Severe temporal wasting and facial slimming. Skin is very fragile and all bony prominences visible especially her pelvic girdle. Sleep:   Sleeps in excess of 12 hours daily. Patient always Edwina Glaze in bed at visits but is able to ambulate with a wheeled walker. Function:  Generalized weakness noted. Can ambulate with a wheeled walker. Needs assistance showering and dressing. She is incontinent too at times and wears incontinence underwear. Skin:  Stage II on coccyx from pressure. Using barrier cream and a hydrocolloid. Cognitive Function:   AXOX3. Does have some anxiety noted. Cardiac:  N/A  Pulmonary:  Wearing oxygen at 2L as needed for SOB.   Very easily fatigued after

## 2023-05-18 ENCOUNTER — HOME CARE VISIT (OUTPATIENT)
Age: 73
End: 2023-05-18
Payer: MEDICARE

## 2023-05-18 PROCEDURE — G0299 HHS/HOSPICE OF RN EA 15 MIN: HCPCS

## 2023-05-18 PROCEDURE — G0155 HHCP-SVS OF CSW,EA 15 MIN: HCPCS

## 2023-05-19 ENCOUNTER — HOME CARE VISIT (OUTPATIENT)
Age: 73
End: 2023-05-19
Payer: MEDICARE

## 2023-05-19 VITALS
DIASTOLIC BLOOD PRESSURE: 63 MMHG | OXYGEN SATURATION: 100 % | SYSTOLIC BLOOD PRESSURE: 87 MMHG | HEART RATE: 60 BPM | TEMPERATURE: 99 F | RESPIRATION RATE: 18 BRPM

## 2023-05-19 PROCEDURE — G0156 HHCP-SVS OF AIDE,EA 15 MIN: HCPCS

## 2023-05-19 NOTE — HOSPICE
Medication refills ordered this visit:   No medications needed at this time. At this visit patient had 120 ml of Morphine in the home. I prefilled (10) syringes with 0.25ml    Medications reconciled and all medications are/are not available in the home this visit. Education was provided regarding medications, medication interactions, and look alike medications. Response to teaching. Lisinopril discontinued as patient's BP was low. Instructed to make subtle movements when getting up from a seated position. Medications are effective at this time. Supplies by type and quantity ordered this visit include: None needed at this time    Consulted medical director/attending physician regarding: Not at this time    Instructed patient/family/caregiver on 24-hour hospice availability and phone number.     Plan for next visit:  Continued EOL education and support

## 2023-05-21 NOTE — ED NOTES
Patient left by transport. All belongings with patient. IV intact and patent. No concerns at this time. Patient

## 2023-05-22 ENCOUNTER — HOME CARE VISIT (OUTPATIENT)
Age: 73
End: 2023-05-22
Payer: MEDICARE

## 2023-05-22 VITALS
HEART RATE: 114 BPM | WEIGHT: 82 LBS | SYSTOLIC BLOOD PRESSURE: 128 MMHG | OXYGEN SATURATION: 97 % | RESPIRATION RATE: 18 BRPM | BODY MASS INDEX: 15 KG/M2 | TEMPERATURE: 98.7 F | DIASTOLIC BLOOD PRESSURE: 83 MMHG

## 2023-05-22 PROCEDURE — G0299 HHS/HOSPICE OF RN EA 15 MIN: HCPCS

## 2023-05-22 NOTE — HOSPICE
Medication refills ordered this visit:  No refills needed this visit. Patient unable to find the 3 unopened boxes of Morphine. She states that she forgot where she put them. She still has some syringes that I prefilled with Morphine from last week. I told her I couldn't place any more narcotics in the home until she produces the missing medication. She stated she would have it at the next visit. Elenita Been made aware along with Massimo Meléndez, MSW    Medications reconciled and all medications are/are not available in the home this visit. Education was provided regarding medications, medication interactions, and look alike medications. Response to teaching:  Verbalized an understanding. Medications are effective at this time. Supplies by type and quantity ordered this visit include:  small pullups. Confirmation#    Consulted medical director/attending physician regarding: Not at this visit    Instructed patient/family/caregiver on 24-hour hospice availability and phone number. Plan for next visit:   Patient is to produce the missing Morphine as she is on a pain contract.

## 2023-05-22 NOTE — HOSPICE
The following standard precautions for infection control were utilized:  Mask  Patient was lying accross the bed and her  was on the couch in the front room. Patient cannot find some of her meds and was trying to process were they might be. Roseline Colin will continue to follow.

## 2023-05-23 ENCOUNTER — HOME CARE VISIT (OUTPATIENT)
Age: 73
End: 2023-05-23
Payer: MEDICARE

## 2023-05-23 NOTE — HOSPICE
Today's SW visit was to complete a bi-monthly assessment of needs/well-being of pt. SW made joint visit with ALEJANDRA JALLOH. SW was received at the front door of the apartment by CG/spouse. SW and RN welcomed into the home. Pt was transported home from SNF placement on 05/17/23. CG agreed to have pt placed back into the home at this time. Apartment is slightly cluttered making it somewhat difficult for pt/cg to move about freely with Rolator's. Pt was received lying in the living area on the couch. PT sat up and moved to larger couch so SW and RN could sit. Pt is dressed appropriately and appears well cared for. Pt denies being in pain at this time. Pt is very thin but able to ambulate with assistive equipment. Medications were reconciled by ALEJANDRA JALLOH. ALEJANDRA JALLOH also provided a written list of medications and schedule of how to administer them. RN CM dell up 10 syringes of morphine due to CG being unable due to arthritis. All meds handed back to pt. PT/CG concerned with CG's in the home. SW to call [facility] DSS to find out if UAI done in 2018 is still active, if not, request for a new UAI assessment so paid CG can be hired. PT/CG also concerned that pt's SNAP benefits were cancelled. JOSH educated on [employer] and requesting reinstatement of SNAP benefits due to pt's placement back into the home. JOSH will continue 2 x's month visits due to concern of past issues (HAN/CG fatigue). No other needs at this time.

## 2023-05-25 ENCOUNTER — HOME CARE VISIT (OUTPATIENT)
Age: 73
End: 2023-05-25
Payer: MEDICARE

## 2023-05-25 NOTE — HOSPICE
At discharge patient will remain at home with family member. The patient had been at [facility] in [place] and left for a few days on her own without providing information on her whereabouts. When she appeared back at the facility she was drug tested and was positive for Cocaine. She then signed a medication agreement with Hospice. On 5/18/23 patient was seen by myself and MARY Ayala. She was discharged from [address] [place] [address] 5/17/23. At the time of visit on 5/18/23, patient had 90ml of Morphine in 3 seperate boxes and an open bottle with 20mls. At the time of this visit 10 syringes were prefilled with 0.25ml and handed to the  in a plastic bag. The other plastic bag with the Morphine was handed to the patient. On 5/22/23 a visit was made and at this time the patient could not produce the 3 boxes of Morphine. This  nurse was told that she must have misplaced it and that she would have it by the time of her next visit. The  was called 5/24/23 and this nurse inquired if the morphine was found. His reply was no. I explained to him that we couldn't place any narcotics in the home as it was an unsafe situation. An email was sent to Dr. Anthony Paul, Hospice Medical Director and it was determined that the patient was in violation of her medication contract and was to be discharged 5/25/23. This nurse spoke with Mr and Mrs. Loaiza via phone and an explanation for discharge was clearly provided and they both verbalized an understanding that all Hospice services would end as of today.  advised to contact PCP, Orion Lara for a follow up appointment. Dr. Orion Lara, PCP notified about discharge.

## 2023-05-31 ENCOUNTER — HOME CARE VISIT (OUTPATIENT)
Age: 73
End: 2023-05-31
Payer: MEDICARE

## 2023-05-31 ENCOUNTER — HOSPICE ADMISSION (OUTPATIENT)
Age: 73
End: 2023-05-31
Payer: MEDICARE

## 2023-05-31 VITALS
SYSTOLIC BLOOD PRESSURE: 141 MMHG | DIASTOLIC BLOOD PRESSURE: 84 MMHG | TEMPERATURE: 99.2 F | RESPIRATION RATE: 20 BRPM | HEART RATE: 96 BPM | OXYGEN SATURATION: 100 %

## 2023-05-31 PROCEDURE — 2500000001 HSPC NON INJECTABLE MED

## 2023-05-31 PROCEDURE — G0299 HHS/HOSPICE OF RN EA 15 MIN: HCPCS

## 2023-05-31 PROCEDURE — 0651 HSPC ROUTINE HOME CARE

## 2023-05-31 ASSESSMENT — ENCOUNTER SYMPTOMS
HEMOPTYSIS: 0
COUGH: 1
COUGH CHARACTERISTICS: NON-PRODUCTIVE

## 2023-05-31 NOTE — HOSPICE
[address] Director:  Dr. Tolu Turcios of Care:  Routine  Advance Directives:  DNR  :   is an [address] [place] the [place]  Allergies:  Latex, Cipro, PCN, Shellfish  LMAC:  17cm  Height:  5ft2  Weight:  77lbs  PPS:  50%  FAST:  N/A  NYHA:  N/A  EF%:  N/A   Tobacco usage:  N/A  Functional status:  Needs assistance with bathing and dressing. Can toilet herself but is incontinent  Infection:  N/A   Bowel Regimen:  Colace daily and Bisacodyl suppository  Lines, Drains, or Airways present:   N/A  Wounds present:  N/A  Symptoms to monitor to maintain comfort:  Pain, anxiety and appetite.   High risk for falls  Hospice Aide Services Requested:  2x weekly; care plan and visit strings entered  MSW Requested:  YES   Requested:   YES  Volunteer Services Requested:  Not at time of admission  Bereavement risk/contact:  Krista Rios, ; low bereavement risk at time of admission  Patient/family/caregiver specific end of life goal:  The patient wishes to be comfortable and free from anxiety  Training and education provided this visit:  Educated on [employer], IDT, medication administration, 24 hour phone number for questions or concerns  Plan for next visit:  Continued EOL education and support  Care coordination with Medical Director, WAQAS, and Desirae Christensen RN regarding admission to hospice and all are in agreement with plan of care

## 2023-06-01 ENCOUNTER — HOME CARE VISIT (OUTPATIENT)
Age: 73
End: 2023-06-01
Payer: MEDICARE

## 2023-06-01 PROCEDURE — 0651 HSPC ROUTINE HOME CARE

## 2023-06-01 PROCEDURE — G0156 HHCP-SVS OF AIDE,EA 15 MIN: HCPCS

## 2023-06-01 ASSESSMENT — ENCOUNTER SYMPTOMS: PAIN LOCATION - PAIN QUALITY: ACHINESS

## 2023-06-02 ENCOUNTER — HOME CARE VISIT (OUTPATIENT)
Age: 73
End: 2023-06-02
Payer: MEDICARE

## 2023-06-02 VITALS
OXYGEN SATURATION: 99 % | HEART RATE: 116 BPM | RESPIRATION RATE: 18 BRPM | DIASTOLIC BLOOD PRESSURE: 70 MMHG | SYSTOLIC BLOOD PRESSURE: 131 MMHG | TEMPERATURE: 99.2 F

## 2023-06-02 PROCEDURE — G0299 HHS/HOSPICE OF RN EA 15 MIN: HCPCS

## 2023-06-02 PROCEDURE — G0155 HHCP-SVS OF CSW,EA 15 MIN: HCPCS

## 2023-06-02 PROCEDURE — 0651 HSPC ROUTINE HOME CARE

## 2023-06-02 NOTE — HOME HEALTH
MSW met with the pt and her spouse/care support Daisy Boyer, who has some medical challenges. Pt's appetite has improved and she uses a rollator for ambulation as needed. Pt has a [employer] that visit approximately 2/week to provide additional housekeeping and errand support. Pt voiced having a strong sapna and a good support system. MSW provided the pt/family documentation of [address] and support programs, and left a VM for Mrs. Gomez ([address]) requesting reinstatement of SNAP benefits. MSW also provided information about care assistance options/costs/funding sources, utility assistance, and discussed transit resources and meal delivery options. MSW invited a return call if resource questions or additional support needs arise.

## 2023-06-02 NOTE — HOSPICE
Medication refills ordered this visit:   No refills needed    Medications reconciled and all medications are available in the home this visit. Education was provided regarding medications, medication interactions, and look alike medications. Response to teaching. Patient and caregiver verbalized understanding. Medications are effective at this time. Supplies by type and quantity ordered this visit include:  No supplies needed    Consulted medical director/attending physician regarding: Not at this time    Instructed patient/family/caregiver on 24-hour hospice availability and phone number.     Plan for next visit:  Continued EOL education and support

## 2023-06-03 PROCEDURE — 0651 HSPC ROUTINE HOME CARE

## 2023-06-04 PROCEDURE — 0651 HSPC ROUTINE HOME CARE

## 2023-06-05 ENCOUNTER — HOME CARE VISIT (OUTPATIENT)
Age: 73
End: 2023-06-05
Payer: MEDICARE

## 2023-06-05 VITALS
SYSTOLIC BLOOD PRESSURE: 127 MMHG | OXYGEN SATURATION: 100 % | DIASTOLIC BLOOD PRESSURE: 76 MMHG | HEART RATE: 100 BPM | TEMPERATURE: 98.8 F | RESPIRATION RATE: 18 BRPM

## 2023-06-05 PROCEDURE — G0299 HHS/HOSPICE OF RN EA 15 MIN: HCPCS

## 2023-06-05 ASSESSMENT — ENCOUNTER SYMPTOMS
COUGH: 1
COUGH CHARACTERISTICS: NON-PRODUCTIVE

## 2023-06-05 NOTE — HOSPICE
Medication refills ordered this visit:  None needed this visit. 4 syringes prefilled with 0.25ml Morphine    Medications reconciled and all medications are available in the home this visit. Education was provided regarding medications, medication interactions, and look alike medications. Response to teaching. Patient verbalized an understanding. Medications are effective at this time. Supplies by type and quantity ordered this visit include:  None needed this visit    Consulted medical director/attending physician regarding: Not at this visit    Instructed patient/family/caregiver on 24-hour hospice availability and phone number. Plan for next visit:  Continued EOL education, medication management and support    98ml morphine in home. Patient was given her morning dose of prednisone. She asked about the lorazepam.  Bottle located in the plastic bag under pillows on couch. Showed to patient and all medications placed where they were found.

## 2023-06-06 ENCOUNTER — HOME CARE VISIT (OUTPATIENT)
Age: 73
End: 2023-06-06
Payer: MEDICARE

## 2023-06-07 ENCOUNTER — HOME CARE VISIT (OUTPATIENT)
Age: 73
End: 2023-06-07
Payer: MEDICARE

## 2023-06-07 PROCEDURE — G0299 HHS/HOSPICE OF RN EA 15 MIN: HCPCS

## 2023-06-08 ENCOUNTER — HOME CARE VISIT (OUTPATIENT)
Age: 73
End: 2023-06-08
Payer: MEDICARE

## 2023-06-08 VITALS
DIASTOLIC BLOOD PRESSURE: 78 MMHG | TEMPERATURE: 100.4 F | OXYGEN SATURATION: 99 % | SYSTOLIC BLOOD PRESSURE: 150 MMHG | HEART RATE: 100 BPM | RESPIRATION RATE: 18 BRPM

## 2023-06-08 ASSESSMENT — ENCOUNTER SYMPTOMS: CONSTIPATION: 1

## 2023-06-08 NOTE — HOSPICE
Patient was laying across the skip sleep. She sat up for a few minutes and afterward patients spouse shared that they are doing ok and that the patient was doing well. Angelito Mora will continue to follow.

## 2023-06-08 NOTE — HOSPICE
Medication refills ordered this visit:  Lorazepam refilled order #[id number]    Medications reconciled and all medications are available in the home this visit. Education was provided regarding medications, medication interactions, and look alike medications. Response to teaching. Patiet and caregiver verbalized understanding. Medications are effective at this time. Supplies by type and quantity ordered this visit include:  pullups and calazime Order Number : [id number]    Consulted medical director/attending physician regarding:  Not at this time    Instructed patient/family/caregiver on 24-hour hospice availability and phone number. Plan for next visit:  Continued EOL education and support    3 syringes refilled with 0.25ml Morphine at this visit. Patient has 95ml of Morphine in home.   Only used 3 doses since Monday, 6/5

## 2023-06-09 ENCOUNTER — HOME CARE VISIT (OUTPATIENT)
Age: 73
End: 2023-06-09
Payer: MEDICARE

## 2023-06-19 ENCOUNTER — HOME CARE VISIT (OUTPATIENT)
Age: 73
End: 2023-06-19
Payer: MEDICARE

## 2023-06-20 ENCOUNTER — HOME CARE VISIT (OUTPATIENT)
Age: 73
End: 2023-06-20
Payer: MEDICARE

## 2023-06-22 ENCOUNTER — HOME CARE VISIT (OUTPATIENT)
Age: 73
End: 2023-06-22
Payer: MEDICARE

## 2023-06-22 VITALS
SYSTOLIC BLOOD PRESSURE: 148 MMHG | DIASTOLIC BLOOD PRESSURE: 58 MMHG | RESPIRATION RATE: 18 BRPM | OXYGEN SATURATION: 96 % | TEMPERATURE: 98.4 F | HEART RATE: 100 BPM

## 2023-06-22 PROCEDURE — G0299 HHS/HOSPICE OF RN EA 15 MIN: HCPCS

## 2023-06-22 NOTE — HOSPICE
Medication refills ordered this visit: Prednisone, loratadine and mirtazipine refilled  confirmation# [id number]    Medications reconciled and all medications are available in the home this visit. Education was provided regarding medications, medication interactions, and look alike medications. Response to teaching. Patient verbalized understanding. Medications are effective at this time. Supplies by type and quantity ordered this visit include:   None needed    Consulted medical director/attending physician regarding: Not at this time    Instructed patient/family/caregiver on 24-hour hospice availability and phone number. Plan for next visit:  Continued EOL education and support    6 syringes prefilled with 0.25ml of morphine.   2 unopened boxes present

## 2023-06-26 ENCOUNTER — HOME CARE VISIT (OUTPATIENT)
Age: 73
End: 2023-06-26
Payer: MEDICARE

## 2023-06-26 PROCEDURE — G0299 HHS/HOSPICE OF RN EA 15 MIN: HCPCS

## 2023-06-27 VITALS
SYSTOLIC BLOOD PRESSURE: 141 MMHG | OXYGEN SATURATION: 100 % | DIASTOLIC BLOOD PRESSURE: 72 MMHG | TEMPERATURE: 97.5 F | BODY MASS INDEX: 15.36 KG/M2 | WEIGHT: 84 LBS | RESPIRATION RATE: 18 BRPM | HEART RATE: 110 BPM

## 2023-06-29 ENCOUNTER — HOME CARE VISIT (OUTPATIENT)
Age: 73
End: 2023-06-29
Payer: MEDICARE

## 2023-06-29 VITALS
OXYGEN SATURATION: 99 % | HEART RATE: 106 BPM | SYSTOLIC BLOOD PRESSURE: 145 MMHG | DIASTOLIC BLOOD PRESSURE: 81 MMHG | RESPIRATION RATE: 18 BRPM | TEMPERATURE: 98.5 F

## 2023-06-29 PROCEDURE — G0299 HHS/HOSPICE OF RN EA 15 MIN: HCPCS

## 2023-07-03 ENCOUNTER — HOME CARE VISIT (OUTPATIENT)
Age: 73
End: 2023-07-03
Payer: MEDICARE

## 2023-07-07 ENCOUNTER — HOME CARE VISIT (OUTPATIENT)
Age: 73
End: 2023-07-07
Payer: MEDICARE

## 2023-07-07 PROCEDURE — G0299 HHS/HOSPICE OF RN EA 15 MIN: HCPCS

## 2023-07-08 VITALS
RESPIRATION RATE: 20 BRPM | DIASTOLIC BLOOD PRESSURE: 85 MMHG | TEMPERATURE: 99.8 F | HEART RATE: 107 BPM | SYSTOLIC BLOOD PRESSURE: 134 MMHG | OXYGEN SATURATION: 94 %

## 2023-07-08 NOTE — HOSPICE
Medication refills ordered this visit: Claritin, lorazepam, prednisone, mirtazapine and colace was refilled. Confirmation # [id number]    Medications reconciled and all medications are available in the home this visit. Education was provided regarding medications, medication interactions, and look alike medications. Response to teaching:  and patient verbalized understanding. Medications are effective at this time. Supplies by type and quantity ordered this visit include:  None needed    Consulted medical director/attending physician regarding:  Not at this time    Instructed patient/family/caregiver on 24-hour hospice availability and phone number.     Plan for next visit:  Continue to assess for decline

## 2023-07-10 ENCOUNTER — HOME CARE VISIT (OUTPATIENT)
Age: 73
End: 2023-07-10
Payer: MEDICARE

## 2023-07-10 VITALS
TEMPERATURE: 97.9 F | HEART RATE: 112 BPM | SYSTOLIC BLOOD PRESSURE: 164 MMHG | OXYGEN SATURATION: 97 % | DIASTOLIC BLOOD PRESSURE: 83 MMHG | RESPIRATION RATE: 18 BRPM

## 2023-07-10 PROCEDURE — G0299 HHS/HOSPICE OF RN EA 15 MIN: HCPCS

## 2023-07-10 NOTE — HOSPICE
Medication refills ordered this visit: No refilles needed    Medications reconciled and all medications are available in the home this visit. Education was provided regarding medications, medication interactions, and look alike medications. Response to teaching. Patient and  verbalize understanding. Medications are effective at this time. Supplies by type and quantity ordered this visit include:   None needed    Consulted medical director/attending physician regarding: Not at this time    Instructed patient/family/caregiver on 24-hour hospice availability and phone number.     Plan for next visit:  Continued EOL and support

## 2023-07-13 ENCOUNTER — HOME CARE VISIT (OUTPATIENT)
Age: 73
End: 2023-07-13
Payer: MEDICARE

## 2023-07-13 PROCEDURE — G0299 HHS/HOSPICE OF RN EA 15 MIN: HCPCS

## 2023-07-14 VITALS
OXYGEN SATURATION: 97 % | SYSTOLIC BLOOD PRESSURE: 132 MMHG | HEART RATE: 112 BPM | DIASTOLIC BLOOD PRESSURE: 76 MMHG | RESPIRATION RATE: 20 BRPM | TEMPERATURE: 99.1 F

## 2023-07-14 NOTE — HOSPICE
Medication refills ordered this visit:  No refills needed    Medications reconciled and all medications are available in the home this visit. Education was provided regarding medications, medication interactions, and look alike medications. Response to teaching. Patient and   verbalized understanding. Medications are effective at this time. Supplies by type and quantity ordered this visit include:  medium pullups ordered  confirmation# [id number]    Consulted medical director/attending physician regarding:  Not at this time    Instructed patient/family/caregiver on 24-hour hospice availability and phone number.     Plan for next visit:  Continued EOL education and support

## 2023-07-17 ENCOUNTER — HOME CARE VISIT (OUTPATIENT)
Age: 73
End: 2023-07-17
Payer: MEDICARE

## 2023-07-17 VITALS
OXYGEN SATURATION: 98 % | BODY MASS INDEX: 16.48 KG/M2 | DIASTOLIC BLOOD PRESSURE: 62 MMHG | WEIGHT: 90.13 LBS | RESPIRATION RATE: 18 BRPM | TEMPERATURE: 98.2 F | SYSTOLIC BLOOD PRESSURE: 100 MMHG | HEART RATE: 98 BPM

## 2023-07-17 PROCEDURE — G0299 HHS/HOSPICE OF RN EA 15 MIN: HCPCS

## 2023-07-17 NOTE — HOSPICE
Medication refills ordered this visit:  Gabapentin ordered for neuropathic pain. Confirmation# [id number]    Medications reconciled and all medications are available in the home this visit. Education was provided regarding medications, medication interactions, and look alike medications. Response to teaching. Patient verbalized understanding. Medications are effective at this time. Supplies by type and quantity ordered this visit include: None needed    Consulted medical director/attending physician regarding: Spoke with Ren Camejo in regards to complaints    Instructed patient/family/caregiver on 24-hour hospice availability and phone number.     Plan for next visit:  Continued EOL education and support

## 2023-07-20 ENCOUNTER — HOME CARE VISIT (OUTPATIENT)
Age: 73
End: 2023-07-20
Payer: MEDICARE

## 2023-07-20 VITALS
DIASTOLIC BLOOD PRESSURE: 66 MMHG | TEMPERATURE: 97.7 F | OXYGEN SATURATION: 96 % | RESPIRATION RATE: 20 BRPM | SYSTOLIC BLOOD PRESSURE: 110 MMHG | HEART RATE: 78 BPM

## 2023-07-20 PROCEDURE — G0299 HHS/HOSPICE OF RN EA 15 MIN: HCPCS

## 2023-07-20 NOTE — HOSPICE
Medication refills ordered this visit: Mirtazapine refilled. confirmation# [id number]    Medications reconciled and all medications are available in the home this visit. Education was provided regarding medications, medication interactions, and look alike medications. Response to teaching. Patient and  verbalizes understanding. Medications are effective at this time. Supplies by type and quantity ordered this visit include: None needed    Consulted medical director/attending physician regarding: Not at this visit    Instructed patient/family/caregiver on 24-hour hospice availability and phone number. Plan for next visit:  Continued EOL education, assess effectiveness of gabapentin. Was started yesterday.

## 2023-07-24 ENCOUNTER — HOME CARE VISIT (OUTPATIENT)
Age: 73
End: 2023-07-24
Payer: MEDICARE

## 2023-07-24 VITALS
DIASTOLIC BLOOD PRESSURE: 60 MMHG | OXYGEN SATURATION: 98 % | SYSTOLIC BLOOD PRESSURE: 140 MMHG | TEMPERATURE: 97.7 F | RESPIRATION RATE: 18 BRPM | HEART RATE: 98 BPM

## 2023-07-24 PROCEDURE — G0299 HHS/HOSPICE OF RN EA 15 MIN: HCPCS

## 2023-07-24 NOTE — HOSPICE
Patient was up and talking today. She was in a good mood and a great place. [address] [employer] Sarah Ann will continue to follow.

## 2023-07-24 NOTE — HOSPICE
Medication refills ordered this visit:  No refills needed    Medications reconciled and all medications are available in the home this visit. Education was provided regarding medications, medication interactions, and look alike medications. Response to teaching. Medications are effective at this time. Supplies by type and quantity ordered this visit include:  None needed    Consulted medical director/attending physician regarding:  Not at this time    Instructed patient/family/caregiver on 24-hour hospice availability and phone number.     Plan for next visit:  Continued EOL education and support

## 2023-07-27 ENCOUNTER — HOME CARE VISIT (OUTPATIENT)
Age: 73
End: 2023-07-27
Payer: MEDICARE

## 2023-07-27 VITALS
DIASTOLIC BLOOD PRESSURE: 68 MMHG | SYSTOLIC BLOOD PRESSURE: 127 MMHG | TEMPERATURE: 100.6 F | HEART RATE: 98 BPM | RESPIRATION RATE: 18 BRPM | OXYGEN SATURATION: 97 %

## 2023-07-27 PROCEDURE — G0299 HHS/HOSPICE OF RN EA 15 MIN: HCPCS

## 2023-07-27 NOTE — HOSPICE
Medication refills ordered this visit:  No refills needed    Medications reconciled and all medications are available in the home this visit. Education was provided regarding medications, medication interactions, and look alike medications. Response to teaching. Patient and  verbalized understanding. Medications are effective at this time. 12 syringes prefilled with 0.25ml morphine at todays visit. Supplies by type and quantity ordered this visit include:  None needed    Consulted medical director/attending physician regarding:  Not at this time    Instructed patient/family/caregiver on 24-hour hospice availability and phone number.     Plan for next visit:  Continued EOL education and support

## 2023-07-31 ENCOUNTER — HOME CARE VISIT (OUTPATIENT)
Age: 73
End: 2023-07-31
Payer: MEDICARE

## 2023-08-03 ENCOUNTER — HOME CARE VISIT (OUTPATIENT)
Age: 73
End: 2023-08-03
Payer: MEDICARE

## 2023-08-03 VITALS
RESPIRATION RATE: 18 BRPM | OXYGEN SATURATION: 96 % | DIASTOLIC BLOOD PRESSURE: 66 MMHG | SYSTOLIC BLOOD PRESSURE: 170 MMHG | HEART RATE: 96 BPM | TEMPERATURE: 96.7 F

## 2023-08-03 PROCEDURE — G0299 HHS/HOSPICE OF RN EA 15 MIN: HCPCS

## 2023-08-03 NOTE — HOSPICE
Medication refills ordered this visit: Acetaminophen, gabapentin, mirtazapine, lorazepam refilled. confirmation# [id number]    Medications reconciled and all medications are available in the home this visit. Education was provided regarding medications, medication interactions, and look alike medications. Response to teaching.  verbalizes understanding. Medications are effective at this time.  reducated on how to draw of morphine to prefill syringes for patient. The patient had no pain medications because all the syringes were empty and the  stated he couldn't see to draw up the medication. Talked about having a family member come once a week to assist him as I draw the syringes up during my visits only. Supplies by type and quantity ordered this visit include: medium pull ups and wipes. confirmation# [id number]    Consulted medical director/attending physician regarding: Not at this time    Instructed patient/family/caregiver on 24-hour hospice availability and phone number.     Plan for next visit:  Continued EOL education and support

## 2023-08-07 ENCOUNTER — HOME CARE VISIT (OUTPATIENT)
Age: 73
End: 2023-08-07
Payer: MEDICARE

## 2023-08-07 PROCEDURE — G0300 HHS/HOSPICE OF LPN EA 15 MIN: HCPCS

## 2023-08-08 VITALS
SYSTOLIC BLOOD PRESSURE: 110 MMHG | HEART RATE: 105 BPM | DIASTOLIC BLOOD PRESSURE: 70 MMHG | OXYGEN SATURATION: 99 % | RESPIRATION RATE: 18 BRPM | TEMPERATURE: 98.9 F

## 2023-08-09 NOTE — HOSPICE
Medication refills ordered this visit:None    Medications reconciled and all medications are available in the home this visit. The following education was provided regarding medications, medication interactions, and look alike medications: Medication side effects, dosages, purposes, frequencies. Response to teaching: Verbalized understanding. Medications are effective at this time. Supplies by type and quantity ordered this visit include: Not needed    Consulted medical director/attending physician regarding:Not needed this visit    Instructed patient/family/caregiver on 24-hour hospice availability and phone number. Plan for next visit:  Continue end of life education and support caregiver.

## 2023-08-10 ENCOUNTER — HOME CARE VISIT (OUTPATIENT)
Age: 73
End: 2023-08-10
Payer: MEDICARE

## 2023-08-10 VITALS
SYSTOLIC BLOOD PRESSURE: 131 MMHG | OXYGEN SATURATION: 97 % | TEMPERATURE: 98.7 F | DIASTOLIC BLOOD PRESSURE: 83 MMHG | HEART RATE: 77 BPM | RESPIRATION RATE: 18 BRPM

## 2023-08-10 PROCEDURE — G0300 HHS/HOSPICE OF LPN EA 15 MIN: HCPCS

## 2023-08-10 NOTE — HOSPICE
Medication refills ordered this visit:None needed    Medications reconciled and all medications are available in the home this visit. The following education was provided regarding medications, medication interactions, and look alike medications: Medication side effects, dosages, purposes, frequencies. Response to teaching: Verbalized understanding. Medications are effective at this time. Supplies by type and quantity ordered this visit include:None    Consulted medical director/attending physician regarding: Not needed    Instructed patient/family/caregiver on 24-hour hospice availability and phone number. Plan for next visit:  Continue end of life education and support caregiver.

## 2023-08-14 ENCOUNTER — HOME CARE VISIT (OUTPATIENT)
Age: 73
End: 2023-08-14
Payer: MEDICARE

## 2023-08-14 NOTE — HOSPICE
Patient was in a little pain. They were in great spirits and had some [employer] for lunch. July Estrada will continue to follow. Angela Salgado(Attending)

## 2023-08-15 ENCOUNTER — HOME CARE VISIT (OUTPATIENT)
Age: 73
End: 2023-08-15
Payer: MEDICARE

## 2023-08-15 VITALS
OXYGEN SATURATION: 94 % | SYSTOLIC BLOOD PRESSURE: 160 MMHG | DIASTOLIC BLOOD PRESSURE: 60 MMHG | TEMPERATURE: 98.3 F | RESPIRATION RATE: 18 BRPM | HEART RATE: 96 BPM

## 2023-08-15 PROCEDURE — G0299 HHS/HOSPICE OF RN EA 15 MIN: HCPCS

## 2023-08-15 NOTE — HOSPICE
Medication refills ordered this visit:  mirtazepine and acetaminophen refilled. confirmation# [id number]    Medications reconciled and all medications are in the home this visit. Education was provided regarding medications, medication interactions, and look alike medications. Response to teaching. Patient and  verbalized understanding. Medications are effective at this time. Supplies by type and quantity ordered this visit include:  No supplies needed    Consulted medical director/attending physician regarding:  Not at this time    Instructed patient/family/caregiver on 24-hour hospice availability and phone number. Plan for next visit:   12 syringes filled with 0.5ml morphine. Patient taking 1-2 daily.   Continued EOL education and support

## 2023-08-17 ENCOUNTER — HOME CARE VISIT (OUTPATIENT)
Age: 73
End: 2023-08-17
Payer: MEDICARE

## 2023-08-17 VITALS
HEART RATE: 93 BPM | TEMPERATURE: 98.5 F | OXYGEN SATURATION: 97 % | RESPIRATION RATE: 22 BRPM | DIASTOLIC BLOOD PRESSURE: 95 MMHG | SYSTOLIC BLOOD PRESSURE: 157 MMHG

## 2023-08-17 PROCEDURE — G0299 HHS/HOSPICE OF RN EA 15 MIN: HCPCS

## 2023-08-17 NOTE — HOSPICE
Medication refills ordered this visit:  None needed    Medications reconciled and all medications are available in the home this visit. Education was provided regarding medications, medication interactions, and look alike medications. Response to teaching.  and patient verbalized understanding. Medications are effective at this time. Supplies by type and quantity ordered this visit include:  None needed    Consulted medical director/attending physician regarding:  Not at this time    Instructed patient/family/caregiver on 24-hour hospice availability and phone number. Plan for next visit:   Prefilled 3 syringes with 0.5ml Morphine.   Continue to monitor for further decline

## 2023-08-21 ENCOUNTER — HOME CARE VISIT (OUTPATIENT)
Age: 73
End: 2023-08-21
Payer: MEDICARE

## 2023-08-21 VITALS
TEMPERATURE: 98 F | HEART RATE: 99 BPM | OXYGEN SATURATION: 98 % | DIASTOLIC BLOOD PRESSURE: 81 MMHG | SYSTOLIC BLOOD PRESSURE: 141 MMHG | RESPIRATION RATE: 18 BRPM

## 2023-08-21 PROCEDURE — G0300 HHS/HOSPICE OF LPN EA 15 MIN: HCPCS

## 2023-08-21 ASSESSMENT — ENCOUNTER SYMPTOMS: PAIN LOCATION - PAIN QUALITY: ACHE

## 2023-08-21 NOTE — HOSPICE
Medication refills ordered this visit: None needed    Medications reconciled and all medications are available in the home this visit. The following education was provided regarding medications, medication interactions, and look alike medications: Medication side effects, dosages, purposes, frequencies. Response to teaching: Verbalized understanding. Medications are effective at this time. Supplies by type and quantity ordered this visit include: Not needed    Consulted medical director/attending physician regarding: Not needed    Instructed patient/family/caregiver on 24-hour hospice availability and phone number. Plan for next visit:  Continue end of life education and support caregiver.       Filled 7 syringes with 0.5 mL Morphine  total full syringes 17, no empties and 20mL left in the bottle of Morphine

## 2023-08-24 ENCOUNTER — HOME CARE VISIT (OUTPATIENT)
Age: 73
End: 2023-08-24
Payer: MEDICARE

## 2023-08-28 ENCOUNTER — HOME CARE VISIT (OUTPATIENT)
Age: 73
End: 2023-08-28
Payer: MEDICARE

## 2023-08-28 VITALS
HEART RATE: 60 BPM | OXYGEN SATURATION: 96 % | SYSTOLIC BLOOD PRESSURE: 114 MMHG | DIASTOLIC BLOOD PRESSURE: 79 MMHG | RESPIRATION RATE: 20 BRPM | TEMPERATURE: 97.6 F

## 2023-08-28 PROCEDURE — G0155 HHCP-SVS OF CSW,EA 15 MIN: HCPCS

## 2023-08-28 PROCEDURE — G0299 HHS/HOSPICE OF RN EA 15 MIN: HCPCS

## 2023-08-28 NOTE — HOSPICE
Medication refills ordered this visit: Lorazepam, morphine, claritin, mirtazapine refilled. confrmation# [id number]    Medications reconciled and all medications are available in the home this visit. Education was provided regarding medications, medication interactions, and look alike medications. Response to teaching. Patient verbalized understanding. Medications are effective at this time. Supplies by type and quantity ordered this visit include:  None needed    Consulted medical director/attending physician regarding: [employer] for morphine sent to Yuni Santana NP    Instructed patient/family/caregiver on 24-hour hospice availability and phone number. Plan for next visit:  Syringes filled with 0.5ml Morphine.   Continue EOL education and support

## 2023-08-31 ENCOUNTER — HOME CARE VISIT (OUTPATIENT)
Age: 73
End: 2023-08-31
Payer: MEDICARE

## 2023-08-31 VITALS
DIASTOLIC BLOOD PRESSURE: 70 MMHG | SYSTOLIC BLOOD PRESSURE: 146 MMHG | OXYGEN SATURATION: 99 % | RESPIRATION RATE: 20 BRPM | HEART RATE: 82 BPM | TEMPERATURE: 98.2 F

## 2023-08-31 PROCEDURE — G0299 HHS/HOSPICE OF RN EA 15 MIN: HCPCS

## 2023-08-31 NOTE — HOSPICE
Medication refills ordered this visit: none needed    Medications reconciled and all medications are available in the home this visit. Education was provided regarding medications, medication interactions, and look alike medications. Response to teaching. Patient and  verbalized understanding. Medications are effective at this time. Supplies by type and quantity ordered this visit include: none needed    Consulted medical director/attending physician regarding:  not at this time    Instructed patient/family/caregiver on 24-hour hospice availability and phone number. Plan for next visit:  syringes filled with 0.5ml morphine.   Continued EOL education and support

## 2023-08-31 NOTE — HOSPICE
Today's SW visit was conducted to provide emotional support, address needs, and assess for mental health stability. SW knocked on the front door of the apartment residence and was told to come in by pt. Pt was received sitting in the living area of the apartment and SW was invited to sit. RN CM was present for nursing visit. The apartment is tidy but has limited space due to pt/spouses belongings. The walkways are clear. SW noted several roaches alive on the carpet throughout assessment. The infestation does not appear to be caused by the cleanliness of the apartment, but the apartment community's need for pest control. Pt states that Medicaid provides and aide that cleans the home once a week. Pt is dressed appropriately and physically looks healthier than previous SW visits. Pt states she has gained approximately 15 lbs over the last 2 months. Pt is A&O x 4. Pt is clear headed and is able to speak openly with SW about changes in health and ceasing all illegal drug use. SW provides encouragement in her continued recovery. Pt states current pain management regitine is effective and spouse administers pain med's to her at the correct time. CG/Pt speak with SW about non-payment of August SNAP benefits and ask for assistance to resolve the matter. SW places call to [facility] benefit line provided on back of benefit card. SW asks if CG/Pt received notice of any benefit changes, but both deny receiving any notice of change. SW is unable to connect with anyone at the [facility] office's automated system. SW will follow up with helping to ensure pt/cg have benefits restored, if they qualify. Pt states she had a stomach bug over the weekend with severe diarrhea but was able to eat on Sunday night and states appetite seems to be returning. She was able to eat a child size portion of dinner last evening. No other needs discussed at this time.   Mental health of pt appears to have improved greatly since beginning

## 2023-09-04 ENCOUNTER — HOME CARE VISIT (OUTPATIENT)
Age: 73
End: 2023-09-04
Payer: MEDICARE

## 2023-09-07 ENCOUNTER — HOME CARE VISIT (OUTPATIENT)
Age: 73
End: 2023-09-07
Payer: MEDICARE

## 2023-09-07 VITALS
RESPIRATION RATE: 24 BRPM | DIASTOLIC BLOOD PRESSURE: 62 MMHG | SYSTOLIC BLOOD PRESSURE: 118 MMHG | HEART RATE: 102 BPM | TEMPERATURE: 99.7 F | OXYGEN SATURATION: 98 %

## 2023-09-07 PROCEDURE — G0299 HHS/HOSPICE OF RN EA 15 MIN: HCPCS

## 2023-09-07 ASSESSMENT — ENCOUNTER SYMPTOMS
HEMOPTYSIS: 0
PAIN LOCATION - PAIN QUALITY: ACHY

## 2023-09-08 NOTE — HOSPICE
good right now. \"     I prefilled 12 Morphine syringes to 0.5ml for the patient at her request.  Also reordered her Loratadine and Mirtazapine through [facility] # [id number]  Patient denied any other medication or supply needs at this time. Patient had no other needs or concerns and stated \"I'm good. I'm doing good. \" She got up and walked me to the door. Ambulated independently. Advised her and spouse to call the 24-hour number if she thought of any other needs or needed to speak with us. She agreed to do so as needed and stated that the \"number is on the refridgerator\". William was in no acute distress upon the completion of this visit.

## 2023-09-12 ENCOUNTER — HOME CARE VISIT (OUTPATIENT)
Age: 73
End: 2023-09-12
Payer: MEDICARE

## 2023-09-12 PROCEDURE — G0300 HHS/HOSPICE OF LPN EA 15 MIN: HCPCS

## 2023-09-13 VITALS
SYSTOLIC BLOOD PRESSURE: 131 MMHG | OXYGEN SATURATION: 98 % | HEART RATE: 78 BPM | RESPIRATION RATE: 18 BRPM | DIASTOLIC BLOOD PRESSURE: 73 MMHG | TEMPERATURE: 98.5 F

## 2023-09-13 ASSESSMENT — ENCOUNTER SYMPTOMS: PAIN LOCATION - PAIN QUALITY: ACHE

## 2023-09-13 NOTE — HOSPICE
kiana monahan ----[facility]ay[facility] [phone] ( Private Aide)    Medication refills ordered this visit: Not needed per patient    Medications reconciled and all medications are available in the home this visit. The following education was provided regarding medications, medication interactions, and look alike medications: Medication side effects, dosages, purposes, frequencies. Response to teaching: Verbalized understanding. Medications are effective at this time. Supplies by type and quantity ordered this visit include: Not needed    Consulted medical director/attending physician regarding:Not needed    Instructed patient/family/caregiver on 24-hour hospice availability and phone number. Plan for next visit:  Continue end of life education and support caregiver.

## 2023-09-14 ENCOUNTER — HOME CARE VISIT (OUTPATIENT)
Age: 73
End: 2023-09-14
Payer: MEDICARE

## 2023-09-14 VITALS
RESPIRATION RATE: 18 BRPM | SYSTOLIC BLOOD PRESSURE: 154 MMHG | OXYGEN SATURATION: 97 % | DIASTOLIC BLOOD PRESSURE: 83 MMHG | HEART RATE: 93 BPM | TEMPERATURE: 97.7 F

## 2023-09-14 PROCEDURE — G0299 HHS/HOSPICE OF RN EA 15 MIN: HCPCS

## 2023-09-14 NOTE — HOSPICE
Medication refills ordered this visit: No refills needed    Medications reconciled and all medications are available in the home this visit. Education was provided regarding medications, medication interactions, and look alike medication. Response to teaching. Medications are effective at this time. Supplies by type and quantity ordered this visit include: None needed    Consulted medical director/attending physician regarding: Notat this time    Instructed patient/family/caregiver on 24-hour hospice availability and phone number.     Plan for next visit:  Continued EOL education an support

## 2023-09-15 ENCOUNTER — HOME CARE VISIT (OUTPATIENT)
Age: 73
End: 2023-09-15
Payer: MEDICARE

## 2023-09-15 NOTE — HOSPICE
Patient was not having a great day but was talkative. They took communion today and  will continue to follow.

## 2023-09-18 ENCOUNTER — HOME CARE VISIT (OUTPATIENT)
Age: 73
End: 2023-09-18
Payer: MEDICARE

## 2023-09-18 PROCEDURE — G0300 HHS/HOSPICE OF LPN EA 15 MIN: HCPCS

## 2023-09-19 VITALS
RESPIRATION RATE: 18 BRPM | SYSTOLIC BLOOD PRESSURE: 149 MMHG | HEART RATE: 89 BPM | TEMPERATURE: 98.9 F | DIASTOLIC BLOOD PRESSURE: 76 MMHG | OXYGEN SATURATION: 95 %

## 2023-09-19 NOTE — HOSPICE
Patient complaining of increased nausea with new gabapentin order. This nurse administered zofran with positive results. Patient refused trying something else for neuropathy and stated that she would continue with the morphine as she had better results. Medication refills ordered this visit: None needed this visit    Medications reconciled and all medications are available in the home this visit. The following education was provided regarding medications, medication interactions, and look alike medications: Medication side effects, dosages, purposes, frequencies. Response to teaching: Verbalized understanding. Medications are effective at this time. Supplies by type and quantity ordered this visit include: Not needed    Consulted medical director/attending physician regarding: Not needed    Instructed patient/family/caregiver on 24-hour hospice availability and phone number. Plan for next visit:  Continue end of life education and support caregiver.

## 2023-09-20 ENCOUNTER — HOME CARE VISIT (OUTPATIENT)
Age: 73
End: 2023-09-20
Payer: MEDICARE

## 2023-09-20 VITALS
SYSTOLIC BLOOD PRESSURE: 118 MMHG | DIASTOLIC BLOOD PRESSURE: 69 MMHG | RESPIRATION RATE: 18 BRPM | HEART RATE: 93 BPM | TEMPERATURE: 100.7 F | OXYGEN SATURATION: 98 %

## 2023-09-20 PROCEDURE — G0300 HHS/HOSPICE OF LPN EA 15 MIN: HCPCS

## 2023-09-20 NOTE — HOSPICE
Patient complained of dizziness and feeling as if she would faint. When this nurse assessed the patient stated that she felt as if her heart rate was increased and she took one nitroglycerin tablet. Symptoms have subsided. Patient did have a fever and was administered tylenol 500mg while this nurse was at the home. Caregiver advised to give again in 6 hours if fever persists and to call 24hour number if needed. Caregiver verbalized understanding. Medication refills ordered this visit: Not needed    Medications reconciled and all medications are available in the home this visit. The following education was provided regarding medications, medication interactions, and look alike medications: Medication side effects, dosages, purposes, frequencies. Response to teaching: Verbalized understanding. Medications are effective at this time. Supplies by type and quantity ordered this visit include: Not needed    Consulted medical director/attending physician regarding: not needed    Instructed patient/family/caregiver on 24-hour hospice availability and phone number. Plan for next visit:  Continue end of life education and support caregiver.

## 2023-09-21 ENCOUNTER — HOME CARE VISIT (OUTPATIENT)
Age: 73
End: 2023-09-21
Payer: MEDICARE

## 2023-09-21 VITALS
DIASTOLIC BLOOD PRESSURE: 65 MMHG | TEMPERATURE: 98.1 F | RESPIRATION RATE: 18 BRPM | OXYGEN SATURATION: 96 % | SYSTOLIC BLOOD PRESSURE: 100 MMHG | HEART RATE: 95 BPM

## 2023-09-21 PROCEDURE — G0300 HHS/HOSPICE OF LPN EA 15 MIN: HCPCS

## 2023-09-21 NOTE — HOSPICE
Ptient complaining of not being able to urinate since yesterday morning. Abdomen is hard and distended. 16Fr Castillo catheter inserted. 500mL of clear yellow urine noted in catheter bag. No other issues at this time. Medication refills ordered this visit: Not needed    Medications reconciled and all medications are available in the home this visit. The following education was provided regarding medications, medication interactions, and look alike medications: Medication side effects, dosages, purposes, frequencies. Response to teaching: Verbalized understanding. Medications are effective at this time. Supplies by type and quantity ordered this visit include: Not needd    Consulted medical director/attending physician regarding: Not needed    Instructed patient/family/caregiver on 24-hour hospice availability and phone number. Plan for next visit:  Continue end of life education and support caregiver.

## 2023-09-22 ENCOUNTER — HOME CARE VISIT (OUTPATIENT)
Age: 73
End: 2023-09-22
Payer: MEDICARE

## 2023-09-22 VITALS
DIASTOLIC BLOOD PRESSURE: 62 MMHG | HEART RATE: 90 BPM | TEMPERATURE: 98.4 F | SYSTOLIC BLOOD PRESSURE: 115 MMHG | OXYGEN SATURATION: 91 % | RESPIRATION RATE: 18 BRPM

## 2023-09-22 PROCEDURE — G0300 HHS/HOSPICE OF LPN EA 15 MIN: HCPCS

## 2023-09-22 PROCEDURE — G0155 HHCP-SVS OF CSW,EA 15 MIN: HCPCS

## 2023-09-24 ENCOUNTER — HOME CARE VISIT (OUTPATIENT)
Age: 73
End: 2023-09-24
Payer: MEDICARE

## 2023-09-24 VITALS
SYSTOLIC BLOOD PRESSURE: 107 MMHG | HEART RATE: 93 BPM | RESPIRATION RATE: 15 BRPM | TEMPERATURE: 99.9 F | OXYGEN SATURATION: 97 % | DIASTOLIC BLOOD PRESSURE: 72 MMHG

## 2023-09-24 PROCEDURE — G0299 HHS/HOSPICE OF RN EA 15 MIN: HCPCS

## 2023-09-24 RX ADMIN — MORPHINE SULFATE 0.5 ML: 100 SOLUTION ORAL at 03:51

## 2023-09-25 ENCOUNTER — HOME CARE VISIT (OUTPATIENT)
Age: 73
End: 2023-09-25
Payer: MEDICARE

## 2023-09-25 VITALS
RESPIRATION RATE: 18 BRPM | DIASTOLIC BLOOD PRESSURE: 64 MMHG | HEART RATE: 95 BPM | OXYGEN SATURATION: 93 % | TEMPERATURE: 98.9 F | SYSTOLIC BLOOD PRESSURE: 119 MMHG

## 2023-09-25 PROCEDURE — G0155 HHCP-SVS OF CSW,EA 15 MIN: HCPCS

## 2023-09-25 PROCEDURE — G0300 HHS/HOSPICE OF LPN EA 15 MIN: HCPCS

## 2023-09-25 ASSESSMENT — ENCOUNTER SYMPTOMS: PAIN LOCATION - PAIN QUALITY: ACHES

## 2023-09-25 NOTE — HOSPICE
The purpose of today's visit was to complete a monthly SW visit with pt and follow up on needs. SW was received at front door of apartment by cg and welcomed into the home. Pt was in the restroom at time of SW arrival.  SW explained to CG that at this time SW is working to get additional CG's in the home. Pt called out from restroom requesting for help with placement of her catheter. Pt appears to be doing well and able to ambulate and dress herself. SW suggested that placement of the catheter bag should be below her bladder but pt was unable to find anyway to secure the bad anywhere but her underwear. SW will request RN CM to order a thigh band to help secure the bag in a more appropriate place. Pt stated she is feeling much better than last week. Pt stated that she had been \"mean\" to CG due to being ill and CG's wishes of having her move to a SNF for care. SW discussed options with family and both are in agreement to having CG's in the home if that was available. JOSH made several calls to coordinate care needs with Medicaid benefits. [facility] was called,  [facility] provider) was called and number for Medicaid  was given to SW through [facility]. SW made contact with Marzena Yeager, Care Manager for Medicaid Benefits. Needs were discussed and Calebivan Sever stated she will begin working on pt's needs for additional cg's to be placed in the home. Pt also lost SNAP benefits and [facility] was called to discuss why benefits were stopped. It was determined that the family does not qualify for additional SNAP benefits due to income over the limit. SW offered pt/cg to find area resources that could be used for supplementing food needs of family. SW offered CG/pt supportive listening and discussed anxiety of CG when pt becomes ill. SW to follow up with Marzena Yeager [phone] and seek food neff/churches to assist with family needs.

## 2023-09-26 NOTE — HOSPICE
Medication refills ordered this visit: Claritin , Lorazepam and Remeron    Medications reconciled and all medications are available in the home this visit. The following education was provided regarding medications, medication interactions, and look alike medications: Medication side effects, dosages, purposes, frequencies. Response to teaching: Verbalized understanding. Medications are effective at this time. Supplies by type and quantity ordered this visit include: Not needed    Consulted medical director/attending physician regarding: Not needed    Instructed patient/family/caregiver on 24-hour hospice availability and phone number. Plan for next visit:  Continue end of life education and support caregiver.

## 2023-09-28 ENCOUNTER — HOME CARE VISIT (OUTPATIENT)
Age: 73
End: 2023-09-28
Payer: MEDICARE

## 2023-09-28 PROCEDURE — G0299 HHS/HOSPICE OF RN EA 15 MIN: HCPCS

## 2023-09-30 VITALS
HEART RATE: 106 BPM | SYSTOLIC BLOOD PRESSURE: 154 MMHG | OXYGEN SATURATION: 99 % | RESPIRATION RATE: 14 BRPM | TEMPERATURE: 97.9 F | DIASTOLIC BLOOD PRESSURE: 99 MMHG

## 2023-09-30 NOTE — HOSPICE
Pt. stats that she wants catheter out. She spends a lot of time in the living room with spouse and ambulates around her apt. She also goes to [facility] [address]. She wants the cather out so she can go to the dentist on 9/29/23. Declined leg bag. Catheter removed at [facility]. Pt. tolerated removall well. .  Patient/Caregiver/Family/Facility findings/issues during SN visit:  Pt request catheter be removed to facilitate her ambulating aroun apatment an going to [facility] and dental appt. Medication refills ordered this visit: None    Medications reconciled and all medications are/are not available in the home this visit. The following education was provided regarding medications, medication interactions, and look alike medications:N/A.  Medications are effective at this time. Supplies by type and quantity ordered this visit include: None requested    Consulted medical director/attending physician regarding: N/A    Instructed patient/family/caregiver on 24-hour hospice availability and phone number. Plan for next visit:  [employer] and [employer]. Provide supportive prsence.

## 2023-10-02 ENCOUNTER — HOME CARE VISIT (OUTPATIENT)
Age: 73
End: 2023-10-02
Payer: MEDICARE

## 2023-10-02 VITALS
OXYGEN SATURATION: 90 % | TEMPERATURE: 98.9 F | RESPIRATION RATE: 18 BRPM | DIASTOLIC BLOOD PRESSURE: 63 MMHG | HEART RATE: 89 BPM | SYSTOLIC BLOOD PRESSURE: 112 MMHG

## 2023-10-02 PROCEDURE — G0300 HHS/HOSPICE OF LPN EA 15 MIN: HCPCS

## 2023-10-02 ASSESSMENT — ENCOUNTER SYMPTOMS: PAIN LOCATION - PAIN QUALITY: NAGGING

## 2023-10-05 ENCOUNTER — HOME CARE VISIT (OUTPATIENT)
Age: 73
End: 2023-10-05
Payer: MEDICARE

## 2023-10-05 VITALS
SYSTOLIC BLOOD PRESSURE: 120 MMHG | RESPIRATION RATE: 18 BRPM | DIASTOLIC BLOOD PRESSURE: 68 MMHG | OXYGEN SATURATION: 97 % | HEART RATE: 97 BPM | TEMPERATURE: 98.6 F

## 2023-10-05 PROCEDURE — G0300 HHS/HOSPICE OF LPN EA 15 MIN: HCPCS

## 2023-10-05 ASSESSMENT — ENCOUNTER SYMPTOMS: PAIN LOCATION - PAIN QUALITY: ACHING

## 2023-10-05 NOTE — HOSPICE
Patient/Caregiver/Family/Facility findings/issues during SN visit:  Patient was in her kitchen and had a fall. Patient landed on her knees and denies hitting her head. left knee is sore but no other apparent injury. Medication refills ordered this visit: Not needed    Medications reconciled and all medications are available in the home this visit. The following education was provided regarding medications, medication interactions, and look alike medications: Medication side effects, dosages, purposes, frequencies. Response to teaching: Verbalized understanding. Medications are effective at this time. Supplies by type and quantity ordered this visit include: No supplies needed    Consulted medical director/attending physician regarding: Not needed    Instructed patient/family/caregiver on 24-hour hospice availability and phone number. Plan for next visit:  Continue end of life education and support caregiver.

## 2023-10-09 ENCOUNTER — HOME CARE VISIT (OUTPATIENT)
Age: 73
End: 2023-10-09
Payer: MEDICARE

## 2023-10-09 VITALS
SYSTOLIC BLOOD PRESSURE: 132 MMHG | BODY MASS INDEX: 16.28 KG/M2 | WEIGHT: 89 LBS | OXYGEN SATURATION: 97 % | HEART RATE: 84 BPM | DIASTOLIC BLOOD PRESSURE: 74 MMHG | TEMPERATURE: 98.5 F | RESPIRATION RATE: 18 BRPM

## 2023-10-09 PROCEDURE — G0299 HHS/HOSPICE OF RN EA 15 MIN: HCPCS

## 2023-10-09 ASSESSMENT — ENCOUNTER SYMPTOMS: CONSTIPATION: 1

## 2023-10-09 NOTE — HOSPICE
Patient/Caregiver findings/issues during SN visit:  Patient becoming increasingly weaker and seems depressed    Medication refills ordered this visit: Started back on her Cymbalta  ordered via [facility]. confirmation# [id number]    Medications reconciled and all medications are available in the home this visit. Education was provided regarding medications, medication interactions, and look alike medications. Response to teaching. Patient and  verbalized understanding. Medications are effective at this time. Supplies by type and quantity ordered this visit include:  None needed    Consulted medical director/attending physician regarding: Spoke to Trevor Das NP about ordering something for depression    Instructed patient/family/caregiver on 24-hour hospice availability and phone number.     Plan for next visit:  Continued EOL education and support

## 2023-10-13 ENCOUNTER — HOME CARE VISIT (OUTPATIENT)
Age: 73
End: 2023-10-13
Payer: MEDICARE

## 2023-10-13 VITALS
SYSTOLIC BLOOD PRESSURE: 130 MMHG | RESPIRATION RATE: 20 BRPM | DIASTOLIC BLOOD PRESSURE: 86 MMHG | TEMPERATURE: 98.5 F | OXYGEN SATURATION: 96 % | HEART RATE: 112 BPM

## 2023-10-13 PROCEDURE — G0299 HHS/HOSPICE OF RN EA 15 MIN: HCPCS

## 2023-10-13 NOTE — HOSPICE
Patient/Caregiver/Family findings/issues during SN visit:  none    Medication refills ordered this visit: none at this time    Medications reconciled and all medications are available in the home this visit. Education was provided regarding medications, medication interactions, and look alike medications. Response to teaching. Patient and  verbalized understanding. Medications are effective at this time. Supplies by type and quantity ordered this visit include:  None needed    Consulted medical director/attending physician regarding: Spoke to Nat Arroyo NP about antidepreesant. Patient states the 2 days she took the cymbalta she was nauseous and had to take zofran. Cymbalta discontonued and will start on Zoloft. Instructed patient/family/caregiver on 24-hour hospice availability and phone number.     Plan for next visit:  Continued EOL education and support

## 2023-10-17 ENCOUNTER — HOME CARE VISIT (OUTPATIENT)
Age: 73
End: 2023-10-17
Payer: MEDICARE

## 2023-10-19 ENCOUNTER — HOME CARE VISIT (OUTPATIENT)
Age: 73
End: 2023-10-19
Payer: MEDICARE

## 2023-10-19 VITALS
DIASTOLIC BLOOD PRESSURE: 75 MMHG | RESPIRATION RATE: 18 BRPM | HEART RATE: 103 BPM | OXYGEN SATURATION: 98 % | SYSTOLIC BLOOD PRESSURE: 120 MMHG | TEMPERATURE: 100.2 F

## 2023-10-19 PROCEDURE — G0299 HHS/HOSPICE OF RN EA 15 MIN: HCPCS

## 2023-10-19 NOTE — HOSPICE
Patient/Caregiver/Family findings/issues during SN visit:  None at this time    Medication refills ordered this visit: mirtazipime and acetaminophen refilled. confirmation# [id number]    Medications reconciled and all medications are available in the home this visit. Education was provided regarding medications, medication interactions, and look alike medications. Response to teaching. Patient and  verbalized understanding. Medications are effective at this time. Supplies by type and quantity ordered this visit include: No supplies needed    Consulted medical director/attending physician regarding: No at this time    Instructed patient/family/caregiver on 24-hour hospice availability and phone number.     Plan for next visit:  Continued eol education and support

## 2023-10-23 ENCOUNTER — HOME CARE VISIT (OUTPATIENT)
Age: 73
End: 2023-10-23
Payer: MEDICARE

## 2023-10-23 VITALS
RESPIRATION RATE: 18 BRPM | TEMPERATURE: 98.2 F | SYSTOLIC BLOOD PRESSURE: 129 MMHG | DIASTOLIC BLOOD PRESSURE: 73 MMHG | OXYGEN SATURATION: 99 % | HEART RATE: 92 BPM

## 2023-10-23 PROCEDURE — G0300 HHS/HOSPICE OF LPN EA 15 MIN: HCPCS

## 2023-10-24 ENCOUNTER — HOME CARE VISIT (OUTPATIENT)
Age: 73
End: 2023-10-24
Payer: MEDICARE

## 2023-10-24 NOTE — HOSPICE
Patient was in bed with a headache today.  was right by her side. [employer] will continue to follow.

## 2023-10-24 NOTE — HOSPICE
Patient/Caregiver/Family/Facility findings/issues during SN visit:  No new concerns this visit. Medication refills ordered this visit: Not needed    Medications reconciled and all medications are available in the home this visit. The following education was provided regarding medications, medication interactions, and look alike medications: Medication side effects, dosages, purposes, frequencies. Response to teaching: Verbalized understanding. Medications are effective at this time. Supplies by type and quantity ordered this visit include: No supplies needed this visit    Consulted medical director/attending physician regarding: Not needed    Instructed patient/family/caregiver on 24-hour hospice availability and phone number. Plan for next visit:  Continue end of life education and support caregiver.

## 2023-10-25 ENCOUNTER — HOME CARE VISIT (OUTPATIENT)
Age: 73
End: 2023-10-25
Payer: MEDICARE

## 2023-10-26 ENCOUNTER — HOME CARE VISIT (OUTPATIENT)
Age: 73
End: 2023-10-26
Payer: MEDICARE

## 2023-10-26 VITALS
DIASTOLIC BLOOD PRESSURE: 90 MMHG | OXYGEN SATURATION: 96 % | TEMPERATURE: 99.2 F | HEART RATE: 100 BPM | RESPIRATION RATE: 20 BRPM | SYSTOLIC BLOOD PRESSURE: 154 MMHG

## 2023-10-26 PROCEDURE — G0299 HHS/HOSPICE OF RN EA 15 MIN: HCPCS

## 2023-10-26 PROCEDURE — G0155 HHCP-SVS OF CSW,EA 15 MIN: HCPCS

## 2023-10-26 NOTE — HOSPICE
Patient/Caregiver findings/issues during SN visit:  No issues found. All syringes are full as patient didn't use any since last visit    Medication refills ordered this visit: Refilled zoloft, claritin and lorazepam.  confirmation # [id number]    Medications reconciled and all medications are available in the home this visit. Education was provided regarding medications, medication interactions, and look alike medications. Response to teaching. Patient and  verbalized understanding. Medications are effective at this time. Supplies by type and quantity ordered this visit include: medium pullups ordered confirmation# [id number]    Consulted medical director/attending physician regarding:  Not at this time    Instructed patient/family/caregiver on 24-hour hospice availability and phone number.     Plan for next visit:  Continued EOL education and support

## 2023-10-26 NOTE — HOSPICE
The purpose of today's SW visit is to complete a monthly visit to assess needs and well-being of pt/cg. SW was received at the front door of the apartment and welcomed in by Salome Jimenez. PT was received in the living room as she was coming out of her bedroom. Pt is dressed appropriately and there are no visible signs of abuse neglect. The apartment is small but clean, although roaches are noted in the residence (building problem not specific to pt/cg apartment). SW assessed if care coordinator from [facility] had made visit. CG/PT were unable to recall until later in the visit, when they stated the care coordinator,  Melissa Agarwal with [employer] had made a visit last week for Care Coordination and possible paid caregivers in the home. Judi's number is [phone]. Pt is trying to change Medicaid benefit to a different company and states that Gearldean Heimlich is helping with this. Food needs are still an issue as the couple lost SNAP benefits due to being slightly over income. Area resources for food neff were given, but pt/cg have no way to get to  locations and all resources require . SW suggests that a volunteer might be able to provide 2 x month service to  food bank items and deliver to the pt/cg home. The family is agreeable to this. PT/CG were also signed up for [employer] during joint visit with Compliance RN Daniela Valentin. CG/pt instructed to monitor email for updates from [employer]. Call to see if she did an assessment and about insurance switch over.  to see if volunteer would be available to  food from food bank once a month. Pt states she has been doing ok, but is becoming more dependant on O2. Catheter has been removed and pt states she is mostly continent of bowel and bladder but wears pull ups as precaution. Pt states her appetite is \"good\", she still has difficulty maintaining her weight of approximately 90 lbs.   Pt states pain is

## 2023-10-27 ENCOUNTER — HOME CARE VISIT (OUTPATIENT)
Age: 73
End: 2023-10-27
Payer: MEDICARE

## 2023-10-30 ENCOUNTER — HOME CARE VISIT (OUTPATIENT)
Age: 73
End: 2023-10-30
Payer: MEDICARE

## 2023-10-30 VITALS
HEART RATE: 99 BPM | DIASTOLIC BLOOD PRESSURE: 75 MMHG | SYSTOLIC BLOOD PRESSURE: 125 MMHG | TEMPERATURE: 98.7 F | RESPIRATION RATE: 18 BRPM | OXYGEN SATURATION: 99 %

## 2023-10-30 PROCEDURE — G0299 HHS/HOSPICE OF RN EA 15 MIN: HCPCS

## 2023-10-30 NOTE — HOSPICE
Patient/Caregiver/Family findings/issues during SN visit:  None identified    Medication refills ordered this visit: acetaminophen refilled ([id number]) AND Mirtazapime refilled ([id number])    Medications reconciled and all medications are available in the home this visit. Education was provided regarding medications, medication interactions, and look alike medications. Response to teaching. Patient and  verbalized understanding. Medications are effective at this time. Supplies by type and quantity ordered this visit include:   none needed    Consulted medical director/attending physician regarding:  Not at this time    Instructed patient/family/caregiver on 24-hour hospice availability and phone number.     Plan for next visit:  Continued EOL education and support

## 2023-11-02 ENCOUNTER — HOME CARE VISIT (OUTPATIENT)
Age: 73
End: 2023-11-02
Payer: MEDICARE

## 2023-11-02 VITALS
TEMPERATURE: 99.7 F | SYSTOLIC BLOOD PRESSURE: 133 MMHG | HEART RATE: 87 BPM | DIASTOLIC BLOOD PRESSURE: 67 MMHG | OXYGEN SATURATION: 98 % | RESPIRATION RATE: 18 BRPM

## 2023-11-02 PROCEDURE — G0300 HHS/HOSPICE OF LPN EA 15 MIN: HCPCS

## 2023-11-02 NOTE — HOSPICE
Patient/Caregiver/Family/Facility findings/issues during SN visit:  No new concerns    Medication refills ordered this visit: Not needed---Zoloft ordered on 10/30 by CM    Medications reconciled and all medications are available in the home this visit. The following education was provided regarding medications, medication interactions, and look alike medications: Medication side effects, dosages, purposes, frequencies. Response to teaching: Verbalized understanding. Medications are effective at this time. Supplies by type and quantity ordered this visit include: No supplies needed this visit    Consulted medical director/attending physician regarding: Not needd    Instructed patient/family/caregiver on 24-hour hospice availability and phone number. Plan for next visit:  Continue end of life education and support caregiver.

## 2023-11-06 ENCOUNTER — HOME CARE VISIT (OUTPATIENT)
Age: 73
End: 2023-11-06
Payer: MEDICARE

## 2023-11-09 ENCOUNTER — HOME CARE VISIT (OUTPATIENT)
Age: 73
End: 2023-11-09
Payer: MEDICARE

## 2023-11-09 VITALS
RESPIRATION RATE: 20 BRPM | SYSTOLIC BLOOD PRESSURE: 133 MMHG | DIASTOLIC BLOOD PRESSURE: 64 MMHG | TEMPERATURE: 97.2 F | OXYGEN SATURATION: 100 % | HEART RATE: 89 BPM

## 2023-11-09 PROCEDURE — G0299 HHS/HOSPICE OF RN EA 15 MIN: HCPCS

## 2023-11-09 NOTE — HOSPICE
Patient/Caregiver/Family findings/issues during SN visit:  None identified    Medication refills ordered this visit:  5 meds refilled including morphine confirmation# [id number] and [id number]    Medications reconciled and all medications are available in the home this visit. Education was provided regarding medications, medication interactions, and look alike medications. Response to teaching. Patient verbalized understanding. Medications are effective at this time. Supplies by type and quantity ordered this visit include:  medium pullups and moisturizer  confirmation# [id number]    Consulted medical director/attending physician regarding: Nay Wang sent to Merline Jensen, NP for morphine refill    Instructed patient/family/caregiver on 24-hour hospice availability and phone number.     Plan for next visit:  Continued education and support

## 2023-11-13 ENCOUNTER — HOME CARE VISIT (OUTPATIENT)
Age: 73
End: 2023-11-13
Payer: MEDICARE

## 2023-11-13 PROCEDURE — G0300 HHS/HOSPICE OF LPN EA 15 MIN: HCPCS

## 2023-11-14 ENCOUNTER — HOME CARE VISIT (OUTPATIENT)
Age: 73
End: 2023-11-14
Payer: MEDICARE

## 2023-11-14 VITALS
RESPIRATION RATE: 18 BRPM | OXYGEN SATURATION: 100 % | DIASTOLIC BLOOD PRESSURE: 73 MMHG | SYSTOLIC BLOOD PRESSURE: 122 MMHG | HEART RATE: 78 BPM | TEMPERATURE: 98.2 F

## 2023-11-14 NOTE — HOSPICE
Patient/Caregiver/Family/Facility findings/issues during SN visit:  No new concerns at this time    Medication refills ordered this visit: Not needed    Medications reconciled and all medications are available in the home this visit. The following education was provided regarding medications, medication interactions, and look alike medications: Medication side effects, dosages, purposes, frequencies. Response to teaching: Verbalized understanding. Medications are effective at this time. Supplies by type and quantity ordered this visit include No supplies needed    Consulted medical director/attending physician regarding: Not needed this visit    Instructed patient/family/caregiver on 24-hour hospice availability and phone number. Plan for next visit:  Continue end of life education and support caregiver.

## 2023-11-15 ENCOUNTER — HOME CARE VISIT (OUTPATIENT)
Age: 73
End: 2023-11-15
Payer: MEDICARE

## 2023-11-16 ENCOUNTER — HOME CARE VISIT (OUTPATIENT)
Age: 73
End: 2023-11-16
Payer: MEDICARE

## 2023-11-20 ENCOUNTER — HOME CARE VISIT (OUTPATIENT)
Age: 73
End: 2023-11-20
Payer: MEDICARE

## 2023-11-20 VITALS
TEMPERATURE: 98 F | RESPIRATION RATE: 16 BRPM | OXYGEN SATURATION: 98 % | HEART RATE: 74 BPM | DIASTOLIC BLOOD PRESSURE: 80 MMHG | SYSTOLIC BLOOD PRESSURE: 120 MMHG

## 2023-11-20 PROCEDURE — G0299 HHS/HOSPICE OF RN EA 15 MIN: HCPCS

## 2023-11-20 NOTE — HOSPICE
S - SN PRN Hospice Visit  B - oK Steven is a 72year old female with hospice diagnosis of COPD. Received call through answering service from patient's spouse requesting visit due to patient complaints of dizziness       and nausea. A - On arrival patient in bathroom. Observed patient ambulating with walker. No complaints of dizziness or nausea. Patient sat in living room and proceeded to talk about feelings of loneliness        and history of depression. She stated that she took an extra dose of medication and felt dizzy earlier prior to visit. The medication taken was Sertraline. The patient went on to say that she was seeing a psychiatrist about one year ago. she stated that she does not remember why the visits ended. She asked if hospice had a counselor        that she could talk to about \"why I feel so lonely\". R - VS 98.0-74-/80-SP02 98% on RA        1)  Education regarding Sertraline: purpose, dose, frequency, side effects - reinforcing that Sertraline is a scheduled medication to be taken once daily. Patient and spouse verbalized             understanding        2)  Education regarding Lorazepam: purpose, dose, frequency, side effects - reinforcing that Lorazepam is an as needed medication which can be taken every 4 hours for anxiety and/or             restlessness. Patient and spouse verbalized understanding. 3)  Education regarding Loratadine which is also an as needed medication prescribed for itching that can be taken once daily. Reinforced the dose can only be taken one time             a day if needed and cannot be repeated on the same day. Patient and spouse verbalized understanding. Patient and spouse had no further questions or complaints.  Medications refilled through [facility]:  Sertraline, Remeron, Claritin

## 2023-11-21 ENCOUNTER — HOME CARE VISIT (OUTPATIENT)
Age: 73
End: 2023-11-21
Payer: MEDICARE

## 2023-11-21 VITALS
HEART RATE: 98 BPM | BODY MASS INDEX: 18.11 KG/M2 | DIASTOLIC BLOOD PRESSURE: 88 MMHG | RESPIRATION RATE: 22 BRPM | TEMPERATURE: 98.6 F | SYSTOLIC BLOOD PRESSURE: 128 MMHG | WEIGHT: 99 LBS | OXYGEN SATURATION: 100 %

## 2023-11-21 NOTE — HOSPICE
Patient was laying across the bed resting. Patient is doing ok but it does seemed that she is sleeping more. I helped Rev. Loaiza with setting up his facebook acct for doing live videos. Armaan Bacon will continue to follow.

## 2023-11-21 NOTE — HOSPICE
Patient/Caregiver/Family findings/issues during SN visit:  Patient with increased depression    Medication refills ordered this visit:  Zoloft increased to 100mg daily    Medications reconciled and all medications are available in the home this visit. Education was provided regarding medications, medication interactions, and look alike medications. Response to teaching. Patient and  verbalized understanding. Medications are effective at this time. Supplies by type and quantity ordered this visit include:   No supplies needed    Consulted medical director/attending physician regarding: Spoke with Eneida Anne NP about patient's increased depression. Medication increased    Instructed patient/family/caregiver on 24-hour hospice availability and phone number.     Plan for next visit:  Contnued EOL education and support

## 2023-11-23 ENCOUNTER — HOME CARE VISIT (OUTPATIENT)
Age: 73
End: 2023-11-23
Payer: MEDICARE

## 2023-11-27 ENCOUNTER — HOME CARE VISIT (OUTPATIENT)
Age: 73
End: 2023-11-27
Payer: MEDICARE

## 2023-11-27 VITALS
DIASTOLIC BLOOD PRESSURE: 76 MMHG | HEART RATE: 95 BPM | OXYGEN SATURATION: 99 % | RESPIRATION RATE: 18 BRPM | SYSTOLIC BLOOD PRESSURE: 164 MMHG | TEMPERATURE: 98.5 F

## 2023-11-27 PROCEDURE — G0155 HHCP-SVS OF CSW,EA 15 MIN: HCPCS

## 2023-11-27 PROCEDURE — G0299 HHS/HOSPICE OF RN EA 15 MIN: HCPCS

## 2023-11-27 NOTE — HOSPICE
Patient/Caregiver/Family findings/issues during SN visit:  None identified    Medication refills ordered this visit:   None needed    Medications reconciled and all medications are available in the home this visit. Education was provided regarding medications, medication interactions, and look alike medications. Response to teaching. Patient and  verbalized understanding. Medications are effective at this time. Supplies by type and quantity ordered this visit include:  None needed    Consulted medical director/attending physician regarding:  Not at this time    Instructed patient/family/caregiver on 24-hour hospice availability and phone number.     Plan for next visit:  Continued EOL education and support

## 2023-11-28 NOTE — HOSPICE
processed her feelings. SW offered education on retraining her mind to focus on the positive aspects of her life and self-forgiveness. Pt also told to call SW when she is going through these emotions and SW will help her to process what she is experiencing and the reasons why. CG is supportive of attending meetings with pt. No other needs at this time. Pt did state family received items from the [facility] and was thankful for the volunteer that delivered the items to the home.

## 2023-11-30 ENCOUNTER — HOME CARE VISIT (OUTPATIENT)
Age: 73
End: 2023-11-30
Payer: MEDICARE

## 2023-11-30 VITALS
SYSTOLIC BLOOD PRESSURE: 118 MMHG | TEMPERATURE: 98.5 F | HEART RATE: 85 BPM | OXYGEN SATURATION: 100 % | RESPIRATION RATE: 18 BRPM | DIASTOLIC BLOOD PRESSURE: 68 MMHG

## 2023-11-30 PROCEDURE — G0300 HHS/HOSPICE OF LPN EA 15 MIN: HCPCS

## 2023-12-01 NOTE — HOSPICE
Patient/Caregiver/Family/Facility findings/issues during SN visit:  No new concerns per patient and spouse    Medication refills ordered this visit: Nida Severin, lorazepam----Confirmation # [id number]      Medications reconciled and all medications are available in the home this visit. The following education was provided regarding medications, medication interactions, and look alike medications: Medication side effects, dosages, purposes, frequencies. Response to teaching: Verbalized understanding. Medications are effective at this time. Supplies by type and quantity ordered this visit include: Oxygen Tubing- 48 feet----Delivery Order number [id number]     Consulted medical director/attending physician regarding: Not needed    Instructed patient/family/caregiver on 24-hour hospice availability and phone number. Plan for next visit:  Continue end of life education and support caregiver.

## 2023-12-04 ENCOUNTER — HOME CARE VISIT (OUTPATIENT)
Age: 73
End: 2023-12-04
Payer: MEDICARE

## 2023-12-04 VITALS
DIASTOLIC BLOOD PRESSURE: 50 MMHG | TEMPERATURE: 98.5 F | HEART RATE: 90 BPM | SYSTOLIC BLOOD PRESSURE: 104 MMHG | OXYGEN SATURATION: 90 % | RESPIRATION RATE: 22 BRPM

## 2023-12-04 PROCEDURE — G0299 HHS/HOSPICE OF RN EA 15 MIN: HCPCS

## 2023-12-04 ASSESSMENT — ENCOUNTER SYMPTOMS: DYSPNEA ACTIVITY LEVEL: AFTER AMBULATING 10 - 20 FT

## 2023-12-04 NOTE — HOSPICE
supply confirmation [id number] 2 M pull ups  med request confirmation [id number] remeron 30mg    Patient/Family findings/issues during SN visit:  Received pt at the front door of her apartment. She answered the door without wearing her O2. After ambulating about 15ft RR 22, SpO2 87%, Pt stated \"mild SOB\". After 10 minutes of rest with O2 @ 2L/MIN via NC RR 18, SpO2 91%. pt denies SOB. Lungs clear. ABD soft/non tender. BM yesterday. No falls reported. States 0/10 pain. 4 Morphine syringes filled to 0.5mL. Pt stated she is not sleeping through the night. remeron increased from 15mg to 30mg QHS per [facility] NP. Orders written. 2 M briefs ordered. Reinforced 24 HR number to call with any questions or concerns    Medication refills ordered this visit: Remeron 30mg    Medications reconciled and all medications are available in the home this visit. The following education was provided regarding medications, medication interactions, and look alike medications: proper med storage. Response to teaching: demonsteates understanding. Medications are not effective at this time. Supplies by type and quantity ordered this visit include: 2 M briefs    Consulted medical director/attending physician regarding: [facility] NP for med increase    Instructed patient/family/caregiver on 24-hour hospice availability and phone number.     Plan for next visit:  monitor repiratory status, monitor sleep patterns, fall prevention

## 2023-12-05 ENCOUNTER — HOME CARE VISIT (OUTPATIENT)
Age: 73
End: 2023-12-05
Payer: MEDICARE

## 2023-12-07 ENCOUNTER — HOME CARE VISIT (OUTPATIENT)
Age: 73
End: 2023-12-07
Payer: MEDICARE

## 2023-12-07 VITALS
TEMPERATURE: 92.8 F | SYSTOLIC BLOOD PRESSURE: 146 MMHG | HEART RATE: 79 BPM | DIASTOLIC BLOOD PRESSURE: 81 MMHG | RESPIRATION RATE: 19 BRPM | OXYGEN SATURATION: 100 %

## 2023-12-07 PROCEDURE — G0299 HHS/HOSPICE OF RN EA 15 MIN: HCPCS

## 2023-12-07 ASSESSMENT — ENCOUNTER SYMPTOMS
PAIN LOCATION - PAIN QUALITY: ACHING
DYSPNEA ACTIVITY LEVEL: AT REST

## 2023-12-07 NOTE — HOSPICE
Patient/Caregiver/Family/Facility findings/issues during SN visit:  n/a    Medication refills ordered this visit: albuterol, docusate, loratadine, sertraline. confirmation number: [id number]    Medications reconciled and all medications are available in the home this visit. The following education was provided regarding medications, medication interactions, and look alike medications. Response to teaching: pt and caregiver verbalized understanding. Medications are effective at this time. Supplies by type and quantity ordered this visit include: none needed    Consulted medical director/attending physician regarding: n/a    Instructed patient/family/caregiver on 24-hour hospice availability and phone number.

## 2023-12-11 ENCOUNTER — HOME CARE VISIT (OUTPATIENT)
Age: 73
End: 2023-12-11
Payer: MEDICARE

## 2023-12-11 PROCEDURE — G0300 HHS/HOSPICE OF LPN EA 15 MIN: HCPCS

## 2023-12-12 VITALS
RESPIRATION RATE: 18 BRPM | SYSTOLIC BLOOD PRESSURE: 147 MMHG | DIASTOLIC BLOOD PRESSURE: 81 MMHG | TEMPERATURE: 98.9 F | HEART RATE: 76 BPM | OXYGEN SATURATION: 100 %

## 2023-12-12 ASSESSMENT — ENCOUNTER SYMPTOMS: DYSPNEA ACTIVITY LEVEL: AFTER AMBULATING LESS THAN 10 FT

## 2023-12-12 NOTE — HOSPICE
Patient/Caregiver/Family/Facility findings/issues during SN visit:  No new concerns at this time    Medication refills ordered this visit:Not needed    Medications reconciled and all medications are available in the home this visit. The following education was provided regarding medications, medication interactions, and look alike medications: Medication side effects, dosages, purposes, frequencies. Response to teaching: Verbalized understanding. Medications are effective at this time. Supplies by type and quantity ordered this visit include: No suppliues needed    Consulted medical director/attending physician regarding: Not needed this visit    Instructed patient/family/caregiver on 24-hour hospice availability and phone number. Plan for next visit:  Continue end of life education and support caregiver.

## 2023-12-14 ENCOUNTER — HOME CARE VISIT (OUTPATIENT)
Age: 73
End: 2023-12-14
Payer: MEDICARE

## 2023-12-14 PROCEDURE — G0300 HHS/HOSPICE OF LPN EA 15 MIN: HCPCS

## 2023-12-15 VITALS
DIASTOLIC BLOOD PRESSURE: 68 MMHG | BODY MASS INDEX: 19.23 KG/M2 | RESPIRATION RATE: 18 BRPM | HEART RATE: 57 BPM | TEMPERATURE: 98 F | WEIGHT: 105.13 LBS | OXYGEN SATURATION: 100 % | SYSTOLIC BLOOD PRESSURE: 129 MMHG

## 2023-12-15 NOTE — HOSPICE
Patient/Caregiver/Family/Facility findings/issues during SN visit:  No new concerns    Medication refills ordered this visit: Mirtazapine, morphine----Confirm # [id number]    Medications reconciled and all medications are available in the home this visit. The following education was provided regarding medications, medication interactions, and look alike medications: Medication side effects, dosages, purposes, frequencies. Response to teaching: Verbalized understanding. Medications are effective at this time. Supplies by type and quantity ordered this visit include: No supplies    Consulted medical director/attending physician regarding: Not needed    Instructed patient/family/caregiver on 24-hour hospice availability and phone number. Plan for next visit:  Continue end of life education and support caregiver.

## 2023-12-26 ENCOUNTER — HOME CARE VISIT (OUTPATIENT)
Age: 73
End: 2023-12-26
Payer: MEDICARE

## 2023-12-26 PROCEDURE — G0155 HHCP-SVS OF CSW,EA 15 MIN: HCPCS

## 2023-12-27 ENCOUNTER — HOME CARE VISIT (OUTPATIENT)
Age: 73
End: 2023-12-27
Payer: MEDICARE

## 2023-12-27 VITALS
DIASTOLIC BLOOD PRESSURE: 76 MMHG | OXYGEN SATURATION: 96 % | SYSTOLIC BLOOD PRESSURE: 134 MMHG | TEMPERATURE: 98.4 F | RESPIRATION RATE: 18 BRPM | HEART RATE: 94 BPM

## 2023-12-27 PROCEDURE — G0300 HHS/HOSPICE OF LPN EA 15 MIN: HCPCS

## 2023-12-27 NOTE — HOSPICE
Patient/Caregiver/Family/Facility findings/issues during SN visit:  No new concerns at this time. Medication refills ordered this visit: Zoloft, Remeron -----#[id number]    Medications reconciled and all medications are available in the home this visit. The following education was provided regarding medications, medication interactions, and look alike medications: Medication side effects, dosages, purposes, frequencies. Response to teaching: Verbalized understanding. Medications are effective at this time. Supplies by type and quantity ordered this visit include: Not needed    Consulted medical director/attending physician regarding: Olya Novak to sign off on Lorazepam refill plaed last week. Instructed patient/family/caregiver on 24-hour hospice availability and phone number. Plan for next visit:  Continue end of life education and support caregiver.

## 2023-12-29 ENCOUNTER — HOME CARE VISIT (OUTPATIENT)
Age: 73
End: 2023-12-29
Payer: MEDICARE

## 2024-01-02 ENCOUNTER — HOME CARE VISIT (OUTPATIENT)
Age: 74
End: 2024-01-02
Payer: MEDICARE

## 2024-01-02 PROCEDURE — G0155 HHCP-SVS OF CSW,EA 15 MIN: HCPCS

## 2024-01-02 NOTE — HOSPICE
beating rapidly.  She called CG for assistance to get to the couch and took a nitroglycerin tablet and moved into her bedroom to lay down.  She said her heart was still beating rapidly and she took a second nitroglycerin tab and laid in the bed the rest of the night. She also stated she has a history of two heart attacks, with the most recent being about 8-10 years ago. Pt stated that the second tablet lowered her heart rate and she has not had an incident since.  SW re-educated on calling the on call number for both prescription refills and any time she has a health concern.  Pt/CG agreed to call on call or office if she has any needs.  No further needs at this time.

## 2024-01-03 ENCOUNTER — HOME CARE VISIT (OUTPATIENT)
Age: 74
End: 2024-01-03
Payer: MEDICARE

## 2024-01-03 NOTE — ED PROVIDER NOTES
"   LOS: 5 days     Chief Complaint/ Reason for encounter: PIPER/CKD, abnormal renal ultrasound    Subjective   01/02/24 : He is doing about the same today, denies complaints  Shortness of breath improved after 1.4 L thoracentesis  Now with lung nodules concerning for possible lung cancer and possible malignant pleural effusion  No edema.  He was started on IV fluids in preparation for heart cath but that is now on hold and his diet has been restarted    1/3 patient had a difficult night with sob. Still feeling sob. Not talking much.     Medical history reviewed:  History of Present Illness    Subjective    History taken from: Patient and chart    Vital Signs  Temp:  [97.9 °F (36.6 °C)-98.7 °F (37.1 °C)] 98.3 °F (36.8 °C)  Heart Rate:  [56-67] 67  Resp:  [16] 16  BP: (102-127)/(48-88) 117/88       Wt Readings from Last 1 Encounters:   12/29/23 0314 98.8 kg (217 lb 14.4 oz)       Objective:  Vital signs: (most recent): Blood pressure 117/88, pulse 67, temperature 98.3 °F (36.8 °C), temperature source Oral, resp. rate 16, height 175.3 cm (69\"), weight 98.8 kg (217 lb 14.4 oz), SpO2 94%.                Objective:  General Appearance:  he looks tired and uncomfortable.   HEENT: Mucous membranes moist, no injury, oropharynx clear  Lungs:+ crackles in bases.     Heart: Normal rate.  Regular rhythm.  S1, S2 normal.  No murmur.   Abdomen: Abdomen is soft.  Bowel sounds are normal, no abdominal tenderness.  There is no rebound or guarding  Extremities: Trace edema of bilateral lower extremities  Neurological: No focal motor or sensory deficits, pupils reactive  Skin:  Warm and dry.  No rash or cyanosis.       Results Review:    Intake/Output:     Intake/Output Summary (Last 24 hours) at 1/3/2024 0915  Last data filed at 1/3/2024 0824  Gross per 24 hour   Intake 720 ml   Output --   Net 720 ml         DATA:  Radiology and Labs:  The following labs independently reviewed by me. Additional labs ordered for tomorrow a.m.  Interval " EMERGENCY DEPARTMENT HISTORY AND PHYSICAL EXAM    I have evaluated the patient at 7:01 PM      Date: 9/12/2022  Patient Name: Jerry Velez    History of Presenting Illness     Chief Complaint   Patient presents with    Abdominal Pain         History Provided By: Patient  Location/Duration/Severity/Modifying factors   57-year-old female presenting to the emergency department for evaluation of abdominal pain with associated nausea vomiting. Patient has a history of diverticulosis. States that these symptoms have been ongoing now for the past day. States that she has not had a bowel movement in about 4 days. She does have issues with constipation as well. She has been taking her Dulcolax daily but has been intermittently compliant with her MiraLAX. She reports some nausea and had an episode of vomiting earlier today. Nonbloody nonbilious. No chest pain or shortness of breath, fevers or chills or urinary symptoms      PCP: Reba Grier MD    Current Facility-Administered Medications   Medication Dose Route Frequency Provider Last Rate Last Admin    iopamidoL (ISOVUE 300) 61 % contrast injection  mL   mL IntraVENous RAD ONCE Alexander Rick DO        sodium chloride 0.9 % bolus infusion 1,000 mL  1,000 mL IntraVENous ONCE Alexander Rick DO        sodium polystyrene (KAYEXALATE) 15 gram/60 mL oral suspension 15 g  15 g Oral NOW Tracy Milian DO         Current Outpatient Medications   Medication Sig Dispense Refill    magnesium citrate solution Take 1/3 of bottle every 8 hours until finished or until you have a bowel movement. 295 mL 0    methocarbamoL (Robaxin-750) 750 mg tablet Take 1 Tablet by mouth three (3) times daily for 10 days. 30 Tablet 0    lidocaine (Lidoderm) 5 % Apply patch to the affected area for 12 hours a day and remove for 12 hours a day. 30 Each 0    acetaminophen (TylenoL) 325 mg tablet Take 3 Tablets by mouth every six (6) hours as needed for Pain.  100 Tablet 0 acetaminophen (TYLENOL) 325 mg tablet Take 2 Tablets by mouth every four (4) hours as needed for Pain. 20 Tablet 0    ondansetron (Zofran ODT) 4 mg disintegrating tablet 1 Tablet by SubLINGual route every eight (8) hours as needed for Nausea or Vomiting. 20 Tablet 0    megestrol (MEGACE) 400 mg/10 mL (40 mg/mL) suspension TAKE 5 ML BY MOUTH THREE TIMES DAILY FOR APPETITE  1    Insulin Needles, Disposable, 31 gauge x 5/16\" ndle USE AS DIRECTED WITH INSULIN PEN DAILY  3    Oxygen 3LPM O2 as needed   Indications: DME: [employer]      pantoprazole (PROTONIX) 20 mg tablet Take 20 mg by mouth daily. benzonatate (TESSALON) 100 mg capsule Take 100 mg by mouth three (3) times daily as needed for Cough. mirtazapine (REMERON) 15 mg tablet Take 15 mg by mouth nightly. metFORMIN (GLUCOPHAGE) 500 mg tablet Take 1 Tab by mouth two (2) times daily (with meals). 60 Tab 0    insulin glargine (LANTUS U-100 INSULIN) 100 unit/mL injection 35 Units by SubCUTAneous route daily. (Patient taking differently: 40 Units by SubCUTAneous route daily. ) 1 Vial 0    albuterol-ipratropium (DUO-NEB) 2.5 mg-0.5 mg/3 ml nebu 3 mL by Nebulization route every four (4) hours as needed for Other (shortness of breath). 30 Nebule 0    diclofenac (VOLTAREN) 1 % gel Apply 2 g to affected area four (4) times daily. Gel should be measured and applied using the supplied dosing card. Apply dose (2 gm or 4 gm) to each location. If treatment site is the hands, patients should wait at least one (1) hour to wash their hands. APPLY TO bilateral knees 100 g 0    melatonin 3 mg tablet Take 1 Tab by mouth nightly. (Patient taking differently: Take 5 mg by mouth nightly.) 30 Tab 0    Omeprazole Magnesium (PRILOSEC) 10 mg suDR Take 20 mg by mouth two (2) times a day. 60 Each 0    raNITIdine (ZANTAC) 150 mg tablet Take 1 Tab by mouth nightly. 15 Tab 0    lisinopril-hydroCHLOROthiazide (PRINZIDE, ZESTORETIC) 20-12.5 mg per tablet Take 1 Tab by mouth daily. notes, chart personally reviewed by me.   Old records independently reviewed showing unknown CKD.  Awaiting records  The following radiologic studies independently viewed by me, findings renal ultrasound was abnormal.  Kidneys normal size but with increased echogenicity and decreased cortical thickness compatible with CKD.  No hydronephrosis.  There was a lesion in the left kidney  New problems include echogenicity involving the bladder, probable renal cyst  Discussed with patient himself at bedside    Risk/ complexity of medical care/ medical decision making moderate complexity, abnormal renal imaging, possible new lung cancer, status post thoracentesis, eventual need for heart catheterization with IV contrast      Labs:   Recent Results (from the past 24 hour(s))   CBC (No Diff)    Collection Time: 01/03/24  3:27 AM    Specimen: Blood   Result Value Ref Range    WBC 6.49 3.40 - 10.80 10*3/mm3    RBC 3.20 (L) 4.14 - 5.80 10*6/mm3    Hemoglobin 10.2 (L) 13.0 - 17.7 g/dL    Hematocrit 31.4 (L) 37.5 - 51.0 %    MCV 98.1 (H) 79.0 - 97.0 fL    MCH 31.9 26.6 - 33.0 pg    MCHC 32.5 31.5 - 35.7 g/dL    RDW 14.5 12.3 - 15.4 %    RDW-SD 51.6 37.0 - 54.0 fl    MPV 11.8 6.0 - 12.0 fL    Platelets 114 (L) 140 - 450 10*3/mm3   Protime-INR    Collection Time: 01/03/24  3:27 AM    Specimen: Blood   Result Value Ref Range    Protime 18.8 (H) 11.7 - 14.2 Seconds    INR 1.55 (H) 0.90 - 1.10   Renal Function Panel    Collection Time: 01/03/24  3:27 AM    Specimen: Blood   Result Value Ref Range    Glucose 107 (H) 65 - 99 mg/dL    BUN 54 (H) 8 - 23 mg/dL    Creatinine 1.62 (H) 0.76 - 1.27 mg/dL    Sodium 139 136 - 145 mmol/L    Potassium 5.0 3.5 - 5.2 mmol/L    Chloride 109 (H) 98 - 107 mmol/L    CO2 21.8 (L) 22.0 - 29.0 mmol/L    Calcium 8.2 (L) 8.6 - 10.5 mg/dL    Albumin 2.4 (L) 3.5 - 5.2 g/dL    Phosphorus 2.9 2.5 - 4.5 mg/dL    Anion Gap 8.2 5.0 - 15.0 mmol/L    BUN/Creatinine Ratio 33.3 (H) 7.0 - 25.0    eGFR 40.3 (L) >60.0  Indications: hypertension      nitroglycerin (NITROSTAT) 0.4 mg SL tablet 1 Tab by SubLINGual route as needed for Chest Pain. 25 Tab 1    fluticasone-salmeterol (ADVAIR) 250-50 mcg/dose diskus inhaler Take 1 puff by inhalation every twelve (12) hours. (Patient taking differently: Take 1 Puff by inhalation daily. ) 1 Inhaler 0    albuterol (PROVENTIL HFA, VENTOLIN HFA, PROAIR HFA) 90 mcg/actuation inhaler Take 1 puff by inhalation daily as needed for Wheezing. 1 Inhaler 0    clopidogrel (PLAVIX) 75 mg tablet Take 1 tablet by mouth daily. 30 tablet 11    atorvastatin (LIPITOR) 40 mg tablet Take 1 tablet by mouth nightly. 30 tablet 11    aspirin 81 mg chewable tablet Take 1 tablet by mouth daily. 30 tablet 0    polyethylene glycol (MIRALAX) 17 gram packet Take 1 packet by mouth daily. 14 each 0    fluoxetine (PROZAC) 20 mg capsule Take 20 mg by mouth daily. tiotropium (SPIRIVA WITH HANDIHALER) 18 mcg inhalation capsule Take 1 Cap by inhalation daily. Past History     Past Medical History:  Past Medical History:   Diagnosis Date    Arthritis     \"generalized\"    Asthma     CAD in native artery     NSTEMI (Sep 2014); diffuse 65% pLAD, minimal disease RCA & LCx. pLAD FFR 0.93.  LV  no RWMA (echo and LV angio)    Chronic neck pain     Chronic pain     left knee    Chronic pain syndrome     COPD (chronic obstructive pulmonary disease) ([facility])     Depression     Diabetes ([address]     Diverticulitis     GERD (gastroesophageal reflux disease)     Heart disease     Hidradenitis 9/11/2014    Hypertension     Left knee pain     Narcotic dependence ([address]     Pneumonia     Right wrist pain     Sarcoidosis        Past Surgical History:  Past Surgical History:   Procedure Laterality Date    BRONCHOSCOPY  10/2019    HX BREAST BIOPSY Right     9/10/14: She said tag in place from [address]  2005    HX HYSTERECTOMY      HX KNEE ARTHROSCOPY Left        Family History:  Family History   Problem Relation Age of mL/min/1.73       Radiology:  Pertinent radiology studies were reviewed as described above      Medications have been reviewed separately in chart overview      ASSESSMENT:  CKD, unknown stage but if his creatinine on admission and today's creatinine are near baseline then this is likely stage IIIb    Acute respiratory failure with hypoxia, improved after thoracentesis    Chest pain  Bladder lesion    Pleural effusion, possibly malignant    CKD (chronic kidney disease)    HTN (hypertension)    Shortness of breath    Lung mass, highly concerning for primary malignancy    Elevated troponin    Atrial fibrillation    Anemia    Thrombocytopenia           DISCUSSION/PLAN:   Chart reviewed.  Cardiac catheterization is now on hold pending workup of the lung lesions  Dyspnea improved after thoracentesis,but sob again.   Will hold IV fluids for now.  Will restart closer to cardiac catheterization, if and when this is rescheduled  Will restart diuretics today.   Urine studies unremarkable  Renal ultrasound suggestive of chronic kidney disease.  Lesion seen on renal ultrasound could certainly represent a primary bladder cancer.  Will consult urology to see for possible cystoscopy  The left renal lesion is likely a cyst and I would rather not do a CT with another dose of IV contrast if a heart cath is upcoming  Await old records to establish baseline labs    Continue to monitor electrolytes and volume closely       Janice Shay MD  [facility]   Office phone number: [phone]  Answering service phone number: [phone]    01/03/24  09:15 EST         Onset    Hypertension Father     Diabetes Mother        Social History:  Social History     Tobacco Use    Smoking status: Former     Years: 46.00     Types: Cigarettes     Quit date: 2018     Years since quittin.5    Smokeless tobacco: Never    Tobacco comments: Only smokes 1 cigarette per day   Substance Use Topics    Alcohol use: Yes     Alcohol/week: 1.7 standard drinks     Types: 2 Glasses of wine per week     Comment: socially    Drug use: Yes     Types: Cocaine, Marijuana     Comment: 6 months ago       Allergies: Allergies   Allergen Reactions    Latex Hives and Itching    Ciprofloxacin Hives    Penicillins Nausea Only    Shellfish Derived Hives and Swelling     Swelling of legs         Review of Systems       Review of Systems   Constitutional:  Negative for activity change, chills, diaphoresis, fatigue and fever. Respiratory:  Negative for cough, chest tightness, shortness of breath, wheezing and stridor. Cardiovascular:  Negative for chest pain and palpitations. Gastrointestinal:  Positive for abdominal pain, constipation, nausea and vomiting. Negative for abdominal distention and diarrhea. Genitourinary:  Negative for difficulty urinating, dysuria and hematuria. Musculoskeletal:  Negative for back pain, joint swelling and myalgias. Skin:  Negative for rash and wound. Neurological:  Negative for dizziness, weakness, light-headedness and headaches. Psychiatric/Behavioral:  Negative for agitation. The patient is not nervous/anxious. Physical Exam   Visit Vitals  /77 (BP 1 Location: Right upper arm)   Pulse (!) 124   Temp 97.9 °F (36.6 °C)   Resp 22   Ht 5' 2\" (1.575 m)   Wt 39.9 kg (88 lb)   SpO2 98%   BMI 16.10 kg/m²         Physical Exam  Constitutional:       General: She is not in acute distress. Appearance: She is not toxic-appearing. HENT:      Head: Normocephalic and atraumatic.       Mouth/Throat:      Mouth: Mucous membranes are moist.   Eyes: Extraocular Movements: Extraocular movements intact. Pupils: Pupils are equal, round, and reactive to light. Cardiovascular:      Rate and Rhythm: Normal rate and regular rhythm. Heart sounds: Normal heart sounds. No murmur heard. No friction rub. No gallop. Pulmonary:      Effort: Pulmonary effort is normal.      Breath sounds: Normal breath sounds. Abdominal:      General: There is no distension. Palpations: Abdomen is soft. There is no mass. Tenderness: There is generalized abdominal tenderness. There is no guarding or rebound. Hernia: No hernia is present. Musculoskeletal:         General: No swelling, tenderness or deformity. Cervical back: Normal range of motion and neck supple. Skin:     General: Skin is warm and dry. Findings: No rash. Neurological:      General: No focal deficit present. Mental Status: She is alert and oriented to person, place, and time. Psychiatric:         Mood and Affect: Mood normal.         Diagnostic Study Results     Labs -  Recent Results (from the past 12 hour(s))   CBC WITH AUTOMATED DIFF    Collection Time: 09/12/22  6:55 PM   Result Value Ref Range    WBC 5.6 4.6 - 13.2 K/uL    RBC 4.33 4.20 - 5.30 M/uL    HGB 10.0 (L) 12.0 - 16.0 g/dL    HCT 32.3 (L) 35.0 - 45.0 %    MCV 74.6 (L) 78.0 - 100.0 FL    MCH 23.1 (L) 24.0 - 34.0 PG    MCHC 31.0 31.0 - 37.0 g/dL    RDW 17.3 (H) 11.6 - 14.5 %    PLATELET 083 642 - 177 K/uL    MPV 10.5 9.2 - 11.8 FL    NRBC 0.0 0  WBC    ABSOLUTE NRBC 0.00 0.00 - 0.01 K/uL    NEUTROPHILS 58 40 - 73 %    LYMPHOCYTES 33 21 - 52 %    MONOCYTES 7 3 - 10 %    EOSINOPHILS 1 0 - 5 %    BASOPHILS 1 0 - 2 %    IMMATURE GRANULOCYTES 0 0.0 - 0.5 %    ABS. NEUTROPHILS 3.3 1.8 - 8.0 K/UL    ABS. LYMPHOCYTES 1.9 0.9 - 3.6 K/UL    ABS. MONOCYTES 0.4 0.05 - 1.2 K/UL    ABS. EOSINOPHILS 0.0 0.0 - 0.4 K/UL    ABS. BASOPHILS 0.1 0.0 - 0.1 K/UL    ABS. IMM.  GRANS. 0.0 0.00 - 0.04 K/UL    DF AUTOMATED METABOLIC PANEL, COMPREHENSIVE    Collection Time: 09/12/22  6:55 PM   Result Value Ref Range    Sodium 137 136 - 145 mmol/L    Potassium 5.6 (H) 3.5 - 5.5 mmol/L    Chloride 113 (H) 100 - 111 mmol/L    CO2 16 (L) 21 - 32 mmol/L    Anion gap 8 3.0 - 18 mmol/L    Glucose 146 (H) 74 - 99 mg/dL    BUN 79 (H) 7.0 - 18 MG/DL    Creatinine 1.55 (H) 0.6 - 1.3 MG/DL    BUN/Creatinine ratio 51 (H) 12 - 20      GFR est AA 40 (L) >60 ml/min/1.73m2    GFR est non-AA 33 (L) >60 ml/min/1.73m2    Calcium 10.4 (H) 8.5 - 10.1 MG/DL    Bilirubin, total 1.3 (H) 0.2 - 1.0 MG/DL    ALT (SGPT) 18 13 - 56 U/L    AST (SGOT) 13 10 - 38 U/L    Alk. phosphatase 55 45 - 117 U/L    Protein, total 8.8 (H) 6.4 - 8.2 g/dL    Albumin 4.5 3.4 - 5.0 g/dL    Globulin 4.3 (H) 2.0 - 4.0 g/dL    A-G Ratio 1.0 0.8 - 1.7     LIPASE    Collection Time: 09/12/22  6:55 PM   Result Value Ref Range    Lipase 311 73 - 393 U/L       Radiologic Studies -   CT ABD PELV WO CONT   Final Result      1. Diverticulosis without CT evidence of diverticulitis. 2.   Fecal stasis. 3. Cholecystectomy. 4. Emphysematous change. Medical Decision Making   I am the first provider for this patient. I reviewed the vital signs, available nursing notes, past medical history, past surgical history, family history and social history. Vital Signs-Reviewed the patient's vital signs. EKG:     Records Reviewed: Nursing Notes, Old Medical Records, Previous electrocardiograms, Previous Radiology Studies, and Previous Laboratory Studies (Time of Review: 7:01 PM)    ED Course: Progress Notes, Reevaluation, and Consults:         Provider Notes (Medical Decision Making):   MDM  Number of Diagnoses or Management Options  Diagnosis management comments: 53-year-old female presenting for evaluation of generalized abdominal pain associate nausea vomiting earlier today. Patient is constipated for 4 days.   She appears in discomfort but no acute distress. Vital signs stable other than some tachycardia 120 likely due to her pain. CT imaging obtained shows no evidence of diverticulitis. Does demonstrate some fecal stasis is no other constipation. Blood work shows borderline hyperkalemia and mild AMBROCIO. Patient given Kayexalate and IV fluids x2 L here. Screening lab work is otherwise unremarkable. No evidence of any infection. Patient has been reassured and given instructions on findings at home. Will be discharged with magnesium citrate and instructions on continued MiraLAX use and Dulcolax. Instruction on fiber and follow-up with her primary care doctor. Return precautions advised the patient discharged in stable condition. Procedures    Critical Care Time:       Diagnosis     Clinical Impression: No diagnosis found. Disposition: discharged    Follow-up Information       Follow up With Specialties Details Why [address] [facility] EMERGENCY DEPT Emergency Medicine  As needed, If symptoms worsen [address] [place] [id number]  [phone]             Patient's Medications   Start Taking    MAGNESIUM CITRATE SOLUTION    Take 1/3 of bottle every 8 hours until finished or until you have a bowel movement. Continue Taking    ACETAMINOPHEN (TYLENOL) 325 MG TABLET    Take 2 Tablets by mouth every four (4) hours as needed for Pain. ACETAMINOPHEN (TYLENOL) 325 MG TABLET    Take 3 Tablets by mouth every six (6) hours as needed for Pain. ALBUTEROL (PROVENTIL HFA, VENTOLIN HFA, PROAIR HFA) 90 MCG/ACTUATION INHALER    Take 1 puff by inhalation daily as needed for Wheezing. ALBUTEROL-IPRATROPIUM (DUO-NEB) 2.5 MG-0.5 MG/3 ML NEBU    3 mL by Nebulization route every four (4) hours as needed for Other (shortness of breath). ASPIRIN 81 MG CHEWABLE TABLET    Take 1 tablet by mouth daily. ATORVASTATIN (LIPITOR) 40 MG TABLET    Take 1 tablet by mouth nightly.     BENZONATATE (TESSALON) 100 MG CAPSULE    Take 100 mg by mouth three (3) times daily as needed for Cough. CLOPIDOGREL (PLAVIX) 75 MG TABLET    Take 1 tablet by mouth daily. DICLOFENAC (VOLTAREN) 1 % GEL    Apply 2 g to affected area four (4) times daily. Gel should be measured and applied using the supplied dosing card. Apply dose (2 gm or 4 gm) to each location. If treatment site is the hands, patients should wait at least one (1) hour to wash their hands. APPLY TO bilateral knees    FLUOXETINE (PROZAC) 20 MG CAPSULE    Take 20 mg by mouth daily. FLUTICASONE-SALMETEROL (ADVAIR) 250-50 MCG/DOSE DISKUS INHALER    Take 1 puff by inhalation every twelve (12) hours. INSULIN GLARGINE (LANTUS U-100 INSULIN) 100 UNIT/ML INJECTION    35 Units by SubCUTAneous route daily. INSULIN NEEDLES, DISPOSABLE, 31 GAUGE X 5/16\" NDLE    USE AS DIRECTED WITH INSULIN PEN DAILY    LIDOCAINE (LIDODERM) 5 %    Apply patch to the affected area for 12 hours a day and remove for 12 hours a day. LISINOPRIL-HYDROCHLOROTHIAZIDE (PRINZIDE, ZESTORETIC) 20-12.5 MG PER TABLET    Take 1 Tab by mouth daily. Indications: hypertension    MEGESTROL (MEGACE) 400 MG/10 ML (40 MG/ML) SUSPENSION    TAKE 5 ML BY MOUTH THREE TIMES DAILY FOR APPETITE    MELATONIN 3 MG TABLET    Take 1 Tab by mouth nightly. METFORMIN (GLUCOPHAGE) 500 MG TABLET    Take 1 Tab by mouth two (2) times daily (with meals). METHOCARBAMOL (ROBAXIN-750) 750 MG TABLET    Take 1 Tablet by mouth three (3) times daily for 10 days. MIRTAZAPINE (REMERON) 15 MG TABLET    Take 15 mg by mouth nightly. NITROGLYCERIN (NITROSTAT) 0.4 MG SL TABLET    1 Tab by SubLINGual route as needed for Chest Pain. OMEPRAZOLE MAGNESIUM (PRILOSEC) 10 MG SUDR    Take 20 mg by mouth two (2) times a day. ONDANSETRON (ZOFRAN ODT) 4 MG DISINTEGRATING TABLET    1 Tablet by SubLINGual route every eight (8) hours as needed for Nausea or Vomiting.     OXYGEN    3LPM O2 as needed   Indications: DME: [employer]    PANTOPRAZOLE (PROTONIX) 20 MG TABLET Take 20 mg by mouth daily. POLYETHYLENE GLYCOL (MIRALAX) 17 GRAM PACKET    Take 1 packet by mouth daily. RANITIDINE (ZANTAC) 150 MG TABLET    Take 1 Tab by mouth nightly. TIOTROPIUM (SPIRIVA WITH HANDIHALER) 18 MCG INHALATION CAPSULE    Take 1 Cap by inhalation daily. These Medications have changed    No medications on file   Stop Taking    No medications on file     Disclaimer: Sections of this note are dictated using utilizing voice recognition software. Minor typographical errors may be present. If questions arise, please do not hesitate to contact me or call our department.

## 2024-01-04 ENCOUNTER — HOME CARE VISIT (OUTPATIENT)
Age: 74
End: 2024-01-04
Payer: MEDICARE

## 2024-01-05 ENCOUNTER — HOME CARE VISIT (OUTPATIENT)
Age: 74
End: 2024-01-05
Payer: MEDICARE

## 2024-01-05 PROCEDURE — G0300 HHS/HOSPICE OF LPN EA 15 MIN: HCPCS

## 2024-01-06 VITALS
TEMPERATURE: 98.9 F | HEART RATE: 96 BPM | RESPIRATION RATE: 18 BRPM | DIASTOLIC BLOOD PRESSURE: 77 MMHG | SYSTOLIC BLOOD PRESSURE: 134 MMHG | OXYGEN SATURATION: 99 %

## 2024-01-06 ASSESSMENT — ENCOUNTER SYMPTOMS: PAIN LOCATION - PAIN QUALITY: ACHE

## 2024-01-06 NOTE — HOSPICE
Patient/Caregiver/Family/Facility findings/issues during SN visit: Pt stated that she got her flu shot on Tuesday and hasnt felt great since.  Lethargic and sore muscles.  Pt stated she is starting to feel better today.    Medication refills ordered this visit: Not needed    Medications reconciled and all medications are available in the home this visit.  The following education was provided regarding medications, medication interactions, and look alike medications: Medication side effects, dosages, purposes, frequencies.  Response to teaching: Verbalized understanding. Medications are effective at this time.      Supplies by type and quantity ordered this visit include: Not needed this visit    Consulted medical director/attending physician regarding: Not needed    Instructed patient/family/caregiver on 24-hour hospice availability and phone number.    Plan for next visit:  Continue end of life education and support caregiver.ine

## 2024-01-08 ENCOUNTER — HOME CARE VISIT (OUTPATIENT)
Age: 74
End: 2024-01-08
Payer: MEDICARE

## 2024-01-08 PROCEDURE — G0300 HHS/HOSPICE OF LPN EA 15 MIN: HCPCS

## 2024-01-09 VITALS
SYSTOLIC BLOOD PRESSURE: 138 MMHG | DIASTOLIC BLOOD PRESSURE: 64 MMHG | HEART RATE: 90 BPM | OXYGEN SATURATION: 95 % | TEMPERATURE: 98.9 F | RESPIRATION RATE: 18 BRPM

## 2024-01-09 NOTE — HOSPICE
Patient/Caregiver/Family/Facility findings/issues during SN visit:  Not needed    Medication refills ordered this visit: Not needed    Medications reconciled and all medications are available in the home this visit.  The following education was provided regarding medications, medication interactions, and look alike medications: Medication side effects, dosages, purposes, frequencies.  Response to teaching: Verbalized understanding. Medications are effective at this time.      Supplies by type and quantity ordered this visit include: Not needed    Consulted medical director/attending physician regarding: Not needed this visit    Instructed patient/family/caregiver on 24-hour hospice availability and phone number.    Plan for next visit:  Continue end of life education and support caregiver.

## 2024-01-11 ENCOUNTER — HOME CARE VISIT (OUTPATIENT)
Age: 74
End: 2024-01-11
Payer: MEDICARE

## 2024-01-11 VITALS
SYSTOLIC BLOOD PRESSURE: 171 MMHG | RESPIRATION RATE: 17 BRPM | OXYGEN SATURATION: 95 % | HEART RATE: 86 BPM | TEMPERATURE: 98 F | DIASTOLIC BLOOD PRESSURE: 100 MMHG

## 2024-01-11 PROCEDURE — G0299 HHS/HOSPICE OF RN EA 15 MIN: HCPCS

## 2024-01-11 RX ADMIN — LORAZEPAM 0.5 MG: 0.5 TABLET ORAL at 10:30

## 2024-01-11 RX ADMIN — MORPHINE SULFATE 0.5 ML: 100 SOLUTION ORAL at 10:30

## 2024-01-11 RX ADMIN — SERTRALINE HYDROCHLORIDE 100 MG: 100 TABLET, FILM COATED ORAL at 10:30

## 2024-01-11 ASSESSMENT — ENCOUNTER SYMPTOMS: PAIN LOCATION - PAIN QUALITY: SORE

## 2024-01-11 NOTE — HOSPICE
Patient/Caregiver/Family/Facility findings/issues during SN visit:  pain management    Medication refills ordered this visit: mirtazapine,loratadine, lorazepam Confirmation #: [id number]    Medications reconciled and all medications are available in the home this visit.  The following education was provided regarding medications, medication interactions, and look alike medications.  Response to teaching Pt verbalized understanding. Medications are effective at this time.      Supplies by type and quantity ordered this visit include: none needed    Consulted medical director/attending physician regarding: n/a    Instructed patient/family/caregiver on 24-hour hospice availability and phone number.    Plan for next visit:  pain management

## 2024-01-15 ENCOUNTER — HOME CARE VISIT (OUTPATIENT)
Age: 74
End: 2024-01-15
Payer: MEDICARE

## 2024-01-15 VITALS
TEMPERATURE: 98.1 F | RESPIRATION RATE: 18 BRPM | HEART RATE: 92 BPM | SYSTOLIC BLOOD PRESSURE: 141 MMHG | DIASTOLIC BLOOD PRESSURE: 77 MMHG | OXYGEN SATURATION: 91 %

## 2024-01-15 PROCEDURE — G0300 HHS/HOSPICE OF LPN EA 15 MIN: HCPCS

## 2024-01-15 ASSESSMENT — ENCOUNTER SYMPTOMS: PAIN LOCATION - PAIN QUALITY: ACHES

## 2024-01-15 NOTE — HOSPICE
Patient/Caregiver/Family/Facility findings/issues during SN visit: No new concerns    Medication refills ordered this visit: Voltaren Gel----Confirmation #: [id number]    Medications reconciled and all medications are available in the home this visit.  The following education was provided regarding medications, medication interactions, and look alike medications: Medication side effects, dosages, purposes, frequencies.  Response to teaching: Verbalized understanding. Medications are effective at this time.      Supplies by type and quantity ordered this visit include: Small Gloves---Order #[id number]    Consulted medical director/attending physician regarding: Not needed this visit    Instructed patient/family/caregiver on 24-hour hospice availability and phone number.    Plan for next visit:  Continue end of life education and support caregiver.

## 2024-01-18 ENCOUNTER — HOME CARE VISIT (OUTPATIENT)
Age: 74
End: 2024-01-18
Payer: MEDICARE

## 2024-01-18 VITALS
OXYGEN SATURATION: 89 % | SYSTOLIC BLOOD PRESSURE: 150 MMHG | RESPIRATION RATE: 22 BRPM | DIASTOLIC BLOOD PRESSURE: 70 MMHG | TEMPERATURE: 99.7 F | HEART RATE: 91 BPM

## 2024-01-18 PROCEDURE — G0299 HHS/HOSPICE OF RN EA 15 MIN: HCPCS

## 2024-01-18 NOTE — HOSPICE
Patient was up washing dishes and they had ordered lunch. Both are doing well and  will continue to follow.

## 2024-01-18 NOTE — HOSPICE
Patient/Caregiver/Family findings/issues during SN visit:  None identified    Medication refills ordered this visit:  zoloft, morphine and lorazepam refilled confirmation# [id number]    Medications reconciled and all medications are available in the home this visit.  Education was provided regarding medications, medication interactions, and look alike medications.  Response to teaching.  Patient and  verbalized understanding.   Medications are effective at this time.      Supplies by type and quantity ordered this visit include: none needed    Consulted medical director/attending physician regarding: Not at this time    Instructed patient/family/caregiver on 24-hour hospice availability and phone number.    Plan for next visit:  Continued EOL education and support

## 2024-01-22 ENCOUNTER — HOME CARE VISIT (OUTPATIENT)
Age: 74
End: 2024-01-22
Payer: MEDICARE

## 2024-01-22 VITALS
DIASTOLIC BLOOD PRESSURE: 68 MMHG | HEART RATE: 89 BPM | RESPIRATION RATE: 16 BRPM | OXYGEN SATURATION: 91 % | SYSTOLIC BLOOD PRESSURE: 128 MMHG | TEMPERATURE: 98.9 F

## 2024-01-22 PROCEDURE — G0300 HHS/HOSPICE OF LPN EA 15 MIN: HCPCS

## 2024-01-22 ASSESSMENT — ENCOUNTER SYMPTOMS
CONSTIPATION: 1
PAIN LOCATION - PAIN QUALITY: ACHE

## 2024-01-22 NOTE — HOSPICE
Patient/Caregiver/Family/Facility findings/issues during SN visit:  Not needed    Medication refills ordered this visit:Dorothy----Confirmation # [id number]    Medications reconciled and all medications are available in the home this visit.  The following education was provided regarding medications, medication interactions, and look alike medications: Medication side effects, dosages, purposes, frequencies.  Response to teaching: Verbalized understanding. Medications are effective at this time.      Supplies by type and quantity ordered this visit include: Not needed    Consulted medical director/attending physician regarding: Not needed this visit    Instructed patient/family/caregiver on 24-hour hospice availability and phone number.    Plan for next visit:  Continue end of life education and support caregiver.

## 2024-01-26 ENCOUNTER — HOME CARE VISIT (OUTPATIENT)
Age: 74
End: 2024-01-26
Payer: MEDICARE

## 2024-01-26 PROCEDURE — G0300 HHS/HOSPICE OF LPN EA 15 MIN: HCPCS

## 2024-01-27 VITALS
OXYGEN SATURATION: 84 % | SYSTOLIC BLOOD PRESSURE: 130 MMHG | TEMPERATURE: 98.9 F | RESPIRATION RATE: 18 BRPM | HEART RATE: 98 BPM | DIASTOLIC BLOOD PRESSURE: 64 MMHG

## 2024-01-27 ASSESSMENT — ENCOUNTER SYMPTOMS: PAIN LOCATION - PAIN QUALITY: THROBBING

## 2024-01-27 NOTE — HOSPICE
Patient/Caregiver/Family/Facility findings/issues during SN visit:  No new concerns per famil.  Pt O2 is low as patient continues to walk around the apartment without oxygen.  Fall documented on Tuesday Morning.  NO injury.    Medication refills ordered this visit:Claritin, Voltaren Gel Zoloft-----CONFIRM # [id number]    Medications reconciled and all medications are available in the home this visit.  The following education was provided regarding medications, medication interactions, and look alike medications: Medication side effects, dosages, purposes, frequencies.  Response to teaching: Verbalized understanding. Medications are effective at this time.      Supplies by type and quantity ordered this visit include:Not needed    Consulted medical director/attending physician regarding: Not needed today    Instructed patient/family/caregiver on 24-hour hospice availability and phone number.    Plan for next visit:  Continue end of life education and support caregiver.

## 2024-01-29 ENCOUNTER — HOME CARE VISIT (OUTPATIENT)
Age: 74
End: 2024-01-29
Payer: MEDICARE

## 2024-01-29 VITALS
TEMPERATURE: 97.8 F | RESPIRATION RATE: 22 BRPM | HEART RATE: 108 BPM | DIASTOLIC BLOOD PRESSURE: 80 MMHG | SYSTOLIC BLOOD PRESSURE: 126 MMHG | OXYGEN SATURATION: 93 %

## 2024-01-29 PROCEDURE — G0299 HHS/HOSPICE OF RN EA 15 MIN: HCPCS

## 2024-01-29 NOTE — HOSPICE
Patient/Caregiver/Family findings/issues during SN visit:  None identified    Medication refills ordered this visit: lorazepam and sertraline refilled.  confirmation# [id number]    Medications reconciled and all medications are available in the home this visit.  Education was provided regarding medications, medication interactions, and look alike medications.  Response to teaching.  Patient verbalized understanding.   Medications are effective at this time.  Syringes prefilled with 0.5ml morphine    Supplies by type and quantity ordered this visit include:  None needed    Consulted medical director/attending physician regarding: Not at this time    Instructed patient/family/caregiver on 24-hour hospice availability and phone number.    Plan for next visit:  Continued EOL education and support

## 2024-02-01 ENCOUNTER — HOME CARE VISIT (OUTPATIENT)
Age: 74
End: 2024-02-01
Payer: MEDICARE

## 2024-02-01 VITALS
OXYGEN SATURATION: 95 % | SYSTOLIC BLOOD PRESSURE: 121 MMHG | HEART RATE: 86 BPM | DIASTOLIC BLOOD PRESSURE: 60 MMHG | RESPIRATION RATE: 18 BRPM | TEMPERATURE: 98.9 F

## 2024-02-01 PROCEDURE — G0300 HHS/HOSPICE OF LPN EA 15 MIN: HCPCS

## 2024-02-01 NOTE — HOSPICE
Patient/Caregiver/Family/Facility findings/issues during SN visit : No new concerns per patient.      Medication refills ordered this visit: Remeron needs to be ordered.  Not available until tomorrow.    Medications reconciled and all medications are available in the home this visit.  The following education was provided regarding medications, medication interactions, and look alike medications: Medication side effects, dosages, purposes, frequencies.  Response to teaching: Verbalized understanding. Medications are effective at this time.      Supplies by type and quantity ordered this visit include:Not needed    Consulted medical director/attending physician regarding: Not needed    Instructed patient/family/caregiver on 24-hour hospice availability and phone number.    Plan for next visit:  Continue end of life education and support caregiver.

## 2024-02-05 ENCOUNTER — HOME CARE VISIT (OUTPATIENT)
Age: 74
End: 2024-02-05
Payer: MEDICARE

## 2024-02-05 PROCEDURE — G0300 HHS/HOSPICE OF LPN EA 15 MIN: HCPCS

## 2024-02-06 VITALS
RESPIRATION RATE: 20 BRPM | OXYGEN SATURATION: 99 % | DIASTOLIC BLOOD PRESSURE: 77 MMHG | HEART RATE: 85 BPM | SYSTOLIC BLOOD PRESSURE: 121 MMHG | TEMPERATURE: 98.9 F

## 2024-02-06 ASSESSMENT — ENCOUNTER SYMPTOMS: DIARRHEA: 1

## 2024-02-06 NOTE — HOSPICE
Patient/Caregiver/Family/Facility findings/issues during SN visit:  Patient stated she is feeling down but doesnt know why.  States she is feeling sorry for herself.  Patients  purchased her a motorized scooter and patient states she will try and go outside for 30 minutes of so when the weather allows and she thinks it will make her feel better.  Pt had loose stools for two days inna row and has been advised to take immodium.  Verbalized understanding.    Medication refills ordered this visit: Not needed    Medications reconciled and all medications are available in the home this visit.  The following education was provided regarding medications, medication interactions, and look alike medications: Medication side effects, dosages, purposes, frequencies.  Response to teaching: Verbalized understanding. Medications are effective at this time.      Supplies by type and quantity ordered this visit include: No supplies needed    Consulted medical director/attending physician regarding: Not needed this vsit    Instructed patient/family/caregiver on 24-hour hospice availability and phone number.    Plan for next visit:  Continue end of life education and support caregiver.

## 2024-02-08 ENCOUNTER — HOME CARE VISIT (OUTPATIENT)
Age: 74
End: 2024-02-08
Payer: MEDICARE

## 2024-02-08 VITALS
DIASTOLIC BLOOD PRESSURE: 61 MMHG | RESPIRATION RATE: 16 BRPM | OXYGEN SATURATION: 94 % | HEART RATE: 83 BPM | TEMPERATURE: 98.2 F | SYSTOLIC BLOOD PRESSURE: 110 MMHG

## 2024-02-08 PROCEDURE — G0299 HHS/HOSPICE OF RN EA 15 MIN: HCPCS

## 2024-02-08 RX ADMIN — ONDANSETRON 4 MG: 4 TABLET, FILM COATED ORAL at 12:24

## 2024-02-08 RX ADMIN — MORPHINE SULFATE 0.75 ML: 100 SOLUTION ORAL at 12:24

## 2024-02-08 ASSESSMENT — ENCOUNTER SYMPTOMS
DIARRHEA: 1
PAIN LOCATION - PAIN QUALITY: SHARP, SORE
DYSPNEA ACTIVITY LEVEL: AT REST

## 2024-02-08 NOTE — HOSPICE
Patient/Caregiver/Family/Facility findings/issues during SN visit:  patient has conerns about a potential stroke. educated patient on the signs and symptoms. Pt vitals are stable and has no signs of a stroke. pt feels like she needs help sleeping, but is sleeping constantly. patient has constant pain. morphine dose was increased from 0.5 mL to 0.75 mL.    Medication refills ordered this visit: confirmation number: [id number] tylenol, morphine, and loratadine    Medications reconciled and all medications are available in the home this visit.  The following education was provided regarding medications, medication interactions, and look alike medications.  Response to teaching: caregiver and patient verbalized understanding. Medications are not effective at this time.      Supplies by type and quantity ordered this visit include: Order Number : [id number] wipes    Consulted medical director/attending physician regarding: n/a    Instructed patient/family/caregiver on 24-hour hospice availability and phone number.    Plan for next visit:  follow up

## 2024-02-12 ENCOUNTER — HOME CARE VISIT (OUTPATIENT)
Age: 74
End: 2024-02-12
Payer: MEDICARE

## 2024-02-12 PROCEDURE — G0299 HHS/HOSPICE OF RN EA 15 MIN: HCPCS

## 2024-02-13 ENCOUNTER — HOME CARE VISIT (OUTPATIENT)
Age: 74
End: 2024-02-13
Payer: MEDICARE

## 2024-02-13 VITALS
SYSTOLIC BLOOD PRESSURE: 156 MMHG | WEIGHT: 101 LBS | OXYGEN SATURATION: 96 % | RESPIRATION RATE: 22 BRPM | BODY MASS INDEX: 18.47 KG/M2 | DIASTOLIC BLOOD PRESSURE: 76 MMHG | HEART RATE: 89 BPM | TEMPERATURE: 98.9 F

## 2024-02-13 PROCEDURE — G0299 HHS/HOSPICE OF RN EA 15 MIN: HCPCS

## 2024-02-13 NOTE — HOSPICE
Patient/Caregiver/Family findings/issues during SN visit:   None identified    Medication refills ordered this visit: Morphine, diclofenc, docusate, lorazepm, remeron and zoloft order # [id number]    Medications reconciled and all medications are available in the home this visit.  Education was provided regarding medications, medication interactions, and look alike medications.   Response to teaching.  Patient verbalizes understnding.  Medications are effective at this time.      Supplies by type and quantity ordered this visit include: None needed    Consulted medical director/attending physician regarding:  Not at this time    Instructed patient/family/caregiver on 24-hour hospice availability and phone number.    Plan for next visit:   Continued EOL education and support.  prefilled syringes at time of visit with 0.75ml.

## 2024-02-14 VITALS
DIASTOLIC BLOOD PRESSURE: 81 MMHG | OXYGEN SATURATION: 95 % | SYSTOLIC BLOOD PRESSURE: 127 MMHG | HEART RATE: 94 BPM | TEMPERATURE: 98.1 F | RESPIRATION RATE: 22 BRPM

## 2024-02-14 NOTE — HOSPICE
Patient/Caregiver/Family findings/issues during SN visit:  PRN visit made.   called and stted that patient was having a hard time freathing and felt \"funny\"    Medication refills ordered this visit: none ordered    Medications reconciled and all medications are available in the home this visit.  Education was provided regarding medications, medication interactions, and look alike medications.  Response to teaching.   and patient verbalized understanding.  Medications are effective at this time.      Supplies by type and quantity ordered this visit include:  None needed    Consulted medical director/attending physician regarding:  Not at this time    Instructed patient/family/caregiver on 24-hour hospice availability and phone number.    Plan for next visit:  Patient axox3 upon arrival.   gave 0.75ml Morphine at time of call.  Medication checked and patient found to have taken 9 prefilled syringes of 0.75 morphine since visit yesterday.  Patient educated on safety of taking too much medications and reminded that she did sign a controlled substance contract.   to keep medicaations at all times and give her medications when she needs them.  [facility] socil worker made aware.  She is going to look into [facility] and possible long term placement as her condition is declining and  won't be able to care for her.  Continued education and support.

## 2024-02-15 ENCOUNTER — HOME CARE VISIT (OUTPATIENT)
Age: 74
End: 2024-02-15
Payer: MEDICARE

## 2024-02-15 VITALS
SYSTOLIC BLOOD PRESSURE: 122 MMHG | DIASTOLIC BLOOD PRESSURE: 74 MMHG | TEMPERATURE: 97.8 F | RESPIRATION RATE: 16 BRPM | OXYGEN SATURATION: 96 % | HEART RATE: 56 BPM

## 2024-02-15 PROCEDURE — G0155 HHCP-SVS OF CSW,EA 15 MIN: HCPCS

## 2024-02-15 PROCEDURE — G0299 HHS/HOSPICE OF RN EA 15 MIN: HCPCS

## 2024-02-15 NOTE — HOSPICE
Patient/Caregiver/Family/Facility findings/issues during SN visit:  N/a    Medication refills ordered this visit: N/A    Medications reconciled and all medications are available in the home this visit.  The following education was provided regarding medications, Patient is not allowed to give herself Morphine.. Medications are  effective at this time.      Supplies by type and quantity ordered this visit include: N/A    Consulted medical director/attending physician regarding:N/A    Instructed patient/family/caregiver on 24-hour hospice availability and phone number.    Plan for next visit:  Will follow up with CM on Monday.  Arrived with Debbie, to visit patient.  Patient was sitting in her chair, Patient denied any sob, or any pain, at this time. Talked with patient about having an aide come in and help. Debbie will get the  Company to come and Assess the amount of help patient can get. Patients vitals were done, all vitals were wnl. All Morphine srynges were filled. Will continue to Monitor patient.

## 2024-02-17 NOTE — HOSPICE
The purpose of today's SW visit is to offer emotional support, assess safety of pt, and discuss caregiving needs.  SW was welcomed into the home by Antony, spouse of pt.  Pt was received in the living area of the home.  Pt is dressed appropriately and there are no signs of physical abuse.  The home is clean and tidy.  Pt appears to have lost some weight and states her appetite has decreased.  Pt is in an agitated stated and reports that spouse will not let her have any pain medication (morphine).  SW discusses the recent overuse of the medication, but educates on following prescribed use.  SW educates CG that withholding medications as prescribed is a form of medical neglect and this is reportable to APS by SW.  Pt has a past history of HAN and discussions with CG/pt surrounding addictions and behaviors associated with addictions were talked about.  CG stated understanding of prescription medication.  SW will increase SW visits to 2 x month to monitor pt well-being.  Pt is experiencing depression and having a difficult time with losing her independence.  Pt suggest wanting to learn to felipe.  SW will make contact with  to see if a volunteer is available to help fulfill pt's wishes.  [facility] has been contacted for reassessment of [facility] for in home paid caregivers.  PT/CG prefer in home caregivers to placement in LTC facility.  During the visit emotional support and active listening provided.  PT agitation decreased greatly with talking through frustrations with SW.  PT/CG denied need for supplies.

## 2024-02-19 ENCOUNTER — HOME CARE VISIT (OUTPATIENT)
Age: 74
End: 2024-02-19
Payer: MEDICARE

## 2024-02-19 PROCEDURE — G0299 HHS/HOSPICE OF RN EA 15 MIN: HCPCS

## 2024-02-19 RX ADMIN — MORPHINE SULFATE 1 ML: 100 SOLUTION ORAL at 13:45

## 2024-02-19 NOTE — HOSPICE
Patient was lying across the bed asleep. She has been sleeping a little more.  will continue to follow.

## 2024-02-20 VITALS
RESPIRATION RATE: 20 BRPM | HEART RATE: 85 BPM | TEMPERATURE: 97.8 F | SYSTOLIC BLOOD PRESSURE: 138 MMHG | DIASTOLIC BLOOD PRESSURE: 88 MMHG | OXYGEN SATURATION: 99 %

## 2024-02-20 ASSESSMENT — ENCOUNTER SYMPTOMS: DYSPNEA ACTIVITY LEVEL: AT REST

## 2024-02-20 NOTE — HOSPICE
Patient/Caregiver/Family/Facility findings/issues during SN visit:  Patient in bed asleep upon SN arrival. Patient reporting pain 10/10, SN received 1 syringe of Morphine 1 mL from spouse and administered to patient. Patient reports depression is very bad. SN educated patient she can take Lorazepam every 6 hours for her anxiety. Patient reports sleeping 18-20 hrs per day. Patient requested refill on her Claritin for itching, SN informed patient it was too soon to refill.     Medication refills ordered this visit: N/A    Medications reconciled and all medications are available in the home this visit. Medications are effective at this time.      Supplies by type and quantity ordered this visit include: N/A    Consulted medical director/attending physician regarding: N/A    Instructed patient/family/caregiver on 24-hour hospice availability and phone number.    Plan for next visit: routine visit with continued EOL support and education

## 2024-02-22 ENCOUNTER — HOME CARE VISIT (OUTPATIENT)
Age: 74
End: 2024-02-22
Payer: MEDICARE

## 2024-02-22 VITALS
SYSTOLIC BLOOD PRESSURE: 137 MMHG | OXYGEN SATURATION: 99 % | HEART RATE: 99 BPM | RESPIRATION RATE: 18 BRPM | TEMPERATURE: 98.5 F | DIASTOLIC BLOOD PRESSURE: 86 MMHG

## 2024-02-22 PROCEDURE — G0300 HHS/HOSPICE OF LPN EA 15 MIN: HCPCS

## 2024-02-22 ASSESSMENT — ENCOUNTER SYMPTOMS: DYSPNEA ACTIVITY LEVEL: AT REST

## 2024-02-22 NOTE — HOSPICE
Patient/Caregiver/Family/Facility findings/issues during SN visit:  No new concerns at this time.  Patient had one empty syringe from morphine.  14 total syringes.   is monitoring medications.  Increased Zoloft to 150mg daily for increased depression.    Medication refills ordered this visit: Loratadine, mirtazapine, sertraline, docusate sodium. Order # [id number]    Medications reconciled and all medications are available in the home this visit.  The following education was provided regarding medications, medication interactions, and look alike medications: Medication side effects, dosages, purposes, frequencies.  Response to teaching: Verbalized understanding. Medications are effective at this time.      Supplies by type and quantity ordered this visit include: No supplies needed    Consulted medical director/attending physician regarding: Not needed this visit.    Instructed patient/family/caregiver on 24-hour hospice availability and phone number.    Plan for next visit:  Continue end of life education and support caregiver.

## 2024-02-26 ENCOUNTER — HOME CARE VISIT (OUTPATIENT)
Age: 74
End: 2024-02-26
Payer: MEDICARE

## 2024-02-26 PROCEDURE — G0299 HHS/HOSPICE OF RN EA 15 MIN: HCPCS

## 2024-02-27 VITALS
OXYGEN SATURATION: 96 % | SYSTOLIC BLOOD PRESSURE: 131 MMHG | DIASTOLIC BLOOD PRESSURE: 64 MMHG | RESPIRATION RATE: 22 BRPM | HEART RATE: 86 BPM | TEMPERATURE: 98.9 F

## 2024-02-27 ASSESSMENT — ENCOUNTER SYMPTOMS: DYSPNEA ACTIVITY LEVEL: AFTER AMBULATING LESS THAN 10 FT

## 2024-02-27 NOTE — HOSPICE
Patient/Caregiver/Family findings/issues during SN visit:  None identified    Medication refills ordered this visit: sertraline and claritin refilled order# [id number]    Medications reconciled and all medications are available in the home this visit.  Education was provided regarding medications, medication interactions, and look alike medications.    Response to teaching.   and patient verbalizes understanding,   Medications are effective at this time.      Supplies by type and quantity ordered this visit include: none needed    Consulted medical director/attending physician regarding: Not at this time    Instructed patient/family/caregiver on 24-hour hospice availability and phone number.    Plan for next visit:  Continued EOL education nd support

## 2024-02-29 ENCOUNTER — HOME CARE VISIT (OUTPATIENT)
Age: 74
End: 2024-02-29
Payer: MEDICARE

## 2024-02-29 VITALS
TEMPERATURE: 97.8 F | DIASTOLIC BLOOD PRESSURE: 62 MMHG | OXYGEN SATURATION: 90 % | RESPIRATION RATE: 22 BRPM | SYSTOLIC BLOOD PRESSURE: 132 MMHG | HEART RATE: 90 BPM

## 2024-02-29 PROCEDURE — G0299 HHS/HOSPICE OF RN EA 15 MIN: HCPCS

## 2024-02-29 ASSESSMENT — ENCOUNTER SYMPTOMS: DYSPNEA ACTIVITY LEVEL: AT REST

## 2024-02-29 NOTE — HOSPICE
Patient/Caregiver/Family findings/issues during SN visit:  none identified    Medication refills ordered this visit:   none needed.    Medications reconciled and all medications are available in the home this visit.  Education was provided regarding medications, medication interactions, and look alike medications  Response to teaching.  Patient and  verbalized understanding.   Medications are effective at this time.  NO syringes prefilled today.    Supplies by type and quantity ordered this visit include: small gloves, wipes order #[id number]    Consulted medical director/attending physician regarding:  Not at this time    Instructed patient/family/caregiver on 24-hour hospice availability and phone number.    Plan for next visit:  Conitnue to observe for decline, EOL and provide support

## 2024-03-04 ENCOUNTER — HOME CARE VISIT (OUTPATIENT)
Age: 74
End: 2024-03-04
Payer: MEDICARE

## 2024-03-04 VITALS
RESPIRATION RATE: 22 BRPM | TEMPERATURE: 99.2 F | OXYGEN SATURATION: 97 % | HEART RATE: 80 BPM | DIASTOLIC BLOOD PRESSURE: 81 MMHG | SYSTOLIC BLOOD PRESSURE: 131 MMHG

## 2024-03-04 PROCEDURE — G0299 HHS/HOSPICE OF RN EA 15 MIN: HCPCS

## 2024-03-04 ASSESSMENT — ENCOUNTER SYMPTOMS: DYSPNEA ACTIVITY LEVEL: AFTER AMBULATING LESS THAN 10 FT

## 2024-03-04 NOTE — HOSPICE
Patient/Caregiver/Family findings/issues during SN visit:  none identified    Medication refills ordered this visit: colace, claritin, lorazepam, remeron and zoloft refilled.  order#[id number]    Medications reconciled and all medications are available in the home this visit.  Education was provided regarding medications, medication interactions, and look alike medications.    Response to teaching.   and patient verbalized understanding.   Medications are effective at this time.      Supplies by type and quantity ordered this visit include:  none needed    Consulted medical director/attending physician regarding:  not at this time    Instructed patient/family/caregiver on 24-hour hospice availability and phone number.    Plan for next visit: 1 syringe prefilled with 0.75ml morphine.  Continued EOL education and support

## 2024-03-07 ENCOUNTER — HOME CARE VISIT (OUTPATIENT)
Age: 74
End: 2024-03-07
Payer: MEDICARE

## 2024-03-07 PROCEDURE — G0300 HHS/HOSPICE OF LPN EA 15 MIN: HCPCS

## 2024-03-08 VITALS
HEART RATE: 104 BPM | OXYGEN SATURATION: 92 % | DIASTOLIC BLOOD PRESSURE: 63 MMHG | TEMPERATURE: 98.5 F | RESPIRATION RATE: 18 BRPM | SYSTOLIC BLOOD PRESSURE: 134 MMHG

## 2024-03-08 ASSESSMENT — ENCOUNTER SYMPTOMS: DYSPNEA ACTIVITY LEVEL: AT REST

## 2024-03-08 NOTE — HOSPICE
Patient/Caregiver/Family/Facility findings/issues during SN visit:  No new concerns this visit.  Patient denies pain.  BM yesterday.    Medication refills ordered this visit: Not needed    Medications reconciled and all medications are available in the home this visit.  The following education was provided regarding medications, medication interactions, and look alike medications: Medication side effects, dosages, purposes, frequencies.  Response to teaching: Verbalized understanding. Medications are effective at this time.      Supplies by type and quantity ordered this visit include: Not needed    Consulted medical director/attending physician regarding: Not needed this visit    Instructed patient/family/caregiver on 24-hour hospice availability and phone number.    Plan for next visit:  Continue end of life education and support caregiver.

## 2024-03-11 ENCOUNTER — HOME CARE VISIT (OUTPATIENT)
Age: 74
End: 2024-03-11
Payer: MEDICARE

## 2024-03-11 VITALS
SYSTOLIC BLOOD PRESSURE: 149 MMHG | OXYGEN SATURATION: 97 % | DIASTOLIC BLOOD PRESSURE: 95 MMHG | RESPIRATION RATE: 19 BRPM | HEART RATE: 100 BPM

## 2024-03-11 PROCEDURE — G0299 HHS/HOSPICE OF RN EA 15 MIN: HCPCS

## 2024-03-13 ENCOUNTER — HOME CARE VISIT (OUTPATIENT)
Age: 74
End: 2024-03-13
Payer: MEDICARE

## 2024-03-13 VITALS
TEMPERATURE: 97.8 F | RESPIRATION RATE: 19 BRPM | SYSTOLIC BLOOD PRESSURE: 152 MMHG | OXYGEN SATURATION: 94 % | HEART RATE: 91 BPM | DIASTOLIC BLOOD PRESSURE: 89 MMHG

## 2024-03-13 PROCEDURE — G0299 HHS/HOSPICE OF RN EA 15 MIN: HCPCS

## 2024-03-13 RX ADMIN — LORAZEPAM 0.5 MG: 0.5 TABLET ORAL at 12:26

## 2024-03-13 ASSESSMENT — ENCOUNTER SYMPTOMS
DYSPNEA ACTIVITY LEVEL: AT REST
PAIN LOCATION - PAIN QUALITY: ACHING
SKIN LESIONS: 1
SKIN LESIONS: 1

## 2024-03-14 ENCOUNTER — HOME CARE VISIT (OUTPATIENT)
Age: 74
End: 2024-03-14
Payer: MEDICARE

## 2024-03-14 PROCEDURE — G0155 HHCP-SVS OF CSW,EA 15 MIN: HCPCS

## 2024-03-14 NOTE — HOSPICE
Patient/Caregiver/Family/Facility findings/issues during SN visit:  numbness and tingling in bilateral feet knees, shoulder. tylenol to effective, anxiousness    Medication refills ordered this visit: melatonin, lyrica 75mg    Medications reconciled and all medications are not available in the home this visit.  The following education was provided regarding medications, medication interactions, and look alike medications.  Response to teaching: hannahegiver verbalized understanding. Medications are not effective at this time.      Supplies by type and quantity ordered this visit include: none needed    Consulted medical director/attending physician regarding: n/a    Instructed patient/family/caregiver on 24-hour hospice availability and phone number.    Plan for next visit:  follow up on sleep cycle and depression. has increaed BP. pt s aware to tkae lorazepam as need. BP is due to anxiety.

## 2024-03-15 NOTE — HOSPICE
The purpose of today's SW is to provide emotional support, assess pt decline, and needs.  SW is welcomed into the home by CG/, Zach.  The home is clean and tidy and there are no safety concerns noted by SW during visit.  Pt is received in the living room as she is walking out of the bedroom, using her rollater.  Pt is dressed appropriately, is walking well, and she denies abuse/neglect by CG.  Pt is A&O x 4 and appears in better physical/mental condition than on SW previous visit.  Pt is having increased anxiety due to her high blood pressure readings.  Pt is wearing a blood pressure machine on her wrist and while sitting and calm, her blood pressure reads 157/107 with pulse at 104.  Pt states history of high blood pressure controlled by medication until her admission to hospice.  Pt states prescribed meds for blood pressure was discontinued.  SW offers emotional support to pt as she discusses her concerns with both blood pressure and neuropathy pain.  Pt continues to eat about 1/2 to 3/4 of adult sized portions of food about 2 x a day.  She is still independent on all ADL's.  [facility] performed a [facility] on March 5th, they have not let pt/cg know if they qualify for additional caregivers at this time.  Pt is having increased difficulty sleeping and states she is tired but when she lays down her mind stays busy.  New medications have been prescribed to help with quality and quantity of sleep.  Pt/CG recently bought an [employer] machine (portable oxygen) and pt is planning to attend [employer] this Sunday.  SW visits have been increased to 2 x month due to increase mental health needs of pt.  Pt is experiencing increased anxiety, depression and feels talking with SW helps her process through her feelings.  Pt is refusing to use morphine at this time, as she states she believes that is what made her blood pressure increase.  She states she takes OTC Tylenol and it is ineffective.  SW to discuss with RN CM the use of

## 2024-03-17 ENCOUNTER — APPOINTMENT (OUTPATIENT)
Facility: HOSPITAL | Age: 74
End: 2024-03-17
Payer: MEDICARE

## 2024-03-17 ENCOUNTER — HOSPITAL ENCOUNTER (EMERGENCY)
Facility: HOSPITAL | Age: 74
Discharge: HOME | End: 2024-03-18
Attending: EMERGENCY MEDICINE
Payer: MEDICARE

## 2024-03-17 ENCOUNTER — HOME CARE VISIT (OUTPATIENT)
Age: 74
End: 2024-03-17
Payer: MEDICARE

## 2024-03-17 DIAGNOSIS — S40.011A CONTUSION OF RIGHT SHOULDER, INITIAL ENCOUNTER: Primary | ICD-10-CM

## 2024-03-17 DIAGNOSIS — J44.9 CHRONIC OBSTRUCTIVE PULMONARY DISEASE, UNSPECIFIED COPD TYPE (HCC): ICD-10-CM

## 2024-03-17 DIAGNOSIS — Z51.5 HOSPICE CARE PATIENT: ICD-10-CM

## 2024-03-17 PROCEDURE — 99283 EMERGENCY DEPT VISIT LOW MDM: CPT

## 2024-03-17 PROCEDURE — 73030 X-RAY EXAM OF SHOULDER: CPT

## 2024-03-17 PROCEDURE — 6370000000 HC RX 637 (ALT 250 FOR IP): Performed by: EMERGENCY MEDICINE

## 2024-03-17 RX ORDER — HYDROCODONE BITARTRATE AND ACETAMINOPHEN 5; 325 MG/1; MG/1
1 TABLET ORAL
Status: COMPLETED | OUTPATIENT
Start: 2024-03-17 | End: 2024-03-17

## 2024-03-17 RX ORDER — IPRATROPIUM BROMIDE AND ALBUTEROL SULFATE 2.5; .5 MG/3ML; MG/3ML
1 SOLUTION RESPIRATORY (INHALATION) ONCE
Status: COMPLETED | OUTPATIENT
Start: 2024-03-17 | End: 2024-03-17

## 2024-03-17 RX ADMIN — IPRATROPIUM BROMIDE AND ALBUTEROL SULFATE 1 DOSE: .5; 3 SOLUTION RESPIRATORY (INHALATION) at 23:56

## 2024-03-17 RX ADMIN — HYDROCODONE BITARTRATE AND ACETAMINOPHEN 1 TABLET: 5; 325 TABLET ORAL at 23:56

## 2024-03-18 ENCOUNTER — HOME CARE VISIT (OUTPATIENT)
Age: 74
End: 2024-03-18
Payer: MEDICARE

## 2024-03-18 VITALS
SYSTOLIC BLOOD PRESSURE: 98 MMHG | OXYGEN SATURATION: 94 % | DIASTOLIC BLOOD PRESSURE: 58 MMHG | TEMPERATURE: 98.1 F | HEART RATE: 83 BPM | RESPIRATION RATE: 18 BRPM

## 2024-03-18 VITALS
DIASTOLIC BLOOD PRESSURE: 56 MMHG | HEART RATE: 78 BPM | OXYGEN SATURATION: 100 % | TEMPERATURE: 98.2 F | RESPIRATION RATE: 9 BRPM | SYSTOLIC BLOOD PRESSURE: 114 MMHG

## 2024-03-18 PROCEDURE — G0300 HHS/HOSPICE OF LPN EA 15 MIN: HCPCS

## 2024-03-18 ASSESSMENT — ENCOUNTER SYMPTOMS
SKIN LESIONS: 1
PAIN LOCATION - PAIN QUALITY: ACHE

## 2024-03-18 NOTE — ED NOTES
Patient is discharged at this time. Patient has been updated on plan of care. Patient has to questions or concerns at this time.

## 2024-03-18 NOTE — DISCHARGE INSTRUCTIONS
Please continue current medications as prescribed.  Reach out to your hospice team tomorrow regarding your recent minor fall and shoulder pain.  Return to the emergency department as needed

## 2024-03-18 NOTE — ED TRIAGE NOTES
Pt brought in by EMS for fall. Denies hitting head. Denies LOC. Denies blood thinners. C/O on R upper arm. Increased SOB over the past 3 days. 84% on home 3L. Recovered on 4L to high 90's.

## 2024-03-18 NOTE — ED PROVIDER NOTES
[facility] EMERGENCY DEPT  eMERGENCY dEPARTMENT eNCOUnter        Pt Name: Juliana Loaiza  MRN: [id number]  Birthdate 1950  Date of evaluation: 3/17/2024  Provider: Bg Rios MD  PCP: Ezequiel Michel MD  Note Started: 11:19 PM EDT 3/18/2024          CHIEF COMPLAINT       Chief Complaint   Patient presents with    Fall    Shortness of Breath       HISTORY OF PRESENT ILLNESS        Patient is currently on hospice with end-stage COPD, coming in after a minor fall when getting up off the toilet she stumbled sideways and struck her right shoulder on the door jam.  She complains of pain at that site.  No head trauma or neck pain.  She has her baseline shortness of breath with waxes and wanes.  There was no chest trauma.  No abdominal trauma.  No other extremity trauma.  Had a conversation with the patient and family regarding the extent of evaluation.  She feels like her shortness of breath is at baseline and does not want evaluation for that.  She would like an x-ray of her right shoulder and medication for pain.  She is on morphine as needed at home.    Nursing Notes were all reviewed and agreed with or any disagreements were addressed  in the HPI.    REVIEW OF SYSTEMS         The following 10 systems are reviewed and negative except as noted in the HPI: Constitutional, Eyes, ENT, cardiovascular, pulmonary, GI, , neuro, skin, and musculoskeletal       PASTMEDICAL HISTORY     Past Medical History:   Diagnosis Date    Arthritis     \"generalized\"    Asthma     CAD in native artery     NSTEMI (Sep 2014); diffuse 65% pLAD, minimal disease RCA & LCx. pLAD FFR 0.93. LV  no RWMA (echo and LV angio)    Chronic neck pain     Chronic pain     left knee    Chronic pain syndrome     COPD (chronic obstructive pulmonary disease) (HCC)     Depression     Diabetes (HCC)     Diverticulitis     GERD (gastroesophageal reflux disease)     Heart disease     Hidradenitis 9/11/2014    Hypertension     Left knee

## 2024-03-18 NOTE — HOSPICE
Patient/Caregiver/Family/Facility findings/issues during SN visit:  Patient is s/p fall day 1.  C/o shoulder pain and advised to take morphine and rest today.  Continue to take regular scheduled medications    Medication refills ordered this visit:Confirmation # [id number]    Medications reconciled and all medications are available in the home this visit.  The following education was provided regarding medications, medication interactions, and look alike medications: Medication side effects, dosages, purposes, frequencies.  Response to teaching: Verbalized understanding. Medications are effective at this time.      Supplies by type and quantity ordered this visit include: Not needed    Consulted medical director/attending physician regarding: Not needed    Instructed patient/family/caregiver on 24-hour hospice availability and phone number.    Plan for next visit:  Continue end of life education and support caregiver.

## 2024-03-21 ENCOUNTER — HOME CARE VISIT (OUTPATIENT)
Age: 74
End: 2024-03-21
Payer: MEDICARE

## 2024-03-21 VITALS
DIASTOLIC BLOOD PRESSURE: 81 MMHG | OXYGEN SATURATION: 97 % | SYSTOLIC BLOOD PRESSURE: 158 MMHG | RESPIRATION RATE: 18 BRPM | HEART RATE: 89 BPM | TEMPERATURE: 98.3 F

## 2024-03-21 PROCEDURE — G0300 HHS/HOSPICE OF LPN EA 15 MIN: HCPCS

## 2024-03-21 ASSESSMENT — ENCOUNTER SYMPTOMS
SKIN LESIONS: 1
PAIN LOCATION - PAIN QUALITY: ACHE

## 2024-03-21 NOTE — HOSPICE
Patient/Caregiver/Family/Facility findings/issues during SN visit: Patient is comfortable and denies pain.  Sitting in her living room with her .  Pt has only had two doses of Morphine since the last nursing visit.  Pt is requesting only one visit per week.    Medication refills ordered this visit: No medication needed    Medications reconciled and all medications are available in the home this visit.  The following education was provided regarding medications, medication interactions, and look alike medications: Medication side effects, dosages, purposes, frequencies.  Response to teaching: Verbalized understanding. Medications are effective at this time.      Supplies by type and quantity ordered this visit include: No supplies    Consulted medical director/attending physician regarding: Not needed    Instructed patient/family/caregiver on 24-hour hospice availability and phone number.    Plan for next visit:  Continue end of life education and support caregiver.

## 2024-03-25 ENCOUNTER — HOME CARE VISIT (OUTPATIENT)
Age: 74
End: 2024-03-25
Payer: MEDICARE

## 2024-03-25 NOTE — HOSPICE
Patient was eating her breakfast with her  right by her side. They are always willing to share. Today she was feeling well and so was her . Her decline has been slow.

## 2024-03-28 ENCOUNTER — HOME CARE VISIT (OUTPATIENT)
Age: 74
End: 2024-03-28
Payer: MEDICARE

## 2024-03-28 VITALS
OXYGEN SATURATION: 95 % | HEART RATE: 87 BPM | DIASTOLIC BLOOD PRESSURE: 87 MMHG | SYSTOLIC BLOOD PRESSURE: 139 MMHG | TEMPERATURE: 98.8 F | RESPIRATION RATE: 18 BRPM

## 2024-03-28 PROCEDURE — G0299 HHS/HOSPICE OF RN EA 15 MIN: HCPCS

## 2024-03-28 RX ADMIN — MORPHINE SULFATE 0.75 ML: 100 SOLUTION ORAL at 12:00

## 2024-03-28 ASSESSMENT — ENCOUNTER SYMPTOMS
SKIN LESIONS: 1
PAIN LOCATION - PAIN QUALITY: ACHING

## 2024-03-28 NOTE — HOSPICE
Patient/Caregiver/Family/Facility findings/issues during SN visit:  pain due to a fall    Medication refills ordered this visit: Confirmation #: [id number] Sertraline, melatonin, lorazepam    Medications reconciled and all medications are available in the home this visit.  The following education was provided regarding medications, medication interactions, and look alike medications.  Response to teaching: caregiver and paitnet verbalized understanding. Medications are effective at this time.      Supplies by type and quantity ordered this visit include: none needed    Consulted medical director/attending physician regarding: DELMA Morales    Instructed patient/family/caregiver on 24-hour hospice availability and phone number.    Plan for next visit:  follow up    Mirtazapine is empty, but it is too soon to refill. Educated paitent on the dose to take. Pt had a fall last week with minor injury. pain in her L knee, neck, and R hip and below. pt stated the change in her sleeping medication is effective.

## 2024-04-02 ENCOUNTER — HOME CARE VISIT (OUTPATIENT)
Age: 74
End: 2024-04-02
Payer: MEDICARE

## 2024-04-02 PROCEDURE — G0155 HHCP-SVS OF CSW,EA 15 MIN: HCPCS

## 2024-04-04 ENCOUNTER — HOME CARE VISIT (OUTPATIENT)
Age: 74
End: 2024-04-04
Payer: MEDICARE

## 2024-04-04 PROCEDURE — G0299 HHS/HOSPICE OF RN EA 15 MIN: HCPCS

## 2024-04-05 VITALS
SYSTOLIC BLOOD PRESSURE: 131 MMHG | OXYGEN SATURATION: 99 % | BODY MASS INDEX: 18.29 KG/M2 | DIASTOLIC BLOOD PRESSURE: 78 MMHG | TEMPERATURE: 98.5 F | WEIGHT: 100 LBS | RESPIRATION RATE: 20 BRPM | HEART RATE: 92 BPM

## 2024-04-05 ASSESSMENT — ENCOUNTER SYMPTOMS: DYSPNEA ACTIVITY LEVEL: AFTER AMBULATING LESS THAN 10 FT

## 2024-04-05 NOTE — HOSPICE
Patient/Caregiver/Family findings/issues during SN visit:  None identified    Medication refills ordered this visit: diclofenac cream, claritin, lorazepam, lyrica, sertraline, zofran and morphine order# [id number]    Medications reconciled and all medications are available in the home this visit.  Education was provided regarding medications, medication interactions, and look alike medications.   Response to teaching.  Patient verbalized understanding.  Medications are effective at this time.  Patient educated to take lorazepam 1mg at bedtime to help with sleep.    Supplies by type and quantity ordered this visit include:  medium pullups order#[id number]    Consulted medical director/attending physician regarding:  Not at this visit.    Instructed patient/family/caregiver on 24-hour hospice availability and phone number.    Plan for next visit:  Continued eol education and support

## 2024-04-09 ENCOUNTER — HOME CARE VISIT (OUTPATIENT)
Age: 74
End: 2024-04-09
Payer: MEDICARE

## 2024-04-09 PROCEDURE — G0299 HHS/HOSPICE OF RN EA 15 MIN: HCPCS

## 2024-04-10 VITALS
DIASTOLIC BLOOD PRESSURE: 70 MMHG | TEMPERATURE: 97.7 F | HEART RATE: 96 BPM | SYSTOLIC BLOOD PRESSURE: 160 MMHG | OXYGEN SATURATION: 99 % | RESPIRATION RATE: 22 BRPM | WEIGHT: 114 LBS | BODY MASS INDEX: 20.85 KG/M2

## 2024-04-10 ASSESSMENT — ENCOUNTER SYMPTOMS: DYSPNEA ACTIVITY LEVEL: AFTER AMBULATING LESS THAN 10 FT

## 2024-04-10 NOTE — HOSPICE
Patient/Caregiver/Family findings/issues during SN visit:  none identified    Medication refills ordered this visit:  lasix 20mg started for BLE swelling.  Delivered at time of visit    Medications reconciled and all medications are available in the home this visit.  Education was provided regarding medications, medication interactions, and look alike medications.   Response to teaching.   and patient verbalized understanding.   Medications are effective at this time.      Supplies by type and quantity ordered this visit include:   None needed    Consulted medical director/attending physician regarding:  Alison Jung NP started on lasix    Instructed patient/family/caregiver on 24-hour hospice availability and phone number.    Plan for next visit:  Continued EOL education and support

## 2024-04-11 ENCOUNTER — HOME CARE VISIT (OUTPATIENT)
Age: 74
End: 2024-04-11
Payer: MEDICARE

## 2024-04-11 VITALS — HEART RATE: 97 BPM | OXYGEN SATURATION: 96 % | RESPIRATION RATE: 17 BRPM | TEMPERATURE: 99.2 F

## 2024-04-11 PROCEDURE — G0299 HHS/HOSPICE OF RN EA 15 MIN: HCPCS

## 2024-04-11 ASSESSMENT — ENCOUNTER SYMPTOMS
SKIN LESIONS: 1
DYSPNEA ACTIVITY LEVEL: AT REST

## 2024-04-11 NOTE — HOSPICE
Patient/Caregiver/Family/Facility findings/issues during SN visit:  n/a    Medication refills ordered this visit:Confirmation #: [id number] melatonin    Medications reconciled and all medications are available in the home this visit.  The following education was provided regarding medications, medication interactions, and look alike medications.  Response to teaching: caregiver verbalized understanding. Medications are effective at this time.      Supplies by type and quantity ordered this visit include: Order Number : [id number] nasal canula, lotion, wipes    Consulted medical director/attending physician regarding: n/a    Instructed patient/family/caregiver on 24-hour hospice availability and phone number.    Plan for next visit:  follow up

## 2024-04-15 ENCOUNTER — HOME CARE VISIT (OUTPATIENT)
Age: 74
End: 2024-04-15
Payer: MEDICARE

## 2024-04-17 ENCOUNTER — HOME CARE VISIT (OUTPATIENT)
Age: 74
End: 2024-04-17
Payer: MEDICARE

## 2024-04-17 VITALS
RESPIRATION RATE: 22 BRPM | DIASTOLIC BLOOD PRESSURE: 85 MMHG | TEMPERATURE: 98.5 F | OXYGEN SATURATION: 96 % | SYSTOLIC BLOOD PRESSURE: 179 MMHG | HEART RATE: 118 BPM

## 2024-04-17 PROCEDURE — G0299 HHS/HOSPICE OF RN EA 15 MIN: HCPCS

## 2024-04-17 ASSESSMENT — ENCOUNTER SYMPTOMS: DYSPNEA ACTIVITY LEVEL: AFTER AMBULATING LESS THAN 10 FT

## 2024-04-17 NOTE — HOSPICE
Patient/Caregiver/Family findings/issues during SN visit:  None identified    Medication refills ordered this visit: Sertraline, lyrica, albuterol MDI, diclofenac, claritin, lorazepam, remeron refilled #[id number]    Medications reconciled and all medications are available in the home this visit.  Education was provided regarding medications, medication interactions, and look alike medications.   Response to teaching.  Patient verbalized understanding.  Medications are effective at this time.      Supplies by type and quantity ordered this visit include: medium gloves [facility]es [id number]    Consulted medical director/attending physician regarding: Not at this time    Instructed patient/family/caregiver on 24-hour hospice availability and phone number.    Plan for next visit:  Continue to monitor for decline and effectiveness of new medications.

## 2024-04-18 ENCOUNTER — HOME CARE VISIT (OUTPATIENT)
Age: 74
End: 2024-04-18
Payer: MEDICARE

## 2024-04-18 NOTE — HOSPICE
Patient was up watching TV and so was her . They order lunch and were having a great day. SHe was in good spirits and there seems to be no changes at this time.  will continue to follow.

## 2024-04-19 ENCOUNTER — HOME CARE VISIT (OUTPATIENT)
Age: 74
End: 2024-04-19
Payer: MEDICARE

## 2024-04-19 PROCEDURE — G0300 HHS/HOSPICE OF LPN EA 15 MIN: HCPCS

## 2024-04-20 ENCOUNTER — HOME CARE VISIT (OUTPATIENT)
Age: 74
End: 2024-04-20
Payer: MEDICARE

## 2024-04-20 VITALS
OXYGEN SATURATION: 95 % | SYSTOLIC BLOOD PRESSURE: 154 MMHG | DIASTOLIC BLOOD PRESSURE: 80 MMHG | HEART RATE: 97 BPM | RESPIRATION RATE: 18 BRPM | TEMPERATURE: 98.4 F

## 2024-04-20 ASSESSMENT — ENCOUNTER SYMPTOMS: DYSPNEA ACTIVITY LEVEL: AFTER AMBULATING LESS THAN 10 FT

## 2024-04-20 NOTE — HOSPICE
Patient/Caregiver/Family/Facility findings/issues during SN visit:  No new concerns at this time.  Pt is sitting in her living room with her spouse. No signs of distress noted.    Pt denies pain.  Vitals WNL.  Medications reconciled and new delivery arrived while this nurse was at visit.  Symptoms controlled at this time.  Pt appetite has varied but is less than her normal.    Medication refills ordered this visit: Not needed    Medications reconciled and all medications are available in the home this visit.  The following education was provided regarding medications, medication interactions, and look alike medications: Medication side effects, dosages, purposes, frequencies.  Response to teaching: Verbalized understanding. Medications are effective at this time.      Supplies by type and quantity ordered this visit include: Replaced Concentrator and ordered new-----:Order #[id number]    Consulted medical director/attending physician regarding: Not needed    Instructed patient/family/caregiver on 24-hour hospice availability and phone number.    Plan for next visit:  Continue end of life education and support caregiver.

## 2024-04-23 ENCOUNTER — HOME CARE VISIT (OUTPATIENT)
Age: 74
End: 2024-04-23
Payer: MEDICARE

## 2024-04-23 PROCEDURE — G0299 HHS/HOSPICE OF RN EA 15 MIN: HCPCS

## 2024-04-26 ENCOUNTER — HOME CARE VISIT (OUTPATIENT)
Age: 74
End: 2024-04-26
Payer: MEDICARE

## 2024-04-26 PROCEDURE — G0299 HHS/HOSPICE OF RN EA 15 MIN: HCPCS

## 2024-04-27 VITALS
SYSTOLIC BLOOD PRESSURE: 146 MMHG | TEMPERATURE: 99.7 F | DIASTOLIC BLOOD PRESSURE: 73 MMHG | HEART RATE: 98 BPM | OXYGEN SATURATION: 98 %

## 2024-04-27 ASSESSMENT — ENCOUNTER SYMPTOMS
PAIN LOCATION - PAIN QUALITY: ACHE
DYSPNEA ACTIVITY LEVEL: AFTER AMBULATING LESS THAN 10 FT

## 2024-04-27 NOTE — HOSPICE
Patient awake and alert sitting on edge of bed.' States she was just in the kitchen that is why she doesn't have O2 on.      I eat when I feel like I want something.  She states she may eat an ice cream or make a sandwich or get some soup.  She states, \"I don't go hungry.\"      Fellin htere Thursday or Friday of last week.  Was sitting and when she went to stand her buckled and she fell down and her head hit the deck.  She states she uses the chair as she is so unsteady.      Take all meds in the am when she wakes up and then in the evening she takes the rest.    LBM:  Yesterday.      Patient states that she was supposed to get some inhalers.  She states \"someone was supposed to order them a couple of days ago.\"  I was talking to her about my pain and having a high tolerance to pain medication.  Patient asked about Fentanyl and being afraid that     Knees - 9  feet- 10  shoulders - 9    They stay that way and never go down.  When asked patient states that she is taking her Morphine once daily.  Writer spoke to her about taking pain medication more frequently to assist with her pain.  Patient states that her  keeps her Morphine because he does not want her to become addicted, she states that her  won't give it to her even when she asks and she states that he will tell her she doesn't need it.       Sleeping okay per patient.  She states that she is sleeping about 3 hours then she wakes up.  She states she is on 2 sleeping pills, but she does not sleep all night.    Medication refills needed:  furosemide, mirtazipine    THe girl that was here the last time was going to try and get some MS Contin    Lorazepam 2 once a day.  not really working per patient.  Med teaching on how often she can have it.     Supplies:  Medium pullups,

## 2024-04-30 ENCOUNTER — HOME CARE VISIT (OUTPATIENT)
Age: 74
End: 2024-04-30
Payer: MEDICARE

## 2024-04-30 PROCEDURE — G0299 HHS/HOSPICE OF RN EA 15 MIN: HCPCS

## 2024-05-01 VITALS
OXYGEN SATURATION: 88 % | DIASTOLIC BLOOD PRESSURE: 63 MMHG | SYSTOLIC BLOOD PRESSURE: 125 MMHG | HEART RATE: 94 BPM | RESPIRATION RATE: 22 BRPM | TEMPERATURE: 98 F

## 2024-05-01 ASSESSMENT — ENCOUNTER SYMPTOMS: DYSPNEA ACTIVITY LEVEL: AT REST

## 2024-05-01 NOTE — HOSPICE
Patient/Caregiver/Family findings/issues during SN visit:  None identified    Medication refills ordered this visit:  claritin, lorazepam, lyrica, sertraline refilled order#[id number]    Medications reconciled and all medications are available in the home this visit.  Education was provided regarding medications, medication interactions, and look alike medications.  Response to teaching.  Medications are effective at this time.      Supplies by type and quantity ordered this visit include: None needed    Consulted medical director/attending physician regarding: not at this time    Instructed patient/family/caregiver on 24-hour hospice availability and phone number.    Plan for next visit:  Continued EOL education and support.  Syringes filled with 0.75ml of morphine

## 2024-05-03 ENCOUNTER — HOME CARE VISIT (OUTPATIENT)
Age: 74
End: 2024-05-03
Payer: MEDICARE

## 2024-05-03 VITALS
OXYGEN SATURATION: 95 % | DIASTOLIC BLOOD PRESSURE: 61 MMHG | TEMPERATURE: 98.9 F | SYSTOLIC BLOOD PRESSURE: 111 MMHG | RESPIRATION RATE: 18 BRPM | HEART RATE: 85 BPM

## 2024-05-03 PROCEDURE — G0300 HHS/HOSPICE OF LPN EA 15 MIN: HCPCS

## 2024-05-03 ASSESSMENT — ENCOUNTER SYMPTOMS
DYSPNEA ACTIVITY LEVEL: AFTER AMBULATING LESS THAN 10 FT
PAIN LOCATION - PAIN QUALITY: ACHE

## 2024-05-07 ENCOUNTER — HOME CARE VISIT (OUTPATIENT)
Age: 74
End: 2024-05-07
Payer: MEDICARE

## 2024-05-07 VITALS
SYSTOLIC BLOOD PRESSURE: 122 MMHG | TEMPERATURE: 98.8 F | OXYGEN SATURATION: 99 % | DIASTOLIC BLOOD PRESSURE: 80 MMHG | RESPIRATION RATE: 20 BRPM | HEART RATE: 84 BPM

## 2024-05-07 PROCEDURE — G0299 HHS/HOSPICE OF RN EA 15 MIN: HCPCS

## 2024-05-07 ASSESSMENT — ENCOUNTER SYMPTOMS: DYSPNEA ACTIVITY LEVEL: AFTER AMBULATING LESS THAN 10 FT

## 2024-05-07 NOTE — HOSPICE
Patient/Caregiver/Family findings/issues during SN visit:  None identified    Medication refills ordered this visit: lasix, lorazepam and remeron order# [id number]    Medications reconciled and all medications are available in the home this visit.  The following education was provided regarding medications, medication interactions, and look alike medications:  Response to teaching.  Patient verbalized understanding.   Medications are effective/not effective at this time.      Supplies by type and quantity ordered this visit include: medium pullups ordered #[id number]    Consulted medical director/attending physician regarding: Not at this time    Instructed patient/family/caregiver on 24-hour hospice availability and phone number.    Plan for next visit:  Continued EOL education and support

## 2024-05-08 ASSESSMENT — ENCOUNTER SYMPTOMS: DYSPNEA ACTIVITY LEVEL: AFTER AMBULATING LESS THAN 10 FT

## 2024-05-08 NOTE — HOSPICE
Patient findings/issues during SN visit:  Pain. Patient not wanting to take PRN medication as often in fear of addiction. Education provided.    Medication refills ordered this visit: none requested    Medications reconciled and all medications are available in the home this visit. Medications are not effective at this time.      Supplies by type and quantity ordered this visit include: none requested    Instructed patient/family on 24-hour hospice availability and phone number.    Plan for next visit:  Effective pain control

## 2024-05-10 ENCOUNTER — HOME CARE VISIT (OUTPATIENT)
Age: 74
End: 2024-05-10
Payer: MEDICARE

## 2024-05-10 VITALS
TEMPERATURE: 97.5 F | HEART RATE: 77 BPM | RESPIRATION RATE: 18 BRPM | SYSTOLIC BLOOD PRESSURE: 122 MMHG | OXYGEN SATURATION: 95 % | DIASTOLIC BLOOD PRESSURE: 94 MMHG

## 2024-05-10 PROCEDURE — G0299 HHS/HOSPICE OF RN EA 15 MIN: HCPCS

## 2024-05-10 ASSESSMENT — ENCOUNTER SYMPTOMS
DYSPNEA ACTIVITY LEVEL: AT REST
PAIN LOCATION - PAIN QUALITY: ACHING

## 2024-05-10 NOTE — HOSPICE
Patient/Caregiver/Family/Facility findings/issues during SN visit:  constant pain and dizziness    Medication refills ordered this visit: none needed    Medications reconciled and all medications are in the home this visit.  The following education was provided regarding medications, medication interactions, and look alike medications.  Response to teaching: caregiver verbalized understanding. Medications are not effective at this time.      Supplies by type and quantity ordered this visit include:  large breifs    Consulted medical director/attending physician regarding: DELMA Morales    Instructed patient/family/caregiver on 24-hour hospice availability and phone number.    Plan for next visit:  follow up    Pt is complaining of 9/10 pain and take morphine as needed. Medication wears off between the next does. pt  states she has a high tolerance. has a history of drug abuse. Depression has stayed the same. She also complains of dizzines at rest and with movement. Pt BP sitting is 122/85 and standing 100/65. Ask NP if pt needs to castellon lasix dose or discontinued. waitng for a response. pt states she hasn't been eating enough. Pt states when she does get hungry, which is rarely she will eat a cup of corn or a orange.

## 2024-05-14 ENCOUNTER — HOME CARE VISIT (OUTPATIENT)
Age: 74
End: 2024-05-14
Payer: MEDICARE

## 2024-05-14 VITALS
OXYGEN SATURATION: 98 % | DIASTOLIC BLOOD PRESSURE: 91 MMHG | TEMPERATURE: 97.7 F | HEART RATE: 87 BPM | RESPIRATION RATE: 20 BRPM | SYSTOLIC BLOOD PRESSURE: 168 MMHG

## 2024-05-14 PROCEDURE — G0299 HHS/HOSPICE OF RN EA 15 MIN: HCPCS

## 2024-05-14 NOTE — HOSPICE
Patient awake and alert, sitting up on the couch in the living room on room air.  Patient dressed and has her wig on.  Patient denies any pain and discomfort.  Patient has medication basket ready for writer to review while in the home.  She right away states that she needs a refil on her Melatonin.  Writer checked allmedications.  When asked how she is doing, patient states that she does a little cooking, takes her own showers, does a lot of things for herself.  Patient gives all pertinent information to writer as soon as writer sat down.      Patient apologizes for the temperature in apartment stating that the A/C is out and \"they are trying to get a new unit\".  Patient talks to this nurse about giving birth to her son in the [facility] and doing it without medication.  She states that is why she had just one.      LBM:  Yesterday     Per patient she waeighs about 110 per her scale yesterday.  She sttes that it fluctuates between 104 and 110 but she likes a lot of salad.      Pain:  She is okay today.  When asked, patient states that she takes it every so often.  She states that \"she\" fills up a bunch of them and I have only used 2-3.    Sleep is okay as long as I take both of those pills.      Morphine - 16 prefilled syringes, none needing refilled.  Patient states that she takes Tylenol when she has anypain    No falls per patient.  She states she uses her walker in the house; she states that they have motorized scooters.  She states that she only uses that in building as \"its too scary to take it out there.\"      Medication needs:  Melatonin    Supplies:  None needed per patient.

## 2024-05-17 ENCOUNTER — HOME CARE VISIT (OUTPATIENT)
Age: 74
End: 2024-05-17
Payer: MEDICARE

## 2024-05-17 VITALS
SYSTOLIC BLOOD PRESSURE: 158 MMHG | RESPIRATION RATE: 18 BRPM | HEART RATE: 109 BPM | OXYGEN SATURATION: 93 % | TEMPERATURE: 97.9 F | DIASTOLIC BLOOD PRESSURE: 84 MMHG

## 2024-05-17 PROCEDURE — G0300 HHS/HOSPICE OF LPN EA 15 MIN: HCPCS

## 2024-05-17 NOTE — HOSPICE
Patient/Caregiver/Family/Facility findings/issues during SN visit: Patient presents sitting up on her couch in her living room.  No signs of acute distress noted. Pt denies pain at this time,  BM yesterday.  Pt is not using Morphine at all for the past week.  No change in appetite.    Medication refills ordered this visit: Trazadone Order # [id number]    Medications reconciled and all medications are available in the home this visit.  The following education was provided regarding medications, medication interactions, and look alike medications: Medication side effects, dosages, purposes, frequencies.  Response to teaching: Verbalized understanding. Medications are effective at this time.      Supplies by type and quantity ordered this visit include: Not needed    Consulted medical director/attending physician regarding: Not needed    Instructed patient/family/caregiver on 24-hour hospice availability and phone number.    Plan for next visit:  Continue end of life education and support caregiver.

## 2024-05-20 ENCOUNTER — HOME CARE VISIT (OUTPATIENT)
Age: 74
End: 2024-05-20
Payer: MEDICARE

## 2024-05-20 NOTE — HOSPICE
PAtient was up and her  was right by her side. She seems to be doing well. She stated she feel good and that her shortness of breath is not as frequent.  will continue to follow.

## 2024-05-21 ENCOUNTER — HOME CARE VISIT (OUTPATIENT)
Age: 74
End: 2024-05-21
Payer: MEDICARE

## 2024-05-21 VITALS
RESPIRATION RATE: 18 BRPM | HEART RATE: 77 BPM | SYSTOLIC BLOOD PRESSURE: 132 MMHG | DIASTOLIC BLOOD PRESSURE: 78 MMHG | TEMPERATURE: 98.1 F | OXYGEN SATURATION: 95 %

## 2024-05-21 PROCEDURE — G0300 HHS/HOSPICE OF LPN EA 15 MIN: HCPCS

## 2024-05-21 ASSESSMENT — ENCOUNTER SYMPTOMS: PAIN LOCATION - PAIN QUALITY: ACHE

## 2024-05-21 NOTE — HOSPICE
Patient/Caregiver/Family/Facility findings/issues during SN visit: Patient presents sitting on her couch.  No signs of acute distress noted. Pt c/o pain in her right shoulder that is relieved with Tylenol.  No other complaints this visit.  Patient is awake and A&O x 4.      Medication refills ordered this visit: Not needed this visit    Medications reconciled and all medications are available in the home this visit.  The following education was provided regarding medications, medication interactions, and look alike medications: Medication side effects, dosages, purposes, frequencies.  Response to teaching: Verbalized understanding. Medications are effective at this time.      Supplies by type and quantity ordered this visit include:Two med pullup one large pullup---Order Number : [id number]    Consulted medical director/attending physician regarding: Not needed this visit.    Instructed patient/family/caregiver on 24-hour hospice availability and phone number.    Plan for next visit:  Continue end of life education and support caregiver.

## 2024-05-24 ENCOUNTER — HOME CARE VISIT (OUTPATIENT)
Age: 74
End: 2024-05-24
Payer: MEDICARE

## 2024-05-24 VITALS — RESPIRATION RATE: 20 BRPM | HEART RATE: 82 BPM | TEMPERATURE: 97.1 F | OXYGEN SATURATION: 96 %

## 2024-05-24 PROCEDURE — G0299 HHS/HOSPICE OF RN EA 15 MIN: HCPCS

## 2024-05-25 NOTE — HOSPICE
Pt. received awake, alert, in living room. Pt. was verbal during visit, stating she is eating well, takes rollator to go throughout apartment complex for takeout    Reports she is sleeping adequately, awaking several times throughout the night to urinate d/t taking lasix    Having frequent bowel movements, almost daily. Denies nausea/vomiting/diarrhea    States she is occasionally anxious, will take lorazepam when she becomes anxious that she states helps relieve her anxiety    At first denied SOB, however when asking further questions, she stated she becomes SOB with exertion    Has chronic pain d/t arthritis, however, states she does not have any other pain    Pt. denies needs of medications or supplies    Encouraged to call 24/7 Hospice number with any questions or concerns

## 2024-05-26 ENCOUNTER — HOME CARE VISIT (OUTPATIENT)
Age: 74
End: 2024-05-26
Payer: MEDICARE

## 2024-05-26 VITALS
SYSTOLIC BLOOD PRESSURE: 131 MMHG | HEART RATE: 85 BPM | TEMPERATURE: 99.1 F | OXYGEN SATURATION: 97 % | RESPIRATION RATE: 22 BRPM | DIASTOLIC BLOOD PRESSURE: 77 MMHG

## 2024-05-26 PROCEDURE — G0299 HHS/HOSPICE OF RN EA 15 MIN: HCPCS

## 2024-05-26 RX ADMIN — MORPHINE SULFATE 0.75 ML: 100 SOLUTION ORAL at 08:15

## 2024-05-28 ENCOUNTER — HOME CARE VISIT (OUTPATIENT)
Age: 74
End: 2024-05-28
Payer: MEDICARE

## 2024-05-28 PROCEDURE — G0300 HHS/HOSPICE OF LPN EA 15 MIN: HCPCS

## 2024-05-29 VITALS
HEART RATE: 86 BPM | TEMPERATURE: 98 F | SYSTOLIC BLOOD PRESSURE: 107 MMHG | RESPIRATION RATE: 18 BRPM | OXYGEN SATURATION: 92 % | DIASTOLIC BLOOD PRESSURE: 65 MMHG

## 2024-05-29 NOTE — HOSPICE
Patient/Caregiver/Family/Facility findings/issues during SN visit:  No new concerns this visit.  Symptoms appear to be managed at this time.  Vitals within normal limits.      Medication refills ordered this visit: Not needed this visit    Medications reconciled and all medications are available in the home this visit.  The following education was provided regarding medications, medication interactions, and look alike medications: Medication side effects, dosages, purposes, frequencies.  Response to teaching: Verbalized understanding. Medications are effective at this time.      Supplies by type and quantity ordered this visit include: Not needed this visit    Consulted medical director/attending physician regarding: Not needed    Instructed patient/family/caregiver on 24-hour hospice availability and phone number.    Plan for next visit:  Continue end of life education and support caregiver.

## 2024-05-30 ENCOUNTER — HOME CARE VISIT (OUTPATIENT)
Age: 74
End: 2024-05-30
Payer: MEDICARE

## 2024-05-30 PROCEDURE — G0155 HHCP-SVS OF CSW,EA 15 MIN: HCPCS

## 2024-05-31 ENCOUNTER — HOME CARE VISIT (OUTPATIENT)
Age: 74
End: 2024-05-31
Payer: MEDICARE

## 2024-05-31 PROCEDURE — G0299 HHS/HOSPICE OF RN EA 15 MIN: HCPCS

## 2024-05-31 NOTE — HOSPICE
Today's SW was a monthly visit to offer emotional support and assess any needs of pt.  SW was received at the apartment door by Antony.  Pt was asleep in her room when SW first arrived, but was very easily aroused by spouse.  The home is clean and tidy and there are no safety concerns noted by SW during visit. Pt walked into living area using her rollator.  She is dressed appropriately and she denies abuse/neglect by cg.  Pt has visibly gained weight and states she is 110 lbs now.  This is a 20-25 lbs gain since start of service.  Pt reports good appetite.  Pt reports she is beginning to have swollen legs and feet, which is being treated with lasix.  Pt reports she needs a refill for this med.  SW reports need to RN CM.  SW, CG, and pt discuss pt's progress since start of services.  Pt concerned she won't be approved for Medicaid benefits.  SW encourages her to continue the process.  SW assures pt the hospice care will be covered 100% by Medicare, even if Medicaid is discontinued.  No other needs report by pt/cg today.

## 2024-06-01 VITALS
SYSTOLIC BLOOD PRESSURE: 132 MMHG | DIASTOLIC BLOOD PRESSURE: 63 MMHG | HEART RATE: 90 BPM | RESPIRATION RATE: 18 BRPM | TEMPERATURE: 97.9 F | OXYGEN SATURATION: 97 %

## 2024-06-01 NOTE — HOSPICE
Patient asleep in be in her side with her hand in her food tray and oxygen in place via nasal canula.  Patient behavior indicators negative for pain and discomfort. Patient arouses easily to verbal stimuli.  Patient states that she is doing good.  Patient dressed today with dentures in and sheryl earrings in place.  Patient falls asleep easily throughout visit, when patient does arouse, she states that she is not sure why she keeps falling asleep.      Spouse states shes asleep all the time - most of the day.      She eats a lot per spouse.

## 2024-06-03 ENCOUNTER — HOME CARE VISIT (OUTPATIENT)
Age: 74
End: 2024-06-03
Payer: MEDICARE

## 2024-06-03 VITALS
DIASTOLIC BLOOD PRESSURE: 87 MMHG | SYSTOLIC BLOOD PRESSURE: 149 MMHG | HEART RATE: 83 BPM | OXYGEN SATURATION: 97 % | TEMPERATURE: 98.1 F

## 2024-06-03 PROCEDURE — G0299 HHS/HOSPICE OF RN EA 15 MIN: HCPCS

## 2024-06-03 ASSESSMENT — ENCOUNTER SYMPTOMS: PAIN LOCATION - PAIN QUALITY: ARTHRITIS

## 2024-06-04 ENCOUNTER — HOME CARE VISIT (OUTPATIENT)
Age: 74
End: 2024-06-04
Payer: MEDICARE

## 2024-06-04 NOTE — HOSPICE
Patient awake and alert sitting on edge of bed with oxygen in place via nasal canula.  Behavior indicators negative for pain.  Patient files her nails during visit.        Patient states she is depressed \"as usual\"    arthritis in shouldersand knees  Did not take antthing for ir    Patient states that she is not sleeping tpp.  She states that she will lay there with her eyes close but no really good sleep.  States I am tired of being depressed    LBM yesterday per pat    Eating: Sos-so per patient.  She states she just eats when she is hungry.      Patient hands writer the Mirtazipine tablets and states that they do not work at all.  Patient gets out her medications to take while this nurse bedside.  Patient takes a handful of pills all at once and then eats a pickle right after.        Patient moves around roome easily during visit an d reconfigures her AC unit with ease.      Patient takes Pregabalin 75mg X2 once a day vice BID    Writer provided NOMNOC to patient with her  present.  Writer provided information that this nurse will reach out to the provider as requested by patient  to assist with reestablishing care.  Patient asked for [facility] as secondary.  Writer explained to patient that we would not remove equipment until it is established with the new physician.        RFefills needed:  Trazadone, Zoloft 50mg tablets

## 2024-06-07 ENCOUNTER — HOME CARE VISIT (OUTPATIENT)
Age: 74
End: 2024-06-07
Payer: MEDICARE

## 2024-06-07 PROCEDURE — G0299 HHS/HOSPICE OF RN EA 15 MIN: HCPCS

## 2024-06-22 ENCOUNTER — HOSPITAL ENCOUNTER (EMERGENCY)
Facility: HOSPITAL | Age: 74
Discharge: HOME | DRG: 197 | End: 2024-06-25

## 2024-06-22 ENCOUNTER — APPOINTMENT (OUTPATIENT)
Facility: HOSPITAL | Age: 74
DRG: 197 | End: 2024-06-22

## 2024-06-22 ENCOUNTER — HOSPITAL ENCOUNTER (INPATIENT)
Facility: HOSPITAL | Age: 74
LOS: 8 days | Discharge: HOME HEALTH | DRG: 197 | End: 2024-06-30
Attending: INTERNAL MEDICINE | Admitting: INTERNAL MEDICINE
Payer: MEDICARE

## 2024-06-22 DIAGNOSIS — E13.69 OTHER SPECIFIED DIABETES MELLITUS WITH OTHER SPECIFIED COMPLICATION, UNSPECIFIED WHETHER LONG TERM INSULIN USE (HCC): ICD-10-CM

## 2024-06-22 DIAGNOSIS — I20.9 ANGINA PECTORIS (HCC): ICD-10-CM

## 2024-06-22 DIAGNOSIS — R73.9 HYPERGLYCEMIA: ICD-10-CM

## 2024-06-22 DIAGNOSIS — R55 SYNCOPE AND COLLAPSE: ICD-10-CM

## 2024-06-22 DIAGNOSIS — T67.1XXA HEAT SYNCOPE, INITIAL ENCOUNTER: Primary | ICD-10-CM

## 2024-06-22 LAB
ALBUMIN SERPL-MCNC: 3.5 G/DL (ref 3.4–5)
ALBUMIN/GLOB SERPL: 0.9 (ref 0.8–1.7)
ALP SERPL-CCNC: 116 U/L (ref 45–117)
ALT SERPL-CCNC: 18 U/L (ref 13–56)
ANION GAP SERPL CALC-SCNC: 6 MMOL/L (ref 3–18)
AST SERPL-CCNC: 16 U/L (ref 10–38)
BASOPHILS # BLD: 0 K/UL (ref 0–0.1)
BASOPHILS NFR BLD: 1 % (ref 0–2)
BILIRUB SERPL-MCNC: 0.4 MG/DL (ref 0.2–1)
BUN SERPL-MCNC: 21 MG/DL (ref 7–18)
BUN/CREAT SERPL: 22 (ref 12–20)
CALCIUM SERPL-MCNC: 8.5 MG/DL (ref 8.5–10.1)
CHLORIDE SERPL-SCNC: 113 MMOL/L (ref 100–111)
CO2 SERPL-SCNC: 23 MMOL/L (ref 21–32)
CREAT SERPL-MCNC: 0.95 MG/DL (ref 0.6–1.3)
DIFFERENTIAL METHOD BLD: ABNORMAL
EOSINOPHIL # BLD: 0.1 K/UL (ref 0–0.4)
EOSINOPHIL NFR BLD: 2 % (ref 0–5)
ERYTHROCYTE [DISTWIDTH] IN BLOOD BY AUTOMATED COUNT: 16.6 % (ref 11.6–14.5)
GLOBULIN SER CALC-MCNC: 3.7 G/DL (ref 2–4)
GLUCOSE SERPL-MCNC: 133 MG/DL (ref 74–99)
HCT VFR BLD AUTO: 27.7 % (ref 35–45)
HGB BLD-MCNC: 8.5 G/DL (ref 12–16)
IMM GRANULOCYTES # BLD AUTO: 0 K/UL (ref 0–0.04)
IMM GRANULOCYTES NFR BLD AUTO: 1 % (ref 0–0.5)
LYMPHOCYTES # BLD: 1.8 K/UL (ref 0.9–3.6)
LYMPHOCYTES NFR BLD: 32 % (ref 21–52)
MAGNESIUM SERPL-MCNC: 1.8 MG/DL (ref 1.6–2.6)
MCH RBC QN AUTO: 22.6 PG (ref 24–34)
MCHC RBC AUTO-ENTMCNC: 30.7 G/DL (ref 31–37)
MCV RBC AUTO: 73.7 FL (ref 78–100)
MONOCYTES # BLD: 0.5 K/UL (ref 0.05–1.2)
MONOCYTES NFR BLD: 9 % (ref 3–10)
NEUTS SEG # BLD: 3.1 K/UL (ref 1.8–8)
NEUTS SEG NFR BLD: 55 % (ref 40–73)
NRBC # BLD: 0 K/UL (ref 0–0.01)
NRBC BLD-RTO: 0 PER 100 WBC
NT PRO BNP: 297 PG/ML (ref 0–900)
PLATELET # BLD AUTO: 214 K/UL (ref 135–420)
POTASSIUM SERPL-SCNC: 3.6 MMOL/L (ref 3.5–5.5)
PROT SERPL-MCNC: 7.2 G/DL (ref 6.4–8.2)
RBC # BLD AUTO: 3.76 M/UL (ref 4.2–5.3)
SODIUM SERPL-SCNC: 142 MMOL/L (ref 136–145)
TROPONIN I SERPL HS-MCNC: 8 NG/L (ref 0–54)
TROPONIN I SERPL HS-MCNC: 8 NG/L (ref 0–54)
WBC # BLD AUTO: 5.6 K/UL (ref 4.6–13.2)

## 2024-06-22 PROCEDURE — 6370000000 HC RX 637 (ALT 250 FOR IP): Performed by: EMERGENCY MEDICINE

## 2024-06-22 PROCEDURE — 80053 COMPREHEN METABOLIC PANEL: CPT

## 2024-06-22 PROCEDURE — 1100000000 HC RM PRIVATE

## 2024-06-22 PROCEDURE — 93005 ELECTROCARDIOGRAM TRACING: CPT | Performed by: INTERNAL MEDICINE

## 2024-06-22 PROCEDURE — 83735 ASSAY OF MAGNESIUM: CPT

## 2024-06-22 PROCEDURE — 96374 THER/PROPH/DIAG INJ IV PUSH: CPT

## 2024-06-22 PROCEDURE — 72125 CT NECK SPINE W/O DYE: CPT

## 2024-06-22 PROCEDURE — 83880 ASSAY OF NATRIURETIC PEPTIDE: CPT

## 2024-06-22 PROCEDURE — 70450 CT HEAD/BRAIN W/O DYE: CPT

## 2024-06-22 PROCEDURE — 71045 X-RAY EXAM CHEST 1 VIEW: CPT

## 2024-06-22 PROCEDURE — 93005 ELECTROCARDIOGRAM TRACING: CPT | Performed by: EMERGENCY MEDICINE

## 2024-06-22 PROCEDURE — 84484 ASSAY OF TROPONIN QUANT: CPT

## 2024-06-22 PROCEDURE — 6370000000 HC RX 637 (ALT 250 FOR IP): Performed by: INTERNAL MEDICINE

## 2024-06-22 PROCEDURE — 71275 CT ANGIOGRAPHY CHEST: CPT

## 2024-06-22 PROCEDURE — 6360000004 HC RX CONTRAST MEDICATION: Performed by: FAMILY MEDICINE

## 2024-06-22 PROCEDURE — 6360000002 HC RX W HCPCS: Performed by: INTERNAL MEDICINE

## 2024-06-22 PROCEDURE — 99285 EMERGENCY DEPT VISIT HI MDM: CPT

## 2024-06-22 PROCEDURE — 6360000002 HC RX W HCPCS: Performed by: EMERGENCY MEDICINE

## 2024-06-22 PROCEDURE — 2580000003 HC RX 258: Performed by: INTERNAL MEDICINE

## 2024-06-22 PROCEDURE — 85025 COMPLETE CBC W/AUTO DIFF WBC: CPT

## 2024-06-22 RX ORDER — ATORVASTATIN CALCIUM 40 MG/1
40 TABLET, FILM COATED ORAL NIGHTLY
Status: DISCONTINUED | OUTPATIENT
Start: 2024-06-22 | End: 2024-06-27

## 2024-06-22 RX ORDER — ENOXAPARIN SODIUM 100 MG/ML
40 INJECTION SUBCUTANEOUS DAILY
Status: DISCONTINUED | OUTPATIENT
Start: 2024-06-23 | End: 2024-06-24

## 2024-06-22 RX ORDER — MAGNESIUM SULFATE IN WATER 40 MG/ML
2000 INJECTION, SOLUTION INTRAVENOUS PRN
Status: DISCONTINUED | OUTPATIENT
Start: 2024-06-22 | End: 2024-06-30 | Stop reason: HOSPADM

## 2024-06-22 RX ORDER — ASPIRIN 81 MG/1
81 TABLET, CHEWABLE ORAL DAILY
Status: DISCONTINUED | OUTPATIENT
Start: 2024-06-23 | End: 2024-06-27

## 2024-06-22 RX ORDER — ACETAMINOPHEN 650 MG/1
650 SUPPOSITORY RECTAL EVERY 6 HOURS PRN
Status: DISCONTINUED | OUTPATIENT
Start: 2024-06-22 | End: 2024-06-30 | Stop reason: HOSPADM

## 2024-06-22 RX ORDER — NITROGLYCERIN 0.4 MG/1
0.4 TABLET SUBLINGUAL EVERY 5 MIN PRN
Status: DISCONTINUED | OUTPATIENT
Start: 2024-06-22 | End: 2024-06-30 | Stop reason: HOSPADM

## 2024-06-22 RX ORDER — ONDANSETRON 2 MG/ML
4 INJECTION INTRAMUSCULAR; INTRAVENOUS EVERY 6 HOURS PRN
Status: DISCONTINUED | OUTPATIENT
Start: 2024-06-22 | End: 2024-06-30 | Stop reason: HOSPADM

## 2024-06-22 RX ORDER — SODIUM CHLORIDE 0.9 % (FLUSH) 0.9 %
5-40 SYRINGE (ML) INJECTION PRN
Status: DISCONTINUED | OUTPATIENT
Start: 2024-06-22 | End: 2024-06-30 | Stop reason: HOSPADM

## 2024-06-22 RX ORDER — ONDANSETRON 4 MG/1
4 TABLET, ORALLY DISINTEGRATING ORAL EVERY 8 HOURS PRN
Status: DISCONTINUED | OUTPATIENT
Start: 2024-06-22 | End: 2024-06-30 | Stop reason: HOSPADM

## 2024-06-22 RX ORDER — MORPHINE SULFATE 2 MG/ML
2 INJECTION, SOLUTION INTRAMUSCULAR; INTRAVENOUS
Status: COMPLETED | OUTPATIENT
Start: 2024-06-22 | End: 2024-06-22

## 2024-06-22 RX ORDER — SODIUM CHLORIDE 0.9 % (FLUSH) 0.9 %
5-40 SYRINGE (ML) INJECTION EVERY 12 HOURS SCHEDULED
Status: DISCONTINUED | OUTPATIENT
Start: 2024-06-22 | End: 2024-06-30 | Stop reason: HOSPADM

## 2024-06-22 RX ORDER — ASPIRIN 81 MG/1
324 TABLET, CHEWABLE ORAL
Status: COMPLETED | OUTPATIENT
Start: 2024-06-22 | End: 2024-06-22

## 2024-06-22 RX ORDER — POTASSIUM CHLORIDE 20 MEQ/1
40 TABLET, EXTENDED RELEASE ORAL PRN
Status: DISCONTINUED | OUTPATIENT
Start: 2024-06-22 | End: 2024-06-30 | Stop reason: HOSPADM

## 2024-06-22 RX ORDER — POTASSIUM CHLORIDE 7.45 MG/ML
10 INJECTION INTRAVENOUS PRN
Status: DISCONTINUED | OUTPATIENT
Start: 2024-06-22 | End: 2024-06-30 | Stop reason: HOSPADM

## 2024-06-22 RX ORDER — SODIUM CHLORIDE 9 MG/ML
INJECTION, SOLUTION INTRAVENOUS PRN
Status: DISCONTINUED | OUTPATIENT
Start: 2024-06-22 | End: 2024-06-30 | Stop reason: HOSPADM

## 2024-06-22 RX ORDER — ACETAMINOPHEN 325 MG/1
650 TABLET ORAL EVERY 6 HOURS PRN
Status: DISCONTINUED | OUTPATIENT
Start: 2024-06-22 | End: 2024-06-30 | Stop reason: HOSPADM

## 2024-06-22 RX ORDER — POLYETHYLENE GLYCOL 3350 17 G/17G
17 POWDER, FOR SOLUTION ORAL DAILY PRN
Status: DISCONTINUED | OUTPATIENT
Start: 2024-06-22 | End: 2024-06-25

## 2024-06-22 RX ORDER — MORPHINE SULFATE 4 MG/ML
4 INJECTION, SOLUTION INTRAMUSCULAR; INTRAVENOUS ONCE
Status: COMPLETED | OUTPATIENT
Start: 2024-06-22 | End: 2024-06-22

## 2024-06-22 RX ADMIN — MORPHINE SULFATE 2 MG: 2 INJECTION, SOLUTION INTRAMUSCULAR; INTRAVENOUS at 20:32

## 2024-06-22 RX ADMIN — ATORVASTATIN CALCIUM 40 MG: 40 TABLET, FILM COATED ORAL at 22:38

## 2024-06-22 RX ADMIN — IOPAMIDOL 85 ML: 755 INJECTION, SOLUTION INTRAVENOUS at 18:55

## 2024-06-22 RX ADMIN — MORPHINE SULFATE 4 MG: 4 INJECTION, SOLUTION INTRAMUSCULAR; INTRAVENOUS at 22:37

## 2024-06-22 RX ADMIN — SODIUM CHLORIDE, PRESERVATIVE FREE 10 ML: 5 INJECTION INTRAVENOUS at 22:37

## 2024-06-22 RX ADMIN — ASPIRIN 81 MG CHEWABLE TABLET 324 MG: 81 TABLET CHEWABLE at 15:43

## 2024-06-22 ASSESSMENT — PAIN SCALES - GENERAL
PAINLEVEL_OUTOF10: 9
PAINLEVEL_OUTOF10: 10
PAINLEVEL_OUTOF10: 5

## 2024-06-22 ASSESSMENT — PAIN DESCRIPTION - FREQUENCY: FREQUENCY: CONTINUOUS

## 2024-06-22 ASSESSMENT — PAIN DESCRIPTION - LOCATION
LOCATION: ABDOMEN
LOCATION: CHEST

## 2024-06-22 ASSESSMENT — PAIN DESCRIPTION - DESCRIPTORS: DESCRIPTORS: HEAVINESS

## 2024-06-22 ASSESSMENT — PAIN DESCRIPTION - ORIENTATION: ORIENTATION: MID

## 2024-06-22 ASSESSMENT — HEART SCORE: ECG: NON-SPECIFC REPOLARIZATION DISTURBANCE/LBTB/PM

## 2024-06-22 ASSESSMENT — PAIN - FUNCTIONAL ASSESSMENT: PAIN_FUNCTIONAL_ASSESSMENT: 0-10

## 2024-06-22 NOTE — ED PROVIDER NOTES
[facility] EMERGENCY DEPT  EMERGENCY DEPARTMENT ENCOUNTER      Pt Name: Juliana Loaiza  MRN: [id number]  Birthdate 1950  Date of evaluation: 6/22/2024  Provider: MODESTA Cabello  10:01 PM    CHIEF COMPLAINT       Chief Complaint   Patient presents with    Chest Pain    Shortness of Breath         HISTORY OF PRESENT ILLNESS    Juliana Loaiza is a 73 y.o. female who presents to the emergency department with a complaint of chest pain and shortness of breath.  She says she has been symptomatic starting yesterday.  Today she felt better enough to go to [facility].  He came home is putting things away and then the next thing she knows she believes she passed out.  History of sarcoidosis and she was told that her heart has to work harder because of her lungs.  Denies fever cough.  Denies abdominal pain vomiting.  History of MI; she is not actively followed by any specialist that she just finished a round of hospice and she decided to come off of that service a week ago Friday.  Review of notes indicates no longer meets hospice criteria as she is no longer terminally ill.    HPI    Nursing Notes were reviewed.    REVIEW OF SYSTEMS       Review of Systems   Constitutional: Negative.    HENT: Negative.     Eyes: Negative.    Respiratory:  Positive for shortness of breath.    Cardiovascular:  Positive for chest pain.   Gastrointestinal:  Negative for abdominal pain.   Endocrine: Negative.    Genitourinary: Negative.    Musculoskeletal: Negative.    Skin: Negative.    Allergic/Immunologic: Negative.    Neurological: Negative.    Hematological: Negative.    Psychiatric/Behavioral: Negative.         Except as noted above the remainder of the review of systems was reviewed and negative.       PAST MEDICAL HISTORY     Past Medical History:   Diagnosis Date    Arthritis     \"generalized\"    Asthma     CAD in native artery     NSTEMI (Sep 2014); diffuse 65% pLAD, minimal disease RCA & LCx. pLAD FFR 0.93. LV  no RWMA

## 2024-06-22 NOTE — ED TRIAGE NOTES
Pt presents to ed via ems with c/o Chest pain & sob x45 minutes. Pt stated she was at home when she started having symptoms and sat on loveseat then 'felt room spinning & blacked out'. Pt states when she came too she was on the floor. Does not remember if she fell.     Ems gave 1 sublingual nitro at scene and 324mg aspirin in ambulance.     Stated she has hx sarcoidosis.

## 2024-06-23 LAB
AMPHET UR QL SCN: NEGATIVE
BARBITURATES UR QL SCN: NEGATIVE
BENZODIAZ UR QL: NEGATIVE
CANNABINOIDS UR QL SCN: NEGATIVE
COCAINE UR QL SCN: NEGATIVE
EKG ATRIAL RATE: 78 BPM
EKG ATRIAL RATE: 88 BPM
EKG DIAGNOSIS: NORMAL
EKG DIAGNOSIS: NORMAL
EKG P AXIS: 48 DEGREES
EKG P AXIS: 52 DEGREES
EKG P-R INTERVAL: 128 MS
EKG P-R INTERVAL: 134 MS
EKG Q-T INTERVAL: 374 MS
EKG Q-T INTERVAL: 398 MS
EKG QRS DURATION: 80 MS
EKG QRS DURATION: 82 MS
EKG QTC CALCULATION (BAZETT): 452 MS
EKG QTC CALCULATION (BAZETT): 453 MS
EKG R AXIS: -16 DEGREES
EKG R AXIS: -9 DEGREES
EKG T AXIS: 44 DEGREES
EKG T AXIS: 46 DEGREES
EKG VENTRICULAR RATE: 78 BPM
EKG VENTRICULAR RATE: 88 BPM
ERYTHROCYTE [DISTWIDTH] IN BLOOD BY AUTOMATED COUNT: 16.5 % (ref 11.6–14.5)
HCT VFR BLD AUTO: 30 % (ref 35–45)
HGB BLD-MCNC: 9.2 G/DL (ref 12–16)
Lab: ABNORMAL
MCH RBC QN AUTO: 22.8 PG (ref 24–34)
MCHC RBC AUTO-ENTMCNC: 30.7 G/DL (ref 31–37)
MCV RBC AUTO: 74.3 FL (ref 78–100)
METHADONE UR QL: NEGATIVE
NRBC # BLD: 0 K/UL (ref 0–0.01)
NRBC BLD-RTO: 0 PER 100 WBC
OPIATES UR QL: POSITIVE
PCP UR QL: NEGATIVE
PLATELET # BLD AUTO: 243 K/UL (ref 135–420)
RBC # BLD AUTO: 4.04 M/UL (ref 4.2–5.3)
WBC # BLD AUTO: 4.4 K/UL (ref 4.6–13.2)

## 2024-06-23 PROCEDURE — 6360000002 HC RX W HCPCS: Performed by: INTERNAL MEDICINE

## 2024-06-23 PROCEDURE — 93010 ELECTROCARDIOGRAM REPORT: CPT | Performed by: INTERNAL MEDICINE

## 2024-06-23 PROCEDURE — 85027 COMPLETE CBC AUTOMATED: CPT

## 2024-06-23 PROCEDURE — 99222 1ST HOSP IP/OBS MODERATE 55: CPT | Performed by: INTERNAL MEDICINE

## 2024-06-23 PROCEDURE — 6370000000 HC RX 637 (ALT 250 FOR IP): Performed by: INTERNAL MEDICINE

## 2024-06-23 PROCEDURE — 6370000000 HC RX 637 (ALT 250 FOR IP): Performed by: HOSPITALIST

## 2024-06-23 PROCEDURE — 2700000000 HC OXYGEN THERAPY PER DAY

## 2024-06-23 PROCEDURE — 99232 SBSQ HOSP IP/OBS MODERATE 35: CPT | Performed by: HOSPITALIST

## 2024-06-23 PROCEDURE — 80307 DRUG TEST PRSMV CHEM ANLYZR: CPT

## 2024-06-23 PROCEDURE — 36415 COLL VENOUS BLD VENIPUNCTURE: CPT

## 2024-06-23 PROCEDURE — 94761 N-INVAS EAR/PLS OXIMETRY MLT: CPT

## 2024-06-23 PROCEDURE — 2580000003 HC RX 258: Performed by: INTERNAL MEDICINE

## 2024-06-23 PROCEDURE — 1100000003 HC PRIVATE W/ TELEMETRY

## 2024-06-23 RX ORDER — OXYCODONE HYDROCHLORIDE AND ACETAMINOPHEN 5; 325 MG/1; MG/1
1 TABLET ORAL EVERY 6 HOURS PRN
Status: DISCONTINUED | OUTPATIENT
Start: 2024-06-23 | End: 2024-06-23

## 2024-06-23 RX ORDER — OXYCODONE HYDROCHLORIDE AND ACETAMINOPHEN 5; 325 MG/1; MG/1
1 TABLET ORAL EVERY 4 HOURS PRN
Status: DISCONTINUED | OUTPATIENT
Start: 2024-06-23 | End: 2024-06-28

## 2024-06-23 RX ORDER — DIPHENHYDRAMINE HCL 25 MG
25 CAPSULE ORAL 2 TIMES DAILY PRN
Status: DISCONTINUED | OUTPATIENT
Start: 2024-06-23 | End: 2024-06-30 | Stop reason: HOSPADM

## 2024-06-23 RX ADMIN — ATORVASTATIN CALCIUM 40 MG: 40 TABLET, FILM COATED ORAL at 20:54

## 2024-06-23 RX ADMIN — OXYCODONE HYDROCHLORIDE AND ACETAMINOPHEN 1 TABLET: 5; 325 TABLET ORAL at 09:30

## 2024-06-23 RX ADMIN — SODIUM CHLORIDE, PRESERVATIVE FREE 10 ML: 5 INJECTION INTRAVENOUS at 09:30

## 2024-06-23 RX ADMIN — OXYCODONE HYDROCHLORIDE AND ACETAMINOPHEN 1 TABLET: 5; 325 TABLET ORAL at 16:23

## 2024-06-23 RX ADMIN — ONDANSETRON 4 MG: 4 TABLET, ORALLY DISINTEGRATING ORAL at 13:43

## 2024-06-23 RX ADMIN — HYDROMORPHONE HYDROCHLORIDE 1 MG: 1 INJECTION, SOLUTION INTRAMUSCULAR; INTRAVENOUS; SUBCUTANEOUS at 01:57

## 2024-06-23 RX ADMIN — DIPHENHYDRAMINE HYDROCHLORIDE 25 MG: 25 CAPSULE ORAL at 21:33

## 2024-06-23 RX ADMIN — ENOXAPARIN SODIUM 40 MG: 100 INJECTION SUBCUTANEOUS at 09:29

## 2024-06-23 RX ADMIN — ASPIRIN 81 MG CHEWABLE TABLET 81 MG: 81 TABLET CHEWABLE at 09:30

## 2024-06-23 RX ADMIN — SODIUM CHLORIDE, PRESERVATIVE FREE 10 ML: 5 INJECTION INTRAVENOUS at 21:31

## 2024-06-23 RX ADMIN — OXYCODONE HYDROCHLORIDE AND ACETAMINOPHEN 1 TABLET: 5; 325 TABLET ORAL at 20:54

## 2024-06-23 ASSESSMENT — PAIN DESCRIPTION - ONSET
ONSET: ON-GOING

## 2024-06-23 ASSESSMENT — PAIN - FUNCTIONAL ASSESSMENT

## 2024-06-23 ASSESSMENT — PAIN DESCRIPTION - FREQUENCY
FREQUENCY: CONTINUOUS

## 2024-06-23 ASSESSMENT — PAIN DESCRIPTION - LOCATION
LOCATION: ABDOMEN

## 2024-06-23 ASSESSMENT — PAIN SCALES - GENERAL
PAINLEVEL_OUTOF10: 4
PAINLEVEL_OUTOF10: 9
PAINLEVEL_OUTOF10: 4
PAINLEVEL_OUTOF10: 2
PAINLEVEL_OUTOF10: 8
PAINLEVEL_OUTOF10: 8
PAINLEVEL_OUTOF10: 7
PAINLEVEL_OUTOF10: 4
PAINLEVEL_OUTOF10: 2
PAINLEVEL_OUTOF10: 4
PAINLEVEL_OUTOF10: 9

## 2024-06-23 ASSESSMENT — PAIN DESCRIPTION - ORIENTATION
ORIENTATION: ANTERIOR
ORIENTATION: RIGHT
ORIENTATION: ANTERIOR
ORIENTATION: ANTERIOR
ORIENTATION: RIGHT;LEFT;MID
ORIENTATION: ANTERIOR
ORIENTATION: RIGHT
ORIENTATION: RIGHT

## 2024-06-23 ASSESSMENT — PAIN DESCRIPTION - DESCRIPTORS
DESCRIPTORS: ACHING
DESCRIPTORS: SHARP
DESCRIPTORS: ACHING
DESCRIPTORS: THROBBING;ACHING;SHARP;STABBING
DESCRIPTORS: ACHING
DESCRIPTORS: ACHING;CRAMPING

## 2024-06-23 ASSESSMENT — PAIN DESCRIPTION - PAIN TYPE
TYPE: CHRONIC PAIN

## 2024-06-23 NOTE — FLOWSHEET NOTE
06/23/24 0130   Vital Signs   Temp 97.9 °F (36.6 °C)   Temp Source Oral   Pulse 73   Respirations 16   BP (!) 160/68   MAP (Calculated) 99     Patient was admitted during the shift, report received from Yenny ROBERTS, ED. Patient was accompanied by her . She was oriented to her room and she verbalised understanding. Bed alarm on, bed was kept in a low position, call bell and bed side table within reach.

## 2024-06-23 NOTE — H&P
INTERNAL MEDICINE H & P/ CONSULT      Patient:  Juliana Loaiza  MRN: [id number]    Consulting Physician: verónica Clark  Reason for Consult/Admit : chest pain  Primacy Care Physician: Ezequiel Michel MD      Assessment and Plan       Chest pain  Syncope  COPD, stable  Tobaccoism  DM2  Pulmonary sarcoidosis  Depression  History of syncope  Old septal infaract  GERD    Plan:  UDS, 2D Echo  NPO Midnight  Dobutamine stress test in the morning(after ACS ruled out)  Trend troponins, telemetry monitor  Resume home medication once med list verified by pharmacy    Patient Active Problem List   Diagnosis    Knee pain, left    DM (diabetes mellitus) ([facility])    Non-ST elevated myocardial infarction (non-STEMI) ([facility])    GERD (gastroesophageal reflux disease)    Cigarette smoker    Hidradenitis    HTN (hypertension)    Hyperglycemia    Osteoporosis    RVH (right ventricular hypertrophy)    Cocaine abuse ([facility])    Chest pain    Anemia    CAD in native artery    COPD, moderate ([facility])    Chronic pain syndrome    HNP (herniated nucleus pulposus), cervical    Pulmonary sarcoidosis ([facility])    Closed head injury    Diverticulitis    Abdominal pain    Recurrent falls    Chronic back pain    Insomnia    DJD (degenerative joint disease) of knee    Syncope and collapse    Depression    IRA (acute kidney injury) ([facility])    Chronic respiratory failure with hypoxia (HCC)    Gastroparesis         HISTORY OF PRESENT ILLNESS:   Juliana Loaiza is a 73 y.o. female with a history of sarcoidosis, cocaine use, tobaccoism, DM2, COPD, Depression, among others who presents with syncope and chest pain.    She states she began having substernal chest pain, left arm, neck pain and dyspnea 45 minutes prior to arrival.    This was followed by a syncopal episode during which she states she hit her head.    Workup including CT head/Cervical spine/Chest were nondiagnostic.  EKG was nonischemic(old septal infarct).    She was recently on hospice

## 2024-06-23 NOTE — CONSULTS
[facility] - Consult Note    Cardiology consultation request from hospitalist for evaluation and management/treatment of chest pain    Date of  Admission: 6/22/2024  3:16 PM   Primary Care Physician:  Ezequiel Michel MD         Assessment:     Patient Active Problem List    Diagnosis Date Noted    Gastroparesis 10/21/2022    IRA (acute kidney injury) ([facility]) 10/19/2022    Pulmonary sarcoidosis ([facility]) 10/16/2019    Chronic respiratory failure with hypoxia ([facility]) 10/16/2019    CAD in native artery     Hyperglycemia 11/05/2017    Closed head injury 11/05/2017    Chronic back pain 11/05/2017    Syncope and collapse 11/05/2017    Diverticulitis 10/25/2014    Abdominal pain 10/25/2014    Anemia 09/12/2014    Non-ST elevated myocardial infarction (non-STEMI) ([facility]) 09/11/2014    GERD (gastroesophageal reflux disease) 09/11/2014    Cigarette smoker 09/11/2014    Hidradenitis 09/11/2014    RVH (right ventricular hypertrophy) 09/11/2014    Cocaine abuse ([facility]) 09/11/2014    Recurrent falls 09/11/2014    Insomnia 09/11/2014    Chest pain 09/10/2014    DM (diabetes mellitus) ([facility]) 01/15/2014    HTN (hypertension) 01/15/2014    Osteoporosis 01/15/2014    HNP (herniated nucleus pulposus), cervical 01/15/2014    Knee pain, left 09/08/2011    COPD, moderate ([facility]) 09/08/2011    Chronic pain syndrome 09/08/2011    DJD (degenerative joint disease) of knee 09/08/2011    Depression 09/08/2011     --Chest pain.  Both typical and atypical features for angina.  Given her multiple cardiac risk factors, I would recommend pursuing a pharmacologic nuclear stress test.  Echocardiogram to eval for any pericardial disease  --Nonobstructive CAD.  Historical diagnosis based on cardiac catheterization in 2014 when she was found to have a proximal LAD moderate stenosis which was negative by FFR.  Last nuclear stress test in 2018 was negative for ischemia.  --History of pulmonary sarcoidosis.  No recent flares.  No longer on chronic

## 2024-06-23 NOTE — ED NOTES
Called report on pt to 5N  Pt transported to the floor with transporter  Pt transported on stretcher with 3L NC   Relinquished care at this time

## 2024-06-24 ENCOUNTER — HOSPITAL ENCOUNTER (INPATIENT)
Facility: HOSPITAL | Age: 74
Discharge: HOME | DRG: 197 | End: 2024-06-27

## 2024-06-24 ENCOUNTER — APPOINTMENT (OUTPATIENT)
Facility: HOSPITAL | Age: 74
DRG: 197 | End: 2024-06-24

## 2024-06-24 ENCOUNTER — APPOINTMENT (OUTPATIENT)
Facility: HOSPITAL | Age: 74
DRG: 197 | End: 2024-06-24
Attending: INTERNAL MEDICINE
Payer: MEDICARE

## 2024-06-24 PROBLEM — F11.29 OPIOID DEPENDENCE WITH OPIOID-INDUCED DISORDER (HCC): Status: ACTIVE | Noted: 2024-06-24

## 2024-06-24 PROBLEM — D86.9 SARCOIDOSIS: Status: ACTIVE | Noted: 2024-06-24

## 2024-06-24 PROBLEM — T67.1XXA HEAT SYNCOPE: Status: ACTIVE | Noted: 2024-06-24

## 2024-06-24 PROBLEM — Z71.89 GOALS OF CARE, COUNSELING/DISCUSSION: Status: ACTIVE | Noted: 2024-06-24

## 2024-06-24 PROBLEM — Z51.5 ENCOUNTER FOR PALLIATIVE CARE: Status: ACTIVE | Noted: 2024-06-24

## 2024-06-24 LAB
ECHO AO ASC DIAM: 3 CM
ECHO AO ASCENDING AORTA INDEX: 2.03 CM/M2
ECHO AO ROOT DIAM: 2.5 CM
ECHO AO ROOT INDEX: 1.69 CM/M2
ECHO AV AREA PEAK VELOCITY: 1.6 CM2
ECHO AV AREA VTI: 1.8 CM2
ECHO AV AREA/BSA PEAK VELOCITY: 1.1 CM2/M2
ECHO AV AREA/BSA VTI: 1.2 CM2/M2
ECHO AV MEAN GRADIENT: 10 MMHG
ECHO AV MEAN VELOCITY: 1.5 M/S
ECHO AV PEAK GRADIENT: 17 MMHG
ECHO AV PEAK VELOCITY: 2 M/S
ECHO AV VELOCITY RATIO: 0.7
ECHO AV VTI: 42.9 CM
ECHO BSA: 1.48 M2
ECHO EST RA PRESSURE: 3 MMHG
ECHO LA DIAMETER INDEX: 2.16 CM/M2
ECHO LA DIAMETER: 3.2 CM
ECHO LA TO AORTIC ROOT RATIO: 1.28
ECHO LA VOL A-L A2C: 32 ML (ref 22–52)
ECHO LA VOL A-L A4C: 41 ML (ref 22–52)
ECHO LA VOL MOD A2C: 31 ML (ref 22–52)
ECHO LA VOL MOD A4C: 37 ML (ref 22–52)
ECHO LA VOLUME AREA LENGTH: 38 ML
ECHO LA VOLUME INDEX A-L A2C: 22 ML/M2 (ref 16–34)
ECHO LA VOLUME INDEX A-L A4C: 28 ML/M2 (ref 16–34)
ECHO LA VOLUME INDEX AREA LENGTH: 26 ML/M2 (ref 16–34)
ECHO LA VOLUME INDEX MOD A2C: 21 ML/M2 (ref 16–34)
ECHO LA VOLUME INDEX MOD A4C: 25 ML/M2 (ref 16–34)
ECHO LV EDV A2C: 46 ML
ECHO LV EDV A4C: 43 ML
ECHO LV EDV BP: 45 ML (ref 56–104)
ECHO LV EDV INDEX A4C: 29 ML/M2
ECHO LV EDV INDEX BP: 30 ML/M2
ECHO LV EDV NDEX A2C: 31 ML/M2
ECHO LV EJECTION FRACTION A2C: 53 %
ECHO LV EJECTION FRACTION A4C: 73 %
ECHO LV EJECTION FRACTION BIPLANE: 64 % (ref 55–100)
ECHO LV ESV A2C: 22 ML
ECHO LV ESV A4C: 12 ML
ECHO LV ESV BP: 16 ML (ref 19–49)
ECHO LV ESV INDEX A2C: 15 ML/M2
ECHO LV ESV INDEX A4C: 8 ML/M2
ECHO LV ESV INDEX BP: 11 ML/M2
ECHO LV FRACTIONAL SHORTENING: 31 % (ref 28–44)
ECHO LV INTERNAL DIMENSION DIASTOLE INDEX: 1.96 CM/M2
ECHO LV INTERNAL DIMENSION DIASTOLIC: 2.9 CM (ref 3.9–5.3)
ECHO LV INTERNAL DIMENSION SYSTOLIC INDEX: 1.35 CM/M2
ECHO LV INTERNAL DIMENSION SYSTOLIC: 2 CM
ECHO LV IVSD: 1.3 CM (ref 0.6–0.9)
ECHO LV MASS 2D: 118.7 G (ref 67–162)
ECHO LV MASS INDEX 2D: 80.2 G/M2 (ref 43–95)
ECHO LV POSTERIOR WALL DIASTOLIC: 1.3 CM (ref 0.6–0.9)
ECHO LV RELATIVE WALL THICKNESS RATIO: 0.9
ECHO LVOT AREA: 2.3 CM2
ECHO LVOT AV VTI INDEX: 0.74
ECHO LVOT DIAM: 1.7 CM
ECHO LVOT MEAN GRADIENT: 5 MMHG
ECHO LVOT PEAK GRADIENT: 8 MMHG
ECHO LVOT PEAK VELOCITY: 1.4 M/S
ECHO LVOT STROKE VOLUME INDEX: 48.4 ML/M2
ECHO LVOT SV: 71.7 ML
ECHO LVOT VTI: 31.6 CM
ECHO MV A VELOCITY: 1.2 M/S
ECHO MV AREA PHT: 3.8 CM2
ECHO MV AREA VTI: 2.1 CM2
ECHO MV E DECELERATION TIME (DT): 193.4 MS
ECHO MV E VELOCITY: 1.41 M/S
ECHO MV E/A RATIO: 1.18
ECHO MV LVOT VTI INDEX: 1.1
ECHO MV MAX VELOCITY: 1.5 M/S
ECHO MV MEAN GRADIENT: 3 MMHG
ECHO MV MEAN VELOCITY: 0.9 M/S
ECHO MV PEAK GRADIENT: 9 MMHG
ECHO MV PRESSURE HALF TIME (PHT): 57.2 MS
ECHO MV VTI: 34.8 CM
ECHO PV MAX VELOCITY: 0.8 M/S
ECHO PV PEAK GRADIENT: 2 MMHG
ECHO RIGHT VENTRICULAR SYSTOLIC PRESSURE (RVSP): 60 MMHG
ECHO RV BASAL DIMENSION: 4.7 CM
ECHO RVOT PEAK GRADIENT: 1 MMHG
ECHO RVOT PEAK VELOCITY: 0.4 M/S
ECHO TV REGURGITANT MAX VELOCITY: 3.76 M/S
ECHO TV REGURGITANT PEAK GRADIENT: 57 MMHG

## 2024-06-24 PROCEDURE — 99233 SBSQ HOSP IP/OBS HIGH 50: CPT | Performed by: PHYSICIAN ASSISTANT

## 2024-06-24 PROCEDURE — 93306 TTE W/DOPPLER COMPLETE: CPT | Performed by: INTERNAL MEDICINE

## 2024-06-24 PROCEDURE — 93017 CV STRESS TEST TRACING ONLY: CPT

## 2024-06-24 PROCEDURE — A9502 TC99M TETROFOSMIN: HCPCS | Performed by: INTERNAL MEDICINE

## 2024-06-24 PROCEDURE — 6370000000 HC RX 637 (ALT 250 FOR IP): Performed by: INTERNAL MEDICINE

## 2024-06-24 PROCEDURE — 6370000000 HC RX 637 (ALT 250 FOR IP): Performed by: PHYSICIAN ASSISTANT

## 2024-06-24 PROCEDURE — 2580000003 HC RX 258: Performed by: PHYSICIAN ASSISTANT

## 2024-06-24 PROCEDURE — 99223 1ST HOSP IP/OBS HIGH 75: CPT | Performed by: NURSE PRACTITIONER

## 2024-06-24 PROCEDURE — 2580000003 HC RX 258: Performed by: INTERNAL MEDICINE

## 2024-06-24 PROCEDURE — 1100000003 HC PRIVATE W/ TELEMETRY

## 2024-06-24 PROCEDURE — 6360000002 HC RX W HCPCS: Performed by: INTERNAL MEDICINE

## 2024-06-24 PROCEDURE — 93306 TTE W/DOPPLER COMPLETE: CPT

## 2024-06-24 PROCEDURE — 6360000002 HC RX W HCPCS: Performed by: PHYSICIAN ASSISTANT

## 2024-06-24 PROCEDURE — 3430000000 HC RX DIAGNOSTIC RADIOPHARMACEUTICAL: Performed by: INTERNAL MEDICINE

## 2024-06-24 RX ORDER — REGADENOSON 0.08 MG/ML
0.4 INJECTION, SOLUTION INTRAVENOUS
Status: COMPLETED | OUTPATIENT
Start: 2024-06-24 | End: 2024-06-24

## 2024-06-24 RX ORDER — ENOXAPARIN SODIUM 100 MG/ML
30 INJECTION SUBCUTANEOUS DAILY
Status: DISCONTINUED | OUTPATIENT
Start: 2024-06-25 | End: 2024-06-26

## 2024-06-24 RX ORDER — IPRATROPIUM BROMIDE AND ALBUTEROL SULFATE 2.5; .5 MG/3ML; MG/3ML
1 SOLUTION RESPIRATORY (INHALATION)
Status: DISCONTINUED | OUTPATIENT
Start: 2024-06-24 | End: 2024-06-28

## 2024-06-24 RX ORDER — 0.9 % SODIUM CHLORIDE 0.9 %
250 INTRAVENOUS SOLUTION INTRAVENOUS ONCE
Status: COMPLETED | OUTPATIENT
Start: 2024-06-24 | End: 2024-06-24

## 2024-06-24 RX ADMIN — OXYCODONE HYDROCHLORIDE AND ACETAMINOPHEN 1 TABLET: 5; 325 TABLET ORAL at 20:26

## 2024-06-24 RX ADMIN — SODIUM CHLORIDE 250 ML: 9 INJECTION, SOLUTION INTRAVENOUS at 10:24

## 2024-06-24 RX ADMIN — ATORVASTATIN CALCIUM 40 MG: 40 TABLET, FILM COATED ORAL at 20:26

## 2024-06-24 RX ADMIN — ONDANSETRON 4 MG: 2 INJECTION INTRAMUSCULAR; INTRAVENOUS at 00:02

## 2024-06-24 RX ADMIN — REGADENOSON 0.4 MG: 0.08 INJECTION, SOLUTION INTRAVENOUS at 10:24

## 2024-06-24 RX ADMIN — WATER 40 MG: 1 INJECTION INTRAMUSCULAR; INTRAVENOUS; SUBCUTANEOUS at 17:50

## 2024-06-24 RX ADMIN — OXYCODONE HYDROCHLORIDE AND ACETAMINOPHEN 1 TABLET: 5; 325 TABLET ORAL at 12:13

## 2024-06-24 RX ADMIN — DIPHENHYDRAMINE HYDROCHLORIDE 25 MG: 25 CAPSULE ORAL at 16:27

## 2024-06-24 RX ADMIN — TETROFOSMIN 11 MILLICURIE: 1.38 INJECTION, POWDER, LYOPHILIZED, FOR SOLUTION INTRAVENOUS at 10:23

## 2024-06-24 RX ADMIN — TETROFOSMIN 33 MILLICURIE: 1.38 INJECTION, POWDER, LYOPHILIZED, FOR SOLUTION INTRAVENOUS at 10:24

## 2024-06-24 RX ADMIN — AZITHROMYCIN MONOHYDRATE 500 MG: 500 INJECTION, POWDER, LYOPHILIZED, FOR SOLUTION INTRAVENOUS at 17:49

## 2024-06-24 RX ADMIN — SODIUM CHLORIDE, PRESERVATIVE FREE 10 ML: 5 INJECTION INTRAVENOUS at 20:27

## 2024-06-24 RX ADMIN — SODIUM CHLORIDE, PRESERVATIVE FREE 10 ML: 5 INJECTION INTRAVENOUS at 11:26

## 2024-06-24 RX ADMIN — OXYCODONE HYDROCHLORIDE AND ACETAMINOPHEN 1 TABLET: 5; 325 TABLET ORAL at 00:58

## 2024-06-24 RX ADMIN — OXYCODONE HYDROCHLORIDE AND ACETAMINOPHEN 1 TABLET: 5; 325 TABLET ORAL at 16:25

## 2024-06-24 ASSESSMENT — PAIN SCALES - GENERAL
PAINLEVEL_OUTOF10: 0
PAINLEVEL_OUTOF10: 2
PAINLEVEL_OUTOF10: 2
PAINLEVEL_OUTOF10: 0
PAINLEVEL_OUTOF10: 2
PAINLEVEL_OUTOF10: 7
PAINLEVEL_OUTOF10: 0
PAINLEVEL_OUTOF10: 6
PAINLEVEL_OUTOF10: 0
PAINLEVEL_OUTOF10: 8
PAINLEVEL_OUTOF10: 0
PAINLEVEL_OUTOF10: 0
PAINLEVEL_OUTOF10: 8

## 2024-06-24 ASSESSMENT — PAIN DESCRIPTION - LOCATION
LOCATION: ABDOMEN
LOCATION: GENERALIZED
LOCATION: OTHER (COMMENT)
LOCATION: ABDOMEN
LOCATION: GENERALIZED

## 2024-06-24 ASSESSMENT — PAIN DESCRIPTION - ONSET: ONSET: ON-GOING

## 2024-06-24 ASSESSMENT — PAIN DESCRIPTION - DESCRIPTORS
DESCRIPTORS: ACHING

## 2024-06-24 ASSESSMENT — PAIN DESCRIPTION - ORIENTATION
ORIENTATION: MID;RIGHT;LEFT
ORIENTATION: INNER
ORIENTATION: INNER

## 2024-06-24 ASSESSMENT — PAIN - FUNCTIONAL ASSESSMENT
PAIN_FUNCTIONAL_ASSESSMENT: ACTIVITIES ARE NOT PREVENTED
PAIN_FUNCTIONAL_ASSESSMENT: ACTIVITIES ARE NOT PREVENTED

## 2024-06-24 ASSESSMENT — PAIN DESCRIPTION - FREQUENCY: FREQUENCY: CONTINUOUS

## 2024-06-24 ASSESSMENT — PAIN DESCRIPTION - PAIN TYPE: TYPE: CHRONIC PAIN

## 2024-06-24 NOTE — ACP (ADVANCE CARE PLANNING)
Advance Care Planning     Palliative Team Advance Care Planning (ACP) Conversation    Date of Conversation: 06/24/24     ACP documents on file prior to discussion:  -Portable DNR    Healthcare Decision Maker:    Primary Decision Maker: PURA LOAIZA - Spouse - [phone]     Conversation Summary:    Visited patient for new consult along with Palliative team member ALLISON William NP. Patient lying in bed, awake and alert.  at bedside. Patient is endorsing some \"nausea\" and \"shortness of breath\". Oxygen in use. Patient does use oxygen at home at 3-4L N/C.    Patient states she was on hospice services up until two weeks ago, adding, \"they discharged me\". Asked patient if she is interested in going back on hospice and she declined.     Discussed goals of care. Patient does have a DDNR on file. Form reviewed with patient and . Both voiced understanding. Patient initially stated she was ok with being a \"DNR\", however, she referred to her  who stated, \"I don't want to lose my wife\".   Further discussion regarding goals of care. Patient hesitated then relayed that she would be accepting of \"CPR\" but would not want to be on \"life support\" long term.  is agreeable to make that decision when that time comes. Patient understands that she will be listed as a \"full code\".  DDNR form revoked, patient was agreeable, form re-scanned into the EMR.     Palliative team would like to re-visit and address goals of care when patient is feeling better. Our team remains available to provide support to Ms Loaiza and her family during this hospital stay.    Resuscitation Status:   Code Status: Full Code     Documentation Completed:  Revoked DDNR    Mary Richardson RN  Palliative Medicine Inpatient RN  Palliative COPE Line: [phone]

## 2024-06-24 NOTE — HOME CARE
Received home health referral -  No HH orders noted at this time.  However, upon reviewing chart.  [facility] is unable to accept this patient at this time.  Out of Network with [employer] ([employer]) with (YTW) prefix.

## 2024-06-24 NOTE — CARE COORDINATION
06/24/24 1444   Discharge Planning   Living Arrangements Family Members   Support Systems Family Members   Potential Assistance Needed Home Care   Potential Assistance Purchasing Medications No   Type of Home Care Services None   Patient expects to be discharged to: Apartment   Expected Discharge Date 06/25/24     CM spoke with p at her bedside.  Pt will return home when medically ready, possibly with home health services.    CM will continue to monitor pt's discharge needs.    Maverick Li RN Case Manager

## 2024-06-24 NOTE — CONSULTS
Palliative Medicine  Patient Name: Juliana Loaiza  YOB: 1950  MRN: [id number]  Age: 73 y.o.  Gender: female    Date of Initial Consult: 6/24/2024   Date of Service: 6/24/2024  Time: 3:05 PM  Provider: ASHIA Oswald NP  Hospital Day: 3  Admit Date: 6/22/2024  Referring Provider: Dr Carrington        Reasons for Consultation:  Goals of Care    HISTORY OF PRESENT ILLNESS (HPI):   Juliana Loaiza is a 73 y.o. female with a past medical history of sarcoidosis, cocaine use, tobacco abuse, diabetic, COPD and depression, who was admitted on 6/22/2024 from home with a diagnosis of chest pain and syncope.  Patient presented to the ER with chest pain left arm pain neck pain shortness of breath for 45 minutes.  She shared this was followed by syncopal episode in which she hit her head.  In the ER CT of the head and cervical spine and chest were negative EKG showed a nonischemic called infarct.  Of note patient was recently on hospice for sarcoidosis.  Per patient and  hospice discharged her as she was not declining.  Cardiology has been consulted and following.  Palliative medicine is consulted for support and goals of care.    6/24/2024 patient returned from dobutamine stress test.  She is alert oriented x 3 tells that she is nauseous.      PALLIATIVE DIAGNOSES:    Goals of care/advance care plan decision  Encounter for palliative care  Sarcoidosis   Chest pain   Opioid dependence     ASSESSMENT AND PLAN:   Goals of care / advanced care plan discussions     June 24, 2024 Patient seen along with Ms Dylan RN. Patient is alert oriented x 3. Feels nauseous after stress test.  at bedside. We introduced our team and purpose for visit. Patient shares she was recently on hospice however discharged as she was not declining.  Hospice agency discharged her.  Patient tells us she is up moving around at home and can handle her ADLs.  We ask if she was comfortable with being discharged from

## 2024-06-25 PROBLEM — R09.02 HYPOXIA: Status: ACTIVE | Noted: 2024-06-25

## 2024-06-25 PROBLEM — R06.00 DYSPNEA: Status: ACTIVE | Noted: 2024-06-25

## 2024-06-25 LAB
ALBUMIN SERPL-MCNC: 3.3 G/DL (ref 3.4–5)
ALBUMIN SERPL-MCNC: 3.4 G/DL (ref 3.4–5)
ANION GAP SERPL CALC-SCNC: 5 MMOL/L (ref 3–18)
ANION GAP SERPL CALC-SCNC: 6 MMOL/L (ref 3–18)
B PERT DNA SPEC QL NAA+PROBE: NOT DETECTED
BASOPHILS # BLD: 0 K/UL (ref 0–0.1)
BASOPHILS NFR BLD: 0 % (ref 0–2)
BORDETELLA PARAPERTUSSIS BY PCR: NOT DETECTED
BUN SERPL-MCNC: 43 MG/DL (ref 7–18)
BUN SERPL-MCNC: 45 MG/DL (ref 7–18)
BUN/CREAT SERPL: 22 (ref 12–20)
BUN/CREAT SERPL: 30 (ref 12–20)
C PNEUM DNA SPEC QL NAA+PROBE: NOT DETECTED
CALCIUM SERPL-MCNC: 8.4 MG/DL (ref 8.5–10.1)
CALCIUM SERPL-MCNC: 8.4 MG/DL (ref 8.5–10.1)
CHLORIDE SERPL-SCNC: 108 MMOL/L (ref 100–111)
CHLORIDE SERPL-SCNC: 111 MMOL/L (ref 100–111)
CO2 SERPL-SCNC: 19 MMOL/L (ref 21–32)
CO2 SERPL-SCNC: 23 MMOL/L (ref 21–32)
CREAT SERPL-MCNC: 1.44 MG/DL (ref 0.6–1.3)
CREAT SERPL-MCNC: 2.03 MG/DL (ref 0.6–1.3)
CRP SERPL-MCNC: <0.3 MG/DL (ref 0–0.3)
DIFFERENTIAL METHOD BLD: ABNORMAL
ECHO BSA: 1.48 M2
EOSINOPHIL # BLD: 0 K/UL (ref 0–0.4)
EOSINOPHIL NFR BLD: 0 % (ref 0–5)
ERYTHROCYTE [DISTWIDTH] IN BLOOD BY AUTOMATED COUNT: 16.6 % (ref 11.6–14.5)
ERYTHROCYTE [SEDIMENTATION RATE] IN BLOOD: 50 MM/HR (ref 0–30)
FLUAV SUBTYP SPEC NAA+PROBE: NOT DETECTED
FLUBV RNA SPEC QL NAA+PROBE: NOT DETECTED
GLUCOSE BLD STRIP.AUTO-MCNC: 165 MG/DL (ref 70–110)
GLUCOSE BLD STRIP.AUTO-MCNC: 254 MG/DL (ref 70–110)
GLUCOSE BLD STRIP.AUTO-MCNC: 308 MG/DL (ref 70–110)
GLUCOSE SERPL-MCNC: 287 MG/DL (ref 74–99)
GLUCOSE SERPL-MCNC: 302 MG/DL (ref 74–99)
HADV DNA SPEC QL NAA+PROBE: NOT DETECTED
HCOV 229E RNA SPEC QL NAA+PROBE: NOT DETECTED
HCOV HKU1 RNA SPEC QL NAA+PROBE: NOT DETECTED
HCOV NL63 RNA SPEC QL NAA+PROBE: NOT DETECTED
HCOV OC43 RNA SPEC QL NAA+PROBE: NOT DETECTED
HCT VFR BLD AUTO: 32.1 % (ref 35–45)
HGB BLD-MCNC: 9.5 G/DL (ref 12–16)
HMPV RNA SPEC QL NAA+PROBE: NOT DETECTED
HPIV1 RNA SPEC QL NAA+PROBE: NOT DETECTED
HPIV2 RNA SPEC QL NAA+PROBE: NOT DETECTED
HPIV3 RNA SPEC QL NAA+PROBE: NOT DETECTED
HPIV4 RNA SPEC QL NAA+PROBE: NOT DETECTED
IMM GRANULOCYTES # BLD AUTO: 0.1 K/UL (ref 0–0.04)
IMM GRANULOCYTES NFR BLD AUTO: 1 % (ref 0–0.5)
L PNEUMO AG UR QL IA: NEGATIVE
LYMPHOCYTES # BLD: 0.6 K/UL (ref 0.9–3.6)
LYMPHOCYTES NFR BLD: 10 % (ref 21–52)
M PNEUMO DNA SPEC QL NAA+PROBE: NOT DETECTED
MCH RBC QN AUTO: 22.3 PG (ref 24–34)
MCHC RBC AUTO-ENTMCNC: 29.6 G/DL (ref 31–37)
MCV RBC AUTO: 75.4 FL (ref 78–100)
MONOCYTES # BLD: 0 K/UL (ref 0.05–1.2)
MONOCYTES NFR BLD: 1 % (ref 3–10)
NEUTS SEG # BLD: 5.3 K/UL (ref 1.8–8)
NEUTS SEG NFR BLD: 88 % (ref 40–73)
NRBC # BLD: 0 K/UL (ref 0–0.01)
NRBC BLD-RTO: 0 PER 100 WBC
NUC STRESS EJECTION FRACTION: 80 %
PHOSPHATE SERPL-MCNC: 3 MG/DL (ref 2.5–4.9)
PHOSPHATE SERPL-MCNC: 4.3 MG/DL (ref 2.5–4.9)
PLATELET # BLD AUTO: 166 K/UL (ref 135–420)
POTASSIUM SERPL-SCNC: 5.3 MMOL/L (ref 3.5–5.5)
POTASSIUM SERPL-SCNC: 5.6 MMOL/L (ref 3.5–5.5)
PROCALCITONIN SERPL-MCNC: 0.15 NG/ML
RBC # BLD AUTO: 4.26 M/UL (ref 4.2–5.3)
RSV RNA SPEC QL NAA+PROBE: NOT DETECTED
RV+EV RNA SPEC QL NAA+PROBE: NOT DETECTED
S PNEUM AG UR QL: NEGATIVE
SARS-COV-2 RNA RESP QL NAA+PROBE: NOT DETECTED
SODIUM SERPL-SCNC: 136 MMOL/L (ref 136–145)
SODIUM SERPL-SCNC: 136 MMOL/L (ref 136–145)
STRESS BASELINE DIAS BP: 70 MMHG
STRESS BASELINE HR: 71 BPM
STRESS BASELINE SYS BP: 137 MMHG
STRESS ESTIMATED WORKLOAD: 1 METS
STRESS PEAK DIAS BP: 70 MMHG
STRESS PEAK SYS BP: 137 MMHG
STRESS PERCENT HR ACHIEVED: 67 %
STRESS POST PEAK HR: 100 BPM
STRESS RATE PRESSURE PRODUCT: NORMAL BPM*MMHG
STRESS TARGET HR: 149 BPM
TID: 1.19
WBC # BLD AUTO: 6 K/UL (ref 4.6–13.2)

## 2024-06-25 PROCEDURE — 94761 N-INVAS EAR/PLS OXIMETRY MLT: CPT

## 2024-06-25 PROCEDURE — 6360000002 HC RX W HCPCS: Performed by: FAMILY MEDICINE

## 2024-06-25 PROCEDURE — 6360000002 HC RX W HCPCS: Performed by: PHYSICIAN ASSISTANT

## 2024-06-25 PROCEDURE — 87205 SMEAR GRAM STAIN: CPT

## 2024-06-25 PROCEDURE — 99222 1ST HOSP IP/OBS MODERATE 55: CPT | Performed by: INTERNAL MEDICINE

## 2024-06-25 PROCEDURE — 82962 GLUCOSE BLOOD TEST: CPT

## 2024-06-25 PROCEDURE — 6370000000 HC RX 637 (ALT 250 FOR IP): Performed by: INTERNAL MEDICINE

## 2024-06-25 PROCEDURE — 99232 SBSQ HOSP IP/OBS MODERATE 35: CPT

## 2024-06-25 PROCEDURE — 86140 C-REACTIVE PROTEIN: CPT

## 2024-06-25 PROCEDURE — 87077 CULTURE AEROBIC IDENTIFY: CPT

## 2024-06-25 PROCEDURE — 6370000000 HC RX 637 (ALT 250 FOR IP): Performed by: PHYSICIAN ASSISTANT

## 2024-06-25 PROCEDURE — 94640 AIRWAY INHALATION TREATMENT: CPT

## 2024-06-25 PROCEDURE — 6360000002 HC RX W HCPCS

## 2024-06-25 PROCEDURE — 51798 US URINE CAPACITY MEASURE: CPT

## 2024-06-25 PROCEDURE — 99232 SBSQ HOSP IP/OBS MODERATE 35: CPT | Performed by: PHYSICIAN ASSISTANT

## 2024-06-25 PROCEDURE — 85652 RBC SED RATE AUTOMATED: CPT

## 2024-06-25 PROCEDURE — 87449 NOS EACH ORGANISM AG IA: CPT

## 2024-06-25 PROCEDURE — 6370000000 HC RX 637 (ALT 250 FOR IP): Performed by: STUDENT IN AN ORGANIZED HEALTH CARE EDUCATION/TRAINING PROGRAM

## 2024-06-25 PROCEDURE — 84145 PROCALCITONIN (PCT): CPT

## 2024-06-25 PROCEDURE — 2580000003 HC RX 258: Performed by: INTERNAL MEDICINE

## 2024-06-25 PROCEDURE — 2580000003 HC RX 258

## 2024-06-25 PROCEDURE — 0202U NFCT DS 22 TRGT SARS-COV-2: CPT

## 2024-06-25 PROCEDURE — 87070 CULTURE OTHR SPECIMN AEROBIC: CPT

## 2024-06-25 PROCEDURE — 85025 COMPLETE CBC W/AUTO DIFF WBC: CPT

## 2024-06-25 PROCEDURE — APPSS15 APP SPLIT SHARED TIME 0-15 MINUTES

## 2024-06-25 PROCEDURE — 36415 COLL VENOUS BLD VENIPUNCTURE: CPT

## 2024-06-25 PROCEDURE — 2700000000 HC OXYGEN THERAPY PER DAY

## 2024-06-25 PROCEDURE — 80069 RENAL FUNCTION PANEL: CPT

## 2024-06-25 PROCEDURE — 2580000003 HC RX 258: Performed by: PHYSICIAN ASSISTANT

## 2024-06-25 PROCEDURE — C9113 INJ PANTOPRAZOLE SODIUM, VIA: HCPCS

## 2024-06-25 PROCEDURE — 82164 ANGIOTENSIN I ENZYME TEST: CPT

## 2024-06-25 PROCEDURE — 87186 SC STD MICRODIL/AGAR DIL: CPT

## 2024-06-25 PROCEDURE — 1100000003 HC PRIVATE W/ TELEMETRY

## 2024-06-25 PROCEDURE — 94664 DEMO&/EVAL PT USE INHALER: CPT

## 2024-06-25 RX ORDER — IPRATROPIUM BROMIDE AND ALBUTEROL SULFATE 2.5; .5 MG/3ML; MG/3ML
1 SOLUTION RESPIRATORY (INHALATION) EVERY 4 HOURS PRN
Status: DISCONTINUED | OUTPATIENT
Start: 2024-06-25 | End: 2024-06-30 | Stop reason: HOSPADM

## 2024-06-25 RX ORDER — POLYETHYLENE GLYCOL 3350 17 G/17G
17 POWDER, FOR SOLUTION ORAL DAILY
Status: DISCONTINUED | OUTPATIENT
Start: 2024-06-25 | End: 2024-06-30 | Stop reason: HOSPADM

## 2024-06-25 RX ORDER — INSULIN LISPRO 100 [IU]/ML
0-16 INJECTION, SOLUTION INTRAVENOUS; SUBCUTANEOUS
Status: DISCONTINUED | OUTPATIENT
Start: 2024-06-25 | End: 2024-06-30 | Stop reason: HOSPADM

## 2024-06-25 RX ORDER — DEXTROSE MONOHYDRATE 100 MG/ML
INJECTION, SOLUTION INTRAVENOUS CONTINUOUS PRN
Status: DISCONTINUED | OUTPATIENT
Start: 2024-06-25 | End: 2024-06-30 | Stop reason: HOSPADM

## 2024-06-25 RX ORDER — ROPINIROLE 0.25 MG/1
0.5 TABLET, FILM COATED ORAL 3 TIMES DAILY
Status: DISCONTINUED | OUTPATIENT
Start: 2024-06-25 | End: 2024-06-30 | Stop reason: HOSPADM

## 2024-06-25 RX ORDER — HYDROXYZINE HYDROCHLORIDE 10 MG/1
10 TABLET, FILM COATED ORAL 3 TIMES DAILY PRN
Status: DISCONTINUED | OUTPATIENT
Start: 2024-06-25 | End: 2024-06-30 | Stop reason: HOSPADM

## 2024-06-25 RX ORDER — ARFORMOTEROL TARTRATE 15 UG/2ML
15 SOLUTION RESPIRATORY (INHALATION)
Status: DISCONTINUED | OUTPATIENT
Start: 2024-06-25 | End: 2024-06-25

## 2024-06-25 RX ORDER — INSULIN GLARGINE 100 [IU]/ML
5 INJECTION, SOLUTION SUBCUTANEOUS DAILY
Status: DISCONTINUED | OUTPATIENT
Start: 2024-06-25 | End: 2024-06-26

## 2024-06-25 RX ORDER — SODIUM CHLORIDE, SODIUM LACTATE, POTASSIUM CHLORIDE, CALCIUM CHLORIDE 600; 310; 30; 20 MG/100ML; MG/100ML; MG/100ML; MG/100ML
INJECTION, SOLUTION INTRAVENOUS CONTINUOUS
Status: DISCONTINUED | OUTPATIENT
Start: 2024-06-25 | End: 2024-06-28

## 2024-06-25 RX ORDER — BUDESONIDE 1 MG/2ML
1 INHALANT ORAL
Status: DISCONTINUED | OUTPATIENT
Start: 2024-06-25 | End: 2024-06-30 | Stop reason: HOSPADM

## 2024-06-25 RX ORDER — INSULIN LISPRO 100 [IU]/ML
0-4 INJECTION, SOLUTION INTRAVENOUS; SUBCUTANEOUS NIGHTLY
Status: DISCONTINUED | OUTPATIENT
Start: 2024-06-25 | End: 2024-06-30 | Stop reason: HOSPADM

## 2024-06-25 RX ADMIN — OXYCODONE HYDROCHLORIDE AND ACETAMINOPHEN 1 TABLET: 5; 325 TABLET ORAL at 08:35

## 2024-06-25 RX ADMIN — ROPINIROLE HYDROCHLORIDE 0.5 MG: 0.25 TABLET, FILM COATED ORAL at 13:42

## 2024-06-25 RX ADMIN — DIPHENHYDRAMINE HYDROCHLORIDE 25 MG: 25 CAPSULE ORAL at 00:54

## 2024-06-25 RX ADMIN — IPRATROPIUM BROMIDE AND ALBUTEROL SULFATE 1 DOSE: .5; 3 SOLUTION RESPIRATORY (INHALATION) at 17:56

## 2024-06-25 RX ADMIN — ACETAMINOPHEN 325MG 650 MG: 325 TABLET ORAL at 13:43

## 2024-06-25 RX ADMIN — DIPHENHYDRAMINE HYDROCHLORIDE 25 MG: 25 CAPSULE ORAL at 08:36

## 2024-06-25 RX ADMIN — INSULIN LISPRO 8 UNITS: 100 INJECTION, SOLUTION INTRAVENOUS; SUBCUTANEOUS at 12:55

## 2024-06-25 RX ADMIN — SODIUM CHLORIDE, POTASSIUM CHLORIDE, SODIUM LACTATE AND CALCIUM CHLORIDE: 600; 310; 30; 20 INJECTION, SOLUTION INTRAVENOUS at 18:16

## 2024-06-25 RX ADMIN — PANTOPRAZOLE SODIUM 40 MG: 40 INJECTION, POWDER, FOR SOLUTION INTRAVENOUS at 17:41

## 2024-06-25 RX ADMIN — HYDROXYZINE HYDROCHLORIDE 10 MG: 10 TABLET, FILM COATED ORAL at 15:05

## 2024-06-25 RX ADMIN — INSULIN LISPRO 4 UNITS: 100 INJECTION, SOLUTION INTRAVENOUS; SUBCUTANEOUS at 22:36

## 2024-06-25 RX ADMIN — OXYCODONE HYDROCHLORIDE AND ACETAMINOPHEN 1 TABLET: 5; 325 TABLET ORAL at 00:51

## 2024-06-25 RX ADMIN — SODIUM ZIRCONIUM CYCLOSILICATE 10 G: 10 POWDER, FOR SUSPENSION ORAL at 17:34

## 2024-06-25 RX ADMIN — ENOXAPARIN SODIUM 30 MG: 100 INJECTION SUBCUTANEOUS at 08:28

## 2024-06-25 RX ADMIN — SODIUM CHLORIDE, PRESERVATIVE FREE 10 ML: 5 INJECTION INTRAVENOUS at 08:31

## 2024-06-25 RX ADMIN — INSULIN GLARGINE 5 UNITS: 100 INJECTION, SOLUTION SUBCUTANEOUS at 12:55

## 2024-06-25 RX ADMIN — OXYCODONE HYDROCHLORIDE AND ACETAMINOPHEN 1 TABLET: 5; 325 TABLET ORAL at 22:35

## 2024-06-25 RX ADMIN — IPRATROPIUM BROMIDE AND ALBUTEROL SULFATE 1 DOSE: .5; 3 SOLUTION RESPIRATORY (INHALATION) at 07:59

## 2024-06-25 RX ADMIN — SODIUM CHLORIDE, PRESERVATIVE FREE 10 ML: 5 INJECTION INTRAVENOUS at 22:39

## 2024-06-25 RX ADMIN — OXYCODONE HYDROCHLORIDE AND ACETAMINOPHEN 1 TABLET: 5; 325 TABLET ORAL at 18:16

## 2024-06-25 RX ADMIN — WATER 40 MG: 1 INJECTION INTRAMUSCULAR; INTRAVENOUS; SUBCUTANEOUS at 08:28

## 2024-06-25 RX ADMIN — ROPINIROLE HYDROCHLORIDE 0.5 MG: 0.25 TABLET, FILM COATED ORAL at 22:35

## 2024-06-25 RX ADMIN — ATORVASTATIN CALCIUM 40 MG: 40 TABLET, FILM COATED ORAL at 22:36

## 2024-06-25 RX ADMIN — POLYETHYLENE GLYCOL 3350 17 G: 17 POWDER, FOR SOLUTION ORAL at 15:05

## 2024-06-25 RX ADMIN — ASPIRIN 81 MG CHEWABLE TABLET 81 MG: 81 TABLET CHEWABLE at 08:28

## 2024-06-25 RX ADMIN — AZITHROMYCIN MONOHYDRATE 500 MG: 500 INJECTION, POWDER, LYOPHILIZED, FOR SOLUTION INTRAVENOUS at 17:39

## 2024-06-25 ASSESSMENT — PAIN SCALES - GENERAL
PAINLEVEL_OUTOF10: 7
PAINLEVEL_OUTOF10: 10
PAINLEVEL_OUTOF10: 3
PAINLEVEL_OUTOF10: 0
PAINLEVEL_OUTOF10: 0
PAINLEVEL_OUTOF10: 4
PAINLEVEL_OUTOF10: 3
PAINLEVEL_OUTOF10: 10
PAINLEVEL_OUTOF10: 0
PAINLEVEL_OUTOF10: 0
PAINLEVEL_OUTOF10: 6
PAINLEVEL_OUTOF10: 7
PAINLEVEL_OUTOF10: 4

## 2024-06-25 ASSESSMENT — PAIN DESCRIPTION - ONSET
ONSET: ON-GOING

## 2024-06-25 ASSESSMENT — PAIN DESCRIPTION - ORIENTATION
ORIENTATION: RIGHT
ORIENTATION: INNER
ORIENTATION: RIGHT
ORIENTATION: INNER
ORIENTATION: INNER
ORIENTATION: RIGHT
ORIENTATION: INNER

## 2024-06-25 ASSESSMENT — PAIN DESCRIPTION - LOCATION
LOCATION: GENERALIZED
LOCATION: ABDOMEN
LOCATION: GENERALIZED

## 2024-06-25 ASSESSMENT — PAIN DESCRIPTION - PAIN TYPE
TYPE: CHRONIC PAIN

## 2024-06-25 ASSESSMENT — PAIN - FUNCTIONAL ASSESSMENT
PAIN_FUNCTIONAL_ASSESSMENT: ACTIVITIES ARE NOT PREVENTED

## 2024-06-25 ASSESSMENT — PAIN DESCRIPTION - FREQUENCY
FREQUENCY: CONTINUOUS

## 2024-06-25 ASSESSMENT — PAIN DESCRIPTION - DESCRIPTORS
DESCRIPTORS: ACHING

## 2024-06-25 ASSESSMENT — PAIN SCALES - WONG BAKER: WONGBAKER_NUMERICALRESPONSE: NO HURT

## 2024-06-25 NOTE — CONSULTS
[facility]  Pulmonary, Critical Care, and Sleep Medicine    Name: Juliana Loaiza MRN: [id number]   : 1950 Hospital: [facility]   Date: 2024        Pulmonary -Medicine: Initial Patient Consult    Admission Date:   2024  LOS: 3  MAR reviewed and pertinent medications noted or modified as needed    IMPRESSION:   Hx of sarcoidosis with concern for acute flare  GGO/Scattered atelectasis and alveolitis vs edema on both lungs on CT .  NT-BNP not elevated, PCL neg  Off steroids for last year while on hospice.  Last sarcoid flare up 25yrs ago with hemoptysis  Hx of polysubstance abuse, Nicotine dependence and cocaine use, quit 14 months ago  COPD, possible AE on spiriva and prn duoneb and nebs. Inconsistent inhaler use due to   Chronic respiratory failure, on LTOT 2-3L baseline, and oxygen concentrator. Associated chronic dyspnea.  Atypical chest pain  Syncopal episode   Diastolic HFpEF 60-65% on .   GERD  IRA  Hyperkalemia  Moderate Pulmonary hypertension, moderate, WHO possibly grp 3/2  Dilated RV on Echo , RVSP 60mmHg, dilated RA  Deconditioning  Recently on hospice care  for about a year till 2024  Advanced age  Depression  CAD, non obstructive, last cath  with moderate stenosis in proximal LAD  Healthy weight, BMI Body mass index is 20.11 kg/m².  CODE STATUS: Full Code No additional code details       Patient Active Problem List   Diagnosis    Knee pain, left    DM (diabetes mellitus) ([facility])    Non-ST elevated myocardial infarction (non-STEMI) ([facility])    GERD (gastroesophageal reflux disease)    Cigarette smoker    Hidradenitis    HTN (hypertension)    Hyperglycemia    Osteoporosis    RVH (right ventricular hypertrophy)    Cocaine abuse (HCC)    Chest pain    Anemia    CAD in native artery    COPD, moderate (HCC)    Chronic pain syndrome    HNP (herniated nucleus pulposus), cervical    Pulmonary sarcoidosis (HCC)    Closed head

## 2024-06-26 ENCOUNTER — APPOINTMENT (OUTPATIENT)
Facility: HOSPITAL | Age: 74
DRG: 197 | End: 2024-06-26

## 2024-06-26 PROBLEM — T67.1XXA HEAT SYNCOPE: Status: RESOLVED | Noted: 2024-06-24 | Resolved: 2024-06-26

## 2024-06-26 PROBLEM — R06.00 DYSPNEA: Status: RESOLVED | Noted: 2024-06-25 | Resolved: 2024-06-26

## 2024-06-26 PROBLEM — N17.9 ACUTE KIDNEY INJURY (HCC): Status: ACTIVE | Noted: 2022-10-19

## 2024-06-26 PROBLEM — D86.9 SARCOID: Status: RESOLVED | Noted: 2024-06-24 | Resolved: 2024-06-26

## 2024-06-26 LAB
ALBUMIN SERPL-MCNC: 3.4 G/DL (ref 3.4–5)
ANION GAP SERPL CALC-SCNC: 11 MMOL/L (ref 3–18)
BASOPHILS # BLD: 0 K/UL (ref 0–0.1)
BASOPHILS NFR BLD: 0 % (ref 0–2)
BUN SERPL-MCNC: 36 MG/DL (ref 7–18)
BUN/CREAT SERPL: 33 (ref 12–20)
CALCIUM SERPL-MCNC: 8.6 MG/DL (ref 8.5–10.1)
CHLORIDE SERPL-SCNC: 111 MMOL/L (ref 100–111)
CO2 SERPL-SCNC: 22 MMOL/L (ref 21–32)
CREAT SERPL-MCNC: 1.08 MG/DL (ref 0.6–1.3)
DIFFERENTIAL METHOD BLD: ABNORMAL
EOSINOPHIL # BLD: 0 K/UL (ref 0–0.4)
EOSINOPHIL NFR BLD: 0 % (ref 0–5)
ERYTHROCYTE [DISTWIDTH] IN BLOOD BY AUTOMATED COUNT: 16.5 % (ref 11.6–14.5)
GLUCOSE BLD STRIP.AUTO-MCNC: 152 MG/DL (ref 70–110)
GLUCOSE BLD STRIP.AUTO-MCNC: 203 MG/DL (ref 70–110)
GLUCOSE BLD STRIP.AUTO-MCNC: 283 MG/DL (ref 70–110)
GLUCOSE BLD STRIP.AUTO-MCNC: 81 MG/DL (ref 70–110)
GLUCOSE SERPL-MCNC: 78 MG/DL (ref 74–99)
HCT VFR BLD AUTO: 27.5 % (ref 35–45)
HGB BLD-MCNC: 8.2 G/DL (ref 12–16)
IMM GRANULOCYTES # BLD AUTO: 0 K/UL (ref 0–0.04)
IMM GRANULOCYTES NFR BLD AUTO: 0 % (ref 0–0.5)
LYMPHOCYTES # BLD: 1.7 K/UL (ref 0.9–3.6)
LYMPHOCYTES NFR BLD: 23 % (ref 21–52)
MCH RBC QN AUTO: 22.4 PG (ref 24–34)
MCHC RBC AUTO-ENTMCNC: 29.8 G/DL (ref 31–37)
MCV RBC AUTO: 75.1 FL (ref 78–100)
MONOCYTES # BLD: 0.7 K/UL (ref 0.05–1.2)
MONOCYTES NFR BLD: 10 % (ref 3–10)
NEUTS SEG # BLD: 5 K/UL (ref 1.8–8)
NEUTS SEG NFR BLD: 66 % (ref 40–73)
NRBC # BLD: 0 K/UL (ref 0–0.01)
NRBC BLD-RTO: 0 PER 100 WBC
PHOSPHATE SERPL-MCNC: 3 MG/DL (ref 2.5–4.9)
PLATELET # BLD AUTO: 224 K/UL (ref 135–420)
POTASSIUM SERPL-SCNC: 4.5 MMOL/L (ref 3.5–5.5)
RBC # BLD AUTO: 3.66 M/UL (ref 4.2–5.3)
SODIUM SERPL-SCNC: 144 MMOL/L (ref 136–145)
WBC # BLD AUTO: 7.5 K/UL (ref 4.6–13.2)

## 2024-06-26 PROCEDURE — 6370000000 HC RX 637 (ALT 250 FOR IP): Performed by: INTERNAL MEDICINE

## 2024-06-26 PROCEDURE — 6360000002 HC RX W HCPCS: Performed by: HOSPITALIST

## 2024-06-26 PROCEDURE — 2580000003 HC RX 258: Performed by: PHYSICIAN ASSISTANT

## 2024-06-26 PROCEDURE — 94640 AIRWAY INHALATION TREATMENT: CPT

## 2024-06-26 PROCEDURE — 82962 GLUCOSE BLOOD TEST: CPT

## 2024-06-26 PROCEDURE — 6360000002 HC RX W HCPCS: Performed by: FAMILY MEDICINE

## 2024-06-26 PROCEDURE — 80069 RENAL FUNCTION PANEL: CPT

## 2024-06-26 PROCEDURE — 6360000002 HC RX W HCPCS

## 2024-06-26 PROCEDURE — 76770 US EXAM ABDO BACK WALL COMP: CPT

## 2024-06-26 PROCEDURE — 94761 N-INVAS EAR/PLS OXIMETRY MLT: CPT

## 2024-06-26 PROCEDURE — 6370000000 HC RX 637 (ALT 250 FOR IP): Performed by: STUDENT IN AN ORGANIZED HEALTH CARE EDUCATION/TRAINING PROGRAM

## 2024-06-26 PROCEDURE — 99232 SBSQ HOSP IP/OBS MODERATE 35: CPT | Performed by: INTERNAL MEDICINE

## 2024-06-26 PROCEDURE — 6370000000 HC RX 637 (ALT 250 FOR IP): Performed by: PHYSICIAN ASSISTANT

## 2024-06-26 PROCEDURE — 6360000002 HC RX W HCPCS: Performed by: PHYSICIAN ASSISTANT

## 2024-06-26 PROCEDURE — 2580000003 HC RX 258: Performed by: INTERNAL MEDICINE

## 2024-06-26 PROCEDURE — 36415 COLL VENOUS BLD VENIPUNCTURE: CPT

## 2024-06-26 PROCEDURE — C9113 INJ PANTOPRAZOLE SODIUM, VIA: HCPCS

## 2024-06-26 PROCEDURE — 2700000000 HC OXYGEN THERAPY PER DAY

## 2024-06-26 PROCEDURE — APPSS15 APP SPLIT SHARED TIME 0-15 MINUTES

## 2024-06-26 PROCEDURE — 85025 COMPLETE CBC W/AUTO DIFF WBC: CPT

## 2024-06-26 PROCEDURE — 94664 DEMO&/EVAL PT USE INHALER: CPT

## 2024-06-26 PROCEDURE — 2580000003 HC RX 258

## 2024-06-26 PROCEDURE — 1100000003 HC PRIVATE W/ TELEMETRY

## 2024-06-26 PROCEDURE — 99233 SBSQ HOSP IP/OBS HIGH 50: CPT | Performed by: STUDENT IN AN ORGANIZED HEALTH CARE EDUCATION/TRAINING PROGRAM

## 2024-06-26 PROCEDURE — 74176 CT ABD & PELVIS W/O CONTRAST: CPT

## 2024-06-26 RX ORDER — INSULIN GLARGINE 100 [IU]/ML
8 INJECTION, SOLUTION SUBCUTANEOUS DAILY
Status: DISCONTINUED | OUTPATIENT
Start: 2024-06-27 | End: 2024-06-30 | Stop reason: HOSPADM

## 2024-06-26 RX ORDER — ENOXAPARIN SODIUM 100 MG/ML
40 INJECTION SUBCUTANEOUS DAILY
Status: DISCONTINUED | OUTPATIENT
Start: 2024-06-27 | End: 2024-06-30 | Stop reason: HOSPADM

## 2024-06-26 RX ORDER — LANOLIN ALCOHOL/MO/W.PET/CERES
6 CREAM (GRAM) TOPICAL NIGHTLY PRN
Status: DISCONTINUED | OUTPATIENT
Start: 2024-06-26 | End: 2024-06-30 | Stop reason: HOSPADM

## 2024-06-26 RX ORDER — MORPHINE SULFATE 2 MG/ML
1 INJECTION, SOLUTION INTRAMUSCULAR; INTRAVENOUS ONCE
Status: COMPLETED | OUTPATIENT
Start: 2024-06-26 | End: 2024-06-26

## 2024-06-26 RX ADMIN — ROPINIROLE HYDROCHLORIDE 0.5 MG: 0.25 TABLET, FILM COATED ORAL at 10:01

## 2024-06-26 RX ADMIN — IPRATROPIUM BROMIDE AND ALBUTEROL SULFATE 1 DOSE: .5; 3 SOLUTION RESPIRATORY (INHALATION) at 08:35

## 2024-06-26 RX ADMIN — PANTOPRAZOLE SODIUM 40 MG: 40 INJECTION, POWDER, FOR SOLUTION INTRAVENOUS at 10:02

## 2024-06-26 RX ADMIN — OXYCODONE HYDROCHLORIDE AND ACETAMINOPHEN 1 TABLET: 5; 325 TABLET ORAL at 11:03

## 2024-06-26 RX ADMIN — ATORVASTATIN CALCIUM 40 MG: 40 TABLET, FILM COATED ORAL at 21:20

## 2024-06-26 RX ADMIN — SODIUM CHLORIDE, PRESERVATIVE FREE 10 ML: 5 INJECTION INTRAVENOUS at 10:15

## 2024-06-26 RX ADMIN — SODIUM CHLORIDE, POTASSIUM CHLORIDE, SODIUM LACTATE AND CALCIUM CHLORIDE: 600; 310; 30; 20 INJECTION, SOLUTION INTRAVENOUS at 21:24

## 2024-06-26 RX ADMIN — INSULIN LISPRO 4 UNITS: 100 INJECTION, SOLUTION INTRAVENOUS; SUBCUTANEOUS at 12:45

## 2024-06-26 RX ADMIN — ENOXAPARIN SODIUM 30 MG: 100 INJECTION SUBCUTANEOUS at 10:02

## 2024-06-26 RX ADMIN — WATER 40 MG: 1 INJECTION INTRAMUSCULAR; INTRAVENOUS; SUBCUTANEOUS at 10:02

## 2024-06-26 RX ADMIN — POLYETHYLENE GLYCOL 3350 17 G: 17 POWDER, FOR SOLUTION ORAL at 10:02

## 2024-06-26 RX ADMIN — DIPHENHYDRAMINE HYDROCHLORIDE 25 MG: 25 CAPSULE ORAL at 02:58

## 2024-06-26 RX ADMIN — SODIUM CHLORIDE, POTASSIUM CHLORIDE, SODIUM LACTATE AND CALCIUM CHLORIDE 75 ML/HR: 600; 310; 30; 20 INJECTION, SOLUTION INTRAVENOUS at 08:00

## 2024-06-26 RX ADMIN — MORPHINE SULFATE 1 MG: 2 INJECTION, SOLUTION INTRAMUSCULAR; INTRAVENOUS at 02:53

## 2024-06-26 RX ADMIN — OXYCODONE HYDROCHLORIDE AND ACETAMINOPHEN 1 TABLET: 5; 325 TABLET ORAL at 04:15

## 2024-06-26 RX ADMIN — ROPINIROLE HYDROCHLORIDE 0.5 MG: 0.25 TABLET, FILM COATED ORAL at 12:45

## 2024-06-26 RX ADMIN — AZITHROMYCIN MONOHYDRATE 500 MG: 500 INJECTION, POWDER, LYOPHILIZED, FOR SOLUTION INTRAVENOUS at 17:25

## 2024-06-26 RX ADMIN — IPRATROPIUM BROMIDE AND ALBUTEROL SULFATE 1 DOSE: .5; 3 SOLUTION RESPIRATORY (INHALATION) at 21:20

## 2024-06-26 RX ADMIN — ASPIRIN 81 MG CHEWABLE TABLET 81 MG: 81 TABLET CHEWABLE at 10:02

## 2024-06-26 RX ADMIN — SODIUM CHLORIDE, POTASSIUM CHLORIDE, SODIUM LACTATE AND CALCIUM CHLORIDE: 600; 310; 30; 20 INJECTION, SOLUTION INTRAVENOUS at 12:48

## 2024-06-26 RX ADMIN — OXYCODONE HYDROCHLORIDE AND ACETAMINOPHEN 1 TABLET: 5; 325 TABLET ORAL at 17:21

## 2024-06-26 RX ADMIN — BUDESONIDE 1 MG: 1 SUSPENSION RESPIRATORY (INHALATION) at 08:35

## 2024-06-26 RX ADMIN — OXYCODONE HYDROCHLORIDE AND ACETAMINOPHEN 1 TABLET: 5; 325 TABLET ORAL at 21:18

## 2024-06-26 RX ADMIN — ROPINIROLE HYDROCHLORIDE 0.5 MG: 0.25 TABLET, FILM COATED ORAL at 21:22

## 2024-06-26 RX ADMIN — INSULIN GLARGINE 5 UNITS: 100 INJECTION, SOLUTION SUBCUTANEOUS at 10:03

## 2024-06-26 RX ADMIN — BUDESONIDE 1 MG: 1 SUSPENSION RESPIRATORY (INHALATION) at 21:19

## 2024-06-26 RX ADMIN — SODIUM CHLORIDE, PRESERVATIVE FREE 10 ML: 5 INJECTION INTRAVENOUS at 21:20

## 2024-06-26 RX ADMIN — IPRATROPIUM BROMIDE AND ALBUTEROL SULFATE 1 DOSE: .5; 3 SOLUTION RESPIRATORY (INHALATION) at 12:20

## 2024-06-26 RX ADMIN — IPRATROPIUM BROMIDE AND ALBUTEROL SULFATE 1 DOSE: .5; 3 SOLUTION RESPIRATORY (INHALATION) at 15:30

## 2024-06-26 ASSESSMENT — PAIN DESCRIPTION - LOCATION
LOCATION: ABDOMEN

## 2024-06-26 ASSESSMENT — PAIN DESCRIPTION - DESCRIPTORS
DESCRIPTORS: ACHING

## 2024-06-26 ASSESSMENT — PAIN SCALES - GENERAL
PAINLEVEL_OUTOF10: 3
PAINLEVEL_OUTOF10: 10
PAINLEVEL_OUTOF10: 0
PAINLEVEL_OUTOF10: 4
PAINLEVEL_OUTOF10: 8
PAINLEVEL_OUTOF10: 6
PAINLEVEL_OUTOF10: 2
PAINLEVEL_OUTOF10: 4
PAINLEVEL_OUTOF10: 5
PAINLEVEL_OUTOF10: 8
PAINLEVEL_OUTOF10: 8
PAINLEVEL_OUTOF10: 6
PAINLEVEL_OUTOF10: 4

## 2024-06-26 ASSESSMENT — PAIN DESCRIPTION - FREQUENCY
FREQUENCY: CONTINUOUS

## 2024-06-26 ASSESSMENT — PAIN DESCRIPTION - ORIENTATION
ORIENTATION: RIGHT
ORIENTATION: INNER
ORIENTATION: RIGHT
ORIENTATION: INNER
ORIENTATION: INNER
ORIENTATION: RIGHT
ORIENTATION: RIGHT

## 2024-06-26 ASSESSMENT — PAIN DESCRIPTION - ONSET
ONSET: ON-GOING

## 2024-06-26 ASSESSMENT — PAIN SCALES - WONG BAKER
WONGBAKER_NUMERICALRESPONSE: HURTS WHOLE LOT
WONGBAKER_NUMERICALRESPONSE: HURTS A LITTLE BIT
WONGBAKER_NUMERICALRESPONSE: NO HURT
WONGBAKER_NUMERICALRESPONSE: NO HURT
WONGBAKER_NUMERICALRESPONSE: HURTS LITTLE MORE

## 2024-06-26 ASSESSMENT — PAIN DESCRIPTION - PAIN TYPE
TYPE: CHRONIC PAIN

## 2024-06-26 ASSESSMENT — PAIN - FUNCTIONAL ASSESSMENT
PAIN_FUNCTIONAL_ASSESSMENT: ACTIVITIES ARE NOT PREVENTED

## 2024-06-26 NOTE — CARE COORDINATION
06/26/24 1841   /Social Work Whiteboard Notes   /Social Work Whiteboard RED 06/26/2024 Patient has HH referrals placed in careport. When medically ready patient dispo is home family or medical transport. MARK Cruz BSW   Case Management

## 2024-06-27 PROBLEM — I27.20 PULMONARY HYPERTENSION (HCC): Status: ACTIVE | Noted: 2024-06-27

## 2024-06-27 PROBLEM — Z71.89 GOALS OF CARE, COUNSELING/DISCUSSION: Status: RESOLVED | Noted: 2024-06-24 | Resolved: 2024-06-27

## 2024-06-27 PROBLEM — Z51.5 ENCOUNTER FOR PALLIATIVE CARE: Status: RESOLVED | Noted: 2024-06-24 | Resolved: 2024-06-27

## 2024-06-27 PROBLEM — R09.02 HYPOXIA: Status: RESOLVED | Noted: 2024-06-25 | Resolved: 2024-06-27

## 2024-06-27 LAB
ALBUMIN SERPL-MCNC: 3.2 G/DL (ref 3.4–5)
ALBUMIN/GLOB SERPL: 0.9 (ref 0.8–1.7)
ALP SERPL-CCNC: 103 U/L (ref 45–117)
ALT SERPL-CCNC: 19 U/L (ref 13–56)
ANION GAP SERPL CALC-SCNC: 4 MMOL/L (ref 3–18)
AST SERPL-CCNC: 18 U/L (ref 10–38)
BASOPHILS # BLD: 0 K/UL (ref 0–0.1)
BASOPHILS NFR BLD: 1 % (ref 0–2)
BILIRUB SERPL-MCNC: 0.4 MG/DL (ref 0.2–1)
BUN SERPL-MCNC: 20 MG/DL (ref 7–18)
BUN/CREAT SERPL: 27 (ref 12–20)
CALCIUM SERPL-MCNC: 8.8 MG/DL (ref 8.5–10.1)
CHLORIDE SERPL-SCNC: 110 MMOL/L (ref 100–111)
CO2 SERPL-SCNC: 25 MMOL/L (ref 21–32)
CREAT SERPL-MCNC: 0.75 MG/DL (ref 0.6–1.3)
DIFFERENTIAL METHOD BLD: ABNORMAL
EOSINOPHIL # BLD: 0.1 K/UL (ref 0–0.4)
EOSINOPHIL NFR BLD: 1 % (ref 0–5)
ERYTHROCYTE [DISTWIDTH] IN BLOOD BY AUTOMATED COUNT: 16.6 % (ref 11.6–14.5)
GLOBULIN SER CALC-MCNC: 3.7 G/DL (ref 2–4)
GLUCOSE BLD STRIP.AUTO-MCNC: 126 MG/DL (ref 70–110)
GLUCOSE BLD STRIP.AUTO-MCNC: 136 MG/DL (ref 70–110)
GLUCOSE BLD STRIP.AUTO-MCNC: 190 MG/DL (ref 70–110)
GLUCOSE SERPL-MCNC: 113 MG/DL (ref 74–99)
HCT VFR BLD AUTO: 27.5 % (ref 35–45)
HGB BLD-MCNC: 8.4 G/DL (ref 12–16)
IMM GRANULOCYTES # BLD AUTO: 0 K/UL (ref 0–0.04)
IMM GRANULOCYTES NFR BLD AUTO: 1 % (ref 0–0.5)
LYMPHOCYTES # BLD: 1.4 K/UL (ref 0.9–3.6)
LYMPHOCYTES NFR BLD: 22 % (ref 21–52)
MCH RBC QN AUTO: 22.6 PG (ref 24–34)
MCHC RBC AUTO-ENTMCNC: 30.5 G/DL (ref 31–37)
MCV RBC AUTO: 74.1 FL (ref 78–100)
MONOCYTES # BLD: 0.3 K/UL (ref 0.05–1.2)
MONOCYTES NFR BLD: 5 % (ref 3–10)
NEUTS SEG # BLD: 4.5 K/UL (ref 1.8–8)
NEUTS SEG NFR BLD: 70 % (ref 40–73)
NRBC # BLD: 0 K/UL (ref 0–0.01)
NRBC BLD-RTO: 0 PER 100 WBC
PLATELET # BLD AUTO: 217 K/UL (ref 135–420)
POTASSIUM SERPL-SCNC: 4.5 MMOL/L (ref 3.5–5.5)
PROT SERPL-MCNC: 6.9 G/DL (ref 6.4–8.2)
RBC # BLD AUTO: 3.71 M/UL (ref 4.2–5.3)
SODIUM SERPL-SCNC: 139 MMOL/L (ref 136–145)
WBC # BLD AUTO: 6.4 K/UL (ref 4.6–13.2)

## 2024-06-27 PROCEDURE — 2580000003 HC RX 258

## 2024-06-27 PROCEDURE — 6360000002 HC RX W HCPCS: Performed by: STUDENT IN AN ORGANIZED HEALTH CARE EDUCATION/TRAINING PROGRAM

## 2024-06-27 PROCEDURE — 94640 AIRWAY INHALATION TREATMENT: CPT

## 2024-06-27 PROCEDURE — 80053 COMPREHEN METABOLIC PANEL: CPT

## 2024-06-27 PROCEDURE — 6370000000 HC RX 637 (ALT 250 FOR IP): Performed by: INTERNAL MEDICINE

## 2024-06-27 PROCEDURE — 2580000003 HC RX 258: Performed by: PHYSICIAN ASSISTANT

## 2024-06-27 PROCEDURE — 36415 COLL VENOUS BLD VENIPUNCTURE: CPT

## 2024-06-27 PROCEDURE — C9113 INJ PANTOPRAZOLE SODIUM, VIA: HCPCS

## 2024-06-27 PROCEDURE — 6360000002 HC RX W HCPCS: Performed by: PHYSICIAN ASSISTANT

## 2024-06-27 PROCEDURE — APPSS15 APP SPLIT SHARED TIME 0-15 MINUTES

## 2024-06-27 PROCEDURE — 99233 SBSQ HOSP IP/OBS HIGH 50: CPT | Performed by: STUDENT IN AN ORGANIZED HEALTH CARE EDUCATION/TRAINING PROGRAM

## 2024-06-27 PROCEDURE — 6360000002 HC RX W HCPCS: Performed by: INTERNAL MEDICINE

## 2024-06-27 PROCEDURE — 6370000000 HC RX 637 (ALT 250 FOR IP): Performed by: STUDENT IN AN ORGANIZED HEALTH CARE EDUCATION/TRAINING PROGRAM

## 2024-06-27 PROCEDURE — 6370000000 HC RX 637 (ALT 250 FOR IP): Performed by: PHYSICIAN ASSISTANT

## 2024-06-27 PROCEDURE — 94761 N-INVAS EAR/PLS OXIMETRY MLT: CPT

## 2024-06-27 PROCEDURE — 99232 SBSQ HOSP IP/OBS MODERATE 35: CPT | Performed by: INTERNAL MEDICINE

## 2024-06-27 PROCEDURE — 85025 COMPLETE CBC W/AUTO DIFF WBC: CPT

## 2024-06-27 PROCEDURE — 6360000002 HC RX W HCPCS

## 2024-06-27 PROCEDURE — 94664 DEMO&/EVAL PT USE INHALER: CPT

## 2024-06-27 PROCEDURE — 2580000003 HC RX 258: Performed by: INTERNAL MEDICINE

## 2024-06-27 PROCEDURE — 2700000000 HC OXYGEN THERAPY PER DAY

## 2024-06-27 PROCEDURE — 1100000003 HC PRIVATE W/ TELEMETRY

## 2024-06-27 PROCEDURE — 2580000003 HC RX 258: Performed by: STUDENT IN AN ORGANIZED HEALTH CARE EDUCATION/TRAINING PROGRAM

## 2024-06-27 PROCEDURE — 82962 GLUCOSE BLOOD TEST: CPT

## 2024-06-27 RX ORDER — AMLODIPINE BESYLATE 2.5 MG/1
5 TABLET ORAL DAILY
Status: DISCONTINUED | OUTPATIENT
Start: 2024-06-27 | End: 2024-06-28

## 2024-06-27 RX ORDER — GABAPENTIN 100 MG/1
200 CAPSULE ORAL 3 TIMES DAILY
Status: DISCONTINUED | OUTPATIENT
Start: 2024-06-27 | End: 2024-06-30 | Stop reason: HOSPADM

## 2024-06-27 RX ORDER — ALBUTEROL SULFATE 90 UG/1
1 AEROSOL, METERED RESPIRATORY (INHALATION) EVERY 6 HOURS PRN
Qty: 18 G | Refills: 3 | Status: SHIPPED | OUTPATIENT
Start: 2024-06-27

## 2024-06-27 RX ORDER — DULOXETIN HYDROCHLORIDE 30 MG/1
30 CAPSULE, DELAYED RELEASE ORAL DAILY
Status: DISCONTINUED | OUTPATIENT
Start: 2024-06-27 | End: 2024-06-30 | Stop reason: HOSPADM

## 2024-06-27 RX ORDER — INSULIN GLARGINE 100 [IU]/ML
10 INJECTION, SOLUTION SUBCUTANEOUS NIGHTLY
Qty: 5 ADJUSTABLE DOSE PRE-FILLED PEN SYRINGE | Refills: 3 | Status: SHIPPED | OUTPATIENT
Start: 2024-06-27 | End: 2024-06-30 | Stop reason: HOSPADM

## 2024-06-27 RX ORDER — ATORVASTATIN CALCIUM 40 MG/1
40 TABLET, FILM COATED ORAL NIGHTLY
Qty: 30 TABLET | Refills: 3 | Status: SHIPPED | OUTPATIENT
Start: 2024-06-27

## 2024-06-27 RX ORDER — PANTOPRAZOLE SODIUM 20 MG/1
40 TABLET, DELAYED RELEASE ORAL DAILY
Qty: 60 TABLET | Refills: 0 | Status: SHIPPED | OUTPATIENT
Start: 2024-06-27 | End: 2024-07-27

## 2024-06-27 RX ORDER — VANCOMYCIN 1.75 G/350ML
1250 INJECTION, SOLUTION INTRAVENOUS ONCE
Status: COMPLETED | OUTPATIENT
Start: 2024-06-27 | End: 2024-06-27

## 2024-06-27 RX ORDER — ASPIRIN 81 MG/1
81 TABLET, CHEWABLE ORAL DAILY
Status: DISCONTINUED | OUTPATIENT
Start: 2024-06-28 | End: 2024-06-30 | Stop reason: HOSPADM

## 2024-06-27 RX ORDER — ATORVASTATIN CALCIUM 40 MG/1
40 TABLET, FILM COATED ORAL NIGHTLY
Status: DISCONTINUED | OUTPATIENT
Start: 2024-06-27 | End: 2024-06-30 | Stop reason: HOSPADM

## 2024-06-27 RX ORDER — POLYETHYLENE GLYCOL 3350 17 G/17G
17 POWDER, FOR SOLUTION ORAL DAILY
Qty: 7 EACH | Refills: 0 | Status: SHIPPED | OUTPATIENT
Start: 2024-06-27 | End: 2024-07-04

## 2024-06-27 RX ORDER — ROPINIROLE 0.5 MG/1
0.5 TABLET, FILM COATED ORAL 3 TIMES DAILY
Qty: 90 TABLET | Refills: 3 | Status: SHIPPED | OUTPATIENT
Start: 2024-06-27

## 2024-06-27 RX ORDER — FLUTICASONE PROPIONATE AND SALMETEROL 250; 50 UG/1; UG/1
1 POWDER RESPIRATORY (INHALATION) EVERY 12 HOURS
Qty: 1 EACH | Refills: 0 | Status: SHIPPED | OUTPATIENT
Start: 2024-06-27 | End: 2024-07-27

## 2024-06-27 RX ORDER — SULFAMETHOXAZOLE AND TRIMETHOPRIM 800; 160 MG/1; MG/1
1 TABLET ORAL
Qty: 15 TABLET | Refills: 0 | Status: SHIPPED | OUTPATIENT
Start: 2024-06-27 | End: 2024-07-27

## 2024-06-27 RX ORDER — ENEMA 19; 7 G/133ML; G/133ML
1 ENEMA RECTAL ONCE
Status: COMPLETED | OUTPATIENT
Start: 2024-06-27 | End: 2024-06-27

## 2024-06-27 RX ORDER — PREDNISONE 20 MG/1
20 TABLET ORAL DAILY
Qty: 14 TABLET | Refills: 0 | Status: SHIPPED | OUTPATIENT
Start: 2024-06-27 | End: 2024-07-11

## 2024-06-27 RX ADMIN — POLYETHYLENE GLYCOL 3350 17 G: 17 POWDER, FOR SOLUTION ORAL at 09:29

## 2024-06-27 RX ADMIN — OXYCODONE HYDROCHLORIDE AND ACETAMINOPHEN 1 TABLET: 5; 325 TABLET ORAL at 09:25

## 2024-06-27 RX ADMIN — OXYCODONE HYDROCHLORIDE AND ACETAMINOPHEN 1 TABLET: 5; 325 TABLET ORAL at 05:21

## 2024-06-27 RX ADMIN — ATORVASTATIN CALCIUM 40 MG: 40 TABLET, FILM COATED ORAL at 22:26

## 2024-06-27 RX ADMIN — HYDROXYZINE HYDROCHLORIDE 10 MG: 10 TABLET, FILM COATED ORAL at 05:22

## 2024-06-27 RX ADMIN — BUDESONIDE 1 MG: 1 SUSPENSION RESPIRATORY (INHALATION) at 21:30

## 2024-06-27 RX ADMIN — IPRATROPIUM BROMIDE AND ALBUTEROL SULFATE 1 DOSE: .5; 3 SOLUTION RESPIRATORY (INHALATION) at 22:27

## 2024-06-27 RX ADMIN — AMLODIPINE BESYLATE 5 MG: 2.5 TABLET ORAL at 16:01

## 2024-06-27 RX ADMIN — GABAPENTIN 200 MG: 100 CAPSULE ORAL at 22:27

## 2024-06-27 RX ADMIN — Medication 6 MG: at 22:28

## 2024-06-27 RX ADMIN — SODIUM CHLORIDE, PRESERVATIVE FREE 10 ML: 5 INJECTION INTRAVENOUS at 09:30

## 2024-06-27 RX ADMIN — ROPINIROLE HYDROCHLORIDE 0.5 MG: 0.25 TABLET, FILM COATED ORAL at 09:27

## 2024-06-27 RX ADMIN — VANCOMYCIN 1250 MG: 1.75 INJECTION, SOLUTION INTRAVENOUS at 16:10

## 2024-06-27 RX ADMIN — ROPINIROLE HYDROCHLORIDE 0.5 MG: 0.25 TABLET, FILM COATED ORAL at 13:35

## 2024-06-27 RX ADMIN — WATER 2000 MG: 1 INJECTION INTRAMUSCULAR; INTRAVENOUS; SUBCUTANEOUS at 13:36

## 2024-06-27 RX ADMIN — IPRATROPIUM BROMIDE AND ALBUTEROL SULFATE 1 DOSE: .5; 3 SOLUTION RESPIRATORY (INHALATION) at 08:01

## 2024-06-27 RX ADMIN — ENOXAPARIN SODIUM 40 MG: 100 INJECTION SUBCUTANEOUS at 09:29

## 2024-06-27 RX ADMIN — IPRATROPIUM BROMIDE AND ALBUTEROL SULFATE 1 DOSE: .5; 3 SOLUTION RESPIRATORY (INHALATION) at 16:15

## 2024-06-27 RX ADMIN — BUDESONIDE 1 MG: 1 SUSPENSION RESPIRATORY (INHALATION) at 08:01

## 2024-06-27 RX ADMIN — PANTOPRAZOLE SODIUM 40 MG: 40 INJECTION, POWDER, FOR SOLUTION INTRAVENOUS at 09:28

## 2024-06-27 RX ADMIN — INSULIN GLARGINE 8 UNITS: 100 INJECTION, SOLUTION SUBCUTANEOUS at 09:28

## 2024-06-27 RX ADMIN — OXYCODONE HYDROCHLORIDE AND ACETAMINOPHEN 1 TABLET: 5; 325 TABLET ORAL at 13:35

## 2024-06-27 RX ADMIN — CEFEPIME 2000 MG: 2 INJECTION, POWDER, FOR SOLUTION INTRAVENOUS at 22:52

## 2024-06-27 RX ADMIN — SODIUM CHLORIDE, POTASSIUM CHLORIDE, SODIUM LACTATE AND CALCIUM CHLORIDE: 600; 310; 30; 20 INJECTION, SOLUTION INTRAVENOUS at 16:08

## 2024-06-27 RX ADMIN — IPRATROPIUM BROMIDE AND ALBUTEROL SULFATE 1 DOSE: .5; 3 SOLUTION RESPIRATORY (INHALATION) at 11:20

## 2024-06-27 RX ADMIN — DULOXETINE HYDROCHLORIDE 30 MG: 30 CAPSULE, DELAYED RELEASE ORAL at 16:02

## 2024-06-27 RX ADMIN — SODIUM PHOSPHATE, DIBASIC AND SODIUM PHOSPHATE, MONOBASIC 1 ENEMA: 7; 19 ENEMA RECTAL at 13:09

## 2024-06-27 RX ADMIN — OXYCODONE HYDROCHLORIDE AND ACETAMINOPHEN 1 TABLET: 5; 325 TABLET ORAL at 22:26

## 2024-06-27 RX ADMIN — GABAPENTIN 200 MG: 100 CAPSULE ORAL at 16:01

## 2024-06-27 RX ADMIN — ROPINIROLE HYDROCHLORIDE 0.5 MG: 0.25 TABLET, FILM COATED ORAL at 22:28

## 2024-06-27 RX ADMIN — SODIUM CHLORIDE, PRESERVATIVE FREE 10 ML: 5 INJECTION INTRAVENOUS at 22:30

## 2024-06-27 RX ADMIN — WATER 40 MG: 1 INJECTION INTRAMUSCULAR; INTRAVENOUS; SUBCUTANEOUS at 09:28

## 2024-06-27 RX ADMIN — ASPIRIN 81 MG CHEWABLE TABLET 81 MG: 81 TABLET CHEWABLE at 09:28

## 2024-06-27 ASSESSMENT — PAIN SCALES - GENERAL
PAINLEVEL_OUTOF10: 0
PAINLEVEL_OUTOF10: 0
PAINLEVEL_OUTOF10: 8
PAINLEVEL_OUTOF10: 8
PAINLEVEL_OUTOF10: 10
PAINLEVEL_OUTOF10: 0
PAINLEVEL_OUTOF10: 2
PAINLEVEL_OUTOF10: 6
PAINLEVEL_OUTOF10: 1
PAINLEVEL_OUTOF10: 8
PAINLEVEL_OUTOF10: 3
PAINLEVEL_OUTOF10: 10
PAINLEVEL_OUTOF10: 2

## 2024-06-27 ASSESSMENT — PAIN - FUNCTIONAL ASSESSMENT
PAIN_FUNCTIONAL_ASSESSMENT: ACTIVITIES ARE NOT PREVENTED

## 2024-06-27 ASSESSMENT — PAIN DESCRIPTION - DESCRIPTORS
DESCRIPTORS: ACHING;CRAMPING
DESCRIPTORS: SHARP;STABBING;CRAMPING
DESCRIPTORS: THROBBING
DESCRIPTORS: ACHING

## 2024-06-27 ASSESSMENT — PAIN DESCRIPTION - ONSET
ONSET: ON-GOING
ONSET: ON-GOING

## 2024-06-27 ASSESSMENT — PAIN SCALES - WONG BAKER
WONGBAKER_NUMERICALRESPONSE: HURTS A LITTLE BIT
WONGBAKER_NUMERICALRESPONSE: NO HURT
WONGBAKER_NUMERICALRESPONSE: NO HURT
WONGBAKER_NUMERICALRESPONSE: HURTS A LITTLE BIT
WONGBAKER_NUMERICALRESPONSE: NO HURT
WONGBAKER_NUMERICALRESPONSE: HURTS LITTLE MORE

## 2024-06-27 ASSESSMENT — PAIN DESCRIPTION - FREQUENCY
FREQUENCY: CONTINUOUS
FREQUENCY: CONTINUOUS

## 2024-06-27 ASSESSMENT — PAIN DESCRIPTION - ORIENTATION
ORIENTATION: LEFT
ORIENTATION: RIGHT;LEFT;MID
ORIENTATION: LEFT

## 2024-06-27 ASSESSMENT — PAIN DESCRIPTION - LOCATION
LOCATION: ABDOMEN
LOCATION: GENERALIZED

## 2024-06-27 ASSESSMENT — PAIN DESCRIPTION - PAIN TYPE
TYPE: ACUTE PAIN
TYPE: ACUTE PAIN

## 2024-06-27 NOTE — CARE COORDINATION
Patient has been accepted to [facility]. Anticipated start of care -1-2days.    Will need CM assistance for transport home.     Met with patient and spouse at bedside and she is agreement with the current disposition.   Gave updated mobile # [phone], which has been added to patient demographics.

## 2024-06-28 LAB
ACE SERPL-CCNC: 85 U/L (ref 14–82)
ANION GAP SERPL CALC-SCNC: 3 MMOL/L (ref 3–18)
BASOPHILS # BLD: 0 K/UL (ref 0–0.1)
BASOPHILS NFR BLD: 1 % (ref 0–2)
BUN SERPL-MCNC: 13 MG/DL (ref 7–18)
BUN/CREAT SERPL: 21 (ref 12–20)
CALCIUM SERPL-MCNC: 9 MG/DL (ref 8.5–10.1)
CHLORIDE SERPL-SCNC: 106 MMOL/L (ref 100–111)
CO2 SERPL-SCNC: 29 MMOL/L (ref 21–32)
CREAT SERPL-MCNC: 0.63 MG/DL (ref 0.6–1.3)
DIFFERENTIAL METHOD BLD: ABNORMAL
EOSINOPHIL # BLD: 0.3 K/UL (ref 0–0.4)
EOSINOPHIL NFR BLD: 7 % (ref 0–5)
ERYTHROCYTE [DISTWIDTH] IN BLOOD BY AUTOMATED COUNT: 16.3 % (ref 11.6–14.5)
FERRITIN SERPL-MCNC: 152 NG/ML (ref 8–388)
GLUCOSE BLD STRIP.AUTO-MCNC: 105 MG/DL (ref 70–110)
GLUCOSE BLD STRIP.AUTO-MCNC: 234 MG/DL (ref 70–110)
GLUCOSE BLD STRIP.AUTO-MCNC: 243 MG/DL (ref 70–110)
GLUCOSE BLD STRIP.AUTO-MCNC: 250 MG/DL (ref 70–110)
GLUCOSE SERPL-MCNC: 121 MG/DL (ref 74–99)
HCT VFR BLD AUTO: 31.2 % (ref 35–45)
HGB BLD-MCNC: 9.5 G/DL (ref 12–16)
IMM GRANULOCYTES # BLD AUTO: 0 K/UL (ref 0–0.04)
IMM GRANULOCYTES NFR BLD AUTO: 0 % (ref 0–0.5)
IRON SATN MFR SERPL: 16 % (ref 20–50)
IRON SERPL-MCNC: 44 UG/DL (ref 50–175)
LYMPHOCYTES # BLD: 1.5 K/UL (ref 0.9–3.6)
LYMPHOCYTES NFR BLD: 31 % (ref 21–52)
MCH RBC QN AUTO: 22.5 PG (ref 24–34)
MCHC RBC AUTO-ENTMCNC: 30.4 G/DL (ref 31–37)
MCV RBC AUTO: 73.9 FL (ref 78–100)
MONOCYTES # BLD: 0.4 K/UL (ref 0.05–1.2)
MONOCYTES NFR BLD: 8 % (ref 3–10)
NEUTS SEG # BLD: 2.6 K/UL (ref 1.8–8)
NEUTS SEG NFR BLD: 54 % (ref 40–73)
NRBC # BLD: 0 K/UL (ref 0–0.01)
NRBC BLD-RTO: 0 PER 100 WBC
PLATELET # BLD AUTO: 198 K/UL (ref 135–420)
POTASSIUM SERPL-SCNC: 4.1 MMOL/L (ref 3.5–5.5)
RBC # BLD AUTO: 4.22 M/UL (ref 4.2–5.3)
SODIUM SERPL-SCNC: 138 MMOL/L (ref 136–145)
TIBC SERPL-MCNC: 270 UG/DL (ref 250–450)
WBC # BLD AUTO: 4.9 K/UL (ref 4.6–13.2)

## 2024-06-28 PROCEDURE — 82962 GLUCOSE BLOOD TEST: CPT

## 2024-06-28 PROCEDURE — 6360000002 HC RX W HCPCS

## 2024-06-28 PROCEDURE — 83540 ASSAY OF IRON: CPT

## 2024-06-28 PROCEDURE — 2580000003 HC RX 258: Performed by: STUDENT IN AN ORGANIZED HEALTH CARE EDUCATION/TRAINING PROGRAM

## 2024-06-28 PROCEDURE — 99233 SBSQ HOSP IP/OBS HIGH 50: CPT | Performed by: STUDENT IN AN ORGANIZED HEALTH CARE EDUCATION/TRAINING PROGRAM

## 2024-06-28 PROCEDURE — 83550 IRON BINDING TEST: CPT

## 2024-06-28 PROCEDURE — 2580000003 HC RX 258: Performed by: PHYSICIAN ASSISTANT

## 2024-06-28 PROCEDURE — 80048 BASIC METABOLIC PNL TOTAL CA: CPT

## 2024-06-28 PROCEDURE — 2580000003 HC RX 258: Performed by: INTERNAL MEDICINE

## 2024-06-28 PROCEDURE — 6370000000 HC RX 637 (ALT 250 FOR IP): Performed by: STUDENT IN AN ORGANIZED HEALTH CARE EDUCATION/TRAINING PROGRAM

## 2024-06-28 PROCEDURE — 6370000000 HC RX 637 (ALT 250 FOR IP): Performed by: PHYSICIAN ASSISTANT

## 2024-06-28 PROCEDURE — 2700000000 HC OXYGEN THERAPY PER DAY

## 2024-06-28 PROCEDURE — 6360000002 HC RX W HCPCS: Performed by: STUDENT IN AN ORGANIZED HEALTH CARE EDUCATION/TRAINING PROGRAM

## 2024-06-28 PROCEDURE — C9113 INJ PANTOPRAZOLE SODIUM, VIA: HCPCS

## 2024-06-28 PROCEDURE — 99232 SBSQ HOSP IP/OBS MODERATE 35: CPT | Performed by: INTERNAL MEDICINE

## 2024-06-28 PROCEDURE — 6370000000 HC RX 637 (ALT 250 FOR IP): Performed by: INTERNAL MEDICINE

## 2024-06-28 PROCEDURE — 6360000002 HC RX W HCPCS: Performed by: INTERNAL MEDICINE

## 2024-06-28 PROCEDURE — 94761 N-INVAS EAR/PLS OXIMETRY MLT: CPT

## 2024-06-28 PROCEDURE — 2580000003 HC RX 258

## 2024-06-28 PROCEDURE — 1100000003 HC PRIVATE W/ TELEMETRY

## 2024-06-28 PROCEDURE — 82728 ASSAY OF FERRITIN: CPT

## 2024-06-28 PROCEDURE — 36415 COLL VENOUS BLD VENIPUNCTURE: CPT

## 2024-06-28 PROCEDURE — 94640 AIRWAY INHALATION TREATMENT: CPT

## 2024-06-28 PROCEDURE — 6360000002 HC RX W HCPCS: Performed by: PHYSICIAN ASSISTANT

## 2024-06-28 PROCEDURE — 85025 COMPLETE CBC W/AUTO DIFF WBC: CPT

## 2024-06-28 RX ORDER — AMLODIPINE BESYLATE 10 MG/1
10 TABLET ORAL DAILY
Status: DISCONTINUED | OUTPATIENT
Start: 2024-06-29 | End: 2024-06-30 | Stop reason: HOSPADM

## 2024-06-28 RX ORDER — SENNA AND DOCUSATE SODIUM 50; 8.6 MG/1; MG/1
2 TABLET, FILM COATED ORAL DAILY
Status: DISCONTINUED | OUTPATIENT
Start: 2024-06-28 | End: 2024-06-30 | Stop reason: HOSPADM

## 2024-06-28 RX ADMIN — VANCOMYCIN HYDROCHLORIDE 750 MG: 750 INJECTION, POWDER, LYOPHILIZED, FOR SOLUTION INTRAVENOUS at 21:08

## 2024-06-28 RX ADMIN — CEFEPIME 2000 MG: 2 INJECTION, POWDER, FOR SOLUTION INTRAVENOUS at 11:55

## 2024-06-28 RX ADMIN — ASPIRIN 81 MG CHEWABLE TABLET 81 MG: 81 TABLET CHEWABLE at 08:53

## 2024-06-28 RX ADMIN — DULOXETINE HYDROCHLORIDE 30 MG: 30 CAPSULE, DELAYED RELEASE ORAL at 08:53

## 2024-06-28 RX ADMIN — INSULIN GLARGINE 8 UNITS: 100 INJECTION, SOLUTION SUBCUTANEOUS at 08:52

## 2024-06-28 RX ADMIN — GABAPENTIN 200 MG: 100 CAPSULE ORAL at 08:54

## 2024-06-28 RX ADMIN — ENOXAPARIN SODIUM 40 MG: 100 INJECTION SUBCUTANEOUS at 08:52

## 2024-06-28 RX ADMIN — WATER 40 MG: 1 INJECTION INTRAMUSCULAR; INTRAVENOUS; SUBCUTANEOUS at 08:53

## 2024-06-28 RX ADMIN — SODIUM CHLORIDE, PRESERVATIVE FREE 20 ML: 5 INJECTION INTRAVENOUS at 08:52

## 2024-06-28 RX ADMIN — OXYCODONE HYDROCHLORIDE AND ACETAMINOPHEN 1 TABLET: 5; 325 TABLET ORAL at 08:54

## 2024-06-28 RX ADMIN — BUDESONIDE 1 MG: 1 SUSPENSION RESPIRATORY (INHALATION) at 20:43

## 2024-06-28 RX ADMIN — GABAPENTIN 200 MG: 100 CAPSULE ORAL at 21:01

## 2024-06-28 RX ADMIN — SENNOSIDES AND DOCUSATE SODIUM 2 TABLET: 50; 8.6 TABLET ORAL at 11:56

## 2024-06-28 RX ADMIN — ATORVASTATIN CALCIUM 40 MG: 40 TABLET, FILM COATED ORAL at 21:01

## 2024-06-28 RX ADMIN — POLYETHYLENE GLYCOL 3350 17 G: 17 POWDER, FOR SOLUTION ORAL at 08:52

## 2024-06-28 RX ADMIN — ROPINIROLE HYDROCHLORIDE 0.5 MG: 0.25 TABLET, FILM COATED ORAL at 21:01

## 2024-06-28 RX ADMIN — PANTOPRAZOLE SODIUM 40 MG: 40 INJECTION, POWDER, FOR SOLUTION INTRAVENOUS at 08:52

## 2024-06-28 RX ADMIN — SODIUM CHLORIDE, PRESERVATIVE FREE 10 ML: 5 INJECTION INTRAVENOUS at 21:10

## 2024-06-28 RX ADMIN — VANCOMYCIN HYDROCHLORIDE 750 MG: 750 INJECTION, POWDER, LYOPHILIZED, FOR SOLUTION INTRAVENOUS at 08:57

## 2024-06-28 RX ADMIN — BUDESONIDE 1 MG: 1 SUSPENSION RESPIRATORY (INHALATION) at 08:25

## 2024-06-28 RX ADMIN — GABAPENTIN 200 MG: 100 CAPSULE ORAL at 13:16

## 2024-06-28 RX ADMIN — ROPINIROLE HYDROCHLORIDE 0.5 MG: 0.25 TABLET, FILM COATED ORAL at 08:53

## 2024-06-28 RX ADMIN — INSULIN LISPRO 4 UNITS: 100 INJECTION, SOLUTION INTRAVENOUS; SUBCUTANEOUS at 11:56

## 2024-06-28 RX ADMIN — ROPINIROLE HYDROCHLORIDE 0.5 MG: 0.25 TABLET, FILM COATED ORAL at 13:16

## 2024-06-28 RX ADMIN — INSULIN LISPRO 4 UNITS: 100 INJECTION, SOLUTION INTRAVENOUS; SUBCUTANEOUS at 16:43

## 2024-06-28 RX ADMIN — CEFEPIME 2000 MG: 2 INJECTION, POWDER, FOR SOLUTION INTRAVENOUS at 23:06

## 2024-06-28 RX ADMIN — AMLODIPINE BESYLATE 5 MG: 2.5 TABLET ORAL at 08:54

## 2024-06-28 ASSESSMENT — PAIN SCALES - GENERAL
PAINLEVEL_OUTOF10: 0
PAINLEVEL_OUTOF10: 2
PAINLEVEL_OUTOF10: 0
PAINLEVEL_OUTOF10: 0
PAINLEVEL_OUTOF10: 5
PAINLEVEL_OUTOF10: 0
PAINLEVEL_OUTOF10: 4
PAINLEVEL_OUTOF10: 1

## 2024-06-28 ASSESSMENT — PAIN DESCRIPTION - ONSET: ONSET: ON-GOING

## 2024-06-28 ASSESSMENT — PAIN SCALES - WONG BAKER
WONGBAKER_NUMERICALRESPONSE: NO HURT

## 2024-06-28 ASSESSMENT — PAIN DESCRIPTION - ORIENTATION
ORIENTATION: MID
ORIENTATION: MID

## 2024-06-28 ASSESSMENT — PAIN DESCRIPTION - DESCRIPTORS
DESCRIPTORS: ACHING
DESCRIPTORS: ACHING

## 2024-06-28 ASSESSMENT — PAIN - FUNCTIONAL ASSESSMENT: PAIN_FUNCTIONAL_ASSESSMENT: ACTIVITIES ARE NOT PREVENTED

## 2024-06-28 ASSESSMENT — PAIN DESCRIPTION - LOCATION
LOCATION: ABDOMEN
LOCATION: ABDOMEN

## 2024-06-28 ASSESSMENT — PAIN DESCRIPTION - PAIN TYPE: TYPE: ACUTE PAIN

## 2024-06-28 ASSESSMENT — PAIN DESCRIPTION - FREQUENCY: FREQUENCY: CONTINUOUS

## 2024-06-28 NOTE — RT PROTOCOL NOTE
NEB PROTOCOL:  ASSESSMENT    Patient  Juliana Loaiza     73 y.o.   female     6/28/2024  3:39 PM      Nebulizer Therapy: Current medications   duoneb q4 while awake, pulmicort bid       Problem List:   Patient Active Problem List   Diagnosis    Knee pain, left    DM (diabetes mellitus) (HCC)    Non-ST elevated myocardial infarction (non-STEMI) (HCC)    GERD (gastroesophageal reflux disease)    Cigarette smoker    Hidradenitis    Primary hypertension    Hyperglycemia    Osteoporosis    RVH (right ventricular hypertrophy)    Cocaine abuse (HCC)    Atypical chest pain    Microcytic anemia    Coronary artery disease involving native coronary artery of native heart without angina pectoris    COPD, moderate (HCC)    Chronic pain syndrome    HNP (herniated nucleus pulposus), cervical    Pulmonary sarcoidosis (HCC)    Closed head injury    Diverticulitis    Abdominal pain    Recurrent falls    Chronic back pain    Insomnia    DJD (degenerative joint disease) of knee    Syncope and collapse    Depression    Acute kidney injury (HCC)    Chronic respiratory failure with hypoxia (HCC)    Gastroparesis    Opioid dependence with opioid-induced disorder (HCC)    Pulmonary hypertension (HCC)       Patient alert and cooperative to use MDI: Yes    Home Respiratory Therapy Regimen/Frequency:  NO- pt reports she only takes tx as needed  Medication   Device  Frequency     SEVERITY INDEX:    ITEM 0 1 2 3 4 Score   Respiratory Pattern and or Rate Regular  10-19 Regular  20-24   24-30    30-34 Severe SOB or   Greater than 35 0   Breath Sounds Clear Occasional Wheeze Mild Wheezing Moderate Wheezing  wheezing/Absent breath sounds 0  Some crackles posteriorly   Shortness of Breath None Dyspnea on Exertion Dyspnea at Rest Moderate Shortness of Breath at Rest Severe Shortness of Breath - Limited Speech 0       Total Score:  0 RT to bedside three times today and all three times patient denied sob and was resting comfortably in bed.

## 2024-06-28 NOTE — RT PROTOCOL NOTE
RT to bedside for evening treatment. Patient resting comfortably with no signs of distress or difficulty breathing. Patient again reports she does not feel she needs tx and states she only takes as needed at home. Past several treatments patient has been clear and decreased. At time of visit she continues to be clear/decreased with some fine crackles on left side posteriorly. Patient told to call if she started becoming sob

## 2024-06-28 NOTE — PALLIATIVE CARE
Palliative Medicine     Palliative Medicine team members Dr BEV Stover and this writer for follow up visit  at bedside. Patient's was alert and aware.  Patient's  is also at bedside.  Patient shared still with shortness of breath. No c/o pain but shared she had good results from her constipation with enema. She feels full and is requesting a repeat enema today. Orders placed for bowel management.     CODE STATUS - FULL CODE WITH FULL INTERVENTIONS.       Marsha NGUYENN, RN, PN  Palliative Medicine Inpatient RN  Palliative [facility] Line: [phone]        .

## 2024-06-28 NOTE — RT PROTOCOL NOTE
Afternoon treatment not given as pt eating lunch. denies sob or feels like she needs at this time . No signs of breathing difficulty noted. Patient told to call if she started feeling sob

## 2024-06-29 LAB
ANION GAP SERPL CALC-SCNC: 5 MMOL/L (ref 3–18)
BACTERIA SPEC CULT: ABNORMAL
BACTERIA SPEC CULT: NORMAL
BACTERIA SPEC CULT: NORMAL
BUN SERPL-MCNC: 24 MG/DL (ref 7–18)
BUN/CREAT SERPL: 26 (ref 12–20)
CALCIUM SERPL-MCNC: 8.5 MG/DL (ref 8.5–10.1)
CHLORIDE SERPL-SCNC: 106 MMOL/L (ref 100–111)
CO2 SERPL-SCNC: 26 MMOL/L (ref 21–32)
CREAT SERPL-MCNC: 0.93 MG/DL (ref 0.6–1.3)
ERYTHROCYTE [DISTWIDTH] IN BLOOD BY AUTOMATED COUNT: 16.2 % (ref 11.6–14.5)
GLUCOSE BLD STRIP.AUTO-MCNC: 132 MG/DL (ref 70–110)
GLUCOSE BLD STRIP.AUTO-MCNC: 150 MG/DL (ref 70–110)
GLUCOSE BLD STRIP.AUTO-MCNC: 259 MG/DL (ref 70–110)
GLUCOSE BLD STRIP.AUTO-MCNC: 338 MG/DL (ref 70–110)
GLUCOSE SERPL-MCNC: 250 MG/DL (ref 74–99)
GRAM STN SPEC: ABNORMAL
HCT VFR BLD AUTO: 32.5 % (ref 35–45)
HGB BLD-MCNC: 9.8 G/DL (ref 12–16)
MCH RBC QN AUTO: 22.4 PG (ref 24–34)
MCHC RBC AUTO-ENTMCNC: 30.2 G/DL (ref 31–37)
MCV RBC AUTO: 74.4 FL (ref 78–100)
NRBC # BLD: 0 K/UL (ref 0–0.01)
NRBC BLD-RTO: 0 PER 100 WBC
PLATELET # BLD AUTO: 164 K/UL (ref 135–420)
POTASSIUM SERPL-SCNC: 4.3 MMOL/L (ref 3.5–5.5)
RBC # BLD AUTO: 4.37 M/UL (ref 4.2–5.3)
SERVICE CMNT-IMP: ABNORMAL
SERVICE CMNT-IMP: NORMAL
SODIUM SERPL-SCNC: 137 MMOL/L (ref 136–145)
VANCOMYCIN SERPL-MCNC: 14.9 UG/ML (ref 5–40)
WBC # BLD AUTO: 7.1 K/UL (ref 4.6–13.2)

## 2024-06-29 PROCEDURE — 6360000002 HC RX W HCPCS

## 2024-06-29 PROCEDURE — 6370000000 HC RX 637 (ALT 250 FOR IP): Performed by: INTERNAL MEDICINE

## 2024-06-29 PROCEDURE — 2580000003 HC RX 258: Performed by: INTERNAL MEDICINE

## 2024-06-29 PROCEDURE — 1100000003 HC PRIVATE W/ TELEMETRY

## 2024-06-29 PROCEDURE — 6360000002 HC RX W HCPCS: Performed by: PHYSICIAN ASSISTANT

## 2024-06-29 PROCEDURE — 2700000000 HC OXYGEN THERAPY PER DAY

## 2024-06-29 PROCEDURE — 6360000002 HC RX W HCPCS: Performed by: INTERNAL MEDICINE

## 2024-06-29 PROCEDURE — 6370000000 HC RX 637 (ALT 250 FOR IP): Performed by: STUDENT IN AN ORGANIZED HEALTH CARE EDUCATION/TRAINING PROGRAM

## 2024-06-29 PROCEDURE — 99232 SBSQ HOSP IP/OBS MODERATE 35: CPT | Performed by: STUDENT IN AN ORGANIZED HEALTH CARE EDUCATION/TRAINING PROGRAM

## 2024-06-29 PROCEDURE — 2580000003 HC RX 258: Performed by: PHYSICIAN ASSISTANT

## 2024-06-29 PROCEDURE — 94761 N-INVAS EAR/PLS OXIMETRY MLT: CPT

## 2024-06-29 PROCEDURE — 6360000002 HC RX W HCPCS: Performed by: STUDENT IN AN ORGANIZED HEALTH CARE EDUCATION/TRAINING PROGRAM

## 2024-06-29 PROCEDURE — 85027 COMPLETE CBC AUTOMATED: CPT

## 2024-06-29 PROCEDURE — 94640 AIRWAY INHALATION TREATMENT: CPT

## 2024-06-29 PROCEDURE — 36415 COLL VENOUS BLD VENIPUNCTURE: CPT

## 2024-06-29 PROCEDURE — C9113 INJ PANTOPRAZOLE SODIUM, VIA: HCPCS

## 2024-06-29 PROCEDURE — 6370000000 HC RX 637 (ALT 250 FOR IP): Performed by: PHYSICIAN ASSISTANT

## 2024-06-29 PROCEDURE — 80048 BASIC METABOLIC PNL TOTAL CA: CPT

## 2024-06-29 PROCEDURE — 2580000003 HC RX 258: Performed by: STUDENT IN AN ORGANIZED HEALTH CARE EDUCATION/TRAINING PROGRAM

## 2024-06-29 PROCEDURE — 80202 ASSAY OF VANCOMYCIN: CPT

## 2024-06-29 PROCEDURE — 82962 GLUCOSE BLOOD TEST: CPT

## 2024-06-29 PROCEDURE — 2580000003 HC RX 258

## 2024-06-29 RX ORDER — ARFORMOTEROL TARTRATE 15 UG/2ML
15 SOLUTION RESPIRATORY (INHALATION)
Status: DISCONTINUED | OUTPATIENT
Start: 2024-06-29 | End: 2024-06-30 | Stop reason: HOSPADM

## 2024-06-29 RX ADMIN — AMLODIPINE BESYLATE 10 MG: 10 TABLET ORAL at 08:45

## 2024-06-29 RX ADMIN — POLYETHYLENE GLYCOL 3350 17 G: 17 POWDER, FOR SOLUTION ORAL at 08:43

## 2024-06-29 RX ADMIN — CEFEPIME 2000 MG: 2 INJECTION, POWDER, FOR SOLUTION INTRAVENOUS at 11:39

## 2024-06-29 RX ADMIN — INSULIN LISPRO 12 UNITS: 100 INJECTION, SOLUTION INTRAVENOUS; SUBCUTANEOUS at 16:38

## 2024-06-29 RX ADMIN — WATER 40 MG: 1 INJECTION INTRAMUSCULAR; INTRAVENOUS; SUBCUTANEOUS at 08:41

## 2024-06-29 RX ADMIN — VANCOMYCIN HYDROCHLORIDE 750 MG: 750 INJECTION, POWDER, LYOPHILIZED, FOR SOLUTION INTRAVENOUS at 07:38

## 2024-06-29 RX ADMIN — VANCOMYCIN HYDROCHLORIDE 500 MG: 500 INJECTION, POWDER, LYOPHILIZED, FOR SOLUTION INTRAVENOUS at 20:48

## 2024-06-29 RX ADMIN — ROPINIROLE HYDROCHLORIDE 0.5 MG: 0.25 TABLET, FILM COATED ORAL at 20:54

## 2024-06-29 RX ADMIN — ROPINIROLE HYDROCHLORIDE 0.5 MG: 0.25 TABLET, FILM COATED ORAL at 14:18

## 2024-06-29 RX ADMIN — ASPIRIN 81 MG CHEWABLE TABLET 81 MG: 81 TABLET CHEWABLE at 08:41

## 2024-06-29 RX ADMIN — ATORVASTATIN CALCIUM 40 MG: 40 TABLET, FILM COATED ORAL at 20:55

## 2024-06-29 RX ADMIN — ARFORMOTEROL TARTRATE 15 MCG: 15 SOLUTION RESPIRATORY (INHALATION) at 20:52

## 2024-06-29 RX ADMIN — INSULIN LISPRO 8 UNITS: 100 INJECTION, SOLUTION INTRAVENOUS; SUBCUTANEOUS at 08:42

## 2024-06-29 RX ADMIN — ACETAMINOPHEN 325MG 650 MG: 325 TABLET ORAL at 03:41

## 2024-06-29 RX ADMIN — ROPINIROLE HYDROCHLORIDE 0.5 MG: 0.25 TABLET, FILM COATED ORAL at 08:41

## 2024-06-29 RX ADMIN — INSULIN GLARGINE 8 UNITS: 100 INJECTION, SOLUTION SUBCUTANEOUS at 08:42

## 2024-06-29 RX ADMIN — SODIUM CHLORIDE, PRESERVATIVE FREE 10 ML: 5 INJECTION INTRAVENOUS at 20:50

## 2024-06-29 RX ADMIN — PANTOPRAZOLE SODIUM 40 MG: 40 INJECTION, POWDER, FOR SOLUTION INTRAVENOUS at 08:42

## 2024-06-29 RX ADMIN — HYDROXYZINE HYDROCHLORIDE 10 MG: 10 TABLET, FILM COATED ORAL at 03:42

## 2024-06-29 RX ADMIN — SODIUM CHLORIDE, PRESERVATIVE FREE 10 ML: 5 INJECTION INTRAVENOUS at 08:45

## 2024-06-29 RX ADMIN — BUDESONIDE 1 MG: 1 SUSPENSION RESPIRATORY (INHALATION) at 20:52

## 2024-06-29 RX ADMIN — GABAPENTIN 200 MG: 100 CAPSULE ORAL at 08:41

## 2024-06-29 RX ADMIN — CEFEPIME 2000 MG: 2 INJECTION, POWDER, FOR SOLUTION INTRAVENOUS at 23:36

## 2024-06-29 RX ADMIN — BUDESONIDE 1 MG: 1 SUSPENSION RESPIRATORY (INHALATION) at 07:57

## 2024-06-29 RX ADMIN — SENNOSIDES AND DOCUSATE SODIUM 2 TABLET: 50; 8.6 TABLET ORAL at 08:41

## 2024-06-29 RX ADMIN — GABAPENTIN 200 MG: 100 CAPSULE ORAL at 20:55

## 2024-06-29 RX ADMIN — DULOXETINE HYDROCHLORIDE 30 MG: 30 CAPSULE, DELAYED RELEASE ORAL at 08:41

## 2024-06-29 RX ADMIN — ENOXAPARIN SODIUM 40 MG: 100 INJECTION SUBCUTANEOUS at 08:42

## 2024-06-29 RX ADMIN — Medication 6 MG: at 03:41

## 2024-06-29 RX ADMIN — SODIUM CHLORIDE 125 MG: 9 INJECTION, SOLUTION INTRAVENOUS at 09:48

## 2024-06-29 RX ADMIN — GABAPENTIN 200 MG: 100 CAPSULE ORAL at 14:18

## 2024-06-29 ASSESSMENT — PAIN DESCRIPTION - FREQUENCY
FREQUENCY: CONTINUOUS
FREQUENCY: CONTINUOUS

## 2024-06-29 ASSESSMENT — PAIN DESCRIPTION - ORIENTATION
ORIENTATION: MID
ORIENTATION: MID

## 2024-06-29 ASSESSMENT — PAIN DESCRIPTION - PAIN TYPE
TYPE: ACUTE PAIN
TYPE: ACUTE PAIN

## 2024-06-29 ASSESSMENT — PAIN SCALES - GENERAL
PAINLEVEL_OUTOF10: 0
PAINLEVEL_OUTOF10: 4
PAINLEVEL_OUTOF10: 0
PAINLEVEL_OUTOF10: 0
PAINLEVEL_OUTOF10: 4
PAINLEVEL_OUTOF10: 0

## 2024-06-29 ASSESSMENT — PAIN DESCRIPTION - DESCRIPTORS
DESCRIPTORS: ACHING
DESCRIPTORS: ACHING

## 2024-06-29 ASSESSMENT — PAIN DESCRIPTION - ONSET
ONSET: ON-GOING
ONSET: ON-GOING

## 2024-06-29 ASSESSMENT — PAIN SCALES - WONG BAKER
WONGBAKER_NUMERICALRESPONSE: NO HURT
WONGBAKER_NUMERICALRESPONSE: NO HURT

## 2024-06-29 ASSESSMENT — PAIN DESCRIPTION - LOCATION
LOCATION: ABDOMEN
LOCATION: ABDOMEN

## 2024-06-29 NOTE — CONSULTS
[facility]  Pulmonary, Critical Care, and Sleep Medicine  Progress note    Name: Juliana Loaiza MRN: [id number]   : 1950 Hospital: [facility]   Date: 2024        IMPRESSION:   Pulmonary Sarcoidosis with concern for possible acute flare  Diffuse scattered GGOs consistent with alveolitis vs edema +/- air-trapping on both lungs on CT .  NT-BNP not elevated. Procalcitonin neg, strep & legionella Ags neg  Had been off steroids for last year while on hospice.  Last sarcoid flare approx 25 yrs ago with hemoptysis  Inflammatory markers, ESR 50, CRP <0.3, though on IV steroids prior to testing. ACE elevated. No proteinuria on urinalysis.  Community-acquired pneumonia: RCx with Staph aureus; sensitivities pending.   COPD, possible acute exacerbation. Inconsistent inhaler use due to difficulty handling.   Moderate pulmonary hypertension, based on TTE, likely WHO group 3/2  Echo  with mild RV dilation, RVSP 60mmHg, dilated RA, peak TR jet velocity of 3.76 m/s  Chronic respiratory failure, on LTOT 3L baseline, and oxygen concentrator. Associated chronic dyspnea.  Hx of polysubstance abuse: Nicotine dependence and cocaine use, quit 14 months ago  Atypical chest pain, improved  Abdominal Pain, improved: CT abdomen consistent with constipation.   Syncopal episode   Diastolic HFpEF 60-65% on .   GERD  IRA, improved  Deconditioning  Recently on hospice care for about a year until 2024  Advanced age  Depression  CAD, non-obstructive, last cath  with moderate stenosis in proximal LAD  Healthy weight, BMI Body mass index is 20.11 kg/m².  CODE STATUS: Full Code No additional code details      PLAN:   Supplementary oxygen; SpO2 goal 88- 92%.  Maintain aspiration precautions: HOB >30 degrees  Bronchial /oral hygiene; IS at bedside   Bronchodilators: Scheduled ICS/LABA + PRN Duoneb  Steroids: Continue Solu-Medrol 40 mg daily while inpatient   Tight glycemic

## 2024-06-30 VITALS
HEART RATE: 74 BPM | TEMPERATURE: 98 F | RESPIRATION RATE: 18 BRPM | SYSTOLIC BLOOD PRESSURE: 145 MMHG | DIASTOLIC BLOOD PRESSURE: 70 MMHG | BODY MASS INDEX: 23.19 KG/M2 | WEIGHT: 126 LBS | HEIGHT: 62 IN | OXYGEN SATURATION: 98 %

## 2024-06-30 LAB — GLUCOSE BLD STRIP.AUTO-MCNC: 122 MG/DL (ref 70–110)

## 2024-06-30 PROCEDURE — 6370000000 HC RX 637 (ALT 250 FOR IP): Performed by: PHYSICIAN ASSISTANT

## 2024-06-30 PROCEDURE — 2700000000 HC OXYGEN THERAPY PER DAY

## 2024-06-30 PROCEDURE — 2580000003 HC RX 258: Performed by: PHYSICIAN ASSISTANT

## 2024-06-30 PROCEDURE — 94761 N-INVAS EAR/PLS OXIMETRY MLT: CPT

## 2024-06-30 PROCEDURE — 6360000002 HC RX W HCPCS: Performed by: PHYSICIAN ASSISTANT

## 2024-06-30 PROCEDURE — 82962 GLUCOSE BLOOD TEST: CPT

## 2024-06-30 PROCEDURE — 6360000002 HC RX W HCPCS: Performed by: INTERNAL MEDICINE

## 2024-06-30 PROCEDURE — 6360000002 HC RX W HCPCS

## 2024-06-30 PROCEDURE — 6360000002 HC RX W HCPCS: Performed by: STUDENT IN AN ORGANIZED HEALTH CARE EDUCATION/TRAINING PROGRAM

## 2024-06-30 PROCEDURE — 2580000003 HC RX 258

## 2024-06-30 PROCEDURE — 99239 HOSP IP/OBS DSCHRG MGMT >30: CPT | Performed by: STUDENT IN AN ORGANIZED HEALTH CARE EDUCATION/TRAINING PROGRAM

## 2024-06-30 PROCEDURE — 6370000000 HC RX 637 (ALT 250 FOR IP): Performed by: STUDENT IN AN ORGANIZED HEALTH CARE EDUCATION/TRAINING PROGRAM

## 2024-06-30 PROCEDURE — C9113 INJ PANTOPRAZOLE SODIUM, VIA: HCPCS

## 2024-06-30 PROCEDURE — 2580000003 HC RX 258: Performed by: INTERNAL MEDICINE

## 2024-06-30 RX ORDER — INSULIN DETEMIR 100 [IU]/ML
10 INJECTION, SOLUTION SUBCUTANEOUS NIGHTLY
Qty: 10 ML | Refills: 0 | Status: SHIPPED | OUTPATIENT
Start: 2024-06-30 | End: 2024-07-30

## 2024-06-30 RX ORDER — CEFDINIR 300 MG/1
300 CAPSULE ORAL 2 TIMES DAILY
Qty: 10 CAPSULE | Refills: 0 | Status: SHIPPED | OUTPATIENT
Start: 2024-06-30 | End: 2024-07-05

## 2024-06-30 RX ORDER — DOXYCYCLINE HYCLATE 100 MG
100 TABLET ORAL 2 TIMES DAILY
Qty: 10 TABLET | Refills: 0 | Status: SHIPPED | OUTPATIENT
Start: 2024-06-30 | End: 2024-07-05

## 2024-06-30 RX ADMIN — DULOXETINE HYDROCHLORIDE 30 MG: 30 CAPSULE, DELAYED RELEASE ORAL at 08:04

## 2024-06-30 RX ADMIN — WATER 40 MG: 1 INJECTION INTRAMUSCULAR; INTRAVENOUS; SUBCUTANEOUS at 08:05

## 2024-06-30 RX ADMIN — SODIUM CHLORIDE, PRESERVATIVE FREE 10 ML: 5 INJECTION INTRAVENOUS at 08:07

## 2024-06-30 RX ADMIN — GABAPENTIN 200 MG: 100 CAPSULE ORAL at 08:03

## 2024-06-30 RX ADMIN — ASPIRIN 81 MG CHEWABLE TABLET 81 MG: 81 TABLET CHEWABLE at 08:03

## 2024-06-30 RX ADMIN — INSULIN GLARGINE 8 UNITS: 100 INJECTION, SOLUTION SUBCUTANEOUS at 08:03

## 2024-06-30 RX ADMIN — VANCOMYCIN HYDROCHLORIDE 500 MG: 500 INJECTION, POWDER, LYOPHILIZED, FOR SOLUTION INTRAVENOUS at 08:13

## 2024-06-30 RX ADMIN — ENOXAPARIN SODIUM 40 MG: 100 INJECTION SUBCUTANEOUS at 08:02

## 2024-06-30 RX ADMIN — ROPINIROLE HYDROCHLORIDE 0.5 MG: 0.25 TABLET, FILM COATED ORAL at 08:03

## 2024-06-30 RX ADMIN — PANTOPRAZOLE SODIUM 40 MG: 40 INJECTION, POWDER, FOR SOLUTION INTRAVENOUS at 08:04

## 2024-06-30 RX ADMIN — AMLODIPINE BESYLATE 10 MG: 10 TABLET ORAL at 08:03

## 2024-06-30 ASSESSMENT — PAIN SCALES - WONG BAKER
WONGBAKER_NUMERICALRESPONSE: NO HURT
WONGBAKER_NUMERICALRESPONSE: NO HURT

## 2024-06-30 ASSESSMENT — PAIN SCALES - GENERAL
PAINLEVEL_OUTOF10: 0

## 2024-06-30 NOTE — PLAN OF CARE
Problem: Discharge Planning  Goal: Discharge to home or other facility with appropriate resources  6/23/2024 1100 by Kvng Riley RN  Outcome: Progressing  6/23/2024 0303 by Prudencio Mcdermott RN  Outcome: Progressing     Problem: Pain  Goal: Verbalizes/displays adequate comfort level or baseline comfort level  6/23/2024 1100 by Kvng Riley RN  Outcome: Progressing  6/23/2024 0303 by Prudencio Mcdermott RN  Outcome: Progressing     Problem: Safety - Adult  Goal: Free from fall injury  6/23/2024 1100 by Kvng Riley RN  Outcome: Progressing  6/23/2024 0303 by Prudencio Mcdermott RN  Outcome: Progressing     
  Problem: Discharge Planning  Goal: Discharge to home or other facility with appropriate resources  6/24/2024 2202 by Anisa Daugherty RN  Outcome: Progressing  6/24/2024 1022 by Marilu Tilley RN  Outcome: Adequate for Discharge     Problem: Pain  Goal: Verbalizes/displays adequate comfort level or baseline comfort level  6/24/2024 2202 by Anisa Daugherty RN  Outcome: Progressing  6/24/2024 1022 by Marilu Tilley RN  Outcome: Adequate for Discharge     Problem: Safety - Adult  Goal: Free from fall injury  6/24/2024 2202 by Anisa Daugherty RN  Outcome: Progressing  6/24/2024 1022 by Marilu Tilley RN  Outcome: Adequate for Discharge     Problem: Chronic Conditions and Co-morbidities  Goal: Patient's chronic conditions and co-morbidity symptoms are monitored and maintained or improved  6/24/2024 2202 by Anisa Daugherty RN  Outcome: Progressing  6/24/2024 1022 by Marilu Tilley RN  Outcome: Adequate for Discharge     
  Problem: Discharge Planning  Goal: Discharge to home or other facility with appropriate resources  6/25/2024 2258 by Prudencio Mcdermott RN  Outcome: Progressing  6/25/2024 1322 by Marilu Tilley RN  Outcome: Progressing     Problem: Pain  Goal: Verbalizes/displays adequate comfort level or baseline comfort level  6/25/2024 2258 by Prudencio Mcdermott RN  Outcome: Progressing  6/25/2024 1322 by Marilu Tilley RN  Outcome: Progressing     Problem: Safety - Adult  Goal: Free from fall injury  6/25/2024 2258 by Prudencio Mcdermott RN  Outcome: Progressing  6/25/2024 1322 by Marilu Tilley RN  Outcome: Progressing     Problem: Chronic Conditions and Co-morbidities  Goal: Patient's chronic conditions and co-morbidity symptoms are monitored and maintained or improved  6/25/2024 2258 by Prudencio Mcdermott RN  Outcome: Progressing  6/25/2024 1322 by Marilu Tilley RN  Outcome: Progressing     Problem: Risk for Elopement  Goal: Patient will not exit the unit/facility without proper excort  6/25/2024 2258 by Prudencio Mcdermott RN  Outcome: Progressing  6/25/2024 1322 by Marilu Tilley RN  Outcome: Progressing  Flowsheets (Taken 6/25/2024 0300 by Anisa Daugherty RN)  Nursing Interventions for Elopement Risk:   Assist with personal care needs such as toileting, eating, dressing, as needed to reduce the risk of wandering   Communicate/escalate to charge nurse the risk of elopement   Communicate/escalate to nursing supervisor the risk of elopement   Escort with two staff members if patient must leave the unit   Communicate to physician the risk for elopement   Make sure patient has all necessary personal care items   Place patient in room far away from exits and stairways   Reduce environmental triggers     Problem: Nutrition Deficit:  Goal: Optimize nutritional status  6/25/2024 2258 by Prudencio Mcdermott RN  Outcome: Progressing  6/25/2024 1322 by Marilu Tilley RN  Outcome: Progressing     
  Problem: Discharge Planning  Goal: Discharge to home or other facility with appropriate resources  6/26/2024 2034 by Neelam Nichols RN  Outcome: Progressing  6/26/2024 1533 by Marilu Tilley RN  Outcome: Progressing     Problem: Pain  Goal: Verbalizes/displays adequate comfort level or baseline comfort level  6/26/2024 2034 by Neelam Nichols RN  Outcome: Progressing  6/26/2024 1533 by Marilu Tilley RN  Outcome: Progressing     Problem: Safety - Adult  Goal: Free from fall injury  6/26/2024 2034 by Neelam Nichols RN  Outcome: Progressing  6/26/2024 1533 by Marilu Tilley RN  Outcome: Progressing     Problem: Chronic Conditions and Co-morbidities  Goal: Patient's chronic conditions and co-morbidity symptoms are monitored and maintained or improved  6/26/2024 2034 by Neelam Nichols RN  Outcome: Progressing  6/26/2024 1533 by Marilu Tilley RN  Outcome: Progressing     Problem: Risk for Elopement  Goal: Patient will not exit the unit/facility without proper excort  6/26/2024 2034 by Neelam Nichols RN  Outcome: Progressing  6/26/2024 1533 by Marilu Tilley RN  Outcome: Progressing     Problem: Nutrition Deficit:  Goal: Optimize nutritional status  6/26/2024 2034 by Neelam Nichols RN  Outcome: Progressing  6/26/2024 1533 by Marilu Tilley RN  Outcome: Progressing     
  Problem: Discharge Planning  Goal: Discharge to home or other facility with appropriate resources  6/28/2024 2019 by Prudencio Mcdermott RN  Outcome: Progressing  Flowsheets (Taken 6/28/2024 2010)  Discharge to home or other facility with appropriate resources: Identify barriers to discharge with patient and caregiver  6/28/2024 1129 by Andre López RN  Outcome: [facility] Progressing  Flowsheets (Taken 6/28/2024 0852)  Discharge to home or other facility with appropriate resources:   Identify barriers to discharge with patient and caregiver   Arrange for needed discharge resources and transportation as appropriate     Problem: Pain  Goal: Verbalizes/displays adequate comfort level or baseline comfort level  6/28/2024 2019 by Prudencio Mcdermott RN  Outcome: Progressing  6/28/2024 1129 by Andre López RN  Outcome: [facility] Progressing     Problem: Safety - Adult  Goal: Free from fall injury  6/28/2024 2019 by Prudencio Mcdermott RN  Outcome: Progressing  6/28/2024 1129 by Andre López RN  Outcome: [facility] Progressing  Flowsheets (Taken 6/28/2024 1128)  Free From Fall Injury: Instruct family/caregiver on patient safety     Problem: Chronic Conditions and Co-morbidities  Goal: Patient's chronic conditions and co-morbidity symptoms are monitored and maintained or improved  6/28/2024 2019 by Prudencio Mcdermott RN  Outcome: Progressing  Flowsheets (Taken 6/28/2024 2010)  Care Plan - Patient's Chronic Conditions and Co-Morbidity Symptoms are Monitored and Maintained or Improved: Monitor and assess patient's chronic conditions and comorbid symptoms for stability, deterioration, or improvement  6/28/2024 1129 by Andre López RN  Outcome: [facility] Progressing  Flowsheets (Taken 6/28/2024 0852)  Care Plan - Patient's Chronic Conditions and Co-Morbidity Symptoms are Monitored and Maintained or Improved: Monitor and assess patient's chronic conditions and comorbid symptoms for stability, deterioration, or improvement   
  Problem: Discharge Planning  Goal: Discharge to home or other facility with appropriate resources  Outcome: Adequate for Discharge     Problem: Pain  Goal: Verbalizes/displays adequate comfort level or baseline comfort level  Outcome: Adequate for Discharge     Problem: Safety - Adult  Goal: Free from fall injury  Outcome: Adequate for Discharge     Problem: Chronic Conditions and Co-morbidities  Goal: Patient's chronic conditions and co-morbidity symptoms are monitored and maintained or improved  Outcome: Adequate for Discharge     
  Problem: Discharge Planning  Goal: Discharge to home or other facility with appropriate resources  Outcome: Progressing     Problem: Pain  Goal: Verbalizes/displays adequate comfort level or baseline comfort level  Outcome: Progressing     Problem: Safety - Adult  Goal: Free from fall injury  Outcome: Progressing     
  Problem: Discharge Planning  Goal: Discharge to home or other facility with appropriate resources  Outcome: Progressing     Problem: Pain  Goal: Verbalizes/displays adequate comfort level or baseline comfort level  Outcome: Progressing     Problem: Safety - Adult  Goal: Free from fall injury  Outcome: Progressing     Problem: Chronic Conditions and Co-morbidities  Goal: Patient's chronic conditions and co-morbidity symptoms are monitored and maintained or improved  Outcome: Progressing     Problem: Risk for Elopement  Goal: Patient will not exit the unit/facility without proper excort  Outcome: Progressing     Problem: Nutrition Deficit:  Goal: Optimize nutritional status  Outcome: Progressing     
  Problem: Discharge Planning  Goal: Discharge to home or other facility with appropriate resources  Outcome: Progressing     Problem: Pain  Goal: Verbalizes/displays adequate comfort level or baseline comfort level  Outcome: Progressing     Problem: Safety - Adult  Goal: Free from fall injury  Outcome: Progressing     Problem: Chronic Conditions and Co-morbidities  Goal: Patient's chronic conditions and co-morbidity symptoms are monitored and maintained or improved  Outcome: Progressing     Problem: Risk for Elopement  Goal: Patient will not exit the unit/facility without proper excort  Outcome: Progressing  Flowsheets (Taken 6/25/2024 0300 by Anisa Daugherty, RN)  Nursing Interventions for Elopement Risk:   Assist with personal care needs such as toileting, eating, dressing, as needed to reduce the risk of wandering   Communicate/escalate to charge nurse the risk of elopement   Communicate/escalate to nursing supervisor the risk of elopement   Escort with two staff members if patient must leave the unit   Communicate to physician the risk for elopement   Make sure patient has all necessary personal care items   Place patient in room far away from exits and stairways   Reduce environmental triggers     Problem: Nutrition Deficit:  Goal: Optimize nutritional status  Outcome: Progressing     
exit the unit/facility without proper excort  6/29/2024 2014 by Prudencio Mcdermott RN  Outcome: Progressing  Flowsheets (Taken 6/29/2024 1932)  Nursing Interventions for Elopement Risk: Assist with personal care needs such as toileting, eating, dressing, as needed to reduce the risk of wandering  6/29/2024 1203 by Andre López, RN  Outcome: /\[facility]\"" Progressing  Flowsheets (Taken 6/29/2024 0800)  Nursing Interventions for Elopement Risk:   Assist with personal care needs such as toileting, eating, dressing, as needed to reduce the risk of wandering   Collaborate with family members/caregivers to mitigate the elopement risk   Collaborate with treatment team for drug withdrawal symptoms treatment     Problem: Nutrition Deficit:  Goal: Optimize nutritional status  6/29/2024 2014 by Prudencio Mcdermott RN  Outcome: Progressing  6/29/2024 1203 by Andre López, RN  Outcome: /\[facility]\"" Progressing

## 2024-06-30 NOTE — FLOWSHEET NOTE
06/30/24 0422   Vital Signs   Temp 99.4 °F (37.4 °C)   Temp Source Oral   Pulse 90   Heart Rate Source Monitor   Respirations 18   BP (!) 160/76   MAP (Calculated) 104   BP Location Right upper arm   BP Method Automatic   Patient Position Turns self     Patient is alert and oriented,was made comfortable throughout the shift.Continue care

## 2024-06-30 NOTE — CARE COORDINATION
[facility] Daughter to transport. MSW left message with home health agency to notify if discharge. Patient aware and supportive of discharge plan.    Iris Boyd  Case Management

## 2024-06-30 NOTE — PROGRESS NOTES
Pharmacist Review and Automatic Dose Adjustment of Prophylactic Enoxaparin    *Review reason for admission/hospital problem list*    The reviewing pharmacist has made an adjustment to the ordered enoxaparin dose or converted to UFH per the approved [facility] protocol and table as identified below.        Juliana Loaiza is a 73 y.o. female.     Recent Labs     06/22/24  1639   CREATININE 0.95       Estimated Creatinine Clearance: 43 mL/min (based on SCr of 0.95 mg/dL).    Height:   Ht Readings from Last 1 Encounters:   06/24/24 1.575 m (5' 2\")     Weight:  Wt Readings from Last 1 Encounters:   06/24/24 49.9 kg (110 lb)               Plan: Based upon the patient's weight and renal function, the ordered enoxaparin dose of 40mg daily has been changed/converted to 30mg daily      Thank you,  VENECIA BUSCH, [facility]    
  [facility] Hospitalist Group  Progress Note    Patient: Juliana Loaiza Age: 73 y.o. : 1950 MR#: [id number] SSN: [id number]  Date: 2024                 DVT Prophylaxis:  [x]Lovenox  []Hep SQ  []SCDs  []Coumadin   []On Heparin gtt []PO anticoagulant    Anticipated discharge: Home tomorrow, if CT scan of the abdomen and pelvis is unremarkable.    Subjective:     The patient was seen and examined at the bedside.  Her  was also in the room.  The patient complained of left lumbar and left lower quadrant abdominal pain which started yesterday.  She said that her last bowel movement was about 5 days ago.  She denied any nausea or vomiting.  She also denied any urinary urgency, frequency or hesitancy.    CT scan of the abdomen and pelvis without contrast has been ordered to check for SBO/ileus.    Objective:   VS: BP (!) 163/84   Pulse 84   Temp 98.3 °F (36.8 °C) (Oral)   Resp 18   Ht 1.575 m (5' 2.01\")   Wt 54 kg (119 lb)   SpO2 100%   BMI 21.76 kg/m²    Tmax/24hrs: Temp (24hrs), Av °F (36.7 °C), Min:97.8 °F (36.6 °C), Max:98.3 °F (36.8 °C)    Intake/Output Summary (Last 24 hours) at 2024 1738  Last data filed at 2024 1320  Gross per 24 hour   Intake 893.16 ml   Output 1320 ml   Net -426.84 ml        PHYSICAL EXAM  General Appearance: NAD, conversant  HENT: normocephalic/atraumatic, moist mucus membranes  Neck: No JVD, supple  Lungs: CTA with normal respiratory effort  CV: RRR, no m/r/g  Abdomen: soft, left lumbar tenderness present without guarding or rebound tenderness; diminished l bowel sounds  Extremities: no cyanosis, no peripheral edema  Neuro: No focal deficits, motor/sensory intact  Skin: Normal color, intact  Psych: appropriate affect, alert and oriented to person, place and time    Current Facility-Administered Medications   Medication Dose Route Frequency    [START ON 2024] enoxaparin (LOVENOX) injection 40 mg  40 mg SubCUTAneous 
  [facility] Hospitalist Group  Progress Note    Patient: Juliana Loaiza Age: 73 y.o. : 1950 MR#: [id number] SSN: [id number]  Date: 2024                 DVT Prophylaxis:  [x]Lovenox  []Hep SQ  []SCDs  []Coumadin   []On Heparin gtt []PO anticoagulant    Anticipated discharge: Home in 1 to 2 days, pending final respiratory culture and sensitivity results.      Subjective:     Patient was seen and examined at the bedside.  Her  was also present.  Patient's abdominal pain has symptomatically improved.  She had a large bowel movement after she received the enema yesterday.  She denied any new complaints today.    Objective:   VS: BP (!) 166/94   Pulse 80   Temp 98 °F (36.7 °C) (Oral)   Resp 18   Ht 1.575 m (5' 2.01\")   Wt 54 kg (119 lb)   SpO2 97%   BMI 21.76 kg/m²    Tmax/24hrs: Temp (24hrs), Av.1 °F (36.7 °C), Min:97.5 °F (36.4 °C), Max:98.5 °F (36.9 °C)    Intake/Output Summary (Last 24 hours) at 2024 0730  Last data filed at 2024 1730  Gross per 24 hour   Intake 1020 ml   Output 650 ml   Net 370 ml        PHYSICAL EXAM  General Appearance: NAD, conversant  HENT: normocephalic/atraumatic, moist mucus membranes  Neck: No JVD, supple  Lungs: CTA with normal respiratory effort  CV: RRR, no m/r/g  Abdomen: Abdomen is soft and nontender  Extremities: no cyanosis, no peripheral edema  Neuro: No focal deficits, motor/sensory intact  Skin: Normal color, intact  Psych: appropriate affect, alert and oriented to person, place and time    Current Facility-Administered Medications   Medication Dose Route Frequency    ceFEPIme (MAXIPIME) 2,000 mg in sodium chloride 0.9 % 100 mL IVPB (mini-bag)  2,000 mg IntraVENous Q12H    vancomycin (VANCOCIN) 750 mg in sodium chloride 0.9 % 250 mL IVPB (Aejf1Elx)  750 mg IntraVENous Q12H    atorvastatin (LIPITOR) tablet 40 mg  40 mg Oral Nightly    aspirin chewable tablet 81 mg  81 mg Oral Daily    DULoxetine (CYMBALTA) 
  [facility] Hospitalist Group  Progress Note    Patient: Juliana Loaiza Age: 73 y.o. : 1950 MR#: [id number] SSN: [id number]  Date: 2024                 DVT Prophylaxis:  [x]Lovenox  []Hep SQ  []SCDs  []Coumadin   []On Heparin gtt []PO anticoagulant    Anticipated discharge: Home in 1 to 2 days, pending final respiratory culture and sensitivity results.      Subjective:     The patient was seen and examined at the bedside.  Her  was also present.  Patient again complained of left lumbar abdominal pain.  CT scan of the abdomen and pelvis from yesterday showed moderate stool retention.  I had a long discussion with the patient about potential opiate dependence and educated her about the adverse effects of opiates.  Patient's respiratory cultures positive for Staphylococcus  and gram-negative rods.  However, the patient denied any cough, shortness of breath, expectoration or hemoptysis.    Fleet Enema has been ordered for constipation.    Objective:   VS: BP (!) 144/76   Pulse 100   Temp 98 °F (36.7 °C) (Oral)   Resp 18   Ht 1.575 m (5' 2.01\")   Wt 54 kg (119 lb)   SpO2 99%   BMI 21.76 kg/m²    Tmax/24hrs: Temp (24hrs), Av.1 °F (36.7 °C), Min:97.8 °F (36.6 °C), Max:98.3 °F (36.8 °C)    Intake/Output Summary (Last 24 hours) at 2024 1319  Last data filed at 2024 1931  Gross per 24 hour   Intake 2507.44 ml   Output --   Net 2507.44 ml        PHYSICAL EXAM  General Appearance: NAD, conversant  HENT: normocephalic/atraumatic, moist mucus membranes  Neck: No JVD, supple  Lungs: CTA with normal respiratory effort  CV: RRR, no m/r/g  Abdomen: soft, left lumbar tenderness present without guarding or rebound tenderness; diminished l bowel sounds  Extremities: no cyanosis, no peripheral edema  Neuro: No focal deficits, motor/sensory intact  Skin: Normal color, intact  Psych: appropriate affect, alert and oriented to person, place and time    Current 
4 Eyes Skin Assessment     NAME:  Juliana Loaiza  YOB: 1950  MEDICAL RECORD NUMBER:  [id number]    The patient is being assessed for  Shift Handoff    I agree that at least one RN has performed a thorough Head to Toe Skin Assessment on the patient. ALL assessment sites listed below have been assessed.      Areas assessed by both nurses:    Shoulders, Back, Chest and Arms, Elbows, Hands        Does the Patient have a Wound? No noted wound(s)       Drew Prevention initiated by RN: Yes  Wound Care Orders initiated by RN: No    Pressure Injury (Stage 3,4, Unstageable, DTI, NWPT, and Complex wounds) if present, place Wound referral order by RN under : No    New Ostomies, if present place, Ostomy referral order under : No     Nurse 1 eSignature: Electronically signed by TERESA WALTER RN on 6/27/24 at 7:45   PM EDT    **SHARE this note so that the co-signing nurse can place an eSignature**    Nurse 2 eSignature: Electronically signed by Vale Butt LPN on 6/287/24 at 7:45 PM EDT    
4 Eyes Skin Assessment     NAME:  Juliana Loaiza  YOB: 1950  MEDICAL RECORD NUMBER:  [id number]    The patient is being assessed for  Shift Handoff    I agree that at least one RN has performed a thorough Head to Toe Skin Assessment on the patient. ALL assessment sites listed below have been assessed.      Areas assessed by both nurses:    Sacrum. Buttock, Coccyx, Ischium        Does the Patient have a Wound? No noted wound(s)       Derw Prevention initiated by RN: No  Wound Care Orders initiated by RN: No    Pressure Injury (Stage 3,4, Unstageable, DTI, NWPT, and Complex wounds) if present, place Wound referral order by RN under : No    New Ostomies, if present place, Ostomy referral order under : No     Nurse 1 eSignature: Electronically signed by Vale Butt LPN on 6/24/24 at 8:04 AM EDT    **SHARE this note so that the co-signing nurse can place an eSignature**    Nurse 2 eSignature: Electronically signed by Marilu Tilley RN on 6/24/24 at 8:15 AM EDT   
4 Eyes Skin Assessment     NAME:  Juliana Loaiza  YOB: 1950  MEDICAL RECORD NUMBER:  [id number]    The patient is being assessed for  Shift Handoff    I agree that at least one RN has performed a thorough Head to Toe Skin Assessment on the patient. ALL assessment sites listed below have been assessed.      Areas assessed by both nurses:    Head, Face, Ears, Shoulders, Back, Chest, Arms, Elbows, Hands, Sacrum. Buttock, Coccyx, Ischium, and Legs. Feet and Heels        Does the Patient have a Wound? No noted wound(s)       Drew Prevention initiated by RN: Yes  Wound Care Orders initiated by RN: No    Pressure Injury (Stage 3,4, Unstageable, DTI, NWPT, and Complex wounds) if present, place Wound referral order by RN under : No    New Ostomies, if present place, Ostomy referral order under : No     Nurse 1 eSignature: Electronically signed by Neelam Nichols RN on 6/27/24 at 7:35 AM EDT    **SHARE this note so that the co-signing nurse can place an eSignature**    Nurse 2 eSignature: Electronically signed by TERESA WALTER RN on 6/27/24 [id number] amEDT   
4 Eyes Skin Assessment     NAME:  Juliana Loaiza  YOB: 1950  MEDICAL RECORD NUMBER:  [id number]    The patient is being assessed for  Shift Handoff    I agree that at least one RN has performed a thorough Head to Toe Skin Assessment on the patient. ALL assessment sites listed below have been assessed.      Areas assessed by both nurses:    Head, Face, Ears, Shoulders, Back, Chest, Arms, Elbows, Hands, Sacrum. Buttock, Coccyx, Ischium, Legs. Feet and Heels, Under Medical Devices , and Other          Does the Patient have a Wound? No noted wound(s)       Drew Prevention initiated by RN: No  Wound Care Orders initiated by RN: No    Pressure Injury (Stage 3,4, Unstageable, DTI, NWPT, and Complex wounds) if present, place Wound referral order by RN under : No    New Ostomies, if present place, Ostomy referral order under : No     Nurse 1 eSignature: Electronically signed by Prudencio Mcdermott RN on 6/23/24 at 4:50 AM EDT    **SHARE this note so that the co-signing nurse can place an eSignature**    Nurse 2 eSignature: Electronically signed by Kvng Riley RN on 6/23/24 at 7:30 AM EDT eyes  
4 Eyes Skin Assessment     NAME:  Juliana Loaiza  YOB: 1950  MEDICAL RECORD NUMBER:  [id number]    The patient is being assessed for  Shift Handoff    I agree that at least one RN has performed a thorough Head to Toe Skin Assessment on the patient. ALL assessment sites listed below have been assessed.      Areas assessed by both nurses:    Head, Face, Ears, Shoulders, Back, Chest, Arms, Elbows, Hands, Sacrum. Buttock, Coccyx, Ischium, and Legs. Feet and Heels        Does the Patient have a Wound? No noted wound(s)       Drew Prevention initiated by RN: No  Wound Care Orders initiated by RN: No    Pressure Injury (Stage 3,4, Unstageable, DTI, NWPT, and Complex wounds) if present, place Wound referral order by RN under : No    New Ostomies, if present place, Ostomy referral order under : No     Nurse 1 eSignature: Electronically signed by Marilu Tilley RN on 6/24/24 at 6:51 PM EDT    **SHARE this note so that the co-signing nurse can place an eSignature**    Nurse 2 eSignature: Electronically signed by JONELLE NOLASCO RN on 6/24/24 at 7:37 PM EDT   
4 Eyes Skin Assessment     NAME:  Juliana Loaiza  YOB: 1950  MEDICAL RECORD NUMBER:  [id number]    The patient is being assessed for  Shift Handoff    I agree that at least one RN has performed a thorough Head to Toe Skin Assessment on the patient. ALL assessment sites listed below have been assessed.      Areas assessed by both nurses:    Head, Face, Ears, Shoulders, Back, Chest, Arms, Elbows, Hands, Sacrum. Buttock, Coccyx, Ischium, Legs. Feet and Heels, Under Medical Devices , and Other ***        Does the Patient have a Wound? No noted wound(s)       Drew Prevention initiated by RN: No  Wound Care Orders initiated by RN: No    Pressure Injury (Stage 3,4, Unstageable, DTI, NWPT, and Complex wounds) if present, place Wound referral order by RN under : No    New Ostomies, if present place, Ostomy referral order under : No     Nurse 1 eSignature: Electronically signed by Prudencio Mcdermott RN on 6/30/24 at 4:28 AM EDT    **SHARE this note so that the co-signing nurse can place an eSignature**    Nurse 2 eSignature: {Esignature:[id number]} eyes  
4 Eyes Skin Assessment     NAME:  Juliana Loaiza  YOB: 1950  MEDICAL RECORD NUMBER:  [id number]    The patient is being assessed for  Shift Handoff    I agree that at least one RN has performed a thorough Head to Toe Skin Assessment on the patient. ALL assessment sites listed below have been assessed.      Areas assessed by both nurses:    Head, Face, Ears, Shoulders, Back, Chest, Arms, Elbows, Hands, Sacrum. Buttock, Coccyx, Ischium, Legs. Feet and Heels, and Under Medical Devices         Does the Patient have a Wound? No noted wound(s)       Drew Prevention initiated by RN: No  Wound Care Orders initiated by RN: No    Pressure Injury (Stage 3,4, Unstageable, DTI, NWPT, and Complex wounds) if present, place Wound referral order by RN under : No    New Ostomies, if present place, Ostomy referral order under : No     Nurse 1 eSignature: Electronically signed by Andre López RN on 6/29/24 at 5:57 PM EDT    **SHARE this note so that the co-signing nurse can place an eSignature**    Nurse 2 eSignature: Electronically signed by Prudencio Mcdermott RN on 6/29/24 at 10:24 PM EDT   
4 Eyes Skin Assessment     NAME:  Juliana Loaiza  YOB: 1950  MEDICAL RECORD NUMBER:  [id number]    The patient is being assessed for  Shift Handoff    I agree that at least one RN has performed a thorough Head to Toe Skin Assessment on the patient. ALL assessment sites listed below have been assessed.      Areas assessed by both nurses:    Head, Face, Ears, Shoulders, Back, Chest, Arms, Elbows, Hands, Sacrum. Buttock, Coccyx, Ischium, Legs. Feet and Heels, Under Medical Devices , and Other          Does the Patient have a Wound? No noted wound(s)       Drew Prevention initiated by RN: No  Wound Care Orders initiated by RN: No    Pressure Injury (Stage 3,4, Unstageable, DTI, NWPT, and Complex wounds) if present, place Wound referral order by RN under : No    New Ostomies, if present place, Ostomy referral order under : No     Nurse 1 eSignature: Electronically signed by Prudencio Mcdermott RN on 6/26/24 at 7:06 AM EDT    **SHARE this note so that the co-signing nurse can place an eSignature**    Nurse 2 eSignature: Electronically signed by Marilu Tilley RN on 6/26/24 at 7:51 AM EDT   
4 Eyes Skin Assessment     NAME:  Juliana Loaiza  YOB: 1950  MEDICAL RECORD NUMBER:  [id number]    The patient is being assessed for  Shift Handoff    I agree that at least one RN has performed a thorough Head to Toe Skin Assessment on the patient. ALL assessment sites listed below have been assessed.      Areas assessed by both nurses:    Head, Face, Ears, Shoulders, Back, Chest, Arms, Elbows, Hands, Sacrum. Buttock, Coccyx, Ischium, and Legs. Feet and Heels        Does the Patient have a Wound? No noted wound(s)       Drew Prevention initiated by RN: No  Wound Care Orders initiated by RN: No    Pressure Injury (Stage 3,4, Unstageable, DTI, NWPT, and Complex wounds) if present, place Wound referral order by RN under : No    New Ostomies, if present place, Ostomy referral order under : No     Nurse 1 eSignature: Electronically signed by Kvng Riley RN on 6/23/24 at 7:20 PM EDT    **SHARE this note so that the co-signing nurse can place an eSignature**    Nurse 2 eSignature: Electronically signed by Vale Butt LPN on 6/23/24 at 7:30 PM EDT    
4 Eyes Skin Assessment     NAME:  Juliana Loaiza  YOB: 1950  MEDICAL RECORD NUMBER:  [id number]    The patient is being assessed for  Shift Handoff    I agree that at least one RN has performed a thorough Head to Toe Skin Assessment on the patient. ALL assessment sites listed below have been assessed.      Areas assessed by both nurses:    Head, Face, Ears, Shoulders, Back, Chest, Arms, Elbows, Hands, Sacrum. Buttock, Coccyx, Ischium, Legs. Feet and Heels, and Under Medical Devices         Does the Patient have a Wound? No noted wound(s)       Drew Prevention initiated by RN: No  Wound Care Orders initiated by RN: No    Pressure Injury (Stage 3,4, Unstageable, DTI, NWPT, and Complex wounds) if present, place Wound referral order by RN under : No    New Ostomies, if present place, Ostomy referral order under : No     Nurse 1 eSignature: Electronically signed by JONELLE NOLASCO RN on 6/25/24 at 6:12 AM EDT    **SHARE this note so that the co-signing nurse can place an eSignature**    Nurse 2 eSignature: Electronically signed by Marilu Tilley RN on 6/25/24 at 7:37 AM EDT    
4 Eyes Skin Assessment     NAME:  Juliana Loaiza  YOB: 1950  MEDICAL RECORD NUMBER:  [id number]    The patient is being assessed for  Shift Handoff    I agree that at least one RN has performed a thorough Head to Toe Skin Assessment on the patient. ALL assessment sites listed below have been assessed.      Areas assessed by both nurses:    Head, Face, Ears, Shoulders, Back, Chest, Arms, Elbows, Hands, Sacrum. Buttock, Coccyx, Ischium, Legs. Feet and Heels, Under Medical Devices , and Other ***        Does the Patient have a Wound? No noted wound(s)       Drew Prevention initiated by RN: No  Wound Care Orders initiated by RN: No    Pressure Injury (Stage 3,4, Unstageable, DTI, NWPT, and Complex wounds) if present, place Wound referral order by RN under : No    New Ostomies, if present place, Ostomy referral order under : No     Nurse 1 eSignature: Electronically signed by Prudencio Mcdermott RN on 6/29/24 at 6:49 AM EDT    **SHARE this note so that the co-signing nurse can place an eSignature**    Nurse 2 eSignature: {Esignature:[id number]} e  
4 Eyes Skin Assessment     NAME:  Juliana Loaiza  YOB: 1950  MEDICAL RECORD NUMBER:  [id number]    The patient is being assessed for  Shift Handoff    I agree that at least one RN has performed a thorough Head to Toe Skin Assessment on the patient. ALL assessment sites listed below have been assessed.      Areas assessed by both nurses:    Sacrum. Buttock, Coccyx, Ischium        Does the Patient have a Wound? No noted wound(s)       Drew Prevention initiated by RN: No  Wound Care Orders initiated by RN: No    Pressure Injury (Stage 3,4, Unstageable, DTI, NWPT, and Complex wounds) if present, place Wound referral order by RN under : No    New Ostomies, if present place, Ostomy referral order under : No     Nurse 1 eSignature: Electronically signed by Vale Butt LPN on 6/28/24 at 72:5 AM EDT    **SHARE this note so that the co-signing nurse can place an eSignature**    Nurse 2 eSignature: {Esignature:[id number]} eye  
4 Eyes Skin Assessment     NAME:  Juliana Loaiza  YOB: 1950  MEDICAL RECORD NUMBER:  [id number]    The patient is being assessed for  Shift Handoff    I agree that at least one RN has performed a thorough Head to Toe Skin Assessment on the patient. ALL assessment sites listed below have been assessed.      Areas assessed by both nurses:    Head, Face, Ears, Shoulders, Back, Chest, Arms, Elbows, Hands, Sacrum. Buttock, Coccyx, Ischium, and Legs. Feet and Heels        Does the Patient have a Wound? No noted wound(s)       Drew Prevention initiated by RN: No  Wound Care Orders initiated by RN: No    Pressure Injury (Stage 3,4, Unstageable, DTI, NWPT, and Complex wounds) if present, place Wound referral order by RN under : No    New Ostomies, if present place, Ostomy referral order under : No     Nurse 1 eSignature: Electronically signed by Marilu Tilley RN on 6/25/24 at 7:58 PM EDT    **SHARE this note so that the co-signing nurse can place an eSignature**    Nurse 2 eSignature: Electronically signed by Prudencio Mcdermott RN on 6/25/24 at 10:59 PM EDT   
4 Eyes Skin Assessment     NAME:  Juliana Loaiza  YOB: 1950  MEDICAL RECORD NUMBER:  [id number]    The patient is being assessed for  Shift Handoff    I agree that at least one RN has performed a thorough Head to Toe Skin Assessment on the patient. ALL assessment sites listed below have been assessed.      Areas assessed by both nurses:    Head, Face, Ears, Shoulders, Back, Chest, Arms, Elbows, Hands, and Sacrum. Buttock, Coccyx, Ischium        Does the Patient have a Wound? No noted wound(s)       Drew Prevention initiated by RN: No  Wound Care Orders initiated by RN: No    Pressure Injury (Stage 3,4, Unstageable, DTI, NWPT, and Complex wounds) if present, place Wound referral order by RN under : No    New Ostomies, if present place, Ostomy referral order under : No     Nurse 1 eSignature: Electronically signed by Marilu Tilley RN on 6/26/24 at 6:52 PM EDT    **SHARE this note so that the co-signing nurse can place an eSignature**    Nurse 2 eSignature: Electronically signed by Neelam Nichols RN on 6/27/24 at 2:35 AM EDT   
Advance Care Planning   Healthcare Decision Maker:    Primary Decision Maker: PURA LOAIZA C - Spouse - [phone]    Today we documented Decision Maker(s) consistent with Legal Next of Kin hierarchy.    conducted an initial consultation and Spiritual Assessment for Juliana Loaiza, who is a 73 y.o.,female. Patient's Primary Language is: English.   According to the patient's EMR [employer] Affiliation is: [employer].     The reason the Patient came to the hospital is:   Patient Active Problem List    Diagnosis Date Noted    Goals of care, counseling/discussion 06/24/2024    Encounter for palliative care 06/24/2024    Opioid dependence with opioid-induced disorder ([facility]) 06/24/2024    Sarcoidosis 06/24/2024    Heat syncope 06/24/2024    Gastroparesis 10/21/2022    IRA (acute kidney injury) ([facility]) 10/19/2022    Pulmonary sarcoidosis ([facility]) 10/16/2019    Chronic respiratory failure with hypoxia ([facility]) 10/16/2019    CAD in native artery     Hyperglycemia 11/05/2017    Closed head injury 11/05/2017    Chronic back pain 11/05/2017    Syncope and collapse 11/05/2017    Diverticulitis 10/25/2014    Abdominal pain 10/25/2014    Anemia 09/12/2014    Non-ST elevated myocardial infarction (non-STEMI) ([facility]) 09/11/2014    GERD (gastroesophageal reflux disease) 09/11/2014    Cigarette smoker 09/11/2014    Hidradenitis 09/11/2014    RVH (right ventricular hypertrophy) 09/11/2014    Cocaine abuse ([facility]) 09/11/2014    Recurrent falls 09/11/2014    Insomnia 09/11/2014    Chest pain 09/10/2014    DM (diabetes mellitus) ([facility]) 01/15/2014    HTN (hypertension) 01/15/2014    Osteoporosis 01/15/2014    HNP (herniated nucleus pulposus), cervical 01/15/2014    Knee pain, left 09/08/2011    COPD, moderate ([facility]) 09/08/2011    Chronic pain syndrome 09/08/2011    DJD (degenerative joint disease) of knee 09/08/2011    Depression 09/08/2011        The  provided the following Interventions:  Initiated a relationship of care and 
[facility] - Progress Note    Admit Date: 6/22/2024  Attending Cardiologist: Dr. Hatfield    Assessment:     Patient Active Problem List    Diagnosis Date Noted    Gastroparesis 10/21/2022    IRA (acute kidney injury) ([facility]) 10/19/2022    Pulmonary sarcoidosis ([facility]) 10/16/2019    Chronic respiratory failure with hypoxia ([facility]) 10/16/2019    CAD in native artery     Hyperglycemia 11/05/2017    Closed head injury 11/05/2017    Chronic back pain 11/05/2017    Syncope and collapse 11/05/2017    Diverticulitis 10/25/2014    Abdominal pain 10/25/2014    Anemia 09/12/2014    Non-ST elevated myocardial infarction (non-STEMI) ([facility]) 09/11/2014    GERD (gastroesophageal reflux disease) 09/11/2014    Cigarette smoker 09/11/2014    Hidradenitis 09/11/2014    RVH (right ventricular hypertrophy) 09/11/2014    Cocaine abuse ([facility]) 09/11/2014    Recurrent falls 09/11/2014    Insomnia 09/11/2014    Chest pain 09/10/2014    DM (diabetes mellitus) ([facility]) 01/15/2014    HTN (hypertension) 01/15/2014    Osteoporosis 01/15/2014    HNP (herniated nucleus pulposus), cervical 01/15/2014    Knee pain, left 09/08/2011    COPD, moderate ([facility]) 09/08/2011    Chronic pain syndrome 09/08/2011    DJD (degenerative joint disease) of knee 09/08/2011    Depression 09/08/2011       --Chest pain.  Both typical and atypical features for angina.  Given her multiple cardiac risk factors, I would recommend pursuing a pharmacologic nuclear stress test.  Echocardiogram to eval for any pericardial disease  --Nonobstructive CAD.  Historical diagnosis based on cardiac catheterization in 2014 when she was found to have a proximal LAD moderate stenosis which was negative by FFR.  Last nuclear stress test in 2018 was negative for ischemia.  --History of pulmonary sarcoidosis.  No recent flares.  No longer on chronic steroids  -- Opioid dependence.  Patient has been on longstanding prescription opioids  -- Chronic malnutrition.  According to the patient this 
Comprehensive Nutrition Assessment    Type and Reason for Visit:  Initial, Positive Nutrition Screen    Nutrition Recommendations/Plan:   Continue current diet as tolerated.  Order Ensure Plus High Protein (each provides 350 kcal, 20g protein) 3x/d per home regimen  Continue to monitor tolerance of PO, compliance of oral supplements, weight, labs, and plan of care during admission.     Malnutrition Assessment:  Malnutrition Status:  No malnutrition (06/25/24 0955)    Context:  Chronic Illness     Findings of the 6 clinical characteristics of malnutrition:  Energy Intake:  No significant decrease in energy intake  Weight Loss:  No significant weight loss     Body Fat Loss:  Unable to assess     Muscle Mass Loss:  Unable to assess    Fluid Accumulation:  No significant fluid accumulation     Strength:  Not Performed    Nutrition History and Allergies:   PMHx: CAD s/p NSTEMI (2014), COPD, DM, Diverticulitis, GERD, Hidradenitis, HTN, Narcotic dependence for chronic pain, hx cocaine use. Noted pt recently on hospice for sarcoidosis, came off 2 wks ago. Hx chronic malnutrition which has improved recently per chart review. PTA reports good appetite and no special diet. UBW 85#, gained up to 110# per pt by drinking Ensure chocolate 3x/d    Weight Hx: Notable for wt gain, lowest wt of 80# in Feb 2023, wt gain since then of  30# (38%). Pt reports same.  Wt Readings from Last 10 Encounters:   06/24/24 49.9 kg (110 lb)   04/09/24 51.7 kg (114 lb)   04/04/24 45.4 kg (100 lb)   02/12/24 45.8 kg (101 lb)   12/14/23 47.7 kg (105 lb 2 oz)   11/20/23 44.9 kg (99 lb)   10/09/23 40.4 kg (89 lb)   07/17/23 40.9 kg (90 lb 2 oz)   06/26/23 38.1 kg (84 lb)   06/15/23 40.1 kg (88 lb 8 oz)     NFPE: unable to perform NFPE. Food Allergies: allergy to latex and shellfish.    Nutrition Assessment:    Admitted for chest pain, concern for sarcoidosis flare. Pt seen for - Positive Nutrition Screen: MST: wt loss, MST: poor appetite. Per Pulm, 
History & Physical    Patient: Juliana Loaiza MRN: [id number]  CSN: [id number]    YOB: 1950  Age: 73 y.o.  Sex: female      DOA: 6/22/2024  CC: Chest pain and SOB    PCP: Ezequiel Michel MD       HPI:     Juliana Loaiza is a 73 y.o. female who presented to the ED with a chief complaint of syncope episode and chest pain 6/22. Pt has a PMH of sarcoidosis, cocaine use, CAD, tobacco abuse, diabetic, COPD and depression. Pt had a syncope episode with a fall where she hit the head. Of the note, patient was recently on hospice for sarcoidosis. Per patient and , hospice discharged her as she was not declining and gained adequate weight. Cardiology consulted. CT of the head and cervical spine and chest were negative. EKG was nonischemic(old septal infarct). ECHO- pending. Stress test with no signs of ischemia    06/25/24  Remains dyspneic  Restless leg noted  Complaining of chronic pain, only \" Dilaudid IV\" has not ever been able to treat her pain  States her body \"eats\" Tylenol and Percocets \"like candy\"  Worsening renal function unclear etiology        Past Medical History:   Diagnosis Date    Arthritis     \"generalized\"    Asthma     CAD in native artery     NSTEMI (Sep 2014); diffuse 65% pLAD, minimal disease RCA & LCx. pLAD FFR 0.93. LV  no RWMA (echo and LV angio)    Chronic neck pain     Chronic pain     left knee    Chronic pain syndrome     COPD (chronic obstructive pulmonary disease) (HCC)     Depression     Diabetes (HCC)     Diverticulitis     GERD (gastroesophageal reflux disease)     Heart disease     Hidradenitis 9/11/2014    Hypertension     Left knee pain     Narcotic dependence (HCC)     Pneumonia     Right wrist pain     Sarcoidosis        Past Surgical History:   Procedure Laterality Date    BREAST BIOPSY Right     9/10/14: She said tag in place from BX    BRONCHOSCOPY  10/2019    CHOLECYSTECTOMY  2005    HYSTERECTOMY (CERVIX STATUS UNKNOWN)      KNEE 
History & Physical    Patient: Juliana Loaiza MRN: [id number]  CSN: [id number]    YOB: 1950  Age: 73 y.o.  Sex: female      DOA: 6/22/2024  CC: Chest pain and SOB    PCP: Ezequiel Michel MD       HPI:     Juliana Loaiza is a 73 y.o. female who presented to the ED with a chief complaint of syncope episode and chest pain 6/22. Pt has a PMH of sarcoidosis, cocaine use, CAD, tobacco abuse, diabetic, COPD and depression. Pt had a syncope episode with a fall where she hit the head. Of the note, patient was recently on hospice for sarcoidosis. Per patient and , hospice discharged her as she was not declining and gained adequate weight. Cardiology consulted. CT of the head and cervical spine and chest were negative. EKG was nonischemic(old septal infarct). ECHO- pending. Stress test- results pending.     06/24/24  On the exam, pt is alert & oriented x4, HD stable.   2-3 word dyspnea -states this is about her baseline  States that the stress echo was\" horrible\" and that she never wants to do it again.  Extremely nauseous secondary to that  Patient complains about being very discouraged and depressed  She is very anxious about her Medicaid lapsing, asking if we can fix that.  Patient states her quality of life is very low .        Past Medical History:   Diagnosis Date    Arthritis     \"generalized\"    Asthma     CAD in native artery     NSTEMI (Sep 2014); diffuse 65% pLAD, minimal disease RCA & LCx. pLAD FFR 0.93. LV  no RWMA (echo and LV angio)    Chronic neck pain     Chronic pain     left knee    Chronic pain syndrome     COPD (chronic obstructive pulmonary disease) (HCC)     Depression     Diabetes (HCC)     Diverticulitis     GERD (gastroesophageal reflux disease)     Heart disease     Hidradenitis 9/11/2014    Hypertension     Left knee pain     Narcotic dependence (HCC)     Pneumonia     Right wrist pain     Sarcoidosis        Past Surgical History:   Procedure Laterality 
[facility]   Pharmacy Pharmacokinetic Monitoring Service - Vancomycin    Indication: HAP  Goal AUC/BHARAT: 400-600  Day of Therapy: 1  Additional Antimicrobials: Cefepime    Pertinent Laboratory Values:   Wt Readings from Last 1 Encounters:   06/26/24 54 kg (119 lb)     Temp Readings from Last 1 Encounters:   06/27/24 98 °F (36.7 °C) (Oral)     Estimated Creatinine Clearance: 54 mL/min (based on SCr of 0.75 mg/dL).    Recent Labs     06/26/24  0401 06/27/24  0357   CREATININE 1.08 0.75   BUN 36* 20*   WBC 7.5 6.4     Pertinent Cultures:  Date Source Results   06/25/24 sputum pending   MRSA Nasal Swab: n/a    Assessment:  Date Current Dose Level (mg/L) Timing of Level (h) AUC/BHARAT   6/27/24 1,250mg - - -   Note: Serum concentrations collected for AUC dosing may appear elevated if collected in close proximity to the dose administered, this is not necessarily an indication of toxicity    Plan:  Give 1,250mg today then start 750mg q12h  No labs ordered  Pharmacy will continue to monitor patient and adjust therapy as indicated    Thank you for the consult,  Chika Up, [facility]  6/27/2024    
[facility]   Pharmacy Pharmacokinetic Monitoring Service - Vancomycin    Indication: HAP  Goal AUC/BHARAT: 400-600  Day of Therapy: 3  Additional Antimicrobials: Cefepime    Pertinent Laboratory Values:   Wt Readings from Last 1 Encounters:   06/29/24 55.8 kg (123 lb)     Temp Readings from Last 1 Encounters:   06/29/24 98 °F (36.7 °C) (Oral)     Estimated Creatinine Clearance: 44 mL/min (based on SCr of 0.93 mg/dL).    Recent Labs     06/28/24  0430 06/29/24  0413   CREATININE 0.63 0.93   BUN 13 24*   WBC 4.9 7.1     Pertinent Cultures:  Date Source Results   06/25 sputum staph   MRSA Nasal Swab: pending    Assessment:  Date Current Dose Level (mg/L) Timing of Level (h) AUC/BHARAT   6/27 1,250 mg x1 - - -   6/28 750 mg q12h - - -   6/29 750 mg q12h 14.9 7 641   Note: Serum concentrations collected for AUC dosing may appear elevated if collected in close proximity to the dose administered, this is not necessarily an indication of toxicity    Plan:  Current dose supratherapeutic with AUC > 600  Will adjust dose to Vancomycin 500 mg q12h for est AUC/tr = 454/14  No level ordered at this time  Pharmacy will continue to monitor patient and adjust therapy as indicated    Thank you for the consult,  THERON POTTER [facility]  6/29/2024  
[facility]  Progress Note    Patient: Juliana Loaiza Age: 73 y.o. : 1950 MR#: [id number] SSN: [id number]  Date/Time: 2024     Subjective:     Patient seen evaluated, sitting up in bed, no acute distress.    73-year-old female with history of sarcoidosis, cocaine use, tobacco, type 2 diabetes, COPD, depression presents to the emergency room with substernal chest pain radiating to left arm, neck with shortness of breath.  Patient was recently on hospice for sarcoidosis and came off about 2 weeks ago.  Patient is asking for pain medications, added Percocet as needed.        Assessment/Plan:     Chest pain with no acute EKG changes, negative troponin, cardiology consulted.  Continue aspirin and statin therapy.  History of type 2 diabetes-insulin sliding scale, monitor blood sugars  History of COPD-continue bronchodilator therapy  History of GERD-continue PPI  History of pulmonary sarcoidosis  DVT prophylaxis-Lovenox  Full code    Patient was recently on hospice, will get palliative care to reevaluate patient            Anticipated Discharge and Disposition:     Home with      Darien Carrington MD  24      Case discussed with:  []Patient  []Family  []Nursing  []Case Management  DVT Prophylaxis:  []Lovenox  []Hep SQ  []SCDs  []Coumadin   []On Heparin gtt    Objective:   VS:   Vitals:    24 0730   BP: 121/86   Pulse: 69   Resp: 16   Temp: 97.6 °F (36.4 °C)   SpO2: 96%        Intake/Output Summary (Last 24 hours) at 2024 1007  Last data filed at 2024 0500  Gross per 24 hour   Intake --   Output 400 ml   Net -400 ml       General:  Alert, cooperative, no acute distress    Pulmonary:  CTA Bilaterally. No Wheezing/Rhonchi/Rales.  Cardiovascular: Regular rate and Rhythm.  GI:  Soft, Non distended, Non tender. + Bowel sounds.  Extremities:  No edema, cyanosis, clubbing. No calf tenderness.   Neurologic: Alert and oriented X 3. No acute 
[facility]  Pulmonary, Critical Care, and Sleep Medicine    Name: Juliana Loaiza MRN: [id number]   : 1950 Hospital: [facility]   Date: 2024        IMPRESSION:   Pulmonary Sarcoidosis with concern for acute flare  GGO/Scattered atelectasis and alveolitis vs edema on both lungs on CT .  NT-BNP not elevated, PCL neg  Off steroids for last year while on hospice.  Last sarcoid flare up 25yrs ago with hemoptysis  Inflammatory markers, ESR 50, neg CRP 0.3. Patient started on IV steroids prior to testing. No proteinuria on urinalysis. ACE elevated.  Community-acquired pneumonia: Rcx + Staph a; sensitivities pending.   Hx of polysubstance abuse, Nicotine dependence and cocaine use, quit 14 months ago  COPD, possible AE on spiriva and prn duoneb and nebs. Inconsistent inhaler use due to difficulty handling. PCL neg, strep PNA/Legionella negative.   Chronic respiratory failure, on LTOT 2-3L baseline, and oxygen concentrator. Associated chronic dyspnea.  Atypical chest pain  Abdominal Pain, LUQ, CT abdomen negative. Receiving bowel regimen for constipation  Syncopal episode   Diastolic HFpEF 60-65% on .   GERD  IRA, improved  Moderate Pulmonary hypertension, moderate, WHO possibly grp 3/2  Dilated RV on Echo , RVSP 60mmHg, dilated RA  Deconditioning  Recently on hospice care for about a year till 2024  Advanced age  Depression  CAD, non obstructive, last cath  with moderate stenosis in proximal LAD  Healthy weight, BMI Body mass index is 20.11 kg/m².  CODE STATUS: Full Code No additional code details     Patient Active Problem List   Diagnosis    Knee pain, left    DM (diabetes mellitus) ([facility])    Non-ST elevated myocardial infarction (non-STEMI) ([facility])    GERD (gastroesophageal reflux disease)    Cigarette smoker    Hidradenitis    Primary hypertension    Hyperglycemia    Osteoporosis    RVH (right ventricular hypertrophy)    Cocaine abuse ([facility]) 
[facility]   Pharmacy Pharmacokinetic Monitoring Service - Vancomycin    Indication: HAP  Goal AUC/BHARAT: 400-600  Day of Therapy: 2  Additional Antimicrobials: Cefepime    Pertinent Laboratory Values:   Wt Readings from Last 1 Encounters:   06/26/24 54 kg (119 lb)     Temp Readings from Last 1 Encounters:   06/27/24 98 °F (36.7 °C) (Oral)     Estimated Creatinine Clearance: 54 mL/min (based on SCr of 0.75 mg/dL).    Recent Labs     06/26/24  0401 06/27/24  0357   CREATININE 1.08 0.75   BUN 36* 20*   WBC 7.5 6.4     Pertinent Cultures:  Date Source Results   06/25 sputum kay   MRSA Nasal Swab: pending    Assessment:  Date Current Dose Level (mg/L) Timing of Level (h) AUC/BHARAT   6/27 1,250 mg x1 - - -   6/28 750 mg q12h - - -   Note: Serum concentrations collected for AUC dosing may appear elevated if collected in close proximity to the dose administered, this is not necessarily an indication of toxicity    Plan:  Continue current dose of 750 mg q12h  No level ordered at this time  Pharmacy will continue to monitor patient and adjust therapy as indicated    Thank you for the consult,  Chika Up, [facility]  6/27/2024      
OT order received and chart reviewed. Patient sitting EOB fully dressed and has been performing basic self care tasks and functional mobility in room with no physical assistance. No skilled OT needs indicated at this time; will complete the order.          Thank you for the referral,  Nabeel Snider MS, OTR/L  
PT order received and chart reviewed. Multiple nursing notes state patient is ambulatory in hallway using rollator. Skilled PT not indicated and will sign off at this time. Thank you.   Ruby Olmos PT, DPT  
Palliative Medicine follow-up progress note  Patient Name: Juliana Loaiza  YOB: 1950  MRN: [id number]  Age: 73 y.o.  Gender: female    Date of Initial Consult: 6/24/2024   Date of Service: 6/28/2024  Provider: Celso Stover MD  Hospital Day: 7  Admit Date: 6/22/2024  Referring Provider: Dr Carrington        Reasons for Consultation:  Goals of Care    HISTORY OF PRESENT ILLNESS (HPI):   Juliana Loaiza is a 73 y.o. female with a past medical history of sarcoidosis, cocaine use, tobacco abuse, diabetic, COPD and depression, who was admitted on 6/22/2024 from home with a diagnosis of chest pain and syncope.  Patient presented to the ER with chest pain left arm pain neck pain shortness of breath for 45 minutes.  She shared this was followed by syncopal episode in which she hit her head.  In the ER CT of the head and cervical spine and chest were negative EKG showed a nonischemic called infarct.  Of note patient was recently on hospice for sarcoidosis.  Per patient and  hospice discharged her as she was not declining.  Cardiology has been consulted and following.  Palliative medicine is consulted for support and goals of care.    6/28/24: Patient was seen in presence of palliative care RN.  Patient's  is also at bedside.  Dyspnea on exertion present.  No pain.  No nausea or vomiting.  Patient stated she has had constipation and had a couple of bowel movements but she thinks she needs more medication.    6/24/2024 patient returned from dobutamine stress test.  She is alert oriented x 3 tells that she is nauseous.      PALLIATIVE DIAGNOSES:    Goals of care/advance care plan decision  Encounter for palliative care  Sarcoidosis   Chest pain   Opioid dependence     ASSESSMENT AND PLAN:   Goals of care / advanced care plan discussions     6/28/24: Patient was seen in presence of palliative care RN.  Patient feels fine and stated she had a good bowel movement but she thinks she is still 
Patient is unable to review her medication. She doesnot remember the meds she is taking and the last time she took them.  
Patient made second attempt to, allegedly, \"get some exercise\" tonight. Attempted to walk past the desk with her Rolator and no oxygen and was surprised to see staff. Again, sleeves rolled down, but wristband still on this time. Suspect, given patient history of cocaine abuse and UDS + for opiates that patient may have been attempting to sneak out of unit. When told that she cannot simply get up and walk around unit alone since she needs oxygen, patient then changed her story and stated she came out of the room to get towels. During the first incident tonight, she had a cup of ice I had given her earlier in the night in her hand and stated that she was looking for ice. Patient educated both times on use of the call bell and also informed that towels and ice are not located where patients have access to them, so she would need to press her call bell to get them anyway. Patient became angry and stated \"I don't like the way ya'll are talking to me\" while staff was returning her to her room. When staff brought towels to her in her room, she demanded to know both nurses last names. Patient was informed that as a matter of policy, patients are not told staff last names as it is a safety issue for staff.   
Patient removed her wristband, put on her street shoes, and rolled her sleeves down, before entering hallway to come to nursing station without oxygen on. Claimed to be looking for ice. Returned patient to room to find that she had also put her bed up about half way. Informed patient that that is unsafe and she cannot do it again.  
Pt has one PIV, occluded. Pt has no IV access. MD notified and stated Please have another USG guided peripheral placed.  House supervisor was notified.    
CREA 1 Application  1 Application Dental Daily PRN    enoxaparin Sodium (LOVENOX) injection 30 mg  30 mg SubCUTAneous Daily    methylPREDNISolone sodium succ (SOLU-MEDROL) 40 mg in sterile water 1 mL injection  40 mg IntraVENous Daily    azithromycin (ZITHROMAX) 500 mg in sodium chloride 0.9 % 250 mL IVPB ([id number])  500 mg IntraVENous Q24H    ipratropium 0.5 mg-albuterol 2.5 mg (DUONEB) nebulizer solution 1 Dose  1 Dose Inhalation Q4H WA RT    diphenhydrAMINE (BENADRYL) capsule 25 mg  25 mg Oral BID PRN    oxyCODONE-acetaminophen (PERCOCET) 5-325 MG per tablet 1 tablet  1 tablet Oral Q4H PRN    nitroGLYCERIN (NITROSTAT) SL tablet 0.4 mg  0.4 mg SubLINGual Q5 Min PRN    sodium chloride flush 0.9 % injection 5-40 mL  5-40 mL IntraVENous 2 times per day    sodium chloride flush 0.9 % injection 5-40 mL  5-40 mL IntraVENous PRN    0.9 % sodium chloride infusion   IntraVENous PRN    potassium chloride (KLOR-CON M) extended release tablet 40 mEq  40 mEq Oral PRN    Or    potassium bicarb-citric acid (EFFER-K) effervescent tablet 40 mEq  40 mEq Oral PRN    Or    potassium chloride 10 mEq/100 mL IVPB (Peripheral Line)  10 mEq IntraVENous PRN    magnesium sulfate 2000 mg in 50 mL IVPB premix  2,000 mg IntraVENous PRN    ondansetron (ZOFRAN-ODT) disintegrating tablet 4 mg  4 mg Oral Q8H PRN    Or    ondansetron (ZOFRAN) injection 4 mg  4 mg IntraVENous Q6H PRN    acetaminophen (TYLENOL) tablet 650 mg  650 mg Oral Q6H PRN    Or    acetaminophen (TYLENOL) suppository 650 mg  650 mg Rectal Q6H PRN    polyethylene glycol (GLYCOLAX) packet 17 g  17 g Oral Daily PRN    aspirin chewable tablet 81 mg  81 mg Oral Daily    atorvastatin (LIPITOR) tablet 40 mg  40 mg Oral Nightly         Intake/Output Summary (Last 24 hours) at 6/25/2024 1154  Last data filed at 6/24/2024 1904  Gross per 24 hour   Intake 220 ml   Output --   Net 220 ml       Physical Exam:  General:  alert, appears stated age, and cooperative  Neck:  no JVD  Lungs:  
mL IntraVENous PRN    0.9 % sodium chloride infusion   IntraVENous PRN    potassium chloride (KLOR-CON M) extended release tablet 40 mEq  40 mEq Oral PRN    Or    potassium bicarb-citric acid (EFFER-K) effervescent tablet 40 mEq  40 mEq Oral PRN    Or    potassium chloride 10 mEq/100 mL IVPB (Peripheral Line)  10 mEq IntraVENous PRN    magnesium sulfate 2000 mg in 50 mL IVPB premix  2,000 mg IntraVENous PRN    ondansetron (ZOFRAN-ODT) disintegrating tablet 4 mg  4 mg Oral Q8H PRN    Or    ondansetron (ZOFRAN) injection 4 mg  4 mg IntraVENous Q6H PRN    acetaminophen (TYLENOL) tablet 650 mg  650 mg Oral Q6H PRN    Or    acetaminophen (TYLENOL) suppository 650 mg  650 mg Rectal Q6H PRN        Labs:    Recent Results (from the past 24 hour(s))   POCT Glucose    Collection Time: 06/28/24  8:34 AM   Result Value Ref Range    POC Glucose 105 70 - 110 mg/dL   POCT Glucose    Collection Time: 06/28/24 11:47 AM   Result Value Ref Range    POC Glucose 243 (H) 70 - 110 mg/dL   POCT Glucose    Collection Time: 06/28/24  4:36 PM   Result Value Ref Range    POC Glucose 234 (H) 70 - 110 mg/dL   POCT Glucose    Collection Time: 06/28/24  8:59 PM   Result Value Ref Range    POC Glucose 250 (H) 70 - 110 mg/dL   Vancomycin Level, Random    Collection Time: 06/29/24  4:13 AM   Result Value Ref Range    Vancomycin Rm 14.9 5.0 - 40.0 UG/ML   Basic Metabolic Panel    Collection Time: 06/29/24  4:13 AM   Result Value Ref Range    Sodium 137 136 - 145 mmol/L    Potassium 4.3 3.5 - 5.5 mmol/L    Chloride 106 100 - 111 mmol/L    CO2 26 21 - 32 mmol/L    Anion Gap 5 3.0 - 18 mmol/L    Glucose 250 (H) 74 - 99 mg/dL    BUN 24 (H) 7.0 - 18 MG/DL    Creatinine 0.93 0.6 - 1.3 MG/DL    BUN/Creatinine Ratio 26 (H) 12 - 20      Est, Glom Filt Rate 66 >60 ml/min/1.73m2    Calcium 8.5 8.5 - 10.1 MG/DL   CBC    Collection Time: 06/29/24  4:13 AM   Result Value Ref Range    WBC 7.1 4.6 - 13.2 K/uL    RBC 4.37 4.20 - 5.30 M/uL    Hemoglobin 9.8 (L) 12.0 - 
FiO2 PEEP                    Peak airway pressure:      Minute ventilation:           Physical Exam:    General:  Alert, cooperative, no distress, appears stated age. Fidgeting.   Head:  Normocephalic, without obvious abnormality, atraumatic.   Eyes:  Conjunctivae/corneas clear. PERRL, EOMs intact.   Nose: Nares normal. Septum midline. Mucosa normal. No drainage or sinus tenderness.   Throat: Lips, mucosa, and tongue normal. Teeth and gums normal.   Neck: Supple, symmetrical, trachea midline, no adenopathy, thyroid: no enlargment/tenderness/nodules, no carotid bruit and no JVD.   Back:   Symmetric, no curvature. ROM normal.   Lungs:   Clear to auscultation bilaterally. Diffuse rales.   Chest wall:  No tenderness or deformity.- No crepitus   Heart:  Regular rate and rhythm, S1, S2 normal, no murmur, click, rub or gallop.   Abdomen:   Soft, LUQ TTP otherwise rest non-tender. Bowel sounds normal. No masses,  No organomegaly.   Extremities: Extremities normal, atraumatic, no cyanosis or edema.   Pulses: 2+ and symmetric all extremities.   Skin: Skin color, texture, turgor normal. No rashes or lesions   Lymph nodes:      Cervical, supraclavicular nodes: unremarkable   Neurologic: Grossly nonfocal       Data:     Recent Labs     06/25/24  0342 06/26/24  0401 06/27/24  0357   WBC 6.0 7.5 6.4   HGB 9.5* 8.2* 8.4*   HCT 32.1* 27.5* 27.5*    224 217       Recent Labs     06/25/24  0342 06/25/24  1817 06/26/24  0401 06/27/24  0357    136 144 139   K 5.6* 5.3 4.5 4.5    111 111 110   CO2 23 19* 22 25   BUN 45* 43* 36* 20*   PHOS 4.3 3.0 3.0  --    ALT  --   --   --  19       Lab Results   Component Value Date/Time     10/19/2022 12:10 PM     09/29/2021 08:44 PM    BNP 66 10/16/2019 04:58 AM     Lab Results   Component Value Date/Time    INR 0.9 08/01/2021 10:47 AM    INR 1.0 10/18/2019 06:00 AM       No results for input(s): \"PH\", \"PCO2\", \"PO2\", \"HCO3\", \"FIO2\" in the last 72 hours.  No results 
0258    oxyCODONE-acetaminophen (PERCOCET) 5-325 MG per tablet 1 tablet  1 tablet Oral Q4H PRN Zachery Clark MD   1 tablet at 06/26/24 1103    nitroGLYCERIN (NITROSTAT) SL tablet 0.4 mg  0.4 mg SubLINGual Q5 Min PRN Amparo Noel PA        sodium chloride flush 0.9 % injection 5-40 mL  5-40 mL IntraVENous 2 times per day Zachery Clark MD   10 mL at 06/26/24 1015    sodium chloride flush 0.9 % injection 5-40 mL  5-40 mL IntraVENous PRN Zachery Clark MD        0.9 % sodium chloride infusion   IntraVENous PRN Zachery Clark MD        potassium chloride (KLOR-CON M) extended release tablet 40 mEq  40 mEq Oral PRN Zachery Clark MD        Or    potassium bicarb-citric acid (EFFER-K) effervescent tablet 40 mEq  40 mEq Oral PRN Zachery Clark MD        Or    potassium chloride 10 mEq/100 mL IVPB (Peripheral Line)  10 mEq IntraVENous PRN Zachery Clark MD        magnesium sulfate 2000 mg in 50 mL IVPB premix  2,000 mg IntraVENous PRN Zachery Clark MD        ondansetron (ZOFRAN-ODT) disintegrating tablet 4 mg  4 mg Oral Q8H PRN Zachery Clark MD   4 mg at 06/23/24 1343    Or    ondansetron (ZOFRAN) injection 4 mg  4 mg IntraVENous Q6H PRN Zachery Clakr MD   4 mg at 06/24/24 0002    acetaminophen (TYLENOL) tablet 650 mg  650 mg Oral Q6H PRN Zachery Clark MD   650 mg at 06/25/24 1343    Or    acetaminophen (TYLENOL) suppository 650 mg  650 mg Rectal Q6H PRN Zachery Clark MD        aspirin chewable tablet 81 mg  81 mg Oral Daily Zachery Clark MD   81 mg at 06/26/24 1002    atorvastatin (LIPITOR) tablet 40 mg  40 mg Oral Nightly Zachery Clark MD   40 mg at 06/25/24 2236                 Imaging:  I have personally reviewed the patient’s radiographs and have reviewed the reports:    US RETROPERITONEAL COMPLETE  Narrative: US RETROPERITONEAL COMPLETE    Clinical information: IRA; ? obstructive uropathy    Comparison: CT abdomen pelvis dated 6/22/2024    Technique: Real-time

## 2024-06-30 NOTE — DISCHARGE SUMMARY
Hospitalist Discharge Summary      Patient: Juliana Loaiza MRN: [id number]  CSN: [id number]    YOB: 1950  Age: 73 y.o.  Sex: female    DOA: 6/22/2024 LOS:  LOS: 8 days   Discharge Date:     Admission Diagnoses: Syncope and collapse [R55]  Angina pectoris (HCC) [I20.9]  Chest pain [R07.9]  Heat syncope, initial encounter [T67.1XXA]    Discharge Diagnoses:    Syncope  Atypical chest pain  Acute kidney injury  Pulmonary sarcoidosis  Restless leg syndrome  Coronary artery disease  Pulmonary hypertension    Discharge Condition: Good    Discharge To: Home    CODE STATUS: Full Code         PHYSICAL EXAM  Visit Vitals  BP (!) 170/63   Pulse 76   Temp 98.1 °F (36.7 °C) (Oral)   Resp 18   Ht 1.575 m (5' 2.01\")   Wt 57.2 kg (126 lb)   SpO2 100%   BMI 23.04 kg/m²       General: Alert, cooperative, no acute distress    Lungs:  CTA Bilaterally. No Wheezing/Rhonchi/Rales.  Heart:  Regular rate and Rhythm.  Abdomen: Soft, Non distended, Non tender. + Bowel sounds.  Extremities: No edema/ cyanosis/ clubbing  Neurologic:  AA oriented X 3. Moves all extremities.                                HPI:    Juliana Loaiza is a 73 y.o. female with a history of sarcoidosis, cocaine use, tobaccoism, DM2, COPD, Depression, among others who presents with syncope and chest pain.    She states she began having substernal chest pain, left arm, neck pain and dyspnea 45 minutes prior to arrival.    This was followed by a syncopal episode during which she states she hit her head.    Workup including CT head/Cervical spine/Chest were nondiagnostic.  EKG was nonischemic(old septal infarct).    She was recently on hospice for sarcoidosis.    Hospital Course:     The patient was initially admitted to the hospital for evaluation of substernal chest pain and a syncopal episode.  Cardiology were consulted.  The patient underwent 2D echo and Lexiscan stress test both of which were unremarkable.  Her chest pain resolved

## 2024-08-04 ENCOUNTER — APPOINTMENT (OUTPATIENT)
Facility: HOSPITAL | Age: 74
DRG: 280 | End: 2024-08-04
Payer: MEDICARE

## 2024-08-04 ENCOUNTER — APPOINTMENT (OUTPATIENT)
Facility: HOSPITAL | Age: 74
DRG: 280 | End: 2024-08-04
Attending: HEALTH CARE PROVIDER
Payer: MEDICARE

## 2024-08-04 ENCOUNTER — HOSPITAL ENCOUNTER (INPATIENT)
Facility: HOSPITAL | Age: 74
LOS: 2 days | Discharge: HOME | DRG: 280 | End: 2024-08-06
Attending: EMERGENCY MEDICINE | Admitting: HEALTH CARE PROVIDER
Payer: MEDICARE

## 2024-08-04 DIAGNOSIS — E87.29 HIGH ANION GAP METABOLIC ACIDOSIS: ICD-10-CM

## 2024-08-04 DIAGNOSIS — I24.9 ACUTE CORONARY SYNDROME (HCC): ICD-10-CM

## 2024-08-04 DIAGNOSIS — I21.4 NSTEMI (NON-ST ELEVATED MYOCARDIAL INFARCTION) (HCC): Primary | ICD-10-CM

## 2024-08-04 DIAGNOSIS — F14.10 NONDEPENDENT COCAINE ABUSE (HCC): ICD-10-CM

## 2024-08-04 DIAGNOSIS — G93.40 ACUTE ENCEPHALOPATHY: ICD-10-CM

## 2024-08-04 DIAGNOSIS — I48.0 PAROXYSMAL ATRIAL FIBRILLATION (HCC): ICD-10-CM

## 2024-08-04 DIAGNOSIS — R73.9 HYPERGLYCEMIA: ICD-10-CM

## 2024-08-04 LAB
ALBUMIN SERPL-MCNC: 2.4 G/DL (ref 3.4–5)
ALBUMIN/GLOB SERPL: 0.6 (ref 0.8–1.7)
ALP SERPL-CCNC: 114 U/L (ref 45–117)
ALT SERPL-CCNC: 30 U/L (ref 13–56)
AMPHET UR QL SCN: NEGATIVE
ANION GAP SERPL CALC-SCNC: 11 MMOL/L (ref 3–18)
ANION GAP SERPL CALC-SCNC: 21 MMOL/L (ref 3–18)
ANION GAP SERPL CALC-SCNC: 9 MMOL/L (ref 3–18)
ANION GAP SERPL CALC-SCNC: 9 MMOL/L (ref 3–18)
APPEARANCE UR: CLEAR
APTT PPP: 29.1 SEC (ref 23–36.4)
APTT PPP: 33.2 SEC (ref 23–36.4)
APTT PPP: 81.4 SEC (ref 23–36.4)
AST SERPL-CCNC: 73 U/L (ref 10–38)
B-OH-BUTYR SERPL-SCNC: 4.06 MMOL/L
BACTERIA URNS QL MICRO: ABNORMAL /HPF
BARBITURATES UR QL SCN: NEGATIVE
BASOPHILS # BLD: 0 K/UL (ref 0–0.1)
BASOPHILS NFR BLD: 0 % (ref 0–2)
BENZODIAZ UR QL: NEGATIVE
BILIRUB DIRECT SERPL-MCNC: 0.2 MG/DL (ref 0–0.2)
BILIRUB SERPL-MCNC: 1 MG/DL (ref 0.2–1)
BILIRUB UR QL: NEGATIVE
BUN SERPL-MCNC: 20 MG/DL (ref 7–18)
BUN SERPL-MCNC: 21 MG/DL (ref 7–18)
BUN SERPL-MCNC: 26 MG/DL (ref 7–18)
BUN SERPL-MCNC: 31 MG/DL (ref 7–18)
BUN/CREAT SERPL: 14 (ref 12–20)
BUN/CREAT SERPL: 16 (ref 12–20)
CALCIUM SERPL-MCNC: 7 MG/DL (ref 8.5–10.1)
CALCIUM SERPL-MCNC: 7 MG/DL (ref 8.5–10.1)
CALCIUM SERPL-MCNC: 7.2 MG/DL (ref 8.5–10.1)
CALCIUM SERPL-MCNC: 7.5 MG/DL (ref 8.5–10.1)
CANNABINOIDS UR QL SCN: NEGATIVE
CHLORIDE SERPL-SCNC: 108 MMOL/L (ref 100–111)
CHLORIDE SERPL-SCNC: 109 MMOL/L (ref 100–111)
CHLORIDE SERPL-SCNC: 110 MMOL/L (ref 100–111)
CHLORIDE SERPL-SCNC: 99 MMOL/L (ref 100–111)
CK SERPL-CCNC: 600 U/L (ref 26–192)
CO2 SERPL-SCNC: 18 MMOL/L (ref 21–32)
CO2 SERPL-SCNC: 26 MMOL/L (ref 21–32)
CO2 SERPL-SCNC: 26 MMOL/L (ref 21–32)
CO2 SERPL-SCNC: 28 MMOL/L (ref 21–32)
COCAINE UR QL SCN: POSITIVE
COLOR UR: ABNORMAL
CREAT SERPL-MCNC: 1.27 MG/DL (ref 0.6–1.3)
CREAT SERPL-MCNC: 1.31 MG/DL (ref 0.6–1.3)
CREAT SERPL-MCNC: 1.65 MG/DL (ref 0.6–1.3)
CREAT SERPL-MCNC: 2.16 MG/DL (ref 0.6–1.3)
DIFFERENTIAL METHOD BLD: ABNORMAL
ECHO AO ASC DIAM: 3 CM
ECHO AO ASCENDING AORTA INDEX: 1.91 CM/M2
ECHO AO ROOT DIAM: 2.4 CM
ECHO AO ROOT INDEX: 1.53 CM/M2
ECHO AV AREA PEAK VELOCITY: 1.4 CM2
ECHO AV AREA/BSA PEAK VELOCITY: 0.9 CM2/M2
ECHO AV PEAK GRADIENT: 20 MMHG
ECHO AV PEAK VELOCITY: 2.2 M/S
ECHO AV VELOCITY RATIO: 0.59
ECHO BSA: 1.57 M2
ECHO EST RA PRESSURE: 8 MMHG
ECHO LA VOL A-L A2C: 33 ML (ref 22–52)
ECHO LA VOL A-L A4C: 50 ML (ref 22–52)
ECHO LA VOL BP: 41 ML (ref 22–52)
ECHO LA VOL MOD A2C: 32 ML (ref 22–52)
ECHO LA VOL MOD A4C: 44 ML (ref 22–52)
ECHO LA VOL/BSA BIPLANE: 26 ML/M2 (ref 16–34)
ECHO LA VOLUME AREA LENGTH: 45 ML
ECHO LA VOLUME INDEX A-L A2C: 21 ML/M2 (ref 16–34)
ECHO LA VOLUME INDEX A-L A4C: 32 ML/M2 (ref 16–34)
ECHO LA VOLUME INDEX AREA LENGTH: 29 ML/M2 (ref 16–34)
ECHO LA VOLUME INDEX MOD A2C: 20 ML/M2 (ref 16–34)
ECHO LA VOLUME INDEX MOD A4C: 28 ML/M2 (ref 16–34)
ECHO LV FRACTIONAL SHORTENING: 40 % (ref 28–44)
ECHO LV INTERNAL DIMENSION DIASTOLE INDEX: 1.91 CM/M2
ECHO LV INTERNAL DIMENSION DIASTOLIC: 3 CM (ref 3.9–5.3)
ECHO LV INTERNAL DIMENSION SYSTOLIC INDEX: 1.15 CM/M2
ECHO LV INTERNAL DIMENSION SYSTOLIC: 1.8 CM
ECHO LV IVSD: 0.9 CM (ref 0.6–0.9)
ECHO LV MASS 2D: 76 G (ref 67–162)
ECHO LV MASS INDEX 2D: 48.4 G/M2 (ref 43–95)
ECHO LV POSTERIOR WALL DIASTOLIC: 1 CM (ref 0.6–0.9)
ECHO LV RELATIVE WALL THICKNESS RATIO: 0.67
ECHO LVOT AREA: 2.5 CM2
ECHO LVOT DIAM: 1.8 CM
ECHO LVOT PEAK GRADIENT: 6 MMHG
ECHO LVOT PEAK VELOCITY: 1.3 M/S
ECHO PV MAX VELOCITY: 0.9 M/S
ECHO PV PEAK GRADIENT: 3 MMHG
ECHO RA VOLUME BIPLANE METHOD OF DISKS: 37 ML
ECHO RA VOLUME INDEX BP: 24 ML/M2
ECHO RA VOLUME: 37 ML
ECHO RA VOLUME: 41 ML
ECHO RIGHT VENTRICULAR SYSTOLIC PRESSURE (RVSP): 62 MMHG
ECHO RV BASAL DIMENSION: 3.1 CM
ECHO RV TAPSE: 1.8 CM (ref 1.7–?)
ECHO TV REGURGITANT MAX VELOCITY: 3.68 M/S
ECHO TV REGURGITANT PEAK GRADIENT: 54 MMHG
EKG ATRIAL RATE: 103 BPM
EKG ATRIAL RATE: 89 BPM
EKG DIAGNOSIS: NORMAL
EKG DIAGNOSIS: NORMAL
EKG P AXIS: 61 DEGREES
EKG P AXIS: 63 DEGREES
EKG P-R INTERVAL: 112 MS
EKG P-R INTERVAL: 116 MS
EKG Q-T INTERVAL: 424 MS
EKG Q-T INTERVAL: 494 MS
EKG QRS DURATION: 74 MS
EKG QRS DURATION: 74 MS
EKG QTC CALCULATION (BAZETT): 555 MS
EKG QTC CALCULATION (BAZETT): 601 MS
EKG R AXIS: -21 DEGREES
EKG R AXIS: -32 DEGREES
EKG T AXIS: 112 DEGREES
EKG T AXIS: 14 DEGREES
EKG VENTRICULAR RATE: 103 BPM
EKG VENTRICULAR RATE: 89 BPM
EOSINOPHIL # BLD: 0 K/UL (ref 0–0.4)
EOSINOPHIL NFR BLD: 0 % (ref 0–5)
EPITH CASTS URNS QL MICRO: ABNORMAL /LPF (ref 0–5)
ERYTHROCYTE [DISTWIDTH] IN BLOOD BY AUTOMATED COUNT: 17.4 % (ref 11.6–14.5)
EST. AVERAGE GLUCOSE BLD GHB EST-MCNC: 183 MG/DL
ETHANOL SERPL-MCNC: <3 MG/DL (ref 0–3)
GLOBULIN SER CALC-MCNC: 4.3 G/DL (ref 2–4)
GLUCOSE BLD STRIP.AUTO-MCNC: 134 MG/DL (ref 70–110)
GLUCOSE BLD STRIP.AUTO-MCNC: 158 MG/DL (ref 70–110)
GLUCOSE SERPL-MCNC: 100 MG/DL (ref 74–99)
GLUCOSE SERPL-MCNC: 105 MG/DL (ref 74–99)
GLUCOSE SERPL-MCNC: 115 MG/DL (ref 74–99)
GLUCOSE SERPL-MCNC: 346 MG/DL (ref 74–99)
GLUCOSE UR STRIP.AUTO-MCNC: NEGATIVE MG/DL
HBA1C MFR BLD: 8 % (ref 4.2–5.6)
HCT VFR BLD AUTO: 35.4 % (ref 35–45)
HGB BLD-MCNC: 10.7 G/DL (ref 12–16)
HGB UR QL STRIP: ABNORMAL
IMM GRANULOCYTES # BLD AUTO: 0.1 K/UL (ref 0–0.04)
IMM GRANULOCYTES NFR BLD AUTO: 1 % (ref 0–0.5)
KETONES UR QL STRIP.AUTO: 15 MG/DL
LACTATE SERPL-SCNC: 3 MMOL/L (ref 0.4–2)
LEUKOCYTE ESTERASE UR QL STRIP.AUTO: ABNORMAL
LIPASE SERPL-CCNC: 56 U/L (ref 13–75)
LYMPHOCYTES # BLD: 1.1 K/UL (ref 0.9–3.6)
LYMPHOCYTES NFR BLD: 7 % (ref 21–52)
Lab: ABNORMAL
MAGNESIUM SERPL-MCNC: 1.2 MG/DL (ref 1.6–2.6)
MAGNESIUM SERPL-MCNC: 1.2 MG/DL (ref 1.6–2.6)
MAGNESIUM SERPL-MCNC: 1.6 MG/DL (ref 1.6–2.6)
MCH RBC QN AUTO: 22.7 PG (ref 24–34)
MCHC RBC AUTO-ENTMCNC: 30.2 G/DL (ref 31–37)
MCV RBC AUTO: 75 FL (ref 78–100)
METHADONE UR QL: NEGATIVE
MONOCYTES # BLD: 0.5 K/UL (ref 0.05–1.2)
MONOCYTES NFR BLD: 4 % (ref 3–10)
NEUTS SEG # BLD: 13.8 K/UL (ref 1.8–8)
NEUTS SEG NFR BLD: 88 % (ref 40–73)
NITRITE UR QL STRIP.AUTO: NEGATIVE
NRBC # BLD: 0.02 K/UL (ref 0–0.01)
NRBC BLD-RTO: 0.1 PER 100 WBC
NT PRO BNP: 2090 PG/ML (ref 0–900)
OPIATES UR QL: NEGATIVE
PCP UR QL: NEGATIVE
PH BLDV: 7.32 (ref 7.32–7.42)
PH UR STRIP: 6 (ref 5–8)
PHOSPHATE SERPL-MCNC: 3.1 MG/DL (ref 2.5–4.9)
PHOSPHATE SERPL-MCNC: 3.5 MG/DL (ref 2.5–4.9)
PHOSPHATE SERPL-MCNC: 4.9 MG/DL (ref 2.5–4.9)
PLATELET # BLD AUTO: 255 K/UL (ref 135–420)
POTASSIUM SERPL-SCNC: 2.2 MMOL/L (ref 3.5–5.5)
POTASSIUM SERPL-SCNC: 2.4 MMOL/L (ref 3.5–5.5)
POTASSIUM SERPL-SCNC: 3.3 MMOL/L (ref 3.5–5.5)
POTASSIUM SERPL-SCNC: 4.4 MMOL/L (ref 3.5–5.5)
PROCALCITONIN SERPL-MCNC: 1.83 NG/ML
PROT SERPL-MCNC: 6.7 G/DL (ref 6.4–8.2)
PROT UR STRIP-MCNC: 100 MG/DL
RBC # BLD AUTO: 4.72 M/UL (ref 4.2–5.3)
RBC #/AREA URNS HPF: ABNORMAL /HPF (ref 0–5)
SODIUM SERPL-SCNC: 138 MMOL/L (ref 136–145)
SODIUM SERPL-SCNC: 145 MMOL/L (ref 136–145)
SODIUM SERPL-SCNC: 145 MMOL/L (ref 136–145)
SODIUM SERPL-SCNC: 146 MMOL/L (ref 136–145)
SP GR UR REFRACTOMETRY: 1.01 (ref 1–1.03)
TROPONIN I SERPL HS-MCNC: 1048 NG/L (ref 0–54)
TROPONIN I SERPL HS-MCNC: 1352 NG/L (ref 0–54)
TROPONIN I SERPL HS-MCNC: 1458 NG/L (ref 0–54)
TROPONIN I SERPL HS-MCNC: 721 NG/L (ref 0–54)
UROBILINOGEN UR QL STRIP.AUTO: 1 EU/DL (ref 0.2–1)
WBC # BLD AUTO: 15.6 K/UL (ref 4.6–13.2)
WBC URNS QL MICRO: ABNORMAL /HPF (ref 0–4)

## 2024-08-04 PROCEDURE — 84484 ASSAY OF TROPONIN QUANT: CPT

## 2024-08-04 PROCEDURE — 6360000002 HC RX W HCPCS: Performed by: HEALTH CARE PROVIDER

## 2024-08-04 PROCEDURE — 87040 BLOOD CULTURE FOR BACTERIA: CPT

## 2024-08-04 PROCEDURE — 83690 ASSAY OF LIPASE: CPT

## 2024-08-04 PROCEDURE — 82962 GLUCOSE BLOOD TEST: CPT

## 2024-08-04 PROCEDURE — 2580000003 HC RX 258

## 2024-08-04 PROCEDURE — 83036 HEMOGLOBIN GLYCOSYLATED A1C: CPT

## 2024-08-04 PROCEDURE — 6370000000 HC RX 637 (ALT 250 FOR IP): Performed by: HEALTH CARE PROVIDER

## 2024-08-04 PROCEDURE — 82800 BLOOD PH: CPT

## 2024-08-04 PROCEDURE — 80307 DRUG TEST PRSMV CHEM ANLYZR: CPT

## 2024-08-04 PROCEDURE — 2580000003 HC RX 258: Performed by: EMERGENCY MEDICINE

## 2024-08-04 PROCEDURE — 2580000003 HC RX 258: Performed by: FAMILY MEDICINE

## 2024-08-04 PROCEDURE — 6360000002 HC RX W HCPCS: Performed by: EMERGENCY MEDICINE

## 2024-08-04 PROCEDURE — 83605 ASSAY OF LACTIC ACID: CPT

## 2024-08-04 PROCEDURE — 99285 EMERGENCY DEPT VISIT HI MDM: CPT

## 2024-08-04 PROCEDURE — 6370000000 HC RX 637 (ALT 250 FOR IP): Performed by: EMERGENCY MEDICINE

## 2024-08-04 PROCEDURE — 94761 N-INVAS EAR/PLS OXIMETRY MLT: CPT

## 2024-08-04 PROCEDURE — 93010 ELECTROCARDIOGRAM REPORT: CPT | Performed by: INTERNAL MEDICINE

## 2024-08-04 PROCEDURE — 93306 TTE W/DOPPLER COMPLETE: CPT | Performed by: INTERNAL MEDICINE

## 2024-08-04 PROCEDURE — 84145 PROCALCITONIN (PCT): CPT

## 2024-08-04 PROCEDURE — 2580000003 HC RX 258: Performed by: INTERNAL MEDICINE

## 2024-08-04 PROCEDURE — 2500000003 HC RX 250 WO HCPCS: Performed by: INTERNAL MEDICINE

## 2024-08-04 PROCEDURE — 6360000002 HC RX W HCPCS

## 2024-08-04 PROCEDURE — 87086 URINE CULTURE/COLONY COUNT: CPT

## 2024-08-04 PROCEDURE — 94640 AIRWAY INHALATION TREATMENT: CPT

## 2024-08-04 PROCEDURE — 93005 ELECTROCARDIOGRAM TRACING: CPT | Performed by: HEALTH CARE PROVIDER

## 2024-08-04 PROCEDURE — 84100 ASSAY OF PHOSPHORUS: CPT

## 2024-08-04 PROCEDURE — 82550 ASSAY OF CK (CPK): CPT

## 2024-08-04 PROCEDURE — 96374 THER/PROPH/DIAG INJ IV PUSH: CPT

## 2024-08-04 PROCEDURE — 80076 HEPATIC FUNCTION PANEL: CPT

## 2024-08-04 PROCEDURE — 82077 ASSAY SPEC XCP UR&BREATH IA: CPT

## 2024-08-04 PROCEDURE — 2580000003 HC RX 258: Performed by: HEALTH CARE PROVIDER

## 2024-08-04 PROCEDURE — 6370000000 HC RX 637 (ALT 250 FOR IP): Performed by: STUDENT IN AN ORGANIZED HEALTH CARE EDUCATION/TRAINING PROGRAM

## 2024-08-04 PROCEDURE — 80048 BASIC METABOLIC PNL TOTAL CA: CPT

## 2024-08-04 PROCEDURE — 6370000000 HC RX 637 (ALT 250 FOR IP): Performed by: FAMILY MEDICINE

## 2024-08-04 PROCEDURE — 85730 THROMBOPLASTIN TIME PARTIAL: CPT

## 2024-08-04 PROCEDURE — 93005 ELECTROCARDIOGRAM TRACING: CPT

## 2024-08-04 PROCEDURE — 85025 COMPLETE CBC W/AUTO DIFF WBC: CPT

## 2024-08-04 PROCEDURE — 6360000002 HC RX W HCPCS: Performed by: FAMILY MEDICINE

## 2024-08-04 PROCEDURE — 93306 TTE W/DOPPLER COMPLETE: CPT

## 2024-08-04 PROCEDURE — 2140000001 HC CVICU INTERMEDIATE R&B

## 2024-08-04 PROCEDURE — 83735 ASSAY OF MAGNESIUM: CPT

## 2024-08-04 PROCEDURE — 71045 X-RAY EXAM CHEST 1 VIEW: CPT

## 2024-08-04 PROCEDURE — 96375 TX/PRO/DX INJ NEW DRUG ADDON: CPT

## 2024-08-04 PROCEDURE — 36415 COLL VENOUS BLD VENIPUNCTURE: CPT

## 2024-08-04 PROCEDURE — 83880 ASSAY OF NATRIURETIC PEPTIDE: CPT

## 2024-08-04 PROCEDURE — 82010 KETONE BODYS QUAN: CPT

## 2024-08-04 PROCEDURE — 81001 URINALYSIS AUTO W/SCOPE: CPT

## 2024-08-04 RX ORDER — POTASSIUM CHLORIDE 7.45 MG/ML
10 INJECTION INTRAVENOUS PRN
Status: DISCONTINUED | OUTPATIENT
Start: 2024-08-04 | End: 2024-08-06 | Stop reason: HOSPADM

## 2024-08-04 RX ORDER — ACETAMINOPHEN 650 MG/1
650 SUPPOSITORY RECTAL EVERY 6 HOURS PRN
Status: DISCONTINUED | OUTPATIENT
Start: 2024-08-04 | End: 2024-08-06 | Stop reason: HOSPADM

## 2024-08-04 RX ORDER — SODIUM CHLORIDE 9 MG/ML
INJECTION, SOLUTION INTRAVENOUS CONTINUOUS
Status: DISCONTINUED | OUTPATIENT
Start: 2024-08-04 | End: 2024-08-04

## 2024-08-04 RX ORDER — ONDANSETRON 4 MG/1
4 TABLET, ORALLY DISINTEGRATING ORAL EVERY 8 HOURS PRN
Status: DISCONTINUED | OUTPATIENT
Start: 2024-08-04 | End: 2024-08-04

## 2024-08-04 RX ORDER — ROPINIROLE 0.25 MG/1
0.5 TABLET, FILM COATED ORAL 3 TIMES DAILY
Status: DISCONTINUED | OUTPATIENT
Start: 2024-08-04 | End: 2024-08-06 | Stop reason: HOSPADM

## 2024-08-04 RX ORDER — ATORVASTATIN CALCIUM 40 MG/1
80 TABLET, FILM COATED ORAL NIGHTLY
Status: DISCONTINUED | OUTPATIENT
Start: 2024-08-04 | End: 2024-08-06 | Stop reason: HOSPADM

## 2024-08-04 RX ORDER — SODIUM CHLORIDE, SODIUM LACTATE, POTASSIUM CHLORIDE, AND CALCIUM CHLORIDE .6; .31; .03; .02 G/100ML; G/100ML; G/100ML; G/100ML
1000 INJECTION, SOLUTION INTRAVENOUS ONCE
Status: COMPLETED | OUTPATIENT
Start: 2024-08-04 | End: 2024-08-04

## 2024-08-04 RX ORDER — MAGNESIUM SULFATE IN WATER 40 MG/ML
2000 INJECTION, SOLUTION INTRAVENOUS PRN
Status: DISCONTINUED | OUTPATIENT
Start: 2024-08-04 | End: 2024-08-06 | Stop reason: HOSPADM

## 2024-08-04 RX ORDER — HEPARIN SODIUM 10000 [USP'U]/100ML
5-30 INJECTION, SOLUTION INTRAVENOUS CONTINUOUS
Status: DISCONTINUED | OUTPATIENT
Start: 2024-08-04 | End: 2024-08-05

## 2024-08-04 RX ORDER — POLYETHYLENE GLYCOL 3350 17 G/17G
17 POWDER, FOR SOLUTION ORAL DAILY PRN
Status: DISCONTINUED | OUTPATIENT
Start: 2024-08-04 | End: 2024-08-06 | Stop reason: HOSPADM

## 2024-08-04 RX ORDER — SODIUM CHLORIDE 9 MG/ML
INJECTION, SOLUTION INTRAVENOUS PRN
Status: DISCONTINUED | OUTPATIENT
Start: 2024-08-04 | End: 2024-08-06 | Stop reason: HOSPADM

## 2024-08-04 RX ORDER — SODIUM CHLORIDE 9 MG/ML
INJECTION, SOLUTION INTRAVENOUS CONTINUOUS
Status: DISCONTINUED | OUTPATIENT
Start: 2024-08-04 | End: 2024-08-05

## 2024-08-04 RX ORDER — POTASSIUM CHLORIDE 20 MEQ/1
40 TABLET, EXTENDED RELEASE ORAL PRN
Status: DISCONTINUED | OUTPATIENT
Start: 2024-08-04 | End: 2024-08-06 | Stop reason: HOSPADM

## 2024-08-04 RX ORDER — ONDANSETRON 2 MG/ML
4 INJECTION INTRAMUSCULAR; INTRAVENOUS EVERY 6 HOURS PRN
Status: DISCONTINUED | OUTPATIENT
Start: 2024-08-04 | End: 2024-08-04

## 2024-08-04 RX ORDER — SODIUM CHLORIDE 0.9 % (FLUSH) 0.9 %
5-40 SYRINGE (ML) INJECTION EVERY 12 HOURS SCHEDULED
Status: DISCONTINUED | OUTPATIENT
Start: 2024-08-04 | End: 2024-08-06 | Stop reason: HOSPADM

## 2024-08-04 RX ORDER — 0.9 % SODIUM CHLORIDE 0.9 %
1000 INTRAVENOUS SOLUTION INTRAVENOUS ONCE
Status: COMPLETED | OUTPATIENT
Start: 2024-08-04 | End: 2024-08-04

## 2024-08-04 RX ORDER — DEXTROSE MONOHYDRATE AND SODIUM CHLORIDE 5; .45 G/100ML; G/100ML
INJECTION, SOLUTION INTRAVENOUS CONTINUOUS PRN
Status: DISCONTINUED | OUTPATIENT
Start: 2024-08-04 | End: 2024-08-06 | Stop reason: HOSPADM

## 2024-08-04 RX ORDER — ASPIRIN 81 MG/1
324 TABLET, CHEWABLE ORAL ONCE
Status: DISCONTINUED | OUTPATIENT
Start: 2024-08-04 | End: 2024-08-05

## 2024-08-04 RX ORDER — ACETAMINOPHEN 325 MG/1
650 TABLET ORAL EVERY 6 HOURS PRN
Status: DISCONTINUED | OUTPATIENT
Start: 2024-08-04 | End: 2024-08-06 | Stop reason: HOSPADM

## 2024-08-04 RX ORDER — DILTIAZEM HYDROCHLORIDE 5 MG/ML
5 INJECTION INTRAVENOUS ONCE
Status: COMPLETED | OUTPATIENT
Start: 2024-08-04 | End: 2024-08-04

## 2024-08-04 RX ORDER — MAGNESIUM SULFATE IN WATER 40 MG/ML
2000 INJECTION, SOLUTION INTRAVENOUS PRN
Status: DISCONTINUED | OUTPATIENT
Start: 2024-08-04 | End: 2024-08-04

## 2024-08-04 RX ORDER — HEPARIN SODIUM 1000 [USP'U]/ML
60 INJECTION, SOLUTION INTRAVENOUS; SUBCUTANEOUS PRN
Status: DISCONTINUED | OUTPATIENT
Start: 2024-08-04 | End: 2024-08-05

## 2024-08-04 RX ORDER — HEPARIN SODIUM 1000 [USP'U]/ML
30 INJECTION, SOLUTION INTRAVENOUS; SUBCUTANEOUS PRN
Status: DISCONTINUED | OUTPATIENT
Start: 2024-08-04 | End: 2024-08-05

## 2024-08-04 RX ORDER — CLOPIDOGREL BISULFATE 75 MG/1
300 TABLET ORAL ONCE
Status: DISCONTINUED | OUTPATIENT
Start: 2024-08-04 | End: 2024-08-06 | Stop reason: HOSPADM

## 2024-08-04 RX ORDER — ASPIRIN 81 MG/1
81 TABLET, CHEWABLE ORAL DAILY
Status: DISCONTINUED | OUTPATIENT
Start: 2024-08-05 | End: 2024-08-06

## 2024-08-04 RX ORDER — METOPROLOL SUCCINATE 25 MG/1
25 TABLET, EXTENDED RELEASE ORAL DAILY
Status: DISCONTINUED | OUTPATIENT
Start: 2024-08-04 | End: 2024-08-04

## 2024-08-04 RX ORDER — LORAZEPAM 2 MG/ML
2 INJECTION INTRAMUSCULAR ONCE
Status: COMPLETED | OUTPATIENT
Start: 2024-08-04 | End: 2024-08-04

## 2024-08-04 RX ORDER — CLOPIDOGREL BISULFATE 75 MG/1
75 TABLET ORAL DAILY
Status: DISCONTINUED | OUTPATIENT
Start: 2024-08-04 | End: 2024-08-04

## 2024-08-04 RX ORDER — NITROGLYCERIN 0.1MG/HR
1 PATCH, TRANSDERMAL 24 HOURS TRANSDERMAL DAILY
Status: DISCONTINUED | OUTPATIENT
Start: 2024-08-04 | End: 2024-08-06 | Stop reason: HOSPADM

## 2024-08-04 RX ORDER — HEPARIN SODIUM 1000 [USP'U]/ML
60 INJECTION, SOLUTION INTRAVENOUS; SUBCUTANEOUS ONCE
Status: COMPLETED | OUTPATIENT
Start: 2024-08-04 | End: 2024-08-04

## 2024-08-04 RX ORDER — POTASSIUM CHLORIDE 20 MEQ/1
40 TABLET, EXTENDED RELEASE ORAL ONCE
Status: COMPLETED | OUTPATIENT
Start: 2024-08-04 | End: 2024-08-04

## 2024-08-04 RX ORDER — CLOPIDOGREL BISULFATE 75 MG/1
75 TABLET ORAL DAILY
Status: DISCONTINUED | OUTPATIENT
Start: 2024-08-05 | End: 2024-08-06

## 2024-08-04 RX ORDER — SODIUM CHLORIDE 0.9 % (FLUSH) 0.9 %
5-40 SYRINGE (ML) INJECTION PRN
Status: DISCONTINUED | OUTPATIENT
Start: 2024-08-04 | End: 2024-08-06 | Stop reason: HOSPADM

## 2024-08-04 RX ADMIN — HEPARIN SODIUM 3400 UNITS: 1000 INJECTION INTRAVENOUS; SUBCUTANEOUS at 05:54

## 2024-08-04 RX ADMIN — WATER 1000 MG: 1 INJECTION INTRAMUSCULAR; INTRAVENOUS; SUBCUTANEOUS at 12:07

## 2024-08-04 RX ADMIN — SODIUM CHLORIDE 5 MG/HR: 900 INJECTION, SOLUTION INTRAVENOUS at 17:10

## 2024-08-04 RX ADMIN — AZITHROMYCIN MONOHYDRATE 500 MG: 500 INJECTION, POWDER, LYOPHILIZED, FOR SOLUTION INTRAVENOUS at 12:33

## 2024-08-04 RX ADMIN — INSULIN HUMAN 6 UNITS: 100 INJECTION, SOLUTION PARENTERAL at 04:22

## 2024-08-04 RX ADMIN — POTASSIUM BICARBONATE 40 MEQ: 782 TABLET, EFFERVESCENT ORAL at 12:09

## 2024-08-04 RX ADMIN — ROPINIROLE HYDROCHLORIDE 0.5 MG: 0.25 TABLET, FILM COATED ORAL at 23:02

## 2024-08-04 RX ADMIN — HEPARIN SODIUM 12 UNITS/KG/HR: 10000 INJECTION, SOLUTION INTRAVENOUS at 05:59

## 2024-08-04 RX ADMIN — DILTIAZEM HYDROCHLORIDE 5 MG: 5 INJECTION, SOLUTION INTRAVENOUS at 17:08

## 2024-08-04 RX ADMIN — POTASSIUM CHLORIDE 40 MEQ: 1500 TABLET, EXTENDED RELEASE ORAL at 23:02

## 2024-08-04 RX ADMIN — SODIUM CHLORIDE: 9 INJECTION, SOLUTION INTRAVENOUS at 12:32

## 2024-08-04 RX ADMIN — SODIUM CHLORIDE 1000 ML: 9 INJECTION, SOLUTION INTRAVENOUS at 06:00

## 2024-08-04 RX ADMIN — HEPARIN SODIUM 3400 UNITS: 1000 INJECTION INTRAVENOUS; SUBCUTANEOUS at 23:23

## 2024-08-04 RX ADMIN — SODIUM CHLORIDE, POTASSIUM CHLORIDE, SODIUM LACTATE AND CALCIUM CHLORIDE 1000 ML: 600; 310; 30; 20 INJECTION, SOLUTION INTRAVENOUS at 03:18

## 2024-08-04 RX ADMIN — HEPARIN SODIUM 14 UNITS/KG/HR: 10000 INJECTION, SOLUTION INTRAVENOUS at 23:26

## 2024-08-04 RX ADMIN — ROPINIROLE HYDROCHLORIDE 0.5 MG: 0.25 TABLET, FILM COATED ORAL at 14:15

## 2024-08-04 RX ADMIN — ATORVASTATIN CALCIUM 80 MG: 40 TABLET, FILM COATED ORAL at 23:02

## 2024-08-04 RX ADMIN — ARFORMOTEROL TARTRATE: 15 SOLUTION RESPIRATORY (INHALATION) at 09:06

## 2024-08-04 RX ADMIN — ARFORMOTEROL TARTRATE: 15 SOLUTION RESPIRATORY (INHALATION) at 19:20

## 2024-08-04 RX ADMIN — POTASSIUM CHLORIDE 10 MEQ: 7.46 INJECTION, SOLUTION INTRAVENOUS at 20:46

## 2024-08-04 RX ADMIN — POTASSIUM CHLORIDE 10 MEQ: 7.45 INJECTION INTRAVENOUS at 22:48

## 2024-08-04 RX ADMIN — SODIUM CHLORIDE, POTASSIUM CHLORIDE, SODIUM LACTATE AND CALCIUM CHLORIDE 1000 ML: 600; 310; 30; 20 INJECTION, SOLUTION INTRAVENOUS at 08:05

## 2024-08-04 RX ADMIN — LORAZEPAM 2 MG: 2 INJECTION INTRAMUSCULAR; INTRAVENOUS at 03:16

## 2024-08-04 RX ADMIN — SODIUM CHLORIDE, PRESERVATIVE FREE 10 ML: 5 INJECTION INTRAVENOUS at 12:10

## 2024-08-04 ASSESSMENT — LIFESTYLE VARIABLES
HOW OFTEN DO YOU HAVE A DRINK CONTAINING ALCOHOL: NEVER
HOW MANY STANDARD DRINKS CONTAINING ALCOHOL DO YOU HAVE ON A TYPICAL DAY: PATIENT DOES NOT DRINK

## 2024-08-04 ASSESSMENT — PAIN DESCRIPTION - DESCRIPTORS: DESCRIPTORS: SHARP

## 2024-08-04 ASSESSMENT — PAIN DESCRIPTION - ORIENTATION: ORIENTATION: LEFT

## 2024-08-04 ASSESSMENT — PAIN DESCRIPTION - LOCATION: LOCATION: BACK;CHEST

## 2024-08-04 ASSESSMENT — PAIN - FUNCTIONAL ASSESSMENT: PAIN_FUNCTIONAL_ASSESSMENT: 0-10

## 2024-08-04 ASSESSMENT — PAIN SCALES - GENERAL: PAINLEVEL_OUTOF10: 10

## 2024-08-04 NOTE — CONSULTS
[facility] - Consult Note    Cardiology consultation request from Dr. Ramos for evaluation and management/treatment of acute MI    Date of  Admission: 8/4/2024  1:41 AM   Primary Care Physician:  Ezequiel Michel MD    Attending Cardiologist: Dr. Wyatt       Assessment:     Patient Active Problem List    Diagnosis Date Noted    NSTEMI (non-ST elevated myocardial infarction) ([facility]) 08/04/2024    Pulmonary hypertension ([facility]) 06/27/2024    Opioid dependence with opioid-induced disorder (Formerly [facility]) 06/24/2024    Gastroparesis 10/21/2022    Acute kidney injury ([facility]) 10/19/2022    Pulmonary sarcoidosis ([facility]) 10/16/2019    Chronic respiratory failure with hypoxia ([facility]) 10/16/2019    Coronary artery disease involving native coronary artery of native heart without angina pectoris     Hyperglycemia 11/05/2017    Closed head injury 11/05/2017    Chronic back pain 11/05/2017    Syncope and collapse 11/05/2017    Diverticulitis 10/25/2014    Abdominal pain 10/25/2014    Microcytic anemia 09/12/2014    Non-ST elevated myocardial infarction (non-STEMI) ([facility]) 09/11/2014    GERD (gastroesophageal reflux disease) 09/11/2014    Cigarette smoker 09/11/2014    Hidradenitis 09/11/2014    RVH (right ventricular hypertrophy) 09/11/2014    Cocaine abuse ([facility]) 09/11/2014    Recurrent falls 09/11/2014    Insomnia 09/11/2014    Atypical chest pain 09/10/2014    DM (diabetes mellitus) ([facility]) 01/15/2014    Primary hypertension 01/15/2014    Osteoporosis 01/15/2014    HNP (herniated nucleus pulposus), cervical 01/15/2014    Knee pain, left 09/08/2011    COPD, moderate ([facility]) 09/08/2011    Chronic pain syndrome 09/08/2011    DJD (degenerative joint disease) of knee 09/08/2011    Depression 09/08/2011 9/11/2014 cardiac catheterization  IMPRESSION  1. Normal systemic pressure.  2. Mild elevation of left-sided filling pressures.  3. Normal left ventricular systolic function with no discrete regional wall  motion abnormalities.  4.  Labs     08/04/24  0225 08/04/24  0932    145   K 4.4 3.3*   CL 99* 108   CO2 18* 28   BUN 31* 26*   CREATININE 2.16* 1.65*   MG  --  1.6   PHOS  --  4.9   ALT 30  --        All Cardiac Markers in the last 24 hours:    Lab Results   Component Value Date/Time    TROPHS 1,458 08/04/2024 08:09 AM    TROPHS 1,352 08/04/2024 06:16 AM    TROPHS 1,048 08/04/2024 04:10 AM     No results found for: \"NTPROBNP\"    Last Lipid:  No results found for: \"CHOL\", \"CHLST\", \"CHOLV\", \"HDL\", \"HDLC\", \"LDL\"    Cardiographics:     Encounter Date: 08/04/24   EKG 12 lead - Serial   Result Value    Ventricular Rate 103    Atrial Rate 103    P-R Interval 112    QRS Duration 74    Q-T Interval 424    QTc Calculation (Bazett) 555    P Axis 63    R Axis -21    T Axis 112    Diagnosis      Sinus tachycardia with occasional premature ventricular complexes with   occasional premature atrial complexes  ST & T wave abnormality, consider lateral ischemia  Prolonged QT  Abnormal ECG  When compared with ECG of 04-AUG-2024 04:28,  Non-specific change in ST segment in Lateral leads  T wave inversion now evident in Lateral leads  Confirmed by Palmira Wyatt MD, ----- ([id number]) on 8/4/2024 4:01:46 PM       08/04/24    ECHO (TTE) COMPLETE (PRN CONTRAST/BUBBLE/STRAIN/3D) 08/04/2024 11:23 AM (Final)    Interpretation Summary    Left Ventricle: Normal left ventricular systolic function with a visually estimated EF of 60 - 65%. Left ventricle is smaller than normal. Normal wall thickness. Normal wall motion.    Right Ventricle: Right ventricle size is normal. Normal systolic function. TAPSE is 1.8 cm.    Aortic Valve: Trileaflet valve. Mild sclerosis of the aortic valve cusps. Mild annular calcification. Trace regurgitation.    Mitral Valve: Mild annular calcification. Trace regurgitation.    Tricuspid Valve: Moderate regurgitation. Moderately elevated RVSP, consistent with moderate pulmonary hypertension. The estimated RVSP is 62 mmHg.    Image quality is

## 2024-08-04 NOTE — PLAN OF CARE
Problem: Discharge Planning  Goal: Discharge to home or other facility with appropriate resources  Outcome: Progressing  Flowsheets (Taken 8/4/2024 1031)  Discharge to home or other facility with appropriate resources:   Identify barriers to discharge with patient and caregiver   Arrange for needed discharge resources and transportation as appropriate     Problem: Pain  Goal: Verbalizes/displays adequate comfort level or baseline comfort level  Outcome: Progressing     Problem: Safety - Adult  Goal: Free from fall injury  Outcome: Progressing     Problem: Skin/Tissue Integrity  Goal: Absence of new skin breakdown  Description: 1.  Monitor for areas of redness and/or skin breakdown  2.  Assess vascular access sites hourly  3.  Every 4-6 hours minimum:  Change oxygen saturation probe site  4.  Every 4-6 hours:  If on nasal continuous positive airway pressure, respiratory therapy assess nares and determine need for appliance change or resting period.  Outcome: Progressing

## 2024-08-04 NOTE — ED NOTES
Informed by charge nurse, ALEJANDRA shabazz, receiving nurse declined patient due to status and pending orders.     Paged attending physician. Awaiting response.

## 2024-08-04 NOTE — ED PROVIDER NOTES
derangement.  BMP moderately abnormal with decreased bicarb to 18, elevated anion gap to 21, glucose of 346.  Additionally, there is evidence of acute kidney injury with BUN = 31, creatinine 2.16, both elevated from baseline.  Differential at this time includes high anion gap metabolic acidosis, query diabetic ketoacidosis versus starvation ketosis versus alcoholic ketosis versus lactic acidosis secondary to underlying infection..  Will obtain alcohol level, beta hydroxybutyrate, lactic these to further guide resuscitation.  Patient was started on 1 L LR with likely an additional liter following.    Additionally, patient demonstrates elevated troponin to 721 as well as elevated NT proBNP to 2090, concerning for nonocclusive myocardial infarction in the setting of cocaine use.  Will trend troponins.    Chest x-ray without evidence of acute cardiopulmonary abnormality.    At the time of turnover, patient's workup still pending troponin, procalcitonin, beta hydroxybutyrate.  Anticipate likely admission for metabolic abnormalities, continue resuscitation and evaluation.    I am the first provider for this patient.    I reviewed the vital signs, available nursing notes, past medical history, past surgical history, family history and social history.    Patient Vitals for the past 12 hrs:   Temp Pulse Resp BP SpO2   08/04/24 0451 -- 78 19 (!) 112/57 --   08/04/24 0450 -- 92 20 -- 100 %   08/04/24 0444 -- 85 19 -- 100 %   08/04/24 0352 -- 93 19 110/65 100 %   08/04/24 0319 -- -- -- (!) 108/95 --   08/04/24 0314 -- 94 22 -- 100 %   08/04/24 0305 -- 96 27 -- 97 %   08/04/24 0255 -- -- -- 110/65 --   08/04/24 0145 97.7 °F (36.5 °C) 93 20 (!) 147/109 95 %       Vital Signs-Reviewed the patient's vital signs.    Pulse Oximetry Analysis, Cardiac Monitor, 12 lead ekg:      EKG obtained at 0 428, apparently interpreted by myself as follows: Rate 89.  Sinus rhythm.  Left axis deviation.  Intervals prolonged QTc.  No evidence of  contiguous ST elevation, ST depression, T wave inversion.  Overall impression: Normal sinus rhythm with prolonged QTc.      Records Reviewed: Nursing notes reviewed (Time of Review: 5:02 AM)    Procedures:   Critical Care Time: 0  If critical care time is note it is exclusive of any separately billable procedures.     Aspirin: No  Diagnosis     Disposition: DISPOSITION         DISCHARGE MEDICATIONS:  Current Discharge Medication List             Details   insulin detemir (LEVEMIR) 100 UNIT/ML injection vial Inject 10 Units into the skin nightly  Qty: 10 mL, Refills: 0    Associated Diagnoses: Hyperglycemia      Insulin Syringes, Disposable, U-100 0.3 ML MISC 1 each by Does not apply route daily  Qty: 100 each, Refills: 3      albuterol sulfate HFA (PROVENTIL;VENTOLIN;PROAIR) 108 (90 Base) MCG/ACT inhaler Inhale 1 puff into the lungs every 6 hours as needed for Wheezing or Shortness of Breath  Qty: 18 g, Refills: 3      fluticasone-salmeterol (ADVAIR) 250-50 MCG/ACT AEPB diskus inhaler Inhale 1 puff into the lungs in the morning and 1 puff in the evening.  Qty: 1 each, Refills: 0      atorvastatin (LIPITOR) 40 MG tablet Take 1 tablet by mouth nightly  Qty: 30 tablet, Refills: 3      rOPINIRole (REQUIP) 0.5 MG tablet Take 1 tablet by mouth 3 times daily  Qty: 90 tablet, Refills: 3      pantoprazole (PROTONIX) 20 MG tablet Take 2 tablets by mouth daily  Qty: 60 tablet, Refills: 0      Spacer/Aero-Holding Chambers KAROL 1 Device by Does not apply route in the morning and at bedtime  Qty: 1 each, Refills: 0      tiotropium (SPIRIVA RESPIMAT) 2.5 MCG/ACT AERS inhaler Inhale 2 puffs into the lungs daily  Qty: 1 each, Refills: 0      pregabalin (LYRICA) 75 MG capsule Take 75 mg by mouth 2 times daily.      !! sertraline (ZOLOFT) 100 MG tablet Take 100 mg by mouth daily. Indications: Depression      !! sertraline (ZOLOFT) 50 MG tablet Take 50 mg by mouth daily. Indications: Depression      diclofenac sodium (VOLTAREN) 1 % GEL

## 2024-08-04 NOTE — ED NOTES
Hospitalist at bedside and made aware patient only has one IV access. He states at this time he would like Heparin drip initiated first over Insulin drip. He also states he will talk to Dr. Beaver regarding additional IV access.

## 2024-08-04 NOTE — H&P
Hospitalist Admission History and Physical    NAME:  Juliana Loaiza   :   1950   MRN:   [id number]     PCP:  Ezequiel Michel MD  Date/Time:  2024 6:03 AM  Subjective:   CHIEF COMPLAINT:      HISTORY OF PRESENT ILLNESS:     Juliana is a 73 y.o.   female with history of CAD (NSTEMI , nonflow-limiting no stents) sarcoid, COPD, diabetes, recent admission for syncope who is admitted for NSTEMI in the setting of cocaine use.  Cocaine use earlier today.  Then developed 10 out of 10 chest pain was brought to hospital by EMS found to have elevated troponin which increased when repeated.      Patient with recent hospital admission in  for syncope.  At that time had nuclear stress test done which did not show evidence of inducible ischemia.  Also had echocardiogram which was unremarkable.    In the emergency room patient was given 2 mg of Ativan for agitation.    Past Medical History:   Diagnosis Date    Arthritis     \"generalized\"    Asthma     CAD in native artery     NSTEMI (Sep 2014); diffuse 65% pLAD, minimal disease RCA & LCx. pLAD FFR 0.93. LV  no RWMA (echo and LV angio)    Chronic neck pain     Chronic pain     left knee    Chronic pain syndrome     COPD (chronic obstructive pulmonary disease) (HCC)     Depression     Diabetes (HCC)     Diverticulitis     GERD (gastroesophageal reflux disease)     Heart disease     Hidradenitis 2014    Hypertension     Left knee pain     Narcotic dependence (HCC)     Pneumonia     Right wrist pain     Sarcoidosis         Past Surgical History:   Procedure Laterality Date    BREAST BIOPSY Right     9/10/14: She said tag in place from BX    BRONCHOSCOPY  10/2019    CHOLECYSTECTOMY  2005    HYSTERECTOMY (CERVIX STATUS UNKNOWN)      KNEE ARTHROSCOPY Left        Social History     Tobacco Use    Smoking status: Former     Current packs/day: 0.00     Types: Cigarettes     Quit date: 3/1/2018     Years since quittin.4     revealing nonflow limiting stenosis.  No stents placed at that time.  In June 2024 had echocardiogram and diet which were unremarkable  - Patient given aspirin, heparin, Plavix  - Spoke with Dr. Wyatt, added to care team  - Repeat troponin x 2  -Echocardiogram  - Lipitor 80 mg  -No beta blocker with cocaine use    #Anion gap acidosis concerning for DKA  Urinalysis, serum ketones still pending at time of admission.  Acidotic with pH 7.31.  -Consult to PICC team for vascular access  -DKA protocol with IV insulin and IV fluids       []Reviewed outside notes   []Reviewed labs and imaging          Discussed Code Status:    [x]Full Code      []DNR     ___________________________________________________    Care Plan discussed with:    [x]Patient   []Family    []ED Care Manager  []ED Doc   []Specialist :    Total Time Coordinating Admission:      minutes   ___________________________________________________  Admitting Physician:  Giuseppe Kline DO, MBA, MS  [facility]  Hospitalist

## 2024-08-04 NOTE — ED NOTES
Attempted to call report to CVT SD. Receiving nurse unavailable was informed to call back in 15 minutes.

## 2024-08-04 NOTE — PROGRESS NOTES
Bedside and Verbal shift change report given to Blessing RN (oncoming nurse) by Cain RN (offgoing nurse). Report included the following information Nurse Handoff Report, Intake/Output, MAR, Recent Results, and Cardiac Rhythm afib/NSR .

## 2024-08-04 NOTE — PROGRESS NOTES
Spiritual Health Assessment/Progress Note  [facility]    Advance Care Planning, Spiritual/Emotional Needs,  ,  ,      Name: Juliana Loaiza MRN: [id number]    Age: 73 y.o.     Sex: female   Language: English   Buddhist: Bahai   NSTEMI (non-ST elevated myocardial infarction) (HCC)     Date: 8/4/2024            Total Time Calculated: 7 min              Spiritual Assessment began in [facility] 2 CV STEPDOWN        Referral/Consult From: Multi-disciplinary team   Encounter Overview/Reason: Advance Care Planning, Spiritual/Emotional Needs  Service Provided For: Patient and family together    Cherie, Belief, Meaning:   Patient is connected with a cherie tradition or spiritual practice and has beliefs or practices that help with coping during difficult times  Family/Friends are connected with a cherie tradition or spiritual practice and have beliefs or practices that help with coping during difficult times      Importance and Influence:  Patient has spiritual/personal beliefs that influence decisions regarding their health  Family/Friends have spiritual/personal beliefs that influence decisions regarding the patient's health    Community:  Patient feels well-supported. Support system includes: Spouse/Partner  Family/Friends feel well-supported. Support system includes: Spouse/Partner    Assessment and Plan of Care:     Patient Interventions include: Affirmed coping skills/support systems  Family/Friends Interventions include: Affirmed coping skills/support systems    Patient Plan of Care: No spiritual needs identified for follow-up  Family/Friends Plan of Care: No spiritual needs identified for follow-up    Electronically signed by RUBEN Flores on 8/4/2024 at 3:03 PM

## 2024-08-04 NOTE — PROGRESS NOTES
[facility]  Progress Note    Patient: Juliana Loaiza Age: 73 y.o. : 1950 MR#: [id number] SSN: [id number]      Subjective/24-hour events:     Chart reviewed, patient seen in ED.  Admitted early this AM by night coverage after presenting w/chest pain.  Just recently admitted in  with chest pain and had NST hat was low risk.    Assessment:   NSTEMI, suspect type 2    Leukocytosis  Lactic acidosis w/AG - ? DKA  IRA  HTN  DM 2  Pulmonary sarcoidosis  Pulmonary HTN  RLS  Cocaine abuse - admitted to using PTA    Plan:   Continue supplemental oxygen, wean as able.  Obtain blood/urine cultures and initiate empiric ABX in setting of unexplained leukocytosis and uncertain etiology of acidosis.  Await UA to check for ketones.  Concerned more for infection as sugars have not been as high as would be expected in setting of DKA although this certainly remains a possibility.  Have ordered serial BMPs to continue to follow labs.  IVF for now.  UDS pending.  Cardiology evaluation pending.  OK to send to stepdown bed but will continue to monitor closely.  Further plan/orders o/w per admitting MD.  Updated family at bedside in ED.    Case discussed with:  [x]Patient  [x]Family  [x] Nursing  []Case Management  DVT Prophylaxis:  []Lovenox  []Hep SQ  []SCDs  []Coumadin   [x]On Heparin gtt []PO anticoagulant    Objective:   VS: BP (!) 112/57   Pulse 78   Temp 97.7 °F (36.5 °C) (Oral)   Resp 19   Ht 1.575 m (5' 2\")   Wt 56.7 kg (125 lb)   SpO2 100%   BMI 22.86 kg/m²      Tmax/24hrs: Temp (24hrs), Av.7 °F (36.5 °C), Min:97.7 °F (36.5 °C), Max:97.7 °F (36.5 °C)  No intake or output data in the 24 hours ending 24 0754    Gen:  In NAD.  A bit anxious but in NAD.  Lungs: Clear, no wheezes  Effort nonlabored.  CV: RRR.  Abdomen: Soft, NTTP.  Extremities: Warm, no pitting edema or ischemia.  Neuro:  Awake and alert, moves extremities spontaneously, follows  0 - 54 ng/L   pH, venous    Collection Time: 08/04/24  4:10 AM   Result Value Ref Range    pH, Musa 7.32 7.32 - 7.42     EKG 12 Lead    Collection Time: 08/04/24  4:28 AM   Result Value Ref Range    Ventricular Rate 89 BPM    Atrial Rate 89 BPM    P-R Interval 116 ms    QRS Duration 74 ms    Q-T Interval 494 ms    QTc Calculation (Bazett) 601 ms    P Axis 61 degrees    R Axis -32 degrees    T Axis 14 degrees    Diagnosis       Sinus rhythm with premature supraventricular complexes and with occasional   premature ventricular complexes  Left axis deviation  Nonspecific ST and T wave abnormality  Prolonged QT  Abnormal ECG  When compared with ECG of 04-AUG-2024 01:57,  ST no longer depressed in Lateral leads  QT has lengthened     POCT Glucose    Collection Time: 08/04/24  5:51 AM   Result Value Ref Range    POC Glucose 158 (H) 70 - 110 mg/dL   Procalcitonin    Collection Time: 08/04/24  6:16 AM   Result Value Ref Range    Procalcitonin 1.83 ng/mL   Troponin    Collection Time: 08/04/24  6:16 AM   Result Value Ref Range    Troponin, High Sensitivity 1,352 (HH) 0 - 54 ng/L         Signed By: Henry Ramos MD

## 2024-08-04 NOTE — ED NOTES
TRANSFER - OUT REPORT:    Verbal report given to ALEJANDRA Barlow on Juliana Loaiza  being transferred to [facility] for routine progression of patient care       Report consisted of patient's Situation, Background, Assessment and   Recommendations(SBAR).     Information from the following report(s) ED Encounter Summary, ED SBAR, Adult Overview, and Recent Results was reviewed with the receiving nurse.    [place] Fall Assessment:    Presents to emergency department  because of falls (Syncope, seizure, or loss of consciousness): No  Age > 70: Yes  Altered Mental Status, Intoxication with alcohol or substance confusion (Disorientation, impaired judgment, poor safety awaremess, or inability to follow instructions): No  Impaired Mobility: Ambulates or transfers with assistive devices or assistance; Unable to ambulate or transer.: No  Nursing Judgement: Yes          Lines:   Peripheral IV 08/04/24 Right Antecubital (Active)   Site Assessment Clean, dry & intact 08/04/24 0316   Line Status Blood return noted;Specimen collected;Flushed 08/04/24 0316   Phlebitis Assessment No symptoms 08/04/24 0316   Infiltration Assessment 0 08/04/24 0316   Dressing Status Clean, dry & intact 08/04/24 0316   Dressing Type Transparent 08/04/24 0316        Opportunity for questions and clarification was provided.      Patient transported with:  Monitor and Registered Nurse

## 2024-08-04 NOTE — PROGRESS NOTES
Bedside and Verbal shift change report given to Blessing Solano RN (oncoming nurse) by Cain Brody RN (offgoing nurse). Report included the following information Nurse Handoff Report, Adult Overview, and Cardiac Rhythm Sinus Rhythm w/ PVCs .       0200:  PIVs turned off after both liines were found infiltrated. Supervisor called to see if a line could started. Patient cleaned up and new purewick placed.

## 2024-08-05 PROBLEM — G93.40 ACUTE ENCEPHALOPATHY: Status: ACTIVE | Noted: 2024-08-05

## 2024-08-05 PROBLEM — I48.0 PAROXYSMAL ATRIAL FIBRILLATION (HCC): Status: ACTIVE | Noted: 2024-08-05

## 2024-08-05 LAB
ANION GAP BLD CALC-SCNC: 8 (ref 10–20)
ANION GAP SERPL CALC-SCNC: 11 MMOL/L (ref 3–18)
ANION GAP SERPL CALC-SCNC: 8 MMOL/L (ref 3–18)
ANION GAP SERPL CALC-SCNC: 9 MMOL/L (ref 3–18)
APTT PPP: 62 SEC (ref 23–36.4)
BUN SERPL-MCNC: 11 MG/DL (ref 7–18)
BUN SERPL-MCNC: 13 MG/DL (ref 7–18)
BUN SERPL-MCNC: 15 MG/DL (ref 7–18)
BUN/CREAT SERPL: 11 (ref 12–20)
BUN/CREAT SERPL: 12 (ref 12–20)
BUN/CREAT SERPL: 13 (ref 12–20)
CA-I BLD-MCNC: 0.95 MMOL/L (ref 1.12–1.32)
CALCIUM SERPL-MCNC: 6.9 MG/DL (ref 8.5–10.1)
CALCIUM SERPL-MCNC: 6.9 MG/DL (ref 8.5–10.1)
CALCIUM SERPL-MCNC: 7.5 MG/DL (ref 8.5–10.1)
CHLORIDE BLD-SCNC: 107 MMOL/L (ref 100–108)
CHLORIDE SERPL-SCNC: 106 MMOL/L (ref 100–111)
CHLORIDE SERPL-SCNC: 107 MMOL/L (ref 100–111)
CHLORIDE SERPL-SCNC: 109 MMOL/L (ref 100–111)
CHOLEST SERPL-MCNC: 177 MG/DL
CO2 BLD-SCNC: 28 MMOL/L (ref 19–24)
CO2 SERPL-SCNC: 21 MMOL/L (ref 21–32)
CO2 SERPL-SCNC: 26 MMOL/L (ref 21–32)
CO2 SERPL-SCNC: 26 MMOL/L (ref 21–32)
CREAT SERPL-MCNC: 0.94 MG/DL (ref 0.6–1.3)
CREAT SERPL-MCNC: 1.13 MG/DL (ref 0.6–1.3)
CREAT SERPL-MCNC: 1.18 MG/DL (ref 0.6–1.3)
CREAT UR-MCNC: 0.9 MG/DL (ref 0.6–1.3)
ECHO BSA: 1.57 M2
ERYTHROCYTE [DISTWIDTH] IN BLOOD BY AUTOMATED COUNT: 17.6 % (ref 11.6–14.5)
GLUCOSE BLD STRIP.AUTO-MCNC: 120 MG/DL (ref 74–99)
GLUCOSE SERPL-MCNC: 131 MG/DL (ref 74–99)
GLUCOSE SERPL-MCNC: 160 MG/DL (ref 74–99)
GLUCOSE SERPL-MCNC: 322 MG/DL (ref 74–99)
HCT VFR BLD AUTO: 25.9 % (ref 35–45)
HDLC SERPL-MCNC: 33 MG/DL (ref 40–60)
HDLC SERPL: 5.4 (ref 0–5)
HGB BLD-MCNC: 7.9 G/DL (ref 12–16)
LDLC SERPL CALC-MCNC: 109 MG/DL (ref 0–100)
LIPID PANEL: ABNORMAL
MAGNESIUM SERPL-MCNC: 1.5 MG/DL (ref 1.6–2.6)
MAGNESIUM SERPL-MCNC: 2.3 MG/DL (ref 1.6–2.6)
MCH RBC QN AUTO: 22.6 PG (ref 24–34)
MCHC RBC AUTO-ENTMCNC: 30.5 G/DL (ref 31–37)
MCV RBC AUTO: 74.2 FL (ref 78–100)
NRBC # BLD: 0.02 K/UL (ref 0–0.01)
NRBC BLD-RTO: 0.2 PER 100 WBC
PHOSPHATE SERPL-MCNC: 2.3 MG/DL (ref 2.5–4.9)
PLATELET # BLD AUTO: 245 K/UL (ref 135–420)
POTASSIUM BLD-SCNC: 2.8 MMOL/L (ref 3.5–5.5)
POTASSIUM SERPL-SCNC: 2.7 MMOL/L (ref 3.5–5.5)
POTASSIUM SERPL-SCNC: 2.8 MMOL/L (ref 3.5–5.5)
POTASSIUM SERPL-SCNC: 4.2 MMOL/L (ref 3.5–5.5)
RBC # BLD AUTO: 3.49 M/UL (ref 4.2–5.3)
SODIUM BLD-SCNC: 143 MMOL/L (ref 136–145)
SODIUM SERPL-SCNC: 138 MMOL/L (ref 136–145)
SODIUM SERPL-SCNC: 142 MMOL/L (ref 136–145)
SODIUM SERPL-SCNC: 143 MMOL/L (ref 136–145)
TRIGL SERPL-MCNC: 175 MG/DL
VLDLC SERPL CALC-MCNC: 35 MG/DL
WBC # BLD AUTO: 9 K/UL (ref 4.6–13.2)

## 2024-08-05 PROCEDURE — 6370000000 HC RX 637 (ALT 250 FOR IP): Performed by: HEALTH CARE PROVIDER

## 2024-08-05 PROCEDURE — 93458 L HRT ARTERY/VENTRICLE ANGIO: CPT | Performed by: INTERNAL MEDICINE

## 2024-08-05 PROCEDURE — 2700000000 HC OXYGEN THERAPY PER DAY

## 2024-08-05 PROCEDURE — 36415 COLL VENOUS BLD VENIPUNCTURE: CPT

## 2024-08-05 PROCEDURE — 85730 THROMBOPLASTIN TIME PARTIAL: CPT

## 2024-08-05 PROCEDURE — 84100 ASSAY OF PHOSPHORUS: CPT

## 2024-08-05 PROCEDURE — 83735 ASSAY OF MAGNESIUM: CPT

## 2024-08-05 PROCEDURE — 6370000000 HC RX 637 (ALT 250 FOR IP): Performed by: FAMILY MEDICINE

## 2024-08-05 PROCEDURE — 6360000002 HC RX W HCPCS: Performed by: EMERGENCY MEDICINE

## 2024-08-05 PROCEDURE — 94761 N-INVAS EAR/PLS OXIMETRY MLT: CPT

## 2024-08-05 PROCEDURE — 4A023N7 MEASUREMENT OF CARDIAC SAMPLING AND PRESSURE, LEFT HEART, PERCUTANEOUS APPROACH: ICD-10-PCS | Performed by: INTERNAL MEDICINE

## 2024-08-05 PROCEDURE — 6360000004 HC RX CONTRAST MEDICATION: Performed by: INTERNAL MEDICINE

## 2024-08-05 PROCEDURE — 6360000002 HC RX W HCPCS: Performed by: FAMILY MEDICINE

## 2024-08-05 PROCEDURE — 6370000000 HC RX 637 (ALT 250 FOR IP): Performed by: STUDENT IN AN ORGANIZED HEALTH CARE EDUCATION/TRAINING PROGRAM

## 2024-08-05 PROCEDURE — B2111ZZ FLUOROSCOPY OF MULTIPLE CORONARY ARTERIES USING LOW OSMOLAR CONTRAST: ICD-10-PCS | Performed by: INTERNAL MEDICINE

## 2024-08-05 PROCEDURE — 2709999900 HC NON-CHARGEABLE SUPPLY: Performed by: INTERNAL MEDICINE

## 2024-08-05 PROCEDURE — 2500000003 HC RX 250 WO HCPCS

## 2024-08-05 PROCEDURE — 2580000003 HC RX 258: Performed by: HEALTH CARE PROVIDER

## 2024-08-05 PROCEDURE — 80048 BASIC METABOLIC PNL TOTAL CA: CPT

## 2024-08-05 PROCEDURE — C1725 CATH, TRANSLUMIN NON-LASER: HCPCS | Performed by: INTERNAL MEDICINE

## 2024-08-05 PROCEDURE — 99232 SBSQ HOSP IP/OBS MODERATE 35: CPT | Performed by: STUDENT IN AN ORGANIZED HEALTH CARE EDUCATION/TRAINING PROGRAM

## 2024-08-05 PROCEDURE — 99152 MOD SED SAME PHYS/QHP 5/>YRS: CPT | Performed by: INTERNAL MEDICINE

## 2024-08-05 PROCEDURE — C1713 ANCHOR/SCREW BN/BN,TIS/BN: HCPCS | Performed by: INTERNAL MEDICINE

## 2024-08-05 PROCEDURE — 2580000003 HC RX 258: Performed by: FAMILY MEDICINE

## 2024-08-05 PROCEDURE — 6360000002 HC RX W HCPCS: Performed by: HEALTH CARE PROVIDER

## 2024-08-05 PROCEDURE — 85027 COMPLETE CBC AUTOMATED: CPT

## 2024-08-05 PROCEDURE — 80047 BASIC METABLC PNL IONIZED CA: CPT

## 2024-08-05 PROCEDURE — 80061 LIPID PANEL: CPT

## 2024-08-05 PROCEDURE — C1894 INTRO/SHEATH, NON-LASER: HCPCS | Performed by: INTERNAL MEDICINE

## 2024-08-05 PROCEDURE — 2500000003 HC RX 250 WO HCPCS: Performed by: INTERNAL MEDICINE

## 2024-08-05 PROCEDURE — 94640 AIRWAY INHALATION TREATMENT: CPT

## 2024-08-05 PROCEDURE — 76000 FLUOROSCOPY <1 HR PHYS/QHP: CPT | Performed by: INTERNAL MEDICINE

## 2024-08-05 PROCEDURE — 6360000002 HC RX W HCPCS: Performed by: INTERNAL MEDICINE

## 2024-08-05 PROCEDURE — 2140000001 HC CVICU INTERMEDIATE R&B

## 2024-08-05 RX ORDER — TRAMADOL HYDROCHLORIDE 50 MG/1
50 TABLET ORAL EVERY 6 HOURS PRN
Status: DISCONTINUED | OUTPATIENT
Start: 2024-08-05 | End: 2024-08-06 | Stop reason: HOSPADM

## 2024-08-05 RX ORDER — MIDAZOLAM HYDROCHLORIDE 1 MG/ML
INJECTION INTRAMUSCULAR; INTRAVENOUS PRN
Status: DISCONTINUED | OUTPATIENT
Start: 2024-08-05 | End: 2024-08-05 | Stop reason: HOSPADM

## 2024-08-05 RX ORDER — HEPARIN SODIUM 1000 [USP'U]/ML
INJECTION, SOLUTION INTRAVENOUS; SUBCUTANEOUS PRN
Status: DISCONTINUED | OUTPATIENT
Start: 2024-08-05 | End: 2024-08-05 | Stop reason: HOSPADM

## 2024-08-05 RX ORDER — TRAMADOL HYDROCHLORIDE 50 MG/1
100 TABLET ORAL EVERY 6 HOURS PRN
Status: DISCONTINUED | OUTPATIENT
Start: 2024-08-05 | End: 2024-08-06 | Stop reason: HOSPADM

## 2024-08-05 RX ORDER — MAGNESIUM SULFATE HEPTAHYDRATE 40 MG/ML
2000 INJECTION, SOLUTION INTRAVENOUS ONCE
Status: COMPLETED | OUTPATIENT
Start: 2024-08-05 | End: 2024-08-05

## 2024-08-05 RX ORDER — VERAPAMIL HYDROCHLORIDE 2.5 MG/ML
INJECTION, SOLUTION INTRAVENOUS PRN
Status: DISCONTINUED | OUTPATIENT
Start: 2024-08-05 | End: 2024-08-05 | Stop reason: HOSPADM

## 2024-08-05 RX ORDER — LIDOCAINE HYDROCHLORIDE 10 MG/ML
INJECTION, SOLUTION EPIDURAL; INFILTRATION; INTRACAUDAL; PERINEURAL PRN
Status: DISCONTINUED | OUTPATIENT
Start: 2024-08-05 | End: 2024-08-05 | Stop reason: HOSPADM

## 2024-08-05 RX ORDER — FENTANYL CITRATE 50 UG/ML
INJECTION, SOLUTION INTRAMUSCULAR; INTRAVENOUS PRN
Status: DISCONTINUED | OUTPATIENT
Start: 2024-08-05 | End: 2024-08-05 | Stop reason: HOSPADM

## 2024-08-05 RX ADMIN — ARFORMOTEROL TARTRATE: 15 SOLUTION RESPIRATORY (INHALATION) at 09:30

## 2024-08-05 RX ADMIN — CLOPIDOGREL BISULFATE 75 MG: 75 TABLET ORAL at 08:37

## 2024-08-05 RX ADMIN — ASPIRIN 81 MG CHEWABLE TABLET 81 MG: 81 TABLET CHEWABLE at 08:36

## 2024-08-05 RX ADMIN — HEPARIN SODIUM 3400 UNITS: 1000 INJECTION INTRAVENOUS; SUBCUTANEOUS at 08:38

## 2024-08-05 RX ADMIN — ATORVASTATIN CALCIUM 80 MG: 40 TABLET, FILM COATED ORAL at 21:01

## 2024-08-05 RX ADMIN — WATER 1000 MG: 1 INJECTION INTRAMUSCULAR; INTRAVENOUS; SUBCUTANEOUS at 10:08

## 2024-08-05 RX ADMIN — SODIUM CHLORIDE, PRESERVATIVE FREE 10 ML: 5 INJECTION INTRAVENOUS at 21:01

## 2024-08-05 RX ADMIN — POTASSIUM CHLORIDE 10 MEQ: 7.45 INJECTION INTRAVENOUS at 05:49

## 2024-08-05 RX ADMIN — POTASSIUM BICARBONATE 40 MEQ: 782 TABLET, EFFERVESCENT ORAL at 14:27

## 2024-08-05 RX ADMIN — TRAMADOL HYDROCHLORIDE 100 MG: 50 TABLET ORAL at 14:27

## 2024-08-05 RX ADMIN — ROPINIROLE HYDROCHLORIDE 0.5 MG: 0.25 TABLET, FILM COATED ORAL at 21:01

## 2024-08-05 RX ADMIN — ARFORMOTEROL TARTRATE: 15 SOLUTION RESPIRATORY (INHALATION) at 21:57

## 2024-08-05 RX ADMIN — TRAMADOL HYDROCHLORIDE 100 MG: 50 TABLET ORAL at 08:37

## 2024-08-05 RX ADMIN — SERTRALINE 100 MG: 50 TABLET, FILM COATED ORAL at 08:37

## 2024-08-05 RX ADMIN — ROPINIROLE HYDROCHLORIDE 0.5 MG: 0.25 TABLET, FILM COATED ORAL at 08:37

## 2024-08-05 RX ADMIN — AZITHROMYCIN MONOHYDRATE 500 MG: 500 INJECTION, POWDER, LYOPHILIZED, FOR SOLUTION INTRAVENOUS at 10:07

## 2024-08-05 RX ADMIN — POTASSIUM CHLORIDE 10 MEQ: 7.45 INJECTION INTRAVENOUS at 12:27

## 2024-08-05 RX ADMIN — MAGNESIUM SULFATE HEPTAHYDRATE 2000 MG: 40 INJECTION, SOLUTION INTRAVENOUS at 10:10

## 2024-08-05 RX ADMIN — POTASSIUM CHLORIDE 10 MEQ: 7.45 INJECTION INTRAVENOUS at 09:59

## 2024-08-05 RX ADMIN — POTASSIUM CHLORIDE 10 MEQ: 7.46 INJECTION, SOLUTION INTRAVENOUS at 00:37

## 2024-08-05 RX ADMIN — SODIUM CHLORIDE, PRESERVATIVE FREE 10 ML: 5 INJECTION INTRAVENOUS at 21:54

## 2024-08-05 RX ADMIN — POTASSIUM CHLORIDE 10 MEQ: 7.45 INJECTION INTRAVENOUS at 14:26

## 2024-08-05 RX ADMIN — POTASSIUM BICARBONATE 40 MEQ: 782 TABLET, EFFERVESCENT ORAL at 00:42

## 2024-08-05 ASSESSMENT — PAIN SCALES - WONG BAKER
WONGBAKER_NUMERICALRESPONSE: NO HURT

## 2024-08-05 ASSESSMENT — PAIN SCALES - GENERAL
PAINLEVEL_OUTOF10: 0
PAINLEVEL_OUTOF10: 7
PAINLEVEL_OUTOF10: 0
PAINLEVEL_OUTOF10: 0

## 2024-08-05 NOTE — PROGRESS NOTES
Bedside shift change report given to ALEJANDRA Hernandes (oncoming nurse) by ALEJANDRA Gilmore (offgoing nurse). Report included the following information Nurse Handoff Report, Cardiac Rhythm sinus tachy, and Alarm Parameters.      0530: blood glucose 425mg/dl- sent secure message to DR Ramos and made orders for insulin.

## 2024-08-05 NOTE — PROGRESS NOTES
TRANSFER - OUT REPORT:    Verbal report given to ALEJANDRA Gilmore on Juliana Loaiza  being transferred to [facility] for routine progression of patient care       Report consisted of patient's Situation, Background, Assessment and   Recommendations(SBAR).     Information from the following report(s) Nurse Handoff Report was reviewed with the receiving nurse.           Lines:   Peripheral IV 08/05/24 Left;Anterior Forearm (Active)   Site Assessment Clean, dry & intact 08/05/24 0240   Line Status Blood return noted;Normal saline locked 08/05/24 0240   Line Care Connections checked and tightened 08/05/24 0240   Phlebitis Assessment No symptoms 08/05/24 0240   Infiltration Assessment 0 08/05/24 0240   Alcohol Cap Used No 08/05/24 0240   Dressing Status New dressing applied 08/05/24 0240   Dressing Type Transparent 08/05/24 0240   Dressing Intervention New 08/05/24 0240       Peripheral IV 08/05/24 Right;Anterior Forearm (Active)   Site Assessment Clean, dry & intact 08/05/24 0255   Line Status Blood return noted;Flushed;Normal saline locked 08/05/24 0255   Line Care Connections checked and tightened 08/05/24 0255   Phlebitis Assessment No symptoms 08/05/24 0255   Infiltration Assessment 0 08/05/24 0255   Alcohol Cap Used No 08/05/24 0255   Dressing Status New dressing applied 08/05/24 0255   Dressing Type Transparent 08/05/24 0255   Dressing Intervention New 08/05/24 0255       Peripheral IV 08/05/24 Posterior;Right Hand (Active)        Opportunity for questions and clarification was provided.      Patient transported with:  Registered Nurse: ALEJANDRA Tierney

## 2024-08-05 NOTE — PROGRESS NOTES
Right wrist band removed, no bleeding or swelling. Sterile hemostatic dressing applied. Normal radial pulse, normal distal circulation and neuro check. Safety instructions reviewed with the patient.

## 2024-08-05 NOTE — PROGRESS NOTES
[facility] - Progress Note    Admit Date: 8/4/2024  Attending Cardiologist: Dr. Merino    Assessment:     Patient Active Problem List    Diagnosis Date Noted    NSTEMI (non-ST elevated myocardial infarction) ([facility]) 08/04/2024    Pulmonary hypertension ([facility]) 06/27/2024    Opioid dependence with opioid-induced disorder ([facility]) 06/24/2024    Gastroparesis 10/21/2022    Acute kidney injury ([facility]) 10/19/2022    Pulmonary sarcoidosis ([facility]) 10/16/2019    Chronic respiratory failure with hypoxia ([facility]) 10/16/2019    Coronary artery disease involving native coronary artery of native heart without angina pectoris     Hyperglycemia 11/05/2017    Closed head injury 11/05/2017    Chronic back pain 11/05/2017    Syncope and collapse 11/05/2017    Diverticulitis 10/25/2014    Abdominal pain 10/25/2014    Microcytic anemia 09/12/2014    Non-ST elevated myocardial infarction (non-STEMI) ([facility]) 09/11/2014    GERD (gastroesophageal reflux disease) 09/11/2014    Cigarette smoker 09/11/2014    Hidradenitis 09/11/2014    RVH (right ventricular hypertrophy) 09/11/2014    Cocaine abuse ([facility]) 09/11/2014    Recurrent falls 09/11/2014    Insomnia 09/11/2014    Atypical chest pain 09/10/2014    DM (diabetes mellitus) ([facility]) 01/15/2014    Primary hypertension 01/15/2014    Osteoporosis 01/15/2014    HNP (herniated nucleus pulposus), cervical 01/15/2014    Knee pain, left 09/08/2011    COPD, moderate ([facility]) 09/08/2011    Chronic pain syndrome 09/08/2011    DJD (degenerative joint disease) of knee 09/08/2011    Depression 09/08/2011       - Chest pain with elevated troponin, 721 > 1048 > 1352 > 1458. EKG SR PACs and NSTWA. Patient had moderate LAD stenosis in 2014. On aspirin, plavix, and IV heparin   - CAD: C 9/2014 diffuse 65% pLAD, minimal disease RCA & LCx. pLAD FFR 0.93.   Nuclear stress test 6/24/24 no evidence of inducible ischemia  Echo 8/4/2024 EF 60-65%, normal wall motion. Moderate TR  with moderately elevated RVSP, 62mmHg  -

## 2024-08-05 NOTE — PROGRESS NOTES
RN to CVT SD to draw BMP prior to cardiac catheterization. New PIV established in pt's R hand. Two green top tubes drawn off PIV. EPOC run to test electrolyte level. EPOC shows potassium at a level of 2.8. MD Merino made aware. Awaiting BMP to result in lab for serum potassium.

## 2024-08-05 NOTE — PLAN OF CARE
Problem: Discharge Planning  Goal: Discharge to home or other facility with appropriate resources  Outcome: Progressing  Flowsheets (Taken 8/5/2024 0811)  Discharge to home or other facility with appropriate resources: Identify barriers to discharge with patient and caregiver     Problem: Pain  Goal: Verbalizes/displays adequate comfort level or baseline comfort level  Outcome: Progressing  Flowsheets (Taken 8/5/2024 1200)  Verbalizes/displays adequate comfort level or baseline comfort level: Encourage patient to monitor pain and request assistance     Problem: Safety - Adult  Goal: Free from fall injury  Outcome: Progressing

## 2024-08-06 VITALS
SYSTOLIC BLOOD PRESSURE: 140 MMHG | BODY MASS INDEX: 26.41 KG/M2 | RESPIRATION RATE: 22 BRPM | DIASTOLIC BLOOD PRESSURE: 80 MMHG | TEMPERATURE: 97.6 F | HEIGHT: 62 IN | OXYGEN SATURATION: 100 % | WEIGHT: 143.5 LBS | HEART RATE: 86 BPM

## 2024-08-06 LAB
ANION GAP SERPL CALC-SCNC: 9 MMOL/L (ref 3–18)
BACTERIA SPEC CULT: NORMAL
BUN SERPL-MCNC: 15 MG/DL (ref 7–18)
BUN/CREAT SERPL: 12 (ref 12–20)
CALCIUM SERPL-MCNC: 7.3 MG/DL (ref 8.5–10.1)
CC UR VC: NORMAL
CHLORIDE SERPL-SCNC: 105 MMOL/L (ref 100–111)
CO2 SERPL-SCNC: 22 MMOL/L (ref 21–32)
CREAT SERPL-MCNC: 1.25 MG/DL (ref 0.6–1.3)
ERYTHROCYTE [DISTWIDTH] IN BLOOD BY AUTOMATED COUNT: 17.6 % (ref 11.6–14.5)
EST. AVERAGE GLUCOSE BLD GHB EST-MCNC: 194 MG/DL
EST. AVERAGE GLUCOSE BLD GHB EST-MCNC: 200 MG/DL
GLUCOSE BLD STRIP.AUTO-MCNC: 361 MG/DL (ref 70–110)
GLUCOSE BLD STRIP.AUTO-MCNC: 415 MG/DL (ref 70–110)
GLUCOSE BLD STRIP.AUTO-MCNC: 425 MG/DL (ref 70–110)
GLUCOSE SERPL-MCNC: 434 MG/DL (ref 74–99)
HBA1C MFR BLD: 8.4 % (ref 4.2–5.6)
HBA1C MFR BLD: 8.6 % (ref 4.2–5.6)
HCT VFR BLD AUTO: 26.8 % (ref 35–45)
HGB BLD-MCNC: 7.8 G/DL (ref 12–16)
MCH RBC QN AUTO: 22.2 PG (ref 24–34)
MCHC RBC AUTO-ENTMCNC: 29.1 G/DL (ref 31–37)
MCV RBC AUTO: 76.1 FL (ref 78–100)
NRBC # BLD: 0.04 K/UL (ref 0–0.01)
NRBC BLD-RTO: 0.5 PER 100 WBC
PLATELET # BLD AUTO: 225 K/UL (ref 135–420)
POTASSIUM SERPL-SCNC: 4.2 MMOL/L (ref 3.5–5.5)
RBC # BLD AUTO: 3.52 M/UL (ref 4.2–5.3)
SERVICE CMNT-IMP: NORMAL
SODIUM SERPL-SCNC: 136 MMOL/L (ref 136–145)
WBC # BLD AUTO: 7.6 K/UL (ref 4.6–13.2)

## 2024-08-06 PROCEDURE — 6370000000 HC RX 637 (ALT 250 FOR IP): Performed by: STUDENT IN AN ORGANIZED HEALTH CARE EDUCATION/TRAINING PROGRAM

## 2024-08-06 PROCEDURE — 94761 N-INVAS EAR/PLS OXIMETRY MLT: CPT

## 2024-08-06 PROCEDURE — 36415 COLL VENOUS BLD VENIPUNCTURE: CPT

## 2024-08-06 PROCEDURE — 2580000003 HC RX 258: Performed by: HEALTH CARE PROVIDER

## 2024-08-06 PROCEDURE — 6370000000 HC RX 637 (ALT 250 FOR IP): Performed by: HEALTH CARE PROVIDER

## 2024-08-06 PROCEDURE — 94640 AIRWAY INHALATION TREATMENT: CPT

## 2024-08-06 PROCEDURE — 2500000003 HC RX 250 WO HCPCS: Performed by: INTERNAL MEDICINE

## 2024-08-06 PROCEDURE — 6360000002 HC RX W HCPCS: Performed by: HEALTH CARE PROVIDER

## 2024-08-06 PROCEDURE — 6360000002 HC RX W HCPCS: Performed by: FAMILY MEDICINE

## 2024-08-06 PROCEDURE — 2700000000 HC OXYGEN THERAPY PER DAY

## 2024-08-06 PROCEDURE — 85027 COMPLETE CBC AUTOMATED: CPT

## 2024-08-06 PROCEDURE — 82962 GLUCOSE BLOOD TEST: CPT

## 2024-08-06 PROCEDURE — 6370000000 HC RX 637 (ALT 250 FOR IP)

## 2024-08-06 PROCEDURE — 83036 HEMOGLOBIN GLYCOSYLATED A1C: CPT

## 2024-08-06 PROCEDURE — 6370000000 HC RX 637 (ALT 250 FOR IP): Performed by: FAMILY MEDICINE

## 2024-08-06 PROCEDURE — 2580000003 HC RX 258: Performed by: FAMILY MEDICINE

## 2024-08-06 PROCEDURE — 99239 HOSP IP/OBS DSCHRG MGMT >30: CPT | Performed by: STUDENT IN AN ORGANIZED HEALTH CARE EDUCATION/TRAINING PROGRAM

## 2024-08-06 PROCEDURE — 80048 BASIC METABOLIC PNL TOTAL CA: CPT

## 2024-08-06 PROCEDURE — 2580000003 HC RX 258: Performed by: INTERNAL MEDICINE

## 2024-08-06 RX ORDER — INSULIN LISPRO 100 [IU]/ML
0-4 INJECTION, SOLUTION INTRAVENOUS; SUBCUTANEOUS NIGHTLY
Status: DISCONTINUED | OUTPATIENT
Start: 2024-08-06 | End: 2024-08-06 | Stop reason: HOSPADM

## 2024-08-06 RX ORDER — INSULIN LISPRO 100 [IU]/ML
0-8 INJECTION, SOLUTION INTRAVENOUS; SUBCUTANEOUS
Status: DISCONTINUED | OUTPATIENT
Start: 2024-08-06 | End: 2024-08-06

## 2024-08-06 RX ORDER — INSULIN LISPRO 100 [IU]/ML
0-4 INJECTION, SOLUTION INTRAVENOUS; SUBCUTANEOUS NIGHTLY
Status: DISCONTINUED | OUTPATIENT
Start: 2024-08-06 | End: 2024-08-06

## 2024-08-06 RX ORDER — AMOXICILLIN AND CLAVULANATE POTASSIUM 875; 125 MG/1; MG/1
1 TABLET, FILM COATED ORAL EVERY 12 HOURS SCHEDULED
Qty: 20 TABLET | Refills: 0 | Status: SHIPPED | OUTPATIENT
Start: 2024-08-06 | End: 2024-08-16

## 2024-08-06 RX ORDER — INSULIN GLARGINE 100 [IU]/ML
15 INJECTION, SOLUTION SUBCUTANEOUS DAILY
Status: DISCONTINUED | OUTPATIENT
Start: 2024-08-06 | End: 2024-08-06 | Stop reason: HOSPADM

## 2024-08-06 RX ORDER — INSULIN LISPRO 100 [IU]/ML
0-8 INJECTION, SOLUTION INTRAVENOUS; SUBCUTANEOUS
Status: DISCONTINUED | OUTPATIENT
Start: 2024-08-06 | End: 2024-08-06 | Stop reason: HOSPADM

## 2024-08-06 RX ORDER — ATORVASTATIN CALCIUM 80 MG/1
80 TABLET, FILM COATED ORAL NIGHTLY
Qty: 30 TABLET | Refills: 3 | Status: SHIPPED | OUTPATIENT
Start: 2024-08-06

## 2024-08-06 RX ORDER — INSULIN DETEMIR 100 [IU]/ML
15 INJECTION, SOLUTION SUBCUTANEOUS NIGHTLY
Qty: 10 ML | Refills: 0 | Status: SHIPPED | OUTPATIENT
Start: 2024-08-06 | End: 2024-10-12

## 2024-08-06 RX ORDER — INSULIN GLARGINE 100 [IU]/ML
15 INJECTION, SOLUTION SUBCUTANEOUS DAILY
Status: CANCELLED | OUTPATIENT
Start: 2024-08-06

## 2024-08-06 RX ORDER — AMOXICILLIN AND CLAVULANATE POTASSIUM 875; 125 MG/1; MG/1
1 TABLET, FILM COATED ORAL EVERY 12 HOURS SCHEDULED
Status: DISCONTINUED | OUTPATIENT
Start: 2024-08-06 | End: 2024-08-06 | Stop reason: HOSPADM

## 2024-08-06 RX ORDER — DEXTROSE MONOHYDRATE 100 MG/ML
INJECTION, SOLUTION INTRAVENOUS CONTINUOUS PRN
Status: DISCONTINUED | OUTPATIENT
Start: 2024-08-06 | End: 2024-08-06 | Stop reason: HOSPADM

## 2024-08-06 RX ADMIN — DILTIAZEM HYDROCHLORIDE 30 MG: 30 TABLET, FILM COATED ORAL at 11:08

## 2024-08-06 RX ADMIN — SODIUM CHLORIDE, PRESERVATIVE FREE 10 ML: 5 INJECTION INTRAVENOUS at 08:54

## 2024-08-06 RX ADMIN — WATER 1000 MG: 1 INJECTION INTRAMUSCULAR; INTRAVENOUS; SUBCUTANEOUS at 08:50

## 2024-08-06 RX ADMIN — SODIUM CHLORIDE 5 MG/HR: 900 INJECTION, SOLUTION INTRAVENOUS at 06:50

## 2024-08-06 RX ADMIN — TRAMADOL HYDROCHLORIDE 100 MG: 50 TABLET ORAL at 06:10

## 2024-08-06 RX ADMIN — INSULIN GLARGINE 15 UNITS: 100 INJECTION, SOLUTION SUBCUTANEOUS at 06:04

## 2024-08-06 RX ADMIN — ROPINIROLE HYDROCHLORIDE 0.5 MG: 0.25 TABLET, FILM COATED ORAL at 08:53

## 2024-08-06 RX ADMIN — ASPIRIN 81 MG CHEWABLE TABLET 81 MG: 81 TABLET CHEWABLE at 08:53

## 2024-08-06 RX ADMIN — ARFORMOTEROL TARTRATE: 15 SOLUTION RESPIRATORY (INHALATION) at 09:29

## 2024-08-06 RX ADMIN — INSULIN LISPRO 8 UNITS: 100 INJECTION, SOLUTION INTRAVENOUS; SUBCUTANEOUS at 06:03

## 2024-08-06 RX ADMIN — AMOXICILLIN AND CLAVULANATE POTASSIUM 1 TABLET: 875; 125 TABLET, FILM COATED ORAL at 11:08

## 2024-08-06 RX ADMIN — APIXABAN 5 MG: 5 TABLET, FILM COATED ORAL at 11:08

## 2024-08-06 RX ADMIN — CLOPIDOGREL BISULFATE 75 MG: 75 TABLET ORAL at 08:53

## 2024-08-06 RX ADMIN — SERTRALINE 100 MG: 50 TABLET, FILM COATED ORAL at 08:53

## 2024-08-06 RX ADMIN — AZITHROMYCIN MONOHYDRATE 500 MG: 500 INJECTION, POWDER, LYOPHILIZED, FOR SOLUTION INTRAVENOUS at 08:50

## 2024-08-06 ASSESSMENT — PAIN - FUNCTIONAL ASSESSMENT: PAIN_FUNCTIONAL_ASSESSMENT: ACTIVITIES ARE NOT PREVENTED

## 2024-08-06 ASSESSMENT — PAIN SCALES - GENERAL
PAINLEVEL_OUTOF10: 0
PAINLEVEL_OUTOF10: 0
PAINLEVEL_OUTOF10: 9

## 2024-08-06 ASSESSMENT — PAIN DESCRIPTION - ORIENTATION: ORIENTATION: LEFT

## 2024-08-06 ASSESSMENT — PAIN DESCRIPTION - LOCATION: LOCATION: SHOULDER

## 2024-08-06 ASSESSMENT — PAIN SCALES - WONG BAKER: WONGBAKER_NUMERICALRESPONSE: NO HURT

## 2024-08-06 ASSESSMENT — PAIN DESCRIPTION - DESCRIPTORS: DESCRIPTORS: ACHING;DULL

## 2024-08-06 NOTE — PROGRESS NOTES
[facility] - Progress Note    Admit Date: 8/4/2024  Attending Cardiologist: Dr. Sorto    Assessment:     Patient Active Problem List    Diagnosis Date Noted    Acute encephalopathy 08/05/2024    Paroxysmal atrial fibrillation ([facility]) 08/05/2024    NSTEMI (non-ST elevated myocardial infarction) ([facility]) 08/04/2024    Pulmonary hypertension ([facility]) 06/27/2024    Opioid dependence with opioid-induced disorder ([facility]) 06/24/2024    Gastroparesis 10/21/2022    Acute kidney injury ([facility]) 10/19/2022    Pulmonary sarcoidosis ([facility]) 10/16/2019    Chronic respiratory failure with hypoxia ([facility]) 10/16/2019    Coronary artery disease involving native coronary artery of native heart without angina pectoris     Hyperglycemia 11/05/2017    Closed head injury 11/05/2017    Chronic back pain 11/05/2017    Syncope and collapse 11/05/2017    Diverticulitis 10/25/2014    Abdominal pain 10/25/2014    Microcytic anemia 09/12/2014    Non-ST elevated myocardial infarction (non-STEMI) ([facility]) 09/11/2014    GERD (gastroesophageal reflux disease) 09/11/2014    Cigarette smoker 09/11/2014    Hidradenitis 09/11/2014    RVH (right ventricular hypertrophy) 09/11/2014    Cocaine abuse ([facility]) 09/11/2014    Recurrent falls 09/11/2014    Insomnia 09/11/2014    Atypical chest pain 09/10/2014    DM (diabetes mellitus) ([facility]) 01/15/2014    Primary hypertension 01/15/2014    Osteoporosis 01/15/2014    HNP (herniated nucleus pulposus), cervical 01/15/2014    Knee pain, left 09/08/2011    COPD, moderate ([facility]) 09/08/2011    Chronic pain syndrome 09/08/2011    DJD (degenerative joint disease) of knee 09/08/2011    Depression 09/08/2011       - Chest pain with elevated troponin, 721 > 1048 > 1352 > 1458. EKG SR PACs and NSTWA.  LHC 8/5/24 RCA with midsegment 20% stenosis. LM patent, LAD patent, Lcx patent.    Nuclear stress test 6/24/24 no evidence of inducible ischemia  Echo 8/4/2024 EF 60-65%, normal wall motion. Moderate TR  with moderately elevated

## 2024-08-06 NOTE — DISCHARGE SUMMARY
Discharge Summary    Patient: Juliana Loaiza MRN: [id number]  [facility]: [id number]    YOB: 1950  Age: 73 y.o.  Sex: female    DOA: 8/4/2024 LOS:  LOS: 2 days   Discharge Date: August 6, 2024     Admission Diagnosis: Nondependent cocaine abuse (HCC) [F14.10]  Acute coronary syndrome (HCC) [I24.9]  Hyperglycemia [R73.9]  NSTEMI (non-ST elevated myocardial infarction) (HCC) [I21.4]  High anion gap metabolic acidosis [E87.29]  Acute encephalopathy [G93.40]    Discharge Diagnosis:    Principal Problem:    NSTEMI (non-ST elevated myocardial infarction) (HCC)  Active Problems:    Acute encephalopathy    Paroxysmal atrial fibrillation (HCC)  Resolved Problems:    * No resolved hospital problems. *       Discharge Condition: Stable  Discharge Disposition: home     PHYSICAL EXAM  Visit Vitals  BP (!) 140/80   Pulse 86   Temp 97.6 °F (36.4 °C) (Temporal)   Resp 22   Ht 1.575 m (5' 2\")   Wt 65.1 kg (143 lb 8 oz)   SpO2 100%   BMI 26.25 kg/m²       General: Alert, cooperative, no acute distress    HEENT: NC, Atraumatic.  PERRLA, EOMI. Anicteric sclerae.  Lungs:  CTA Bilaterally. No Wheezing/Rhonchi/Rales.  Heart:  Regular  rhythm,  No murmur, No Rubs, No Gallops  Abdomen: Soft, Non distended, Non tender.  +Bowel sounds, no HSM  Extremities: No c/c/e  Psych:   Good insight. Not anxious or agitated.  Neurologic:  CN 2-12 grossly intact, oriented X 3.  No acute neurological                                 Deficits,     Hospital Course By Problem:   NSTEMI, suspect type 2 - patient underwent cardiac cath on August 5 with no worrisome findings.  No intervention was needed.  Leukocytosis -exact etiology is unknown.  Patient acutely not found to have infection.  Urine and blood cultures negative to date.  Given that patient still continues to have some mild chest pain and had a leukocytosis, will treat for 5 days for community-acquired pneumonia.   IRA resolved with IV fluids.  HTN normotensive on current

## 2024-08-06 NOTE — PLAN OF CARE
Problem: Discharge Planning  Goal: Discharge to home or other facility with appropriate resources  8/5/2024 2312 by Cecilio Suarez RN  Outcome: Progressing  Flowsheets (Taken 8/5/2024 2001)  Discharge to home or other facility with appropriate resources:   Identify barriers to discharge with patient and caregiver   Arrange for needed discharge resources and transportation as appropriate   Identify discharge learning needs (meds, wound care, etc)   Refer to discharge planning if patient needs post-hospital services based on physician order or complex needs related to functional status, cognitive ability or social support system  8/5/2024 1702 by Deirdre Richard RN  Outcome: Progressing  Flowsheets (Taken 8/5/2024 0811)  Discharge to home or other facility with appropriate resources: Identify barriers to discharge with patient and caregiver     Problem: Pain  Goal: Verbalizes/displays adequate comfort level or baseline comfort level  8/5/2024 2312 by Cecilio Suarez RN  Outcome: Progressing  8/5/2024 1702 by Deirdre Richard RN  Outcome: Progressing  Flowsheets  Taken 8/5/2024 1600  Verbalizes/displays adequate comfort level or baseline comfort level: Encourage patient to monitor pain and request assistance  Taken 8/5/2024 1200  Verbalizes/displays adequate comfort level or baseline comfort level: Encourage patient to monitor pain and request assistance     Problem: Safety - Adult  Goal: Free from fall injury  8/5/2024 2312 by Cecilio Suarez RN  Outcome: Progressing  8/5/2024 1702 by Deirdre Richard RN  Outcome: Progressing     Problem: Skin/Tissue Integrity  Goal: Absence of new skin breakdown  Description: 1.  Monitor for areas of redness and/or skin breakdown  2.  Assess vascular access sites hourly  3.  Every 4-6 hours minimum:  Change oxygen saturation probe site  4.  Every 4-6 hours:  If on nasal continuous positive airway pressure, respiratory therapy assess nares and determine need

## 2024-08-06 NOTE — CARE COORDINATION
Met with pt and spouse at bedside.  Pt sitting on her rollator without oxygen  Pt stated she is on continuous oxygen at home.  Pt and spouse needs transport home.  CM informed pt CM will arrange stretcher transport because of oxygen needs but Medicaid stretcher can take up to 3 hours before they show up.  Pt stated she does not want to wait that she will call her [employer] to pick her and her spouse.  CM advised that she will need oxygen to home and she stated she can do without oxygen until she gets home.   CM updated CCL Orlin.            PATRICK KraftN RN  Care Management

## 2024-08-06 NOTE — CARE COORDINATION
08/06/24 1216   Service Assessment   Patient Orientation Alert and Oriented   Cognition Alert   History Provided By Patient   Primary Caregiver Self   Accompanied By/Relationship spouse Antony Loaiza   Support Systems Spouse/Significant Other   Patient's Healthcare Decision Maker is: Named in Scanned ACP Document   PCP Verified by CM Yes  (saw pcp 2 weeks ago.  Pt pcp is with [facility])   Last Visit to PCP Within last 3 months   Prior Functional Level Independent in ADLs/IADLs   Current Functional Level Independent in ADLs/IADLs   Can patient return to prior living arrangement Yes   Ability to make needs known: Good   Family able to assist with home care needs: Yes   Would you like for me to discuss the discharge plan with any other family members/significant others, and if so, who? Yes  (spouse)   Financial Resources Medicare   Community Resources None   Social/Functional History   Lives With Spouse   Type of Home Apartment   Home Layout Multi-level  (pt lives on 3rd floor)   Home Access Elevator   Bathroom Shower/Tub Walk-in shower   Bathroom Toilet Standard   Bathroom Equipment Grab bars in shower   Bathroom Accessibility Accessible   Home Equipment Rollator;Electric scooter   Receives Help From Family   ADL Assistance Independent   Homemaking Assistance Independent   Ambulation Assistance Independent   Transfer Assistance Independent   Active  No   Patient's  Info Medicaid cab   Mode of Transportation Car   Occupation Retired   Discharge Planning   Type of Residence Apartment   Living Arrangements Spouse/Significant Other   Current Services Prior To Admission Durable Medical Equipment   Current DME Prior to Arrival Other (Comment)  (rollator, scooter)   Potential Assistance Needed N/A   DME Ordered? No   Potential Assistance Purchasing Medications No   Type of Home Care Services None   Patient expects to be discharged to: Apartment   Services At/After Discharge   Transition of Care Consult (YEIMI

## 2024-08-06 NOTE — PLAN OF CARE
Problem: Discharge Planning  Goal: Discharge to home or other facility with appropriate resources  8/6/2024 1058 by Orlin Núñez RN  Outcome: Completed  8/5/2024 2312 by Cecilio Suarez RN  Outcome: Progressing  Flowsheets (Taken 8/5/2024 2001)  Discharge to home or other facility with appropriate resources:   Identify barriers to discharge with patient and caregiver   Arrange for needed discharge resources and transportation as appropriate   Identify discharge learning needs (meds, wound care, etc)   Refer to discharge planning if patient needs post-hospital services based on physician order or complex needs related to functional status, cognitive ability or social support system     Problem: Pain  Goal: Verbalizes/displays adequate comfort level or baseline comfort level  8/6/2024 1058 by Orlin Núñez RN  Outcome: Completed  8/5/2024 2312 by Cecilio Suarez RN  Outcome: Progressing     Problem: Safety - Adult  Goal: Free from fall injury  8/6/2024 1058 by Orlin Núñez RN  Outcome: Completed  8/5/2024 2312 by Cecilio Suarez RN  Outcome: Progressing     Problem: Skin/Tissue Integrity  Goal: Absence of new skin breakdown  Description: 1.  Monitor for areas of redness and/or skin breakdown  2.  Assess vascular access sites hourly  3.  Every 4-6 hours minimum:  Change oxygen saturation probe site  4.  Every 4-6 hours:  If on nasal continuous positive airway pressure, respiratory therapy assess nares and determine need for appliance change or resting period.  8/6/2024 1058 by Orlin Núñez RN  Outcome: Completed  8/5/2024 2312 by Cecilio Suarez RN  Outcome: Progressing     Problem: ABCDS Injury Assessment  Goal: Absence of physical injury  8/6/2024 1058 by Orlin Núñez RN  Outcome: Completed  8/5/2024 2312 by Cecilio Suarez RN  Outcome: Progressing     Problem: Chronic Conditions and Co-morbidities  Goal: Patient's chronic conditions and co-morbidity symptoms are monitored and

## 2024-08-06 NOTE — CARE COORDINATION
08/06/24 1215   IMM Letter   IMM Letter given to Patient/Family/Significant other/Guardian/POA/by: Bridgette Grier   IMM Letter date given: 08/06/24   IMM Letter time given: 1216       Medicare pt has received, reviewed, and signed 2nd IM letter informing them of their right to appeal the discharge.  Signed copy has been placed on pt bedside chart.      TREY Kraft RN  Care Management

## 2024-08-06 NOTE — PROGRESS NOTES
Discharge instructions reviewed with patient and spouse at bedside. Opportunity for questions provided. All questions answered. Pt transported downstairs to entrance via wheelchair.

## 2024-08-06 NOTE — PROGRESS NOTES
[facility]  Progress Note    Patient: Juliana Loaiza Age: 73 y.o. : 1950 MR#: [id number] SSN: [id number]      Subjective/24-hour events:   Patient to cardiac cath this afternoon.  No concerns. No chest pain. Feels well.       Assessment:   NSTEMI, suspect type 2    Leukocytosis  Lactic acidosis w/AG - ? DKA  IRA  HTN  DM 2  Pulmonary sarcoidosis  Pulmonary HTN  RLS  Cocaine abuse - admitted to using PTA    Plan:   Now s/p cardiac cath.  To transition on oral cardizem tomorrow and can likely discharge.   Case discussed with:  [x]Patient  [x]Family  [x] Nursing  []Case Management  DVT Prophylaxis:  []Lovenox  []Hep SQ  []SCDs  []Coumadin   [x]On Heparin gtt []PO anticoagulant    Objective:   VS: /76   Pulse 84   Temp 98.2 °F (36.8 °C) (Oral)   Resp 21   Ht 1.575 m (5' 2\")   Wt 56.7 kg (125 lb)   SpO2 95%   BMI 22.86 kg/m²      Tmax/24hrs: Temp (24hrs), Av °F (36.7 °C), Min:97.8 °F (36.6 °C), Max:98.2 °F (36.8 °C)    Intake/Output Summary (Last 24 hours) at 2024 2135  Last data filed at 2024 1758  Gross per 24 hour   Intake 330.53 ml   Output 610 ml   Net -279.47 ml       Gen:  In NAD.  A bit anxious but in NAD.  Lungs: Clear, no wheezes  Effort nonlabored.  CV: RRR.  Abdomen: Soft, NTTP.  Extremities: Warm, no pitting edema or ischemia.  Neuro:  Awake and alert, moves extremities spontaneously, follows commands.    Current Facility-Administered Medications   Medication Dose Route Frequency    traMADol (ULTRAM) tablet 50 mg  50 mg Oral Q6H PRN    Or    traMADol (ULTRAM) tablet 100 mg  100 mg Oral Q6H PRN    dextrose bolus 10% 125 mL  125 mL IntraVENous PRN    Or    dextrose bolus 10% 250 mL  250 mL IntraVENous PRN    potassium chloride 10 mEq/100 mL IVPB (Peripheral Line)  10 mEq IntraVENous PRN    sodium phosphate 15 mmol in sodium chloride 0.9 % 250 mL IVPB  15 mmol IntraVENous PRN    dextrose 5 % and 0.45 % sodium chloride infusion    (H) <150 MG/DL    HDL 33 (L) 40 - 60 MG/DL    LDL Cholesterol 109 (H) 0 - 100 MG/DL    VLDL Cholesterol Calculated 35 MG/DL    Chol/HDL Ratio 5.4 (H) 0 - 5.0     POC CHEM 8    Collection Time: 08/05/24  2:12 PM   Result Value Ref Range    POC TCO2 28 (H) 19 - 24 MMOL/L    POC Glucose 120 (H) 74 - 99 MG/DL    POC Creatinine 0.9 0.6 - 1.3 MG/DL    eGFR, POC 68 >60 ml/min/1.73m2    POC Sodium 143 136 - 145 MMOL/L    POC Potassium 2.8 (LL) 3.5 - 5.5 MMOL/L    POC Ionized Calcium 0.95 (L) 1.12 - 1.32 mmol/L    POC Chloride 107 100 - 108 MMOL/L    Anion Gap, POC 8 (L) 10 - 20     Basic Metabolic Panel    Collection Time: 08/05/24  2:18 PM   Result Value Ref Range    Sodium 143 136 - 145 mmol/L    Potassium 2.8 (LL) 3.5 - 5.5 mmol/L    Chloride 109 100 - 111 mmol/L    CO2 26 21 - 32 mmol/L    Anion Gap 8 3.0 - 18 mmol/L    Glucose 131 (H) 74 - 99 mg/dL    BUN 11 7.0 - 18 MG/DL    Creatinine 0.94 0.6 - 1.3 MG/DL    BUN/Creatinine Ratio 12 12 - 20      Est, Glom Filt Rate 65 >60 ml/min/1.73m2    Calcium 6.9 (L) 8.5 - 10.1 MG/DL   Cardiac procedure    Collection Time: 08/05/24  4:14 PM   Result Value Ref Range    Body Surface Area 1.57 m2         Signed By: Patti Gaona DO

## 2024-08-14 NOTE — PROGRESS NOTES
Physician Progress Note      PATIENT:               KATHLEEN ALLEN  CSN #:                  [id number]  :                       1950  ADMIT DATE:       2024 1:41 AM  DISCH DATE:        2024 1:44 PM  RESPONDING  PROVIDER #:        Patti Gaona DO          QUERY TEXT:    Good afternoon.    Patient admitted with NSTEMI, noted to have atrial fibrillation and is   maintained on Eliquis.    If possible, please document in progress notes and discharge summary if you   are evaluating and/or treating any of the following:?  ?  The medical record reflects the following:    Risk Factors:73 yr old female, HTN, DM II, CAD,  hx of atrial fibrillation    Clinical Indicators: from  Cardiology PN: \"- pAF: appears to be a new   diagnosis. Currently in SR. Continue IV cardizem and heparin\"    Treatment: Cardiology consult, Eliquis 5 mg pm twice daily      Thank you,  Fiorella Mccabe RN, CDI  Options provided:  -- Secondary hypercoagulable state in a patient with atrial fibrillation  -- Other - I will add my own diagnosis  -- Disagree - Not applicable / Not valid  -- Disagree - Clinically unable to determine / Unknown  -- Refer to Clinical Documentation Reviewer    PROVIDER RESPONSE TEXT:    This patient has secondary hypercoagulable state in a patient with atrial   fibrillation.    Query created by: Fiorella Mccabe on 2024 1:16 PM      Electronically signed by:  Patti Gaona DO 2024 11:00 AM

## 2024-08-20 ENCOUNTER — HOSPITAL ENCOUNTER (EMERGENCY)
Facility: HOSPITAL | Age: 74
Discharge: HOME | End: 2024-08-20
Attending: EMERGENCY MEDICINE
Payer: MEDICARE

## 2024-08-20 ENCOUNTER — APPOINTMENT (OUTPATIENT)
Facility: HOSPITAL | Age: 74
End: 2024-08-20
Payer: MEDICARE

## 2024-08-20 VITALS
TEMPERATURE: 99.2 F | HEART RATE: 99 BPM | BODY MASS INDEX: 23.19 KG/M2 | DIASTOLIC BLOOD PRESSURE: 89 MMHG | HEIGHT: 62 IN | OXYGEN SATURATION: 94 % | SYSTOLIC BLOOD PRESSURE: 124 MMHG | WEIGHT: 126 LBS | RESPIRATION RATE: 13 BRPM

## 2024-08-20 DIAGNOSIS — E83.42 HYPOMAGNESEMIA: ICD-10-CM

## 2024-08-20 DIAGNOSIS — I21.4 NSTEMI (NON-ST ELEVATED MYOCARDIAL INFARCTION) (HCC): ICD-10-CM

## 2024-08-20 DIAGNOSIS — E87.6 HYPOKALEMIA: ICD-10-CM

## 2024-08-20 DIAGNOSIS — R07.9 CHEST PAIN, UNSPECIFIED TYPE: Primary | ICD-10-CM

## 2024-08-20 DIAGNOSIS — Z86.79 HISTORY OF ATRIAL FIBRILLATION: ICD-10-CM

## 2024-08-20 DIAGNOSIS — I10 ESSENTIAL HYPERTENSION: ICD-10-CM

## 2024-08-20 LAB
ALBUMIN SERPL-MCNC: 2.9 G/DL (ref 3.4–5)
ALBUMIN/GLOB SERPL: 0.7 (ref 0.8–1.7)
ALP SERPL-CCNC: 164 U/L (ref 45–117)
ALT SERPL-CCNC: 15 U/L (ref 13–56)
AMPHET UR QL SCN: NEGATIVE
ANION GAP SERPL CALC-SCNC: 10 MMOL/L (ref 3–18)
APPEARANCE UR: CLEAR
AST SERPL-CCNC: 20 U/L (ref 10–38)
BACTERIA URNS QL MICRO: NEGATIVE /HPF
BARBITURATES UR QL SCN: NEGATIVE
BASOPHILS # BLD: 0 K/UL (ref 0–0.1)
BASOPHILS NFR BLD: 1 % (ref 0–2)
BENZODIAZ UR QL: NEGATIVE
BILIRUB SERPL-MCNC: 0.4 MG/DL (ref 0.2–1)
BILIRUB UR QL: NEGATIVE
BUN SERPL-MCNC: 10 MG/DL (ref 7–18)
BUN/CREAT SERPL: 10 (ref 12–20)
CALCIUM SERPL-MCNC: 7.1 MG/DL (ref 8.5–10.1)
CANNABINOIDS UR QL SCN: NEGATIVE
CHLORIDE SERPL-SCNC: 106 MMOL/L (ref 100–111)
CO2 SERPL-SCNC: 26 MMOL/L (ref 21–32)
COCAINE UR QL SCN: NEGATIVE
COLOR UR: YELLOW
CREAT SERPL-MCNC: 0.97 MG/DL (ref 0.6–1.3)
DIFFERENTIAL METHOD BLD: ABNORMAL
EKG ATRIAL RATE: 107 BPM
EKG DIAGNOSIS: NORMAL
EKG P AXIS: 31 DEGREES
EKG P-R INTERVAL: 122 MS
EKG Q-T INTERVAL: 356 MS
EKG QRS DURATION: 74 MS
EKG QTC CALCULATION (BAZETT): 475 MS
EKG R AXIS: -19 DEGREES
EKG T AXIS: 76 DEGREES
EKG VENTRICULAR RATE: 107 BPM
EOSINOPHIL # BLD: 0.1 K/UL (ref 0–0.4)
EOSINOPHIL NFR BLD: 2 % (ref 0–5)
EPITH CASTS URNS QL MICRO: NORMAL /LPF (ref 0–5)
ERYTHROCYTE [DISTWIDTH] IN BLOOD BY AUTOMATED COUNT: 19.1 % (ref 11.6–14.5)
GLOBULIN SER CALC-MCNC: 4.3 G/DL (ref 2–4)
GLUCOSE SERPL-MCNC: 157 MG/DL (ref 74–99)
GLUCOSE UR STRIP.AUTO-MCNC: NEGATIVE MG/DL
HCT VFR BLD AUTO: 28 % (ref 35–45)
HGB BLD-MCNC: 8.4 G/DL (ref 12–16)
HGB UR QL STRIP: NEGATIVE
HYALINE CASTS URNS QL MICRO: NORMAL /LPF (ref 0–2)
IMM GRANULOCYTES # BLD AUTO: 0 K/UL (ref 0–0.04)
IMM GRANULOCYTES NFR BLD AUTO: 0 % (ref 0–0.5)
KETONES UR QL STRIP.AUTO: NEGATIVE MG/DL
LEUKOCYTE ESTERASE UR QL STRIP.AUTO: NEGATIVE
LYMPHOCYTES # BLD: 1.9 K/UL (ref 0.9–3.6)
LYMPHOCYTES NFR BLD: 31 % (ref 21–52)
Lab: ABNORMAL
MAGNESIUM SERPL-MCNC: 1.1 MG/DL (ref 1.6–2.6)
MCH RBC QN AUTO: 22.2 PG (ref 24–34)
MCHC RBC AUTO-ENTMCNC: 30 G/DL (ref 31–37)
MCV RBC AUTO: 73.9 FL (ref 78–100)
METHADONE UR QL: NEGATIVE
MONOCYTES # BLD: 0.6 K/UL (ref 0.05–1.2)
MONOCYTES NFR BLD: 9 % (ref 3–10)
NEUTS SEG # BLD: 3.5 K/UL (ref 1.8–8)
NEUTS SEG NFR BLD: 58 % (ref 40–73)
NITRITE UR QL STRIP.AUTO: NEGATIVE
NRBC # BLD: 0 K/UL (ref 0–0.01)
NRBC BLD-RTO: 0 PER 100 WBC
OPIATES UR QL: POSITIVE
PCP UR QL: NEGATIVE
PH UR STRIP: 7 (ref 5–8)
PLATELET # BLD AUTO: 378 K/UL (ref 135–420)
PMV BLD AUTO: 10.7 FL (ref 9.2–11.8)
POTASSIUM SERPL-SCNC: 2.9 MMOL/L (ref 3.5–5.5)
PROT SERPL-MCNC: 7.2 G/DL (ref 6.4–8.2)
PROT UR STRIP-MCNC: 30 MG/DL
RBC # BLD AUTO: 3.79 M/UL (ref 4.2–5.3)
RBC #/AREA URNS HPF: NORMAL /HPF (ref 0–5)
SODIUM SERPL-SCNC: 142 MMOL/L (ref 136–145)
SP GR UR REFRACTOMETRY: 1.02 (ref 1–1.03)
TROPONIN I SERPL HS-MCNC: 10 NG/L (ref 0–54)
TROPONIN I SERPL HS-MCNC: 10 NG/L (ref 0–54)
UROBILINOGEN UR QL STRIP.AUTO: 0.2 EU/DL (ref 0.2–1)
WBC # BLD AUTO: 6.1 K/UL (ref 4.6–13.2)
WBC URNS QL MICRO: NORMAL /HPF (ref 0–4)

## 2024-08-20 PROCEDURE — 6360000004 HC RX CONTRAST MEDICATION: Performed by: STUDENT IN AN ORGANIZED HEALTH CARE EDUCATION/TRAINING PROGRAM

## 2024-08-20 PROCEDURE — 96368 THER/DIAG CONCURRENT INF: CPT

## 2024-08-20 PROCEDURE — 96367 TX/PROPH/DG ADDL SEQ IV INF: CPT

## 2024-08-20 PROCEDURE — 99285 EMERGENCY DEPT VISIT HI MDM: CPT

## 2024-08-20 PROCEDURE — 80053 COMPREHEN METABOLIC PANEL: CPT

## 2024-08-20 PROCEDURE — 96375 TX/PRO/DX INJ NEW DRUG ADDON: CPT

## 2024-08-20 PROCEDURE — 80307 DRUG TEST PRSMV CHEM ANLYZR: CPT

## 2024-08-20 PROCEDURE — 81001 URINALYSIS AUTO W/SCOPE: CPT

## 2024-08-20 PROCEDURE — 93005 ELECTROCARDIOGRAM TRACING: CPT | Performed by: EMERGENCY MEDICINE

## 2024-08-20 PROCEDURE — 96365 THER/PROPH/DIAG IV INF INIT: CPT

## 2024-08-20 PROCEDURE — 6370000000 HC RX 637 (ALT 250 FOR IP): Performed by: STUDENT IN AN ORGANIZED HEALTH CARE EDUCATION/TRAINING PROGRAM

## 2024-08-20 PROCEDURE — 71045 X-RAY EXAM CHEST 1 VIEW: CPT

## 2024-08-20 PROCEDURE — 6360000002 HC RX W HCPCS: Performed by: STUDENT IN AN ORGANIZED HEALTH CARE EDUCATION/TRAINING PROGRAM

## 2024-08-20 PROCEDURE — 6360000002 HC RX W HCPCS: Performed by: EMERGENCY MEDICINE

## 2024-08-20 PROCEDURE — 85025 COMPLETE CBC W/AUTO DIFF WBC: CPT

## 2024-08-20 PROCEDURE — 96366 THER/PROPH/DIAG IV INF ADDON: CPT

## 2024-08-20 PROCEDURE — 84484 ASSAY OF TROPONIN QUANT: CPT

## 2024-08-20 PROCEDURE — 96376 TX/PRO/DX INJ SAME DRUG ADON: CPT

## 2024-08-20 PROCEDURE — 71275 CT ANGIOGRAPHY CHEST: CPT

## 2024-08-20 PROCEDURE — 83735 ASSAY OF MAGNESIUM: CPT

## 2024-08-20 PROCEDURE — 93010 ELECTROCARDIOGRAM REPORT: CPT | Performed by: INTERNAL MEDICINE

## 2024-08-20 RX ORDER — NITROGLYCERIN 0.4 MG/1
0.4 TABLET SUBLINGUAL EVERY 5 MIN PRN
Qty: 25 TABLET | Refills: 3 | Status: SHIPPED | OUTPATIENT
Start: 2024-08-20

## 2024-08-20 RX ORDER — ACETAMINOPHEN 500 MG
1000 TABLET ORAL
Status: COMPLETED | OUTPATIENT
Start: 2024-08-20 | End: 2024-08-20

## 2024-08-20 RX ORDER — POTASSIUM CHLORIDE 7.45 MG/ML
10 INJECTION INTRAVENOUS
Status: DISPENSED | OUTPATIENT
Start: 2024-08-20 | End: 2024-08-20

## 2024-08-20 RX ORDER — POTASSIUM CHLORIDE 7.45 MG/ML
10 INJECTION INTRAVENOUS ONCE
Status: COMPLETED | OUTPATIENT
Start: 2024-08-20 | End: 2024-08-20

## 2024-08-20 RX ORDER — FAMOTIDINE 20 MG/1
20 TABLET, FILM COATED ORAL
Status: COMPLETED | OUTPATIENT
Start: 2024-08-20 | End: 2024-08-20

## 2024-08-20 RX ORDER — MORPHINE SULFATE 2 MG/ML
2 INJECTION, SOLUTION INTRAMUSCULAR; INTRAVENOUS ONCE
Status: COMPLETED | OUTPATIENT
Start: 2024-08-20 | End: 2024-08-20

## 2024-08-20 RX ORDER — NITROGLYCERIN 0.4 MG/1
0.4 TABLET SUBLINGUAL ONCE
Status: COMPLETED | OUTPATIENT
Start: 2024-08-20 | End: 2024-08-20

## 2024-08-20 RX ORDER — FAMOTIDINE 20 MG/1
20 TABLET, FILM COATED ORAL 2 TIMES DAILY
Qty: 180 TABLET | Refills: 1 | Status: SHIPPED | OUTPATIENT
Start: 2024-08-20

## 2024-08-20 RX ORDER — DILTIAZEM HYDROCHLORIDE 120 MG/1
120 CAPSULE, COATED, EXTENDED RELEASE ORAL DAILY
Qty: 30 CAPSULE | Refills: 0 | Status: SHIPPED | OUTPATIENT
Start: 2024-08-20

## 2024-08-20 RX ORDER — SUCRALFATE 1 G/1
1 TABLET ORAL
Status: COMPLETED | OUTPATIENT
Start: 2024-08-20 | End: 2024-08-20

## 2024-08-20 RX ORDER — MAGNESIUM SULFATE 1 G/100ML
1000 INJECTION INTRAVENOUS ONCE
Status: COMPLETED | OUTPATIENT
Start: 2024-08-20 | End: 2024-08-20

## 2024-08-20 RX ORDER — SUCRALFATE 1 G/1
1 TABLET ORAL 3 TIMES DAILY PRN
Qty: 120 TABLET | Refills: 3 | Status: SHIPPED | OUTPATIENT
Start: 2024-08-20

## 2024-08-20 RX ADMIN — POTASSIUM BICARBONATE 40 MEQ: 782 TABLET, EFFERVESCENT ORAL at 12:18

## 2024-08-20 RX ADMIN — ACETAMINOPHEN 1000 MG: 500 TABLET ORAL at 14:58

## 2024-08-20 RX ADMIN — FAMOTIDINE 20 MG: 20 TABLET ORAL at 15:02

## 2024-08-20 RX ADMIN — MORPHINE SULFATE 2 MG: 2 INJECTION, SOLUTION INTRAMUSCULAR; INTRAVENOUS at 12:27

## 2024-08-20 RX ADMIN — IOPAMIDOL 80 ML: 755 INJECTION, SOLUTION INTRAVENOUS at 13:00

## 2024-08-20 RX ADMIN — POTASSIUM CHLORIDE 10 MEQ: 7.46 INJECTION, SOLUTION INTRAVENOUS at 13:37

## 2024-08-20 RX ADMIN — POTASSIUM CHLORIDE 10 MEQ: 7.46 INJECTION, SOLUTION INTRAVENOUS at 15:35

## 2024-08-20 RX ADMIN — NITROGLYCERIN 0.4 MG: 0.4 TABLET, ORALLY DISINTEGRATING SUBLINGUAL at 11:33

## 2024-08-20 RX ADMIN — MAGNESIUM SULFATE HEPTAHYDRATE 1000 MG: 1 INJECTION, SOLUTION INTRAVENOUS at 14:57

## 2024-08-20 RX ADMIN — MORPHINE SULFATE 2 MG: 2 INJECTION, SOLUTION INTRAMUSCULAR; INTRAVENOUS at 11:34

## 2024-08-20 RX ADMIN — MAGNESIUM SULFATE HEPTAHYDRATE 1000 MG: 1 INJECTION, SOLUTION INTRAVENOUS at 12:20

## 2024-08-20 RX ADMIN — SUCRALFATE 1 G: 1 TABLET ORAL at 14:58

## 2024-08-20 RX ADMIN — MORPHINE SULFATE 2 MG: 2 INJECTION, SOLUTION INTRAMUSCULAR; INTRAVENOUS at 15:04

## 2024-08-20 ASSESSMENT — PAIN SCALES - GENERAL
PAINLEVEL_OUTOF10: 8
PAINLEVEL_OUTOF10: 7
PAINLEVEL_OUTOF10: 7
PAINLEVEL_OUTOF10: 10
PAINLEVEL_OUTOF10: 7

## 2024-08-20 ASSESSMENT — PAIN DESCRIPTION - LOCATION: LOCATION: CHEST

## 2024-08-20 ASSESSMENT — PAIN DESCRIPTION - DESCRIPTORS: DESCRIPTORS: ACHING;SHARP;SHOOTING

## 2024-08-20 ASSESSMENT — PAIN - FUNCTIONAL ASSESSMENT: PAIN_FUNCTIONAL_ASSESSMENT: 0-10

## 2024-08-20 ASSESSMENT — PAIN DESCRIPTION - ORIENTATION: ORIENTATION: LEFT

## 2024-08-20 NOTE — ED NOTES
Pt provided AVS and print scripts (4). Pt verbalized understanding to discharge instructions and was provided wheelchair to lobby. Pt able to ambulate from bed to wheelchair mildly unsteady gait observed. Pt ambulates independently. Pt  is discharged home with  in no distress.

## 2024-08-20 NOTE — ED PROVIDER NOTES
August 2024 Admission:    Left Ventricle: Normal left ventricular systolic function with a visually estimated EF of 60 - 65%. Left ventricle is smaller than normal. Normal wall thickness. Normal wall motion.    Right Ventricle: Right ventricle size is normal. Normal systolic function. TAPSE is 1.8 cm.    Aortic Valve: Trileaflet valve. Mild sclerosis of the aortic valve cusps. Mild annular calcification. Trace regurgitation.    Mitral Valve: Mild annular calcification. Trace regurgitation.    Tricuspid Valve: Moderate regurgitation. Moderately elevated RVSP, consistent with moderate pulmonary hypertension. The estimated RVSP is 62 mmHg.    Image quality is technically difficult.    BP improving with SL nitro, additional sublingual dose ordered prn    Pain improving from a 9 to an 8 with morphine, nitro.    Workup, EKG reviewed as per ED Course.     Pain improving throughout ED stay.  Spoke with cardiology who does not recommend admission at this time, I offered starting of different blood pressure medication such as diuretics to cardiology who feels that patient's lower extremity edema is more consistent with diltiazem side effect and recommended changing her diltiazem from every 8 to an extended release tablet.  She was given additional round of IV potassium and turned over to Dr. Thrasher pending finishing this potassium.  She was written prescriptions for a refill of her sublingual nitro, Pepcid, Carafate. Recommended to f/u with cardiology in the next 1 week.     330pm- pt resting comfortably eating turkey sandwich. All questions answered.     Considered admission but do not feel patient would benefit from inpatient treatment and evaluation.      Orders as below:  Orders Placed This Encounter   Procedures    XR CHEST PORTABLE    CTA CHEST W WO CONTRAST    CBC with Auto Differential    Comprehensive Metabolic Panel w/ Reflex to MG    Troponin    Troponin    Urine Drug Screen    Urinalysis    Magnesium    EKG 12

## 2024-08-20 NOTE — ED TRIAGE NOTES
Patient arrives to ED with report of chest pain, states he ran out of nitro [facility]. +SOB, no fever. Had 1 nitro by [facility] and 1 nitro by EMS  along with 324 aspirin. Pain decreased from 10 to 8.

## 2024-08-26 NOTE — HOSPICE
Patient/Caregiver/Family/Facility findings/issues during SN visit:  pt anxiousness, decreased sleep    Medication refills ordered this visit: none needed    Medications reconciled and all medications are available in the home this visit.  The following education was provided regarding medications, medication interactions, and look alike medications.  Response to teaching: caregiver verbalized understanding. Medications are not effective at this time.      Supplies by type and quantity ordered this visit include: none needed    Consulted medical director/attending physician regarding: DELMA Morales    Instructed patient/family/caregiver on 24-hour hospice availability and phone number.    Plan for next visit:  follow up    pt complains of not having enough sleep. pt states she will sleep 2hr a day. pt is anxious and has constant /10 pain. Gave morphine and lorazepam. 1 hous later paient became more anxious. Bp elevated to systolic 190 stated he had SOB and palpatations. after rest pt states, \"i am okay now. My blood pressrue is 150.\" pt normal sysytolic BP is 150.pt states she thinks it was the morphine and would rather take tylenol for pain. Pt last seen on 7/25/24. Due to return in 6 months.    Has appt on 1/27/25.    Rx last filled on 7/25/24 #115/1RF.

## 2024-10-31 ENCOUNTER — HOSPITAL ENCOUNTER (INPATIENT)
Facility: HOSPITAL | Age: 74
LOS: 5 days | Discharge: HOME HEALTH | DRG: 189 | End: 2024-11-05
Attending: STUDENT IN AN ORGANIZED HEALTH CARE EDUCATION/TRAINING PROGRAM | Admitting: INTERNAL MEDICINE
Payer: MEDICARE

## 2024-10-31 ENCOUNTER — APPOINTMENT (OUTPATIENT)
Facility: HOSPITAL | Age: 74
DRG: 189 | End: 2024-10-31
Payer: MEDICARE

## 2024-10-31 DIAGNOSIS — R07.9 CHEST PAIN, UNSPECIFIED TYPE: ICD-10-CM

## 2024-10-31 DIAGNOSIS — I48.0 PAROXYSMAL ATRIAL FIBRILLATION (HCC): Primary | ICD-10-CM

## 2024-10-31 DIAGNOSIS — R09.02 HYPOXIA: ICD-10-CM

## 2024-10-31 DIAGNOSIS — R06.03 RESPIRATORY DISTRESS: ICD-10-CM

## 2024-10-31 DIAGNOSIS — R79.89 ELEVATED D-DIMER: ICD-10-CM

## 2024-10-31 DIAGNOSIS — N17.9 AKI (ACUTE KIDNEY INJURY) (HCC): ICD-10-CM

## 2024-10-31 PROBLEM — J96.91 HYPOXIC RESPIRATORY FAILURE: Status: ACTIVE | Noted: 2024-10-31

## 2024-10-31 LAB
ALBUMIN SERPL-MCNC: 2.3 G/DL (ref 3.4–5)
ALBUMIN/GLOB SERPL: 0.4 (ref 0.8–1.7)
ALP SERPL-CCNC: 122 U/L (ref 45–117)
ALT SERPL-CCNC: 7 U/L (ref 13–56)
ANION GAP SERPL CALC-SCNC: 5 MMOL/L (ref 3–18)
AST SERPL-CCNC: 10 U/L (ref 10–38)
B PERT DNA SPEC QL NAA+PROBE: NOT DETECTED
BASE DEFICIT BLDV-SCNC: 5.1 MMOL/L
BASOPHILS # BLD: 0.1 K/UL (ref 0–0.1)
BASOPHILS NFR BLD: 1 % (ref 0–2)
BILIRUB SERPL-MCNC: 0.3 MG/DL (ref 0.2–1)
BORDETELLA PARAPERTUSSIS BY PCR: NOT DETECTED
BUN SERPL-MCNC: 31 MG/DL (ref 7–18)
BUN/CREAT SERPL: 14 (ref 12–20)
C PNEUM DNA SPEC QL NAA+PROBE: NOT DETECTED
CALCIUM SERPL-MCNC: 8.2 MG/DL (ref 8.5–10.1)
CHLORIDE SERPL-SCNC: 108 MMOL/L (ref 100–111)
CO2 SERPL-SCNC: 26 MMOL/L (ref 21–32)
CREAT SERPL-MCNC: 2.14 MG/DL (ref 0.6–1.3)
D DIMER PPP FEU-MCNC: 1.83 UG/ML(FEU)
DIFFERENTIAL METHOD BLD: ABNORMAL
EKG ATRIAL RATE: 115 BPM
EKG DIAGNOSIS: NORMAL
EKG P AXIS: 73 DEGREES
EKG P-R INTERVAL: 124 MS
EKG Q-T INTERVAL: 334 MS
EKG QRS DURATION: 78 MS
EKG QTC CALCULATION (BAZETT): 462 MS
EKG R AXIS: -9 DEGREES
EKG T AXIS: 60 DEGREES
EKG VENTRICULAR RATE: 115 BPM
EOSINOPHIL # BLD: 0 K/UL (ref 0–0.4)
EOSINOPHIL NFR BLD: 0 % (ref 0–5)
ERYTHROCYTE [DISTWIDTH] IN BLOOD BY AUTOMATED COUNT: 18.1 % (ref 11.6–14.5)
FLUAV SUBTYP SPEC NAA+PROBE: NOT DETECTED
FLUBV RNA SPEC QL NAA+PROBE: NOT DETECTED
GLOBULIN SER CALC-MCNC: 5.4 G/DL (ref 2–4)
GLUCOSE BLD STRIP.AUTO-MCNC: 532 MG/DL (ref 70–110)
GLUCOSE BLD STRIP.AUTO-MCNC: 535 MG/DL (ref 70–110)
GLUCOSE SERPL-MCNC: 131 MG/DL (ref 74–99)
HADV DNA SPEC QL NAA+PROBE: NOT DETECTED
HCO3 BLDV-SCNC: 19.4 MMOL/L (ref 23–28)
HCOV 229E RNA SPEC QL NAA+PROBE: NOT DETECTED
HCOV HKU1 RNA SPEC QL NAA+PROBE: NOT DETECTED
HCOV NL63 RNA SPEC QL NAA+PROBE: NOT DETECTED
HCOV OC43 RNA SPEC QL NAA+PROBE: NOT DETECTED
HCT VFR BLD AUTO: 28.3 % (ref 35–45)
HGB BLD-MCNC: 8.3 G/DL (ref 12–16)
HMPV RNA SPEC QL NAA+PROBE: NOT DETECTED
HPIV1 RNA SPEC QL NAA+PROBE: NOT DETECTED
HPIV2 RNA SPEC QL NAA+PROBE: NOT DETECTED
HPIV3 RNA SPEC QL NAA+PROBE: NOT DETECTED
HPIV4 RNA SPEC QL NAA+PROBE: NOT DETECTED
IMM GRANULOCYTES # BLD AUTO: 0 K/UL (ref 0–0.04)
IMM GRANULOCYTES NFR BLD AUTO: 0 % (ref 0–0.5)
LACTATE SERPL-SCNC: 1.9 MMOL/L (ref 0.4–2)
LYMPHOCYTES # BLD: 1.6 K/UL (ref 0.9–3.6)
LYMPHOCYTES NFR BLD: 15 % (ref 21–52)
M PNEUMO DNA SPEC QL NAA+PROBE: NOT DETECTED
MAGNESIUM SERPL-MCNC: 2 MG/DL (ref 1.6–2.6)
MCH RBC QN AUTO: 21.7 PG (ref 24–34)
MCHC RBC AUTO-ENTMCNC: 29.3 G/DL (ref 31–37)
MCV RBC AUTO: 74.1 FL (ref 78–100)
MONOCYTES # BLD: 0.7 K/UL (ref 0.05–1.2)
MONOCYTES NFR BLD: 7 % (ref 3–10)
NEUTS SEG # BLD: 8.1 K/UL (ref 1.8–8)
NEUTS SEG NFR BLD: 77 % (ref 40–73)
NRBC # BLD: 0 K/UL (ref 0–0.01)
NRBC BLD-RTO: 0 PER 100 WBC
NT PRO BNP: 5444 PG/ML (ref 0–900)
PCO2 BLDV: 32.2 MMHG (ref 41–51)
PH BLDV: 7.39 (ref 7.32–7.42)
PLATELET # BLD AUTO: 359 K/UL (ref 135–420)
PMV BLD AUTO: 10.2 FL (ref 9.2–11.8)
PO2 BLDV: 103 MMHG (ref 25–40)
POTASSIUM SERPL-SCNC: 5.3 MMOL/L (ref 3.5–5.5)
PROT SERPL-MCNC: 7.7 G/DL (ref 6.4–8.2)
RBC # BLD AUTO: 3.82 M/UL (ref 4.2–5.3)
RSV RNA SPEC QL NAA+PROBE: NOT DETECTED
RV+EV RNA SPEC QL NAA+PROBE: NOT DETECTED
SAO2 % BLDV: 97.9 % (ref 65–88)
SARS-COV-2 RNA RESP QL NAA+PROBE: NOT DETECTED
SERVICE CMNT-IMP: ABNORMAL
SODIUM SERPL-SCNC: 139 MMOL/L (ref 136–145)
SPECIMEN TYPE: ABNORMAL
TROPONIN I SERPL HS-MCNC: 41 NG/L (ref 0–54)
WBC # BLD AUTO: 10.5 K/UL (ref 4.6–13.2)

## 2024-10-31 PROCEDURE — A9539 TC99M PENTETATE: HCPCS | Performed by: STUDENT IN AN ORGANIZED HEALTH CARE EDUCATION/TRAINING PROGRAM

## 2024-10-31 PROCEDURE — 6360000002 HC RX W HCPCS: Performed by: EMERGENCY MEDICINE

## 2024-10-31 PROCEDURE — 99285 EMERGENCY DEPT VISIT HI MDM: CPT

## 2024-10-31 PROCEDURE — 83605 ASSAY OF LACTIC ACID: CPT

## 2024-10-31 PROCEDURE — 82803 BLOOD GASES ANY COMBINATION: CPT

## 2024-10-31 PROCEDURE — 87040 BLOOD CULTURE FOR BACTERIA: CPT

## 2024-10-31 PROCEDURE — 78582 LUNG VENTILAT&PERFUS IMAGING: CPT

## 2024-10-31 PROCEDURE — 93010 ELECTROCARDIOGRAM REPORT: CPT | Performed by: INTERNAL MEDICINE

## 2024-10-31 PROCEDURE — 0202U NFCT DS 22 TRGT SARS-COV-2: CPT

## 2024-10-31 PROCEDURE — 96374 THER/PROPH/DIAG INJ IV PUSH: CPT

## 2024-10-31 PROCEDURE — 85379 FIBRIN DEGRADATION QUANT: CPT

## 2024-10-31 PROCEDURE — 82962 GLUCOSE BLOOD TEST: CPT

## 2024-10-31 PROCEDURE — 3430000000 HC RX DIAGNOSTIC RADIOPHARMACEUTICAL: Performed by: STUDENT IN AN ORGANIZED HEALTH CARE EDUCATION/TRAINING PROGRAM

## 2024-10-31 PROCEDURE — 94640 AIRWAY INHALATION TREATMENT: CPT

## 2024-10-31 PROCEDURE — 2580000003 HC RX 258: Performed by: EMERGENCY MEDICINE

## 2024-10-31 PROCEDURE — 83735 ASSAY OF MAGNESIUM: CPT

## 2024-10-31 PROCEDURE — 93005 ELECTROCARDIOGRAM TRACING: CPT | Performed by: STUDENT IN AN ORGANIZED HEALTH CARE EDUCATION/TRAINING PROGRAM

## 2024-10-31 PROCEDURE — 2580000003 HC RX 258: Performed by: INTERNAL MEDICINE

## 2024-10-31 PROCEDURE — 1100000003 HC PRIVATE W/ TELEMETRY

## 2024-10-31 PROCEDURE — 99223 1ST HOSP IP/OBS HIGH 75: CPT | Performed by: INTERNAL MEDICINE

## 2024-10-31 PROCEDURE — 83880 ASSAY OF NATRIURETIC PEPTIDE: CPT

## 2024-10-31 PROCEDURE — 96375 TX/PRO/DX INJ NEW DRUG ADDON: CPT

## 2024-10-31 PROCEDURE — 71045 X-RAY EXAM CHEST 1 VIEW: CPT

## 2024-10-31 PROCEDURE — 6370000000 HC RX 637 (ALT 250 FOR IP): Performed by: STUDENT IN AN ORGANIZED HEALTH CARE EDUCATION/TRAINING PROGRAM

## 2024-10-31 PROCEDURE — 84484 ASSAY OF TROPONIN QUANT: CPT

## 2024-10-31 PROCEDURE — 94761 N-INVAS EAR/PLS OXIMETRY MLT: CPT

## 2024-10-31 PROCEDURE — 2500000003 HC RX 250 WO HCPCS: Performed by: INTERNAL MEDICINE

## 2024-10-31 PROCEDURE — 80053 COMPREHEN METABOLIC PANEL: CPT

## 2024-10-31 PROCEDURE — 85025 COMPLETE CBC W/AUTO DIFF WBC: CPT

## 2024-10-31 PROCEDURE — 71046 X-RAY EXAM CHEST 2 VIEWS: CPT

## 2024-10-31 PROCEDURE — 6370000000 HC RX 637 (ALT 250 FOR IP): Performed by: INTERNAL MEDICINE

## 2024-10-31 PROCEDURE — 2700000000 HC OXYGEN THERAPY PER DAY

## 2024-10-31 RX ORDER — GABAPENTIN 100 MG/1
200 CAPSULE ORAL 3 TIMES DAILY
Status: DISCONTINUED | OUTPATIENT
Start: 2024-10-31 | End: 2024-11-05 | Stop reason: HOSPADM

## 2024-10-31 RX ORDER — ROPINIROLE 0.25 MG/1
0.5 TABLET, FILM COATED ORAL 3 TIMES DAILY
Status: DISCONTINUED | OUTPATIENT
Start: 2024-10-31 | End: 2024-11-05 | Stop reason: HOSPADM

## 2024-10-31 RX ORDER — SUCRALFATE 1 G/1
1 TABLET ORAL 3 TIMES DAILY PRN
Status: DISCONTINUED | OUTPATIENT
Start: 2024-10-31 | End: 2024-11-05 | Stop reason: HOSPADM

## 2024-10-31 RX ORDER — INSULIN GLARGINE 100 [IU]/ML
15 INJECTION, SOLUTION SUBCUTANEOUS DAILY
Status: DISCONTINUED | OUTPATIENT
Start: 2024-10-31 | End: 2024-11-01

## 2024-10-31 RX ORDER — FUROSEMIDE 10 MG/ML
40 INJECTION INTRAMUSCULAR; INTRAVENOUS ONCE
Status: COMPLETED | OUTPATIENT
Start: 2024-10-31 | End: 2024-10-31

## 2024-10-31 RX ORDER — FAMOTIDINE 20 MG/1
20 TABLET, FILM COATED ORAL 2 TIMES DAILY
Status: DISCONTINUED | OUTPATIENT
Start: 2024-10-31 | End: 2024-11-01

## 2024-10-31 RX ORDER — DULOXETIN HYDROCHLORIDE 30 MG/1
30 CAPSULE, DELAYED RELEASE ORAL DAILY
Status: DISCONTINUED | OUTPATIENT
Start: 2024-11-01 | End: 2024-11-05 | Stop reason: HOSPADM

## 2024-10-31 RX ORDER — IPRATROPIUM BROMIDE AND ALBUTEROL SULFATE 2.5; .5 MG/3ML; MG/3ML
1 SOLUTION RESPIRATORY (INHALATION)
Status: DISCONTINUED | OUTPATIENT
Start: 2024-10-31 | End: 2024-11-02

## 2024-10-31 RX ORDER — BUDESONIDE 0.25 MG/2ML
0.25 INHALANT ORAL
Status: DISCONTINUED | OUTPATIENT
Start: 2024-10-31 | End: 2024-11-02

## 2024-10-31 RX ORDER — ARFORMOTEROL TARTRATE 15 UG/2ML
15 SOLUTION RESPIRATORY (INHALATION)
Status: DISCONTINUED | OUTPATIENT
Start: 2024-10-31 | End: 2024-11-02

## 2024-10-31 RX ORDER — IPRATROPIUM BROMIDE AND ALBUTEROL SULFATE 2.5; .5 MG/3ML; MG/3ML
1 SOLUTION RESPIRATORY (INHALATION)
Status: COMPLETED | OUTPATIENT
Start: 2024-10-31 | End: 2024-10-31

## 2024-10-31 RX ORDER — KIT FOR THE PREPARATION OF TECHNETIUM TC 99M PENTETATE 20 MG/1
31.5 INJECTION, POWDER, LYOPHILIZED, FOR SOLUTION INTRAVENOUS; RESPIRATORY (INHALATION)
Status: COMPLETED | OUTPATIENT
Start: 2024-10-31 | End: 2024-10-31

## 2024-10-31 RX ORDER — LORAZEPAM 0.5 MG/1
0.5 TABLET ORAL EVERY 6 HOURS PRN
Status: DISCONTINUED | OUTPATIENT
Start: 2024-10-31 | End: 2024-11-05

## 2024-10-31 RX ORDER — ATORVASTATIN CALCIUM 40 MG/1
80 TABLET, FILM COATED ORAL NIGHTLY
Status: DISCONTINUED | OUTPATIENT
Start: 2024-10-31 | End: 2024-11-05 | Stop reason: HOSPADM

## 2024-10-31 RX ORDER — MIRTAZAPINE 15 MG/1
45 TABLET, FILM COATED ORAL NIGHTLY
Status: DISCONTINUED | OUTPATIENT
Start: 2024-10-31 | End: 2024-11-05 | Stop reason: HOSPADM

## 2024-10-31 RX ORDER — DILTIAZEM HYDROCHLORIDE 120 MG/1
120 CAPSULE, COATED, EXTENDED RELEASE ORAL DAILY
Status: DISCONTINUED | OUTPATIENT
Start: 2024-11-01 | End: 2024-11-05 | Stop reason: HOSPADM

## 2024-10-31 RX ADMIN — IPRATROPIUM BROMIDE AND ALBUTEROL SULFATE 1 DOSE: .5; 3 SOLUTION RESPIRATORY (INHALATION) at 12:08

## 2024-10-31 RX ADMIN — FUROSEMIDE 40 MG: 10 INJECTION, SOLUTION INTRAMUSCULAR; INTRAVENOUS at 17:27

## 2024-10-31 RX ADMIN — Medication 31.5 MILLICURIE: at 13:45

## 2024-10-31 RX ADMIN — WATER 125 MG: 1 INJECTION INTRAMUSCULAR; INTRAVENOUS; SUBCUTANEOUS at 17:22

## 2024-10-31 RX ADMIN — DOXYCYCLINE 100 MG: 100 INJECTION, POWDER, LYOPHILIZED, FOR SOLUTION INTRAVENOUS at 19:30

## 2024-10-31 RX ADMIN — IPRATROPIUM BROMIDE AND ALBUTEROL SULFATE 1 DOSE: .5; 3 SOLUTION RESPIRATORY (INHALATION) at 22:25

## 2024-10-31 RX ADMIN — Medication 5.5 MILLICURIE: at 14:13

## 2024-10-31 ASSESSMENT — PAIN DESCRIPTION - DESCRIPTORS: DESCRIPTORS: SHARP

## 2024-10-31 ASSESSMENT — PAIN DESCRIPTION - PAIN TYPE: TYPE: CHRONIC PAIN

## 2024-10-31 ASSESSMENT — PAIN DESCRIPTION - LOCATION: LOCATION: CHEST

## 2024-10-31 ASSESSMENT — PAIN DESCRIPTION - FREQUENCY: FREQUENCY: INTERMITTENT

## 2024-10-31 ASSESSMENT — PAIN DESCRIPTION - ORIENTATION: ORIENTATION: LEFT

## 2024-10-31 ASSESSMENT — PAIN DESCRIPTION - ONSET: ONSET: ON-GOING

## 2024-10-31 ASSESSMENT — PAIN - FUNCTIONAL ASSESSMENT: PAIN_FUNCTIONAL_ASSESSMENT: ACTIVITIES ARE NOT PREVENTED

## 2024-10-31 ASSESSMENT — PAIN SCALES - GENERAL: PAINLEVEL_OUTOF10: 9

## 2024-10-31 NOTE — ED NOTES
Pt noted to be low 80's on 4lpm via NC.  MD notified, gave verbal order to place pt on NRB.    Paged RT.

## 2024-10-31 NOTE — ED TRIAGE NOTES
Pt arrives via EMS from home c/o illness and SOB x 3 days. Baseline 4L NC. Pt does state she has chronic SOB.

## 2024-10-31 NOTE — ED PROVIDER NOTES
EMERGENCY DEPARTMENT HISTORY AND PHYSICAL EXAM    11:10 AM      Date: 10/31/2024  Patient Name: Juliana Loaiza    History of Presenting Illness     Chief Complaint   Patient presents with    Shortness of Breath       History From: Patient    Juliana Loaiza is a 74 y.o. female   HPI  74-year-old female with history of sarcoidosis on 3 to 4 L of oxygen, diabetes, CAD, COPD, pulmonary hypertension who presents with shortness of breath.  Patient said the symptoms been going on for the past 4 to 5 days however her tachypnea is worsening hence why she came to the hospital.  Denies any change in meds, sick contacts, or any other changes.  No aggravating or alleviating factors.  She tried home nebulizer and inhalers but is not working.  On assessing ROS she denies any nausea, vomiting, abdominal pain, changes in bowel movements, rashes, fevers, or any other changes.        Nursing Notes were all reviewed and agreed with or any disagreements were addressed in the HPI.    PCP: Ezequiel Michel MD    Current Facility-Administered Medications   Medication Dose Route Frequency Provider Last Rate Last Admin    0.9 % sodium chloride infusion   IntraVENous PRN Patti Gaona  mL/hr at 11/03/24 0941 New Bag at 11/03/24 0941    [START ON 11/4/2024] levoFLOXacin (LEVAQUIN) 250 MG/50ML infusion 250 mg  250 mg IntraVENous Q24H Patti Gaona DO        ipratropium 0.5 mg-albuterol 2.5 mg (DUONEB) nebulizer solution 1 Dose  1 Dose Inhalation Q4H PRN Red Stover MD        budesonide (PULMICORT) nebulizer suspension 1 mg  1 mg Nebulization BID Red Stover MD   1 mg at 11/03/24 0859    oxyCODONE-acetaminophen (PERCOCET) 5-325 MG per tablet 1 tablet  1 tablet Oral Q8H PRN Patti Gaona DO   1 tablet at 11/03/24 0545    ipratropium (ATROVENT) 0.02 % nebulizer solution 0.5 mg  0.5 mg Nebulization Q4H Red Stover MD   0.5 mg at 11/03/24 0859    insulin glargine (LANTUS) injection vial 30 Units  30

## 2024-10-31 NOTE — ED PROVIDER NOTES
Sign out received     75 y/o f w/ pmhx of sarcoidosis, usually on 2L NC at baseline  Now on high flow  Pending VQ scan bc of elevated creatinine  Dimer is high to 1.83      Tachycardic,hypoxic to 70s  S/p nebs    Pending vq scan and admission      Brigido Shepherd MD  10/31/24 9130

## 2024-10-31 NOTE — H&P
Cardiovascular: Negative for chest pain, palpitations and leg swelling.   Gastrointestinal: Negative for nausea, vomiting, abdominal pain, diarrhea, constipation, blood in stool, abdominal distention and anal bleeding.   Genitourinary: Negative for dysuria, urgency, frequency, hematuria, flank pain and difficulty urinating.   Musculoskeletal: Negative for back pain and arthralgias.   Skin: Negative for color change, rash and wound.   Neurological: Negative for dizziness, seizures, syncope, speech difficulty, light-headedness or headaches.   Hematological: Does not bruise/bleed easily.   Psychiatric/Behavioral: Negative for suicidal ideas, hallucinations, behavioral problems, self-injury or agitation          Objective:   Body mass index is 23.05 kg/m².  Vitals:    10/31/24 1745 10/31/24 1800 10/31/24 1815 10/31/24 1845   BP: 115/82 128/67 118/64 124/66   Pulse: 96 (!) 102 (!) 103 (!) 102   Resp: 23 19 21 22   Temp:       TempSrc:       SpO2:  100% 97% 97%   Weight:       Height:            Physical Exam:  General: Alert awake oriented  Cardiovascular:  S1S2+, RRR  Pulmonary:  CTA b/l  GI:  Soft, BS+, NT, ND  Extremities:  No edema           CBC:  Lab Results   Component Value Date/Time    WBC 10.5 10/31/2024 11:50 AM    HGB 8.3 10/31/2024 11:50 AM    HCT 28.3 10/31/2024 11:50 AM     10/31/2024 11:50 AM    MCV 74.1 10/31/2024 11:50 AM        CMP:  Lab Results   Component Value Date/Time     10/31/2024 11:50 AM    K 5.3 10/31/2024 11:50 AM     10/31/2024 11:50 AM    CO2 26 10/31/2024 11:50 AM    BUN 31 10/31/2024 11:50 AM    GFRAA >60 09/21/2022 04:20 PM    GLOB 5.4 10/31/2024 11:50 AM    ALT 7 10/31/2024 11:50 AM        PT/INR  Lab Results   Component Value Date/Time    INR 0.9 08/01/2021 10:47 AM    INR 1.0 10/18/2019 06:00 AM                 Assessment and  Plan:     1 acute on chronic hypoxic respiratory failure  2 sarcoidosis exacerbation  3 COPD exacerbation  4 CAD-nonischemic CAD  5

## 2024-11-01 ENCOUNTER — HOSPITAL ENCOUNTER (INPATIENT)
Facility: HOSPITAL | Age: 74
Discharge: HOME | DRG: 189 | End: 2024-11-04
Payer: MEDICARE

## 2024-11-01 LAB
ERYTHROCYTE [SEDIMENTATION RATE] IN BLOOD: >130 MM/HR (ref 0–30)
GLUCOSE BLD STRIP.AUTO-MCNC: 189 MG/DL (ref 70–110)
GLUCOSE BLD STRIP.AUTO-MCNC: 237 MG/DL (ref 70–110)
GLUCOSE BLD STRIP.AUTO-MCNC: 280 MG/DL (ref 70–110)
GLUCOSE BLD STRIP.AUTO-MCNC: 299 MG/DL (ref 70–110)
GLUCOSE BLD STRIP.AUTO-MCNC: 359 MG/DL (ref 70–110)
GLUCOSE BLD STRIP.AUTO-MCNC: 530 MG/DL (ref 70–110)
GLUCOSE BLD STRIP.AUTO-MCNC: 558 MG/DL (ref 70–110)

## 2024-11-01 PROCEDURE — 6370000000 HC RX 637 (ALT 250 FOR IP): Performed by: INTERNAL MEDICINE

## 2024-11-01 PROCEDURE — 94640 AIRWAY INHALATION TREATMENT: CPT

## 2024-11-01 PROCEDURE — 6370000000 HC RX 637 (ALT 250 FOR IP): Performed by: STUDENT IN AN ORGANIZED HEALTH CARE EDUCATION/TRAINING PROGRAM

## 2024-11-01 PROCEDURE — 2500000003 HC RX 250 WO HCPCS: Performed by: INTERNAL MEDICINE

## 2024-11-01 PROCEDURE — 6360000002 HC RX W HCPCS: Performed by: INTERNAL MEDICINE

## 2024-11-01 PROCEDURE — 36415 COLL VENOUS BLD VENIPUNCTURE: CPT

## 2024-11-01 PROCEDURE — 82962 GLUCOSE BLOOD TEST: CPT

## 2024-11-01 PROCEDURE — 85652 RBC SED RATE AUTOMATED: CPT

## 2024-11-01 PROCEDURE — 99232 SBSQ HOSP IP/OBS MODERATE 35: CPT | Performed by: STUDENT IN AN ORGANIZED HEALTH CARE EDUCATION/TRAINING PROGRAM

## 2024-11-01 PROCEDURE — 2580000003 HC RX 258: Performed by: INTERNAL MEDICINE

## 2024-11-01 PROCEDURE — 86141 C-REACTIVE PROTEIN HS: CPT

## 2024-11-01 PROCEDURE — 94761 N-INVAS EAR/PLS OXIMETRY MLT: CPT

## 2024-11-01 PROCEDURE — 76770 US EXAM ABDO BACK WALL COMP: CPT

## 2024-11-01 PROCEDURE — 1100000003 HC PRIVATE W/ TELEMETRY

## 2024-11-01 PROCEDURE — 2700000000 HC OXYGEN THERAPY PER DAY

## 2024-11-01 RX ORDER — INSULIN GLARGINE 100 [IU]/ML
20 INJECTION, SOLUTION SUBCUTANEOUS DAILY
Status: DISCONTINUED | OUTPATIENT
Start: 2024-11-01 | End: 2024-11-02

## 2024-11-01 RX ORDER — FAMOTIDINE 20 MG/1
20 TABLET, FILM COATED ORAL DAILY
Status: DISCONTINUED | OUTPATIENT
Start: 2024-11-02 | End: 2024-11-05 | Stop reason: HOSPADM

## 2024-11-01 RX ORDER — INSULIN LISPRO 100 [IU]/ML
0-16 INJECTION, SOLUTION INTRAVENOUS; SUBCUTANEOUS
Status: DISCONTINUED | OUTPATIENT
Start: 2024-11-01 | End: 2024-11-05 | Stop reason: HOSPADM

## 2024-11-01 RX ORDER — DEXTROSE MONOHYDRATE 100 MG/ML
INJECTION, SOLUTION INTRAVENOUS CONTINUOUS PRN
Status: DISCONTINUED | OUTPATIENT
Start: 2024-11-01 | End: 2024-11-05 | Stop reason: HOSPADM

## 2024-11-01 RX ADMIN — GABAPENTIN 200 MG: 100 CAPSULE ORAL at 08:09

## 2024-11-01 RX ADMIN — ROPINIROLE HYDROCHLORIDE 0.5 MG: 0.25 TABLET, FILM COATED ORAL at 00:13

## 2024-11-01 RX ADMIN — INSULIN LISPRO 16 UNITS: 100 INJECTION, SOLUTION INTRAVENOUS; SUBCUTANEOUS at 01:07

## 2024-11-01 RX ADMIN — DOXYCYCLINE 100 MG: 100 INJECTION, POWDER, LYOPHILIZED, FOR SOLUTION INTRAVENOUS at 06:23

## 2024-11-01 RX ADMIN — ATORVASTATIN CALCIUM 80 MG: 40 TABLET, FILM COATED ORAL at 21:55

## 2024-11-01 RX ADMIN — BUDESONIDE 250 MCG: 0.25 INHALANT RESPIRATORY (INHALATION) at 20:04

## 2024-11-01 RX ADMIN — DULOXETINE HYDROCHLORIDE 30 MG: 30 CAPSULE, DELAYED RELEASE ORAL at 08:09

## 2024-11-01 RX ADMIN — GABAPENTIN 200 MG: 100 CAPSULE ORAL at 00:13

## 2024-11-01 RX ADMIN — WATER 40 MG: 1 INJECTION INTRAMUSCULAR; INTRAVENOUS; SUBCUTANEOUS at 01:07

## 2024-11-01 RX ADMIN — INSULIN GLARGINE 20 UNITS: 100 INJECTION, SOLUTION SUBCUTANEOUS at 10:24

## 2024-11-01 RX ADMIN — IPRATROPIUM BROMIDE AND ALBUTEROL SULFATE 1 DOSE: .5; 3 SOLUTION RESPIRATORY (INHALATION) at 20:04

## 2024-11-01 RX ADMIN — ROPINIROLE HYDROCHLORIDE 0.5 MG: 0.25 TABLET, FILM COATED ORAL at 08:09

## 2024-11-01 RX ADMIN — ATORVASTATIN CALCIUM 80 MG: 40 TABLET, FILM COATED ORAL at 00:14

## 2024-11-01 RX ADMIN — APIXABAN 5 MG: 5 TABLET, FILM COATED ORAL at 08:09

## 2024-11-01 RX ADMIN — INSULIN LISPRO 8 UNITS: 100 INJECTION, SOLUTION INTRAVENOUS; SUBCUTANEOUS at 08:12

## 2024-11-01 RX ADMIN — BUDESONIDE 250 MCG: 0.25 INHALANT RESPIRATORY (INHALATION) at 00:48

## 2024-11-01 RX ADMIN — WATER 40 MG: 1 INJECTION INTRAMUSCULAR; INTRAVENOUS; SUBCUTANEOUS at 10:24

## 2024-11-01 RX ADMIN — IPRATROPIUM BROMIDE AND ALBUTEROL SULFATE 1 DOSE: .5; 3 SOLUTION RESPIRATORY (INHALATION) at 11:16

## 2024-11-01 RX ADMIN — FAMOTIDINE 20 MG: 20 TABLET, FILM COATED ORAL at 08:09

## 2024-11-01 RX ADMIN — MIRTAZAPINE 45 MG: 15 TABLET, FILM COATED ORAL at 00:13

## 2024-11-01 RX ADMIN — ARFORMOTEROL TARTRATE 15 MCG: 15 SOLUTION RESPIRATORY (INHALATION) at 20:04

## 2024-11-01 RX ADMIN — ARFORMOTEROL TARTRATE 15 MCG: 15 SOLUTION RESPIRATORY (INHALATION) at 07:45

## 2024-11-01 RX ADMIN — INSULIN LISPRO 16 UNITS: 100 INJECTION, SOLUTION INTRAVENOUS; SUBCUTANEOUS at 22:18

## 2024-11-01 RX ADMIN — BUDESONIDE 250 MCG: 0.25 INHALANT RESPIRATORY (INHALATION) at 07:45

## 2024-11-01 RX ADMIN — MIRTAZAPINE 45 MG: 15 TABLET, FILM COATED ORAL at 21:55

## 2024-11-01 RX ADMIN — INSULIN GLARGINE 15 UNITS: 100 INJECTION, SOLUTION SUBCUTANEOUS at 00:14

## 2024-11-01 RX ADMIN — IPRATROPIUM BROMIDE AND ALBUTEROL SULFATE 1 DOSE: .5; 3 SOLUTION RESPIRATORY (INHALATION) at 15:33

## 2024-11-01 RX ADMIN — ARFORMOTEROL TARTRATE 15 MCG: 15 SOLUTION RESPIRATORY (INHALATION) at 00:48

## 2024-11-01 RX ADMIN — APIXABAN 5 MG: 5 TABLET, FILM COATED ORAL at 00:13

## 2024-11-01 RX ADMIN — IPRATROPIUM BROMIDE AND ALBUTEROL SULFATE 1 DOSE: .5; 3 SOLUTION RESPIRATORY (INHALATION) at 07:45

## 2024-11-01 RX ADMIN — DOXYCYCLINE 100 MG: 100 INJECTION, POWDER, LYOPHILIZED, FOR SOLUTION INTRAVENOUS at 18:41

## 2024-11-01 RX ADMIN — WATER 40 MG: 1 INJECTION INTRAMUSCULAR; INTRAVENOUS; SUBCUTANEOUS at 16:32

## 2024-11-01 RX ADMIN — FAMOTIDINE 20 MG: 20 TABLET, FILM COATED ORAL at 00:13

## 2024-11-01 RX ADMIN — INSULIN LISPRO 8 UNITS: 100 INJECTION, SOLUTION INTRAVENOUS; SUBCUTANEOUS at 13:03

## 2024-11-01 RX ADMIN — APIXABAN 5 MG: 5 TABLET, FILM COATED ORAL at 21:55

## 2024-11-01 RX ADMIN — INSULIN LISPRO 4 UNITS: 100 INJECTION, SOLUTION INTRAVENOUS; SUBCUTANEOUS at 17:14

## 2024-11-01 RX ADMIN — DILTIAZEM HYDROCHLORIDE 120 MG: 120 CAPSULE, COATED, EXTENDED RELEASE ORAL at 08:09

## 2024-11-01 ASSESSMENT — PAIN SCALES - GENERAL
PAINLEVEL_OUTOF10: 0

## 2024-11-01 NOTE — ED NOTES
Pt is currently asleep on the ED stretcher. Bed is at the lowest position with brakes on and x2 rails up.

## 2024-11-01 NOTE — CARE COORDINATION
11/01/24 0850   Service Assessment   Patient Orientation Alert and Oriented;Person;Place;Situation   Cognition Alert   Primary Caregiver Self   Support Systems Spouse/Significant Other;Family Members   Patient's Healthcare Decision Maker is: Named in Scanned ACP Document   PCP Verified by CM Yes  (Ezequiel Michel retired unsure of new  name.)   Last Visit to PCP Within last 6 months   Prior Functional Level Independent in ADLs/IADLs   Current Functional Level Independent in ADLs/IADLs   Can patient return to prior living arrangement Yes   Ability to make needs known: Good   Family able to assist with home care needs: Yes   Would you like for me to discuss the discharge plan with any other family members/significant others, and if so, who? Yes  (ACP)   Financial Resources Medicare;Medicaid   Community Resources None   Social/Functional History   Lives With Spouse   Type of Home Apartment   Home Layout   (3rd floor uses elevator)   Home Access Elevator   Bathroom Shower/Tub Walk-in shower   Bathroom Toilet Standard   Bathroom Accessibility Accessible   Home Equipment Rollator   Receives Help From Family   ADL Assistance Independent   Homemaking Assistance Independent   Homemaking Responsibilities Yes   Ambulation Assistance Independent   Transfer Assistance Independent   Discharge Planning   Type of Residence Apartment   Living Arrangements Spouse/Significant Other   Current Services Prior To Admission None   Potential Assistance Needed Transportation   DME Ordered? No   Potential Assistance Purchasing Medications No   Type of Home Care Services None   Patient expects to be discharged to: Apartment   Services At/After Discharge   Transition of Care Consult (CM Consult) Discharge Planning   Services At/After Discharge None    Resource Information Provided? No   Mode of Transport at Discharge Other (see comment)  (Will need SW to arrange transport)   Confirm Follow Up Transport Self   Condition of

## 2024-11-02 ENCOUNTER — APPOINTMENT (OUTPATIENT)
Facility: HOSPITAL | Age: 74
DRG: 189 | End: 2024-11-02
Attending: STUDENT IN AN ORGANIZED HEALTH CARE EDUCATION/TRAINING PROGRAM
Payer: MEDICARE

## 2024-11-02 LAB
ANION GAP SERPL CALC-SCNC: 4 MMOL/L (ref 3–18)
BUN SERPL-MCNC: 39 MG/DL (ref 7–18)
BUN/CREAT SERPL: 31 (ref 12–20)
CALCIUM SERPL-MCNC: 9.5 MG/DL (ref 8.5–10.1)
CHLORIDE SERPL-SCNC: 111 MMOL/L (ref 100–111)
CO2 SERPL-SCNC: 27 MMOL/L (ref 21–32)
CREAT SERPL-MCNC: 1.24 MG/DL (ref 0.6–1.3)
CRP SERPL HS-MCNC: >9.5 MG/L
EKG ATRIAL RATE: 123 BPM
EKG DIAGNOSIS: NORMAL
EKG P AXIS: 40 DEGREES
EKG P-R INTERVAL: 134 MS
EKG Q-T INTERVAL: 294 MS
EKG QRS DURATION: 70 MS
EKG QTC CALCULATION (BAZETT): 420 MS
EKG R AXIS: -32 DEGREES
EKG T AXIS: 11 DEGREES
EKG VENTRICULAR RATE: 123 BPM
GLUCOSE BLD STRIP.AUTO-MCNC: 223 MG/DL (ref 70–110)
GLUCOSE BLD STRIP.AUTO-MCNC: 259 MG/DL (ref 70–110)
GLUCOSE BLD STRIP.AUTO-MCNC: 324 MG/DL (ref 70–110)
GLUCOSE BLD STRIP.AUTO-MCNC: 335 MG/DL (ref 70–110)
GLUCOSE BLD STRIP.AUTO-MCNC: 360 MG/DL (ref 70–110)
GLUCOSE SERPL-MCNC: 207 MG/DL (ref 74–99)
POTASSIUM SERPL-SCNC: 4.7 MMOL/L (ref 3.5–5.5)
PROCALCITONIN SERPL-MCNC: 0.23 NG/ML
SODIUM SERPL-SCNC: 142 MMOL/L (ref 136–145)
TROPONIN I SERPL HS-MCNC: 14 NG/L (ref 0–54)
TROPONIN I SERPL HS-MCNC: 14 NG/L (ref 0–54)

## 2024-11-02 PROCEDURE — 6360000002 HC RX W HCPCS: Performed by: INTERNAL MEDICINE

## 2024-11-02 PROCEDURE — 2500000003 HC RX 250 WO HCPCS: Performed by: INTERNAL MEDICINE

## 2024-11-02 PROCEDURE — 99232 SBSQ HOSP IP/OBS MODERATE 35: CPT | Performed by: STUDENT IN AN ORGANIZED HEALTH CARE EDUCATION/TRAINING PROGRAM

## 2024-11-02 PROCEDURE — 82962 GLUCOSE BLOOD TEST: CPT

## 2024-11-02 PROCEDURE — 1100000003 HC PRIVATE W/ TELEMETRY

## 2024-11-02 PROCEDURE — 94761 N-INVAS EAR/PLS OXIMETRY MLT: CPT

## 2024-11-02 PROCEDURE — 94640 AIRWAY INHALATION TREATMENT: CPT

## 2024-11-02 PROCEDURE — 6370000000 HC RX 637 (ALT 250 FOR IP): Performed by: INTERNAL MEDICINE

## 2024-11-02 PROCEDURE — 99223 1ST HOSP IP/OBS HIGH 75: CPT | Performed by: INTERNAL MEDICINE

## 2024-11-02 PROCEDURE — 80048 BASIC METABOLIC PNL TOTAL CA: CPT

## 2024-11-02 PROCEDURE — 2580000003 HC RX 258: Performed by: INTERNAL MEDICINE

## 2024-11-02 PROCEDURE — 36415 COLL VENOUS BLD VENIPUNCTURE: CPT

## 2024-11-02 PROCEDURE — 93970 EXTREMITY STUDY: CPT

## 2024-11-02 PROCEDURE — 84145 PROCALCITONIN (PCT): CPT

## 2024-11-02 PROCEDURE — 84484 ASSAY OF TROPONIN QUANT: CPT

## 2024-11-02 PROCEDURE — 6370000000 HC RX 637 (ALT 250 FOR IP): Performed by: STUDENT IN AN ORGANIZED HEALTH CARE EDUCATION/TRAINING PROGRAM

## 2024-11-02 PROCEDURE — 93005 ELECTROCARDIOGRAM TRACING: CPT | Performed by: STUDENT IN AN ORGANIZED HEALTH CARE EDUCATION/TRAINING PROGRAM

## 2024-11-02 PROCEDURE — 2700000000 HC OXYGEN THERAPY PER DAY

## 2024-11-02 PROCEDURE — 6360000002 HC RX W HCPCS

## 2024-11-02 RX ORDER — BUDESONIDE 1 MG/2ML
1 INHALANT ORAL 2 TIMES DAILY
Status: DISCONTINUED | OUTPATIENT
Start: 2024-11-02 | End: 2024-11-02

## 2024-11-02 RX ORDER — OXYCODONE AND ACETAMINOPHEN 5; 325 MG/1; MG/1
1 TABLET ORAL EVERY 8 HOURS PRN
Status: DISCONTINUED | OUTPATIENT
Start: 2024-11-02 | End: 2024-11-05

## 2024-11-02 RX ORDER — NICOTINE 21 MG/24HR
1 PATCH, TRANSDERMAL 24 HOURS TRANSDERMAL DAILY
Status: DISCONTINUED | OUTPATIENT
Start: 2024-11-02 | End: 2024-11-03

## 2024-11-02 RX ORDER — IPRATROPIUM BROMIDE AND ALBUTEROL SULFATE 2.5; .5 MG/3ML; MG/3ML
1 SOLUTION RESPIRATORY (INHALATION) EVERY 4 HOURS PRN
Status: DISCONTINUED | OUTPATIENT
Start: 2024-11-02 | End: 2024-11-05 | Stop reason: HOSPADM

## 2024-11-02 RX ORDER — INSULIN GLARGINE 100 [IU]/ML
30 INJECTION, SOLUTION SUBCUTANEOUS DAILY
Status: DISCONTINUED | OUTPATIENT
Start: 2024-11-03 | End: 2024-11-05 | Stop reason: HOSPADM

## 2024-11-02 RX ORDER — BUDESONIDE 1 MG/2ML
1 INHALANT ORAL 2 TIMES DAILY
Status: DISCONTINUED | OUTPATIENT
Start: 2024-11-02 | End: 2024-11-05 | Stop reason: HOSPADM

## 2024-11-02 RX ADMIN — ROPINIROLE HYDROCHLORIDE 0.5 MG: 0.25 TABLET, FILM COATED ORAL at 13:51

## 2024-11-02 RX ADMIN — DULOXETINE HYDROCHLORIDE 30 MG: 30 CAPSULE, DELAYED RELEASE ORAL at 08:59

## 2024-11-02 RX ADMIN — DILTIAZEM HYDROCHLORIDE 120 MG: 120 CAPSULE, COATED, EXTENDED RELEASE ORAL at 08:59

## 2024-11-02 RX ADMIN — WATER 40 MG: 1 INJECTION INTRAMUSCULAR; INTRAVENOUS; SUBCUTANEOUS at 23:52

## 2024-11-02 RX ADMIN — FAMOTIDINE 20 MG: 20 TABLET ORAL at 08:59

## 2024-11-02 RX ADMIN — RIVAROXABAN 15 MG: 15 TABLET, FILM COATED ORAL at 18:25

## 2024-11-02 RX ADMIN — INSULIN LISPRO 12 UNITS: 100 INJECTION, SOLUTION INTRAVENOUS; SUBCUTANEOUS at 21:21

## 2024-11-02 RX ADMIN — LIDOCAINE HYDROCHLORIDE 40 ML: 20 SOLUTION ORAL at 13:48

## 2024-11-02 RX ADMIN — INSULIN GLARGINE 20 UNITS: 100 INJECTION, SOLUTION SUBCUTANEOUS at 08:58

## 2024-11-02 RX ADMIN — GABAPENTIN 200 MG: 100 CAPSULE ORAL at 10:04

## 2024-11-02 RX ADMIN — APIXABAN 5 MG: 5 TABLET, FILM COATED ORAL at 08:59

## 2024-11-02 RX ADMIN — INSULIN LISPRO 4 UNITS: 100 INJECTION, SOLUTION INTRAVENOUS; SUBCUTANEOUS at 16:43

## 2024-11-02 RX ADMIN — IPRATROPIUM BROMIDE 0.5 MG: 0.5 SOLUTION RESPIRATORY (INHALATION) at 17:18

## 2024-11-02 RX ADMIN — ARFORMOTEROL TARTRATE 15 MCG: 15 SOLUTION RESPIRATORY (INHALATION) at 09:06

## 2024-11-02 RX ADMIN — INSULIN LISPRO 8 UNITS: 100 INJECTION, SOLUTION INTRAVENOUS; SUBCUTANEOUS at 09:05

## 2024-11-02 RX ADMIN — WATER 40 MG: 1 INJECTION INTRAMUSCULAR; INTRAVENOUS; SUBCUTANEOUS at 08:58

## 2024-11-02 RX ADMIN — WATER 40 MG: 1 INJECTION INTRAMUSCULAR; INTRAVENOUS; SUBCUTANEOUS at 16:36

## 2024-11-02 RX ADMIN — BUDESONIDE 250 MCG: 0.25 INHALANT RESPIRATORY (INHALATION) at 09:06

## 2024-11-02 RX ADMIN — ATORVASTATIN CALCIUM 80 MG: 40 TABLET, FILM COATED ORAL at 21:23

## 2024-11-02 RX ADMIN — INSULIN LISPRO 12 UNITS: 100 INJECTION, SOLUTION INTRAVENOUS; SUBCUTANEOUS at 12:18

## 2024-11-02 RX ADMIN — OXYCODONE HYDROCHLORIDE AND ACETAMINOPHEN 1 TABLET: 5; 325 TABLET ORAL at 13:53

## 2024-11-02 RX ADMIN — WATER 40 MG: 1 INJECTION INTRAMUSCULAR; INTRAVENOUS; SUBCUTANEOUS at 01:39

## 2024-11-02 RX ADMIN — Medication 0.5 MG: at 12:52

## 2024-11-02 RX ADMIN — IPRATROPIUM BROMIDE 0.5 MG: 0.5 SOLUTION RESPIRATORY (INHALATION) at 12:52

## 2024-11-02 RX ADMIN — BUDESONIDE 1 MG: 1 SUSPENSION RESPIRATORY (INHALATION) at 20:46

## 2024-11-02 RX ADMIN — MIRTAZAPINE 45 MG: 15 TABLET, FILM COATED ORAL at 21:23

## 2024-11-02 RX ADMIN — IPRATROPIUM BROMIDE 0.5 MG: 0.5 SOLUTION RESPIRATORY (INHALATION) at 20:46

## 2024-11-02 RX ADMIN — IPRATROPIUM BROMIDE AND ALBUTEROL SULFATE 1 DOSE: .5; 3 SOLUTION RESPIRATORY (INHALATION) at 09:06

## 2024-11-02 RX ADMIN — ROPINIROLE HYDROCHLORIDE 0.5 MG: 0.25 TABLET, FILM COATED ORAL at 10:03

## 2024-11-02 RX ADMIN — DOXYCYCLINE 100 MG: 100 INJECTION, POWDER, LYOPHILIZED, FOR SOLUTION INTRAVENOUS at 18:38

## 2024-11-02 RX ADMIN — DOXYCYCLINE 100 MG: 100 INJECTION, POWDER, LYOPHILIZED, FOR SOLUTION INTRAVENOUS at 09:33

## 2024-11-02 ASSESSMENT — PAIN DESCRIPTION - DIRECTION
RADIATING_TOWARDS: BACK
RADIATING_TOWARDS: BACK

## 2024-11-02 ASSESSMENT — PAIN DESCRIPTION - LOCATION
LOCATION: CHEST
LOCATION: CHEST;BACK
LOCATION: GENERALIZED;ABDOMEN
LOCATION: GENERALIZED

## 2024-11-02 ASSESSMENT — PAIN DESCRIPTION - ORIENTATION
ORIENTATION: ANTERIOR
ORIENTATION: MID
ORIENTATION: ANTERIOR

## 2024-11-02 ASSESSMENT — PAIN SCALES - GENERAL
PAINLEVEL_OUTOF10: 0
PAINLEVEL_OUTOF10: 6
PAINLEVEL_OUTOF10: 0
PAINLEVEL_OUTOF10: 9
PAINLEVEL_OUTOF10: 8
PAINLEVEL_OUTOF10: 4
PAINLEVEL_OUTOF10: 6
PAINLEVEL_OUTOF10: 7
PAINLEVEL_OUTOF10: 0
PAINLEVEL_OUTOF10: 6

## 2024-11-02 ASSESSMENT — PAIN DESCRIPTION - ONSET
ONSET: ON-GOING

## 2024-11-02 ASSESSMENT — PAIN DESCRIPTION - DESCRIPTORS
DESCRIPTORS: ACHING

## 2024-11-02 ASSESSMENT — PAIN DESCRIPTION - FREQUENCY
FREQUENCY: CONTINUOUS
FREQUENCY: INTERMITTENT

## 2024-11-02 ASSESSMENT — PAIN DESCRIPTION - PAIN TYPE
TYPE: CHRONIC PAIN
TYPE: CHRONIC PAIN

## 2024-11-02 ASSESSMENT — PAIN - FUNCTIONAL ASSESSMENT
PAIN_FUNCTIONAL_ASSESSMENT: PREVENTS OR INTERFERES SOME ACTIVE ACTIVITIES AND ADLS
PAIN_FUNCTIONAL_ASSESSMENT: PREVENTS OR INTERFERES SOME ACTIVE ACTIVITIES AND ADLS

## 2024-11-02 NOTE — CONSULTS
[facility].  Pulmonary, Critical Care, and Sleep Medicine    Initial Pulmonary Consultation    Name: Juliana Loaiza MRN: [id number]   : 1950 Hospital: [facility]   Date: 2024          This patient has been seen and evaluated at the request of Dr. Gaona for Sarcoidosis exacerbation.     IMPRESSION:   Acute on chronic hypoxic respiratory failure  Acute bronchospasm: Etiology unclear sarcoidosis related lung disease versus other ILD to cause groundglass opacities such as , CEP, etc.  Viral respiratory panel negative but may still have viral infection not covered by panel.  Groundglass opacities may also represent chronic pulmonary edema given CHFpEF and pulmonary hypertension, although less likely given pattern and distribution  Sarcoidosis: Patient reports history of sarcoidosis diagnosed in the  via punch biopsy, treated with topicals at that time.  Patient reports that then symptoms progressed to her lungs, she cannot recall any biopsy done previously to her chest.  Patient reports that she has been on short courses of steroids multiple times throughout the years, essentially on chronic therapy recently taken off due to switching PCP.  Patient has not seen a pulmonologist since the .  Reports not using any long-acting bronchodilators.  Given this information, it is unclear if patient has active sarcoidosis versus alternative pathology.  Patient already on steroids given elevated ESR and CRP, will need outpatient workup for chronic groundglass opacities with wide differential.  Questionable diagnosis of COPD versus more likely sarcoid related lung disease with ongoing bronchospasm: Patient reports minimal smoking history, only smoked a few years in her 20s.  No chronic use, denies any other smoking  Chest pain, atypical: EKG checked, no evidence of ischemia  Sinus tachycardia: May be secondary to excessive beta agonist

## 2024-11-03 PROBLEM — R91.8 PULMONARY INFILTRATES: Status: ACTIVE | Noted: 2024-11-03

## 2024-11-03 PROBLEM — R53.81 PHYSICAL DECONDITIONING: Status: ACTIVE | Noted: 2024-11-03

## 2024-11-03 PROBLEM — J98.01 ACUTE BRONCHOSPASM: Status: ACTIVE | Noted: 2024-11-03

## 2024-11-03 PROBLEM — D86.9 SARCOIDOSIS: Status: ACTIVE | Noted: 2024-11-03

## 2024-11-03 LAB
ANION GAP SERPL CALC-SCNC: 4 MMOL/L (ref 3–18)
ANION GAP SERPL CALC-SCNC: 4 MMOL/L (ref 3–18)
BUN SERPL-MCNC: 47 MG/DL (ref 7–18)
BUN SERPL-MCNC: 53 MG/DL (ref 7–18)
BUN/CREAT SERPL: 38 (ref 12–20)
BUN/CREAT SERPL: 47 (ref 12–20)
CALCIUM SERPL-MCNC: 8.9 MG/DL (ref 8.5–10.1)
CALCIUM SERPL-MCNC: 8.9 MG/DL (ref 8.5–10.1)
CHLORIDE SERPL-SCNC: 108 MMOL/L (ref 100–111)
CHLORIDE SERPL-SCNC: 109 MMOL/L (ref 100–111)
CO2 SERPL-SCNC: 26 MMOL/L (ref 21–32)
CO2 SERPL-SCNC: 27 MMOL/L (ref 21–32)
CREAT SERPL-MCNC: 1.12 MG/DL (ref 0.6–1.3)
CREAT SERPL-MCNC: 1.23 MG/DL (ref 0.6–1.3)
GLUCOSE BLD STRIP.AUTO-MCNC: 180 MG/DL (ref 70–110)
GLUCOSE BLD STRIP.AUTO-MCNC: 293 MG/DL (ref 70–110)
GLUCOSE BLD STRIP.AUTO-MCNC: 294 MG/DL (ref 70–110)
GLUCOSE BLD STRIP.AUTO-MCNC: 388 MG/DL (ref 70–110)
GLUCOSE SERPL-MCNC: 197 MG/DL (ref 74–99)
GLUCOSE SERPL-MCNC: 333 MG/DL (ref 74–99)
POTASSIUM SERPL-SCNC: 5.1 MMOL/L (ref 3.5–5.5)
POTASSIUM SERPL-SCNC: 5.8 MMOL/L (ref 3.5–5.5)
SODIUM SERPL-SCNC: 138 MMOL/L (ref 136–145)
SODIUM SERPL-SCNC: 140 MMOL/L (ref 136–145)

## 2024-11-03 PROCEDURE — 80048 BASIC METABOLIC PNL TOTAL CA: CPT

## 2024-11-03 PROCEDURE — 99232 SBSQ HOSP IP/OBS MODERATE 35: CPT | Performed by: STUDENT IN AN ORGANIZED HEALTH CARE EDUCATION/TRAINING PROGRAM

## 2024-11-03 PROCEDURE — 94669 MECHANICAL CHEST WALL OSCILL: CPT

## 2024-11-03 PROCEDURE — 6360000002 HC RX W HCPCS: Performed by: INTERNAL MEDICINE

## 2024-11-03 PROCEDURE — 2580000003 HC RX 258: Performed by: INTERNAL MEDICINE

## 2024-11-03 PROCEDURE — 36415 COLL VENOUS BLD VENIPUNCTURE: CPT

## 2024-11-03 PROCEDURE — 2700000000 HC OXYGEN THERAPY PER DAY

## 2024-11-03 PROCEDURE — 94664 DEMO&/EVAL PT USE INHALER: CPT

## 2024-11-03 PROCEDURE — 94760 N-INVAS EAR/PLS OXIMETRY 1: CPT

## 2024-11-03 PROCEDURE — 6360000002 HC RX W HCPCS: Performed by: STUDENT IN AN ORGANIZED HEALTH CARE EDUCATION/TRAINING PROGRAM

## 2024-11-03 PROCEDURE — 6370000000 HC RX 637 (ALT 250 FOR IP): Performed by: INTERNAL MEDICINE

## 2024-11-03 PROCEDURE — 6370000000 HC RX 637 (ALT 250 FOR IP): Performed by: STUDENT IN AN ORGANIZED HEALTH CARE EDUCATION/TRAINING PROGRAM

## 2024-11-03 PROCEDURE — 1100000003 HC PRIVATE W/ TELEMETRY

## 2024-11-03 PROCEDURE — 94640 AIRWAY INHALATION TREATMENT: CPT

## 2024-11-03 PROCEDURE — 82962 GLUCOSE BLOOD TEST: CPT

## 2024-11-03 PROCEDURE — 2580000003 HC RX 258: Performed by: STUDENT IN AN ORGANIZED HEALTH CARE EDUCATION/TRAINING PROGRAM

## 2024-11-03 PROCEDURE — 99233 SBSQ HOSP IP/OBS HIGH 50: CPT | Performed by: INTERNAL MEDICINE

## 2024-11-03 RX ORDER — LEVOFLOXACIN 5 MG/ML
500 INJECTION, SOLUTION INTRAVENOUS ONCE
Status: COMPLETED | OUTPATIENT
Start: 2024-11-03 | End: 2024-11-03

## 2024-11-03 RX ORDER — SODIUM CHLORIDE 9 MG/ML
INJECTION, SOLUTION INTRAVENOUS PRN
Status: ACTIVE | OUTPATIENT
Start: 2024-11-03 | End: 2024-11-04

## 2024-11-03 RX ORDER — LEVOFLOXACIN 5 MG/ML
250 INJECTION, SOLUTION INTRAVENOUS EVERY 24 HOURS
Status: DISCONTINUED | OUTPATIENT
Start: 2024-11-04 | End: 2024-11-05 | Stop reason: HOSPADM

## 2024-11-03 RX ADMIN — INSULIN LISPRO 8 UNITS: 100 INJECTION, SOLUTION INTRAVENOUS; SUBCUTANEOUS at 12:19

## 2024-11-03 RX ADMIN — BUDESONIDE 1 MG: 1 SUSPENSION RESPIRATORY (INHALATION) at 08:59

## 2024-11-03 RX ADMIN — OXYCODONE HYDROCHLORIDE AND ACETAMINOPHEN 1 TABLET: 5; 325 TABLET ORAL at 20:00

## 2024-11-03 RX ADMIN — WATER 40 MG: 1 INJECTION INTRAMUSCULAR; INTRAVENOUS; SUBCUTANEOUS at 09:25

## 2024-11-03 RX ADMIN — DILTIAZEM HYDROCHLORIDE 120 MG: 120 CAPSULE, COATED, EXTENDED RELEASE ORAL at 09:26

## 2024-11-03 RX ADMIN — IPRATROPIUM BROMIDE 0.5 MG: 0.5 SOLUTION RESPIRATORY (INHALATION) at 00:31

## 2024-11-03 RX ADMIN — IPRATROPIUM BROMIDE 0.5 MG: 0.5 SOLUTION RESPIRATORY (INHALATION) at 08:59

## 2024-11-03 RX ADMIN — SODIUM CHLORIDE: 9 INJECTION, SOLUTION INTRAVENOUS at 09:41

## 2024-11-03 RX ADMIN — RIVAROXABAN 15 MG: 15 TABLET, FILM COATED ORAL at 17:31

## 2024-11-03 RX ADMIN — ATORVASTATIN CALCIUM 80 MG: 40 TABLET, FILM COATED ORAL at 21:27

## 2024-11-03 RX ADMIN — MIRTAZAPINE 45 MG: 15 TABLET, FILM COATED ORAL at 21:27

## 2024-11-03 RX ADMIN — INSULIN LISPRO 8 UNITS: 100 INJECTION, SOLUTION INTRAVENOUS; SUBCUTANEOUS at 21:27

## 2024-11-03 RX ADMIN — OXYCODONE HYDROCHLORIDE AND ACETAMINOPHEN 1 TABLET: 5; 325 TABLET ORAL at 05:45

## 2024-11-03 RX ADMIN — BUDESONIDE 1 MG: 1 SUSPENSION RESPIRATORY (INHALATION) at 20:24

## 2024-11-03 RX ADMIN — IPRATROPIUM BROMIDE 0.5 MG: 0.5 SOLUTION RESPIRATORY (INHALATION) at 16:27

## 2024-11-03 RX ADMIN — INSULIN LISPRO 4 UNITS: 100 INJECTION, SOLUTION INTRAVENOUS; SUBCUTANEOUS at 17:28

## 2024-11-03 RX ADMIN — DULOXETINE HYDROCHLORIDE 30 MG: 30 CAPSULE, DELAYED RELEASE ORAL at 09:26

## 2024-11-03 RX ADMIN — INSULIN GLARGINE 30 UNITS: 100 INJECTION, SOLUTION SUBCUTANEOUS at 09:25

## 2024-11-03 RX ADMIN — FAMOTIDINE 20 MG: 20 TABLET ORAL at 09:25

## 2024-11-03 RX ADMIN — IPRATROPIUM BROMIDE 0.5 MG: 0.5 SOLUTION RESPIRATORY (INHALATION) at 20:24

## 2024-11-03 RX ADMIN — LEVOFLOXACIN 500 MG: 5 INJECTION, SOLUTION INTRAVENOUS at 09:47

## 2024-11-03 RX ADMIN — INSULIN LISPRO 16 UNITS: 100 INJECTION, SOLUTION INTRAVENOUS; SUBCUTANEOUS at 09:25

## 2024-11-03 RX ADMIN — WATER 40 MG: 1 INJECTION INTRAMUSCULAR; INTRAVENOUS; SUBCUTANEOUS at 17:29

## 2024-11-03 RX ADMIN — IPRATROPIUM BROMIDE 0.5 MG: 0.5 SOLUTION RESPIRATORY (INHALATION) at 03:13

## 2024-11-03 RX ADMIN — IPRATROPIUM BROMIDE 0.5 MG: 0.5 SOLUTION RESPIRATORY (INHALATION) at 11:44

## 2024-11-03 ASSESSMENT — PAIN DESCRIPTION - ONSET
ONSET: GRADUAL
ONSET: GRADUAL

## 2024-11-03 ASSESSMENT — PAIN DESCRIPTION - FREQUENCY
FREQUENCY: CONTINUOUS
FREQUENCY: CONTINUOUS

## 2024-11-03 ASSESSMENT — PAIN SCALES - GENERAL
PAINLEVEL_OUTOF10: 7
PAINLEVEL_OUTOF10: 3
PAINLEVEL_OUTOF10: 0
PAINLEVEL_OUTOF10: 7

## 2024-11-03 ASSESSMENT — PAIN DESCRIPTION - PAIN TYPE
TYPE: CHRONIC PAIN
TYPE: ACUTE PAIN

## 2024-11-03 ASSESSMENT — PAIN DESCRIPTION - DIRECTION: RADIATING_TOWARDS: BACK

## 2024-11-03 ASSESSMENT — PAIN DESCRIPTION - ORIENTATION
ORIENTATION: MID
ORIENTATION: MID

## 2024-11-03 ASSESSMENT — PAIN DESCRIPTION - DESCRIPTORS
DESCRIPTORS: DISCOMFORT
DESCRIPTORS: DISCOMFORT

## 2024-11-03 ASSESSMENT — PAIN DESCRIPTION - LOCATION
LOCATION: CHEST
LOCATION: CHEST

## 2024-11-04 ENCOUNTER — APPOINTMENT (OUTPATIENT)
Facility: HOSPITAL | Age: 74
DRG: 189 | End: 2024-11-04
Attending: STUDENT IN AN ORGANIZED HEALTH CARE EDUCATION/TRAINING PROGRAM
Payer: MEDICARE

## 2024-11-04 ENCOUNTER — APPOINTMENT (OUTPATIENT)
Facility: HOSPITAL | Age: 74
DRG: 189 | End: 2024-11-04
Payer: MEDICARE

## 2024-11-04 LAB
ANION GAP SERPL CALC-SCNC: 4 MMOL/L (ref 3–18)
BUN SERPL-MCNC: 50 MG/DL (ref 7–18)
BUN/CREAT SERPL: 50 (ref 12–20)
CALCIUM SERPL-MCNC: 9.1 MG/DL (ref 8.5–10.1)
CHLORIDE SERPL-SCNC: 109 MMOL/L (ref 100–111)
CO2 SERPL-SCNC: 26 MMOL/L (ref 21–32)
CREAT SERPL-MCNC: 1 MG/DL (ref 0.6–1.3)
ECHO AV PEAK GRADIENT: 16 MMHG
ECHO AV PEAK VELOCITY: 2 M/S
ECHO BSA: 1.58 M2
ECHO BSA: 1.58 M2
ECHO EST RA PRESSURE: 3 MMHG
ECHO LV EDV A2C: 59 ML
ECHO LV EDV A4C: 49 ML
ECHO LV EDV BP: 55 ML (ref 56–104)
ECHO LV EDV INDEX A4C: 31 ML/M2
ECHO LV EDV INDEX BP: 35 ML/M2
ECHO LV EDV NDEX A2C: 38 ML/M2
ECHO LV EJECTION FRACTION A2C: 55 %
ECHO LV EJECTION FRACTION A4C: 59 %
ECHO LV EJECTION FRACTION BIPLANE: 58 % (ref 55–100)
ECHO LV ESV A2C: 26 ML
ECHO LV ESV A4C: 20 ML
ECHO LV ESV BP: 23 ML (ref 19–49)
ECHO LV ESV INDEX A2C: 17 ML/M2
ECHO LV ESV INDEX A4C: 13 ML/M2
ECHO LV ESV INDEX BP: 15 ML/M2
ECHO LV FRACTIONAL SHORTENING: 30 % (ref 28–44)
ECHO LV INTERNAL DIMENSION DIASTOLE INDEX: 1.91 CM/M2
ECHO LV INTERNAL DIMENSION DIASTOLIC: 3 CM (ref 3.9–5.3)
ECHO LV INTERNAL DIMENSION SYSTOLIC INDEX: 1.34 CM/M2
ECHO LV INTERNAL DIMENSION SYSTOLIC: 2.1 CM
ECHO LV IVSD: 1 CM (ref 0.6–0.9)
ECHO LV MASS 2D: 82.1 G (ref 67–162)
ECHO LV MASS INDEX 2D: 52.3 G/M2 (ref 43–95)
ECHO LV POSTERIOR WALL DIASTOLIC: 1 CM (ref 0.6–0.9)
ECHO LV RELATIVE WALL THICKNESS RATIO: 0.67
ECHO MV MAX VELOCITY: 1.5 M/S
ECHO MV MEAN GRADIENT: 4 MMHG
ECHO MV MEAN VELOCITY: 0.9 M/S
ECHO MV PEAK GRADIENT: 9 MMHG
ECHO MV VTI: 41.2 CM
ECHO RA AREA 4C: 15.4 CM2
ECHO RA END SYSTOLIC VOLUME APICAL 4 CHAMBER INDEX BSA: 25 ML/M2
ECHO RA VOLUME AREA LENGTH APICAL 4 CHAMBER: 41 ML
ECHO RA VOLUME: 39 ML
ECHO RIGHT VENTRICULAR SYSTOLIC PRESSURE (RVSP): 66 MMHG
ECHO RV BASAL DIMENSION: 4 CM
ECHO RV MID DIMENSION: 2.4 CM
ECHO RV TAPSE: 2.3 CM (ref 1.7–?)
ECHO TV REGURGITANT MAX VELOCITY: 3.98 M/S
ECHO TV REGURGITANT PEAK GRADIENT: 63 MMHG
GLUCOSE BLD STRIP.AUTO-MCNC: 228 MG/DL (ref 70–110)
GLUCOSE BLD STRIP.AUTO-MCNC: 331 MG/DL (ref 70–110)
GLUCOSE BLD STRIP.AUTO-MCNC: 331 MG/DL (ref 70–110)
GLUCOSE BLD STRIP.AUTO-MCNC: 365 MG/DL (ref 70–110)
GLUCOSE SERPL-MCNC: 121 MG/DL (ref 74–99)
POTASSIUM SERPL-SCNC: 4.6 MMOL/L (ref 3.5–5.5)
SODIUM SERPL-SCNC: 139 MMOL/L (ref 136–145)

## 2024-11-04 PROCEDURE — 97116 GAIT TRAINING THERAPY: CPT

## 2024-11-04 PROCEDURE — 99233 SBSQ HOSP IP/OBS HIGH 50: CPT | Performed by: INTERNAL MEDICINE

## 2024-11-04 PROCEDURE — 36415 COLL VENOUS BLD VENIPUNCTURE: CPT

## 2024-11-04 PROCEDURE — 6370000000 HC RX 637 (ALT 250 FOR IP): Performed by: STUDENT IN AN ORGANIZED HEALTH CARE EDUCATION/TRAINING PROGRAM

## 2024-11-04 PROCEDURE — 1100000003 HC PRIVATE W/ TELEMETRY

## 2024-11-04 PROCEDURE — 2700000000 HC OXYGEN THERAPY PER DAY

## 2024-11-04 PROCEDURE — 93970 EXTREMITY STUDY: CPT | Performed by: SURGERY

## 2024-11-04 PROCEDURE — 94640 AIRWAY INHALATION TREATMENT: CPT

## 2024-11-04 PROCEDURE — 80048 BASIC METABOLIC PNL TOTAL CA: CPT

## 2024-11-04 PROCEDURE — 94761 N-INVAS EAR/PLS OXIMETRY MLT: CPT

## 2024-11-04 PROCEDURE — 2580000003 HC RX 258: Performed by: INTERNAL MEDICINE

## 2024-11-04 PROCEDURE — 93308 TTE F-UP OR LMTD: CPT

## 2024-11-04 PROCEDURE — 6360000002 HC RX W HCPCS: Performed by: INTERNAL MEDICINE

## 2024-11-04 PROCEDURE — 82962 GLUCOSE BLOOD TEST: CPT

## 2024-11-04 PROCEDURE — 99239 HOSP IP/OBS DSCHRG MGMT >30: CPT | Performed by: STUDENT IN AN ORGANIZED HEALTH CARE EDUCATION/TRAINING PROGRAM

## 2024-11-04 PROCEDURE — 97161 PT EVAL LOW COMPLEX 20 MIN: CPT

## 2024-11-04 PROCEDURE — 6360000002 HC RX W HCPCS: Performed by: STUDENT IN AN ORGANIZED HEALTH CARE EDUCATION/TRAINING PROGRAM

## 2024-11-04 PROCEDURE — 6370000000 HC RX 637 (ALT 250 FOR IP): Performed by: INTERNAL MEDICINE

## 2024-11-04 PROCEDURE — 94669 MECHANICAL CHEST WALL OSCILL: CPT

## 2024-11-04 PROCEDURE — 70450 CT HEAD/BRAIN W/O DYE: CPT

## 2024-11-04 RX ORDER — PREDNISONE 10 MG/1
TABLET ORAL
Qty: 2 TABLET | Refills: 0 | Status: SHIPPED | OUTPATIENT
Start: 2024-11-04 | End: 2024-11-04

## 2024-11-04 RX ORDER — PREDNISONE 5 MG/1
TABLET ORAL
Qty: 4 TABLET | Refills: 0 | Status: SHIPPED | OUTPATIENT
Start: 2024-11-04

## 2024-11-04 RX ORDER — SODIUM CHLORIDE 9 MG/ML
INJECTION, SOLUTION INTRAVENOUS PRN
Status: ACTIVE | OUTPATIENT
Start: 2024-11-04 | End: 2024-11-05

## 2024-11-04 RX ORDER — FLUTICASONE PROPIONATE AND SALMETEROL 500; 50 UG/1; UG/1
1 POWDER RESPIRATORY (INHALATION) EVERY 12 HOURS
Qty: 60 EACH | Refills: 3 | Status: SHIPPED | OUTPATIENT
Start: 2024-11-04

## 2024-11-04 RX ORDER — TIOTROPIUM BROMIDE INHALATION SPRAY 1.56 UG/1
2 SPRAY, METERED RESPIRATORY (INHALATION) DAILY
Qty: 1 EACH | Refills: 2 | Status: SHIPPED | OUTPATIENT
Start: 2024-11-04

## 2024-11-04 RX ORDER — LEVOFLOXACIN 250 MG/1
250 TABLET, FILM COATED ORAL DAILY
Qty: 6 TABLET | Refills: 0 | Status: SHIPPED | OUTPATIENT
Start: 2024-11-04 | End: 2024-11-10

## 2024-11-04 RX ORDER — DULOXETIN HYDROCHLORIDE 30 MG/1
30 CAPSULE, DELAYED RELEASE ORAL DAILY
Qty: 30 CAPSULE | Refills: 3 | Status: SHIPPED | OUTPATIENT
Start: 2024-11-05

## 2024-11-04 RX ORDER — PREDNISONE 10 MG/1
TABLET ORAL
Qty: 100 TABLET | Refills: 0 | Status: SHIPPED | OUTPATIENT
Start: 2024-11-04

## 2024-11-04 RX ADMIN — FAMOTIDINE 20 MG: 20 TABLET ORAL at 08:59

## 2024-11-04 RX ADMIN — WATER 40 MG: 1 INJECTION INTRAMUSCULAR; INTRAVENOUS; SUBCUTANEOUS at 00:36

## 2024-11-04 RX ADMIN — ATORVASTATIN CALCIUM 80 MG: 40 TABLET, FILM COATED ORAL at 19:58

## 2024-11-04 RX ADMIN — IPRATROPIUM BROMIDE 0.5 MG: 0.5 SOLUTION RESPIRATORY (INHALATION) at 15:09

## 2024-11-04 RX ADMIN — BUDESONIDE 1 MG: 1 SUSPENSION RESPIRATORY (INHALATION) at 07:45

## 2024-11-04 RX ADMIN — OXYCODONE HYDROCHLORIDE AND ACETAMINOPHEN 1 TABLET: 5; 325 TABLET ORAL at 19:58

## 2024-11-04 RX ADMIN — IPRATROPIUM BROMIDE 0.5 MG: 0.5 SOLUTION RESPIRATORY (INHALATION) at 20:02

## 2024-11-04 RX ADMIN — INSULIN LISPRO 16 UNITS: 100 INJECTION, SOLUTION INTRAVENOUS; SUBCUTANEOUS at 12:08

## 2024-11-04 RX ADMIN — WATER 40 MG: 1 INJECTION INTRAMUSCULAR; INTRAVENOUS; SUBCUTANEOUS at 08:58

## 2024-11-04 RX ADMIN — DILTIAZEM HYDROCHLORIDE 120 MG: 120 CAPSULE, COATED, EXTENDED RELEASE ORAL at 08:59

## 2024-11-04 RX ADMIN — IPRATROPIUM BROMIDE 0.5 MG: 0.5 SOLUTION RESPIRATORY (INHALATION) at 12:12

## 2024-11-04 RX ADMIN — MIRTAZAPINE 45 MG: 15 TABLET, FILM COATED ORAL at 19:57

## 2024-11-04 RX ADMIN — IPRATROPIUM BROMIDE 0.5 MG: 0.5 SOLUTION RESPIRATORY (INHALATION) at 04:08

## 2024-11-04 RX ADMIN — INSULIN LISPRO 12 UNITS: 100 INJECTION, SOLUTION INTRAVENOUS; SUBCUTANEOUS at 20:13

## 2024-11-04 RX ADMIN — INSULIN GLARGINE 30 UNITS: 100 INJECTION, SOLUTION SUBCUTANEOUS at 08:57

## 2024-11-04 RX ADMIN — OXYCODONE HYDROCHLORIDE AND ACETAMINOPHEN 1 TABLET: 5; 325 TABLET ORAL at 08:59

## 2024-11-04 RX ADMIN — RIVAROXABAN 15 MG: 15 TABLET, FILM COATED ORAL at 18:30

## 2024-11-04 RX ADMIN — WATER 40 MG: 1 INJECTION INTRAMUSCULAR; INTRAVENOUS; SUBCUTANEOUS at 16:32

## 2024-11-04 RX ADMIN — DULOXETINE HYDROCHLORIDE 30 MG: 30 CAPSULE, DELAYED RELEASE ORAL at 08:58

## 2024-11-04 RX ADMIN — IPRATROPIUM BROMIDE 0.5 MG: 0.5 SOLUTION RESPIRATORY (INHALATION) at 00:23

## 2024-11-04 RX ADMIN — IPRATROPIUM BROMIDE 0.5 MG: 0.5 SOLUTION RESPIRATORY (INHALATION) at 07:45

## 2024-11-04 RX ADMIN — INSULIN LISPRO 4 UNITS: 100 INJECTION, SOLUTION INTRAVENOUS; SUBCUTANEOUS at 08:57

## 2024-11-04 RX ADMIN — INSULIN LISPRO 12 UNITS: 100 INJECTION, SOLUTION INTRAVENOUS; SUBCUTANEOUS at 16:32

## 2024-11-04 RX ADMIN — BUDESONIDE 1 MG: 1 SUSPENSION RESPIRATORY (INHALATION) at 20:02

## 2024-11-04 RX ADMIN — LEVOFLOXACIN 250 MG: 5 INJECTION, SOLUTION INTRAVENOUS at 10:37

## 2024-11-04 ASSESSMENT — PAIN SCALES - GENERAL
PAINLEVEL_OUTOF10: 0
PAINLEVEL_OUTOF10: 7
PAINLEVEL_OUTOF10: 7
PAINLEVEL_OUTOF10: 0
PAINLEVEL_OUTOF10: 3
PAINLEVEL_OUTOF10: 3

## 2024-11-04 ASSESSMENT — PAIN DESCRIPTION - FREQUENCY
FREQUENCY: INTERMITTENT
FREQUENCY: CONTINUOUS

## 2024-11-04 ASSESSMENT — PAIN DESCRIPTION - ONSET
ONSET: ON-GOING
ONSET: ON-GOING

## 2024-11-04 ASSESSMENT — PAIN DESCRIPTION - PAIN TYPE
TYPE: CHRONIC PAIN
TYPE: CHRONIC PAIN

## 2024-11-04 ASSESSMENT — PAIN DESCRIPTION - DESCRIPTORS
DESCRIPTORS: DISCOMFORT
DESCRIPTORS: DISCOMFORT

## 2024-11-04 ASSESSMENT — PAIN - FUNCTIONAL ASSESSMENT
PAIN_FUNCTIONAL_ASSESSMENT: ACTIVITIES ARE NOT PREVENTED
PAIN_FUNCTIONAL_ASSESSMENT: PREVENTS OR INTERFERES SOME ACTIVE ACTIVITIES AND ADLS

## 2024-11-04 ASSESSMENT — PAIN DESCRIPTION - LOCATION
LOCATION: LEG
LOCATION: CHEST

## 2024-11-04 ASSESSMENT — PAIN DESCRIPTION - ORIENTATION
ORIENTATION: LEFT
ORIENTATION: RIGHT;LEFT

## 2024-11-04 NOTE — CARE COORDINATION
[facility] sent the patient clinicals out in [facility] waiting on an accepting  agency.       12:18 pm- [facility] has accepted.      Lili Diaz, MSW     [phone]

## 2024-11-04 NOTE — CARE COORDINATION
Requested Case Management specialist (tomorrow 11/5/24) to assist with transportation to:     [address] [place] [id number]     Phone number: [phone]       Any steps at the residence? Elevator   Patient will require BLS transport.   Pt requires Stretcher If stretcher, reason:Sarcoidosis,Hypoxic respiratory failure,  Pulmonary infiltrates,   Patient is currently requiring oxygen Yes 2 Liters NC  Height:5'2   Weight: 126  Pt is on isolation: No  Is the pt ready now? No Tomorrow 11/5/24  Requested time: 12 pm  PCS Faxed: Yes  Insurance verified on face sheet: Yes  Auth needed for transport: No  CM completed PCS/ Envelope and placed on chart. Yes      MARY Wilkes

## 2024-11-04 NOTE — DISCHARGE SUMMARY
Discharge Summary    Patient: Juliana Loaiza MRN: [id number]  CSN: [id number]    YOB: 1950  Age: 74 y.o.  Sex: female    DOA: 10/31/2024 LOS:  LOS: 4 days   Discharge Date:      Admission Diagnosis: IRA (acute kidney injury) ([facility]) [N17.9]  Hypoxic respiratory failure [J96.91]    Discharge Diagnosis:    Principal Problem:    Hypoxic respiratory failure  Active Problems:    Pulmonary infiltrates    Acute bronchospasm    Physical deconditioning    Sarcoidosis  Resolved Problems:    * No resolved hospital problems. *       Discharge Condition: Stable  Discharge Disposition: Home with      PHYSICAL EXAM  Visit Vitals  BP (!) 151/73   Pulse 86   Temp 97.4 °F (36.3 °C) (Oral)   Resp 19   Ht 1.575 m (5' 2.01\")   Wt 57.2 kg (126 lb)   SpO2 100%   BMI 23.04 kg/m²       General: Alert, cooperative, no acute distress    HEENT: NC, Atraumatic.  PERRLA, EOMI. Anicteric sclerae.  Lungs:  CTA Bilaterally. No Wheezing/Rhonchi/Rales.  Heart:  Regular  rhythm,  No murmur, No Rubs, No Gallops  Abdomen: Soft, Non distended, Non tender.  +Bowel sounds, no HSM  Extremities: No c/c/e  Psych:   Good insight. Not anxious or agitated.  Neurologic:  CN 2-12 grossly intact, oriented X 3.  No acute neurological                                 Deficits,     Hospital Course By Problem:     1 acute on chronic hypoxic respiratory failure  -Supplemental oxygen to maintain O2 saturation above 92%  -IV steroids - solumedrol 40 mg TID started 11/1/24. Patient to have prolonged taper as documented in discharge meds.   -Bronchodilators - Brovana + Pulmicort. PRN duonebs while inpatient. Swapped to Spiriva and Advair at discharge.   -Doxycycline 100 mg BID (10/31). Switched to Levaquin. Will complete for total 7 days.   -Pulm - Dr. Stover followed.      2 sarcoidosis exacerbation  -As above     3 COPD exacerbation  -As above     4 CAD-nonischemic CAD  -Currently asymptomatic     5 diabetes mellitus type 2, insulin dependent   -SSI +

## 2024-11-04 NOTE — CARE COORDINATION
Called the number [facility] [phone] and spoke with Doni to schedule BLS stretcher transport to the patient's home address [address] [place] [id number] and phone number [phone]. The pickup times is for 12 noon on Tuesday, November 5, 2024. The trip number is [id number].

## 2024-11-05 VITALS
OXYGEN SATURATION: 98 % | RESPIRATION RATE: 16 BRPM | TEMPERATURE: 97 F | WEIGHT: 126 LBS | BODY MASS INDEX: 23.19 KG/M2 | HEIGHT: 62 IN | HEART RATE: 89 BPM | DIASTOLIC BLOOD PRESSURE: 79 MMHG | SYSTOLIC BLOOD PRESSURE: 156 MMHG

## 2024-11-05 LAB
ANION GAP SERPL CALC-SCNC: 5 MMOL/L (ref 3–18)
BUN SERPL-MCNC: 52 MG/DL (ref 7–18)
BUN/CREAT SERPL: 39 (ref 12–20)
CALCIUM SERPL-MCNC: 8.6 MG/DL (ref 8.5–10.1)
CHLORIDE SERPL-SCNC: 109 MMOL/L (ref 100–111)
CO2 SERPL-SCNC: 27 MMOL/L (ref 21–32)
CREAT SERPL-MCNC: 1.32 MG/DL (ref 0.6–1.3)
GLUCOSE BLD STRIP.AUTO-MCNC: 314 MG/DL (ref 70–110)
GLUCOSE SERPL-MCNC: 310 MG/DL (ref 74–99)
POTASSIUM SERPL-SCNC: 4.9 MMOL/L (ref 3.5–5.5)
SODIUM SERPL-SCNC: 141 MMOL/L (ref 136–145)

## 2024-11-05 PROCEDURE — 6360000002 HC RX W HCPCS: Performed by: STUDENT IN AN ORGANIZED HEALTH CARE EDUCATION/TRAINING PROGRAM

## 2024-11-05 PROCEDURE — 6370000000 HC RX 637 (ALT 250 FOR IP): Performed by: INTERNAL MEDICINE

## 2024-11-05 PROCEDURE — 94669 MECHANICAL CHEST WALL OSCILL: CPT

## 2024-11-05 PROCEDURE — 82962 GLUCOSE BLOOD TEST: CPT

## 2024-11-05 PROCEDURE — 97535 SELF CARE MNGMENT TRAINING: CPT

## 2024-11-05 PROCEDURE — 80048 BASIC METABOLIC PNL TOTAL CA: CPT

## 2024-11-05 PROCEDURE — 2580000003 HC RX 258: Performed by: INTERNAL MEDICINE

## 2024-11-05 PROCEDURE — 6360000002 HC RX W HCPCS: Performed by: INTERNAL MEDICINE

## 2024-11-05 PROCEDURE — 36415 COLL VENOUS BLD VENIPUNCTURE: CPT

## 2024-11-05 PROCEDURE — 94640 AIRWAY INHALATION TREATMENT: CPT

## 2024-11-05 PROCEDURE — 97165 OT EVAL LOW COMPLEX 30 MIN: CPT

## 2024-11-05 PROCEDURE — 6370000000 HC RX 637 (ALT 250 FOR IP): Performed by: STUDENT IN AN ORGANIZED HEALTH CARE EDUCATION/TRAINING PROGRAM

## 2024-11-05 PROCEDURE — 94761 N-INVAS EAR/PLS OXIMETRY MLT: CPT

## 2024-11-05 PROCEDURE — 2700000000 HC OXYGEN THERAPY PER DAY

## 2024-11-05 RX ORDER — ACETAMINOPHEN 325 MG/1
650 TABLET ORAL EVERY 4 HOURS PRN
Status: DISCONTINUED | OUTPATIENT
Start: 2024-11-05 | End: 2024-11-05 | Stop reason: HOSPADM

## 2024-11-05 RX ORDER — POLYETHYLENE GLYCOL 3350 17 G/17G
17 POWDER, FOR SOLUTION ORAL 2 TIMES DAILY
Status: DISCONTINUED | OUTPATIENT
Start: 2024-11-05 | End: 2024-11-05 | Stop reason: HOSPADM

## 2024-11-05 RX ADMIN — IPRATROPIUM BROMIDE 0.5 MG: 0.5 SOLUTION RESPIRATORY (INHALATION) at 11:21

## 2024-11-05 RX ADMIN — INSULIN LISPRO 12 UNITS: 100 INJECTION, SOLUTION INTRAVENOUS; SUBCUTANEOUS at 08:14

## 2024-11-05 RX ADMIN — INSULIN GLARGINE 30 UNITS: 100 INJECTION, SOLUTION SUBCUTANEOUS at 08:14

## 2024-11-05 RX ADMIN — BUDESONIDE 1 MG: 1 SUSPENSION RESPIRATORY (INHALATION) at 11:28

## 2024-11-05 RX ADMIN — WATER 40 MG: 1 INJECTION INTRAMUSCULAR; INTRAVENOUS; SUBCUTANEOUS at 08:15

## 2024-11-05 RX ADMIN — POLYETHYLENE GLYCOL 3350 17 G: 17 POWDER, FOR SOLUTION ORAL at 11:53

## 2024-11-05 RX ADMIN — OXYCODONE HYDROCHLORIDE AND ACETAMINOPHEN 1 TABLET: 5; 325 TABLET ORAL at 08:15

## 2024-11-05 RX ADMIN — WATER 40 MG: 1 INJECTION INTRAMUSCULAR; INTRAVENOUS; SUBCUTANEOUS at 00:56

## 2024-11-05 RX ADMIN — FAMOTIDINE 20 MG: 20 TABLET ORAL at 08:15

## 2024-11-05 RX ADMIN — LEVOFLOXACIN 250 MG: 5 INJECTION, SOLUTION INTRAVENOUS at 10:32

## 2024-11-05 RX ADMIN — ACETAMINOPHEN 325MG 650 MG: 325 TABLET ORAL at 11:53

## 2024-11-05 RX ADMIN — DILTIAZEM HYDROCHLORIDE 120 MG: 120 CAPSULE, COATED, EXTENDED RELEASE ORAL at 08:15

## 2024-11-05 RX ADMIN — DULOXETINE HYDROCHLORIDE 30 MG: 30 CAPSULE, DELAYED RELEASE ORAL at 08:15

## 2024-11-05 RX ADMIN — INSULIN LISPRO 12 UNITS: 100 INJECTION, SOLUTION INTRAVENOUS; SUBCUTANEOUS at 11:53

## 2024-11-05 RX ADMIN — IPRATROPIUM BROMIDE 0.5 MG: 0.5 SOLUTION RESPIRATORY (INHALATION) at 01:09

## 2024-11-05 ASSESSMENT — PAIN DESCRIPTION - PAIN TYPE
TYPE: CHRONIC PAIN
TYPE: ACUTE PAIN

## 2024-11-05 ASSESSMENT — PAIN DESCRIPTION - DIRECTION: RADIATING_TOWARDS: BACK, CHEST

## 2024-11-05 ASSESSMENT — PAIN SCALES - GENERAL
PAINLEVEL_OUTOF10: 7
PAINLEVEL_OUTOF10: 0
PAINLEVEL_OUTOF10: 5
PAINLEVEL_OUTOF10: 0
PAINLEVEL_OUTOF10: 6

## 2024-11-05 ASSESSMENT — PAIN DESCRIPTION - FREQUENCY
FREQUENCY: INTERMITTENT
FREQUENCY: CONTINUOUS

## 2024-11-05 ASSESSMENT — PAIN DESCRIPTION - ORIENTATION
ORIENTATION: OTHER (COMMENT)
ORIENTATION: LOWER

## 2024-11-05 ASSESSMENT — PAIN DESCRIPTION - DESCRIPTORS
DESCRIPTORS: ACHING
DESCRIPTORS: ACHING

## 2024-11-05 ASSESSMENT — PAIN DESCRIPTION - LOCATION
LOCATION: HEAD
LOCATION: ABDOMEN

## 2024-11-05 ASSESSMENT — PAIN DESCRIPTION - ONSET
ONSET: GRADUAL
ONSET: ON-GOING

## 2024-11-05 NOTE — PROGRESS NOTES
Physician Progress Note      PATIENT:               KATHLEEN ALLEN  CSN #:                  [id number]  :                       1950  ADMIT DATE:       10/31/2024 11:31 AM  DISCH DATE:  RESPONDING  PROVIDER #:        Patti Gaona DO          QUERY TEXT:    Patient admitted with acute embolus and thrombosis of right calf muscle, noted   to have paroxysmal atrial fibrillation and is maintained on Eliquis If   possible, please document in progress notes and discharge summary if you are   evaluating and/or treating any of the following:?  ?  The medical record reflects the following:  Risk Factors:  female, age >65 years, DM, HTN  Clinical Indicators: 2024, PN, Dr. Red Stover MD:  \"Paroxysmal atrial   fibrillation (HCC).\"  2024, discharge summary, Dr. Patti Gaona, DO:  \"Will swap from   Eliquis to Xarelto.  Acute DVT: Bilateral LE duplex showing acute DVT in right   gastrocnemius, peroneal and distal femoral and tibial veins on the right.\"  Treatment: Eliquis replaced by Xarelto    Thank you,  ANALIA Foster RN, CDS  [email or web address]  Options provided:  -- Secondary hypercoagulable state in a patient with atrial fibrillation  -- Other - I will add my own diagnosis  -- Disagree - Not applicable / Not valid  -- Disagree - Clinically unable to determine / Unknown  -- Refer to Clinical Documentation Reviewer    PROVIDER RESPONSE TEXT:    This patient has secondary hypercoagulable state in a patient with atrial   fibrillation.    Query created by: Jennifer Foster on 2024 5:39 PM      Electronically signed by:  Patti Gaona DO 2024 9:17 AM          
-Discharge documents reviewed with the patient.  -Patient's PIV removed.  -Verbal report given to medical transport.  -Patient's belongings on stretcher.  -Questions encouraged and answered.  
0930: Morning medications given, IV in right wrist taken out d/t causing patient discomfort, new IV placed in right forearm. Patient asked for pain medication d/t 8/10 back pain, contacted patient's attending Dr. Gaona. Gabapentin was unheld, previously held d/t patient presenting lethargic yesterday 11/1/2024.  
1120: RN informed by MD Ck that patient called him in to their room and asked him to assist her to the restroom because she just had a fall trying to go by herself. MD reported to RN that there wasn't any bed alarm was going off and patient was in her bed. RN went bedside to assist patient to the restroom. Assessment was completed and updated vitals were obtained. Vital signs were stable. Pt did state that she landed on her knees and also hit her head. Slight abrasion noted on Bilateral knees. No edema, ecchymosis or wound noted on head. DO Jimenez contacted for a STAT CT of the Head. CT  was negative for any acute abnormalities, hemorrhage, lesions or fractures.   
4 Eyes Skin Assessment     NAME:  Juliana Loaiza  YOB: 1950  MEDICAL RECORD NUMBER:  [id number]    The patient is being assessed for  {Reason for Assessment:34818}    I agree that at least one RN has performed a thorough Head to Toe Skin Assessment on the patient. ALL assessment sites listed below have been assessed.      Areas assessed by both nurses:    Head, Face, Ears, Shoulders, Back, Chest, Arms, Elbows, Hands, Sacrum. Buttock, Coccyx, Ischium, Legs. Feet and Heels, and Under Medical Devices         Does the Patient have a Wound? No noted wound(s)       Drew Prevention initiated by RN: Yes  Wound Care Orders initiated by RN: No    Pressure Injury (Stage 3,4, Unstageable, DTI, NWPT, and Complex wounds) if present, place Wound referral order by RN under : No    New Ostomies, if present place, Ostomy referral order under : No     Nurse 1 eSignature: Electronically signed by Alicia Abraham RN on 11/4/24 at 8:38 PM EST    **SHARE this note so that the co-signing nurse can place an eSignature**    Nurse 2 eSignature: {Esignature:[id number]}    
4 Eyes Skin Assessment     NAME:  Juliana Loaiza  YOB: 1950  MEDICAL RECORD NUMBER:  [id number]    The patient is being assessed for  Shift Handoff    I agree that at least one RN has performed a thorough Head to Toe Skin Assessment on the patient. ALL assessment sites listed below have been assessed.      Areas assessed by both nurses:    Head, Face, Ears, Shoulders, Back, Chest, Arms, Elbows, Hands, Sacrum. Buttock, Coccyx, Ischium, Legs. Feet and Heels, and Under Medical Devices         Does the Patient have a Wound? No noted wound(s)       Drew Prevention initiated by RN: Yes  Wound Care Orders initiated by RN: No    Pressure Injury (Stage 3,4, Unstageable, DTI, NWPT, and Complex wounds) if present, place Wound referral order by RN under : No    New Ostomies, if present place, Ostomy referral order under : No     Nurse 1 eSignature: Electronically signed by Alicia Abraham RN on 11/3/24 at 8:00 AM EST    **SHARE this note so that the co-signing nurse can place an eSignature**    Nurse 2 eSignature: {Esignature:[id number]}    
4 Eyes Skin Assessment     NAME:  Juliana Loaiza  YOB: 1950  MEDICAL RECORD NUMBER:  [id number]    The patient is being assessed for  Shift Handoff    I agree that at least one RN has performed a thorough Head to Toe Skin Assessment on the patient. ALL assessment sites listed below have been assessed.      Areas assessed by both nurses:    Head, Face, Ears, Shoulders, Back, Chest, Arms, Elbows, Hands, Sacrum. Buttock, Coccyx, Ischium, Legs. Feet and Heels, and Under Medical Devices         Does the Patient have a Wound? No noted wound(s)       Drew Prevention initiated by RN: Yes  Wound Care Orders initiated by RN: No    Pressure Injury (Stage 3,4, Unstageable, DTI, NWPT, and Complex wounds) if present, place Wound referral order by RN under : No    New Ostomies, if present place, Ostomy referral order under : No     Nurse 1 eSignature: Electronically signed by Alicia Abraham RN on 11/3/24 at 10:57 AM EST    **SHARE this note so that the co-signing nurse can place an eSignature**    Nurse 2 eSignature: Electronically signed by Michelle Britt RN on 11/3/24 at 9:57 AM EST   
4 Eyes Skin Assessment     NAME:  Juliana Loaiza  YOB: 1950  MEDICAL RECORD NUMBER:  [id number]    The patient is being assessed for  Shift Handoff    I agree that at least one RN has performed a thorough Head to Toe Skin Assessment on the patient. ALL assessment sites listed below have been assessed.      Areas assessed by both nurses:    Head, Face, Ears, Shoulders, Back, Chest, Arms, Elbows, Hands, Sacrum. Buttock, Coccyx, Ischium, and Under Medical Devices         Does the Patient have a Wound? No noted wound(s)       Drew Prevention initiated by RN: Yes  Wound Care Orders initiated by RN: No    Pressure Injury (Stage 3,4, Unstageable, DTI, NWPT, and Complex wounds) if present, place Wound referral order by RN under : No    New Ostomies, if present place, Ostomy referral order under : No     Nurse 1 eSignature: Electronically signed by Latoya Meadows RN on 11/1/24 at 7:46 AM EDT    **SHARE this note so that the co-signing nurse can place an eSignature**    Nurse 2 eSignature: Electronically signed by Swathi Michaels RN on 11/1/24 at 7:17 PM EDT   
4 Eyes Skin Assessment     NAME:  Juliana Loaiza  YOB: 1950  MEDICAL RECORD NUMBER:  [id number]    The patient is being assessed for  Shift Handoff    I agree that at least one RN has performed a thorough Head to Toe Skin Assessment on the patient. ALL assessment sites listed below have been assessed.      Areas assessed by both nurses:    Head, Face, Ears, Shoulders, Back, Chest, Arms, Elbows, Hands, Sacrum. Buttock, Coccyx, Ischium, Legs. Feet and Heels, and Under Medical Devices         Does the Patient have a Wound? No noted wound(s)       Drew Prevention initiated by RN: Yes  Wound Care Orders initiated by RN: No    Pressure Injury (Stage 3,4, Unstageable, DTI, NWPT, and Complex wounds) if present, place Wound referral order by RN under : No    New Ostomies, if present place, Ostomy referral order under : No     Nurse 1 eSignature: Electronically signed by Swathi Michaels RN on 11/1/24 at 7:17 PM EDT    **SHARE this note so that the co-signing nurse can place an eSignature**    Nurse 2 eSignature: {Esignature:[id number]}   
4 Eyes Skin Assessment     NAME:  Juliana Loaiza  YOB: 1950  MEDICAL RECORD NUMBER:  [id number]    The patient is being assessed for  Shift Handoff    I agree that at least one RN has performed a thorough Head to Toe Skin Assessment on the patient. ALL assessment sites listed below have been assessed.      Areas assessed by both nurses:    Head, Face, Ears, Arms, Elbows, Hands, and Legs. Feet and Heels        Does the Patient have a Wound? No noted wound(s)       Drew Prevention initiated by RN: No  Wound Care Orders initiated by RN: No    Pressure Injury (Stage 3,4, Unstageable, DTI, NWPT, and Complex wounds) if present, place Wound referral order by RN under : No    New Ostomies, if present place, Ostomy referral order under : No     Nurse 1 eSignature: Electronically signed by Aria Centeno RN on 11/4/24 at 8:14 AM EST    **SHARE this note so that the co-signing nurse can place an eSignature**    Nurse 2 eSignature: Electronically signed by Alicia Abraham RN on 11/4/24 at 9:38 AM EST   
4 Eyes Skin Assessment     NAME:  Juliana Loaiza  YOB: 1950  MEDICAL RECORD NUMBER:  [id number]    The patient is being assessed for  Shift Handoff    I agree that at least one RN has performed a thorough Head to Toe Skin Assessment on the patient. ALL assessment sites listed below have been assessed.      Areas assessed by both nurses:    Head, Face, Ears, Shoulders, Back, Chest, Arms, Elbows, Hands, Sacrum. Buttock, Coccyx, Ischium, Legs. Feet and Heels, and Under Medical Devices         Does the Patient have a Wound? No noted wound(s)       Drew Prevention initiated by RN: Yes  Wound Care Orders initiated by RN: No    Pressure Injury (Stage 3,4, Unstageable, DTI, NWPT, and Complex wounds) if present, place Wound referral order by RN under : No    New Ostomies, if present place, Ostomy referral order under : No     Nurse 1 eSignature: Electronically signed by Charlotte Melchor RN on 11/2/24 at 4:55 PM EDT    **SHARE this note so that the co-signing nurse can place an eSignature**    Nurse 2 eSignature: Electronically signed by Leonardo Sousa RN on 11/2/24 at 4:55 PM EDT   
Comprehensive Nutrition Assessment    Type and Reason for Visit:  Initial, Positive nutrition screen    Nutrition Recommendations/Plan:   Contoine current plan, pt regaining weight lost, will follow PO intake/appetite     Malnutrition Assessment:  Malnutrition Status:  previous hx of malnutrition, recovering    Nutrition History and Allergies:   PMH icludes CAD, NSTEMI, COPD, Diverticulitis, GERD, Htn, chronic pain, substance abuse. Allergy to shellfish.        10/31/2024    11:45 AM 8/20/2024    11:10 AM 8/6/2024     3:29 AM 8/5/2024     6:00 AM 8/4/2024     7:16 AM 8/4/2024     1:45 AM 6/30/2024     4:22 AM   Weight Metrics   Weight 126 lb 126 lb 143 lb 8 oz 125 lb 125 lb 125 lb 126 lb   BMI (Calculated) 23.1 kg/m2 23.1 kg/m2 26.3 kg/m2 22.9 kg/m2 22.9 kg/m2 22.9 kg/m2 23.1 kg/m2        Nutrition Assessment:    Pt admitted for shortness of breath. respiratory failure, IRA, sarcoidosis and COPD exacerbation.    Nutrition Related Findings:    POCG 189-558; meds include lasix, insulin, remeron; no edema noted, no BM recorded Wound Type:  (L elbow wound)       Current Nutrition Intake & Therapies:    Average Meal Intake: Unable to assess  Average Supplements Intake: Unable to assess  ADULT DIET; Regular; 5 carb choices (75 gm/meal)  ADULT ORAL NUTRITION SUPPLEMENT; Breakfast, Dinner, Lunch; Diabetic Oral Supplement    Anthropometric Measures:  Height: 157.5 cm (5' 2.01\")  Ideal Body Weight (IBW): 110 lbs (50 kg)    Admission Body Weight: 57.2 kg (126 lb 1.7 oz)  Current Body Weight: 57.2 kg (126 lb 1.7 oz), 114.6 % IBW. Weight Source: Stated  Current BMI (kg/m2): 23.1  Usual Body Weight:  (trend upward from 101# in February 2024)        Weight Adjustment For: No Adjustment                 BMI Categories: Normal Weight (BMI 22.0 to 24.9) age over 65    Estimated Daily Nutrient Needs:  Energy Requirements Based On: Kcal/kg  Weight Used for Energy Requirements: Current  Energy (kcal/day): 5521-8769 kcal (30-35 
[facility] Renal Dosing Services    Pharmacist Renal Dosing Note for   Famotidine    Previous Regimen Famotidine 20 mg BID   Serum Creatinine No results found for: \"GILBERTO\", \"CREAPOC\"   Creatinine Clearance Estimated Creatinine Clearance: 19 mL/min (A) (based on SCr of 2.14 mg/dL (H)).   BUN Lab Results   Component Value Date/Time    BUN 31 10/31/2024 11:50 AM           The following medication:   Famotidine  was automatically dose-adjusted per [facility] P&T Committee Protocol, with respect to renal function.      Dosage changed to:  20 mg daily    Additional notes:    Pharmacy to continue to monitor patient daily.   Will make dosage adjustments based upon changing renal function.  Signed Lucas Rodriguez RPH.     
[facility].  Pulmonary, Critical Care, and Sleep Medicine    F/U Pulmonary Consultation    Name: Juliana Loaiza MRN: [id number]   : 1950 Hospital: [facility]   Date: 11/3/2024          This patient has been seen and evaluated at the request of Dr. Gaona for Sarcoidosis exacerbation.     IMPRESSION:   Acute on chronic hypoxic respiratory failure  Acute bronchospasm: Etiology unclear sarcoidosis related lung disease versus other ILD to cause groundglass opacities such as , CEP, etc.  Viral respiratory panel negative but may still have viral infection not covered by panel.  Groundglass opacities may also represent chronic pulmonary edema given CHFpEF and pulmonary hypertension, although less likely given pattern and distribution  Sarcoidosis: Patient reports history of sarcoidosis diagnosed in the  via punch biopsy, treated with topicals at that time.  Patient reports that then symptoms progressed to her lungs, she cannot recall any biopsy done previously to her chest.  Patient reports that she has been on short courses of steroids multiple times throughout the years, essentially on chronic therapy recently taken off due to switching PCP.  Patient has not seen a pulmonologist since the .  Reports not using any long-acting bronchodilators.  Given this information, it is unclear if patient has active sarcoidosis versus alternative pathology.  Patient already on steroids given elevated ESR and CRP, will need outpatient workup for chronic groundglass opacities with wide differential.  Questionable diagnosis of COPD versus more likely sarcoid related lung disease with ongoing bronchospasm: Patient reports minimal smoking history, only smoked a few years in her 20s.  No chronic use, denies any other smoking  Chest pain, atypical: EKG checked, no evidence of ischemia, trop negative  Sinus tachycardia: May be secondary to excessive beta agonist 
Occupational Therapy    Orders received, chart reviewed. OT attempted evaluation but pt off floor for ECHO this morning and for xray at lunch. OT to continue to follow.     Thank you for this referral.     Cain Barroso M.S., OTR/L  
Progress Note  Hospitalist Service    Patient: Juliana Loaiza MRN: [id number]   SSN: [id number]  YOB: 1950   Age: 74 y.o.  Sex: female      Admit Date: 10/31/2024    LOS: 3 days   Chief Complaint   Patient presents with    Shortness of Breath       Subjective:     Patient seen and examined.  Patient appears comfortable. Satting in 90s on 2L NC  Patient updated that she could potentially be discharged tomorrow.   PT/OT consulted for tomorrow. Patient likely to be discharged home with .     Objective:     Vitals:  BP (!) 145/83   Pulse (!) 106   Temp 97.7 °F (36.5 °C) (Oral)   Resp 27   Ht 1.575 m (5' 2.01\")   Wt 57.2 kg (126 lb)   SpO2 95%   BMI 23.04 kg/m²     Physical Exam:   General appearance: alert, appears stated age, and cooperative  Lungs: Poor inspiratory effort; rales in bilateral bases (improving). On 2L NC.   Heart: regular rate and rhythm, S1, S2 normal, no murmur, click, rub or gallop  Abdomen: soft, non-tender; bowel sounds normal; no masses,  no organomegaly  Pulses: 2+ and symmetric  Skin: Skin color, texture, turgor normal. No rashes or lesions  Neuro:  normal without focal findings, mental status, speech normal, alert and oriented x3, RONNIE, and reflexes normal and symmetric    Intake and Output:  Current Shift: 11/03 0701 - 11/03 1900  In: 153.9 [I.V.:37.8]  Out: -   Last three shifts: 11/01 1901 - 11/03 0700  In: 576.3 [P.O.:240]  Out: 200 [Urine:200]    Lab/Data Review:  Recent Results (from the past 12 hour(s))   Basic Metabolic Panel    Collection Time: 11/03/24  2:47 AM   Result Value Ref Range    Sodium 138 136 - 145 mmol/L    Potassium 5.8 (H) 3.5 - 5.5 mmol/L    Chloride 108 100 - 111 mmol/L    CO2 26 21 - 32 mmol/L    Anion Gap 4 3.0 - 18 mmol/L    Glucose 333 (H) 74 - 99 mg/dL    BUN 47 (H) 7.0 - 18 MG/DL    Creatinine 1.23 0.6 - 1.3 MG/DL    BUN/Creatinine Ratio 38 (H) 12 - 20      Est, Glom Filt Rate 47 (L) >60 ml/min/1.73m2    Calcium 8.9 8.5 - 10.1 
Progress Note  Hospitalist Service    Patient: Juliana Loaiza MRN: [id number]   SSN: [id number]  YOB: 1950   Age: 74 y.o.  Sex: female      Admit Date: 10/31/2024    LOS: 1 day   Chief Complaint   Patient presents with    Shortness of Breath       Subjective:     Patient seen and examined.  Patient very groggy on exam. States she was given a \"long acting\" medication.    is at bedside. Updated. He states patient is on 3-4 L chronically.     Objective:     Vitals:  BP (!) 141/79   Pulse (!) 104   Temp 97.9 °F (36.6 °C) (Axillary)   Resp 18   Ht 1.575 m (5' 2\")   Wt 57.2 kg (126 lb)   SpO2 100%   BMI 23.05 kg/m²     Physical Exam:   General appearance: alert, appears stated age, and cooperative  Lungs: Poor inspiratory effort; rales in bilateral bases. On 3L NC.   Heart: regular rate and rhythm, S1, S2 normal, no murmur, click, rub or gallop  Abdomen: soft, non-tender; bowel sounds normal; no masses,  no organomegaly  Pulses: 2+ and symmetric  Skin: Skin color, texture, turgor normal. No rashes or lesions  Neuro:  normal without focal findings, mental status, speech normal, alert and oriented x3, RONNIE, and reflexes normal and symmetric    Intake and Output:  Current Shift: No intake/output data recorded.  Last three shifts: No intake/output data recorded.    Lab/Data Review:  Recent Results (from the past 12 hour(s))   POCT Glucose    Collection Time: 11/01/24  7:56 AM   Result Value Ref Range    POC Glucose 299 (H) 70 - 110 mg/dL   POCT Glucose    Collection Time: 11/01/24 11:58 AM   Result Value Ref Range    POC Glucose 280 (H) 70 - 110 mg/dL         Key Findings or tests:       Telemetry NONE   Oxygen NONE     Assessment and Plan:     1 acute on chronic hypoxic respiratory failure  -Supplemental oxygen to maintain O2 saturation above 92%  -IV steroids - solumedrol 40 mg TID started 11/1/24.   -Bronchodilators - Brovana + Pulmicort. PRN duonebs.   -Doxycycline 100 mg BID 
Spiritual Health History and Assessment/Progress Note  [facility]    Loneliness/Social Isolation,  ,  ,      Name: Juliana Loaiza MRN: [id number]    Age: 74 y.o.     Sex: female   Language: English   Catholic: Episcopal   Hypoxic respiratory failure     Date: 11/1/2024            Total Time Calculated: 7 min              Spiritual Assessment began in [address] TELEMETRY        Referral/Consult From: Nurse   Encounter Overview/Reason: Loneliness/Social Isolation  Service Provided For: Patient    Cherie, Belief, Meaning:   Patient has beliefs or practices that help with coping during difficult times  Family/Friends No family/friends present      Importance and Influence:  Patient has spiritual/personal beliefs that influence decisions regarding their health  Family/Friends No family/friends present    Community:  Patient is connected with a spiritual community  Family/Friends No family/friends present    Assessment and Plan of Care:     Patient Interventions include: Affirmed coping skills/support systems  Family/Friends Interventions include: No family/friends present    Patient Plan of Care: Spiritual Care available upon further referral  Family/Friends Plan of Care: No family/friends present    Electronically signed by RUBEN Flores on 11/1/2024 at 2:49 PM    
Spoke to Bryce from access to care to confirm that pt still needed BLS transport and if she still needed a ride. Gave information on case manage  note. Ride scheduled for 12pm  nurse can call 11/5 for update  
TRANSFER - IN REPORT:    Verbal report received from ALEJANDRA Casas on Juliana Loaiza  being received from ED for routine progression of patient care      Report consisted of patient's Situation, Background, Assessment and   Recommendations(SBAR).     Information from the following report(s) Nurse Handoff Report, Recent Results, Med Rec Status, Neuro Assessment, and Event Log was reviewed with the receiving nurse.    Opportunity for questions and clarification was provided.      Assessment completed upon patient's arrival to unit and care assumed.        4 Eyes Skin Assessment     NAME:  Juliana Loaiza  YOB: 1950  MEDICAL RECORD NUMBER:  [id number]    The patient is being assessed for  Admission    I agree that at least one RN has performed a thorough Head to Toe Skin Assessment on the patient. ALL assessment sites listed below have been assessed.      Areas assessed by both nurses:    Head, Face, Ears, Shoulders, Back, Chest, Arms, Elbows, Hands, Sacrum. Buttock, Coccyx, Ischium, and Legs. Feet and Heels        Does the Patient have a Wound? No noted wound(s)       Drew Prevention initiated by RN: Yes  Wound Care Orders initiated by RN: No    Pressure Injury (Stage 3,4, Unstageable, DTI, NWPT, and Complex wounds) if present, place Wound referral order by RN under : No    New Ostomies, if present place, Ostomy referral order under : No     Nurse 1 eSignature: Electronically signed by Latoya Meadows RN on 11/1/24 at 12:39 AM EDT    **SHARE this note so that the co-signing nurse can place an eSignature**    Nurse 2 eSignature: Yessenia Rob RN    0034- Hospitalist returned page regarding blood sugar of 535. 15 units of lantus given already. New orders placed for short acting insulin. Will recheck and administer.     0420- Patient found with high flow nasal cannula laying in the bed next to her, oxygen saturation 93% on room air. Patient placed on 4L nasal cannula. 
renal disease.   #8. Follow renal function. Avoid renal toxic meds     9. Chest pain - suspect chronic in nature vs GERD. Trop not diagnostic, EKG with sinus tach and PVCs. Cannot rule out PE though doubt.  -Will order repeat Trop for 1800.  -Limited echo to r/o right heart strain.  -Will add Percocet on for pain  -Continue holding gabapentin and Requip as patient became very lethargic yesterday.   -GI cocktail. PRN Maalox.       Diet    DVT Prophylax    GI Prophylaxis    Code status    Disposition TBD. Likely home with  11/4 vs 11/5.         Patti Gaona DO, hospitalist   November 2, 2024        
the evaluation of this patient at high risk for decompensation with multiple organ involvement     Above mentioned total time spent on reviewing the case/medical record/data/notes/EMR/patient examination/documentation/coordinating care with nurse/consultants, exclusive of procedures with complex decision making performed and > 50% time spent in face to face evaluation.         Red Stover MD/MPH     Pulmonary, Critical Care Medicine  [facility]            Please note that this dictation was completed with KLab, the computer voice recognition software.  Quite often unanticipated grammatical, syntax, homophones, and other interpretive errors are inadvertently transcribed by the computer software.  Please disregard these errors.  Please excuse any errors that have escaped final proofreading.

## 2024-11-05 NOTE — PLAN OF CARE
Problem: Chronic Conditions and Co-morbidities  Goal: Patient's chronic conditions and co-morbidity symptoms are monitored and maintained or improved  Description: Monitoring respiratory status, MD updated on changes, Monitoring blood glucose levels and treating as needed, medication given for restless leg syndrome  11/2/2024 1656 by Charlotte Melchor RN  Outcome: Progressing  11/2/2024 1507 by Charlotte Melchor RN  Outcome: Progressing  Flowsheets  Taken 11/2/2024 1507  Care Plan - Patient's Chronic Conditions and Co-Morbidity Symptoms are Monitored and Maintained or Improved:   Monitor and assess patient's chronic conditions and comorbid symptoms for stability, deterioration, or improvement   Collaborate with multidisciplinary team to address chronic and comorbid conditions and prevent exacerbation or deterioration  Taken 11/2/2024 1200  Care Plan - Patient's Chronic Conditions and Co-Morbidity Symptoms are Monitored and Maintained or Improved:   Monitor and assess patient's chronic conditions and comorbid symptoms for stability, deterioration, or improvement   Collaborate with multidisciplinary team to address chronic and comorbid conditions and prevent exacerbation or deterioration  Note: Monitoring diabetes and giving insulin as needed, monitoring respiratory status MD notified of tachynea and shortness of breath, medication given for restless leg syndrome  11/2/2024 1040 by Leonardo Talbert, RN  Outcome: Progressing  Flowsheets  Taken 11/2/2024 1040  Care Plan - Patient's Chronic Conditions and Co-Morbidity Symptoms are Monitored and Maintained or Improved:   Collaborate with multidisciplinary team to address chronic and comorbid conditions and prevent exacerbation or deterioration   Monitor and assess patient's chronic conditions and comorbid symptoms for stability, deterioration, or improvement  Taken 11/2/2024 0805  Care Plan - Patient's Chronic Conditions and Co-Morbidity Symptoms are 
  Problem: Chronic Conditions and Co-morbidities  Goal: Patient's chronic conditions and co-morbidity symptoms are monitored and maintained or improved  Description: Monitoring respiratory status, MD updated on changes, Monitoring blood glucose levels and treating as needed, medication given for restless leg syndrome  Outcome: Progressing  Note: Patient will be assessed and their oxygen necessity will decreased from 4L to 3L nasal canula.     Problem: Discharge Planning  Goal: Discharge to home or other facility with appropriate resources  Description: Still monitoring respiratory status and blood glucose levels, will re-evaluate for possible discharge tomorrow  Outcome: Progressing  Note: Patient's barriers to discharge will be dicussed with their care team.      Problem: Skin/Tissue Integrity  Goal: Absence of new skin breakdown  Description: 1.  Monitor for areas of redness and/or skin breakdown  2.  Assess vascular access sites hourly  3.  Every 4-6 hours minimum:  Change oxygen saturation probe site  4.  Every 4-6 hours:  If on nasal continuous positive airway pressure, respiratory therapy assess nares and determine need for appliance change or resting period.    No new skin breakdown noted  Outcome: Progressing  Note: Patient will be encouraged to change their position every 2 hours.     Problem: Safety - Adult  Goal: Free from fall injury  Description: Gripper socks, call bell in reach  Outcome: Not Progressing  Note: Patient will be educated to use call light for assistance.     
  Problem: Chronic Conditions and Co-morbidities  Goal: Patient's chronic conditions and co-morbidity symptoms are monitored and maintained or improved  Description: Monitoring respiratory status, MD updated on changes, Monitoring blood glucose levels and treating as needed, medication given for restless leg syndrome  Outcome: Progressing  Note: Patient will be assessed and their oxygen necessity will decreased from 4L to 3L nasal canula.     Problem: Skin/Tissue Integrity  Goal: Absence of new skin breakdown  Description: 1.  Monitor for areas of redness and/or skin breakdown  2.  Assess vascular access sites hourly  3.  Every 4-6 hours minimum:  Change oxygen saturation probe site  4.  Every 4-6 hours:  If on nasal continuous positive airway pressure, respiratory therapy assess nares and determine need for appliance change or resting period.    No new skin breakdown noted  Outcome: Progressing  Note: Patient will be encouraged to change their position every 2 hours.     Problem: Safety - Adult  Goal: Free from fall injury  Description: Gripper socks, call bell in reach  Outcome: Progressing  Note: Patient will be free from falls, call light will be within reach, bed will be lock and in lowest position and their bed alarm will be on     
  Problem: Chronic Conditions and Co-morbidities  Goal: Patient's chronic conditions and co-morbidity symptoms are monitored and maintained or improved  Note: Patient's chronic condition of sarcoidosis will be monitored by using continuous pulse oximetry to maintain oxygen saturations >90%.      Problem: Discharge Planning  Goal: Discharge to home or other facility with appropriate resources  Note: Patient anticipating discharge home.      Problem: Skin/Tissue Integrity  Goal: Absence of new skin breakdown  Description: 1.  Monitor for areas of redness and/or skin breakdown  2.  Assess vascular access sites hourly  3.  Every 4-6 hours minimum:  Change oxygen saturation probe site  4.  Every 4-6 hours:  If on nasal continuous positive airway pressure, respiratory therapy assess nares and determine need for appliance change or resting period.  Note: Patient will not have any new skin breakdown.      Problem: Safety - Adult  Goal: Free from fall injury  Note: Patient will use call light for assistance before getting up.      Problem: Pain  Goal: Verbalizes/displays adequate comfort level or baseline comfort level  Flowsheets (Taken 11/1/2024 7102)  Verbalizes/displays adequate comfort level or baseline comfort level:   Encourage patient to monitor pain and request assistance   Assess pain using appropriate pain scale     
  Problem: Safety - Adult  Goal: Free from fall injury  Description: Gripper socks, call bell in reach  11/4/2024 2119 by Alicia Abraham, RN  Outcome: Not Progressing  Note: Patient will be educated to use call light for assistance.     
  Problem: Skin/Tissue Integrity  Goal: Absence of new skin breakdown  Description: 1.  Monitor for areas of redness and/or skin breakdown  2.  Assess vascular access sites hourly  3.  Every 4-6 hours minimum:  Change oxygen saturation probe site  4.  Every 4-6 hours:  If on nasal continuous positive airway pressure, respiratory therapy assess nares and determine need for appliance change or resting period.    No new skin breakdown noted  11/5/2024 1110 by Gladys Sawyer, RN  Outcome: Progressing  Note: -Educate/encourage repositioning every 2-3 hours to help decrease risk of skin break down.     Problem: Safety - Adult  Goal: Free from fall injury  Description: Gripper socks, call bell in reach  11/5/2024 1110 by Gladys Sawyer RN  Outcome: Progressing  Flowsheets (Taken 11/5/2024 1110)  Free From Fall Injury: Instruct family/caregiver on patient safety  Note: -Encourage use of call bell for assistance.     Problem: Pain  Goal: Verbalizes/displays adequate comfort level or baseline comfort level  Description: Monitoring pain level, MD notified, medication ordered and given  11/5/2024 1110 by Gladys Sawyer, RN  Outcome: Progressing  Flowsheets (Taken 11/5/2024 1110)  Verbalizes/displays adequate comfort level or baseline comfort level:   Encourage patient to monitor pain and request assistance   Assess pain using appropriate pain scale     Problem: Respiratory - Adult  Goal: Achieves optimal ventilation and oxygenation  Description: Monitoring respiratory status, increased oxygen as needed., MD notified of patient's status  11/5/2024 1110 by Gladys Sawyer, RN  Outcome: Progressing  Flowsheets (Taken 11/5/2024 1110)  Achieves optimal ventilation and oxygenation:   Assess for changes in respiratory status   Assess for changes in mentation and behavior     
  Problem: Skin/Tissue Integrity  Goal: Absence of new skin breakdown  Description: 1.  Monitor for areas of redness and/or skin breakdown  2.  Assess vascular access sites hourly  3.  Every 4-6 hours minimum:  Change oxygen saturation probe site  4.  Every 4-6 hours:  If on nasal continuous positive airway pressure, respiratory therapy assess nares and determine need for appliance change or resting period.  Outcome: Progressing  Note: Encouraging pt to turn      Problem: Safety - Adult  Goal: Free from fall injury  Outcome: Progressing  Flowsheets (Taken 11/2/2024 1328)  Free From Fall Injury:   Instruct family/caregiver on patient safety   Based on caregiver fall risk screen, instruct family/caregiver to ask for assistance with transferring infant if caregiver noted to have fall risk factors  Note: Instruct pt to call before getting out of pt, bed alarm on     Problem: Pain  Goal: Verbalizes/displays adequate comfort level or baseline comfort level  Outcome: Progressing  Note: Pt Repositioning and encouraging pt to notify RN when in pain     
The ending of Daylight Saving Time occurred at 0200 hrs. Documentation of patient care and medications administered is done with respect to the time change.       Problem: Respiratory - Adult  Goal: Achieves optimal ventilation and oxygenation  Description: Monitoring respiratory status, increased oxygen as needed., MD notified of patient's status  11/3/2024 0337 by Aria Centeno, RN  Flowsheets  Taken 11/3/2024 0337  Achieves optimal ventilation and oxygenation:   Assess for changes in respiratory status   Assess for changes in mentation and behavior  Taken 11/2/2024 1930  Achieves optimal ventilation and oxygenation:   Assess for changes in respiratory status   Assess for changes in mentation and behavior   Position to facilitate oxygenation and minimize respiratory effort   Oxygen supplementation based on oxygen saturation or arterial blood gases  Note: Patient was assessed and oxygen level decreased from 4L to 3L nasal canula.  11/2/2024 1656 by Charlotte Melchor RN  Outcome: Progressing  11/2/2024 1507 by Charlotte Melchor RN  Outcome: Progressing  Flowsheets  Taken 11/2/2024 1507  Achieves optimal ventilation and oxygenation:   Assess for changes in respiratory status   Assess for changes in mentation and behavior   Position to facilitate oxygenation and minimize respiratory effort   Oxygen supplementation based on oxygen saturation or arterial blood gases  Taken 11/2/2024 1200  Achieves optimal ventilation and oxygenation:   Assess for changes in respiratory status   Assess for changes in mentation and behavior   Position to facilitate oxygenation and minimize respiratory effort  Note: Patient tachypneic, MD notified, Oxygen increased as needed     Problem: Safety - Adult  Goal: Free from fall injury  Description: Gripper socks, call bell in reach  11/3/2024 0337 by Aria Centeno, RN  Flowsheets (Taken 11/3/2024 0337)  Free From Fall Injury: Instruct family/caregiver on patient safety  Note: Bed 
facilitate oxygenation and minimize respiratory effort  Note: Pt able to maintain optimal saturation levels on baseline 4 L oxygen      
tomorrow?”    OBJECTIVE DATA SUMMARY:     Past Medical History:   Diagnosis Date    Arthritis     \"generalized\"    Asthma     CAD in native artery     NSTEMI (Sep 2014); diffuse 65% pLAD, minimal disease RCA & LCx. pLAD FFR 0.93. LV  no RWMA (echo and LV angio)    Chronic neck pain     Chronic pain     left knee    Chronic pain syndrome     COPD (chronic obstructive pulmonary disease) (HCC)     Depression     Diabetes (HCC)     Diverticulitis     GERD (gastroesophageal reflux disease)     Heart disease     Hidradenitis 9/11/2014    Hypertension     Left knee pain     Narcotic dependence (HCC)     Pneumonia     Right wrist pain     Sarcoidosis      Past Surgical History:   Procedure Laterality Date    BREAST BIOPSY Right     9/10/14: She said tag in place from BX    BRONCHOSCOPY  10/2019    CARDIAC PROCEDURE N/A 8/5/2024    Left heart cath performed by Jose Merino MD at [facility] CARDIAC CATH LAB    CARDIAC PROCEDURE N/A 8/5/2024    Coronary angiography performed by Jose Merino MD at [facility] CARDIAC CATH LAB    CHOLECYSTECTOMY  2005    HYSTERECTOMY (CERVIX STATUS UNKNOWN)      KNEE ARTHROSCOPY Left        Home Situation:   Social/Functional History  Lives With: Spouse  Type of Home: Apartment  Home Layout:  (3rd floor uses elevator)  Home Access: Elevator  Bathroom Shower/Tub: Walk-in shower  Bathroom Toilet: Standard  Bathroom Equipment: Shower chair  Bathroom Accessibility: Accessible  Home Equipment: Rollator  Receives Help From: Family  ADL Assistance: Independent  Homemaking Assistance: Independent  Homemaking Responsibilities: Yes  Ambulation Assistance: Independent  Transfer Assistance: Independent  [x]  Right hand dominant   []  Left hand dominant    Cognitive/Behavioral Status:  Orientation  Orientation Level: Oriented X4  Cognition  Overall Cognitive Status: WFL    Skin: Intact  Edema: None noted    Vision/Perceptual:    Vision  Vision: Within Functional Limits        Coordination: BUE  Coordination: Within 
PT  Minutes: 16      Eval Complexity: Decision Making: Low Complexity

## 2024-11-15 ENCOUNTER — CLINICAL DOCUMENTATION (OUTPATIENT)
Age: 74
End: 2024-11-15

## 2025-01-17 ENCOUNTER — APPOINTMENT (OUTPATIENT)
Facility: HOSPITAL | Age: 75
End: 2025-01-17
Payer: MEDICARE

## 2025-01-17 ENCOUNTER — HOSPITAL ENCOUNTER (INPATIENT)
Facility: HOSPITAL | Age: 75
LOS: 7 days | Discharge: NURSING, REHAB OR LONG-TERM CARE | End: 2025-01-24
Attending: STUDENT IN AN ORGANIZED HEALTH CARE EDUCATION/TRAINING PROGRAM | Admitting: INTERNAL MEDICINE
Payer: MEDICARE

## 2025-01-17 ENCOUNTER — APPOINTMENT (OUTPATIENT)
Facility: HOSPITAL | Age: 75
End: 2025-01-17
Attending: INTERNAL MEDICINE
Payer: MEDICARE

## 2025-01-17 DIAGNOSIS — J96.21 ACUTE ON CHRONIC RESPIRATORY FAILURE WITH HYPOXIA: Primary | ICD-10-CM

## 2025-01-17 DIAGNOSIS — U07.1 COVID: ICD-10-CM

## 2025-01-17 DIAGNOSIS — I26.93 SINGLE SUBSEGMENTAL PULMONARY EMBOLISM WITHOUT ACUTE COR PULMONALE (HCC): ICD-10-CM

## 2025-01-17 DIAGNOSIS — R06.02 SHORTNESS OF BREATH: ICD-10-CM

## 2025-01-17 DIAGNOSIS — J12.9 VIRAL PNEUMONIA: ICD-10-CM

## 2025-01-17 DIAGNOSIS — I26.99 ACUTE PULMONARY EMBOLISM WITHOUT ACUTE COR PULMONALE, UNSPECIFIED PULMONARY EMBOLISM TYPE (HCC): ICD-10-CM

## 2025-01-17 PROBLEM — J44.1 COPD WITH ACUTE EXACERBATION (HCC): Status: ACTIVE | Noted: 2025-01-17

## 2025-01-17 PROBLEM — J96.00 ACUTE RESPIRATORY FAILURE: Status: ACTIVE | Noted: 2025-01-17

## 2025-01-17 PROBLEM — J44.1 COPD EXACERBATION (HCC): Status: ACTIVE | Noted: 2025-01-17

## 2025-01-17 PROBLEM — J20.6 ACUTE BRONCHITIS DUE TO RHINOVIRUS: Status: ACTIVE | Noted: 2025-01-17

## 2025-01-17 LAB
ALBUMIN SERPL-MCNC: 1.8 G/DL (ref 3.4–5)
ALBUMIN SERPL-MCNC: 2.2 G/DL (ref 3.4–5)
ALBUMIN/GLOB SERPL: 0.3 (ref 0.8–1.7)
ALBUMIN/GLOB SERPL: 0.3 (ref 0.8–1.7)
ALP SERPL-CCNC: 105 U/L (ref 45–117)
ALP SERPL-CCNC: 127 U/L (ref 45–117)
ALT SERPL-CCNC: 6 U/L (ref 13–56)
ALT SERPL-CCNC: <6 U/L (ref 13–56)
ANION GAP BLD CALC-SCNC: ABNORMAL MMOL/L (ref 10–20)
ANION GAP SERPL CALC-SCNC: 7 MMOL/L (ref 3–18)
ANION GAP SERPL CALC-SCNC: 7 MMOL/L (ref 3–18)
APTT PPP: >180 SEC (ref 23–36.4)
AST SERPL-CCNC: 14 U/L (ref 10–38)
AST SERPL-CCNC: 7 U/L (ref 10–38)
B PERT DNA SPEC QL NAA+PROBE: NOT DETECTED
BASE DEFICIT BLD-SCNC: 0.6 MMOL/L
BASOPHILS # BLD: 0.03 K/UL (ref 0–0.1)
BASOPHILS NFR BLD: 0.4 % (ref 0–2)
BDY SITE: ABNORMAL
BILIRUB SERPL-MCNC: 0.4 MG/DL (ref 0.2–1)
BILIRUB SERPL-MCNC: 0.5 MG/DL (ref 0.2–1)
BODY TEMPERATURE: 98.7
BORDETELLA PARAPERTUSSIS BY PCR: NOT DETECTED
BUN SERPL-MCNC: 22 MG/DL (ref 7–18)
BUN SERPL-MCNC: 24 MG/DL (ref 7–18)
BUN/CREAT SERPL: 17 (ref 12–20)
BUN/CREAT SERPL: 18 (ref 12–20)
C PNEUM DNA SPEC QL NAA+PROBE: NOT DETECTED
CA-I BLD-MCNC: 1.07 MMOL/L (ref 1.15–1.33)
CALCIUM SERPL-MCNC: 7.5 MG/DL (ref 8.5–10.1)
CALCIUM SERPL-MCNC: 8 MG/DL (ref 8.5–10.1)
CHLORIDE BLD-SCNC: 106 MMOL/L (ref 98–107)
CHLORIDE SERPL-SCNC: 103 MMOL/L (ref 100–111)
CHLORIDE SERPL-SCNC: 105 MMOL/L (ref 100–111)
CO2 BLD-SCNC: 24 MMOL/L (ref 22–29)
CO2 SERPL-SCNC: 26 MMOL/L (ref 21–32)
CO2 SERPL-SCNC: 27 MMOL/L (ref 21–32)
CREAT BLD-MCNC: 1.31 MG/DL (ref 0.6–1.3)
CREAT SERPL-MCNC: 1.32 MG/DL (ref 0.6–1.3)
CREAT SERPL-MCNC: 1.37 MG/DL (ref 0.6–1.3)
DIFFERENTIAL METHOD BLD: ABNORMAL
ECHO AO ASC DIAM: 2.7 CM
ECHO AO ASCENDING AORTA INDEX: 1.7 CM/M2
ECHO AO ROOT DIAM: 2.5 CM
ECHO AO ROOT INDEX: 1.57 CM/M2
ECHO BSA: 1.6 M2
ECHO EST RA PRESSURE: 8 MMHG
ECHO LA DIAMETER INDEX: 1.26 CM/M2
ECHO LA DIAMETER: 2 CM
ECHO LA TO AORTIC ROOT RATIO: 0.8
ECHO LA VOL A-L A2C: 52 ML (ref 22–52)
ECHO LA VOL A-L A4C: 49 ML (ref 22–52)
ECHO LA VOL BP: 48 ML (ref 22–52)
ECHO LA VOL MOD A2C: 49 ML (ref 22–52)
ECHO LA VOL MOD A4C: 46 ML (ref 22–52)
ECHO LA VOL/BSA BIPLANE: 30 ML/M2 (ref 16–34)
ECHO LA VOLUME AREA LENGTH: 51 ML
ECHO LA VOLUME INDEX A-L A2C: 33 ML/M2 (ref 16–34)
ECHO LA VOLUME INDEX A-L A4C: 31 ML/M2 (ref 16–34)
ECHO LA VOLUME INDEX AREA LENGTH: 32 ML/M2 (ref 16–34)
ECHO LA VOLUME INDEX MOD A2C: 31 ML/M2 (ref 16–34)
ECHO LA VOLUME INDEX MOD A4C: 29 ML/M2 (ref 16–34)
ECHO LV EF PHYSICIAN: 50 %
ECHO LV FRACTIONAL SHORTENING: 28 % (ref 28–44)
ECHO LV INTERNAL DIMENSION DIASTOLE INDEX: 2.89 CM/M2
ECHO LV INTERNAL DIMENSION DIASTOLIC: 4.6 CM (ref 3.9–5.3)
ECHO LV INTERNAL DIMENSION SYSTOLIC INDEX: 2.08 CM/M2
ECHO LV INTERNAL DIMENSION SYSTOLIC: 3.3 CM
ECHO LV IVSD: 1.1 CM (ref 0.6–0.9)
ECHO LV MASS 2D: 181.2 G (ref 67–162)
ECHO LV MASS INDEX 2D: 114 G/M2 (ref 43–95)
ECHO LV POSTERIOR WALL DIASTOLIC: 1.1 CM (ref 0.6–0.9)
ECHO LV RELATIVE WALL THICKNESS RATIO: 0.48
ECHO RA VOLUME BIPLANE METHOD OF DISKS: 30 ML
ECHO RA VOLUME INDEX BP: 19 ML/M2
ECHO RIGHT VENTRICULAR SYSTOLIC PRESSURE (RVSP): 52 MMHG
ECHO TV REGURGITANT MAX VELOCITY: 3.33 M/S
ECHO TV REGURGITANT PEAK GRADIENT: 44 MMHG
EOSINOPHIL # BLD: 0.14 K/UL (ref 0–0.4)
EOSINOPHIL NFR BLD: 1.6 % (ref 0–5)
ERYTHROCYTE [DISTWIDTH] IN BLOOD BY AUTOMATED COUNT: 19.5 % (ref 11.6–14.5)
ERYTHROCYTE [DISTWIDTH] IN BLOOD BY AUTOMATED COUNT: 19.5 % (ref 11.6–14.5)
FIO2 ON VENT: 100 %
FLUAV SUBTYP SPEC NAA+PROBE: NOT DETECTED
FLUBV RNA SPEC QL NAA+PROBE: NOT DETECTED
GAS FLOW.O2 O2 DELIVERY SYS: ABNORMAL
GLOBULIN SER CALC-MCNC: 5.7 G/DL (ref 2–4)
GLOBULIN SER CALC-MCNC: 6.8 G/DL (ref 2–4)
GLUCOSE BLD STRIP.AUTO-MCNC: 269 MG/DL (ref 70–110)
GLUCOSE BLD STRIP.AUTO-MCNC: 303 MG/DL (ref 70–110)
GLUCOSE BLD-MCNC: 88 MG/DL (ref 74–99)
GLUCOSE SERPL-MCNC: 263 MG/DL (ref 74–99)
GLUCOSE SERPL-MCNC: 92 MG/DL (ref 74–99)
HADV DNA SPEC QL NAA+PROBE: NOT DETECTED
HCO3 BLD-SCNC: 24.7 MMOL/L (ref 21–28)
HCOV 229E RNA SPEC QL NAA+PROBE: NOT DETECTED
HCOV HKU1 RNA SPEC QL NAA+PROBE: NOT DETECTED
HCOV NL63 RNA SPEC QL NAA+PROBE: NOT DETECTED
HCOV OC43 RNA SPEC QL NAA+PROBE: DETECTED
HCT VFR BLD AUTO: 26.4 % (ref 35–45)
HCT VFR BLD AUTO: 31.8 % (ref 35–45)
HGB BLD-MCNC: 7.8 G/DL (ref 12–16)
HGB BLD-MCNC: 9.4 G/DL (ref 12–16)
HMPV RNA SPEC QL NAA+PROBE: NOT DETECTED
HPIV1 RNA SPEC QL NAA+PROBE: NOT DETECTED
HPIV2 RNA SPEC QL NAA+PROBE: NOT DETECTED
HPIV3 RNA SPEC QL NAA+PROBE: NOT DETECTED
HPIV4 RNA SPEC QL NAA+PROBE: NOT DETECTED
IMM GRANULOCYTES # BLD AUTO: 0.03 K/UL (ref 0–0.04)
IMM GRANULOCYTES NFR BLD AUTO: 0.4 % (ref 0–0.5)
IPAP/PIP: 14
L PNEUMO AG UR QL IA: NEGATIVE
LACTATE BLD-SCNC: <0.4 MMOL/L (ref 0.4–2)
LYMPHOCYTES # BLD: 1.37 K/UL (ref 0.9–3.6)
LYMPHOCYTES NFR BLD: 16.1 % (ref 21–52)
M PNEUMO DNA SPEC QL NAA+PROBE: NOT DETECTED
MAGNESIUM SERPL-MCNC: 1.5 MG/DL (ref 1.6–2.6)
MCH RBC QN AUTO: 20.6 PG (ref 24–34)
MCH RBC QN AUTO: 20.6 PG (ref 24–34)
MCHC RBC AUTO-ENTMCNC: 29.5 G/DL (ref 31–37)
MCHC RBC AUTO-ENTMCNC: 29.6 G/DL (ref 31–37)
MCV RBC AUTO: 69.6 FL (ref 78–100)
MCV RBC AUTO: 69.7 FL (ref 78–100)
MONOCYTES # BLD: 0.54 K/UL (ref 0.05–1.2)
MONOCYTES NFR BLD: 6.4 % (ref 3–10)
NEUTS SEG # BLD: 6.39 K/UL (ref 1.8–8)
NEUTS SEG NFR BLD: 75.1 % (ref 40–73)
NRBC # BLD: 0 K/UL (ref 0–0.01)
NRBC # BLD: 0 K/UL (ref 0–0.01)
NRBC BLD-RTO: 0 PER 100 WBC
NRBC BLD-RTO: 0 PER 100 WBC
NT PRO BNP: 4769 PG/ML (ref 0–900)
PCO2 BLD: 42.6 MMHG (ref 35–48)
PEEP RESPIRATORY: 6
PH BLD: 7.37 (ref 7.35–7.45)
PLATELET # BLD AUTO: 326 K/UL (ref 135–420)
PLATELET # BLD AUTO: 373 K/UL (ref 135–420)
PLATELET COMMENT: ABNORMAL
PO2 BLD: 557 MMHG (ref 83–108)
POTASSIUM BLD-SCNC: 3.3 MMOL/L (ref 3.5–5.1)
POTASSIUM SERPL-SCNC: 3.2 MMOL/L (ref 3.5–5.5)
POTASSIUM SERPL-SCNC: 4.1 MMOL/L (ref 3.5–5.5)
PRESSURE SUPPORT SETTING VENT: 8
PROT SERPL-MCNC: 7.5 G/DL (ref 6.4–8.2)
PROT SERPL-MCNC: 9 G/DL (ref 6.4–8.2)
RBC # BLD AUTO: 3.79 M/UL (ref 4.2–5.3)
RBC # BLD AUTO: 4.57 M/UL (ref 4.2–5.3)
RBC MORPH BLD: ABNORMAL
RESPIRATORY RATE, POC: 41 (ref 5–40)
RSV RNA SPEC QL NAA+PROBE: NOT DETECTED
RV+EV RNA SPEC QL NAA+PROBE: DETECTED
S PNEUM AG UR QL: NEGATIVE
SAO2 % BLD: 100 % (ref 94–98)
SARS-COV-2 RNA RESP QL NAA+PROBE: NOT DETECTED
SERVICE CMNT-IMP: ABNORMAL
SODIUM BLD-SCNC: 140 MMOL/L (ref 136–145)
SODIUM SERPL-SCNC: 136 MMOL/L (ref 136–145)
SODIUM SERPL-SCNC: 139 MMOL/L (ref 136–145)
SPECIMEN SITE: ABNORMAL
TROPONIN I SERPL HS-MCNC: 40 NG/L (ref 0–54)
TROPONIN I SERPL HS-MCNC: 46 NG/L (ref 0–54)
VENTILATION MODE VENT: ABNORMAL
WBC # BLD AUTO: 10.5 K/UL (ref 4.6–13.2)
WBC # BLD AUTO: 8.5 K/UL (ref 4.6–13.2)

## 2025-01-17 PROCEDURE — 6370000000 HC RX 637 (ALT 250 FOR IP): Performed by: STUDENT IN AN ORGANIZED HEALTH CARE EDUCATION/TRAINING PROGRAM

## 2025-01-17 PROCEDURE — 94761 N-INVAS EAR/PLS OXIMETRY MLT: CPT

## 2025-01-17 PROCEDURE — 82947 ASSAY GLUCOSE BLOOD QUANT: CPT

## 2025-01-17 PROCEDURE — 83605 ASSAY OF LACTIC ACID: CPT

## 2025-01-17 PROCEDURE — 87449 NOS EACH ORGANISM AG IA: CPT

## 2025-01-17 PROCEDURE — 6360000002 HC RX W HCPCS: Performed by: INTERNAL MEDICINE

## 2025-01-17 PROCEDURE — 2500000003 HC RX 250 WO HCPCS: Performed by: INTERNAL MEDICINE

## 2025-01-17 PROCEDURE — 93325 DOPPLER ECHO COLOR FLOW MAPG: CPT | Performed by: INTERNAL MEDICINE

## 2025-01-17 PROCEDURE — 6370000000 HC RX 637 (ALT 250 FOR IP): Performed by: INTERNAL MEDICINE

## 2025-01-17 PROCEDURE — 36600 WITHDRAWAL OF ARTERIAL BLOOD: CPT

## 2025-01-17 PROCEDURE — 99285 EMERGENCY DEPT VISIT HI MDM: CPT

## 2025-01-17 PROCEDURE — 93321 DOPPLER ECHO F-UP/LMTD STD: CPT

## 2025-01-17 PROCEDURE — 2580000003 HC RX 258: Performed by: INTERNAL MEDICINE

## 2025-01-17 PROCEDURE — 96365 THER/PROPH/DIAG IV INF INIT: CPT

## 2025-01-17 PROCEDURE — 82962 GLUCOSE BLOOD TEST: CPT

## 2025-01-17 PROCEDURE — 80053 COMPREHEN METABOLIC PANEL: CPT

## 2025-01-17 PROCEDURE — 84132 ASSAY OF SERUM POTASSIUM: CPT

## 2025-01-17 PROCEDURE — 96366 THER/PROPH/DIAG IV INF ADDON: CPT

## 2025-01-17 PROCEDURE — 84295 ASSAY OF SERUM SODIUM: CPT

## 2025-01-17 PROCEDURE — 6360000002 HC RX W HCPCS: Performed by: STUDENT IN AN ORGANIZED HEALTH CARE EDUCATION/TRAINING PROGRAM

## 2025-01-17 PROCEDURE — 1100000000 HC RM PRIVATE

## 2025-01-17 PROCEDURE — 94660 CPAP INITIATION&MGMT: CPT

## 2025-01-17 PROCEDURE — 2700000000 HC OXYGEN THERAPY PER DAY

## 2025-01-17 PROCEDURE — 93005 ELECTROCARDIOGRAM TRACING: CPT | Performed by: STUDENT IN AN ORGANIZED HEALTH CARE EDUCATION/TRAINING PROGRAM

## 2025-01-17 PROCEDURE — 85027 COMPLETE CBC AUTOMATED: CPT

## 2025-01-17 PROCEDURE — 96375 TX/PRO/DX INJ NEW DRUG ADDON: CPT

## 2025-01-17 PROCEDURE — 2580000003 HC RX 258: Performed by: STUDENT IN AN ORGANIZED HEALTH CARE EDUCATION/TRAINING PROGRAM

## 2025-01-17 PROCEDURE — 85014 HEMATOCRIT: CPT

## 2025-01-17 PROCEDURE — 85730 THROMBOPLASTIN TIME PARTIAL: CPT

## 2025-01-17 PROCEDURE — 99223 1ST HOSP IP/OBS HIGH 75: CPT | Performed by: INTERNAL MEDICINE

## 2025-01-17 PROCEDURE — 82803 BLOOD GASES ANY COMBINATION: CPT

## 2025-01-17 PROCEDURE — 36415 COLL VENOUS BLD VENIPUNCTURE: CPT

## 2025-01-17 PROCEDURE — 82330 ASSAY OF CALCIUM: CPT

## 2025-01-17 PROCEDURE — 2500000003 HC RX 250 WO HCPCS: Performed by: STUDENT IN AN ORGANIZED HEALTH CARE EDUCATION/TRAINING PROGRAM

## 2025-01-17 PROCEDURE — 0202U NFCT DS 22 TRGT SARS-COV-2: CPT

## 2025-01-17 PROCEDURE — 93308 TTE F-UP OR LMTD: CPT | Performed by: INTERNAL MEDICINE

## 2025-01-17 PROCEDURE — 71275 CT ANGIOGRAPHY CHEST: CPT

## 2025-01-17 PROCEDURE — 83880 ASSAY OF NATRIURETIC PEPTIDE: CPT

## 2025-01-17 PROCEDURE — 83735 ASSAY OF MAGNESIUM: CPT

## 2025-01-17 PROCEDURE — 84484 ASSAY OF TROPONIN QUANT: CPT

## 2025-01-17 PROCEDURE — 6360000004 HC RX CONTRAST MEDICATION: Performed by: STUDENT IN AN ORGANIZED HEALTH CARE EDUCATION/TRAINING PROGRAM

## 2025-01-17 PROCEDURE — 5A09357 ASSISTANCE WITH RESPIRATORY VENTILATION, LESS THAN 24 CONSECUTIVE HOURS, CONTINUOUS POSITIVE AIRWAY PRESSURE: ICD-10-PCS | Performed by: INTERNAL MEDICINE

## 2025-01-17 PROCEDURE — 94640 AIRWAY INHALATION TREATMENT: CPT

## 2025-01-17 PROCEDURE — 71045 X-RAY EXAM CHEST 1 VIEW: CPT

## 2025-01-17 PROCEDURE — 93321 DOPPLER ECHO F-UP/LMTD STD: CPT | Performed by: INTERNAL MEDICINE

## 2025-01-17 PROCEDURE — 85025 COMPLETE CBC W/AUTO DIFF WBC: CPT

## 2025-01-17 RX ORDER — ONDANSETRON 2 MG/ML
4 INJECTION INTRAMUSCULAR; INTRAVENOUS EVERY 6 HOURS PRN
Status: DISCONTINUED | OUTPATIENT
Start: 2025-01-17 | End: 2025-01-24 | Stop reason: HOSPADM

## 2025-01-17 RX ORDER — INSULIN GLARGINE 100 [IU]/ML
15 INJECTION, SOLUTION SUBCUTANEOUS NIGHTLY
Status: DISCONTINUED | OUTPATIENT
Start: 2025-01-17 | End: 2025-01-17

## 2025-01-17 RX ORDER — HEPARIN SODIUM 1000 [USP'U]/ML
80 INJECTION, SOLUTION INTRAVENOUS; SUBCUTANEOUS PRN
Status: DISCONTINUED | OUTPATIENT
Start: 2025-01-17 | End: 2025-01-20

## 2025-01-17 RX ORDER — HEPARIN SODIUM 1000 [USP'U]/ML
80 INJECTION, SOLUTION INTRAVENOUS; SUBCUTANEOUS ONCE
Status: COMPLETED | OUTPATIENT
Start: 2025-01-17 | End: 2025-01-17

## 2025-01-17 RX ORDER — MEGESTROL ACETATE 40 MG/ML
200 SUSPENSION ORAL
Status: DISCONTINUED | OUTPATIENT
Start: 2025-01-17 | End: 2025-01-17

## 2025-01-17 RX ORDER — PANTOPRAZOLE SODIUM 40 MG/1
40 TABLET, DELAYED RELEASE ORAL DAILY
Status: DISCONTINUED | OUTPATIENT
Start: 2025-01-17 | End: 2025-01-24 | Stop reason: HOSPADM

## 2025-01-17 RX ORDER — MIRTAZAPINE 15 MG/1
45 TABLET, FILM COATED ORAL NIGHTLY
Status: DISCONTINUED | OUTPATIENT
Start: 2025-01-17 | End: 2025-01-24 | Stop reason: HOSPADM

## 2025-01-17 RX ORDER — ROPINIROLE 0.25 MG/1
0.5 TABLET, FILM COATED ORAL 3 TIMES DAILY
Status: DISCONTINUED | OUTPATIENT
Start: 2025-01-17 | End: 2025-01-24 | Stop reason: HOSPADM

## 2025-01-17 RX ORDER — POTASSIUM CHLORIDE 1500 MG/1
40 TABLET, EXTENDED RELEASE ORAL PRN
Status: DISCONTINUED | OUTPATIENT
Start: 2025-01-17 | End: 2025-01-24 | Stop reason: HOSPADM

## 2025-01-17 RX ORDER — IODIXANOL 320 MG/ML
80 INJECTION, SOLUTION INTRAVASCULAR
Status: COMPLETED | OUTPATIENT
Start: 2025-01-17 | End: 2025-01-17

## 2025-01-17 RX ORDER — ONDANSETRON 4 MG/1
4 TABLET, ORALLY DISINTEGRATING ORAL EVERY 8 HOURS PRN
Status: DISCONTINUED | OUTPATIENT
Start: 2025-01-17 | End: 2025-01-24 | Stop reason: HOSPADM

## 2025-01-17 RX ORDER — INSULIN LISPRO 100 [IU]/ML
0-8 INJECTION, SOLUTION INTRAVENOUS; SUBCUTANEOUS
Status: DISCONTINUED | OUTPATIENT
Start: 2025-01-17 | End: 2025-01-24 | Stop reason: HOSPADM

## 2025-01-17 RX ORDER — ACETAMINOPHEN 500 MG
500 TABLET ORAL EVERY 6 HOURS PRN
Status: DISCONTINUED | OUTPATIENT
Start: 2025-01-17 | End: 2025-01-17 | Stop reason: SDUPTHER

## 2025-01-17 RX ORDER — HEPARIN SODIUM 10000 [USP'U]/100ML
5-30 INJECTION, SOLUTION INTRAVENOUS CONTINUOUS
Status: DISCONTINUED | OUTPATIENT
Start: 2025-01-17 | End: 2025-01-20

## 2025-01-17 RX ORDER — FAMOTIDINE 20 MG/1
20 TABLET, FILM COATED ORAL 2 TIMES DAILY
Status: DISCONTINUED | OUTPATIENT
Start: 2025-01-17 | End: 2025-01-17

## 2025-01-17 RX ORDER — DEXTROSE MONOHYDRATE 100 MG/ML
INJECTION, SOLUTION INTRAVENOUS CONTINUOUS PRN
Status: DISCONTINUED | OUTPATIENT
Start: 2025-01-17 | End: 2025-01-24 | Stop reason: HOSPADM

## 2025-01-17 RX ORDER — FUROSEMIDE 10 MG/ML
40 INJECTION INTRAMUSCULAR; INTRAVENOUS
Status: COMPLETED | OUTPATIENT
Start: 2025-01-17 | End: 2025-01-17

## 2025-01-17 RX ORDER — HEPARIN SODIUM 1000 [USP'U]/ML
40 INJECTION, SOLUTION INTRAVENOUS; SUBCUTANEOUS PRN
Status: DISCONTINUED | OUTPATIENT
Start: 2025-01-17 | End: 2025-01-20

## 2025-01-17 RX ORDER — POTASSIUM CHLORIDE 7.45 MG/ML
10 INJECTION INTRAVENOUS PRN
Status: DISCONTINUED | OUTPATIENT
Start: 2025-01-17 | End: 2025-01-24 | Stop reason: HOSPADM

## 2025-01-17 RX ORDER — ACETAMINOPHEN 650 MG/1
650 SUPPOSITORY RECTAL EVERY 6 HOURS PRN
Status: DISCONTINUED | OUTPATIENT
Start: 2025-01-17 | End: 2025-01-24 | Stop reason: HOSPADM

## 2025-01-17 RX ORDER — IPRATROPIUM BROMIDE AND ALBUTEROL SULFATE 2.5; .5 MG/3ML; MG/3ML
3 SOLUTION RESPIRATORY (INHALATION)
Status: COMPLETED | OUTPATIENT
Start: 2025-01-17 | End: 2025-01-17

## 2025-01-17 RX ORDER — ACETAMINOPHEN 325 MG/1
650 TABLET ORAL EVERY 6 HOURS PRN
Status: DISCONTINUED | OUTPATIENT
Start: 2025-01-17 | End: 2025-01-24 | Stop reason: HOSPADM

## 2025-01-17 RX ORDER — MAGNESIUM SULFATE IN WATER 40 MG/ML
2000 INJECTION, SOLUTION INTRAVENOUS PRN
Status: DISCONTINUED | OUTPATIENT
Start: 2025-01-17 | End: 2025-01-24 | Stop reason: HOSPADM

## 2025-01-17 RX ORDER — LORAZEPAM 1 MG/1
1 TABLET ORAL EVERY 8 HOURS PRN
Status: DISCONTINUED | OUTPATIENT
Start: 2025-01-17 | End: 2025-01-18

## 2025-01-17 RX ORDER — DULOXETIN HYDROCHLORIDE 30 MG/1
30 CAPSULE, DELAYED RELEASE ORAL DAILY
Status: DISCONTINUED | OUTPATIENT
Start: 2025-01-17 | End: 2025-01-24 | Stop reason: HOSPADM

## 2025-01-17 RX ORDER — INSULIN GLARGINE 100 [IU]/ML
20 INJECTION, SOLUTION SUBCUTANEOUS DAILY
Status: DISCONTINUED | OUTPATIENT
Start: 2025-01-17 | End: 2025-01-24 | Stop reason: HOSPADM

## 2025-01-17 RX ORDER — POTASSIUM CHLORIDE 1500 MG/1
20 TABLET, EXTENDED RELEASE ORAL 2 TIMES DAILY
Status: DISPENSED | OUTPATIENT
Start: 2025-01-17 | End: 2025-01-19

## 2025-01-17 RX ORDER — MAGNESIUM SULFATE 1 G/100ML
1000 INJECTION INTRAVENOUS
Status: COMPLETED | OUTPATIENT
Start: 2025-01-17 | End: 2025-01-17

## 2025-01-17 RX ORDER — FUROSEMIDE 10 MG/ML
20 INJECTION INTRAMUSCULAR; INTRAVENOUS 2 TIMES DAILY
Status: DISCONTINUED | OUTPATIENT
Start: 2025-01-17 | End: 2025-01-24 | Stop reason: HOSPADM

## 2025-01-17 RX ORDER — SUCRALFATE 1 G/1
1 TABLET ORAL 3 TIMES DAILY PRN
Status: DISCONTINUED | OUTPATIENT
Start: 2025-01-17 | End: 2025-01-18

## 2025-01-17 RX ORDER — SODIUM CHLORIDE 0.9 % (FLUSH) 0.9 %
5-40 SYRINGE (ML) INJECTION EVERY 12 HOURS SCHEDULED
Status: DISCONTINUED | OUTPATIENT
Start: 2025-01-17 | End: 2025-01-24 | Stop reason: HOSPADM

## 2025-01-17 RX ORDER — ATORVASTATIN CALCIUM 40 MG/1
80 TABLET, FILM COATED ORAL NIGHTLY
Status: DISCONTINUED | OUTPATIENT
Start: 2025-01-17 | End: 2025-01-24 | Stop reason: HOSPADM

## 2025-01-17 RX ORDER — SODIUM CHLORIDE 0.9 % (FLUSH) 0.9 %
5-40 SYRINGE (ML) INJECTION PRN
Status: DISCONTINUED | OUTPATIENT
Start: 2025-01-17 | End: 2025-01-24 | Stop reason: HOSPADM

## 2025-01-17 RX ORDER — NICOTINE 21 MG/24HR
1 PATCH, TRANSDERMAL 24 HOURS TRANSDERMAL DAILY
Status: DISCONTINUED | OUTPATIENT
Start: 2025-01-17 | End: 2025-01-24 | Stop reason: HOSPADM

## 2025-01-17 RX ORDER — DILTIAZEM HYDROCHLORIDE 120 MG/1
120 CAPSULE, COATED, EXTENDED RELEASE ORAL DAILY
Status: DISCONTINUED | OUTPATIENT
Start: 2025-01-17 | End: 2025-01-24 | Stop reason: HOSPADM

## 2025-01-17 RX ORDER — PREGABALIN 75 MG/1
75 CAPSULE ORAL 2 TIMES DAILY
Status: DISCONTINUED | OUTPATIENT
Start: 2025-01-17 | End: 2025-01-24 | Stop reason: HOSPADM

## 2025-01-17 RX ORDER — SODIUM CHLORIDE 9 MG/ML
INJECTION, SOLUTION INTRAVENOUS PRN
Status: DISCONTINUED | OUTPATIENT
Start: 2025-01-17 | End: 2025-01-24 | Stop reason: HOSPADM

## 2025-01-17 RX ORDER — POLYETHYLENE GLYCOL 3350 17 G/17G
17 POWDER, FOR SOLUTION ORAL DAILY PRN
Status: DISCONTINUED | OUTPATIENT
Start: 2025-01-17 | End: 2025-01-24 | Stop reason: HOSPADM

## 2025-01-17 RX ADMIN — SODIUM CHLORIDE, PRESERVATIVE FREE 10 ML: 5 INJECTION INTRAVENOUS at 20:52

## 2025-01-17 RX ADMIN — DULOXETINE HYDROCHLORIDE 30 MG: 30 CAPSULE, DELAYED RELEASE ORAL at 16:00

## 2025-01-17 RX ADMIN — FUROSEMIDE 40 MG: 10 INJECTION, SOLUTION INTRAMUSCULAR; INTRAVENOUS at 11:40

## 2025-01-17 RX ADMIN — ARFORMOTEROL TARTRATE: 15 SOLUTION RESPIRATORY (INHALATION) at 20:54

## 2025-01-17 RX ADMIN — MIRTAZAPINE 45 MG: 15 TABLET, FILM COATED ORAL at 20:29

## 2025-01-17 RX ADMIN — INSULIN GLARGINE 20 UNITS: 100 INJECTION, SOLUTION SUBCUTANEOUS at 20:34

## 2025-01-17 RX ADMIN — ACETAMINOPHEN 650 MG: 325 TABLET ORAL at 13:26

## 2025-01-17 RX ADMIN — IPRATROPIUM BROMIDE AND ALBUTEROL SULFATE 3 DOSE: .5; 3 SOLUTION RESPIRATORY (INHALATION) at 09:18

## 2025-01-17 RX ADMIN — HEPARIN SODIUM 18 UNITS/KG/HR: 10000 INJECTION, SOLUTION INTRAVENOUS at 11:45

## 2025-01-17 RX ADMIN — ARFORMOTEROL TARTRATE: 15 SOLUTION RESPIRATORY (INHALATION) at 12:16

## 2025-01-17 RX ADMIN — INSULIN LISPRO 6 UNITS: 100 INJECTION, SOLUTION INTRAVENOUS; SUBCUTANEOUS at 16:58

## 2025-01-17 RX ADMIN — POTASSIUM CHLORIDE 20 MEQ: 1500 TABLET, EXTENDED RELEASE ORAL at 13:26

## 2025-01-17 RX ADMIN — CEFEPIME 2000 MG: 2 INJECTION, POWDER, FOR SOLUTION INTRAVENOUS at 23:41

## 2025-01-17 RX ADMIN — ROPINIROLE HYDROCHLORIDE 0.5 MG: 0.25 TABLET, FILM COATED ORAL at 21:02

## 2025-01-17 RX ADMIN — ATORVASTATIN CALCIUM 80 MG: 40 TABLET, FILM COATED ORAL at 20:28

## 2025-01-17 RX ADMIN — FUROSEMIDE 20 MG: 10 INJECTION, SOLUTION INTRAMUSCULAR; INTRAVENOUS at 16:59

## 2025-01-17 RX ADMIN — WATER 40 MG: 1 INJECTION INTRAMUSCULAR; INTRAVENOUS; SUBCUTANEOUS at 16:58

## 2025-01-17 RX ADMIN — DILTIAZEM HYDROCHLORIDE 120 MG: 120 CAPSULE, COATED, EXTENDED RELEASE ORAL at 13:26

## 2025-01-17 RX ADMIN — AZITHROMYCIN MONOHYDRATE 500 MG: 500 INJECTION, POWDER, LYOPHILIZED, FOR SOLUTION INTRAVENOUS at 09:15

## 2025-01-17 RX ADMIN — POTASSIUM CHLORIDE 20 MEQ: 1500 TABLET, EXTENDED RELEASE ORAL at 20:29

## 2025-01-17 RX ADMIN — WATER 125 MG: 1 INJECTION INTRAMUSCULAR; INTRAVENOUS; SUBCUTANEOUS at 09:10

## 2025-01-17 RX ADMIN — MAGNESIUM SULFATE HEPTAHYDRATE 1000 MG: 1 INJECTION, SOLUTION INTRAVENOUS at 13:25

## 2025-01-17 RX ADMIN — PANTOPRAZOLE SODIUM 40 MG: 40 TABLET, DELAYED RELEASE ORAL at 13:26

## 2025-01-17 RX ADMIN — PREGABALIN 75 MG: 25 CAPSULE ORAL at 18:03

## 2025-01-17 RX ADMIN — INSULIN LISPRO 4 UNITS: 100 INJECTION, SOLUTION INTRAVENOUS; SUBCUTANEOUS at 20:34

## 2025-01-17 RX ADMIN — IODIXANOL 70 ML: 320 INJECTION, SOLUTION INTRAVASCULAR at 10:28

## 2025-01-17 RX ADMIN — HEPARIN SODIUM 4700 UNITS: 1000 INJECTION INTRAVENOUS; SUBCUTANEOUS at 11:41

## 2025-01-17 RX ADMIN — CEFEPIME 2000 MG: 2 INJECTION, POWDER, FOR SOLUTION INTRAVENOUS at 13:25

## 2025-01-17 ASSESSMENT — LIFESTYLE VARIABLES
HOW MANY STANDARD DRINKS CONTAINING ALCOHOL DO YOU HAVE ON A TYPICAL DAY: PATIENT DOES NOT DRINK
HOW OFTEN DO YOU HAVE A DRINK CONTAINING ALCOHOL: NEVER

## 2025-01-17 NOTE — ED PROVIDER NOTES
EMERGENCY DEPARTMENT HISTORY AND PHYSICAL EXAM      Date: 1/17/2025  Patient Name: Juliana Loaiza    History of Presenting Illness     No chief complaint on file.      History (Context): Juliana Loaiza is a 74 y.o. female  presents to the ED today with chief complaint of dyspnea.  Patient states dyspnea began earlier this morning and is worsened since onset.  Per EMS patient on 6 L/min nasal cannula and at home was found to be satting 78% with increased work of breathing.  Patient states that she has had a cough and URI symptoms over the past few days.  Denies any fever, chills, chest pain, abdominal pain, nausea, vomit, or diarrhea associated with her symptoms.  States that she was supposed to be on anticoagulation due to DVTs however does not know if she is taking the medication at this time.      PCP: Ezequiel Michel MD    Current Facility-Administered Medications   Medication Dose Route Frequency Provider Last Rate Last Admin    heparin (porcine) injection 4,700 Units  80 Units/kg (Order-Specific) IntraVENous PRN Julianna Pagan MD        heparin (porcine) injection 2,300 Units  40 Units/kg (Order-Specific) IntraVENous PRN Julianna Pagan MD        heparin 25,000 units in dextrose 5% 250 mL (premix) infusion  5-30 Units/kg/hr (Order-Specific) IntraVENous Continuous Julianna Pagan MD 10.5 mL/hr at 01/17/25 1145 18 Units/kg/hr at 01/17/25 1145    atorvastatin (LIPITOR) tablet 80 mg  80 mg Oral Nightly Julianna Pagan MD        dilTIAZem (CARDIZEM CD) extended release capsule 120 mg  120 mg Oral Daily Julianna Pagan MD   120 mg at 01/17/25 1326    DULoxetine (CYMBALTA) extended release capsule 30 mg  30 mg Oral Daily Julianna Pagan MD   30 mg at 01/17/25 1600    arformoterol 15 mcg-budesonide 0.5 mg neb solution   Nebulization BID RT Julianna Pagan MD   Given at 01/17/25 1216    insulin glargine (LANTUS) injection vial 15 Units  15 Units  POCT Blood Gas & Electrolytes    POCT glucose    POCT Glucose    EKG 12 Lead (SOB)    ADMIT TO INPATIENT    ADMIT TO INPATIENT    Vascular duplex lower extremity venous bilateral          ED Course:   ED Course as of 01/17/25 1617   Fri Jan 17, 2025   0914 Patient able to be placed on high flow nasal cannula following initial BiPAP.  Lab work significant for magnesium 1.5, creatinine 1.32 Which appears to be around her baseline, otherwise labs grossly within normal limits.  ABG with an elevated PaO2 but otherwise without further abnormalities.  Will obtain CTA of the chest to evaluate for PE due to her questionable anticoagulation status.  Will continue to monitor patient. [DV]   0923 Patient's troponin initially 46 which is above her previous baseline therefore will obtain repeat.  BNP slightly elevated when compared to previous and therefore will provide patient with Lasix.  Will continue to monitor patient. [DV]   0924 Patient's chest x-ray on my read does not show evidence of focal consolidation and when compared to previous is similar.  Will continue to monitor patient. [DV]   1055 Patient positive for rhino enterovirus, coronavirus, and CTA of the chest on my read Showing bilateral patchy pulmonary opacities with a single nonocclusive left lower lobe subsegmental PE.  Will consult hospitalist for admission at this time. [DV]   1114 Spoke with hospitalist who agrees to admit patient this time.  Agrees with heparin initiation. [DV]      ED Course User Index  [DV] Lv Gamez Jr., DO           Procedures:  Procedures      Rhythm interpretation from monitor: Sinus tachycardia      Social Determinants of Health: none       Supplemental Historians include: EMS       Documentation/Prior Results Review:  Old medical records.  Previous electrocardiograms.  Nursing notes.  Ambulance run sheet.  Previous radiology studies.      Discussion of Mangement with other Physicians, QHP or Appropriate Source:

## 2025-01-17 NOTE — H&P
Hospitalist Admission History and Physical    NAME:  Juliana Loaiza   :   1950   MRN:   [id number]     PCP:  Ezequiel Michel MD  Date/Time:  2025 12:03 PM  Subjective:   CHIEF COMPLAINT: Shortness of breath    HISTORY OF PRESENT ILLNESS:     Juliana is a 74 y.o.   female with history of COPD chronic respiratory failure on 6 L home oxygen, history of sarcoid, history of diabetes on Lantus paroxysmal A-fib also had DVTs last year supposed to be on Xarelto but patient is not very compliant with the medications.  She came in today to the ED with complaining of shortness of breath hypoxia according to the EMS her sats was 78% on 6 L oxygen.    Patient was seen in the ED given breathing treatments Lasix Z-Brandon  steroid oxygenation improved.    CTA showed;  1.  A single nonocclusive LEFT lower lobe subsegmental pulmonary embolism.  2.  Multiple new patchy bilateral pulmonary opacities which are likely  infectious or inflammatory/due to pneumonia.  3.  Bronchiectasis and bronchial wall thickening suggestive of acute bronchitis.  4.  Above findings discussed with Dr. Gamez  5.  Mild cardiomegaly.  6.  Other chronic findings as above  7.  Limited due to motion    Patient's respiratory panel was positive for rhinovirus coronavirus.    Patient was also started on IV Lasix for elevated BNP with history of pulmonary hypertension.    Patient getting admitted for further workup and treatment           Past Medical History:   Diagnosis Date    Arthritis     \"generalized\"    Asthma     CAD in native artery     NSTEMI (Sep 2014); diffuse 65% pLAD, minimal disease RCA & LCx. pLAD FFR 0.93. LV  no RWMA (echo and LV angio)    Chronic neck pain     Chronic pain     left knee    Chronic pain syndrome     COPD (chronic obstructive pulmonary disease) (HCC)     Depression     Diabetes (HCC)     Diverticulitis     GERD (gastroesophageal reflux disease)     Heart disease     Hidradenitis  adenopathy, thyroid: non tender    no carotid bruit and has JVD.    Back:    Symmetric,  No CVA tenderness.    Lungs:   Bilateral wheezing and rhonchi    Chest wall:  No tenderness or deformity. No Accessory muscle use.  Heart:   Regular rate and rhythm,  no murmur, rub or gallop.  Abdomen:   Soft, non-tender. Not distended.  Bowel sounds normal. No masses  Extremities: Extremities normal, atraumatic, No cyanosis.  No edema. No clubbing  Skin:     Texture, turgor normal. No rashes or lesions.  Not Jaundiced    Psych:  Very anxious   Neurologic: EOMs intact. No facial asymmetry. No aphasia or slurred speech. Normal   strength, Alert and oriented X 3.       LAB DATA REVIEWED:    No components found for: \"GLPOC\"  Recent Labs     01/17/25  0843      K 4.1      CO2 27   BUN 22*   WBC 8.5   HGB 9.4*   HCT 31.8*            IMAGING RESULTS:   [x]  I have personally reviewed the actual   []CXR  []CT scan      Assessment/Plan:      Principal Problem:    Acute on chronic respiratory failure with hypoxia  Active Problems:    DM (diabetes mellitus), type 2 with complications (HCC)    Cigarette smoker    Chronic pain syndrome    Pulmonary sarcoidosis (HCC)    Pulmonary hypertension (HCC)    Paroxysmal atrial fibrillation (HCC)    Sarcoidosis    COPD with acute exacerbation (HCC)    Acute pulmonary embolism (HCC)    Acute bronchitis due to Rhinovirus    COPD exacerbation (HCC)  Resolved Problems:    * No resolved hospital problems. *     ___________________________________________________  PLAN:    Risk of deterioration:  []Low    []Moderate  [x]High    Acute on chronic hypoxic respiratory failure; due to pneumonia viral rhinovirus coronavirus  Continue 4-6 her oxygen keep sats 90 to 92%    COPD exacerbation with history of sarcoid;  Continue DuoNebs IV steroids add Mucinex Tussionex    Bilateral community-acquired pneumonia with history of COPD bronchiectasis on CT;  Start the patient on cefepime and

## 2025-01-17 NOTE — ED TRIAGE NOTES
Pt presents to the emergency department via EMS c/o CP and SOB since Thursday. PT states she initially refused to be transported to the hospital due to her  being a resident of [facility]. PT states the CP and SOB has progressed since Thursday. PT has pmhx of COPD and is on supplemental oxygen at home via [place] @ 6lpm. EMS reports PT's oxygen saturation was \"high 70's to low 80's on 6lpm\" on their arrival. PT placed on NRB by EMS. RNx2 at bedside. MD evaluation at charge desk. RT to bedside.

## 2025-01-17 NOTE — PLAN OF CARE
Problem: Chronic Conditions and Co-morbidities  Goal: Patient's chronic conditions and co-morbidity symptoms are monitored and maintained or improved  Outcome: Progressing  Flowsheets (Taken 1/17/2025 1411)  Care Plan - Patient's Chronic Conditions and Co-Morbidity Symptoms are Monitored and Maintained or Improved:   Monitor and assess patient's chronic conditions and comorbid symptoms for stability, deterioration, or improvement   Collaborate with multidisciplinary team to address chronic and comorbid conditions and prevent exacerbation or deterioration   Update acute care plan with appropriate goals if chronic or comorbid symptoms are exacerbated and prevent overall improvement and discharge     Problem: Discharge Planning  Goal: Discharge to home or other facility with appropriate resources  Outcome: Progressing  Flowsheets (Taken 1/17/2025 1411)  Discharge to home or other facility with appropriate resources:   Identify barriers to discharge with patient and caregiver   Identify discharge learning needs (meds, wound care, etc)   Refer to discharge planning if patient needs post-hospital services based on physician order or complex needs related to functional status, cognitive ability or social support system     Problem: Skin/Tissue Integrity  Goal: Absence of new skin breakdown  Description: 1.  Monitor for areas of redness and/or skin breakdown  2.  Assess vascular access sites hourly  3.  Every 4-6 hours minimum:  Change oxygen saturation probe site  4.  Every 4-6 hours:  If on nasal continuous positive airway pressure, respiratory therapy assess nares and determine need for appliance change or resting period.  Outcome: Progressing  Note: Q2 reposition     Problem: Safety - Adult  Goal: Free from fall injury  Outcome: Progressing  Flowsheets (Taken 1/17/2025 1721)  Free From Fall Injury: Instruct family/caregiver on patient safety  Note: Q1 rounding     Problem: Respiratory - Adult  Goal: Achieves optimal

## 2025-01-18 ENCOUNTER — APPOINTMENT (OUTPATIENT)
Facility: HOSPITAL | Age: 75
End: 2025-01-18
Attending: INTERNAL MEDICINE
Payer: MEDICARE

## 2025-01-18 PROBLEM — U07.1 COVID: Status: ACTIVE | Noted: 2025-01-18

## 2025-01-18 PROBLEM — E44.0 MODERATE PROTEIN-CALORIE MALNUTRITION (HCC): Status: ACTIVE | Noted: 2025-01-18

## 2025-01-18 PROBLEM — J12.9 VIRAL PNEUMONIA: Status: ACTIVE | Noted: 2025-01-18

## 2025-01-18 LAB
ALBUMIN SERPL-MCNC: 1.7 G/DL (ref 3.4–5)
ALBUMIN/GLOB SERPL: 0.3 (ref 0.8–1.7)
ALP SERPL-CCNC: 98 U/L (ref 45–117)
ALT SERPL-CCNC: <6 U/L (ref 13–56)
ANION GAP SERPL CALC-SCNC: 8 MMOL/L (ref 3–18)
APTT PPP: 126.1 SEC (ref 23–36.4)
APTT PPP: 130.8 SEC (ref 23–36.4)
APTT PPP: 58.4 SEC (ref 23–36.4)
APTT PPP: 85.9 SEC (ref 23–36.4)
AST SERPL-CCNC: 10 U/L (ref 10–38)
BASOPHILS # BLD: 0.01 K/UL (ref 0–0.1)
BASOPHILS NFR BLD: 0.1 % (ref 0–2)
BILIRUB SERPL-MCNC: 0.3 MG/DL (ref 0.2–1)
BUN SERPL-MCNC: 28 MG/DL (ref 7–18)
BUN/CREAT SERPL: 22 (ref 12–20)
CALCIUM SERPL-MCNC: 7.5 MG/DL (ref 8.5–10.1)
CHLORIDE SERPL-SCNC: 104 MMOL/L (ref 100–111)
CO2 SERPL-SCNC: 27 MMOL/L (ref 21–32)
CREAT SERPL-MCNC: 1.27 MG/DL (ref 0.6–1.3)
DIFFERENTIAL METHOD BLD: ABNORMAL
EOSINOPHIL # BLD: 0 K/UL (ref 0–0.4)
EOSINOPHIL NFR BLD: 0 % (ref 0–5)
ERYTHROCYTE [DISTWIDTH] IN BLOOD BY AUTOMATED COUNT: 19.6 % (ref 11.6–14.5)
EST. AVERAGE GLUCOSE BLD GHB EST-MCNC: 143 MG/DL
GLOBULIN SER CALC-MCNC: 5.4 G/DL (ref 2–4)
GLUCOSE BLD STRIP.AUTO-MCNC: 160 MG/DL (ref 70–110)
GLUCOSE BLD STRIP.AUTO-MCNC: 241 MG/DL (ref 70–110)
GLUCOSE BLD STRIP.AUTO-MCNC: 244 MG/DL (ref 70–110)
GLUCOSE BLD STRIP.AUTO-MCNC: 256 MG/DL (ref 70–110)
GLUCOSE SERPL-MCNC: 227 MG/DL (ref 74–99)
HBA1C MFR BLD: 6.6 % (ref 4.2–5.6)
HCT VFR BLD AUTO: 25.5 % (ref 35–45)
HGB BLD-MCNC: 7.8 G/DL (ref 12–16)
IMM GRANULOCYTES # BLD AUTO: 0.04 K/UL (ref 0–0.04)
IMM GRANULOCYTES NFR BLD AUTO: 0.4 % (ref 0–0.5)
LYMPHOCYTES # BLD: 0.77 K/UL (ref 0.9–3.6)
LYMPHOCYTES NFR BLD: 8.4 % (ref 21–52)
MAGNESIUM SERPL-MCNC: 1.7 MG/DL (ref 1.6–2.6)
MCH RBC QN AUTO: 21.4 PG (ref 24–34)
MCHC RBC AUTO-ENTMCNC: 30.6 G/DL (ref 31–37)
MCV RBC AUTO: 69.9 FL (ref 78–100)
MONOCYTES # BLD: 0.11 K/UL (ref 0.05–1.2)
MONOCYTES NFR BLD: 1.2 % (ref 3–10)
NEUTS SEG # BLD: 8.24 K/UL (ref 1.8–8)
NEUTS SEG NFR BLD: 89.9 % (ref 40–73)
NRBC # BLD: 0 K/UL (ref 0–0.01)
NRBC BLD-RTO: 0 PER 100 WBC
PLATELET # BLD AUTO: 380 K/UL (ref 135–420)
PMV BLD AUTO: 11.4 FL (ref 9.2–11.8)
POTASSIUM SERPL-SCNC: 3.8 MMOL/L (ref 3.5–5.5)
PROT SERPL-MCNC: 7.1 G/DL (ref 6.4–8.2)
RBC # BLD AUTO: 3.65 M/UL (ref 4.2–5.3)
SODIUM SERPL-SCNC: 139 MMOL/L (ref 136–145)
WBC # BLD AUTO: 9.2 K/UL (ref 4.6–13.2)

## 2025-01-18 PROCEDURE — 36415 COLL VENOUS BLD VENIPUNCTURE: CPT

## 2025-01-18 PROCEDURE — 2500000003 HC RX 250 WO HCPCS: Performed by: INTERNAL MEDICINE

## 2025-01-18 PROCEDURE — 2580000003 HC RX 258: Performed by: INTERNAL MEDICINE

## 2025-01-18 PROCEDURE — 1100000000 HC RM PRIVATE

## 2025-01-18 PROCEDURE — 93970 EXTREMITY STUDY: CPT

## 2025-01-18 PROCEDURE — 6360000002 HC RX W HCPCS: Performed by: INTERNAL MEDICINE

## 2025-01-18 PROCEDURE — 94640 AIRWAY INHALATION TREATMENT: CPT

## 2025-01-18 PROCEDURE — 83036 HEMOGLOBIN GLYCOSYLATED A1C: CPT

## 2025-01-18 PROCEDURE — 6370000000 HC RX 637 (ALT 250 FOR IP): Performed by: STUDENT IN AN ORGANIZED HEALTH CARE EDUCATION/TRAINING PROGRAM

## 2025-01-18 PROCEDURE — 6370000000 HC RX 637 (ALT 250 FOR IP): Performed by: HEALTH CARE PROVIDER

## 2025-01-18 PROCEDURE — 85025 COMPLETE CBC W/AUTO DIFF WBC: CPT

## 2025-01-18 PROCEDURE — 99222 1ST HOSP IP/OBS MODERATE 55: CPT | Performed by: HOSPITALIST

## 2025-01-18 PROCEDURE — 82962 GLUCOSE BLOOD TEST: CPT

## 2025-01-18 PROCEDURE — 6370000000 HC RX 637 (ALT 250 FOR IP): Performed by: INTERNAL MEDICINE

## 2025-01-18 PROCEDURE — 83735 ASSAY OF MAGNESIUM: CPT

## 2025-01-18 PROCEDURE — 2700000000 HC OXYGEN THERAPY PER DAY

## 2025-01-18 PROCEDURE — 94761 N-INVAS EAR/PLS OXIMETRY MLT: CPT

## 2025-01-18 PROCEDURE — 99233 SBSQ HOSP IP/OBS HIGH 50: CPT | Performed by: STUDENT IN AN ORGANIZED HEALTH CARE EDUCATION/TRAINING PROGRAM

## 2025-01-18 PROCEDURE — 80053 COMPREHEN METABOLIC PANEL: CPT

## 2025-01-18 PROCEDURE — 85730 THROMBOPLASTIN TIME PARTIAL: CPT

## 2025-01-18 RX ORDER — MULTIVITAMIN WITH IRON
1 TABLET ORAL DAILY
Status: DISCONTINUED | OUTPATIENT
Start: 2025-01-18 | End: 2025-01-24 | Stop reason: HOSPADM

## 2025-01-18 RX ORDER — TRAMADOL HYDROCHLORIDE 50 MG/1
50 TABLET ORAL EVERY 6 HOURS PRN
Status: DISCONTINUED | OUTPATIENT
Start: 2025-01-18 | End: 2025-01-24 | Stop reason: HOSPADM

## 2025-01-18 RX ADMIN — SERTRALINE HYDROCHLORIDE 100 MG: 50 TABLET ORAL at 08:34

## 2025-01-18 RX ADMIN — ARFORMOTEROL TARTRATE: 15 SOLUTION RESPIRATORY (INHALATION) at 20:39

## 2025-01-18 RX ADMIN — POTASSIUM BICARBONATE 20 MEQ: 782 TABLET, EFFERVESCENT ORAL at 21:29

## 2025-01-18 RX ADMIN — PANTOPRAZOLE SODIUM 40 MG: 40 TABLET, DELAYED RELEASE ORAL at 08:33

## 2025-01-18 RX ADMIN — DULOXETINE HYDROCHLORIDE 30 MG: 30 CAPSULE, DELAYED RELEASE ORAL at 08:34

## 2025-01-18 RX ADMIN — PREGABALIN 75 MG: 25 CAPSULE ORAL at 21:02

## 2025-01-18 RX ADMIN — INSULIN LISPRO 4 UNITS: 100 INJECTION, SOLUTION INTRAVENOUS; SUBCUTANEOUS at 08:35

## 2025-01-18 RX ADMIN — INSULIN LISPRO 2 UNITS: 100 INJECTION, SOLUTION INTRAVENOUS; SUBCUTANEOUS at 12:29

## 2025-01-18 RX ADMIN — DILTIAZEM HYDROCHLORIDE 120 MG: 120 CAPSULE, COATED, EXTENDED RELEASE ORAL at 08:33

## 2025-01-18 RX ADMIN — INSULIN LISPRO 2 UNITS: 100 INJECTION, SOLUTION INTRAVENOUS; SUBCUTANEOUS at 17:11

## 2025-01-18 RX ADMIN — FUROSEMIDE 20 MG: 10 INJECTION, SOLUTION INTRAMUSCULAR; INTRAVENOUS at 08:37

## 2025-01-18 RX ADMIN — WATER 40 MG: 1 INJECTION INTRAMUSCULAR; INTRAVENOUS; SUBCUTANEOUS at 04:58

## 2025-01-18 RX ADMIN — INSULIN GLARGINE 20 UNITS: 100 INJECTION, SOLUTION SUBCUTANEOUS at 08:36

## 2025-01-18 RX ADMIN — FUROSEMIDE 20 MG: 10 INJECTION, SOLUTION INTRAMUSCULAR; INTRAVENOUS at 17:13

## 2025-01-18 RX ADMIN — ROPINIROLE HYDROCHLORIDE 0.5 MG: 0.25 TABLET, FILM COATED ORAL at 21:01

## 2025-01-18 RX ADMIN — AZITHROMYCIN MONOHYDRATE 500 MG: 500 INJECTION, POWDER, LYOPHILIZED, FOR SOLUTION INTRAVENOUS at 08:52

## 2025-01-18 RX ADMIN — SODIUM CHLORIDE, PRESERVATIVE FREE 10 ML: 5 INJECTION INTRAVENOUS at 08:53

## 2025-01-18 RX ADMIN — ROPINIROLE HYDROCHLORIDE 0.5 MG: 0.25 TABLET, FILM COATED ORAL at 13:48

## 2025-01-18 RX ADMIN — ARFORMOTEROL TARTRATE: 15 SOLUTION RESPIRATORY (INHALATION) at 09:43

## 2025-01-18 RX ADMIN — CEFEPIME 2000 MG: 2 INJECTION, POWDER, FOR SOLUTION INTRAVENOUS at 11:42

## 2025-01-18 RX ADMIN — HEPARIN SODIUM 2300 UNITS: 1000 INJECTION INTRAVENOUS; SUBCUTANEOUS at 21:19

## 2025-01-18 RX ADMIN — ATORVASTATIN CALCIUM 80 MG: 40 TABLET, FILM COATED ORAL at 21:01

## 2025-01-18 RX ADMIN — PREGABALIN 75 MG: 25 CAPSULE ORAL at 08:47

## 2025-01-18 RX ADMIN — MIRTAZAPINE 45 MG: 15 TABLET, FILM COATED ORAL at 21:01

## 2025-01-18 RX ADMIN — WATER 40 MG: 1 INJECTION INTRAMUSCULAR; INTRAVENOUS; SUBCUTANEOUS at 17:11

## 2025-01-18 RX ADMIN — HEPARIN SODIUM 13 UNITS/KG/HR: 10000 INJECTION, SOLUTION INTRAVENOUS at 17:18

## 2025-01-18 RX ADMIN — TRAMADOL HYDROCHLORIDE 50 MG: 50 TABLET, COATED ORAL at 12:26

## 2025-01-18 RX ADMIN — THERA TABS 1 TABLET: TAB at 17:09

## 2025-01-18 RX ADMIN — SODIUM CHLORIDE, PRESERVATIVE FREE 10 ML: 5 INJECTION INTRAVENOUS at 21:06

## 2025-01-18 RX ADMIN — ROPINIROLE HYDROCHLORIDE 0.5 MG: 0.25 TABLET, FILM COATED ORAL at 08:33

## 2025-01-18 ASSESSMENT — PAIN SCALES - GENERAL
PAINLEVEL_OUTOF10: 0
PAINLEVEL_OUTOF10: 0
PAINLEVEL_OUTOF10: 5
PAINLEVEL_OUTOF10: 10
PAINLEVEL_OUTOF10: 0

## 2025-01-18 ASSESSMENT — PAIN DESCRIPTION - ORIENTATION: ORIENTATION: LOWER

## 2025-01-18 ASSESSMENT — PAIN DESCRIPTION - DESCRIPTORS: DESCRIPTORS: ACHING;THROBBING

## 2025-01-18 ASSESSMENT — PAIN DESCRIPTION - LOCATION: LOCATION: BACK

## 2025-01-18 NOTE — PROGRESS NOTES
Collection Time: 01/18/25  4:40 AM   Result Value Ref Range    Sodium 139 136 - 145 mmol/L    Potassium 3.8 3.5 - 5.5 mmol/L    Chloride 104 100 - 111 mmol/L    CO2 27 21 - 32 mmol/L    Anion Gap 8 3.0 - 18 mmol/L    Glucose 227 (H) 74 - 99 mg/dL    BUN 28 (H) 7.0 - 18 MG/DL    Creatinine 1.27 0.6 - 1.3 MG/DL    BUN/Creatinine Ratio 22 (H) 12 - 20      Est, Glom Filt Rate 45 (L) >60 ml/min/1.73m2    Calcium 7.5 (L) 8.5 - 10.1 MG/DL    Total Bilirubin 0.3 0.2 - 1.0 MG/DL    ALT <6 (L) 13 - 56 U/L    AST 10 10 - 38 U/L    Alk Phosphatase 98 45 - 117 U/L    Total Protein 7.1 6.4 - 8.2 g/dL    Albumin 1.7 (L) 3.4 - 5.0 g/dL    Globulin 5.4 (H) 2.0 - 4.0 g/dL    Albumin/Globulin Ratio 0.3 (L) 0.8 - 1.7     CBC with Auto Differential    Collection Time: 01/18/25  4:40 AM   Result Value Ref Range    WBC 9.2 4.6 - 13.2 K/uL    RBC 3.65 (L) 4.20 - 5.30 M/uL    Hemoglobin 7.8 (L) 12.0 - 16.0 g/dL    Hematocrit 25.5 (L) 35.0 - 45.0 %    MCV 69.9 (L) 78.0 - 100.0 FL    MCH 21.4 (L) 24.0 - 34.0 PG    MCHC 30.6 (L) 31.0 - 37.0 g/dL    RDW 19.6 (H) 11.6 - 14.5 %    Platelets 380 135 - 420 K/uL    MPV 11.4 9.2 - 11.8 FL    Nucleated RBCs 0.0 0  WBC    nRBC 0.00 0.00 - 0.01 K/uL    Neutrophils % 89.9 (H) 40.0 - 73.0 %    Lymphocytes % 8.4 (L) 21.0 - 52.0 %    Monocytes % 1.2 (L) 3.0 - 10.0 %    Eosinophils % 0.0 0.0 - 5.0 %    Basophils % 0.1 0.0 - 2.0 %    Immature Granulocytes % 0.4 0.0 - 0.5 %    Neutrophils Absolute 8.24 (H) 1.80 - 8.00 K/UL    Lymphocytes Absolute 0.77 (L) 0.90 - 3.60 K/UL    Monocytes Absolute 0.11 0.05 - 1.20 K/UL    Eosinophils Absolute 0.00 0.00 - 0.40 K/UL    Basophils Absolute 0.01 0.00 - 0.10 K/UL    Immature Granulocytes Absolute 0.04 0.00 - 0.04 K/UL    Differential Type AUTOMATED     Magnesium    Collection Time: 01/18/25  4:40 AM   Result Value Ref Range    Magnesium 1.7 1.6 - 2.6 mg/dL   POCT Glucose    Collection Time: 01/18/25  7:18 AM   Result Value Ref Range    POC Glucose 256 (H) 70 -  110 mg/dL   Vascular duplex lower extremity venous bilateral    Collection Time: 01/18/25 10:57 AM   Result Value Ref Range    Body Surface Area 1.6 m2          Signed By: Velasquez Reilly MD     January 18, 2025 11:04 AM

## 2025-01-18 NOTE — PROGRESS NOTES
conducted an initial consultation and Spiritual Assessment for Juliana Loaiza, who is a 74 y.o.,female. Patient’s Primary Language is: English.   According to the patient’s EMR Restoration Affiliation is: Sikhism.     The  provided the following Interventions:  Initiated a relationship of care and support. To there patient in bed 2314 where she has been for the last 24 hours dur to Acute on Chronic respiratory failure with hypoxia. Patient has a high end of life care index.  Explored issues of cherie, belief, spirituality and Anglican/ritual needs while hospitalized. She is active in a local [employer].  Listened empathically. There is no advance directive on file.  Offered prayer and assurance of continued prayers on patients behalf.     The following outcomes were achieved:  Patient shared limited information about her medical narrative and spiritual journey/beliefs.  Patient processed feeling about current hospitalization.  Patient expressed gratitude for pastoral care visit.    Spiritual Health History and Assessment/Progress Note  [facility]    Crisis, Initial Encounter (iv-sa-[id number]), Follow up,  ,      Name: Juliana Loaiza MRN: [id number]    Age: 74 y.o.     Sex: female   Language: English   Baptism: Sikhism   Acute on chronic respiratory failure with hypoxia     Date: 1/18/2025            Total Time Calculated: 8 min              Spiritual Assessment began in [facility] 2 CV STEPDOWN        Referral/Consult From: Rounding   Encounter Overview/Reason: Crisis, Initial Encounter ([id number]-[id number])  Service Provided For: Patient    Cherie, Belief, Meaning:   Patient identifies as spiritual and is connected with a cherie tradition or spiritual practice  Family/Friends No family/friends present      Importance and Influence:  Patient has no beliefs influential to healthcare decision-making identified during this visit  Family/Friends No family/friends present    Community:  Patient feels

## 2025-01-18 NOTE — PROGRESS NOTES
Bedside shift change report given to ALEJANDRA Hernandes (oncoming nurse) by ALEJANDRA Silva (offgoing nurse). Report included the following information Nurse Handoff Report, Recent Results, Cardiac Rhythm SR with PVC's, and Alarm Parameters.

## 2025-01-18 NOTE — CONSULTS
[facility].  Pulmonary, Critical Care, and Sleep Medicine    Initial Patient Consult    Name: Juliana Loaiza MRN: [id number]   : 1950 Hospital: [facility]   Date: 2025        IMPRESSION:   Acute on chronic hypoxic respiratory failure = multifactorial from COPD flare, debility, L subsegmental PE, bacterial vs viral PNA, bronchiectasis flare.  ON high dose ICS/LABA and low dose Spiriva at home.  Home baseline is 6 Lpm oxygen  Asthma - suspected based on recurrent bronchospasm and lack of smoking history.  Oral steroid dependence at this point with well over 2 steroid courses per year being given  Acute subsegmental LLL PE - hx DVT in the past.  Noncompliant with Xarelto  Rhinovirus and Coronavirus OC43 infection - likely contributing to flare  Bronchiectasis - noted on CTA Chest.  On prior CT findings of air trapping and small airways disease with alveolitis seen suggesting this is results of chronic airway inflammation and recurrent infection  Sarcoidosis - diagnosed in  via punch biopsy.  No evidience of fibrosis on CT Chest.  No extrapulmonary symptoms  Deconditioning  Comorbidities: DM2 on Lantus, parox Afib, HFrEF, pulm HTN, hx cocaine abuse     Patient Active Problem List   Diagnosis    Knee pain, left    DM (diabetes mellitus), type 2 with complications (HCC)    Non-ST elevated myocardial infarction (non-STEMI) ([facility])    GERD (gastroesophageal reflux disease)    Cigarette smoker    Hidradenitis    Primary hypertension    Hyperglycemia    Osteoporosis    RVH (right ventricular hypertrophy)    Cocaine abuse (HCC)    Atypical chest pain    Microcytic anemia    Coronary artery disease involving native coronary artery of native heart without angina pectoris    COPD, moderate (HCC)    Chronic pain syndrome    HNP (herniated nucleus pulposus), cervical    Pulmonary sarcoidosis (HCC)    Closed head injury    Diverticulitis    Abdominal pain    Recurrent falls  direction toward the septum.    Tricuspid Valve: The estimated RVSP is 52 mmHg.    Left Atrium: Left atrial volume index is normal (16-34 mL/m2).    Pericardium: No pericardial effusion.    Signed by: Sebastien Sorto MD on 1/17/2025  4:15 PM    Imaging:  I have personally reviewed the patient’s radiographs and have reviewed the reports:  Xray Result (most recent):  XR CHEST PORTABLE 01/17/2025    Narrative  EXAM: XR CHEST PORTABLE    CLINICAL INDICATION/HISTORY : Provided with order: Shortness of Breath.    COMPARISON: October 31, 2024    TECHNIQUE: 1 VIEWS    FINDINGS:  EKG leads and wires overlie the chest. New RIGHT lateral upper lobe scarring or  atelectasis.  Mild diffuse interstitial prominence similar to priors. No focal lung  consolidation..  Heart is normal in size. Calcified mediastinal and hilar lymph nodes.  No large pleural effusion.    Impression  1.  New lateral RIGHT upper lobe scarring or atelectasis.  2.  Otherwise similar to prior with mild diffuse interstitial prominence.        Electronically signed by Lachelle Narayan    CT Result (most recent):  CTA CHEST W WO CONTRAST 01/17/2025    Narrative  EXAM: CTA CHEST W WO CONTRAST    CLINICAL INDICATION/HISTORY : PE.    COMPARISON: August 20, 2024    TECHNIQUE: Thin section axial images were obtained from the thoracic inlet  through the upper abdomen after the uneventful administration of IV contrast.  Utilization of smart prep dynamic bolus enhancement, timing centered for  pulmonary artery delineation.  Coronal and sagittal maximum intensity projection  (MIP) reconstructions were post-processed and utilized to better define  pulmonary artery anatomy and increase sensitivity for detecting emboli.  All CT scans at this facility are performed using dose optimization of technique  as appropriate to a performed exam, to include automated exposure control,  adjustment of the mA and/or kV according to patient size (including appropriate  matching for

## 2025-01-18 NOTE — PROGRESS NOTES
Comprehensive Nutrition Assessment    Type and Reason for Visit:  Initial, Positive nutrition screen    Nutrition Recommendations/Plan:   Continue current diet as tolerated.  Order Glucerna (each provides 220 kcal, 10g protein) TID  Recommend/order MVI/min supplementation daily, Daily wts.  Continue to monitor tolerance of PO, compliance of oral supplements, weight, labs, and plan of care during admission.     Malnutrition Assessment:  Malnutrition Status:  Moderate malnutrition (01/18/25 1504)    Context:  Chronic Illness     Findings of the 6 clinical characteristics of malnutrition:  Energy Intake:  75% or less estimated energy requirements for 1 month or longer  Weight Loss:  No weight loss     Body Fat Loss:  Mild body fat loss Triceps, Buccal region   Muscle Mass Loss:  Mild muscle mass loss Temples (temporalis), Clavicles (pectoralis & deltoids), Scapula (trapezius), Hand (interosseous)  Fluid Accumulation:  No fluid accumulation     Strength:  Not Performed    Nutrition History and Allergies:      Past Medical History:   Diagnosis Date    Arthritis     \"generalized\"    Asthma     CAD in native artery     NSTEMI (Sep 2014); diffuse 65% pLAD, minimal disease RCA & LCx. pLAD FFR 0.93. LV  no RWMA (echo and LV angio)    Chronic neck pain     Chronic pain     left knee    Chronic pain syndrome     COPD (chronic obstructive pulmonary disease) (HCC)     Depression     Diabetes (HCC)     Diverticulitis     GERD (gastroesophageal reflux disease)     Heart disease     Hidradenitis 9/11/2014    Hypertension     Left knee pain     Narcotic dependence (HCC)     Pneumonia     Right wrist pain     Sarcoidosis      PTA: Per pt decreased appetite for >1 month pta. Pt states just \"nibbling\", not eating full meals. Could not remember last full meal. Pt reports  lbs, with 8-10 lbs recent unintentional weight loss; over last month.     Weight Hx: 126 lbs Wt change: +3 lb (+2.3%) x 7 months - not significant.

## 2025-01-19 LAB
ANION GAP SERPL CALC-SCNC: 8 MMOL/L (ref 3–18)
APTT PPP: 165.8 SEC (ref 23–36.4)
APTT PPP: 68.3 SEC (ref 23–36.4)
APTT PPP: >180 SEC (ref 23–36.4)
BASOPHILS # BLD: 0 K/UL (ref 0–0.1)
BASOPHILS NFR BLD: 0 % (ref 0–2)
BUN SERPL-MCNC: 30 MG/DL (ref 7–18)
BUN/CREAT SERPL: 21 (ref 12–20)
CALCIUM SERPL-MCNC: 8.3 MG/DL (ref 8.5–10.1)
CHLORIDE SERPL-SCNC: 106 MMOL/L (ref 100–111)
CO2 SERPL-SCNC: 26 MMOL/L (ref 21–32)
CREAT SERPL-MCNC: 1.45 MG/DL (ref 0.6–1.3)
DIFFERENTIAL METHOD BLD: ABNORMAL
ECHO BSA: 1.6 M2
EKG ATRIAL RATE: 111 BPM
EKG DIAGNOSIS: NORMAL
EKG P-R INTERVAL: 154 MS
EKG Q-T INTERVAL: 348 MS
EKG QRS DURATION: 72 MS
EKG QTC CALCULATION (BAZETT): 473 MS
EKG R AXIS: -32 DEGREES
EKG T AXIS: 33 DEGREES
EKG VENTRICULAR RATE: 111 BPM
EOSINOPHIL # BLD: 0.01 K/UL (ref 0–0.4)
EOSINOPHIL NFR BLD: 0.1 % (ref 0–5)
ERYTHROCYTE [DISTWIDTH] IN BLOOD BY AUTOMATED COUNT: 19.9 % (ref 11.6–14.5)
GLUCOSE BLD STRIP.AUTO-MCNC: 139 MG/DL (ref 70–110)
GLUCOSE BLD STRIP.AUTO-MCNC: 139 MG/DL (ref 70–110)
GLUCOSE BLD STRIP.AUTO-MCNC: 222 MG/DL (ref 70–110)
GLUCOSE BLD STRIP.AUTO-MCNC: 378 MG/DL (ref 70–110)
GLUCOSE SERPL-MCNC: 219 MG/DL (ref 74–99)
HCT VFR BLD AUTO: 26.8 % (ref 35–45)
HGB BLD-MCNC: 7.8 G/DL (ref 12–16)
IMM GRANULOCYTES # BLD AUTO: 0.07 K/UL (ref 0–0.04)
IMM GRANULOCYTES NFR BLD AUTO: 0.7 % (ref 0–0.5)
LYMPHOCYTES # BLD: 0.68 K/UL (ref 0.9–3.6)
LYMPHOCYTES NFR BLD: 6.3 % (ref 21–52)
MCH RBC QN AUTO: 21.1 PG (ref 24–34)
MCHC RBC AUTO-ENTMCNC: 29.1 G/DL (ref 31–37)
MCV RBC AUTO: 72.4 FL (ref 78–100)
MONOCYTES # BLD: 0.19 K/UL (ref 0.05–1.2)
MONOCYTES NFR BLD: 1.8 % (ref 3–10)
NEUTS SEG # BLD: 9.77 K/UL (ref 1.8–8)
NEUTS SEG NFR BLD: 91.1 % (ref 40–73)
NRBC # BLD: 0 K/UL (ref 0–0.01)
NRBC BLD-RTO: 0 PER 100 WBC
PLATELET # BLD AUTO: 234 K/UL (ref 135–420)
POTASSIUM SERPL-SCNC: 4.4 MMOL/L (ref 3.5–5.5)
RBC # BLD AUTO: 3.7 M/UL (ref 4.2–5.3)
SODIUM SERPL-SCNC: 140 MMOL/L (ref 136–145)
WBC # BLD AUTO: 10.7 K/UL (ref 4.6–13.2)

## 2025-01-19 PROCEDURE — 93970 EXTREMITY STUDY: CPT | Performed by: INTERNAL MEDICINE

## 2025-01-19 PROCEDURE — 36415 COLL VENOUS BLD VENIPUNCTURE: CPT

## 2025-01-19 PROCEDURE — 6370000000 HC RX 637 (ALT 250 FOR IP): Performed by: INTERNAL MEDICINE

## 2025-01-19 PROCEDURE — 2700000000 HC OXYGEN THERAPY PER DAY

## 2025-01-19 PROCEDURE — 1100000000 HC RM PRIVATE

## 2025-01-19 PROCEDURE — 99232 SBSQ HOSP IP/OBS MODERATE 35: CPT | Performed by: HOSPITALIST

## 2025-01-19 PROCEDURE — 82962 GLUCOSE BLOOD TEST: CPT

## 2025-01-19 PROCEDURE — 99232 SBSQ HOSP IP/OBS MODERATE 35: CPT | Performed by: STUDENT IN AN ORGANIZED HEALTH CARE EDUCATION/TRAINING PROGRAM

## 2025-01-19 PROCEDURE — 6360000002 HC RX W HCPCS: Performed by: STUDENT IN AN ORGANIZED HEALTH CARE EDUCATION/TRAINING PROGRAM

## 2025-01-19 PROCEDURE — 85730 THROMBOPLASTIN TIME PARTIAL: CPT

## 2025-01-19 PROCEDURE — 6360000002 HC RX W HCPCS: Performed by: INTERNAL MEDICINE

## 2025-01-19 PROCEDURE — 93010 ELECTROCARDIOGRAM REPORT: CPT | Performed by: INTERNAL MEDICINE

## 2025-01-19 PROCEDURE — 94640 AIRWAY INHALATION TREATMENT: CPT

## 2025-01-19 PROCEDURE — 2500000003 HC RX 250 WO HCPCS: Performed by: INTERNAL MEDICINE

## 2025-01-19 PROCEDURE — 94761 N-INVAS EAR/PLS OXIMETRY MLT: CPT

## 2025-01-19 PROCEDURE — 2580000003 HC RX 258: Performed by: STUDENT IN AN ORGANIZED HEALTH CARE EDUCATION/TRAINING PROGRAM

## 2025-01-19 PROCEDURE — 85025 COMPLETE CBC W/AUTO DIFF WBC: CPT

## 2025-01-19 PROCEDURE — 6370000000 HC RX 637 (ALT 250 FOR IP): Performed by: STUDENT IN AN ORGANIZED HEALTH CARE EDUCATION/TRAINING PROGRAM

## 2025-01-19 PROCEDURE — 2580000003 HC RX 258: Performed by: INTERNAL MEDICINE

## 2025-01-19 PROCEDURE — 80048 BASIC METABOLIC PNL TOTAL CA: CPT

## 2025-01-19 RX ADMIN — PANTOPRAZOLE SODIUM 40 MG: 40 TABLET, DELAYED RELEASE ORAL at 08:48

## 2025-01-19 RX ADMIN — FUROSEMIDE 20 MG: 10 INJECTION, SOLUTION INTRAMUSCULAR; INTRAVENOUS at 08:49

## 2025-01-19 RX ADMIN — ROPINIROLE HYDROCHLORIDE 0.5 MG: 0.25 TABLET, FILM COATED ORAL at 08:47

## 2025-01-19 RX ADMIN — AZITHROMYCIN MONOHYDRATE 500 MG: 500 INJECTION, POWDER, LYOPHILIZED, FOR SOLUTION INTRAVENOUS at 08:55

## 2025-01-19 RX ADMIN — INSULIN LISPRO 8 UNITS: 100 INJECTION, SOLUTION INTRAVENOUS; SUBCUTANEOUS at 17:24

## 2025-01-19 RX ADMIN — ROPINIROLE HYDROCHLORIDE 0.5 MG: 0.25 TABLET, FILM COATED ORAL at 21:03

## 2025-01-19 RX ADMIN — WATER 40 MG: 1 INJECTION INTRAMUSCULAR; INTRAVENOUS; SUBCUTANEOUS at 17:25

## 2025-01-19 RX ADMIN — PREGABALIN 75 MG: 25 CAPSULE ORAL at 08:59

## 2025-01-19 RX ADMIN — THERA TABS 1 TABLET: TAB at 08:48

## 2025-01-19 RX ADMIN — INSULIN LISPRO 2 UNITS: 100 INJECTION, SOLUTION INTRAVENOUS; SUBCUTANEOUS at 08:45

## 2025-01-19 RX ADMIN — SODIUM CHLORIDE, PRESERVATIVE FREE 10 ML: 5 INJECTION INTRAVENOUS at 08:48

## 2025-01-19 RX ADMIN — TRAMADOL HYDROCHLORIDE 50 MG: 50 TABLET, COATED ORAL at 05:12

## 2025-01-19 RX ADMIN — HEPARIN SODIUM 2300 UNITS: 1000 INJECTION INTRAVENOUS; SUBCUTANEOUS at 05:37

## 2025-01-19 RX ADMIN — FUROSEMIDE 20 MG: 10 INJECTION, SOLUTION INTRAMUSCULAR; INTRAVENOUS at 17:25

## 2025-01-19 RX ADMIN — WATER 40 MG: 1 INJECTION INTRAMUSCULAR; INTRAVENOUS; SUBCUTANEOUS at 05:14

## 2025-01-19 RX ADMIN — ARFORMOTEROL TARTRATE: 15 SOLUTION RESPIRATORY (INHALATION) at 20:30

## 2025-01-19 RX ADMIN — ARFORMOTEROL TARTRATE: 15 SOLUTION RESPIRATORY (INHALATION) at 08:42

## 2025-01-19 RX ADMIN — SERTRALINE HYDROCHLORIDE 100 MG: 50 TABLET ORAL at 08:47

## 2025-01-19 RX ADMIN — MIRTAZAPINE 45 MG: 15 TABLET, FILM COATED ORAL at 21:02

## 2025-01-19 RX ADMIN — CEFEPIME 2000 MG: 2 INJECTION, POWDER, FOR SOLUTION INTRAVENOUS at 00:46

## 2025-01-19 RX ADMIN — ROPINIROLE HYDROCHLORIDE 0.5 MG: 0.25 TABLET, FILM COATED ORAL at 13:57

## 2025-01-19 RX ADMIN — ATORVASTATIN CALCIUM 80 MG: 40 TABLET, FILM COATED ORAL at 21:03

## 2025-01-19 RX ADMIN — CEFEPIME 1000 MG: 1 INJECTION, POWDER, FOR SOLUTION INTRAMUSCULAR; INTRAVENOUS at 12:25

## 2025-01-19 RX ADMIN — DILTIAZEM HYDROCHLORIDE 120 MG: 120 CAPSULE, COATED, EXTENDED RELEASE ORAL at 08:47

## 2025-01-19 RX ADMIN — DULOXETINE HYDROCHLORIDE 30 MG: 30 CAPSULE, DELAYED RELEASE ORAL at 08:48

## 2025-01-19 RX ADMIN — SODIUM CHLORIDE, PRESERVATIVE FREE 10 ML: 5 INJECTION INTRAVENOUS at 21:05

## 2025-01-19 RX ADMIN — CEFEPIME 1000 MG: 1 INJECTION, POWDER, FOR SOLUTION INTRAMUSCULAR; INTRAVENOUS at 23:51

## 2025-01-19 RX ADMIN — PREGABALIN 75 MG: 25 CAPSULE ORAL at 21:02

## 2025-01-19 RX ADMIN — INSULIN GLARGINE 20 UNITS: 100 INJECTION, SOLUTION SUBCUTANEOUS at 08:45

## 2025-01-19 ASSESSMENT — PAIN SCALES - GENERAL
PAINLEVEL_OUTOF10: 8
PAINLEVEL_OUTOF10: 7
PAINLEVEL_OUTOF10: 0
PAINLEVEL_OUTOF10: 0
PAINLEVEL_OUTOF10: 4
PAINLEVEL_OUTOF10: 0
PAINLEVEL_OUTOF10: 0

## 2025-01-19 ASSESSMENT — PAIN DESCRIPTION - LOCATION
LOCATION: RIB CAGE;STERNUM
LOCATION: ABDOMEN

## 2025-01-19 ASSESSMENT — PAIN DESCRIPTION - FREQUENCY: FREQUENCY: INTERMITTENT

## 2025-01-19 ASSESSMENT — PAIN DESCRIPTION - DESCRIPTORS: DESCRIPTORS: ACHING

## 2025-01-19 ASSESSMENT — PAIN DESCRIPTION - ORIENTATION: ORIENTATION: ANTERIOR

## 2025-01-19 ASSESSMENT — PAIN - FUNCTIONAL ASSESSMENT: PAIN_FUNCTIONAL_ASSESSMENT: ACTIVITIES ARE NOT PREVENTED

## 2025-01-19 ASSESSMENT — PAIN DESCRIPTION - ONSET: ONSET: ON-GOING

## 2025-01-19 ASSESSMENT — PAIN DESCRIPTION - PAIN TYPE: TYPE: CHRONIC PAIN

## 2025-01-19 NOTE — PROGRESS NOTES
[facility]  Progress Note    Patient: Juliana Loaiza Age: 74 y.o. : 1950 MR#: [id number] SSN: [id number]  Date/Time: 2025    Subjective:   Subjective   No acute events overnight, no new concerns or complaints, vitals stable.      Assessment/Plan:   Acute on chronic hypoxic respiratory failure  Rhinovirus infection  Coronavirus OC 4 3 infection  Pneumonia due to viral infection  COPD exacerbation  Suspected asthma  History of sarcoid  Acute lower left lobe subsegmental PE  Bronchiectasis  Type 2 diabetes  Paroxysmal A-fib  HFrEF  Pulmonary hypertension      Plan  Continue oxygen supplementation, goal is 88 to 92% SpO2, avoid over oxygenation  Bronchodilators and steroids as scheduled, appreciate pulmonology guidance on this  Likewise antibiotics especially given the possibility of superimposed pneumonia in this patient who has multiple risk factors  Ongoing heparin drip for PE, will need to switch to DOAC prior to discharge  Aggressive pulmonary hygiene    No significant change to plans today    Disposition: Expected location: Home     Expected discharge date: 2025      Case discussed with:  [x]Patient  []Family  [x]Nursing  [x]Case Management  DVT Prophylaxis:  []Lovenox  []Hep SQ  [x]SCDs  []Coumadin   [x]On Heparin gtt   [] DOAC    Objective:   Objective:  General Appearance:  Comfortable, in no acute distress, not in pain and ill-appearing.    Vital signs: (most recent): Blood pressure 122/70, pulse 83, temperature 97.4 °F (36.3 °C), temperature source Oral, resp. rate 12, height 1.575 m (5' 2.01\"), weight 46 kg (101 lb 6.6 oz), SpO2 (!) 70%.  Vital signs are normal.  No fever.    HEENT: Normal HEENT exam.    Lungs:  Normal effort and normal respiratory rate.  There are decreased breath sounds and wheezes.    Heart: Normal rate.  Regular rhythm.  S1 normal and S2 normal.  No murmur, gallop or friction rub.   Chest: Symmetric chest wall expansion.  100 - 111 mmol/L    CO2 26 21 - 32 mmol/L    Anion Gap 8 3.0 - 18 mmol/L    Glucose 219 (H) 74 - 99 mg/dL    BUN 30 (H) 7.0 - 18 MG/DL    Creatinine 1.45 (H) 0.6 - 1.3 MG/DL    BUN/Creatinine Ratio 21 (H) 12 - 20      Est, Glom Filt Rate 38 (L) >60 ml/min/1.73m2    Calcium 8.3 (L) 8.5 - 10.1 MG/DL   APTT    Collection Time: 01/19/25  4:15 AM   Result Value Ref Range    APTT 68.3 (H) 23.0 - 36.4 SEC   POCT Glucose    Collection Time: 01/19/25  8:03 AM   Result Value Ref Range    POC Glucose 222 (H) 70 - 110 mg/dL   POCT Glucose    Collection Time: 01/19/25 12:10 PM   Result Value Ref Range    POC Glucose 139 (H) 70 - 110 mg/dL   APTT    Collection Time: 01/19/25 12:40 PM   Result Value Ref Range    APTT >180.0 (HH) 23.0 - 36.4 SEC          Signed By: Velasquez Reilly MD     January 19, 2025 4:05 PM

## 2025-01-19 NOTE — PLAN OF CARE
Problem: Chronic Conditions and Co-morbidities  Goal: Patient's chronic conditions and co-morbidity symptoms are monitored and maintained or improved  1/19/2025 0951 by Janet Beaver, RN  Outcome: Progressing  Flowsheets (Taken 1/19/2025 0858)  Care Plan - Patient's Chronic Conditions and Co-Morbidity Symptoms are Monitored and Maintained or Improved: Monitor and assess patient's chronic conditions and comorbid symptoms for stability, deterioration, or improvement     Problem: Discharge Planning  Goal: Discharge to home or other facility with appropriate resources  1/19/2025 0951 by Janet Beaver, RN  Outcome: Progressing  Flowsheets (Taken 1/19/2025 0858)  Discharge to home or other facility with appropriate resources: Identify barriers to discharge with patient and caregiver     Problem: Skin/Tissue Integrity  Goal: Absence of new skin breakdown  Description: 1.  Monitor for areas of redness and/or skin breakdown  2.  Assess vascular access sites hourly  3.  Every 4-6 hours minimum:  Change oxygen saturation probe site  4.  Every 4-6 hours:  If on nasal continuous positive airway pressure, respiratory therapy assess nares and determine need for appliance change or resting period.  1/19/2025 0951 by Janet Beaver, RN  Outcome: Progressing     Problem: Safety - Adult  Goal: Free from fall injury  1/19/2025 0951 by Janet Beaver, RN  Outcome: Progressing     Problem: Respiratory - Adult  Goal: Achieves optimal ventilation and oxygenation  1/19/2025 0951 by Janet Beaver, RN  Outcome: Progressing     Problem: Skin/Tissue Integrity - Adult  Goal: Skin integrity remains intact  1/19/2025 0951 by Janet Beaver, RN  Outcome: Progressing  Flowsheets (Taken 1/19/2025 0858)  Skin Integrity Remains Intact: Monitor for areas of redness and/or skin breakdown

## 2025-01-19 NOTE — PROGRESS NOTES
Pharmacy Note     Cefepime 2,000mg q12h ordered for treatment of CAP. Per [facility] Policy, cefepime will be changed to 1,000mg q12h.     Estimated Creatinine Clearance: Estimated Creatinine Clearance: 25 mL/min (A) (based on SCr of 1.45 mg/dL (H)).  Dialysis Status, IRA, CKD: -  BMI:  Body mass index is 18.54 kg/m².    Rationale for Adjustment:  [facility] renal dosing policy.    Pharmacy will continue to monitor and adjust dose as necessary.      Please call with any questions.    Thank you,  VENECIA BUSCH, [facility]

## 2025-01-19 NOTE — PROGRESS NOTES
[facility]  Pulmonary, Critical Care, and Sleep Medicine    Name: Juliana Loaiza MRN: [id number]   : 1950 Hospital: [facility]   Date: 2025        IMPRESSION:   Acute on chronic hypoxic respiratory failure = multifactorial from COPD flare, debility, L subsegmental PE, bacterial vs viral PNA, bronchiectasis flare.  ON high dose ICS/LABA and low dose Spiriva at home.  Home baseline is 6 Lpm oxygen  Asthma - suspected based on recurrent bronchospasm and lack of smoking history.  Oral steroid dependence at this point with well over 2 steroid courses per year being given  Acute subsegmental LLL PE - hx DVT in the past.  Noncompliant with Xarelto  Rhinovirus and Coronavirus OC43 infection - likely contributing to flare  Bronchiectasis - noted on CTA Chest.  On prior CT findings of air trapping and small airways disease with alveolitis seen suggesting this is results of chronic airway inflammation and recurrent infection  Sarcoidosis - diagnosed in  via punch biopsy.  No evidience of fibrosis on CT Chest.  No extrapulmonary symptoms  Deconditioning  Comorbidities: DM2 on Lantus, parox Afib, HFrEF, pulm HTN, hx cocaine abuse           Patient Active Problem List   Diagnosis    Knee pain, left    DM (diabetes mellitus), type 2 with complications (HCC)    Non-ST elevated myocardial infarction (non-STEMI) ([facility])    GERD (gastroesophageal reflux disease)    Cigarette smoker    Hidradenitis    Primary hypertension    Hyperglycemia    Osteoporosis    RVH (right ventricular hypertrophy)    Cocaine abuse (HCC)    Atypical chest pain    Microcytic anemia    Coronary artery disease involving native coronary artery of native heart without angina pectoris    COPD, moderate (HCC)    Chronic pain syndrome    HNP (herniated nucleus pulposus), cervical    Pulmonary sarcoidosis (HCC)    Closed head injury    Diverticulitis    Abdominal pain    Recurrent falls    Chronic back pain     normal.   Neurologic: Grossly nonfocal     DATA:  Labs:  Recent Labs     01/17/25  1830 01/18/25  0440 01/19/25  0415   WBC 10.5 9.2 10.7   HGB 7.8* 7.8* 7.8*   HCT 26.4* 25.5* 26.8*    380 234     Recent Labs     01/17/25  0843 01/17/25  1830 01/18/25  0440 01/19/25  0415    136 139 140   K 4.1 3.2* 3.8 4.4    103 104 106   CO2 27 26 27 26   BUN 22* 24* 28* 30*   MG 1.5*  --  1.7  --    ALT 6* <6* <6*  --      No results for input(s): \"PH\", \"PCO2\", \"PO2\", \"HCO3\", \"FIO2\" in the last 72 hours.                                                       Echo:   01/17/25    ECHO (TTE) LIMITED (PRN CONTRAST/BUBBLE/STRAIN/3D) 01/17/2025  4:15 PM (Final)    Interpretation Summary    Left Ventricle: Low normal left ventricular systolic function with a visually estimated EF of 50 - 55%. Left ventricle size is normal. Mildly increased wall thickness.    Image quality is suboptimal due to low windows, ribs and shallow breathing    Left Ventricle: Normal wall motion.    Right Ventricle: Right ventricle size is normal.    Right Ventricle: Systolic function appears mildly reduced    Aortic Valve: Trileaflet valve. Mild sclerosis of the aortic valve cusps.    Tricuspid Valve: Mild regurgitation with jet direction toward the septum.    Tricuspid Valve: The estimated RVSP is 52 mmHg.    Left Atrium: Left atrial volume index is normal (16-34 mL/m2).    Pericardium: No pericardial effusion.    Signed by: Sebastien Sorto MD on 1/17/2025  4:15 PM      Imaging:  [x]I have personally reviewed the patient’s radiographs  []Radiographs reviewed with radiologist   []No change from prior, tubes and lines in adequate position  []Improved   []Worsening    High complexity decision making was performed during the evaluation of this patient at high risk for decompensation with multiple organ involvement     Above mentioned total time spent on reviewing the case/medical record/data/notes/EMR/patient

## 2025-01-20 LAB
ANION GAP SERPL CALC-SCNC: 2 MMOL/L (ref 3–18)
APTT PPP: 67.8 SEC (ref 23–36.4)
BASOPHILS # BLD: 0 K/UL (ref 0–0.1)
BASOPHILS NFR BLD: 0 % (ref 0–2)
BUN SERPL-MCNC: 41 MG/DL (ref 7–18)
BUN/CREAT SERPL: 25 (ref 12–20)
CALCIUM SERPL-MCNC: 8.4 MG/DL (ref 8.5–10.1)
CHLORIDE SERPL-SCNC: 105 MMOL/L (ref 100–111)
CO2 SERPL-SCNC: 30 MMOL/L (ref 21–32)
CREAT SERPL-MCNC: 1.63 MG/DL (ref 0.6–1.3)
DIFFERENTIAL METHOD BLD: ABNORMAL
EOSINOPHIL # BLD: 0 K/UL (ref 0–0.4)
EOSINOPHIL NFR BLD: 0 % (ref 0–5)
ERYTHROCYTE [DISTWIDTH] IN BLOOD BY AUTOMATED COUNT: 20.1 % (ref 11.6–14.5)
GLUCOSE BLD STRIP.AUTO-MCNC: 136 MG/DL (ref 70–110)
GLUCOSE BLD STRIP.AUTO-MCNC: 167 MG/DL (ref 70–110)
GLUCOSE BLD STRIP.AUTO-MCNC: 191 MG/DL (ref 70–110)
GLUCOSE BLD STRIP.AUTO-MCNC: 221 MG/DL (ref 70–110)
GLUCOSE SERPL-MCNC: 156 MG/DL (ref 74–99)
HCT VFR BLD AUTO: 27.6 % (ref 35–45)
HGB BLD-MCNC: 8 G/DL (ref 12–16)
IMM GRANULOCYTES # BLD AUTO: 0.14 K/UL (ref 0–0.04)
IMM GRANULOCYTES NFR BLD AUTO: 1.3 % (ref 0–0.5)
LYMPHOCYTES # BLD: 0.65 K/UL (ref 0.9–3.6)
LYMPHOCYTES NFR BLD: 6 % (ref 21–52)
MCH RBC QN AUTO: 21.2 PG (ref 24–34)
MCHC RBC AUTO-ENTMCNC: 29 G/DL (ref 31–37)
MCV RBC AUTO: 73.2 FL (ref 78–100)
MONOCYTES # BLD: 0.18 K/UL (ref 0.05–1.2)
MONOCYTES NFR BLD: 1.7 % (ref 3–10)
NEUTS SEG # BLD: 9.92 K/UL (ref 1.8–8)
NEUTS SEG NFR BLD: 91 % (ref 40–73)
NRBC # BLD: 0 K/UL (ref 0–0.01)
NRBC BLD-RTO: 0 PER 100 WBC
PLATELET # BLD AUTO: 479 K/UL (ref 135–420)
PMV BLD AUTO: 11.2 FL (ref 9.2–11.8)
POTASSIUM SERPL-SCNC: 4.7 MMOL/L (ref 3.5–5.5)
RBC # BLD AUTO: 3.77 M/UL (ref 4.2–5.3)
SODIUM SERPL-SCNC: 137 MMOL/L (ref 136–145)
WBC # BLD AUTO: 10.9 K/UL (ref 4.6–13.2)

## 2025-01-20 PROCEDURE — 6370000000 HC RX 637 (ALT 250 FOR IP): Performed by: INTERNAL MEDICINE

## 2025-01-20 PROCEDURE — 99232 SBSQ HOSP IP/OBS MODERATE 35: CPT | Performed by: STUDENT IN AN ORGANIZED HEALTH CARE EDUCATION/TRAINING PROGRAM

## 2025-01-20 PROCEDURE — 97535 SELF CARE MNGMENT TRAINING: CPT

## 2025-01-20 PROCEDURE — 82962 GLUCOSE BLOOD TEST: CPT

## 2025-01-20 PROCEDURE — 2580000003 HC RX 258: Performed by: STUDENT IN AN ORGANIZED HEALTH CARE EDUCATION/TRAINING PROGRAM

## 2025-01-20 PROCEDURE — 80048 BASIC METABOLIC PNL TOTAL CA: CPT

## 2025-01-20 PROCEDURE — 36415 COLL VENOUS BLD VENIPUNCTURE: CPT

## 2025-01-20 PROCEDURE — 6370000000 HC RX 637 (ALT 250 FOR IP): Performed by: STUDENT IN AN ORGANIZED HEALTH CARE EDUCATION/TRAINING PROGRAM

## 2025-01-20 PROCEDURE — 6360000002 HC RX W HCPCS: Performed by: INTERNAL MEDICINE

## 2025-01-20 PROCEDURE — APPSS15 APP SPLIT SHARED TIME 0-15 MINUTES

## 2025-01-20 PROCEDURE — 85025 COMPLETE CBC W/AUTO DIFF WBC: CPT

## 2025-01-20 PROCEDURE — 97162 PT EVAL MOD COMPLEX 30 MIN: CPT

## 2025-01-20 PROCEDURE — 2500000003 HC RX 250 WO HCPCS: Performed by: INTERNAL MEDICINE

## 2025-01-20 PROCEDURE — 6360000002 HC RX W HCPCS: Performed by: STUDENT IN AN ORGANIZED HEALTH CARE EDUCATION/TRAINING PROGRAM

## 2025-01-20 PROCEDURE — 99232 SBSQ HOSP IP/OBS MODERATE 35: CPT | Performed by: HOSPITALIST

## 2025-01-20 PROCEDURE — 2580000003 HC RX 258: Performed by: INTERNAL MEDICINE

## 2025-01-20 PROCEDURE — 85730 THROMBOPLASTIN TIME PARTIAL: CPT

## 2025-01-20 PROCEDURE — 2140000001 HC CVICU INTERMEDIATE R&B

## 2025-01-20 PROCEDURE — 97530 THERAPEUTIC ACTIVITIES: CPT

## 2025-01-20 PROCEDURE — 94640 AIRWAY INHALATION TREATMENT: CPT

## 2025-01-20 PROCEDURE — 2700000000 HC OXYGEN THERAPY PER DAY

## 2025-01-20 PROCEDURE — 94761 N-INVAS EAR/PLS OXIMETRY MLT: CPT

## 2025-01-20 PROCEDURE — 97166 OT EVAL MOD COMPLEX 45 MIN: CPT

## 2025-01-20 RX ORDER — AZITHROMYCIN 500 MG/1
500 TABLET, FILM COATED ORAL DAILY
Qty: 3 TABLET | Refills: 0 | Status: SHIPPED | OUTPATIENT
Start: 2025-01-20 | End: 2025-01-23

## 2025-01-20 RX ORDER — PREDNISONE 20 MG/1
TABLET ORAL
Qty: 14 TABLET | Refills: 0 | Status: SHIPPED | OUTPATIENT
Start: 2025-01-20 | End: 2025-02-01

## 2025-01-20 RX ORDER — PREDNISONE 20 MG/1
40 TABLET ORAL DAILY
Status: DISCONTINUED | OUTPATIENT
Start: 2025-01-21 | End: 2025-01-24 | Stop reason: HOSPADM

## 2025-01-20 RX ADMIN — PREGABALIN 75 MG: 25 CAPSULE ORAL at 08:54

## 2025-01-20 RX ADMIN — DULOXETINE HYDROCHLORIDE 30 MG: 30 CAPSULE, DELAYED RELEASE ORAL at 08:53

## 2025-01-20 RX ADMIN — PREGABALIN 75 MG: 25 CAPSULE ORAL at 21:51

## 2025-01-20 RX ADMIN — ROPINIROLE HYDROCHLORIDE 0.5 MG: 0.25 TABLET, FILM COATED ORAL at 21:51

## 2025-01-20 RX ADMIN — ATORVASTATIN CALCIUM 80 MG: 40 TABLET, FILM COATED ORAL at 21:51

## 2025-01-20 RX ADMIN — MIRTAZAPINE 45 MG: 15 TABLET, FILM COATED ORAL at 21:51

## 2025-01-20 RX ADMIN — THERA TABS 1 TABLET: TAB at 08:54

## 2025-01-20 RX ADMIN — HEPARIN SODIUM 11 UNITS/KG/HR: 10000 INJECTION, SOLUTION INTRAVENOUS at 00:13

## 2025-01-20 RX ADMIN — AZITHROMYCIN MONOHYDRATE 500 MG: 500 INJECTION, POWDER, LYOPHILIZED, FOR SOLUTION INTRAVENOUS at 08:55

## 2025-01-20 RX ADMIN — INSULIN GLARGINE 20 UNITS: 100 INJECTION, SOLUTION SUBCUTANEOUS at 08:54

## 2025-01-20 RX ADMIN — SODIUM CHLORIDE, PRESERVATIVE FREE 10 ML: 5 INJECTION INTRAVENOUS at 08:54

## 2025-01-20 RX ADMIN — RIVAROXABAN 15 MG: 15 TABLET, FILM COATED ORAL at 11:37

## 2025-01-20 RX ADMIN — FUROSEMIDE 20 MG: 10 INJECTION, SOLUTION INTRAMUSCULAR; INTRAVENOUS at 18:35

## 2025-01-20 RX ADMIN — INSULIN LISPRO 2 UNITS: 100 INJECTION, SOLUTION INTRAVENOUS; SUBCUTANEOUS at 11:43

## 2025-01-20 RX ADMIN — ROPINIROLE HYDROCHLORIDE 0.5 MG: 0.25 TABLET, FILM COATED ORAL at 13:10

## 2025-01-20 RX ADMIN — SERTRALINE HYDROCHLORIDE 100 MG: 50 TABLET ORAL at 08:53

## 2025-01-20 RX ADMIN — HEPARIN SODIUM 2300 UNITS: 1000 INJECTION INTRAVENOUS; SUBCUTANEOUS at 09:24

## 2025-01-20 RX ADMIN — ROPINIROLE HYDROCHLORIDE 0.5 MG: 0.25 TABLET, FILM COATED ORAL at 08:53

## 2025-01-20 RX ADMIN — SODIUM CHLORIDE, PRESERVATIVE FREE 10 ML: 5 INJECTION INTRAVENOUS at 21:52

## 2025-01-20 RX ADMIN — DILTIAZEM HYDROCHLORIDE 120 MG: 120 CAPSULE, COATED, EXTENDED RELEASE ORAL at 08:54

## 2025-01-20 RX ADMIN — WATER 40 MG: 1 INJECTION INTRAMUSCULAR; INTRAVENOUS; SUBCUTANEOUS at 05:35

## 2025-01-20 RX ADMIN — TRAMADOL HYDROCHLORIDE 50 MG: 50 TABLET, COATED ORAL at 09:07

## 2025-01-20 RX ADMIN — CEFEPIME 1000 MG: 1 INJECTION, POWDER, FOR SOLUTION INTRAMUSCULAR; INTRAVENOUS at 23:35

## 2025-01-20 RX ADMIN — INSULIN LISPRO 2 UNITS: 100 INJECTION, SOLUTION INTRAVENOUS; SUBCUTANEOUS at 21:51

## 2025-01-20 RX ADMIN — CEFEPIME 1000 MG: 1 INJECTION, POWDER, FOR SOLUTION INTRAMUSCULAR; INTRAVENOUS at 11:37

## 2025-01-20 RX ADMIN — ARFORMOTEROL TARTRATE: 15 SOLUTION RESPIRATORY (INHALATION) at 08:01

## 2025-01-20 RX ADMIN — FUROSEMIDE 20 MG: 10 INJECTION, SOLUTION INTRAMUSCULAR; INTRAVENOUS at 08:54

## 2025-01-20 RX ADMIN — PANTOPRAZOLE SODIUM 40 MG: 40 TABLET, DELAYED RELEASE ORAL at 08:54

## 2025-01-20 ASSESSMENT — PAIN DESCRIPTION - ONSET: ONSET: GRADUAL

## 2025-01-20 ASSESSMENT — PAIN DESCRIPTION - DESCRIPTORS: DESCRIPTORS: ACHING;SORE

## 2025-01-20 ASSESSMENT — PAIN SCALES - GENERAL
PAINLEVEL_OUTOF10: 0
PAINLEVEL_OUTOF10: 9
PAINLEVEL_OUTOF10: 0
PAINLEVEL_OUTOF10: 0

## 2025-01-20 ASSESSMENT — ENCOUNTER SYMPTOMS
EYE PAIN: 0
RHINORRHEA: 0
SHORTNESS OF BREATH: 1
DIARRHEA: 0
NAUSEA: 0
EYE DISCHARGE: 0
COUGH: 0
ABDOMINAL PAIN: 0
WHEEZING: 0

## 2025-01-20 ASSESSMENT — PAIN DESCRIPTION - ORIENTATION: ORIENTATION: ANTERIOR;LOWER

## 2025-01-20 ASSESSMENT — PAIN DESCRIPTION - PAIN TYPE: TYPE: ACUTE PAIN;CHRONIC PAIN

## 2025-01-20 ASSESSMENT — PAIN DESCRIPTION - FREQUENCY: FREQUENCY: INTERMITTENT

## 2025-01-20 ASSESSMENT — PAIN - FUNCTIONAL ASSESSMENT: PAIN_FUNCTIONAL_ASSESSMENT: PREVENTS OR INTERFERES SOME ACTIVE ACTIVITIES AND ADLS

## 2025-01-20 ASSESSMENT — PAIN DESCRIPTION - LOCATION: LOCATION: BACK;CHEST

## 2025-01-20 NOTE — CARE COORDINATION
01/20/25 1220   IMM Letter   IMM Letter given to Patient/Family/Significant other/Guardian/POA/by: Thais Ulrich   IMM Letter date given: 01/20/25   IMM Letter time given: 1050

## 2025-01-20 NOTE — PROGRESS NOTES
[facility]  Progress Note    Patient: Juliana Loaiza Age: 74 y.o. : 1950 MR#: [id number] SSN: [id number]  Date/Time: 2025    Subjective:   Subjective   No acute events overnight, no new concerns or complaints, vitals stable.      Assessment/Plan:   Acute on chronic hypoxic respiratory failure  Rhinovirus infection  Coronavirus OC 4 3 infection  Pneumonia due to viral infection  COPD exacerbation  Suspected asthma  History of sarcoid  Acute lower left lobe subsegmental PE  Bronchiectasis  Type 2 diabetes  Paroxysmal A-fib  HFrEF  Pulmonary hypertension  Physical debility and falls      Plan  Baseline oxygen supplementation  Switch to home DOAC  PT OT eval given patient's complaints of debility limiting ability to take care of self at home.      Disposition: Ex[facility] location: [facility]    Expected discharge date: 2025      Case discussed with:  [x]Patient  []Family  [x]Nursing  [x]Case Management  DVT Prophylaxis:  []Lovenox  []Hep SQ  [x]SCDs  []Coumadin   []On Heparin gtt   [x] DOAC    Objective:   Objective:  General Appearance:  Comfortable, in no acute distress, not in pain and ill-appearing.    Vital signs: (most recent): Blood pressure 114/73, pulse (!) 102, temperature 97.8 °F (36.6 °C), temperature source Oral, resp. rate 21, height 1.575 m (5' 2.01\"), weight 46.4 kg (102 lb 4.7 oz), SpO2 94%.  Vital signs are normal.  No fever.    HEENT: Normal HEENT exam.    Lungs:  Normal effort and normal respiratory rate.  There are decreased breath sounds and wheezes.    Heart: Normal rate.  Regular rhythm.  S1 normal and S2 normal.  No murmur, gallop or friction rub.   Chest: Symmetric chest wall expansion.   Abdomen: Abdomen is soft and non-distended.  Bowel sounds are normal.   There is no abdominal tenderness.     Extremities: Normal range of motion.    Pulses: Distal pulses are intact.    Neurological: Patient is alert and oriented to person, place and

## 2025-01-20 NOTE — PLAN OF CARE
Problem: Physical Therapy - Adult  Goal: By Discharge: Performs mobility at highest level of function for planned discharge setting.  See evaluation for individualized goals.  Description: Initiated  1/20/25  to be met within 7-10 days.    1.  Patient will move from supine to sit and sit to supine , scoot up and down, and roll side to side in bed with modified independence.    2.  Patient will transfer from bed to chair and chair to bed with modified independence using the least restrictive device.  3.  Patient will perform sit to stand with modified independence.  4.  Patient will ambulate with modified independence for 50 feet with the least restrictive device.   5.  Patient will perform BLE therex as prescribed to tolerance.     PLOF: pt lives an apt with elevator access, mod ind with rollator and home O2 at baseline,  currently at SNF and thus has no support    Outcome: Progressing     PHYSICAL THERAPY EVALUATION    Patient: Juliana Loaiza (74 y.o. female)  Date: 1/20/2025  Primary Diagnosis: Acute respiratory failure [J96.00]  Shortness of breath [R06.02]  Viral pneumonia [J12.9]  COPD exacerbation (HCC) [J44.1]  Acute on chronic respiratory failure with hypoxia [J96.21]  Acute pulmonary embolism without acute cor pulmonale, unspecified pulmonary embolism type (HCC) [I26.99]  Single subsegmental pulmonary embolism without acute cor pulmonale (HCC) [I26.93]  COVID [U07.1]       Precautions: General Precautions, Fall Risk, Bed Alarm      ASSESSMENT :  Pt received in bed, sleeping, easily awakes. Denies pain. On 6L via NC. Oriented to person, place, and time. Pt reporting she had a fall within the last hour of arrival trying to get onto BSC and that it took her approx 15 min to get up back into bed. Bed alarm was noted to not be on upon arrival. Pt required min A to EOB with increased time. ROM intact with strength being grossly decreased. Upon sitting up pt c/o dizziness and reporting feeling like  associated with a discharge to the patient's home setting.    This AMPAC score should be considered in conjunction with interdisciplinary team recommendations to determine the most appropriate discharge setting. Patient's social support, diagnosis, medical stability, and prior level of function should also be taken into consideration.     SUBJECTIVE:   Patient stated “I fell earlier, no one came.”    OBJECTIVE DATA SUMMARY:     Past Medical History:   Diagnosis Date    Arthritis     \"generalized\"    Asthma     CAD in native artery     NSTEMI (Sep 2014); diffuse 65% pLAD, minimal disease RCA & LCx. pLAD FFR 0.93. LV  no RWMA (echo and LV angio)    Chronic neck pain     Chronic pain     left knee    Chronic pain syndrome     COPD (chronic obstructive pulmonary disease) (HCC)     Depression     Diabetes (HCC)     Diverticulitis     GERD (gastroesophageal reflux disease)     Heart disease     Hidradenitis 9/11/2014    Hypertension     Left knee pain     Narcotic dependence (HCC)     Pneumonia     Right wrist pain     Sarcoidosis      Past Surgical History:   Procedure Laterality Date    BREAST BIOPSY Right     9/10/14: She said tag in place from BX    BRONCHOSCOPY  10/2019    CARDIAC PROCEDURE N/A 8/5/2024    Left heart cath performed by Jose Merino MD at [facility] CARDIAC CATH LAB    CARDIAC PROCEDURE N/A 8/5/2024    Coronary angiography performed by Jose Merino MD at [facility] CARDIAC CATH LAB    CHOLECYSTECTOMY  2005    HYSTERECTOMY (CERVIX STATUS UNKNOWN)      KNEE ARTHROSCOPY Left        Home Situation:  Social/Functional History  Lives With: Spouse  Type of Home: Apartment  Home Layout: One level  Home Access: Elevator  Bathroom Shower/Tub: Tub/Shower unit  Bathroom Toilet: Standard  Bathroom Equipment: Grab bars in shower, Shower chair  Bathroom Accessibility: Accessible  Home Equipment: Rollator, Oxygen  Receives Help From: Other (Comment) (spouse)  Prior Level of Assist for ADLs: Needs assistance  Prior Level of

## 2025-01-20 NOTE — PROGRESS NOTES
[facility]  Pulmonary, Critical Care, and Sleep Medicine    Name: Juliana Loaiza MRN: [id number]   : 1950 Hospital: [facility]   Date: 2025        Pulmonary Medicine: Daily Progress Note    Admission Date:   2025  LOS: 3  MAR reviewed and pertinent medications noted or modified as needed    IMPRESSION:   Acute on chronic hypoxic respiratory failure = multifactorial from COPD flare, debility, L subsegmental PE, bacterial vs viral PNA, bronchiectasis flare.  ON high dose ICS/LABA and low dose Spiriva at home.  Home baseline is 6 Lpm oxygen  Asthma - suspected based on recurrent bronchospasm and lack of smoking history.  Oral steroid dependence at this point with well over 2 steroid courses per year being given  Acute subsegmental LLL PE - hx DVT in the past.  Noncompliant with Xarelto  Rhinovirus and Coronavirus OC43 infection - likely contributing to flare  Bronchiectasis - noted on CTA Chest.  On prior CT findings of air trapping and small airways disease with alveolitis seen suggesting this is results of chronic airway inflammation and recurrent infection  Sarcoidosis - diagnosed in  via punch biopsy.  No evidience of fibrosis on CT Chest.  No extrapulmonary symptoms  Deconditioning  Comorbidities: DM2 on Lantus, parox Afib, HFrEF, pulm HTN, hx cocaine abuse  IRA  Underweight with BMI Body mass index is 18.7 kg/m².         Patient Active Problem List   Diagnosis    Knee pain, left    DM (diabetes mellitus), type 2 with complications (HCC)    Non-ST elevated myocardial infarction (non-STEMI) ([facility])    GERD (gastroesophageal reflux disease)    Cigarette smoker    Hidradenitis    Primary hypertension    Hyperglycemia    Osteoporosis    RVH (right ventricular hypertrophy)    Cocaine abuse ([facility])    Atypical chest pain    Microcytic anemia    Coronary artery disease involving native coronary artery of native heart without angina pectoris    COPD, moderate

## 2025-01-20 NOTE — PROGRESS NOTES
Cm contacted Debra from [facility] to follow pt for [facility], cm contacted [facility] to update of needing insurance auth, will forward OT progress notes to [facility] once available.

## 2025-01-20 NOTE — CARE COORDINATION
Pt states she would like to go to [facility] where her spouse is if recommended, pt clinical placed in [facility].

## 2025-01-20 NOTE — PLAN OF CARE
Problem: Chronic Conditions and Co-morbidities  Goal: Patient's chronic conditions and co-morbidity symptoms are monitored and maintained or improved  Outcome: Progressing  Flowsheets (Taken 1/19/2025 2000)  Care Plan - Patient's Chronic Conditions and Co-Morbidity Symptoms are Monitored and Maintained or Improved:   Monitor and assess patient's chronic conditions and comorbid symptoms for stability, deterioration, or improvement   Collaborate with multidisciplinary team to address chronic and comorbid conditions and prevent exacerbation or deterioration   Update acute care plan with appropriate goals if chronic or comorbid symptoms are exacerbated and prevent overall improvement and discharge     Problem: Discharge Planning  Goal: Discharge to home or other facility with appropriate resources  Outcome: Progressing  Flowsheets (Taken 1/19/2025 2000)  Discharge to home or other facility with appropriate resources: Identify barriers to discharge with patient and caregiver     Problem: Skin/Tissue Integrity  Goal: Absence of new skin breakdown  Description: 1.  Monitor for areas of redness and/or skin breakdown  2.  Assess vascular access sites hourly  3.  Every 4-6 hours minimum:  Change oxygen saturation probe site  4.  Every 4-6 hours:  If on nasal continuous positive airway pressure, respiratory therapy assess nares and determine need for appliance change or resting period.  Outcome: Progressing     Problem: Safety - Adult  Goal: Free from fall injury  Outcome: Progressing     Problem: Respiratory - Adult  Goal: Achieves optimal ventilation and oxygenation  Outcome: Progressing  Flowsheets (Taken 1/19/2025 2000)  Achieves optimal ventilation and oxygenation:   Assess for changes in respiratory status   Assess for changes in mentation and behavior   Position to facilitate oxygenation and minimize respiratory effort   Oxygen supplementation based on oxygen saturation or arterial blood gases   Assess and instruct to

## 2025-01-20 NOTE — PLAN OF CARE
Problem: Occupational Therapy - Adult  Goal: By Discharge: Performs self-care activities at highest level of function for planned discharge setting.  See evaluation for individualized goals.  Description: Occupational Therapy Goals:  Initiated 1/20/2025 to be met within 7-10 days.    1.  Patient will perform grooming while standing at sink with contact guard assistance.   2.  Patient will perform upper body dressing with modified independence.  3.  Patient will perform lower body dressing  with supervision/set-up.  4.  Patient will perform toilet transfers with supervision/set-up  5.  Patient will perform all aspects of toileting with supervision/set-up.  6.  Patient will participate in upper extremity therapeutic exercise/activities with supervision/set-up for 8-10 minutes to increase strength/endurance for ADLs.    7.  Patient will utilize energy conservation techniques during functional activities with verbal cues.    PLOF: Pt lives w/ spouse in an apartment w/ elevator access, tub/shower. Pt is independent w/ mobility using rollator, and is independent w/ ADLs. Spouse in currently at SNF for rehab.      Outcome: Progressing    OCCUPATIONAL THERAPY EVALUATION    Patient: Juliana Loaiza (74 y.o. female)  Date: 1/20/2025  Primary Diagnosis: Acute respiratory failure [J96.00]  Shortness of breath [R06.02]  Viral pneumonia [J12.9]  COPD exacerbation (HCC) [J44.1]  Acute on chronic respiratory failure with hypoxia [J96.21]  Acute pulmonary embolism without acute cor pulmonale, unspecified pulmonary embolism type (HCC) [I26.99]  Single subsegmental pulmonary embolism without acute cor pulmonale (HCC) [I26.93]  COVID [U07.1]       Precautions: General Precautions, Fall Risk, Bed Alarm, ,      ASSESSMENT :  Pt semi supine in bed, finishing PT session, agreeable to OT evaluation, bed alarm placed on by PT as PT exits room. Pt A&Ox4, on 6LO2 via NC, and explains to OT that she had a fall about an hour ago when she

## 2025-01-20 NOTE — CARE COORDINATION
SNF Authorization Request  1/20/2025, 3:48 PM    Patient Name: Juliana Loaiza                   YOB: 1950    Patient has been provided with freedom of choice and has chosen to go to [facility]    SNF has confirmed that they can accept the patient and they have a bed Yes  SNF has confirmed that they are in network with the patient's insurance Yes    Please request authorization.    Estimated date of discharge is 1/21/25    Skilled Need    PT Yes   OT Yes   Speech therapy No  Wound Care No  IV MedicationsNo  Trach No   Peg No     If PT/ OT/ Speech is required, evaluations/ notes have been updated within the last 48 hours       Additional information     Payor: Payor: [facility] MEDICARE / Plan: ANTHEM FULL DUAL ADVANTAGE 2 / Product Type: *No Product type* /   Attending physician: Velasquez Reilly MD Felicia E Orenuga, RN  Case Management Department  Ph: [phone]

## 2025-01-20 NOTE — PLAN OF CARE
Problem: Chronic Conditions and Co-morbidities  Goal: Patient's chronic conditions and co-morbidity symptoms are monitored and maintained or improved  1/20/2025 1052 by Caridad Mercado RN  Outcome: Progressing  Flowsheets (Taken 1/20/2025 0800)  Care Plan - Patient's Chronic Conditions and Co-Morbidity Symptoms are Monitored and Maintained or Improved:   Monitor and assess patient's chronic conditions and comorbid symptoms for stability, deterioration, or improvement   Collaborate with multidisciplinary team to address chronic and comorbid conditions and prevent exacerbation or deterioration   Update acute care plan with appropriate goals if chronic or comorbid symptoms are exacerbated and prevent overall improvement and discharge  1/20/2025 0402 by Jorge Alberto Turner, RN  Outcome: Progressing  Flowsheets (Taken 1/19/2025 2000)  Care Plan - Patient's Chronic Conditions and Co-Morbidity Symptoms are Monitored and Maintained or Improved:   Monitor and assess patient's chronic conditions and comorbid symptoms for stability, deterioration, or improvement   Collaborate with multidisciplinary team to address chronic and comorbid conditions and prevent exacerbation or deterioration   Update acute care plan with appropriate goals if chronic or comorbid symptoms are exacerbated and prevent overall improvement and discharge     Problem: Discharge Planning  Goal: Discharge to home or other facility with appropriate resources  1/20/2025 1052 by Caridad Mercado, RN  Outcome: Progressing  Flowsheets (Taken 1/20/2025 0800)  Discharge to home or other facility with appropriate resources:   Identify barriers to discharge with patient and caregiver   Arrange for needed discharge resources and transportation as appropriate   Identify discharge learning needs (meds, wound care, etc)   Arrange for interpreters to assist at discharge as needed   Refer to discharge planning if patient needs post-hospital services based on physician order  baseline of 4LNC

## 2025-01-20 NOTE — PROGRESS NOTES
Approached by PT. While working with patient, patient stated to PT that she was trying to get out of the bed to use bedside commode and fell to her knees and then bumped her head.     This RN went to assess patient. Patient still A&Ox4. No signs of bleeding/bruising or skin tear noted from fall. Vitals stable.     MD Reilly paged. No new orders, continue to monitor.

## 2025-01-20 NOTE — CARE COORDINATION
Chart reviewed at this time , cm introduced self and explained cm role as d/c planner, at this time pt states her spouse is at inpatient rehab so she would be alone at home, states no family available for assistance and would like assistance with her ADL care, pt also states she uses home 02 cont at 6L/NC does not recall DME company name,  notified for PT/OT eval for safe d/c planning , verbal order received for home health eval at this time, pt states she will need medical transportation home. At this time cm will cont to follow for PT/OT recommendations for safe d/c planning. List of Home health/SNF provided to patient for review.   01/20/25 1202   Service Assessment   Patient Orientation Alert and Oriented   Cognition Alert   History Provided By Patient   Primary Caregiver Self   Support Systems Spouse/Significant Other;Other (Comment)  (pt states spouse is currently in inpatient rehab)   Patient's Healthcare Decision Maker is: Patient Declined (Legal Next of Kin Remains as Decision Maker)   PCP Verified by CM Yes   Last Visit to PCP Within last 3 months   Prior Functional Level Assistance with the following:;Bathing;Dressing;Housework  (pt states she needs assistance at home with ADL's)   Current Functional Level Assistance with the following:;Bathing;Dressing   Can patient return to prior living arrangement Yes   Ability to make needs known: Good   Family able to assist with home care needs: No  (spouse in Rehab pt states she has no available family)   Community Resources None   CM/SW Referral   (new patient)   Discharge Planning   Type of Residence Apartment   Living Arrangements Spouse/Significant Other   Current Services Prior To Admission Oxygen Therapy  (pt states she on 6L/NC cont, doesn't recall DME company name)   Potential Assistance Needed Home Care   DME Ordered? No   Potential Assistance Purchasing Medications No   Type of Home Care Services None   Patient expects to be discharged to:  Apartment   One/Two Story Residence One story  (apartment)   History of falls? 1   Services At/After Discharge   Transition of Care Consult (CM Consult) N/A  (no HH consult at this time, Dr. Reilly notified)   Services At/After Discharge Home Health   [place] Resource Information Provided?   (n/a)   Mode of Transport at Discharge Other (see comment)  (medical transportation)   Confirm Follow Up Transport Self   Condition of Participation: Discharge Planning   The Plan for Transition of Care is related to the following treatment goals: medically cleared , d/c disposition home with possibly home health   The Patient and/or Patient Representative was provided with a Choice of Provider? Patient   The Patient and/Or Patient Representative agree with the Discharge Plan? Yes   Freedom of Choice list was provided with basic dialogue that supports the patient's individualized plan of care/goals, treatment preferences, and shares the quality data associated with the providers?  Yes

## 2025-01-21 LAB
ANION GAP SERPL CALC-SCNC: 1 MMOL/L (ref 3–18)
BASOPHILS # BLD: 0.01 K/UL (ref 0–0.1)
BASOPHILS NFR BLD: 0.1 % (ref 0–2)
BUN SERPL-MCNC: 52 MG/DL (ref 7–18)
BUN/CREAT SERPL: 29 (ref 12–20)
CALCIUM SERPL-MCNC: 9.1 MG/DL (ref 8.5–10.1)
CHLORIDE SERPL-SCNC: 109 MMOL/L (ref 100–111)
CO2 SERPL-SCNC: 31 MMOL/L (ref 21–32)
CREAT SERPL-MCNC: 1.77 MG/DL (ref 0.6–1.3)
DIFFERENTIAL METHOD BLD: ABNORMAL
EOSINOPHIL # BLD: 0 K/UL (ref 0–0.4)
EOSINOPHIL NFR BLD: 0 % (ref 0–5)
ERYTHROCYTE [DISTWIDTH] IN BLOOD BY AUTOMATED COUNT: 20.1 % (ref 11.6–14.5)
GLUCOSE BLD STRIP.AUTO-MCNC: 133 MG/DL (ref 70–110)
GLUCOSE BLD STRIP.AUTO-MCNC: 295 MG/DL (ref 70–110)
GLUCOSE BLD STRIP.AUTO-MCNC: 323 MG/DL (ref 70–110)
GLUCOSE BLD STRIP.AUTO-MCNC: 93 MG/DL (ref 70–110)
GLUCOSE SERPL-MCNC: 55 MG/DL (ref 74–99)
HCT VFR BLD AUTO: 30.4 % (ref 35–45)
HGB BLD-MCNC: 8.7 G/DL (ref 12–16)
IMM GRANULOCYTES # BLD AUTO: 0.18 K/UL (ref 0–0.04)
IMM GRANULOCYTES NFR BLD AUTO: 1.5 % (ref 0–0.5)
LYMPHOCYTES # BLD: 1.22 K/UL (ref 0.9–3.6)
LYMPHOCYTES NFR BLD: 10.3 % (ref 21–52)
MCH RBC QN AUTO: 20.5 PG (ref 24–34)
MCHC RBC AUTO-ENTMCNC: 28.6 G/DL (ref 31–37)
MCV RBC AUTO: 71.5 FL (ref 78–100)
MONOCYTES # BLD: 0.84 K/UL (ref 0.05–1.2)
MONOCYTES NFR BLD: 7.1 % (ref 3–10)
NEUTS SEG # BLD: 9.57 K/UL (ref 1.8–8)
NEUTS SEG NFR BLD: 81 % (ref 40–73)
NRBC # BLD: 0 K/UL (ref 0–0.01)
NRBC BLD-RTO: 0 PER 100 WBC
PLATELET # BLD AUTO: 488 K/UL (ref 135–420)
PMV BLD AUTO: 10.7 FL (ref 9.2–11.8)
POTASSIUM SERPL-SCNC: 4.8 MMOL/L (ref 3.5–5.5)
RBC # BLD AUTO: 4.25 M/UL (ref 4.2–5.3)
SODIUM SERPL-SCNC: 141 MMOL/L (ref 136–145)
WBC # BLD AUTO: 11.8 K/UL (ref 4.6–13.2)

## 2025-01-21 PROCEDURE — 6370000000 HC RX 637 (ALT 250 FOR IP)

## 2025-01-21 PROCEDURE — 2500000003 HC RX 250 WO HCPCS: Performed by: INTERNAL MEDICINE

## 2025-01-21 PROCEDURE — 94640 AIRWAY INHALATION TREATMENT: CPT

## 2025-01-21 PROCEDURE — 2580000003 HC RX 258: Performed by: STUDENT IN AN ORGANIZED HEALTH CARE EDUCATION/TRAINING PROGRAM

## 2025-01-21 PROCEDURE — 6370000000 HC RX 637 (ALT 250 FOR IP): Performed by: INTERNAL MEDICINE

## 2025-01-21 PROCEDURE — 99231 SBSQ HOSP IP/OBS SF/LOW 25: CPT | Performed by: STUDENT IN AN ORGANIZED HEALTH CARE EDUCATION/TRAINING PROGRAM

## 2025-01-21 PROCEDURE — 1100000003 HC PRIVATE W/ TELEMETRY

## 2025-01-21 PROCEDURE — 82962 GLUCOSE BLOOD TEST: CPT

## 2025-01-21 PROCEDURE — 94664 DEMO&/EVAL PT USE INHALER: CPT

## 2025-01-21 PROCEDURE — 6370000000 HC RX 637 (ALT 250 FOR IP): Performed by: STUDENT IN AN ORGANIZED HEALTH CARE EDUCATION/TRAINING PROGRAM

## 2025-01-21 PROCEDURE — 6360000002 HC RX W HCPCS: Performed by: INTERNAL MEDICINE

## 2025-01-21 PROCEDURE — 94761 N-INVAS EAR/PLS OXIMETRY MLT: CPT

## 2025-01-21 PROCEDURE — 36415 COLL VENOUS BLD VENIPUNCTURE: CPT

## 2025-01-21 PROCEDURE — 6360000002 HC RX W HCPCS: Performed by: STUDENT IN AN ORGANIZED HEALTH CARE EDUCATION/TRAINING PROGRAM

## 2025-01-21 PROCEDURE — 2580000003 HC RX 258: Performed by: INTERNAL MEDICINE

## 2025-01-21 PROCEDURE — 80048 BASIC METABOLIC PNL TOTAL CA: CPT

## 2025-01-21 PROCEDURE — 85025 COMPLETE CBC W/AUTO DIFF WBC: CPT

## 2025-01-21 RX ADMIN — SODIUM CHLORIDE, PRESERVATIVE FREE 10 ML: 5 INJECTION INTRAVENOUS at 20:30

## 2025-01-21 RX ADMIN — CEFEPIME 1000 MG: 1 INJECTION, POWDER, FOR SOLUTION INTRAMUSCULAR; INTRAVENOUS at 12:04

## 2025-01-21 RX ADMIN — RIVAROXABAN 15 MG: 15 TABLET, FILM COATED ORAL at 08:08

## 2025-01-21 RX ADMIN — FUROSEMIDE 20 MG: 10 INJECTION, SOLUTION INTRAMUSCULAR; INTRAVENOUS at 08:09

## 2025-01-21 RX ADMIN — AZITHROMYCIN MONOHYDRATE 500 MG: 500 INJECTION, POWDER, LYOPHILIZED, FOR SOLUTION INTRAVENOUS at 08:13

## 2025-01-21 RX ADMIN — PREGABALIN 75 MG: 25 CAPSULE ORAL at 08:08

## 2025-01-21 RX ADMIN — FUROSEMIDE 20 MG: 10 INJECTION, SOLUTION INTRAMUSCULAR; INTRAVENOUS at 18:15

## 2025-01-21 RX ADMIN — PREDNISONE 40 MG: 20 TABLET ORAL at 08:08

## 2025-01-21 RX ADMIN — SERTRALINE HYDROCHLORIDE 100 MG: 50 TABLET ORAL at 08:08

## 2025-01-21 RX ADMIN — ROPINIROLE HYDROCHLORIDE 0.5 MG: 0.25 TABLET, FILM COATED ORAL at 14:38

## 2025-01-21 RX ADMIN — ARFORMOTEROL TARTRATE: 15 SOLUTION RESPIRATORY (INHALATION) at 10:02

## 2025-01-21 RX ADMIN — INSULIN LISPRO 4 UNITS: 100 INJECTION, SOLUTION INTRAVENOUS; SUBCUTANEOUS at 20:27

## 2025-01-21 RX ADMIN — SODIUM CHLORIDE, PRESERVATIVE FREE 10 ML: 5 INJECTION INTRAVENOUS at 08:13

## 2025-01-21 RX ADMIN — ROPINIROLE HYDROCHLORIDE 0.5 MG: 0.25 TABLET, FILM COATED ORAL at 08:09

## 2025-01-21 RX ADMIN — PANTOPRAZOLE SODIUM 40 MG: 40 TABLET, DELAYED RELEASE ORAL at 08:14

## 2025-01-21 RX ADMIN — ARFORMOTEROL TARTRATE: 15 SOLUTION RESPIRATORY (INHALATION) at 20:38

## 2025-01-21 RX ADMIN — DILTIAZEM HYDROCHLORIDE 120 MG: 120 CAPSULE, COATED, EXTENDED RELEASE ORAL at 08:08

## 2025-01-21 RX ADMIN — THERA TABS 1 TABLET: TAB at 08:09

## 2025-01-21 RX ADMIN — DULOXETINE HYDROCHLORIDE 30 MG: 30 CAPSULE, DELAYED RELEASE ORAL at 08:08

## 2025-01-21 RX ADMIN — INSULIN GLARGINE 20 UNITS: 100 INJECTION, SOLUTION SUBCUTANEOUS at 08:09

## 2025-01-21 ASSESSMENT — PAIN SCALES - GENERAL
PAINLEVEL_OUTOF10: 0

## 2025-01-21 ASSESSMENT — PAIN SCALES - WONG BAKER: WONGBAKER_NUMERICALRESPONSE: NO HURT

## 2025-01-21 NOTE — PLAN OF CARE
Problem: Discharge Planning  Goal: Discharge to home or other facility with appropriate resources  Outcome: Progressing   Identify barriers to discharge with patient and caregiver   Identify discharge learning needs (meds, wound care, etc)    Problem: Safety - Adult  Goal: Free from fall injury  Outcome: Progressing  Note: Encourage patient to use call bell for help            Hourly rounding    Problem: Pain  Goal: Verbalizes/displays adequate comfort level or baseline comfort level  Outcome: Progressing   Encourage patient to monitor pain and request assistance   Assess pain using appropriate pain scale    Problem: Musculoskeletal - Adult  Goal: Return mobility to safest level of function  Outcome: Progressing   Assess patient stability and activity tolerance for standing, transferring and ambulating with or without assistive devices   Assist with transfers and ambulation using safe patient handling equipment as needed     Problem: Skin/Tissue Integrity  Goal: Absence of new skin breakdown                Monitor for areas of redness and/or skin breakdown  Outcome: Progressing    Problem: Infection - Adult  Goal: Absence of infection during hospitalization  Outcome: Progressing   Assess and monitor for signs and symptoms of infection   Monitor lab/diagnostic results   Administer medications as ordered   Identify and instruct in appropriate isolation precautions for identified infection/condition

## 2025-01-21 NOTE — PROGRESS NOTES
[facility]  Progress Note    Patient: Juliana Loaiza Age: 74 y.o. : 1950 MR#: [id number] SSN: [id number]  Date/Time: 2025    Subjective:   Subjective   No acute events overnight, no new concerns or complaints, vitals stable.      Assessment/Plan:   Acute on chronic hypoxic respiratory failure  Rhinovirus infection  Coronavirus OC 4 3 infection  Pneumonia due to viral infection  COPD exacerbation  Suspected asthma  History of sarcoid  Acute lower left lobe subsegmental PE  Bronchiectasis  Type 2 diabetes  Paroxysmal A-fib  HFrEF  Pulmonary hypertension  Physical debility and falls      Plan  Pending authorization for skilled nursing facility.    Disposition: Expected location: SNF    Expected discharge date: 2025      Case discussed with:  [x]Patient  []Family  [x]Nursing  [x]Case Management  DVT Prophylaxis:  []Lovenox  []Hep SQ  [x]SCDs  []Coumadin   []On Heparin gtt   [x] DOAC    Objective:   Objective:  General Appearance:  Comfortable, in no acute distress, not in pain and ill-appearing.    Vital signs: (most recent): Blood pressure 137/76, pulse 97, temperature 97.7 °F (36.5 °C), temperature source Axillary, resp. rate 17, height 1.575 m (5' 2.01\"), weight 46.3 kg (102 lb), SpO2 96%.  Vital signs are normal.  No fever.    HEENT: Normal HEENT exam.    Lungs:  Normal effort and normal respiratory rate.  There are decreased breath sounds and wheezes.    Heart: Normal rate.  Regular rhythm.  S1 normal and S2 normal.  No murmur, gallop or friction rub.   Chest: Symmetric chest wall expansion.   Abdomen: Abdomen is soft and non-distended.  Bowel sounds are normal.   There is no abdominal tenderness.     Extremities: Normal range of motion.    Pulses: Distal pulses are intact.    Neurological: Patient is alert and oriented to person, place and time.  Normal strength.    Pupils:  Pupils are equal, round, and reactive to light.    Skin:  Warm and dry.

## 2025-01-21 NOTE — PLAN OF CARE
Problem: Chronic Conditions and Co-morbidities  Goal: Patient's chronic conditions and co-morbidity symptoms are monitored and maintained or improved  Outcome: Progressing  Flowsheets (Taken 1/21/2025 0911)  Care Plan - Patient's Chronic Conditions and Co-Morbidity Symptoms are Monitored and Maintained or Improved: Monitor and assess patient's chronic conditions and comorbid symptoms for stability, deterioration, or improvement  Note: Patient with chronic COPD   Admitted with COVID  Currently on room air sating 93-94%  Dyspneic on exertion  Will monitor respiratory status      Problem: Discharge Planning  Goal: Discharge to home or other facility with appropriate resources  1/21/2025 0911 by Dwayne Corral RN  Outcome: Progressing  Flowsheets (Taken 1/21/2025 0911)  Discharge to home or other facility with appropriate resources: Identify barriers to discharge with patient and caregiver  Note: Patient accepted to [facility]  Needs to be seen by PT OT prior to discharge  Will call them and make them aware     Problem: Skin/Tissue Integrity  Goal: Absence of new skin breakdown  Description: 1.  Monitor for areas of redness and/or skin breakdown  2.  Assess vascular access sites hourly  3.  Every 4-6 hours minimum:  Change oxygen saturation probe site  4.  Every 4-6 hours:  If on nasal continuous positive airway pressure, respiratory therapy assess nares and determine need for appliance change or resting period.  1/21/2025 0911 by Dwayne Corral RN  Outcome: Progressing  Note: Patient turns self  Encouraged to turn every 2 hours  Will assist with turning an pulling up in bed     Problem: Safety - Adult  Goal: Free from fall injury  1/21/2025 0911 by Dwayne Corral RN  Outcome: Progressing  Flowsheets (Taken 1/21/2025 0911)  Free From Fall Injury: Instruct family/caregiver on patient safety  Note: Fall and safety precautions continued  Call bell with in reach  Patient encouraged to call for assistance

## 2025-01-21 NOTE — CARE COORDINATION
Pre-cert Request   1/21/2025, 2:50 PM    Patient Name: Juliana Loaiza                   YOB: 1950      *ALERT - Authorization is pending review by the insurance company.  This is not an authorization.*    Submitted via availity.  Requested Facility:  [facility]  Anticipated Admit Date to [facility]:  01/21/25  Pending Auth #:  [id number]   Pending Plan Auth ID:     Suzette Burr  Case Management Department  Ph: [phone] Fax: [phone]

## 2025-01-21 NOTE — PROGRESS NOTES
Patient to transfer to room 420  Telephone transfer report given to Iesha ROBERTS  Opportunity for questions and clarification given  Transport requested

## 2025-01-21 NOTE — CARE COORDINATION
Per [facility] website -- This member is not eligible for Post-Acute Care Commercial Benefits.  Informed CM member is not eligible for Post-Acute Care.

## 2025-01-22 ENCOUNTER — APPOINTMENT (OUTPATIENT)
Facility: HOSPITAL | Age: 75
End: 2025-01-22
Payer: MEDICARE

## 2025-01-22 LAB
ANION GAP SERPL CALC-SCNC: 3 MMOL/L (ref 3–18)
BASOPHILS # BLD: 0.01 K/UL (ref 0–0.1)
BASOPHILS NFR BLD: 0.2 % (ref 0–2)
BUN SERPL-MCNC: 59 MG/DL (ref 7–18)
BUN/CREAT SERPL: 36 (ref 12–20)
CALCIUM SERPL-MCNC: 8.3 MG/DL (ref 8.5–10.1)
CHLORIDE SERPL-SCNC: 108 MMOL/L (ref 100–111)
CO2 SERPL-SCNC: 30 MMOL/L (ref 21–32)
CREAT SERPL-MCNC: 1.65 MG/DL (ref 0.6–1.3)
DIFFERENTIAL METHOD BLD: ABNORMAL
EOSINOPHIL # BLD: 0 K/UL (ref 0–0.4)
EOSINOPHIL NFR BLD: 0 % (ref 0–5)
ERYTHROCYTE [DISTWIDTH] IN BLOOD BY AUTOMATED COUNT: 19.9 % (ref 11.6–14.5)
GLUCOSE BLD STRIP.AUTO-MCNC: 275 MG/DL (ref 70–110)
GLUCOSE BLD STRIP.AUTO-MCNC: 301 MG/DL (ref 70–110)
GLUCOSE SERPL-MCNC: 296 MG/DL (ref 74–99)
HCT VFR BLD AUTO: 24.4 % (ref 35–45)
HGB BLD-MCNC: 7.3 G/DL (ref 12–16)
IMM GRANULOCYTES # BLD AUTO: 0.14 K/UL (ref 0–0.04)
IMM GRANULOCYTES NFR BLD AUTO: 2.3 % (ref 0–0.5)
LYMPHOCYTES # BLD: 0.57 K/UL (ref 0.9–3.6)
LYMPHOCYTES NFR BLD: 9.3 % (ref 21–52)
MCH RBC QN AUTO: 20.8 PG (ref 24–34)
MCHC RBC AUTO-ENTMCNC: 29.9 G/DL (ref 31–37)
MCV RBC AUTO: 69.5 FL (ref 78–100)
MONOCYTES # BLD: 0.31 K/UL (ref 0.05–1.2)
MONOCYTES NFR BLD: 5.1 % (ref 3–10)
NEUTS SEG # BLD: 5.1 K/UL (ref 1.8–8)
NEUTS SEG NFR BLD: 83.1 % (ref 40–73)
NRBC # BLD: 0 K/UL (ref 0–0.01)
NRBC BLD-RTO: 0 PER 100 WBC
PLATELET # BLD AUTO: 364 K/UL (ref 135–420)
PMV BLD AUTO: 11.3 FL (ref 9.2–11.8)
POTASSIUM SERPL-SCNC: 4.5 MMOL/L (ref 3.5–5.5)
RBC # BLD AUTO: 3.51 M/UL (ref 4.2–5.3)
SODIUM SERPL-SCNC: 141 MMOL/L (ref 136–145)
WBC # BLD AUTO: 6.1 K/UL (ref 4.6–13.2)

## 2025-01-22 PROCEDURE — 85025 COMPLETE CBC W/AUTO DIFF WBC: CPT

## 2025-01-22 PROCEDURE — 6370000000 HC RX 637 (ALT 250 FOR IP): Performed by: INTERNAL MEDICINE

## 2025-01-22 PROCEDURE — 36415 COLL VENOUS BLD VENIPUNCTURE: CPT

## 2025-01-22 PROCEDURE — 82962 GLUCOSE BLOOD TEST: CPT

## 2025-01-22 PROCEDURE — 71045 X-RAY EXAM CHEST 1 VIEW: CPT

## 2025-01-22 PROCEDURE — 2580000003 HC RX 258: Performed by: INTERNAL MEDICINE

## 2025-01-22 PROCEDURE — 97535 SELF CARE MNGMENT TRAINING: CPT

## 2025-01-22 PROCEDURE — 6360000002 HC RX W HCPCS: Performed by: STUDENT IN AN ORGANIZED HEALTH CARE EDUCATION/TRAINING PROGRAM

## 2025-01-22 PROCEDURE — 6360000002 HC RX W HCPCS: Performed by: INTERNAL MEDICINE

## 2025-01-22 PROCEDURE — 97116 GAIT TRAINING THERAPY: CPT

## 2025-01-22 PROCEDURE — 97110 THERAPEUTIC EXERCISES: CPT

## 2025-01-22 PROCEDURE — 6370000000 HC RX 637 (ALT 250 FOR IP)

## 2025-01-22 PROCEDURE — 99232 SBSQ HOSP IP/OBS MODERATE 35: CPT | Performed by: HOSPITALIST

## 2025-01-22 PROCEDURE — 1100000003 HC PRIVATE W/ TELEMETRY

## 2025-01-22 PROCEDURE — 2580000003 HC RX 258: Performed by: STUDENT IN AN ORGANIZED HEALTH CARE EDUCATION/TRAINING PROGRAM

## 2025-01-22 PROCEDURE — 2500000003 HC RX 250 WO HCPCS: Performed by: INTERNAL MEDICINE

## 2025-01-22 PROCEDURE — 94761 N-INVAS EAR/PLS OXIMETRY MLT: CPT

## 2025-01-22 PROCEDURE — 80048 BASIC METABOLIC PNL TOTAL CA: CPT

## 2025-01-22 PROCEDURE — 6370000000 HC RX 637 (ALT 250 FOR IP): Performed by: STUDENT IN AN ORGANIZED HEALTH CARE EDUCATION/TRAINING PROGRAM

## 2025-01-22 RX ADMIN — ROPINIROLE HYDROCHLORIDE 0.5 MG: 0.25 TABLET, FILM COATED ORAL at 20:50

## 2025-01-22 RX ADMIN — AZITHROMYCIN MONOHYDRATE 500 MG: 500 INJECTION, POWDER, LYOPHILIZED, FOR SOLUTION INTRAVENOUS at 10:16

## 2025-01-22 RX ADMIN — PREGABALIN 75 MG: 25 CAPSULE ORAL at 20:51

## 2025-01-22 RX ADMIN — INSULIN LISPRO 4 UNITS: 100 INJECTION, SOLUTION INTRAVENOUS; SUBCUTANEOUS at 21:03

## 2025-01-22 RX ADMIN — PANTOPRAZOLE SODIUM 40 MG: 40 TABLET, DELAYED RELEASE ORAL at 10:21

## 2025-01-22 RX ADMIN — CEFEPIME 1000 MG: 1 INJECTION, POWDER, FOR SOLUTION INTRAMUSCULAR; INTRAVENOUS at 00:46

## 2025-01-22 RX ADMIN — FUROSEMIDE 20 MG: 10 INJECTION, SOLUTION INTRAMUSCULAR; INTRAVENOUS at 20:51

## 2025-01-22 RX ADMIN — FUROSEMIDE 20 MG: 10 INJECTION, SOLUTION INTRAMUSCULAR; INTRAVENOUS at 10:50

## 2025-01-22 RX ADMIN — ROPINIROLE HYDROCHLORIDE 0.5 MG: 0.25 TABLET, FILM COATED ORAL at 10:20

## 2025-01-22 RX ADMIN — SODIUM CHLORIDE, PRESERVATIVE FREE 10 ML: 5 INJECTION INTRAVENOUS at 10:21

## 2025-01-22 RX ADMIN — ATORVASTATIN CALCIUM 80 MG: 40 TABLET, FILM COATED ORAL at 20:50

## 2025-01-22 RX ADMIN — SERTRALINE HYDROCHLORIDE 100 MG: 50 TABLET ORAL at 10:20

## 2025-01-22 RX ADMIN — DULOXETINE HYDROCHLORIDE 30 MG: 30 CAPSULE, DELAYED RELEASE ORAL at 10:20

## 2025-01-22 RX ADMIN — DILTIAZEM HYDROCHLORIDE 120 MG: 120 CAPSULE, COATED, EXTENDED RELEASE ORAL at 10:19

## 2025-01-22 RX ADMIN — PREDNISONE 40 MG: 20 TABLET ORAL at 10:20

## 2025-01-22 RX ADMIN — INSULIN GLARGINE 20 UNITS: 100 INJECTION, SOLUTION SUBCUTANEOUS at 10:18

## 2025-01-22 RX ADMIN — RIVAROXABAN 15 MG: 15 TABLET, FILM COATED ORAL at 10:19

## 2025-01-22 RX ADMIN — PREGABALIN 75 MG: 25 CAPSULE ORAL at 10:19

## 2025-01-22 RX ADMIN — MIRTAZAPINE 45 MG: 15 TABLET, FILM COATED ORAL at 20:51

## 2025-01-22 ASSESSMENT — ENCOUNTER SYMPTOMS
COUGH: 0
WHEEZING: 0
NAUSEA: 0
DIARRHEA: 0
SHORTNESS OF BREATH: 1
RHINORRHEA: 0
ABDOMINAL PAIN: 0
EYE PAIN: 0
EYE DISCHARGE: 0

## 2025-01-22 ASSESSMENT — PAIN SCALES - WONG BAKER
WONGBAKER_NUMERICALRESPONSE: NO HURT
WONGBAKER_NUMERICALRESPONSE: NO HURT

## 2025-01-22 ASSESSMENT — PAIN SCALES - GENERAL
PAINLEVEL_OUTOF10: 0
PAINLEVEL_OUTOF10: 0

## 2025-01-22 NOTE — PROGRESS NOTES
4 Eyes Skin Assessment     NAME:  Juliana Loaiza  YOB: 1950  MEDICAL RECORD NUMBER:  [id number]    The patient is being assessed for  Shift Handoff    I agree that at least one RN has performed a thorough Head to Toe Skin Assessment on the patient. ALL assessment sites listed below have been assessed.      Areas assessed by both nurses:    Head, Face, Ears, Shoulders, Back, Chest, Arms, Elbows, Hands, Sacrum. Buttock, Coccyx, Ischium, Legs. Feet and Heels, Under Medical Devices , and Other na        Does the Patient have a Wound? Yes wound(s) were present on assessment. LDA wound assessment was Initiated and completed by RN       Drew Prevention initiated by RN: No  Wound Care Orders initiated by RN: No    Pressure Injury (Stage 3,4, Unstageable, DTI, NWPT, and Complex wounds) if present, place Wound referral order by RN under : No    New Ostomies, if present place, Ostomy referral order under : No     Nurse 1 eSignature: Electronically signed by Alice Christian RN on 1/21/25 at 11:06 PM EST    **SHARE this note so that the co-signing nurse can place an eSignature**    Nurse 2 eSignature: {Esignature:[id number]}

## 2025-01-22 NOTE — PLAN OF CARE
Problem: Safety - Adult  Goal: Free from fall injury  1/21/2025 2247 by Alice Christian, RN  Outcome: Progressing  Note: Patient A/Ox1 and is a High Fall risk.  All safety measures are in place to include non-skid socks, bracelet, bed alarm and personal belongings and call bell within reach.  Patient educated but no signs of learning.  Will continue to monitor.    1/21/2025 2005 by Alice Christian, RN  Outcome: Progressing  Note: Patient is a High Fall risk.  All safety measures are in place to include bed alarm, non-skid socks, bracelet and call bell and belongings within reach.    1/21/2025 0911 by Dwayne Corral, ALEJANDRA  Outcome: Progressing  Flowsheets (Taken 1/21/2025 0911)  Free From Fall Injury: Instruct family/caregiver on patient safety  Note: Fall and safety precautions continued  Call bell with in reach  Patient encouraged to call for assistance

## 2025-01-22 NOTE — PROGRESS NOTES
Lizandro Patel MD   albuterol sulfate HFA (PROVENTIL;VENTOLIN;PROAIR) 108 (90 Base) MCG/ACT inhaler Inhale 1 puff into the lungs every 6 hours as needed for Wheezing or Shortness of Breath 6/27/24   Lizandro Patel MD   rOPINIRole (REQUIP) 0.5 MG tablet Take 1 tablet by mouth 3 times daily 6/27/24   Lizandro Patel MD   pantoprazole (PROTONIX) 20 MG tablet Take 2 tablets by mouth daily 6/27/24 7/27/24  Lizandro Patel MD   Spacer/Aero-Holding Chambers KAROL 1 Device by Does not apply route in the morning and at bedtime 6/27/24 7/27/24  Lizandro Patel MD   furosemide (LASIX) 20 MG tablet Take 20 mg by mouth daily. 4/5/24 5/10/24  Jacquelin Hogan MD   pregabalin (LYRICA) 75 MG capsule Take 1 capsule by mouth 2 times daily. 3/13/24   Jacquelin Hogan MD   sertraline (ZOLOFT) 100 MG tablet Take 1 tablet by mouth daily Indications: Depression 2/23/24   Alison Jung APRN - NP   diclofenac sodium (VOLTAREN) 1 % GEL Apply 2 g topically 4 times daily as needed for Pain Indications: Pain 1/15/24   Carmen Nuñez APRN - NP   mirtazapine (REMERON) 30 MG tablet Take 1.5 tablets by mouth nightly Indications: sleep 3/11/24   Carmen Nuñez APRN - NP   gabapentin (NEURONTIN) 100 MG capsule Take 2 capsules by mouth 3 times daily. 9/14/23 10/13/23  Jacquelin Hogan MD   acetaminophen (TYLENOL) 500 MG tablet Take 1 tablet by mouth every 6 hours as needed for Fever or Pain 8/3/23   Jacquelin Hogan MD   OXYGEN 2-4 L/min by Nasal route as needed for Other or Shortness of Breath (COMFORT). 5/31/23   Jacquelin Hogan MD   Oxygen Concentrator 2-4 L/min by Nasal route as needed (shortness of breath). 2/28/23   Jacquelin Hogan MD   lisinopril (PRINIVIL;ZESTRIL) 20 MG tablet Take 20 mg by mouth daily. Indications: Changes in Blood Pressure 2/28/23 5/18/23  Provider, MD Jacquelin   lidocaine (LIDODERM) 5 % Apply patch to the affected area for 12 hours a day and remove  MD   1 patch at 01/22/25 1021    insulin lispro (HUMALOG,ADMELOG) injection vial 0-8 Units  0-8 Units SubCUTAneous 4x Daily AC & HS Julianna Pagan MD   4 Units at 01/21/25 2027    furosemide (LASIX) injection 20 mg  20 mg IntraVENous BID Julianna Pagan MD   20 mg at 01/22/25 1050    insulin glargine (LANTUS) injection vial 20 Units  20 Units SubCUTAneous Daily Julianna Pagan MD   20 Units at 01/22/25 1018                 Imaging:  I have personally reviewed the patient’s radiographs and have reviewed the reports:    Vascular duplex lower extremity venous bilateral    Known (11/04/2024) non-occlusive deep vein thrombosis in1 gastrocnemius   vein within the right lower extremity, occlusive on prior exam.    Known (11/04/2024) non-occlusive deep vein thrombosis in 1 of 2   peroneal veins of the right lower extremity.    Known (11/04/2024) non-occlusive deep vein thrombosis in the distal   femoral and 2 of 2 posterior tibial veins of the left lower extremity.    No significant changes since prior exam.                 Chart and note reviewed. Data reviewed. Seen on rounds today - 1/22/25. I have independently evaluated and examined the patient. I       I have personally reviewed all pertinent data including labs, imaging and recommendations of treatment team providers.  Aggregate time was 43 minutes, of which >50% was provided by myself, which includes only time during which I was engaged in work directly related to the patient's care evaluation, management and care decisions, and ordering appropriate treatment related to pulmonary problems exclusive of procedures. This time was independent of other practitioners.                               Adriano Munoz MD  Pulmonary, Critical Care Medicine  [facility]

## 2025-01-22 NOTE — PLAN OF CARE
Problem: Safety - Adult  Goal: Free from fall injury  1/21/2025 2005 by Alice Christian, RN  Outcome: Progressing  Note: Patient is a High Fall risk.  All safety measures are in place to include bed alarm, non-skid socks, bracelet and call bell and belongings within reach.    1/21/2025 0911 by Dwayne Corral, RN  Outcome: Progressing  Flowsheets (Taken 1/21/2025 0911)  Free From Fall Injury: Instruct family/caregiver on patient safety  Note: Fall and safety precautions continued  Call bell with in reach  Patient encouraged to call for assistance

## 2025-01-22 NOTE — PLAN OF CARE
Problem: Occupational Therapy - Adult  Goal: By Discharge: Performs self-care activities at highest level of function for planned discharge setting.  See evaluation for individualized goals.  Description: Occupational Therapy Goals:  Initiated 1/20/2025 to be met within 7-10 days.    1.  Patient will perform grooming while standing at sink with contact guard assistance.   2.  Patient will perform upper body dressing with modified independence.  3.  Patient will perform lower body dressing  with supervision/set-up.  4.  Patient will perform toilet transfers with supervision/set-up  5.  Patient will perform all aspects of toileting with supervision/set-up.  6.  Patient will participate in upper extremity therapeutic exercise/activities with supervision/set-up for 8-10 minutes to increase strength/endurance for ADLs.    7.  Patient will utilize energy conservation techniques during functional activities with verbal cues.    PLOF: Pt lives w/ spouse in an apartment w/ elevator access, tub/shower. Pt is independent w/ mobility using rollator, and is independent w/ ADLs. Spouse in currently at SNF for rehab.      Outcome: Progressing      OCCUPATIONAL THERAPY TREATMENT    Patient: Juliana Loaiza (74 y.o. female)  Date: 1/22/2025  Diagnosis: Acute respiratory failure [J96.00]  Shortness of breath [R06.02]  Viral pneumonia [J12.9]  COPD exacerbation (HCC) [J44.1]  Acute on chronic respiratory failure with hypoxia [J96.21]  Acute pulmonary embolism without acute cor pulmonale, unspecified pulmonary embolism type (HCC) [I26.99]  Single subsegmental pulmonary embolism without acute cor pulmonale (HCC) [I26.93]  COVID [U07.1] Acute on chronic respiratory failure with hypoxia  Precautions: General Precautions, Fall Risk, Bed Alarm      Chart, occupational therapy assessment, plan of care, and goals were reviewed.  ASSESSMENT:  Pt cleared to participate in OT session by RN. Upon entering the room, the pt was supine in bed,

## 2025-01-22 NOTE — PLAN OF CARE
Problem: Physical Therapy - Adult  Goal: By Discharge: Performs mobility at highest level of function for planned discharge setting.  See evaluation for individualized goals.  Description: Initiated  1/20/25  to be met within 7-10 days.    1.  Patient will move from supine to sit and sit to supine , scoot up and down, and roll side to side in bed with modified independence.    2.  Patient will transfer from bed to chair and chair to bed with modified independence using the least restrictive device.  3.  Patient will perform sit to stand with modified independence.  4.  Patient will ambulate with modified independence for 50 feet with the least restrictive device.   5.  Patient will perform BLE therex as prescribed to tolerance.     PLOF: pt lives an apt with elevator access, mod ind with rollator and home O2 at baseline,  currently at SNF and thus has no support    Outcome: Progressing     PHYSICAL THERAPY TREATMENT    Patient: Juliana Loaiza (74 y.o. female)  Date: 1/22/2025  Diagnosis: Acute respiratory failure [J96.00]  Shortness of breath [R06.02]  Viral pneumonia [J12.9]  COPD exacerbation (HCC) [J44.1]  Acute on chronic respiratory failure with hypoxia [J96.21]  Acute pulmonary embolism without acute cor pulmonale, unspecified pulmonary embolism type (HCC) [I26.99]  Single subsegmental pulmonary embolism without acute cor pulmonale (HCC) [I26.93]  COVID [U07.1] Acute on chronic respiratory failure with hypoxia      Precautions: General Precautions, Fall Risk, Bed Alarm,  ,  ,  ,  ,  ,  ,      ASSESSMENT:  Patient needs encouragement to participate in PT treatment session. CGA supine to sit transfer. She has good sitting balance. Initially dizzy with change in position, but improves with time. Decreased endurance noted needing multiple rest breaks and O2 >95% on RA. Min A for sit to stand transfers and verbal cues for safe hand placement. She performs 2 sets of standing marching with CGA for

## 2025-01-22 NOTE — PROGRESS NOTES
[facility]  Progress Note    Patient: Juliana Loaiza Age: 74 y.o. : 1950 MR#: [id number] SSN: [id number]  Date/Time: 2025    Subjective:   Subjective   No acute events overnight, no new concerns or complaints, vitals stable.      Assessment/Plan:   Acute on chronic hypoxic respiratory failure  Rhinovirus infection  Coronavirus OC 4 3 infection  Pneumonia due to viral infection  COPD exacerbation  Suspected asthma  History of sarcoid  Acute lower left lobe subsegmental PE  Bronchiectasis  Type 2 diabetes  Paroxysmal A-fib  HFrEF  Pulmonary hypertension  Physical debility and falls      Plan  Pending authorization for skilled nursing facility.    Disposition: Expected location: SNF    Expected discharge date: 2025      Case discussed with:  [x]Patient  []Family  [x]Nursing  [x]Case Management  DVT Prophylaxis:  []Lovenox  []Hep SQ  [x]SCDs  []Coumadin   []On Heparin gtt   [x] DOAC    Objective:   Objective:  General Appearance:  Comfortable, in no acute distress, not in pain and ill-appearing.    Vital signs: (most recent): Blood pressure (!) 138/95, pulse 91, temperature 97.3 °F (36.3 °C), temperature source Axillary, resp. rate 18, height 1.575 m (5' 2.01\"), weight 46.3 kg (102 lb), SpO2 97%.  Vital signs are normal.  No fever.    HEENT: Normal HEENT exam.    Lungs:  Normal effort and normal respiratory rate.  There are decreased breath sounds and wheezes.    Heart: Normal rate.  Regular rhythm.  S1 normal and S2 normal.  No murmur, gallop or friction rub.   Chest: Symmetric chest wall expansion.   Abdomen: Abdomen is soft and non-distended.  Bowel sounds are normal.   There is no abdominal tenderness.     Extremities: Normal range of motion.    Pulses: Distal pulses are intact.    Neurological: Patient is alert and oriented to person, place and time.  Normal strength.    Pupils:  Pupils are equal, round, and reactive to light.    Skin:  Warm and

## 2025-01-22 NOTE — CARE COORDINATION
SNF auth still pending in [employer], sent updated PT/OT, Pulmonology and Physician progress notes in [employer].

## 2025-01-22 NOTE — PROGRESS NOTES
4 Eyes Skin Assessment     NAME:  Juliana Loaiza  YOB: 1950  MEDICAL RECORD NUMBER:  [id number]    The patient is being assessed for  Shift Handoff    I agree that at least one RN has performed a thorough Head to Toe Skin Assessment on the patient. ALL assessment sites listed below have been assessed.      Areas assessed by both nurses:    Head, Face, Ears, Shoulders, Back, Chest, Arms, Elbows, Hands, Sacrum. Buttock, Coccyx, Ischium, and Legs. Feet and Heels        Does the Patient have a Wound? Yes wound(s) were present on assessment. LDA wound assessment was Initiated and completed by RN       Drew Prevention initiated by RN: Yes  Wound Care Orders initiated by RN: Yes    Pressure Injury (Stage 3,4, Unstageable, DTI, NWPT, and Complex wounds) if present, place Wound referral order by RN under : No    New Ostomies, if present place, Ostomy referral order under : No     Nurse 1 eSignature: Electronically signed by Iesha Banerjee RN on 1/21/25 at 8:47 PM EST    **SHARE this note so that the co-signing nurse can place an eSignature**    Nurse 2 eSignature: Electronically signed by Alice Christian RN on 1/22/25 at 12:27 AM EST

## 2025-01-23 LAB
ANION GAP SERPL CALC-SCNC: 4 MMOL/L (ref 3–18)
BASOPHILS # BLD: 0 K/UL (ref 0–0.1)
BASOPHILS NFR BLD: 0 % (ref 0–2)
BUN SERPL-MCNC: 57 MG/DL (ref 7–18)
BUN/CREAT SERPL: 38 (ref 12–20)
CALCIUM SERPL-MCNC: 8.7 MG/DL (ref 8.5–10.1)
CHLORIDE SERPL-SCNC: 105 MMOL/L (ref 100–111)
CO2 SERPL-SCNC: 31 MMOL/L (ref 21–32)
CREAT SERPL-MCNC: 1.52 MG/DL (ref 0.6–1.3)
DIFFERENTIAL METHOD BLD: ABNORMAL
EOSINOPHIL # BLD: 0 K/UL (ref 0–0.4)
EOSINOPHIL NFR BLD: 0 % (ref 0–5)
ERYTHROCYTE [DISTWIDTH] IN BLOOD BY AUTOMATED COUNT: 19.9 % (ref 11.6–14.5)
GLUCOSE BLD STRIP.AUTO-MCNC: 390 MG/DL (ref 70–110)
GLUCOSE BLD STRIP.AUTO-MCNC: 402 MG/DL (ref 70–110)
GLUCOSE BLD STRIP.AUTO-MCNC: 416 MG/DL (ref 70–110)
GLUCOSE BLD STRIP.AUTO-MCNC: 487 MG/DL (ref 70–110)
GLUCOSE BLD STRIP.AUTO-MCNC: 493 MG/DL (ref 70–110)
GLUCOSE SERPL-MCNC: 388 MG/DL (ref 74–99)
HCT VFR BLD AUTO: 29 % (ref 35–45)
HGB BLD-MCNC: 8.8 G/DL (ref 12–16)
IMM GRANULOCYTES # BLD AUTO: 0 K/UL (ref 0–0.04)
IMM GRANULOCYTES NFR BLD AUTO: 0 % (ref 0–0.5)
LYMPHOCYTES # BLD: 0.87 K/UL (ref 0.9–3.6)
LYMPHOCYTES NFR BLD: 10 % (ref 21–52)
MCH RBC QN AUTO: 21.1 PG (ref 24–34)
MCHC RBC AUTO-ENTMCNC: 30.3 G/DL (ref 31–37)
MCV RBC AUTO: 69.4 FL (ref 78–100)
MONOCYTES # BLD: 0 K/UL (ref 0.05–1.2)
MONOCYTES NFR BLD: 0 % (ref 3–10)
NEUTS SEG # BLD: 7.83 K/UL (ref 1.8–8)
NEUTS SEG NFR BLD: 90 % (ref 40–73)
NRBC # BLD: 0.02 K/UL (ref 0–0.01)
NRBC BLD-RTO: 0.2 PER 100 WBC
PLATELET # BLD AUTO: 448 K/UL (ref 135–420)
PLATELET COMMENT: ABNORMAL
PMV BLD AUTO: 10.7 FL (ref 9.2–11.8)
POTASSIUM SERPL-SCNC: 4.8 MMOL/L (ref 3.5–5.5)
RBC # BLD AUTO: 4.18 M/UL (ref 4.2–5.3)
RBC MORPH BLD: ABNORMAL
SODIUM SERPL-SCNC: 140 MMOL/L (ref 136–145)
WBC # BLD AUTO: 8.7 K/UL (ref 4.6–13.2)
WBC MORPH BLD: ABNORMAL

## 2025-01-23 PROCEDURE — 6370000000 HC RX 637 (ALT 250 FOR IP): Performed by: STUDENT IN AN ORGANIZED HEALTH CARE EDUCATION/TRAINING PROGRAM

## 2025-01-23 PROCEDURE — 80048 BASIC METABOLIC PNL TOTAL CA: CPT

## 2025-01-23 PROCEDURE — 6360000002 HC RX W HCPCS: Performed by: INTERNAL MEDICINE

## 2025-01-23 PROCEDURE — 94761 N-INVAS EAR/PLS OXIMETRY MLT: CPT

## 2025-01-23 PROCEDURE — 82962 GLUCOSE BLOOD TEST: CPT

## 2025-01-23 PROCEDURE — 6370000000 HC RX 637 (ALT 250 FOR IP)

## 2025-01-23 PROCEDURE — 85025 COMPLETE CBC W/AUTO DIFF WBC: CPT

## 2025-01-23 PROCEDURE — 99239 HOSP IP/OBS DSCHRG MGMT >30: CPT | Performed by: STUDENT IN AN ORGANIZED HEALTH CARE EDUCATION/TRAINING PROGRAM

## 2025-01-23 PROCEDURE — 6370000000 HC RX 637 (ALT 250 FOR IP): Performed by: INTERNAL MEDICINE

## 2025-01-23 PROCEDURE — 2500000003 HC RX 250 WO HCPCS: Performed by: INTERNAL MEDICINE

## 2025-01-23 PROCEDURE — 94640 AIRWAY INHALATION TREATMENT: CPT

## 2025-01-23 PROCEDURE — 1100000003 HC PRIVATE W/ TELEMETRY

## 2025-01-23 PROCEDURE — 36415 COLL VENOUS BLD VENIPUNCTURE: CPT

## 2025-01-23 RX ORDER — INSULIN LISPRO 100 [IU]/ML
4 INJECTION, SOLUTION INTRAVENOUS; SUBCUTANEOUS ONCE
Status: COMPLETED | OUTPATIENT
Start: 2025-01-23 | End: 2025-01-23

## 2025-01-23 RX ADMIN — SODIUM CHLORIDE, PRESERVATIVE FREE 10 ML: 5 INJECTION INTRAVENOUS at 04:24

## 2025-01-23 RX ADMIN — ATORVASTATIN CALCIUM 80 MG: 40 TABLET, FILM COATED ORAL at 21:33

## 2025-01-23 RX ADMIN — SERTRALINE HYDROCHLORIDE 100 MG: 50 TABLET ORAL at 08:26

## 2025-01-23 RX ADMIN — DULOXETINE HYDROCHLORIDE 30 MG: 30 CAPSULE, DELAYED RELEASE ORAL at 08:25

## 2025-01-23 RX ADMIN — INSULIN LISPRO 8 UNITS: 100 INJECTION, SOLUTION INTRAVENOUS; SUBCUTANEOUS at 21:32

## 2025-01-23 RX ADMIN — TRAMADOL HYDROCHLORIDE 50 MG: 50 TABLET, COATED ORAL at 08:26

## 2025-01-23 RX ADMIN — THERA TABS 1 TABLET: TAB at 08:26

## 2025-01-23 RX ADMIN — DILTIAZEM HYDROCHLORIDE 120 MG: 120 CAPSULE, COATED, EXTENDED RELEASE ORAL at 08:26

## 2025-01-23 RX ADMIN — PANTOPRAZOLE SODIUM 40 MG: 40 TABLET, DELAYED RELEASE ORAL at 08:25

## 2025-01-23 RX ADMIN — RIVAROXABAN 15 MG: 15 TABLET, FILM COATED ORAL at 08:25

## 2025-01-23 RX ADMIN — SODIUM CHLORIDE, PRESERVATIVE FREE 10 ML: 5 INJECTION INTRAVENOUS at 08:29

## 2025-01-23 RX ADMIN — PREDNISONE 40 MG: 20 TABLET ORAL at 08:25

## 2025-01-23 RX ADMIN — INSULIN LISPRO 8 UNITS: 100 INJECTION, SOLUTION INTRAVENOUS; SUBCUTANEOUS at 16:04

## 2025-01-23 RX ADMIN — ARFORMOTEROL TARTRATE: 15 SOLUTION RESPIRATORY (INHALATION) at 20:33

## 2025-01-23 RX ADMIN — MIRTAZAPINE 45 MG: 15 TABLET, FILM COATED ORAL at 21:34

## 2025-01-23 RX ADMIN — ROPINIROLE HYDROCHLORIDE 0.5 MG: 0.25 TABLET, FILM COATED ORAL at 16:04

## 2025-01-23 RX ADMIN — ARFORMOTEROL TARTRATE: 15 SOLUTION RESPIRATORY (INHALATION) at 07:50

## 2025-01-23 RX ADMIN — ROPINIROLE HYDROCHLORIDE 0.5 MG: 0.25 TABLET, FILM COATED ORAL at 21:33

## 2025-01-23 RX ADMIN — INSULIN GLARGINE 20 UNITS: 100 INJECTION, SOLUTION SUBCUTANEOUS at 08:28

## 2025-01-23 RX ADMIN — PREGABALIN 75 MG: 25 CAPSULE ORAL at 21:34

## 2025-01-23 RX ADMIN — INSULIN LISPRO 8 UNITS: 100 INJECTION, SOLUTION INTRAVENOUS; SUBCUTANEOUS at 06:38

## 2025-01-23 RX ADMIN — ROPINIROLE HYDROCHLORIDE 0.5 MG: 0.25 TABLET, FILM COATED ORAL at 08:25

## 2025-01-23 RX ADMIN — PREGABALIN 75 MG: 25 CAPSULE ORAL at 08:26

## 2025-01-23 RX ADMIN — FUROSEMIDE 20 MG: 10 INJECTION, SOLUTION INTRAMUSCULAR; INTRAVENOUS at 08:28

## 2025-01-23 RX ADMIN — INSULIN LISPRO 4 UNITS: 100 INJECTION, SOLUTION INTRAVENOUS; SUBCUTANEOUS at 23:29

## 2025-01-23 ASSESSMENT — PAIN DESCRIPTION - FREQUENCY: FREQUENCY: INTERMITTENT

## 2025-01-23 ASSESSMENT — PAIN DESCRIPTION - DESCRIPTORS
DESCRIPTORS: CRAMPING
DESCRIPTORS: CRAMPING

## 2025-01-23 ASSESSMENT — PAIN SCALES - GENERAL
PAINLEVEL_OUTOF10: 9
PAINLEVEL_OUTOF10: 0
PAINLEVEL_OUTOF10: 9
PAINLEVEL_OUTOF10: 0

## 2025-01-23 ASSESSMENT — PAIN - FUNCTIONAL ASSESSMENT
PAIN_FUNCTIONAL_ASSESSMENT: PREVENTS OR INTERFERES SOME ACTIVE ACTIVITIES AND ADLS
PAIN_FUNCTIONAL_ASSESSMENT: ACTIVITIES ARE NOT PREVENTED

## 2025-01-23 ASSESSMENT — ENCOUNTER SYMPTOMS
EYE PAIN: 0
ABDOMINAL PAIN: 0
NAUSEA: 0
SHORTNESS OF BREATH: 1
COUGH: 0
RHINORRHEA: 0
EYE DISCHARGE: 0
DIARRHEA: 0
WHEEZING: 0

## 2025-01-23 ASSESSMENT — PAIN DESCRIPTION - LOCATION
LOCATION: CHEST
LOCATION: CHEST

## 2025-01-23 ASSESSMENT — PAIN DESCRIPTION - PAIN TYPE: TYPE: CHRONIC PAIN

## 2025-01-23 ASSESSMENT — PAIN DESCRIPTION - ONSET: ONSET: ON-GOING

## 2025-01-23 ASSESSMENT — PAIN DESCRIPTION - ORIENTATION: ORIENTATION: ANTERIOR

## 2025-01-23 NOTE — CARE COORDINATION
SNF Authorization  1/23/2025, 8:43 AM    Patient Name: Juliana Loaiza                   YOB: 1950      Received via: Phone call from Arminda with Stefani Rucker  Auth ID:  [id number] for Level 2  Plan Auth ID:   Service:  [facility]  Approval Dates: 1/23/ 2025-1/29/2025  Next Review Date: 1/29/2025        [facility]  Case Management Department  Ph: [phone] Fax: [phone]     Arminda can be reached at [phone] Ext [phone]

## 2025-01-23 NOTE — PROGRESS NOTES
Spoke with [facility]MT to reschedule the patient transport. Patient pickup time is now 01/24/2025 @10:00 am. Trip # [phone]

## 2025-01-23 NOTE — CARE COORDINATION
Auth is approved for [facility].  CM notes in [facility] that [facility] has declined patient. Call to Debra @ [phone], Patient HIPAA verified, informed her of the above, she states referral was sent through CC link, patient IS accepted to [facility], ok for this CM to set up transport. CM is appreciative. Call concluded.    Call to [facility]MT @ [phone], spoke to Evan, patient is scheduled with BLS hand to hand , stretcher transport ETA 1530, trip #[phone]. CM is appreciative. Call concluded.    DCP:[facility] Nurse Report Line is [phone] with [facility]MT transport @ [phone],ETA 1530, trip #[phone].    [facility] ([place])    Basic Information    Address:  [facility] [place] [address]  [facility]  Phone:  [phone]  Fax:  [phone]    No further CM needs identified. CM will remain available should further needs arise.

## 2025-01-23 NOTE — DISCHARGE INSTRUCTIONS
DISCHARGE SUMMARY from Nurse    PATIENT INSTRUCTIONS:    After general anesthesia or intravenous sedation, for 24 hours or while taking prescription Narcotics:  Limit your activities  Do not drive and operate hazardous machinery  Do not make important personal or business decisions  Do  not drink alcoholic beverages  If you have not urinated within 8 hours after discharge, please contact your surgeon on call.    Report the following to your surgeon:  Excessive pain, swelling, redness or odor of or around the surgical area  Temperature over 100.5  Nausea and vomiting lasting longer than 4 hours or if unable to take medications  Any signs of decreased circulation or nerve impairment to extremity: change in color, persistent  numbness, tingling, coldness or increase pain  Any questions    What to do at Home:  Recommended activity: activity as tolerated,     If you experience any of the following symptoms [facility] [place] for continuing care, please follow up with PCP.    *  Please give a list of your current medications to your Primary Care Provider.    *  Please update this list whenever your medications are discontinued, doses are      changed, or new medications (including over-the-counter products) are added.    *  Please carry medication information at all times in case of emergency situations.    These are general instructions for a healthy lifestyle:    No smoking/ No tobacco products/ Avoid exposure to second hand smoke  Surgeon General's Warning:  Quitting smoking now greatly reduces serious risk to your health.    Obesity, smoking, and sedentary lifestyle greatly increases your risk for illness    A healthy diet, regular physical exercise & weight monitoring are important for maintaining a healthy lifestyle    You may be retaining fluid if you have a history of heart failure or if you experience any of the following symptoms:  Weight gain of 3 pounds or more overnight or 5 pounds in a week,  increased swelling in our hands or feet or shortness of breath while lying flat in bed.  Please call your doctor as soon as you notice any of these symptoms; do not wait until your next office visit.    Patient armband removed and shredded   Thank you for enrolling in [employer]. Please follow the instructions below to securely access your online medical record. [employer] allows you to send messages to your doctor, view your test results, renew your prescriptions, schedule appointments, and more.     How Do I Sign Up?  In your Internet browser, go to [email or web address]  Click on the Sign Up Now link in the Sign In box. You will see the New Member Sign Up page.  Enter your [employer] Access Code exactly as it appears below. You will not need to use this code after you’ve completed the sign-up process. If you do not sign up before the expiration date, you must request a new code.  [employer] Access Code: [email or web address]  Expires: 3/3/2025  8:23 AM    Enter your Social Security Number ([id number]) and Date of Birth (mm/dd/yyyy) as indicated and click Submit. You will be taken to the next sign-up page.  Create a [employer] ID. This will be your [employer] login ID and cannot be changed, so think of one that is secure and easy to remember.  Create a [employer] password. You can change your password at any time.  Enter your Password Reset Question and Answer. This can be used at a later time if you forget your password.   Enter your e-mail address. You will receive e-mail notification when new information is available in [employer].  Click Sign Up. You can now view your medical record.     Additional Information  If you have questions, please contact your physician practice where you receive care. Remember, [employer] is NOT to be used for urgent needs. For medical emergencies, dial 911.   The discharge information has been reviewed with the {PATIENT PARENT GUARDIAN:[id number]}.  The {PATIENT PARENT GUARDIAN:[id number]} verbalized

## 2025-01-23 NOTE — PLAN OF CARE
Problem: Chronic Conditions and Co-morbidities  Goal: Patient's chronic conditions and co-morbidity symptoms are monitored and maintained or improved  Outcome: Progressing  Flowsheets (Taken 1/21/2025 2000 by Alice Christian RN)  Care Plan - Patient's Chronic Conditions and Co-Morbidity Symptoms are Monitored and Maintained or Improved: Monitor and assess patient's chronic conditions and comorbid symptoms for stability, deterioration, or improvement  Note: Patient breathing helped with HOB, patient's blood sugar checked and covered      Problem: Discharge Planning  Goal: Discharge to home or other facility with appropriate resources  Outcome: Progressing  Note: Patient suppose to leave to snf      Problem: Skin/Tissue Integrity  Goal: Absence of new skin breakdown  Description: 1.  Monitor for areas of redness and/or skin breakdown  2.  Assess vascular access sites hourly  3.  Every 4-6 hours minimum:  Change oxygen saturation probe site  4.  Every 4-6 hours:  If on nasal continuous positive airway pressure, respiratory therapy assess nares and determine need for appliance change or resting period.  Outcome: Progressing     Problem: Safety - Adult  Goal: Free from fall injury  Outcome: Progressing  Note: Patient instructed not to get ou to bed, bed alarm on      Problem: Skin/Tissue Integrity - Adult  Goal: Skin integrity remains intact  Outcome: Progressing  Note: Patient turned      Problem: Pain  Goal: Verbalizes/displays adequate comfort level or baseline comfort level  Outcome: Progressing  Note: No complaints of pain      Problem: Cardiovascular - Adult  Goal: Maintains optimal cardiac output and hemodynamic stability  Outcome: Progressing  Note: Patient on lasix, placed on external catheter

## 2025-01-23 NOTE — PROGRESS NOTES
(HCC)    Chronic pain syndrome    HNP (herniated nucleus pulposus), cervical    Pulmonary sarcoidosis (HCC)    Closed head injury    Diverticulitis    Abdominal pain    Recurrent falls    Chronic back pain    Insomnia    DJD (degenerative joint disease) of knee    Syncope and collapse    Depression    Acute kidney injury (HCC)    Chronic respiratory failure with hypoxia    Gastroparesis    Opioid dependence with opioid-induced disorder (HCC)    Pulmonary hypertension (HCC)    NSTEMI (non-ST elevated myocardial infarction) (HCC)    Acute encephalopathy    Paroxysmal atrial fibrillation (HCC)    Hypoxic respiratory failure    Pulmonary infiltrates    Acute bronchospasm    Physical deconditioning    Sarcoidosis    Acute on chronic respiratory failure with hypoxia    COPD with acute exacerbation (HCC)    Acute pulmonary embolism (HCC)    Acute bronchitis due to Rhinovirus    COPD exacerbation (HCC)    COVID    Viral pneumonia    Moderate protein-calorie malnutrition (HCC)          RECOMMENDATIONS:     PULM:  Supplemental oxygen to maintain SpO2 >88%; avoid hyperoxygenation; currently on 6L which is her baseline.  Improved from yesterday.  CXR shows improvement.  Resting comfortable in room  Bronchodilators prn  Steroids - now on 40 mg prednisone daily  Antibiotics - Cefepime/azithro.  Agree with this.  7 day total course  No specific treatment for rhinovirus and coronavirus other than supportive care and steroids  Now transitioned off heparin gtt and on xarelto  Agree with daily diuresis  Aspiration precautions including elevating HOB >30deg  Aggressive pulmonary toileting/bronchial hygiene  Frequent incentive spirometry and flutter valve  Out patient testing - PFT, 6 min walk,   Assess home Oxygen needs at discharge  Before discharge, will increase her home Spiriva dose.  On outpatient basis will consider her for biologic therapy given her recurrent flares and possible component of reactive airway.  This may be    Spacer/Aero-Holding Chambers KAROL 1 Device by Does not apply route in the morning and at bedtime 6/27/24 7/27/24  Lizandro Patel MD   furosemide (LASIX) 20 MG tablet Take 20 mg by mouth daily. 4/5/24 5/10/24  Jacquelin Hogan MD   pregabalin (LYRICA) 75 MG capsule Take 1 capsule by mouth 2 times daily. 3/13/24   Jacquelin Hogan MD   sertraline (ZOLOFT) 100 MG tablet Take 1 tablet by mouth daily Indications: Depression 2/23/24   Alison Jung APRN - NP   diclofenac sodium (VOLTAREN) 1 % GEL Apply 2 g topically 4 times daily as needed for Pain Indications: Pain 1/15/24   Carmen Nuñez APRN - NP   mirtazapine (REMERON) 30 MG tablet Take 1.5 tablets by mouth nightly Indications: sleep 3/11/24   Carmen Nuñez APRN - NP   gabapentin (NEURONTIN) 100 MG capsule Take 2 capsules by mouth 3 times daily. 9/14/23 10/13/23  Jacquelin Hogan MD   acetaminophen (TYLENOL) 500 MG tablet Take 1 tablet by mouth every 6 hours as needed for Fever or Pain 8/3/23   Jacquelin Hogan MD   OXYGEN 2-4 L/min by Nasal route as needed for Other or Shortness of Breath (COMFORT). 5/31/23   Jacquelin Hogan MD   Oxygen Concentrator 2-4 L/min by Nasal route as needed (shortness of breath). 2/28/23   Jacquelin Hogan MD   lisinopril (PRINIVIL;ZESTRIL) 20 MG tablet Take 20 mg by mouth daily. Indications: Changes in Blood Pressure 2/28/23 5/18/23  Jacquelin Hogan MD   lidocaine (LIDODERM) 5 % Apply patch to the affected area for 12 hours a day and remove for 12 hours a day. 9/6/22   Automatic Reconciliation, Ar   metFORMIN (GLUCOPHAGE) 500 MG tablet Take 1 tablet by mouth 2 times daily (with meals) 10/18/19   Automatic Reconciliation, Ar   nitroGLYCERIN (NITROSTAT) 0.4 MG SL tablet Place 1 tablet under the tongue as needed 11/8/17   Automatic Reconciliation, Ar     [unfilled]  Allergies   Allergen Reactions    Latex Hives and Itching    Ciprofloxacin Hives    Penicillins Nausea Only    Shellfish

## 2025-01-23 NOTE — CARE COORDINATION
Met with Patient at bedside. CM re- introduces self. Patient is alert and oriented x 4. [employer] verified. Advised that she is medically cleared and has a discharge order, is approved and scheduled to go to [facility] today w MMT ETA 1530. Patient agrees.        01/23/25 0919   IMM Letter   IMM Letter given to Patient/Family/Significant other/Guardian/POA/by: Ronda Moses RN, CM, Copy to Patient, Original on Hard Chart.   IMM Letter date given: 01/23/25   IMM Letter time given: 0919     Patient denies any immediate needs or concerns. Left in bed, lowest locked position, call bell in reach.

## 2025-01-23 NOTE — DISCHARGE SUMMARY
pulmonary opacities which are likely  infectious or inflammatory/due to pneumonia.  3.  Bronchiectasis and bronchial wall thickening suggestive of acute bronchitis.  4.  Above findings discussed with Dr. Gamez  5.  Mild cardiomegaly.  6.  Other chronic findings as above  7.  Limited due to motion      Electronically signed by Lachelle Narayan     MRI Result (most recent):  No results found for this or any previous visit from the past 3650 days.      Discharge Medications:     Current Discharge Medication List        START taking these medications    Details   azithromycin (ZITHROMAX) 500 MG tablet Take 1 tablet by mouth daily for 3 days  Qty: 3 tablet, Refills: 0  Start date: 1/20/2025, End date: 1/23/2025           CONTINUE these medications which have CHANGED    Details   predniSONE (DELTASONE) 20 MG tablet Take 2 tablets by mouth daily for 4 days, THEN 1 tablet daily for 4 days, THEN 0.5 tablets daily for 4 days.  Qty: 14 tablet, Refills: 0  Start date: 1/20/2025, End date: 2/1/2025           CONTINUE these medications which have NOT CHANGED    Details   DULoxetine (CYMBALTA) 30 MG extended release capsule Take 1 capsule by mouth daily  Qty: 30 capsule, Refills: 3      rivaroxaban (XARELTO) 15 MG TABS tablet Take 1 tablet by mouth daily  Qty: 90 tablet, Refills: 1      fluticasone-salmeterol (ADVAIR DISKUS) 500-50 MCG/ACT AEPB diskus inhaler Inhale 1 puff into the lungs in the morning and 1 puff in the evening.  Qty: 60 each, Refills: 3      tiotropium (SPIRIVA RESPIMAT) 1.25 MCG/ACT AERS inhaler Inhale 2 puffs into the lungs daily  Qty: 1 each, Refills: 2      dilTIAZem (CARDIZEM CD) 120 MG extended release capsule Take 1 capsule by mouth daily  Qty: 30 capsule, Refills: 0      insulin detemir (LEVEMIR) 100 UNIT/ML injection vial Inject 15 Units into the skin nightly  Qty: 10 mL, Refills: 0    Associated Diagnoses: Hyperglycemia      atorvastatin (LIPITOR) 80 MG tablet Take 1 tablet by mouth nightly  Qty: 30  tablet, Refills: 3      Insulin Syringes, Disposable, U-100 0.3 ML MISC 1 each by Does not apply route daily  Qty: 100 each, Refills: 3      albuterol sulfate HFA (PROVENTIL;VENTOLIN;PROAIR) 108 (90 Base) MCG/ACT inhaler Inhale 1 puff into the lungs every 6 hours as needed for Wheezing or Shortness of Breath  Qty: 18 g, Refills: 3      rOPINIRole (REQUIP) 0.5 MG tablet Take 1 tablet by mouth 3 times daily  Qty: 90 tablet, Refills: 3      pantoprazole (PROTONIX) 20 MG tablet Take 2 tablets by mouth daily  Qty: 60 tablet, Refills: 0      Spacer/Aero-Holding Chambers KAROL 1 Device by Does not apply route in the morning and at bedtime  Qty: 1 each, Refills: 0      pregabalin (LYRICA) 75 MG capsule Take 1 capsule by mouth 2 times daily.      sertraline (ZOLOFT) 100 MG tablet Take 1 tablet by mouth daily Indications: Depression      diclofenac sodium (VOLTAREN) 1 % GEL Apply 2 g topically 4 times daily as needed for Pain Indications: Pain      mirtazapine (REMERON) 30 MG tablet Take 1.5 tablets by mouth nightly Indications: sleep      acetaminophen (TYLENOL) 500 MG tablet Take 1 tablet by mouth every 6 hours as needed for Fever or Pain      OXYGEN 2-4 L/min by Nasal route as needed for Other or Shortness of Breath (COMFORT).      Oxygen Concentrator 2-4 L/min by Nasal route as needed (shortness of breath).      lidocaine (LIDODERM) 5 % Apply patch to the affected area for 12 hours a day and remove for 12 hours a day.      metFORMIN (GLUCOPHAGE) 500 MG tablet Take 1 tablet by mouth 2 times daily (with meals)      nitroGLYCERIN (NITROSTAT) 0.4 MG SL tablet Place 1 tablet under the tongue as needed           STOP taking these medications       famotidine (PEPCID) 20 MG tablet Comments:   Reason for Stopping:         sucralfate (CARAFATE) 1 GM tablet Comments:   Reason for Stopping:         gabapentin (NEURONTIN) 100 MG capsule Comments:   Reason for Stopping:         ondansetron (ZOFRAN) 4 MG tablet Comments:   Reason for

## 2025-01-23 NOTE — PROGRESS NOTES
Comprehensive Nutrition Assessment    Type and Reason for Visit:  Reassess, Positive nutrition screen    Nutrition Recommendations/Plan:   Continue current diet as tolerated.  Continue oral supplements: Glucerna (each provides 220 kcal, 10g protein) TID  Discontinue Ensure TID d/t DM  Continue multivitamin supplementation daily, Daily wts.  Continue to monitor tolerance of PO, compliance of oral supplements, weight, labs, and plan of care during admission.     Malnutrition Assessment:  Malnutrition Status:  Moderate malnutrition (01/18/25 1504)    Context:  Chronic Illness     Findings of the 6 clinical characteristics of malnutrition:  Energy Intake:  75% or less estimated energy requirements for 1 month or longer  Weight Loss:  No weight loss     Body Fat Loss:  Mild body fat loss Triceps, Buccal region   Muscle Mass Loss:  Mild muscle mass loss Temples (temporalis), Clavicles (pectoralis & deltoids), Scapula (trapezius), Hand (interosseous)  Fluid Accumulation:  No fluid accumulation     Strength:  Not Performed    Nutrition Assessment:    Admitted for acute on chronic respiratory failure with hypoxia; COPD. Pt seen for - follow up; awake and oriented. Noted newly documented stage 1 PI. Per pt appetite has increased and is consuming >50% of meals, drinking both ensure and glucerna at meals. Plan to modify ONS to meet pt protein-energy needs. Per flowsheet pt consuming 50% on average of 6 documented meals. Po intake has increased since last RD visit (1/18). Bed weight obtained 48.0 kg (105.6 lbs). Pt meeting estimated protein-energy needs. Pt had no nutrition related questions/concerns at this time. Will continue to monitor per protocol.    Meal Intake: Provides on average ~52% kcal, 62% protein of estimated needs plus ONS intake (% x3/d) ~90% kcal/d, 100% protein.   Patient Vitals for the past 168 hrs:   PO Meals Eaten (%)   01/20/25 1401 1 - 25%   01/20/25 0800 51 - 75%   01/19/25 1332 26 - 50%

## 2025-01-24 VITALS
RESPIRATION RATE: 22 BRPM | HEIGHT: 62 IN | OXYGEN SATURATION: 100 % | HEART RATE: 87 BPM | TEMPERATURE: 97.5 F | DIASTOLIC BLOOD PRESSURE: 79 MMHG | WEIGHT: 102 LBS | SYSTOLIC BLOOD PRESSURE: 160 MMHG | BODY MASS INDEX: 18.77 KG/M2

## 2025-01-24 LAB
ANION GAP SERPL CALC-SCNC: 5 MMOL/L (ref 3–18)
BASOPHILS # BLD: 0.01 K/UL (ref 0–0.1)
BASOPHILS NFR BLD: 0.1 % (ref 0–2)
BUN SERPL-MCNC: 56 MG/DL (ref 7–18)
BUN/CREAT SERPL: 43 (ref 12–20)
CALCIUM SERPL-MCNC: 8.3 MG/DL (ref 8.5–10.1)
CHLORIDE SERPL-SCNC: 104 MMOL/L (ref 100–111)
CO2 SERPL-SCNC: 31 MMOL/L (ref 21–32)
CREAT SERPL-MCNC: 1.3 MG/DL (ref 0.6–1.3)
DIFFERENTIAL METHOD BLD: ABNORMAL
EOSINOPHIL # BLD: 0 K/UL (ref 0–0.4)
EOSINOPHIL NFR BLD: 0 % (ref 0–5)
ERYTHROCYTE [DISTWIDTH] IN BLOOD BY AUTOMATED COUNT: 19.9 % (ref 11.6–14.5)
GLUCOSE BLD STRIP.AUTO-MCNC: 402 MG/DL (ref 70–110)
GLUCOSE BLD STRIP.AUTO-MCNC: 406 MG/DL (ref 70–110)
GLUCOSE SERPL-MCNC: 321 MG/DL (ref 74–99)
HCT VFR BLD AUTO: 26.7 % (ref 35–45)
HGB BLD-MCNC: 8 G/DL (ref 12–16)
IMM GRANULOCYTES # BLD AUTO: 0.12 K/UL (ref 0–0.04)
IMM GRANULOCYTES NFR BLD AUTO: 1.4 % (ref 0–0.5)
LYMPHOCYTES # BLD: 0.75 K/UL (ref 0.9–3.6)
LYMPHOCYTES NFR BLD: 8.6 % (ref 21–52)
MCH RBC QN AUTO: 21.3 PG (ref 24–34)
MCHC RBC AUTO-ENTMCNC: 30 G/DL (ref 31–37)
MCV RBC AUTO: 71 FL (ref 78–100)
MONOCYTES # BLD: 0.43 K/UL (ref 0.05–1.2)
MONOCYTES NFR BLD: 4.9 % (ref 3–10)
NEUTS SEG # BLD: 7.44 K/UL (ref 1.8–8)
NEUTS SEG NFR BLD: 85 % (ref 40–73)
NRBC # BLD: 0.03 K/UL (ref 0–0.01)
NRBC BLD-RTO: 0.3 PER 100 WBC
PLATELET # BLD AUTO: 354 K/UL (ref 135–420)
PMV BLD AUTO: 10.3 FL (ref 9.2–11.8)
POTASSIUM SERPL-SCNC: 4.6 MMOL/L (ref 3.5–5.5)
RBC # BLD AUTO: 3.76 M/UL (ref 4.2–5.3)
SODIUM SERPL-SCNC: 140 MMOL/L (ref 136–145)
WBC # BLD AUTO: 8.8 K/UL (ref 4.6–13.2)

## 2025-01-24 PROCEDURE — 6360000002 HC RX W HCPCS: Performed by: INTERNAL MEDICINE

## 2025-01-24 PROCEDURE — 85025 COMPLETE CBC W/AUTO DIFF WBC: CPT

## 2025-01-24 PROCEDURE — 2700000000 HC OXYGEN THERAPY PER DAY

## 2025-01-24 PROCEDURE — 94761 N-INVAS EAR/PLS OXIMETRY MLT: CPT

## 2025-01-24 PROCEDURE — 6370000000 HC RX 637 (ALT 250 FOR IP): Performed by: STUDENT IN AN ORGANIZED HEALTH CARE EDUCATION/TRAINING PROGRAM

## 2025-01-24 PROCEDURE — 97116 GAIT TRAINING THERAPY: CPT

## 2025-01-24 PROCEDURE — 6370000000 HC RX 637 (ALT 250 FOR IP): Performed by: INTERNAL MEDICINE

## 2025-01-24 PROCEDURE — 94640 AIRWAY INHALATION TREATMENT: CPT

## 2025-01-24 PROCEDURE — 97535 SELF CARE MNGMENT TRAINING: CPT

## 2025-01-24 PROCEDURE — 82962 GLUCOSE BLOOD TEST: CPT

## 2025-01-24 PROCEDURE — 97530 THERAPEUTIC ACTIVITIES: CPT

## 2025-01-24 PROCEDURE — 80048 BASIC METABOLIC PNL TOTAL CA: CPT

## 2025-01-24 PROCEDURE — 6370000000 HC RX 637 (ALT 250 FOR IP)

## 2025-01-24 PROCEDURE — 36415 COLL VENOUS BLD VENIPUNCTURE: CPT

## 2025-01-24 RX ADMIN — PREGABALIN 75 MG: 25 CAPSULE ORAL at 08:43

## 2025-01-24 RX ADMIN — PREDNISONE 40 MG: 20 TABLET ORAL at 08:43

## 2025-01-24 RX ADMIN — DILTIAZEM HYDROCHLORIDE 120 MG: 120 CAPSULE, COATED, EXTENDED RELEASE ORAL at 08:43

## 2025-01-24 RX ADMIN — INSULIN GLARGINE 20 UNITS: 100 INJECTION, SOLUTION SUBCUTANEOUS at 08:44

## 2025-01-24 RX ADMIN — INSULIN LISPRO 8 UNITS: 100 INJECTION, SOLUTION INTRAVENOUS; SUBCUTANEOUS at 08:45

## 2025-01-24 RX ADMIN — ROPINIROLE HYDROCHLORIDE 0.5 MG: 0.25 TABLET, FILM COATED ORAL at 08:43

## 2025-01-24 RX ADMIN — THERA TABS 1 TABLET: TAB at 08:43

## 2025-01-24 RX ADMIN — ARFORMOTEROL TARTRATE: 15 SOLUTION RESPIRATORY (INHALATION) at 07:52

## 2025-01-24 RX ADMIN — SERTRALINE HYDROCHLORIDE 100 MG: 50 TABLET ORAL at 08:43

## 2025-01-24 RX ADMIN — DULOXETINE HYDROCHLORIDE 30 MG: 30 CAPSULE, DELAYED RELEASE ORAL at 08:43

## 2025-01-24 RX ADMIN — RIVAROXABAN 15 MG: 15 TABLET, FILM COATED ORAL at 08:43

## 2025-01-24 RX ADMIN — PANTOPRAZOLE SODIUM 40 MG: 40 TABLET, DELAYED RELEASE ORAL at 08:42

## 2025-01-24 ASSESSMENT — PAIN SCALES - GENERAL: PAINLEVEL_OUTOF10: 0

## 2025-01-24 NOTE — DISCHARGE SUMMARY
Discharge Summary    Patient: Juliana Loaiza MRN: [id number]  [facility]: [id number]    YOB: 1950  Age: 74 y.o.  Sex: female    DOA: 1/17/2025 LOS:  LOS: 7 days   Discharge Date:  1/24/2025      Admission Diagnosis: Acute respiratory failure [J96.00]  Shortness of breath [R06.02]  Viral pneumonia [J12.9]  COPD exacerbation (HCC) [J44.1]  Acute on chronic respiratory failure with hypoxia [J96.21]  Acute pulmonary embolism without acute cor pulmonale, unspecified pulmonary embolism type (HCC) [I26.99]  Single subsegmental pulmonary embolism without acute cor pulmonale (HCC) [I26.93]  COVID [U07.1]    Discharge Diagnosis:  Acute on chronic hypoxic respiratory failure  Rhinovirus infection  Coronavirus OC 4 3 infection  Pneumonia due to viral infection  COPD exacerbation  Suspected asthma  History of sarcoid  Acute lower left lobe subsegmental PE  Bronchiectasis  Type 2 diabetes  Paroxysmal A-fib  HFrEF  Pulmonary hypertension  Physical debility and falls    Discharge Condition: Stable    Discharge Disposition: SNF    PHYSICAL EXAM    Visit Vitals  BP (!) 160/79   Pulse 87   Temp 97.5 °F (36.4 °C) (Axillary)   Resp 22   Ht 1.575 m (5' 2.01\")   Wt 46.3 kg (102 lb)   SpO2 100%   BMI 18.65 kg/m²       Hospital Course By Problem:   Patient presented to the emergency room secondary to shortness of breath and hypoxia.  Patient notably has a history of COPD and chronic respiratory failure on 6 L of home oxygen along with a history of sarcoidosis and a history of DVTs and paroxysmal A-fib needing anticoagulation despite patient not being sure if she was taking her anticoagulation correctly.  Patient was evaluated in the ED, found to be hypoxic despite her home oxygen supplementation, given appropriate treatment for initial workup and treatment of hypoxia and suspected COPD exacerbation and subsequently admitted for further workup and treatment.  Patient notably also found to have a small

## 2025-01-24 NOTE — PROGRESS NOTES
Ambulance came to transport patient to SNF, when the ambulance person told this writer that they could only be here for 10 minutes.  She explained to this writer that they were having high call volumes and could only stay for 10 minutes.  The ambulance person request another blood sugar be taken. The patient's blood sugar at this time was 402.  The MD was called, the patient was given insulin per scale, and the ambulance left.  However, the ambulance person said to put the patient on will call and they could pick her up later. The Clinical Coordinator was at the desk when the ambulance person made this request. The MD was made aware and still wanted the patient to go to the SNF.  The Clinical Coordinator called the  and the ambulance service.  The ambulance service said that the only transport that was available today would be at 10 pm. The MD was made aware and the patient was rescheduled for 10 am on 1/24/25.  The SNF that the patient was going to  doesn't take patients after 8pm.

## 2025-01-24 NOTE — PROGRESS NOTES
Patient with high blood sugar -- educated patient on water instead of juice which she keeps asking for because she does not like water.  Gave the 8 units and perfect served Dr. Orourke Doctor notification required with the 8 unit according to MAR

## 2025-01-24 NOTE — PROGRESS NOTES
4 Eyes Skin Assessment     NAME:  Juliana Loaiza  YOB: 1950  MEDICAL RECORD NUMBER:  [id number]    The patient is being assessed for  Shift Handoff    I agree that at least one RN has performed a thorough Head to Toe Skin Assessment on the patient. ALL assessment sites listed below have been assessed.      Areas assessed by both nurses:    Head, Face, Ears, Shoulders, Back, Chest, Arms, Elbows, Hands, Sacrum. Buttock, Coccyx, Ischium, Legs. Feet and Heels, and Under Medical Devices         Does the Patient have a Wound? No noted wound(s)       Drew Prevention initiated by RN: No  Wound Care Orders initiated by RN: No    Pressure Injury (Stage 3,4, Unstageable, DTI, NWPT, and Complex wounds) if present, place Wound referral order by RN under : No    New Ostomies, if present place, Ostomy referral order under : No     Nurse 1 eSignature: Electronically signed by JOJO ARVIZU RN on 1/23/25 at 8:41 PM EST    **SHARE this note so that the co-signing nurse can place an eSignature**    Nurse 2 eSignature: {Esignature:[id number]}

## 2025-01-24 NOTE — PLAN OF CARE
Problem: Physical Therapy - Adult  Goal: By Discharge: Performs mobility at highest level of function for planned discharge setting.  See evaluation for individualized goals.  Description: Initiated  1/20/25  to be met within 7-10 days.    1.  Patient will move from supine to sit and sit to supine , scoot up and down, and roll side to side in bed with modified independence.    2.  Patient will transfer from bed to chair and chair to bed with modified independence using the least restrictive device.  3.  Patient will perform sit to stand with modified independence.  4.  Patient will ambulate with modified independence for 50 feet with the least restrictive device.   5.  Patient will perform BLE therex as prescribed to tolerance.     PLOF: pt lives an apt with elevator access, mod ind with rollator and home O2 at baseline,  currently at SNF and thus has no support    Outcome: Progressing     PHYSICAL THERAPY TREATMENT    Patient: Juliana Loaiza (74 y.o. female)  Date: 1/24/2025  Diagnosis: Acute respiratory failure [J96.00]  Shortness of breath [R06.02]  Viral pneumonia [J12.9]  COPD exacerbation (HCC) [J44.1]  Acute on chronic respiratory failure with hypoxia [J96.21]  Acute pulmonary embolism without acute cor pulmonale, unspecified pulmonary embolism type (HCC) [I26.99]  Single subsegmental pulmonary embolism without acute cor pulmonale (HCC) [I26.93]  COVID [U07.1] Acute on chronic respiratory failure with hypoxia      Precautions: General Precautions, Fall Risk, Bed Alarm,  ,  ,  ,  ,  ,  ,      ASSESSMENT:  Pt resting in bed upon entering room, agreeable to PT treatment.  Pt is on 3L supplemental O2 through NC.  Pt sat EOB with CGA, demonstrates good sitting balance.  Performed self care ADLs to prepare for discharge with good dynamic sitting balance.  STS with RW and CGA and began ambulating to opposite side of bed when pt started becoming lightheaded stating she felt as if she was going to pass  assistance  Balance:  Balance  Sitting: Impaired  Sitting - Static: Good (unsupported)  Sitting - Dynamic: Fair (occasional)  Standing: Impaired;With support  Standing - Static: Fair  Standing - Dynamic: Fair   Wheelchair Mobility:      Ambulation/Gait Training:     Gait  Gait Training: Yes  Overall Level of Assistance: Contact-guard assistance;Minimum assistance  Distance (ft): 10 Feet  Assistive Device: Walker, rolling  Speed/Kaity: Slow;Shuffled  Step Length: Right shortened;Left shortened  Gait Abnormalities: Decreased step clearance        Pain:  Intensity Pre-treatment: 0/10   Intensity Post-treatment: 0/10    Activity Tolerance:   Activity Tolerance: Treatment limited secondary to medical complications  Please refer to the flowsheet for vital signs taken during this treatment.  After treatment:   [] Patient left in no apparent distress sitting up in chair  [x] Patient left in no apparent distress in bed  [x] Call bell left within reach  [x] Nursing notified  [] Caregiver present  [x] Bed alarm activated  [] SCDs applied      COMMUNICATION/EDUCATION:   Patient Education  Education Given To: Patient  Education Provided: Role of Therapy;Plan of Care;Transfer Training;Mobility Training  Education Method: Demonstration;Verbal;Teach Back  Barriers to Learning: None  Education Outcome: Verbalized understanding;Demonstrated understanding;Continued education needed      Thao Khan PT  Minutes: 26

## 2025-01-24 NOTE — PLAN OF CARE
Problem: Chronic Conditions and Co-morbidities  Goal: Patient's chronic conditions and co-morbidity symptoms are monitored and maintained or improved  Outcome: Adequate for Discharge  Flowsheets (Taken 1/23/2025 2124)  Care Plan - Patient's Chronic Conditions and Co-Morbidity Symptoms are Monitored and Maintained or Improved:   Monitor and assess patient's chronic conditions and comorbid symptoms for stability, deterioration, or improvement   Collaborate with multidisciplinary team to address chronic and comorbid conditions and prevent exacerbation or deterioration   Update acute care plan with appropriate goals if chronic or comorbid symptoms are exacerbated and prevent overall improvement and discharge     Problem: Discharge Planning  Goal: Discharge to home or other facility with appropriate resources  Outcome: Adequate for Discharge  Flowsheets (Taken 1/23/2025 2124)  Discharge to home or other facility with appropriate resources:   Identify barriers to discharge with patient and caregiver   Arrange for needed discharge resources and transportation as appropriate   Refer to discharge planning if patient needs post-hospital services based on physician order or complex needs related to functional status, cognitive ability or social support system     Problem: Skin/Tissue Integrity  Goal: Absence of new skin breakdown  Description: 1.  Monitor for areas of redness and/or skin breakdown  2.  Assess vascular access sites hourly  3.  Every 4-6 hours minimum:  Change oxygen saturation probe site  4.  Every 4-6 hours:  If on nasal continuous positive airway pressure, respiratory therapy assess nares and determine need for appliance change or resting period.  Outcome: Adequate for Discharge     Problem: Safety - Adult  Goal: Free from fall injury  Outcome: Adequate for Discharge     Problem: Skin/Tissue Integrity - Adult  Goal: Skin integrity remains intact  Outcome: Adequate for Discharge  Flowsheets (Taken 1/23/2025  2124)  Skin Integrity Remains Intact: Monitor for areas of redness and/or skin breakdown     Problem: Metabolic/Fluid and Electrolytes - Adult  Goal: Electrolytes maintained within normal limits  Outcome: Adequate for Discharge  Flowsheets (Taken 1/23/2025 2124)  Electrolytes maintained within normal limits:   Monitor labs and assess patient for signs and symptoms of electrolyte imbalances   Administer electrolyte replacement as ordered   Monitor response to electrolyte replacements, including repeat lab results as appropriate  Goal: Hemodynamic stability and optimal renal function maintained  Outcome: Adequate for Discharge  Flowsheets (Taken 1/23/2025 2124)  Hemodynamic stability and optimal renal function maintained:   Monitor labs and assess for signs and symptoms of volume excess or deficit   Monitor intake, output and patient weight   Monitor response to interventions for patient's volume status, including labs, urine output, blood pressure (other measures as available)     Problem: Pain  Goal: Verbalizes/displays adequate comfort level or baseline comfort level  Outcome: Adequate for Discharge     Problem: Cardiovascular - Adult  Goal: Maintains optimal cardiac output and hemodynamic stability  Outcome: Adequate for Discharge  Flowsheets (Taken 1/23/2025 2124)  Maintains optimal cardiac output and hemodynamic stability:   Monitor blood pressure and heart rate   Monitor urine output and notify Licensed Independent Practitioner for values outside of normal range   Assess for signs of decreased cardiac output     Problem: Musculoskeletal - Adult  Goal: Return mobility to safest level of function  Outcome: Adequate for Discharge  Flowsheets (Taken 1/23/2025 2124)  Return Mobility to Safest Level of Function: Assess patient stability and activity tolerance for standing, transferring and ambulating with or without assistive devices  Goal: Return ADL status to a safe level of function  Outcome: Adequate for

## 2025-01-24 NOTE — PLAN OF CARE
Problem: Occupational Therapy - Adult  Goal: By Discharge: Performs self-care activities at highest level of function for planned discharge setting.  See evaluation for individualized goals.  Description: Occupational Therapy Goals:  Initiated 1/20/2025 to be met within 7-10 days.    1.  Patient will perform grooming while standing at sink with contact guard assistance.   2.  Patient will perform upper body dressing with modified independence.  3.  Patient will perform lower body dressing  with supervision/set-up.  4.  Patient will perform toilet transfers with supervision/set-up  5.  Patient will perform all aspects of toileting with supervision/set-up.  6.  Patient will participate in upper extremity therapeutic exercise/activities with supervision/set-up for 8-10 minutes to increase strength/endurance for ADLs.    7.  Patient will utilize energy conservation techniques during functional activities with verbal cues.    PLOF: Pt lives w/ spouse in an apartment w/ elevator access, tub/shower. Pt is independent w/ mobility using rollator, and is independent w/ ADLs. Spouse in currently at SNF for rehab.      OCCUPATIONAL THERAPY TREATMENT    Patient: Juliana Loaiza (74 y.o. female)  Date: 1/24/2025  Diagnosis: Acute respiratory failure [J96.00]  Shortness of breath [R06.02]  Viral pneumonia [J12.9]  COPD exacerbation (HCC) [J44.1]  Acute on chronic respiratory failure with hypoxia [J96.21]  Acute pulmonary embolism without acute cor pulmonale, unspecified pulmonary embolism type (HCC) [I26.99]  Single subsegmental pulmonary embolism without acute cor pulmonale (HCC) [I26.93]  COVID [U07.1] Acute on chronic respiratory failure with hypoxia      Precautions: General Precautions, Fall Risk, Bed Alarm,  ,  ,  ,  ,  ,  ,      Chart, occupational therapy assessment, plan of care, and goals were reviewed.  ASSESSMENT:  Pt presented supine in bed upon entry, on 3L O2NC, and agreeable. She came to EOB CGA in prep for

## 2025-02-08 ENCOUNTER — HOSPITAL ENCOUNTER (EMERGENCY)
Facility: HOSPITAL | Age: 75
Discharge: HOME | End: 2025-02-08
Attending: STUDENT IN AN ORGANIZED HEALTH CARE EDUCATION/TRAINING PROGRAM
Payer: MEDICARE

## 2025-02-08 ENCOUNTER — APPOINTMENT (OUTPATIENT)
Facility: HOSPITAL | Age: 75
End: 2025-02-08
Payer: MEDICARE

## 2025-02-08 VITALS
DIASTOLIC BLOOD PRESSURE: 74 MMHG | RESPIRATION RATE: 27 BRPM | HEART RATE: 91 BPM | OXYGEN SATURATION: 100 % | HEIGHT: 62 IN | TEMPERATURE: 98 F | WEIGHT: 116 LBS | SYSTOLIC BLOOD PRESSURE: 141 MMHG | BODY MASS INDEX: 21.35 KG/M2

## 2025-02-08 DIAGNOSIS — D64.9 SYMPTOMATIC ANEMIA: Primary | ICD-10-CM

## 2025-02-08 DIAGNOSIS — R07.9 CHEST PAIN, UNSPECIFIED TYPE: ICD-10-CM

## 2025-02-08 DIAGNOSIS — I48.0 PAROXYSMAL ATRIAL FIBRILLATION (HCC): ICD-10-CM

## 2025-02-08 LAB
ANION GAP SERPL CALC-SCNC: 4 MMOL/L (ref 3–18)
B PERT DNA SPEC QL NAA+PROBE: NOT DETECTED
BASOPHILS # BLD: 0.01 K/UL (ref 0–0.1)
BASOPHILS NFR BLD: 0.2 % (ref 0–2)
BORDETELLA PARAPERTUSSIS BY PCR: NOT DETECTED
BUN SERPL-MCNC: 32 MG/DL (ref 7–18)
BUN/CREAT SERPL: 40 (ref 12–20)
C PNEUM DNA SPEC QL NAA+PROBE: NOT DETECTED
CALCIUM SERPL-MCNC: 7.7 MG/DL (ref 8.5–10.1)
CHLORIDE SERPL-SCNC: 113 MMOL/L (ref 100–111)
CO2 SERPL-SCNC: 28 MMOL/L (ref 21–32)
CREAT SERPL-MCNC: 0.8 MG/DL (ref 0.6–1.3)
DIFFERENTIAL METHOD BLD: ABNORMAL
EKG ATRIAL RATE: 91 BPM
EKG DIAGNOSIS: NORMAL
EKG P AXIS: 54 DEGREES
EKG P-R INTERVAL: 122 MS
EKG Q-T INTERVAL: 336 MS
EKG QRS DURATION: 78 MS
EKG QTC CALCULATION (BAZETT): 413 MS
EKG R AXIS: -2 DEGREES
EKG T AXIS: 49 DEGREES
EKG VENTRICULAR RATE: 91 BPM
EOSINOPHIL # BLD: 0.21 K/UL (ref 0–0.4)
EOSINOPHIL NFR BLD: 3.3 % (ref 0–5)
ERYTHROCYTE [DISTWIDTH] IN BLOOD BY AUTOMATED COUNT: 24 % (ref 11.6–14.5)
FLUAV SUBTYP SPEC NAA+PROBE: NOT DETECTED
FLUBV RNA SPEC QL NAA+PROBE: NOT DETECTED
GLUCOSE SERPL-MCNC: 103 MG/DL (ref 74–99)
HADV DNA SPEC QL NAA+PROBE: NOT DETECTED
HCOV 229E RNA SPEC QL NAA+PROBE: NOT DETECTED
HCOV HKU1 RNA SPEC QL NAA+PROBE: NOT DETECTED
HCOV NL63 RNA SPEC QL NAA+PROBE: NOT DETECTED
HCOV OC43 RNA SPEC QL NAA+PROBE: NOT DETECTED
HCT VFR BLD AUTO: 23.1 % (ref 35–45)
HCT VFR BLD AUTO: 24.4 % (ref 35–45)
HGB BLD-MCNC: 6.7 G/DL (ref 12–16)
HGB BLD-MCNC: 7.3 G/DL (ref 12–16)
HISTORY CHECK: NORMAL
HMPV RNA SPEC QL NAA+PROBE: NOT DETECTED
HPIV1 RNA SPEC QL NAA+PROBE: NOT DETECTED
HPIV2 RNA SPEC QL NAA+PROBE: NOT DETECTED
HPIV3 RNA SPEC QL NAA+PROBE: NOT DETECTED
HPIV4 RNA SPEC QL NAA+PROBE: NOT DETECTED
IMM GRANULOCYTES # BLD AUTO: 0.03 K/UL (ref 0–0.04)
IMM GRANULOCYTES NFR BLD AUTO: 0.5 % (ref 0–0.5)
LYMPHOCYTES # BLD: 1.27 K/UL (ref 0.9–3.6)
LYMPHOCYTES NFR BLD: 20.3 % (ref 21–52)
M PNEUMO DNA SPEC QL NAA+PROBE: NOT DETECTED
MCH RBC QN AUTO: 21.6 PG (ref 24–34)
MCHC RBC AUTO-ENTMCNC: 29 G/DL (ref 31–37)
MCV RBC AUTO: 74.5 FL (ref 78–100)
MONOCYTES # BLD: 0.39 K/UL (ref 0.05–1.2)
MONOCYTES NFR BLD: 6.2 % (ref 3–10)
NEUTS SEG # BLD: 4.36 K/UL (ref 1.8–8)
NEUTS SEG NFR BLD: 69.5 % (ref 40–73)
NRBC # BLD: 0 K/UL (ref 0–0.01)
NRBC BLD-RTO: 0 PER 100 WBC
PLATELET # BLD AUTO: 157 K/UL (ref 135–420)
POTASSIUM SERPL-SCNC: 4 MMOL/L (ref 3.5–5.5)
RBC # BLD AUTO: 3.1 M/UL (ref 4.2–5.3)
RSV RNA SPEC QL NAA+PROBE: NOT DETECTED
RV+EV RNA SPEC QL NAA+PROBE: NOT DETECTED
SARS-COV-2 RNA RESP QL NAA+PROBE: NOT DETECTED
SODIUM SERPL-SCNC: 145 MMOL/L (ref 136–145)
TROPONIN I SERPL HS-MCNC: 26 NG/L (ref 0–54)
TROPONIN I SERPL HS-MCNC: 28 NG/L (ref 0–54)
WBC # BLD AUTO: 6.3 K/UL (ref 4.6–13.2)

## 2025-02-08 PROCEDURE — 93005 ELECTROCARDIOGRAM TRACING: CPT | Performed by: STUDENT IN AN ORGANIZED HEALTH CARE EDUCATION/TRAINING PROGRAM

## 2025-02-08 PROCEDURE — 85025 COMPLETE CBC W/AUTO DIFF WBC: CPT

## 2025-02-08 PROCEDURE — 93010 ELECTROCARDIOGRAM REPORT: CPT | Performed by: INTERNAL MEDICINE

## 2025-02-08 PROCEDURE — 85014 HEMATOCRIT: CPT

## 2025-02-08 PROCEDURE — 6370000000 HC RX 637 (ALT 250 FOR IP): Performed by: STUDENT IN AN ORGANIZED HEALTH CARE EDUCATION/TRAINING PROGRAM

## 2025-02-08 PROCEDURE — 85018 HEMOGLOBIN: CPT

## 2025-02-08 PROCEDURE — 96374 THER/PROPH/DIAG INJ IV PUSH: CPT

## 2025-02-08 PROCEDURE — 86900 BLOOD TYPING SEROLOGIC ABO: CPT

## 2025-02-08 PROCEDURE — 86901 BLOOD TYPING SEROLOGIC RH(D): CPT

## 2025-02-08 PROCEDURE — 86923 COMPATIBILITY TEST ELECTRIC: CPT

## 2025-02-08 PROCEDURE — 84484 ASSAY OF TROPONIN QUANT: CPT

## 2025-02-08 PROCEDURE — P9016 RBC LEUKOCYTES REDUCED: HCPCS

## 2025-02-08 PROCEDURE — 71045 X-RAY EXAM CHEST 1 VIEW: CPT

## 2025-02-08 PROCEDURE — 99285 EMERGENCY DEPT VISIT HI MDM: CPT

## 2025-02-08 PROCEDURE — 6360000002 HC RX W HCPCS: Performed by: STUDENT IN AN ORGANIZED HEALTH CARE EDUCATION/TRAINING PROGRAM

## 2025-02-08 PROCEDURE — 80048 BASIC METABOLIC PNL TOTAL CA: CPT

## 2025-02-08 PROCEDURE — 0202U NFCT DS 22 TRGT SARS-COV-2: CPT

## 2025-02-08 PROCEDURE — 86850 RBC ANTIBODY SCREEN: CPT

## 2025-02-08 PROCEDURE — 36430 TRANSFUSION BLD/BLD COMPNT: CPT

## 2025-02-08 RX ORDER — ONDANSETRON 2 MG/ML
4 INJECTION INTRAMUSCULAR; INTRAVENOUS ONCE
Status: COMPLETED | OUTPATIENT
Start: 2025-02-08 | End: 2025-02-08

## 2025-02-08 RX ORDER — DILTIAZEM HYDROCHLORIDE 30 MG/1
120 TABLET, FILM COATED ORAL
Status: COMPLETED | OUTPATIENT
Start: 2025-02-08 | End: 2025-02-08

## 2025-02-08 RX ORDER — SODIUM CHLORIDE 9 MG/ML
INJECTION, SOLUTION INTRAVENOUS PRN
Status: DISCONTINUED | OUTPATIENT
Start: 2025-02-08 | End: 2025-02-08 | Stop reason: HOSPADM

## 2025-02-08 RX ADMIN — DILTIAZEM HYDROCHLORIDE 120 MG: 30 TABLET, FILM COATED ORAL at 03:54

## 2025-02-08 RX ADMIN — ONDANSETRON 4 MG: 2 INJECTION, SOLUTION INTRAMUSCULAR; INTRAVENOUS at 03:49

## 2025-02-08 ASSESSMENT — PAIN DESCRIPTION - ORIENTATION: ORIENTATION: MID;LEFT

## 2025-02-08 ASSESSMENT — PAIN DESCRIPTION - LOCATION: LOCATION: CHEST

## 2025-02-08 ASSESSMENT — PAIN DESCRIPTION - PAIN TYPE: TYPE: ACUTE PAIN

## 2025-02-08 ASSESSMENT — PAIN - FUNCTIONAL ASSESSMENT: PAIN_FUNCTIONAL_ASSESSMENT: 0-10

## 2025-02-08 ASSESSMENT — PAIN SCALES - GENERAL: PAINLEVEL_OUTOF10: 5

## 2025-02-08 ASSESSMENT — PAIN DESCRIPTION - DIRECTION: RADIATING_TOWARDS: BACK

## 2025-02-08 ASSESSMENT — PAIN DESCRIPTION - DESCRIPTORS: DESCRIPTORS: THROBBING

## 2025-02-08 NOTE — ED NOTES
Patient pulled off diaper, purewick and vital sign monitoring devices. Patient endorses she is leaving to go home. Patient is redirectable, agreeable; endorses she will sit back and wait for blood to be drawn and processed. IV flushed, saline locked.

## 2025-02-08 NOTE — ED NOTES
Per EMS pt came from [facility] in [place] for abnormal labs. Pt's hemoglobin was reported as being low at 5.8 per documentation provided on arrival.

## 2025-02-08 NOTE — ED NOTES
Assumed care of patient. Patient laying semi guillen with pillow supporting head and neck. Eyes open, blankets pulled up to waist. No respiratory distress observed. Skin is warm and dry to touch. IV flushed, saline locked. Vital signs monitored by bedside monitor. Patient on 4L NC, home oxygen. Keeley Casey Rn at bedside.

## 2025-02-08 NOTE — ED NOTES
Patient complains she feels sick to her stomach. Pt endorses 'I feel like I'm going to throw up'. Requested Zofran from MD.

## 2025-02-08 NOTE — ED NOTES
Bedside shift change report given to Jonh (oncoming nurse) by Tg (offgoing nurse). Report included the following information Nurse Handoff Report and ED Encounter Summary.

## 2025-02-08 NOTE — CONSENT
Informed Consent for Blood Component Transfusion Note    I have discussed with the patient the rationale for blood component transfusion; its benefits in treating or preventing fatigue, organ damage, or death; and its risk which includes mild transfusion reactions, rare risk of blood borne infection, or more serious but rare reactions. I have discussed the alternatives to transfusion, including the risk and consequences of not receiving transfusion. The patient had an opportunity to ask questions and had agreed to proceed with transfusion of blood components.    Electronically signed by Lv Gamez Jr, DO on 2/8/25 at 1:08 AM EST

## 2025-02-08 NOTE — ED NOTES
Pt discharged into er back to [facility]. Verbalized good understanding of the risk of not wearing oxygen for transport. Pt states she has it at home.

## 2025-02-08 NOTE — ED NOTES
Ezio Rn, [facility] Care staff, called to ask about patient's plan of care. Waiting for post blood transfusion H/H to be drawn. Unknown if patient will be admitted.

## 2025-02-08 NOTE — ED PROVIDER NOTES
EMERGENCY DEPARTMENT HISTORY AND PHYSICAL EXAM      Date: 2/8/2025  Patient Name: Juliana Loaiza    History of Presenting Illness     Chief Complaint   Patient presents with    abnormal labs       History (Context): Juliana Loaiza is a 74 y.o. female  presents to the ED today with chief complaint of abnormal lab value.  Per EMS patient had 911 called after she was found to have a hemoglobin in the fives.  Upon arrival patient states that she is having some chest pain but denies further complaints.  States baseline dyspneic which is not changed at this time.  Chronically on what appears to be 6 L/min nasal cannula.  Is currently on anticoagulation.    Patient denies any active bleeding at this time or melanotic stool.      PCP: Ezequiel Michel MD    Current Facility-Administered Medications   Medication Dose Route Frequency Provider Last Rate Last Admin    0.9 % sodium chloride infusion   IntraVENous PRN Lv Gamez Jr., DO         Current Outpatient Medications   Medication Sig Dispense Refill    tiotropium (SPIRIVA RESPIMAT) 2.5 MCG/ACT AERS inhaler Inhale 2 puffs into the lungs daily 1 each 3    DULoxetine (CYMBALTA) 30 MG extended release capsule Take 1 capsule by mouth daily 30 capsule 3    rivaroxaban (XARELTO) 15 MG TABS tablet Take 1 tablet by mouth daily 90 tablet 1    fluticasone-salmeterol (ADVAIR DISKUS) 500-50 MCG/ACT AEPB diskus inhaler Inhale 1 puff into the lungs in the morning and 1 puff in the evening. 60 each 3    dilTIAZem (CARDIZEM CD) 120 MG extended release capsule Take 1 capsule by mouth daily 30 capsule 0    insulin detemir (LEVEMIR) 100 UNIT/ML injection vial Inject 15 Units into the skin nightly 10 mL 0    atorvastatin (LIPITOR) 80 MG tablet Take 1 tablet by mouth nightly 30 tablet 3    Insulin Syringes, Disposable, U-100 0.3 ML MISC 1 each by Does not apply route daily 100 each 3    albuterol sulfate HFA (PROVENTIL;VENTOLIN;PROAIR) 108 (90 Base) MCG/ACT inhaler  Pneumonia     Right wrist pain     Sarcoidosis        Past Surgical History:  Past Surgical History:   Procedure Laterality Date    BREAST BIOPSY Right     9/10/14: She said tag in place from BX    BRONCHOSCOPY  10/2019    CARDIAC PROCEDURE N/A 2024    Left heart cath performed by Jose Merino MD at [facility] CARDIAC CATH LAB    CARDIAC PROCEDURE N/A 2024    Coronary angiography performed by Jose Merino MD at [facility] CARDIAC CATH LAB    CHOLECYSTECTOMY  2005    HYSTERECTOMY (CERVIX STATUS UNKNOWN)      KNEE ARTHROSCOPY Left        Family History:  Family History   Problem Relation Age of Onset    Diabetes Mother     Hypertension Father        Social History:   Social History     Tobacco Use    Smoking status: Former     Current packs/day: 0.00     Types: Cigarettes     Quit date: 3/1/2018     Years since quittin.9    Smokeless tobacco: Never   Substance Use Topics    Alcohol use: Yes     Alcohol/week: 1.7 standard drinks of alcohol    Drug use: Yes     Types: Marijuana (Weed), Cocaine       Allergies:  Allergies   Allergen Reactions    Latex Hives and Itching    Ciprofloxacin Hives    Penicillins Nausea Only    Shellfish Allergy Hives and Swelling     Swelling of legs         Physical Exam     Vitals:    25 0415 25 0430 25 0445 25 0500   BP: (!) 158/88 (!) 175/85 (!) 161/89 (!) 153/84   Pulse: (!) 101 (!) 104 (!) 102 97   Resp: (!) 34 (!) 34 19 20   Temp:       TempSrc:       SpO2: 100% 100% 100% 100%   Weight:       Height:           Physical Exam  Constitutional:       General: She is not in acute distress.     Appearance: She is ill-appearing (Chronically). She is not toxic-appearing.   HENT:      Head: Normocephalic and atraumatic.   Eyes:      General: No scleral icterus.  Cardiovascular:      Rate and Rhythm: Regular rhythm. Tachycardia present.      Pulses: Normal pulses.   Pulmonary:      Effort: Pulmonary effort is normal. No respiratory distress.      Comments: On supplemental

## 2025-02-09 LAB
ABO + RH BLD: NORMAL
BLD PROD TYP BPU: NORMAL
BLOOD BANK BLOOD PRODUCT EXPIRATION DATE: NORMAL
BLOOD BANK DISPENSE STATUS: NORMAL
BLOOD BANK ISBT PRODUCT BLOOD TYPE: 5100
BLOOD BANK PRODUCT CODE: NORMAL
BLOOD BANK UNIT TYPE AND RH: NORMAL
BLOOD GROUP ANTIBODIES SERPL: NORMAL
BPU ID: NORMAL
CALLED TO: NORMAL
CROSSMATCH RESULT: NORMAL
SPECIMEN EXP DATE BLD: NORMAL
UNIT DIVISION: 0
UNIT ISSUE DATE/TIME: NORMAL

## 2025-02-15 ENCOUNTER — APPOINTMENT (OUTPATIENT)
Facility: HOSPITAL | Age: 75
DRG: 637 | End: 2025-02-15
Payer: MEDICARE

## 2025-02-15 ENCOUNTER — HOSPITAL ENCOUNTER (INPATIENT)
Facility: HOSPITAL | Age: 75
LOS: 6 days | Discharge: HOME HEALTH | DRG: 637 | End: 2025-02-21
Attending: STUDENT IN AN ORGANIZED HEALTH CARE EDUCATION/TRAINING PROGRAM | Admitting: INTERNAL MEDICINE
Payer: MEDICARE

## 2025-02-15 DIAGNOSIS — E16.2 HYPOGLYCEMIA: ICD-10-CM

## 2025-02-15 DIAGNOSIS — T68.XXXA HYPOTHERMIA, INITIAL ENCOUNTER: ICD-10-CM

## 2025-02-15 DIAGNOSIS — R41.82 ALTERED MENTAL STATUS, UNSPECIFIED ALTERED MENTAL STATUS TYPE: Primary | ICD-10-CM

## 2025-02-15 DIAGNOSIS — G89.4 CHRONIC PAIN SYNDROME: ICD-10-CM

## 2025-02-15 PROBLEM — J44.89 COPD WITH ASTHMA (HCC): Chronic | Status: ACTIVE | Noted: 2025-02-15

## 2025-02-15 PROBLEM — I48.0 PAROXYSMAL ATRIAL FIBRILLATION (HCC): Chronic | Status: ACTIVE | Noted: 2024-08-05

## 2025-02-15 PROBLEM — J96.11 CHRONIC RESPIRATORY FAILURE WITH HYPOXIA (HCC): Chronic | Status: ACTIVE | Noted: 2019-10-16

## 2025-02-15 PROBLEM — Z79.01 CURRENT USE OF LONG TERM ANTICOAGULATION: Chronic | Status: ACTIVE | Noted: 2025-02-15

## 2025-02-15 PROBLEM — R94.31 QT PROLONGATION: Chronic | Status: ACTIVE | Noted: 2025-02-15

## 2025-02-15 PROBLEM — J44.89 COPD WITH ASTHMA (HCC): Status: ACTIVE | Noted: 2025-02-15

## 2025-02-15 PROBLEM — D86.0 PULMONARY SARCOIDOSIS: Chronic | Status: ACTIVE | Noted: 2019-10-16

## 2025-02-15 PROBLEM — Z79.01 CURRENT USE OF LONG TERM ANTICOAGULATION: Status: ACTIVE | Noted: 2025-02-15

## 2025-02-15 PROBLEM — R94.31 QT PROLONGATION: Status: ACTIVE | Noted: 2025-02-15

## 2025-02-15 LAB
ALBUMIN SERPL-MCNC: 2.5 G/DL (ref 3.4–5)
ALBUMIN/GLOB SERPL: 0.6 (ref 0.8–1.7)
ALP SERPL-CCNC: 87 U/L (ref 45–117)
ALT SERPL-CCNC: 61 U/L (ref 13–56)
AMMONIA PLAS-SCNC: 34 UMOL/L (ref 11–32)
AMPHET UR QL SCN: NEGATIVE
ANION GAP BLD CALC-SCNC: ABNORMAL MMOL/L (ref 10–20)
ANION GAP SERPL CALC-SCNC: 4 MMOL/L (ref 3–18)
APAP SERPL-MCNC: <2 UG/ML (ref 10–30)
APPEARANCE UR: CLEAR
AST SERPL-CCNC: 43 U/L (ref 10–38)
BACTERIA URNS QL MICRO: NEGATIVE /HPF
BARBITURATES UR QL SCN: NEGATIVE
BASE EXCESS BLD CALC-SCNC: 8.9 MMOL/L
BENZODIAZ UR QL: NEGATIVE
BILIRUB SERPL-MCNC: 0.3 MG/DL (ref 0.2–1)
BILIRUB UR QL: NEGATIVE
BUN SERPL-MCNC: 30 MG/DL (ref 7–18)
BUN/CREAT SERPL: 48 (ref 12–20)
CA-I BLD-MCNC: 1.2 MMOL/L (ref 1.15–1.33)
CALCIUM SERPL-MCNC: 8.2 MG/DL (ref 8.5–10.1)
CANNABINOIDS UR QL SCN: NEGATIVE
CHLORIDE BLD-SCNC: 107 MMOL/L (ref 98–107)
CHLORIDE SERPL-SCNC: 108 MMOL/L (ref 100–111)
CHP ED QC CHECK: YES
CHP ED QC CHECK: YES
CO2 BLD-SCNC: 35 MMOL/L (ref 22–29)
CO2 SERPL-SCNC: 33 MMOL/L (ref 21–32)
COCAINE UR QL SCN: NEGATIVE
COLOR UR: YELLOW
CREAT BLD-MCNC: 0.71 MG/DL (ref 0.6–1.3)
CREAT SERPL-MCNC: 0.63 MG/DL (ref 0.6–1.3)
EPITH CASTS URNS QL MICRO: ABNORMAL /LPF (ref 0–5)
ERYTHROCYTE [DISTWIDTH] IN BLOOD BY AUTOMATED COUNT: 23.6 % (ref 11.6–14.5)
ETHANOL SERPL-MCNC: <3 MG/DL (ref 0–3)
GLOBULIN SER CALC-MCNC: 4.1 G/DL (ref 2–4)
GLUCOSE BLD STRIP.AUTO-MCNC: 110 MG/DL (ref 70–110)
GLUCOSE BLD STRIP.AUTO-MCNC: 123 MG/DL (ref 70–110)
GLUCOSE BLD STRIP.AUTO-MCNC: 124 MG/DL (ref 70–110)
GLUCOSE BLD STRIP.AUTO-MCNC: 227 MG/DL (ref 70–110)
GLUCOSE BLD STRIP.AUTO-MCNC: 42 MG/DL (ref 70–110)
GLUCOSE BLD STRIP.AUTO-MCNC: 44 MG/DL (ref 70–110)
GLUCOSE BLD STRIP.AUTO-MCNC: 45 MG/DL (ref 70–110)
GLUCOSE BLD STRIP.AUTO-MCNC: 97 MG/DL (ref 70–110)
GLUCOSE BLD-MCNC: 107 MG/DL
GLUCOSE BLD-MCNC: 227 MG/DL
GLUCOSE BLD-MCNC: 41 MG/DL (ref 74–99)
GLUCOSE SERPL-MCNC: 45 MG/DL (ref 74–99)
GLUCOSE UR STRIP.AUTO-MCNC: NEGATIVE MG/DL
HCO3 BLD-SCNC: 36 MMOL/L (ref 21–28)
HCT VFR BLD AUTO: 28.8 % (ref 35–45)
HGB BLD-MCNC: 8.6 G/DL (ref 12–16)
HGB UR QL STRIP: ABNORMAL
HYALINE CASTS URNS QL MICRO: ABNORMAL /LPF (ref 0–2)
KETONES UR QL STRIP.AUTO: NEGATIVE MG/DL
LACTATE BLD-SCNC: 0.69 MMOL/L (ref 0.4–2)
LEUKOCYTE ESTERASE UR QL STRIP.AUTO: NEGATIVE
Lab: NORMAL
MAGNESIUM SERPL-MCNC: 1.5 MG/DL (ref 1.6–2.6)
MCH RBC QN AUTO: 22.5 PG (ref 24–34)
MCHC RBC AUTO-ENTMCNC: 29.9 G/DL (ref 31–37)
MCV RBC AUTO: 75.4 FL (ref 78–100)
METHADONE UR QL: NEGATIVE
MUCOUS THREADS URNS QL MICRO: ABNORMAL /LPF
NITRITE UR QL STRIP.AUTO: NEGATIVE
NRBC # BLD: 0.02 K/UL (ref 0–0.01)
NRBC BLD-RTO: 0.2 PER 100 WBC
OPIATES UR QL: NEGATIVE
PCO2 BLDV: 62.6 MMHG (ref 41–51)
PCP UR QL: NEGATIVE
PH BLDV: 7.37 (ref 7.32–7.42)
PH UR STRIP: 6 (ref 5–8)
PLATELET # BLD AUTO: 274 K/UL (ref 135–420)
PMV BLD AUTO: 11 FL (ref 9.2–11.8)
PO2 BLDV: 65 MMHG (ref 25–40)
POTASSIUM BLD-SCNC: 3.2 MMOL/L (ref 3.5–5.1)
POTASSIUM SERPL-SCNC: 3.2 MMOL/L (ref 3.5–5.5)
PROCALCITONIN SERPL-MCNC: 0.18 NG/ML
PROT SERPL-MCNC: 6.6 G/DL (ref 6.4–8.2)
PROT UR STRIP-MCNC: 300 MG/DL
RBC # BLD AUTO: 3.82 M/UL (ref 4.2–5.3)
RBC #/AREA URNS HPF: ABNORMAL /HPF (ref 0–5)
SALICYLATES SERPL-MCNC: <1.7 MG/DL (ref 2.8–20)
SAO2 % BLD: 91 % (ref 94–98)
SERVICE CMNT-IMP: ABNORMAL
SODIUM BLD-SCNC: 150 MMOL/L (ref 136–145)
SODIUM SERPL-SCNC: 145 MMOL/L (ref 136–145)
SP GR UR REFRACTOMETRY: 1.01 (ref 1–1.03)
SPECIMEN SITE: ABNORMAL
T4 FREE SERPL-MCNC: 1 NG/DL (ref 0.7–1.5)
TSH SERPL DL<=0.05 MIU/L-ACNC: 0.41 UIU/ML (ref 0.36–3.74)
UROBILINOGEN UR QL STRIP.AUTO: 0.2 EU/DL (ref 0.2–1)
WBC # BLD AUTO: 9.2 K/UL (ref 4.6–13.2)
WBC URNS QL MICRO: ABNORMAL /HPF (ref 0–5)

## 2025-02-15 PROCEDURE — 82140 ASSAY OF AMMONIA: CPT

## 2025-02-15 PROCEDURE — 2140000001 HC CVICU INTERMEDIATE R&B

## 2025-02-15 PROCEDURE — 85014 HEMATOCRIT: CPT

## 2025-02-15 PROCEDURE — 87154 CUL TYP ID BLD PTHGN 6+ TRGT: CPT

## 2025-02-15 PROCEDURE — 2580000003 HC RX 258: Performed by: STUDENT IN AN ORGANIZED HEALTH CARE EDUCATION/TRAINING PROGRAM

## 2025-02-15 PROCEDURE — 2580000003 HC RX 258: Performed by: INTERNAL MEDICINE

## 2025-02-15 PROCEDURE — 99285 EMERGENCY DEPT VISIT HI MDM: CPT

## 2025-02-15 PROCEDURE — 6360000002 HC RX W HCPCS: Performed by: STUDENT IN AN ORGANIZED HEALTH CARE EDUCATION/TRAINING PROGRAM

## 2025-02-15 PROCEDURE — 83735 ASSAY OF MAGNESIUM: CPT

## 2025-02-15 PROCEDURE — 80053 COMPREHEN METABOLIC PANEL: CPT

## 2025-02-15 PROCEDURE — 84439 ASSAY OF FREE THYROXINE: CPT

## 2025-02-15 PROCEDURE — 83605 ASSAY OF LACTIC ACID: CPT

## 2025-02-15 PROCEDURE — 84295 ASSAY OF SERUM SODIUM: CPT

## 2025-02-15 PROCEDURE — 84145 PROCALCITONIN (PCT): CPT

## 2025-02-15 PROCEDURE — 99223 1ST HOSP IP/OBS HIGH 75: CPT | Performed by: INTERNAL MEDICINE

## 2025-02-15 PROCEDURE — 82962 GLUCOSE BLOOD TEST: CPT

## 2025-02-15 PROCEDURE — 87040 BLOOD CULTURE FOR BACTERIA: CPT

## 2025-02-15 PROCEDURE — 85027 COMPLETE CBC AUTOMATED: CPT

## 2025-02-15 PROCEDURE — 93005 ELECTROCARDIOGRAM TRACING: CPT | Performed by: STUDENT IN AN ORGANIZED HEALTH CARE EDUCATION/TRAINING PROGRAM

## 2025-02-15 PROCEDURE — 1100000000 HC RM PRIVATE

## 2025-02-15 PROCEDURE — 84132 ASSAY OF SERUM POTASSIUM: CPT

## 2025-02-15 PROCEDURE — 96374 THER/PROPH/DIAG INJ IV PUSH: CPT

## 2025-02-15 PROCEDURE — 70450 CT HEAD/BRAIN W/O DYE: CPT

## 2025-02-15 PROCEDURE — 82803 BLOOD GASES ANY COMBINATION: CPT

## 2025-02-15 PROCEDURE — 2500000003 HC RX 250 WO HCPCS: Performed by: STUDENT IN AN ORGANIZED HEALTH CARE EDUCATION/TRAINING PROGRAM

## 2025-02-15 PROCEDURE — 80307 DRUG TEST PRSMV CHEM ANLYZR: CPT

## 2025-02-15 PROCEDURE — 71045 X-RAY EXAM CHEST 1 VIEW: CPT

## 2025-02-15 PROCEDURE — 80179 DRUG ASSAY SALICYLATE: CPT

## 2025-02-15 PROCEDURE — 81001 URINALYSIS AUTO W/SCOPE: CPT

## 2025-02-15 PROCEDURE — 82947 ASSAY GLUCOSE BLOOD QUANT: CPT

## 2025-02-15 PROCEDURE — 82077 ASSAY SPEC XCP UR&BREATH IA: CPT

## 2025-02-15 PROCEDURE — 82330 ASSAY OF CALCIUM: CPT

## 2025-02-15 PROCEDURE — 80143 DRUG ASSAY ACETAMINOPHEN: CPT

## 2025-02-15 PROCEDURE — 84443 ASSAY THYROID STIM HORMONE: CPT

## 2025-02-15 RX ORDER — MAGNESIUM SULFATE IN WATER 40 MG/ML
2000 INJECTION, SOLUTION INTRAVENOUS PRN
Status: DISCONTINUED | OUTPATIENT
Start: 2025-02-15 | End: 2025-02-21 | Stop reason: HOSPADM

## 2025-02-15 RX ORDER — VANCOMYCIN 1.75 G/350ML
1250 INJECTION, SOLUTION INTRAVENOUS ONCE
Status: COMPLETED | OUTPATIENT
Start: 2025-02-15 | End: 2025-02-15

## 2025-02-15 RX ORDER — ONDANSETRON 2 MG/ML
4 INJECTION INTRAMUSCULAR; INTRAVENOUS EVERY 6 HOURS PRN
Status: DISCONTINUED | OUTPATIENT
Start: 2025-02-15 | End: 2025-02-21 | Stop reason: HOSPADM

## 2025-02-15 RX ORDER — HYDRALAZINE HYDROCHLORIDE 20 MG/ML
5 INJECTION INTRAMUSCULAR; INTRAVENOUS EVERY 6 HOURS PRN
Status: DISCONTINUED | OUTPATIENT
Start: 2025-02-15 | End: 2025-02-21 | Stop reason: HOSPADM

## 2025-02-15 RX ORDER — SODIUM CHLORIDE 0.9 % (FLUSH) 0.9 %
5-40 SYRINGE (ML) INJECTION PRN
Status: DISCONTINUED | OUTPATIENT
Start: 2025-02-15 | End: 2025-02-21 | Stop reason: HOSPADM

## 2025-02-15 RX ORDER — LABETALOL HYDROCHLORIDE 5 MG/ML
5 INJECTION, SOLUTION INTRAVENOUS
Status: DISCONTINUED | OUTPATIENT
Start: 2025-02-15 | End: 2025-02-21 | Stop reason: HOSPADM

## 2025-02-15 RX ORDER — DEXTROSE MONOHYDRATE 100 MG/ML
INJECTION, SOLUTION INTRAVENOUS CONTINUOUS
Status: DISCONTINUED | OUTPATIENT
Start: 2025-02-15 | End: 2025-02-15

## 2025-02-15 RX ORDER — DEXTROSE MONOHYDRATE 100 MG/ML
INJECTION, SOLUTION INTRAVENOUS CONTINUOUS PRN
Status: DISCONTINUED | OUTPATIENT
Start: 2025-02-15 | End: 2025-02-21 | Stop reason: HOSPADM

## 2025-02-15 RX ORDER — IPRATROPIUM BROMIDE AND ALBUTEROL SULFATE 2.5; .5 MG/3ML; MG/3ML
1 SOLUTION RESPIRATORY (INHALATION) EVERY 4 HOURS PRN
Status: DISCONTINUED | OUTPATIENT
Start: 2025-02-15 | End: 2025-02-21 | Stop reason: HOSPADM

## 2025-02-15 RX ORDER — 0.9 % SODIUM CHLORIDE 0.9 %
30 INTRAVENOUS SOLUTION INTRAVENOUS ONCE
Status: DISCONTINUED | OUTPATIENT
Start: 2025-02-15 | End: 2025-02-15 | Stop reason: SDUPTHER

## 2025-02-15 RX ORDER — METOPROLOL TARTRATE 1 MG/ML
5 INJECTION, SOLUTION INTRAVENOUS
Status: DISCONTINUED | OUTPATIENT
Start: 2025-02-15 | End: 2025-02-21 | Stop reason: HOSPADM

## 2025-02-15 RX ORDER — ACETAMINOPHEN 325 MG/1
650 TABLET ORAL EVERY 6 HOURS PRN
Status: DISCONTINUED | OUTPATIENT
Start: 2025-02-15 | End: 2025-02-21 | Stop reason: HOSPADM

## 2025-02-15 RX ORDER — DEXTROSE MONOHYDRATE 25 G/50ML
25 INJECTION, SOLUTION INTRAVENOUS ONCE
Status: COMPLETED | OUTPATIENT
Start: 2025-02-15 | End: 2025-02-15

## 2025-02-15 RX ORDER — POTASSIUM CHLORIDE 1500 MG/1
40 TABLET, EXTENDED RELEASE ORAL PRN
Status: DISCONTINUED | OUTPATIENT
Start: 2025-02-15 | End: 2025-02-21 | Stop reason: HOSPADM

## 2025-02-15 RX ORDER — LACTULOSE 10 G/15ML
30 SOLUTION ORAL 3 TIMES DAILY
Status: DISCONTINUED | OUTPATIENT
Start: 2025-02-15 | End: 2025-02-17

## 2025-02-15 RX ORDER — ACETAMINOPHEN 650 MG/1
650 SUPPOSITORY RECTAL EVERY 6 HOURS PRN
Status: DISCONTINUED | OUTPATIENT
Start: 2025-02-15 | End: 2025-02-21 | Stop reason: HOSPADM

## 2025-02-15 RX ORDER — SODIUM CHLORIDE 0.9 % (FLUSH) 0.9 %
5-40 SYRINGE (ML) INJECTION EVERY 12 HOURS SCHEDULED
Status: DISCONTINUED | OUTPATIENT
Start: 2025-02-15 | End: 2025-02-21 | Stop reason: HOSPADM

## 2025-02-15 RX ORDER — INSULIN LISPRO 100 [IU]/ML
0-4 INJECTION, SOLUTION INTRAVENOUS; SUBCUTANEOUS
Status: DISCONTINUED | OUTPATIENT
Start: 2025-02-15 | End: 2025-02-21 | Stop reason: HOSPADM

## 2025-02-15 RX ORDER — DEXTROSE MONOHYDRATE 100 MG/ML
INJECTION, SOLUTION INTRAVENOUS CONTINUOUS
Status: DISCONTINUED | OUTPATIENT
Start: 2025-02-15 | End: 2025-02-16

## 2025-02-15 RX ORDER — POLYETHYLENE GLYCOL 3350 17 G/17G
17 POWDER, FOR SOLUTION ORAL DAILY PRN
Status: DISCONTINUED | OUTPATIENT
Start: 2025-02-15 | End: 2025-02-21 | Stop reason: HOSPADM

## 2025-02-15 RX ORDER — MECOBALAMIN 5000 MCG
5 TABLET,DISINTEGRATING ORAL NIGHTLY PRN
Status: DISCONTINUED | OUTPATIENT
Start: 2025-02-15 | End: 2025-02-21 | Stop reason: HOSPADM

## 2025-02-15 RX ORDER — ONDANSETRON 4 MG/1
4 TABLET, ORALLY DISINTEGRATING ORAL EVERY 8 HOURS PRN
Status: DISCONTINUED | OUTPATIENT
Start: 2025-02-15 | End: 2025-02-21 | Stop reason: HOSPADM

## 2025-02-15 RX ORDER — SODIUM CHLORIDE 9 MG/ML
INJECTION, SOLUTION INTRAVENOUS PRN
Status: DISCONTINUED | OUTPATIENT
Start: 2025-02-15 | End: 2025-02-21 | Stop reason: HOSPADM

## 2025-02-15 RX ORDER — POTASSIUM CHLORIDE 7.45 MG/ML
10 INJECTION INTRAVENOUS PRN
Status: DISCONTINUED | OUTPATIENT
Start: 2025-02-15 | End: 2025-02-21 | Stop reason: HOSPADM

## 2025-02-15 RX ADMIN — DEXTROSE MONOHYDRATE 25 G: 25 INJECTION, SOLUTION INTRAVENOUS at 15:02

## 2025-02-15 RX ADMIN — VANCOMYCIN 1250 MG: 1.75 INJECTION, SOLUTION INTRAVENOUS at 18:54

## 2025-02-15 RX ADMIN — DEXTROSE MONOHYDRATE: 100 INJECTION, SOLUTION INTRAVENOUS at 19:00

## 2025-02-15 RX ADMIN — SODIUM CHLORIDE 1503 ML: 0.9 INJECTION, SOLUTION INTRAVENOUS at 22:42

## 2025-02-15 RX ADMIN — WATER 2000 MG: 1 INJECTION INTRAMUSCULAR; INTRAVENOUS; SUBCUTANEOUS at 18:40

## 2025-02-15 NOTE — H&P
None    0 Result Notes  Details    Reading Physician Reading Date Result Priority   Malcom Smalls MD  [phone] 2/15/2025      Narrative & Impression  EXAM: CT HEAD WO CONTRAST     CLINICAL INDICATION/HISTORY: AMS     COMPARISON: 11/4/2024     TECHNIQUE: Axial imaging of the head from the skull base to the vertex without  IV contrast and reconstructed into coronal and sagittal planes.     All CT scans at this facility are performed using dose optimization technique as  appropriate to a performed exam, to include automated exposure control,  adjustment of the MA and/or kV according to patient size (including appropriate  matching for site-specific examinations) or use of  iterative reconstruction  technique.     FINDINGS:      No acute intra-axial or extra-axial hemorrhage, white matter edema or midline  shift. Ventricles are normal size. Periventricular hypodensities are unchanged  from previous exam and likely chronic white matter ischemic changes. Bone  windows demonstrate no fractures. The visualized paranasal sinuses are clear.  There is  fluid opacification in the bilateral mastoid air cells. This is new  when compared to the prior exam. Bilateral cerebral and cerebellar atrophy.     IMPRESSION:  No acute intracranial process. New opacification of the bilateral ethmoid air  cells suggest mastoiditis. Clinical correlation is recommended.        Electronically signed by Malcom Smalls           Exam Ended: 02/15/25 15:31 EST            XR CHEST PORTABLE  Order: [id number]  Status: Final result       Visible to patient: No (not released)       Next appt: None    0 Result Notes  Details    Reading Physician Reading Date Result Priority   Maverick Red MD  [phone] 2/15/2025      Narrative & Impression  PORTABLE CHEST X-RAY     CPT CODE: 84245      INDICATION: Hypothermia.     COMPARISON: Plain film 2/8/2025.     FINDINGS:   Patient rotated and leaning to the right for this exam.  Heart size top normal and

## 2025-02-15 NOTE — ED TRIAGE NOTES
Pt presents to ed via ems with c/o unresponsive. Pt  stated he found her in the bed. Upon arrival pt bg 24

## 2025-02-15 NOTE — ED PROVIDER NOTES
EMERGENCY DEPARTMENT HISTORY AND PHYSICAL EXAM    7:26 AM      Date: 2/15/2025  Patient Name: Juliana Loaiza    History of Presenting Illness     Chief Complaint   Patient presents with    Hypoglycemia       History From: EMS    Juliana Loaiza is a 74 y.o. female   HPI  74-year-old female with history of diabetes, CAD, hypertension, COPD on 6 L of oxygen, pulmonary sarcoidosis, pulmonary hypertension who presents altered mental status.  As per EMS patient was found to be altered with a blood sugar level of 24.  Patient last well-known was 2/14/2025 at 6 PM.  Has been called in.  He denies any head trauma, change back, sick contacts, or any other changes.  Unable to assess ROS as patient is altered at this time.  Per EMS patient was given glucagon in the field.         Nursing Notes were all reviewed and agreed with or any disagreements were addressed in the HPI.    PCP: Ezequiel Michel MD    Current Facility-Administered Medications   Medication Dose Route Frequency Provider Last Rate Last Admin    atorvastatin (LIPITOR) tablet 80 mg  80 mg Oral Nightly Edi Horne, DO        dilTIAZem (CARDIZEM CD) extended release capsule 120 mg  120 mg Oral Daily Edi Horne, DO        DULoxetine (CYMBALTA) extended release capsule 30 mg  30 mg Oral Daily Edi Horne,         arformoterol 15 mcg-budesonide 1 mg neb solution   Nebulization BID RT Edi Horne,         lidocaine 4 % external patch 1 patch  1 patch Topical Daily Edi Horne,         mirtazapine (REMERON) tablet 45 mg  45 mg Oral Nightly Edi Horne,         pantoprazole (PROTONIX) tablet 40 mg  40 mg Oral Daily Edi Horne,         rivaroxaban (XARELTO) tablet 15 mg  15 mg Oral Daily Edi Horne,         sertraline (ZOLOFT) tablet 100 mg  100 mg Oral Daily Edi Horne, DO        rOPINIRole (REQUIP) tablet 0.5 mg  0.5 mg Oral TID Edi Horne,         ipratropium (ATROVENT) 0.02 % nebulizer

## 2025-02-16 PROBLEM — R41.82 ALTERED MENTAL STATUS: Status: ACTIVE | Noted: 2025-02-16

## 2025-02-16 PROBLEM — E16.2 HYPOGLYCEMIA: Status: ACTIVE | Noted: 2025-02-16

## 2025-02-16 LAB
ACB COMPLEX DNA BLD POS QL NAA+NON-PROBE: NOT DETECTED
ACCESSION NUMBER, LLC1M: ABNORMAL
ALBUMIN SERPL-MCNC: 2.1 G/DL (ref 3.4–5)
ALBUMIN/GLOB SERPL: 0.6 (ref 0.8–1.7)
ALP SERPL-CCNC: 106 U/L (ref 45–117)
ALT SERPL-CCNC: 87 U/L (ref 13–56)
ANION GAP SERPL CALC-SCNC: 5 MMOL/L (ref 3–18)
AST SERPL-CCNC: 67 U/L (ref 10–38)
B FRAGILIS DNA BLD POS QL NAA+NON-PROBE: NOT DETECTED
BASOPHILS # BLD: 0.02 K/UL (ref 0–0.1)
BASOPHILS NFR BLD: 0.3 % (ref 0–2)
BILIRUB SERPL-MCNC: 0.6 MG/DL (ref 0.2–1)
BIOFIRE TEST COMMENT: ABNORMAL
BUN SERPL-MCNC: 21 MG/DL (ref 7–18)
BUN/CREAT SERPL: 21 (ref 12–20)
C ALBICANS DNA BLD POS QL NAA+NON-PROBE: NOT DETECTED
C AURIS DNA BLD POS QL NAA+NON-PROBE: NOT DETECTED
C GATTII+NEOFOR DNA BLD POS QL NAA+N-PRB: NOT DETECTED
C GLABRATA DNA BLD POS QL NAA+NON-PROBE: NOT DETECTED
C KRUSEI DNA BLD POS QL NAA+NON-PROBE: NOT DETECTED
C PARAP DNA BLD POS QL NAA+NON-PROBE: NOT DETECTED
C TROPICLS DNA BLD POS QL NAA+NON-PROBE: NOT DETECTED
CALCIUM SERPL-MCNC: 7 MG/DL (ref 8.5–10.1)
CHLORIDE SERPL-SCNC: 108 MMOL/L (ref 100–111)
CO2 SERPL-SCNC: 29 MMOL/L (ref 21–32)
CREAT SERPL-MCNC: 1 MG/DL (ref 0.6–1.3)
DIFFERENTIAL METHOD BLD: ABNORMAL
E CLOAC COMP DNA BLD POS NAA+NON-PROBE: NOT DETECTED
E COLI DNA BLD POS QL NAA+NON-PROBE: NOT DETECTED
E FAECALIS DNA BLD POS QL NAA+NON-PROBE: NOT DETECTED
E FAECIUM DNA BLD POS QL NAA+NON-PROBE: NOT DETECTED
EKG ATRIAL RATE: 96 BPM
EKG DIAGNOSIS: NORMAL
EKG P AXIS: 44 DEGREES
EKG P-R INTERVAL: 110 MS
EKG Q-T INTERVAL: 386 MS
EKG QRS DURATION: 84 MS
EKG QTC CALCULATION (BAZETT): 487 MS
EKG R AXIS: -14 DEGREES
EKG T AXIS: 27 DEGREES
EKG VENTRICULAR RATE: 96 BPM
ENTEROBACTERALES DNA BLD POS NAA+N-PRB: NOT DETECTED
EOSINOPHIL # BLD: 0.09 K/UL (ref 0–0.4)
EOSINOPHIL NFR BLD: 1.2 % (ref 0–5)
ERYTHROCYTE [DISTWIDTH] IN BLOOD BY AUTOMATED COUNT: 23.9 % (ref 11.6–14.5)
GLOBULIN SER CALC-MCNC: 3.8 G/DL (ref 2–4)
GLUCOSE BLD STRIP.AUTO-MCNC: 131 MG/DL (ref 70–110)
GLUCOSE BLD STRIP.AUTO-MCNC: 168 MG/DL (ref 70–110)
GLUCOSE BLD STRIP.AUTO-MCNC: 187 MG/DL (ref 70–110)
GLUCOSE BLD STRIP.AUTO-MCNC: 275 MG/DL (ref 70–110)
GLUCOSE SERPL-MCNC: 204 MG/DL (ref 74–99)
GP B STREP DNA BLD POS QL NAA+NON-PROBE: NOT DETECTED
HAEM INFLU DNA BLD POS QL NAA+NON-PROBE: NOT DETECTED
HCT VFR BLD AUTO: 29.3 % (ref 35–45)
HGB BLD-MCNC: 8.6 G/DL (ref 12–16)
IMM GRANULOCYTES # BLD AUTO: 0.06 K/UL (ref 0–0.04)
IMM GRANULOCYTES NFR BLD AUTO: 0.8 % (ref 0–0.5)
K OXYTOCA DNA BLD POS QL NAA+NON-PROBE: NOT DETECTED
KLEBSIELLA SP DNA BLD POS QL NAA+NON-PRB: NOT DETECTED
KLEBSIELLA SP DNA BLD POS QL NAA+NON-PRB: NOT DETECTED
L MONOCYTOG DNA BLD POS QL NAA+NON-PROBE: NOT DETECTED
LYMPHOCYTES # BLD: 1.22 K/UL (ref 0.9–3.6)
LYMPHOCYTES NFR BLD: 16.1 % (ref 21–52)
MCH RBC QN AUTO: 21.9 PG (ref 24–34)
MCHC RBC AUTO-ENTMCNC: 29.4 G/DL (ref 31–37)
MCV RBC AUTO: 74.6 FL (ref 78–100)
MECA+MECC ISLT/SPM QL: DETECTED
MONOCYTES # BLD: 0.57 K/UL (ref 0.05–1.2)
MONOCYTES NFR BLD: 7.5 % (ref 3–10)
N MEN DNA BLD POS QL NAA+NON-PROBE: NOT DETECTED
NEUTS SEG # BLD: 5.64 K/UL (ref 1.8–8)
NEUTS SEG NFR BLD: 74.1 % (ref 40–73)
NRBC # BLD: 0 K/UL (ref 0–0.01)
NRBC BLD-RTO: 0 PER 100 WBC
P AERUGINOSA DNA BLD POS NAA+NON-PROBE: NOT DETECTED
PLATELET # BLD AUTO: 276 K/UL (ref 135–420)
PMV BLD AUTO: 10.6 FL (ref 9.2–11.8)
POTASSIUM SERPL-SCNC: 3.8 MMOL/L (ref 3.5–5.5)
PROT SERPL-MCNC: 5.9 G/DL (ref 6.4–8.2)
PROTEUS SP DNA BLD POS QL NAA+NON-PROBE: NOT DETECTED
RBC # BLD AUTO: 3.93 M/UL (ref 4.2–5.3)
RESISTANT GENE TARGETS: ABNORMAL
S AUREUS DNA BLD POS QL NAA+NON-PROBE: NOT DETECTED
S AUREUS+CONS DNA BLD POS NAA+NON-PROBE: DETECTED
S EPIDERMIDIS DNA BLD POS QL NAA+NON-PRB: DETECTED
S LUGDUNENSIS DNA BLD POS QL NAA+NON-PRB: NOT DETECTED
S MALTOPHILIA DNA BLD POS QL NAA+NON-PRB: NOT DETECTED
S MARCESCENS DNA BLD POS NAA+NON-PROBE: NOT DETECTED
S PNEUM DNA BLD POS QL NAA+NON-PROBE: NOT DETECTED
S PYO DNA BLD POS QL NAA+NON-PROBE: NOT DETECTED
SALMONELLA DNA BLD POS QL NAA+NON-PROBE: NOT DETECTED
SODIUM SERPL-SCNC: 142 MMOL/L (ref 136–145)
STREPTOCOCCUS DNA BLD POS NAA+NON-PROBE: NOT DETECTED
WBC # BLD AUTO: 7.6 K/UL (ref 4.6–13.2)

## 2025-02-16 PROCEDURE — 80053 COMPREHEN METABOLIC PANEL: CPT

## 2025-02-16 PROCEDURE — 82962 GLUCOSE BLOOD TEST: CPT

## 2025-02-16 PROCEDURE — 6360000002 HC RX W HCPCS: Performed by: HOSPITALIST

## 2025-02-16 PROCEDURE — 36415 COLL VENOUS BLD VENIPUNCTURE: CPT

## 2025-02-16 PROCEDURE — 6370000000 HC RX 637 (ALT 250 FOR IP): Performed by: INTERNAL MEDICINE

## 2025-02-16 PROCEDURE — 94761 N-INVAS EAR/PLS OXIMETRY MLT: CPT

## 2025-02-16 PROCEDURE — 93010 ELECTROCARDIOGRAM REPORT: CPT | Performed by: INTERNAL MEDICINE

## 2025-02-16 PROCEDURE — 2500000003 HC RX 250 WO HCPCS: Performed by: INTERNAL MEDICINE

## 2025-02-16 PROCEDURE — 2700000000 HC OXYGEN THERAPY PER DAY

## 2025-02-16 PROCEDURE — 85025 COMPLETE CBC W/AUTO DIFF WBC: CPT

## 2025-02-16 PROCEDURE — 99232 SBSQ HOSP IP/OBS MODERATE 35: CPT | Performed by: HOSPITALIST

## 2025-02-16 PROCEDURE — 2140000001 HC CVICU INTERMEDIATE R&B

## 2025-02-16 PROCEDURE — 6360000002 HC RX W HCPCS: Performed by: INTERNAL MEDICINE

## 2025-02-16 PROCEDURE — 94640 AIRWAY INHALATION TREATMENT: CPT

## 2025-02-16 PROCEDURE — 2580000003 HC RX 258: Performed by: HOSPITALIST

## 2025-02-16 RX ORDER — DILTIAZEM HYDROCHLORIDE 120 MG/1
120 CAPSULE, COATED, EXTENDED RELEASE ORAL DAILY
Status: DISCONTINUED | OUTPATIENT
Start: 2025-02-16 | End: 2025-02-21 | Stop reason: HOSPADM

## 2025-02-16 RX ORDER — ATORVASTATIN CALCIUM 40 MG/1
80 TABLET, FILM COATED ORAL NIGHTLY
Status: DISCONTINUED | OUTPATIENT
Start: 2025-02-16 | End: 2025-02-21 | Stop reason: HOSPADM

## 2025-02-16 RX ORDER — DULOXETIN HYDROCHLORIDE 30 MG/1
30 CAPSULE, DELAYED RELEASE ORAL DAILY
Status: DISCONTINUED | OUTPATIENT
Start: 2025-02-16 | End: 2025-02-21 | Stop reason: HOSPADM

## 2025-02-16 RX ORDER — PANTOPRAZOLE SODIUM 40 MG/1
40 TABLET, DELAYED RELEASE ORAL DAILY
Status: DISCONTINUED | OUTPATIENT
Start: 2025-02-16 | End: 2025-02-21 | Stop reason: HOSPADM

## 2025-02-16 RX ORDER — LIDOCAINE 4 G/G
1 PATCH TOPICAL DAILY
Status: DISCONTINUED | OUTPATIENT
Start: 2025-02-16 | End: 2025-02-21 | Stop reason: HOSPADM

## 2025-02-16 RX ORDER — MIRTAZAPINE 15 MG/1
45 TABLET, FILM COATED ORAL NIGHTLY
Status: DISCONTINUED | OUTPATIENT
Start: 2025-02-16 | End: 2025-02-21 | Stop reason: HOSPADM

## 2025-02-16 RX ORDER — ROPINIROLE 0.25 MG/1
0.5 TABLET, FILM COATED ORAL 3 TIMES DAILY
Status: DISCONTINUED | OUTPATIENT
Start: 2025-02-16 | End: 2025-02-21 | Stop reason: HOSPADM

## 2025-02-16 RX ADMIN — ROPINIROLE 0.5 MG: 0.25 TABLET, FILM COATED ORAL at 09:27

## 2025-02-16 RX ADMIN — INSULIN LISPRO 2 UNITS: 100 INJECTION, SOLUTION INTRAVENOUS; SUBCUTANEOUS at 16:19

## 2025-02-16 RX ADMIN — PANTOPRAZOLE SODIUM 40 MG: 40 TABLET, DELAYED RELEASE ORAL at 09:27

## 2025-02-16 RX ADMIN — ARFORMOTEROL TARTRATE: 15 SOLUTION RESPIRATORY (INHALATION) at 07:59

## 2025-02-16 RX ADMIN — IPRATROPIUM BROMIDE 0.5 MG: 0.5 SOLUTION RESPIRATORY (INHALATION) at 19:54

## 2025-02-16 RX ADMIN — DULOXETINE HYDROCHLORIDE 30 MG: 30 CAPSULE, DELAYED RELEASE ORAL at 09:27

## 2025-02-16 RX ADMIN — VANCOMYCIN HYDROCHLORIDE 750 MG: 750 INJECTION, POWDER, LYOPHILIZED, FOR SOLUTION INTRAVENOUS at 16:18

## 2025-02-16 RX ADMIN — LACTULOSE 30 G: 20 SOLUTION ORAL at 09:27

## 2025-02-16 RX ADMIN — ATORVASTATIN CALCIUM 80 MG: 40 TABLET, FILM COATED ORAL at 21:08

## 2025-02-16 RX ADMIN — SODIUM CHLORIDE, PRESERVATIVE FREE 10 ML: 5 INJECTION INTRAVENOUS at 09:27

## 2025-02-16 RX ADMIN — ACETAMINOPHEN 650 MG: 325 TABLET ORAL at 00:15

## 2025-02-16 RX ADMIN — ROPINIROLE 0.5 MG: 0.25 TABLET, FILM COATED ORAL at 14:33

## 2025-02-16 RX ADMIN — CEFEPIME 2000 MG: 2 INJECTION, POWDER, FOR SOLUTION INTRAVENOUS at 16:18

## 2025-02-16 RX ADMIN — DILTIAZEM HYDROCHLORIDE 120 MG: 120 CAPSULE, COATED, EXTENDED RELEASE ORAL at 09:27

## 2025-02-16 RX ADMIN — ARFORMOTEROL TARTRATE: 15 SOLUTION RESPIRATORY (INHALATION) at 19:54

## 2025-02-16 RX ADMIN — ACETAMINOPHEN 650 MG: 325 TABLET ORAL at 21:08

## 2025-02-16 RX ADMIN — ROPINIROLE 0.5 MG: 0.25 TABLET, FILM COATED ORAL at 21:08

## 2025-02-16 RX ADMIN — SODIUM CHLORIDE, PRESERVATIVE FREE 10 ML: 5 INJECTION INTRAVENOUS at 04:35

## 2025-02-16 RX ADMIN — IPRATROPIUM BROMIDE 0.5 MG: 0.5 SOLUTION RESPIRATORY (INHALATION) at 07:59

## 2025-02-16 RX ADMIN — LACTULOSE 30 G: 20 SOLUTION ORAL at 14:33

## 2025-02-16 RX ADMIN — IPRATROPIUM BROMIDE 0.5 MG: 0.5 SOLUTION RESPIRATORY (INHALATION) at 12:47

## 2025-02-16 RX ADMIN — SERTRALINE HYDROCHLORIDE 100 MG: 50 TABLET ORAL at 09:27

## 2025-02-16 RX ADMIN — SODIUM CHLORIDE, PRESERVATIVE FREE 10 ML: 5 INJECTION INTRAVENOUS at 22:22

## 2025-02-16 RX ADMIN — RIVAROXABAN 15 MG: 15 TABLET, FILM COATED ORAL at 09:27

## 2025-02-16 RX ADMIN — MIRTAZAPINE 45 MG: 15 TABLET, FILM COATED ORAL at 21:08

## 2025-02-16 ASSESSMENT — PAIN SCALES - GENERAL
PAINLEVEL_OUTOF10: 0
PAINLEVEL_OUTOF10: 0
PAINLEVEL_OUTOF10: 9
PAINLEVEL_OUTOF10: 0
PAINLEVEL_OUTOF10: 10

## 2025-02-16 ASSESSMENT — PAIN DESCRIPTION - LOCATION
LOCATION: GENERALIZED
LOCATION: GENERALIZED

## 2025-02-16 NOTE — ED NOTES
Pt had two large loose soft bowel movements. Dr. Horne aware. Judi huluis manueler removed due to rectal temp 99.7. oral temp 98.9.

## 2025-02-16 NOTE — ED NOTES
Pt cleaned up of stool Large loose mixed with formed yellow stool. Pur-wic changed. Pt cleaned up.

## 2025-02-16 NOTE — ED NOTES
TRANSFER - OUT REPORT:    Verbal report given to ALEJANDRA Funk on Juliana Loaiza  being transferred to 2303 for routine progression of patient care       Report consisted of patient's Situation, Background, Assessment and   Recommendations(SBAR).     Information from the following report(s) Nurse Handoff Report, ED Encounter Summary, ED SBAR, Intake/Output, MAR, Recent Results, Cardiac Rhythm Sinus Rhythm, and Neuro Assessment was reviewed with the receiving nurse.    [place] Fall Assessment:    Presents to emergency department  because of falls (Syncope, seizure, or loss of consciousness): No  Age > 70: No  Altered Mental Status, Intoxication with alcohol or substance confusion (Disorientation, impaired judgment, poor safety awaremess, or inability to follow instructions): No  Impaired Mobility: Ambulates or transfers with assistive devices or assistance; Unable to ambulate or transer.: No             Lines:   Peripheral IV 02/15/25 Right External Jugular (Active)       Peripheral IV 02/15/25 Distal;Right;Anterior Basilic (Active)        Opportunity for questions and clarification was provided.      Patient transported with:  O2 @ 4lpm Monitor

## 2025-02-17 LAB
ANION GAP SERPL CALC-SCNC: 5 MMOL/L (ref 3–18)
BASOPHILS # BLD: 0.01 K/UL (ref 0–0.1)
BASOPHILS NFR BLD: 0.2 % (ref 0–2)
BUN SERPL-MCNC: 20 MG/DL (ref 7–18)
BUN/CREAT SERPL: 20 (ref 12–20)
CALCIUM SERPL-MCNC: 6.9 MG/DL (ref 8.5–10.1)
CHLORIDE SERPL-SCNC: 106 MMOL/L (ref 100–111)
CO2 SERPL-SCNC: 30 MMOL/L (ref 21–32)
CREAT SERPL-MCNC: 1.01 MG/DL (ref 0.6–1.3)
DIFFERENTIAL METHOD BLD: ABNORMAL
EOSINOPHIL # BLD: 0.1 K/UL (ref 0–0.4)
EOSINOPHIL NFR BLD: 1.5 % (ref 0–5)
ERYTHROCYTE [DISTWIDTH] IN BLOOD BY AUTOMATED COUNT: 23.6 % (ref 11.6–14.5)
GLUCOSE BLD STRIP.AUTO-MCNC: 203 MG/DL (ref 70–110)
GLUCOSE BLD STRIP.AUTO-MCNC: 245 MG/DL (ref 70–110)
GLUCOSE BLD STRIP.AUTO-MCNC: 280 MG/DL (ref 70–110)
GLUCOSE BLD STRIP.AUTO-MCNC: 315 MG/DL (ref 70–110)
GLUCOSE SERPL-MCNC: 290 MG/DL (ref 74–99)
HCT VFR BLD AUTO: 29 % (ref 35–45)
HGB BLD-MCNC: 8.7 G/DL (ref 12–16)
IMM GRANULOCYTES # BLD AUTO: 0.06 K/UL (ref 0–0.04)
IMM GRANULOCYTES NFR BLD AUTO: 0.9 % (ref 0–0.5)
LYMPHOCYTES # BLD: 0.96 K/UL (ref 0.9–3.6)
LYMPHOCYTES NFR BLD: 14.9 % (ref 21–52)
MCH RBC QN AUTO: 22.4 PG (ref 24–34)
MCHC RBC AUTO-ENTMCNC: 30 G/DL (ref 31–37)
MCV RBC AUTO: 74.6 FL (ref 78–100)
MONOCYTES # BLD: 0.42 K/UL (ref 0.05–1.2)
MONOCYTES NFR BLD: 6.5 % (ref 3–10)
NEUTS SEG # BLD: 4.91 K/UL (ref 1.8–8)
NEUTS SEG NFR BLD: 76 % (ref 40–73)
NRBC # BLD: 0 K/UL (ref 0–0.01)
NRBC BLD-RTO: 0 PER 100 WBC
PLATELET # BLD AUTO: 262 K/UL (ref 135–420)
POTASSIUM SERPL-SCNC: 3.7 MMOL/L (ref 3.5–5.5)
RBC # BLD AUTO: 3.89 M/UL (ref 4.2–5.3)
SODIUM SERPL-SCNC: 141 MMOL/L (ref 136–145)
VANCOMYCIN SERPL-MCNC: 14.8 UG/ML (ref 5–40)
WBC # BLD AUTO: 6.5 K/UL (ref 4.6–13.2)

## 2025-02-17 PROCEDURE — 6360000002 HC RX W HCPCS: Performed by: HOSPITALIST

## 2025-02-17 PROCEDURE — 2580000003 HC RX 258: Performed by: HOSPITALIST

## 2025-02-17 PROCEDURE — 2500000003 HC RX 250 WO HCPCS: Performed by: INTERNAL MEDICINE

## 2025-02-17 PROCEDURE — 94640 AIRWAY INHALATION TREATMENT: CPT

## 2025-02-17 PROCEDURE — 97162 PT EVAL MOD COMPLEX 30 MIN: CPT

## 2025-02-17 PROCEDURE — 2700000000 HC OXYGEN THERAPY PER DAY

## 2025-02-17 PROCEDURE — 80202 ASSAY OF VANCOMYCIN: CPT

## 2025-02-17 PROCEDURE — 6370000000 HC RX 637 (ALT 250 FOR IP): Performed by: HOSPITALIST

## 2025-02-17 PROCEDURE — 99232 SBSQ HOSP IP/OBS MODERATE 35: CPT | Performed by: HOSPITALIST

## 2025-02-17 PROCEDURE — 2140000001 HC CVICU INTERMEDIATE R&B

## 2025-02-17 PROCEDURE — 97166 OT EVAL MOD COMPLEX 45 MIN: CPT

## 2025-02-17 PROCEDURE — 6360000002 HC RX W HCPCS: Performed by: INTERNAL MEDICINE

## 2025-02-17 PROCEDURE — 82962 GLUCOSE BLOOD TEST: CPT

## 2025-02-17 PROCEDURE — 36415 COLL VENOUS BLD VENIPUNCTURE: CPT

## 2025-02-17 PROCEDURE — 80048 BASIC METABOLIC PNL TOTAL CA: CPT

## 2025-02-17 PROCEDURE — 94761 N-INVAS EAR/PLS OXIMETRY MLT: CPT

## 2025-02-17 PROCEDURE — 85025 COMPLETE CBC W/AUTO DIFF WBC: CPT

## 2025-02-17 PROCEDURE — 6370000000 HC RX 637 (ALT 250 FOR IP): Performed by: INTERNAL MEDICINE

## 2025-02-17 RX ORDER — GUAIFENESIN/DEXTROMETHORPHAN 100-10MG/5
5 SYRUP ORAL EVERY 4 HOURS PRN
Status: DISCONTINUED | OUTPATIENT
Start: 2025-02-17 | End: 2025-02-21 | Stop reason: HOSPADM

## 2025-02-17 RX ORDER — OXYCODONE AND ACETAMINOPHEN 5; 325 MG/1; MG/1
1 TABLET ORAL EVERY 8 HOURS PRN
Status: DISCONTINUED | OUTPATIENT
Start: 2025-02-17 | End: 2025-02-18

## 2025-02-17 RX ADMIN — GUAIFENESIN AND DEXTROMETHORPHAN 5 ML: 100; 10 SYRUP ORAL at 17:25

## 2025-02-17 RX ADMIN — INSULIN LISPRO 2 UNITS: 100 INJECTION, SOLUTION INTRAVENOUS; SUBCUTANEOUS at 09:57

## 2025-02-17 RX ADMIN — ACETAMINOPHEN 650 MG: 325 TABLET ORAL at 09:53

## 2025-02-17 RX ADMIN — IPRATROPIUM BROMIDE 0.5 MG: 0.5 SOLUTION RESPIRATORY (INHALATION) at 02:26

## 2025-02-17 RX ADMIN — NITROGLYCERIN 0.5 INCH: 20 OINTMENT TOPICAL at 09:56

## 2025-02-17 RX ADMIN — RIVAROXABAN 15 MG: 15 TABLET, FILM COATED ORAL at 09:52

## 2025-02-17 RX ADMIN — OXYCODONE HYDROCHLORIDE AND ACETAMINOPHEN 1 TABLET: 5; 325 TABLET ORAL at 20:55

## 2025-02-17 RX ADMIN — OXYCODONE HYDROCHLORIDE AND ACETAMINOPHEN 1 TABLET: 5; 325 TABLET ORAL at 11:19

## 2025-02-17 RX ADMIN — VANCOMYCIN HYDROCHLORIDE 750 MG: 750 INJECTION, POWDER, LYOPHILIZED, FOR SOLUTION INTRAVENOUS at 04:17

## 2025-02-17 RX ADMIN — SODIUM CHLORIDE, PRESERVATIVE FREE 10 ML: 5 INJECTION INTRAVENOUS at 20:58

## 2025-02-17 RX ADMIN — INSULIN LISPRO 1 UNITS: 100 INJECTION, SOLUTION INTRAVENOUS; SUBCUTANEOUS at 17:25

## 2025-02-17 RX ADMIN — ATORVASTATIN CALCIUM 80 MG: 40 TABLET, FILM COATED ORAL at 20:51

## 2025-02-17 RX ADMIN — LACTULOSE 30 G: 20 SOLUTION ORAL at 09:52

## 2025-02-17 RX ADMIN — DULOXETINE HYDROCHLORIDE 30 MG: 30 CAPSULE, DELAYED RELEASE ORAL at 09:51

## 2025-02-17 RX ADMIN — CEFEPIME 2000 MG: 2 INJECTION, POWDER, FOR SOLUTION INTRAVENOUS at 03:08

## 2025-02-17 RX ADMIN — INSULIN LISPRO 3 UNITS: 100 INJECTION, SOLUTION INTRAVENOUS; SUBCUTANEOUS at 20:52

## 2025-02-17 RX ADMIN — ROPINIROLE 0.5 MG: 0.25 TABLET, FILM COATED ORAL at 09:52

## 2025-02-17 RX ADMIN — ROPINIROLE 0.5 MG: 0.25 TABLET, FILM COATED ORAL at 14:37

## 2025-02-17 RX ADMIN — SERTRALINE HYDROCHLORIDE 100 MG: 50 TABLET ORAL at 09:51

## 2025-02-17 RX ADMIN — SODIUM CHLORIDE, PRESERVATIVE FREE 10 ML: 5 INJECTION INTRAVENOUS at 10:17

## 2025-02-17 RX ADMIN — PANTOPRAZOLE SODIUM 40 MG: 40 TABLET, DELAYED RELEASE ORAL at 09:51

## 2025-02-17 RX ADMIN — MIRTAZAPINE 45 MG: 15 TABLET, FILM COATED ORAL at 20:50

## 2025-02-17 RX ADMIN — CEFEPIME 2000 MG: 2 INJECTION, POWDER, FOR SOLUTION INTRAVENOUS at 14:40

## 2025-02-17 RX ADMIN — DILTIAZEM HYDROCHLORIDE 120 MG: 120 CAPSULE, COATED, EXTENDED RELEASE ORAL at 09:52

## 2025-02-17 RX ADMIN — INSULIN LISPRO 1 UNITS: 100 INJECTION, SOLUTION INTRAVENOUS; SUBCUTANEOUS at 12:44

## 2025-02-17 RX ADMIN — ROPINIROLE 0.5 MG: 0.25 TABLET, FILM COATED ORAL at 20:51

## 2025-02-17 ASSESSMENT — PAIN DESCRIPTION - LOCATION
LOCATION: BACK;CHEST
LOCATION: GENERALIZED
LOCATION: BACK;CHEST
LOCATION: CHEST;BACK

## 2025-02-17 ASSESSMENT — PAIN SCALES - GENERAL
PAINLEVEL_OUTOF10: 10
PAINLEVEL_OUTOF10: 8
PAINLEVEL_OUTOF10: 10
PAINLEVEL_OUTOF10: 10

## 2025-02-17 ASSESSMENT — PAIN DESCRIPTION - ORIENTATION: ORIENTATION: LOWER

## 2025-02-17 ASSESSMENT — PAIN DESCRIPTION - DESCRIPTORS: DESCRIPTORS: SHARP

## 2025-02-17 NOTE — CARE COORDINATION
02/17/25 1122   Service Assessment   Patient Orientation Alert and Oriented   Cognition Alert   History Provided By Patient   Primary Caregiver Self   Support Systems Spouse/Significant Other   Patient's Healthcare Decision Maker is: Patient Declined (Legal Next of Kin Remains as Decision Maker)   PCP Verified by CM Yes   Last Visit to PCP Within last 3 months   Prior Functional Level Assistance with the following:;Bathing;Dressing;Housework;Cooking   Current Functional Level Assistance with the following:;Bathing;Dressing;Cooking;Housework   Can patient return to prior living arrangement Yes   Ability to make needs known: Good   Family able to assist with home care needs: No   Community Resources None   Social/Functional History   Lives With Spouse   Type of Home Apartment   Home Layout One level   Home Access Elevator   Bathroom Shower/Tub Tub/Shower unit   Bathroom Toilet Standard   Bathroom Equipment Grab bars in shower;Shower chair   Bathroom Accessibility Accessible   Home Equipment Rollator;Oxygen   Prior Level of Assist for ADLs Needs assistance   Prior Level of Assist for Homemaking Needs assistance   Homemaking Responsibilities No   Ambulation Assistance Independent   Prior Level of Assist for Transfers Independent   Active  No   Patient's  Info Medicaid cab   Occupation Retired   Discharge Planning   Type of Residence Apartment   Living Arrangements Spouse/Significant Other   Current Services Prior To Admission Oxygen Therapy   DME Ordered? No   Potential Assistance Purchasing Medications No   Type of Home Care Services None   Patient expects to be discharged to: Apartment   One/Two Story Residence One story   History of falls? 1   Services At/After Discharge   Transition of Care Consult (CM Consult)   (TBD- Possibly HH)   [place] Resource Information Provided? No   Mode of Transport at Discharge Other (see comment)  (Medicaid Cab)   Confirm Follow Up Transport Self   Condition of

## 2025-02-17 NOTE — CARE COORDINATION
02/17/25 1129   Readmission Assessment   Number of Days since last admission? 8-30 days   Previous Disposition SNF   Who is being Interviewed Patient   Did you visit your Primary Care Physician after you left the hospital, before you returned this time? No  (Within 24 hours of being discharged from SNF patient was find in her residence unresponsive)   Why weren't you able to visit your PCP? Other (Comment)  (@ SNF)   Did you see a specialist, such as Cardiac, Pulmonary, Orthopedic Physician, etc. after you left the hospital? No   Who advised the patient to return to the hospital?   (Family)   Does the patient report anything that got in the way of taking their medications? No     MARY Wilkes

## 2025-02-18 ENCOUNTER — APPOINTMENT (OUTPATIENT)
Facility: HOSPITAL | Age: 75
DRG: 637 | End: 2025-02-18
Payer: MEDICARE

## 2025-02-18 LAB
ANION GAP SERPL CALC-SCNC: 3 MMOL/L (ref 3–18)
BACTERIA SPEC CULT: ABNORMAL
BASOPHILS # BLD: 0.01 K/UL (ref 0–0.1)
BASOPHILS NFR BLD: 0.2 % (ref 0–2)
BUN SERPL-MCNC: 16 MG/DL (ref 7–18)
BUN/CREAT SERPL: 16 (ref 12–20)
CALCIUM SERPL-MCNC: 7.5 MG/DL (ref 8.5–10.1)
CHLORIDE SERPL-SCNC: 108 MMOL/L (ref 100–111)
CO2 SERPL-SCNC: 31 MMOL/L (ref 21–32)
CREAT SERPL-MCNC: 0.98 MG/DL (ref 0.6–1.3)
DIFFERENTIAL METHOD BLD: ABNORMAL
EOSINOPHIL # BLD: 0.14 K/UL (ref 0–0.4)
EOSINOPHIL NFR BLD: 2.1 % (ref 0–5)
ERYTHROCYTE [DISTWIDTH] IN BLOOD BY AUTOMATED COUNT: 22.9 % (ref 11.6–14.5)
GLUCOSE BLD STRIP.AUTO-MCNC: 158 MG/DL (ref 70–110)
GLUCOSE BLD STRIP.AUTO-MCNC: 203 MG/DL (ref 70–110)
GLUCOSE BLD STRIP.AUTO-MCNC: 229 MG/DL (ref 70–110)
GLUCOSE BLD STRIP.AUTO-MCNC: 323 MG/DL (ref 70–110)
GLUCOSE SERPL-MCNC: 201 MG/DL (ref 74–99)
GRAM STN SPEC: ABNORMAL
HCT VFR BLD AUTO: 29.1 % (ref 35–45)
HGB BLD-MCNC: 8.8 G/DL (ref 12–16)
IMM GRANULOCYTES # BLD AUTO: 0.06 K/UL (ref 0–0.04)
IMM GRANULOCYTES NFR BLD AUTO: 0.9 % (ref 0–0.5)
LYMPHOCYTES # BLD: 0.88 K/UL (ref 0.9–3.6)
LYMPHOCYTES NFR BLD: 13.4 % (ref 21–52)
MCH RBC QN AUTO: 23 PG (ref 24–34)
MCHC RBC AUTO-ENTMCNC: 30.2 G/DL (ref 31–37)
MCV RBC AUTO: 76 FL (ref 78–100)
MONOCYTES # BLD: 0.49 K/UL (ref 0.05–1.2)
MONOCYTES NFR BLD: 7.4 % (ref 3–10)
NEUTS SEG # BLD: 5.01 K/UL (ref 1.8–8)
NEUTS SEG NFR BLD: 76 % (ref 40–73)
NRBC # BLD: 0 K/UL (ref 0–0.01)
NRBC BLD-RTO: 0 PER 100 WBC
PLATELET # BLD AUTO: 263 K/UL (ref 135–420)
POTASSIUM SERPL-SCNC: 3.9 MMOL/L (ref 3.5–5.5)
RBC # BLD AUTO: 3.83 M/UL (ref 4.2–5.3)
SERVICE CMNT-IMP: ABNORMAL
SODIUM SERPL-SCNC: 142 MMOL/L (ref 136–145)
TROPONIN I SERPL HS-MCNC: 32 NG/L (ref 0–54)
WBC # BLD AUTO: 6.6 K/UL (ref 4.6–13.2)

## 2025-02-18 PROCEDURE — 36415 COLL VENOUS BLD VENIPUNCTURE: CPT

## 2025-02-18 PROCEDURE — 6370000000 HC RX 637 (ALT 250 FOR IP): Performed by: HOSPITALIST

## 2025-02-18 PROCEDURE — 2500000003 HC RX 250 WO HCPCS: Performed by: INTERNAL MEDICINE

## 2025-02-18 PROCEDURE — 6360000002 HC RX W HCPCS: Performed by: INTERNAL MEDICINE

## 2025-02-18 PROCEDURE — 2140000001 HC CVICU INTERMEDIATE R&B

## 2025-02-18 PROCEDURE — 97162 PT EVAL MOD COMPLEX 30 MIN: CPT

## 2025-02-18 PROCEDURE — 84484 ASSAY OF TROPONIN QUANT: CPT

## 2025-02-18 PROCEDURE — 6360000002 HC RX W HCPCS

## 2025-02-18 PROCEDURE — 71045 X-RAY EXAM CHEST 1 VIEW: CPT

## 2025-02-18 PROCEDURE — 99232 SBSQ HOSP IP/OBS MODERATE 35: CPT | Performed by: HOSPITALIST

## 2025-02-18 PROCEDURE — 82962 GLUCOSE BLOOD TEST: CPT

## 2025-02-18 PROCEDURE — 6370000000 HC RX 637 (ALT 250 FOR IP): Performed by: INTERNAL MEDICINE

## 2025-02-18 PROCEDURE — 93005 ELECTROCARDIOGRAM TRACING: CPT | Performed by: HOSPITALIST

## 2025-02-18 PROCEDURE — 94761 N-INVAS EAR/PLS OXIMETRY MLT: CPT

## 2025-02-18 PROCEDURE — 85025 COMPLETE CBC W/AUTO DIFF WBC: CPT

## 2025-02-18 PROCEDURE — 6370000000 HC RX 637 (ALT 250 FOR IP)

## 2025-02-18 PROCEDURE — 94640 AIRWAY INHALATION TREATMENT: CPT

## 2025-02-18 PROCEDURE — 80048 BASIC METABOLIC PNL TOTAL CA: CPT

## 2025-02-18 RX ORDER — DIAZEPAM 2 MG/1
2 TABLET ORAL EVERY 8 HOURS PRN
Status: DISCONTINUED | OUTPATIENT
Start: 2025-02-18 | End: 2025-02-21 | Stop reason: HOSPADM

## 2025-02-18 RX ORDER — MORPHINE SULFATE 2 MG/ML
INJECTION, SOLUTION INTRAMUSCULAR; INTRAVENOUS
Status: COMPLETED
Start: 2025-02-18 | End: 2025-02-18

## 2025-02-18 RX ORDER — MORPHINE SULFATE 2 MG/ML
1 INJECTION, SOLUTION INTRAMUSCULAR; INTRAVENOUS ONCE
Status: COMPLETED | OUTPATIENT
Start: 2025-02-18 | End: 2025-02-18

## 2025-02-18 RX ORDER — ASPIRIN 325 MG
325 TABLET, DELAYED RELEASE (ENTERIC COATED) ORAL ONCE
Status: COMPLETED | OUTPATIENT
Start: 2025-02-18 | End: 2025-02-18

## 2025-02-18 RX ORDER — OXYCODONE AND ACETAMINOPHEN 5; 325 MG/1; MG/1
1 TABLET ORAL EVERY 6 HOURS PRN
Status: DISCONTINUED | OUTPATIENT
Start: 2025-02-18 | End: 2025-02-21 | Stop reason: HOSPADM

## 2025-02-18 RX ADMIN — DILTIAZEM HYDROCHLORIDE 120 MG: 120 CAPSULE, COATED, EXTENDED RELEASE ORAL at 08:12

## 2025-02-18 RX ADMIN — MORPHINE SULFATE 1 MG: 2 INJECTION, SOLUTION INTRAMUSCULAR; INTRAVENOUS at 08:30

## 2025-02-18 RX ADMIN — OXYCODONE HYDROCHLORIDE AND ACETAMINOPHEN 1 TABLET: 5; 325 TABLET ORAL at 18:10

## 2025-02-18 RX ADMIN — INSULIN LISPRO 1 UNITS: 100 INJECTION, SOLUTION INTRAVENOUS; SUBCUTANEOUS at 08:13

## 2025-02-18 RX ADMIN — MIRTAZAPINE 45 MG: 15 TABLET, FILM COATED ORAL at 20:29

## 2025-02-18 RX ADMIN — INSULIN LISPRO 3 UNITS: 100 INJECTION, SOLUTION INTRAVENOUS; SUBCUTANEOUS at 18:11

## 2025-02-18 RX ADMIN — SERTRALINE HYDROCHLORIDE 100 MG: 50 TABLET ORAL at 08:12

## 2025-02-18 RX ADMIN — SODIUM CHLORIDE, PRESERVATIVE FREE 10 ML: 5 INJECTION INTRAVENOUS at 08:09

## 2025-02-18 RX ADMIN — ROPINIROLE 0.5 MG: 0.25 TABLET, FILM COATED ORAL at 20:30

## 2025-02-18 RX ADMIN — SODIUM CHLORIDE, PRESERVATIVE FREE 10 ML: 5 INJECTION INTRAVENOUS at 20:31

## 2025-02-18 RX ADMIN — ROPINIROLE 0.5 MG: 0.25 TABLET, FILM COATED ORAL at 14:48

## 2025-02-18 RX ADMIN — IPRATROPIUM BROMIDE 0.5 MG: 0.5 SOLUTION RESPIRATORY (INHALATION) at 08:27

## 2025-02-18 RX ADMIN — INSULIN LISPRO 1 UNITS: 100 INJECTION, SOLUTION INTRAVENOUS; SUBCUTANEOUS at 20:28

## 2025-02-18 RX ADMIN — DULOXETINE HYDROCHLORIDE 30 MG: 30 CAPSULE, DELAYED RELEASE ORAL at 08:12

## 2025-02-18 RX ADMIN — ASPIRIN 325 MG: 325 TABLET, COATED ORAL at 08:54

## 2025-02-18 RX ADMIN — DIAZEPAM 2 MG: 2 TABLET ORAL at 14:48

## 2025-02-18 RX ADMIN — HYDRALAZINE HYDROCHLORIDE 5 MG: 20 INJECTION INTRAMUSCULAR; INTRAVENOUS at 08:09

## 2025-02-18 RX ADMIN — PANTOPRAZOLE SODIUM 40 MG: 40 TABLET, DELAYED RELEASE ORAL at 08:12

## 2025-02-18 RX ADMIN — RIVAROXABAN 15 MG: 15 TABLET, FILM COATED ORAL at 08:12

## 2025-02-18 RX ADMIN — OXYCODONE HYDROCHLORIDE AND ACETAMINOPHEN 1 TABLET: 5; 325 TABLET ORAL at 08:12

## 2025-02-18 RX ADMIN — ATORVASTATIN CALCIUM 80 MG: 40 TABLET, FILM COATED ORAL at 20:29

## 2025-02-18 RX ADMIN — ROPINIROLE 0.5 MG: 0.25 TABLET, FILM COATED ORAL at 08:12

## 2025-02-18 RX ADMIN — ARFORMOTEROL TARTRATE: 15 SOLUTION RESPIRATORY (INHALATION) at 08:27

## 2025-02-18 ASSESSMENT — PAIN DESCRIPTION - LOCATION
LOCATION: CHEST

## 2025-02-18 ASSESSMENT — PAIN DESCRIPTION - PAIN TYPE: TYPE: ACUTE PAIN

## 2025-02-18 ASSESSMENT — PAIN SCALES - GENERAL
PAINLEVEL_OUTOF10: 10
PAINLEVEL_OUTOF10: 0

## 2025-02-18 ASSESSMENT — PAIN DESCRIPTION - ORIENTATION
ORIENTATION: ANTERIOR;POSTERIOR
ORIENTATION: ANTERIOR

## 2025-02-18 ASSESSMENT — PAIN DESCRIPTION - DESCRIPTORS
DESCRIPTORS: ACHING
DESCRIPTORS: TIGHTNESS

## 2025-02-18 ASSESSMENT — PAIN DESCRIPTION - FREQUENCY: FREQUENCY: INTERMITTENT

## 2025-02-18 ASSESSMENT — PAIN DESCRIPTION - ONSET: ONSET: ON-GOING

## 2025-02-18 NOTE — CONSULTS
[facility] - Consult Note    Cardiology consultation request from Dr. Carrington for evaluation and management/treatment of chest pain    Date of  Admission: 2/15/2025  2:55 PM   Primary Care Physician:  Ezequiel Michel MD    Attending Cardiologist: Dr. Sorto       Assessment:     - Atypical chest pain. Troponin level wnl. EKG 2/15 SR with PACs, no significant ST/T wave abnormalities.   - Acute metabolic encephalopathy: primary reason for admission. Possibly secondary to hypoglycemia Resolved   - Bacteremia: positive blood cultures likely contaminant. Patient on  IV antibiotics. ID following   - Nonobstructive CAD: [facility] 8/2024 RCA with midsegment 20% stenosis. LM patent, LAD patent, Lcx patent.   - Echo 1/17/25 EF 50-55%, normal wall motion. RVSP 52mmHg  - pAF: Currently in SR. OAC with Xarelto. Cardizem CD 120mg for rate control   - History of pulmonary sarcoidosis. Followed by pulmonology as outpatient  - COPD , not in an acute exacerbation  - Chronic respiratory failure on 4L of O2 as outpatient    - Chronic pain  - Anxiety  - Tobacco use history  - History of polysubstance abuse  - Hx of PE. On Xarelto  - HLD  - T2DM    Primary cardiologist: Dr. Merino     Plan:   -  Patient seen and examined independently.  Her chest pain is atypical for a cardiac origin.  The patient appears alert but detailed her history any further.  No further cardiac evaluation is planned at this time.  Agree with note of Asha Matute NP.  NAOMI Sorto MD  - Continue current cardiac medication regimen to include aspirin, high intensity statin, Xarelto, and cardizem   - No cardiac ischemic eval planned.      History of Present Illness:     This is a 74 y.o. female admitted for Hypothermia, initial encounter [T68.XXXA].    Patient with pmhx of nonobstructive CAD, T2DM, GERD, hypertension, pulmonary sarcoidosis presented to [facility] on 2/15/2025 with altered mental status. She was found to have hypothermia possibly secondary 
2 DAYS       Blood Culture 1 [[id number]]  (Abnormal) Collected: 02/15/25 1830    Order Status: Completed Specimen: Blood Updated: 02/16/25 2103     Special Requests NO SPECIAL REQUESTS        Gram Stain       AEROBIC BOTTLE Gram positive cocci IN GROUPS            FABIAN RILEY RN CVTSD AT [phone] TO BKS            ANAEROBIC BOTTLE Gram positive cocci IN GROUPS                  SMEAR CALLED TO AND CORRECTLY REPEATED BY: ALEJANDRA SCHULTZ CVTSTEPDOWN  AT 1742HRS ON [id number] TO 4410.           Culture       PROBABLE STAPHYLOCOCCUS SPECIES, COAGULASE NEGATIVE GROWING IN 1 OF 2 BOTTLES DRAWN No Site Indicated          Culture, Blood, PCR ID Panel [[id number]]  (Abnormal) Collected: 02/15/25 1730    Order Status: Completed Specimen: Blood Updated: 02/16/25 1505     Accession Number [id number]     Enterococcus faecalis by PCR Not detected        Enterococcus faecium by PCR Not detected        Listeria monocytogenes by PCR Not detected        STAPHYLOCOCCUS Detected        Staphylococcus Aureus Not detected        Staphylococcus epidermidis by PCR Detected        Staphylococcus lugdunensis by PCR Not detected        STREPTOCOCCUS Not detected        Streptococcus agalactiae (Group B) Not detected        Strep pneumoniae Not detected        Strep pyogenes,(Grp. A) Not detected        Acinetobacter calcoac baumannii complex by PCR Not detected        Bacteroides fragilis by PCR Not detected        Enterobacteriaceae by PCR Not detected        Enterobacter cloacae complex by PCR Not detected        Escherichia Coli Not detected        Klebsiella aerogenes by PCR Not detected        Klebsiella oxytoca by PCR Not detected        Klebsiella pneumoniae group by PCR Not detected        Proteus by PCR Not detected        Salmonella species by PCR Not detected        Serratia marcescens by PCR Not detected        Haemophilus Influenzae by PCR Not detected        Neisseria meningitidis by PCR Not detected        Pseudomonas aeruginosa Not

## 2025-02-18 NOTE — RT PROTOCOL NOTE
Afternoon treatment not given as patient refused. RT could tell patient seemed upset but wouldn't say what was wrong. When RT attempted to place treatment on patient she asked to please just be left alone

## 2025-02-18 NOTE — RT PROTOCOL NOTE
Per Dr Grecia lyn to start patient on bipap due to continued respiratory difficulty despite morphine being given and breathing treatment

## 2025-02-18 NOTE — SIGNIFICANT EVENT
[facility]  Rapid/Medical Response Team Note      Patient:    Juliana Loaiza 74 y.o. female, : 1950  Patient MRN: [id number]    Admission Date: 2/15/2025   Admission Diagnosis: Hypothermia, initial encounter [T68.XXXA]      RAPID RESPONSE  Rapid response called for: Chest pain, SOB.         OBJECTIVE    RRT/MRT start time:               RRT/MRT end time:  Vitals:    25 1144   BP: (!) 124/95   Pulse: (!) 105   Resp: 25   Temp: 98.9 °F (37.2 °C)   SpO2: 92%        Physical Exam:  Gen: In significant distress  HEENT:  atraumatic  CV: Tachycardiac, without M/R/G  Pulm: CTAB, no wheezes, no crackles, Head bobbing, increased WOB  Abd: S/NT/ND  MSK: no clubbing, no edema, focal tenderness on left chest.  Neuro: no focal deficits appreciated         ASSESSMENT/PLAN AND DISPOSITION  Juliana Loaiza is a 74 y.o. year old female admitted for Hypothermia, initial encounter [T68.XXXA]  Rapid Response Team/ Medical Response Team Called For: Chest pain, SOB    History of PE, CAD, Afib    Medications Administered During Rapid:  Morphine, aspirin, hydralazine,    EKG: Tachycardia, no STEMI changes    Labs: Troponin, Sugar    Imaging:       > CXR: Suspected mild interstitial infiltrate/edema and small left greater than right  pleural effusions      Acute Chest pain  Has a history of anxiety attacks per attending. Concisder CTA of chest, Consider BiPAP due to increased WOB.    Disposition: CVT stepdown  Patient condition: Guarded      Attending Dr. Darien Carrington notified of rapid response and is in agreement with plan.     Primary team resuming care.     [facility] Senior resident Dr. Jennifer Paige present during Rapid Response.        PHILOMENA HERBERT DO -PGY 1  [facility] Medicine  2025 4:11 PM

## 2025-02-18 NOTE — RT PROTOCOL NOTE
RT notified by patient nurse that hospitalist not wanting bipap at this time. Machine left at bedside  at this time. Patient reporting her breathing is feeling a little bit better now although still looking a little labored.

## 2025-02-19 LAB
ANION GAP SERPL CALC-SCNC: 6 MMOL/L (ref 3–18)
BASOPHILS # BLD: 0.02 K/UL (ref 0–0.1)
BASOPHILS NFR BLD: 0.3 % (ref 0–2)
BUN SERPL-MCNC: 18 MG/DL (ref 7–18)
BUN/CREAT SERPL: 18 (ref 12–20)
CALCIUM SERPL-MCNC: 7.5 MG/DL (ref 8.5–10.1)
CHLORIDE SERPL-SCNC: 104 MMOL/L (ref 100–111)
CO2 SERPL-SCNC: 31 MMOL/L (ref 21–32)
CREAT SERPL-MCNC: 1.02 MG/DL (ref 0.6–1.3)
DIFFERENTIAL METHOD BLD: ABNORMAL
EKG ATRIAL RATE: 108 BPM
EKG DIAGNOSIS: NORMAL
EKG P AXIS: 40 DEGREES
EKG P-R INTERVAL: 112 MS
EKG Q-T INTERVAL: 344 MS
EKG QRS DURATION: 74 MS
EKG QTC CALCULATION (BAZETT): 460 MS
EKG R AXIS: -12 DEGREES
EKG T AXIS: 12 DEGREES
EKG VENTRICULAR RATE: 108 BPM
EOSINOPHIL # BLD: 0.14 K/UL (ref 0–0.4)
EOSINOPHIL NFR BLD: 2.1 % (ref 0–5)
ERYTHROCYTE [DISTWIDTH] IN BLOOD BY AUTOMATED COUNT: 22.9 % (ref 11.6–14.5)
GLUCOSE BLD STRIP.AUTO-MCNC: 168 MG/DL (ref 70–110)
GLUCOSE BLD STRIP.AUTO-MCNC: 174 MG/DL (ref 70–110)
GLUCOSE BLD STRIP.AUTO-MCNC: 249 MG/DL (ref 70–110)
GLUCOSE BLD STRIP.AUTO-MCNC: 281 MG/DL (ref 70–110)
GLUCOSE SERPL-MCNC: 250 MG/DL (ref 74–99)
HCT VFR BLD AUTO: 30.9 % (ref 35–45)
HGB BLD-MCNC: 9.1 G/DL (ref 12–16)
IMM GRANULOCYTES # BLD AUTO: 0.06 K/UL (ref 0–0.04)
IMM GRANULOCYTES NFR BLD AUTO: 0.9 % (ref 0–0.5)
LYMPHOCYTES # BLD: 1.37 K/UL (ref 0.9–3.6)
LYMPHOCYTES NFR BLD: 20.3 % (ref 21–52)
MCH RBC QN AUTO: 22.6 PG (ref 24–34)
MCHC RBC AUTO-ENTMCNC: 29.4 G/DL (ref 31–37)
MCV RBC AUTO: 76.7 FL (ref 78–100)
MONOCYTES # BLD: 0.64 K/UL (ref 0.05–1.2)
MONOCYTES NFR BLD: 9.5 % (ref 3–10)
NEUTS SEG # BLD: 4.53 K/UL (ref 1.8–8)
NEUTS SEG NFR BLD: 66.9 % (ref 40–73)
NRBC # BLD: 0 K/UL (ref 0–0.01)
NRBC BLD-RTO: 0 PER 100 WBC
NT PRO BNP: 3662 PG/ML (ref 0–900)
PLATELET # BLD AUTO: 276 K/UL (ref 135–420)
PMV BLD AUTO: 10.8 FL (ref 9.2–11.8)
POTASSIUM SERPL-SCNC: 4.7 MMOL/L (ref 3.5–5.5)
RBC # BLD AUTO: 4.03 M/UL (ref 4.2–5.3)
SODIUM SERPL-SCNC: 141 MMOL/L (ref 136–145)
WBC # BLD AUTO: 6.8 K/UL (ref 4.6–13.2)

## 2025-02-19 PROCEDURE — 83880 ASSAY OF NATRIURETIC PEPTIDE: CPT

## 2025-02-19 PROCEDURE — 6370000000 HC RX 637 (ALT 250 FOR IP): Performed by: HOSPITALIST

## 2025-02-19 PROCEDURE — 6360000002 HC RX W HCPCS: Performed by: INTERNAL MEDICINE

## 2025-02-19 PROCEDURE — 97530 THERAPEUTIC ACTIVITIES: CPT

## 2025-02-19 PROCEDURE — 93010 ELECTROCARDIOGRAM REPORT: CPT | Performed by: INTERNAL MEDICINE

## 2025-02-19 PROCEDURE — 36415 COLL VENOUS BLD VENIPUNCTURE: CPT

## 2025-02-19 PROCEDURE — 99232 SBSQ HOSP IP/OBS MODERATE 35: CPT | Performed by: INTERNAL MEDICINE

## 2025-02-19 PROCEDURE — 94761 N-INVAS EAR/PLS OXIMETRY MLT: CPT

## 2025-02-19 PROCEDURE — 99232 SBSQ HOSP IP/OBS MODERATE 35: CPT | Performed by: HOSPITALIST

## 2025-02-19 PROCEDURE — 82962 GLUCOSE BLOOD TEST: CPT

## 2025-02-19 PROCEDURE — 2140000001 HC CVICU INTERMEDIATE R&B

## 2025-02-19 PROCEDURE — 2500000003 HC RX 250 WO HCPCS: Performed by: INTERNAL MEDICINE

## 2025-02-19 PROCEDURE — 85025 COMPLETE CBC W/AUTO DIFF WBC: CPT

## 2025-02-19 PROCEDURE — 94640 AIRWAY INHALATION TREATMENT: CPT

## 2025-02-19 PROCEDURE — 80048 BASIC METABOLIC PNL TOTAL CA: CPT

## 2025-02-19 PROCEDURE — 6370000000 HC RX 637 (ALT 250 FOR IP): Performed by: INTERNAL MEDICINE

## 2025-02-19 RX ORDER — MULTIVITAMIN WITH IRON
1 TABLET ORAL DAILY
Status: DISCONTINUED | OUTPATIENT
Start: 2025-02-19 | End: 2025-02-21 | Stop reason: HOSPADM

## 2025-02-19 RX ADMIN — IPRATROPIUM BROMIDE 0.5 MG: 0.5 SOLUTION RESPIRATORY (INHALATION) at 08:03

## 2025-02-19 RX ADMIN — PANTOPRAZOLE SODIUM 40 MG: 40 TABLET, DELAYED RELEASE ORAL at 09:10

## 2025-02-19 RX ADMIN — OXYCODONE HYDROCHLORIDE AND ACETAMINOPHEN 1 TABLET: 5; 325 TABLET ORAL at 04:07

## 2025-02-19 RX ADMIN — ATORVASTATIN CALCIUM 80 MG: 40 TABLET, FILM COATED ORAL at 20:41

## 2025-02-19 RX ADMIN — RIVAROXABAN 15 MG: 15 TABLET, FILM COATED ORAL at 09:10

## 2025-02-19 RX ADMIN — MIRTAZAPINE 45 MG: 15 TABLET, FILM COATED ORAL at 20:41

## 2025-02-19 RX ADMIN — INSULIN LISPRO 1 UNITS: 100 INJECTION, SOLUTION INTRAVENOUS; SUBCUTANEOUS at 17:24

## 2025-02-19 RX ADMIN — OXYCODONE HYDROCHLORIDE AND ACETAMINOPHEN 1 TABLET: 5; 325 TABLET ORAL at 11:51

## 2025-02-19 RX ADMIN — THERA TABS 1 TABLET: TAB at 11:36

## 2025-02-19 RX ADMIN — OXYCODONE HYDROCHLORIDE AND ACETAMINOPHEN 1 TABLET: 5; 325 TABLET ORAL at 17:40

## 2025-02-19 RX ADMIN — ROPINIROLE 0.5 MG: 0.25 TABLET, FILM COATED ORAL at 15:45

## 2025-02-19 RX ADMIN — ROPINIROLE 0.5 MG: 0.25 TABLET, FILM COATED ORAL at 20:41

## 2025-02-19 RX ADMIN — DILTIAZEM HYDROCHLORIDE 120 MG: 120 CAPSULE, COATED, EXTENDED RELEASE ORAL at 09:10

## 2025-02-19 RX ADMIN — INSULIN LISPRO 2 UNITS: 100 INJECTION, SOLUTION INTRAVENOUS; SUBCUTANEOUS at 09:05

## 2025-02-19 RX ADMIN — ROPINIROLE 0.5 MG: 0.25 TABLET, FILM COATED ORAL at 09:09

## 2025-02-19 RX ADMIN — SODIUM CHLORIDE, PRESERVATIVE FREE 10 ML: 5 INJECTION INTRAVENOUS at 20:42

## 2025-02-19 RX ADMIN — DULOXETINE HYDROCHLORIDE 30 MG: 30 CAPSULE, DELAYED RELEASE ORAL at 09:10

## 2025-02-19 RX ADMIN — IPRATROPIUM BROMIDE 0.5 MG: 0.5 SOLUTION RESPIRATORY (INHALATION) at 13:29

## 2025-02-19 RX ADMIN — POTASSIUM CHLORIDE 40 MEQ: 1500 TABLET, EXTENDED RELEASE ORAL at 11:37

## 2025-02-19 RX ADMIN — SERTRALINE HYDROCHLORIDE 100 MG: 50 TABLET ORAL at 09:10

## 2025-02-19 RX ADMIN — ARFORMOTEROL TARTRATE: 15 SOLUTION RESPIRATORY (INHALATION) at 08:03

## 2025-02-19 ASSESSMENT — PAIN SCALES - GENERAL
PAINLEVEL_OUTOF10: 6
PAINLEVEL_OUTOF10: 10
PAINLEVEL_OUTOF10: 0
PAINLEVEL_OUTOF10: 7

## 2025-02-19 ASSESSMENT — PAIN DESCRIPTION - LOCATION
LOCATION: CHEST
LOCATION: GENERALIZED
LOCATION: GENERALIZED

## 2025-02-19 ASSESSMENT — PAIN - FUNCTIONAL ASSESSMENT: PAIN_FUNCTIONAL_ASSESSMENT: ACTIVITIES ARE NOT PREVENTED

## 2025-02-19 ASSESSMENT — PAIN DESCRIPTION - ONSET: ONSET: ON-GOING

## 2025-02-19 ASSESSMENT — PAIN SCALES - WONG BAKER
WONGBAKER_NUMERICALRESPONSE: NO HURT
WONGBAKER_NUMERICALRESPONSE: NO HURT

## 2025-02-19 ASSESSMENT — PAIN DESCRIPTION - FREQUENCY: FREQUENCY: INTERMITTENT

## 2025-02-19 ASSESSMENT — PAIN DESCRIPTION - ORIENTATION: ORIENTATION: ANTERIOR

## 2025-02-19 ASSESSMENT — PAIN DESCRIPTION - DESCRIPTORS: DESCRIPTORS: ACHING

## 2025-02-19 ASSESSMENT — PAIN DESCRIPTION - PAIN TYPE: TYPE: ACUTE PAIN

## 2025-02-19 NOTE — CARE COORDINATION
SW met with patient at bedside explained and introduce.    SW informed patient the SNF was recommend by PT and  provided SNF list to patient.    Patient  stated \" I do not want to go to facility want to go home with my .\"    SW will continue to follow up.    Angelica Harmon BSW  Case Management

## 2025-02-19 NOTE — CARE COORDINATION
SW met patient at bedside explain role and introduce.      SW provided patient with choice list for SNF.    Patient stated \" I do not want to go to a facility going home with .\"      [facility]       Angleica Harmon BSW  Case Management

## 2025-02-20 LAB
ANION GAP SERPL CALC-SCNC: 3 MMOL/L (ref 3–18)
BASOPHILS # BLD: 0.02 K/UL (ref 0–0.1)
BASOPHILS NFR BLD: 0.3 % (ref 0–2)
BUN SERPL-MCNC: 24 MG/DL (ref 7–18)
BUN/CREAT SERPL: 25 (ref 12–20)
CALCIUM SERPL-MCNC: 7.7 MG/DL (ref 8.5–10.1)
CHLORIDE SERPL-SCNC: 106 MMOL/L (ref 100–111)
CO2 SERPL-SCNC: 30 MMOL/L (ref 21–32)
CREAT SERPL-MCNC: 0.95 MG/DL (ref 0.6–1.3)
DIFFERENTIAL METHOD BLD: ABNORMAL
EOSINOPHIL # BLD: 0.12 K/UL (ref 0–0.4)
EOSINOPHIL NFR BLD: 1.7 % (ref 0–5)
ERYTHROCYTE [DISTWIDTH] IN BLOOD BY AUTOMATED COUNT: 22.5 % (ref 11.6–14.5)
GLUCOSE BLD STRIP.AUTO-MCNC: 176 MG/DL (ref 70–110)
GLUCOSE BLD STRIP.AUTO-MCNC: 247 MG/DL (ref 70–110)
GLUCOSE BLD STRIP.AUTO-MCNC: 252 MG/DL (ref 70–110)
GLUCOSE BLD STRIP.AUTO-MCNC: 259 MG/DL (ref 70–110)
GLUCOSE BLD STRIP.AUTO-MCNC: 291 MG/DL (ref 70–110)
GLUCOSE SERPL-MCNC: 174 MG/DL (ref 74–99)
HCT VFR BLD AUTO: 28.9 % (ref 35–45)
HGB BLD-MCNC: 8.5 G/DL (ref 12–16)
IMM GRANULOCYTES # BLD AUTO: 0.05 K/UL (ref 0–0.04)
IMM GRANULOCYTES NFR BLD AUTO: 0.7 % (ref 0–0.5)
LYMPHOCYTES # BLD: 1.29 K/UL (ref 0.9–3.6)
LYMPHOCYTES NFR BLD: 18.4 % (ref 21–52)
MCH RBC QN AUTO: 22.2 PG (ref 24–34)
MCHC RBC AUTO-ENTMCNC: 29.4 G/DL (ref 31–37)
MCV RBC AUTO: 75.5 FL (ref 78–100)
MONOCYTES # BLD: 0.72 K/UL (ref 0.05–1.2)
MONOCYTES NFR BLD: 10.3 % (ref 3–10)
NEUTS SEG # BLD: 4.81 K/UL (ref 1.8–8)
NEUTS SEG NFR BLD: 68.6 % (ref 40–73)
NRBC # BLD: 0 K/UL (ref 0–0.01)
NRBC BLD-RTO: 0 PER 100 WBC
PLATELET # BLD AUTO: 267 K/UL (ref 135–420)
PMV BLD AUTO: 10.9 FL (ref 9.2–11.8)
POTASSIUM SERPL-SCNC: 4.7 MMOL/L (ref 3.5–5.5)
POTASSIUM SERPL-SCNC: 5.6 MMOL/L (ref 3.5–5.5)
RBC # BLD AUTO: 3.83 M/UL (ref 4.2–5.3)
SODIUM SERPL-SCNC: 139 MMOL/L (ref 136–145)
WBC # BLD AUTO: 7 K/UL (ref 4.6–13.2)

## 2025-02-20 PROCEDURE — 85025 COMPLETE CBC W/AUTO DIFF WBC: CPT

## 2025-02-20 PROCEDURE — 80048 BASIC METABOLIC PNL TOTAL CA: CPT

## 2025-02-20 PROCEDURE — 2140000001 HC CVICU INTERMEDIATE R&B

## 2025-02-20 PROCEDURE — 94640 AIRWAY INHALATION TREATMENT: CPT

## 2025-02-20 PROCEDURE — 6370000000 HC RX 637 (ALT 250 FOR IP): Performed by: HOSPITALIST

## 2025-02-20 PROCEDURE — 6360000002 HC RX W HCPCS: Performed by: INTERNAL MEDICINE

## 2025-02-20 PROCEDURE — 99232 SBSQ HOSP IP/OBS MODERATE 35: CPT | Performed by: HOSPITALIST

## 2025-02-20 PROCEDURE — 2500000003 HC RX 250 WO HCPCS: Performed by: INTERNAL MEDICINE

## 2025-02-20 PROCEDURE — 36415 COLL VENOUS BLD VENIPUNCTURE: CPT

## 2025-02-20 PROCEDURE — 82962 GLUCOSE BLOOD TEST: CPT

## 2025-02-20 PROCEDURE — 84132 ASSAY OF SERUM POTASSIUM: CPT

## 2025-02-20 PROCEDURE — 6370000000 HC RX 637 (ALT 250 FOR IP): Performed by: INTERNAL MEDICINE

## 2025-02-20 PROCEDURE — 2700000000 HC OXYGEN THERAPY PER DAY

## 2025-02-20 PROCEDURE — 94761 N-INVAS EAR/PLS OXIMETRY MLT: CPT

## 2025-02-20 RX ADMIN — IPRATROPIUM BROMIDE 0.5 MG: 0.5 SOLUTION RESPIRATORY (INHALATION) at 08:30

## 2025-02-20 RX ADMIN — OXYCODONE HYDROCHLORIDE AND ACETAMINOPHEN 1 TABLET: 5; 325 TABLET ORAL at 14:30

## 2025-02-20 RX ADMIN — THERA TABS 1 TABLET: TAB at 09:55

## 2025-02-20 RX ADMIN — SODIUM ZIRCONIUM CYCLOSILICATE 10 G: 5 POWDER, FOR SUSPENSION ORAL at 09:59

## 2025-02-20 RX ADMIN — MIRTAZAPINE 45 MG: 15 TABLET, FILM COATED ORAL at 20:01

## 2025-02-20 RX ADMIN — DILTIAZEM HYDROCHLORIDE 120 MG: 120 CAPSULE, COATED, EXTENDED RELEASE ORAL at 09:55

## 2025-02-20 RX ADMIN — PANTOPRAZOLE SODIUM 40 MG: 40 TABLET, DELAYED RELEASE ORAL at 09:55

## 2025-02-20 RX ADMIN — SODIUM CHLORIDE, PRESERVATIVE FREE 10 ML: 5 INJECTION INTRAVENOUS at 20:02

## 2025-02-20 RX ADMIN — DULOXETINE HYDROCHLORIDE 30 MG: 30 CAPSULE, DELAYED RELEASE ORAL at 09:55

## 2025-02-20 RX ADMIN — IPRATROPIUM BROMIDE 0.5 MG: 0.5 SOLUTION RESPIRATORY (INHALATION) at 14:10

## 2025-02-20 RX ADMIN — INSULIN LISPRO 2 UNITS: 100 INJECTION, SOLUTION INTRAVENOUS; SUBCUTANEOUS at 12:47

## 2025-02-20 RX ADMIN — ARFORMOTEROL TARTRATE: 15 SOLUTION RESPIRATORY (INHALATION) at 08:30

## 2025-02-20 RX ADMIN — ROPINIROLE 0.5 MG: 0.25 TABLET, FILM COATED ORAL at 20:01

## 2025-02-20 RX ADMIN — ROPINIROLE 0.5 MG: 0.25 TABLET, FILM COATED ORAL at 14:30

## 2025-02-20 RX ADMIN — SERTRALINE HYDROCHLORIDE 100 MG: 50 TABLET ORAL at 09:55

## 2025-02-20 RX ADMIN — RIVAROXABAN 15 MG: 15 TABLET, FILM COATED ORAL at 09:54

## 2025-02-20 RX ADMIN — ARFORMOTEROL TARTRATE: 15 SOLUTION RESPIRATORY (INHALATION) at 20:41

## 2025-02-20 RX ADMIN — SODIUM CHLORIDE, PRESERVATIVE FREE 10 ML: 5 INJECTION INTRAVENOUS at 09:57

## 2025-02-20 RX ADMIN — OXYCODONE HYDROCHLORIDE AND ACETAMINOPHEN 1 TABLET: 5; 325 TABLET ORAL at 07:28

## 2025-02-20 RX ADMIN — INSULIN LISPRO 2 UNITS: 100 INJECTION, SOLUTION INTRAVENOUS; SUBCUTANEOUS at 20:07

## 2025-02-20 RX ADMIN — INSULIN LISPRO 2 UNITS: 100 INJECTION, SOLUTION INTRAVENOUS; SUBCUTANEOUS at 17:21

## 2025-02-20 RX ADMIN — ROPINIROLE 0.5 MG: 0.25 TABLET, FILM COATED ORAL at 09:54

## 2025-02-20 RX ADMIN — INSULIN LISPRO 2 UNITS: 100 INJECTION, SOLUTION INTRAVENOUS; SUBCUTANEOUS at 09:53

## 2025-02-20 RX ADMIN — IPRATROPIUM BROMIDE 0.5 MG: 0.5 SOLUTION RESPIRATORY (INHALATION) at 20:41

## 2025-02-20 RX ADMIN — ATORVASTATIN CALCIUM 80 MG: 40 TABLET, FILM COATED ORAL at 20:02

## 2025-02-20 ASSESSMENT — PAIN DESCRIPTION - ORIENTATION: ORIENTATION: ANTERIOR;MID

## 2025-02-20 ASSESSMENT — PAIN SCALES - GENERAL
PAINLEVEL_OUTOF10: 6
PAINLEVEL_OUTOF10: 8
PAINLEVEL_OUTOF10: 0
PAINLEVEL_OUTOF10: 3
PAINLEVEL_OUTOF10: 0
PAINLEVEL_OUTOF10: 0
PAINLEVEL_OUTOF10: 10
PAINLEVEL_OUTOF10: 0

## 2025-02-20 ASSESSMENT — PAIN DESCRIPTION - DESCRIPTORS: DESCRIPTORS: ACHING

## 2025-02-20 ASSESSMENT — PAIN DESCRIPTION - LOCATION
LOCATION: CHEST
LOCATION: CHEST

## 2025-02-20 ASSESSMENT — PAIN SCALES - WONG BAKER: WONGBAKER_NUMERICALRESPONSE: NO HURT

## 2025-02-20 NOTE — CARE COORDINATION
[phone] spoke with Irene and pt was placed on will call with MMT for 02/21/25    Chela Pinon BSN RN  Case Management  [phone]

## 2025-02-20 NOTE — CARE COORDINATION
CM called [facility] [phone], was informed they are not transporting pt to residences    CM called [phone], was also told transport is not going to residences today.    Perfect serve message to  informing of the same.    Chela Pinon BSN RN  Case Management  [phone]

## 2025-02-21 VITALS
TEMPERATURE: 98.2 F | RESPIRATION RATE: 21 BRPM | BODY MASS INDEX: 18.62 KG/M2 | SYSTOLIC BLOOD PRESSURE: 171 MMHG | OXYGEN SATURATION: 100 % | HEART RATE: 105 BPM | WEIGHT: 101.19 LBS | HEIGHT: 62 IN | DIASTOLIC BLOOD PRESSURE: 84 MMHG

## 2025-02-21 PROBLEM — R41.82 ALTERED MENTAL STATUS: Status: RESOLVED | Noted: 2025-02-16 | Resolved: 2025-02-21

## 2025-02-21 PROBLEM — E16.2 HYPOGLYCEMIA: Status: RESOLVED | Noted: 2025-02-16 | Resolved: 2025-02-21

## 2025-02-21 LAB
ANION GAP SERPL CALC-SCNC: 3 MMOL/L (ref 3–18)
BACTERIA SPEC CULT: NORMAL
BASOPHILS # BLD: 0.02 K/UL (ref 0–0.1)
BASOPHILS NFR BLD: 0.3 % (ref 0–2)
BUN SERPL-MCNC: 26 MG/DL (ref 7–18)
BUN/CREAT SERPL: 23 (ref 12–20)
CALCIUM SERPL-MCNC: 8.2 MG/DL (ref 8.5–10.1)
CHLORIDE SERPL-SCNC: 104 MMOL/L (ref 100–111)
CO2 SERPL-SCNC: 30 MMOL/L (ref 21–32)
CREAT SERPL-MCNC: 1.13 MG/DL (ref 0.6–1.3)
DIFFERENTIAL METHOD BLD: ABNORMAL
EOSINOPHIL # BLD: 0.08 K/UL (ref 0–0.4)
EOSINOPHIL NFR BLD: 1.3 % (ref 0–5)
ERYTHROCYTE [DISTWIDTH] IN BLOOD BY AUTOMATED COUNT: 22.7 % (ref 11.6–14.5)
GLUCOSE BLD STRIP.AUTO-MCNC: 213 MG/DL (ref 70–110)
GLUCOSE BLD STRIP.AUTO-MCNC: 292 MG/DL (ref 70–110)
GLUCOSE SERPL-MCNC: 226 MG/DL (ref 74–99)
HCT VFR BLD AUTO: 30.1 % (ref 35–45)
HGB BLD-MCNC: 9 G/DL (ref 12–16)
IMM GRANULOCYTES # BLD AUTO: 0.06 K/UL (ref 0–0.04)
IMM GRANULOCYTES NFR BLD AUTO: 1 % (ref 0–0.5)
LYMPHOCYTES # BLD: 1.53 K/UL (ref 0.9–3.6)
LYMPHOCYTES NFR BLD: 24.3 % (ref 21–52)
MCH RBC QN AUTO: 22.6 PG (ref 24–34)
MCHC RBC AUTO-ENTMCNC: 29.9 G/DL (ref 31–37)
MCV RBC AUTO: 75.6 FL (ref 78–100)
MONOCYTES # BLD: 0.74 K/UL (ref 0.05–1.2)
MONOCYTES NFR BLD: 11.7 % (ref 3–10)
NEUTS SEG # BLD: 3.87 K/UL (ref 1.8–8)
NEUTS SEG NFR BLD: 61.4 % (ref 40–73)
NRBC # BLD: 0 K/UL (ref 0–0.01)
NRBC BLD-RTO: 0 PER 100 WBC
PLATELET # BLD AUTO: 277 K/UL (ref 135–420)
POTASSIUM SERPL-SCNC: 5 MMOL/L (ref 3.5–5.5)
RBC # BLD AUTO: 3.98 M/UL (ref 4.2–5.3)
SERVICE CMNT-IMP: NORMAL
SODIUM SERPL-SCNC: 137 MMOL/L (ref 136–145)
WBC # BLD AUTO: 6.3 K/UL (ref 4.6–13.2)

## 2025-02-21 PROCEDURE — 94640 AIRWAY INHALATION TREATMENT: CPT

## 2025-02-21 PROCEDURE — 97530 THERAPEUTIC ACTIVITIES: CPT

## 2025-02-21 PROCEDURE — 82962 GLUCOSE BLOOD TEST: CPT

## 2025-02-21 PROCEDURE — 6370000000 HC RX 637 (ALT 250 FOR IP): Performed by: HOSPITALIST

## 2025-02-21 PROCEDURE — 94761 N-INVAS EAR/PLS OXIMETRY MLT: CPT

## 2025-02-21 PROCEDURE — 97535 SELF CARE MNGMENT TRAINING: CPT

## 2025-02-21 PROCEDURE — 85025 COMPLETE CBC W/AUTO DIFF WBC: CPT

## 2025-02-21 PROCEDURE — 36415 COLL VENOUS BLD VENIPUNCTURE: CPT

## 2025-02-21 PROCEDURE — 97116 GAIT TRAINING THERAPY: CPT

## 2025-02-21 PROCEDURE — 80048 BASIC METABOLIC PNL TOTAL CA: CPT

## 2025-02-21 PROCEDURE — 2700000000 HC OXYGEN THERAPY PER DAY

## 2025-02-21 PROCEDURE — 6360000002 HC RX W HCPCS: Performed by: INTERNAL MEDICINE

## 2025-02-21 PROCEDURE — 99239 HOSP IP/OBS DSCHRG MGMT >30: CPT | Performed by: HOSPITALIST

## 2025-02-21 PROCEDURE — 6370000000 HC RX 637 (ALT 250 FOR IP): Performed by: INTERNAL MEDICINE

## 2025-02-21 RX ORDER — MULTIVITAMIN WITH IRON
1 TABLET ORAL DAILY
Qty: 30 TABLET | Refills: 0 | Status: SHIPPED | OUTPATIENT
Start: 2025-02-22

## 2025-02-21 RX ORDER — OXYCODONE AND ACETAMINOPHEN 5; 325 MG/1; MG/1
1 TABLET ORAL EVERY 8 HOURS PRN
Qty: 9 TABLET | Refills: 0 | Status: SHIPPED | OUTPATIENT
Start: 2025-02-21 | End: 2025-02-24

## 2025-02-21 RX ORDER — LIDOCAINE 50 MG/G
1 PATCH TOPICAL DAILY
Qty: 10 PATCH | Refills: 0 | Status: SHIPPED | OUTPATIENT
Start: 2025-02-21

## 2025-02-21 RX ADMIN — DULOXETINE HYDROCHLORIDE 30 MG: 30 CAPSULE, DELAYED RELEASE ORAL at 08:17

## 2025-02-21 RX ADMIN — IPRATROPIUM BROMIDE 0.5 MG: 0.5 SOLUTION RESPIRATORY (INHALATION) at 14:59

## 2025-02-21 RX ADMIN — ROPINIROLE 0.5 MG: 0.25 TABLET, FILM COATED ORAL at 08:16

## 2025-02-21 RX ADMIN — THERA TABS 1 TABLET: TAB at 08:17

## 2025-02-21 RX ADMIN — RIVAROXABAN 15 MG: 15 TABLET, FILM COATED ORAL at 08:16

## 2025-02-21 RX ADMIN — INSULIN LISPRO 2 UNITS: 100 INJECTION, SOLUTION INTRAVENOUS; SUBCUTANEOUS at 12:26

## 2025-02-21 RX ADMIN — INSULIN LISPRO 1 UNITS: 100 INJECTION, SOLUTION INTRAVENOUS; SUBCUTANEOUS at 08:17

## 2025-02-21 RX ADMIN — PANTOPRAZOLE SODIUM 40 MG: 40 TABLET, DELAYED RELEASE ORAL at 08:17

## 2025-02-21 RX ADMIN — SERTRALINE HYDROCHLORIDE 100 MG: 50 TABLET ORAL at 08:17

## 2025-02-21 RX ADMIN — OXYCODONE HYDROCHLORIDE AND ACETAMINOPHEN 1 TABLET: 5; 325 TABLET ORAL at 08:21

## 2025-02-21 RX ADMIN — DILTIAZEM HYDROCHLORIDE 120 MG: 120 CAPSULE, COATED, EXTENDED RELEASE ORAL at 08:17

## 2025-02-21 ASSESSMENT — PAIN SCALES - WONG BAKER: WONGBAKER_NUMERICALRESPONSE: NO HURT

## 2025-02-21 ASSESSMENT — PAIN DESCRIPTION - LOCATION: LOCATION: CHEST

## 2025-02-21 ASSESSMENT — PAIN SCALES - GENERAL
PAINLEVEL_OUTOF10: 0
PAINLEVEL_OUTOF10: 8
PAINLEVEL_OUTOF10: 2

## 2025-02-21 NOTE — DISCHARGE SUMMARY
Discharge Summary    Patient: Juliana Loaiza MRN: [id number]  [facility]: [id number]    YOB: 1950  Age: 74 y.o.  Sex: female    DOA: 2/15/2025 LOS:  LOS: 6 days        Disposition: Home with [facility]    Discharge Date: 2/21/2025    Admission Diagnosis: Hypothermia, initial encounter [T68.XXXA]    Discharge Diagnosis:    Acute metabolic encephalopathy  Mild hyperkalemia  Atypical chest pain, most likely musculoskeletal.  Bacteremia-positive blood cultures likely contaminant  Chronic hypoxic respiratory failure on 4 L nasal cannula.  Chronic pain  Chronic anxiety  History of paroxysmal A-fib with secondary hypercoagulable state due to A-fib  History of coronary artery disease  History of hypertension  History of GERD  History of type 2 diabetes    Discharge Condition: Stable      PHYSICAL EXAM  Visit Vitals  /84   Pulse (!) 107   Temp 98.2 °F (36.8 °C)   Resp 24   Ht 1.575 m (5' 2\")   Wt 45.9 kg (101 lb 3.1 oz)   SpO2 95%   BMI 18.51 kg/m²       General: Alert, cooperative, no acute distress.  Lungs:  CTA Bilaterally. No Wheezing/Rales.  Heart:             Regular rate and Rhythm.  Abdomen: Soft, Non distended, Non tender. + Bowel sounds.  Extremities: No edema.  Psych:   Good insight. Not anxious or agitated.  Neurologic:  AA, oriented X 3. Moves all ext        HPI  Juliana Loaiza is a 74 y.o. female who presents to [facility] ER with complaint of Altered Mental Status.  Patient reports that she just came home from [facility] for maybe 24 hours before her  (a 89-year-old gentleman) found her unconscious.  Patient reports that she does not know what medications she is on and that her  doesn't know, but her  can bring her Medication List into the hospital.  Patient reports that she believes she had a fever ~2 weeks ago.  Patient reports that she has nausea without vomiting, diarrhea, and a productive cough.  Patient reports that she has had pain

## 2025-02-21 NOTE — PLAN OF CARE
Problem: Chronic Conditions and Co-morbidities  Goal: Patient's chronic conditions and co-morbidity symptoms are monitored and maintained or improved  2/17/2025 1337 by Sussy Abraham RN  Outcome: Progressing  2/17/2025 0225 by Blessing Solano RN  Outcome: Progressing     Problem: Discharge Planning  Goal: Discharge to home or other facility with appropriate resources  2/17/2025 1337 by Sussy Abraham RN  Outcome: Progressing  2/17/2025 0225 by Blessing Solano RN  Outcome: Progressing     Problem: Safety - Adult  Goal: Free from fall injury  2/17/2025 1337 by Sussy Abraham RN  Outcome: Progressing  2/17/2025 0225 by Blessing Solano RN  Outcome: Progressing     Problem: Skin/Tissue Integrity  Goal: Skin integrity remains intact  Description: 1.  Monitor for areas of redness and/or skin breakdown  2.  Assess vascular access sites hourly  3.  Every 4-6 hours minimum:  Change oxygen saturation probe site  4.  Every 4-6 hours:  If on nasal continuous positive airway pressure, respiratory therapy assess nares and determine need for appliance change or resting period  2/17/2025 1337 by Sussy Abraham RN  Outcome: Progressing  2/17/2025 0225 by Blessing Solano RN  Outcome: Progressing     
  Problem: Chronic Conditions and Co-morbidities  Goal: Patient's chronic conditions and co-morbidity symptoms are monitored and maintained or improved  Outcome: Completed     Problem: Discharge Planning  Goal: Discharge to home or other facility with appropriate resources  Outcome: Completed     Problem: Safety - Adult  Goal: Free from fall injury  Outcome: Completed     Problem: Skin/Tissue Integrity  Goal: Skin integrity remains intact  Description: 1.  Monitor for areas of redness and/or skin breakdown  2.  Assess vascular access sites hourly  3.  Every 4-6 hours minimum:  Change oxygen saturation probe site  4.  Every 4-6 hours:  If on nasal continuous positive airway pressure, respiratory therapy assess nares and determine need for appliance change or resting period  Outcome: Completed     Problem: ABCDS Injury Assessment  Goal: Absence of physical injury  Outcome: Completed     Problem: Pain  Goal: Verbalizes/displays adequate comfort level or baseline comfort level  Outcome: Completed     
  Problem: Discharge Planning  Goal: Discharge to home or other facility with appropriate resources  Outcome: Progressing   Identify barriers to discharge with patient and caregiver   Arrange for needed discharge resources and transportation as appropriate   Identify discharge learning needs (meds, wound care, etc)     Problem: Safety - Adult  Goal: Free from fall injury  Outcome: Progressing  Note: Hourly rounding            Encourage patient to use the call bell for assistance     Problem: Skin/Tissue Integrity  Goal: Skin integrity remains intact               Monitor for areas of redness and/or skin breakdown   Outcome: Progressing  
  Problem: Musculoskeletal - Adult  Goal: Return mobility to safest level of function  Outcome: Completed  Flowsheets (Taken 2/18/2025 6348)  Return Mobility to Safest Level of Function:   Assess patient stability and activity tolerance for standing, transferring and ambulating with or without assistive devices   Assist with transfers and ambulation using safe patient handling equipment as needed     
  Problem: Occupational Therapy - Adult  Goal: By Discharge: Performs self-care activities at highest level of function for planned discharge setting.  See evaluation for individualized goals.  Description: Occupational Therapy Goals:  Initiated 2/17/2025 to be met within 7-10 days.    1.  Patient will perform grooming with modified independence.   2.  Patient will perform bathing with supervision/set-up using adaptive equipment as needed.  3.  Patient will perform upper body dressing and lower body dressing  with supervision/set-up using adaptive equipment as needed.  4.  Patient will perform bedside commode transfers with supervision/set-up using RW.  5.  Patient will perform all aspects of toileting with supervision/set-up.  6.  Patient will participate in upper extremity therapeutic exercise/activities with supervision/set-up for 8-10 minutes to increase strength/endurance for ADLs.    7.  Patient will utilize energy conservation techniques during functional activities with min verbal cues.    PLOF: Mod I with Adls and functional mobility using rollator, spouse in unable to assist and 2 sons do not assist pt and spouse     2/21/2025 1301 by Galilea Forbes, OTR/L  Outcome: Progressing     Problem: Physical Therapy - Adult  Goal: By Discharge: Performs mobility at highest level of function for planned discharge setting.  See evaluation for individualized goals.  Description: Physical Therapy Goals:  Initiated 2/17/2025 to be met within 7-10 days.    1.  Patient will move from supine to sit and sit to supine  in bed with modified independence.    2.  Patient will transfer from bed to chair and chair to bed with modified independence using the least restrictive device.  3.  Patient will perform sit to stand with modified independence.  4.  Patient will ambulate with modified independence for 150 feet with the least restrictive device.   5.  Patient will ascend/descend 3 stairs with handrail(s) with modified 
  Problem: Physical Therapy - Adult  Goal: By Discharge: Performs mobility at highest level of function for planned discharge setting.  See evaluation for individualized goals.  Description: Physical Therapy Goals:  Initiated 2/17/2025 to be met within 7-10 days.    1.  Patient will move from supine to sit and sit to supine  in bed with modified independence.    2.  Patient will transfer from bed to chair and chair to bed with modified independence using the least restrictive device.  3.  Patient will perform sit to stand with modified independence.  4.  Patient will ambulate with modified independence for 150 feet with the least restrictive device.   5.  Patient will ascend/descend 3 stairs with handrail(s) with modified independence.    PLOF: Mod I with functional mobility using walker. Lives with spouse in single story home.       Outcome: Progressing     PHYSICAL THERAPY EVALUATION    Patient: Juliana Loaiza (74 y.o. female)  Date: 2/17/2025  Primary Diagnosis: Hypothermia, initial encounter [T68.XXXA]       Precautions: Fall Risk, General Precautions,  ,  ,  ,  ,  ,  ,      ASSESSMENT :  Patient resting in bed upon entry. Agreeable to minimal activity. C/o 8/10 chest pain nurse aware and received pain medication. SBA supine to sit transfer with HOB elevated. She has good sitting balance. BLE strength grossly 4/5. Patient denies OOB mobility and returns to supine. Educated on the importance of mobility. Call bell left within reach.     DEFICITS/IMPAIRMENTS:    , Body Structures, Functions, Activity Limitations Requiring Skilled Therapeutic Intervention: Decreased functional mobility ;Decreased strength;Decreased endurance;Decreased balance    Patient will benefit from skilled intervention to address the above impairments.  Patient's rehabilitation potential/Therapy Prognosis: Good.  Factors which may influence rehabilitation potential include:   []         None noted  []         Mental ability/status  [x] 
  Problem: Physical Therapy - Adult  Goal: By Discharge: Performs mobility at highest level of function for planned discharge setting.  See evaluation for individualized goals.  Description: Physical Therapy Goals:  Initiated 2/17/2025 to be met within 7-10 days.    1.  Patient will move from supine to sit and sit to supine  in bed with modified independence.    2.  Patient will transfer from bed to chair and chair to bed with modified independence using the least restrictive device.  3.  Patient will perform sit to stand with modified independence.  4.  Patient will ambulate with modified independence for 150 feet with the least restrictive device.   5.  Patient will ascend/descend 3 stairs with handrail(s) with modified independence.    PLOF: Mod I with functional mobility using walker. Lives with spouse in single story home.       Outcome: Progressing     PHYSICAL THERAPY TREATMENT    Patient: Juliana Loaiza (74 y.o. female)  Date: 2/19/2025  Diagnosis: Hypothermia, initial encounter [T68.XXXA] Hypothermia, initial encounter      Precautions: Fall Risk, General Precautions,  ,  ,  ,  ,  ,  ,      ASSESSMENT:  Patient seen for PT follow up session with focus on progressing OOB mobility. Received supine; agreeably with heavy encouragement. SBA provided for bed mobility. Balance at EOB intact. Increased time and education provided on importance of gait training and partaking in OOB mobility, but patient ultimately refusing. Repositioning and scooting completed with SBA. Continue PT POC.    Recommend for next PT session:  Sit to stand transfers, Bed to chair transfers, and Gait training with less restrictive device    Progression toward goals:   []      Improving appropriately and progressing toward goals  [x]      Improving slowly and progressing toward goals  []      Not making progress toward goals and plan of care will be adjusted     PLAN:  Patient continues to benefit from skilled intervention to address 
  Problem: Safety - Adult  Goal: Free from fall injury  Outcome: Progressing     Problem: Discharge Planning  Goal: Discharge to home or other facility with appropriate resources  Outcome: Progressing     Problem: Chronic Conditions and Co-morbidities  Goal: Patient's chronic conditions and co-morbidity symptoms are monitored and maintained or improved  Outcome: Progressing     
  Problem: Skin/Tissue Integrity  Goal: Skin integrity remains intact  Description: 1.  Monitor for areas of redness and/or skin breakdown  2.  Assess vascular access sites hourly  3.  Every 4-6 hours minimum:  Change oxygen saturation probe site  4.  Every 4-6 hours:  If on nasal continuous positive airway pressure, respiratory therapy assess nares and determine need for appliance change or resting period  Outcome: Progressing     Problem: Safety - Adult  Goal: Free from fall injury  Outcome: Progressing     Problem: Chronic Conditions and Co-morbidities  Goal: Patient's chronic conditions and co-morbidity symptoms are monitored and maintained or improved  Outcome: Progressing     Problem: Discharge Planning  Goal: Discharge to home or other facility with appropriate resources  Outcome: Progressing     
 Not making progress toward goals and plan of care will be adjusted     PLAN:  Patient continues to benefit from skilled intervention to address the above impairments.  Continue treatment per established plan of care.    Further Equipment Recommendations for Discharge: rolling walker    [facility]: AM-PAC Inpatient Mobility Raw Score : 20      Current research shows that an AM-PAC score of 18 (14 without stairs) or greater is associated with a discharge to the patient's home setting.    This AMPAC score should be considered in conjunction with interdisciplinary team recommendations to determine the most appropriate discharge setting. Patient's social support, diagnosis, medical stability, and prior level of function should also be taken into consideration.     SUBJECTIVE:   Patient stated, \"[facility] is providing me a walker.\"    OBJECTIVE DATA SUMMARY:   Critical Behavior:  Orientation  Orientation Level: Oriented X4       Functional Mobility Training:  Bed Mobility:  Bed Mobility Training  Bed Mobility Training: Yes  Supine to Sit: Stand by assistance  Sit to Supine: Stand by assistance  Scooting: Stand by assistance  Transfers:  Transfer Training  Transfer Training: Yes  Interventions: Verbal cues (cues for hand placement, RW management)  Sit to Stand: Contact guard assistance;Stand by assistance  Stand to Sit: Contact guard assistance;Stand by assistance  Stand Pivot Transfers: Contact guard assistance;Stand by assistance  Balance:  Balance  Sitting: Intact  Standing: Impaired  Standing - Static: Good  Standing - Dynamic: Fair;Good    Ambulation/Gait Training:     Gait  Gait Training: Yes  Overall Level of Assistance: Contact guard assistance;Stand by assistance  Distance (ft): 20 Feet  Assistive Device: Walker, rolling  Interventions: Verbal cues (cues provided for pacing)  Speed/Kaity: Slow;Shuffled  Step Length: Right shortened;Left shortened  Gait Abnormalities: Decreased step clearance (forward flexed 
Progressing     
progressing toward goals  []          Improving slowly and progressing toward goals  []          Not making progress toward goals and plan of care will be adjusted     PLAN:  Patient continues to benefit from skilled intervention to address the above impairments.  Continue treatment per established plan of care.    Further Equipment Recommendations for Discharge: bedside commode, shower chair, and rolling walker    AMPA: AM-PAC Inpatient Daily Activity Raw Score: 19    Current research shows that an AM-PAC score of 18 or greater is associated with a discharge to the patient's home setting.    This AMPAC score should be considered in conjunction with interdisciplinary team recommendations to determine the most appropriate discharge setting. Patient's social support, diagnosis, medical stability, and prior level of function should also be taken into consideration.     SUBJECTIVE:   Patient stated, \"I feel better.\"    OBJECTIVE DATA SUMMARY:   Cognitive/Behavioral Status:  Orientation  Overall Orientation Status: Within Functional Limits  Orientation Level: Oriented X4  Cognition  Overall Cognitive Status: WFL    Functional Mobility and Transfers for ADLs:   Bed Mobility:  Bed Mobility Training  Bed Mobility Training: Yes  Supine to Sit: Stand by assistance  Sit to Supine: Stand by assistance  Scooting: Stand by assistance   Transfers:  Transfer Training  Transfer Training: Yes  Interventions: Verbal cues (cues for hand placement, RW management)  Sit to Stand: Contact guard assistance;Stand by assistance  Stand to Sit: Contact guard assistance;Stand by assistance  Stand Pivot Transfers: Contact guard assistance;Stand by assistance    Balance:  Balance  Sitting: Intact  Standing: Impaired  Standing - Static: Good  Standing - Dynamic: Fair;Good    ADL Intervention:  Feeding: Supervision  Grooming: Supervision   LE Dressing: Stand by assistance  Toileting: Stand by assistance     Pain:  Intensity Pre-treatment: 0/10 
Training;Fall Prevention Strategies  Education Method: Demonstration;Verbal;Teach Back  Barriers to Learning: None  Education Outcome: Verbalized understanding    Thank you for this referral.  Erin Lockhart , OT  Minutes: 9    Eval Complexity: Decision Making: Medium Complexity

## 2025-02-21 NOTE — DISCHARGE INSTRUCTIONS
Health Maintenance       DTaP/Tdap/Td Vaccine (1 - Tdap)  Overdue since 6/15/2005    Diabetes Foot Exam (Yearly)  Overdue since 9/9/2023    COVID-19 Vaccine (8 - 2023-24 season)  Overdue since 2/29/2024    Traditional Medicare- Medicare Wellness Visit (Yearly)  Due soon on 9/1/2024           Following review of the above:  Patient is not proceeding with: COVID-19 and Dtap/Tdap/Td    Pt scheduled MW. Will do diabetic foot exam at [facility].    Note: Refer to final orders and clinician documentation.       Discharge Instructions    Patient: Juliana Loaiza MRN: [id number]  [facility]: [id number]    YOB: 1950  Age: 74 y.o.  Sex: female    DOA: 2/15/2025       DIET:  cardiac diet and diabetic diet    ACTIVITY: activity as tolerated  Home health care for Skilled care for DM, Hypertension and medication management       PT/OT consult      ADDITIONAL INFORMATION: If you experience any of the following symptoms but not limited to Fever, chills, nausea, vomiting, diarrhea, change in mentation, falling, bleeding, shortness of breath, chest pain, please call your primary care physician or return to the emergency room if you cannot get hold of your doctor:     FOLLOW UP CARE:   Follow-up Information     [facility] Follow up.    Why: DANNY Rodriguez RN Discharge Navigator will call you on Monday, Feb 24   with appointment date and time.  Contact information:  [facility] [facility],  [place], [address]    [phone]           Ezequiel Michel MD. Schedule an appointment as soon as possible   for a visit in 1 week(s).    Specialty: Family Medicine  Contact information:  [address] [place] [id number]  [phone]                Esha Moreno MD  2/21/2025 1:08 PM

## 2025-02-21 NOTE — CARE COORDINATION
02/21/25 1033   IMM Letter   IMM Letter given to Patient/Family/Significant other/Guardian/POA/by: Bridgette Grier   IMM Letter date given: 02/21/25   IMM Letter time given: 0830       Medicare pt has received, reviewed, and signed 2nd IM letter informing them of their right to appeal the discharge.  Signed copy has been placed on pt bedside chart.      TREY Kraft RN  Care Management

## 2025-02-21 NOTE — CARE COORDINATION
Chart reviewed.  Pt on transport will call.  Sent message to Dr. Walter to be sure pt is good to go.    0853:    Per Dr. Walter, 12 noon transport ok.    0900:    Called [facility] [phone] and spoke with Edel.  She stated their team on the way to access the roads if safe enough and if ok, they can  pt at 1pm and if not, they will call the Nurses Station to update.            Bridgette Grier, PATRICKN RN  Care Management

## 2025-02-21 NOTE — CARE COORDINATION
Maryanne of [facility] left a message that they already arranged Cardiology appointment at 1:30pm on 2/26/25 and pcp appointment at 3:50pm on 2/26/26 at [facility] office.              Discharge order noted for today. Pt has been accepted to [facility] agency. Met with patient and spoke with her   and are agreeable to the transition plan today. Transport has been arranged through [facility]. Patient's discharge summary and home health  orders have been forwarded to [facility]  agency via [facility]. Updated bedside RN, Janet,  to the transition plan.  Discharge information has been documented on the AVS.           TREY Kraft RN  Care Management

## 2025-02-21 NOTE — CARE COORDINATION
Michela called from [facility] that they can pick pt up at 2:30pm to transport her home.  Nurse Janet and pt informed.  Informed pt's spouse Antony Loaiza [phone]        Bridgette Grier, PATRICKN RN  Care Management

## 2025-02-21 NOTE — PROGRESS NOTES
conducted an initial consultation and Spiritual Assessment for Juliana Loaiza, who is a 74 y.o.,female. Patient’s Primary Language is: English.   According to the patient’s EMR Rastafarian Affiliation is: Synagogue.      The  provided the following Interventions:  Initiated a relationship of care and support to the patient in bed 2311 where she has been for the las day and a half.   Explored issues of cherie, belief, spirituality and Faith/ritual needs while hospitalized.She is involved in a local [employer] community.  Listened empathically. There is no advance directive present on file.  Offered prayer and assurance of continued prayers on patients behalf.     The following outcomes were achieved:  Patient shared limited information about her medical narrative and spiritual journey/beliefs.  Patient processed feeling about current hospitalization.  Patient expressed gratitude for pastoral care visit.  Spiritual Health History and Assessment/Progress Note  [facility]    Crisis, Initial Encounter (iv-sa-[id number]), Follow up,  ,      Name: Juliana Loaiza MRN: [id number]    Age: 74 y.o.     Sex: female   Language: English   Worship: Synagogue   Hypothermia, initial encounter     Date: 2/17/2025            Total Time Calculated: 10 min              Spiritual Assessment began in [facility] 2 CV STEPDOWN        Referral/Consult From: Rounding   Encounter Overview/Reason: Crisis, Initial Encounter ([id number])       Cherie, Belief, Meaning:   Patient Other: Is connected with a Faith community  Family/Friends No family/friends present      Importance and Influence:  Patient has spiritual/personal beliefs that influence decisions regarding their health  Family/Friends No family/friends present    Community:  Patient feels well-supported. Support system includes: Extended family  Family/Friends No family/friends present    Assessment and Plan of Care:     Patient Interventions include: 
 responded to Rapid Response for  Juliana Loaiza, who is a 74 y.o.,female,     The  provided the following Interventions:  Provided crisis spiritual care and support. Patient alert and engaged with RRT. Offered prayers on behalf for the patient. Chart reviewed.       Antony Rosales  Staff   Spiritual Health   [phone]  
0700 - Bedside shift report completed. Patient awake and alert no complaints at this time.    0800- In to beside for rounding patient complaining of 10/10 anterior chest pain tight in nature and SOB. Patient Hypertension 181/110. EKG done at bedside NSR w/ PACs. MD paged PRN Hydralazine given. Oxycodone given as well. MD paged no answer at this time. Patient respirations 35. RRT called. STAT troponin ordered, chest x-ray, and possible CTA to be ordered once consulted w/ hospitalist.     BG - 203     Respiratory at bedside to give 8 am scheduled breathing treatments    0836 - Xray at bedside   
Assumed care of patient from Esther Funk (off going nurse). Patient alert and oriented. No apparent distress noted. Patient offers no concerns and can verbalize needs. Assessment to follow. Call bell within reach. Bed in lowest, locked position.   
Attempt to administer prn pain med as previously requested by pt, pt noted to be asleep, will continue to monitor.    0218: pt still asleep, resting quietly, in no apparent discomfort, breathing even, unlabored. BP cuff off of pt''s arm with a a reading of 81/48, rechecked 127/67. Will continue to monitor.  
[facility] - Progress Note    Admit Date: 2/15/2025  Attending Cardiologist: Dr. Quesada    Assessment:     - Atypical chest pain. Troponin level wnl. EKG 2/15 SR with PACs, no significant ST/T wave abnormalities.   - Acute metabolic encephalopathy: primary reason for admission. Possibly secondary to hypoglycemia. Resolved   - Hypothermia in the setting of hypoglycemia. Resolved.   - Nonobstructive CAD: [facility] 8/2024 RCA with midsegment 20% stenosis. LM patent, LAD patent, Lcx patent.   -  Echo 1/17/25 EF 50-55%, normal wall motion. RVSP 52mmHg.  - pAF: Currently sinus tachycardia. OAC with Xarelto. Cardizem CD 120mg for rate control   - History of pulmonary sarcoidosis. Followed by pulmonology as outpatient  - Chronic respiratory failure on 4L of O2 as outpatient    - Chronic pain  - Anxiety  - History of polysubstance abuse  - Hx of PE. On Xarelto  - HLD  - T2DM     Primary cardiologist: Dr. Merino  Plan:     - Continue current cardiac medication regimen to include aspirin, high intensity statin, Xarelto, and cardizem   - No cardiac ischemic eval planned.   - Will sign off and be available as needed.       Subjective:     No new complaints.     Objective:      Patient Vitals for the past 8 hrs:   Temp Pulse Resp BP SpO2   02/19/25 0804 98 °F (36.7 °C) (!) 115 20 (!) 155/80 90 %   02/19/25 0700 -- 96 19 (!) 148/87 --   02/19/25 0500 -- (!) 101 (!) 34 (!) 160/90 --   02/19/25 0437 -- -- 25 -- --   02/19/25 0413 98.1 °F (36.7 °C) (!) 104 21 (!) 179/98 --   02/19/25 0407 -- -- 21 -- --   02/19/25 0215 -- 99 18 127/67 --         Patient Vitals for the past 96 hrs:   Weight   02/15/25 2237 57.2 kg (126 lb)   02/15/25 1504 57.4 kg (126 lb 8 oz)       TELE: sinus tachycardia               Current Facility-Administered Medications   Medication Dose Route Frequency    diazePAM (VALIUM) tablet 2 mg  2 mg Oral Q8H PRN    oxyCODONE-acetaminophen (PERCOCET) 5-325 MG per tablet 1 tablet  1 tablet Oral Q6H PRN    
Comprehensive Nutrition Assessment    Type and Reason for Visit:  Initial, Positive nutrition screen    Nutrition Recommendations/Plan:   Continue current diet as tolerated. Update order to include allergies.   Order Glucerna (each provides 220 kcal, 10g protein) BID  Plan to order MVI rt/t increased nutrient needs.   Daily wts.   Continue to monitor tolerance of PO, compliance of oral supplements, weight, labs, and plan of care during admission.     Malnutrition Assessment:  Malnutrition Status:  Insufficient data (pt denied wt loss/changes in intake. complete NFPE deferred to f/u) (02/19/25 1120)    Context:  Chronic Illness       Nutrition History and Allergies:      Past Medical History:   Diagnosis Date    Arthritis     \"generalized\"    Asthma     CAD in native artery     NSTEMI (Sep 2014); diffuse 65% pLAD, minimal disease RCA & LCx. pLAD FFR 0.93. LV  no RWMA (echo and LV angio)    Chronic neck pain     Chronic pain     left knee    Chronic pain syndrome     COPD (chronic obstructive pulmonary disease) (HCC)     Depression     Diabetes (HCC)     Diverticulitis     GERD (gastroesophageal reflux disease)     Heart disease     Hidradenitis 9/11/2014    Hypertension     Left knee pain     Narcotic dependence (HCC)     Pneumonia     Right wrist pain     Sarcoidosis      Pt presented c/o AMS    Weight Hx: per EMR review, wt change: -3 lb (2.3%) x 30 days, +25 lb (19.8%) x 1 year PTA - not significant.   Wt Readings from Last 10 Encounters:   02/15/25 57.2 kg (126 lb)   02/08/25 52.6 kg (116 lb)   01/21/25 46.3 kg (102 lb)   10/31/24 57.2 kg (126 lb)   08/20/24 57.2 kg (126 lb)   08/06/24 65.1 kg (143 lb 8 oz)   06/30/24 57.2 kg (126 lb)   04/09/24 51.7 kg (114 lb)   04/04/24 45.4 kg (100 lb)   02/12/24 45.8 kg (101 lb)     NFPE: Visual NFPE conducted only. Food Allergies: allergy to shellfish    Nutrition Assessment:    Admitted for management of hypothermia, AMS, hypglycemia. Pt seen for - Positive Nutrition 
INTERIM UPDATE - 1759 EST on 2/15/2025    [facility] ER Physician calls requesting admission for a 74-year-old female who presents with Altered Mental Status.  Patients reported Last Known Well Time (a.k.a. LKWT) was 1800 EST yesterday (2/14/2025).    [facility] ER Physician states that Patient was Hypoglycemic (42 mg/dL) on arrival and was noted to have a Temperature of 91.2°F.  Reportedly, her Serum Glucose was in the 20s mg/dL with EMS.  GCS score was reportedly 8.    Patient reportedly came back to baseline with replacement of her blood sugar, but continue to be Hypothermic at 92.6°F.    [facility] ER Physician reports that Patient has returned to her baseline mentation.    Plan:  Will admit Patient to [facility] for Acute Metabolic Encephalopathy and Hypothermia thought 2°/2 Hypoglycemia.    Patient will need to be placed on Judi Hugger until her Temperature increases to 97.0°F, at which time Patient will be removed from Judi Hugger and monitored for 1 hour in [facility] ER.  Should Patient's Blood Pressure decrease to the point that Patient requires Pressors, Hospitalist's admission of the Patient will be rescinded and [facility] ER Physician was informed that Patient would require admission with [facility] Intensivist services.  Should Patient's Temperature fall significantly during the 1-hour period after removal from Judi Hugger, Patient will need to be admitted to [facility] ICU under Hospitalist's service (and Hospitalist's service alone).  Should Patient's Temperature not significantly decrease during that 1-hour monitoring period in [facility] ER after Judi Hugger is removed, Patient can be admitted to [facility] Stepdown Unit.    Please note that the service to which Patient will ultimately be admitted and the Level of Care that Patient will need will be determined while Patient is re-warmed and after Judi Hugger is discontinued.    Nursing Supervisor has been informed of this arrangement is has been informed not to drop a bed for Patient 
Infectious Disease progress Note        Reason: Coagulase-negative Staphylococcus bacteremia    Current abx Prior abx   Cefepime, vancomycin since 2/15/25-2/17      Lines:       Assessment :   74 y.o. female with past medical history significant for coronary artery disease, type 2 diabetes, GERD, hypertension, sarcoidosis presented to [facility] on 2/15/2025 with altered mentation.    Now with 1/2 sets of blood culture positive for multiple colony types of coagulase-negative Staphylococcus on 2/15/2025    Coagulase-negative Staphylococcus bacteremia-likely contaminant.  No definite evidence of skin/soft tissue infection as a source of bacteremia.  Low clinical probability of sepsis on admission.  Patient's altered mentation likely metabolic encephalopathy secondary to hypoglycemia/meds- ?  Morphine  Low procalcitonin argues against bacterial sepsis    Risk of giving broad-spectrum antibiotics outweigh the benefit at this time.  Hence recommend to monitor off antibiotics    Left sided chest pain: likely atypical. + chest wall tenderness. No evidence of erythema/soft tissue infection noted on today's exam.    Status post RRT this a.m. for chest pain.  Plans for cardiology evaluation noted    Recommendations:     Monitor off antibiotics  Pain management per primary team  Follow-up cardiology recommendations regarding chest pain   management of hypoglycemia per primary team  Discharge planning per primary team    Will sign off.  Please call if any new question or concerns.  Thanks    . Above plan was discussed in details with patient,and dr Carrington.    HPI:      Currently she complains of left-sided chest pain and feels like she has a knot on the left chest.    Currently she denies any sore throat, runny nose, cough, abdominal pain, diarrhea, dysuria.      Past Medical History:   Diagnosis Date    Arthritis     \"generalized\"    Asthma     CAD in native artery     NSTEMI (Sep 2014); diffuse 65% pLAD, minimal disease RCA & LCx. 
[facility]   Pharmacy Pharmacokinetic Monitoring Service - Vancomycin     Juliana Loaiza is a 74 y.o. female starting on vancomycin therapy for Sepsis of Unknown Etiology. Pharmacy was consulted for monitoring and adjustment.    Target Concentration: Goal AUC/BHARAT 400-600 mg*hr/L    Additional Antimicrobials: Cefepime    Pertinent Laboratory Values:   Temp: 100.3 °F (37.9 °C), Weight - Scale: 57.2 kg (126 lb)  Recent Labs     02/15/25  1455 02/15/25  1501 02/15/25  1734   CREATININE 0.63 0.71  --    BUN 30*  --   --    WBC 9.2  --   --    PROCAL  --   --  0.18     Estimated Creatinine Clearance: 57 mL/min (based on SCr of 0.71 mg/dL).    Pertinent Cultures:  Culture Date Source Results   02/15 Blood x 2 GPC   MRSA Nasal Swab: N/A. Non-respiratory infection    Plan:  Dosing recommendations based on Bayesian software  Patient received 1250 mg x 1 in ED, will follow with 750 mg q12h  Anticipated AUC of 503 and trough concentration of 14.2 at steady state  Renal labs as indicated   Vancomycin concentration ordered for AM labs tomorrow  Pharmacy will continue to monitor patient and adjust therapy as indicated    Thank you for the consult,  JODEE HALL RPH  2/16/2025    
[facility] Hospitalist Group  Progress Note    Patient: Juliana Loaiza Age: 74 y.o. : 1950 MR#: [id number] SSN: [id number]  Date/Time: 2025     Subjective:     Patient seen evaluated, sitting up in bed, no acute distress.    74-year-old female presents to the emergency room with complaints of altered mental status.  Patient was recently discharged from [facility] before her  found her unconscious at home.  Patient has chronic hypoxic respiratory failure on 4 L nasal cannula at baseline.  Patient admitted to the hospital secondary to acute metabolic encephalopathy unclear etiology.  Patient noted to have a mild elevation in ammonia levels.    -no new events overnight, patient is asking for pain medications, started on p.o. Percocet as needed.  Patient mentions she was previously on hospice care and was on IV morphine.  She has been discharged from hospice care.  ID consulted for positive blood cultures.  Discontinue lactulose.  PT and OT eval and treatment.    -noted events from earlier this morning, RRT called for chest pain.  EKG did not show any acute changes.  Troponin level at 32.  Patient continues to complain of chest pain off and on, I feel this is related more to her anxiety as well as pain med seeking behavior.  She continues to ask for IV morphine.  Placed her on p.o. Percocet yesterday and added p.o. diazepam today.  Given the fact that she continues to complain of chest pain cardiology has been consulted for further evaluation.      Assessment/Plan:     Acute metabolic encephalopathy-resolved, patient is at baseline.  Bacteremia-positive blood cultures likely contaminant, patient started on broad-spectrum IV antibiotics.  ID consulted.  Patient does not require any further antibiotics per ID.  Chronic hypoxic respiratory failure on 4 L nasal cannula, continue supplemental oxygen.  Hypokalemia-resolved  Chronic pain-patient continues to ask for 
[facility] Hospitalist Group  Progress Note    Patient: Juliana Loaiza Age: 74 y.o. : 1950 MR#: [id number] SSN: [id number]  Date/Time: 2025     Subjective: Patient lying in the bed, complains of some left-sided chest pain, requesting for IV morphine, patient stated she used to be on IV morphine in the past and her pain better with IV morphine.  She is at her baseline for her shortness of breath.  Per RN, patient keeps requesting for pain medication.       Assessment/Plan:     Acute metabolic encephalopathy-resolved, patient is at baseline.  Atypical chest pain, most likely musculoskeletal.  Bacteremia-positive blood cultures likely contaminant  Chronic hypoxic respiratory failure on 4 L nasal cannula, continue supplemental oxygen.  Hypokalemia-resolved  Chronic pain  Chronic anxiety  History of paroxysmal A-fib with secondary hypercoagulable state due to A-fib  History of coronary artery disease  History of hypertension  History of GERD  History of type 2 diabetes    Plan  Cardiology input noted, chest pain noncardiac recommend no further workup.  Discussed with patient about cardiology recommendations, patient continues to request IV morphine.  Reassured patient that chest pain is not cardiac, recommended to continue to use p.o. pain medications  Will continue Xarelto, Cardizem, statin  Continue PPI  Continue Percocet for pain control  Continue Zoloft, Cymbalta and Remeron  PT OT recommending SNF placement    Discussed with patient extensively about my above plan care, explained about cardiology recommendations no further workup needed.  Discussed about discharge planning process, patient does not want to go home, wants to stay in the hospital till weekend.  I did explain to the patient that she is medically stable and no need to stay in the hospital.  Patient got very emotional and started crying and requesting to keep her in the hospital.  She also requesting that I 
[facility] Hospitalist Group  Progress Note    Patient: Juliana Loaiza Age: 74 y.o. : 1950 MR#: [id number] SSN: [id number]  Date/Time: 2025     Subjective: Patient lying in the bed, feels fine, chest pain better.       Assessment/Plan:     Acute metabolic encephalopathy-resolved, patient is at baseline.  Mild hyperkalemia  Atypical chest pain, most likely musculoskeletal.  Bacteremia-positive blood cultures likely contaminant  Chronic hypoxic respiratory failure on 4 L nasal cannula, continue supplemental oxygen.  Hypokalemia-resolved  Chronic pain  Chronic anxiety  History of paroxysmal A-fib with secondary hypercoagulable state due to A-fib  History of coronary artery disease  History of hypertension  History of GERD  History of type 2 diabetes    Plan  Will give Lokelma and monitor potassium levels  Cardiology input noted, chest pain noncardiac recommend no further workup.  Will continue Xarelto, Cardizem, statin  Continue PPI  Continue Percocet for pain control  Continue Zoloft, Cymbalta and Remeron  PT OT recommending SNF placement    Discussed with patient extensively about my above plan care, discussed about discharge planning, patient agreed to go home with transport.   cannot  the patient due to his advanced age.   I also discussed with her about SNF discharge per PT recommendation but patient declines wants to go home.    Disposition: Home with home health if repeat potassium stable    Addendum  Discussed with case management for arranging transportation, due to heavy snow in the area last 24 hours transportation company not taking any patient's home.  Discharge canceled, advised case management for transport set up for tomorrow.      Case discussed with:  [x]Patient  []Family  [x]Nursing  []Case Management  DVT Prophylaxis:  []Lovenox  []Hep SQ  []SCDs  []Coumadin   [x] Xarelto    Objective:   VS:   Vitals:    25 1206   BP: (!) 159/90 
[facility]   Pharmacy Pharmacokinetic Monitoring Service - Vancomycin    Indication: Sepsis of Unknown Etiology  Target Concentration: Goal AUC/BHARAT 400-600 mg*hr/L  Day of Therapy: 3  Additional Antimicrobials: Cefepime    Pertinent Laboratory Values:   Temp: 98.5 °F (36.9 °C), Weight - Scale: 57.2 kg (126 lb)  Recent Labs     02/15/25  1734 02/16/25  2135 02/17/25  0354   CREATININE  --  1.00 1.01   BUN  --  21* 20*   WBC  --  7.6 6.5   PROCAL 0.18  --   --        Estimated Creatinine Clearance: 40 mL/min (based on SCr of 1.01 mg/dL).    Pertinent Cultures:  Culture Date Source Results   2/15 blood gpc 1 of 2   MRSA Nasal Swab: N/A. Non-respiratory infection    Assessment:  Date/Time Current Dose Concentration Timing of Concentration (h) AUC   2/15 1,250mg - - -   2/16 750mg q12h      2/17 750mg q12h  1,000mg q24h 14.8 11.5 840     Note: Serum concentrations collected for AUC dosing may appear elevated if collected in close proximity to the dose administered, this is not necessarily an indication of toxicity    Plan:  Vancomycin 1,000mg q24h  Projected AUCss/T = 585/16.2  Renal labs as indicated   Vancomycin concentration to be repeated as indicated  Pharmacy will continue to monitor patient and adjust therapy as indicated    Thank you for the consult,  VENECIA BUSCH RPH  2/17/2025      
Occupational Therapy      Attempted OT tx x2, however pt declines tx x2. Will continue to follow this pt.Thank you for your referral.    KELLIE Stone/ RAMY  
Occupational Therapy      Attempted OT tx x2, however pt is on the phone with son at first attempt requesting to be seen later and at second attempt pt is eating lunch. Will continue to follow this pt. Thank you for your referral.    KELLIE Stone/ RAMY  
PCC update:      Evaluated the patient at the request of medical service. Patient being admitted for hypothermia, on Judi hugger and hypoglycemia which resolved with D50.    Patient seen in ED room 2. /88, HR 90s-low 100s, O2 sats 100% on 6L O2. Awake and alert but encephalopathic, AAOx0. Making urine. Not on any drips.   No immediate need for management by ICU team. OK to admit to ICU under hospitalist service.  Discussed with attending on call Dr. Munoz and ED provider Dr. Slaughter.           Suzi Padilla PA-C  02/15/25  Pulmonary, Critical Care Medicine  [facility]       
Patient c/o back & chest pain 10/10. Pain does not radiate. Nitro patch applied to chest. Patient given PRN Tylenol. Patient requesting Morphine. States doctor said he would give her morphine. Currently refusing Lidocaine patch. States she will take it after she gets Morphine. MD notified. Will continue to monitor. Call bell within reach.  
Patient left unit to home via transport. O2 at 4L NC, IV's removed, belongings with patient on stretcher.   
Patient noted to have positive blood cultures, continue IV cefepime and vancomycin.  Consult ID.  Will continue to follow.  
Patient refused HHN  
Patient refused HHN.   
Patient refused HHN. She is in no distress.   
Patient refuses HHN  
Patient refuses HHN. Explained th benefits of the HHN. Patient still refuses.   
Patient seen evaluated, sitting up in bed, no acute distress.  Patient complaining of pain all over.  Patient is asking for morphine.  Patient mentions she was previously on hospice and was on morphine however has been discharged from hospice care.  She said the morphine helped relieve her symptoms.  Will add Percocet as needed.  Continue to follow-up  
Physical Therapy  Pt not seen for skilled PT due to:    Patient politely refusing any/all mobility this date, stating, \"not today.\" Appearing upset yet calm.     Thank you.  BROWN BlakelyT       
276 135 - 420 K/uL    MPV 10.6 9.2 - 11.8 FL    Nucleated RBCs 0.0 0  WBC    nRBC 0.00 0.00 - 0.01 K/uL    Neutrophils % 74.1 (H) 40.0 - 73.0 %    Lymphocytes % 16.1 (L) 21.0 - 52.0 %    Monocytes % 7.5 3.0 - 10.0 %    Eosinophils % 1.2 0.0 - 5.0 %    Basophils % 0.3 0.0 - 2.0 %    Immature Granulocytes % 0.8 (H) 0.0 - 0.5 %    Neutrophils Absolute 5.64 1.80 - 8.00 K/UL    Lymphocytes Absolute 1.22 0.90 - 3.60 K/UL    Monocytes Absolute 0.57 0.05 - 1.20 K/UL    Eosinophils Absolute 0.09 0.00 - 0.40 K/UL    Basophils Absolute 0.02 0.00 - 0.10 K/UL    Immature Granulocytes Absolute 0.06 (H) 0.00 - 0.04 K/UL    Differential Type AUTOMATED     Comprehensive Metabolic Panel    Collection Time: 02/16/25  9:35 PM   Result Value Ref Range    Sodium 142 136 - 145 mmol/L    Potassium 3.8 3.5 - 5.5 mmol/L    Chloride 108 100 - 111 mmol/L    CO2 29 21 - 32 mmol/L    Anion Gap 5 3.0 - 18 mmol/L    Glucose 204 (H) 74 - 99 mg/dL    BUN 21 (H) 7.0 - 18 MG/DL    Creatinine 1.00 0.6 - 1.3 MG/DL    BUN/Creatinine Ratio 21 (H) 12 - 20      Est, Glom Filt Rate 60 (L) >60 ml/min/1.73m2    Calcium 7.0 (L) 8.5 - 10.1 MG/DL    Total Bilirubin 0.6 0.2 - 1.0 MG/DL    ALT 87 (H) 13 - 56 U/L    AST 67 (H) 10 - 38 U/L    Alk Phosphatase 106 45 - 117 U/L    Total Protein 5.9 (L) 6.4 - 8.2 g/dL    Albumin 2.1 (L) 3.4 - 5.0 g/dL    Globulin 3.8 2.0 - 4.0 g/dL    Albumin/Globulin Ratio 0.6 (L) 0.8 - 1.7     Vancomycin Level, Random    Collection Time: 02/17/25  3:54 AM   Result Value Ref Range    Vancomycin Rm 14.8 5.0 - 40.0 UG/ML   Basic Metabolic Panel    Collection Time: 02/17/25  3:54 AM   Result Value Ref Range    Sodium 141 136 - 145 mmol/L    Potassium 3.7 3.5 - 5.5 mmol/L    Chloride 106 100 - 111 mmol/L    CO2 30 21 - 32 mmol/L    Anion Gap 5 3.0 - 18 mmol/L    Glucose 290 (H) 74 - 99 mg/dL    BUN 20 (H) 7.0 - 18 MG/DL    Creatinine 1.01 0.6 - 1.3 MG/DL    BUN/Creatinine Ratio 20 12 - 20      Est, Glom Filt Rate 59 (L) >60 
Neutrophils Absolute 5.64 1.80 - 8.00 K/UL    Lymphocytes Absolute 1.22 0.90 - 3.60 K/UL    Monocytes Absolute 0.57 0.05 - 1.20 K/UL    Eosinophils Absolute 0.09 0.00 - 0.40 K/UL    Basophils Absolute 0.02 0.00 - 0.10 K/UL    Immature Granulocytes Absolute 0.06 (H) 0.00 - 0.04 K/UL    Differential Type AUTOMATED     Comprehensive Metabolic Panel    Collection Time: 02/16/25  9:35 PM   Result Value Ref Range    Sodium 142 136 - 145 mmol/L    Potassium 3.8 3.5 - 5.5 mmol/L    Chloride 108 100 - 111 mmol/L    CO2 29 21 - 32 mmol/L    Anion Gap 5 3.0 - 18 mmol/L    Glucose 204 (H) 74 - 99 mg/dL    BUN 21 (H) 7.0 - 18 MG/DL    Creatinine 1.00 0.6 - 1.3 MG/DL    BUN/Creatinine Ratio 21 (H) 12 - 20      Est, Glom Filt Rate 60 (L) >60 ml/min/1.73m2    Calcium 7.0 (L) 8.5 - 10.1 MG/DL    Total Bilirubin 0.6 0.2 - 1.0 MG/DL    ALT 87 (H) 13 - 56 U/L    AST 67 (H) 10 - 38 U/L    Alk Phosphatase 106 45 - 117 U/L    Total Protein 5.9 (L) 6.4 - 8.2 g/dL    Albumin 2.1 (L) 3.4 - 5.0 g/dL    Globulin 3.8 2.0 - 4.0 g/dL    Albumin/Globulin Ratio 0.6 (L) 0.8 - 1.7     Vancomycin Level, Random    Collection Time: 02/17/25  3:54 AM   Result Value Ref Range    Vancomycin Rm 14.8 5.0 - 40.0 UG/ML   Basic Metabolic Panel    Collection Time: 02/17/25  3:54 AM   Result Value Ref Range    Sodium 141 136 - 145 mmol/L    Potassium 3.7 3.5 - 5.5 mmol/L    Chloride 106 100 - 111 mmol/L    CO2 30 21 - 32 mmol/L    Anion Gap 5 3.0 - 18 mmol/L    Glucose 290 (H) 74 - 99 mg/dL    BUN 20 (H) 7.0 - 18 MG/DL    Creatinine 1.01 0.6 - 1.3 MG/DL    BUN/Creatinine Ratio 20 12 - 20      Est, Glom Filt Rate 59 (L) >60 ml/min/1.73m2    Calcium 6.9 (L) 8.5 - 10.1 MG/DL   CBC with Auto Differential    Collection Time: 02/17/25  3:54 AM   Result Value Ref Range    WBC 6.5 4.6 - 13.2 K/uL    RBC 3.89 (L) 4.20 - 5.30 M/uL    Hemoglobin 8.7 (L) 12.0 - 16.0 g/dL    Hematocrit 29.0 (L) 35.0 - 45.0 %    MCV 74.6 (L) 78.0 - 100.0 FL    MCH 22.4 (L) 24.0 - 34.0 PG

## 2025-03-18 ENCOUNTER — TRANSCRIBE ORDERS (OUTPATIENT)
Facility: HOSPITAL | Age: 75
End: 2025-03-18

## 2025-03-18 DIAGNOSIS — Z78.0 POSTMENOPAUSAL: Primary | ICD-10-CM

## 2025-05-06 ENCOUNTER — APPOINTMENT (OUTPATIENT)
Facility: HOSPITAL | Age: 75
End: 2025-05-06
Payer: MEDICARE

## 2025-05-06 ENCOUNTER — HOSPITAL ENCOUNTER (INPATIENT)
Facility: HOSPITAL | Age: 75
LOS: 10 days | End: 2025-05-17
Attending: EMERGENCY MEDICINE | Admitting: INTERNAL MEDICINE
Payer: MEDICARE

## 2025-05-06 ENCOUNTER — HOSPITAL ENCOUNTER (EMERGENCY)
Facility: HOSPITAL | Age: 75
Discharge: STILL ADMITTED OR ADMINISTRATIVE | End: 2025-05-06
Attending: EMERGENCY MEDICINE
Payer: MEDICARE

## 2025-05-06 VITALS — HEART RATE: 95 BPM | RESPIRATION RATE: 16 BRPM

## 2025-05-06 DIAGNOSIS — R40.2431 GLASGOW COMA SCALE TOTAL SCORE 3-8, IN THE FIELD (EMT OR AMBULANCE) (HCC): Primary | ICD-10-CM

## 2025-05-06 DIAGNOSIS — I63.413 CEREBROVASCULAR ACCIDENT (CVA) DUE TO BILATERAL EMBOLISM OF MIDDLE CEREBRAL ARTERIES (HCC): ICD-10-CM

## 2025-05-06 DIAGNOSIS — I50.9 CONGESTIVE HEART FAILURE, UNSPECIFIED HF CHRONICITY, UNSPECIFIED HEART FAILURE TYPE (HCC): ICD-10-CM

## 2025-05-06 DIAGNOSIS — R09.2 RESPIRATORY ARREST (HCC): ICD-10-CM

## 2025-05-06 LAB
ANION GAP BLD CALC-SCNC: ABNORMAL MMOL/L (ref 10–20)
ANION GAP BLD CALC-SCNC: ABNORMAL MMOL/L (ref 10–20)
ARTERIAL PATENCY WRIST A: ABNORMAL
BASE DEFICIT BLD-SCNC: 0.7 MMOL/L
BASE DEFICIT BLD-SCNC: 0.7 MMOL/L
BASE DEFICIT BLD-SCNC: 3.2 MMOL/L
BASE DEFICIT BLD-SCNC: 3.2 MMOL/L
BDY SITE: ABNORMAL
CA-I BLD-MCNC: 1.05 MMOL/L (ref 1.15–1.33)
CA-I BLD-MCNC: 1.05 MMOL/L (ref 1.15–1.33)
CHLORIDE BLD-SCNC: 103 MMOL/L (ref 98–107)
CHLORIDE BLD-SCNC: 103 MMOL/L (ref 98–107)
CO2 BLD-SCNC: 22 MMOL/L (ref 22–29)
CO2 BLD-SCNC: 22 MMOL/L (ref 22–29)
CREAT BLD-MCNC: 3.12 MG/DL (ref 0.6–1.3)
CREAT BLD-MCNC: 3.12 MG/DL (ref 0.6–1.3)
FIO2 ON VENT: 15 %
FIO2 ON VENT: 15 %
GAS FLOW.O2 O2 DELIVERY SYS: ABNORMAL
GAS FLOW.O2 SETTING OXYMISER: 16 BPM
GAS FLOW.O2 SETTING OXYMISER: 16 BPM
GLUCOSE BLD-MCNC: 307 MG/DL (ref 74–99)
GLUCOSE BLD-MCNC: 307 MG/DL (ref 74–99)
HCO3 BLD-SCNC: 21.4 MMOL/L (ref 21–28)
HCO3 BLD-SCNC: 21.4 MMOL/L (ref 21–28)
HCO3 BLD-SCNC: 23.1 MMOL/L (ref 21–28)
HCO3 BLD-SCNC: 23.1 MMOL/L (ref 21–28)
IPAP/PIP/HIGH PEEP: 22
IPAP/PIP/HIGH PEEP: 22
LACTATE BLD-SCNC: 2.39 MMOL/L (ref 0.4–2)
LACTATE BLD-SCNC: 2.39 MMOL/L (ref 0.4–2)
O2/TOTAL GAS SETTING VFR VENT: 40 %
O2/TOTAL GAS SETTING VFR VENT: 40 %
PCO2 BLD: 34.5 MMHG (ref 35–48)
PCO2 BLD: 34.5 MMHG (ref 35–48)
PCO2 BLD: 36 MMHG (ref 35–48)
PCO2 BLD: 36 MMHG (ref 35–48)
PEEP RESPIRATORY: 5 CMH2O
PEEP RESPIRATORY: 5 CMH2O
PH BLD: 7.38 (ref 7.35–7.45)
PH BLD: 7.38 (ref 7.35–7.45)
PH BLD: 7.43 (ref 7.35–7.45)
PH BLD: 7.43 (ref 7.35–7.45)
PO2 BLD: 117 MMHG (ref 83–108)
PO2 BLD: 117 MMHG (ref 83–108)
PO2 BLD: 351 MMHG (ref 83–108)
PO2 BLD: 351 MMHG (ref 83–108)
POTASSIUM BLD-SCNC: 4.2 MMOL/L (ref 3.5–5.1)
POTASSIUM BLD-SCNC: 4.2 MMOL/L (ref 3.5–5.1)
RESPIRATORY RATE, POC: 15 (ref 5–40)
RESPIRATORY RATE, POC: 15 (ref 5–40)
RESPIRATORY RATE, POC: 16 (ref 5–40)
RESPIRATORY RATE, POC: 16 (ref 5–40)
SAO2 % BLD: 100 % (ref 94–98)
SAO2 % BLD: 100 % (ref 94–98)
SAO2 % BLD: 98.5 % (ref 92–97)
SAO2 % BLD: 98.5 % (ref 92–97)
SERVICE CMNT-IMP: ABNORMAL
SODIUM BLD-SCNC: 138 MMOL/L (ref 136–145)
SODIUM BLD-SCNC: 138 MMOL/L (ref 136–145)
SPECIMEN SITE: ABNORMAL
SPECIMEN SITE: ABNORMAL
SPECIMEN TYPE: ABNORMAL
SPECIMEN TYPE: ABNORMAL
VENTILATION MODE VENT: ABNORMAL
VENTILATION MODE VENT: ABNORMAL
VT SETTING VENT: 420 ML
VT SETTING VENT: 420 ML

## 2025-05-06 PROCEDURE — 31500 INSERT EMERGENCY AIRWAY: CPT

## 2025-05-06 PROCEDURE — 85025 COMPLETE CBC W/AUTO DIFF WBC: CPT

## 2025-05-06 PROCEDURE — 82947 ASSAY GLUCOSE BLOOD QUANT: CPT

## 2025-05-06 PROCEDURE — 6360000002 HC RX W HCPCS

## 2025-05-06 PROCEDURE — 2700000000 HC OXYGEN THERAPY PER DAY

## 2025-05-06 PROCEDURE — 80179 DRUG ASSAY SALICYLATE: CPT

## 2025-05-06 PROCEDURE — 80053 COMPREHEN METABOLIC PANEL: CPT

## 2025-05-06 PROCEDURE — 84484 ASSAY OF TROPONIN QUANT: CPT

## 2025-05-06 PROCEDURE — 36600 WITHDRAWAL OF ARTERIAL BLOOD: CPT

## 2025-05-06 PROCEDURE — 99285 EMERGENCY DEPT VISIT HI MDM: CPT

## 2025-05-06 PROCEDURE — 84132 ASSAY OF SERUM POTASSIUM: CPT

## 2025-05-06 PROCEDURE — 0BH17EZ INSERTION OF ENDOTRACHEAL AIRWAY INTO TRACHEA, VIA NATURAL OR ARTIFICIAL OPENING: ICD-10-PCS | Performed by: EMERGENCY MEDICINE

## 2025-05-06 PROCEDURE — 83605 ASSAY OF LACTIC ACID: CPT

## 2025-05-06 PROCEDURE — 93005 ELECTROCARDIOGRAM TRACING: CPT | Performed by: EMERGENCY MEDICINE

## 2025-05-06 PROCEDURE — 70450 CT HEAD/BRAIN W/O DYE: CPT

## 2025-05-06 PROCEDURE — 94002 VENT MGMT INPAT INIT DAY: CPT

## 2025-05-06 PROCEDURE — 6360000002 HC RX W HCPCS: Performed by: EMERGENCY MEDICINE

## 2025-05-06 PROCEDURE — 82330 ASSAY OF CALCIUM: CPT

## 2025-05-06 PROCEDURE — 84295 ASSAY OF SERUM SODIUM: CPT

## 2025-05-06 PROCEDURE — 80143 DRUG ASSAY ACETAMINOPHEN: CPT

## 2025-05-06 PROCEDURE — 82077 ASSAY SPEC XCP UR&BREATH IA: CPT

## 2025-05-06 PROCEDURE — 5A1955Z RESPIRATORY VENTILATION, GREATER THAN 96 CONSECUTIVE HOURS: ICD-10-PCS | Performed by: EMERGENCY MEDICINE

## 2025-05-06 PROCEDURE — 82803 BLOOD GASES ANY COMBINATION: CPT

## 2025-05-06 PROCEDURE — 71045 X-RAY EXAM CHEST 1 VIEW: CPT

## 2025-05-06 RX ORDER — MIDAZOLAM HYDROCHLORIDE 2 MG/2ML
5 INJECTION, SOLUTION INTRAMUSCULAR; INTRAVENOUS
Status: COMPLETED | OUTPATIENT
Start: 2025-05-06 | End: 2025-05-06

## 2025-05-06 RX ORDER — NALOXONE HYDROCHLORIDE 1 MG/ML
INJECTION INTRAMUSCULAR; INTRAVENOUS; SUBCUTANEOUS
Status: COMPLETED
Start: 2025-05-06 | End: 2025-05-06

## 2025-05-06 RX ORDER — MIDAZOLAM HYDROCHLORIDE 2 MG/2ML
5 INJECTION, SOLUTION INTRAMUSCULAR; INTRAVENOUS
Status: DISCONTINUED | OUTPATIENT
Start: 2025-05-06 | End: 2025-05-07 | Stop reason: HOSPADM

## 2025-05-06 RX ORDER — SODIUM CHLORIDE 9 MG/ML
INJECTION, SOLUTION INTRAVENOUS CONTINUOUS
Status: DISCONTINUED | OUTPATIENT
Start: 2025-05-06 | End: 2025-05-07 | Stop reason: HOSPADM

## 2025-05-06 RX ORDER — 0.9 % SODIUM CHLORIDE 0.9 %
1000 INTRAVENOUS SOLUTION INTRAVENOUS ONCE
Status: DISCONTINUED | OUTPATIENT
Start: 2025-05-06 | End: 2025-05-07 | Stop reason: HOSPADM

## 2025-05-06 RX ORDER — MIDAZOLAM HYDROCHLORIDE 1 MG/ML
INJECTION, SOLUTION INTRAMUSCULAR; INTRAVENOUS
Status: COMPLETED
Start: 2025-05-06 | End: 2025-05-06

## 2025-05-06 RX ADMIN — NALOXONE HYDROCHLORIDE 2 MG: 1 INJECTION INTRAMUSCULAR; INTRAVENOUS; SUBCUTANEOUS at 23:14

## 2025-05-06 RX ADMIN — MIDAZOLAM HYDROCHLORIDE 5 MG: 1 INJECTION, SOLUTION INTRAMUSCULAR; INTRAVENOUS at 23:12

## 2025-05-06 RX ADMIN — MIDAZOLAM HYDROCHLORIDE 5 MG: 2 INJECTION, SOLUTION INTRAMUSCULAR; INTRAVENOUS at 23:12

## 2025-05-06 ASSESSMENT — PULMONARY FUNCTION TESTS
PIF_VALUE: 23
PIF_VALUE: 21
PIF_VALUE: 21

## 2025-05-07 ENCOUNTER — APPOINTMENT (OUTPATIENT)
Facility: HOSPITAL | Age: 75
End: 2025-05-07
Payer: MEDICARE

## 2025-05-07 PROBLEM — G93.40 ACUTE ENCEPHALOPATHY: Status: ACTIVE | Noted: 2025-05-07

## 2025-05-07 PROBLEM — R40.20 COMA (HCC): Status: ACTIVE | Noted: 2025-05-07

## 2025-05-07 PROBLEM — E87.20 METABOLIC ACIDOSIS: Status: ACTIVE | Noted: 2025-05-07

## 2025-05-07 PROBLEM — J96.00 ACUTE RESPIRATORY FAILURE (HCC): Status: ACTIVE | Noted: 2025-05-07

## 2025-05-07 PROBLEM — J96.00 ACUTE RESPIRATORY FAILURE (HCC): Status: ACTIVE | Noted: 2025-01-01

## 2025-05-07 PROBLEM — A41.9 SEVERE SEPSIS (HCC): Status: ACTIVE | Noted: 2025-05-07

## 2025-05-07 PROBLEM — E87.20 LACTIC ACIDOSIS: Status: ACTIVE | Noted: 2025-05-07

## 2025-05-07 PROBLEM — R40.20 COMA (HCC): Status: ACTIVE | Noted: 2025-01-01

## 2025-05-07 PROBLEM — F19.10 POLYSUBSTANCE ABUSE (HCC): Status: ACTIVE | Noted: 2025-05-07

## 2025-05-07 PROBLEM — J96.01 ACUTE HYPOXEMIC RESPIRATORY FAILURE (HCC): Status: ACTIVE | Noted: 2025-05-07

## 2025-05-07 PROBLEM — R65.20 SEVERE SEPSIS (HCC): Status: ACTIVE | Noted: 2025-05-07

## 2025-05-07 PROBLEM — R65.20 SEVERE SEPSIS (HCC): Status: ACTIVE | Noted: 2025-01-01

## 2025-05-07 PROBLEM — B34.8 RHINOVIRUS: Status: ACTIVE | Noted: 2025-05-07

## 2025-05-07 PROBLEM — J96.01 ACUTE HYPOXEMIC RESPIRATORY FAILURE (HCC): Status: ACTIVE | Noted: 2025-01-01

## 2025-05-07 PROBLEM — E87.20 LACTIC ACIDOSIS: Status: ACTIVE | Noted: 2025-01-01

## 2025-05-07 PROBLEM — E87.20 METABOLIC ACIDOSIS: Status: ACTIVE | Noted: 2025-01-01

## 2025-05-07 PROBLEM — R74.8 ELEVATED CK: Status: ACTIVE | Noted: 2025-01-01

## 2025-05-07 PROBLEM — B34.8 RHINOVIRUS: Status: ACTIVE | Noted: 2025-01-01

## 2025-05-07 PROBLEM — R74.01 TRANSAMINITIS: Status: ACTIVE | Noted: 2025-05-07

## 2025-05-07 PROBLEM — R74.8 ELEVATED CK: Status: ACTIVE | Noted: 2025-05-07

## 2025-05-07 PROBLEM — F19.10 POLYSUBSTANCE ABUSE (HCC): Status: ACTIVE | Noted: 2025-01-01

## 2025-05-07 PROBLEM — R74.01 TRANSAMINITIS: Status: ACTIVE | Noted: 2025-01-01

## 2025-05-07 PROBLEM — A41.9 SEVERE SEPSIS (HCC): Status: ACTIVE | Noted: 2025-01-01

## 2025-05-07 PROBLEM — J69.0 ASPIRATION PNEUMONIA OF BOTH LUNGS (HCC): Status: ACTIVE | Noted: 2025-01-01

## 2025-05-07 PROBLEM — J69.0 ASPIRATION PNEUMONIA OF BOTH LUNGS (HCC): Status: ACTIVE | Noted: 2025-05-07

## 2025-05-07 PROBLEM — N17.9 AKI (ACUTE KIDNEY INJURY): Status: ACTIVE | Noted: 2025-05-07

## 2025-05-07 PROBLEM — G93.40 ACUTE ENCEPHALOPATHY: Status: ACTIVE | Noted: 2025-01-01

## 2025-05-07 PROBLEM — N17.9 AKI (ACUTE KIDNEY INJURY): Status: ACTIVE | Noted: 2025-01-01

## 2025-05-07 LAB
ALBUMIN SERPL-MCNC: 2.2 G/DL (ref 3.4–5)
ALBUMIN SERPL-MCNC: 2.2 G/DL (ref 3.4–5)
ALBUMIN SERPL-MCNC: 2.6 G/DL (ref 3.4–5)
ALBUMIN SERPL-MCNC: 2.6 G/DL (ref 3.4–5)
ALBUMIN/GLOB SERPL: 0.6 (ref 0.8–1.7)
ALP SERPL-CCNC: 88 U/L (ref 45–117)
ALP SERPL-CCNC: 88 U/L (ref 45–117)
ALP SERPL-CCNC: 98 U/L (ref 45–117)
ALP SERPL-CCNC: 98 U/L (ref 45–117)
ALT SERPL-CCNC: 38 U/L (ref 10–35)
ALT SERPL-CCNC: 38 U/L (ref 10–35)
ALT SERPL-CCNC: 65 U/L (ref 10–35)
ALT SERPL-CCNC: 65 U/L (ref 10–35)
AMMONIA PLAS-SCNC: 24 UMOL/L (ref 11–60)
AMMONIA PLAS-SCNC: 24 UMOL/L (ref 11–60)
AMPHET UR QL SCN: NEGATIVE
AMPHET UR QL SCN: NEGATIVE
ANION GAP SERPL CALC-SCNC: 15 MMOL/L (ref 3–18)
ANION GAP SERPL CALC-SCNC: 15 MMOL/L (ref 3–18)
ANION GAP SERPL CALC-SCNC: 18 MMOL/L (ref 3–18)
ANION GAP SERPL CALC-SCNC: 18 MMOL/L (ref 3–18)
ANION GAP SERPL CALC-SCNC: 21 MMOL/L (ref 3–18)
ANION GAP SERPL CALC-SCNC: 21 MMOL/L (ref 3–18)
APAP SERPL-MCNC: <5 UG/ML (ref 10–30)
APAP SERPL-MCNC: <5 UG/ML (ref 10–30)
APPEARANCE UR: ABNORMAL
APPEARANCE UR: ABNORMAL
ARTERIAL PATENCY WRIST A: ABNORMAL
ARTERIAL PATENCY WRIST A: ABNORMAL
AST SERPL-CCNC: 252 U/L (ref 10–38)
AST SERPL-CCNC: 252 U/L (ref 10–38)
AST SERPL-CCNC: 96 U/L (ref 10–38)
AST SERPL-CCNC: 96 U/L (ref 10–38)
B PERT DNA SPEC QL NAA+PROBE: NOT DETECTED
B PERT DNA SPEC QL NAA+PROBE: NOT DETECTED
BACTERIA URNS QL MICRO: ABNORMAL /HPF
BACTERIA URNS QL MICRO: ABNORMAL /HPF
BARBITURATES UR QL SCN: NEGATIVE
BARBITURATES UR QL SCN: NEGATIVE
BASE DEFICIT BLD-SCNC: 4.4 MMOL/L
BASE DEFICIT BLD-SCNC: 4.4 MMOL/L
BASOPHILS # BLD: 0.01 K/UL (ref 0–0.1)
BASOPHILS # BLD: 0.01 K/UL (ref 0–0.1)
BASOPHILS # BLD: 0.02 K/UL (ref 0–0.1)
BASOPHILS # BLD: 0.02 K/UL (ref 0–0.1)
BASOPHILS NFR BLD: 0.1 % (ref 0–2)
BASOPHILS NFR BLD: 0.1 % (ref 0–2)
BASOPHILS NFR BLD: 0.2 % (ref 0–2)
BASOPHILS NFR BLD: 0.2 % (ref 0–2)
BDY SITE: ABNORMAL
BDY SITE: ABNORMAL
BENZODIAZ UR QL: NEGATIVE
BENZODIAZ UR QL: NEGATIVE
BILIRUB SERPL-MCNC: 0.6 MG/DL (ref 0.2–1)
BILIRUB SERPL-MCNC: 0.6 MG/DL (ref 0.2–1)
BILIRUB SERPL-MCNC: 0.8 MG/DL (ref 0.2–1)
BILIRUB SERPL-MCNC: 0.8 MG/DL (ref 0.2–1)
BILIRUB UR QL: NEGATIVE
BILIRUB UR QL: NEGATIVE
BORDETELLA PARAPERTUSSIS BY PCR: NOT DETECTED
BORDETELLA PARAPERTUSSIS BY PCR: NOT DETECTED
BUN SERPL-MCNC: 30 MG/DL (ref 6–23)
BUN SERPL-MCNC: 30 MG/DL (ref 6–23)
BUN SERPL-MCNC: 31 MG/DL (ref 6–23)
BUN SERPL-MCNC: 31 MG/DL (ref 6–23)
BUN SERPL-MCNC: 35 MG/DL (ref 6–23)
BUN SERPL-MCNC: 35 MG/DL (ref 6–23)
BUN/CREAT SERPL: 10 (ref 12–20)
BUN/CREAT SERPL: 10 (ref 12–20)
BUN/CREAT SERPL: 11 (ref 12–20)
BUN/CREAT SERPL: 11 (ref 12–20)
BUN/CREAT SERPL: 12 (ref 12–20)
BUN/CREAT SERPL: 12 (ref 12–20)
C PNEUM DNA SPEC QL NAA+PROBE: NOT DETECTED
C PNEUM DNA SPEC QL NAA+PROBE: NOT DETECTED
CA-I SERPL-SCNC: 1.02 MMOL/L (ref 1.15–1.33)
CA-I SERPL-SCNC: 1.02 MMOL/L (ref 1.15–1.33)
CALCIUM SERPL-MCNC: 7.4 MG/DL (ref 8.5–10.1)
CALCIUM SERPL-MCNC: 7.4 MG/DL (ref 8.5–10.1)
CALCIUM SERPL-MCNC: 7.9 MG/DL (ref 8.5–10.1)
CALCIUM SERPL-MCNC: 7.9 MG/DL (ref 8.5–10.1)
CALCIUM SERPL-MCNC: 8 MG/DL (ref 8.5–10.1)
CALCIUM SERPL-MCNC: 8 MG/DL (ref 8.5–10.1)
CANNABINOIDS UR QL SCN: NEGATIVE
CANNABINOIDS UR QL SCN: NEGATIVE
CHLORIDE SERPL-SCNC: 103 MMOL/L (ref 98–107)
CHLORIDE SERPL-SCNC: 103 MMOL/L (ref 98–107)
CHLORIDE SERPL-SCNC: 104 MMOL/L (ref 98–107)
CHLORIDE SERPL-SCNC: 104 MMOL/L (ref 98–107)
CHLORIDE SERPL-SCNC: 94 MMOL/L (ref 98–107)
CHLORIDE SERPL-SCNC: 94 MMOL/L (ref 98–107)
CK SERPL-CCNC: ABNORMAL U/L (ref 26–192)
CK SERPL-CCNC: ABNORMAL U/L (ref 26–192)
CO2 SERPL-SCNC: 19 MMOL/L (ref 21–32)
COCAINE UR QL SCN: POSITIVE
COCAINE UR QL SCN: POSITIVE
COLOR UR: ABNORMAL
COLOR UR: ABNORMAL
CREAT SERPL-MCNC: 2.89 MG/DL (ref 0.6–1.3)
CREAT SERPL-MCNC: 2.89 MG/DL (ref 0.6–1.3)
CREAT SERPL-MCNC: 3 MG/DL (ref 0.6–1.3)
CREAT SERPL-MCNC: 3 MG/DL (ref 0.6–1.3)
CREAT SERPL-MCNC: 3.01 MG/DL (ref 0.6–1.3)
CREAT SERPL-MCNC: 3.01 MG/DL (ref 0.6–1.3)
DIFFERENTIAL METHOD BLD: ABNORMAL
EOSINOPHIL # BLD: 0 K/UL (ref 0–0.4)
EOSINOPHIL NFR BLD: 0 % (ref 0–5)
EPITH CASTS URNS QL MICRO: ABNORMAL /LPF (ref 0–5)
EPITH CASTS URNS QL MICRO: ABNORMAL /LPF (ref 0–5)
ERYTHROCYTE [DISTWIDTH] IN BLOOD BY AUTOMATED COUNT: 15.7 % (ref 11.6–14.5)
ERYTHROCYTE [DISTWIDTH] IN BLOOD BY AUTOMATED COUNT: 15.7 % (ref 11.6–14.5)
ERYTHROCYTE [DISTWIDTH] IN BLOOD BY AUTOMATED COUNT: 15.9 % (ref 11.6–14.5)
ERYTHROCYTE [DISTWIDTH] IN BLOOD BY AUTOMATED COUNT: 15.9 % (ref 11.6–14.5)
ETHANOL SERPL-MCNC: <11 MG/DL (ref 0–0.08)
ETHANOL SERPL-MCNC: <11 MG/DL (ref 0–0.08)
FENTANYL: NEGATIVE
FENTANYL: NEGATIVE
FLUAV SUBTYP SPEC NAA+PROBE: NOT DETECTED
FLUAV SUBTYP SPEC NAA+PROBE: NOT DETECTED
FLUBV RNA SPEC QL NAA+PROBE: NOT DETECTED
FLUBV RNA SPEC QL NAA+PROBE: NOT DETECTED
GAS FLOW.O2 SETTING OXYMISER: 16 BPM
GAS FLOW.O2 SETTING OXYMISER: 16 BPM
GLOBULIN SER CALC-MCNC: 3.6 G/DL (ref 2–4)
GLOBULIN SER CALC-MCNC: 3.6 G/DL (ref 2–4)
GLOBULIN SER CALC-MCNC: 4.1 G/DL (ref 2–4)
GLOBULIN SER CALC-MCNC: 4.1 G/DL (ref 2–4)
GLUCOSE BLD STRIP.AUTO-MCNC: 112 MG/DL (ref 70–110)
GLUCOSE BLD STRIP.AUTO-MCNC: 112 MG/DL (ref 70–110)
GLUCOSE BLD STRIP.AUTO-MCNC: 133 MG/DL (ref 70–110)
GLUCOSE BLD STRIP.AUTO-MCNC: 133 MG/DL (ref 70–110)
GLUCOSE BLD STRIP.AUTO-MCNC: 227 MG/DL (ref 70–110)
GLUCOSE BLD STRIP.AUTO-MCNC: 227 MG/DL (ref 70–110)
GLUCOSE SERPL-MCNC: 117 MG/DL (ref 74–108)
GLUCOSE SERPL-MCNC: 117 MG/DL (ref 74–108)
GLUCOSE SERPL-MCNC: 247 MG/DL (ref 74–108)
GLUCOSE SERPL-MCNC: 247 MG/DL (ref 74–108)
GLUCOSE SERPL-MCNC: 309 MG/DL (ref 74–108)
GLUCOSE SERPL-MCNC: 309 MG/DL (ref 74–108)
GLUCOSE UR STRIP.AUTO-MCNC: 100 MG/DL
GLUCOSE UR STRIP.AUTO-MCNC: 100 MG/DL
HADV DNA SPEC QL NAA+PROBE: NOT DETECTED
HADV DNA SPEC QL NAA+PROBE: NOT DETECTED
HCO3 BLD-SCNC: 20.8 MMOL/L (ref 21–28)
HCO3 BLD-SCNC: 20.8 MMOL/L (ref 21–28)
HCOV 229E RNA SPEC QL NAA+PROBE: NOT DETECTED
HCOV 229E RNA SPEC QL NAA+PROBE: NOT DETECTED
HCOV HKU1 RNA SPEC QL NAA+PROBE: NOT DETECTED
HCOV HKU1 RNA SPEC QL NAA+PROBE: NOT DETECTED
HCOV NL63 RNA SPEC QL NAA+PROBE: NOT DETECTED
HCOV NL63 RNA SPEC QL NAA+PROBE: NOT DETECTED
HCOV OC43 RNA SPEC QL NAA+PROBE: NOT DETECTED
HCOV OC43 RNA SPEC QL NAA+PROBE: NOT DETECTED
HCT VFR BLD AUTO: 31.5 % (ref 35–45)
HCT VFR BLD AUTO: 31.5 % (ref 35–45)
HCT VFR BLD AUTO: 34.9 % (ref 35–45)
HCT VFR BLD AUTO: 34.9 % (ref 35–45)
HGB BLD-MCNC: 10.7 G/DL (ref 12–16)
HGB BLD-MCNC: 10.7 G/DL (ref 12–16)
HGB BLD-MCNC: 9.7 G/DL (ref 12–16)
HGB BLD-MCNC: 9.7 G/DL (ref 12–16)
HGB UR QL STRIP: ABNORMAL
HGB UR QL STRIP: ABNORMAL
HMPV RNA SPEC QL NAA+PROBE: NOT DETECTED
HMPV RNA SPEC QL NAA+PROBE: NOT DETECTED
HPIV1 RNA SPEC QL NAA+PROBE: NOT DETECTED
HPIV1 RNA SPEC QL NAA+PROBE: NOT DETECTED
HPIV2 RNA SPEC QL NAA+PROBE: NOT DETECTED
HPIV2 RNA SPEC QL NAA+PROBE: NOT DETECTED
HPIV3 RNA SPEC QL NAA+PROBE: NOT DETECTED
HPIV3 RNA SPEC QL NAA+PROBE: NOT DETECTED
HPIV4 RNA SPEC QL NAA+PROBE: NOT DETECTED
HPIV4 RNA SPEC QL NAA+PROBE: NOT DETECTED
HYALINE CASTS URNS QL MICRO: ABNORMAL /LPF (ref 0–2)
HYALINE CASTS URNS QL MICRO: ABNORMAL /LPF (ref 0–2)
IMM GRANULOCYTES # BLD AUTO: 0.05 K/UL (ref 0–0.04)
IMM GRANULOCYTES NFR BLD AUTO: 0.4 % (ref 0–0.5)
IMM GRANULOCYTES NFR BLD AUTO: 0.4 % (ref 0–0.5)
IMM GRANULOCYTES NFR BLD AUTO: 0.5 % (ref 0–0.5)
IMM GRANULOCYTES NFR BLD AUTO: 0.5 % (ref 0–0.5)
IPAP/PIP/HIGH PEEP: 22
IPAP/PIP/HIGH PEEP: 22
KETONES UR QL STRIP.AUTO: NEGATIVE MG/DL
KETONES UR QL STRIP.AUTO: NEGATIVE MG/DL
LACTATE BLD-SCNC: 3.11 MMOL/L (ref 0.4–2)
LACTATE BLD-SCNC: 3.11 MMOL/L (ref 0.4–2)
LACTATE SERPL-SCNC: 1.7 MMOL/L (ref 0.4–2)
LACTATE SERPL-SCNC: 1.7 MMOL/L (ref 0.4–2)
LACTATE SERPL-SCNC: 2.3 MMOL/L (ref 0.4–2)
LACTATE SERPL-SCNC: 2.3 MMOL/L (ref 0.4–2)
LACTATE SERPL-SCNC: 2.5 MMOL/L (ref 0.4–2)
LACTATE SERPL-SCNC: 2.5 MMOL/L (ref 0.4–2)
LACTATE SERPL-SCNC: 4.5 MMOL/L (ref 0.4–2)
LACTATE SERPL-SCNC: 4.5 MMOL/L (ref 0.4–2)
LEUKOCYTE ESTERASE UR QL STRIP.AUTO: ABNORMAL
LEUKOCYTE ESTERASE UR QL STRIP.AUTO: ABNORMAL
LYMPHOCYTES # BLD: 1.06 K/UL (ref 0.9–3.6)
LYMPHOCYTES # BLD: 1.06 K/UL (ref 0.9–3.6)
LYMPHOCYTES # BLD: 1.3 K/UL (ref 0.9–3.6)
LYMPHOCYTES # BLD: 1.3 K/UL (ref 0.9–3.6)
LYMPHOCYTES NFR BLD: 10 % (ref 21–52)
LYMPHOCYTES NFR BLD: 10 % (ref 21–52)
LYMPHOCYTES NFR BLD: 10.5 % (ref 21–52)
LYMPHOCYTES NFR BLD: 10.5 % (ref 21–52)
Lab: ABNORMAL
Lab: ABNORMAL
M PNEUMO DNA SPEC QL NAA+PROBE: NOT DETECTED
M PNEUMO DNA SPEC QL NAA+PROBE: NOT DETECTED
MAGNESIUM SERPL-MCNC: 1.6 MG/DL (ref 1.6–2.6)
MAGNESIUM SERPL-MCNC: 1.6 MG/DL (ref 1.6–2.6)
MAGNESIUM SERPL-MCNC: 1.9 MG/DL (ref 1.6–2.6)
MAGNESIUM SERPL-MCNC: 1.9 MG/DL (ref 1.6–2.6)
MCH RBC QN AUTO: 21.6 PG (ref 24–34)
MCH RBC QN AUTO: 21.6 PG (ref 24–34)
MCH RBC QN AUTO: 21.7 PG (ref 24–34)
MCH RBC QN AUTO: 21.7 PG (ref 24–34)
MCHC RBC AUTO-ENTMCNC: 30.7 G/DL (ref 31–37)
MCHC RBC AUTO-ENTMCNC: 30.7 G/DL (ref 31–37)
MCHC RBC AUTO-ENTMCNC: 30.8 G/DL (ref 31–37)
MCHC RBC AUTO-ENTMCNC: 30.8 G/DL (ref 31–37)
MCV RBC AUTO: 70.4 FL (ref 78–100)
MCV RBC AUTO: 70.4 FL (ref 78–100)
MCV RBC AUTO: 70.5 FL (ref 78–100)
MCV RBC AUTO: 70.5 FL (ref 78–100)
METHADONE UR QL: NEGATIVE
METHADONE UR QL: NEGATIVE
MONOCYTES # BLD: 0.74 K/UL (ref 0.05–1.2)
MONOCYTES # BLD: 0.74 K/UL (ref 0.05–1.2)
MONOCYTES # BLD: 1.19 K/UL (ref 0.05–1.2)
MONOCYTES # BLD: 1.19 K/UL (ref 0.05–1.2)
MONOCYTES NFR BLD: 7 % (ref 3–10)
MONOCYTES NFR BLD: 7 % (ref 3–10)
MONOCYTES NFR BLD: 9.6 % (ref 3–10)
MONOCYTES NFR BLD: 9.6 % (ref 3–10)
MUCOUS THREADS URNS QL MICRO: ABNORMAL /LPF
MUCOUS THREADS URNS QL MICRO: ABNORMAL /LPF
NEUTS SEG # BLD: 8.72 K/UL (ref 1.8–8)
NEUTS SEG # BLD: 8.72 K/UL (ref 1.8–8)
NEUTS SEG # BLD: 9.81 K/UL (ref 1.8–8)
NEUTS SEG # BLD: 9.81 K/UL (ref 1.8–8)
NEUTS SEG NFR BLD: 79.3 % (ref 40–73)
NEUTS SEG NFR BLD: 79.3 % (ref 40–73)
NEUTS SEG NFR BLD: 82.4 % (ref 40–73)
NEUTS SEG NFR BLD: 82.4 % (ref 40–73)
NITRITE UR QL STRIP.AUTO: NEGATIVE
NITRITE UR QL STRIP.AUTO: NEGATIVE
NRBC # BLD: 0 K/UL (ref 0–0.01)
NRBC BLD-RTO: 0 PER 100 WBC
O2/TOTAL GAS SETTING VFR VENT: 40 %
O2/TOTAL GAS SETTING VFR VENT: 40 %
OPIATES UR QL: NEGATIVE
OPIATES UR QL: NEGATIVE
OXYCODONE UR QL SCN: NEGATIVE
OXYCODONE UR QL SCN: NEGATIVE
PCO2 BLD: 37.4 MMHG (ref 35–48)
PCO2 BLD: 37.4 MMHG (ref 35–48)
PEEP RESPIRATORY: 5 CMH2O
PEEP RESPIRATORY: 5 CMH2O
PH BLD: 7.35 (ref 7.35–7.45)
PH BLD: 7.35 (ref 7.35–7.45)
PH UR STRIP: 5.5 (ref 5–8)
PH UR STRIP: 5.5 (ref 5–8)
PHOSPHATE SERPL-MCNC: 5 MG/DL (ref 2.5–4.9)
PHOSPHATE SERPL-MCNC: 5 MG/DL (ref 2.5–4.9)
PHOSPHATE SERPL-MCNC: 6.2 MG/DL (ref 2.5–4.9)
PHOSPHATE SERPL-MCNC: 6.2 MG/DL (ref 2.5–4.9)
PLATELET # BLD AUTO: 183 K/UL (ref 135–420)
PLATELET # BLD AUTO: 183 K/UL (ref 135–420)
PLATELET # BLD AUTO: 201 K/UL (ref 135–420)
PLATELET # BLD AUTO: 201 K/UL (ref 135–420)
PO2 BLD: 101 MMHG (ref 83–108)
PO2 BLD: 101 MMHG (ref 83–108)
POTASSIUM SERPL-SCNC: 4 MMOL/L (ref 3.5–5.5)
POTASSIUM SERPL-SCNC: 4 MMOL/L (ref 3.5–5.5)
POTASSIUM SERPL-SCNC: 4.1 MMOL/L (ref 3.5–5.5)
POTASSIUM SERPL-SCNC: 4.1 MMOL/L (ref 3.5–5.5)
POTASSIUM SERPL-SCNC: 6.1 MMOL/L (ref 3.5–5.5)
POTASSIUM SERPL-SCNC: 6.1 MMOL/L (ref 3.5–5.5)
PROCALCITONIN SERPL-MCNC: 21 NG/ML
PROCALCITONIN SERPL-MCNC: 21 NG/ML
PROT SERPL-MCNC: 5.7 G/DL (ref 6.4–8.2)
PROT SERPL-MCNC: 5.7 G/DL (ref 6.4–8.2)
PROT SERPL-MCNC: 6.6 G/DL (ref 6.4–8.2)
PROT SERPL-MCNC: 6.6 G/DL (ref 6.4–8.2)
PROT UR STRIP-MCNC: >1000 MG/DL
PROT UR STRIP-MCNC: >1000 MG/DL
RBC # BLD AUTO: 4.47 M/UL (ref 4.2–5.3)
RBC # BLD AUTO: 4.47 M/UL (ref 4.2–5.3)
RBC # BLD AUTO: 4.96 M/UL (ref 4.2–5.3)
RBC # BLD AUTO: 4.96 M/UL (ref 4.2–5.3)
RBC #/AREA URNS HPF: ABNORMAL /HPF (ref 0–5)
RBC #/AREA URNS HPF: ABNORMAL /HPF (ref 0–5)
RESPIRATORY RATE, POC: 16 (ref 5–40)
RESPIRATORY RATE, POC: 16 (ref 5–40)
RSV RNA SPEC QL NAA+PROBE: NOT DETECTED
RSV RNA SPEC QL NAA+PROBE: NOT DETECTED
RV+EV RNA SPEC QL NAA+PROBE: DETECTED
RV+EV RNA SPEC QL NAA+PROBE: DETECTED
SALICYLATES SERPL-MCNC: <0.5 MG/DL (ref 3–10)
SALICYLATES SERPL-MCNC: <0.5 MG/DL (ref 3–10)
SAO2 % BLD: 97.6 % (ref 92–97)
SAO2 % BLD: 97.6 % (ref 92–97)
SARS-COV-2 RNA RESP QL NAA+PROBE: NOT DETECTED
SARS-COV-2 RNA RESP QL NAA+PROBE: NOT DETECTED
SERVICE CMNT-IMP: ABNORMAL
SERVICE CMNT-IMP: ABNORMAL
SODIUM SERPL-SCNC: 134 MMOL/L (ref 136–145)
SODIUM SERPL-SCNC: 134 MMOL/L (ref 136–145)
SODIUM SERPL-SCNC: 137 MMOL/L (ref 136–145)
SODIUM SERPL-SCNC: 137 MMOL/L (ref 136–145)
SODIUM SERPL-SCNC: 140 MMOL/L (ref 136–145)
SODIUM SERPL-SCNC: 140 MMOL/L (ref 136–145)
SP GR UR REFRACTOMETRY: 1.02 (ref 1–1.03)
SP GR UR REFRACTOMETRY: 1.02 (ref 1–1.03)
SPECIMEN TYPE: ABNORMAL
SPECIMEN TYPE: ABNORMAL
TROPONIN T SERPL HS-MCNC: 73.5 NG/L (ref 0–14)
TROPONIN T SERPL HS-MCNC: 73.5 NG/L (ref 0–14)
TROPONIN T SERPL HS-MCNC: 81.9 NG/L (ref 0–14)
TROPONIN T SERPL HS-MCNC: 81.9 NG/L (ref 0–14)
TROPONIN T SERPL HS-MCNC: 84.2 NG/L (ref 0–14)
TROPONIN T SERPL HS-MCNC: 84.2 NG/L (ref 0–14)
TROPONIN T SERPL HS-MCNC: 86.1 NG/L (ref 0–14)
TROPONIN T SERPL HS-MCNC: 86.1 NG/L (ref 0–14)
TSH, 3RD GENERATION: 1.14 UIU/ML (ref 0.27–4.2)
TSH, 3RD GENERATION: 1.14 UIU/ML (ref 0.27–4.2)
URATE CRY URNS QL MICRO: ABNORMAL
URATE CRY URNS QL MICRO: ABNORMAL
UROBILINOGEN UR QL STRIP.AUTO: 0.2 EU/DL (ref 0.2–1)
UROBILINOGEN UR QL STRIP.AUTO: 0.2 EU/DL (ref 0.2–1)
VENTILATION MODE VENT: ABNORMAL
VENTILATION MODE VENT: ABNORMAL
VT SETTING VENT: 420 ML
VT SETTING VENT: 420 ML
WBC # BLD AUTO: 10.6 K/UL (ref 4.6–13.2)
WBC # BLD AUTO: 10.6 K/UL (ref 4.6–13.2)
WBC # BLD AUTO: 12.4 K/UL (ref 4.6–13.2)
WBC # BLD AUTO: 12.4 K/UL (ref 4.6–13.2)
WBC URNS QL MICRO: ABNORMAL /HPF (ref 0–5)
WBC URNS QL MICRO: ABNORMAL /HPF (ref 0–5)

## 2025-05-07 PROCEDURE — 2580000003 HC RX 258: Performed by: NURSE PRACTITIONER

## 2025-05-07 PROCEDURE — 6370000000 HC RX 637 (ALT 250 FOR IP): Performed by: NURSE PRACTITIONER

## 2025-05-07 PROCEDURE — 94640 AIRWAY INHALATION TREATMENT: CPT

## 2025-05-07 PROCEDURE — 6360000002 HC RX W HCPCS

## 2025-05-07 PROCEDURE — 87040 BLOOD CULTURE FOR BACTERIA: CPT

## 2025-05-07 PROCEDURE — 71045 X-RAY EXAM CHEST 1 VIEW: CPT

## 2025-05-07 PROCEDURE — 80320 DRUG SCREEN QUANTALCOHOLS: CPT

## 2025-05-07 PROCEDURE — 6360000002 HC RX W HCPCS: Performed by: NURSE PRACTITIONER

## 2025-05-07 PROCEDURE — 6360000002 HC RX W HCPCS: Performed by: INTERNAL MEDICINE

## 2025-05-07 PROCEDURE — 82803 BLOOD GASES ANY COMBINATION: CPT

## 2025-05-07 PROCEDURE — 87086 URINE CULTURE/COLONY COUNT: CPT

## 2025-05-07 PROCEDURE — 87088 URINE BACTERIA CULTURE: CPT

## 2025-05-07 PROCEDURE — 6360000002 HC RX W HCPCS: Performed by: EMERGENCY MEDICINE

## 2025-05-07 PROCEDURE — 0202U NFCT DS 22 TRGT SARS-COV-2: CPT

## 2025-05-07 PROCEDURE — 84443 ASSAY THYROID STIM HORMONE: CPT

## 2025-05-07 PROCEDURE — 84145 PROCALCITONIN (PCT): CPT

## 2025-05-07 PROCEDURE — 2580000003 HC RX 258: Performed by: EMERGENCY MEDICINE

## 2025-05-07 PROCEDURE — 36415 COLL VENOUS BLD VENIPUNCTURE: CPT

## 2025-05-07 PROCEDURE — 80307 DRUG TEST PRSMV CHEM ANLYZR: CPT

## 2025-05-07 PROCEDURE — APPSS60 APP SPLIT SHARED TIME 46-60 MINUTES: Performed by: NURSE PRACTITIONER

## 2025-05-07 PROCEDURE — 2580000003 HC RX 258: Performed by: INTERNAL MEDICINE

## 2025-05-07 PROCEDURE — 2500000003 HC RX 250 WO HCPCS: Performed by: NURSE PRACTITIONER

## 2025-05-07 PROCEDURE — 2720000010 HC SURG SUPPLY STERILE

## 2025-05-07 PROCEDURE — 84100 ASSAY OF PHOSPHORUS: CPT

## 2025-05-07 PROCEDURE — 82330 ASSAY OF CALCIUM: CPT

## 2025-05-07 PROCEDURE — 94003 VENT MGMT INPAT SUBQ DAY: CPT

## 2025-05-07 PROCEDURE — 85025 COMPLETE CBC W/AUTO DIFF WBC: CPT

## 2025-05-07 PROCEDURE — 80053 COMPREHEN METABOLIC PANEL: CPT

## 2025-05-07 PROCEDURE — 81001 URINALYSIS AUTO W/SCOPE: CPT

## 2025-05-07 PROCEDURE — 2700000000 HC OXYGEN THERAPY PER DAY

## 2025-05-07 PROCEDURE — 87186 SC STD MICRODIL/AGAR DIL: CPT

## 2025-05-07 PROCEDURE — 83605 ASSAY OF LACTIC ACID: CPT

## 2025-05-07 PROCEDURE — XX20X89 MONITORING OF BRAIN ELECTRICAL ACTIVITY, COMPUTER-AIDED DETECTION AND NOTIFICATION, NEW TECHNOLOGY GROUP 9: ICD-10-PCS | Performed by: PSYCHIATRY & NEUROLOGY

## 2025-05-07 PROCEDURE — 82962 GLUCOSE BLOOD TEST: CPT

## 2025-05-07 PROCEDURE — 71250 CT THORAX DX C-: CPT

## 2025-05-07 PROCEDURE — 84484 ASSAY OF TROPONIN QUANT: CPT

## 2025-05-07 PROCEDURE — 82550 ASSAY OF CK (CPK): CPT

## 2025-05-07 PROCEDURE — 36600 WITHDRAWAL OF ARTERIAL BLOOD: CPT

## 2025-05-07 PROCEDURE — 94761 N-INVAS EAR/PLS OXIMETRY MLT: CPT

## 2025-05-07 PROCEDURE — 51702 INSERT TEMP BLADDER CATH: CPT

## 2025-05-07 PROCEDURE — 83735 ASSAY OF MAGNESIUM: CPT

## 2025-05-07 PROCEDURE — 74018 RADEX ABDOMEN 1 VIEW: CPT

## 2025-05-07 PROCEDURE — 95706 EEG WO VID 2-12HR INTMT MNTR: CPT

## 2025-05-07 PROCEDURE — 82140 ASSAY OF AMMONIA: CPT

## 2025-05-07 PROCEDURE — 99291 CRITICAL CARE FIRST HOUR: CPT | Performed by: INTERNAL MEDICINE

## 2025-05-07 PROCEDURE — 2000000000 HC ICU R&B

## 2025-05-07 RX ORDER — SODIUM CHLORIDE, SODIUM LACTATE, POTASSIUM CHLORIDE, CALCIUM CHLORIDE 600; 310; 30; 20 MG/100ML; MG/100ML; MG/100ML; MG/100ML
INJECTION, SOLUTION INTRAVENOUS CONTINUOUS
Status: DISCONTINUED | OUTPATIENT
Start: 2025-05-07 | End: 2025-05-13

## 2025-05-07 RX ORDER — SODIUM CHLORIDE 0.9 % (FLUSH) 0.9 %
5-40 SYRINGE (ML) INJECTION EVERY 12 HOURS SCHEDULED
Status: DISCONTINUED | OUTPATIENT
Start: 2025-05-07 | End: 2025-05-16

## 2025-05-07 RX ORDER — ONDANSETRON 2 MG/ML
4 INJECTION INTRAMUSCULAR; INTRAVENOUS EVERY 6 HOURS PRN
Status: DISCONTINUED | OUTPATIENT
Start: 2025-05-07 | End: 2025-05-17 | Stop reason: HOSPADM

## 2025-05-07 RX ORDER — CALCIUM GLUCONATE 20 MG/ML
1000 INJECTION, SOLUTION INTRAVENOUS
Status: DISPENSED | OUTPATIENT
Start: 2025-05-07 | End: 2025-05-07

## 2025-05-07 RX ORDER — MAGNESIUM SULFATE IN WATER 40 MG/ML
2000 INJECTION, SOLUTION INTRAVENOUS ONCE
Status: COMPLETED | OUTPATIENT
Start: 2025-05-07 | End: 2025-05-07

## 2025-05-07 RX ORDER — POLYETHYLENE GLYCOL 3350 17 G/17G
17 POWDER, FOR SOLUTION ORAL DAILY PRN
Status: DISCONTINUED | OUTPATIENT
Start: 2025-05-07 | End: 2025-05-15

## 2025-05-07 RX ORDER — POTASSIUM CHLORIDE 29.8 MG/ML
20 INJECTION INTRAVENOUS PRN
Status: DISCONTINUED | OUTPATIENT
Start: 2025-05-07 | End: 2025-05-16

## 2025-05-07 RX ORDER — ONDANSETRON 4 MG/1
4 TABLET, ORALLY DISINTEGRATING ORAL EVERY 8 HOURS PRN
Status: DISCONTINUED | OUTPATIENT
Start: 2025-05-07 | End: 2025-05-16

## 2025-05-07 RX ORDER — SODIUM CHLORIDE 9 MG/ML
INJECTION, SOLUTION INTRAVENOUS PRN
Status: DISCONTINUED | OUTPATIENT
Start: 2025-05-07 | End: 2025-05-17 | Stop reason: HOSPADM

## 2025-05-07 RX ORDER — THIAMINE HYDROCHLORIDE 100 MG/ML
300 INJECTION, SOLUTION INTRAMUSCULAR; INTRAVENOUS ONCE
Status: COMPLETED | OUTPATIENT
Start: 2025-05-07 | End: 2025-05-07

## 2025-05-07 RX ORDER — SODIUM CHLORIDE 0.9 % (FLUSH) 0.9 %
5-40 SYRINGE (ML) INJECTION PRN
Status: DISCONTINUED | OUTPATIENT
Start: 2025-05-07 | End: 2025-05-17 | Stop reason: HOSPADM

## 2025-05-07 RX ORDER — INSULIN LISPRO 100 [IU]/ML
0-16 INJECTION, SOLUTION INTRAVENOUS; SUBCUTANEOUS EVERY 6 HOURS SCHEDULED
Status: DISCONTINUED | OUTPATIENT
Start: 2025-05-07 | End: 2025-05-16

## 2025-05-07 RX ORDER — SODIUM CHLORIDE, SODIUM LACTATE, POTASSIUM CHLORIDE, AND CALCIUM CHLORIDE .6; .31; .03; .02 G/100ML; G/100ML; G/100ML; G/100ML
30 INJECTION, SOLUTION INTRAVENOUS ONCE
Status: COMPLETED | OUTPATIENT
Start: 2025-05-07 | End: 2025-05-07

## 2025-05-07 RX ORDER — THIAMINE HYDROCHLORIDE 100 MG/ML
200 INJECTION, SOLUTION INTRAMUSCULAR; INTRAVENOUS DAILY
Status: DISCONTINUED | OUTPATIENT
Start: 2025-05-08 | End: 2025-05-09

## 2025-05-07 RX ORDER — HEPARIN SODIUM 5000 [USP'U]/ML
5000 INJECTION, SOLUTION INTRAVENOUS; SUBCUTANEOUS EVERY 8 HOURS SCHEDULED
Status: DISCONTINUED | OUTPATIENT
Start: 2025-05-07 | End: 2025-05-16

## 2025-05-07 RX ORDER — CHLORHEXIDINE GLUCONATE ORAL RINSE 1.2 MG/ML
15 SOLUTION DENTAL 2 TIMES DAILY
Status: DISCONTINUED | OUTPATIENT
Start: 2025-05-07 | End: 2025-05-16

## 2025-05-07 RX ORDER — BUDESONIDE 1 MG/2ML
1 INHALANT ORAL 2 TIMES DAILY
Status: DISCONTINUED | OUTPATIENT
Start: 2025-05-07 | End: 2025-05-09

## 2025-05-07 RX ORDER — BUMETANIDE 0.25 MG/ML
4 INJECTION, SOLUTION INTRAMUSCULAR; INTRAVENOUS ONCE
Status: COMPLETED | OUTPATIENT
Start: 2025-05-07 | End: 2025-05-07

## 2025-05-07 RX ORDER — CHLOROTHIAZIDE SODIUM 500 MG/1
500 INJECTION INTRAVENOUS ONCE
Status: COMPLETED | OUTPATIENT
Start: 2025-05-07 | End: 2025-05-07

## 2025-05-07 RX ORDER — ACETAMINOPHEN 325 MG/1
650 TABLET ORAL EVERY 6 HOURS PRN
Status: DISCONTINUED | OUTPATIENT
Start: 2025-05-07 | End: 2025-05-16

## 2025-05-07 RX ORDER — DEXTROSE MONOHYDRATE 100 MG/ML
INJECTION, SOLUTION INTRAVENOUS CONTINUOUS PRN
Status: DISCONTINUED | OUTPATIENT
Start: 2025-05-07 | End: 2025-05-16

## 2025-05-07 RX ORDER — MAGNESIUM SULFATE IN WATER 40 MG/ML
2000 INJECTION, SOLUTION INTRAVENOUS PRN
Status: DISCONTINUED | OUTPATIENT
Start: 2025-05-07 | End: 2025-05-16

## 2025-05-07 RX ORDER — CALCIUM GLUCONATE 20 MG/ML
1000 INJECTION, SOLUTION INTRAVENOUS
Status: COMPLETED | OUTPATIENT
Start: 2025-05-07 | End: 2025-05-08

## 2025-05-07 RX ORDER — ACETAMINOPHEN 650 MG/1
650 SUPPOSITORY RECTAL EVERY 6 HOURS PRN
Status: DISCONTINUED | OUTPATIENT
Start: 2025-05-07 | End: 2025-05-17 | Stop reason: HOSPADM

## 2025-05-07 RX ORDER — ARFORMOTEROL TARTRATE 15 UG/2ML
15 SOLUTION RESPIRATORY (INHALATION)
Status: DISCONTINUED | OUTPATIENT
Start: 2025-05-07 | End: 2025-05-16

## 2025-05-07 RX ORDER — POTASSIUM CHLORIDE 7.45 MG/ML
10 INJECTION INTRAVENOUS PRN
Status: DISCONTINUED | OUTPATIENT
Start: 2025-05-07 | End: 2025-05-16

## 2025-05-07 RX ADMIN — INSULIN LISPRO 4 UNITS: 100 INJECTION, SOLUTION INTRAVENOUS; SUBCUTANEOUS at 13:54

## 2025-05-07 RX ADMIN — HEPARIN SODIUM 5000 UNITS: 5000 INJECTION INTRAVENOUS; SUBCUTANEOUS at 06:50

## 2025-05-07 RX ADMIN — FAMOTIDINE 20 MG: 10 INJECTION, SOLUTION INTRAVENOUS at 20:38

## 2025-05-07 RX ADMIN — BUDESONIDE 1 MG: 1 SUSPENSION RESPIRATORY (INHALATION) at 07:34

## 2025-05-07 RX ADMIN — BUDESONIDE 1 MG: 1 SUSPENSION RESPIRATORY (INHALATION) at 19:36

## 2025-05-07 RX ADMIN — MAGNESIUM SULFATE HEPTAHYDRATE 2000 MG: 40 INJECTION, SOLUTION INTRAVENOUS at 06:51

## 2025-05-07 RX ADMIN — FAMOTIDINE 20 MG: 10 INJECTION, SOLUTION INTRAVENOUS at 10:02

## 2025-05-07 RX ADMIN — CALCIUM GLUCONATE 1000 MG: 20 INJECTION, SOLUTION INTRAVENOUS at 23:17

## 2025-05-07 RX ADMIN — SODIUM CHLORIDE, SODIUM LACTATE, POTASSIUM CHLORIDE, AND CALCIUM CHLORIDE 1497 ML: 600; 310; 30; 20 INJECTION, SOLUTION INTRAVENOUS at 06:00

## 2025-05-07 RX ADMIN — HEPARIN SODIUM 5000 UNITS: 5000 INJECTION INTRAVENOUS; SUBCUTANEOUS at 14:05

## 2025-05-07 RX ADMIN — VANCOMYCIN HYDROCHLORIDE 1250 MG: 10 INJECTION, POWDER, LYOPHILIZED, FOR SOLUTION INTRAVENOUS at 04:13

## 2025-05-07 RX ADMIN — CHLORHEXIDINE GLUCONATE 0.12% ORAL RINSE 15 ML: 1.2 LIQUID ORAL at 20:38

## 2025-05-07 RX ADMIN — CALCIUM GLUCONATE 1000 MG: 20 INJECTION, SOLUTION INTRAVENOUS at 06:51

## 2025-05-07 RX ADMIN — HEPARIN SODIUM 5000 UNITS: 5000 INJECTION INTRAVENOUS; SUBCUTANEOUS at 20:39

## 2025-05-07 RX ADMIN — CHLORHEXIDINE GLUCONATE 0.12% ORAL RINSE 15 ML: 1.2 LIQUID ORAL at 10:02

## 2025-05-07 RX ADMIN — ARFORMOTEROL TARTRATE 15 MCG: 15 SOLUTION RESPIRATORY (INHALATION) at 19:36

## 2025-05-07 RX ADMIN — CEFEPIME 1000 MG: 1 INJECTION, POWDER, FOR SOLUTION INTRAMUSCULAR; INTRAVENOUS at 23:52

## 2025-05-07 RX ADMIN — SODIUM CHLORIDE, SODIUM LACTATE, POTASSIUM CHLORIDE, AND CALCIUM CHLORIDE: 600; 310; 30; 20 INJECTION, SOLUTION INTRAVENOUS at 08:09

## 2025-05-07 RX ADMIN — SODIUM CHLORIDE, PRESERVATIVE FREE 10 ML: 5 INJECTION INTRAVENOUS at 10:05

## 2025-05-07 RX ADMIN — CEFEPIME 1000 MG: 1 INJECTION, POWDER, FOR SOLUTION INTRAMUSCULAR; INTRAVENOUS at 14:49

## 2025-05-07 RX ADMIN — IPRATROPIUM BROMIDE 0.5 MG: 0.5 SOLUTION RESPIRATORY (INHALATION) at 07:34

## 2025-05-07 RX ADMIN — THIAMINE HYDROCHLORIDE 300 MG: 100 INJECTION, SOLUTION INTRAMUSCULAR; INTRAVENOUS at 14:05

## 2025-05-07 RX ADMIN — IPRATROPIUM BROMIDE 0.5 MG: 0.5 SOLUTION RESPIRATORY (INHALATION) at 12:54

## 2025-05-07 RX ADMIN — BUMETANIDE 4 MG: 0.25 INJECTION INTRAMUSCULAR; INTRAVENOUS at 14:12

## 2025-05-07 RX ADMIN — IPRATROPIUM BROMIDE 0.5 MG: 0.5 SOLUTION RESPIRATORY (INHALATION) at 15:58

## 2025-05-07 RX ADMIN — CHLOROTHIAZIDE SODIUM 500 MG: 500 INJECTION, POWDER, LYOPHILIZED, FOR SOLUTION INTRAVENOUS at 15:36

## 2025-05-07 RX ADMIN — CEFEPIME 1000 MG: 1 INJECTION, POWDER, FOR SOLUTION INTRAMUSCULAR; INTRAVENOUS at 01:52

## 2025-05-07 RX ADMIN — ARFORMOTEROL TARTRATE 15 MCG: 15 SOLUTION RESPIRATORY (INHALATION) at 07:34

## 2025-05-07 RX ADMIN — FAMOTIDINE 20 MG: 10 INJECTION, SOLUTION INTRAVENOUS at 04:09

## 2025-05-07 RX ADMIN — IPRATROPIUM BROMIDE 0.5 MG: 0.5 SOLUTION RESPIRATORY (INHALATION) at 19:36

## 2025-05-07 ASSESSMENT — PULMONARY FUNCTION TESTS
PIF_VALUE: 17
PIF_VALUE: 20
PIF_VALUE: 20
PIF_VALUE: 18
PIF_VALUE: 25
PIF_VALUE: 19

## 2025-05-07 NOTE — CONSULTS
Consult Note      Consult requested by: Macy Berman MD    ADMIT DATE: 5/6/2025    CONSULT DATE: May 7, 2025             Admission diagnosis: Coma (HCC)   Reason for Nephrology Consultation: IRA    Assessment:   1) IRA ( baseline unclear) , oliguric d/t rhabdo/ATN/prerenal azotemia   2) Acute on chronic respiratory, suspect PNA   3) Acute encephalopathy-toxic/metabolic in nature, patient unresponsive with pinpoint pupils,etio unclear : sepsis v/s drug toxicity vs. Mediation induced  4) sepsis - PNA  5) hyperkalemia,  hypocalcemia   6) HAGMA  7) lactic acidoses  8) elevated CK    Recommendations:     1) strict I/o charting   2) rankin catheter  3) keep MAP > 65   4) bicarb gtt @ 125 cc/hrs   5) follow CK levels   6) avoid NSAIDs , nephrotoxins  7) renally dose meds for egfr < 30  8) no acute indication for dialysis     Total critical care time : 40 mins   Please call with questions    Toan Lopez MD FASN  Cell [phone]  Pager: [phone]  Fax   [phone]         HPI: Juliana Loaiza is a 74 y.o. female Black /    with a significant PMHx of pulmonary sarcoidosis, polysubstance abuse, chronic hypoxic respiratory failure on LTOT 6 L/min, asthma, HFpEF, GERD, pulmonary hypertension, depression, CAD , HTN, DM II, chronic pain, anxiety, and Hx  acute subsegmental LLL PE and DVT in the past, non-compliant with AC presented to [facility] ER after being found unresponsive d/t acute metabolic encephalopathy. Patient was noted to be septic with elevated Procalcitonin, likely 2/2 to aspiration vs Comm acquired pneumonia.  UDS positive foe cocaine. Noted to have IRA d/t prerenl /ATN/ Rhabdomyolysis. Oliguric , Nephro consulted      History reviewed. No pertinent past medical history.   History reviewed. No pertinent surgical history.    Social History     Socioeconomic History    Marital status:      Spouse name: Not on file    Number of children: Not on file    Years of education: Not on file

## 2025-05-07 NOTE — PROGRESS NOTES
[facility]   Pharmacy Pharmacokinetic Monitoring Service - Vancomycin     Wade Dobson is a 89 y.o. female starting on vancomycin therapy for Sepsis of Unknown Etiology. Pharmacy consulted for monitoring and adjustment.    Target Concentration: Dosing based on anticipated concentration <15 mg/L due to renal impairment/insufficiency    Additional Antimicrobials: Cefepime    Pertinent Laboratory Values:   Temp: (!) 95.2 °F (35.1 °C), Weight - Scale: 49.9 kg (110 lb)  Recent Labs     05/06/25 2217 05/06/25 2219   CREATININE 3.00* 3.12*   BUN 30*  --    WBC 10.6  --      Estimated Creatinine Clearance: -1 mL/min (A) (based on SCr of 3.12 mg/dL (H)).    Pertinent Cultures:  Culture Date Source Results   05/06 Blood Pending   MRSA Nasal Swab: N/A. Non-respiratory infection    Plan:  Concentration-guided dosing due to renal impairment/insufficiency  Start vancomycin 1250 mg IV x 1  Will dose according to vancomycin concentration levels at this time due to renal insufficiency  Renal labs as indicated   Vancomycin concentration not ordered  Pharmacy will continue to monitor patient and adjust therapy as indicated    Thank you for the consult,  APARNA IQBAL [facility]  5/7/2025

## 2025-05-07 NOTE — CONSULTS
Comprehensive Nutrition Assessment    Type and Reason for Visit:  Initial, Consult, NPO/clear liquid, Nutrition support    Nutrition Recommendations/Plan:   Initiate tube feeding regimen:   Formula: Vital AF 1.2 (Peptide Based)  Start at 10 ml/hr  Advance as tolerated by 10 ml q 8 hours  Goal Rate: 50 ml/hr x 20 hours (for anticipation of holding tube feeds for standard nursing care)  Water Flushes: 30 mL q 4 hours (180 mL total)  Goal Regimen Provides (with modulars):  1200 kcal, 75g protein, 990 ml free water, 80% RDIs  Order  mg today, 200 mg thiamine daily thereafter.  Lytes replacements per ICU.   Daily wts.   Continue to monitor tolerance of EN, weight, labs, and plan of care during admission.      Malnutrition Assessment:  Malnutrition Status:  Insufficient data (vs at risk, BMI, NPO, vent) (05/07/25 1130)    Context:  Acute Illness       Nutrition History and Allergies:      History reviewed. No pertinent past medical history.    Limited hx available. Name updated in EMR. Limited hx in EMR.    Weight Hx: Current charted wt has no source. Bed scale taken by .5 lb. Monitor wt trends in-house.   Wt Readings from Last 10 Encounters:   05/06/25 49.9 kg (110 lb)     NFPE: limited visual NFPE conducted. Food Allergies: [facility]    Nutrition Assessment:    Admitted after being found unresponsive. On arrival to ER, pt was emergently intubated for airway protection. Pt seen for - Consult: TF ordering and management. Chart updated with name. Discussed care during interdisciplinary rounds. Per RN, pt receiving multiple lytes replacements. Plan to talk to Nephrology regarding dialysis. Ok to initiate TF, plan to order thiamine. Monitor initiation and tolerance.    Nutrition Related Findings:    Pertinent Meds:   Cefepime  Pepcid  Heparin  2g Mg sulf  1g Calc gluc  LR @ 100 ml/hr Pertinent Labs:   Recent Labs     05/06/25  2217 05/06/25  2219 05/07/25  0418   GLUCOSE 309*  --  247*   BUN 30*  --  31*   CREATININE  3.00* 3.12* 2.89*   *  --  140   K 6.1*  --  4.0   CL 94*  --  104   CO2 19*  --  19*   CALCIUM 8.0*  --  7.4*   PHOS  --   --  6.2*   MG  --   --  1.6       Recent Labs     05/06/25  2219   POCGLU 307*       Last BM: Last BM (including prior to admit): 05/07/25    Skin: Wound Type: None       Edema:Edema: Generalized  Edema Generalized: Non-pitting       Current Nutrition Intake & Therapies:    Average Meal Intake: NPO     Diet NPO    Anthropometric Measures:  Height: 167.6 cm (5' 6\")  Ideal Body Weight (IBW): 130 lbs (59 kg)    Admission Body Weight: 49.9 kg (110 lb 0.2 oz)  Current Body Weight: 52.2 kg (115 lb) (taken by RD), 88.5 % IBW.    Current BMI (kg/m2): 18.6  Usual Body Weight:  (RICARDO)  BMI Categories: Underweight (BMI less than 22) age over 65    Estimated Daily Nutrient Needs:  Energy Requirements Based On: Formula  Weight Used for Energy Requirements: Admission  Energy (kcal/day): 4189-5040 ([id number])  Weight Used for Protein Requirements: Admission  Protein (g/day):  (1.2-2)  Method Used for Fluid Requirements: Standard renal  Fluid (ml/day): 500-1000+UOP    Nutrition Diagnosis:   Inadequate oral intake related to impaired respiratory function as evidenced by NPO or clear liquid status due to medical condition, intubation, nutrition support - enteral nutrition    Nutrition Interventions:   Food and/or Nutrient Delivery: Continue NPO, Start Tube Feeding  Nutrition Education/Counseling: No recommendation at this time     Plan of Care discussed with: interdisciplinary team    Goals:  Previous Goal Met: New Goal  Goals: Meet at least 75% of estimated needs, Initiate nutrition support, by next RD assessment       Nutrition Monitoring and Evaluation:   Behavioral-Environmental Outcomes: None Identified  Food/Nutrient Intake Outcomes: Enteral Nutrition Intake/Tolerance  Physical Signs/Symptoms Outcomes: GI Status, Biochemical Data, Nausea or Vomiting, Fluid Status or Edema, Hemodynamic Status,

## 2025-05-07 NOTE — H&P
[facility]  Pulmonary, Critical Care, and Sleep Medicine    Name: Wade Dobson MRN: [id number]   : 1878 Hospital: [facility]   Date: 2025        Critical Care History and Physical      IMPRESSION:   Acute on chronic respiratory failure, unclear if hypercapnic or hypoxic on arrival-intubated for airway protection, Home baseline is 6 Lpm oxygen  Acute encephalopathy-toxic/metabolic in nature, patient unresponsive with pinpoint pupils, etiology unclear--?CVA vs. Drug toxicity vs. Mediation induced (patient on multiple sedating medications, such as Cymbalta, Remeron, Lyrica, Requip, and Zoloft-->also places patient at risk for serotonin syndrome), vs. Hypoxia vs. Uremia vs. Sepsis vs. Hypothermia vs. Non-convulsive status vs. Drug OD  CAP vs. Aspiration PNA-extensive infiltrates on CT chest A/P, primarily RUL/RLL   Sepsis vs. Severe sepsis, in the setting of PNA  Electrolyte derangements- hyperkalemia, hypochloremia, hypocalcemia  IRA-prerenal azotemia vs. ischemic ATN  HAGMA  Lactic acidosis  Elevated CK (14K) with concern for rhabdo   Mild transaminitis   Nonobstructive CAD: [facility] 2024 RCA with midsegment 20% stenosis. LM patent, LAD patent, Lcx patent. Echo 25 EF 50-55%, normal wall motion. RVSP 52mmHg  pAF: Currently in SR. OAC with Xarelto. Cardizem CD 120mg for rate control   HTN  DM II   GERD   Bronchiectasis   Sarcoidosis - diagnosed in  via punch biopsy  Chronic pain  Anxiety  Tobacco use history  History of polysubstance abuse-UDS 5/ +cocaine   Hx  acute subsegmental LLL PE and DVT in the past, non-compliant with AC  Deconditioning, debility      Patient Active Problem List   Diagnosis    Coma (HCC)        RECOMMENDATIONS:   Neuro:   -No requiring continuous sedation, no response to Narcan. Keep off sedation to assess Neuro status   -Titrate sedation to RASS of 0 to -1 if needed. PRN for breakthrough sedation needs.   -CT head negative. Consider MRI

## 2025-05-07 NOTE — CARE COORDINATION
SW sent SNF referrals via [facility] for discharge planning.        Angelica Harmon MSW  Case Management

## 2025-05-07 NOTE — PROGRESS NOTES
Review of Systems    Physical Exam  Skin:            Comments: Sacrum: DTI impression of toilet seat  LLE: DTI

## 2025-05-07 NOTE — ED NOTES
PT presents to the emergency department via EMS for AMS. EMS reports 911 was called by a \"friend\" who found the patient on the floor of her apartment - not responding. PT is unresponsive on arrival to ED with GCS of 3. RN x3, EDT x2, MD, MD student, RT at bedside on arrival. PT given 2mg Narcan by EMS prior to arrival. PT registered as Annagabriel Kate with no identifying information available.     PPD in emergency department for different case was consulted to assist with patient identification - PPD able to obtain patient identification.

## 2025-05-07 NOTE — PROGRESS NOTES
05/07/25 0430   Patient Transport   Time Spent Transporting 16-30   Transport Ventillation Type Transport vent   Transport From ER   Transport Destination CT scan   Transport Destination CT scan   Transport Destination ICU   Emergency Equipment Included Yes     RT assisted in transport of patient on oxygen equipment without incidence .  Dual verification of equipment completed with Landy ROBERTS.

## 2025-05-07 NOTE — PROGRESS NOTES
05/06/25 0064   Patient Transport   Time Spent Transporting 1-15   Transport Ventillation Type Transport vent   Transport From ER   Transport Destination CT scan   Transport Destination CT scan   Transport Destination ER   Emergency Equipment Included Yes     RT assisted in transport of patient on oxygen equipment without incidence.  Dual verification of equipment completed with Landy ROBERTS.

## 2025-05-07 NOTE — PLAN OF CARE
VENTILATOR CARE PLAN    Problem: Ventilator Management  Goal: *Adequate oxygenation/ ventilation/ and extubation      Patient:        Wade Alejoaring     89 y.o.   female     5/7/2025  10:02 AM  Patient Active Problem List   Diagnosis    Coma (HCC)    Acute encephalopathy    Acute respiratory failure (HCC)    Severe sepsis (HCC)    Polysubstance abuse (HCC)    Transaminitis    Elevated CK    IRA (acute kidney injury)    Aspiration pneumonia of both lungs (HCC)    Lactic acidosis    Metabolic acidosis       Coma (HCC) [R40.20]  Respiratory arrest (HCC) [R09.2]  Marvin coma scale total score 3-8, in the field (EMT or ambulance) ([facility]) [R40.2431]      Ventilator settings: AC/VC+ f 16, Vt 420, PEEP 5, 30%    ETT Size/Placement: 7.5 ETT, 23cm      Wean Screen Pass (Yes or No): NO     Additional Comments: No weaning today.         PLAN OF CARE: Plan is to assess and start wean when ready       Lab Test:  Date:5/7/2025  WBC:   Lab Results   Component Value Date/Time    WBC 12.4 05/07/2025 04:18 AM   HGB:   Lab Results   Component Value Date/Time    HGB 9.7 05/07/2025 04:18 AM    PLTS:   Lab Results   Component Value Date/Time     05/07/2025 04:18 AM         Vital Signs:   Patient Vitals for the past 8 hrs:   Temp Pulse Resp BP SpO2   05/07/25 0930 97.7 °F (36.5 °C) 97 16 105/76 98 %   05/07/25 0900 97.3 °F (36.3 °C) 93 16 110/62 98 %   05/07/25 0830 96.8 °F (36 °C) 90 16 120/64 98 %   05/07/25 0800 (!) 96.3 °F (35.7 °C) 91 16 130/69 99 %   05/07/25 0730 (!) 95.9 °F (35.5 °C) 90 16 135/70 100 %   05/07/25 0700 (!) 95.7 °F (35.4 °C) 93 16 136/70 96 %   05/07/25 0630 (!) 95.7 °F (35.4 °C) 97 16 122/73 --   05/07/25 0600 -- 93 16 134/66 --   05/07/25 0430 (!) 91.9 °F (33.3 °C) 98 19 -- --   05/07/25 0400 (!) 96.1 °F (35.6 °C) (!) 110 19 114/71 --   05/07/25 0345 (!) 96.1 °F (35.6 °C) (!) 108 16 116/61 --   05/07/25 0330 (!) 95.9 °F (35.5 °C) (!) 101 16 138/73 --   05/07/25 0315 (!) 95.7 °F (35.4 °C) 89 16 103/72 --    05/07/25 0300 (!) 95.5 °F (35.3 °C) 87 16 107/69 --   05/07/25 0245 (!) 95.5 °F (35.3 °C) 88 16 99/63 --   05/07/25 0230 (!) 95.4 °F (35.2 °C) 88 16 97/63 --   05/07/25 0215 (!) 95.4 °F (35.2 °C) 90 16 111/63 --

## 2025-05-07 NOTE — PROGRESS NOTES
[facility] Update:  Called to update , Antony Loaiza, on patient's current status. He states he is admitted to [facility] and has been there for approximately 2 weeks following a hospital admission. He Is aware the neighbor found his wife unresponsive. I have provided complete medical update. He states he has no further questions at this time. I attempted to provide him with contact info for the ICU, but he states their phone bill was not paid, thus he cannot call. He states he can be reached at the number I have called this morning ([phone]). He is NOK.     Anahi Ryan, SHABBIR-BC  05/07/25  Pulmonary, Critical Care Medicine  [facility]

## 2025-05-07 NOTE — ED PROVIDER NOTES
EMERGENCY DEPARTMENT HISTORY AND PHYSICAL EXAM    10:31 PM EDT seen on arrival in room 3        Date: 5/6/2025  Patient Name: Juliana Loaiza    History of Presenting Illness     No chief complaint on file.        History Provided By: EMS    Additional History (Context): Juliana Loaiza is a 74 y.o. female presents with neighbor went to check on the patient found her to be unresponsive and EMS was called.  0.5 mg of Narcan given followed by a total of 2, blood sugar was in the 300s, patient unresponsive to pain, some respiratory effort EMS provided bag-valve-mask ventilation on the way in.  She arrives unresponsive.    PCP: Ezequiel Michel MD    Chief Complaint:   Duration:    Timing:    Location:   Quality:   Severity:   Modifying Factors:   Associated Symptoms:       Current Facility-Administered Medications   Medication Dose Route Frequency Provider Last Rate Last Admin    sodium chloride 0.9 % bolus 1,000 mL  1,000 mL IntraVENous Once Jaiden Corrigan MD        0.9 % sodium chloride infusion   IntraVENous Continuous Jaiden Corrigan MD         Current Outpatient Medications   Medication Sig Dispense Refill    lidocaine (LIDODERM) 5 % Place 1 patch onto the skin daily 12 hours on, 12 hours off. 10 patch 0    Multiple Vitamin (MULTIVITAMIN) TABS tablet Take 1 tablet by mouth daily 30 tablet 0    tiotropium (SPIRIVA RESPIMAT) 2.5 MCG/ACT AERS inhaler Inhale 2 puffs into the lungs daily 1 each 3    DULoxetine (CYMBALTA) 30 MG extended release capsule Take 1 capsule by mouth daily 30 capsule 3    rivaroxaban (XARELTO) 15 MG TABS tablet Take 1 tablet by mouth daily 90 tablet 1    fluticasone-salmeterol (ADVAIR DISKUS) 500-50 MCG/ACT AEPB diskus inhaler Inhale 1 puff into the lungs in the morning and 1 puff in the evening. 60 each 3    dilTIAZem (CARDIZEM CD) 120 MG extended release capsule Take 1 capsule by mouth daily 30 capsule 0    insulin detemir (LEVEMIR) 100 UNIT/ML injection vial

## 2025-05-07 NOTE — PROGRESS NOTES
Pharmacy Note     Cefepime, pharmacy to dose ordered for treatment of sepsis. Per [facility] Policy, Cefepime will be changed to 2 gm IV extended infusion q24h.     Estimated Creatinine Clearance: Estimated Creatinine Clearance: -1 mL/min (A) (based on SCr of 3.12 mg/dL (H)).  Dialysis Status, IRA, CKD: IRA    BMI:  Body mass index is 17.75 kg/m².    Rationale for Adjustment:  [facility] B-Lactam extended infusion policy    Pharmacy will continue to monitor and adjust dose as necessary.      Please call with any questions.    Thank you,  APARNA IQBAL, [facility]

## 2025-05-07 NOTE — CARE COORDINATION
Case Management Assessment  Initial Evaluation    Date/Time of Evaluation: 5/7/2025 12:06 PM  Assessment Completed by: @ME      Patient Name: Juliana Loaiza                   YOB: 1950  Diagnosis: Coma (HCC) [R40.20]  Respiratory arrest (HCC) [R09.2]  Adams coma scale total score 3-8, in the field (EMT or ambulance) (HCC) [R40.2431]                   Date / Time: 5/6/2025 10:09 PM      SW spoke with patient  spouse Antony via phone call, explained role and introduce. HIPAA verified. Initial case management admission assessment completed with patient's permission. Patient Demographics verified. Patient's preference for disposition at discharge is unknown at this time. Patient is unresponsive and intubated, spouse Antony is currently at [facility].  Advance Directives:      Code Status: Full Code   Patient's Primary Decision Maker is: (P) Patient Declined (Legal Next of Kin Remains as Decision Maker)      Family can provide assistance at DC: (P) No  Would you like Case Management to discuss the discharge plan with any other family members/significant others, and if so, who?    Plans to Return to Present Housing: (P) Unknown at present  Potential Assistance needed at discharge: (P) Transportation            Potential DME:    Patient expects to discharge to: (P) Unknown  Plan for transportation at discharge: (P) Family    Does insurance require precert for SNF: Yes    No Pharmacies Listed  Factors facilitating achievement of predicted outcomes: Family support    Barriers to discharge:  Patient is currently unresponsive and intubated.    Additional Case Management Notes:  CM needs to follow up with patient spouse regarding discharge plan.    The Plan for Transition of Care is related to the following treatment goals of Coma (HCC) [R40.20]  Respiratory arrest (HCC) [R09.2]  Marvin coma scale total score 3-8, in the field (EMT or ambulance) (HCC) [R40.2431]    IF APPLICABLE: The Patient

## 2025-05-07 NOTE — ED NOTES
Juliana Loaiza (Legal Name)     74 y.o., 1950    Gender identity: Female    Legal sex: Female    Marital status:     Race: Black /     Ethnicity: Non- / Non     Languages    [address] [address]    Employer: Retired    MRN: [id number]    E-MRN: [id number]          Pt's name

## 2025-05-08 ENCOUNTER — APPOINTMENT (OUTPATIENT)
Facility: HOSPITAL | Age: 75
End: 2025-05-08
Payer: MEDICARE

## 2025-05-08 LAB
ANION GAP BLD CALC-SCNC: ABNORMAL MMOL/L (ref 10–20)
ANION GAP BLD CALC-SCNC: ABNORMAL MMOL/L (ref 10–20)
ANION GAP SERPL CALC-SCNC: 15 MMOL/L (ref 3–18)
ANION GAP SERPL CALC-SCNC: 15 MMOL/L (ref 3–18)
APPEARANCE CSF: COLORLESS
APPEARANCE CSF: COLORLESS
ARTERIAL PATENCY WRIST A: POSITIVE
ARTERIAL PATENCY WRIST A: POSITIVE
BASE DEFICIT BLD-SCNC: 2.3 MMOL/L
BASE DEFICIT BLD-SCNC: 2.3 MMOL/L
BASOPHILS # BLD: 0 K/UL (ref 0–0.1)
BASOPHILS # BLD: 0 K/UL (ref 0–0.1)
BASOPHILS NFR BLD: 0 % (ref 0–2)
BASOPHILS NFR BLD: 0 % (ref 0–2)
BDY SITE: ABNORMAL
BDY SITE: ABNORMAL
BUN SERPL-MCNC: 36 MG/DL (ref 6–23)
BUN SERPL-MCNC: 36 MG/DL (ref 6–23)
BUN/CREAT SERPL: 12 (ref 12–20)
BUN/CREAT SERPL: 12 (ref 12–20)
CA-I BLD-MCNC: 1.19 MMOL/L (ref 1.15–1.33)
CA-I BLD-MCNC: 1.19 MMOL/L (ref 1.15–1.33)
CALCIUM SERPL-MCNC: 8.4 MG/DL (ref 8.5–10.1)
CALCIUM SERPL-MCNC: 8.4 MG/DL (ref 8.5–10.1)
CHLORIDE BLD-SCNC: 103 MMOL/L (ref 98–107)
CHLORIDE BLD-SCNC: 103 MMOL/L (ref 98–107)
CHLORIDE SERPL-SCNC: 102 MMOL/L (ref 98–107)
CHLORIDE SERPL-SCNC: 102 MMOL/L (ref 98–107)
CK SERPL-CCNC: 8482 U/L (ref 26–192)
CK SERPL-CCNC: 8482 U/L (ref 26–192)
CO2 BLD-SCNC: 21 MMOL/L (ref 22–29)
CO2 BLD-SCNC: 21 MMOL/L (ref 22–29)
CO2 SERPL-SCNC: 20 MMOL/L (ref 21–32)
CO2 SERPL-SCNC: 20 MMOL/L (ref 21–32)
COLOR CSF: CLEAR
COLOR CSF: CLEAR
COLOR SPUN CSF: CLEAR
COLOR SPUN CSF: CLEAR
CREAT BLD-MCNC: 3.18 MG/DL (ref 0.6–1.3)
CREAT BLD-MCNC: 3.18 MG/DL (ref 0.6–1.3)
CREAT SERPL-MCNC: 3.05 MG/DL (ref 0.6–1.3)
CREAT SERPL-MCNC: 3.05 MG/DL (ref 0.6–1.3)
DIFFERENTIAL METHOD BLD: ABNORMAL
DIFFERENTIAL METHOD BLD: ABNORMAL
EKG ATRIAL RATE: 94 BPM
EKG ATRIAL RATE: 94 BPM
EKG DIAGNOSIS: NORMAL
EKG DIAGNOSIS: NORMAL
EKG P AXIS: 70 DEGREES
EKG P AXIS: 70 DEGREES
EKG P-R INTERVAL: 126 MS
EKG P-R INTERVAL: 126 MS
EKG Q-T INTERVAL: 430 MS
EKG Q-T INTERVAL: 430 MS
EKG QRS DURATION: 76 MS
EKG QRS DURATION: 76 MS
EKG QTC CALCULATION (BAZETT): 537 MS
EKG QTC CALCULATION (BAZETT): 537 MS
EKG R AXIS: 9 DEGREES
EKG R AXIS: 9 DEGREES
EKG T AXIS: 44 DEGREES
EKG T AXIS: 44 DEGREES
EKG VENTRICULAR RATE: 94 BPM
EKG VENTRICULAR RATE: 94 BPM
EOSINOPHIL # BLD: 0 K/UL (ref 0–0.4)
EOSINOPHIL # BLD: 0 K/UL (ref 0–0.4)
EOSINOPHIL NFR BLD: 0 % (ref 0–5)
EOSINOPHIL NFR BLD: 0 % (ref 0–5)
ERYTHROCYTE [DISTWIDTH] IN BLOOD BY AUTOMATED COUNT: 15.7 % (ref 11.6–14.5)
ERYTHROCYTE [DISTWIDTH] IN BLOOD BY AUTOMATED COUNT: 15.7 % (ref 11.6–14.5)
FIO2 ON VENT: 30 %
FIO2 ON VENT: 30 %
GAS FLOW.O2 O2 DELIVERY SYS: ABNORMAL
GAS FLOW.O2 O2 DELIVERY SYS: ABNORMAL
GLUCOSE BLD STRIP.AUTO-MCNC: 133 MG/DL (ref 70–110)
GLUCOSE BLD STRIP.AUTO-MCNC: 133 MG/DL (ref 70–110)
GLUCOSE BLD STRIP.AUTO-MCNC: 139 MG/DL (ref 70–110)
GLUCOSE BLD STRIP.AUTO-MCNC: 139 MG/DL (ref 70–110)
GLUCOSE BLD STRIP.AUTO-MCNC: 182 MG/DL (ref 70–110)
GLUCOSE BLD STRIP.AUTO-MCNC: 182 MG/DL (ref 70–110)
GLUCOSE BLD STRIP.AUTO-MCNC: 199 MG/DL (ref 70–110)
GLUCOSE BLD STRIP.AUTO-MCNC: 199 MG/DL (ref 70–110)
GLUCOSE BLD-MCNC: 129 MG/DL (ref 74–99)
GLUCOSE BLD-MCNC: 129 MG/DL (ref 74–99)
GLUCOSE CSF-MCNC: 86 MG/DL (ref 40–70)
GLUCOSE CSF-MCNC: 86 MG/DL (ref 40–70)
GLUCOSE SERPL-MCNC: 116 MG/DL (ref 74–108)
GLUCOSE SERPL-MCNC: 116 MG/DL (ref 74–108)
HCO3 BLD-SCNC: 21.9 MMOL/L (ref 21–28)
HCO3 BLD-SCNC: 21.9 MMOL/L (ref 21–28)
HCT VFR BLD AUTO: 25.9 % (ref 35–45)
HCT VFR BLD AUTO: 25.9 % (ref 35–45)
HGB BLD-MCNC: 8.2 G/DL (ref 12–16)
HGB BLD-MCNC: 8.2 G/DL (ref 12–16)
IMM GRANULOCYTES # BLD AUTO: 0 K/UL (ref 0–0.04)
IMM GRANULOCYTES # BLD AUTO: 0 K/UL (ref 0–0.04)
IMM GRANULOCYTES NFR BLD AUTO: 0 % (ref 0–0.5)
IMM GRANULOCYTES NFR BLD AUTO: 0 % (ref 0–0.5)
INR PPP: 1.2 (ref 0.9–1.1)
INR PPP: 1.2 (ref 0.9–1.1)
LACTATE BLD-SCNC: 1.19 MMOL/L (ref 0.4–2)
LACTATE BLD-SCNC: 1.19 MMOL/L (ref 0.4–2)
LACTATE SERPL-SCNC: 1.7 MMOL/L (ref 0.4–2)
LACTATE SERPL-SCNC: 1.7 MMOL/L (ref 0.4–2)
LYMPHOCYTES # BLD: 0.38 K/UL (ref 0.9–3.6)
LYMPHOCYTES # BLD: 0.38 K/UL (ref 0.9–3.6)
LYMPHOCYTES NFR BLD: 3 % (ref 21–52)
LYMPHOCYTES NFR BLD: 3 % (ref 21–52)
MAGNESIUM SERPL-MCNC: 1.8 MG/DL (ref 1.6–2.6)
MAGNESIUM SERPL-MCNC: 1.8 MG/DL (ref 1.6–2.6)
MCH RBC QN AUTO: 21.9 PG (ref 24–34)
MCH RBC QN AUTO: 21.9 PG (ref 24–34)
MCHC RBC AUTO-ENTMCNC: 31.7 G/DL (ref 31–37)
MCHC RBC AUTO-ENTMCNC: 31.7 G/DL (ref 31–37)
MCV RBC AUTO: 69.1 FL (ref 78–100)
MCV RBC AUTO: 69.1 FL (ref 78–100)
MONOCYTES # BLD: 0.5 K/UL (ref 0.05–1.2)
MONOCYTES # BLD: 0.5 K/UL (ref 0.05–1.2)
MONOCYTES NFR BLD: 4 % (ref 3–10)
MONOCYTES NFR BLD: 4 % (ref 3–10)
NEUTS SEG # BLD: 11.62 K/UL (ref 1.8–8)
NEUTS SEG # BLD: 11.62 K/UL (ref 1.8–8)
NEUTS SEG NFR BLD: 93 % (ref 40–73)
NEUTS SEG NFR BLD: 93 % (ref 40–73)
NRBC # BLD: 0 K/UL (ref 0–0.01)
NRBC # BLD: 0 K/UL (ref 0–0.01)
NRBC BLD-RTO: 0 PER 100 WBC
NRBC BLD-RTO: 0 PER 100 WBC
PCO2 BLD: 34.4 MMHG (ref 35–48)
PCO2 BLD: 34.4 MMHG (ref 35–48)
PEEP RESPIRATORY: 5
PEEP RESPIRATORY: 5
PH BLD: 7.41 (ref 7.35–7.45)
PH BLD: 7.41 (ref 7.35–7.45)
PHOSPHATE SERPL-MCNC: 5.2 MG/DL (ref 2.5–4.9)
PHOSPHATE SERPL-MCNC: 5.2 MG/DL (ref 2.5–4.9)
PLATELET # BLD AUTO: 177 K/UL (ref 135–420)
PLATELET # BLD AUTO: 177 K/UL (ref 135–420)
PLATELET COMMENT: ABNORMAL
PLATELET COMMENT: ABNORMAL
PO2 BLD: 94 MMHG (ref 83–108)
PO2 BLD: 94 MMHG (ref 83–108)
POTASSIUM BLD-SCNC: 3.8 MMOL/L (ref 3.5–5.1)
POTASSIUM BLD-SCNC: 3.8 MMOL/L (ref 3.5–5.1)
POTASSIUM SERPL-SCNC: 4 MMOL/L (ref 3.5–5.5)
POTASSIUM SERPL-SCNC: 4 MMOL/L (ref 3.5–5.5)
PROCALCITONIN SERPL-MCNC: 11.8 NG/ML
PROCALCITONIN SERPL-MCNC: 11.8 NG/ML
PROT CSF-MCNC: 23 MG/DL (ref 15–45)
PROT CSF-MCNC: 23 MG/DL (ref 15–45)
PROTHROMBIN TIME: 15.2 SEC (ref 11.9–14.9)
PROTHROMBIN TIME: 15.2 SEC (ref 11.9–14.9)
RBC # BLD AUTO: 3.75 M/UL (ref 4.2–5.3)
RBC # BLD AUTO: 3.75 M/UL (ref 4.2–5.3)
RBC # CSF: 1 /CU MM
RBC # CSF: 1 /CU MM
RBC MORPH BLD: ABNORMAL
SAO2 % BLD: 98 % (ref 94–98)
SAO2 % BLD: 98 % (ref 94–98)
SERVICE CMNT-IMP: ABNORMAL
SERVICE CMNT-IMP: ABNORMAL
SODIUM BLD-SCNC: 139 MMOL/L (ref 136–145)
SODIUM BLD-SCNC: 139 MMOL/L (ref 136–145)
SODIUM SERPL-SCNC: 137 MMOL/L (ref 136–145)
SODIUM SERPL-SCNC: 137 MMOL/L (ref 136–145)
SPECIMEN SITE: ABNORMAL
SPECIMEN SITE: ABNORMAL
TROPONIN T SERPL HS-MCNC: 50.2 NG/L (ref 0–14)
TROPONIN T SERPL HS-MCNC: 50.2 NG/L (ref 0–14)
TUBE # CSF: 1
TUBE # CSF: 3
TUBE # CSF: 3
VANCOMYCIN SERPL-MCNC: 13.3 UG/ML (ref 5–40)
VANCOMYCIN SERPL-MCNC: 13.3 UG/ML (ref 5–40)
VENTILATION MODE VENT: ABNORMAL
VENTILATION MODE VENT: ABNORMAL
VT SETTING VENT: 420
VT SETTING VENT: 420
WBC # BLD AUTO: 12.5 K/UL (ref 4.6–13.2)
WBC # BLD AUTO: 12.5 K/UL (ref 4.6–13.2)
WBC # CSF: 0 /CU MM (ref 0–5)
WBC # CSF: 0 /CU MM (ref 0–5)

## 2025-05-08 PROCEDURE — 87070 CULTURE OTHR SPECIMN AEROBIC: CPT

## 2025-05-08 PROCEDURE — 87476 LYME DIS DNA AMP PROBE: CPT

## 2025-05-08 PROCEDURE — 86592 SYPHILIS TEST NON-TREP QUAL: CPT

## 2025-05-08 PROCEDURE — 6360000002 HC RX W HCPCS: Performed by: NURSE PRACTITIONER

## 2025-05-08 PROCEDURE — 2000000000 HC ICU R&B

## 2025-05-08 PROCEDURE — 62270 DX LMBR SPI PNXR: CPT | Performed by: INTERNAL MEDICINE

## 2025-05-08 PROCEDURE — 86255 FLUORESCENT ANTIBODY SCREEN: CPT

## 2025-05-08 PROCEDURE — 84132 ASSAY OF SERUM POTASSIUM: CPT

## 2025-05-08 PROCEDURE — 95813 EEG EXTND MNTR 61-119 MIN: CPT | Performed by: PSYCHIATRY & NEUROLOGY

## 2025-05-08 PROCEDURE — 009U3ZX DRAINAGE OF SPINAL CANAL, PERCUTANEOUS APPROACH, DIAGNOSTIC: ICD-10-PCS | Performed by: INTERNAL MEDICINE

## 2025-05-08 PROCEDURE — 85610 PROTHROMBIN TIME: CPT

## 2025-05-08 PROCEDURE — 99222 1ST HOSP IP/OBS MODERATE 55: CPT | Performed by: PSYCHIATRY & NEUROLOGY

## 2025-05-08 PROCEDURE — 86788 WEST NILE VIRUS AB IGM: CPT

## 2025-05-08 PROCEDURE — 93010 ELECTROCARDIOGRAM REPORT: CPT | Performed by: INTERNAL MEDICINE

## 2025-05-08 PROCEDURE — 86341 ISLET CELL ANTIBODY: CPT

## 2025-05-08 PROCEDURE — 82330 ASSAY OF CALCIUM: CPT

## 2025-05-08 PROCEDURE — 2500000003 HC RX 250 WO HCPCS: Performed by: PSYCHIATRY & NEUROLOGY

## 2025-05-08 PROCEDURE — 86789 WEST NILE VIRUS ANTIBODY: CPT

## 2025-05-08 PROCEDURE — 89050 BODY FLUID CELL COUNT: CPT

## 2025-05-08 PROCEDURE — 6370000000 HC RX 637 (ALT 250 FOR IP): Performed by: NURSE PRACTITIONER

## 2025-05-08 PROCEDURE — 36600 WITHDRAWAL OF ARTERIAL BLOOD: CPT

## 2025-05-08 PROCEDURE — 87015 SPECIMEN INFECT AGNT CONCNTJ: CPT

## 2025-05-08 PROCEDURE — 82947 ASSAY GLUCOSE BLOOD QUANT: CPT

## 2025-05-08 PROCEDURE — 84157 ASSAY OF PROTEIN OTHER: CPT

## 2025-05-08 PROCEDURE — 83605 ASSAY OF LACTIC ACID: CPT

## 2025-05-08 PROCEDURE — 82803 BLOOD GASES ANY COMBINATION: CPT

## 2025-05-08 PROCEDURE — 84484 ASSAY OF TROPONIN QUANT: CPT

## 2025-05-08 PROCEDURE — 2580000003 HC RX 258: Performed by: PHYSICIAN ASSISTANT

## 2025-05-08 PROCEDURE — 71045 X-RAY EXAM CHEST 1 VIEW: CPT

## 2025-05-08 PROCEDURE — 84295 ASSAY OF SERUM SODIUM: CPT

## 2025-05-08 PROCEDURE — 83735 ASSAY OF MAGNESIUM: CPT

## 2025-05-08 PROCEDURE — 87205 SMEAR GRAM STAIN: CPT

## 2025-05-08 PROCEDURE — 6370000000 HC RX 637 (ALT 250 FOR IP): Performed by: PHYSICIAN ASSISTANT

## 2025-05-08 PROCEDURE — 84100 ASSAY OF PHOSPHORUS: CPT

## 2025-05-08 PROCEDURE — 88112 CYTOPATH CELL ENHANCE TECH: CPT

## 2025-05-08 PROCEDURE — 87483 CNS DNA AMP PROBE TYPE 12-25: CPT

## 2025-05-08 PROCEDURE — 94761 N-INVAS EAR/PLS OXIMETRY MLT: CPT

## 2025-05-08 PROCEDURE — 82962 GLUCOSE BLOOD TEST: CPT

## 2025-05-08 PROCEDURE — 2580000003 HC RX 258: Performed by: PSYCHIATRY & NEUROLOGY

## 2025-05-08 PROCEDURE — 6360000002 HC RX W HCPCS: Performed by: INTERNAL MEDICINE

## 2025-05-08 PROCEDURE — 99291 CRITICAL CARE FIRST HOUR: CPT | Performed by: INTERNAL MEDICINE

## 2025-05-08 PROCEDURE — 80048 BASIC METABOLIC PNL TOTAL CA: CPT

## 2025-05-08 PROCEDURE — 85025 COMPLETE CBC W/AUTO DIFF WBC: CPT

## 2025-05-08 PROCEDURE — 2500000003 HC RX 250 WO HCPCS: Performed by: NURSE PRACTITIONER

## 2025-05-08 PROCEDURE — 94640 AIRWAY INHALATION TREATMENT: CPT

## 2025-05-08 PROCEDURE — 87529 HSV DNA AMP PROBE: CPT

## 2025-05-08 PROCEDURE — 82550 ASSAY OF CK (CPK): CPT

## 2025-05-08 PROCEDURE — 2580000003 HC RX 258: Performed by: NURSE PRACTITIONER

## 2025-05-08 PROCEDURE — 84145 PROCALCITONIN (PCT): CPT

## 2025-05-08 PROCEDURE — 94003 VENT MGMT INPAT SUBQ DAY: CPT

## 2025-05-08 PROCEDURE — 36415 COLL VENOUS BLD VENIPUNCTURE: CPT

## 2025-05-08 PROCEDURE — 85014 HEMATOCRIT: CPT

## 2025-05-08 PROCEDURE — 86618 LYME DISEASE ANTIBODY: CPT

## 2025-05-08 PROCEDURE — 80202 ASSAY OF VANCOMYCIN: CPT

## 2025-05-08 PROCEDURE — 6360000002 HC RX W HCPCS: Performed by: PHYSICIAN ASSISTANT

## 2025-05-08 PROCEDURE — APPSS15 APP SPLIT SHARED TIME 0-15 MINUTES: Performed by: NURSE PRACTITIONER

## 2025-05-08 PROCEDURE — 2580000003 HC RX 258: Performed by: INTERNAL MEDICINE

## 2025-05-08 PROCEDURE — 82945 GLUCOSE OTHER FLUID: CPT

## 2025-05-08 PROCEDURE — 87255 GENET VIRUS ISOLATE HSV: CPT

## 2025-05-08 PROCEDURE — 2700000000 HC OXYGEN THERAPY PER DAY

## 2025-05-08 RX ORDER — CARVEDILOL 6.25 MG/1
6.25 TABLET ORAL DAILY
Status: DISCONTINUED | OUTPATIENT
Start: 2025-05-08 | End: 2025-05-12

## 2025-05-08 RX ORDER — CARVEDILOL 6.25 MG/1
6.25 TABLET ORAL 2 TIMES DAILY WITH MEALS
Status: DISCONTINUED | OUTPATIENT
Start: 2025-05-08 | End: 2025-05-08

## 2025-05-08 RX ADMIN — SODIUM CHLORIDE, PRESERVATIVE FREE 10 ML: 5 INJECTION INTRAVENOUS at 09:06

## 2025-05-08 RX ADMIN — IPRATROPIUM BROMIDE 0.5 MG: 0.5 SOLUTION RESPIRATORY (INHALATION) at 19:08

## 2025-05-08 RX ADMIN — CHLORHEXIDINE GLUCONATE 0.12% ORAL RINSE 15 ML: 1.2 LIQUID ORAL at 08:48

## 2025-05-08 RX ADMIN — BUDESONIDE 1 MG: 1 SUSPENSION RESPIRATORY (INHALATION) at 07:37

## 2025-05-08 RX ADMIN — SODIUM CHLORIDE, SODIUM LACTATE, POTASSIUM CHLORIDE, AND CALCIUM CHLORIDE: 600; 310; 30; 20 INJECTION, SOLUTION INTRAVENOUS at 10:47

## 2025-05-08 RX ADMIN — HEPARIN SODIUM 5000 UNITS: 5000 INJECTION INTRAVENOUS; SUBCUTANEOUS at 14:29

## 2025-05-08 RX ADMIN — CHLORHEXIDINE GLUCONATE 0.12% ORAL RINSE 15 ML: 1.2 LIQUID ORAL at 22:23

## 2025-05-08 RX ADMIN — SODIUM CHLORIDE, PRESERVATIVE FREE 10 ML: 5 INJECTION INTRAVENOUS at 21:24

## 2025-05-08 RX ADMIN — IPRATROPIUM BROMIDE 0.5 MG: 0.5 SOLUTION RESPIRATORY (INHALATION) at 15:18

## 2025-05-08 RX ADMIN — CARVEDILOL 6.25 MG: 6.25 TABLET, FILM COATED ORAL at 10:41

## 2025-05-08 RX ADMIN — ACYCLOVIR SODIUM 500 MG: 50 INJECTION, SOLUTION INTRAVENOUS at 17:50

## 2025-05-08 RX ADMIN — IPRATROPIUM BROMIDE 0.5 MG: 0.5 SOLUTION RESPIRATORY (INHALATION) at 07:37

## 2025-05-08 RX ADMIN — CALCIUM GLUCONATE 1000 MG: 20 INJECTION, SOLUTION INTRAVENOUS at 01:01

## 2025-05-08 RX ADMIN — IPRATROPIUM BROMIDE 0.5 MG: 0.5 SOLUTION RESPIRATORY (INHALATION) at 11:12

## 2025-05-08 RX ADMIN — FAMOTIDINE 20 MG: 10 INJECTION, SOLUTION INTRAVENOUS at 08:48

## 2025-05-08 RX ADMIN — VALPROATE SODIUM 500 MG: 100 INJECTION, SOLUTION INTRAVENOUS at 23:17

## 2025-05-08 RX ADMIN — INSULIN LISPRO 4 UNITS: 100 INJECTION, SOLUTION INTRAVENOUS; SUBCUTANEOUS at 12:32

## 2025-05-08 RX ADMIN — ARFORMOTEROL TARTRATE 15 MCG: 15 SOLUTION RESPIRATORY (INHALATION) at 19:09

## 2025-05-08 RX ADMIN — HEPARIN SODIUM 5000 UNITS: 5000 INJECTION INTRAVENOUS; SUBCUTANEOUS at 22:23

## 2025-05-08 RX ADMIN — BUDESONIDE 1 MG: 1 SUSPENSION RESPIRATORY (INHALATION) at 19:09

## 2025-05-08 RX ADMIN — HEPARIN SODIUM 5000 UNITS: 5000 INJECTION INTRAVENOUS; SUBCUTANEOUS at 07:18

## 2025-05-08 RX ADMIN — SODIUM CHLORIDE, SODIUM LACTATE, POTASSIUM CHLORIDE, AND CALCIUM CHLORIDE: 600; 310; 30; 20 INJECTION, SOLUTION INTRAVENOUS at 19:45

## 2025-05-08 RX ADMIN — VALPROATE SODIUM 750 MG: 100 INJECTION, SOLUTION INTRAVENOUS at 18:05

## 2025-05-08 RX ADMIN — VANCOMYCIN HYDROCHLORIDE 750 MG: 750 INJECTION, POWDER, LYOPHILIZED, FOR SOLUTION INTRAVENOUS at 08:46

## 2025-05-08 RX ADMIN — THIAMINE HYDROCHLORIDE 200 MG: 100 INJECTION, SOLUTION INTRAMUSCULAR; INTRAVENOUS at 08:48

## 2025-05-08 RX ADMIN — CEFEPIME 1000 MG: 1 INJECTION, POWDER, FOR SOLUTION INTRAMUSCULAR; INTRAVENOUS at 12:18

## 2025-05-08 RX ADMIN — ARFORMOTEROL TARTRATE 15 MCG: 15 SOLUTION RESPIRATORY (INHALATION) at 07:37

## 2025-05-08 ASSESSMENT — PULMONARY FUNCTION TESTS
PIF_VALUE: 19
PIF_VALUE: 19
PIF_VALUE: 18
PIF_VALUE: 19
PIF_VALUE: 19
PIF_VALUE: 17

## 2025-05-08 NOTE — CARE COORDINATION
JOSH sent referrals to [facility] via [facility] for discharge planning.      Angelica Harmon MSW  Case Management

## 2025-05-08 NOTE — PROGRESS NOTES
ICU Update    LP performed. Fluid studies sent for:     Protein + glucose + cell count + VDRL + HSV + gram stain + cytology + autoimmune/paraneoplastic encephalitis panel ([facility] ID [id number])     Lv Lea   [facility] PGY6

## 2025-05-08 NOTE — PROCEDURES
PROCEDURE NOTE  Date: 2025   Name: Juliana Loaiza  YOB: 1950    Procedures      Name: Juliana Loaiza MRN: [id number]   : 1950 [facility]   Date: 2025        Lumbar Puncture Procedure Note    Procedure: Lumbar puncture, image guided    Indication: Altered mental status    : Lv Lea DO    Assistant: None    Anesthesia: 1% lidocaine administered locally    Procedure Details:   Informed consent obtained and maximum barrier sterile technique utilized to include cap, mask, sterile gown, sterile gloves, sterile towels, hand hygiene, 2% chlorhexidine for cutaneous antisepsis.    The patient was placed in the lateral decubitus position.    Under ultrasound guidance, the lumbar spinous processes were identified. 1% lidocaine was utilized for local anesthesia. Percutaneous access to the thecal sac was achieved using a 20 gauge 3.5 inch spinal needle at the level of L3-4.    9 cc of CSF was collected for processing  Opening pressure of 13.5 cm of H2O was obtained    Specimens: Yes    Estimated blood loss: Minimal    Complications: None apparent    LATIA Magaña PGY6   25  Pulmonary, Critical Care Medicine  [facility]

## 2025-05-08 NOTE — CONSULTS
Consult Note            Date:5/8/2025        Patient Name:Juliana Loaiza     YOB: 1950     Age:74 y.o.    Consults    Chief Complaint     Chief Complaint   Patient presents with    Unresponsive          History Obtained From   electronic medical record, reason patient could not give history:  on ventilator    History of Present Illness   The patient is a 74-year-old female found at home unresponsive.  EMS called.  She was brought to the emergency department.  Ultimately intubated for respiratory failure.  Given broad-spectrum antibiotics to cover for possible bacterial infection.  CT head showed diffuse parenchymal loss but no acute process urine drug screen was positive for cocaine    Past Medical History   History reviewed. No pertinent past medical history.     Past Surgical History   History reviewed. No pertinent surgical history.     Medications     Prior to Admission medications    Not on File        [START ON 5/9/2025] famotidine (PEPCID) 20 MG/2ML 20 mg in sodium chloride (PF) 0.9 % 10 mL injection, Daily  carvedilol (COREG) tablet 6.25 mg, Daily  chlorhexidine (PERIDEX) 0.12 % solution 15 mL, BID  sodium chloride flush 0.9 % injection 5-40 mL, 2 times per day  sodium chloride flush 0.9 % injection 5-40 mL, PRN  0.9 % sodium chloride infusion, PRN  potassium chloride 20 mEq/50 mL IVPB (Central Line), PRN   Or  potassium chloride 10 mEq/100 mL IVPB (Peripheral Line), PRN  magnesium sulfate 2000 mg in 50 mL IVPB premix, PRN  ondansetron (ZOFRAN-ODT) disintegrating tablet 4 mg, Q8H PRN   Or  ondansetron (ZOFRAN) injection 4 mg, Q6H PRN  polyethylene glycol (GLYCOLAX) packet 17 g, Daily PRN  acetaminophen (TYLENOL) tablet 650 mg, Q6H PRN   Or  acetaminophen (TYLENOL) suppository 650 mg, Q6H PRN  budesonide (PULMICORT) nebulizer suspension 1 mg, BID  arformoterol tartrate (BROVANA) nebulizer solution 15 mcg, BID RT  ipratropium (ATROVENT) 0.02 % nebulizer solution 0.5 mg, 4x Daily RT  glucose  metabolic in etiology due to sepsis, substance abuse and cocaine abuse, acute kidney injury.  Unclear if the patient had an unwitnessed event like arrhythmia or brief cardio cardiac arrest.  CT of the head is unrevealing.  Rapid EEG was negative for seizures but did show right temporal periodic sharp waves with some on the left. Will order routine eeg.  .CSF analysis to r/o CNS infection such as herpes encephalitis given the periodic discharges.  Will add depacon for aed    Electronically signed by Yuly Vidal MD on 5/8/25 at 2:29 PM EDT

## 2025-05-08 NOTE — PROCEDURES
PROCEDURE NOTE  Date: 5/7/25   Name: Juliana Loaiza  YOB: 1950    Procedures      Technical Description: [employer]ell is an FDA?approved EEG that can be used by staff members providing care to patients with a suspicion of seizure. This device is a reduced?montage (8?channel) circumferential EEG that produces tracings covering the frontal, temporal, and occipital areas.     EEG Session Report Patient Name: SHAKA HERNANDEZ Medical ID: [id number] YOB: 1878 Age: 89 Session Duration: May 7, 2025 5:39 AM - May 7, 2025 10:14 AM Recording Total Time: 04:34:45 Ordering Physician: HOMA SARAH Primary Indication: Undifferentiated AMS Location: ICU/MICU Impressions: Comments: Report prepared by: Yuly Vidal Report generated on: May 8, 2025 2:44 PM RUST-4     This study demonstrates prominent low voltage beta activity with generalized slowing but frequent repetitive right temporal sharp waves, lateralizing with some more rare periodic sharp waves in the left temporal lobe.  No clear well-formed ongoing electrographic seizure.

## 2025-05-08 NOTE — PROGRESS NOTES
[facility].  Pulmonary, Critical Care, and Sleep Medicine    Name: Juliana Loaiza MRN: [id number]   : 1950 Hospital: [facility]   Date: 2025  Admission Date: 2025     Chart and notes reviewed. Data reviewed. I have evaluated all findings.    [x]I have reviewed the flowsheet and previous day’s notes.    [x]The patient is unable to give any meaningful history or review of systems because the patient is:  [x]Intubated []Sedated   []Unresponsive      [x]The patient is critically ill on      [x]Mechanical ventilation []Pressors   []BiPAP []     IMPRESSION:   Acute on chronic respiratory failure, unclear if hypercapnic or hypoxic on arrival-intubated for airway protection, Home baseline is 6 Lpm oxygen  Acute encephalopathy-toxic/metabolic in nature, patient unresponsive with pinpoint pupils, etiology unclear--?CVA vs. Drug toxicity vs. Mediation induced (patient on multiple sedating medications, such as Cymbalta, Remeron, Lyrica, Requip, and Zoloft-->also places patient at risk for serotonin syndrome), vs. Hypoxia vs. Uremia vs. Sepsis vs. Drug OD  CAP vs. Aspiration PNA-extensive infiltrates on CT chest A/P, primarily RUL/RLL   Rhinovirus- +RVP    Sepsis vs. Severe sepsis, in the setting of PNA  Electrolyte derangements- hyperkalemia (resolved), hypochloremia (resolved), hypocalcemia, hyperphosphatemia  IRA-prerenal azotemia vs. ischemic ATN  HAGMA  Lactic acidosis, resolved   Elevated CK (14K) with concern for rhabdo, improving    Mild transaminitis   Nonobstructive CAD: [facility] 2024 RCA with midsegment 20% stenosis. LM patent, LAD patent, Lcx patent. Echo 25 EF 50-55%, normal wall motion. RVSP 52mmHg  pAF: Currently in SR. OAC with Xarelto. Cardizem CD 120mg for rate control   HTN  DM II   GERD   Bronchiectasis   Sarcoidosis - diagnosed in  via punch biopsy  Chronic pain  Anxiety  Microcytic hypochromic anemia   Tobacco use history  History of  PCR Not detected        Influenza B PCR Not detected        Parainfluenza 1 PCR Not detected        Parainfluenza 2 PCR Not detected        Parainfluenza 3 PCR Not detected        Parainfluenza 4 PCR Not detected        Respiratory Syncytial Virus by PCR Not detected        Bordetella parapertussis by PCR Not detected        Bordetella pertussis by PCR Not detected        Chlamydophila Pneumonia PCR Not detected        Mycoplasma pneumo by PCR Not detected       Urine Culture [[id number]] Collected: 05/07/25 0340    Order Status: Sent Specimen: Urine, clean catch Updated: 05/07/25 1617    Culture, Respiratory [[id number]]     Order Status: Sent Specimen: Endotracheal     Blood Culture 2 [[id number]] Collected: 05/07/25 0135    Order Status: Completed Specimen: Blood Updated: 05/07/25 0748     Special Requests NO SPECIAL REQUESTS        Culture NO GROWTH AFTER 5 HOURS       Blood Culture 1 [[id number]] Collected: 05/07/25 0120    Order Status: Completed Specimen: Blood Updated: 05/07/25 0748     Special Requests NO SPECIAL REQUESTS        Culture NO GROWTH AFTER 5 HOURS       Urine Culture [[id number]]     Order Status: Canceled Specimen: Urine, clean catch                  Imaging:  [x]   I have personally reviewed the patient’s radiographs and reports  Most recent imaging:  CT CHEST ABDOMEN PELVIS WO CONTRAST Additional Contrast? None  Result Date: 5/7/2025  1.  Tubes and lines in good position. 2.  Extensive changes consistent with bronchopneumonia. Electronically signed by Zurdo COOPER Post    XR ABDOMEN (KUB) (SINGLE AP VIEW)  Result Date: 5/7/2025  1.  The tube has its tip overlying the stomach. Electronically signed by Zurdo COOPER Post    XR CHEST PORTABLE  Result Date: 5/7/2025  1.  Tubes and lines without evidence of complication. 2.  Multifocal opacities. Electronically signed by Zurdo COOPER Post    CT HEAD WO CONTRAST  Result Date: 5/7/2025  1.   Diffuse parenchymal loss. 2.  No acute intracranial process.

## 2025-05-08 NOTE — PROGRESS NOTES
[facility]   Pharmacy Pharmacokinetic Monitoring Service - Vancomycin    Indication: sepsis  Goal level: 10-20 mg/L  Day of Therapy: 2  Additional Antimicrobials: cefepime    Pertinent Laboratory Values:   Wt Readings from Last 1 Encounters:   05/07/25 49.9 kg (110 lb 0.2 oz)     Temp Readings from Last 1 Encounters:   05/08/25 98.6 °F (37 °C) (Bladder)     Estimated Creatinine Clearance: 13 mL/min (A) (based on SCr of 3.18 mg/dL (H)).    Recent Labs     05/07/25  0418 05/07/25  2220 05/08/25  0148 05/08/25  0347   CREATININE 2.89* 3.01* 3.05* 3.18*   BUN 31* 35* 36*  --    WBC 12.4  --  12.5  --      Pertinent Cultures:  Date Source Results   5/7 blood NGTD   5/7 urine pending   MRSA Nasal Swab: NA    Assessment:  Date Current Dose Level (mg/L) Timing of Level (h)   5/7 1,250 mg x1 - -   5/8 750 mg x1 13.3 21     Plan:  Dose by level  Dose: 750 mg x1  Ordered a level for 5/9 with AM labs  Pharmacy will continue to monitor patient and adjust therapy as indicated    Thank you for the consult,  Shashi Perez [facility]  5/8/2025

## 2025-05-08 NOTE — PROGRESS NOTES
VENTILATOR CARE PLAN    Problem: Ventilator Management  Goal: *Adequate oxygenation/ ventilation/ and extubation      Patient:        Juliana Loaiza     74 y.o.   female     5/8/2025  5:44 AM  Patient Active Problem List   Diagnosis    Coma (HCC)    Acute encephalopathy    Acute respiratory failure (HCC)    Severe sepsis (HCC)    Polysubstance abuse (HCC)    Transaminitis    Elevated CK    IRA (acute kidney injury)    Aspiration pneumonia of both lungs (HCC)    Lactic acidosis    Metabolic acidosis    Rhinovirus       Coma ([facility]) [R40.20]  Respiratory arrest (HCC) [R09.2]  [place] coma scale total score 3-8, in the field (EMT or ambulance) ([facility]) [R40.2431]    Reason patient intubated: Acute on chronic respiratory failure, unclear if hypercapnic or hypoxic on arrival-intubated for airway protection, Home baseline is 6 Lpm oxygen  Acute encephalopathy-toxic/metabolic in nature, patient unresponsive with pinpoint pupils, etiology unclear--?CVA vs. Drug toxicity vs. Mediation induced (patient on multiple sedating medications, such as Cymbalta, Remeron, Lyrica, Requip, and Zoloft-->also places patient at risk for serotonin syndrome), vs. Hypoxia vs. Uremia vs. Sepsis vs. Drug OD    Ventilator day: 3    Ventilator settings: AC/VC+ 16/420/+5/30%     ETT Size/Placement: 7.5 ETT 23@lips     Wean Screen Pass (Yes or No): No  Wean Screen Reason for Failure: Mentation, Not following commands  Duration of Weaning Trial: N/A  Additional Comments:        PLAN OF CARE: Maintain adequate ventilation and oxygenation. Wean vent and SBT as tolerated       GOAL: Extubation      OUTCOME:  Not progressing    ABG:  Date:5/8/2025   Latest Reference Range & Units 05/08/25 03:47   Geo Test -   Positive   FIO2 Arterial % 30   DEVICE -   ADULT VENT   Site -   LEFT BRACHIAL   Mode -   Pressure regulated volume control   POC pH 7.35 - 7.45   7.41   POC pCO2 35.0 - 48.0 MMHG 34.4 (L)   POC PO2 83 - 108 MMHG 94   POC HCO3 21 - 28 MMOL/L 21.9

## 2025-05-08 NOTE — PLAN OF CARE
Problem: Discharge Planning  Goal: Discharge to home or other facility with appropriate resources  Outcome: Not Progressing  Flowsheets (Taken 5/8/2025 0000)  Discharge to home or other facility with appropriate resources:   Identify barriers to discharge with patient and caregiver   Arrange for needed discharge resources and transportation as appropriate   Identify discharge learning needs (meds, wound care, etc)   Refer to discharge planning if patient needs post-hospital services based on physician order or complex needs related to functional status, cognitive ability or social support system     Problem: Safety - Adult  Goal: Free from fall injury  Outcome: Progressing     Problem: Nutrition Deficit:  Goal: Optimize nutritional status  Outcome: Progressing     Problem: Skin/Tissue Integrity - Adult  Goal: Skin integrity remains intact  Outcome: Progressing  Goal: Incisions, wounds, or drain sites healing without S/S of infection  Outcome: Progressing  Goal: Oral mucous membranes remain intact  Outcome: Progressing     Problem: Musculoskeletal - Adult  Goal: Return ADL status to a safe level of function  Outcome: Not Progressing     Problem: Genitourinary - Adult  Goal: Urinary catheter remains patent  Outcome: Progressing     Problem: Infection - Adult  Goal: Absence of infection at discharge  Recent Flowsheet Documentation  Taken 5/8/2025 0000 by Tamara Smith RN  Absence of infection at discharge:   Monitor all insertion sites i.e., indwelling lines, tubes and drains   Monitor lab/diagnostic results   Monitor endotracheal (as able) and nasal secretions for changes in amount and color   Donald appropriate cooling/warming therapies per order   Administer medications as ordered   Instruct and encourage patient and family to use good hand hygiene technique  Goal: Absence of infection during hospitalization  Recent Flowsheet Documentation  Taken 5/8/2025 0000 by Tamara Smith RN  Absence of infection  during hospitalization:   Monitor lab/diagnostic results   Caro appropriate cooling/warming therapies per order   Administer medications as ordered   Identify and instruct in appropriate isolation precautions for identified infection/condition  Goal: Absence of fever/infection during anticipated neutropenic period  Outcome: Progressing  Flowsheets (Taken 5/8/2025 0000)  Absence of fever/infection during anticipated neutropenic period: Monitor white blood cell count     Problem: Metabolic/Fluid and Electrolytes - Adult  Goal: Electrolytes maintained within normal limits  Recent Flowsheet Documentation  Taken 5/8/2025 0000 by Tamara Smith RN  Electrolytes maintained within normal limits:   Monitor labs and assess patient for signs and symptoms of electrolyte imbalances   Administer electrolyte replacement as ordered   Monitor response to electrolyte replacements, including repeat lab results as appropriate  Goal: Hemodynamic stability and optimal renal function maintained  Recent Flowsheet Documentation  Taken 5/8/2025 0000 by Tamara Smith RN  Hemodynamic stability and optimal renal function maintained:   Monitor labs and assess for signs and symptoms of volume excess or deficit   Monitor intake, output and patient weight   Monitor urine specific gravity, serum osmolarity and serum sodium as indicated or ordered   Monitor response to interventions for patient's volume status, including labs, urine output, blood pressure (other measures as available)     Problem: Discharge Planning  Goal: Discharge to home or other facility with appropriate resources  Outcome: Not Progressing  Flowsheets (Taken 5/8/2025 0000)  Discharge to home or other facility with appropriate resources:   Identify barriers to discharge with patient and caregiver   Arrange for needed discharge resources and transportation as appropriate   Identify discharge learning needs (meds, wound care, etc)   Refer to discharge planning if patient

## 2025-05-08 NOTE — PROGRESS NOTES
In Patient Progress note      Admit Date: 5/6/2025      Impression:     1) IRA ( baseline unclear) , oliguric --> now nonoliguric  d/t rhabdo/ATN ,   Diuresed well with lasix and metolazone , good urine output --> continue auto diuresing   2) Acute on chronic respiratory, suspect PNA   3) Acute encephalopathy-toxic/metabolic in nature, patient unresponsive with pinpoint pupils,etio unclear :   sepsis v/s drug toxicity vs. Mediation induced  4) sepsis - PNA  5) hyperkalemia,  hypocalcemia   6) HAGMA  7) lactic acidoses  8) elevated CK--> improving      Recommendations:      1) strict I/o charting   2) rankin catheter  3) keep MAP > 65   4) continue LR @ 125 cc/hrs   5) follow CK levels   6) avoid NSAIDs , nephrotoxins  7) renally dose meds for egfr < 30  8) no acute indication for dialysis   9) ok to use PRN diuretics   10) check echocardiogram      Total critical care time : 35 mins   Please call with questions     Toan Lopez MD FASN  Cell [phone]  Pager: [phone]  Fax   [phone]   Subjective:     - intubated , no meaningful response   - respiratory - stable  - hemodynamics - stable, no pressrs  - UOP-improved   - Nutrition -ok    Objective:     BP (!) 158/69   Pulse 100   Temp 98.8 °F (37.1 °C)   Resp 16   Ht 1.676 m (5' 6\")   Wt 49.9 kg (110 lb 0.2 oz)   SpO2 95%   BMI 17.76 kg/m²       Intake/Output Summary (Last 24 hours) at 5/8/2025 1551  Last data filed at 5/8/2025 1411  Gross per 24 hour   Intake 2237.5 ml   Output 2755 ml   Net -517.5 ml       Physical Exam:     Intubated/ off sedated   HENT mmm  RS AEBE   CVS s1s2 wnl no JVD  Ext edema +  Skin no rashes  Neuro poor response     Data Review:    Recent Labs     05/08/25  0148   WBC 12.5   RBC 3.75*   HCT 25.9*   MCV 69.1*   MCH 21.9*   MCHC 31.7   RDW 15.7*     Recent Labs     05/06/25  2217 05/07/25  0418 05/07/25 2220 05/08/25  0148   BUN 30* 31* 35* 36*   K 6.1* 4.0 4.1 4.0   * 140 137 137   CL 94* 104 103 102   CO2 19* 19*

## 2025-05-08 NOTE — PROGRESS NOTES
Review of Systems    Physical Exam  Skin:             Left buttock      Right thigh/buttock      Sacrum       Recommendations:   Silicone dressing to sacrum and bilateral buttocks.  Change every 3 days and prn soilage. Expect these wounds to evolve, the purple/maroon areas will unroof, hopefully revealing healthy granulation tissue underneath.     Breanna NGUYENN, RN, WCC, COCN, CLIN IV  [facility]  Office: [phone]  Please use Work Cell: [phone]

## 2025-05-09 ENCOUNTER — APPOINTMENT (OUTPATIENT)
Facility: HOSPITAL | Age: 75
End: 2025-05-09
Payer: MEDICARE

## 2025-05-09 LAB
ANION GAP BLD CALC-SCNC: ABNORMAL MMOL/L (ref 10–20)
ANION GAP BLD CALC-SCNC: ABNORMAL MMOL/L (ref 10–20)
ANION GAP SERPL CALC-SCNC: 13 MMOL/L (ref 3–18)
ANION GAP SERPL CALC-SCNC: 13 MMOL/L (ref 3–18)
ANION GAP SERPL CALC-SCNC: 15 MMOL/L (ref 3–18)
ANION GAP SERPL CALC-SCNC: 15 MMOL/L (ref 3–18)
ARTERIAL PATENCY WRIST A: POSITIVE
ARTERIAL PATENCY WRIST A: POSITIVE
BACTERIA SPEC CULT: ABNORMAL
BACTERIA SPEC CULT: ABNORMAL
BASE EXCESS BLD CALC-SCNC: 2.6 MMOL/L
BASE EXCESS BLD CALC-SCNC: 2.6 MMOL/L
BASOPHILS # BLD: 0.02 K/UL (ref 0–0.1)
BASOPHILS # BLD: 0.02 K/UL (ref 0–0.1)
BASOPHILS NFR BLD: 0.2 % (ref 0–2)
BASOPHILS NFR BLD: 0.2 % (ref 0–2)
BDY SITE: ABNORMAL
BDY SITE: ABNORMAL
BUN SERPL-MCNC: 35 MG/DL (ref 6–23)
BUN/CREAT SERPL: 15 (ref 12–20)
BUN/CREAT SERPL: 15 (ref 12–20)
BUN/CREAT SERPL: 16 (ref 12–20)
BUN/CREAT SERPL: 16 (ref 12–20)
C GATTII+NEOFOR DNA CSF QL NAA+NON-PROBE: NOT DETECTED
C GATTII+NEOFOR DNA CSF QL NAA+NON-PROBE: NOT DETECTED
CA-I BLD-MCNC: 1.06 MMOL/L (ref 1.15–1.33)
CA-I BLD-MCNC: 1.06 MMOL/L (ref 1.15–1.33)
CALCIUM SERPL-MCNC: 7.5 MG/DL (ref 8.5–10.1)
CALCIUM SERPL-MCNC: 7.5 MG/DL (ref 8.5–10.1)
CALCIUM SERPL-MCNC: 7.6 MG/DL (ref 8.5–10.1)
CALCIUM SERPL-MCNC: 7.6 MG/DL (ref 8.5–10.1)
CC UR VC: ABNORMAL
CC UR VC: ABNORMAL
CHLORIDE BLD-SCNC: 103 MMOL/L (ref 98–107)
CHLORIDE BLD-SCNC: 103 MMOL/L (ref 98–107)
CHLORIDE SERPL-SCNC: 101 MMOL/L (ref 98–107)
CHLORIDE SERPL-SCNC: 101 MMOL/L (ref 98–107)
CHLORIDE SERPL-SCNC: 102 MMOL/L (ref 98–107)
CHLORIDE SERPL-SCNC: 102 MMOL/L (ref 98–107)
CK SERPL-CCNC: 4801 U/L (ref 26–192)
CK SERPL-CCNC: 4801 U/L (ref 26–192)
CMV DNA CSF QL NAA+NON-PROBE: NOT DETECTED
CMV DNA CSF QL NAA+NON-PROBE: NOT DETECTED
CO2 BLD-SCNC: 25 MMOL/L (ref 22–29)
CO2 BLD-SCNC: 25 MMOL/L (ref 22–29)
CO2 SERPL-SCNC: 23 MMOL/L (ref 21–32)
CREAT BLD-MCNC: 2.15 MG/DL (ref 0.6–1.3)
CREAT BLD-MCNC: 2.15 MG/DL (ref 0.6–1.3)
CREAT SERPL-MCNC: 2.11 MG/DL (ref 0.6–1.3)
CREAT SERPL-MCNC: 2.11 MG/DL (ref 0.6–1.3)
CREAT SERPL-MCNC: 2.37 MG/DL (ref 0.6–1.3)
CREAT SERPL-MCNC: 2.37 MG/DL (ref 0.6–1.3)
CYTOLOGY-NON GYN: NORMAL
CYTOLOGY-NON GYN: NORMAL
DIFFERENTIAL METHOD BLD: ABNORMAL
DIFFERENTIAL METHOD BLD: ABNORMAL
E COLI K1 DNA CSF QL NAA+NON-PROBE: NOT DETECTED
E COLI K1 DNA CSF QL NAA+NON-PROBE: NOT DETECTED
EOSINOPHIL # BLD: 0.03 K/UL (ref 0–0.4)
EOSINOPHIL # BLD: 0.03 K/UL (ref 0–0.4)
EOSINOPHIL NFR BLD: 0.2 % (ref 0–5)
EOSINOPHIL NFR BLD: 0.2 % (ref 0–5)
ERYTHROCYTE [DISTWIDTH] IN BLOOD BY AUTOMATED COUNT: 15.9 % (ref 11.6–14.5)
ERYTHROCYTE [DISTWIDTH] IN BLOOD BY AUTOMATED COUNT: 15.9 % (ref 11.6–14.5)
EV RNA CSF QL NAA+NON-PROBE: NOT DETECTED
EV RNA CSF QL NAA+NON-PROBE: NOT DETECTED
FIO2 ON VENT: 30 %
FIO2 ON VENT: 30 %
FOLATE SERPL-MCNC: 3.4 NG/ML (ref 4.6–34.8)
FOLATE SERPL-MCNC: 3.4 NG/ML (ref 4.6–34.8)
GAS FLOW.O2 O2 DELIVERY SYS: ABNORMAL
GAS FLOW.O2 O2 DELIVERY SYS: ABNORMAL
GLUCOSE BLD STRIP.AUTO-MCNC: 161 MG/DL (ref 70–110)
GLUCOSE BLD STRIP.AUTO-MCNC: 161 MG/DL (ref 70–110)
GLUCOSE BLD STRIP.AUTO-MCNC: 163 MG/DL (ref 70–110)
GLUCOSE BLD STRIP.AUTO-MCNC: 163 MG/DL (ref 70–110)
GLUCOSE BLD STRIP.AUTO-MCNC: 166 MG/DL (ref 70–110)
GLUCOSE BLD STRIP.AUTO-MCNC: 166 MG/DL (ref 70–110)
GLUCOSE BLD STRIP.AUTO-MCNC: 208 MG/DL (ref 70–110)
GLUCOSE BLD STRIP.AUTO-MCNC: 208 MG/DL (ref 70–110)
GLUCOSE BLD-MCNC: 143 MG/DL (ref 74–99)
GLUCOSE BLD-MCNC: 143 MG/DL (ref 74–99)
GLUCOSE SERPL-MCNC: 137 MG/DL (ref 74–108)
GLUCOSE SERPL-MCNC: 137 MG/DL (ref 74–108)
GLUCOSE SERPL-MCNC: 196 MG/DL (ref 74–108)
GLUCOSE SERPL-MCNC: 196 MG/DL (ref 74–108)
GP B STREP DNA CSF QL NAA+NON-PROBE: NOT DETECTED
GP B STREP DNA CSF QL NAA+NON-PROBE: NOT DETECTED
HAEM INFLU DNA CSF QL NAA+NON-PROBE: NOT DETECTED
HAEM INFLU DNA CSF QL NAA+NON-PROBE: NOT DETECTED
HCO3 BLD-SCNC: 26.2 MMOL/L (ref 21–28)
HCO3 BLD-SCNC: 26.2 MMOL/L (ref 21–28)
HCT VFR BLD AUTO: 24.5 % (ref 35–45)
HCT VFR BLD AUTO: 24.5 % (ref 35–45)
HGB BLD-MCNC: 7.7 G/DL (ref 12–16)
HGB BLD-MCNC: 7.7 G/DL (ref 12–16)
HHV6 DNA CSF QL NAA+NON-PROBE: NOT DETECTED
HHV6 DNA CSF QL NAA+NON-PROBE: NOT DETECTED
HSV1 DNA CSF QL NAA+PROBE: NOT DETECTED
HSV1 DNA CSF QL NAA+PROBE: NOT DETECTED
HSV2 DNA CSF QL NAA+NON-PROBE: NOT DETECTED
HSV2 DNA CSF QL NAA+NON-PROBE: NOT DETECTED
IMM GRANULOCYTES # BLD AUTO: 0.13 K/UL (ref 0–0.04)
IMM GRANULOCYTES # BLD AUTO: 0.13 K/UL (ref 0–0.04)
IMM GRANULOCYTES NFR BLD AUTO: 1 % (ref 0–0.5)
IMM GRANULOCYTES NFR BLD AUTO: 1 % (ref 0–0.5)
L MONOCYTOG DNA CSF QL NAA+NON-PROBE: NOT DETECTED
L MONOCYTOG DNA CSF QL NAA+NON-PROBE: NOT DETECTED
LACTATE BLD-SCNC: 1.68 MMOL/L (ref 0.4–2)
LACTATE BLD-SCNC: 1.68 MMOL/L (ref 0.4–2)
LYMPHOCYTES # BLD: 1.84 K/UL (ref 0.9–3.6)
LYMPHOCYTES # BLD: 1.84 K/UL (ref 0.9–3.6)
LYMPHOCYTES NFR BLD: 14.8 % (ref 21–52)
LYMPHOCYTES NFR BLD: 14.8 % (ref 21–52)
MAGNESIUM SERPL-MCNC: 1.7 MG/DL (ref 1.6–2.6)
MAGNESIUM SERPL-MCNC: 1.7 MG/DL (ref 1.6–2.6)
MCH RBC QN AUTO: 21.5 PG (ref 24–34)
MCH RBC QN AUTO: 21.5 PG (ref 24–34)
MCHC RBC AUTO-ENTMCNC: 31.4 G/DL (ref 31–37)
MCHC RBC AUTO-ENTMCNC: 31.4 G/DL (ref 31–37)
MCV RBC AUTO: 68.4 FL (ref 78–100)
MCV RBC AUTO: 68.4 FL (ref 78–100)
MONOCYTES # BLD: 0.73 K/UL (ref 0.05–1.2)
MONOCYTES # BLD: 0.73 K/UL (ref 0.05–1.2)
MONOCYTES NFR BLD: 5.9 % (ref 3–10)
MONOCYTES NFR BLD: 5.9 % (ref 3–10)
N MEN DNA CSF QL NAA+NON-PROBE: NOT DETECTED
N MEN DNA CSF QL NAA+NON-PROBE: NOT DETECTED
NEUTS SEG # BLD: 9.7 K/UL (ref 1.8–8)
NEUTS SEG # BLD: 9.7 K/UL (ref 1.8–8)
NEUTS SEG NFR BLD: 77.9 % (ref 40–73)
NEUTS SEG NFR BLD: 77.9 % (ref 40–73)
NRBC # BLD: 0 K/UL (ref 0–0.01)
NRBC # BLD: 0 K/UL (ref 0–0.01)
NRBC BLD-RTO: 0 PER 100 WBC
NRBC BLD-RTO: 0 PER 100 WBC
PARECHOVIRUS A RNA CSF QL NAA+NON-PROBE: NOT DETECTED
PARECHOVIRUS A RNA CSF QL NAA+NON-PROBE: NOT DETECTED
PCO2 BLD: 34.7 MMHG (ref 35–48)
PCO2 BLD: 34.7 MMHG (ref 35–48)
PEEP RESPIRATORY: 5
PEEP RESPIRATORY: 5
PH BLD: 7.49 (ref 7.35–7.45)
PH BLD: 7.49 (ref 7.35–7.45)
PHOSPHATE SERPL-MCNC: 3.8 MG/DL (ref 2.5–4.9)
PHOSPHATE SERPL-MCNC: 3.8 MG/DL (ref 2.5–4.9)
PLATELET # BLD AUTO: 168 K/UL (ref 135–420)
PLATELET # BLD AUTO: 168 K/UL (ref 135–420)
PO2 BLD: 112 MMHG (ref 83–108)
PO2 BLD: 112 MMHG (ref 83–108)
POTASSIUM BLD-SCNC: 3.1 MMOL/L (ref 3.5–5.1)
POTASSIUM BLD-SCNC: 3.1 MMOL/L (ref 3.5–5.1)
POTASSIUM SERPL-SCNC: 3.5 MMOL/L (ref 3.5–5.5)
POTASSIUM SERPL-SCNC: 3.5 MMOL/L (ref 3.5–5.5)
POTASSIUM SERPL-SCNC: 3.8 MMOL/L (ref 3.5–5.5)
POTASSIUM SERPL-SCNC: 3.8 MMOL/L (ref 3.5–5.5)
RBC # BLD AUTO: 3.58 M/UL (ref 4.2–5.3)
RBC # BLD AUTO: 3.58 M/UL (ref 4.2–5.3)
S PNEUM DNA CSF QL NAA+NON-PROBE: NOT DETECTED
S PNEUM DNA CSF QL NAA+NON-PROBE: NOT DETECTED
SAO2 % BLD: 99 % (ref 94–98)
SAO2 % BLD: 99 % (ref 94–98)
SERVICE CMNT-IMP: ABNORMAL
SODIUM BLD-SCNC: 138 MMOL/L (ref 136–145)
SODIUM BLD-SCNC: 138 MMOL/L (ref 136–145)
SODIUM SERPL-SCNC: 138 MMOL/L (ref 136–145)
SODIUM SERPL-SCNC: 138 MMOL/L (ref 136–145)
SODIUM SERPL-SCNC: 139 MMOL/L (ref 136–145)
SODIUM SERPL-SCNC: 139 MMOL/L (ref 136–145)
SPECIMEN SITE: ABNORMAL
SPECIMEN SITE: ABNORMAL
T4 FREE SERPL-MCNC: 1.8 NG/DL (ref 0.9–1.7)
T4 FREE SERPL-MCNC: 1.8 NG/DL (ref 0.9–1.7)
TSH, 3RD GENERATION: 0.37 UIU/ML (ref 0.27–4.2)
TSH, 3RD GENERATION: 0.37 UIU/ML (ref 0.27–4.2)
VALPROATE SERPL-MCNC: 61 UG/ML (ref 50–100)
VALPROATE SERPL-MCNC: 61 UG/ML (ref 50–100)
VANCOMYCIN SERPL-MCNC: 17.7 UG/ML (ref 5–40)
VANCOMYCIN SERPL-MCNC: 17.7 UG/ML (ref 5–40)
VENTILATION MODE VENT: ABNORMAL
VENTILATION MODE VENT: ABNORMAL
VIT B12 SERPL-MCNC: 576 PG/ML (ref 211–911)
VIT B12 SERPL-MCNC: 576 PG/ML (ref 211–911)
VT SETTING VENT: 420
VT SETTING VENT: 420
VZV DNA CSF QL NAA+NON-PROBE: NOT DETECTED
VZV DNA CSF QL NAA+NON-PROBE: NOT DETECTED
WBC # BLD AUTO: 12.5 K/UL (ref 4.6–13.2)
WBC # BLD AUTO: 12.5 K/UL (ref 4.6–13.2)

## 2025-05-09 PROCEDURE — 2700000000 HC OXYGEN THERAPY PER DAY

## 2025-05-09 PROCEDURE — 85025 COMPLETE CBC W/AUTO DIFF WBC: CPT

## 2025-05-09 PROCEDURE — 6370000000 HC RX 637 (ALT 250 FOR IP): Performed by: NURSE PRACTITIONER

## 2025-05-09 PROCEDURE — 89220 SPUTUM SPECIMEN COLLECTION: CPT

## 2025-05-09 PROCEDURE — 6360000002 HC RX W HCPCS: Performed by: NURSE PRACTITIONER

## 2025-05-09 PROCEDURE — 80202 ASSAY OF VANCOMYCIN: CPT

## 2025-05-09 PROCEDURE — 6360000002 HC RX W HCPCS: Performed by: PHYSICIAN ASSISTANT

## 2025-05-09 PROCEDURE — 84295 ASSAY OF SERUM SODIUM: CPT

## 2025-05-09 PROCEDURE — 2580000003 HC RX 258: Performed by: INTERNAL MEDICINE

## 2025-05-09 PROCEDURE — 6370000000 HC RX 637 (ALT 250 FOR IP): Performed by: PHYSICIAN ASSISTANT

## 2025-05-09 PROCEDURE — 2500000003 HC RX 250 WO HCPCS: Performed by: PSYCHIATRY & NEUROLOGY

## 2025-05-09 PROCEDURE — 87070 CULTURE OTHR SPECIMN AEROBIC: CPT

## 2025-05-09 PROCEDURE — 82803 BLOOD GASES ANY COMBINATION: CPT

## 2025-05-09 PROCEDURE — 82607 VITAMIN B-12: CPT

## 2025-05-09 PROCEDURE — 94003 VENT MGMT INPAT SUBQ DAY: CPT

## 2025-05-09 PROCEDURE — 82330 ASSAY OF CALCIUM: CPT

## 2025-05-09 PROCEDURE — 70551 MRI BRAIN STEM W/O DYE: CPT

## 2025-05-09 PROCEDURE — 2580000003 HC RX 258: Performed by: PHYSICIAN ASSISTANT

## 2025-05-09 PROCEDURE — 84443 ASSAY THYROID STIM HORMONE: CPT

## 2025-05-09 PROCEDURE — APPSS15 APP SPLIT SHARED TIME 0-15 MINUTES: Performed by: PHYSICIAN ASSISTANT

## 2025-05-09 PROCEDURE — 82962 GLUCOSE BLOOD TEST: CPT

## 2025-05-09 PROCEDURE — 2500000003 HC RX 250 WO HCPCS: Performed by: NURSE PRACTITIONER

## 2025-05-09 PROCEDURE — 80048 BASIC METABOLIC PNL TOTAL CA: CPT

## 2025-05-09 PROCEDURE — 84132 ASSAY OF SERUM POTASSIUM: CPT

## 2025-05-09 PROCEDURE — 99291 CRITICAL CARE FIRST HOUR: CPT | Performed by: INTERNAL MEDICINE

## 2025-05-09 PROCEDURE — 94761 N-INVAS EAR/PLS OXIMETRY MLT: CPT

## 2025-05-09 PROCEDURE — 82947 ASSAY GLUCOSE BLOOD QUANT: CPT

## 2025-05-09 PROCEDURE — 80164 ASSAY DIPROPYLACETIC ACD TOT: CPT

## 2025-05-09 PROCEDURE — 84100 ASSAY OF PHOSPHORUS: CPT

## 2025-05-09 PROCEDURE — 82550 ASSAY OF CK (CPK): CPT

## 2025-05-09 PROCEDURE — 36600 WITHDRAWAL OF ARTERIAL BLOOD: CPT

## 2025-05-09 PROCEDURE — 94640 AIRWAY INHALATION TREATMENT: CPT

## 2025-05-09 PROCEDURE — 95822 EEG COMA OR SLEEP ONLY: CPT

## 2025-05-09 PROCEDURE — 6370000000 HC RX 637 (ALT 250 FOR IP): Performed by: INTERNAL MEDICINE

## 2025-05-09 PROCEDURE — 6360000002 HC RX W HCPCS: Performed by: INTERNAL MEDICINE

## 2025-05-09 PROCEDURE — 2580000003 HC RX 258: Performed by: PSYCHIATRY & NEUROLOGY

## 2025-05-09 PROCEDURE — 95816 EEG AWAKE AND DROWSY: CPT | Performed by: PSYCHIATRY & NEUROLOGY

## 2025-05-09 PROCEDURE — 2000000000 HC ICU R&B

## 2025-05-09 PROCEDURE — 82746 ASSAY OF FOLIC ACID SERUM: CPT

## 2025-05-09 PROCEDURE — 84439 ASSAY OF FREE THYROXINE: CPT

## 2025-05-09 PROCEDURE — 36415 COLL VENOUS BLD VENIPUNCTURE: CPT

## 2025-05-09 PROCEDURE — 71045 X-RAY EXAM CHEST 1 VIEW: CPT

## 2025-05-09 PROCEDURE — 83735 ASSAY OF MAGNESIUM: CPT

## 2025-05-09 PROCEDURE — 2500000003 HC RX 250 WO HCPCS: Performed by: PHYSICIAN ASSISTANT

## 2025-05-09 PROCEDURE — 87205 SMEAR GRAM STAIN: CPT

## 2025-05-09 PROCEDURE — 6360000002 HC RX W HCPCS

## 2025-05-09 PROCEDURE — 85014 HEMATOCRIT: CPT

## 2025-05-09 PROCEDURE — 83605 ASSAY OF LACTIC ACID: CPT

## 2025-05-09 RX ORDER — THIAMINE HYDROCHLORIDE 100 MG/ML
200 INJECTION, SOLUTION INTRAMUSCULAR; INTRAVENOUS DAILY
Status: DISCONTINUED | OUTPATIENT
Start: 2025-05-10 | End: 2025-05-12

## 2025-05-09 RX ORDER — MAGNESIUM SULFATE HEPTAHYDRATE 40 MG/ML
2000 INJECTION, SOLUTION INTRAVENOUS ONCE
Status: COMPLETED | OUTPATIENT
Start: 2025-05-09 | End: 2025-05-09

## 2025-05-09 RX ORDER — FOLIC ACID 5 MG/ML
1 INJECTION, SOLUTION INTRAMUSCULAR; INTRAVENOUS; SUBCUTANEOUS DAILY
Status: DISCONTINUED | OUTPATIENT
Start: 2025-05-09 | End: 2025-05-12

## 2025-05-09 RX ORDER — BUDESONIDE 1 MG/2ML
1 INHALANT ORAL
Status: DISCONTINUED | OUTPATIENT
Start: 2025-05-09 | End: 2025-05-16

## 2025-05-09 RX ORDER — FENTANYL CITRATE 50 UG/ML
50 INJECTION, SOLUTION INTRAMUSCULAR; INTRAVENOUS ONCE
Status: COMPLETED | OUTPATIENT
Start: 2025-05-09 | End: 2025-05-09

## 2025-05-09 RX ORDER — THIAMINE HYDROCHLORIDE 100 MG/ML
400 INJECTION, SOLUTION INTRAMUSCULAR; INTRAVENOUS EVERY 8 HOURS
Status: COMPLETED | OUTPATIENT
Start: 2025-05-09 | End: 2025-05-09

## 2025-05-09 RX ORDER — FENTANYL CITRATE 50 UG/ML
100 INJECTION, SOLUTION INTRAMUSCULAR; INTRAVENOUS ONCE
Status: COMPLETED | OUTPATIENT
Start: 2025-05-09 | End: 2025-05-09

## 2025-05-09 RX ORDER — MULTIVIT-MIN/FERROUS GLUCONATE 9 MG/15 ML
15 LIQUID (ML) ORAL DAILY
Status: DISCONTINUED | OUTPATIENT
Start: 2025-05-09 | End: 2025-05-16

## 2025-05-09 RX ORDER — FENTANYL CITRATE 50 UG/ML
100 INJECTION, SOLUTION INTRAMUSCULAR; INTRAVENOUS ONCE
Status: DISCONTINUED | OUTPATIENT
Start: 2025-05-09 | End: 2025-05-09

## 2025-05-09 RX ADMIN — ARFORMOTEROL TARTRATE 15 MCG: 15 SOLUTION RESPIRATORY (INHALATION) at 19:09

## 2025-05-09 RX ADMIN — INSULIN LISPRO 4 UNITS: 100 INJECTION, SOLUTION INTRAVENOUS; SUBCUTANEOUS at 12:15

## 2025-05-09 RX ADMIN — THIAMINE HYDROCHLORIDE 400 MG: 100 INJECTION, SOLUTION INTRAMUSCULAR; INTRAVENOUS at 14:17

## 2025-05-09 RX ADMIN — BUDESONIDE 1 MG: 1 SUSPENSION RESPIRATORY (INHALATION) at 08:10

## 2025-05-09 RX ADMIN — POTASSIUM CHLORIDE 10 MEQ: 7.46 INJECTION, SOLUTION INTRAVENOUS at 05:01

## 2025-05-09 RX ADMIN — SODIUM CHLORIDE, PRESERVATIVE FREE 10 ML: 5 INJECTION INTRAVENOUS at 07:59

## 2025-05-09 RX ADMIN — ACYCLOVIR SODIUM 500 MG: 50 INJECTION, SOLUTION INTRAVENOUS at 09:56

## 2025-05-09 RX ADMIN — CARVEDILOL 6.25 MG: 6.25 TABLET, FILM COATED ORAL at 07:58

## 2025-05-09 RX ADMIN — Medication 15 ML: at 12:25

## 2025-05-09 RX ADMIN — SODIUM CHLORIDE, SODIUM LACTATE, POTASSIUM CHLORIDE, AND CALCIUM CHLORIDE: 600; 310; 30; 20 INJECTION, SOLUTION INTRAVENOUS at 12:05

## 2025-05-09 RX ADMIN — FOLIC ACID 1 MG: 5 INJECTION, SOLUTION INTRAMUSCULAR; INTRAVENOUS; SUBCUTANEOUS at 09:53

## 2025-05-09 RX ADMIN — IPRATROPIUM BROMIDE 0.5 MG: 0.5 SOLUTION RESPIRATORY (INHALATION) at 08:10

## 2025-05-09 RX ADMIN — BUDESONIDE 1 MG: 1 SUSPENSION RESPIRATORY (INHALATION) at 19:09

## 2025-05-09 RX ADMIN — SODIUM CHLORIDE, SODIUM LACTATE, POTASSIUM CHLORIDE, AND CALCIUM CHLORIDE 100 ML/HR: 600; 310; 30; 20 INJECTION, SOLUTION INTRAVENOUS at 22:32

## 2025-05-09 RX ADMIN — FENTANYL CITRATE 100 MCG: 0.05 INJECTION, SOLUTION INTRAMUSCULAR; INTRAVENOUS at 20:45

## 2025-05-09 RX ADMIN — HEPARIN SODIUM 5000 UNITS: 5000 INJECTION INTRAVENOUS; SUBCUTANEOUS at 22:30

## 2025-05-09 RX ADMIN — HEPARIN SODIUM 5000 UNITS: 5000 INJECTION INTRAVENOUS; SUBCUTANEOUS at 06:26

## 2025-05-09 RX ADMIN — MAGNESIUM SULFATE HEPTAHYDRATE 2000 MG: 40 INJECTION, SOLUTION INTRAVENOUS at 06:50

## 2025-05-09 RX ADMIN — HEPARIN SODIUM 5000 UNITS: 5000 INJECTION INTRAVENOUS; SUBCUTANEOUS at 14:17

## 2025-05-09 RX ADMIN — SODIUM CHLORIDE, PRESERVATIVE FREE 10 ML: 5 INJECTION INTRAVENOUS at 20:53

## 2025-05-09 RX ADMIN — VALPROATE SODIUM 500 MG: 100 INJECTION, SOLUTION INTRAVENOUS at 07:58

## 2025-05-09 RX ADMIN — POTASSIUM BICARBONATE 20 MEQ: 782 TABLET, EFFERVESCENT ORAL at 06:48

## 2025-05-09 RX ADMIN — ARFORMOTEROL TARTRATE 15 MCG: 15 SOLUTION RESPIRATORY (INHALATION) at 08:09

## 2025-05-09 RX ADMIN — FENTANYL CITRATE 50 MCG: 0.05 INJECTION, SOLUTION INTRAMUSCULAR; INTRAVENOUS at 15:58

## 2025-05-09 RX ADMIN — THIAMINE HYDROCHLORIDE 400 MG: 100 INJECTION, SOLUTION INTRAMUSCULAR; INTRAVENOUS at 06:47

## 2025-05-09 RX ADMIN — CHLORHEXIDINE GLUCONATE 0.12% ORAL RINSE 15 ML: 1.2 LIQUID ORAL at 20:46

## 2025-05-09 RX ADMIN — CEFEPIME 1000 MG: 1 INJECTION, POWDER, FOR SOLUTION INTRAMUSCULAR; INTRAVENOUS at 00:32

## 2025-05-09 RX ADMIN — SODIUM CHLORIDE, PRESERVATIVE FREE 20 MG: 5 INJECTION INTRAVENOUS at 07:59

## 2025-05-09 RX ADMIN — VALPROATE SODIUM 500 MG: 100 INJECTION, SOLUTION INTRAVENOUS at 22:43

## 2025-05-09 RX ADMIN — CHLORHEXIDINE GLUCONATE 0.12% ORAL RINSE 15 ML: 1.2 LIQUID ORAL at 07:58

## 2025-05-09 RX ADMIN — ACYCLOVIR SODIUM 500 MG: 50 INJECTION, SOLUTION INTRAVENOUS at 02:15

## 2025-05-09 RX ADMIN — CEFEPIME 1000 MG: 1 INJECTION, POWDER, FOR SOLUTION INTRAMUSCULAR; INTRAVENOUS at 12:21

## 2025-05-09 RX ADMIN — VALPROATE SODIUM 500 MG: 100 INJECTION, SOLUTION INTRAVENOUS at 15:05

## 2025-05-09 ASSESSMENT — PULMONARY FUNCTION TESTS
PIF_VALUE: 18
PIF_VALUE: 20
PEFR_L/MIN: 96
PIF_VALUE: 18
PIF_VALUE: 20
PIF_VALUE: 18
PIF_VALUE: 18

## 2025-05-09 NOTE — PROGRESS NOTES
No SBT at this time       05/09/25 0740   Adult IBW   Height 1.676 m (5' 6\")   IBW/kg (Calculated) 59.3   Patient Observation   Pulse (!) 111   Respirations 26   SpO2 97 %   Breath Sounds   Respiratory Pattern Regular   Breath Sounds Bilateral Clear;Crackles    Vent Information   Ventilator Day(s) 4   Ventilator ID [id number]   Ventilator Safety Check Performed Pre-Use Yes   Vent Mode AC/PRVC   Ventilator Settings   Target Vt 420   Resp Rate (Set) 14 bpm   PEEP/CPAP (cmH2O) 5   FiO2  30 %   Insp Time (sec) 0.9 sec   Vent Patient Data (Readings)   Vt (Measured) 402 mL   Peak Inspiratory Pressure (cmH2O) 18 cmH2O   Rate Measured 14 br/min   Minute Volume (L/min) 6.14 Liters   Mean Airway Pressure (cmH2O) 7.9 cmH20   Plateau Pressure (cm H2O) 17 cm H2O   Driving Pressure 12   Inspiratory Time 0.9 sec   I:E Ratio 1:3   Flow Sensitivity 3 L/min   Disconnect Sensitivity (%) 75 %   Static Compliance (L/cm H2O) 34   Dynamic Compliance (L/cm H2O) 38 L/cm H2O   Airway Resistance 9   Insp Rise Time (%) 50 %   Backup Apnea On   Backup Rate 14 Breaths Per Minute   Backup Vt 420   Backup I Time 1   Vent Alarm Settings   Low Pressure (cmH2O) 10.5 cmH2O   High Pressure (cmH2O) 40 cmH2O   Low Minute Volume (lpm) 2 L/min   High Minute Volume (lpm) 16 L/min   Low Exhaled Vt (ml) 200 mL   High Exhaled Vt (ml) 800 mL   RR High (bpm) 40 br/min   Apnea (secs) 20 secs   Additional Respiratoray Assessments   Humidification Source Heated wire   Humidification Temp 36.9   Circuit Condensation Drained   Ambu Bag With Mask At Bedside Yes   ETT    Placement Date/Time: 05/06/25 2225   Present on Admission/Arrival: No  Placed By: In ED  Placement Verified By: Auscultation;Colorimetric ETCO2 device  Preoxygenation: Yes  Airway Type: Cuffed  Airway Tube Size: 7.5 mm  Laryngoscope: Mac  Blade Size: ...   Secured At 23 cm   Measured From Lips   ETT Placement (S)  Left   Secured By Commercial tube cervantes   Site Assessment Dry   Cuff Pressure 25 cm H2O

## 2025-05-09 NOTE — PROGRESS NOTES
VENTILATOR CARE PLAN    Problem: Ventilator Management  Goal: *Adequate oxygenation/ ventilation/ and extubation      Patient:        Juliana Loaiza     74 y.o.   female     5/9/2025  2:43 AM  Patient Active Problem List   Diagnosis    Coma (HCC)    Acute encephalopathy    Acute hypoxemic respiratory failure (HCC)    Severe sepsis (HCC)    Polysubstance abuse (HCC)    Transaminitis    Elevated CK    IRA (acute kidney injury)    Aspiration pneumonia of both lungs (HCC)    Lactic acidosis    Metabolic acidosis    Rhinovirus       Coma (HCC) [R40.20]  Respiratory arrest (HCC) [R09.2]  [place] coma scale total score 3-8, in the field (EMT or ambulance) ([facility]) [R40.2431]    Reason patient intubated:     Acute on chronic respiratory failure, unclear if hypercapnic or hypoxic on arrival-intubated for airway protection, Home baseline is 6 Lpm oxygen  Acute encephalopathy-toxic/metabolic in nature, patient unresponsive with pinpoint pupils, etiology unclear--?CVA vs. Drug toxicity vs. Mediation induced (patient on multiple sedating medications, such as Cymbalta, Remeron, Lyrica, Requip, and Zoloft-->also places patient at risk for serotonin syndrome), vs. Hypoxia vs. Uremia vs. Sepsis vs. Drug OD     Ventilator day: 4     Ventilator settings: AC/VC+ 14/420/+5/30%     ETT Size/Placement: 7.5 ETT 23@lips     Wean Screen Pass (Yes or No): No  Wean Screen Reason for Failure: Mentation, Not following commands, responds to noxious stimuli  Duration of Weaning Trial: N/A  Additional Comments: LP obtained, neuro consulted, CT neg-consider MRI         PLAN OF CARE: Maintain adequate ventilation and oxygenation. Wean vent and SBT as tolerated        GOAL: Extubation        OUTCOME:  Not progressing       ABG:  Date:5/9/2025   Latest Reference Range & Units 05/09/25 02:59   Base Excess mmol/L 2.6   Geo Test -   Positive   FIO2 Arterial % 30   DEVICE -   ADULT VENT   Site -   RIGHT RADIAL   Mode -   Pressure regulated volume control

## 2025-05-09 NOTE — PROGRESS NOTES
Spiritual Health History and Assessment/Progress Note  [facility]    Attempted Encounter,  ,  ,      Name: Juliana Loaiza MRN: [id number]    Age: 74 y.o.     Sex: female   Language: English   Moravian: No Adventism on file   Coma (HCC)     Date: 5/9/2025            Total Time Calculated: 4 min              Spiritual Assessment began in [facility] 3 INTENSIVE CARE UNIT        Referral/Consult From: Rounding   Encounter Overview/Reason: Attempted Encounter  Service Provided For: Patient    Cherie, Belief, Meaning:   Patient unable to assess at this time  Family/Friends No family/friends present      Importance and Influence:  Patient unable to assess at this time  Family/Friends No family/friends present    Community:  Patient Other: spouse  Family/Friends No family/friends present    Assessment and Plan of Care:     Patient Interventions include: Provided sacramental/Jew ritual  Family/Friends Interventions include: No family/friends present    Patient Plan of Care: Spiritual Care available upon further referral  Family/Friends Plan of Care: No family/friends present    Electronically signed by Chaplain Jamia on 5/9/2025 at 6:16 PM

## 2025-05-09 NOTE — PLAN OF CARE
Problem: Skin/Tissue Integrity - Adult  Goal: Skin integrity remains intact  Description: 1.  Monitor for areas of redness and/or skin breakdown2.  Assess vascular access sites hourly3.  Every 4-6 hours minimum:  Change oxygen saturation probe site4.  Every 4-6 hours:  If on nasal continuous positive airway pressure, respiratory therapy assess nares and determine need for appliance change or resting period  Outcome: Progressing  Flowsheets (Taken 5/9/2025 0800)  Skin Integrity Remains Intact:   Monitor for areas of redness and/or skin breakdown   Assess vascular access sites hourly   Every 4-6 hours minimum:  Change oxygen saturation probe site   Turn and reposition as indicated   Positioning devices   Check visual cues for pain   Monitor skin under medical devices     Problem: Skin/Tissue Integrity  Goal: Skin integrity remains intact  Description: 1.  Monitor for areas of redness and/or skin breakdown2.  Assess vascular access sites hourly3.  Every 4-6 hours minimum:  Change oxygen saturation probe site4.  Every 4-6 hours:  If on nasal continuous positive airway pressure, respiratory therapy assess nares and determine need for appliance change or resting period  Outcome: Progressing  Flowsheets (Taken 5/9/2025 0800)  Skin Integrity Remains Intact:   Monitor for areas of redness and/or skin breakdown   Assess vascular access sites hourly   Every 4-6 hours minimum:  Change oxygen saturation probe site   Turn and reposition as indicated   Positioning devices   Check visual cues for pain   Monitor skin under medical devices

## 2025-05-09 NOTE — PROGRESS NOTES
MRI screening form needs to be filled out and faxed to [phone] BEFORE MRI can be scheduled.  If unable to obtain information from patient , [employer] needs to be contacted . If patient is claustrophobic or will needs pain meds, please have ordered in advance in order to facilitate exam.

## 2025-05-09 NOTE — PROGRESS NOTES
Nutrition Note      Nutrition Recommendations/Plan:   Modify tube feeding regimen:   Formula: Glucerna 1.5 (Diabetic)  Goal Rate: 45 ml/hr x 20 hours (for anticipation of holding tube feeds for standard nursing care)  Water Flushes: 75 mL q 4 hours (450 mL total)  Goal Regimen Provides (with modulars):  1350 kcal, 74g protein, 1134 ml free water, 90% RDIs  Continue folic acid, thiamine supplementation. Order multivitamin supplementation daily to meet RDIs.    IVF per MD.  Daily wts.   Continue to monitor tolerance of EN, weight, labs, and plan of care during admission.      Admitted after being found unresponsive. On arrival to ER, pt was emergently intubated for airway protection 5/6. Nephrology follow, IRA (baseline unclear). S/p LP. Discussed care during interdisciplinary rounds. Per RN, pt tolerating feeds at goal rate, minimal OP of FMS, receiving lytes replacements. Plan to modify TF order.    Estimated Daily Nutrient Needs: 49.4 kg  Energy Requirements Based On: Formula  Weight Used for Energy Requirements: Current  Energy (kcal/day): 8818-5849 ([id number])  Weight Used for Protein Requirements: Current  Protein (g/day): 59-99  Method Used for Fluid Requirements: Standard renal  Fluid (ml/day): 500-1000+UOP    Nutrition Related Findings:   Pertinent Meds:   Cefepime  Pepcid  Folic acid  Heparin  2g Mg sulf  Thiamine  LR (held)   Pertinent Labs: folate 3.4 L  Recent Labs     05/08/25  0148 05/08/25  0347 05/09/25  0115 05/09/25  0259   GLUCOSE 116*  --  137*  --    BUN 36*  --  35*  --    CREATININE 3.05*   < > 2.37* 2.15*     --  139  --    K 4.0  --  3.5  --      --  101  --    CO2 20*  --  23  --    CALCIUM 8.4*  --  7.5*  --    PHOS 5.2*  --  3.8  --    MG 1.8  --  1.7  --     < > = values in this interval not displayed.     Recent Labs     05/08/25  0347 05/08/25  1210 05/08/25  1213 05/08/25  1748 05/08/25  2321 05/09/25  0259 05/09/25  0533 05/09/25  1108   POCGLU 129* 199* 182* 133* 139* 143*

## 2025-05-09 NOTE — PROGRESS NOTES
[facility].  Pulmonary, Critical Care, and Sleep Medicine    Name: Juliana Loaiza MRN: [id number]   : 1950 Hospital: [facility]   Date: 2025  Admission Date: 2025     Chart and notes reviewed. Data reviewed. I have evaluated all findings.    [x]I have reviewed the flowsheet and previous day’s notes.    [x]The patient is unable to give any meaningful history or review of systems because the patient is:  [x]Intubated []Sedated   []Unresponsive      [x]The patient is critically ill on      [x]Mechanical ventilation []Pressors   []BiPAP []     IMPRESSION:   Acute on chronic respiratory failure, unclear if hypercapnic or hypoxic on arrival-intubated for airway protection, Home baseline is 6 Lpm oxygen  Acute encephalopathy-toxic/metabolic in nature, patient unresponsive with pinpoint pupils, etiology unclear--?CVA vs. Drug toxicity vs. Medication related (patient on multiple sedating medications, such as Cymbalta, Remeron, Lyrica, Requip, and Zoloft-->also places patient at risk for serotonin syndrome), vs. Hypoxia vs. Uremia vs. Sepsis vs. Drug OD, LP completed , POC EEG with concern for focal seizure   CAP vs. Aspiration PNA-extensive infiltrates on CT chest A/P, primarily RUL/RLL   Rhinovirus- +RVP    Sepsis 2/2 pneumonia and UTI   IRA-prerenal azotemia vs. ischemic ATN  HAGMA  Rhabdo, improving    Mild transaminitis   Nonobstructive CAD: [facility] 2024 RCA with midsegment 20% stenosis. LM patent, LAD patent, Lcx patent. Echo 25 EF 50-55%, normal wall motion. RVSP 52mmHg  pAF: Currently in SR. OAC with Xarelto. Cardizem CD 120mg for rate control   HTN  DM II   GERD   Bronchiectasis   Sarcoidosis - diagnosed in  via punch biopsy  Chronic pain  Anxiety  Microcytic hypochromic anemia   Tobacco use history  History of polysubstance abuse-UDS  +cocaine   Hx  acute subsegmental LLL PE and DVT in the past, non-compliant with AC  Deconditioning, debility

## 2025-05-09 NOTE — PROGRESS NOTES
VENTILATOR CARE PLAN    Problem: Ventilator Management  Goal: *Adequate oxygenation/ ventilation/ and extubation      Patient:        Juliana Loaiza     74 y.o.   female     5/9/2025  10:30 AM  Patient Active Problem List   Diagnosis    Coma (HCC)    Acute encephalopathy    Acute hypoxemic respiratory failure (HCC)    Severe sepsis (HCC)    Polysubstance abuse (HCC)    Transaminitis    Elevated CK    IRA (acute kidney injury)    Aspiration pneumonia of both lungs (HCC)    Lactic acidosis    Metabolic acidosis    Rhinovirus       Coma (HCC) [R40.20]  Respiratory arrest (HCC) [R09.2]  Marvin coma scale total score 3-8, in the field (EMT or ambulance) ([facility]) [R40.2431]    Reason patient intubated:     Acute on chronic respiratory failure, unclear if hypercapnic or hypoxic on arrival-intubated for airway protection, Home baseline is 6 Lpm oxygen  Acute encephalopathy-toxic/metabolic in nature, patient unresponsive with pinpoint pupils, etiology unclear--?CVA vs. Drug toxicity vs. Mediation induced (patient on multiple sedating medications, such as Cymbalta, Remeron, Lyrica, Requip, and Zoloft-->also places patient at risk for serotonin syndrome), vs. Hypoxia vs. Uremia vs. Sepsis vs. Drug OD    Ventilator day: 4    Ventilator settings: VC/PRVC - 14 / 390 / 30% / PEEP 5    ETT Size/Placement:  7.5 @ 23 at the lips     Wean Screen Pass (Yes or No): No  Wean Screen Reason for Failure: Mentation, patient does not follow commands  Duration of Weaning Trial: N/A  Additional Comments:  MRI planned for today      PLAN OF CARE:  Continue full ventilatory support.  Maintain adequate ventilation and oxygenation.  Wean when appropriate     GOAL:  Ventilator liberation / Extubation    OUTCOME: Not progressing    ABG:  Date:5/9/2025   Latest Reference Range & Units 05/09/25 02:59   POC pH 7.35 - 7.45   7.49 (H)   POC pCO2 35.0 - 48.0 MMHG 34.7 (L)   POC PO2 83 - 108 MMHG 112 (H)   POC HCO3 21 - 28 MMOL/L 26.2   POC O2 SAT 94 - 98 %  99 (H)   POC TCO2 22 - 29 MMOL/L 25   POC PEEP/CPA -   5   POC TIDAL VOLUME -   420   Base Excess mmol/L 2.6   (H): Data is abnormally high  (L): Data is abnormally low    Tidal volume decreased to 390    Chest X-ray:  Date:5/9/2025    FINDINGS: There is an endotracheal tube with the tip about 2 cm above the jaclyn  and a gastric tube with the tip below the diaphragm and off the bottom of the  image.     There is persistent streaky opacities in the right lung base and possibly also  in the retrocardiac left base. No gross consolidations are seen. There is  prominent hilar pulmonary vasculature, bilaterally.  The cardiomediastinal  silhouette is normal.  The bones and soft tissues are unremarkable. There is  probably little interval change.     IMPRESSION:     Intubated; at least 2 cm above the jaclyn. Gastric tube is unchanged.     Persistent bibasilar interstitial/streaky opacities, right somewhat more  prominent than left. Probable bilateral vascular congestion.      Lab Test:  Date:5/9/2025  WBC:   Lab Results   Component Value Date/Time    WBC 12.5 05/09/2025 01:15 AM   HGB:   Lab Results   Component Value Date/Time    HGB 7.7 05/09/2025 01:15 AM    PLTS:   Lab Results   Component Value Date/Time     05/09/2025 01:15 AM         Vital Signs:   Patient Vitals for the past 8 hrs:   Temp Pulse Resp BP SpO2   05/09/25 1000 99.5 °F (37.5 °C) 99 25 139/85 96 %   05/09/25 0900 99.7 °F (37.6 °C) 90 16 (!) 109/48 96 %   05/09/25 0800 99.7 °F (37.6 °C) 97 15 (!) 145/60 96 %   05/09/25 0758 -- 97 15 127/73 96 %   05/09/25 0740 -- (!) 111 26 -- 97 %   05/09/25 0700 99.3 °F (37.4 °C) (!) 108 19 (!) 112/52 96 %   05/09/25 0630 99.1 °F (37.3 °C) (!) 109 18 (!) 142/80 95 %   05/09/25 0600 99.1 °F (37.3 °C) 99 18 134/62 96 %   05/09/25 0400 99.3 °F (37.4 °C) 93 18 136/67 97 %   05/09/25 0345 99.5 °F (37.5 °C) 98 16 124/73 97 %   05/09/25 0330 99.3 °F (37.4 °C) 92 15 125/68 96 %   05/09/25 0315 99.5 °F (37.5 °C) 99 14 (!)

## 2025-05-09 NOTE — PROGRESS NOTES
In Patient Progress note      Admit Date: 5/6/2025          Impression:        IRA - kidney function is looking better with continued downward trend in serum Cr -> looks good today at 2.37mg/dl (from 3.05 yesterday).  Baseline Cr is unknown as I could not find any renal indices that predate this bout of IRA.  This IRA appears to be the result of rhabdo and she continues to receive fairly aggressive IVF's (/hr).  UA is very typical of rhabdo with moderate blood and no RBC's.  Outputs are excellent as patient voided 3.2L yesterday per I/O's.    Rhabdo - suspect this is drug-related as she did test positive for cocaine on urine tox screen.  This drug is commonly associated with rhabdo and IRA.  CPK level is trending in the right direction as well as the serum Cr, looks good today at 4800 (14K admit).  Lytes all looking good.  K and Ca and phos all pretty normal.   Acidosis - resolved.  CO2 looking better at 23meq/l this am with fairly normal AG.  BG this am actually shows a slightly alkalotic pH (7.49) due to respiratory alkalosis.  Respiratory failure - she remains on the ventilator.  ABG looks good overall.  CXR shows some basilar infiltrates, ? congestion but no overt pulmonary edema.    Anemia - Hgb 7.7g/dl this am.  This has drifted down over the last few days though this may be partly dilutional from the IVF's.  WBC and platelets noted to be normal.        Plan:     I think the LR is a good fluid choice in this patient so we'll stick with this. We can cut back slightly on the rate at this point since kidneys have stabilized and hemodynamics look good.  I'll reduce to 100/hr.    I do NOT think she needs any further diuretic.  Outputs are excellent on their own, and I expect she'll continue to auto-diurese as GFR recovers.  She was actually in a negative fluid balance yesterday despite getting the IVF's at 125/hr.    Meds reviewed and look okay.  Other than the change in IVF rate, I'm not going to make

## 2025-05-09 NOTE — PROCEDURES
PROCEDURE NOTE  Date: 5/9/2025   Name: Juliana Loaiza  YOB: 1950    Procedures        Routine EEG Report    Ordering Physician:       Reason for EEG:     Technical Summary: This EEG was performed with a 32-channel digital EEG machine with electrodes placed according to the international 10-20 system of placement.  The procedure was performed the patient and the intubated state.          Background:   In the maximally alert state, there is no posterior dominant rhythm noted the background is consistent with moderately severe generalized slowing with intermittent triphasic waves    Hyperventilation not performed  Photic Stimulation: Photic stimulation was performed at various flash frequencies and no change in background noted    Sleep: Not captured    Impression: This EEG is abnormal due to moderately severe generalized slowing. Findings non specific in etiology and may be related to toxic metabolic encephalopathies, neurodegenerative disorders, medications or any causes of bi hemispheric dysfunction.  The presence of triphasic waves suggesting underlying toxic metabolic encephalopathy.  No focal epileptiform discharges or electrographic seizures noted.

## 2025-05-09 NOTE — ED NOTES
[facility]   BMI: BMI (kg/m2): 32.1 (04-06-23 @ 09:18)  HbA1c: A1C with Estimated Average Glucose Result: 9.2 % (04-07-23 @ 08:11)    Glucose: POCT Blood Glucose.: 247 mg/dL (04-13-23 @ 11:14)    BP: 133/68 (04-13-23 @ 08:36) (113/74 - 150/90)  Lipid Panel:

## 2025-05-09 NOTE — PROGRESS NOTES
RT assisted in transport of patient on oxygen equipment.  Dual verification of equipment completed with RN.    Transported to MRI and back to ICU.    No negative events. Vitals remained stable.

## 2025-05-10 ENCOUNTER — APPOINTMENT (OUTPATIENT)
Facility: HOSPITAL | Age: 75
End: 2025-05-10
Attending: INTERNAL MEDICINE
Payer: MEDICARE

## 2025-05-10 ENCOUNTER — APPOINTMENT (OUTPATIENT)
Facility: HOSPITAL | Age: 75
End: 2025-05-10
Payer: MEDICARE

## 2025-05-10 PROBLEM — I63.413 CEREBROVASCULAR ACCIDENT (CVA) DUE TO BILATERAL EMBOLISM OF MIDDLE CEREBRAL ARTERIES (HCC): Status: ACTIVE | Noted: 2025-05-10

## 2025-05-10 PROBLEM — R09.2 RESPIRATORY ARREST (HCC): Status: ACTIVE | Noted: 2025-01-01

## 2025-05-10 PROBLEM — I63.413 CEREBROVASCULAR ACCIDENT (CVA) DUE TO BILATERAL EMBOLISM OF MIDDLE CEREBRAL ARTERIES (HCC): Status: ACTIVE | Noted: 2025-01-01

## 2025-05-10 PROBLEM — R09.2 RESPIRATORY ARREST (HCC): Status: ACTIVE | Noted: 2025-05-10

## 2025-05-10 LAB
ACETONE BLOOD: NOT DETECTED MG/L
ACETONE BLOOD: NOT DETECTED MG/L
ALBUMIN SERPL-MCNC: 1.6 G/DL (ref 3.4–5)
ALBUMIN SERPL-MCNC: 1.6 G/DL (ref 3.4–5)
ANION GAP BLD CALC-SCNC: ABNORMAL MMOL/L (ref 10–20)
ANION GAP BLD CALC-SCNC: ABNORMAL MMOL/L (ref 10–20)
ANION GAP SERPL CALC-SCNC: 14 MMOL/L (ref 3–18)
ANION GAP SERPL CALC-SCNC: 14 MMOL/L (ref 3–18)
ARTERIAL PATENCY WRIST A: POSITIVE
ARTERIAL PATENCY WRIST A: POSITIVE
BASE EXCESS BLD CALC-SCNC: 6.7 MMOL/L
BASE EXCESS BLD CALC-SCNC: 6.7 MMOL/L
BASOPHILS # BLD: 0.03 K/UL (ref 0–0.1)
BASOPHILS # BLD: 0.03 K/UL (ref 0–0.1)
BASOPHILS NFR BLD: 0.2 % (ref 0–2)
BASOPHILS NFR BLD: 0.2 % (ref 0–2)
BDY SITE: ABNORMAL
BDY SITE: ABNORMAL
BUN SERPL-MCNC: 35 MG/DL (ref 6–23)
BUN SERPL-MCNC: 35 MG/DL (ref 6–23)
BUN/CREAT SERPL: 20 (ref 12–20)
BUN/CREAT SERPL: 20 (ref 12–20)
CA-I BLD-MCNC: 1.08 MMOL/L (ref 1.15–1.33)
CA-I BLD-MCNC: 1.08 MMOL/L (ref 1.15–1.33)
CALCIUM SERPL-MCNC: 8.2 MG/DL (ref 8.5–10.1)
CALCIUM SERPL-MCNC: 8.2 MG/DL (ref 8.5–10.1)
CHAIN OF CUSTODY?: NO
CHAIN OF CUSTODY?: NO
CHLORIDE BLD-SCNC: 105 MMOL/L (ref 98–107)
CHLORIDE BLD-SCNC: 105 MMOL/L (ref 98–107)
CHLORIDE SERPL-SCNC: 102 MMOL/L (ref 98–107)
CHLORIDE SERPL-SCNC: 102 MMOL/L (ref 98–107)
CHOLEST SERPL-MCNC: 98 MG/DL
CHOLEST SERPL-MCNC: 98 MG/DL
CK SERPL-CCNC: 3687 U/L (ref 26–192)
CK SERPL-CCNC: 3687 U/L (ref 26–192)
CO2 BLD-SCNC: 29 MMOL/L (ref 22–29)
CO2 BLD-SCNC: 29 MMOL/L (ref 22–29)
CO2 SERPL-SCNC: 25 MMOL/L (ref 21–32)
CO2 SERPL-SCNC: 25 MMOL/L (ref 21–32)
CREAT BLD-MCNC: 1.81 MG/DL (ref 0.6–1.3)
CREAT BLD-MCNC: 1.81 MG/DL (ref 0.6–1.3)
CREAT SERPL-MCNC: 1.78 MG/DL (ref 0.6–1.3)
CREAT SERPL-MCNC: 1.78 MG/DL (ref 0.6–1.3)
DIFFERENTIAL METHOD BLD: ABNORMAL
DIFFERENTIAL METHOD BLD: ABNORMAL
EOSINOPHIL # BLD: 0.14 K/UL (ref 0–0.4)
EOSINOPHIL # BLD: 0.14 K/UL (ref 0–0.4)
EOSINOPHIL NFR BLD: 1.1 % (ref 0–5)
EOSINOPHIL NFR BLD: 1.1 % (ref 0–5)
ERYTHROCYTE [DISTWIDTH] IN BLOOD BY AUTOMATED COUNT: 15.8 % (ref 11.6–14.5)
ERYTHROCYTE [DISTWIDTH] IN BLOOD BY AUTOMATED COUNT: 15.8 % (ref 11.6–14.5)
ETHANOL: <10 MG/L (ref 0–0.08)
ETHANOL: <10 MG/L (ref 0–0.08)
ETHYLENE GLYCOL: NORMAL MG/L
ETHYLENE GLYCOL: NORMAL MG/L
FIO2 ON VENT: 30 %
FIO2 ON VENT: 30 %
GAS FLOW.O2 O2 DELIVERY SYS: ABNORMAL
GAS FLOW.O2 O2 DELIVERY SYS: ABNORMAL
GLUCOSE BLD STRIP.AUTO-MCNC: 158 MG/DL (ref 70–110)
GLUCOSE BLD STRIP.AUTO-MCNC: 158 MG/DL (ref 70–110)
GLUCOSE BLD STRIP.AUTO-MCNC: 166 MG/DL (ref 70–110)
GLUCOSE BLD STRIP.AUTO-MCNC: 166 MG/DL (ref 70–110)
GLUCOSE BLD STRIP.AUTO-MCNC: 176 MG/DL (ref 70–110)
GLUCOSE BLD STRIP.AUTO-MCNC: 176 MG/DL (ref 70–110)
GLUCOSE BLD-MCNC: 151 MG/DL (ref 74–99)
GLUCOSE BLD-MCNC: 151 MG/DL (ref 74–99)
GLUCOSE SERPL-MCNC: 130 MG/DL (ref 74–108)
GLUCOSE SERPL-MCNC: 130 MG/DL (ref 74–108)
HCO3 BLD-SCNC: 30.1 MMOL/L (ref 21–28)
HCO3 BLD-SCNC: 30.1 MMOL/L (ref 21–28)
HCT VFR BLD AUTO: 25.6 % (ref 35–45)
HCT VFR BLD AUTO: 25.6 % (ref 35–45)
HDLC SERPL-MCNC: 27 MG/DL (ref 40–60)
HDLC SERPL-MCNC: 27 MG/DL (ref 40–60)
HDLC SERPL: 3.7 (ref 0–5)
HDLC SERPL: 3.7 (ref 0–5)
HGB BLD-MCNC: 8.1 G/DL (ref 12–16)
HGB BLD-MCNC: 8.1 G/DL (ref 12–16)
IMM GRANULOCYTES # BLD AUTO: 0.31 K/UL (ref 0–0.04)
IMM GRANULOCYTES # BLD AUTO: 0.31 K/UL (ref 0–0.04)
IMM GRANULOCYTES NFR BLD AUTO: 2.4 % (ref 0–0.5)
IMM GRANULOCYTES NFR BLD AUTO: 2.4 % (ref 0–0.5)
ISOPROPANOL: NOT DETECTED MG/L
ISOPROPANOL: NOT DETECTED MG/L
LACTATE BLD-SCNC: 1.69 MMOL/L (ref 0.4–2)
LACTATE BLD-SCNC: 1.69 MMOL/L (ref 0.4–2)
LDLC SERPL CALC-MCNC: 46 MG/DL (ref 0–100)
LDLC SERPL CALC-MCNC: 46 MG/DL (ref 0–100)
LYMPHOCYTES # BLD: 2 K/UL (ref 0.9–3.6)
LYMPHOCYTES # BLD: 2 K/UL (ref 0.9–3.6)
LYMPHOCYTES NFR BLD: 15.8 % (ref 21–52)
LYMPHOCYTES NFR BLD: 15.8 % (ref 21–52)
MAGNESIUM SERPL-MCNC: 2.3 MG/DL (ref 1.6–2.6)
MAGNESIUM SERPL-MCNC: 2.3 MG/DL (ref 1.6–2.6)
MCH RBC QN AUTO: 22.1 PG (ref 24–34)
MCH RBC QN AUTO: 22.1 PG (ref 24–34)
MCHC RBC AUTO-ENTMCNC: 31.6 G/DL (ref 31–37)
MCHC RBC AUTO-ENTMCNC: 31.6 G/DL (ref 31–37)
MCV RBC AUTO: 69.8 FL (ref 78–100)
MCV RBC AUTO: 69.8 FL (ref 78–100)
METHANOL: NOT DETECTED MG/L
METHANOL: NOT DETECTED MG/L
MONOCYTES # BLD: 0.73 K/UL (ref 0.05–1.2)
MONOCYTES # BLD: 0.73 K/UL (ref 0.05–1.2)
MONOCYTES NFR BLD: 5.8 % (ref 3–10)
MONOCYTES NFR BLD: 5.8 % (ref 3–10)
NEUTS SEG # BLD: 9.47 K/UL (ref 1.8–8)
NEUTS SEG # BLD: 9.47 K/UL (ref 1.8–8)
NEUTS SEG NFR BLD: 74.7 % (ref 40–73)
NEUTS SEG NFR BLD: 74.7 % (ref 40–73)
NRBC # BLD: 0.02 K/UL (ref 0–0.01)
NRBC # BLD: 0.02 K/UL (ref 0–0.01)
NRBC BLD-RTO: 0.2 PER 100 WBC
NRBC BLD-RTO: 0.2 PER 100 WBC
PCO2 BLD: 37 MMHG (ref 35–48)
PCO2 BLD: 37 MMHG (ref 35–48)
PEEP RESPIRATORY: 5
PEEP RESPIRATORY: 5
PH BLD: 7.52 (ref 7.35–7.45)
PH BLD: 7.52 (ref 7.35–7.45)
PHOSPHATE SERPL-MCNC: 3.1 MG/DL (ref 2.5–4.9)
PHOSPHATE SERPL-MCNC: 3.1 MG/DL (ref 2.5–4.9)
PLATELET # BLD AUTO: 182 K/UL (ref 135–420)
PLATELET # BLD AUTO: 182 K/UL (ref 135–420)
PO2 BLD: 107 MMHG (ref 83–108)
PO2 BLD: 107 MMHG (ref 83–108)
POTASSIUM BLD-SCNC: 3.8 MMOL/L (ref 3.5–5.1)
POTASSIUM BLD-SCNC: 3.8 MMOL/L (ref 3.5–5.1)
POTASSIUM SERPL-SCNC: 4.1 MMOL/L (ref 3.5–5.5)
POTASSIUM SERPL-SCNC: 4.1 MMOL/L (ref 3.5–5.5)
RBC # BLD AUTO: 3.67 M/UL (ref 4.2–5.3)
RBC # BLD AUTO: 3.67 M/UL (ref 4.2–5.3)
REPORT STATUS: NORMAL
REPORT STATUS: NORMAL
SAO2 % BLD: 99 % (ref 94–98)
SAO2 % BLD: 99 % (ref 94–98)
SERVICE CMNT-IMP: ABNORMAL
SERVICE CMNT-IMP: ABNORMAL
SODIUM BLD-SCNC: 140 MMOL/L (ref 136–145)
SODIUM BLD-SCNC: 140 MMOL/L (ref 136–145)
SODIUM SERPL-SCNC: 141 MMOL/L (ref 136–145)
SODIUM SERPL-SCNC: 141 MMOL/L (ref 136–145)
SPECIMEN SITE: ABNORMAL
SPECIMEN SITE: ABNORMAL
SPECIMEN SOURCE: NORMAL
SPECIMEN SOURCE: NORMAL
TRIGL SERPL-MCNC: 129 MG/DL (ref 0–150)
TRIGL SERPL-MCNC: 129 MG/DL (ref 0–150)
VENTILATION MODE VENT: ABNORMAL
VENTILATION MODE VENT: ABNORMAL
VLDLC SERPL CALC-MCNC: 26 MG/DL
VLDLC SERPL CALC-MCNC: 26 MG/DL
VT SETTING VENT: 390
VT SETTING VENT: 390
WBC # BLD AUTO: 12.7 K/UL (ref 4.6–13.2)
WBC # BLD AUTO: 12.7 K/UL (ref 4.6–13.2)

## 2025-05-10 PROCEDURE — 6370000000 HC RX 637 (ALT 250 FOR IP): Performed by: NURSE PRACTITIONER

## 2025-05-10 PROCEDURE — 93306 TTE W/DOPPLER COMPLETE: CPT

## 2025-05-10 PROCEDURE — 6370000000 HC RX 637 (ALT 250 FOR IP): Performed by: PHYSICIAN ASSISTANT

## 2025-05-10 PROCEDURE — 80061 LIPID PANEL: CPT

## 2025-05-10 PROCEDURE — 2000000000 HC ICU R&B

## 2025-05-10 PROCEDURE — 2500000003 HC RX 250 WO HCPCS: Performed by: PSYCHIATRY & NEUROLOGY

## 2025-05-10 PROCEDURE — 6370000000 HC RX 637 (ALT 250 FOR IP): Performed by: PSYCHIATRY & NEUROLOGY

## 2025-05-10 PROCEDURE — 82947 ASSAY GLUCOSE BLOOD QUANT: CPT

## 2025-05-10 PROCEDURE — 6370000000 HC RX 637 (ALT 250 FOR IP): Performed by: INTERNAL MEDICINE

## 2025-05-10 PROCEDURE — 94003 VENT MGMT INPAT SUBQ DAY: CPT

## 2025-05-10 PROCEDURE — 2580000003 HC RX 258: Performed by: INTERNAL MEDICINE

## 2025-05-10 PROCEDURE — 71045 X-RAY EXAM CHEST 1 VIEW: CPT

## 2025-05-10 PROCEDURE — 82550 ASSAY OF CK (CPK): CPT

## 2025-05-10 PROCEDURE — 85014 HEMATOCRIT: CPT

## 2025-05-10 PROCEDURE — 2500000003 HC RX 250 WO HCPCS: Performed by: NURSE PRACTITIONER

## 2025-05-10 PROCEDURE — 2580000003 HC RX 258: Performed by: PSYCHIATRY & NEUROLOGY

## 2025-05-10 PROCEDURE — 6360000002 HC RX W HCPCS: Performed by: INTERNAL MEDICINE

## 2025-05-10 PROCEDURE — 99291 CRITICAL CARE FIRST HOUR: CPT | Performed by: INTERNAL MEDICINE

## 2025-05-10 PROCEDURE — 6360000002 HC RX W HCPCS: Performed by: PHYSICIAN ASSISTANT

## 2025-05-10 PROCEDURE — 84132 ASSAY OF SERUM POTASSIUM: CPT

## 2025-05-10 PROCEDURE — 85025 COMPLETE CBC W/AUTO DIFF WBC: CPT

## 2025-05-10 PROCEDURE — 82330 ASSAY OF CALCIUM: CPT

## 2025-05-10 PROCEDURE — 83605 ASSAY OF LACTIC ACID: CPT

## 2025-05-10 PROCEDURE — 99291 CRITICAL CARE FIRST HOUR: CPT | Performed by: PSYCHIATRY & NEUROLOGY

## 2025-05-10 PROCEDURE — 82962 GLUCOSE BLOOD TEST: CPT

## 2025-05-10 PROCEDURE — 82803 BLOOD GASES ANY COMBINATION: CPT

## 2025-05-10 PROCEDURE — 83735 ASSAY OF MAGNESIUM: CPT

## 2025-05-10 PROCEDURE — 36415 COLL VENOUS BLD VENIPUNCTURE: CPT

## 2025-05-10 PROCEDURE — 80069 RENAL FUNCTION PANEL: CPT

## 2025-05-10 PROCEDURE — 94640 AIRWAY INHALATION TREATMENT: CPT

## 2025-05-10 PROCEDURE — 6360000002 HC RX W HCPCS: Performed by: NURSE PRACTITIONER

## 2025-05-10 PROCEDURE — 84295 ASSAY OF SERUM SODIUM: CPT

## 2025-05-10 PROCEDURE — 36600 WITHDRAWAL OF ARTERIAL BLOOD: CPT

## 2025-05-10 PROCEDURE — APPSS15 APP SPLIT SHARED TIME 0-15 MINUTES: Performed by: PHYSICIAN ASSISTANT

## 2025-05-10 RX ORDER — ASPIRIN 81 MG/1
81 TABLET, CHEWABLE ORAL DAILY
Status: DISCONTINUED | OUTPATIENT
Start: 2025-05-10 | End: 2025-05-16

## 2025-05-10 RX ADMIN — ASPIRIN 81 MG CHEWABLE TABLET 81 MG: 81 TABLET CHEWABLE at 14:05

## 2025-05-10 RX ADMIN — VALPROATE SODIUM 500 MG: 100 INJECTION, SOLUTION INTRAVENOUS at 22:53

## 2025-05-10 RX ADMIN — CEFEPIME 1000 MG: 1 INJECTION, POWDER, FOR SOLUTION INTRAMUSCULAR; INTRAVENOUS at 00:07

## 2025-05-10 RX ADMIN — BUDESONIDE 1 MG: 1 SUSPENSION RESPIRATORY (INHALATION) at 19:44

## 2025-05-10 RX ADMIN — HEPARIN SODIUM 5000 UNITS: 5000 INJECTION INTRAVENOUS; SUBCUTANEOUS at 06:41

## 2025-05-10 RX ADMIN — VALPROATE SODIUM 500 MG: 100 INJECTION, SOLUTION INTRAVENOUS at 06:50

## 2025-05-10 RX ADMIN — BUDESONIDE 1 MG: 1 SUSPENSION RESPIRATORY (INHALATION) at 07:49

## 2025-05-10 RX ADMIN — SODIUM CHLORIDE, SODIUM LACTATE, POTASSIUM CHLORIDE, AND CALCIUM CHLORIDE: 600; 310; 30; 20 INJECTION, SOLUTION INTRAVENOUS at 07:48

## 2025-05-10 RX ADMIN — HEPARIN SODIUM 5000 UNITS: 5000 INJECTION INTRAVENOUS; SUBCUTANEOUS at 14:05

## 2025-05-10 RX ADMIN — FOLIC ACID 1 MG: 5 INJECTION, SOLUTION INTRAMUSCULAR; INTRAVENOUS; SUBCUTANEOUS at 08:56

## 2025-05-10 RX ADMIN — SODIUM CHLORIDE, PRESERVATIVE FREE 10 ML: 5 INJECTION INTRAVENOUS at 21:18

## 2025-05-10 RX ADMIN — CEFEPIME 1000 MG: 1 INJECTION, POWDER, FOR SOLUTION INTRAMUSCULAR; INTRAVENOUS at 11:52

## 2025-05-10 RX ADMIN — ARFORMOTEROL TARTRATE 15 MCG: 15 SOLUTION RESPIRATORY (INHALATION) at 19:44

## 2025-05-10 RX ADMIN — HEPARIN SODIUM 5000 UNITS: 5000 INJECTION INTRAVENOUS; SUBCUTANEOUS at 22:14

## 2025-05-10 RX ADMIN — SODIUM CHLORIDE, PRESERVATIVE FREE 20 MG: 5 INJECTION INTRAVENOUS at 08:01

## 2025-05-10 RX ADMIN — CHLORHEXIDINE GLUCONATE 0.12% ORAL RINSE 15 ML: 1.2 LIQUID ORAL at 21:19

## 2025-05-10 RX ADMIN — CHLORHEXIDINE GLUCONATE 0.12% ORAL RINSE 15 ML: 1.2 LIQUID ORAL at 08:03

## 2025-05-10 RX ADMIN — SODIUM CHLORIDE, PRESERVATIVE FREE 10 ML: 5 INJECTION INTRAVENOUS at 08:08

## 2025-05-10 RX ADMIN — Medication 15 ML: at 08:01

## 2025-05-10 RX ADMIN — CARVEDILOL 6.25 MG: 6.25 TABLET, FILM COATED ORAL at 08:05

## 2025-05-10 RX ADMIN — THIAMINE HYDROCHLORIDE 200 MG: 100 INJECTION, SOLUTION INTRAMUSCULAR; INTRAVENOUS at 08:02

## 2025-05-10 RX ADMIN — VALPROATE SODIUM 500 MG: 100 INJECTION, SOLUTION INTRAVENOUS at 15:10

## 2025-05-10 RX ADMIN — SODIUM CHLORIDE, SODIUM LACTATE, POTASSIUM CHLORIDE, AND CALCIUM CHLORIDE: 600; 310; 30; 20 INJECTION, SOLUTION INTRAVENOUS at 22:53

## 2025-05-10 RX ADMIN — ARFORMOTEROL TARTRATE 15 MCG: 15 SOLUTION RESPIRATORY (INHALATION) at 07:49

## 2025-05-10 ASSESSMENT — PULMONARY FUNCTION TESTS
PIF_VALUE: 20
PIF_VALUE: 16
PIF_VALUE: 19
PIF_VALUE: 19

## 2025-05-10 NOTE — PLAN OF CARE
Problem: Discharge Planning  Goal: Discharge to home or other facility with appropriate resources  Outcome: Progressing  Flowsheets (Taken 5/10/2025 1035)  Discharge to home or other facility with appropriate resources:   Identify barriers to discharge with patient and caregiver   Identify discharge learning needs (meds, wound care, etc)  Note: Barriers right not would be pulmonary wise. Unsure if she will pass SBT or need further intervention     Problem: Safety - Adult  Goal: Free from fall injury  5/10/2025 1035 by Rupa Gorss, RN  Outcome: Progressing  Flowsheets (Taken 5/10/2025 1035)  Free From Fall Injury: Instruct family/caregiver on patient safety  Note: Bed alarm on. Bed in lowest position.   5/10/2025 0153 by Tricia Smith RN  Outcome: Progressing  Flowsheets (Taken 5/10/2025 0153)  Free From Fall Injury: Instruct family/caregiver on patient safety  5/9/2025 2340 by Tricia Smiht RN  Outcome: Progressing  Flowsheets (Taken 5/9/2025 2340)  Free From Fall Injury: Instruct family/caregiver on patient safety     Problem: Nutrition Deficit:  Goal: Optimize nutritional status  Outcome: Progressing  Flowsheets (Taken 5/10/2025 1035)  Nutrient intake appropriate for improving, restoring, or maintaining nutritional needs:   Assess nutritional status and recommend course of action   Monitor oral intake, labs, and treatment plans  Note: Administer alternative food source through OG tube. Crush medications. Monitor labs. Admin IVF per nephro     Problem: Pain  Goal: Verbalizes/displays adequate comfort level or baseline comfort level  5/10/2025 1035 by Rupa Gross, RN  Outcome: Progressing  Flowsheets (Taken 5/10/2025 1035)  Verbalizes/displays adequate comfort level or baseline comfort level:   Encourage patient to monitor pain and request assistance   Implement non-pharmacological measures as appropriate and evaluate response  Note: Monitor pain using CPOT scale. Q2 turns.   5/10/2025 0153 by

## 2025-05-10 NOTE — PROGRESS NOTES
In Patient Progress note      Admit Date: 5/6/2025          Impression:        IRA - kidney function continues to improve, as expected.  Patient developed IRA due to rhabdo.  Urine outputs remain very good and Cr continues to steadily trend down -> looks great at 1.78mg/dl this am. She remains on some IVF's with LR which I reduced to 100/hr yesterday.    Rhabdo - suspect this is drug-related as she did test positive for cocaine on urine tox screen.  CPK level is trending in the right direction as well as the serum Cr, looks good today at 3600 (14K admit).  Lytes all looking good.  K and Ca and phos all pretty normal.   Acidosis - resolved.  CO2 25meq/l with normal AG.    Respiratory failure - acute on chronic, stable on the vent.    CXR this am reviewed and shows mildly increased opacities RUL.   No evidence of pulmonary edema.    Anemia - Hgb stable, actually a smidge higher at 8.1g/dl today.  Neuro - multifactorial encephalopathy from illicit drug use + numerous prescription meds (cymbalta, remeron, lyrica, requip, zoloft) that likely contributed as well.  This is NOT primarily due to uremia though reduced GFR will lead to build-up of all these drugs and metabolites.          Plan:     She's looking great from our end and I expect she'll continue to improve (barring any new complications).     I'll leave her on the LR for now but we can continue to slowly taper the rate.  We don't need to be quite so aggressive any longer now that hemodynamics are stable, rhabdo is improving, and IRA is looking much better.  Reduce rate to 75/hr.     I do NOT think she needs any further diuretic.  Outputs are excellent on their own, and I expect she'll continue to auto-diurese as GFR recovers.      Meds reviewed and look okay.  Other than the change in IVF rate, I'm not going to make any further changes.    We'll continue to trend chemistries daily, no need to be checking more often than this.  Hopefully, Cr will normalize

## 2025-05-10 NOTE — PLAN OF CARE
Problem: Safety - Adult  Goal: Free from fall injury  5/10/2025 0153 by Tricia Smith, RN  Outcome: Progressing  Flowsheets (Taken 5/10/2025 0153)  Free From Fall Injury: Instruct family/caregiver on patient safety  5/9/2025 2340 by Tricia Smith, RN  Outcome: Progressing  Flowsheets (Taken 5/9/2025 2340)  Free From Fall Injury: Instruct family/caregiver on patient safety     Problem: Pain  Goal: Verbalizes/displays adequate comfort level or baseline comfort level  Outcome: Progressing  Flowsheets (Taken 5/10/2025 0153)  Verbalizes/displays adequate comfort level or baseline comfort level:   Assess pain using appropriate pain scale   Implement non-pharmacological measures as appropriate and evaluate response   Notify Licensed Independent Practitioner if interventions unsuccessful or patient reports new pain     Problem: Respiratory - Adult  Goal: Achieves optimal ventilation and oxygenation  Outcome: Progressing  Flowsheets (Taken 5/10/2025 0154)  Achieves optimal ventilation and oxygenation:   Assess for changes in respiratory status   Position to facilitate oxygenation and minimize respiratory effort   Assess the need for suctioning and aspirate as needed   Respiratory therapy support as indicated     Problem: Skin/Tissue Integrity - Adult  Goal: Skin integrity remains intact  Description: 1.  Monitor for areas of redness and/or skin breakdown2.  Assess vascular access sites hourly3.  Every 4-6 hours minimum:  Change oxygen saturation probe site4.  Every 4-6 hours:  If on nasal continuous positive airway pressure, respiratory therapy assess nares and determine need for appliance change or resting period  5/9/2025 1550 by Swathi Michaels, RN  Outcome: Progressing  Flowsheets (Taken 5/9/2025 0800)  Skin Integrity Remains Intact:   Monitor for areas of redness and/or skin breakdown   Assess vascular access sites hourly   Every 4-6 hours minimum:  Change oxygen saturation probe site   Turn and reposition as

## 2025-05-10 NOTE — PROGRESS NOTES
[facility].  Pulmonary, Critical Care, and Sleep Medicine    Name: Juliana Loaiza MRN: [id number]   : 1950 Hospital: [facility]   Date: 5/10/2025  Admission Date: 2025     Chart and notes reviewed. Data reviewed. I have evaluated all findings.    [x]I have reviewed the flowsheet and previous day’s notes.    [x]The patient is unable to give any meaningful history or review of systems because the patient is:  [x]Intubated []Sedated   []Unresponsive      [x]The patient is critically ill on      [x]Mechanical ventilation []Pressors   []BiPAP []     IMPRESSION:   Acute on chronic respiratory failure, unclear if hypercapnic or hypoxic on arrival-intubated for airway protection, Home baseline is 6 Lpm oxygen  Acute encephalopathy-toxic/metabolic in nature, patient unresponsive with pinpoint pupils, etiology unclear--?CVA vs. Drug toxicity vs. Medication related (patient on multiple sedating medications, such as Cymbalta, Remeron, Lyrica, Requip, and Zoloft-->also places patient at risk for serotonin syndrome), vs. Hypoxia vs. Uremia vs. Sepsis vs. Drug OD, LP completed , POC EEG with concern for focal seizure   CAP vs. Aspiration PNA-extensive infiltrates on CT chest A/P, primarily RUL/RLL   Rhinovirus- +RVP    Sepsis 2/2 pneumonia and UTI   Klebsiella UTI   IRA-prerenal azotemia vs. ischemic ATN  HAGMA  Rhabdo, improving    Mild transaminitis   Nonobstructive CAD: [facility] 2024 RCA with midsegment 20% stenosis. LM patent, LAD patent, Lcx patent. Echo 25 EF 50-55%, normal wall motion. RVSP 52mmHg  pAF: Currently in SR. OAC with Xarelto. Cardizem CD 120mg for rate control   HTN  DM II   GERD   Bronchiectasis   Sarcoidosis - diagnosed in  via punch biopsy  Chronic pain  Anxiety  Microcytic hypochromic anemia   Tobacco use history  History of polysubstance abuse-UDS  +cocaine   Hx  acute subsegmental LLL PE and DVT in the past, non-compliant with    -Q6 glucoses, SSI.     Metabolic:  -Daily BMP, mag, phos. Trend lytes, replace as needed.     Musc/Skin:  -no acute issues, wound care    Palliative: Full Code No additional code details,   MPOA: Antony Loaiza (), updated 5/7 in AM on the phone   Discussed in interdisciplinary rounds     Best practice :    Glycemic control  IHI ICU bundles:              Rankin Bundle Followed and Vent Bundle Followed, Vent Day 5/7/25  [facility] Vent patients-               VAP bundle-Hockley tube to suction at 20-30 cm Hg, Maintain Cathleen tube with 5-10ml air every 4 hours, Routine oral care every 4 hours, Elevation of head > 45 degree, Daily sedation holiday and SBT evaluation starting at 6.00am.  Stress ulcer prophylaxis.              Pepcid  DVT prophylaxis.              SQH  Need for Lines, rankin assessed.  Palliative care evaluation.  Restraints need.  Attending Non-violent Restraint Reevaluation   N/A     _____________________________________________________________________________________________________________________________________________________________________________________    Interval HPI:  Patient is a 74 y.o. female with a significant PMHx of pulmonary sarcoidosis, polysubstance abuse, chronic hypoxic respiratory failure on LTOT 6 L/min, asthma, HFpEF, GERD, pulmonary hypertension, depression, CAD , HTN, DM II, chronic pain, anxiety, and Hx  acute subsegmental LLL PE and DVT in the past, non-compliant with AC presented to [facility] ER after being found unresponsive. EMS was called and patient was given  a total of 4 mg of Narcan between EMS and [facility] with no improvement in mental status.No evidence of hypoglycemia. Patient was found nearing respiratory arrest and was provided bag-valve-mask ventilation on the way in. On arrival to ER, patient was emergently intubated for airway protection. Patient was hypothermic with temp of 95.5 degrees celsius and marginally hypotensive. Concern for potetially underlyin infection, so

## 2025-05-10 NOTE — PROGRESS NOTES
No SBT at this time-       05/10/25 0748   Weaning Parameters   Spontaneous Breathing Trial Complete (S)  No (comment)  (Tacypneic on PRVC settings)

## 2025-05-10 NOTE — PROGRESS NOTES
VENTILATOR CARE PLAN    Problem: Ventilator Management  Goal: *Adequate oxygenation/ ventilation/ and extubation      Patient:        Jluiana Loaiza     74 y.o.   female     5/10/2025  1:28 AM  Patient Active Problem List   Diagnosis    Coma (HCC)    Acute encephalopathy    Acute hypoxemic respiratory failure (HCC)    Severe sepsis (HCC)    Polysubstance abuse (HCC)    Transaminitis    Elevated CK    IRA (acute kidney injury)    Aspiration pneumonia of both lungs (HCC)    Lactic acidosis    Metabolic acidosis    Rhinovirus       Coma (HCC) [R40.20]  Respiratory arrest (HCC) [R09.2]  Marvin coma scale total score 3-8, in the field (EMT or ambulance) ([facility]) [R40.2431]    Reason patient intubated:      Acute on chronic respiratory failure, unclear if hypercapnic or hypoxic on arrival-intubated for airway protection, Home baseline is 6 Lpm oxygen  Acute encephalopathy-toxic/metabolic in nature, patient unresponsive with pinpoint pupils, etiology unclear--?CVA vs. Drug toxicity vs. Mediation induced (patient on multiple sedating medications, such as Cymbalta, Remeron, Lyrica, Requip, and Zoloft-->also places patient at risk for serotonin syndrome), vs. Hypoxia vs. Uremia vs. Sepsis vs. Drug OD     Ventilator day: 5     Ventilator settings: AC/VC+ 14/420/+5/30%     ETT Size/Placement: 7.5 ETT 23@lips     Wean Screen Pass (Yes or No): No  Wean Screen Reason for Failure: Mentation, Not following commands, responds to noxious stimuli  Duration of Weaning Trial: N/A  Additional Comments: LP obtained, neuro consulted, CT neg, no read on MRI, EEG shows mod, severe generalized slowing         PLAN OF CARE: Maintain adequate ventilation and oxygenation. Wean vent and SBT as tolerated        GOAL: Extubation        OUTCOME:  Not progressing    ABG:  Date:5/10/2025     Latest Reference Range & Units 05/10/25 03:57   Base Excess mmol/L 6.7   Geo Test -   Positive   FIO2 Arterial % 30   DEVICE -   ADULT VENT   Site -   RIGHT  RADIAL   Mode -   Pressure regulated volume control   POC pH 7.35 - 7.45   7.52 (H)   POC pCO2 35.0 - 48.0 MMHG 37.0   POC PO2 83 - 108 MMHG 107   POC HCO3 21 - 28 MMOL/L 30.1 (H)   POC O2 SAT 94 - 98 % 99 (H)   POC TCO2 22 - 29 MMOL/L 29   POC PEEP/CPA -   5   POC TIDAL VOLUME -   390   POC Ionized Calcium 1.15 - 1.33 mmol/L 1.08 (L)   POC Potassium 3.5 - 5.1 mmol/L 3.8   POC Sodium 136 - 145 mmol/L 140   (H): Data is abnormally high  (L): Data is abnormally low  Chest X-ray:  Date:5/10/2025    FINDINGS: There is an endotracheal tube with the tip about 2 cm above the jaclyn  and a gastric tube with the tip below the diaphragm and off the bottom of the  image.     There is persistent streaky opacities in the right lung base and possibly also  in the retrocardiac left base. No gross consolidations are seen. There is  prominent hilar pulmonary vasculature, bilaterally.  The cardiomediastinal  silhouette is normal.  The bones and soft tissues are unremarkable. There is  probably little interval change.     IMPRESSION:     Intubated; at least 2 cm above the jaclyn. Gastric tube is unchanged.     Persistent bibasilar interstitial/streaky opacities, right somewhat more  prominent than left. Probable bilateral vascular congestion.  Lab Test:  Date:5/10/2025  WBC:   Lab Results   Component Value Date/Time    WBC 12.5 05/09/2025 01:15 AM   HGB:   Lab Results   Component Value Date/Time    HGB 7.7 05/09/2025 01:15 AM    PLTS:   Lab Results   Component Value Date/Time     05/09/2025 01:15 AM         Vital Signs:   Patient Vitals for the past 8 hrs:   Temp Pulse Resp BP SpO2   05/10/25 0000 97.7 °F (36.5 °C) 82 14 124/72 96 %   05/09/25 2337 -- 75 15 -- 95 %   05/09/25 2300 -- 74 16 133/64 95 %   05/09/25 2200 -- 86 17 126/63 96 %   05/09/25 2115 -- -- 15 -- --   05/09/25 2100 -- 88 14 (!) 130/54 94 %   05/09/25 2045 -- 97 18 114/60 95 %   05/09/25 2000 98.3 °F (36.8 °C) 98 22 (!) 150/91 96 %   05/09/25 1909 -- 77 19 --

## 2025-05-10 NOTE — PROGRESS NOTES
Progress Note  Date:5/10/2025       Room:[facility]  Patient Name:Juliana Loaiza     YOB: 1950     Age:74 y.o.        Subjective    Subjective Patient remain in ICU, intubate, non responsive  Review of Systems unable to obtain  Objective         Vitals Last 24 Hours:  TEMPERATURE:  Temp  Av °F (37.2 °C)  Min: 97.7 °F (36.5 °C)  Max: 100.2 °F (37.9 °C)  RESPIRATIONS RANGE: Resp  Av.3  Min: 14  Max: 27  PULSE OXIMETRY RANGE: SpO2  Av.2 %  Min: 93 %  Max: 100 %  PULSE RANGE: Pulse  Av.1  Min: 74  Max: 108  BLOOD PRESSURE RANGE: Systolic (24hrs), Av , Min:99 , Max:177   ; Diastolic (24hrs), Av, Min:49, Max:93    I/O (24Hr):    Intake/Output Summary (Last 24 hours) at 5/10/2025 1250  Last data filed at 5/10/2025 1200  Gross per 24 hour   Intake 4043.15 ml   Output 1275 ml   Net 2768.15 ml     Objective  General lying in bed, intubated,   Neuro: MS lying in bed intubated, , does not respond to verbal/tactile stimuli, grimaces to noxious stimuli  CN Pupils reactive bilat positive eye movements with oculocephalic maneuvers does not grimace, opens eyes spontaneously  Motor no spontaneous movement or Withdrawal of ext from noxious stimuli   Labs/Imaging/Diagnostics    Labs:  CBC:  Recent Labs     25  0115 05/10/25  0115   WBC 12.5 12.5 12.7   RBC 3.75* 3.58* 3.67*   HGB 8.2* 7.7* 8.1*   HCT 25.9* 24.5* 25.6*   MCV 69.1* 68.4* 69.8*   RDW 15.7* 15.9* 15.8*    168 182     CHEMISTRIES:  Recent Labs     25  0148 25  0347 25  0115 25  0259 25  1245 05/10/25  0115 05/10/25  0357     --  139  --  138 141  --    K 4.0  --  3.5  --  3.8 4.1  --      --  101  --  102 102  --    CO2 20*  --  23  --  23 25  --    BUN 36*  --  35*  --  35* 35*  --    CREATININE 3.05*   < > 2.37*   < > 2.11* 1.78* 1.81*   GLUCOSE 116*  --  137*  --  196* 130*  --    PHOS 5.2*  --  3.8  --   --  3.1  --    MG 1.8  --  1.7  --   --  2.3  --     < >

## 2025-05-11 ENCOUNTER — APPOINTMENT (OUTPATIENT)
Facility: HOSPITAL | Age: 75
End: 2025-05-11
Payer: MEDICARE

## 2025-05-11 LAB
ALBUMIN SERPL-MCNC: 1.7 G/DL (ref 3.4–5)
ALBUMIN SERPL-MCNC: 1.7 G/DL (ref 3.4–5)
ANION GAP BLD CALC-SCNC: ABNORMAL MMOL/L (ref 10–20)
ANION GAP BLD CALC-SCNC: ABNORMAL MMOL/L (ref 10–20)
ANION GAP SERPL CALC-SCNC: 13 MMOL/L (ref 3–18)
ANION GAP SERPL CALC-SCNC: 13 MMOL/L (ref 3–18)
ARTERIAL PATENCY WRIST A: POSITIVE
ARTERIAL PATENCY WRIST A: POSITIVE
BACTERIA SPEC CULT: NORMAL
BACTERIA SPEC CULT: NORMAL
BASE EXCESS BLD CALC-SCNC: 8.2 MMOL/L
BASE EXCESS BLD CALC-SCNC: 8.2 MMOL/L
BASOPHILS # BLD: 0.03 K/UL (ref 0–0.1)
BASOPHILS # BLD: 0.03 K/UL (ref 0–0.1)
BASOPHILS NFR BLD: 0.3 % (ref 0–2)
BASOPHILS NFR BLD: 0.3 % (ref 0–2)
BDY SITE: ABNORMAL
BDY SITE: ABNORMAL
BUN SERPL-MCNC: 29 MG/DL (ref 6–23)
BUN SERPL-MCNC: 29 MG/DL (ref 6–23)
BUN/CREAT SERPL: 24 (ref 12–20)
BUN/CREAT SERPL: 24 (ref 12–20)
CA-I BLD-MCNC: 1.16 MMOL/L (ref 1.15–1.33)
CA-I BLD-MCNC: 1.16 MMOL/L (ref 1.15–1.33)
CALCIUM SERPL-MCNC: 8.5 MG/DL (ref 8.5–10.1)
CALCIUM SERPL-MCNC: 8.5 MG/DL (ref 8.5–10.1)
CHLORIDE BLD-SCNC: 102 MMOL/L (ref 98–107)
CHLORIDE BLD-SCNC: 102 MMOL/L (ref 98–107)
CHLORIDE SERPL-SCNC: 101 MMOL/L (ref 98–107)
CHLORIDE SERPL-SCNC: 101 MMOL/L (ref 98–107)
CO2 BLD-SCNC: 32 MMOL/L (ref 22–29)
CO2 BLD-SCNC: 32 MMOL/L (ref 22–29)
CO2 SERPL-SCNC: 27 MMOL/L (ref 21–32)
CO2 SERPL-SCNC: 27 MMOL/L (ref 21–32)
CREAT BLD-MCNC: 1.4 MG/DL (ref 0.6–1.3)
CREAT BLD-MCNC: 1.4 MG/DL (ref 0.6–1.3)
CREAT SERPL-MCNC: 1.2 MG/DL (ref 0.6–1.3)
CREAT SERPL-MCNC: 1.2 MG/DL (ref 0.6–1.3)
DIFFERENTIAL METHOD BLD: ABNORMAL
DIFFERENTIAL METHOD BLD: ABNORMAL
ECHO AO ASC DIAM: 2.3 CM
ECHO AO ASC DIAM: 2.3 CM
ECHO AO ASCENDING AORTA INDEX: 1.45 CM/M2
ECHO AO ASCENDING AORTA INDEX: 1.45 CM/M2
ECHO AO ROOT DIAM: 2.6 CM
ECHO AO ROOT DIAM: 2.6 CM
ECHO AO ROOT INDEX: 1.64 CM/M2
ECHO AO ROOT INDEX: 1.64 CM/M2
ECHO AV AREA PEAK VELOCITY: 1.5 CM2
ECHO AV AREA PEAK VELOCITY: 1.5 CM2
ECHO AV AREA VTI: 1.4 CM2
ECHO AV AREA VTI: 1.4 CM2
ECHO AV AREA/BSA PEAK VELOCITY: 0.9 CM2/M2
ECHO AV AREA/BSA PEAK VELOCITY: 0.9 CM2/M2
ECHO AV AREA/BSA VTI: 0.9 CM2/M2
ECHO AV AREA/BSA VTI: 0.9 CM2/M2
ECHO AV MEAN GRADIENT: 10 MMHG
ECHO AV MEAN GRADIENT: 10 MMHG
ECHO AV MEAN VELOCITY: 1.6 M/S
ECHO AV MEAN VELOCITY: 1.6 M/S
ECHO AV PEAK GRADIENT: 17 MMHG
ECHO AV PEAK GRADIENT: 17 MMHG
ECHO AV PEAK VELOCITY: 2.1 M/S
ECHO AV PEAK VELOCITY: 2.1 M/S
ECHO AV VELOCITY RATIO: 0.62
ECHO AV VELOCITY RATIO: 0.62
ECHO AV VTI: 40.7 CM
ECHO AV VTI: 40.7 CM
ECHO BSA: 1.57 M2
ECHO BSA: 1.57 M2
ECHO LA VOL A-L A2C: 38 ML (ref 22–52)
ECHO LA VOL A-L A2C: 38 ML (ref 22–52)
ECHO LA VOL A-L A4C: 37 ML (ref 22–52)
ECHO LA VOL A-L A4C: 37 ML (ref 22–52)
ECHO LA VOL BP: 39 ML (ref 22–52)
ECHO LA VOL BP: 39 ML (ref 22–52)
ECHO LA VOL MOD A2C: 36 ML (ref 22–52)
ECHO LA VOL MOD A2C: 36 ML (ref 22–52)
ECHO LA VOL MOD A4C: 34 ML (ref 22–52)
ECHO LA VOL MOD A4C: 34 ML (ref 22–52)
ECHO LA VOL/BSA BIPLANE: 25 ML/M2 (ref 16–34)
ECHO LA VOL/BSA BIPLANE: 25 ML/M2 (ref 16–34)
ECHO LA VOLUME AREA LENGTH: 42 ML
ECHO LA VOLUME AREA LENGTH: 42 ML
ECHO LA VOLUME INDEX A-L A2C: 24 ML/M2 (ref 16–34)
ECHO LA VOLUME INDEX A-L A2C: 24 ML/M2 (ref 16–34)
ECHO LA VOLUME INDEX A-L A4C: 23 ML/M2 (ref 16–34)
ECHO LA VOLUME INDEX A-L A4C: 23 ML/M2 (ref 16–34)
ECHO LA VOLUME INDEX AREA LENGTH: 26 ML/M2 (ref 16–34)
ECHO LA VOLUME INDEX AREA LENGTH: 26 ML/M2 (ref 16–34)
ECHO LA VOLUME INDEX MOD A2C: 23 ML/M2 (ref 16–34)
ECHO LA VOLUME INDEX MOD A2C: 23 ML/M2 (ref 16–34)
ECHO LA VOLUME INDEX MOD A4C: 21 ML/M2 (ref 16–34)
ECHO LA VOLUME INDEX MOD A4C: 21 ML/M2 (ref 16–34)
ECHO LV E' LATERAL VELOCITY: 6.55 CM/S
ECHO LV E' LATERAL VELOCITY: 6.55 CM/S
ECHO LV E' SEPTAL VELOCITY: 6.89 CM/S
ECHO LV E' SEPTAL VELOCITY: 6.89 CM/S
ECHO LV EDV A2C: 47 ML
ECHO LV EDV A2C: 47 ML
ECHO LV EDV A4C: 53 ML
ECHO LV EDV A4C: 53 ML
ECHO LV EDV BP: 51 ML (ref 56–104)
ECHO LV EDV BP: 51 ML (ref 56–104)
ECHO LV EDV INDEX A4C: 33 ML/M2
ECHO LV EDV INDEX A4C: 33 ML/M2
ECHO LV EDV INDEX BP: 32 ML/M2
ECHO LV EDV INDEX BP: 32 ML/M2
ECHO LV EDV NDEX A2C: 30 ML/M2
ECHO LV EDV NDEX A2C: 30 ML/M2
ECHO LV EF PHYSICIAN: 65 %
ECHO LV EF PHYSICIAN: 65 %
ECHO LV EJECTION FRACTION A2C: 64 %
ECHO LV EJECTION FRACTION A2C: 64 %
ECHO LV EJECTION FRACTION A4C: 68 %
ECHO LV EJECTION FRACTION A4C: 68 %
ECHO LV EJECTION FRACTION BIPLANE: 67 % (ref 55–100)
ECHO LV EJECTION FRACTION BIPLANE: 67 % (ref 55–100)
ECHO LV ESV A2C: 17 ML
ECHO LV ESV A2C: 17 ML
ECHO LV ESV A4C: 17 ML
ECHO LV ESV A4C: 17 ML
ECHO LV ESV BP: 17 ML (ref 19–49)
ECHO LV ESV BP: 17 ML (ref 19–49)
ECHO LV ESV INDEX A2C: 11 ML/M2
ECHO LV ESV INDEX A2C: 11 ML/M2
ECHO LV ESV INDEX A4C: 11 ML/M2
ECHO LV ESV INDEX A4C: 11 ML/M2
ECHO LV ESV INDEX BP: 11 ML/M2
ECHO LV ESV INDEX BP: 11 ML/M2
ECHO LV FRACTIONAL SHORTENING: 33 % (ref 28–44)
ECHO LV FRACTIONAL SHORTENING: 33 % (ref 28–44)
ECHO LV INTERNAL DIMENSION DIASTOLE INDEX: 2.26 CM/M2
ECHO LV INTERNAL DIMENSION DIASTOLE INDEX: 2.26 CM/M2
ECHO LV INTERNAL DIMENSION DIASTOLIC: 3.6 CM (ref 3.9–5.3)
ECHO LV INTERNAL DIMENSION DIASTOLIC: 3.6 CM (ref 3.9–5.3)
ECHO LV INTERNAL DIMENSION SYSTOLIC INDEX: 1.51 CM/M2
ECHO LV INTERNAL DIMENSION SYSTOLIC INDEX: 1.51 CM/M2
ECHO LV INTERNAL DIMENSION SYSTOLIC: 2.4 CM
ECHO LV INTERNAL DIMENSION SYSTOLIC: 2.4 CM
ECHO LV IVSD: 1.1 CM (ref 0.6–0.9)
ECHO LV IVSD: 1.1 CM (ref 0.6–0.9)
ECHO LV MASS 2D: 132.7 G (ref 67–162)
ECHO LV MASS 2D: 132.7 G (ref 67–162)
ECHO LV MASS INDEX 2D: 83.4 G/M2 (ref 43–95)
ECHO LV MASS INDEX 2D: 83.4 G/M2 (ref 43–95)
ECHO LV POSTERIOR WALL DIASTOLIC: 1.2 CM (ref 0.6–0.9)
ECHO LV POSTERIOR WALL DIASTOLIC: 1.2 CM (ref 0.6–0.9)
ECHO LV RELATIVE WALL THICKNESS RATIO: 0.67
ECHO LV RELATIVE WALL THICKNESS RATIO: 0.67
ECHO LVOT AREA: 2.3 CM2
ECHO LVOT AREA: 2.3 CM2
ECHO LVOT AV VTI INDEX: 0.59
ECHO LVOT AV VTI INDEX: 0.59
ECHO LVOT DIAM: 1.7 CM
ECHO LVOT DIAM: 1.7 CM
ECHO LVOT MEAN GRADIENT: 3 MMHG
ECHO LVOT MEAN GRADIENT: 3 MMHG
ECHO LVOT PEAK GRADIENT: 7 MMHG
ECHO LVOT PEAK GRADIENT: 7 MMHG
ECHO LVOT PEAK VELOCITY: 1.3 M/S
ECHO LVOT PEAK VELOCITY: 1.3 M/S
ECHO LVOT STROKE VOLUME INDEX: 34.4 ML/M2
ECHO LVOT STROKE VOLUME INDEX: 34.4 ML/M2
ECHO LVOT SV: 54.7 ML
ECHO LVOT SV: 54.7 ML
ECHO LVOT VTI: 24.1 CM
ECHO LVOT VTI: 24.1 CM
ECHO MV A VELOCITY: 1.41 M/S
ECHO MV A VELOCITY: 1.41 M/S
ECHO MV AREA VTI: 1.4 CM2
ECHO MV AREA VTI: 1.4 CM2
ECHO MV E DECELERATION TIME (DT): 300.2 MS
ECHO MV E DECELERATION TIME (DT): 300.2 MS
ECHO MV E VELOCITY: 1.37 M/S
ECHO MV E VELOCITY: 1.37 M/S
ECHO MV E/A RATIO: 0.97
ECHO MV E/A RATIO: 0.97
ECHO MV E/E' LATERAL: 20.92
ECHO MV E/E' LATERAL: 20.92
ECHO MV E/E' RATIO (AVERAGED): 20.4
ECHO MV E/E' RATIO (AVERAGED): 20.4
ECHO MV E/E' SEPTAL: 19.88
ECHO MV E/E' SEPTAL: 19.88
ECHO MV LVOT VTI INDEX: 1.67
ECHO MV LVOT VTI INDEX: 1.67
ECHO MV MAX VELOCITY: 1.6 M/S
ECHO MV MAX VELOCITY: 1.6 M/S
ECHO MV MEAN GRADIENT: 5 MMHG
ECHO MV MEAN GRADIENT: 5 MMHG
ECHO MV MEAN VELOCITY: 1.1 M/S
ECHO MV MEAN VELOCITY: 1.1 M/S
ECHO MV PEAK GRADIENT: 10 MMHG
ECHO MV PEAK GRADIENT: 10 MMHG
ECHO MV VTI: 40.3 CM
ECHO MV VTI: 40.3 CM
ECHO PV MAX VELOCITY: 1.2 M/S
ECHO PV MAX VELOCITY: 1.2 M/S
ECHO PV PEAK GRADIENT: 6 MMHG
ECHO PV PEAK GRADIENT: 6 MMHG
ECHO RA END SYSTOLIC VOLUME APICAL 4 CHAMBER INDEX BSA: 41 ML/M2
ECHO RA END SYSTOLIC VOLUME APICAL 4 CHAMBER INDEX BSA: 41 ML/M2
ECHO RA VOLUME AREA LENGTH APICAL 4 CHAMBER: 67 ML
ECHO RA VOLUME AREA LENGTH APICAL 4 CHAMBER: 67 ML
ECHO RA VOLUME: 65 ML
ECHO RA VOLUME: 65 ML
ECHO RV BASAL DIMENSION: 4.7 CM
ECHO RV BASAL DIMENSION: 4.7 CM
ECHO RV FREE WALL PEAK S': 15.5 CM/S
ECHO RV FREE WALL PEAK S': 15.5 CM/S
ECHO RV TAPSE: 1.9 CM (ref 1.7–?)
ECHO RV TAPSE: 1.9 CM (ref 1.7–?)
ECHO TV REGURGITANT MAX VELOCITY: 3.5 M/S
ECHO TV REGURGITANT MAX VELOCITY: 3.5 M/S
ECHO TV REGURGITANT PEAK GRADIENT: 49 MMHG
ECHO TV REGURGITANT PEAK GRADIENT: 49 MMHG
EOSINOPHIL # BLD: 0.17 K/UL (ref 0–0.4)
EOSINOPHIL # BLD: 0.17 K/UL (ref 0–0.4)
EOSINOPHIL NFR BLD: 1.6 % (ref 0–5)
EOSINOPHIL NFR BLD: 1.6 % (ref 0–5)
ERYTHROCYTE [DISTWIDTH] IN BLOOD BY AUTOMATED COUNT: 15.9 % (ref 11.6–14.5)
ERYTHROCYTE [DISTWIDTH] IN BLOOD BY AUTOMATED COUNT: 15.9 % (ref 11.6–14.5)
EST. AVERAGE GLUCOSE BLD GHB EST-MCNC: 152 MG/DL
EST. AVERAGE GLUCOSE BLD GHB EST-MCNC: 152 MG/DL
FIO2 ON VENT: 30 %
FIO2 ON VENT: 30 %
GAS FLOW.O2 O2 DELIVERY SYS: ABNORMAL
GAS FLOW.O2 O2 DELIVERY SYS: ABNORMAL
GLUCOSE BLD STRIP.AUTO-MCNC: 104 MG/DL (ref 70–110)
GLUCOSE BLD STRIP.AUTO-MCNC: 104 MG/DL (ref 70–110)
GLUCOSE BLD STRIP.AUTO-MCNC: 128 MG/DL (ref 70–110)
GLUCOSE BLD STRIP.AUTO-MCNC: 128 MG/DL (ref 70–110)
GLUCOSE BLD STRIP.AUTO-MCNC: 159 MG/DL (ref 70–110)
GLUCOSE BLD STRIP.AUTO-MCNC: 159 MG/DL (ref 70–110)
GLUCOSE BLD STRIP.AUTO-MCNC: 195 MG/DL (ref 70–110)
GLUCOSE BLD STRIP.AUTO-MCNC: 195 MG/DL (ref 70–110)
GLUCOSE BLD-MCNC: 143 MG/DL (ref 74–99)
GLUCOSE BLD-MCNC: 143 MG/DL (ref 74–99)
GLUCOSE SERPL-MCNC: 143 MG/DL (ref 74–108)
GLUCOSE SERPL-MCNC: 143 MG/DL (ref 74–108)
GRAM STN SPEC: NORMAL
HBA1C MFR BLD: 6.9 % (ref 4.2–5.6)
HBA1C MFR BLD: 6.9 % (ref 4.2–5.6)
HCO3 BLD-SCNC: 32.6 MMOL/L (ref 21–28)
HCO3 BLD-SCNC: 32.6 MMOL/L (ref 21–28)
HCT VFR BLD AUTO: 25 % (ref 35–45)
HCT VFR BLD AUTO: 25 % (ref 35–45)
HGB BLD-MCNC: 7.8 G/DL (ref 12–16)
HGB BLD-MCNC: 7.8 G/DL (ref 12–16)
HSV SPEC CULT: NEGATIVE
HSV SPEC CULT: NEGATIVE
IMM GRANULOCYTES # BLD AUTO: 0.51 K/UL (ref 0–0.04)
IMM GRANULOCYTES # BLD AUTO: 0.51 K/UL (ref 0–0.04)
IMM GRANULOCYTES NFR BLD AUTO: 4.8 % (ref 0–0.5)
IMM GRANULOCYTES NFR BLD AUTO: 4.8 % (ref 0–0.5)
LACTATE BLD-SCNC: 1.42 MMOL/L (ref 0.4–2)
LACTATE BLD-SCNC: 1.42 MMOL/L (ref 0.4–2)
LYMPHOCYTES # BLD: 1.75 K/UL (ref 0.9–3.6)
LYMPHOCYTES # BLD: 1.75 K/UL (ref 0.9–3.6)
LYMPHOCYTES NFR BLD: 16.6 % (ref 21–52)
LYMPHOCYTES NFR BLD: 16.6 % (ref 21–52)
MAGNESIUM SERPL-MCNC: 2 MG/DL (ref 1.6–2.6)
MAGNESIUM SERPL-MCNC: 2 MG/DL (ref 1.6–2.6)
MCH RBC QN AUTO: 21.8 PG (ref 24–34)
MCH RBC QN AUTO: 21.8 PG (ref 24–34)
MCHC RBC AUTO-ENTMCNC: 31.2 G/DL (ref 31–37)
MCHC RBC AUTO-ENTMCNC: 31.2 G/DL (ref 31–37)
MCV RBC AUTO: 69.8 FL (ref 78–100)
MCV RBC AUTO: 69.8 FL (ref 78–100)
MONOCYTES # BLD: 0.72 K/UL (ref 0.05–1.2)
MONOCYTES # BLD: 0.72 K/UL (ref 0.05–1.2)
MONOCYTES NFR BLD: 6.8 % (ref 3–10)
MONOCYTES NFR BLD: 6.8 % (ref 3–10)
NEUTS SEG # BLD: 7.36 K/UL (ref 1.8–8)
NEUTS SEG # BLD: 7.36 K/UL (ref 1.8–8)
NEUTS SEG NFR BLD: 69.9 % (ref 40–73)
NEUTS SEG NFR BLD: 69.9 % (ref 40–73)
NRBC # BLD: 0 K/UL (ref 0–0.01)
NRBC # BLD: 0 K/UL (ref 0–0.01)
NRBC BLD-RTO: 0 PER 100 WBC
NRBC BLD-RTO: 0 PER 100 WBC
PCO2 BLD: 44.2 MMHG (ref 35–48)
PCO2 BLD: 44.2 MMHG (ref 35–48)
PEEP RESPIRATORY: 5
PEEP RESPIRATORY: 5
PH BLD: 7.48 (ref 7.35–7.45)
PH BLD: 7.48 (ref 7.35–7.45)
PHOSPHATE SERPL-MCNC: 2.6 MG/DL (ref 2.5–4.9)
PHOSPHATE SERPL-MCNC: 2.6 MG/DL (ref 2.5–4.9)
PLATELET # BLD AUTO: 193 K/UL (ref 135–420)
PLATELET # BLD AUTO: 193 K/UL (ref 135–420)
PO2 BLD: 91 MMHG (ref 83–108)
PO2 BLD: 91 MMHG (ref 83–108)
POTASSIUM BLD-SCNC: 3.8 MMOL/L (ref 3.5–5.1)
POTASSIUM BLD-SCNC: 3.8 MMOL/L (ref 3.5–5.1)
POTASSIUM SERPL-SCNC: 3.8 MMOL/L (ref 3.5–5.5)
POTASSIUM SERPL-SCNC: 3.8 MMOL/L (ref 3.5–5.5)
RBC # BLD AUTO: 3.58 M/UL (ref 4.2–5.3)
RBC # BLD AUTO: 3.58 M/UL (ref 4.2–5.3)
SAO2 % BLD: 98 % (ref 94–98)
SAO2 % BLD: 98 % (ref 94–98)
SERVICE CMNT-IMP: ABNORMAL
SERVICE CMNT-IMP: ABNORMAL
SERVICE CMNT-IMP: NORMAL
SERVICE CMNT-IMP: NORMAL
SODIUM BLD-SCNC: 144 MMOL/L (ref 136–145)
SODIUM BLD-SCNC: 144 MMOL/L (ref 136–145)
SODIUM SERPL-SCNC: 142 MMOL/L (ref 136–145)
SODIUM SERPL-SCNC: 142 MMOL/L (ref 136–145)
SPECIMEN SITE: ABNORMAL
SPECIMEN SITE: ABNORMAL
SPECIMEN SOURCE: NORMAL
SPECIMEN SOURCE: NORMAL
VENTILATION MODE VENT: ABNORMAL
VENTILATION MODE VENT: ABNORMAL
VT SETTING VENT: 390
VT SETTING VENT: 390
WBC # BLD AUTO: 10.5 K/UL (ref 4.6–13.2)
WBC # BLD AUTO: 10.5 K/UL (ref 4.6–13.2)

## 2025-05-11 PROCEDURE — 85025 COMPLETE CBC W/AUTO DIFF WBC: CPT

## 2025-05-11 PROCEDURE — 2700000000 HC OXYGEN THERAPY PER DAY

## 2025-05-11 PROCEDURE — 2580000003 HC RX 258: Performed by: INTERNAL MEDICINE

## 2025-05-11 PROCEDURE — 36415 COLL VENOUS BLD VENIPUNCTURE: CPT

## 2025-05-11 PROCEDURE — 6370000000 HC RX 637 (ALT 250 FOR IP): Performed by: NURSE PRACTITIONER

## 2025-05-11 PROCEDURE — 94640 AIRWAY INHALATION TREATMENT: CPT

## 2025-05-11 PROCEDURE — 83036 HEMOGLOBIN GLYCOSYLATED A1C: CPT

## 2025-05-11 PROCEDURE — 6370000000 HC RX 637 (ALT 250 FOR IP): Performed by: PHYSICIAN ASSISTANT

## 2025-05-11 PROCEDURE — 6360000002 HC RX W HCPCS: Performed by: INTERNAL MEDICINE

## 2025-05-11 PROCEDURE — 2500000003 HC RX 250 WO HCPCS: Performed by: PSYCHIATRY & NEUROLOGY

## 2025-05-11 PROCEDURE — 84295 ASSAY OF SERUM SODIUM: CPT

## 2025-05-11 PROCEDURE — 2580000003 HC RX 258: Performed by: PSYCHIATRY & NEUROLOGY

## 2025-05-11 PROCEDURE — 82962 GLUCOSE BLOOD TEST: CPT

## 2025-05-11 PROCEDURE — 6370000000 HC RX 637 (ALT 250 FOR IP): Performed by: PSYCHIATRY & NEUROLOGY

## 2025-05-11 PROCEDURE — 80069 RENAL FUNCTION PANEL: CPT

## 2025-05-11 PROCEDURE — 82803 BLOOD GASES ANY COMBINATION: CPT

## 2025-05-11 PROCEDURE — 83605 ASSAY OF LACTIC ACID: CPT

## 2025-05-11 PROCEDURE — 6360000002 HC RX W HCPCS: Performed by: PHYSICIAN ASSISTANT

## 2025-05-11 PROCEDURE — 71045 X-RAY EXAM CHEST 1 VIEW: CPT

## 2025-05-11 PROCEDURE — 94003 VENT MGMT INPAT SUBQ DAY: CPT

## 2025-05-11 PROCEDURE — 6370000000 HC RX 637 (ALT 250 FOR IP): Performed by: INTERNAL MEDICINE

## 2025-05-11 PROCEDURE — 2000000000 HC ICU R&B

## 2025-05-11 PROCEDURE — 2500000003 HC RX 250 WO HCPCS: Performed by: NURSE PRACTITIONER

## 2025-05-11 PROCEDURE — 82330 ASSAY OF CALCIUM: CPT

## 2025-05-11 PROCEDURE — 6360000002 HC RX W HCPCS: Performed by: NURSE PRACTITIONER

## 2025-05-11 PROCEDURE — 99291 CRITICAL CARE FIRST HOUR: CPT | Performed by: INTERNAL MEDICINE

## 2025-05-11 PROCEDURE — 83735 ASSAY OF MAGNESIUM: CPT

## 2025-05-11 PROCEDURE — 94761 N-INVAS EAR/PLS OXIMETRY MLT: CPT

## 2025-05-11 PROCEDURE — 99291 CRITICAL CARE FIRST HOUR: CPT | Performed by: PSYCHIATRY & NEUROLOGY

## 2025-05-11 PROCEDURE — APPSS15 APP SPLIT SHARED TIME 0-15 MINUTES: Performed by: PHYSICIAN ASSISTANT

## 2025-05-11 PROCEDURE — 93306 TTE W/DOPPLER COMPLETE: CPT | Performed by: INTERNAL MEDICINE

## 2025-05-11 PROCEDURE — 82947 ASSAY GLUCOSE BLOOD QUANT: CPT

## 2025-05-11 PROCEDURE — 85014 HEMATOCRIT: CPT

## 2025-05-11 PROCEDURE — 84132 ASSAY OF SERUM POTASSIUM: CPT

## 2025-05-11 RX ORDER — HYDRALAZINE HYDROCHLORIDE 20 MG/ML
10 INJECTION INTRAMUSCULAR; INTRAVENOUS EVERY 6 HOURS PRN
Status: DISCONTINUED | OUTPATIENT
Start: 2025-05-11 | End: 2025-05-17 | Stop reason: HOSPADM

## 2025-05-11 RX ORDER — AMLODIPINE BESYLATE 5 MG/1
5 TABLET ORAL DAILY
Status: DISCONTINUED | OUTPATIENT
Start: 2025-05-11 | End: 2025-05-14

## 2025-05-11 RX ADMIN — SODIUM CHLORIDE, PRESERVATIVE FREE 20 MG: 5 INJECTION INTRAVENOUS at 08:35

## 2025-05-11 RX ADMIN — VALPROATE SODIUM 500 MG: 100 INJECTION, SOLUTION INTRAVENOUS at 06:37

## 2025-05-11 RX ADMIN — CARVEDILOL 6.25 MG: 6.25 TABLET, FILM COATED ORAL at 08:35

## 2025-05-11 RX ADMIN — HEPARIN SODIUM 5000 UNITS: 5000 INJECTION INTRAVENOUS; SUBCUTANEOUS at 06:11

## 2025-05-11 RX ADMIN — SODIUM CHLORIDE, PRESERVATIVE FREE 10 ML: 5 INJECTION INTRAVENOUS at 21:54

## 2025-05-11 RX ADMIN — ARFORMOTEROL TARTRATE 15 MCG: 15 SOLUTION RESPIRATORY (INHALATION) at 08:32

## 2025-05-11 RX ADMIN — BUDESONIDE 1 MG: 1 SUSPENSION RESPIRATORY (INHALATION) at 19:30

## 2025-05-11 RX ADMIN — VALPROATE SODIUM 500 MG: 100 INJECTION, SOLUTION INTRAVENOUS at 23:11

## 2025-05-11 RX ADMIN — SODIUM CHLORIDE, SODIUM LACTATE, POTASSIUM CHLORIDE, AND CALCIUM CHLORIDE: 600; 310; 30; 20 INJECTION, SOLUTION INTRAVENOUS at 16:35

## 2025-05-11 RX ADMIN — CEFEPIME 1000 MG: 1 INJECTION, POWDER, FOR SOLUTION INTRAMUSCULAR; INTRAVENOUS at 00:14

## 2025-05-11 RX ADMIN — BUDESONIDE 1 MG: 1 SUSPENSION RESPIRATORY (INHALATION) at 08:32

## 2025-05-11 RX ADMIN — CHLORHEXIDINE GLUCONATE 0.12% ORAL RINSE 15 ML: 1.2 LIQUID ORAL at 08:35

## 2025-05-11 RX ADMIN — AMLODIPINE BESYLATE 5 MG: 5 TABLET ORAL at 08:35

## 2025-05-11 RX ADMIN — VALPROATE SODIUM 500 MG: 100 INJECTION, SOLUTION INTRAVENOUS at 16:36

## 2025-05-11 RX ADMIN — Medication 15 ML: at 08:35

## 2025-05-11 RX ADMIN — THIAMINE HYDROCHLORIDE 200 MG: 100 INJECTION, SOLUTION INTRAMUSCULAR; INTRAVENOUS at 08:35

## 2025-05-11 RX ADMIN — CHLORHEXIDINE GLUCONATE 0.12% ORAL RINSE 15 ML: 1.2 LIQUID ORAL at 21:54

## 2025-05-11 RX ADMIN — ARFORMOTEROL TARTRATE 15 MCG: 15 SOLUTION RESPIRATORY (INHALATION) at 19:30

## 2025-05-11 RX ADMIN — ASPIRIN 81 MG CHEWABLE TABLET 81 MG: 81 TABLET CHEWABLE at 08:35

## 2025-05-11 RX ADMIN — CEFEPIME 2000 MG: 2 INJECTION, POWDER, FOR SOLUTION INTRAVENOUS at 12:36

## 2025-05-11 RX ADMIN — FOLIC ACID 1 MG: 5 INJECTION, SOLUTION INTRAMUSCULAR; INTRAVENOUS; SUBCUTANEOUS at 09:00

## 2025-05-11 RX ADMIN — SODIUM CHLORIDE, PRESERVATIVE FREE 10 ML: 5 INJECTION INTRAVENOUS at 08:36

## 2025-05-11 RX ADMIN — HYDRALAZINE HYDROCHLORIDE 10 MG: 20 INJECTION INTRAMUSCULAR; INTRAVENOUS at 18:35

## 2025-05-11 RX ADMIN — INSULIN LISPRO 4 UNITS: 100 INJECTION, SOLUTION INTRAVENOUS; SUBCUTANEOUS at 12:34

## 2025-05-11 ASSESSMENT — PULMONARY FUNCTION TESTS
PIF_VALUE: 16
PIF_VALUE: 19
PIF_VALUE: 20
PIF_VALUE: 25

## 2025-05-11 NOTE — PLAN OF CARE
Problem: Discharge Planning  Goal: Discharge to home or other facility with appropriate resources  5/10/2025 2204 by Anna Eaton RN  Outcome: Progressing  5/10/2025 2203 by Anna Eaton RN  Outcome: Progressing  Flowsheets (Taken 5/10/2025 2203)  Discharge to home or other facility with appropriate resources:   Identify barriers to discharge with patient and caregiver   Arrange for needed discharge resources and transportation as appropriate   Identify discharge learning needs (meds, wound care, etc)  5/10/2025 2203 by Anna Eaton RN  Outcome: Progressing  Flowsheets  Taken 5/10/2025 2203  Discharge to home or other facility with appropriate resources:   Identify barriers to discharge with patient and caregiver   Arrange for needed discharge resources and transportation as appropriate   Identify discharge learning needs (meds, wound care, etc)  Taken 5/10/2025 2000  Discharge to home or other facility with appropriate resources:   Identify barriers to discharge with patient and caregiver   Arrange for needed discharge resources and transportation as appropriate   Identify discharge learning needs (meds, wound care, etc)  5/10/2025 1035 by Rupa Gross RN  Outcome: Progressing  Flowsheets (Taken 5/10/2025 1035)  Discharge to home or other facility with appropriate resources:   Identify barriers to discharge with patient and caregiver   Identify discharge learning needs (meds, wound care, etc)  Note: Barriers right not would be pulmonary wise. Unsure if she will pass SBT or need further intervention     Problem: Neurosensory - Adult  Goal: Achieves stable or improved neurological status  5/10/2025 2204 by Anna Eaton, RN  Outcome: Progressing  Flowsheets (Taken 5/10/2025 2204)  Achieves stable or improved neurological status:   Assess for and report changes in neurological status   Initiate measures to prevent increased intracranial pressure   Maintain blood pressure and fluid volume within ordered parameters

## 2025-05-11 NOTE — PROGRESS NOTES
Influenza B PCR Not detected        Parainfluenza 1 PCR Not detected        Parainfluenza 2 PCR Not detected        Parainfluenza 3 PCR Not detected        Parainfluenza 4 PCR Not detected        Respiratory Syncytial Virus by PCR Not detected        Bordetella parapertussis by PCR Not detected        Bordetella pertussis by PCR Not detected        Chlamydophila Pneumonia PCR Not detected        Mycoplasma pneumo by PCR Not detected       Urine Culture [[id number]]  (Abnormal)  (Susceptibility) Collected: 05/07/25 0340    Order Status: Completed Specimen: Urine, clean catch Updated: 05/09/25 1044     Special Requests NO SPECIAL REQUESTS        [place] count --        20572  COLONIES/mL       Culture Klebsiella pneumoniae       Susceptibility        Klebsiella pneumoniae      BACTERIAL SUSCEPTIBILITY PANEL BHARAT      amikacin <=2 ug/mL Sensitive      ampicillin >=32 ug/mL Resistant      ampicillin-sulbactam 8 ug/mL Sensitive      ceFAZolin <=4 ug/mL Sensitive      cefepime <=1 ug/mL Sensitive      cefOXitin <=4 ug/mL Sensitive      cefTAZidime <=1 ug/mL Sensitive      cefTRIAXone <=1 ug/mL Sensitive      ciprofloxacin <=0.25 ug/mL Sensitive      gentamicin <=1 ug/mL Sensitive      levofloxacin <=0.12 ug/mL Sensitive      meropenem <=0.25 ug/mL Sensitive      nitrofurantoin 64 ug/mL Intermediate      piperacillin-tazobactam <=4 ug/mL Sensitive      tobramycin <=1 ug/mL Sensitive      trimethoprim-sulfamethoxazole <=20 ug/mL Sensitive                           Culture, Respiratory [[id number]] Collected: 05/07/25 0215    Order Status: Canceled Specimen: Endotracheal     Blood Culture 2 [[id number]] Collected: 05/07/25 0135    Order Status: Completed Specimen: Blood Updated: 05/10/25 0629     Special Requests NO SPECIAL REQUESTS        Culture NO GROWTH 3 DAYS       Blood Culture 1 [[id number]] Collected: 05/07/25 0120    Order Status: Completed Specimen: Blood Updated: 05/10/25 0629     Special Requests NO SPECIAL  REQUESTS        Culture NO GROWTH 3 DAYS       Urine Culture [[id number]]     Order Status: Canceled Specimen: Urine, clean catch                  Imaging:  [x]   I have personally reviewed the patient’s radiographs and reports  Most recent imaging:  CT CHEST ABDOMEN PELVIS WO CONTRAST Additional Contrast? None  Result Date: 5/7/2025  1.  Tubes and lines in good position. 2.  Extensive changes consistent with bronchopneumonia. Electronically signed by Zurdo Singer    XR ABDOMEN (KUB) (SINGLE AP VIEW)  Result Date: 5/7/2025  1.  The tube has its tip overlying the stomach. Electronically signed by Zurdo COOPER Post    XR CHEST PORTABLE  Result Date: 5/7/2025  1.  Tubes and lines without evidence of complication. 2.  Multifocal opacities. Electronically signed by Zurdo COOPER Post    CT HEAD WO CONTRAST  Result Date: 5/7/2025  1.   Diffuse parenchymal loss. 2.  No acute intracranial process. Electronically signed by Zurdo Singer      No results found for this or any previous visit.     No valid procedures specified.     12 minutes were spent with the patient at the bedside. This care involved high complexity decision making to assess, manipulate, and support vital system functions, to treat this degreee vital organ system failure and to prevent further life threatening deterioration of the patient’s condition  The services I provided to this patient were to treat and/or prevent clinically significant deterioration that could result in the failure of one or more body systems and/or organ systems due to respiratory distress, hypoxia, cardiac dysrhythmia.       Lydia Chen PA-C  05/11/25  Pulmonary, Critical Care Medicine  [facility]

## 2025-05-11 NOTE — PROGRESS NOTES
[facility] Renal Dosing Services    Pharmacist Renal Dosing Note for Cefepime    Previous Regimen 1 gm q12h   Serum Creatinine No results found for: \"GILBERTO\", \"CREAPOC\"   Creatinine Clearance Estimated Creatinine Clearance: 32 mL/min (A) (based on SCr of 1.4 mg/dL (H)).   BUN Lab Results   Component Value Date/Time    BUN 29 05/11/2025 01:59 AM           The following medication: Cefepime was automatically dose-adjusted per [facility] P&T Committee Protocol, with respect to renal function.      Dosage changed to:  2 gm q12h    Additional notes:    Pharmacy to continue to monitor patient daily.   Will make dosage adjustments based upon changing renal function.  Signed Lucas Rodriguez RPH.

## 2025-05-11 NOTE — PROGRESS NOTES
In Patient Progress note      Admit Date: 5/6/2025          Impression:        IRA - patient with recovering ATN injury which was a result of rhabdo.  Urine outputs remain excellent, voided 2L yesterday per I/O's.  Cr continues its steady downward trend and is almost back to normal at this point -> looks great at 1.20mg/dl this am.  Lytes are all looking good at this time.   Rhabdo - suspect this was drug-related as she did test positive for cocaine on urine tox screen.  CK continues to improve and was down to 3K yesterday when last checked.    Acidosis - resolved.    Respiratory failure - she remains on the ventilator. It looks like neuro status will be the limiting factor in terms of when she'll be ready to come off the vent.     Anemia - Hgb stable 7.8g/dl this am.          Plan:     LR already reduced to 75/hr which is basically a pure maintenance rate.  I'm fine with this now that rhabdo has essentially resolved and GFR back to normal.      I do NOT think she needs any further diuretic.  Outputs are excellent on their own and no evidence of volume overload.      Meds reviewed and look okay.      BP's occasionally get a little high but this is NOT a huge concern right now.     Please avoid all potential renal toxins to prevent any recurrences of IRA.    Ventilator management per PCCM team.    We will sign off at this point, not adding much at this point -> available as needed, thanks.          Subjective:     Events noted.             Current Facility-Administered Medications:     amLODIPine (NORVASC) tablet 5 mg, 5 mg, Oral, Daily, Lydia Chen PA-C, 5 mg at 05/11/25 0835    hydrALAZINE (APRESOLINE) injection 10 mg, 10 mg, IntraVENous, Q6H PRN, Lydia Chen PA-C    aspirin chewable tablet 81 mg, 81 mg, Per NG tube, Daily, Yuly Vidal MD, 81 mg at 05/11/25 0835    folic acid injection 1 mg, 1 mg, IntraVENous, Daily, Lydia Chen PA-C, 1 mg at 05/10/25 0856    [COMPLETED] thiamine  pitting edema.  Skin intact, no rash.  [place] with excellent volumes light yellow urine.  No focal neuro findings noted.       Data Review:    Recent Labs     05/11/25  0159   WBC 10.5   RBC 3.58*   HCT 25.0*   MCV 69.8*   MCH 21.8*   MCHC 31.2   RDW 15.9*     Recent Labs     05/09/25  0115 05/09/25  1245 05/10/25  0115 05/11/25  0159   BUN 35* 35* 35* 29*   K 3.5 3.8 4.1 3.8    138 141 142    102 102 101   CO2 23 23 25 27   PHOS 3.8  --  3.1 2.6       KARLY BLANTON MD    Pager [phone]

## 2025-05-11 NOTE — PROGRESS NOTES
PCCM UPDATE  I did meet with Mr. Antony Loaiza,  of Ms. Juliana Loaiza, at bedside today at 6:30pm.  They have been  for over 24hrs. He is 89yo, came in from a nursing home with his  Chava Conley (Tel [phone]) who helps to commute him to hospital appointments and willing to come along with him.  Mr. Jacques acknowleges talking to Dr. Vidal this afternoon about his wifes neurologic condition. He wanted me to break it down to him again in lay ezra terms.  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  He acknowledges that he does not want his wife to live in a vegetative state and does not want to prolong her suffering, he feels he is stuck between a rock and a hard place, as the decision maker watching her be alone during this period, and does express that she has been through.   He acknowledges she has a son who is not involved in her life and has been out of touch, and that he does not have his contact.  I did explain the risks of prolonged intubation and option for trach and PEG if she cannot be liberated from the ventilation due to her neurologic status. I did also discuss consideration for a confort focussed care.  He will like a few days to make this decision but is agreeable to changing her  code status to a limited CODE< permitting intubation/maintenance of current endotracheal tube only.    In the event of accidental extubation, he does not want her reintubated. In the event of cardiopulmonary arrest, her does not want resuscitation.    CODE STATUS updated in the EMR.    Lizandro Sanz, PhD., PA-C.  7:06 PM  Pulmonary, Critical Care Medicine  [facility]

## 2025-05-11 NOTE — PLAN OF CARE
Problem: Discharge Planning  Goal: Discharge to home or other facility with appropriate resources  5/10/2025 2203 by Anna Eaton RN  Outcome: Progressing  Flowsheets (Taken 5/10/2025 2203)  Discharge to home or other facility with appropriate resources:   Identify barriers to discharge with patient and caregiver   Arrange for needed discharge resources and transportation as appropriate   Identify discharge learning needs (meds, wound care, etc)  5/10/2025 2203 by Anna Eaton RN  Outcome: Progressing  Flowsheets  Taken 5/10/2025 2203  Discharge to home or other facility with appropriate resources:   Identify barriers to discharge with patient and caregiver   Arrange for needed discharge resources and transportation as appropriate   Identify discharge learning needs (meds, wound care, etc)  Taken 5/10/2025 2000  Discharge to home or other facility with appropriate resources:   Identify barriers to discharge with patient and caregiver   Arrange for needed discharge resources and transportation as appropriate   Identify discharge learning needs (meds, wound care, etc)  5/10/2025 1035 by Rupa Gross RN  Outcome: Progressing  Flowsheets (Taken 5/10/2025 1035)  Discharge to home or other facility with appropriate resources:   Identify barriers to discharge with patient and caregiver   Identify discharge learning needs (meds, wound care, etc)  Note: Barriers right not would be pulmonary wise. Unsure if she will pass SBT or need further intervention     Problem: Neurosensory - Adult  Goal: Achieves stable or improved neurological status  5/10/2025 2203 by Anna Eaton, RN  Outcome: Progressing  5/10/2025 2203 by Anna Eaton RN  Outcome: Progressing  Flowsheets (Taken 5/10/2025 2000)  Achieves stable or improved neurological status:   Assess for and report changes in neurological status   Initiate measures to prevent increased intracranial pressure   Maintain blood pressure and fluid volume within ordered parameters

## 2025-05-11 NOTE — PLAN OF CARE
Problem: Discharge Planning  Goal: Discharge to home or other facility with appropriate resources  5/11/2025 0918 by Frieda Murry RN  Outcome: Progressing  5/10/2025 2204 by Anna Eaton RN  Outcome: Progressing  5/10/2025 2203 by Anna Eaton RN  Outcome: Progressing  Flowsheets (Taken 5/10/2025 2203)  Discharge to home or other facility with appropriate resources:   Identify barriers to discharge with patient and caregiver   Arrange for needed discharge resources and transportation as appropriate   Identify discharge learning needs (meds, wound care, etc)  5/10/2025 2203 by Anna Eaton RN  Outcome: Progressing  Flowsheets  Taken 5/10/2025 2203  Discharge to home or other facility with appropriate resources:   Identify barriers to discharge with patient and caregiver   Arrange for needed discharge resources and transportation as appropriate   Identify discharge learning needs (meds, wound care, etc)  Taken 5/10/2025 2000  Discharge to home or other facility with appropriate resources:   Identify barriers to discharge with patient and caregiver   Arrange for needed discharge resources and transportation as appropriate   Identify discharge learning needs (meds, wound care, etc)     Problem: Safety - Adult  Goal: Free from fall injury  5/11/2025 0918 by Frieda Murry RN  Outcome: Progressing  5/10/2025 2204 by Anna Eaton RN  Outcome: Progressing  5/10/2025 2203 by Anna Eaton RN  Outcome: Progressing  Flowsheets (Taken 5/10/2025 1035 by Rupa Gross, RN)  Free From Fall Injury: Instruct family/caregiver on patient safety  5/10/2025 2203 by Anna Eaton RN  Outcome: Progressing     Problem: Nutrition Deficit:  Goal: Optimize nutritional status  5/11/2025 0918 by Frieda Murry RN  Outcome: Progressing  5/10/2025 2204 by Anna Eaton RN  Outcome: Progressing  5/10/2025 2203 by Anna Eaton RN  Outcome: Progressing  Flowsheets (Taken 5/10/2025 2203)  Nutrient intake appropriate for improving,  support as indicated  5/10/2025 2204 by Anna Eaton RN  Outcome: Progressing  Flowsheets (Taken 5/10/2025 2204)  Achieves optimal ventilation and oxygenation:   Assess for changes in respiratory status   Assess for changes in mentation and behavior   Position to facilitate oxygenation and minimize respiratory effort   Oxygen supplementation based on oxygen saturation or arterial blood gases   Assess the need for suctioning and aspirate as needed   Respiratory therapy support as indicated  5/10/2025 2203 by Anna Eaton RN  Outcome: Progressing  5/10/2025 2203 by Anna Eaton RN  Outcome: Progressing  Flowsheets (Taken 5/10/2025 2000)  Achieves optimal ventilation and oxygenation:   Assess for changes in respiratory status   Assess for changes in mentation and behavior   Position to facilitate oxygenation and minimize respiratory effort   Oxygen supplementation based on oxygen saturation or arterial blood gases   Assess the need for suctioning and aspirate as needed   Assess and instruct to report shortness of breath or any respiratory difficulty   Respiratory therapy support as indicated     Problem: Cardiovascular - Adult  Goal: Maintains optimal cardiac output and hemodynamic stability  5/11/2025 0918 by Frieda Murry RN  Outcome: Progressing  Flowsheets (Taken 5/11/2025 0800)  Maintains optimal cardiac output and hemodynamic stability:   Monitor blood pressure and heart rate   Monitor urine output and notify Licensed Independent Practitioner for values outside of normal range   Assess for signs of decreased cardiac output   Administer fluid and/or volume expanders as ordered  5/10/2025 2204 by Anna Eaton RN  Outcome: Progressing  Flowsheets (Taken 5/10/2025 2204)  Maintains optimal cardiac output and hemodynamic stability:   Monitor blood pressure and heart rate   Monitor urine output and notify Licensed Independent Practitioner for values outside of normal range   Assess for signs of decreased cardiac

## 2025-05-11 NOTE — PROGRESS NOTES
Progress Note  Date:2025       Room:[facility]  Patient Name:Juliana Loaiza     YOB: 1950     Age:74 y.o.        Subjective    Subjective remains intubated and unresponsive in the ICU  Review of Systems unable to obtain-intubated   Objective         Vitals Last 24 Hours:  TEMPERATURE:  Temp  Av.2 °F (36.8 °C)  Min: 97.9 °F (36.6 °C)  Max: 98.5 °F (36.9 °C)  RESPIRATIONS RANGE: Resp  Av.9  Min: 14  Max: 31  PULSE OXIMETRY RANGE: SpO2  Av.9 %  Min: 94 %  Max: 100 %  PULSE RANGE: Pulse  Av.1  Min: 77  Max: 103  BLOOD PRESSURE RANGE: Systolic (24hrs), Av , Min:95 , Max:199   ; Diastolic (24hrs), Av, Min:55, Max:109    I/O (24Hr):    Intake/Output Summary (Last 24 hours) at 2025 1214  Last data filed at 2025 0801  Gross per 24 hour   Intake 1881.58 ml   Output 1540 ml   Net 341.58 ml     Objective  General lying in bed, intubated,   Neuro: MS lying in bed intubated, , does not respond to verbal/tactile stimuli, grimaces to noxious stimuli  CN Pupils reactive bilat positive eye movements with oculocephalic maneuvers does not grimace, opens eyes spontaneously  Motor no spontaneous movement or Withdrawal of ext from noxious stimuli   Labs/Imaging/Diagnostics    Labs:  CBC:  Recent Labs     05/09/25  0115 05/10/25  0115 05/11/25  0159   WBC 12.5 12.7 10.5   RBC 3.58* 3.67* 3.58*   HGB 7.7* 8.1* 7.8*   HCT 24.5* 25.6* 25.0*   MCV 68.4* 69.8* 69.8*   RDW 15.9* 15.8* 15.9*    182 193     CHEMISTRIES:  Recent Labs     25  0115 25  0259 25  1245 05/10/25  0115 05/10/25  0357 25  0159 25  0342     --  138 141  --  142  --    K 3.5  --  3.8 4.1  --  3.8  --      --  102 102  --  101  --    CO2 23  --  23 25  --  27  --    BUN 35*  --  35* 35*  --  29*  --    CREATININE 2.37*   < > 2.11* 1.78* 1.81* 1.20 1.40*   GLUCOSE 137*  --  196* 130*  --  143*  --    PHOS 3.8  --   --  3.1  --  2.6  --    MG 1.7  --   --  2.3  --  2.0  --   following commands or moving extremities spontaneously.  MRI brain shows extensive bilateral MCA strokes,Embolic appearing.    Carotid ultrasound pending    Echocardiogram demonstrated no thrombus or vegetations.  Given the extent of her infarcts, concern for poor neurological recovery.  Patient also with other medical issues including IRA, sepsis, rhinovirus and pneumonia.  History of polysubstance abuse that may have contributed to this event.  The periodic sharp waves on the rapid EEG were likely related to the acute infarcts.  CSF so far has been negative for signs of CNS infection.  I recommend keeping her on IV Depacon for antiepileptic drug given the epileptiform discharges on the rapid EEG.  2. Abnormal rapid EEG.  Periodic mainly right hemispheric discharges with some on the left.  The bihemispheric strokes are likely the structural etiology of those findings.  Given those epileptiform discharges she should remain on antiepileptic drugs with IV Depacon.  She did not have any actual well-formed seizures on the study.  No overt clinical seizure activity.  But she is at high risk for seizures without treatment  3. Stroke-Would not anticoagulate given the size of the stroke and risk for hemorrhagic transformation.     Continue baby aspirin daily.    hemoglobin A1c 6.9   fasting lipid panel.  LDL 46    Start statin once medically appropriate but will hold off now due to elevated CK  Recommend JUAN given the extent of the strokes and  cardioembolic pattern of bilateral infarcts since etiology not found on TTE.    I discussed with patient's spouse on the phone, Antony Loaiza. He is currently in a skilled nursing facility, [facility],.  He states that the patient never explicitly stated her wishes if something were to happen that where she would require long-term mechanical ventilation and feeding tubes.  However, he stated that she would not want to be in the persistent vegetative state hooked up to machines.

## 2025-05-11 NOTE — PROGRESS NOTES
VENTILATOR CARE PLAN    Problem: Ventilator Management  Goal: *Adequate oxygenation/ ventilation/ and extubation      Patient:        Juliana Loaiza     74 y.o.   female     5/11/2025  11:49 AM  Patient Active Problem List   Diagnosis    Coma (HCC)    Acute encephalopathy    Acute hypoxemic respiratory failure (HCC)    Severe sepsis (HCC)    Polysubstance abuse (HCC)    Transaminitis    Elevated CK    IRA (acute kidney injury)    Aspiration pneumonia of both lungs (HCC)    Lactic acidosis    Metabolic acidosis    Rhinovirus    Cerebrovascular accident (CVA) due to bilateral embolism of middle cerebral arteries (HCC)    Respiratory arrest (HCC)       Coma (HCC) [R40.20]  Respiratory arrest (HCC) [R09.2]  [place] coma scale total score 3-8, in the field (EMT or ambulance) (HCC) [R40.2431]    Reason patient intubated:  Acute on chronic respiratory failure, unclear if              hypercapnic or hypoxic on arrival-intubated for airway protection, Home baseline is 6 Lpm oxygen  Acute encephalopathy-toxic/metabolic in nature, patient unresponsive with pinpoint pupils, etiology unclear--?CVA vs. Drug toxicity vs. Mediation induced (patient on multiple sedating medications, such as Cymbalta, Remeron, Lyrica, Requip, and Zoloft-->also places patient at risk for serotonin syndrome), vs. Hypoxia vs. Uremia vs. Sepsis vs. Drug OD    Ventilator day: 6.    Ventilator settings: Currently on SBT PS 10, PEEP 5, FiO2 30%. Rest on PRVC, f 14, vT 390, PEEP 5, FiO2 30%, tI 1.0 sec.    ETT Size/Placement: 7.5 FERNANDA ETT secured at 23 cm at upper lip.     Wean Screen Pass (Yes or No): Yes.  Wean Screen Reason for Failure: -  Duration of Weaning Trial: Ongoing.  Additional Comments: Pt opens eyes to voice, does not follow commands or track.        PLAN OF CARE:  Maintain adequate ventilation and oxygenation, wean vent settings as tolerated, SBT as tolerated when safe and appropriate.       GOAL:  Liberation from artificial airway and

## 2025-05-11 NOTE — PROGRESS NOTES
SBT initiated, PS 10, PEEP 5, FiO2 28%. Pt wakes but does not track or follow any commands.       05/11/25 0833   Weaning Parameters   Spontaneous Breathing Trial Complete Yes   Respiratory Rate Observed 27   Ve 8.77      RSBI 82     Tolerating. Will monitor for signs of apnea or distress.

## 2025-05-12 ENCOUNTER — APPOINTMENT (OUTPATIENT)
Facility: HOSPITAL | Age: 75
End: 2025-05-12
Attending: PSYCHIATRY & NEUROLOGY
Payer: MEDICARE

## 2025-05-12 ENCOUNTER — APPOINTMENT (OUTPATIENT)
Facility: HOSPITAL | Age: 75
End: 2025-05-12
Payer: MEDICARE

## 2025-05-12 PROBLEM — Z51.5 PALLIATIVE CARE ENCOUNTER: Status: ACTIVE | Noted: 2025-05-12

## 2025-05-12 PROBLEM — Z71.89 GOALS OF CARE, COUNSELING/DISCUSSION: Status: ACTIVE | Noted: 2025-05-12

## 2025-05-12 PROBLEM — Z51.5 PALLIATIVE CARE ENCOUNTER: Status: ACTIVE | Noted: 2025-01-01

## 2025-05-12 PROBLEM — Z71.89 DNR (DO NOT RESUSCITATE) DISCUSSION: Status: ACTIVE | Noted: 2025-01-01

## 2025-05-12 PROBLEM — Z71.89 GOALS OF CARE, COUNSELING/DISCUSSION: Status: ACTIVE | Noted: 2025-01-01

## 2025-05-12 PROBLEM — Z71.89 DNR (DO NOT RESUSCITATE) DISCUSSION: Status: ACTIVE | Noted: 2025-05-12

## 2025-05-12 LAB
ALBUMIN SERPL-MCNC: 1.5 G/DL (ref 3.4–5)
ALBUMIN SERPL-MCNC: 1.5 G/DL (ref 3.4–5)
ANION GAP BLD CALC-SCNC: ABNORMAL MMOL/L (ref 10–20)
ANION GAP BLD CALC-SCNC: ABNORMAL MMOL/L (ref 10–20)
ANION GAP SERPL CALC-SCNC: 10 MMOL/L (ref 3–18)
ANION GAP SERPL CALC-SCNC: 10 MMOL/L (ref 3–18)
ANION GAP SERPL CALC-SCNC: 12 MMOL/L (ref 3–18)
ANION GAP SERPL CALC-SCNC: 12 MMOL/L (ref 3–18)
ARTERIAL PATENCY WRIST A: POSITIVE
ARTERIAL PATENCY WRIST A: POSITIVE
BASE EXCESS BLD CALC-SCNC: 11.9 MMOL/L
BASE EXCESS BLD CALC-SCNC: 11.9 MMOL/L
BASOPHILS # BLD: 0 K/UL (ref 0–0.1)
BASOPHILS # BLD: 0 K/UL (ref 0–0.1)
BASOPHILS # BLD: 0.1 K/UL (ref 0–0.1)
BASOPHILS # BLD: 0.1 K/UL (ref 0–0.1)
BASOPHILS NFR BLD: 0 % (ref 0–2)
BASOPHILS NFR BLD: 0 % (ref 0–2)
BASOPHILS NFR BLD: 1 % (ref 0–2)
BASOPHILS NFR BLD: 1 % (ref 0–2)
BDY SITE: ABNORMAL
BDY SITE: ABNORMAL
BUN SERPL-MCNC: 32 MG/DL (ref 6–23)
BUN SERPL-MCNC: 32 MG/DL (ref 6–23)
BUN SERPL-MCNC: 34 MG/DL (ref 6–23)
BUN SERPL-MCNC: 34 MG/DL (ref 6–23)
BUN/CREAT SERPL: 29 (ref 12–20)
BUN/CREAT SERPL: 29 (ref 12–20)
BUN/CREAT SERPL: 34 (ref 12–20)
BUN/CREAT SERPL: 34 (ref 12–20)
CA-I BLD-MCNC: 1.14 MMOL/L (ref 1.15–1.33)
CA-I BLD-MCNC: 1.14 MMOL/L (ref 1.15–1.33)
CALCIUM SERPL-MCNC: 8.2 MG/DL (ref 8.5–10.1)
CALCIUM SERPL-MCNC: 8.2 MG/DL (ref 8.5–10.1)
CALCIUM SERPL-MCNC: 9.1 MG/DL (ref 8.5–10.1)
CALCIUM SERPL-MCNC: 9.1 MG/DL (ref 8.5–10.1)
CHLORIDE BLD-SCNC: 101 MMOL/L (ref 98–107)
CHLORIDE BLD-SCNC: 101 MMOL/L (ref 98–107)
CHLORIDE SERPL-SCNC: 102 MMOL/L (ref 98–107)
CHLORIDE SERPL-SCNC: 102 MMOL/L (ref 98–107)
CHLORIDE SERPL-SCNC: 104 MMOL/L (ref 98–107)
CHLORIDE SERPL-SCNC: 104 MMOL/L (ref 98–107)
CK SERPL-CCNC: 1435 U/L (ref 26–192)
CK SERPL-CCNC: 1435 U/L (ref 26–192)
CO2 BLD-SCNC: 34 MMOL/L (ref 22–29)
CO2 BLD-SCNC: 34 MMOL/L (ref 22–29)
CO2 SERPL-SCNC: 28 MMOL/L (ref 21–32)
CO2 SERPL-SCNC: 28 MMOL/L (ref 21–32)
CO2 SERPL-SCNC: 30 MMOL/L (ref 21–32)
CO2 SERPL-SCNC: 30 MMOL/L (ref 21–32)
CREAT BLD-MCNC: 1.18 MG/DL (ref 0.6–1.3)
CREAT BLD-MCNC: 1.18 MG/DL (ref 0.6–1.3)
CREAT SERPL-MCNC: 1.01 MG/DL (ref 0.6–1.3)
CREAT SERPL-MCNC: 1.01 MG/DL (ref 0.6–1.3)
CREAT SERPL-MCNC: 1.08 MG/DL (ref 0.6–1.3)
CREAT SERPL-MCNC: 1.08 MG/DL (ref 0.6–1.3)
DIFFERENTIAL METHOD BLD: ABNORMAL
ECHO BSA: 1.62 M2
ECHO BSA: 1.62 M2
EOSINOPHIL # BLD: 0 K/UL (ref 0–0.4)
EOSINOPHIL NFR BLD: 0 % (ref 0–5)
ERYTHROCYTE [DISTWIDTH] IN BLOOD BY AUTOMATED COUNT: 16 % (ref 11.6–14.5)
ERYTHROCYTE [DISTWIDTH] IN BLOOD BY AUTOMATED COUNT: 16 % (ref 11.6–14.5)
ERYTHROCYTE [DISTWIDTH] IN BLOOD BY AUTOMATED COUNT: 16.4 % (ref 11.6–14.5)
ERYTHROCYTE [DISTWIDTH] IN BLOOD BY AUTOMATED COUNT: 16.4 % (ref 11.6–14.5)
FIO2 ON VENT: 35 %
FIO2 ON VENT: 35 %
GAS FLOW.O2 O2 DELIVERY SYS: ABNORMAL
GAS FLOW.O2 O2 DELIVERY SYS: ABNORMAL
GLUCOSE BLD STRIP.AUTO-MCNC: 100 MG/DL (ref 70–110)
GLUCOSE BLD STRIP.AUTO-MCNC: 100 MG/DL (ref 70–110)
GLUCOSE BLD STRIP.AUTO-MCNC: 117 MG/DL (ref 70–110)
GLUCOSE BLD STRIP.AUTO-MCNC: 117 MG/DL (ref 70–110)
GLUCOSE BLD STRIP.AUTO-MCNC: 178 MG/DL (ref 70–110)
GLUCOSE BLD STRIP.AUTO-MCNC: 178 MG/DL (ref 70–110)
GLUCOSE BLD STRIP.AUTO-MCNC: 187 MG/DL (ref 70–110)
GLUCOSE BLD STRIP.AUTO-MCNC: 187 MG/DL (ref 70–110)
GLUCOSE BLD STRIP.AUTO-MCNC: 200 MG/DL (ref 70–110)
GLUCOSE BLD STRIP.AUTO-MCNC: 200 MG/DL (ref 70–110)
GLUCOSE BLD-MCNC: 188 MG/DL (ref 74–99)
GLUCOSE BLD-MCNC: 188 MG/DL (ref 74–99)
GLUCOSE SERPL-MCNC: 124 MG/DL (ref 74–108)
GLUCOSE SERPL-MCNC: 124 MG/DL (ref 74–108)
GLUCOSE SERPL-MCNC: 163 MG/DL (ref 74–108)
GLUCOSE SERPL-MCNC: 163 MG/DL (ref 74–108)
HCO3 BLD-SCNC: 35 MMOL/L (ref 21–28)
HCO3 BLD-SCNC: 35 MMOL/L (ref 21–28)
HCT VFR BLD AUTO: 22.2 % (ref 35–45)
HCT VFR BLD AUTO: 22.2 % (ref 35–45)
HCT VFR BLD AUTO: 23.2 % (ref 35–45)
HCT VFR BLD AUTO: 23.2 % (ref 35–45)
HGB BLD-MCNC: 6.8 G/DL (ref 12–16)
HGB BLD-MCNC: 6.8 G/DL (ref 12–16)
HGB BLD-MCNC: 7.1 G/DL (ref 12–16)
HGB BLD-MCNC: 7.1 G/DL (ref 12–16)
HSV 1 DNA: NEGATIVE
HSV 1 DNA: NEGATIVE
HSV 2 DNA: NEGATIVE
HSV 2 DNA: NEGATIVE
IMM GRANULOCYTES # BLD AUTO: 0 K/UL (ref 0–0.04)
IMM GRANULOCYTES NFR BLD AUTO: 0 % (ref 0–0.5)
LACTATE BLD-SCNC: 0.93 MMOL/L (ref 0.4–2)
LACTATE BLD-SCNC: 0.93 MMOL/L (ref 0.4–2)
LYMPHOCYTES # BLD: 0.83 K/UL (ref 0.9–3.6)
LYMPHOCYTES # BLD: 0.83 K/UL (ref 0.9–3.6)
LYMPHOCYTES # BLD: 1.19 K/UL (ref 0.9–3.6)
LYMPHOCYTES # BLD: 1.19 K/UL (ref 0.9–3.6)
LYMPHOCYTES NFR BLD: 12 % (ref 21–52)
LYMPHOCYTES NFR BLD: 12 % (ref 21–52)
LYMPHOCYTES NFR BLD: 9 % (ref 21–52)
LYMPHOCYTES NFR BLD: 9 % (ref 21–52)
MAGNESIUM SERPL-MCNC: 1.9 MG/DL (ref 1.6–2.6)
MAGNESIUM SERPL-MCNC: 1.9 MG/DL (ref 1.6–2.6)
MAGNESIUM SERPL-MCNC: 2.4 MG/DL (ref 1.6–2.6)
MAGNESIUM SERPL-MCNC: 2.4 MG/DL (ref 1.6–2.6)
MCH RBC QN AUTO: 21.5 PG (ref 24–34)
MCH RBC QN AUTO: 21.5 PG (ref 24–34)
MCH RBC QN AUTO: 21.8 PG (ref 24–34)
MCH RBC QN AUTO: 21.8 PG (ref 24–34)
MCHC RBC AUTO-ENTMCNC: 30.6 G/DL (ref 31–37)
MCV RBC AUTO: 70.3 FL (ref 78–100)
MCV RBC AUTO: 70.3 FL (ref 78–100)
MCV RBC AUTO: 71.4 FL (ref 78–100)
MCV RBC AUTO: 71.4 FL (ref 78–100)
METAMYELOCYTES NFR BLD MANUAL: 1 %
METAMYELOCYTES NFR BLD MANUAL: 1 %
METAMYELOCYTES NFR BLD MANUAL: 2 %
METAMYELOCYTES NFR BLD MANUAL: 2 %
MONOCYTES # BLD: 0.55 K/UL (ref 0.05–1.2)
MONOCYTES # BLD: 0.55 K/UL (ref 0.05–1.2)
MONOCYTES # BLD: 0.79 K/UL (ref 0.05–1.2)
MONOCYTES # BLD: 0.79 K/UL (ref 0.05–1.2)
MONOCYTES NFR BLD: 6 % (ref 3–10)
MONOCYTES NFR BLD: 6 % (ref 3–10)
MONOCYTES NFR BLD: 8 % (ref 3–10)
MONOCYTES NFR BLD: 8 % (ref 3–10)
MYELOCYTES NFR BLD MANUAL: 2 %
MYELOCYTES NFR BLD MANUAL: 2 %
NEUTS BAND NFR BLD MANUAL: 2 %
NEUTS BAND NFR BLD MANUAL: 2 %
NEUTS SEG # BLD: 7.45 K/UL (ref 1.8–8)
NEUTS SEG # BLD: 7.45 K/UL (ref 1.8–8)
NEUTS SEG # BLD: 7.72 K/UL (ref 1.8–8)
NEUTS SEG # BLD: 7.72 K/UL (ref 1.8–8)
NEUTS SEG NFR BLD: 78 % (ref 40–73)
NEUTS SEG NFR BLD: 78 % (ref 40–73)
NEUTS SEG NFR BLD: 79 % (ref 40–73)
NEUTS SEG NFR BLD: 79 % (ref 40–73)
NRBC # BLD: 0 K/UL (ref 0–0.01)
NRBC BLD-RTO: 0 PER 100 WBC
PCO2 BLD: 38.3 MMHG (ref 35–48)
PCO2 BLD: 38.3 MMHG (ref 35–48)
PEEP RESPIRATORY: 5
PEEP RESPIRATORY: 5
PH BLD: 7.57 (ref 7.35–7.45)
PH BLD: 7.57 (ref 7.35–7.45)
PHOSPHATE SERPL-MCNC: 2.7 MG/DL (ref 2.5–4.9)
PHOSPHATE SERPL-MCNC: 2.7 MG/DL (ref 2.5–4.9)
PLATELET # BLD AUTO: 173 K/UL (ref 135–420)
PLATELET # BLD AUTO: 173 K/UL (ref 135–420)
PLATELET # BLD AUTO: 192 K/UL (ref 135–420)
PLATELET # BLD AUTO: 192 K/UL (ref 135–420)
PLATELET COMMENT: ABNORMAL
PO2 BLD: 116 MMHG (ref 83–108)
PO2 BLD: 116 MMHG (ref 83–108)
POTASSIUM BLD-SCNC: 3.5 MMOL/L (ref 3.5–5.1)
POTASSIUM BLD-SCNC: 3.5 MMOL/L (ref 3.5–5.1)
POTASSIUM SERPL-SCNC: 3.4 MMOL/L (ref 3.5–5.5)
POTASSIUM SERPL-SCNC: 3.4 MMOL/L (ref 3.5–5.5)
POTASSIUM SERPL-SCNC: 4.4 MMOL/L (ref 3.5–5.5)
POTASSIUM SERPL-SCNC: 4.4 MMOL/L (ref 3.5–5.5)
RBC # BLD AUTO: 3.16 M/UL (ref 4.2–5.3)
RBC # BLD AUTO: 3.16 M/UL (ref 4.2–5.3)
RBC # BLD AUTO: 3.25 M/UL (ref 4.2–5.3)
RBC # BLD AUTO: 3.25 M/UL (ref 4.2–5.3)
RBC MORPH BLD: ABNORMAL
REAGIN AB CSF QL: NON REACTIVE
REAGIN AB CSF QL: NON REACTIVE
RESPIRATORY RATE, POC: 18 (ref 5–40)
RESPIRATORY RATE, POC: 18 (ref 5–40)
SAO2 % BLD: 99 % (ref 94–98)
SAO2 % BLD: 99 % (ref 94–98)
SERVICE CMNT-IMP: ABNORMAL
SERVICE CMNT-IMP: ABNORMAL
SODIUM BLD-SCNC: 142 MMOL/L (ref 136–145)
SODIUM BLD-SCNC: 142 MMOL/L (ref 136–145)
SODIUM SERPL-SCNC: 141 MMOL/L (ref 136–145)
SODIUM SERPL-SCNC: 141 MMOL/L (ref 136–145)
SODIUM SERPL-SCNC: 144 MMOL/L (ref 136–145)
SODIUM SERPL-SCNC: 144 MMOL/L (ref 136–145)
SPECIMEN SITE: ABNORMAL
SPECIMEN SITE: ABNORMAL
VAS LEFT CCA DIST EDV: 20.9 CM/S
VAS LEFT CCA DIST EDV: 20.9 CM/S
VAS LEFT CCA DIST PSV: 127.3 CM/S
VAS LEFT CCA DIST PSV: 127.3 CM/S
VAS LEFT CCA MID EDV: 22.86 CM/S
VAS LEFT CCA MID EDV: 22.86 CM/S
VAS LEFT CCA MID PSV: 105.64 CM/S
VAS LEFT CCA MID PSV: 105.64 CM/S
VAS LEFT CCA PROX EDV: 26.8 CM/S
VAS LEFT CCA PROX EDV: 26.8 CM/S
VAS LEFT CCA PROX PSV: 127.3 CM/S
VAS LEFT CCA PROX PSV: 127.3 CM/S
VAS LEFT ECA EDV: 0 CM/S
VAS LEFT ECA EDV: 0 CM/S
VAS LEFT ECA PSV: 110.7 CM/S
VAS LEFT ECA PSV: 110.7 CM/S
VAS LEFT ICA DIST EDV: 34.4 CM/S
VAS LEFT ICA DIST EDV: 34.4 CM/S
VAS LEFT ICA DIST PSV: 95.2 CM/S
VAS LEFT ICA DIST PSV: 95.2 CM/S
VAS LEFT ICA MID EDV: 20.2 CM/S
VAS LEFT ICA MID EDV: 20.2 CM/S
VAS LEFT ICA MID PSV: 90.1 CM/S
VAS LEFT ICA MID PSV: 90.1 CM/S
VAS LEFT ICA PROX EDV: 18.9 CM/S
VAS LEFT ICA PROX EDV: 18.9 CM/S
VAS LEFT ICA PROX PSV: 87.9 CM/S
VAS LEFT ICA PROX PSV: 87.9 CM/S
VAS LEFT ICA/CCA PSV: 0.75 NO UNITS
VAS LEFT ICA/CCA PSV: 0.75 NO UNITS
VAS LEFT SUBCLAVIAN PROX EDV: 0 CM/S
VAS LEFT SUBCLAVIAN PROX EDV: 0 CM/S
VAS LEFT SUBCLAVIAN PROX PSV: 150 CM/S
VAS LEFT SUBCLAVIAN PROX PSV: 150 CM/S
VAS LEFT VERTEBRAL EDV: 10.99 CM/S
VAS LEFT VERTEBRAL EDV: 10.99 CM/S
VAS LEFT VERTEBRAL PSV: 55.5 CM/S
VAS LEFT VERTEBRAL PSV: 55.5 CM/S
VAS RIGHT CCA DIST EDV: 17.1 CM/S
VAS RIGHT CCA DIST EDV: 17.1 CM/S
VAS RIGHT CCA DIST PSV: 109.2 CM/S
VAS RIGHT CCA DIST PSV: 109.2 CM/S
VAS RIGHT CCA MID EDV: 12.76 CM/S
VAS RIGHT CCA MID EDV: 12.76 CM/S
VAS RIGHT CCA MID PSV: 82.23 CM/S
VAS RIGHT CCA MID PSV: 82.23 CM/S
VAS RIGHT CCA PROX EDV: 25.4 CM/S
VAS RIGHT CCA PROX EDV: 25.4 CM/S
VAS RIGHT CCA PROX PSV: 129.9 CM/S
VAS RIGHT CCA PROX PSV: 129.9 CM/S
VAS RIGHT ECA EDV: 13.01 CM/S
VAS RIGHT ECA EDV: 13.01 CM/S
VAS RIGHT ECA PSV: 150 CM/S
VAS RIGHT ECA PSV: 150 CM/S
VAS RIGHT ICA DIST EDV: 23.6 CM/S
VAS RIGHT ICA DIST EDV: 23.6 CM/S
VAS RIGHT ICA DIST PSV: 94.7 CM/S
VAS RIGHT ICA DIST PSV: 94.7 CM/S
VAS RIGHT ICA MID EDV: 15.5 CM/S
VAS RIGHT ICA MID EDV: 15.5 CM/S
VAS RIGHT ICA MID PSV: 83.4 CM/S
VAS RIGHT ICA MID PSV: 83.4 CM/S
VAS RIGHT ICA PROX EDV: 17.7 CM/S
VAS RIGHT ICA PROX EDV: 17.7 CM/S
VAS RIGHT ICA PROX PSV: 117.5 CM/S
VAS RIGHT ICA PROX PSV: 117.5 CM/S
VAS RIGHT ICA/CCA PSV: 1.1 NO UNITS
VAS RIGHT ICA/CCA PSV: 1.1 NO UNITS
VAS RIGHT SUBCLAVIAN PROX EDV: 0 CM/S
VAS RIGHT SUBCLAVIAN PROX EDV: 0 CM/S
VAS RIGHT SUBCLAVIAN PROX PSV: 129.9 CM/S
VAS RIGHT SUBCLAVIAN PROX PSV: 129.9 CM/S
VAS RIGHT VERTEBRAL EDV: 17.12 CM/S
VAS RIGHT VERTEBRAL EDV: 17.12 CM/S
VAS RIGHT VERTEBRAL PSV: 78.5 CM/S
VAS RIGHT VERTEBRAL PSV: 78.5 CM/S
VENTILATION MODE VENT: ABNORMAL
VENTILATION MODE VENT: ABNORMAL
VT SETTING VENT: 390
VT SETTING VENT: 390
WBC # BLD AUTO: 9.2 K/UL (ref 4.6–13.2)
WBC # BLD AUTO: 9.2 K/UL (ref 4.6–13.2)
WBC # BLD AUTO: 9.9 K/UL (ref 4.6–13.2)
WBC # BLD AUTO: 9.9 K/UL (ref 4.6–13.2)
WNV IGG SPEC QL IA: NEGATIVE
WNV IGG SPEC QL IA: NEGATIVE
WNV IGM CSF QL IA: NEGATIVE
WNV IGM CSF QL IA: NEGATIVE

## 2025-05-12 PROCEDURE — 6360000002 HC RX W HCPCS: Performed by: INTERNAL MEDICINE

## 2025-05-12 PROCEDURE — 94003 VENT MGMT INPAT SUBQ DAY: CPT

## 2025-05-12 PROCEDURE — 82550 ASSAY OF CK (CPK): CPT

## 2025-05-12 PROCEDURE — 6360000002 HC RX W HCPCS: Performed by: PHYSICIAN ASSISTANT

## 2025-05-12 PROCEDURE — 2580000003 HC RX 258: Performed by: INTERNAL MEDICINE

## 2025-05-12 PROCEDURE — 82330 ASSAY OF CALCIUM: CPT

## 2025-05-12 PROCEDURE — 93880 EXTRACRANIAL BILAT STUDY: CPT | Performed by: INTERNAL MEDICINE

## 2025-05-12 PROCEDURE — 94640 AIRWAY INHALATION TREATMENT: CPT

## 2025-05-12 PROCEDURE — 82947 ASSAY GLUCOSE BLOOD QUANT: CPT

## 2025-05-12 PROCEDURE — 82962 GLUCOSE BLOOD TEST: CPT

## 2025-05-12 PROCEDURE — 2500000003 HC RX 250 WO HCPCS: Performed by: NURSE PRACTITIONER

## 2025-05-12 PROCEDURE — 99223 1ST HOSP IP/OBS HIGH 75: CPT | Performed by: INTERNAL MEDICINE

## 2025-05-12 PROCEDURE — 82803 BLOOD GASES ANY COMBINATION: CPT

## 2025-05-12 PROCEDURE — 6370000000 HC RX 637 (ALT 250 FOR IP): Performed by: INTERNAL MEDICINE

## 2025-05-12 PROCEDURE — 84295 ASSAY OF SERUM SODIUM: CPT

## 2025-05-12 PROCEDURE — 80048 BASIC METABOLIC PNL TOTAL CA: CPT

## 2025-05-12 PROCEDURE — 71045 X-RAY EXAM CHEST 1 VIEW: CPT

## 2025-05-12 PROCEDURE — 80069 RENAL FUNCTION PANEL: CPT

## 2025-05-12 PROCEDURE — APPSS15 APP SPLIT SHARED TIME 0-15 MINUTES: Performed by: PHYSICIAN ASSISTANT

## 2025-05-12 PROCEDURE — 2500000003 HC RX 250 WO HCPCS: Performed by: PSYCHIATRY & NEUROLOGY

## 2025-05-12 PROCEDURE — 86850 RBC ANTIBODY SCREEN: CPT

## 2025-05-12 PROCEDURE — 6370000000 HC RX 637 (ALT 250 FOR IP): Performed by: PHYSICIAN ASSISTANT

## 2025-05-12 PROCEDURE — 84132 ASSAY OF SERUM POTASSIUM: CPT

## 2025-05-12 PROCEDURE — 86901 BLOOD TYPING SEROLOGIC RH(D): CPT

## 2025-05-12 PROCEDURE — 6360000002 HC RX W HCPCS: Performed by: NURSE PRACTITIONER

## 2025-05-12 PROCEDURE — 2580000003 HC RX 258: Performed by: PSYCHIATRY & NEUROLOGY

## 2025-05-12 PROCEDURE — 83605 ASSAY OF LACTIC ACID: CPT

## 2025-05-12 PROCEDURE — 86900 BLOOD TYPING SEROLOGIC ABO: CPT

## 2025-05-12 PROCEDURE — 94761 N-INVAS EAR/PLS OXIMETRY MLT: CPT

## 2025-05-12 PROCEDURE — 85025 COMPLETE CBC W/AUTO DIFF WBC: CPT

## 2025-05-12 PROCEDURE — 2700000000 HC OXYGEN THERAPY PER DAY

## 2025-05-12 PROCEDURE — 6370000000 HC RX 637 (ALT 250 FOR IP): Performed by: PSYCHIATRY & NEUROLOGY

## 2025-05-12 PROCEDURE — 36415 COLL VENOUS BLD VENIPUNCTURE: CPT

## 2025-05-12 PROCEDURE — 99291 CRITICAL CARE FIRST HOUR: CPT | Performed by: INTERNAL MEDICINE

## 2025-05-12 PROCEDURE — 2500000003 HC RX 250 WO HCPCS: Performed by: PHYSICIAN ASSISTANT

## 2025-05-12 PROCEDURE — 2000000000 HC ICU R&B

## 2025-05-12 PROCEDURE — 85014 HEMATOCRIT: CPT

## 2025-05-12 PROCEDURE — 6370000000 HC RX 637 (ALT 250 FOR IP): Performed by: NURSE PRACTITIONER

## 2025-05-12 PROCEDURE — 83735 ASSAY OF MAGNESIUM: CPT

## 2025-05-12 PROCEDURE — 86923 COMPATIBILITY TEST ELECTRIC: CPT

## 2025-05-12 PROCEDURE — 36600 WITHDRAWAL OF ARTERIAL BLOOD: CPT

## 2025-05-12 PROCEDURE — 93880 EXTRACRANIAL BILAT STUDY: CPT

## 2025-05-12 RX ORDER — FAMOTIDINE 20 MG/1
20 TABLET, FILM COATED ORAL DAILY
Status: DISCONTINUED | OUTPATIENT
Start: 2025-05-13 | End: 2025-05-16

## 2025-05-12 RX ORDER — FOLIC ACID 1 MG/1
1 TABLET ORAL DAILY
Status: DISCONTINUED | OUTPATIENT
Start: 2025-05-13 | End: 2025-05-16

## 2025-05-12 RX ORDER — CALCIUM GLUCONATE 20 MG/ML
1000 INJECTION, SOLUTION INTRAVENOUS
Status: COMPLETED | OUTPATIENT
Start: 2025-05-12 | End: 2025-05-12

## 2025-05-12 RX ORDER — GAUZE BANDAGE 2" X 2"
100 BANDAGE TOPICAL DAILY
Status: DISCONTINUED | OUTPATIENT
Start: 2025-05-13 | End: 2025-05-16

## 2025-05-12 RX ORDER — MAGNESIUM SULFATE HEPTAHYDRATE 40 MG/ML
2000 INJECTION, SOLUTION INTRAVENOUS ONCE
Status: COMPLETED | OUTPATIENT
Start: 2025-05-12 | End: 2025-05-12

## 2025-05-12 RX ORDER — INSULIN GLARGINE 100 [IU]/ML
5 INJECTION, SOLUTION SUBCUTANEOUS DAILY
Status: DISCONTINUED | OUTPATIENT
Start: 2025-05-12 | End: 2025-05-16

## 2025-05-12 RX ADMIN — MAGNESIUM SULFATE HEPTAHYDRATE 2000 MG: 40 INJECTION, SOLUTION INTRAVENOUS at 08:25

## 2025-05-12 RX ADMIN — SODIUM CHLORIDE, PRESERVATIVE FREE 10 ML: 5 INJECTION INTRAVENOUS at 21:00

## 2025-05-12 RX ADMIN — Medication 15 ML: at 08:17

## 2025-05-12 RX ADMIN — FOLIC ACID 1 MG: 5 INJECTION, SOLUTION INTRAMUSCULAR; INTRAVENOUS; SUBCUTANEOUS at 09:24

## 2025-05-12 RX ADMIN — INSULIN LISPRO 4 UNITS: 100 INJECTION, SOLUTION INTRAVENOUS; SUBCUTANEOUS at 11:35

## 2025-05-12 RX ADMIN — BUDESONIDE 1 MG: 1 SUSPENSION RESPIRATORY (INHALATION) at 19:56

## 2025-05-12 RX ADMIN — AMLODIPINE BESYLATE 5 MG: 5 TABLET ORAL at 08:17

## 2025-05-12 RX ADMIN — CALCIUM GLUCONATE 1000 MG: 20 INJECTION, SOLUTION INTRAVENOUS at 08:14

## 2025-05-12 RX ADMIN — CEFEPIME 2000 MG: 2 INJECTION, POWDER, FOR SOLUTION INTRAVENOUS at 22:30

## 2025-05-12 RX ADMIN — VALPROATE SODIUM 500 MG: 100 INJECTION, SOLUTION INTRAVENOUS at 15:41

## 2025-05-12 RX ADMIN — ARFORMOTEROL TARTRATE 15 MCG: 15 SOLUTION RESPIRATORY (INHALATION) at 19:56

## 2025-05-12 RX ADMIN — CEFEPIME 2000 MG: 2 INJECTION, POWDER, FOR SOLUTION INTRAVENOUS at 11:52

## 2025-05-12 RX ADMIN — SODIUM CHLORIDE, SODIUM LACTATE, POTASSIUM CHLORIDE, AND CALCIUM CHLORIDE: 600; 310; 30; 20 INJECTION, SOLUTION INTRAVENOUS at 14:06

## 2025-05-12 RX ADMIN — ARFORMOTEROL TARTRATE 15 MCG: 15 SOLUTION RESPIRATORY (INHALATION) at 08:27

## 2025-05-12 RX ADMIN — POTASSIUM BICARBONATE 20 MEQ: 782 TABLET, EFFERVESCENT ORAL at 05:54

## 2025-05-12 RX ADMIN — SODIUM CHLORIDE, PRESERVATIVE FREE 20 MG: 5 INJECTION INTRAVENOUS at 08:16

## 2025-05-12 RX ADMIN — INSULIN LISPRO 4 UNITS: 100 INJECTION, SOLUTION INTRAVENOUS; SUBCUTANEOUS at 05:55

## 2025-05-12 RX ADMIN — CEFEPIME 2000 MG: 2 INJECTION, POWDER, FOR SOLUTION INTRAVENOUS at 00:15

## 2025-05-12 RX ADMIN — CARVEDILOL 6.25 MG: 6.25 TABLET, FILM COATED ORAL at 08:17

## 2025-05-12 RX ADMIN — VALPROATE SODIUM 500 MG: 100 INJECTION, SOLUTION INTRAVENOUS at 06:46

## 2025-05-12 RX ADMIN — CHLORHEXIDINE GLUCONATE 0.12% ORAL RINSE 15 ML: 1.2 LIQUID ORAL at 22:13

## 2025-05-12 RX ADMIN — THIAMINE HYDROCHLORIDE 200 MG: 100 INJECTION, SOLUTION INTRAMUSCULAR; INTRAVENOUS at 08:16

## 2025-05-12 RX ADMIN — INSULIN GLARGINE 5 UNITS: 100 INJECTION, SOLUTION SUBCUTANEOUS at 08:39

## 2025-05-12 RX ADMIN — BUDESONIDE 1 MG: 1 SUSPENSION RESPIRATORY (INHALATION) at 08:27

## 2025-05-12 RX ADMIN — SODIUM CHLORIDE, PRESERVATIVE FREE 10 ML: 5 INJECTION INTRAVENOUS at 08:17

## 2025-05-12 RX ADMIN — CHLORHEXIDINE GLUCONATE 0.12% ORAL RINSE 15 ML: 1.2 LIQUID ORAL at 08:26

## 2025-05-12 RX ADMIN — CALCIUM GLUCONATE 1000 MG: 20 INJECTION, SOLUTION INTRAVENOUS at 09:58

## 2025-05-12 RX ADMIN — ASPIRIN 81 MG CHEWABLE TABLET 81 MG: 81 TABLET CHEWABLE at 08:17

## 2025-05-12 ASSESSMENT — PULMONARY FUNCTION TESTS
PIF_VALUE: 18
PIF_VALUE: 20
PIF_VALUE: 20
PIF_VALUE: 16

## 2025-05-12 NOTE — PLAN OF CARE
Problem: Discharge Planning  Goal: Discharge to home or other facility with appropriate resources  5/11/2025 2236 by Anna Eaton, RN  Outcome: Progressing  Flowsheets  Taken 5/11/2025 2236  Discharge to home or other facility with appropriate resources:   Identify barriers to discharge with patient and caregiver   Arrange for needed discharge resources and transportation as appropriate   Identify discharge learning needs (meds, wound care, etc)  Taken 5/11/2025 2000  Discharge to home or other facility with appropriate resources:   Identify barriers to discharge with patient and caregiver   Arrange for needed discharge resources and transportation as appropriate   Identify discharge learning needs (meds, wound care, etc)  5/11/2025 0918 by Frieda Murry, RN  Outcome: Progressing

## 2025-05-12 NOTE — PROGRESS NOTES
VENTILATOR CARE PLAN    Problem: Ventilator Management  Goal: *Adequate oxygenation/ ventilation/ and extubation      Patient:        Juliana Loaiza     74 y.o.   female     5/12/2025  8:49 AM  Patient Active Problem List   Diagnosis    Coma (HCC)    Acute encephalopathy    Acute hypoxemic respiratory failure (HCC)    Severe sepsis (HCC)    Polysubstance abuse (HCC)    Transaminitis    Elevated CK    IRA (acute kidney injury)    Aspiration pneumonia of both lungs (HCC)    Lactic acidosis    Metabolic acidosis    Rhinovirus    Cerebrovascular accident (CVA) due to bilateral embolism of middle cerebral arteries (HCC)    Respiratory arrest (HCC)       Coma (HCC) [R40.20]  Respiratory arrest (HCC) [R09.2]  [place] coma scale total score 3-8, in the field (EMT or ambulance) (HCC) [R40.2431]    Reason patient intubated:  Acute on chronic respiratory failure, unclear if              hypercapnic or hypoxic on arrival-intubated for airway protection, Home baseline is 6 Lpm oxygen  Acute encephalopathy-toxic/metabolic in nature, patient unresponsive with pinpoint pupils, etiology unclear--?CVA vs. Drug toxicity vs. Mediation induced (patient on multiple sedating medications, such as Cymbalta, Remeron, Lyrica, Requip, and Zoloft-->also places patient at risk for serotonin syndrome), vs. Hypoxia vs. Uremia vs. Sepsis vs. Drug OD    Ventilator day: 7.    Ventilator settings: Currently on SBT PS 14, PEEP 5, FiO2 30%. Rest on PRVC, f 14, vT 345, PEEP 5, FiO2 35%, tI 1.0 sec.     ETT Size/Placement: 7.5 FERNANDA ETT secured at 23 cm at upper lip.      Wean Screen Pass (Yes or No): Yes.  Wean Screen Reason for Failure: -  Duration of Weaning Trial: Ongoing.  Additional Comments: Pt opens eyes to voice, does not follow commands or track.        PLAN OF CARE:  Maintain adequate ventilation and oxygenation, wean vent settings as tolerated, SBT as tolerated when safe and appropriate.        GOAL:  Liberation from artificial airway and

## 2025-05-12 NOTE — CONSULTS
Palliative Medicine Consult  [facility]: [phone] ([phone]  [facility]: [phone]     Patient Name: Juliana Loaiza  YOB: 1950    Date of Service: 5/12/2025  Provider: Celso Stover MD  Hospital Day: 7  Admit Date: 5/6/2025  Referring Provider:  Eloy Flores DO     Reason for Consult: goals of care discussion/ACP/symptoms management      SUMMARY:     Juliana Loaiza is a 74 y.o. with a past history of  pulmonary sarcoidosis, polysubstance abuse, chronic hypoxic respiratory failure on LTOT 6 L/min, asthma, HFpEF, GERD, pulmonary hypertension, depression, CAD , HTN, DM II, chronic pain, anxiety, and Hx  acute subsegmental LLL PE and DVT in the past, non-compliant with AC, who was admitted on 5/6/2025 from home with a diagnosis of acute on chronic hypoxic respiratory failure in setting of encephalopathy, pneumonia and sepsis.  Patient initially presented to emergency room after being found unresponsive, patient was given Narcan without significant improvement.  Patient was intubated in the emergency room for airway protection.  Initial labs showed patient is having IRA, hyperkalemia, hypocalcemia, anemia, elevated troponin.  CT head was negative for acute abnormalities.  Bilateral carotid Doppler is unremarkable.  Current medical issues leading to Palliative Medicine involvement include: To establish goals of care.    May 12, 2025: Patient was seen in presence of palliative care staff member.  Patient is trying to open eyes on tactile commands.  Remains on FiO2 of 35 and PEEP of 5.     HISTORY:     History obtained from: Medical records    CHIEF COMPLAINT: Encephalopathy    HPI/SUBJECTIVE:    The patient is:   [] Verbal and participatory  [x] Non-participatory due to:   Encephalopathy     PHYSICAL EXAM:     From RN flowsheet:  Wt Readings from Last 3 Encounters:   05/12/25 56.2 kg (124 lb)       BP (!) 104/53   Pulse 77   Temp 97.9 °F (36.6 °C)   Resp 13   Ht

## 2025-05-12 NOTE — PROGRESS NOTES
[facility].  Pulmonary, Critical Care, and Sleep Medicine    Name: Juliana Loaiza MRN: [id number]   : 1950 Hospital: [facility]   Date: 2025  Admission Date: 2025     Chart and notes reviewed. Data reviewed. I have evaluated all findings.    [x]I have reviewed the flowsheet and previous day’s notes.    [x]The patient is unable to give any meaningful history or review of systems because the patient is:  [x]Intubated []Sedated   []Unresponsive      [x]The patient is critically ill on      [x]Mechanical ventilation []Pressors   []BiPAP []     IMPRESSION:   Acute on chronic respiratory failure, unclear if hypercapnic or hypoxic on arrival-intubated for airway protection, Home baseline is 6 Lpm oxygen  Acute encephalopathy- appears most likely due to bi hemispheric MCA stroke, embolic, most likely present on admission, not seen on initial head imaging   CAP vs. Aspiration PNA-extensive infiltrates on CT chest A/P, primarily RUL/RLL   Rhinovirus- +RVP  w/ Acute on chronic pansinusitis   Sepsis 2/2 pneumonia and UTI   Klebsiella UTI   IRA-prerenal azotemia vs. ischemic ATN- resolving   Rhabdo, improving    Mild transaminitis   Nonobstructive CAD: [facility] 2024 RCA with midsegment 20% stenosis. LM patent, LAD patent, Lcx patent. Echo 25 EF 50-55%, normal wall motion. RVSP 52mmHg  pAF: Currently in SR. OAC with Xarelto. Cardizem CD 120mg for rate control   HTN  DM II   GERD   Bronchiectasis   Sarcoidosis - diagnosed in  via punch biopsy  Chronic pain  Anxiety  Microcytic hypochromic anemia   Tobacco use history  History of polysubstance abuse-UDS  +cocaine   Hx  acute subsegmental LLL PE and DVT in the past, non-compliant with AC  Deconditioning, debility   Likely malnourished- underweight, folic acid deficiency      Patient Active Problem List   Diagnosis    Coma (HCC)    Acute encephalopathy    Acute hypoxemic respiratory failure (HCC)    Severe sepsis  Collected: 05/07/25 1145    Order Status: Completed Specimen: Nasopharyngeal Updated: 05/07/25 1410     Adenovirus by PCR Not detected        Coronavirus 229E by PCR Not detected        Coronavirus HKU1 by PCR Not detected        Coronavirus NL63 by PCR Not detected        Coronavirus OC43 by PCR Not detected        SARS-CoV-2, PCR Not detected        Human Metapneumovirus by PCR Not detected        Rhinovirus Enterovirus PCR Detected        Influenza A by PCR Not detected        Influenza B PCR Not detected        Parainfluenza 1 PCR Not detected        Parainfluenza 2 PCR Not detected        Parainfluenza 3 PCR Not detected        Parainfluenza 4 PCR Not detected        Respiratory Syncytial Virus by PCR Not detected        Bordetella parapertussis by PCR Not detected        Bordetella pertussis by PCR Not detected        Chlamydophila Pneumonia PCR Not detected        Mycoplasma pneumo by PCR Not detected       Urine Culture [[id number]]  (Abnormal)  (Susceptibility) Collected: 05/07/25 0340    Order Status: Completed Specimen: Urine, clean catch Updated: 05/09/25 1044     Special Requests NO SPECIAL REQUESTS        Rudy count --        90450  COLONIES/mL       Culture Klebsiella pneumoniae       Susceptibility        Klebsiella pneumoniae      BACTERIAL SUSCEPTIBILITY PANEL BHARAT      amikacin <=2 ug/mL Sensitive      ampicillin >=32 ug/mL Resistant      ampicillin-sulbactam 8 ug/mL Sensitive      ceFAZolin <=4 ug/mL Sensitive      cefepime <=1 ug/mL Sensitive      cefOXitin <=4 ug/mL Sensitive      cefTAZidime <=1 ug/mL Sensitive      cefTRIAXone <=1 ug/mL Sensitive      ciprofloxacin <=0.25 ug/mL Sensitive      gentamicin <=1 ug/mL Sensitive      levofloxacin <=0.12 ug/mL Sensitive      meropenem <=0.25 ug/mL Sensitive      nitrofurantoin 64 ug/mL Intermediate      piperacillin-tazobactam <=4 ug/mL Sensitive      tobramycin <=1 ug/mL Sensitive      trimethoprim-sulfamethoxazole <=20 ug/mL Sensitive

## 2025-05-12 NOTE — PROGRESS NOTES
SBT initiated, PS 14, PEEP 5, FiO2 35%. Pt opens eyes but does not track or follow any commands.       05/12/25 0831   Weaning Parameters   Spontaneous Breathing Trial Complete (S)  Yes   Respiratory Rate Observed 29   Ve 10.9      RSBI 86       Tolerating on PS 14. Will monitor for signs of apnea or distress, and for weaning PS as tolerated.

## 2025-05-12 NOTE — ACP (ADVANCE CARE PLANNING)
Advance Care Planning     Palliative Team Advance Care Planning (ACP) Conversation    Date of Conversation: 05/12/25    Individuals present for the conversation: Patient, Spouse Antony Loaiza on phone , and  Chava Conley on phone      ACP documents on file prior to discussion:  -None    Previously completed document/s not on file: Unknown, or patient / participant is uncertain.    Healthcare Decision Maker:    Primary Decision Maker: Antony Loaiza - Spouse - [phone]      Conversation Summary:  Juliana Loaiza is a 74 y.o. with a past h/o  pulmonary sarcoidosis, polysubstance abuse, chronic hypoxic respiratory failure on LTOT 6 L/min at home,  asthma, HFpEF, GERD, pulmonary HTN, CAD, DM II, chronic pain, anxiety, and Hx  acute subsegmental LLL PE and DVT in the past.  Patient was admitted via the ED from home in acute on chronic hypoxic respiratory failure in setting of encephalopathy, pneumonia and sepsis.  Patient required intubation.   Ms Loaiza was seen at bedside in  after consult with ICU Attending. Patient is maintained on vent with setting of FiO2 of 35 and PEEP of 5. No sedation .  Patient is   with no children. Her spouse resides at [facility] but is able to continue as patient's primary Healthcare agent. Palliative team placed call to Mr Loaiza to discuss next steps in patient's care. He has limited access to transportation and will not be able to be at the bedside until Friday when his , Chava Conley will be available to drive him here. He confirmed code status of NO CPR or ReintubationHe gave us permission to call their , Malorie Chava Lowge. . Our team spoke with Pastor Conley and confirmed a meeting time fort 11 AM on Friday to discuss possible transition to comfort.      Resuscitation Status:   Code Status: Limited -  No CPR/shocks, no intubation in the event of dislodgment of ET.      Documentation Completed:  -No new documents

## 2025-05-13 ENCOUNTER — APPOINTMENT (OUTPATIENT)
Facility: HOSPITAL | Age: 75
End: 2025-05-13
Payer: MEDICARE

## 2025-05-13 PROBLEM — I63.511 ACUTE ISCHEMIC RIGHT MCA STROKE (HCC): Status: ACTIVE | Noted: 2025-01-01

## 2025-05-13 PROBLEM — I63.512 ACUTE ISCHEMIC LEFT MCA STROKE (HCC): Status: ACTIVE | Noted: 2025-05-13

## 2025-05-13 PROBLEM — I63.512 ACUTE ISCHEMIC LEFT MCA STROKE (HCC): Status: ACTIVE | Noted: 2025-01-01

## 2025-05-13 PROBLEM — J96.01 ACUTE HYPOXEMIC RESPIRATORY FAILURE (HCC): Status: ACTIVE | Noted: 2025-01-01

## 2025-05-13 PROBLEM — J96.01 ACUTE HYPOXEMIC RESPIRATORY FAILURE (HCC): Status: ACTIVE | Noted: 2025-05-13

## 2025-05-13 PROBLEM — I63.511 ACUTE ISCHEMIC RIGHT MCA STROKE (HCC): Status: ACTIVE | Noted: 2025-05-13

## 2025-05-13 LAB
ALBUMIN SERPL-MCNC: 1.5 G/DL (ref 3.4–5)
ALBUMIN SERPL-MCNC: 1.5 G/DL (ref 3.4–5)
ALBUMIN/GLOB SERPL: 0.4 (ref 0.8–1.7)
ALBUMIN/GLOB SERPL: 0.4 (ref 0.8–1.7)
ALP SERPL-CCNC: 75 U/L (ref 45–117)
ALP SERPL-CCNC: 75 U/L (ref 45–117)
ALT SERPL-CCNC: 34 U/L (ref 10–35)
ALT SERPL-CCNC: 34 U/L (ref 10–35)
ANION GAP BLD CALC-SCNC: ABNORMAL MMOL/L (ref 10–20)
ANION GAP BLD CALC-SCNC: ABNORMAL MMOL/L (ref 10–20)
ANION GAP SERPL CALC-SCNC: 10 MMOL/L (ref 3–18)
ANION GAP SERPL CALC-SCNC: 10 MMOL/L (ref 3–18)
ARTERIAL PATENCY WRIST A: POSITIVE
ARTERIAL PATENCY WRIST A: POSITIVE
AST SERPL-CCNC: 64 U/L (ref 10–38)
AST SERPL-CCNC: 64 U/L (ref 10–38)
B BURGDOR DNA SPEC QL NAA+PROBE: NEGATIVE
B BURGDOR DNA SPEC QL NAA+PROBE: NEGATIVE
BACTERIA SPEC CULT: NORMAL
BASE EXCESS BLD CALC-SCNC: 8.6 MMOL/L
BASE EXCESS BLD CALC-SCNC: 8.6 MMOL/L
BASOPHILS # BLD: 0 K/UL (ref 0–0.1)
BASOPHILS # BLD: 0 K/UL (ref 0–0.1)
BASOPHILS NFR BLD: 0 % (ref 0–2)
BASOPHILS NFR BLD: 0 % (ref 0–2)
BDY SITE: ABNORMAL
BDY SITE: ABNORMAL
BILIRUB DIRECT SERPL-MCNC: <0.2 MG/DL (ref 0–0.2)
BILIRUB DIRECT SERPL-MCNC: <0.2 MG/DL (ref 0–0.2)
BILIRUB SERPL-MCNC: 0.3 MG/DL (ref 0.2–1)
BILIRUB SERPL-MCNC: 0.3 MG/DL (ref 0.2–1)
BUN SERPL-MCNC: 35 MG/DL (ref 6–23)
BUN SERPL-MCNC: 35 MG/DL (ref 6–23)
BUN/CREAT SERPL: 34 (ref 12–20)
BUN/CREAT SERPL: 34 (ref 12–20)
CA-I BLD-MCNC: 1.29 MMOL/L (ref 1.15–1.33)
CA-I BLD-MCNC: 1.29 MMOL/L (ref 1.15–1.33)
CA-I SERPL-SCNC: 1.28 MMOL/L (ref 1.15–1.33)
CA-I SERPL-SCNC: 1.28 MMOL/L (ref 1.15–1.33)
CALCIUM SERPL-MCNC: 8.9 MG/DL (ref 8.5–10.1)
CALCIUM SERPL-MCNC: 8.9 MG/DL (ref 8.5–10.1)
CHLORIDE BLD-SCNC: 102 MMOL/L (ref 98–107)
CHLORIDE BLD-SCNC: 102 MMOL/L (ref 98–107)
CHLORIDE SERPL-SCNC: 104 MMOL/L (ref 98–107)
CHLORIDE SERPL-SCNC: 104 MMOL/L (ref 98–107)
CK SERPL-CCNC: 1044 U/L (ref 26–192)
CK SERPL-CCNC: 1044 U/L (ref 26–192)
CO2 BLD-SCNC: 33 MMOL/L (ref 22–29)
CO2 BLD-SCNC: 33 MMOL/L (ref 22–29)
CO2 SERPL-SCNC: 30 MMOL/L (ref 21–32)
CO2 SERPL-SCNC: 30 MMOL/L (ref 21–32)
CREAT BLD-MCNC: 1.13 MG/DL (ref 0.6–1.3)
CREAT BLD-MCNC: 1.13 MG/DL (ref 0.6–1.3)
CREAT SERPL-MCNC: 1.03 MG/DL (ref 0.6–1.3)
CREAT SERPL-MCNC: 1.03 MG/DL (ref 0.6–1.3)
DIFFERENTIAL METHOD BLD: ABNORMAL
DIFFERENTIAL METHOD BLD: ABNORMAL
EOSINOPHIL # BLD: 0.09 K/UL (ref 0–0.4)
EOSINOPHIL # BLD: 0.09 K/UL (ref 0–0.4)
EOSINOPHIL NFR BLD: 1 % (ref 0–5)
EOSINOPHIL NFR BLD: 1 % (ref 0–5)
ERYTHROCYTE [DISTWIDTH] IN BLOOD BY AUTOMATED COUNT: 16.4 % (ref 11.6–14.5)
ERYTHROCYTE [DISTWIDTH] IN BLOOD BY AUTOMATED COUNT: 16.4 % (ref 11.6–14.5)
FIO2 ON VENT: 35 %
FIO2 ON VENT: 35 %
GAS FLOW.O2 O2 DELIVERY SYS: ABNORMAL
GAS FLOW.O2 O2 DELIVERY SYS: ABNORMAL
GLOBULIN SER CALC-MCNC: 4.1 G/DL (ref 2–4)
GLOBULIN SER CALC-MCNC: 4.1 G/DL (ref 2–4)
GLUCOSE BLD STRIP.AUTO-MCNC: 145 MG/DL (ref 70–110)
GLUCOSE BLD STRIP.AUTO-MCNC: 145 MG/DL (ref 70–110)
GLUCOSE BLD STRIP.AUTO-MCNC: 174 MG/DL (ref 70–110)
GLUCOSE BLD STRIP.AUTO-MCNC: 174 MG/DL (ref 70–110)
GLUCOSE BLD STRIP.AUTO-MCNC: 180 MG/DL (ref 70–110)
GLUCOSE BLD STRIP.AUTO-MCNC: 180 MG/DL (ref 70–110)
GLUCOSE BLD STRIP.AUTO-MCNC: 193 MG/DL (ref 70–110)
GLUCOSE BLD STRIP.AUTO-MCNC: 193 MG/DL (ref 70–110)
GLUCOSE BLD STRIP.AUTO-MCNC: 92 MG/DL (ref 70–110)
GLUCOSE BLD STRIP.AUTO-MCNC: 92 MG/DL (ref 70–110)
GLUCOSE BLD-MCNC: 174 MG/DL (ref 74–99)
GLUCOSE BLD-MCNC: 174 MG/DL (ref 74–99)
GLUCOSE SERPL-MCNC: 156 MG/DL (ref 74–108)
GLUCOSE SERPL-MCNC: 156 MG/DL (ref 74–108)
HCO3 BLD-SCNC: 33.4 MMOL/L (ref 21–28)
HCO3 BLD-SCNC: 33.4 MMOL/L (ref 21–28)
HCT VFR BLD AUTO: 21.4 % (ref 35–45)
HCT VFR BLD AUTO: 21.4 % (ref 35–45)
HCT VFR BLD AUTO: 28.3 % (ref 35–45)
HCT VFR BLD AUTO: 28.3 % (ref 35–45)
HGB BLD-MCNC: 6.6 G/DL (ref 12–16)
HGB BLD-MCNC: 6.6 G/DL (ref 12–16)
HGB BLD-MCNC: 8.7 G/DL (ref 12–16)
HGB BLD-MCNC: 8.7 G/DL (ref 12–16)
HISTORY CHECK: NORMAL
HISTORY CHECK: NORMAL
IMM GRANULOCYTES # BLD AUTO: 0 K/UL (ref 0–0.04)
IMM GRANULOCYTES # BLD AUTO: 0 K/UL (ref 0–0.04)
IMM GRANULOCYTES NFR BLD AUTO: 0 % (ref 0–0.5)
IMM GRANULOCYTES NFR BLD AUTO: 0 % (ref 0–0.5)
LACTATE BLD-SCNC: 0.98 MMOL/L (ref 0.4–2)
LACTATE BLD-SCNC: 0.98 MMOL/L (ref 0.4–2)
LYMPHOCYTES # BLD: 1.75 K/UL (ref 0.9–3.6)
LYMPHOCYTES # BLD: 1.75 K/UL (ref 0.9–3.6)
LYMPHOCYTES NFR BLD: 19 % (ref 21–52)
LYMPHOCYTES NFR BLD: 19 % (ref 21–52)
MAGNESIUM SERPL-MCNC: 2.2 MG/DL (ref 1.6–2.6)
MAGNESIUM SERPL-MCNC: 2.2 MG/DL (ref 1.6–2.6)
MCH RBC QN AUTO: 22.1 PG (ref 24–34)
MCH RBC QN AUTO: 22.1 PG (ref 24–34)
MCHC RBC AUTO-ENTMCNC: 30.8 G/DL (ref 31–37)
MCHC RBC AUTO-ENTMCNC: 30.8 G/DL (ref 31–37)
MCV RBC AUTO: 71.8 FL (ref 78–100)
MCV RBC AUTO: 71.8 FL (ref 78–100)
METAMYELOCYTES NFR BLD MANUAL: 1 %
METAMYELOCYTES NFR BLD MANUAL: 1 %
MONOCYTES # BLD: 0.74 K/UL (ref 0.05–1.2)
MONOCYTES # BLD: 0.74 K/UL (ref 0.05–1.2)
MONOCYTES NFR BLD: 8 % (ref 3–10)
MONOCYTES NFR BLD: 8 % (ref 3–10)
MYELOCYTES NFR BLD MANUAL: 2 %
MYELOCYTES NFR BLD MANUAL: 2 %
NEUTS SEG # BLD: 6.35 K/UL (ref 1.8–8)
NEUTS SEG # BLD: 6.35 K/UL (ref 1.8–8)
NEUTS SEG NFR BLD: 69 % (ref 40–73)
NEUTS SEG NFR BLD: 69 % (ref 40–73)
NRBC # BLD: 0 K/UL (ref 0–0.01)
NRBC # BLD: 0 K/UL (ref 0–0.01)
NRBC BLD-RTO: 0 PER 100 WBC
NRBC BLD-RTO: 0 PER 100 WBC
PCO2 BLD: 48.1 MMHG (ref 35–48)
PCO2 BLD: 48.1 MMHG (ref 35–48)
PEEP RESPIRATORY: 5
PEEP RESPIRATORY: 5
PH BLD: 7.45 (ref 7.35–7.45)
PH BLD: 7.45 (ref 7.35–7.45)
PLATELET # BLD AUTO: 181 K/UL (ref 135–420)
PLATELET # BLD AUTO: 181 K/UL (ref 135–420)
PLATELET COMMENT: ABNORMAL
PLATELET COMMENT: ABNORMAL
PO2 BLD: 88 MMHG (ref 83–108)
PO2 BLD: 88 MMHG (ref 83–108)
POTASSIUM BLD-SCNC: 3.9 MMOL/L (ref 3.5–5.1)
POTASSIUM BLD-SCNC: 3.9 MMOL/L (ref 3.5–5.1)
POTASSIUM SERPL-SCNC: 4 MMOL/L (ref 3.5–5.5)
POTASSIUM SERPL-SCNC: 4 MMOL/L (ref 3.5–5.5)
PROT SERPL-MCNC: 5.5 G/DL (ref 6.4–8.2)
PROT SERPL-MCNC: 5.5 G/DL (ref 6.4–8.2)
RBC # BLD AUTO: 2.98 M/UL (ref 4.2–5.3)
RBC # BLD AUTO: 2.98 M/UL (ref 4.2–5.3)
RBC MORPH BLD: ABNORMAL
RESPIRATORY RATE, POC: 20 (ref 5–40)
RESPIRATORY RATE, POC: 20 (ref 5–40)
SAO2 % BLD: 97 % (ref 94–98)
SAO2 % BLD: 97 % (ref 94–98)
SERVICE CMNT-IMP: ABNORMAL
SERVICE CMNT-IMP: ABNORMAL
SERVICE CMNT-IMP: NORMAL
SODIUM BLD-SCNC: 145 MMOL/L (ref 136–145)
SODIUM BLD-SCNC: 145 MMOL/L (ref 136–145)
SODIUM SERPL-SCNC: 145 MMOL/L (ref 136–145)
SODIUM SERPL-SCNC: 145 MMOL/L (ref 136–145)
SPECIMEN SITE: ABNORMAL
SPECIMEN SITE: ABNORMAL
SPECIMEN SOURCE: NORMAL
SPECIMEN SOURCE: NORMAL
VENTILATION MODE VENT: ABNORMAL
VENTILATION MODE VENT: ABNORMAL
VT SETTING VENT: 345
VT SETTING VENT: 345
WBC # BLD AUTO: 9.2 K/UL (ref 4.6–13.2)
WBC # BLD AUTO: 9.2 K/UL (ref 4.6–13.2)

## 2025-05-13 PROCEDURE — 36600 WITHDRAWAL OF ARTERIAL BLOOD: CPT

## 2025-05-13 PROCEDURE — 82803 BLOOD GASES ANY COMBINATION: CPT

## 2025-05-13 PROCEDURE — 84132 ASSAY OF SERUM POTASSIUM: CPT

## 2025-05-13 PROCEDURE — 6370000000 HC RX 637 (ALT 250 FOR IP): Performed by: NURSE PRACTITIONER

## 2025-05-13 PROCEDURE — 82330 ASSAY OF CALCIUM: CPT

## 2025-05-13 PROCEDURE — P9016 RBC LEUKOCYTES REDUCED: HCPCS

## 2025-05-13 PROCEDURE — 94761 N-INVAS EAR/PLS OXIMETRY MLT: CPT

## 2025-05-13 PROCEDURE — 82947 ASSAY GLUCOSE BLOOD QUANT: CPT

## 2025-05-13 PROCEDURE — 6370000000 HC RX 637 (ALT 250 FOR IP): Performed by: INTERNAL MEDICINE

## 2025-05-13 PROCEDURE — 2000000000 HC ICU R&B

## 2025-05-13 PROCEDURE — 2500000003 HC RX 250 WO HCPCS: Performed by: NURSE PRACTITIONER

## 2025-05-13 PROCEDURE — 2700000000 HC OXYGEN THERAPY PER DAY

## 2025-05-13 PROCEDURE — 36430 TRANSFUSION BLD/BLD COMPNT: CPT

## 2025-05-13 PROCEDURE — 83735 ASSAY OF MAGNESIUM: CPT

## 2025-05-13 PROCEDURE — 6360000002 HC RX W HCPCS: Performed by: INTERNAL MEDICINE

## 2025-05-13 PROCEDURE — 94640 AIRWAY INHALATION TREATMENT: CPT

## 2025-05-13 PROCEDURE — 85014 HEMATOCRIT: CPT

## 2025-05-13 PROCEDURE — 2500000003 HC RX 250 WO HCPCS: Performed by: PSYCHIATRY & NEUROLOGY

## 2025-05-13 PROCEDURE — 83605 ASSAY OF LACTIC ACID: CPT

## 2025-05-13 PROCEDURE — 99291 CRITICAL CARE FIRST HOUR: CPT | Performed by: INTERNAL MEDICINE

## 2025-05-13 PROCEDURE — 94003 VENT MGMT INPAT SUBQ DAY: CPT

## 2025-05-13 PROCEDURE — 85018 HEMOGLOBIN: CPT

## 2025-05-13 PROCEDURE — 80076 HEPATIC FUNCTION PANEL: CPT

## 2025-05-13 PROCEDURE — 80048 BASIC METABOLIC PNL TOTAL CA: CPT

## 2025-05-13 PROCEDURE — 2580000003 HC RX 258: Performed by: INTERNAL MEDICINE

## 2025-05-13 PROCEDURE — 71045 X-RAY EXAM CHEST 1 VIEW: CPT

## 2025-05-13 PROCEDURE — 36415 COLL VENOUS BLD VENIPUNCTURE: CPT

## 2025-05-13 PROCEDURE — 6370000000 HC RX 637 (ALT 250 FOR IP): Performed by: PHYSICIAN ASSISTANT

## 2025-05-13 PROCEDURE — 2580000003 HC RX 258: Performed by: PSYCHIATRY & NEUROLOGY

## 2025-05-13 PROCEDURE — 6370000000 HC RX 637 (ALT 250 FOR IP): Performed by: PSYCHIATRY & NEUROLOGY

## 2025-05-13 PROCEDURE — 6360000002 HC RX W HCPCS: Performed by: NURSE PRACTITIONER

## 2025-05-13 PROCEDURE — 82962 GLUCOSE BLOOD TEST: CPT

## 2025-05-13 PROCEDURE — 82550 ASSAY OF CK (CPK): CPT

## 2025-05-13 PROCEDURE — 84295 ASSAY OF SERUM SODIUM: CPT

## 2025-05-13 PROCEDURE — 85025 COMPLETE CBC W/AUTO DIFF WBC: CPT

## 2025-05-13 PROCEDURE — APPSS15 APP SPLIT SHARED TIME 0-15 MINUTES: Performed by: NURSE PRACTITIONER

## 2025-05-13 PROCEDURE — 6360000002 HC RX W HCPCS: Performed by: PHYSICIAN ASSISTANT

## 2025-05-13 PROCEDURE — 30233N1 TRANSFUSION OF NONAUTOLOGOUS RED BLOOD CELLS INTO PERIPHERAL VEIN, PERCUTANEOUS APPROACH: ICD-10-PCS | Performed by: INTERNAL MEDICINE

## 2025-05-13 RX ORDER — SODIUM CHLORIDE 9 MG/ML
INJECTION, SOLUTION INTRAVENOUS PRN
Status: DISCONTINUED | OUTPATIENT
Start: 2025-05-13 | End: 2025-05-16

## 2025-05-13 RX ADMIN — INSULIN GLARGINE 5 UNITS: 100 INJECTION, SOLUTION SUBCUTANEOUS at 08:21

## 2025-05-13 RX ADMIN — INSULIN LISPRO 4 UNITS: 100 INJECTION, SOLUTION INTRAVENOUS; SUBCUTANEOUS at 18:06

## 2025-05-13 RX ADMIN — CEFEPIME 2000 MG: 2 INJECTION, POWDER, FOR SOLUTION INTRAVENOUS at 22:58

## 2025-05-13 RX ADMIN — ACETAMINOPHEN 650 MG: 325 TABLET ORAL at 15:28

## 2025-05-13 RX ADMIN — CHLORHEXIDINE GLUCONATE 0.12% ORAL RINSE 15 ML: 1.2 LIQUID ORAL at 08:12

## 2025-05-13 RX ADMIN — FOLIC ACID 1 MG: 1 TABLET ORAL at 08:12

## 2025-05-13 RX ADMIN — BUDESONIDE 1 MG: 1 SUSPENSION RESPIRATORY (INHALATION) at 07:35

## 2025-05-13 RX ADMIN — SODIUM CHLORIDE, PRESERVATIVE FREE 10 ML: 5 INJECTION INTRAVENOUS at 08:13

## 2025-05-13 RX ADMIN — CEFEPIME 2000 MG: 2 INJECTION, POWDER, FOR SOLUTION INTRAVENOUS at 12:10

## 2025-05-13 RX ADMIN — ARFORMOTEROL TARTRATE 15 MCG: 15 SOLUTION RESPIRATORY (INHALATION) at 07:35

## 2025-05-13 RX ADMIN — SODIUM CHLORIDE, SODIUM LACTATE, POTASSIUM CHLORIDE, AND CALCIUM CHLORIDE: 600; 310; 30; 20 INJECTION, SOLUTION INTRAVENOUS at 05:45

## 2025-05-13 RX ADMIN — INSULIN LISPRO 4 UNITS: 100 INJECTION, SOLUTION INTRAVENOUS; SUBCUTANEOUS at 05:53

## 2025-05-13 RX ADMIN — VALPROATE SODIUM 500 MG: 100 INJECTION, SOLUTION INTRAVENOUS at 00:00

## 2025-05-13 RX ADMIN — VALPROATE SODIUM 500 MG: 100 INJECTION, SOLUTION INTRAVENOUS at 15:25

## 2025-05-13 RX ADMIN — AMLODIPINE BESYLATE 5 MG: 5 TABLET ORAL at 08:12

## 2025-05-13 RX ADMIN — VALPROATE SODIUM 500 MG: 100 INJECTION, SOLUTION INTRAVENOUS at 23:03

## 2025-05-13 RX ADMIN — Medication 100 MG: at 08:12

## 2025-05-13 RX ADMIN — VALPROATE SODIUM 500 MG: 100 INJECTION, SOLUTION INTRAVENOUS at 05:56

## 2025-05-13 RX ADMIN — FAMOTIDINE 20 MG: 20 TABLET, FILM COATED ORAL at 08:12

## 2025-05-13 RX ADMIN — ARFORMOTEROL TARTRATE 15 MCG: 15 SOLUTION RESPIRATORY (INHALATION) at 19:30

## 2025-05-13 RX ADMIN — ASPIRIN 81 MG CHEWABLE TABLET 81 MG: 81 TABLET CHEWABLE at 08:12

## 2025-05-13 RX ADMIN — HYDRALAZINE HYDROCHLORIDE 10 MG: 20 INJECTION INTRAMUSCULAR; INTRAVENOUS at 11:59

## 2025-05-13 RX ADMIN — Medication 15 ML: at 08:12

## 2025-05-13 RX ADMIN — BUDESONIDE 1 MG: 1 SUSPENSION RESPIRATORY (INHALATION) at 19:30

## 2025-05-13 RX ADMIN — CHLORHEXIDINE GLUCONATE 0.12% ORAL RINSE 15 ML: 1.2 LIQUID ORAL at 21:29

## 2025-05-13 ASSESSMENT — PULMONARY FUNCTION TESTS
PIF_VALUE: 16
PIF_VALUE: 16
PIF_VALUE: 17
PIF_VALUE: 16
PIF_VALUE: 17
PIF_VALUE: 17

## 2025-05-13 NOTE — PROGRESS NOTES
VENTILATOR CARE PLAN    Problem: Ventilator Management  Goal: *Adequate oxygenation/ ventilation/ and extubation      Patient:     Juliana Loaiza     74 y.o.   female     5/13/2025  9:57 AM  Patient Active Problem List   Diagnosis    Coma (HCC)    Acute encephalopathy    Acute respiratory failure (HCC)    Severe sepsis (HCC)    Polysubstance abuse (HCC)    Transaminitis    Elevated CK    IRA (acute kidney injury)    Aspiration pneumonia of both lungs (HCC)    Lactic acidosis    Metabolic acidosis    Rhinovirus    Cerebrovascular accident (CVA) due to bilateral embolism of middle cerebral arteries (HCC)    Respiratory arrest (HCC)    Palliative care encounter    Goals of care, counseling/discussion    DNR (do not resuscitate) discussion    Acute ischemic right MCA stroke (HCC)    Acute ischemic left MCA stroke (HCC)       Coma ([facility]) [R40.20]  Respiratory arrest (HCC) [R09.2]  [place] coma scale total score 3-8, in the field (EMT or ambulance) ([facility]) [R40.2431]    Reason patient intubated:     Acute on chronic respiratory failure, unclear if hypercapnic or hypoxic on arrival-intubated for airway protection, Home baseline is 6 Lpm oxygen  Acute encephalopathy-toxic/metabolic in nature, patient unresponsive with pinpoint pupils, etiology unclear--?CVA vs. Drug toxicity vs. Mediation induced (patient on multiple sedating medications, such as Cymbalta, Remeron, Lyrica, Requip, and Zoloft-->also places patient at risk for serotonin syndrome), vs. Hypoxia vs. Uremia vs. Sepsis vs. Drug OD    Ventilator day: 8    Ventilator settings:  AC/PRVC - 14 / 345 / 35% / PEEP 5    ETT Size/Placement: 7.5 @ 23 at the Lips     Wean Screen Pass (Yes or No): Yes  Wean Screen Reason for Failure:  Duration of Weaning Trial:  Weaning trial started at 0744   Additional Comments:      PLAN OF CARE:  Continue ventilatory support.  SBT as tolerated.      GOAL:  Ventilator liberation / Extubation    OUTCOME: Progressing    ABG:  Date:5/13/2025

## 2025-05-13 NOTE — CONSENT
Informed Consent for Blood Component Transfusion Note    I have discussed with the  the rationale for blood component transfusion; its benefits in treating or preventing fatigue, organ damage, or death; and its risk which includes mild transfusion reactions, rare risk of blood borne infection, or more serious but rare reactions. I have discussed the alternatives to transfusion, including the risk and consequences of not receiving transfusion. The  had an opportunity to ask questions and had agreed to proceed with transfusion of blood components.    Electronically signed by ASHIA Irwin NP on 5/13/25 at 4:51 AM EDT

## 2025-05-13 NOTE — PLAN OF CARE
Problem: Discharge Planning  Goal: Discharge to home or other facility with appropriate resources  Outcome: Progressing  Flowsheets (Taken 5/12/2025 2000)  Discharge to home or other facility with appropriate resources:   Identify barriers to discharge with patient and caregiver   Refer to discharge planning if patient needs post-hospital services based on physician order or complex needs related to functional status, cognitive ability or social support system     Problem: Safety - Adult  Goal: Free from fall injury  Outcome: Progressing     Problem: Nutrition Deficit:  Goal: Optimize nutritional status  Outcome: Progressing     Problem: Pain  Goal: Verbalizes/displays adequate comfort level or baseline comfort level  Outcome: Progressing  Flowsheets (Taken 5/12/2025 2000)  Verbalizes/displays adequate comfort level or baseline comfort level:   Encourage patient to monitor pain and request assistance   Assess pain using appropriate pain scale   Administer analgesics based on type and severity of pain and evaluate response   Implement non-pharmacological measures as appropriate and evaluate response   Consider cultural and social influences on pain and pain management   Notify Licensed Independent Practitioner if interventions unsuccessful or patient reports new pain     Problem: Neurosensory - Adult  Goal: Achieves stable or improved neurological status  Outcome: Not Progressing  Flowsheets (Taken 5/12/2025 2000)  Achieves stable or improved neurological status:   Assess for and report changes in neurological status   Maintain blood pressure and fluid volume within ordered parameters to optimize cerebral perfusion and minimize risk of hemorrhage   Monitor temperature, glucose, and sodium. Initiate appropriate interventions as ordered  Goal: Achieves maximal functionality and self care  Outcome: Not Progressing  Flowsheets (Taken 5/12/2025 2000)  Achieves maximal functionality and self care: Monitor swallowing and

## 2025-05-13 NOTE — PROGRESS NOTES
Comprehensive Nutrition Assessment    Type and Reason for Visit:  Reassess, Consult, NPO/clear liquid, Nutrition support    Nutrition Recommendations/Plan:   Continue tube feeding regimen:   Formula: Glucerna 1.5 (Diabetic)  Goal Rate: 45 ml/hr x 20 hours (for anticipation of holding tube feeds for standard nursing care)  Water Flushes: 75 mL q 4 hours (450 mL total)  Goal Regimen Provides (with modulars):  1350 kcal, 74g protein, 1134 ml free water, 90% RDIs  Continue folic acid, thiamine, multivitamin supplementation daily.  Daily wts.   Continue to monitor tolerance of EN, weight, labs, and plan of care during admission.      Malnutrition Assessment:  Malnutrition Status:  At risk for malnutrition (NPO,vent) (05/13/25 1153)    Context:  Acute Illness       Nutrition Assessment:    Admitted after being found unresponsive. On arrival to ER, pt was emergently intubated for airway protection 5/6. Nephrology follow, IRA (baseline unclear). S/p LP. Discussed care during interdisciplinary rounds. Per RN, pt tolerating feeds at goal rate. MRI completed 5/9 b/l hemispheric MCA stroke. Per Palliative Care: pt limited code, no CPR, no intubation in even of ETT dislodgement. Further GOC maria isabel comfort measures will be on Friday ~11am. Continue to follow per policy.    Nutrition Related Findings:    Pertinent Meds:   Cefepime  MVI/min   Pepcid  Folic acid  Thiamine  Pertinent Labs:  Recent Labs     05/11/25  0159 05/11/25  0342 05/12/25  0028 05/12/25  0320 05/12/25  2043 05/13/25  0146 05/13/25  0345   GLUCOSE 143*  --  163*  --  124* 156*  --    BUN 29*  --  32*  --  34* 35*  --    CREATININE 1.20   < > 1.08   < > 1.01 1.03 1.13     --  141  --  144 145  --    K 3.8  --  3.4*  --  4.4 4.0  --      --  102  --  104 104  --    CO2 27  --  28  --  30 30  --    CALCIUM 8.5  --  8.2*  --  9.1 8.9  --    PHOS 2.6  --  2.7  --   --   --   --    MG 2.0  --  1.9  --  2.4 2.2  --     < > = values in this interval not

## 2025-05-13 NOTE — PROGRESS NOTES
[facility].  Pulmonary, Critical Care, and Sleep Medicine    Name: Juliana Loaiza MRN: [id number]   : 1950 Hospital: Critical access hospital   Date: 2025  Admission Date: 2025     Chart and notes reviewed. Data reviewed. I have evaluated all findings.    [x]I have reviewed the flowsheet and previous day’s notes.    [x]The patient is unable to give any meaningful history or review of systems because the patient is:  [x]Intubated []Sedated   []Unresponsive      [x]The patient is critically ill on      [x]Mechanical ventilation []Pressors   []BiPAP []     IMPRESSION:   Acute on chronic respiratory failure, unclear if hypercapnic or hypoxic on arrival-intubated for airway protection, Home baseline is 6 Lpm oxygen  Acute encephalopathy- appears most likely due to bi hemispheric MCA stroke, embolic, most likely present on admission, not seen on initial head imaging   Extensive acute bihemispheric MCA territory nonhemorrhagic infarctions- MRI    CAP vs. Aspiration PNA-extensive infiltrates on CT chest A/P, primarily RUL/RLL   Rhinovirus- +RVP  w/ Acute on chronic pansinusitis   Sepsis 2/2 pneumonia and UTI   Klebsiella UTI   IRA-prerenal azotemia vs. ischemic ATN- resolving   Rhabdo, improving    Mild transaminitis   Nonobstructive CAD: [facility] 2024 RCA with midsegment 20% stenosis. LM patent, LAD patent, Lcx patent. Echo 25 EF 50-55%, normal wall motion. RVSP 52mmHg  pAF: Currently in SR. OAC with Xarelto. Cardizem CD 120mg for rate control OP  HTN  DM II   GERD   Bronchiectasis   Sarcoidosis - diagnosed in  via punch biopsy  Chronic pain  Anxiety  Microcytic hypochromic anemia   Tobacco use history  History of polysubstance abuse-UDS  +cocaine   Hx  acute subsegmental LLL PE and DVT in the past, non-compliant with AC  Deconditioning, debility   Likely malnourished- underweight, folic acid deficiency      Patient Active Problem List   Diagnosis    Coma (HCC)     patient was emergently intubated for airway protection. Patient was hypothermic with temp of 95.5 degrees celsius and marginally hypotensive. Concern for potetially underlyin infection, so patient was given a dose of Cefepime. No concern for sepsis in the ER. No IVF bolus given. CT head completed and negative any acute findings.      Labs with findings of  IRA, hyperkalemia, hypochloremia, hypocalcemia, HAGMA, hyperglycemia, elevated Troponin , likely demand ischemia, elevated CK at 14K, and anemia. UDS +cocaine. RVP +rhinovirus. Blood cx were obtained. CT chest consistent with pneumonia.  Patient remains unresponsive. LP completed 5/8. EEG completed 5/9, modertely severe generalized slowing w/o seizures, MRI completed 5/9 b/l hemispheric MCA stroke. Now DNR. Possibly CMO pending course.     Subjective 05/13/25  Hospital Day: 6  Vent Day: 5/7/25   Overnight events: no acute events overnight, Hgb drop to 6.6, PRBC ordered   Mentation/Activity: Unresponsive,+corneal reflex, +cough/gag- + sponta eye opening, no response to threat , some spontaneous movement   Respiratory/ Secretions: mechanical ventilator, low vent requirement   Hemodynamics: hypertension with stimulation, afebrile  Urine output, bowel: adequate, see I&O   Diet NPO  ADULT TUBE FEEDING; Orogastric; Diabetic; Continuous; 45; No; 75; Q 4 hours  Need for procedures: N/ A              ROS:Review of systems not obtained due to patient factors.    Events, medications, imaging, and notes from last 24 hours reviewed.     Patient Active Problem List   Diagnosis    Coma (HCC)    Acute encephalopathy    Acute hypoxemic respiratory failure (HCC)    Severe sepsis (HCC)    Polysubstance abuse (HCC)    Transaminitis    Elevated CK    IRA (acute kidney injury)    Aspiration pneumonia of both lungs (HCC)    Lactic acidosis    Metabolic acidosis    Rhinovirus    Cerebrovascular accident (CVA) due to bilateral embolism of middle cerebral arteries (HCC)    Respiratory

## 2025-05-13 NOTE — PROGRESS NOTES
PSV Trial Started       05/13/25 0744   Patient Observation   Pulse 87   Respirations 22   SpO2 95 %   Vent Information   Vent Mode (S)  CPAP/PS   Ventilator Settings   PEEP/CPAP (cmH2O) 5   FiO2  35 %   Pressure Support (cm H2O) (S)  12 cm H2O   Vent Patient Data (Readings)   Vt Spont (mL) 304 mL   Rate Measured 22 br/min   Minute Volume (L/min) 5.44 Liters   Peak Inspiratory Flow (lpm) 18 L/sec   Mean Airway Pressure (cmH2O) 8.4 cmH20   I:E Ratio 1:2.4   Flow Sensitivity 3 L/min   Disconnect Sensitivity (%) 75 %   Expiratory Sensitivity (%) 25 %   Weaning Parameters   Spontaneous Breathing Trial Complete (S)  Yes   Respiratory Rate Observed 22   Ve 304   VT 5.44   RSBI 69     0815 - Decreased PS to 10.  Volumes mid-400's

## 2025-05-14 ENCOUNTER — APPOINTMENT (OUTPATIENT)
Facility: HOSPITAL | Age: 75
End: 2025-05-14
Payer: MEDICARE

## 2025-05-14 LAB
ABO + RH BLD: NORMAL
ABO + RH BLD: NORMAL
ALBUMIN SERPL-MCNC: 1.8 G/DL (ref 3.4–5)
ALBUMIN SERPL-MCNC: 1.8 G/DL (ref 3.4–5)
ANION GAP SERPL CALC-SCNC: 11 MMOL/L (ref 3–18)
ANION GAP SERPL CALC-SCNC: 11 MMOL/L (ref 3–18)
ARTERIAL PATENCY WRIST A: POSITIVE
ARTERIAL PATENCY WRIST A: POSITIVE
BASE EXCESS BLD CALC-SCNC: 11.2 MMOL/L
BASE EXCESS BLD CALC-SCNC: 11.2 MMOL/L
BASOPHILS # BLD: 0 K/UL (ref 0–0.1)
BASOPHILS # BLD: 0 K/UL (ref 0–0.1)
BASOPHILS NFR BLD: 0 % (ref 0–2)
BASOPHILS NFR BLD: 0 % (ref 0–2)
BDY SITE: ABNORMAL
BDY SITE: ABNORMAL
BLD PROD TYP BPU: NORMAL
BLD PROD TYP BPU: NORMAL
BLOOD BANK BLOOD PRODUCT EXPIRATION DATE: NORMAL
BLOOD BANK BLOOD PRODUCT EXPIRATION DATE: NORMAL
BLOOD BANK DISPENSE STATUS: NORMAL
BLOOD BANK DISPENSE STATUS: NORMAL
BLOOD BANK ISBT PRODUCT BLOOD TYPE: 5100
BLOOD BANK ISBT PRODUCT BLOOD TYPE: 5100
BLOOD BANK PRODUCT CODE: NORMAL
BLOOD BANK PRODUCT CODE: NORMAL
BLOOD BANK UNIT TYPE AND RH: NORMAL
BLOOD BANK UNIT TYPE AND RH: NORMAL
BLOOD GROUP ANTIBODIES SERPL: NORMAL
BLOOD GROUP ANTIBODIES SERPL: NORMAL
BPU ID: NORMAL
BPU ID: NORMAL
BUN SERPL-MCNC: 36 MG/DL (ref 6–23)
BUN SERPL-MCNC: 36 MG/DL (ref 6–23)
BUN/CREAT SERPL: 38 (ref 12–20)
BUN/CREAT SERPL: 38 (ref 12–20)
CALCIUM SERPL-MCNC: 9.1 MG/DL (ref 8.5–10.1)
CALCIUM SERPL-MCNC: 9.1 MG/DL (ref 8.5–10.1)
CALLED TO: NORMAL
CALLED TO: NORMAL
CHLORIDE SERPL-SCNC: 104 MMOL/L (ref 98–107)
CHLORIDE SERPL-SCNC: 104 MMOL/L (ref 98–107)
CK SERPL-CCNC: 906 U/L (ref 26–192)
CK SERPL-CCNC: 906 U/L (ref 26–192)
CO2 SERPL-SCNC: 30 MMOL/L (ref 21–32)
CO2 SERPL-SCNC: 30 MMOL/L (ref 21–32)
CREAT SERPL-MCNC: 0.93 MG/DL (ref 0.6–1.3)
CREAT SERPL-MCNC: 0.93 MG/DL (ref 0.6–1.3)
CROSSMATCH RESULT: NORMAL
CROSSMATCH RESULT: NORMAL
DIFFERENTIAL METHOD BLD: ABNORMAL
DIFFERENTIAL METHOD BLD: ABNORMAL
EOSINOPHIL # BLD: 0 K/UL (ref 0–0.4)
EOSINOPHIL # BLD: 0 K/UL (ref 0–0.4)
EOSINOPHIL NFR BLD: 0 % (ref 0–5)
EOSINOPHIL NFR BLD: 0 % (ref 0–5)
ERYTHROCYTE [DISTWIDTH] IN BLOOD BY AUTOMATED COUNT: 16.6 % (ref 11.6–14.5)
ERYTHROCYTE [DISTWIDTH] IN BLOOD BY AUTOMATED COUNT: 16.6 % (ref 11.6–14.5)
GAS FLOW.O2 O2 DELIVERY SYS: ABNORMAL
GAS FLOW.O2 O2 DELIVERY SYS: ABNORMAL
GAS FLOW.O2 SETTING OXYMISER: 14 BPM
GAS FLOW.O2 SETTING OXYMISER: 14 BPM
GLUCOSE BLD STRIP.AUTO-MCNC: 138 MG/DL (ref 70–110)
GLUCOSE BLD STRIP.AUTO-MCNC: 138 MG/DL (ref 70–110)
GLUCOSE BLD STRIP.AUTO-MCNC: 193 MG/DL (ref 70–110)
GLUCOSE BLD STRIP.AUTO-MCNC: 193 MG/DL (ref 70–110)
GLUCOSE BLD STRIP.AUTO-MCNC: 99 MG/DL (ref 70–110)
GLUCOSE BLD STRIP.AUTO-MCNC: 99 MG/DL (ref 70–110)
GLUCOSE SERPL-MCNC: 137 MG/DL (ref 74–108)
GLUCOSE SERPL-MCNC: 137 MG/DL (ref 74–108)
HCO3 BLD-SCNC: 35.5 MMOL/L (ref 21–28)
HCO3 BLD-SCNC: 35.5 MMOL/L (ref 21–28)
HCT VFR BLD AUTO: 29.9 % (ref 35–45)
HCT VFR BLD AUTO: 29.9 % (ref 35–45)
HGB BLD-MCNC: 9 G/DL (ref 12–16)
HGB BLD-MCNC: 9 G/DL (ref 12–16)
IMM GRANULOCYTES # BLD AUTO: 0 K/UL (ref 0–0.04)
IMM GRANULOCYTES # BLD AUTO: 0 K/UL (ref 0–0.04)
IMM GRANULOCYTES NFR BLD AUTO: 0 % (ref 0–0.5)
IMM GRANULOCYTES NFR BLD AUTO: 0 % (ref 0–0.5)
IPAP/PIP/HIGH PEEP: 21
IPAP/PIP/HIGH PEEP: 21
LYMPHOCYTES # BLD: 1.94 K/UL (ref 0.9–3.6)
LYMPHOCYTES # BLD: 1.94 K/UL (ref 0.9–3.6)
LYMPHOCYTES NFR BLD: 17 % (ref 21–52)
LYMPHOCYTES NFR BLD: 17 % (ref 21–52)
MAGNESIUM SERPL-MCNC: 2.1 MG/DL (ref 1.6–2.6)
MAGNESIUM SERPL-MCNC: 2.1 MG/DL (ref 1.6–2.6)
MCH RBC QN AUTO: 22 PG (ref 24–34)
MCH RBC QN AUTO: 22 PG (ref 24–34)
MCHC RBC AUTO-ENTMCNC: 30.1 G/DL (ref 31–37)
MCHC RBC AUTO-ENTMCNC: 30.1 G/DL (ref 31–37)
MCV RBC AUTO: 73.1 FL (ref 78–100)
MCV RBC AUTO: 73.1 FL (ref 78–100)
MONOCYTES # BLD: 0.68 K/UL (ref 0.05–1.2)
MONOCYTES # BLD: 0.68 K/UL (ref 0.05–1.2)
MONOCYTES NFR BLD: 6 % (ref 3–10)
MONOCYTES NFR BLD: 6 % (ref 3–10)
NEUTS SEG # BLD: 8.78 K/UL (ref 1.8–8)
NEUTS SEG # BLD: 8.78 K/UL (ref 1.8–8)
NEUTS SEG NFR BLD: 77 % (ref 40–73)
NEUTS SEG NFR BLD: 77 % (ref 40–73)
NRBC # BLD: 0 K/UL (ref 0–0.01)
NRBC # BLD: 0 K/UL (ref 0–0.01)
NRBC BLD-RTO: 0 PER 100 WBC
NRBC BLD-RTO: 0 PER 100 WBC
O2/TOTAL GAS SETTING VFR VENT: 35 %
O2/TOTAL GAS SETTING VFR VENT: 35 %
PAW @ MEAN EXP FLOW ON VENT: 11 CMH2O
PAW @ MEAN EXP FLOW ON VENT: 11 CMH2O
PCO2 BLD: 44.3 MMHG (ref 35–48)
PCO2 BLD: 44.3 MMHG (ref 35–48)
PEEP RESPIRATORY: 5 CMH2O
PEEP RESPIRATORY: 5 CMH2O
PH BLD: 7.51 (ref 7.35–7.45)
PH BLD: 7.51 (ref 7.35–7.45)
PHOSPHATE SERPL-MCNC: 3.6 MG/DL (ref 2.5–4.9)
PHOSPHATE SERPL-MCNC: 3.6 MG/DL (ref 2.5–4.9)
PLATELET # BLD AUTO: 207 K/UL (ref 135–420)
PLATELET # BLD AUTO: 207 K/UL (ref 135–420)
PLATELET COMMENT: ABNORMAL
PLATELET COMMENT: ABNORMAL
PO2 BLD: 105 MMHG (ref 83–108)
PO2 BLD: 105 MMHG (ref 83–108)
POTASSIUM SERPL-SCNC: 4.1 MMOL/L (ref 3.5–5.5)
POTASSIUM SERPL-SCNC: 4.1 MMOL/L (ref 3.5–5.5)
RBC # BLD AUTO: 4.09 M/UL (ref 4.2–5.3)
RBC # BLD AUTO: 4.09 M/UL (ref 4.2–5.3)
RBC MORPH BLD: ABNORMAL
RESPIRATORY RATE, POC: 29 (ref 5–40)
RESPIRATORY RATE, POC: 29 (ref 5–40)
SAO2 % BLD: 98.5 % (ref 92–97)
SAO2 % BLD: 98.5 % (ref 92–97)
SERVICE CMNT-IMP: ABNORMAL
SERVICE CMNT-IMP: ABNORMAL
SODIUM SERPL-SCNC: 146 MMOL/L (ref 136–145)
SODIUM SERPL-SCNC: 146 MMOL/L (ref 136–145)
SPECIMEN EXP DATE BLD: NORMAL
SPECIMEN EXP DATE BLD: NORMAL
SPECIMEN TYPE: ABNORMAL
SPECIMEN TYPE: ABNORMAL
UNIT DIVISION: 0
UNIT DIVISION: 0
UNIT ISSUE DATE/TIME: NORMAL
UNIT ISSUE DATE/TIME: NORMAL
VENTILATION MODE VENT: ABNORMAL
VENTILATION MODE VENT: ABNORMAL
VT SETTING VENT: 345 ML
VT SETTING VENT: 345 ML
WBC # BLD AUTO: 11.4 K/UL (ref 4.6–13.2)
WBC # BLD AUTO: 11.4 K/UL (ref 4.6–13.2)

## 2025-05-14 PROCEDURE — 94761 N-INVAS EAR/PLS OXIMETRY MLT: CPT

## 2025-05-14 PROCEDURE — 6370000000 HC RX 637 (ALT 250 FOR IP): Performed by: PSYCHIATRY & NEUROLOGY

## 2025-05-14 PROCEDURE — 6370000000 HC RX 637 (ALT 250 FOR IP): Performed by: NURSE PRACTITIONER

## 2025-05-14 PROCEDURE — 2500000003 HC RX 250 WO HCPCS: Performed by: PSYCHIATRY & NEUROLOGY

## 2025-05-14 PROCEDURE — 83735 ASSAY OF MAGNESIUM: CPT

## 2025-05-14 PROCEDURE — 2700000000 HC OXYGEN THERAPY PER DAY

## 2025-05-14 PROCEDURE — 2000000000 HC ICU R&B

## 2025-05-14 PROCEDURE — 6370000000 HC RX 637 (ALT 250 FOR IP): Performed by: INTERNAL MEDICINE

## 2025-05-14 PROCEDURE — 6360000002 HC RX W HCPCS: Performed by: PHYSICIAN ASSISTANT

## 2025-05-14 PROCEDURE — 2580000003 HC RX 258: Performed by: PSYCHIATRY & NEUROLOGY

## 2025-05-14 PROCEDURE — APPSS15 APP SPLIT SHARED TIME 0-15 MINUTES: Performed by: NURSE PRACTITIONER

## 2025-05-14 PROCEDURE — 6360000002 HC RX W HCPCS: Performed by: NURSE PRACTITIONER

## 2025-05-14 PROCEDURE — 82803 BLOOD GASES ANY COMBINATION: CPT

## 2025-05-14 PROCEDURE — 85025 COMPLETE CBC W/AUTO DIFF WBC: CPT

## 2025-05-14 PROCEDURE — 94003 VENT MGMT INPAT SUBQ DAY: CPT

## 2025-05-14 PROCEDURE — 82550 ASSAY OF CK (CPK): CPT

## 2025-05-14 PROCEDURE — 71045 X-RAY EXAM CHEST 1 VIEW: CPT

## 2025-05-14 PROCEDURE — 6370000000 HC RX 637 (ALT 250 FOR IP): Performed by: PHYSICIAN ASSISTANT

## 2025-05-14 PROCEDURE — 36415 COLL VENOUS BLD VENIPUNCTURE: CPT

## 2025-05-14 PROCEDURE — 94640 AIRWAY INHALATION TREATMENT: CPT

## 2025-05-14 PROCEDURE — 80069 RENAL FUNCTION PANEL: CPT

## 2025-05-14 PROCEDURE — 6360000002 HC RX W HCPCS: Performed by: INTERNAL MEDICINE

## 2025-05-14 PROCEDURE — 36600 WITHDRAWAL OF ARTERIAL BLOOD: CPT

## 2025-05-14 PROCEDURE — 2500000003 HC RX 250 WO HCPCS: Performed by: NURSE PRACTITIONER

## 2025-05-14 PROCEDURE — 82962 GLUCOSE BLOOD TEST: CPT

## 2025-05-14 PROCEDURE — 99291 CRITICAL CARE FIRST HOUR: CPT | Performed by: INTERNAL MEDICINE

## 2025-05-14 RX ORDER — AMLODIPINE BESYLATE 10 MG/1
10 TABLET ORAL DAILY
Status: DISCONTINUED | OUTPATIENT
Start: 2025-05-15 | End: 2025-05-16

## 2025-05-14 RX ORDER — FUROSEMIDE 10 MG/ML
40 INJECTION INTRAMUSCULAR; INTRAVENOUS ONCE
Status: COMPLETED | OUTPATIENT
Start: 2025-05-14 | End: 2025-05-14

## 2025-05-14 RX ADMIN — BUDESONIDE 1 MG: 1 SUSPENSION RESPIRATORY (INHALATION) at 07:28

## 2025-05-14 RX ADMIN — FAMOTIDINE 20 MG: 20 TABLET, FILM COATED ORAL at 08:59

## 2025-05-14 RX ADMIN — ASPIRIN 81 MG CHEWABLE TABLET 81 MG: 81 TABLET CHEWABLE at 08:59

## 2025-05-14 RX ADMIN — INSULIN LISPRO 4 UNITS: 100 INJECTION, SOLUTION INTRAVENOUS; SUBCUTANEOUS at 12:30

## 2025-05-14 RX ADMIN — INSULIN GLARGINE 5 UNITS: 100 INJECTION, SOLUTION SUBCUTANEOUS at 08:59

## 2025-05-14 RX ADMIN — Medication 15 ML: at 08:59

## 2025-05-14 RX ADMIN — SODIUM CHLORIDE, PRESERVATIVE FREE 10 ML: 5 INJECTION INTRAVENOUS at 22:26

## 2025-05-14 RX ADMIN — HEPARIN SODIUM 5000 UNITS: 5000 INJECTION INTRAVENOUS; SUBCUTANEOUS at 21:37

## 2025-05-14 RX ADMIN — VALPROATE SODIUM 500 MG: 100 INJECTION, SOLUTION INTRAVENOUS at 06:36

## 2025-05-14 RX ADMIN — CHLORHEXIDINE GLUCONATE 0.12% ORAL RINSE 15 ML: 1.2 LIQUID ORAL at 21:37

## 2025-05-14 RX ADMIN — FOLIC ACID 1 MG: 1 TABLET ORAL at 08:59

## 2025-05-14 RX ADMIN — Medication 100 MG: at 08:59

## 2025-05-14 RX ADMIN — ARFORMOTEROL TARTRATE 15 MCG: 15 SOLUTION RESPIRATORY (INHALATION) at 07:28

## 2025-05-14 RX ADMIN — CHLORHEXIDINE GLUCONATE 0.12% ORAL RINSE 15 ML: 1.2 LIQUID ORAL at 08:59

## 2025-05-14 RX ADMIN — SODIUM CHLORIDE, PRESERVATIVE FREE 10 ML: 5 INJECTION INTRAVENOUS at 09:00

## 2025-05-14 RX ADMIN — FUROSEMIDE 40 MG: 10 INJECTION, SOLUTION INTRAMUSCULAR; INTRAVENOUS at 12:30

## 2025-05-14 RX ADMIN — VALPROATE SODIUM 500 MG: 100 INJECTION, SOLUTION INTRAVENOUS at 15:51

## 2025-05-14 RX ADMIN — AMLODIPINE BESYLATE 5 MG: 5 TABLET ORAL at 08:59

## 2025-05-14 RX ADMIN — HEPARIN SODIUM 5000 UNITS: 5000 INJECTION INTRAVENOUS; SUBCUTANEOUS at 15:51

## 2025-05-14 RX ADMIN — VALPROATE SODIUM 500 MG: 100 INJECTION, SOLUTION INTRAVENOUS at 22:31

## 2025-05-14 RX ADMIN — BUDESONIDE 1 MG: 1 SUSPENSION RESPIRATORY (INHALATION) at 19:43

## 2025-05-14 RX ADMIN — ARFORMOTEROL TARTRATE 15 MCG: 15 SOLUTION RESPIRATORY (INHALATION) at 19:43

## 2025-05-14 ASSESSMENT — PULMONARY FUNCTION TESTS
PIF_VALUE: 21
PIF_VALUE: 18
PIF_VALUE: 19
PIF_VALUE: 18
PIF_VALUE: 15
PIF_VALUE: 17

## 2025-05-14 NOTE — PROGRESS NOTES
Advance Care Planning   Healthcare Decision Maker:    Primary Decision Maker: Antony Loaiza - Spouse - [phone]    Click here to complete Healthcare Decision Makers including selection of the Healthcare Decision Maker Relationship (ie \"Primary\").          attended the interdisciplinary rounds for Juliana Loaiza, who is a 74 y.o.,female. Patient's Primary Language is: English.   According to the patient's EMR [employer] Affiliation is: .     The reason the Patient came to the hospital is:   Patient Active Problem List    Diagnosis Date Noted    Acute ischemic right MCA stroke (HCC) 05/13/2025    Acute ischemic left MCA stroke (HCC) 05/13/2025    Acute hypoxemic respiratory failure (HCC) 05/13/2025    Palliative care encounter 05/12/2025    Goals of care, counseling/discussion 05/12/2025    DNR (do not resuscitate) discussion 05/12/2025    Cerebrovascular accident (CVA) due to bilateral embolism of middle cerebral arteries (HCC) 05/10/2025    Respiratory arrest (HCC) 05/10/2025    Coma (HCC) 05/07/2025    Acute encephalopathy 05/07/2025    Acute respiratory failure (HCC) 05/07/2025    Severe sepsis (HCC) 05/07/2025    Polysubstance abuse (HCC) 05/07/2025    Transaminitis 05/07/2025    Elevated CK 05/07/2025    IRA (acute kidney injury) 05/07/2025    Aspiration pneumonia of both lungs (HCC) 05/07/2025    Lactic acidosis 05/07/2025    Metabolic acidosis 05/07/2025    Rhinovirus 05/07/2025          Plan:   participated and listen to the recommendations of the IDR team. Patient intubated. No major events overnight. Open eyes and does tracked, but not following commands.   Chaplains will continue to follow and will provide pastoral care on an as needed/requested basis.   recommends bedside caregivers page  on duty if patient shows signs of acute spiritual or emotional distress.    Chaplain [place]  Staff   Spiritual Health   [phone]

## 2025-05-14 NOTE — PROGRESS NOTES
05/14/25 0734   Weaning Parameters   Respiratory Rate Observed 30   Ve 12.7      RSBI 84     Pt on SBT. PS 12 PEEP 5 FiO2 28. Pt is tachypneic despite adjusting PS. RN aware. Will continue to monitor.

## 2025-05-14 NOTE — PROGRESS NOTES
Acute encephalopathy    Acute respiratory failure (HCC)    Severe sepsis (HCC)    Polysubstance abuse (HCC)    Transaminitis    Elevated CK    IRA (acute kidney injury)    Aspiration pneumonia of both lungs (HCC)    Lactic acidosis    Metabolic acidosis    Rhinovirus    Cerebrovascular accident (CVA) due to bilateral embolism of middle cerebral arteries (HCC)    Respiratory arrest (HCC)    Palliative care encounter    Goals of care, counseling/discussion    DNR (do not resuscitate) discussion    Acute ischemic right MCA stroke (HCC)    Acute ischemic left MCA stroke (HCC)    Acute hypoxemic respiratory failure (HCC)        RECOMMENDATIONS:   Neuro:   -No requiring continuous sedation, no response to Narcan. Keep off sedation to assess Neuro status   -Titrate sedation to RASS of 0 to -1 if needed. PRN for breakthrough sedation needs.   -CT head negative.   -Ceribell w/ focal seizure changes per neurology (following) review, EEG with mod severe generalized slowing, AED: valproate   -Hold all OP sedating drugs. Hold Cymbalta, Remeron, Lyrica, Requip, and Zoloft. Hold all pain meds   -MRI completed 5/9 w/ b/l MCA stroke- prognosis is extremely poor     Pulm:   -Titrate FiO2 for goal SPO2> 90%,VAP prevention bundle, head of the bed at 30' all times.Daily sedation holiday and assessment for weaning with SBT as tolerated.    PRN duonebs.   -Continue Bronchodilators, pulmonary hygiene care, Aspiration precautions, Keep HOB >30 degrees    CVS :  -Aim MAP >65mmHg  -Holding AC due to degree of stroke high risk for hemorrhagic conversion    GI:   -SUP, Trend LFTs, Zofran PRN for N/V  -Diet- Diet NPO  ADULT TUBE FEEDING; Orogastric; Diabetic; Continuous; 45; No; 75; Q 4 hours  -Thiamine/folic acid    Renal:   -Trend Renal indices, Strict Is/Os,   -Nephrology signed off, renal function improved, IVF stopped with improved CK levels     Hem/Onc:   -Monitor for s/o active bleeding.   -Continue Aspirin. Holding AC     I/D:  -Sepsis  The tube has its tip overlying the stomach. Electronically signed by Zurdo COOPER Post    XR CHEST PORTABLE  Result Date: 5/7/2025  1.  Tubes and lines without evidence of complication. 2.  Multifocal opacities. Electronically signed by Zurdo COOPER Post    CT HEAD WO CONTRAST  Result Date: 5/7/2025  1.   Diffuse parenchymal loss. 2.  No acute intracranial process. Electronically signed by Zurdo Singer      No results found for this or any previous visit.     No valid procedures specified.     10 minutes were spent with the patient at the bedside. This care involved high complexity decision making to assess, manipulate, and support vital system functions, to treat this degreee vital organ system failure and to prevent further life threatening deterioration of the patient’s condition  The services I provided to this patient were to treat and/or prevent clinically significant deterioration that could result in the failure of one or more body systems and/or organ systems due to respiratory distress, hypoxia, cardiac dysrhythmia.       Anahi Ryan, ASHIA - NP  05/14/25  Pulmonary, Critical Care Medicine  [facility]

## 2025-05-14 NOTE — PROGRESS NOTES
VENTILATOR CARE PLAN    Problem: Ventilator Management  Goal: *Adequate oxygenation/ ventilation/ and extubation      Patient:        Juliana Loaiza     74 y.o.   female     5/14/2025  5:29 AM  Patient Active Problem List   Diagnosis    Coma (HCC)    Acute encephalopathy    Acute respiratory failure (HCC)    Severe sepsis (HCC)    Polysubstance abuse (HCC)    Transaminitis    Elevated CK    IRA (acute kidney injury)    Aspiration pneumonia of both lungs (HCC)    Lactic acidosis    Metabolic acidosis    Rhinovirus    Cerebrovascular accident (CVA) due to bilateral embolism of middle cerebral arteries (HCC)    Respiratory arrest (HCC)    Palliative care encounter    Goals of care, counseling/discussion    DNR (do not resuscitate) discussion    Acute ischemic right MCA stroke (HCC)    Acute ischemic left MCA stroke (HCC)    Acute hypoxemic respiratory failure (HCC)       Coma (HCC) [R40.20]  Respiratory arrest (HCC) [R09.2]  [place] coma scale total score 3-8, in the field (EMT or ambulance) ([facility]) [R40.2431]    Reason patient intubated:  Acute on chronic respiratory failure, unclear if              hypercapnic or hypoxic on arrival-intubated for airway protection, Home baseline is 6 Lpm oxygen  Acute encephalopathy-toxic/metabolic in nature, patient unresponsive with pinpoint pupils, etiology unclear--?CVA vs. Drug toxicity vs. Mediation induced (patient on multiple sedating medications, such as Cymbalta, Remeron, Lyrica, Requip, and Zoloft-->also places patient at risk for serotonin syndrome), vs. Hypoxia vs. Uremia vs. Sepsis vs. Drug OD    Ventilator day: 9.    Ventilator settings: PRVC, f 14, vT 345, PEEP 5, FiO2 28%    ETT Size/Placement: 7.5 FERNANDA ETT secured at 23 cm at upper lip.           PLAN OF CARE:  Maintain adequate ventilation and oxygenation, wean vent settings as tolerated, SBT as tolerated when safe and appropriate.        GOAL:  Liberation from artificial airway and mechanical ventilator.

## 2025-05-14 NOTE — PLAN OF CARE
Problem: Discharge Planning  Goal: Discharge to home or other facility with appropriate resources  Outcome: Progressing     Problem: Safety - Adult  Goal: Free from fall injury  Outcome: Progressing     Problem: Nutrition Deficit:  Goal: Optimize nutritional status  Outcome: Progressing  Flowsheets (Taken 5/13/2025 1151 by Dockweiler, Megan, RD)  Nutrient intake appropriate for improving, restoring, or maintaining nutritional needs:   Assess nutritional status and recommend course of action   Monitor oral intake, labs, and treatment plans   Recommend appropriate diets, oral nutritional supplements, and vitamin/mineral supplements   Recommend, monitor, and adjust tube feedings and TPN/PPN based on assessed needs     Problem: Pain  Goal: Verbalizes/displays adequate comfort level or baseline comfort level  Outcome: Progressing  Flowsheets (Taken 5/13/2025 2030)  Verbalizes/displays adequate comfort level or baseline comfort level:   Assess pain using appropriate pain scale   Implement non-pharmacological measures as appropriate and evaluate response     Problem: Neurosensory - Adult  Goal: Achieves stable or improved neurological status  Outcome: Progressing  Flowsheets (Taken 5/13/2025 2030)  Achieves stable or improved neurological status:   Assess for and report changes in neurological status   Initiate measures to prevent increased intracranial pressure  Goal: Achieves maximal functionality and self care  Outcome: Progressing     Problem: Respiratory - Adult  Goal: Achieves optimal ventilation and oxygenation  Outcome: Progressing     Problem: Cardiovascular - Adult  Goal: Maintains optimal cardiac output and hemodynamic stability  Outcome: Progressing  Flowsheets (Taken 5/13/2025 2030)  Maintains optimal cardiac output and hemodynamic stability:   Monitor blood pressure and heart rate   Monitor urine output and notify Licensed Independent Practitioner for values outside of normal range  Goal: Absence of cardiac

## 2025-05-14 NOTE — PROGRESS NOTES
VENTILATOR CARE PLAN    Problem: Ventilator Management  Goal: *Adequate oxygenation/ ventilation/ and extubation      Patient:        Juliana Loaiza     74 y.o.   female     5/14/2025  8:47 AM  Patient Active Problem List   Diagnosis    Coma (HCC)    Acute encephalopathy    Acute respiratory failure (HCC)    Severe sepsis (HCC)    Polysubstance abuse (HCC)    Transaminitis    Elevated CK    IRA (acute kidney injury)    Aspiration pneumonia of both lungs (HCC)    Lactic acidosis    Metabolic acidosis    Rhinovirus    Cerebrovascular accident (CVA) due to bilateral embolism of middle cerebral arteries (HCC)    Respiratory arrest (HCC)    Palliative care encounter    Goals of care, counseling/discussion    DNR (do not resuscitate) discussion    Acute ischemic right MCA stroke (HCC)    Acute ischemic left MCA stroke (HCC)    Acute hypoxemic respiratory failure (HCC)       Coma (HCC) [R40.20]  Respiratory arrest (HCC) [R09.2]  [place] coma scale total score 3-8, in the field (EMT or ambulance) ([facility]) [R40.2431]    Reason patient intubated:  Acute on chronic respiratory failure, unclear if hypercapnic or hypoxic on arrival-intubated for airway protection, Home baseline is 6 Lpm oxygen  Acute encephalopathy-toxic/metabolic in nature, patient unresponsive with pinpoint pupils, etiology unclear--?CVA vs. Drug toxicity vs. Mediation induced (patient on multiple sedating medications, such as Cymbalta, Remeron, Lyrica, Requip, and Zoloft-->also places patient at risk for serotonin syndrome), vs. Hypoxia vs. Uremia vs. Sepsis vs. Drug OD     Ventilator day: 9.     Ventilator settings: PRVC, f 14, vT 345, PEEP 5, FiO2 28%     ETT Size/Placement: 7.5 FERNANDA ETT secured at 23 cm at upper lip.      Wean Screen Pass (Yes or No): yes  Wean Screen Reason for Failure:  Duration of Weaning Trial: Pt has been on SBT all shift. Will rest overnight. PS 12, PEEP 5  Additional Comments:        PLAN OF CARE:  Maintain adequate ventilation and

## 2025-05-15 ENCOUNTER — APPOINTMENT (OUTPATIENT)
Facility: HOSPITAL | Age: 75
End: 2025-05-15
Payer: MEDICARE

## 2025-05-15 LAB
ALBUMIN SERPL-MCNC: 1.8 G/DL (ref 3.4–5)
ALBUMIN SERPL-MCNC: 1.8 G/DL (ref 3.4–5)
ANION GAP SERPL CALC-SCNC: 13 MMOL/L (ref 3–18)
ANION GAP SERPL CALC-SCNC: 13 MMOL/L (ref 3–18)
ARTERIAL PATENCY WRIST A: POSITIVE
B BURGDOR IGG CSF-ACNC: < 0.08 INDEX
B BURGDOR IGG CSF-ACNC: < 0.08 INDEX
B BURGDOR IGM CSF-ACNC: < 0.06 INDEX
B BURGDOR IGM CSF-ACNC: < 0.06 INDEX
BASE EXCESS BLD CALC-SCNC: 11.6 MMOL/L
BASE EXCESS BLD CALC-SCNC: 11.6 MMOL/L
BASE EXCESS BLD CALC-SCNC: 12.4 MMOL/L
BASE EXCESS BLD CALC-SCNC: 12.4 MMOL/L
BASOPHILS # BLD: 0.04 K/UL (ref 0–0.1)
BASOPHILS # BLD: 0.04 K/UL (ref 0–0.1)
BASOPHILS NFR BLD: 0.3 % (ref 0–2)
BASOPHILS NFR BLD: 0.3 % (ref 0–2)
BDY SITE: ABNORMAL
BUN SERPL-MCNC: 39 MG/DL (ref 6–23)
BUN SERPL-MCNC: 39 MG/DL (ref 6–23)
BUN/CREAT SERPL: 36 (ref 12–20)
BUN/CREAT SERPL: 36 (ref 12–20)
CALCIUM SERPL-MCNC: 9.1 MG/DL (ref 8.5–10.1)
CALCIUM SERPL-MCNC: 9.1 MG/DL (ref 8.5–10.1)
CHLORIDE SERPL-SCNC: 104 MMOL/L (ref 98–107)
CHLORIDE SERPL-SCNC: 104 MMOL/L (ref 98–107)
CK SERPL-CCNC: 625 U/L (ref 26–192)
CK SERPL-CCNC: 625 U/L (ref 26–192)
CO2 SERPL-SCNC: 31 MMOL/L (ref 21–32)
CO2 SERPL-SCNC: 31 MMOL/L (ref 21–32)
CREAT SERPL-MCNC: 1.08 MG/DL (ref 0.6–1.3)
CREAT SERPL-MCNC: 1.08 MG/DL (ref 0.6–1.3)
DIFFERENTIAL METHOD BLD: ABNORMAL
DIFFERENTIAL METHOD BLD: ABNORMAL
EOSINOPHIL # BLD: 0.05 K/UL (ref 0–0.4)
EOSINOPHIL # BLD: 0.05 K/UL (ref 0–0.4)
EOSINOPHIL NFR BLD: 0.4 % (ref 0–5)
EOSINOPHIL NFR BLD: 0.4 % (ref 0–5)
ERYTHROCYTE [DISTWIDTH] IN BLOOD BY AUTOMATED COUNT: 17 % (ref 11.6–14.5)
ERYTHROCYTE [DISTWIDTH] IN BLOOD BY AUTOMATED COUNT: 17 % (ref 11.6–14.5)
GAS FLOW.O2 O2 DELIVERY SYS: ABNORMAL
GAS FLOW.O2 SETTING OXYMISER: 14 BPM
GLUCOSE BLD STRIP.AUTO-MCNC: 117 MG/DL (ref 70–110)
GLUCOSE BLD STRIP.AUTO-MCNC: 117 MG/DL (ref 70–110)
GLUCOSE BLD STRIP.AUTO-MCNC: 166 MG/DL (ref 70–110)
GLUCOSE BLD STRIP.AUTO-MCNC: 166 MG/DL (ref 70–110)
GLUCOSE BLD STRIP.AUTO-MCNC: 187 MG/DL (ref 70–110)
GLUCOSE BLD STRIP.AUTO-MCNC: 187 MG/DL (ref 70–110)
GLUCOSE SERPL-MCNC: 138 MG/DL (ref 74–108)
GLUCOSE SERPL-MCNC: 138 MG/DL (ref 74–108)
HCO3 BLD-SCNC: 35.4 MMOL/L (ref 21–28)
HCO3 BLD-SCNC: 35.4 MMOL/L (ref 21–28)
HCO3 BLD-SCNC: 35.9 MMOL/L (ref 21–28)
HCO3 BLD-SCNC: 35.9 MMOL/L (ref 21–28)
HCT VFR BLD AUTO: 28.6 % (ref 35–45)
HCT VFR BLD AUTO: 28.6 % (ref 35–45)
HGB BLD-MCNC: 8.9 G/DL (ref 12–16)
HGB BLD-MCNC: 8.9 G/DL (ref 12–16)
IMM GRANULOCYTES # BLD AUTO: 0.54 K/UL (ref 0–0.04)
IMM GRANULOCYTES # BLD AUTO: 0.54 K/UL (ref 0–0.04)
IMM GRANULOCYTES NFR BLD AUTO: 4.1 % (ref 0–0.5)
IMM GRANULOCYTES NFR BLD AUTO: 4.1 % (ref 0–0.5)
LYMPHOCYTES # BLD: 2.15 K/UL (ref 0.9–3.6)
LYMPHOCYTES # BLD: 2.15 K/UL (ref 0.9–3.6)
LYMPHOCYTES NFR BLD: 16.4 % (ref 21–52)
LYMPHOCYTES NFR BLD: 16.4 % (ref 21–52)
MAGNESIUM SERPL-MCNC: 2.1 MG/DL (ref 1.6–2.6)
MAGNESIUM SERPL-MCNC: 2.1 MG/DL (ref 1.6–2.6)
MCH RBC QN AUTO: 22.5 PG (ref 24–34)
MCH RBC QN AUTO: 22.5 PG (ref 24–34)
MCHC RBC AUTO-ENTMCNC: 31.1 G/DL (ref 31–37)
MCHC RBC AUTO-ENTMCNC: 31.1 G/DL (ref 31–37)
MCV RBC AUTO: 72.2 FL (ref 78–100)
MCV RBC AUTO: 72.2 FL (ref 78–100)
MONOCYTES # BLD: 1.14 K/UL (ref 0.05–1.2)
MONOCYTES # BLD: 1.14 K/UL (ref 0.05–1.2)
MONOCYTES NFR BLD: 8.7 % (ref 3–10)
MONOCYTES NFR BLD: 8.7 % (ref 3–10)
NEUTS SEG # BLD: 9.2 K/UL (ref 1.8–8)
NEUTS SEG # BLD: 9.2 K/UL (ref 1.8–8)
NEUTS SEG NFR BLD: 70.1 % (ref 40–73)
NEUTS SEG NFR BLD: 70.1 % (ref 40–73)
NRBC # BLD: 0 K/UL (ref 0–0.01)
NRBC # BLD: 0 K/UL (ref 0–0.01)
NRBC BLD-RTO: 0 PER 100 WBC
NRBC BLD-RTO: 0 PER 100 WBC
O2/TOTAL GAS SETTING VFR VENT: 28 %
PCO2 BLD: 40.7 MMHG (ref 35–48)
PCO2 BLD: 40.7 MMHG (ref 35–48)
PCO2 BLD: 41.6 MMHG (ref 35–48)
PCO2 BLD: 41.6 MMHG (ref 35–48)
PEEP RESPIRATORY: 5 CMH2O
PH BLD: 7.54 (ref 7.35–7.45)
PH BLD: 7.54 (ref 7.35–7.45)
PH BLD: 7.55 (ref 7.35–7.45)
PH BLD: 7.55 (ref 7.35–7.45)
PHOSPHATE SERPL-MCNC: 3.9 MG/DL (ref 2.5–4.9)
PLATELET # BLD AUTO: 241 K/UL (ref 135–420)
PLATELET # BLD AUTO: 241 K/UL (ref 135–420)
PO2 BLD: 86 MMHG (ref 83–108)
PO2 BLD: 86 MMHG (ref 83–108)
PO2 BLD: 90 MMHG (ref 83–108)
PO2 BLD: 90 MMHG (ref 83–108)
POTASSIUM SERPL-SCNC: 4 MMOL/L (ref 3.5–5.5)
POTASSIUM SERPL-SCNC: 4 MMOL/L (ref 3.5–5.5)
RBC # BLD AUTO: 3.96 M/UL (ref 4.2–5.3)
RBC # BLD AUTO: 3.96 M/UL (ref 4.2–5.3)
SAO2 % BLD: 97.6 % (ref 92–97)
SAO2 % BLD: 97.6 % (ref 92–97)
SAO2 % BLD: 97.9 % (ref 92–97)
SAO2 % BLD: 97.9 % (ref 92–97)
SERVICE CMNT-IMP: ABNORMAL
SODIUM SERPL-SCNC: 147 MMOL/L (ref 136–145)
SODIUM SERPL-SCNC: 147 MMOL/L (ref 136–145)
SPECIMEN TYPE: ABNORMAL
VENTILATION MODE VENT: ABNORMAL
VT SETTING VENT: 345 ML
WBC # BLD AUTO: 13.1 K/UL (ref 4.6–13.2)
WBC # BLD AUTO: 13.1 K/UL (ref 4.6–13.2)

## 2025-05-15 PROCEDURE — 85025 COMPLETE CBC W/AUTO DIFF WBC: CPT

## 2025-05-15 PROCEDURE — 94761 N-INVAS EAR/PLS OXIMETRY MLT: CPT

## 2025-05-15 PROCEDURE — 2700000000 HC OXYGEN THERAPY PER DAY

## 2025-05-15 PROCEDURE — 36415 COLL VENOUS BLD VENIPUNCTURE: CPT

## 2025-05-15 PROCEDURE — 6360000002 HC RX W HCPCS: Performed by: PHYSICIAN ASSISTANT

## 2025-05-15 PROCEDURE — 2580000003 HC RX 258: Performed by: PSYCHIATRY & NEUROLOGY

## 2025-05-15 PROCEDURE — 6370000000 HC RX 637 (ALT 250 FOR IP): Performed by: INTERNAL MEDICINE

## 2025-05-15 PROCEDURE — 6370000000 HC RX 637 (ALT 250 FOR IP): Performed by: NURSE PRACTITIONER

## 2025-05-15 PROCEDURE — 74018 RADEX ABDOMEN 1 VIEW: CPT

## 2025-05-15 PROCEDURE — 94640 AIRWAY INHALATION TREATMENT: CPT

## 2025-05-15 PROCEDURE — 6360000002 HC RX W HCPCS: Performed by: INTERNAL MEDICINE

## 2025-05-15 PROCEDURE — 6370000000 HC RX 637 (ALT 250 FOR IP): Performed by: PHYSICIAN ASSISTANT

## 2025-05-15 PROCEDURE — 82550 ASSAY OF CK (CPK): CPT

## 2025-05-15 PROCEDURE — 2500000003 HC RX 250 WO HCPCS: Performed by: PSYCHIATRY & NEUROLOGY

## 2025-05-15 PROCEDURE — 99291 CRITICAL CARE FIRST HOUR: CPT | Performed by: INTERNAL MEDICINE

## 2025-05-15 PROCEDURE — 6360000002 HC RX W HCPCS: Performed by: NURSE PRACTITIONER

## 2025-05-15 PROCEDURE — 71045 X-RAY EXAM CHEST 1 VIEW: CPT

## 2025-05-15 PROCEDURE — 80069 RENAL FUNCTION PANEL: CPT

## 2025-05-15 PROCEDURE — 84100 ASSAY OF PHOSPHORUS: CPT

## 2025-05-15 PROCEDURE — 6370000000 HC RX 637 (ALT 250 FOR IP): Performed by: PSYCHIATRY & NEUROLOGY

## 2025-05-15 PROCEDURE — 36600 WITHDRAWAL OF ARTERIAL BLOOD: CPT

## 2025-05-15 PROCEDURE — 2000000000 HC ICU R&B

## 2025-05-15 PROCEDURE — APPSS15 APP SPLIT SHARED TIME 0-15 MINUTES: Performed by: NURSE PRACTITIONER

## 2025-05-15 PROCEDURE — 83735 ASSAY OF MAGNESIUM: CPT

## 2025-05-15 PROCEDURE — 82803 BLOOD GASES ANY COMBINATION: CPT

## 2025-05-15 PROCEDURE — 2500000003 HC RX 250 WO HCPCS: Performed by: NURSE PRACTITIONER

## 2025-05-15 PROCEDURE — 94003 VENT MGMT INPAT SUBQ DAY: CPT

## 2025-05-15 PROCEDURE — 82962 GLUCOSE BLOOD TEST: CPT

## 2025-05-15 RX ORDER — POLYETHYLENE GLYCOL 3350 17 G/17G
34 POWDER, FOR SOLUTION ORAL 2 TIMES DAILY
Status: DISCONTINUED | OUTPATIENT
Start: 2025-05-15 | End: 2025-05-16

## 2025-05-15 RX ORDER — FUROSEMIDE 10 MG/ML
40 INJECTION INTRAMUSCULAR; INTRAVENOUS ONCE
Status: COMPLETED | OUTPATIENT
Start: 2025-05-15 | End: 2025-05-15

## 2025-05-15 RX ADMIN — ASPIRIN 81 MG CHEWABLE TABLET 81 MG: 81 TABLET CHEWABLE at 07:38

## 2025-05-15 RX ADMIN — POLYETHYLENE GLYCOL 3350 34 G: 17 POWDER, FOR SOLUTION ORAL at 20:45

## 2025-05-15 RX ADMIN — INSULIN GLARGINE 5 UNITS: 100 INJECTION, SOLUTION SUBCUTANEOUS at 07:38

## 2025-05-15 RX ADMIN — DOCUSATE SODIUM LIQUID 100 MG: 100 LIQUID ORAL at 10:14

## 2025-05-15 RX ADMIN — Medication 100 MG: at 07:38

## 2025-05-15 RX ADMIN — HEPARIN SODIUM 5000 UNITS: 5000 INJECTION INTRAVENOUS; SUBCUTANEOUS at 22:30

## 2025-05-15 RX ADMIN — Medication 15 ML: at 07:38

## 2025-05-15 RX ADMIN — VALPROATE SODIUM 500 MG: 100 INJECTION, SOLUTION INTRAVENOUS at 14:49

## 2025-05-15 RX ADMIN — BUDESONIDE 1 MG: 1 SUSPENSION RESPIRATORY (INHALATION) at 07:18

## 2025-05-15 RX ADMIN — BUDESONIDE 1 MG: 1 SUSPENSION RESPIRATORY (INHALATION) at 19:52

## 2025-05-15 RX ADMIN — AMLODIPINE BESYLATE 10 MG: 10 TABLET ORAL at 07:38

## 2025-05-15 RX ADMIN — HEPARIN SODIUM 5000 UNITS: 5000 INJECTION INTRAVENOUS; SUBCUTANEOUS at 06:40

## 2025-05-15 RX ADMIN — POLYETHYLENE GLYCOL 3350 34 G: 17 POWDER, FOR SOLUTION ORAL at 10:14

## 2025-05-15 RX ADMIN — SODIUM CHLORIDE, PRESERVATIVE FREE 10 ML: 5 INJECTION INTRAVENOUS at 21:30

## 2025-05-15 RX ADMIN — FAMOTIDINE 20 MG: 20 TABLET, FILM COATED ORAL at 07:38

## 2025-05-15 RX ADMIN — SODIUM CHLORIDE, PRESERVATIVE FREE 10 ML: 5 INJECTION INTRAVENOUS at 08:15

## 2025-05-15 RX ADMIN — INSULIN LISPRO 4 UNITS: 100 INJECTION, SOLUTION INTRAVENOUS; SUBCUTANEOUS at 06:51

## 2025-05-15 RX ADMIN — ARFORMOTEROL TARTRATE 15 MCG: 15 SOLUTION RESPIRATORY (INHALATION) at 19:51

## 2025-05-15 RX ADMIN — CHLORHEXIDINE GLUCONATE 0.12% ORAL RINSE 15 ML: 1.2 LIQUID ORAL at 22:18

## 2025-05-15 RX ADMIN — FOLIC ACID 1 MG: 1 TABLET ORAL at 07:38

## 2025-05-15 RX ADMIN — HEPARIN SODIUM 5000 UNITS: 5000 INJECTION INTRAVENOUS; SUBCUTANEOUS at 14:47

## 2025-05-15 RX ADMIN — FUROSEMIDE 40 MG: 10 INJECTION, SOLUTION INTRAMUSCULAR; INTRAVENOUS at 10:14

## 2025-05-15 RX ADMIN — CHLORHEXIDINE GLUCONATE 0.12% ORAL RINSE 15 ML: 1.2 LIQUID ORAL at 07:38

## 2025-05-15 RX ADMIN — VALPROATE SODIUM 500 MG: 100 INJECTION, SOLUTION INTRAVENOUS at 06:40

## 2025-05-15 RX ADMIN — ARFORMOTEROL TARTRATE 15 MCG: 15 SOLUTION RESPIRATORY (INHALATION) at 07:18

## 2025-05-15 ASSESSMENT — PAIN SCALES - WONG BAKER
WONGBAKER_NUMERICALRESPONSE: NO HURT
WONGBAKER_NUMERICALRESPONSE: NO HURT

## 2025-05-15 ASSESSMENT — PULMONARY FUNCTION TESTS
PIF_VALUE: 17
PIF_VALUE: 18
PIF_VALUE: 16
PIF_VALUE: 15
PIF_VALUE: 18

## 2025-05-15 NOTE — PROGRESS NOTES
Advance Care Planning   Healthcare Decision Maker:    Primary Decision Maker: Antony Loaiza - Spouse - [phone]    Click here to complete Healthcare Decision Makers including selection of the Healthcare Decision Maker Relationship (ie \"Primary\").          attended the interdisciplinary rounds for Juliana Loaiza, who is a 74 y.o.,female. Patient's Primary Language is: English.   According to the patient's EMR Bahai Affiliation is: .     The reason the Patient came to the hospital is:   Patient Active Problem List    Diagnosis Date Noted    Acute ischemic right MCA stroke (HCC) 05/13/2025    Acute ischemic left MCA stroke (HCC) 05/13/2025    Acute hypoxemic respiratory failure (HCC) 05/13/2025    Palliative care encounter 05/12/2025    Goals of care, counseling/discussion 05/12/2025    DNR (do not resuscitate) discussion 05/12/2025    Cerebrovascular accident (CVA) due to bilateral embolism of middle cerebral arteries (HCC) 05/10/2025    Respiratory arrest (HCC) 05/10/2025    Coma (HCC) 05/07/2025    Acute encephalopathy 05/07/2025    Acute respiratory failure (HCC) 05/07/2025    Severe sepsis (HCC) 05/07/2025    Polysubstance abuse (HCC) 05/07/2025    Transaminitis 05/07/2025    Elevated CK 05/07/2025    IRA (acute kidney injury) 05/07/2025    Aspiration pneumonia of both lungs (HCC) 05/07/2025    Lactic acidosis 05/07/2025    Metabolic acidosis 05/07/2025    Rhinovirus 05/07/2025          Plan:   participated and listen to the recommendations of the IDR team. Patient intubated and is palliative. Will have a plan of goal/care meeting with spouse on 5-16-25. Chaplains will continue to follow and will provide pastoral care on an as needed/requested basis.  recommends bedside caregivers page  on duty if patient shows signs of acute spiritual or emotional distress.    Chaplain Antony Roasles  Staff   Spiritual Health   [phone]

## 2025-05-15 NOTE — PLAN OF CARE
Problem: Safety - Adult  Goal: Free from fall injury  5/15/2025 0826 by Jessie Gonzalez, RN  Outcome: Progressing  5/15/2025 0814 by Yuridia Lr RN  Outcome: Progressing  Note: Pt will not fall while in ICU by 5/20/2025     Problem: Respiratory - Adult  Goal: Achieves optimal ventilation and oxygenation  5/15/2025 0826 by Jessie Gonzalez RN  Outcome: Progressing  Flowsheets (Taken 5/14/2025 2030 by Yuridia Lr, ALEJANDRA)  Achieves optimal ventilation and oxygenation:   Assess for changes in respiratory status   Assess for changes in mentation and behavior   Position to facilitate oxygenation and minimize respiratory effort   Oxygen supplementation based on oxygen saturation or arterial blood gases   Assess the need for suctioning and aspirate as needed   Assess and instruct to report shortness of breath or any respiratory difficulty   Respiratory therapy support as indicated  Note: SBT initiated. Pt will tolerate SBT  5/15/2025 0814 by Yuridia Lr RN  Outcome: Progressing  Flowsheets (Taken 5/14/2025 2030)  Achieves optimal ventilation and oxygenation:   Assess for changes in respiratory status   Assess for changes in mentation and behavior   Position to facilitate oxygenation and minimize respiratory effort   Oxygen supplementation based on oxygen saturation or arterial blood gases   Assess the need for suctioning and aspirate as needed   Assess and instruct to report shortness of breath or any respiratory difficulty   Respiratory therapy support as indicated

## 2025-05-15 NOTE — RT PROTOCOL NOTE
VENTILATOR CARE PLAN    Problem: Ventilator Management  Goal: *Adequate oxygenation/ ventilation/ and extubation      Patient:        Juliana Loaiza     74 y.o.   female     5/15/2025  10:10 AM  Patient Active Problem List   Diagnosis    Coma (HCC)    Acute encephalopathy    Acute respiratory failure (HCC)    Severe sepsis (HCC)    Polysubstance abuse (HCC)    Transaminitis    Elevated CK    IRA (acute kidney injury)    Aspiration pneumonia of both lungs (HCC)    Lactic acidosis    Metabolic acidosis    Rhinovirus    Cerebrovascular accident (CVA) due to bilateral embolism of middle cerebral arteries (HCC)    Respiratory arrest (HCC)    Palliative care encounter    Goals of care, counseling/discussion    DNR (do not resuscitate) discussion    Acute ischemic right MCA stroke (HCC)    Acute ischemic left MCA stroke (HCC)    Acute hypoxemic respiratory failure (HCC)       Coma (HCC) [R40.20]  Respiratory arrest (HCC) [R09.2]  [place] coma scale total score 3-8, in the field (EMT or ambulance) ([facility]) [R40.2431]    Reason patient intubated: Acute on chronic respiratory failure, unclear if hypercapnic or hypoxic on arrival-intubated for airway protection, Home baseline is 6 Lpm oxygen  Acute encephalopathy-toxic/metabolic in nature, patient unresponsive with pinpoint pupils, etiology unclear--?CVA vs. Drug toxicity vs. Mediation induced (patient on multiple sedating medications, such as Cymbalta, Remeron, Lyrica, Requip, and Zoloft-->also places patient at risk for serotonin syndrome), vs. Hypoxia vs. Uremia vs. Sepsis vs. Drug OD    Ventilator day: 10    VVentilator settings: PRVC, f 14, vT 345, PEEP 5, FiO2 28%     ETT Size/Placement: 7.5 FERNANDA ETT secured at 23 cm at upper lip.       Wean Screen Pass (Yes or No): yes  Wean Screen Reason for Failure:  Duration of Weaning Trial: patient placed on ps of 12 this morning at 0729- trial ended at 1125 due to patient having tachypnea, appearing uncomfortable  Additional  (!) 106 30 -- --   05/15/25 0700 -- (!) 103 12 117/63 --   05/15/25 0600 -- (!) 113 22 113/62 --   05/15/25 0500 -- (!) 112 19 (!) 157/110 --   05/15/25 0400 98.3 °F (36.8 °C) (!) 111 29 137/76 100 %   05/15/25 0328 -- (!) 111 22 -- 97 %   05/15/25 0301 -- (!) 107 30 119/61 97 %   05/15/25 0300 -- (!) 108 24 119/61 99 %

## 2025-05-15 NOTE — PROGRESS NOTES
VENTILATOR CARE PLAN    Problem: Ventilator Management  Goal: *Adequate oxygenation/ ventilation/ and extubation      Patient:        Juliana Loaiza     74 y.o.   female     5/15/2025  4:58 AM  Patient Active Problem List   Diagnosis    Coma (HCC)    Acute encephalopathy    Acute respiratory failure (HCC)    Severe sepsis (HCC)    Polysubstance abuse (HCC)    Transaminitis    Elevated CK    IRA (acute kidney injury)    Aspiration pneumonia of both lungs (HCC)    Lactic acidosis    Metabolic acidosis    Rhinovirus    Cerebrovascular accident (CVA) due to bilateral embolism of middle cerebral arteries (HCC)    Respiratory arrest (HCC)    Palliative care encounter    Goals of care, counseling/discussion    DNR (do not resuscitate) discussion    Acute ischemic right MCA stroke (HCC)    Acute ischemic left MCA stroke (HCC)    Acute hypoxemic respiratory failure (HCC)       Coma (HCC) [R40.20]  Respiratory arrest (HCC) [R09.2]  [place] coma scale total score 3-8, in the field (EMT or ambulance) ([facility]) [R40.2431]    Reason patient intubated:  Acute on chronic respiratory failure, unclear if hypercapnic or hypoxic on arrival-intubated for airway protection, Home baseline is 6 Lpm oxygen  Acute encephalopathy-toxic/metabolic in nature, patient unresponsive with pinpoint pupils, etiology unclear--?CVA vs. Drug toxicity vs. Mediation induced (patient on multiple sedating medications, such as Cymbalta, Remeron, Lyrica, Requip, and Zoloft-->also places patient at risk for serotonin syndrome), vs. Hypoxia vs. Uremia vs. Sepsis vs. Drug OD     Ventilator day: 10.     Ventilator settings: PRVC, f 14, vT 345, PEEP 5, FiO2 28%     ETT Size/Placement: 7.5 FERNANDA ETT secured at 23 cm at upper lip.         PLAN OF CARE:  Maintain adequate ventilation and oxygenation, wean vent settings as tolerated, SBT as tolerated when safe and appropriate.         GOAL:  Liberation from artificial airway and mechanical ventilator.         OUTCOME:

## 2025-05-15 NOTE — PROGRESS NOTES
Patient placed on spont weaning trial. Started patient on PS of 8 but had to increase to 12 to maintain adequate volumes and rsbi less than 100. On ps 12, peep 5, 28% patient tolerating well       05/15/25 0723   Weaning Parameters   Respiratory Rate Observed 23   Ve 7.93      RSBI 68

## 2025-05-15 NOTE — PROGRESS NOTES
05/15/25 1114   Weaning Parameters   Spontaneous Breathing Trial Complete (S)  No (comment)  (Trial ended as patient appearing restless/uncomforable with RR ranging in upper 30s)

## 2025-05-15 NOTE — PROGRESS NOTES
Pt has been on SBT since this am. Transition pt back to rest vent settings r/t pt became tachypneic in the high 30's and HR in the high 118-120's.  1552pm [employer] called and talked to Ms. Cagle, [employer] Representative.

## 2025-05-15 NOTE — PROGRESS NOTES
Acute encephalopathy    Acute respiratory failure (HCC)    Severe sepsis (HCC)    Polysubstance abuse (HCC)    Transaminitis    Elevated CK    IRA (acute kidney injury)    Aspiration pneumonia of both lungs (HCC)    Lactic acidosis    Metabolic acidosis    Rhinovirus    Cerebrovascular accident (CVA) due to bilateral embolism of middle cerebral arteries (HCC)    Respiratory arrest (HCC)    Palliative care encounter    Goals of care, counseling/discussion    DNR (do not resuscitate) discussion    Acute ischemic right MCA stroke (HCC)    Acute ischemic left MCA stroke (HCC)    Acute hypoxemic respiratory failure (HCC)        RECOMMENDATIONS:   Neuro:   -No requiring continuous sedation, no response to Narcan. Keep off sedation to assess Neuro status   -Titrate sedation to RASS of 0 to -1 if needed. PRN for breakthrough sedation needs.   -CT head negative.   -Ceribell w/ focal seizure changes per neurology (following) review, EEG with mod severe generalized slowing, AED: valproate   -Hold all OP sedating drugs. Hold Cymbalta, Remeron, Lyrica, Requip, and Zoloft. Hold all pain meds   -MRI completed 5/9 w/ b/l MCA stroke- prognosis is extremely poor     Pulm:   -Titrate FiO2 for goal SPO2> 90%,VAP prevention bundle, head of the bed at 30' all times.Daily sedation holiday and assessment for weaning with SBT as tolerated.    PRN duonebs.   -Continue Bronchodilators, pulmonary hygiene care, Aspiration precautions, Keep HOB >30 degrees    CVS :  -Aim MAP >65mmHg, continue Norvasc 10 mg daily   -Holding AC due to degree of stroke high risk for hemorrhagic conversion  -Diurese as tolerated     GI:   -SUP, Trend LFTs, Zofran PRN for N/V  -Diet- Diet NPO  ADULT TUBE FEEDING; Orogastric; Diabetic; Continuous; 45; No; 150; Q 4 hours  -Thiamine/folic acid    Renal:   -Trend Renal indices, Strict Is/Os,   -Nephrology signed off, renal function improved, IVF stopped with improved CK levels     Hem/Onc:   -Monitor for s/o active  of Narcan between EMS and [facility] with no improvement in mental status.No evidence of hypoglycemia. Patient was found nearing respiratory arrest and was provided bag-valve-mask ventilation on the way in. On arrival to ER, patient was emergently intubated for airway protection. Patient was hypothermic with temp of 95.5 degrees celsius and marginally hypotensive. Concern for potetially underlyin infection, so patient was given a dose of Cefepime. No concern for sepsis in the ER. No IVF bolus given. CT head completed and negative any acute findings.      Labs with findings of  IRA, hyperkalemia, hypochloremia, hypocalcemia, HAGMA, hyperglycemia, elevated Troponin , likely demand ischemia, elevated CK at 14K, and anemia. UDS +cocaine. RVP +rhinovirus. Blood cx were obtained. CT chest consistent with pneumonia.  Patient remains unresponsive. LP completed 5/8. EEG completed 5/9, modertely severe generalized slowing w/o seizures, MRI completed 5/9 b/l hemispheric MCA stroke. Now DNR. Possibly CMO pending course.     Subjective 05/15/25  Hospital Day: 8  Vent Day: 5/7/25   Overnight events: no acute events overnight   Mentation/Activity: Unresponsive,+corneal reflex, +cough/gag- + sponta eye opening, no response to threat , some spontaneous movement   Respiratory/ Secretions: mechanical ventilator, low vent requirement   Hemodynamics: hypertension with stimulation, afebrile  Urine output, bowel: adequate, see I&O   Diet NPO  ADULT TUBE FEEDING; Orogastric; Diabetic; Continuous; 45; No; 150; Q 4 hours  Need for procedures: N/ A              ROS:Review of systems not obtained due to patient factors.    Events, medications, imaging, and notes from last 24 hours reviewed.     Patient Active Problem List   Diagnosis    Coma (HCC)    Acute encephalopathy    Acute respiratory failure (HCC)    Severe sepsis (HCC)    Polysubstance abuse (HCC)    Transaminitis    Elevated CK    IRA (acute kidney injury)    Aspiration pneumonia of both

## 2025-05-15 NOTE — PLAN OF CARE
Problem: Discharge Planning  Goal: Discharge to home or other facility with appropriate resources  Outcome: Progressing  Flowsheets (Taken 5/14/2025 2030)  Discharge to home or other facility with appropriate resources:   Identify barriers to discharge with patient and caregiver   Arrange for needed discharge resources and transportation as appropriate   Identify discharge learning needs (meds, wound care, etc)   Arrange for interpreters to assist at discharge as needed   Refer to discharge planning if patient needs post-hospital services based on physician order or complex needs related to functional status, cognitive ability or social support system     Problem: Safety - Adult  Goal: Free from fall injury  Outcome: Progressing  Note: Pt will not fall while in ICU by 5/20/2025     Problem: Nutrition Deficit:  Goal: Optimize nutritional status  Outcome: Progressing     Problem: Neurosensory - Adult  Goal: Achieves stable or improved neurological status  Outcome: Progressing  Flowsheets (Taken 5/14/2025 2030)  Achieves stable or improved neurological status:   Assess for and report changes in neurological status   Maintain blood pressure and fluid volume within ordered parameters to optimize cerebral perfusion and minimize risk of hemorrhage   Monitor temperature, glucose, and sodium. Initiate appropriate interventions as ordered     Problem: Respiratory - Adult  Goal: Achieves optimal ventilation and oxygenation  Outcome: Progressing  Flowsheets (Taken 5/14/2025 2030)  Achieves optimal ventilation and oxygenation:   Assess for changes in respiratory status   Assess for changes in mentation and behavior   Position to facilitate oxygenation and minimize respiratory effort   Oxygen supplementation based on oxygen saturation or arterial blood gases   Assess the need for suctioning and aspirate as needed   Assess and instruct to report shortness of breath or any respiratory difficulty   Respiratory therapy support as  diagnoses, and notify attending LIP2. [place] high risk fall precautions, as indicated3. Provide frequent short contacts to provide reality reorientation, refocusing and direction4. Decrease environmental stimuli, including noise as appropriate5. Monitor and intervene to maintain adequate nutrition, hydration, elimination, sleep and activity6. If unable to ensure safety without constant attention obtain sitter and review sitter guidelines with assigned personnel7. Initiate Psychosocial CNS and Spiritual Care consult, as indicated  Outcome: Progressing  Flowsheets (Taken 5/14/2025 2030)  Effect of thought disturbance (confusion, delirium, dementia, or psychosis) are managed with adequate functional status:   Assess for contributors to thought disturbance, including medications, impaired vision or hearing, underlying metabolic abnormalities, dehydration, psychiatric diagnoses, notify LIP   Decrease environmental stimuli, including noise as appropriate   Monitor and intervene to maintain adequate nutrition, hydration, elimination, sleep and activity

## 2025-05-16 ENCOUNTER — APPOINTMENT (OUTPATIENT)
Facility: HOSPITAL | Age: 75
End: 2025-05-16
Payer: MEDICARE

## 2025-05-16 PROBLEM — D86.9 SARCOIDOSIS: Status: ACTIVE | Noted: 2025-05-16

## 2025-05-16 PROBLEM — J96.20 ACUTE ON CHRONIC RESPIRATORY FAILURE (HCC): Status: ACTIVE | Noted: 2025-05-16

## 2025-05-16 PROBLEM — J96.20 ACUTE ON CHRONIC RESPIRATORY FAILURE (HCC): Status: ACTIVE | Noted: 2025-01-01

## 2025-05-16 PROBLEM — Z51.5 ENCOUNTER FOR PALLIATIVE CARE: Status: ACTIVE | Noted: 2025-05-16

## 2025-05-16 PROBLEM — D86.9 SARCOIDOSIS: Status: ACTIVE | Noted: 2025-01-01

## 2025-05-16 PROBLEM — Z51.5 ENCOUNTER FOR PALLIATIVE CARE: Status: ACTIVE | Noted: 2025-01-01

## 2025-05-16 LAB
ANION GAP SERPL CALC-SCNC: 14 MMOL/L (ref 3–18)
ANION GAP SERPL CALC-SCNC: 14 MMOL/L (ref 3–18)
ARTERIAL PATENCY WRIST A: POSITIVE
ARTERIAL PATENCY WRIST A: POSITIVE
BACTERIA SPEC CULT: NORMAL
BASE EXCESS BLD CALC-SCNC: 12 MMOL/L
BASE EXCESS BLD CALC-SCNC: 12 MMOL/L
BASOPHILS # BLD: 0.03 K/UL (ref 0–0.1)
BASOPHILS # BLD: 0.03 K/UL (ref 0–0.1)
BASOPHILS NFR BLD: 0.2 % (ref 0–2)
BASOPHILS NFR BLD: 0.2 % (ref 0–2)
BDY SITE: ABNORMAL
BDY SITE: ABNORMAL
BUN SERPL-MCNC: 44 MG/DL (ref 6–23)
BUN SERPL-MCNC: 44 MG/DL (ref 6–23)
BUN/CREAT SERPL: 38 (ref 12–20)
BUN/CREAT SERPL: 38 (ref 12–20)
CALCIUM SERPL-MCNC: 8.7 MG/DL (ref 8.5–10.1)
CALCIUM SERPL-MCNC: 8.7 MG/DL (ref 8.5–10.1)
CHLORIDE SERPL-SCNC: 102 MMOL/L (ref 98–107)
CHLORIDE SERPL-SCNC: 102 MMOL/L (ref 98–107)
CO2 SERPL-SCNC: 29 MMOL/L (ref 21–32)
CO2 SERPL-SCNC: 29 MMOL/L (ref 21–32)
CREAT SERPL-MCNC: 1.14 MG/DL (ref 0.6–1.3)
CREAT SERPL-MCNC: 1.14 MG/DL (ref 0.6–1.3)
DIFFERENTIAL METHOD BLD: ABNORMAL
DIFFERENTIAL METHOD BLD: ABNORMAL
EOSINOPHIL # BLD: 0.02 K/UL (ref 0–0.4)
EOSINOPHIL # BLD: 0.02 K/UL (ref 0–0.4)
EOSINOPHIL NFR BLD: 0.1 % (ref 0–5)
EOSINOPHIL NFR BLD: 0.1 % (ref 0–5)
ERYTHROCYTE [DISTWIDTH] IN BLOOD BY AUTOMATED COUNT: 17.4 % (ref 11.6–14.5)
ERYTHROCYTE [DISTWIDTH] IN BLOOD BY AUTOMATED COUNT: 17.4 % (ref 11.6–14.5)
GAS FLOW.O2 O2 DELIVERY SYS: ABNORMAL
GAS FLOW.O2 O2 DELIVERY SYS: ABNORMAL
GAS FLOW.O2 SETTING OXYMISER: 14 BPM
GAS FLOW.O2 SETTING OXYMISER: 14 BPM
GLUCOSE BLD STRIP.AUTO-MCNC: 148 MG/DL (ref 70–110)
GLUCOSE BLD STRIP.AUTO-MCNC: 148 MG/DL (ref 70–110)
GLUCOSE BLD STRIP.AUTO-MCNC: 185 MG/DL (ref 70–110)
GLUCOSE BLD STRIP.AUTO-MCNC: 185 MG/DL (ref 70–110)
GLUCOSE SERPL-MCNC: 134 MG/DL (ref 74–108)
GLUCOSE SERPL-MCNC: 134 MG/DL (ref 74–108)
GRAM STN SPEC: NORMAL
HCO3 BLD-SCNC: 35.5 MMOL/L (ref 21–28)
HCO3 BLD-SCNC: 35.5 MMOL/L (ref 21–28)
HCT VFR BLD AUTO: 28 % (ref 35–45)
HCT VFR BLD AUTO: 28 % (ref 35–45)
HGB BLD-MCNC: 8.5 G/DL (ref 12–16)
HGB BLD-MCNC: 8.5 G/DL (ref 12–16)
IMM GRANULOCYTES # BLD AUTO: 0.36 K/UL (ref 0–0.04)
IMM GRANULOCYTES # BLD AUTO: 0.36 K/UL (ref 0–0.04)
IMM GRANULOCYTES NFR BLD AUTO: 2.5 % (ref 0–0.5)
IMM GRANULOCYTES NFR BLD AUTO: 2.5 % (ref 0–0.5)
LYMPHOCYTES # BLD: 2.18 K/UL (ref 0.9–3.6)
LYMPHOCYTES # BLD: 2.18 K/UL (ref 0.9–3.6)
LYMPHOCYTES NFR BLD: 15.4 % (ref 21–52)
LYMPHOCYTES NFR BLD: 15.4 % (ref 21–52)
MAGNESIUM SERPL-MCNC: 2.1 MG/DL (ref 1.6–2.6)
MAGNESIUM SERPL-MCNC: 2.1 MG/DL (ref 1.6–2.6)
MCH RBC QN AUTO: 22.1 PG (ref 24–34)
MCH RBC QN AUTO: 22.1 PG (ref 24–34)
MCHC RBC AUTO-ENTMCNC: 30.4 G/DL (ref 31–37)
MCHC RBC AUTO-ENTMCNC: 30.4 G/DL (ref 31–37)
MCV RBC AUTO: 72.7 FL (ref 78–100)
MCV RBC AUTO: 72.7 FL (ref 78–100)
MONOCYTES # BLD: 1.24 K/UL (ref 0.05–1.2)
MONOCYTES # BLD: 1.24 K/UL (ref 0.05–1.2)
MONOCYTES NFR BLD: 8.7 % (ref 3–10)
MONOCYTES NFR BLD: 8.7 % (ref 3–10)
NEUTS SEG # BLD: 10.37 K/UL (ref 1.8–8)
NEUTS SEG # BLD: 10.37 K/UL (ref 1.8–8)
NEUTS SEG NFR BLD: 73.1 % (ref 40–73)
NEUTS SEG NFR BLD: 73.1 % (ref 40–73)
NRBC # BLD: 0 K/UL (ref 0–0.01)
NRBC # BLD: 0 K/UL (ref 0–0.01)
NRBC BLD-RTO: 0 PER 100 WBC
NRBC BLD-RTO: 0 PER 100 WBC
O2/TOTAL GAS SETTING VFR VENT: 28 %
O2/TOTAL GAS SETTING VFR VENT: 28 %
PCO2 BLD: 40.5 MMHG (ref 35–48)
PCO2 BLD: 40.5 MMHG (ref 35–48)
PEEP RESPIRATORY: 5 CMH2O
PEEP RESPIRATORY: 5 CMH2O
PH BLD: 7.55 (ref 7.35–7.45)
PH BLD: 7.55 (ref 7.35–7.45)
PHOSPHATE SERPL-MCNC: 4.4 MG/DL (ref 2.5–4.9)
PHOSPHATE SERPL-MCNC: 4.4 MG/DL (ref 2.5–4.9)
PLATELET # BLD AUTO: 244 K/UL (ref 135–420)
PLATELET # BLD AUTO: 244 K/UL (ref 135–420)
PMV BLD AUTO: 10.9 FL (ref 9.2–11.8)
PMV BLD AUTO: 10.9 FL (ref 9.2–11.8)
PO2 BLD: 101 MMHG (ref 83–108)
PO2 BLD: 101 MMHG (ref 83–108)
POTASSIUM SERPL-SCNC: 4 MMOL/L (ref 3.5–5.5)
POTASSIUM SERPL-SCNC: 4 MMOL/L (ref 3.5–5.5)
RBC # BLD AUTO: 3.85 M/UL (ref 4.2–5.3)
RBC # BLD AUTO: 3.85 M/UL (ref 4.2–5.3)
SAO2 % BLD: 98.5 % (ref 92–97)
SAO2 % BLD: 98.5 % (ref 92–97)
SERVICE CMNT-IMP: ABNORMAL
SERVICE CMNT-IMP: ABNORMAL
SERVICE CMNT-IMP: NORMAL
SERVICE CMNT-IMP: NORMAL
SODIUM SERPL-SCNC: 146 MMOL/L (ref 136–145)
SODIUM SERPL-SCNC: 146 MMOL/L (ref 136–145)
SPECIMEN TYPE: ABNORMAL
SPECIMEN TYPE: ABNORMAL
VENTILATION MODE VENT: ABNORMAL
VENTILATION MODE VENT: ABNORMAL
VT SETTING VENT: 345 ML
VT SETTING VENT: 345 ML
WBC # BLD AUTO: 14.2 K/UL (ref 4.6–13.2)
WBC # BLD AUTO: 14.2 K/UL (ref 4.6–13.2)

## 2025-05-16 PROCEDURE — 2000000000 HC ICU R&B

## 2025-05-16 PROCEDURE — 94640 AIRWAY INHALATION TREATMENT: CPT

## 2025-05-16 PROCEDURE — 71045 X-RAY EXAM CHEST 1 VIEW: CPT

## 2025-05-16 PROCEDURE — 2500000003 HC RX 250 WO HCPCS: Performed by: PSYCHIATRY & NEUROLOGY

## 2025-05-16 PROCEDURE — 6370000000 HC RX 637 (ALT 250 FOR IP): Performed by: PSYCHIATRY & NEUROLOGY

## 2025-05-16 PROCEDURE — 36415 COLL VENOUS BLD VENIPUNCTURE: CPT

## 2025-05-16 PROCEDURE — 99233 SBSQ HOSP IP/OBS HIGH 50: CPT | Performed by: NURSE PRACTITIONER

## 2025-05-16 PROCEDURE — 6360000002 HC RX W HCPCS: Performed by: PHYSICIAN ASSISTANT

## 2025-05-16 PROCEDURE — 94003 VENT MGMT INPAT SUBQ DAY: CPT

## 2025-05-16 PROCEDURE — 82962 GLUCOSE BLOOD TEST: CPT

## 2025-05-16 PROCEDURE — 2580000003 HC RX 258: Performed by: PSYCHIATRY & NEUROLOGY

## 2025-05-16 PROCEDURE — 6360000002 HC RX W HCPCS: Performed by: NURSE PRACTITIONER

## 2025-05-16 PROCEDURE — 2700000000 HC OXYGEN THERAPY PER DAY

## 2025-05-16 PROCEDURE — 94761 N-INVAS EAR/PLS OXIMETRY MLT: CPT

## 2025-05-16 PROCEDURE — 6370000000 HC RX 637 (ALT 250 FOR IP): Performed by: PHYSICIAN ASSISTANT

## 2025-05-16 PROCEDURE — 6370000000 HC RX 637 (ALT 250 FOR IP): Performed by: NURSE PRACTITIONER

## 2025-05-16 PROCEDURE — 6370000000 HC RX 637 (ALT 250 FOR IP): Performed by: INTERNAL MEDICINE

## 2025-05-16 PROCEDURE — 99232 SBSQ HOSP IP/OBS MODERATE 35: CPT | Performed by: INTERNAL MEDICINE

## 2025-05-16 PROCEDURE — 82803 BLOOD GASES ANY COMBINATION: CPT

## 2025-05-16 PROCEDURE — 84100 ASSAY OF PHOSPHORUS: CPT

## 2025-05-16 PROCEDURE — 6360000002 HC RX W HCPCS: Performed by: INTERNAL MEDICINE

## 2025-05-16 PROCEDURE — 36600 WITHDRAWAL OF ARTERIAL BLOOD: CPT

## 2025-05-16 PROCEDURE — 83735 ASSAY OF MAGNESIUM: CPT

## 2025-05-16 PROCEDURE — 80048 BASIC METABOLIC PNL TOTAL CA: CPT

## 2025-05-16 PROCEDURE — APPSS15 APP SPLIT SHARED TIME 0-15 MINUTES: Performed by: PHYSICIAN ASSISTANT

## 2025-05-16 PROCEDURE — 85025 COMPLETE CBC W/AUTO DIFF WBC: CPT

## 2025-05-16 RX ORDER — GLYCOPYRROLATE 0.2 MG/ML
0.2 INJECTION INTRAMUSCULAR; INTRAVENOUS EVERY 4 HOURS PRN
Status: DISCONTINUED | OUTPATIENT
Start: 2025-05-16 | End: 2025-05-17 | Stop reason: HOSPADM

## 2025-05-16 RX ORDER — MORPHINE SULFATE/0.9% NACL/PF 1 MG/ML
SYRINGE (ML) INJECTION CONTINUOUS
Refills: 0 | Status: DISCONTINUED | OUTPATIENT
Start: 2025-05-16 | End: 2025-05-17 | Stop reason: HOSPADM

## 2025-05-16 RX ORDER — LORAZEPAM 2 MG/ML
0.5 INJECTION INTRAMUSCULAR EVERY 4 HOURS PRN
Status: DISCONTINUED | OUTPATIENT
Start: 2025-05-16 | End: 2025-05-17 | Stop reason: HOSPADM

## 2025-05-16 RX ORDER — MORPHINE SULFATE 2 MG/ML
2 INJECTION, SOLUTION INTRAMUSCULAR; INTRAVENOUS EVERY 30 MIN PRN
Status: DISCONTINUED | OUTPATIENT
Start: 2025-05-16 | End: 2025-05-17 | Stop reason: HOSPADM

## 2025-05-16 RX ADMIN — VALPROATE SODIUM 500 MG: 100 INJECTION, SOLUTION INTRAVENOUS at 06:50

## 2025-05-16 RX ADMIN — FAMOTIDINE 20 MG: 20 TABLET, FILM COATED ORAL at 09:35

## 2025-05-16 RX ADMIN — AMLODIPINE BESYLATE 10 MG: 10 TABLET ORAL at 09:35

## 2025-05-16 RX ADMIN — CHLORHEXIDINE GLUCONATE 0.12% ORAL RINSE 15 ML: 1.2 LIQUID ORAL at 09:35

## 2025-05-16 RX ADMIN — Medication 15 ML: at 09:35

## 2025-05-16 RX ADMIN — VALPROATE SODIUM 500 MG: 100 INJECTION, SOLUTION INTRAVENOUS at 23:20

## 2025-05-16 RX ADMIN — Medication 100 MG: at 09:35

## 2025-05-16 RX ADMIN — DOCUSATE SODIUM LIQUID 100 MG: 100 LIQUID ORAL at 09:35

## 2025-05-16 RX ADMIN — MORPHINE SULFATE: 1 INJECTION INTRAVENOUS at 11:05

## 2025-05-16 RX ADMIN — VALPROATE SODIUM 500 MG: 100 INJECTION, SOLUTION INTRAVENOUS at 00:02

## 2025-05-16 RX ADMIN — ASPIRIN 81 MG CHEWABLE TABLET 81 MG: 81 TABLET CHEWABLE at 09:35

## 2025-05-16 RX ADMIN — FOLIC ACID 1 MG: 1 TABLET ORAL at 09:35

## 2025-05-16 RX ADMIN — HEPARIN SODIUM 5000 UNITS: 5000 INJECTION INTRAVENOUS; SUBCUTANEOUS at 05:45

## 2025-05-16 RX ADMIN — POLYETHYLENE GLYCOL 3350 34 G: 17 POWDER, FOR SOLUTION ORAL at 09:35

## 2025-05-16 RX ADMIN — INSULIN LISPRO 4 UNITS: 100 INJECTION, SOLUTION INTRAVENOUS; SUBCUTANEOUS at 05:45

## 2025-05-16 RX ADMIN — ARFORMOTEROL TARTRATE 15 MCG: 15 SOLUTION RESPIRATORY (INHALATION) at 07:21

## 2025-05-16 RX ADMIN — BUDESONIDE 1 MG: 1 SUSPENSION RESPIRATORY (INHALATION) at 07:22

## 2025-05-16 RX ADMIN — INSULIN GLARGINE 5 UNITS: 100 INJECTION, SOLUTION SUBCUTANEOUS at 09:35

## 2025-05-16 ASSESSMENT — PAIN SCALES - WONG BAKER
WONGBAKER_NUMERICALRESPONSE: NO HURT
WONGBAKER_NUMERICALRESPONSE: NO HURT

## 2025-05-16 ASSESSMENT — PULMONARY FUNCTION TESTS
PIF_VALUE: 17
PIF_VALUE: 16

## 2025-05-16 NOTE — PROGRESS NOTES
CARE:    May 15, 2025 Patient seen along with Ms Lottie RN. Patient seen prior to compassionate extubation. She is poorly responsive.  and  arrived this am. They are aware of her poor prognosis and care decisions needing to be made. Offered support in this difficult time. Discussed options; trach vs compassionate extubation. Mr Loaiza shared he wishes to proceed with compassionate extubation. Comfort measures and compassionate extubation discussed in detail including compassionate removal of ET and vent, will not replace, stopping all aggressive treatment including IVF's, IVAB, no further labs, x rays., no feeding tubes.  Symptom management discussed including use of Morphine / ativan. Will place on Morphine PCA to help with symptom management.  aware and agreeable. We also discussed it is unknown how long she will survive after compassionate extubation goal is for her to be comfortable. All questions answered to the best of our ability.   Discussed with ICU, discussed with BSRN. Goals of care DNR/DNI comfort measures, compassionate extubation    Please see below for previous notes per palliative team      May 12, 2025: Patient was seen in palliative care staff member.  Patient is currently intubated and tries to open his eyes on tactile commands.  She is able to move left side on tactile commands.  Currently getting IV fluid.  Patient does not follow any meaningful verbal commands otherwise.  Discussed with ICU team who is planning to provide the patient 1 unit of blood transfusion.  Patient has been off IV sedation.  ICU team discussed with patient's  at bedside yesterday who made patient's limited code.  MRI of brain showed bilateral acute strokes.  Based on patient's current clinical mental status, she is unable to participate in decision-making process.  We reached out to patient's  over the phone who stated he has been  to patient's for last 24 years.  He is  department.

## 2025-05-16 NOTE — CARE COORDINATION
05/16/25 1618   IMM Letter   IMM Letter given to Patient/Family/Significant other/Guardian/POA/by: Thais Ulrich   IMM Letter date given: 05/16/25   IMM Letter time given: 1615

## 2025-05-16 NOTE — CARE COORDINATION
At this time waiting to hear back from [facility],left voice mail on Elizabeth Richardson voice mail with return call back. cm updated patients spouse cm also updated patients pastor Conley [phone] per spouse request.

## 2025-05-16 NOTE — CARE COORDINATION
Cm met with pt and spouse at bedside, patients pastor Conley at bedside also, spouse gave permission to discuss care with  present, spouse agreeable to hospice, he would like pt to receive hospice services at [facility] where he is currently receiving rehab services, cm reached out to [facility] Liaison Debra notified waiting on return call back.Spouse selected [facility], referral placed and notified agency.

## 2025-05-16 NOTE — PLAN OF CARE
Problem: Discharge Planning  Goal: Discharge to home or other facility with appropriate resources  5/16/2025 0201 by Yuridia Lr, ALEJANDRA  Outcome: Progressing  Flowsheets (Taken 5/15/2025 2015)  Discharge to home or other facility with appropriate resources:   Identify barriers to discharge with patient and caregiver   Arrange for needed discharge resources and transportation as appropriate   Identify discharge learning needs (meds, wound care, etc)   Arrange for interpreters to assist at discharge as needed   Refer to discharge planning if patient needs post-hospital services based on physician order or complex needs related to functional status, cognitive ability or social support system     Problem: Safety - Adult  Goal: Free from fall injury  5/16/2025 1318 by Frieda Murry RN  Outcome: Progressing  Flowsheets (Taken 5/16/2025 1318)  Free From Fall Injury:   Instruct family/caregiver on patient safety   Based on caregiver fall risk screen, instruct family/caregiver to ask for assistance with transferring infant if caregiver noted to have fall risk factors  Note: Emphasizing the importance of effective communication with caregivers, family members, and healthcare providers to seek timely support when needed .  Learning to recognize personal indicators that a crisis may be approaching, such as changes in mood, behavior, or physical symptoms.   Engaging in activities that can redirect focus away from negative thoughts or urges.   5/16/2025 0201 by Yuridia Lr, ALEJANDRA  Outcome: Progressing  Note: Pt will not fall while in ICU by 5/20/2025     Problem: Pain  Goal: Verbalizes/displays adequate comfort level or baseline comfort level  5/16/2025 0201 by Yuridia Lr, ALEJANDRA  Outcome: Progressing  Flowsheets (Taken 5/15/2025 2000)  Verbalizes/displays adequate comfort level or baseline comfort level:   Assess pain using appropriate pain scale   Administer analgesics

## 2025-05-16 NOTE — ACP (ADVANCE CARE PLANNING)
Advance Care Planning     Palliative Team Advance Care Planning (ACP) Conversation    Date of Conversation: 05/16/25    Individuals present for the conversation: Patient, Spouse Antony Loaiza  , and  Chava Conley      ACP documents on file prior to discussion:  -Portable DNR/DO Not Reintubate    Previously completed document/s not on file: Unknown, or patient / participant is uncertain.    Healthcare Decision Maker:    Primary Decision Maker: Antony Loaiza - Spouse - [phone]      Conversation Summary:  Juliana Loaiza is a 74 y.o. with a past h/o  pulmonary sarcoidosis, polysubstance abuse, chronic hypoxic respiratory failure on LTOT 6 L/min at home,  asthma, HFpEF, GERD, pulmonary HTN, CAD, DM II, chronic pain, anxiety, and Hx  acute subsegmental LLL PE and DVT in the past.  Patient was admitted via the ED from home in acute on chronic hypoxic respiratory failure in setting of encephalopathy, pneumonia and sepsis.  Patient required intubation.   Ms Loaiza was seen at bedside in . Patient on airborne precautions for Rhinovirus. Patient is maintained on vent with setting of FiO2 of 28 and PEEP of 5. No sedation .  Patient is   with no children. Palliative Team is scheduled with meet with spouse and  this AM for decision concerning next steps in patient's care.   aFdia Chong and Chava Valencia arrived at room as Palliative team was present.  Introduced our role as the team to meet with them today for next steps.  Chava Valencia shared  he has been speaking with Mr. Loaiza and providing spiritual support as he makes these decisions.  Mr Loaiza shared he does not want patient to suffer. Palliative NP provided a brief medical update including patient's lack of alertness without sedation.  He would like to move forward with compassionate extubation.   The process of removal of the ET tube and ventilator with a transition to comfort. Comfort measures discussed at

## 2025-05-16 NOTE — CARE COORDINATION
Patient accepted by [facility] in care [facility] and booked, Spoke with Liaison Debra she will call cm back with admission date, cm updated  via [facility].

## 2025-05-16 NOTE — PROGRESS NOTES
Comprehensive Nutrition Assessment    Type and Reason for Visit:  Reassess, Consult, NPO/clear liquid    Nutrition Recommendations/Plan:   Noted pt transitioned to comfort measures.  Continue all nutrition interventions consistent with goals of care. Please re-consult RD if GOC changes.       Malnutrition Assessment:  Malnutrition Status:  At risk for malnutrition (NPO) (05/16/25 1050)    Context:  Acute Illness       Nutrition Assessment:    Admitted after being found unresponsive. On arrival to ER, pt was emergently intubated for airway protection 5/6. Nephrology follow, IRA (baseline unclear). S/p LP. MRI completed 5/9 b/l hemispheric MCA stroke; poor prognosis. Discussed care during interdisciplinary rounds. Pt transitioned to comfort measures. Orders placed.    Nutrition Related Findings:    Pertinent Meds:   Reviewed    Pertinent Labs:  Recent Labs     05/15/25  0332 05/16/25  0139   GLUCOSE 138* 134*   BUN 39* 44*   CREATININE 1.08 1.14   * 146*   K 4.0 4.0    102   CO2 31 29   CALCIUM 9.1 8.7   PHOS 3.9  3.9 4.4   MG 2.1 2.1     Recent Labs     05/14/25  1139 05/14/25  1702 05/14/25  2350 05/15/25  0637 05/15/25  1148 05/15/25  1718 05/16/25  0003 05/16/25  0539   POCGLU 193* 99 138* 187* 117* 166* 148* 185*       Last BM: 05/10/25 (per previous shift report)    Skin: Wound Type: None    Edema:    Right upper extremity, Left upper extremity   Edema Generalized: Non-pitting  RUE Edema: +1  LUE Edema: +1           Current Nutrition Intake & Therapies:    Average Meal Intake: NPO     Diet NPO    Nutrition intake per I/O: Total daily TF provision on average of 761 ml x last 9 days per I/O. Provides 100% kcal/protein.    Anthropometric Measures:  Height: 167.6 cm (5' 5.98\")  Ideal Body Weight (IBW): 130 lbs (59 kg)    Admission Body Weight: 49.9 kg (110 lb 0.2 oz)  Current Body Weight: 52.4 kg (115 lb 8.3 oz) (lowest 49.4 kg), 88.5 % IBW.    Current BMI (kg/m2): 18.7  Usual Body Weight:  (RICARDO)  BMI

## 2025-05-16 NOTE — PROGRESS NOTES
[facility].  Pulmonary, Critical Care, and Sleep Medicine    Name: Juliana Loaiza MRN: [id number]   : 1950 Hospital: [facility]   Date: 2025  Admission Date: 2025     Chart and notes reviewed. Data reviewed. I have evaluated all findings.    [x]I have reviewed the flowsheet and previous day’s notes.    [x]The patient is unable to give any meaningful history or review of systems because the patient is:  [x]Intubated []Sedated   []Unresponsive      [x]The patient is critically ill on      [x]Mechanical ventilation []Pressors   []BiPAP []     IMPRESSION:   Acute on chronic respiratory failure, unclear if hypercapnic or hypoxic on arrival-intubated for airway protection, Home baseline is 6 Lpm oxygen  Acute encephalopathy- appears most likely due to bi hemispheric MCA stroke, embolic, most likely present on admission, not seen on initial head imaging   Extensive acute bihemispheric MCA territory nonhemorrhagic infarctions- MRI    CAP vs. Aspiration PNA-extensive infiltrates on CT chest A/P, primarily RUL/RLL   Rhinovirus- +RVP  w/ Acute on chronic pansinusitis   Sepsis 2/2 pneumonia and UTI   Klebsiella UTI   IRA-prerenal azotemia vs. ischemic ATN- resolved    Rhabdo, improving    Mild transaminitis   Nonobstructive CAD: [facility] 2024 RCA with midsegment 20% stenosis. LM patent, LAD patent, Lcx patent. Echo 25 EF 50-55%, normal wall motion. RVSP 52mmHg  pAF: Currently in SR. OAC with Xarelto. Cardizem CD 120mg for rate control OP  HTN  DM II   GERD   Bronchiectasis   Sarcoidosis - diagnosed in  via punch biopsy  Chronic pain  Anxiety  Microcytic hypochromic anemia   Tobacco use history  History of polysubstance abuse-UDS  +cocaine   Hx  acute subsegmental LLL PE and DVT in the past, non-compliant with AC  Deconditioning, debility   Likely malnourished- underweight, folic acid deficiency      Patient Active Problem List   Diagnosis    Coma (HCC)     Collected: 05/07/25 0215    Order Status: Canceled Specimen: Endotracheal     Blood Culture 2 [[id number]] Collected: 05/07/25 0135    Order Status: Completed Specimen: Blood Updated: 05/13/25 0728     Special Requests NO SPECIAL REQUESTS        Culture NO GROWTH 6 DAYS       Blood Culture 1 [[id number]] Collected: 05/07/25 0120    Order Status: Completed Specimen: Blood Updated: 05/13/25 0728     Special Requests NO SPECIAL REQUESTS        Culture NO GROWTH 6 DAYS       Urine Culture [[id number]]     Order Status: Canceled Specimen: Urine, clean catch                  Imaging:  [x]   I have personally reviewed the patient’s radiographs and reports  Most recent imaging:  CT CHEST ABDOMEN PELVIS WO CONTRAST Additional Contrast? None  Result Date: 5/7/2025  1.  Tubes and lines in good position. 2.  Extensive changes consistent with bronchopneumonia. Electronically signed by Zurdo Singer    XR ABDOMEN (KUB) (SINGLE AP VIEW)  Result Date: 5/7/2025  1.  The tube has its tip overlying the stomach. Electronically signed by Zurdo Singer    XR CHEST PORTABLE  Result Date: 5/7/2025  1.  Tubes and lines without evidence of complication. 2.  Multifocal opacities. Electronically signed by Zurdo Singer    CT HEAD WO CONTRAST  Result Date: 5/7/2025  1.   Diffuse parenchymal loss. 2.  No acute intracranial process. Electronically signed by Zurdo Singer      No results found for this or any previous visit.     No valid procedures specified.     10 minutes were spent with the patient at the bedside. This care involved high complexity decision making to assess, manipulate, and support vital system functions, to treat this degreee vital organ system failure and to prevent further life threatening deterioration of the patient’s condition  The services I provided to this patient were to treat and/or prevent clinically significant deterioration that could result in the failure of one or more body systems and/or organ systems due to

## 2025-05-16 NOTE — PROGRESS NOTES
VENTILATOR CARE PLAN    Problem: Ventilator Management  Goal: *Adequate oxygenation/ ventilation/ and extubation      Patient:     Juliana Loaiza     74 y.o.   female     5/16/2025  9:24 AM  Patient Active Problem List   Diagnosis    Coma (HCC)    Acute encephalopathy    Acute respiratory failure (HCC)    Severe sepsis (HCC)    Polysubstance abuse (HCC)    Transaminitis    Elevated CK    IRA (acute kidney injury)    Aspiration pneumonia of both lungs (HCC)    Lactic acidosis    Metabolic acidosis    Rhinovirus    Cerebrovascular accident (CVA) due to bilateral embolism of middle cerebral arteries (HCC)    Respiratory arrest (HCC)    Palliative care encounter    Goals of care, counseling/discussion    DNR (do not resuscitate) discussion    Acute ischemic right MCA stroke (HCC)    Acute ischemic left MCA stroke (HCC)    Acute hypoxemic respiratory failure (HCC)       Coma (HCC) [R40.20]  Respiratory arrest (HCC) [R09.2]  Marvin coma scale total score 3-8, in the field (EMT or ambulance) ([facility]) [R40.2431]    Reason patient intubated: Acute on chronic respiratory failure, unclear if hypercapnic or hypoxic on arrival-intubated for airway protection, Home baseline is 6 Lpm oxygen  Acute encephalopathy-toxic/metabolic in nature, patient unresponsive with pinpoint pupils, etiology unclear--?CVA vs. Drug toxicity vs. Mediation induced (patient on multiple sedating medications, such as Cymbalta, Remeron, Lyrica, Requip, and Zoloft-->also places patient at risk for serotonin syndrome), vs. Hypoxia vs. Uremia vs. Sepsis vs. Drug OD    Ventilator day: 11    Ventilator settings: /PRVC -      05/16/25 0722   Ventilator Settings   Target Vt 345   Resp Rate (Set) 14 bpm   PEEP/CPAP (cmH2O) 5   FiO2  28 %     ETT Size/Placement:  7.5 @ 21cm at the lips ( retracted from 23cm)     Wean Screen Pass (Yes or No): No  Wean Screen Reason for Failure: Neuro status    PLAN OF CARE: Family meeting  today     ABG:   Date:5/16/2025   Latest

## 2025-05-16 NOTE — PROGRESS NOTES
05/16/25 0734   ETT    Placement Date/Time: 05/06/25 7416   Present on Admission/Arrival: No  Placed By: In ED  Placement Verified By: Auscultation;Colorimetric ETCO2 device  Preoxygenation: Yes  Airway Type: Cuffed  Airway Tube Size: 7.5 mm  Laryngoscope: Mac  Blade Size: ...   Secured At (S)  21 cm  (retracted 2cm per rt communication order post AM CXR)   Measured From [facility]   ETT Placement Center   Secured By Commercial tube gaming   Site Assessment Dry   Cuff Pressure 28 cm H2O   Tie/Gaming Changed No

## 2025-05-16 NOTE — PROGRESS NOTES
INTERIM UPDATE - 1437 EST on 5/16/2025    Procedures performed in [facility] ER/ICU:  [x][]  Endotracheal Intubation  [][]  Reintubation  [][]  Tracheostomy  [][]  Chest Tube(s)  [][]  PEG Tube    Agents required:  [][]  IV Pressor(s) [][]  IV Inotropic agent(s)  [][]  IV Diuretic gtt  [][]  IV Antihypertensive gtt  [][]  IV Rate-Controlling gtt  [][]  IV-Rhythm Controlling Infusion  [][]  IV Insulin gtt  [][]  IV Heparin gtt  [][]  IV Dexmedetomidine (a.k.a. Precedex) gtt (while Extubated)  []  Tenecteplase (TNK)     [][]  Packed Red Blood Cells (PRBCs)   [][]  Platelets  [][]  Fresh Frozen Plasma     [][]  Prothrombin Complex Concentrate (PCC)    Interventions required:   [][]  Judi Hugger  [][]  Restraints (while Extubated)  [][]  Sitter  [][]  Critical Renal Replacement Therapy (CRRT)    Current FiO2 requirement:   [x]  Room Air  [][]  Nasal Cannula  [][]  Salter  [][]  CPAP/BiPAP  [][]  High Flow NC    Followed by:  [] Pulmonology  [] Nephrology  [] Cardiology  [] Gastroenterology  [x] Neurology   [] Infectious Disease  [] Hematology/Oncology  [] Interventional Radiology    [] Cardiothoracic Surgery  [] Orthopedic [Spinal] Surgery  [] General Surgery  [] Vascular Surgery   [] Bariatric Surgery  [] ENT  [] Palliative      *First box indicates this procedure/intervention was performed in or prior to [facility] ICU  **Second box indicates that the procedure/intervention is still in place and/or on-going at time of sign-out      Intensivist services state that Patient is being down-graded from their service (and, thus, Transferred to Hospitalist service) and will be physically Transferred to [facility] Medical Unit, as is possible.    Intensivist service Clinician states that Patient had a Bilateral MCA Stroke with a persistent vegetative state.  Patient was made was made Comfort Care this AM and was Terminally Extubated at that time.  Patient is reportedly resting comfortable and tolerating extubation well.    Plan:  Will follow-up

## 2025-05-16 NOTE — PLAN OF CARE
Conditions and Co-Morbidity Symptoms are Monitored and Maintained or Improved:   Monitor and assess patient's chronic conditions and comorbid symptoms for stability, deterioration, or improvement   Collaborate with multidisciplinary team to address chronic and comorbid conditions and prevent exacerbation or deterioration   Update acute care plan with appropriate goals if chronic or comorbid symptoms are exacerbated and prevent overall improvement and discharge     Problem: Coping  Goal: Pt/Family able to verbalize concerns and demonstrate effective coping strategies  Description: INTERVENTIONS:1. Assist patient/family to identify coping skills, available support systems and cultural and spiritual values2. Provide emotional support, including active listening and acknowledgement of concerns of patient and caregivers3. Reduce environmental stimuli, as able4. Instruct patient/family in relaxation techniques, as appropriate5. Assess for spiritual pain/suffering and initiate Spiritual Care, Psychosocial Clinical Specialist consults as needed  Outcome: Progressing  Flowsheets (Taken 5/15/2025 2015)  Patient/family able to verbalize anxieties, fears, and concerns, and demonstrate effective coping:   Provide emotional support, including active listening and acknowledgement of concerns of patient and caregivers   Reduce environmental stimuli, as able     Problem: Confusion  Goal: Confusion, delirium, dementia, or psychosis is improved or at baseline  Description: INTERVENTIONS:1. Assess for possible contributors to thought disturbance, including medications, impaired vision or hearing, underlying metabolic abnormalities, dehydration, psychiatric diagnoses, and notify attending LIP2. [place] high risk fall precautions, as indicated3. Provide frequent short contacts to provide reality reorientation, refocusing and direction4. Decrease environmental stimuli, including noise as appropriate5. Monitor and intervene to maintain  adequate nutrition, hydration, elimination, sleep and activity6. If unable to ensure safety without constant attention obtain sitter and review sitter guidelines with assigned personnel7. Initiate Psychosocial CNS and Spiritual Care consult, as indicated  Outcome: Progressing  Flowsheets (Taken 5/15/2025 2015)  Effect of thought disturbance (confusion, delirium, dementia, or psychosis) are managed with adequate functional status:   Decrease environmental stimuli, including noise as appropriate   Monitor and intervene to maintain adequate nutrition, hydration, elimination, sleep and activity

## 2025-05-16 NOTE — RT PROTOCOL NOTE
VENTILATOR CARE PLAN    Problem: Ventilator Management  Goal: *Adequate oxygenation/ ventilation/ and extubation      Patient:        Juliana Loaiza     74 y.o.   female     5/16/2025  4:56 AM  Patient Active Problem List   Diagnosis    Coma (HCC)    Acute encephalopathy    Acute respiratory failure (HCC)    Severe sepsis (HCC)    Polysubstance abuse (HCC)    Transaminitis    Elevated CK    IRA (acute kidney injury)    Aspiration pneumonia of both lungs (HCC)    Lactic acidosis    Metabolic acidosis    Rhinovirus    Cerebrovascular accident (CVA) due to bilateral embolism of middle cerebral arteries (HCC)    Respiratory arrest (HCC)    Palliative care encounter    Goals of care, counseling/discussion    DNR (do not resuscitate) discussion    Acute ischemic right MCA stroke (HCC)    Acute ischemic left MCA stroke (HCC)    Acute hypoxemic respiratory failure (HCC)       Coma (HCC) [R40.20]  Respiratory arrest (HCC) [R09.2]  Eudora coma scale total score 3-8, in the field (EMT or ambulance) ([facility]) [R40.2431]    Reason patient intubated: Acute on chronic respiratory failure, unclear if hypercapnic or hypoxic on arrival-intubated for airway protection, Home baseline is 6 Lpm oxygen  Acute encephalopathy-toxic/metabolic in nature, patient unresponsive with pinpoint pupils, etiology unclear--?CVA vs. Drug toxicity vs. Mediation induced (patient on multiple sedating medications, such as Cymbalta, Remeron, Lyrica, Requip, and Zoloft-->also places patient at risk for serotonin syndrome), vs. Hypoxia vs. Uremia vs. Sepsis vs. Drug OD    Ventilator day: 11    VVentilator settings: PRVC, f 14, vT 345, PEEP 5, FiO2 28%     ETT Size/Placement: 7.5 FERNANDA ETT secured at 23 cm at upper lip.       Wean Screen Pass (Yes or No): yes  Wean Screen Reason for Failure:  Duration of Weaning Trial: patient placed on ps of 12 5/15 at 0729- trial ended at 1125 due to patient having tachypnea, appearing uncomfortable  Additional Comments:          PLAN OF CARE:   rest on vent. Trial as tolerated. Care meeting planned with family tomorrow    ABG:   Latest Reference Range & Units 05/16/25 03:31   Base Excess mmol/L 12.0   Set Rate bpm 14   DEVICE -   ADULT VENT   Site -   RIGHT RADIAL   Mode -   ASSIST CONTROL   POC pH 7.35 - 7.45   7.55 (H)   POC pCO2 35.0 - 48.0 MMHG 40.5   POC PO2 83 - 108 MMHG 101   POC HCO3 21 - 28 MMOL/L 35.5 (H)   POC O2 SAT 92 - 97 % 98.5 (H)   POC Geo's Test -   Positive   POC TIDAL VOLUME ml 345   FIO2 % 28   POC PEEP cmH2O 5   (H): Data is abnormally high

## 2025-05-16 NOTE — CARE COORDINATION
No return call from [facility] regarding admission, cm will update weekend cm assigned to ICU. Annamaria from [facility] updated.

## 2025-05-17 VITALS
DIASTOLIC BLOOD PRESSURE: 51 MMHG | OXYGEN SATURATION: 90 % | HEIGHT: 66 IN | TEMPERATURE: 97.8 F | BODY MASS INDEX: 18.57 KG/M2 | RESPIRATION RATE: 11 BRPM | TEMPERATURE: 97.8 F | HEIGHT: 66 IN | HEART RATE: 88 BPM | WEIGHT: 115.52 LBS | RESPIRATION RATE: 11 BRPM | WEIGHT: 115.52 LBS | OXYGEN SATURATION: 90 % | HEART RATE: 88 BPM | SYSTOLIC BLOOD PRESSURE: 86 MMHG | DIASTOLIC BLOOD PRESSURE: 51 MMHG | SYSTOLIC BLOOD PRESSURE: 86 MMHG | BODY MASS INDEX: 18.57 KG/M2

## 2025-05-17 PROCEDURE — 6360000002 HC RX W HCPCS: Performed by: NURSE PRACTITIONER

## 2025-05-17 PROCEDURE — 2500000003 HC RX 250 WO HCPCS: Performed by: PSYCHIATRY & NEUROLOGY

## 2025-05-17 PROCEDURE — 94761 N-INVAS EAR/PLS OXIMETRY MLT: CPT

## 2025-05-17 PROCEDURE — 99239 HOSP IP/OBS DSCHRG MGMT >30: CPT | Performed by: STUDENT IN AN ORGANIZED HEALTH CARE EDUCATION/TRAINING PROGRAM

## 2025-05-17 PROCEDURE — 2580000003 HC RX 258: Performed by: PSYCHIATRY & NEUROLOGY

## 2025-05-17 RX ADMIN — VALPROATE SODIUM 500 MG: 100 INJECTION, SOLUTION INTRAVENOUS at 07:06

## 2025-05-17 RX ADMIN — MORPHINE SULFATE: 1 INJECTION INTRAVENOUS at 02:42

## 2025-05-17 NOTE — CARE COORDINATION
CM went to see patient, Dr Reilly let YEIMI that patient has .           Maude Christie  RN Case Manager  [phone]

## 2025-05-17 NOTE — DISCHARGE SUMMARY
Discharge Summary    Patient: Juliana Loaiza MRN: [id number]  [facility]: [id number]    YOB: 1950  Age: 74 y.o.  Sex: female    DOA: 2025 LOS:  LOS: 10 days   Discharge Date: 2025      Admission Diagnosis: Coma ([facility]) [R40.20]  Respiratory arrest ([facility]) [R09.2]  Marvin coma scale total score 3-8, in the field (EMT or ambulance) ([facility]) [R40.2431]    Discharge Diagnosis: Acute on chronic hypoxic respiratory failure  Acute encephalopathy  Acute extensive bihemispheric MCA territory infarction  CAP versus aspiration pneumonia  Rhinovirus  Acute on chronic pansinusitis  Sepsis  Klebsiella UTI  IRA versus ATN  Rhabdomyolysis  CAD  PAF  Hypertension  Diabetes  GERD  Bronchiectasis  Sarcoidosis  Anxiety  Microcytic hypochromic anemia  Tobacco use history  Polysubstance abuse history  Debility  Deconditioning    Discharge Condition:     Discharge Disposition:     PHYSICAL EXAM    Pulseless apneic unresponsive to any stimuli    Hospital Course By Problem:   Patient presented to the emergency room after being found unresponsive.  Patient initially given Narcan in the field and MMC with no improvement.  Patient required BVM ventilation followed by intubation on arrival to the emergency room..  Was subsequently admitted to the ICU in critical condition.  Workup continued while inpatient as to patient's profound encephalopathy with multiple etiologies ruled out including immediate infective sources, seizures, drug ingestion that were opioid-based.  MRI brain was done which did show extensive bilateral MCA strokes.  Patient retained brainstem reflexes.  Notably during admission she was also found to have pneumonia and rhinovirus infection.  Given the expected prognosis and severity of her condition, discussions were had with family and palliative care was consulted.  Patient was taken care of for several days noted to optimize and ensure that as much recovery as it is possible was  None.    FINDINGS:  Limitations: Evaluation of the solid organs and pulmonary arteries are limited  due to the lack of IV contrast.    Thyroid: Unremarkable.    Mediastinum: Several calcified lymph nodes are noted. Enteric tube is present  with the tip in the stomach. No central fluid collection. No mediastinal mass.    Heart: Unremarkable.    Pericardium: Unremarkable.      Coronary arteries: Extensive coronary artery calcifications.    Thoracic aorta: No evidence of aneurysm.    Pulmonary arteries: Due to the lack of IV contrast, evaluation of the pulmonary  arteries is limited.    Trachea and bronchi: The patient is intubated. Extensive bronchial thickening is  noted.    Pleura: No pneumothorax. No pleural effusion identified.    Lungs: Extensive infiltrates are noted especially in the right upper lobe and  right lower lobe.    Axilla/Chest Wall: The axilla are unremarkable.    Peritoneum: No free air identified.  No free fluid appreciated.  No fluid  collections identified.    Liver: Unremarkable    Biliary/gallbladder: No intrahepatic or extra hepatic biliary duct dilatation  identified. The gallbladder is surgically absent.    Spleen: Unremarkable    Pancreas: No peripancreatic inflammation appreciated. No pancreatic ductal  dilatation identified. No pancreatic lesions identified.    : The adrenal glands are unremarkable. No urolithiasis appreciated. No  perinephric fat stranding identified. In the upper pole of the right kidney,  there are 2 likely cysts superiormost is 1.2 cm. The inferior is 1.2 cm. No  evidence of hydroureteronephrosis. The bladder is decompressed by a Castillo  catheter.    GI: The stomach is unremarkable. The small bowel is without evidence of  obstruction. No dilated loops appreciated. No small bowel wall thickening  identified. The mesentery is without evidence of adenopathy or inflammation.  The appendix is unremarkable. Diverticulosis is noted. No evidence

## 2025-05-17 NOTE — DEATH NOTES
Death Pronouncement Note  Patient's Name: Juliana Loaiza   Patient's YOB: 1950  MRN Number: [id number]    Admitting Provider: Shashi Eid MD  Attending Provider: Velasquez Reilly MD    Patient was examined and the following were absent: Pulses, Blood Pressure, and Respiratory effort    I declared the patient dead on 5/17/2025 at 8:50 AM    Preliminary Cause of Death: Acute hypoxic respiratory failure (HCC)     Electronically signed by Velasquez Reilly MD on 5/17/25 at 9:15 AM EDT

## 2025-05-17 NOTE — ACP (ADVANCE CARE PLANNING)
Advance Care Planning   Healthcare Decision Maker:    Primary Decision Maker: Antony Loaiza - Spouse - [phone]     responded to Death of  Juliana Loaiza, who was a 74 y.o.,female,     The  provided the following Interventions:  Provided crisis pastoral care, pastoral support and grief interventions.   Offered prayers on behalf of the patient.   Chart reviewed.      Plan:  Chaplains will continue to follow and will provide pastoral care on an as needed/requested basis and grief support for the family.        dermatitisSpiritual Health History and Assessment/Progress Note  [facility]    Interdisciplinary rounds,  ,  ,      Name: Juilana Loaiza MRN: [id number]    Age: 74 y.o.     Sex: female   Language: English   Synagogue: No Mosque on file   Coma (HCC)     Date: 5/17/2025            Total Time Calculated: 4 min              Spiritual Assessment continued in [facility] 3 INTENSIVE CARE UNIT        Referral/Consult From: Multi-disciplinary team   Encounter Overview/Reason: Interdisciplinary rounds  Service Provided For: Patient    Cherie, Belief, Meaning:   Patient death  Family/Friends No family/friends present      Importance and Influence:    Family/Friends     Community:  Patient Other: death  Family/Friends are connected with a spiritual community: and No family/friends present    Assessment and Plan of Care:     Patient Interventions include:   Family/Friends Interventions include:     Patient Plan of Care:   Family/Friends Plan of Care:     Electronically signed by Ismael Nguyen Jr.,  on 5/17/2025 at 9:30 AM

## 2025-05-17 NOTE — PROGRESS NOTES
Patient rested peacefully this shift. Turned and repositioned q 2 hours. Mouth care completed. AM care completed. Morphine PCA infusing at 2 mg/hr continuous dose.

## 2025-05-17 NOTE — PLAN OF CARE
Problem: Safety - Adult  Goal: Free from fall injury  5/17/2025 0209 by Odilia Mejia, RN  Outcome: Progressing  Flowsheets (Taken 5/16/2025 2000)  Free From Fall Injury: Instruct family/caregiver on patient safety     Problem: Pain  Goal: Verbalizes/displays adequate comfort level or baseline comfort level  Outcome: Progressing  Flowsheets  Taken 5/16/2025 2000 by Odilia Mejia RN  Verbalizes/displays adequate comfort level or baseline comfort level:   Encourage patient to monitor pain and request assistance   Assess pain using appropriate pain scale   Administer analgesics based on type and severity of pain and evaluate response   Implement non-pharmacological measures as appropriate and evaluate response  Taken 5/16/2025 1600 by Frieda Murry RN  Verbalizes/displays adequate comfort level or baseline comfort level:   Assess pain using appropriate pain scale   Administer analgesics based on type and severity of pain and evaluate response   Implement non-pharmacological measures as appropriate and evaluate response

## 2025-05-17 NOTE — PROGRESS NOTES
Patient asystole, called spouse, Antony but no answer, notified friend enlisted in the contact, Chava Valencia and notified about the passing of the pt, the later will go get the pt's spouse at the facility then will bring him to come see the pt.

## 2025-05-30 LAB
Lab: NORMAL
Lab: NORMAL
REFERENCE LAB: NORMAL

## (undated) DEVICE — GLIDESHEATH SLENDER STAINLESS STEEL KIT: Brand: GLIDESHEATH SLENDER

## (undated) DEVICE — BASIN EMESIS 500CC ROSE 250/CS 60/PLT: Brand: MEDEGEN MEDICAL PRODUCTS, LLC

## (undated) DEVICE — SYR 50ML SLIP TIP NSAF LF STRL --

## (undated) DEVICE — FLEX ADVANTAGE 3000CC: Brand: FLEX ADVANTAGE

## (undated) DEVICE — DRAPE,ANGIO,BRACH,STERILE,38X44: Brand: MEDLINE

## (undated) DEVICE — SOLUTION IRRIG 1000ML H2O STRL BLT

## (undated) DEVICE — SET FLD ADMIN 3 W STPCOCK FIX FEM L BOR 1IN

## (undated) DEVICE — CONTAINER PREFIL FRMLN 40ML --

## (undated) DEVICE — MEDI-VAC NON-CONDUCTIVE SUCTION TUBING: Brand: CARDINAL HEALTH

## (undated) DEVICE — SINGLE USE SUCTION VALVE MAJ-209: Brand: SINGLE USE SUCTION VALVE (STERILE)

## (undated) DEVICE — PACK PROCEDURE SURG VASC CATH 161 MMC LF

## (undated) DEVICE — BANDAGE HEMOSTATIC 3000

## (undated) DEVICE — 1860 HEALTH CARE N95 MASK, 20EACH/BOX  6 BX/C: Brand: 3M™

## (undated) DEVICE — AIRLIFE™ ADULT OXYGEN MASK VINYL, UNDER-THE-CHIN STYLE, MEDIUM CONCENTRATION MASK WITH 7 FEET (2.1 M) CRUSH-RESISTANT OXYGEN TUBING: Brand: AIRLIFE™

## (undated) DEVICE — FORCEPS BX L240CM JAW DIA2.4MM ORNG L CAP W/ NDL DISP RAD

## (undated) DEVICE — BITE BLOCK ENDOSCP UNIV AD 6 TO 9.4 MM

## (undated) DEVICE — STERILE POLYISOPRENE POWDER-FREE SURGICAL GLOVES: Brand: PROTEXIS

## (undated) DEVICE — GOWN ISOL IMPERV UNIV, DISP, OPEN BACK, BLUE --

## (undated) DEVICE — MEDI-VAC SUCTION HIGH CAPACITY: Brand: CARDINAL HEALTH

## (undated) DEVICE — SINGLE USE BIOPSY VALVE MAJ-210: Brand: SINGLE USE BIOPSY VALVE (STERILE)

## (undated) DEVICE — FCPS BIOP PULM RAD JAW 100CML -- BX/10 M00515180

## (undated) DEVICE — CANNULA NSL AD TBNG L14FT STD PVC O2 CRV CONN NONFLARED NSL

## (undated) DEVICE — LINER SUCT CANSTR 3000CC PLAS SFT PRE ASSEMB W/OUT TBNG W/

## (undated) DEVICE — ADAPTER TBNG DIA15MM SWVL FBROPT BRONCHSCP TERM 2 AXIS PEEP

## (undated) DEVICE — PROCEDURE KIT FLUID MGMT 10 FR CUST MAINFOLD

## (undated) DEVICE — SET ADMIN L104IN 20 GTT GRAV RLER CLMP SMRT SITE NDL FREE

## (undated) DEVICE — YANKAUER,SMOOTH HANDLE,HIGH CAPACITY: Brand: MEDLINE INDUSTRIES, INC.

## (undated) DEVICE — SYRINGE MED 25GA 3ML L5/8IN SUBQ PLAS W/ DETACH NDL SFTY

## (undated) DEVICE — RADIFOCUS OPTITORQUE ANGIOGRAPHIC CATHETER: Brand: OPTITORQUE

## (undated) DEVICE — ENDOSCOPY PUMP TUBING/ CAP SET: Brand: ERBE

## (undated) DEVICE — TRAP SUC MUCOUS 70ML -- MEDICHOICE MEDLINE

## (undated) DEVICE — BASIN EMSIS 16OZ GRAPHITE PLAS KID SHP MOLD GRAD FOR ORAL

## (undated) DEVICE — GAUZE,SPONGE,4"X4",16PLY,STRL,LF,10/TRAY: Brand: MEDLINE

## (undated) DEVICE — BLADE TNGE REG 6IN WOOD STRL -- CONVERT TO ITEM 153408

## (undated) DEVICE — MASK SURG REG ORNG LEV 3 SFTY SEAL 4 LAYR SFT INNR LINING

## (undated) DEVICE — DRAPE TWL SURG 16X26IN BLU ORB04] ALLCARE INC]

## (undated) DEVICE — (D)SYR 10ML SLIP TIP 1/5ML GRD -- DISC BY MFR USE ITEM 338000

## (undated) DEVICE — BE 105-8 BRONCHOSCOPE SWIVEL - 15MM ID/22MM OD (PATIENT PORT) X15MM OD (EQUIPMENT PORT). REUSABLE.  FITS COMPONENTS OF ADULT VENTILATOR CIRCUITS.  MOLDED OF POLYETHERIMIDE. INCLUDES TWO SILICONE RUBBER CAPS; ONE CAP ALLOWS FOR THE USE OF A SUCTIONING CATHETER WHILE THE OTHER CAP ALLOWS FOR THE USE OF A FIBER-OPTIC BRONCHOSCOPE WITHOUT SIGNIFICANT LOSS OF PEEP.: Brand: BE 105-8 BRONCHOSCOPE SWIVEL

## (undated) DEVICE — APPLICATOR FBR TIP L6IN COT TIP WOOD SHFT SWAB 2000 PER CA

## (undated) DEVICE — 1860S HEALTH CARE RESPIRATOR N95 120EA/C: Brand: 3M™

## (undated) DEVICE — NDL BX EXCELON 21GX130CM --

## (undated) DEVICE — CATHETER SUCT TR FL TIP 14FR W/ O CTRL

## (undated) DEVICE — FLUFF AND POLYMER UNDERPAD,EXTRA HEAVY: Brand: WINGS

## (undated) DEVICE — PRESSURE MONITORING SET: Brand: TRUWAVE

## (undated) DEVICE — AIRLIFE™ NASAL OXYGEN CANNULA CURVED, FLARED TIP WITH 14 FOOT (4.3 M) CRUSH-RESISTANT TUBING, OVER-THE-EAR STYLE: Brand: AIRLIFE™